# Patient Record
Sex: FEMALE | Race: WHITE | Employment: UNEMPLOYED | URBAN - METROPOLITAN AREA
[De-identification: names, ages, dates, MRNs, and addresses within clinical notes are randomized per-mention and may not be internally consistent; named-entity substitution may affect disease eponyms.]

---

## 2017-02-23 ENCOUNTER — HOSPITAL ENCOUNTER (EMERGENCY)
Age: 32
Discharge: HOME | End: 2017-02-24
Attending: EMERGENCY MEDICINE
Payer: COMMERCIAL

## 2017-02-23 DIAGNOSIS — G89.29 OTHER CHRONIC PAIN: ICD-10-CM

## 2017-02-23 DIAGNOSIS — F11.20 NARCOTIC DEPENDENCE (HCC): ICD-10-CM

## 2017-02-23 DIAGNOSIS — R10.9 FLANK PAIN: Primary | ICD-10-CM

## 2017-02-23 DIAGNOSIS — N39.0 URINARY TRACT INFECTION WITH HEMATURIA, SITE UNSPECIFIED: ICD-10-CM

## 2017-02-23 DIAGNOSIS — R31.9 URINARY TRACT INFECTION WITH HEMATURIA, SITE UNSPECIFIED: ICD-10-CM

## 2017-02-23 DIAGNOSIS — R31.9 HEMATURIA: ICD-10-CM

## 2017-02-23 LAB
ALBUMIN SERPL BCP-MCNC: 3.9 G/DL (ref 3.5–5)
ALBUMIN/GLOB SERPL: 1.3 {RATIO} (ref 1.1–2.2)
ALP SERPL-CCNC: 118 U/L (ref 45–117)
ALT SERPL-CCNC: 17 U/L (ref 12–78)
ANION GAP BLD CALC-SCNC: 8 MMOL/L (ref 5–15)
APPEARANCE UR: ABNORMAL
AST SERPL W P-5'-P-CCNC: 12 U/L (ref 15–37)
BACTERIA URNS QL MICRO: ABNORMAL /HPF
BASOPHILS # BLD AUTO: 0 K/UL (ref 0–0.1)
BASOPHILS # BLD: 0 % (ref 0–1)
BILIRUB SERPL-MCNC: 0.2 MG/DL (ref 0.2–1)
BILIRUB UR QL: NEGATIVE
BUN SERPL-MCNC: 19 MG/DL (ref 6–20)
BUN/CREAT SERPL: 21 (ref 12–20)
CALCIUM SERPL-MCNC: 8.7 MG/DL (ref 8.5–10.1)
CHLORIDE SERPL-SCNC: 104 MMOL/L (ref 97–108)
CO2 SERPL-SCNC: 27 MMOL/L (ref 21–32)
COLOR UR: ABNORMAL
CREAT SERPL-MCNC: 0.9 MG/DL (ref 0.55–1.02)
EOSINOPHIL # BLD: 0.3 K/UL (ref 0–0.4)
EOSINOPHIL NFR BLD: 4 % (ref 0–7)
EPITH CASTS URNS QL MICRO: ABNORMAL /LPF
ERYTHROCYTE [DISTWIDTH] IN BLOOD BY AUTOMATED COUNT: 12.6 % (ref 11.5–14.5)
GLOBULIN SER CALC-MCNC: 3 G/DL (ref 2–4)
GLUCOSE SERPL-MCNC: 73 MG/DL (ref 65–100)
GLUCOSE UR STRIP.AUTO-MCNC: NEGATIVE MG/DL
HCT VFR BLD AUTO: 41.1 % (ref 35–47)
HGB BLD-MCNC: 14 G/DL (ref 11.5–16)
HGB UR QL STRIP: ABNORMAL
HYALINE CASTS URNS QL MICRO: ABNORMAL /LPF (ref 0–5)
KETONES UR QL STRIP.AUTO: NEGATIVE MG/DL
LEUKOCYTE ESTERASE UR QL STRIP.AUTO: ABNORMAL
LYMPHOCYTES # BLD AUTO: 45 % (ref 12–49)
LYMPHOCYTES # BLD: 4.3 K/UL (ref 0.8–3.5)
MCH RBC QN AUTO: 32.3 PG (ref 26–34)
MCHC RBC AUTO-ENTMCNC: 34.1 G/DL (ref 30–36.5)
MCV RBC AUTO: 94.7 FL (ref 80–99)
MONOCYTES # BLD: 0.7 K/UL (ref 0–1)
MONOCYTES NFR BLD AUTO: 7 % (ref 5–13)
NEUTS SEG # BLD: 4.3 K/UL (ref 1.8–8)
NEUTS SEG NFR BLD AUTO: 44 % (ref 32–75)
NITRITE UR QL STRIP.AUTO: NEGATIVE
PH UR STRIP: 6 [PH] (ref 5–8)
PLATELET # BLD AUTO: 320 K/UL (ref 150–400)
POTASSIUM SERPL-SCNC: 4 MMOL/L (ref 3.5–5.1)
PROT SERPL-MCNC: 6.9 G/DL (ref 6.4–8.2)
PROT UR STRIP-MCNC: NEGATIVE MG/DL
RBC # BLD AUTO: 4.34 M/UL (ref 3.8–5.2)
RBC #/AREA URNS HPF: >100 /HPF (ref 0–5)
SODIUM SERPL-SCNC: 139 MMOL/L (ref 136–145)
SP GR UR REFRACTOMETRY: 1.03 (ref 1–1.03)
UA: UC IF INDICATED,UAUC: ABNORMAL
UROBILINOGEN UR QL STRIP.AUTO: 0.2 EU/DL (ref 0.2–1)
WBC # BLD AUTO: 9.7 K/UL (ref 3.6–11)
WBC URNS QL MICRO: ABNORMAL /HPF (ref 0–4)

## 2017-02-23 PROCEDURE — 81001 URINALYSIS AUTO W/SCOPE: CPT | Performed by: EMERGENCY MEDICINE

## 2017-02-23 PROCEDURE — 87086 URINE CULTURE/COLONY COUNT: CPT | Performed by: EMERGENCY MEDICINE

## 2017-02-23 PROCEDURE — 85025 COMPLETE CBC W/AUTO DIFF WBC: CPT | Performed by: EMERGENCY MEDICINE

## 2017-02-23 PROCEDURE — 96376 TX/PRO/DX INJ SAME DRUG ADON: CPT

## 2017-02-23 PROCEDURE — 99282 EMERGENCY DEPT VISIT SF MDM: CPT

## 2017-02-23 PROCEDURE — 80053 COMPREHEN METABOLIC PANEL: CPT | Performed by: EMERGENCY MEDICINE

## 2017-02-23 PROCEDURE — 36415 COLL VENOUS BLD VENIPUNCTURE: CPT | Performed by: EMERGENCY MEDICINE

## 2017-02-23 PROCEDURE — 96375 TX/PRO/DX INJ NEW DRUG ADDON: CPT

## 2017-02-23 PROCEDURE — 96374 THER/PROPH/DIAG INJ IV PUSH: CPT

## 2017-02-23 RX ORDER — HYDROMORPHONE HYDROCHLORIDE 1 MG/ML
1 INJECTION, SOLUTION INTRAMUSCULAR; INTRAVENOUS; SUBCUTANEOUS
Status: COMPLETED | OUTPATIENT
Start: 2017-02-24 | End: 2017-02-24

## 2017-02-23 RX ORDER — ONDANSETRON 2 MG/ML
4 INJECTION INTRAMUSCULAR; INTRAVENOUS
Status: COMPLETED | OUTPATIENT
Start: 2017-02-24 | End: 2017-02-24

## 2017-02-24 ENCOUNTER — APPOINTMENT (OUTPATIENT)
Dept: CT IMAGING | Age: 32
End: 2017-02-24
Attending: EMERGENCY MEDICINE
Payer: COMMERCIAL

## 2017-02-24 VITALS
BODY MASS INDEX: 39.19 KG/M2 | RESPIRATION RATE: 16 BRPM | OXYGEN SATURATION: 100 % | HEIGHT: 62 IN | DIASTOLIC BLOOD PRESSURE: 71 MMHG | HEART RATE: 74 BPM | TEMPERATURE: 98.1 F | SYSTOLIC BLOOD PRESSURE: 105 MMHG | WEIGHT: 212.96 LBS

## 2017-02-24 PROCEDURE — 74011250636 HC RX REV CODE- 250/636: Performed by: EMERGENCY MEDICINE

## 2017-02-24 PROCEDURE — 74176 CT ABD & PELVIS W/O CONTRAST: CPT

## 2017-02-24 RX ORDER — NITROFURANTOIN 25; 75 MG/1; MG/1
100 CAPSULE ORAL 2 TIMES DAILY
Qty: 6 CAP | Refills: 0 | Status: SHIPPED | OUTPATIENT
Start: 2017-02-24 | End: 2017-02-27

## 2017-02-24 RX ORDER — PREDNISONE 20 MG/1
20 TABLET ORAL 2 TIMES DAILY
COMMUNITY
End: 2017-03-22

## 2017-02-24 RX ORDER — HYDROMORPHONE HYDROCHLORIDE 1 MG/ML
0.5 INJECTION, SOLUTION INTRAMUSCULAR; INTRAVENOUS; SUBCUTANEOUS
Status: COMPLETED | OUTPATIENT
Start: 2017-02-24 | End: 2017-02-24

## 2017-02-24 RX ORDER — OXYCODONE HYDROCHLORIDE 5 MG/1
5 CAPSULE ORAL
Qty: 12 CAP | Refills: 0 | Status: SHIPPED | OUTPATIENT
Start: 2017-02-24 | End: 2017-04-11 | Stop reason: CLARIF

## 2017-02-24 RX ORDER — VENLAFAXINE 100 MG/1
75 TABLET ORAL DAILY
COMMUNITY
End: 2018-06-17

## 2017-02-24 RX ADMIN — HYDROMORPHONE HYDROCHLORIDE 1 MG: 1 INJECTION, SOLUTION INTRAMUSCULAR; INTRAVENOUS; SUBCUTANEOUS at 00:44

## 2017-02-24 RX ADMIN — ONDANSETRON HYDROCHLORIDE 4 MG: 2 INJECTION, SOLUTION INTRAMUSCULAR; INTRAVENOUS at 00:44

## 2017-02-24 RX ADMIN — HYDROMORPHONE HYDROCHLORIDE 0.5 MG: 1 INJECTION, SOLUTION INTRAMUSCULAR; INTRAVENOUS; SUBCUTANEOUS at 03:00

## 2017-02-24 RX ADMIN — HYDROMORPHONE HYDROCHLORIDE 0.5 MG: 1 INJECTION, SOLUTION INTRAMUSCULAR; INTRAVENOUS; SUBCUTANEOUS at 02:01

## 2017-02-24 NOTE — DISCHARGE INSTRUCTIONS
Chronic Pain: Care Instructions  Your Care Instructions  Chronic pain is pain that lasts a long time (months or even years) and may or may not have a clear cause. It is different from acute pain, which usually does have a clear cause--like an injury or illness--and gets better over time. Chronic pain:  · Lasts over time but may vary from day to day. · Does not go away despite efforts to end it. · May disrupt your sleep and lead to fatigue. · May cause depression or anxiety. · May make your muscles tense, causing more pain. · Can disrupt your work, hobbies, home life, and relationships with friends and family. Chronic pain is a very real condition. It is not just in your head. Treatment can help and usually includes several methods used together, such as medicines, physical therapy, exercise, and other treatments. Learning how to relax and changing negative thought patterns can also help you cope. Chronic pain is complex. Taking an active role in your treatment will help you better manage your pain. Tell your doctor if you have trouble dealing with your pain. You may have to try several things before you find what works best for you. Follow-up care is a key part of your treatment and safety. Be sure to make and go to all appointments, and call your doctor if you are having problems. Its also a good idea to know your test results and keep a list of the medicines you take. How can you care for yourself at home? · Pace yourself. Break up large jobs into smaller tasks. Save harder tasks for days when you have less pain, or go back and forth between hard tasks and easier ones. Take rest breaks. · Relax, and reduce stress. Relaxation techniques such as deep breathing or meditation can help. · Keep moving. Gentle, daily exercise can help reduce pain over the long run. Try low- or no-impact exercises such as walking, swimming, and stationary biking. Do stretches to stay flexible.   · Try heat, cold packs, and massage. · Get enough sleep. Chronic pain can make you tired and drain your energy. Talk with your doctor if you have trouble sleeping because of pain. · Think positive. Your thoughts can affect your pain level. Do things that you enjoy to distract yourself when you have pain instead of focusing on the pain. See a movie, read a book, listen to music, or spend time with a friend. · If you think you are depressed, talk to your doctor about treatment. · Keep a daily pain diary. Record how your moods, thoughts, sleep patterns, activities, and medicine affect your pain. You may find that your pain is worse during or after certain activities or when you are feeling a certain emotion. Having a record of your pain can help you and your doctor find the best ways to treat your pain. · Take pain medicines exactly as directed. ¨ If the doctor gave you a prescription medicine for pain, take it as prescribed. ¨ If you are not taking a prescription pain medicine, ask your doctor if you can take an over-the-counter medicine. Reducing constipation caused by pain medicine  · Include fruits, vegetables, beans, and whole grains in your diet each day. These foods are high in fiber. · Drink plenty of fluids, enough so that your urine is light yellow or clear like water. If you have kidney, heart, or liver disease and have to limit fluids, talk with your doctor before you increase the amount of fluids you drink. · If your doctor recommends it, get more exercise. Walking is a good choice. Bit by bit, increase the amount you walk every day. Try for at least 30 minutes on most days of the week. · Schedule time each day for a bowel movement. A daily routine may help. Take your time and do not strain when having a bowel movement. When should you call for help? Call your doctor now or seek immediate medical care if:  · Your pain gets worse or is out of control.   · You feel down or blue, or you do not enjoy things like you once did. You may be depressed, which is common in people with chronic pain. Depression can be treated. · You have vomiting or cramps for more than 2 hours. Watch closely for changes in your health, and be sure to contact your doctor if:  · You cannot sleep because of pain. · You are very worried or anxious about your pain. · You have trouble taking your pain medicine. · You have any concerns about your pain medicine. · You have trouble with bowel movements, such as:  ¨ No bowel movement in 3 days. ¨ Blood in the anal area, in your stool, or on the toilet paper. ¨ Diarrhea for more than 24 hours. Where can you learn more? Go to http://kymberly-yoon.info/. Enter N004 in the search box to learn more about \"Chronic Pain: Care Instructions. \"  Current as of: February 19, 2016  Content Version: 11.1  © 2006-2016 [employer]. Care instructions adapted under license by [employer] (which disclaims liability or warranty for this information). If you have questions about a medical condition or this instruction, always ask your healthcare professional. [place] [address] any warranty or liability for your use of this information. Blood in the Urine: Care Instructions  Your Care Instructions  Blood in the urine, or hematuria, may make the urine look red, brown, or pink. There may be blood every time you urinate or just from time to time. You cannot always see blood in the urine, but it will show up in a urine test.  Blood in the urine may be serious. It should always be checked by a doctor. Your doctor may recommend more tests, including an X-ray, a CT scan, or a cystoscopy (which lets a doctor look inside the urethra and bladder). Blood in the urine can be a sign of another problem. Common causes are bladder infections and kidney stones. An injury to your groin or your genital area can also cause bleeding in the urinary tract.  Very hard exercise--such as running a marathon--can cause blood in the urine. Blood in the urine can also be a sign of kidney disease or cancer in the bladder or kidney. Many cases of blood in the urine are caused by a harmless condition that runs in families. This is called benign familial hematuria. It does not need any treatment. Sometimes your urine may look red or brown even though it does not contain blood. For example, not getting enough fluids (dehydration), taking certain medicines, or having a liver problem can change the color of your urine. Eating foods such as beets, rhubarb, or blackberries or foods with red food coloring can make your urine look red or pink. Follow-up care is a key part of your treatment and safety. Be sure to make and go to all appointments, and call your doctor if you are having problems. It's also a good idea to know your test results and keep a list of the medicines you take. When should you call for help? Call your doctor now or seek immediate medical care if:  · You have symptoms of a urinary infection. For example:  ¨ You have pus in your urine. ¨ You have pain in your back just below your rib cage. This is called flank pain. ¨ You have a fever, chills, or body aches. ¨ It hurts to urinate. ¨ You have groin or belly pain. · You have more blood in your urine. Watch closely for changes in your health, and be sure to contact your doctor if:  · You have new urination problems. · You do not get better as expected. Where can you learn more? Go to http://kymberly-yoon.info/. Enter M524 in the search box to learn more about \"Blood in the Urine: Care Instructions. \"  Current as of: August 12, 2016  Content Version: 11.1  © 5451-5814 [employer]. Care instructions adapted under license by [employer] (which disclaims liability or warranty for this information).  If you have questions about a medical condition or this instruction, always ask your healthcare professional. [address] any warranty or liability for your use of this information. Abdominal Pain: Care Instructions  Your Care Instructions    Abdominal pain has many possible causes. Some aren't serious and get better on their own in a few days. Others need more testing and treatment. If your pain continues or gets worse, you need to be rechecked and may need more tests to find out what is wrong. You may need surgery to correct the problem. Don't ignore new symptoms, such as fever, nausea and vomiting, urination problems, pain that gets worse, and dizziness. These may be signs of a more serious problem. Your doctor may have recommended a follow-up visit in the next 8 to 12 hours. If you are not getting better, you may need more tests or treatment. The doctor has checked you carefully, but problems can develop later. If you notice any problems or new symptoms, get medical treatment right away. Follow-up care is a key part of your treatment and safety. Be sure to make and go to all appointments, and call your doctor if you are having problems. It's also a good idea to know your test results and keep a list of the medicines you take. How can you care for yourself at home? · Rest until you feel better. · To prevent dehydration, drink plenty of fluids, enough so that your urine is light yellow or clear like water. Choose water and other caffeine-free clear liquids until you feel better. If you have kidney, heart, or liver disease and have to limit fluids, talk with your doctor before you increase the amount of fluids you drink. · If your stomach is upset, eat mild foods, such as rice, dry toast or crackers, bananas, and applesauce. Try eating several small meals instead of two or three large ones. · Wait until 48 hours after all symptoms have gone away before you have spicy foods, alcohol, and drinks that contain caffeine. · Do not eat foods that are high in fat.   · Avoid anti-inflammatory medicines such as aspirin, ibuprofen (Advil, Motrin), and naproxen (Aleve). These can cause stomach upset. Talk to your doctor if you take daily aspirin for another health problem. When should you call for help? Call 911 anytime you think you may need emergency care. For example, call if:  · You passed out (lost consciousness). · You pass maroon or very bloody stools. · You vomit blood or what looks like coffee grounds. · You have new, severe belly pain. Call your doctor now or seek immediate medical care if:  · Your pain gets worse, especially if it becomes focused in one area of your belly. · You have a new or higher fever. · Your stools are black and look like tar, or they have streaks of blood. · You have unexpected vaginal bleeding. · You have symptoms of a urinary tract infection. These may include:  ¨ Pain when you urinate. ¨ Urinating more often than usual.  ¨ Blood in your urine. · You are dizzy or lightheaded, or you feel like you may faint. Watch closely for changes in your health, and be sure to contact your doctor if:  · You are not getting better after 1 day (24 hours). Where can you learn more? Go to http://kymberly-yoon.info/. Enter X855 in the search box to learn more about \"Abdominal Pain: Care Instructions. \"  Current as of: May 27, 2016  Content Version: 11.1  © 9236-7910 [email or web address]. Care instructions adapted under license by [employer] (which disclaims liability or warranty for this information). If you have questions about a medical condition or this instruction, always ask your healthcare professional. [place] [address] any warranty or liability for your use of this information.

## 2017-02-24 NOTE — ED PROVIDER NOTES
HPI Comments: Shona Muñoz is a 33 y.o. female, pmhx significant for kidney stone, GERD, IBS, ovarian cyst, lupus, who presents ambulatory to the ED c/o acute on chronic BL flank pain with associated nausea, hematuria, inability to fully empty bladder and suprapubic pain x today. Pt states that she saw her rheumatologist a few weeks ago and stated that her kidneys were inflamed, which was typical for lupus pts, via US. He rx'd her a course of 20 mg BID of prednisone, of which she has 7 more days. He told her that if she has increased pain to go into the ED to be evaluated. He also found on a R kidney stone on US 3 days ago. Pt has taken zofran and tramadol with no relief from her sxs. Of note, pt reports that her creatinine was increased 1.5 weeks ago but her urine came back clean. Pt specifically denies fever or vomiting. PCP: Evelia Merlin, MD      Social Hx: -tobacco, -EtOH, -Illicit Drugs   FHx: no pertinent family hx   Medication Allergies: compazine, penicillins, sulfa, topamax, toradol, valproic acid      There are no other complaints, changes, or physical findings at this time. The history is provided by the patient.         Past Medical History:   Diagnosis Date    Abnormal CT of the abdomen 11/8/2012    Asthma     Autoimmune disease ([facility] [address]     lupus    Cervical prolapse     Dehydration     Fatty liver     Gastrointestinal disorder     gerd, twisted colon, IBS    GERD (gastroesophageal reflux disease)     Headache(784.0)     Morbid obesity (HCC)     Nausea & vomiting     Neurological disorder     cluster headaches    other 2006, 2009    ovarian cyst removal    Other ill-defined conditions(799.89)     Worsening headaches        Past Surgical History:   Procedure Laterality Date    COLONOSCOPY  9/21/2010         HX CHOLECYSTECTOMY      HX GYN  1/2009    right salpingo oopherectomy    HX GYN  2006    right ovarian tumor removed    HX HEENT      left wisdom teeth removal  HX HEENT      top right wisdom tooth removed    HX HERNIA REPAIR  4/2012    HX HERNIA REPAIR  2/28/13    Laparoscopic recurrent incisional hernia repair    HX UROLOGICAL      Kidney Stone Removal    [place] EGD TRANSORAL BIOPSY SINGLE/MULTIPLE  9/22/2010              History reviewed. No pertinent family history. Social History     Social History    Marital status:      Spouse name: N/A    Number of children: N/A    Years of education: N/A     Occupational History    Not on file. Social History Main Topics    Smoking status: Never Smoker    Smokeless tobacco: Never Used    Alcohol use No    Drug use: No    Sexual activity: Not Currently     Partners: Male     Birth control/ protection: Abstinence     Other Topics Concern    Not on file     Social History Narrative         ALLERGIES: Latex; Compazine [prochlorperazine]; Pcn [penicillins]; Sulfa (sulfonamide antibiotics); Topamax [topiramate]; Adhesive; Mushroom combination no.1; Toradol [ketorolac tromethamine]; and Valproic acid    Review of Systems   Constitutional: Negative for activity change, appetite change, fatigue and fever. HENT: Negative. Negative for congestion, rhinorrhea and sore throat. Respiratory: Negative. Negative for cough, shortness of breath and wheezing. Cardiovascular: Negative. Negative for chest pain and leg swelling. Gastrointestinal: Positive for abdominal pain (suprapubic) and nausea. Negative for abdominal distention, constipation, diarrhea and vomiting. Endocrine: Negative. Genitourinary: Positive for flank pain (BL) and hematuria. Negative for difficulty urinating, dysuria, menstrual problem, vaginal bleeding and vaginal discharge.        + inability to empty bladder    Musculoskeletal: Negative for arthralgias, joint swelling and myalgias. Skin: Negative. Negative for rash. Neurological: Negative. Negative for dizziness, weakness, light-headedness and headaches. Psychiatric/Behavioral: Negative. All other systems reviewed and are negative. Patient Vitals for the past 12 hrs:   Temp Pulse Resp BP SpO2   02/23/17 2248 98.1 °F (36.7 °C) 76 16 117/74 100 %       Physical Exam   Constitutional: She is oriented to person, place, and time. She appears well-developed. No distress (no acute distress). Non - toxic  Overweight    HENT:   Head: Atraumatic. Eyes: EOM are normal.   Cardiovascular: Normal rate, regular rhythm, normal heart sounds and intact distal pulses. Exam reveals no gallop and no friction rub. No murmur heard. Pulmonary/Chest: Effort normal and breath sounds normal. No respiratory distress. She has no wheezes. She has no rales. She exhibits no tenderness. Abdominal: Soft. Bowel sounds are normal. She exhibits no distension and no mass. There is tenderness (mild BL CVA tenderness). There is no rebound and no guarding. Musculoskeletal: Normal range of motion. She exhibits no edema or tenderness. Neurological: She is oriented to person, place, and time. Skin: Skin is warm. Psychiatric: She has a normal mood and affect. Nursing note and vitals reviewed. MDM  Number of Diagnoses or Management Options  Flank pain:   Hematuria:   Urinary tract infection with hematuria, site unspecified:   Diagnosis management comments: DDx: UTI, pyelonephritis, acute on chronic kidney disease, lupus flare, ureteral stone, kidney stone        Amount and/or Complexity of Data Reviewed  Clinical lab tests: reviewed and ordered  Tests in the radiology section of CPT®: reviewed and ordered  Review and summarize past medical records: yes  Independent visualization of images, tracings, or specimens: yes    Patient Progress  Patient progress: stable      Progress Note:  Pt had good relief from IV pain medication. Awaiting CT.   Lizzie Burr MD    Progress Note:  Unable to obtain records from OSH. OSH denies record of patient visiting this facility despite patient's report of such visit. Nelsy Rodriguez MD    Progress Note:  Pt asking about how to manage a skin lesion inside her nose at time of discharge. Pt was referred back to her physician for further management of lesion she feels is related to her SLE. Progress Note:  Pt has not had any N/V during ED visit. Pain controlled at time of discharge. Nelsy Rodriguez MD      Procedures  LABORATORY TESTS:  Recent Results (from the past 12 hour(s))   CBC WITH AUTOMATED DIFF    Collection Time: 02/23/17 11:01 PM   Result Value Ref Range    WBC 9.7 3.6 - 11.0 K/uL    RBC 4.34 3.80 - 5.20 M/uL    HGB 14.0 11.5 - 16.0 g/dL    HCT 41.1 35.0 - 47.0 %    MCV 94.7 80.0 - 99.0 FL    MCH 32.3 26.0 - 34.0 PG    MCHC 34.1 30.0 - 36.5 g/dL    RDW 12.6 11.5 - 14.5 %    PLATELET 831 118 - 291 K/uL    NEUTROPHILS 44 32 - 75 %    LYMPHOCYTES 45 12 - 49 %    MONOCYTES 7 5 - 13 %    EOSINOPHILS 4 0 - 7 %    BASOPHILS 0 0 - 1 %    ABS. NEUTROPHILS 4.3 1.8 - 8.0 K/UL    ABS. LYMPHOCYTES 4.3 (H) 0.8 - 3.5 K/UL    ABS. MONOCYTES 0.7 0.0 - 1.0 K/UL    ABS. EOSINOPHILS 0.3 0.0 - 0.4 K/UL    ABS. BASOPHILS 0.0 0.0 - 0.1 K/UL   METABOLIC PANEL, COMPREHENSIVE    Collection Time: 02/23/17 11:01 PM   Result Value Ref Range    Sodium 139 136 - 145 mmol/L    Potassium 4.0 3.5 - 5.1 mmol/L    Chloride 104 97 - 108 mmol/L    CO2 27 21 - 32 mmol/L    Anion gap 8 5 - 15 mmol/L    Glucose 73 65 - 100 mg/dL    BUN 19 6 - 20 MG/DL    Creatinine 0.90 0.55 - 1.02 MG/DL    BUN/Creatinine ratio 21 (H) 12 - 20      GFR est AA >60 >60 ml/min/1.73m2    GFR est non-AA >60 >60 ml/min/1.73m2    Calcium 8.7 8.5 - 10.1 MG/DL    Bilirubin, total 0.2 0.2 - 1.0 MG/DL    ALT (SGPT) 17 12 - 78 U/L    AST (SGOT) 12 (L) 15 - 37 U/L    Alk.  phosphatase 118 (H) 45 - 117 U/L    Protein, total 6.9 6.4 - 8.2 g/dL    Albumin 3.9 3.5 - 5.0 g/dL    Globulin 3.0 2.0 - 4.0 g/dL    A-G Ratio 1.3 1.1 - 2.2     URINALYSIS W/ REFLEX CULTURE    Collection Time: 02/23/17 11:19 PM   Result Value Ref Range    Color YELLOW/STRAW      Appearance CLOUDY (A) CLEAR      Specific gravity 1.026 1.003 - 1.030      pH (UA) 6.0 5.0 - 8.0      Protein NEGATIVE  NEG mg/dL    Glucose NEGATIVE  NEG mg/dL    Ketone NEGATIVE  NEG mg/dL    Bilirubin NEGATIVE  NEG      Blood LARGE (A) NEG      Urobilinogen 0.2 0.2 - 1.0 EU/dL    Nitrites NEGATIVE  NEG      Leukocyte Esterase SMALL (A) NEG      WBC 10-20 0 - 4 /hpf    RBC >100 (H) 0 - 5 /hpf    Epithelial cells MANY (A) FEW /lpf    Bacteria 1+ (A) NEG /hpf    UA:UC IF INDICATED URINE CULTURE ORDERED (A) CNI      Hyaline cast 2-5 0 - 5 /lpf       IMAGING RESULTS:  CT Results  (Last 48 hours)               02/24/17 0128  CT ABD PELV WO CONT Final result    Impression:  IMPRESSION:   Punctate nonobstructing right renal stone. No ureteral stone or hydronephrosis. No acute abnormality. Narrative:  INDICATION: Bilateral flank pain right greater than left for one week       COMPARISON: 12/8/2016       TECHNIQUE:    Thin axial images were obtained through the abdomen and pelvis. Coronal and   sagittal reconstructions were generated. Oral contrast was not administered. CT   dose reduction was achieved through use of a standardized protocol tailored for   this examination and automatic exposure control for dose modulation. The absence of intravenous contrast material reduces the sensitivity for   evaluation of the solid parenchymal organs of the abdomen. FINDINGS:    LUNG BASES: Clear. INCIDENTALLY IMAGED HEART AND MEDIASTINUM: Unremarkable. LIVER: No mass or biliary dilatation. GALLBLADDER: Surgically absent. SPLEEN: No mass. PANCREAS: No mass or ductal dilatation. ADRENALS: Unremarkable. KIDNEYS/URETERS: Subcentimeter right renal cyst is stable. There is a punctate   nonobstructing right renal stone. No ureteral stone or hydronephrosis. STOMACH: Unremarkable. SMALL BOWEL: No dilatation or wall thickening.    COLON: There is moderate fecal stasis without colonic distention   APPENDIX: Unremarkable. PERITONEUM: No ascites or pneumoperitoneum. RETROPERITONEUM: No lymphadenopathy or aortic aneurysm. REPRODUCTIVE ORGANS: The uterus is surgically absent. URINARY BLADDER: No mass or calculus. BONES: No destructive bone lesion. ADDITIONAL COMMENTS: N/A                 MEDICATIONS GIVEN:  Medications   sodium chloride 0.9 % bolus infusion 1,000 mL (1,000 mL IntraVENous Refused 2/24/17 0000)   HYDROmorphone (PF) (DILAUDID) injection 0.5 mg (not administered)   ondansetron (ZOFRAN) injection 4 mg (4 mg IntraVENous Given 2/24/17 0044)   HYDROmorphone (PF) (DILAUDID) injection 1 mg (1 mg IntraVENous Given 2/24/17 0044)       IMPRESSION:  1. Flank pain    2. Hematuria    3. Urinary tract infection with hematuria, site unspecified        PLAN:  1. Current Discharge Medication List      CONTINUE these medications which have NOT CHANGED    Details   predniSONE (DELTASONE) 20 mg tablet Take 20 mg by mouth two (2) times a day. venlafaxine (EFFEXOR) 100 mg tablet Take 150 mg by mouth daily. ondansetron (ZOFRAN ODT) 4 mg disintegrating tablet Take 1 Tab by mouth every eight (8) hours as needed for Nausea. Qty: 10 Tab, Refills: 0      rizatriptan (MAXALT) 10 mg tablet Take 10 mg by mouth once as needed for Migraine. May repeat in 2 hours if needed      belimumab (BENLYSTA) 400 mg solr injection by IntraVENous route. ALPRAZolam (XANAX) 1 mg tablet Take 1 Tab by mouth nightly. Max Daily Amount: 1 mg. Qty: 30 Tab, Refills: 6    Associated Diagnoses: Chronic migraine without aura, without mention of intractable migraine without mention of status migrainosus; Anxiety; Lupus (HCC)      mirabegron ER (MYRBETRIQ) 50 mg ER tablet Take 50 mg by mouth daily.  Indications: BLADDER HYPERACTIVITY      omeprazole (PRILOSEC) 20 mg capsule            2.   Follow-up Information     None        Return to ED if worse   DISCHARGE NOTE:  1:54 AM  The patient's results have been reviewed with family and/or caregiver. They verbally convey their understanding and agreement of the patient's signs, symptoms, diagnosis, treatment, and prognosis. They additionally agree to follow up as recommended in the discharge instructions or to return to the Emergency Room should the patient's condition change prior to their follow-up appointment. The family and/or caregiver verbally agrees with the care-plan and all of their questions have been answered. The discharge instructions have also been provided to the them along with educational information regarding the patient's diagnosis and a list of reasons why the patient would want to return to the ER prior to their follow-up appointment should their condition change. Written by Cesar Krause, ANDRE Scribe, as dictated by Estela Ceballos MD.    This note is prepared by Cesar Krause acting as scribe for MD Estela Arana MD : The scribe's documentation has been prepared under my direction and personally reviewed by me in its entirety. I confirm that the note above accurately reflects all work, treatment, procedures, and medical decision making performed by me.

## 2017-02-25 LAB
BACTERIA SPEC CULT: NORMAL
CC UR VC: NORMAL
SERVICE CMNT-IMP: NORMAL

## 2017-02-25 NOTE — PROGRESS NOTES
Called patient and discussed with patient urine results. No need for abx. Will f/u with Dr Thad Guan as needed. Ely Truong  Pagé FNP-BC

## 2017-03-15 ENCOUNTER — HOSPITAL ENCOUNTER (EMERGENCY)
Age: 32
Discharge: HOME | End: 2017-03-15
Attending: EMERGENCY MEDICINE
Payer: COMMERCIAL

## 2017-03-15 ENCOUNTER — APPOINTMENT (OUTPATIENT)
Dept: GENERAL RADIOLOGY | Age: 32
End: 2017-03-15
Attending: PHYSICIAN ASSISTANT
Payer: COMMERCIAL

## 2017-03-15 VITALS
SYSTOLIC BLOOD PRESSURE: 158 MMHG | HEART RATE: 59 BPM | DIASTOLIC BLOOD PRESSURE: 90 MMHG | TEMPERATURE: 98.4 F | BODY MASS INDEX: 38.58 KG/M2 | WEIGHT: 209.66 LBS | HEIGHT: 62 IN | RESPIRATION RATE: 18 BRPM | OXYGEN SATURATION: 97 %

## 2017-03-15 DIAGNOSIS — R05.9 COUGH: ICD-10-CM

## 2017-03-15 DIAGNOSIS — R07.89 CHEST WALL PAIN: ICD-10-CM

## 2017-03-15 DIAGNOSIS — R09.1 PLEURISY WITHOUT EFFUSION: Primary | ICD-10-CM

## 2017-03-15 LAB
ALBUMIN SERPL BCP-MCNC: 3.9 G/DL (ref 3.5–5)
ALBUMIN/GLOB SERPL: 1.4 {RATIO} (ref 1.1–2.2)
ALP SERPL-CCNC: 103 U/L (ref 45–117)
ALT SERPL-CCNC: 21 U/L (ref 12–78)
ANION GAP BLD CALC-SCNC: 5 MMOL/L (ref 5–15)
AST SERPL W P-5'-P-CCNC: 14 U/L (ref 15–37)
BASOPHILS # BLD AUTO: 0 K/UL (ref 0–0.1)
BASOPHILS # BLD: 0 % (ref 0–1)
BILIRUB SERPL-MCNC: 0.1 MG/DL (ref 0.2–1)
BUN SERPL-MCNC: 16 MG/DL (ref 6–20)
BUN/CREAT SERPL: 17 (ref 12–20)
CALCIUM SERPL-MCNC: 8.8 MG/DL (ref 8.5–10.1)
CHLORIDE SERPL-SCNC: 108 MMOL/L (ref 97–108)
CO2 SERPL-SCNC: 28 MMOL/L (ref 21–32)
CREAT SERPL-MCNC: 0.93 MG/DL (ref 0.55–1.02)
EOSINOPHIL # BLD: 0 K/UL (ref 0–0.4)
EOSINOPHIL NFR BLD: 0 % (ref 0–7)
ERYTHROCYTE [DISTWIDTH] IN BLOOD BY AUTOMATED COUNT: 12.9 % (ref 11.5–14.5)
GLOBULIN SER CALC-MCNC: 2.7 G/DL (ref 2–4)
GLUCOSE SERPL-MCNC: 105 MG/DL (ref 65–100)
HCT VFR BLD AUTO: 41.4 % (ref 35–47)
HGB BLD-MCNC: 14.3 G/DL (ref 11.5–16)
LYMPHOCYTES # BLD AUTO: 23 % (ref 12–49)
LYMPHOCYTES # BLD: 1.4 K/UL (ref 0.8–3.5)
MCH RBC QN AUTO: 32.6 PG (ref 26–34)
MCHC RBC AUTO-ENTMCNC: 34.5 G/DL (ref 30–36.5)
MCV RBC AUTO: 94.3 FL (ref 80–99)
MONOCYTES # BLD: 0.6 K/UL (ref 0–1)
MONOCYTES NFR BLD AUTO: 10 % (ref 5–13)
NEUTS SEG # BLD: 4.2 K/UL (ref 1.8–8)
NEUTS SEG NFR BLD AUTO: 67 % (ref 32–75)
PLATELET # BLD AUTO: 240 K/UL (ref 150–400)
POTASSIUM SERPL-SCNC: 4.1 MMOL/L (ref 3.5–5.1)
PROT SERPL-MCNC: 6.6 G/DL (ref 6.4–8.2)
RBC # BLD AUTO: 4.39 M/UL (ref 3.8–5.2)
SODIUM SERPL-SCNC: 141 MMOL/L (ref 136–145)
WBC # BLD AUTO: 6.3 K/UL (ref 3.6–11)

## 2017-03-15 PROCEDURE — 74011250637 HC RX REV CODE- 250/637: Performed by: PHYSICIAN ASSISTANT

## 2017-03-15 PROCEDURE — 80053 COMPREHEN METABOLIC PANEL: CPT

## 2017-03-15 PROCEDURE — 74011250636 HC RX REV CODE- 250/636: Performed by: PHYSICIAN ASSISTANT

## 2017-03-15 PROCEDURE — 96375 TX/PRO/DX INJ NEW DRUG ADDON: CPT

## 2017-03-15 PROCEDURE — 71020 XR CHEST PA LAT: CPT

## 2017-03-15 PROCEDURE — 36415 COLL VENOUS BLD VENIPUNCTURE: CPT

## 2017-03-15 PROCEDURE — 96374 THER/PROPH/DIAG INJ IV PUSH: CPT

## 2017-03-15 PROCEDURE — 99283 EMERGENCY DEPT VISIT LOW MDM: CPT

## 2017-03-15 PROCEDURE — 85025 COMPLETE CBC W/AUTO DIFF WBC: CPT

## 2017-03-15 RX ORDER — MELOXICAM 15 MG/1
15 TABLET ORAL DAILY
Qty: 10 TAB | Refills: 0 | Status: SHIPPED | OUTPATIENT
Start: 2017-03-15 | End: 2017-03-22

## 2017-03-15 RX ORDER — GUAIFENESIN 100 MG/5ML
100 SOLUTION ORAL
Qty: 1 BOTTLE | Refills: 0 | Status: SHIPPED | OUTPATIENT
Start: 2017-03-15 | End: 2017-04-11 | Stop reason: CLARIF

## 2017-03-15 RX ORDER — HYDROMORPHONE HYDROCHLORIDE 1 MG/ML
1 INJECTION, SOLUTION INTRAMUSCULAR; INTRAVENOUS; SUBCUTANEOUS
Status: COMPLETED | OUTPATIENT
Start: 2017-03-15 | End: 2017-03-15

## 2017-03-15 RX ORDER — GUAIFENESIN 100 MG/5ML
100 SOLUTION ORAL
Status: COMPLETED | OUTPATIENT
Start: 2017-03-15 | End: 2017-03-15

## 2017-03-15 RX ORDER — HYDROMORPHONE HYDROCHLORIDE 1 MG/ML
1 INJECTION, SOLUTION INTRAMUSCULAR; INTRAVENOUS; SUBCUTANEOUS
Status: DISCONTINUED | OUTPATIENT
Start: 2017-03-15 | End: 2017-03-15

## 2017-03-15 RX ORDER — MORPHINE SULFATE 2 MG/ML
6 INJECTION, SOLUTION INTRAMUSCULAR; INTRAVENOUS ONCE
Status: COMPLETED | OUTPATIENT
Start: 2017-03-15 | End: 2017-03-15

## 2017-03-15 RX ORDER — ONDANSETRON 2 MG/ML
4 INJECTION INTRAMUSCULAR; INTRAVENOUS ONCE
Status: COMPLETED | OUTPATIENT
Start: 2017-03-15 | End: 2017-03-15

## 2017-03-15 RX ORDER — HYDROCODONE BITARTRATE AND HOMATROPINE METHYLBROMIDE 1.5; 5 MG/5ML; MG/5ML
5 SYRUP ORAL 4 TIMES DAILY
Qty: 120 ML | Status: SHIPPED | OUTPATIENT
Start: 2017-03-15 | End: 2017-03-22

## 2017-03-15 RX ORDER — FLUCONAZOLE 150 MG/1
150 TABLET ORAL
Qty: 1 TAB | Refills: 0 | Status: SHIPPED | OUTPATIENT
Start: 2017-03-15 | End: 2017-03-15

## 2017-03-15 RX ORDER — HYDROCODONE POLISTIREX AND CHLORPHENIRAMINE POLISTIREX 10; 8 MG/5ML; MG/5ML
5 SUSPENSION, EXTENDED RELEASE ORAL
Status: COMPLETED | OUTPATIENT
Start: 2017-03-15 | End: 2017-03-15

## 2017-03-15 RX ADMIN — ONDANSETRON HYDROCHLORIDE 4 MG: 2 INJECTION, SOLUTION INTRAMUSCULAR; INTRAVENOUS at 17:37

## 2017-03-15 RX ADMIN — METHYLPREDNISOLONE SODIUM SUCCINATE 125 MG: 125 INJECTION, POWDER, FOR SOLUTION INTRAMUSCULAR; INTRAVENOUS at 17:36

## 2017-03-15 RX ADMIN — HYDROMORPHONE HYDROCHLORIDE 1 MG: 1 INJECTION, SOLUTION INTRAMUSCULAR; INTRAVENOUS; SUBCUTANEOUS at 18:48

## 2017-03-15 RX ADMIN — Medication 6 MG: at 17:37

## 2017-03-15 RX ADMIN — GUAIFENESIN 100 MG: 100 SOLUTION ORAL at 17:51

## 2017-03-15 RX ADMIN — HYDROCODONE POLISTIREX AND CHLORPHENIRAMINE POLISTIREX 5 ML: 10; 8 SUSPENSION, EXTENDED RELEASE ORAL at 17:51

## 2017-03-15 NOTE — ED PROVIDER NOTES
HPI Comments: Jayda Nina is a 33 y.o. female with a PMH of asthma, lupus, GERD, IBS, and cluster headaches presenting ambulatory to the ED from home C/O cough for the past week. She states that she saw her PCP last week and had a chest xray and flu swab completed at the time. She states that her PCP told her it looked like she had the start of a pneumonia on her CXR so she was given a Zpak, Hydromet, and prednisone. She reports that she was not feeling any relief and called her PCP back on Monday and was given a prescription for Levaquin, which she has taken for the past 2 days. She reports very little relief, and ran out of the cough syrup at home. She states that she has had pleurisy in the past, and reports that she has very similar symptoms. She endorses chest pain with deep inspiration and during coughing spells, cough with some green/red mucus production, subjective fevers/chills, and generalized fatigue. She denies any recent travel, OCP use, leg swelling, or history of blood clots. Hysterectomy performed May 2016, with no other recent surgeries. She has taken ibuprofen and tylenol at home with no relief of her chest pain. Her pain is localized bilaterally along the anterior lower rib cage and is reproducible to palpation. She denies any recent sick contacts. She has home albuterol inhalers and nebulizers which she reports regular use of. Patient denies any other symptoms or complaints. PCP: Yolanda Sanders MD    There are no other complaints, changes or physical findings at this time. The history is provided by the patient. No  was used.         Past Medical History:   Diagnosis Date    Abnormal CT of the abdomen 11/8/2012    Asthma     Autoimmune disease (HCC)     lupus    Cervical prolapse     Dehydration     Fatty liver     Gastrointestinal disorder     gerd, twisted colon, IBS    GERD (gastroesophageal reflux disease)     Headache(784.0)     [facility]     Nausea & vomiting     Neurological disorder     cluster headaches    other 2006, 2009    ovarian cyst removal    Other ill-defined conditions(799.89)     Worsening headaches        Past Surgical History:   Procedure Laterality Date    COLONOSCOPY  9/21/2010         HX CHOLECYSTECTOMY      HX GYN  1/2009    right salpingo oopherectomy    HX GYN  2006    right ovarian tumor removed    HX HEENT      left wisdom teeth removal    HX HEENT      top right wisdom tooth removed    HX HERNIA REPAIR  4/2012    HX HERNIA REPAIR  2/28/13    Laparoscopic recurrent incisional hernia repair    HX UROLOGICAL      Kidney Stone Removal    FL EGD TRANSORAL BIOPSY SINGLE/MULTIPLE  9/22/2010              History reviewed. No pertinent family history. Social History     Social History    Marital status:      Spouse name: N/A    Number of children: N/A    Years of education: N/A     Occupational History    Not on file. Social History Main Topics    Smoking status: Never Smoker    Smokeless tobacco: Never Used    Alcohol use No    Drug use: No    Sexual activity: Not Currently     Partners: Male     Birth control/ protection: Abstinence     Other Topics Concern    Not on file     Social History Narrative         ALLERGIES: Latex; Compazine [prochlorperazine]; Pcn [penicillins]; Sulfa (sulfonamide antibiotics); Topamax [topiramate]; Adhesive; Mushroom combination no.1; Toradol [ketorolac tromethamine]; and Valproic acid    Review of Systems   Constitutional: Positive for chills and fever. HENT: Positive for sore throat. Eyes: Negative for pain. Respiratory: Positive for cough, chest tightness and shortness of breath. Cardiovascular: Positive for chest pain. Gastrointestinal: Negative for abdominal pain, diarrhea, nausea and vomiting. Genitourinary: Negative for dysuria and hematuria. Musculoskeletal: Negative for arthralgias and myalgias. Skin: Negative for rash. Neurological: Negative for dizziness, light-headedness, numbness and headaches. Psychiatric/Behavioral: Negative for behavioral problems and confusion. Vitals:    03/15/17 1632 03/15/17 1848   BP: 127/87 158/90   Pulse: 80 (!) 59   Resp: 20 18   Temp: 98.4 °F (36.9 °C)    SpO2: 98% 97%   Weight: 95.1 kg (209 lb 10.5 oz)    Height: 5' 2\" (1.575 m)             Physical Exam   Constitutional: She is oriented to person, place, and time. She appears well-developed and well-nourished. No distress. 32 y/o  female, dry hacking cough throughout exam   HENT:   Head: Normocephalic and atraumatic. Right Ear: Hearing, tympanic membrane, external ear and ear canal normal. Tympanic membrane is not injected. Left Ear: Hearing, tympanic membrane, external ear and ear canal normal. Tympanic membrane is not injected. Nose: Nose normal. No mucosal edema. Right sinus exhibits no maxillary sinus tenderness and no frontal sinus tenderness. Left sinus exhibits no maxillary sinus tenderness and no frontal sinus tenderness. Mouth/Throat: Mucous membranes are dry. Posterior oropharyngeal erythema present. No oropharyngeal exudate or posterior oropharyngeal edema. Eyes: Conjunctivae and EOM are normal. Right conjunctiva is not injected. Left conjunctiva is not injected. Neck: Normal range of motion. Neck supple. Cardiovascular: Normal rate and regular rhythm. No murmur heard. Pulmonary/Chest: Effort normal and breath sounds normal. She has no decreased breath sounds. She has no wheezes. She exhibits bony tenderness (bilateral anterior lower rib cage tenderness to palpation). Abdominal: Soft. Normal appearance and bowel sounds are normal. She exhibits no distension. There is no tenderness. There is no guarding. Musculoskeletal: Normal range of motion. She exhibits no edema or tenderness. Neurological: She is alert and oriented to person, place, and time. Skin: Skin is warm and dry. No rash noted. She is not diaphoretic. Psychiatric: She has a normal mood and affect. Her behavior is normal. Judgment normal.   Nursing note and vitals reviewed. MDM  Number of Diagnoses or Management Options  Diagnosis management comments: DDx: pneumonia, bronchitis, bronchospasm, URI, asthma, COPD, pneumothorax, pulmonary edema. Low suspicion for pulmonary embolism due to not tachycardic, fevers, no recent travel, leg swelling or blood clot risk factors. Patient presents with cough and associated chest pain. Will obtain labs, CXR and treat with cough suppressant, pain medications and solumedrol. Amount and/or Complexity of Data Reviewed  Clinical lab tests: ordered and reviewed  Tests in the radiology section of CPT®: ordered and reviewed  Review and summarize past medical records: yes    Patient Progress  Patient progress: stable    ED Course       Procedures    Chief Complaint   Patient presents with    Cough     x 1 wk, on second round of abx, reports blood tinge sputum        5:19 PM  The patients presenting problems have been discussed, and they are in agreement with the care plan formulated and outlined with them. I have encouraged them to ask questions as they arise throughout their visit.     MEDICATIONS GIVEN:  Medications   chlorpheniramine-HYDROcodone (TUSSIONEX) oral suspension 5 mL (5 mL Oral Given 3/15/17 1751)   morphine injection 6 mg (6 mg IntraVENous Given 3/15/17 1737)   methylPREDNISolone (PF) (SOLU-MEDROL) injection 125 mg (125 mg IntraVENous Given 3/15/17 1736)   ondansetron (ZOFRAN) injection 4 mg (4 mg IntraVENous Given 3/15/17 1737)   guaiFENesin (ROBITUSSIN) 100 mg/5 mL oral liquid 100 mg (100 mg Oral Given 3/15/17 1751)   HYDROmorphone (PF) (DILAUDID) injection 1 mg (1 mg IntraVENous Given 3/15/17 1848)       LABS REVIEWED:  Recent Results (from the past 24 hour(s))   CBC WITH AUTOMATED DIFF    Collection Time: 03/15/17  5:35 PM   Result Value Ref Range    WBC 6.3 3.6 - 11.0 K/uL RBC 4.39 3.80 - 5.20 M/uL    HGB 14.3 11.5 - 16.0 g/dL    HCT 41.4 35.0 - 47.0 %    MCV 94.3 80.0 - 99.0 FL    MCH 32.6 26.0 - 34.0 PG    MCHC 34.5 30.0 - 36.5 g/dL    RDW 12.9 11.5 - 14.5 %    PLATELET 804 801 - 516 K/uL    NEUTROPHILS 67 32 - 75 %    LYMPHOCYTES 23 12 - 49 %    MONOCYTES 10 5 - 13 %    EOSINOPHILS 0 0 - 7 %    BASOPHILS 0 0 - 1 %    ABS. NEUTROPHILS 4.2 1.8 - 8.0 K/UL    ABS. LYMPHOCYTES 1.4 0.8 - 3.5 K/UL    ABS. MONOCYTES 0.6 0.0 - 1.0 K/UL    ABS. EOSINOPHILS 0.0 0.0 - 0.4 K/UL    ABS. BASOPHILS 0.0 0.0 - 0.1 K/UL   METABOLIC PANEL, COMPREHENSIVE    Collection Time: 03/15/17  5:35 PM   Result Value Ref Range    Sodium 141 136 - 145 mmol/L    Potassium 4.1 3.5 - 5.1 mmol/L    Chloride 108 97 - 108 mmol/L    CO2 28 21 - 32 mmol/L    Anion gap 5 5 - 15 mmol/L    Glucose 105 (H) 65 - 100 mg/dL    BUN 16 6 - 20 MG/DL    Creatinine 0.93 0.55 - 1.02 MG/DL    BUN/Creatinine ratio 17 12 - 20      GFR est AA >60 >60 ml/min/1.73m2    GFR est non-AA >60 >60 ml/min/1.73m2    Calcium 8.8 8.5 - 10.1 MG/DL    Bilirubin, total 0.1 (L) 0.2 - 1.0 MG/DL    ALT (SGPT) 21 12 - 78 U/L    AST (SGOT) 14 (L) 15 - 37 U/L    Alk. phosphatase 103 45 - 117 U/L    Protein, total 6.6 6.4 - 8.2 g/dL    Albumin 3.9 3.5 - 5.0 g/dL    Globulin 2.7 2.0 - 4.0 g/dL    A-G Ratio 1.4 1.1 - 2.2         VITAL SIGNS:  Patient Vitals for the past 12 hrs:   Temp Pulse Resp BP SpO2   03/15/17 1848 - (!) 59 18 158/90 97 %   03/15/17 1632 98.4 °F (36.9 °C) 80 20 127/87 98 %       RADIOLOGY RESULTS:  The following have been ordered and reviewed:  XR CHEST PA LAT   Final Result   EXAM: XR CHEST PA LAT     INDICATION: cough     COMPARISON: 7/21/2016.     FINDINGS: PA and lateral radiographs of the chest demonstrate clear lungs.  The  cardiac and mediastinal contours and pulmonary vascularity are normal. The  bones and soft tissues are within normal limits.      IMPRESSION  IMPRESSION: Normal chest.       PROGRESS NOTES:  5:47 PM  Per nursing, patient declined receiving IV fluids, stating that she has an overactive bladder and she'll be up all night. 6:13 PM  I have just reevaluated the patient. I have reviewed her vital signs and determined there is currently no worsening in their condition or physical exam. Results have been reviewed with them and their questions have been answered. Patient reports that she has not gotten any relief of her chest pain yet. Of note, patient also requesting a prescription for Diflucan for yeast infection. 6:58 PM  Reviewed discharge paperwork and prescriptions with patient. Questions answered and follow up information given. Patient agrees with discharge home at this time. DIAGNOSIS:    1. Pleurisy without effusion    2. Cough    3. Chest wall pain        PLAN:  Follow-up Information     Follow up With Details Comments [address] Emanuel MD In 1 week  [address] [place] [address]  [phone]      [facility] EMERGENCY DEPT  As needed, If symptoms worsen [address] [facility] [address]   [address] [address] [phone]        Current Discharge Medication List      START taking these medications    Details   fluconazole (DIFLUCAN) 150 mg tablet Take 1 Tab by mouth now for 1 dose. FDA advises cautious prescribing of oral fluconazole in pregnancy. Qty: 1 Tab, Refills: 0      meloxicam (MOBIC) 15 mg tablet Take 1 Tab by mouth daily. Qty: 10 Tab, Refills: 0      HYDROcodone-homatropine (HYCODAN) 5-1.5 mg/5 mL (5 mL) syrup Take 5 mL by mouth four (4) times daily for 7 days. Max Daily Amount: 20 mL. Qty: 120 mL, Refills: ml      guaiFENesin (ROBITUSSIN) 100 mg/5 mL liquid Take 5 mL by mouth three (3) times daily as needed for Cough. Qty: 1 Bottle, Refills: 0               ED COURSE: The patients hospital course has been uncomplicated. DISCHARGE NOTE:  7:15 PM  The care plan has been outline with the patient and/or family, who verbally conveyed understanding and agreement.  Available results have been reviewed. Patient and/or family understand the follow up plan as outlined and discharge instructions. Should their condition deterioration at any time after discharge the patient agrees to return, follow up sooner than outlined or seek medical assistance at the closest Emergency Room as soon as possible. Questions have been answered.  Discharge instructions and educational information regarding the patient's diagnosis as well a list of reasons why the patient would want to seek immediate medical attention, should their condition change, were reviewed directly with the patient/family

## 2017-03-15 NOTE — DISCHARGE INSTRUCTIONS
Thank you for allowing us to provide you with excellent care today. We hope we addressed all of your concerns and needs. We strive to provide excellent quality care in the Emergency Department. Please rate us as excellent, as anything less than excellent does not meet our expectations. If you feel that you have not received excellent quality care or timely care, please ask to speak to the nurse manager. Please choose us in the future for your continued health care needs. The exam and treatment you received in the Emergency Department were for an urgent problem and are not intended as complete care. It is important that you follow-up with a doctor, nurse practitioner, or physician assistant to:  (1) confirm your diagnosis,  (2) re-evaluation of changes in your illness and treatment, and  (3) for ongoing care. If your symptoms become worse or you do not improve as expected and you are unable to reach your usual health care provider, you should return to the Emergency Department. We are available 24 hours a day. Take this sheet with you when you go to your follow-up visit. If you have any problem arranging the follow-up visit, contact [address] [place] [address] 21 [phone])    Make an appointment with your Primary Care doctor for follow up of this visit. Return to the ER if you are unable to be seen in the time recommended on your discharge instructions. Cough: Care Instructions  Your Care Instructions  A cough is your body's response to something that bothers your throat or airways. Many things can cause a cough. You might cough because of a cold or the flu, bronchitis, or asthma. Smoking, postnasal drip, allergies, and stomach acid that backs up into your throat also can cause coughs. A cough is a symptom, not a disease. Most coughs stop when the cause, such as a cold, goes away. You can take a few steps at home to cough less and feel better. Follow-up care is a key part of your treatment and safety.  Be sure to make and go to all appointments, and call your doctor if you are having problems. It's also a good idea to know your test results and keep a list of the medicines you take. How can you care for yourself at home? · Drink lots of water and other fluids. This helps thin the mucus and soothes a dry or sore throat. Honey or lemon juice in hot water or tea may ease a dry cough. · Take cough medicine as directed by your doctor. · Prop up your head on pillows to help you breathe and ease a dry cough. · Try cough drops to soothe a dry or sore throat. Cough drops don't stop a cough. Medicine-flavored cough drops are no better than candy-flavored drops or hard candy. · Do not smoke. Avoid secondhand smoke. If you need help quitting, talk to your doctor about stop-smoking programs and medicines. These can increase your chances of quitting for good. When should you call for help? Call 911 anytime you think you may need emergency care. For example, call if:  · You have severe trouble breathing. Call your doctor now or seek immediate medical care if:  · You cough up blood. · You have new or worse trouble breathing. · You have a new or higher fever. · You have a new rash. Watch closely for changes in your health, and be sure to contact your doctor if:  · You cough more deeply or more often, especially if you notice more mucus or a change in the color of your mucus. · You have new symptoms, such as a sore throat, an earache, or sinus pain. · You do not get better as expected. Where can you learn more? Go to http://kymberly-yoon.info/. Enter D279 in the search box to learn more about \"Cough: Care Instructions. \"  Current as of: May 27, 2016  Content Version: 11.1  © 7546-8602 [employer]. Care instructions adapted under license by [employer] (which disclaims liability or warranty for this information).  If you have questions about a medical condition or this instruction, always ask your healthcare professional. [place] [address] any warranty or liability for your use of this information.

## 2017-03-15 NOTE — ED NOTES
Patient arrives for cough and pain with inspiration that started about one week ago. Patient reports that her doctor prescribed her a z-pac and then changed her to Levaquin which she has been taking since Monday. Patient reports that she is coughing up sputum that is red/green. Patient reports low grade fever x2 days. Patient reports hx of pleurisy. No distress noted. Will continue to monitor.

## 2017-03-15 NOTE — LETTER
[address] 
[facility] EMERGENCY DEPT 
[address] [place] [address] [address] [id number] [phone] Work/School Note Date: 3/15/2017 To Whom It May concern: 
 
Ying Felix was seen and treated today in the emergency room by the following provider(s): 
Attending Provider: Destiny Brandt MD 
Physician Assistant: Tabatha Zee PA-C. Ying Felix may return to work on 18 March 2017. Sincerely, Tabatha Zee PA-C

## 2017-03-22 ENCOUNTER — HOSPITAL ENCOUNTER (EMERGENCY)
Age: 32
Discharge: HOME | End: 2017-03-22
Attending: EMERGENCY MEDICINE
Payer: COMMERCIAL

## 2017-03-22 VITALS
HEART RATE: 109 BPM | TEMPERATURE: 97.7 F | SYSTOLIC BLOOD PRESSURE: 105 MMHG | WEIGHT: 220.68 LBS | HEIGHT: 62 IN | BODY MASS INDEX: 40.61 KG/M2 | OXYGEN SATURATION: 91 % | DIASTOLIC BLOOD PRESSURE: 48 MMHG | RESPIRATION RATE: 22 BRPM

## 2017-03-22 DIAGNOSIS — R09.1 PLEURISY: ICD-10-CM

## 2017-03-22 DIAGNOSIS — R05.9 COUGH: Primary | ICD-10-CM

## 2017-03-22 PROCEDURE — 96375 TX/PRO/DX INJ NEW DRUG ADDON: CPT

## 2017-03-22 PROCEDURE — 74011250636 HC RX REV CODE- 250/636: Performed by: EMERGENCY MEDICINE

## 2017-03-22 PROCEDURE — 99283 EMERGENCY DEPT VISIT LOW MDM: CPT

## 2017-03-22 PROCEDURE — 74011250637 HC RX REV CODE- 250/637: Performed by: EMERGENCY MEDICINE

## 2017-03-22 PROCEDURE — 96374 THER/PROPH/DIAG INJ IV PUSH: CPT

## 2017-03-22 RX ORDER — KETOROLAC TROMETHAMINE 30 MG/ML
30 INJECTION, SOLUTION INTRAMUSCULAR; INTRAVENOUS
Status: DISCONTINUED | OUTPATIENT
Start: 2017-03-22 | End: 2017-03-22 | Stop reason: HOSPADM

## 2017-03-22 RX ORDER — PREDNISONE 10 MG/1
TABLET ORAL
Qty: 1 PACKAGE | Refills: 0 | Status: SHIPPED | OUTPATIENT
Start: 2017-03-22 | End: 2017-04-11 | Stop reason: CLARIF

## 2017-03-22 RX ORDER — HYDROMORPHONE HYDROCHLORIDE 1 MG/ML
1 INJECTION, SOLUTION INTRAMUSCULAR; INTRAVENOUS; SUBCUTANEOUS
Status: COMPLETED | OUTPATIENT
Start: 2017-03-22 | End: 2017-03-22

## 2017-03-22 RX ORDER — HYDROMORPHONE HYDROCHLORIDE 2 MG/1
2 TABLET ORAL
Status: COMPLETED | OUTPATIENT
Start: 2017-03-22 | End: 2017-03-22

## 2017-03-22 RX ORDER — LEVOFLOXACIN 750 MG/1
750 TABLET ORAL DAILY
Qty: 5 TAB | Refills: 0 | Status: SHIPPED | OUTPATIENT
Start: 2017-03-22 | End: 2017-03-27

## 2017-03-22 RX ORDER — MELOXICAM 15 MG/1
15 TABLET ORAL DAILY
Qty: 21 TAB | Refills: 0 | Status: SHIPPED | OUTPATIENT
Start: 2017-03-22 | End: 2017-04-11 | Stop reason: CLARIF

## 2017-03-22 RX ORDER — PREDNISONE 20 MG/1
60 TABLET ORAL DAILY
Qty: 15 TAB | Refills: 0 | Status: SHIPPED | OUTPATIENT
Start: 2017-03-22 | End: 2017-03-27

## 2017-03-22 RX ORDER — ONDANSETRON 2 MG/ML
4 INJECTION INTRAMUSCULAR; INTRAVENOUS
Status: COMPLETED | OUTPATIENT
Start: 2017-03-22 | End: 2017-03-22

## 2017-03-22 RX ORDER — HYDROCODONE BITARTRATE AND HOMATROPINE METHYLBROMIDE 1.5; 5 MG/5ML; MG/5ML
5 SYRUP ORAL 4 TIMES DAILY
Qty: 120 ML | Status: SHIPPED | OUTPATIENT
Start: 2017-03-22 | End: 2017-03-29

## 2017-03-22 RX ADMIN — HYDROMORPHONE HYDROCHLORIDE 2 MG: 2 TABLET ORAL at 16:43

## 2017-03-22 RX ADMIN — ONDANSETRON HYDROCHLORIDE 4 MG: 2 INJECTION, SOLUTION INTRAMUSCULAR; INTRAVENOUS at 14:39

## 2017-03-22 RX ADMIN — HYDROMORPHONE HYDROCHLORIDE 1 MG: 1 INJECTION, SOLUTION INTRAMUSCULAR; INTRAVENOUS; SUBCUTANEOUS at 15:38

## 2017-03-22 NOTE — ED PROVIDER NOTES
HPI Comments: Chris Norris, 33 y.o. Female with PMHx significant for pleurisy, asthma, and lupus, presents ambulatory to ED [facility] ED with cc of progressively worsening nasal congestion, a persistent productive cough with associated green sputum and post-tussive emesis, bilateral ear pain, sore throat, a low grade fever, and chest pain x 3 days. The patient notes that her chest pain is exacerbated with deep inspiration. The patient notes that current symptoms are similar to when she was diagnosed with pleurisy. The patient states that she has used her nebulizer machine and inhaler with no relief. The patient states that she had similar symptoms three weeks ago, and she was prescribed a Z-pack. The patient also notes that she was evaluated in the ED 1 week ago for the same complaint and had a full work up with no significant findings. The patient states that she was discharged with meloxicam and Hycodan with significant relief before symptoms recurred three days ago. The patient denies recent long travel or hormone therapy. The patient specifically denies hemoptysis, unilateral swelling, SOB, or other acute complaints at this time. PCP: Juno Good MD    Social history significant for: - Tobacco, - EtOH, - Illicit Drug Use    There are no other complaints, changes, or physical findings at this time. Written by Luna Oshea ED Scribcadence, as dictated by Harika Rao MD.    The history is provided by the patient and medical records. No  was used.         Past Medical History:   Diagnosis Date    Abnormal CT of the abdomen 11/8/2012    Asthma     Autoimmune disease ([facility] [address]     lupus    Cervical prolapse     Dehydration     Fatty liver     Gastrointestinal disorder     gerd, twisted colon, IBS    GERD (gastroesophageal reflux disease)     Headache(784.0)     Morbid obesity (HCC)     Nausea & vomiting     Neurological disorder     cluster headaches    other 2006, 2009 ovarian cyst removal    Other ill-defined conditions(799.89)     Worsening headaches        Past Surgical History:   Procedure Laterality Date    COLONOSCOPY  9/21/2010         HX CHOLECYSTECTOMY      HX GYN  1/2009    right salpingo oopherectomy    HX GYN  2006    right ovarian tumor removed    HX HEENT      left wisdom teeth removal    HX HEENT      top right wisdom tooth removed    HX HERNIA REPAIR  4/2012    HX HERNIA REPAIR  2/28/13    Laparoscopic recurrent incisional hernia repair    HX UROLOGICAL      Kidney Stone Removal    [place] EGD TRANSORAL BIOPSY SINGLE/MULTIPLE  9/22/2010              History reviewed. No pertinent family history. Social History     Social History    Marital status:      Spouse name: N/A    Number of children: N/A    Years of education: N/A     Occupational History    Not on file. Social History Main Topics    Smoking status: Never Smoker    Smokeless tobacco: Never Used    Alcohol use No    Drug use: No    Sexual activity: Not Currently     Partners: Male     Birth control/ protection: Abstinence     Other Topics Concern    Not on file     Social History Narrative         ALLERGIES: Latex; Compazine [prochlorperazine]; Pcn [penicillins]; Sulfa (sulfonamide antibiotics); Topamax [topiramate]; Adhesive; Mushroom combination no.1; Toradol [ketorolac tromethamine]; and Valproic acid    Review of Systems   Constitutional: Positive for fever. Negative for chills. HENT: Positive for congestion, ear pain and sore throat. Negative for rhinorrhea and sneezing. Eyes: Negative for redness and visual disturbance. Respiratory: Positive for cough. Negative for shortness of breath. (-) Hemoptysis   Cardiovascular: Positive for chest pain. Negative for leg swelling. Gastrointestinal: Positive for vomiting (Post-tussive). Negative for abdominal pain and nausea. Genitourinary: Negative for difficulty urinating and frequency.    Musculoskeletal: Negative for back pain, myalgias and neck stiffness. Skin: Negative for rash. Neurological: Negative for dizziness, syncope, weakness and headaches. Hematological: Negative for adenopathy. Patient Vitals for the past 12 hrs:   Temp Pulse Resp BP SpO2   03/22/17 1545 - - - 105/47 96 %   03/22/17 1537 - - - 105/62 -   03/22/17 1333 97.7 °F (36.5 °C) (!) 109 22 124/85 100 %       Physical Exam   Constitutional: She is oriented to person, place, and time. She appears well-developed and well-nourished. Tearful  Obese   HENT:   Head: Normocephalic and atraumatic. Mouth/Throat: Oropharynx is clear and moist.   Eyes: Conjunctivae and EOM are normal.   Neck: Normal range of motion and full passive range of motion without pain. Neck supple. Cardiovascular: Regular rhythm, S1 normal, S2 normal, normal heart sounds, intact distal pulses and normal pulses. No murmur heard. Mildly tachycardic   Pulmonary/Chest: Breath sounds normal. No respiratory distress. She has no wheezes. Splinting on deep inspiration   Abdominal: Soft. Normal appearance and bowel sounds are normal. She exhibits no distension. There is no tenderness. There is no rebound. Musculoskeletal: Normal range of motion. Neurological: She is alert and oriented to person, place, and time. She has normal strength. Skin: Skin is warm, dry and intact. No rash noted. Psychiatric: She has a normal mood and affect. Her speech is normal and behavior is normal. Judgment and thought content normal.   Nursing note and vitals reviewed.   Written by Macie Goyal ED Scribe, as dictated by Mitch Leslie MD.    MDM  Number of Diagnoses or Management Options  Cough:   Pleurisy:   Diagnosis management comments:   DDx: Pleurisy, bronchitis, URI; low concern for PE       Amount and/or Complexity of Data Reviewed  Review and summarize past medical records: yes    Patient Progress  Patient progress: stable    ED Course       Procedures    Progress Note:  3:28 PM  The patient was offered a full work up for her symptoms, but states that she was evaluated in the ED and had a full work up done at that time. The patient defers a full work up at this time. The patient's  was also reviewed. The patient requested additional doses of Levaquin, and she was counseled on the overuse of ABX given her past prescriptions. She is in understanding with this. Written by ANDRE Rochaibe, as dictated by Aditi Ruiz MD.    Progress Note:  4:16 PM  The patient is requesting an additional dose of pain medication prior to discharge. Will give her a PO pain medication and discharge. Informed pt that this is likely viral in nature, but will write Rx for Levaquin in case her symptoms do not improve. Written by ANDRE Rocha, as dictated by Aditi Ruiz MD.    MEDICATIONS GIVEN:  Medications   ketorolac (TORADOL) injection 30 mg (30 mg IntraVENous Refused 3/22/17 1430)   ondansetron (ZOFRAN) injection 4 mg (4 mg IntraVENous Given 3/22/17 1439)   HYDROmorphone (PF) (DILAUDID) injection 1 mg (1 mg IntraVENous Given 3/22/17 1538)       IMPRESSION:  1. Cough    2. Pleurisy        PLAN:  1. Current Discharge Medication List      START taking these medications    Details   predniSONE (STERAPRED DS) 10 mg dose pack As directed on packaging label after completing burst dose  Qty: 1 Package, Refills: 0      levoFLOXacin (LEVAQUIN) 750 mg tablet Take 1 Tab by mouth daily for 5 days. Qty: 5 Tab, Refills: 0         CONTINUE these medications which have CHANGED    Details   meloxicam (MOBIC) 15 mg tablet Take 1 Tab by mouth daily. Qty: 21 Tab, Refills: 0      HYDROcodone-homatropine (HYCODAN) 5-1.5 mg/5 mL (5 mL) syrup Take 5 mL by mouth four (4) times daily for 7 days. Max Daily Amount: 20 mL. Qty: 120 mL, Refills: ml      predniSONE (DELTASONE) 20 mg tablet Take 3 Tabs by mouth daily for 5 days. Qty: 15 Tab, Refills: 0           2.    Follow-up Information Follow up With Details Comments Contact Info    Vannesa Edge MD Call in 1 day  [address]  [address] [address] [phone]      [facility] EMERGENCY DEPT  As needed, If symptoms worsen [address]  [address] [facility]  [phone]        Return to ED if worse     Discharge Note:  3:32 PM  The pt is ready for discharge. The pt's signs, symptoms, diagnosis, and discharge instructions have been discussed and pt has conveyed their understanding. The pt is to follow up as recommended or return to ER should their symptoms worsen. Plan has been discussed and pt is in agreement. This note is prepared by Emil Prince, acting as a Scribe for Marcell Claude, MD.    Marcell Claude, MD: The scribe's documentation has been prepared under my direction and personally reviewed by me in its entirety. I confirm that the notes above accurately reflects all work, treatment, procedures, and medical decision making performed by me.

## 2017-03-22 NOTE — ED NOTES
Assumed care of patient. Patient is alert and oriented, does not appear to be in distress. Patient ambulatory to ED and states that she has been recently treated for pleurisy and pna, feels like it is back now and is tear full. Pt states she has \"extreme\" pain when taking a deep breath, denies sob.

## 2017-03-22 NOTE — DISCHARGE INSTRUCTIONS
Cough: Care Instructions  Your Care Instructions  A cough is your body's response to something that bothers your throat or airways. Many things can cause a cough. You might cough because of a cold or the flu, bronchitis, or asthma. Smoking, postnasal drip, allergies, and stomach acid that backs up into your throat also can cause coughs. A cough is a symptom, not a disease. Most coughs stop when the cause, such as a cold, goes away. You can take a few steps at home to cough less and feel better. Follow-up care is a key part of your treatment and safety. Be sure to make and go to all appointments, and call your doctor if you are having problems. It's also a good idea to know your test results and keep a list of the medicines you take. How can you care for yourself at home? · Drink lots of water and other fluids. This helps thin the mucus and soothes a dry or sore throat. Honey or lemon juice in hot water or tea may ease a dry cough. · Take cough medicine as directed by your doctor. · Prop up your head on pillows to help you breathe and ease a dry cough. · Try cough drops to soothe a dry or sore throat. Cough drops don't stop a cough. Medicine-flavored cough drops are no better than candy-flavored drops or hard candy. · Do not smoke. Avoid secondhand smoke. If you need help quitting, talk to your doctor about stop-smoking programs and medicines. These can increase your chances of quitting for good. When should you call for help? Call 911 anytime you think you may need emergency care. For example, call if:  · You have severe trouble breathing. Call your doctor now or seek immediate medical care if:  · You cough up blood. · You have new or worse trouble breathing. · You have a new or higher fever. · You have a new rash.   Watch closely for changes in your health, and be sure to contact your doctor if:  · You cough more deeply or more often, especially if you notice more mucus or a change in the color of your mucus. · You have new symptoms, such as a sore throat, an earache, or sinus pain. · You do not get better as expected. Where can you learn more? Go to http://kymberly-yoon.info/. Enter D279 in the search box to learn more about \"Cough: Care Instructions. \"  Current as of: May 27, 2016  Content Version: 11.1  © 2006-2016 [employer]. Care instructions adapted under license by [employer] (which disclaims liability or warranty for this information). If you have questions about a medical condition or this instruction, always ask your healthcare professional. [place] [address] any warranty or liability for your use of this information. Pleurisy: Care Instructions  Your Care Instructions  Pleurisy is inflammation of the tissue that lines the inside of the chest and covers the lungs (pleura). Pleurisy is often caused by an infection, usually a virus. It also can be caused by other health problems, such as pneumonia or lupus. Pleurisy can cause sharp chest pain that gets worse when you cough or take a deep breath. You may need more tests to find out what is causing your pleurisy. Treatment depends on the cause. Pleurisy may come and go for a few days, or it may continue if the cause has not been treated. Home treatment can help ease symptoms. Follow-up care is a key part of your treatment and safety. Be sure to make and go to all appointments, and call your doctor if you are having problems. Its also a good idea to know your test results and keep a list of the medicines you take. How can you care for yourself at home? · Take an over-the-counter pain medicine, such as acetaminophen (Tylenol), ibuprofen (Advil, Motrin), or naproxen (Aleve). Read and follow all instructions on the label. · Do not take two or more pain medicines at the same time unless the doctor told you to. Many pain medicines have acetaminophen, which is Tylenol.  Too much acetaminophen (Tylenol) can be harmful. · If your doctor prescribed antibiotics, take them as directed. Do not stop taking them just because you feel better. You need to take the full course of antibiotics. · Take cough medicine as directed if your doctor recommends it. · Avoid activities that make the pain worse. When should you call for help? Call 911 anytime you think you may need emergency care. For example, call if:  · You have severe trouble breathing. · You have severe chest pain. · You passed out (lost consciousness). Call your doctor now or seek immediate medical care if:  · You have a new or higher fever. Watch closely for changes in your health, and be sure to contact your doctor if:  · You begin to cough up yellow or green mucus. · You cough up blood. · Your symptoms are not better in 3 or 4 days. Where can you learn more? Go to http://kymberly-yoon.info/. Enter F346 in the search box to learn more about \"Pleurisy: Care Instructions. \"  Current as of: May 23, 2016  Content Version: 11.1  © 6165-1938 [employer]. Care instructions adapted under license by [employer] (which disclaims liability or warranty for this information). If you have questions about a medical condition or this instruction, always ask your healthcare professional. [address] any warranty or liability for your use of this information.

## 2017-03-22 NOTE — ED NOTES
Bedside and Verbal shift change report given to Rika Acosta RN (oncoming nurse) by Lorna Lora (offgoing nurse). Report included the following information SBAR, Kardex, ED Summary, [facility] and Recent Results. Patient resting comfortably, side rail up, call bell w/in reach, no further needs expressed at this time, aware of POC.

## 2017-03-22 NOTE — ED NOTES
Patient discharged and given discharge instructions by Ramya Pollock MD. Patient had an opportunity to ask questions. Patient verbalized understanding of discharge instructions. Patient ambulatory from ED with tech, discharge instructions and prescriptions in hand. Patient accompanied by family. Ramya Pollock MD notified of pt's HR of 112. MD okay for pt to be discharged.

## 2017-03-27 ENCOUNTER — HOSPITAL ENCOUNTER (EMERGENCY)
Age: 32
Discharge: HOME | End: 2017-03-27
Attending: EMERGENCY MEDICINE
Payer: COMMERCIAL

## 2017-03-27 ENCOUNTER — APPOINTMENT (OUTPATIENT)
Dept: GENERAL RADIOLOGY | Age: 32
End: 2017-03-27
Attending: EMERGENCY MEDICINE
Payer: COMMERCIAL

## 2017-03-27 VITALS
HEART RATE: 99 BPM | TEMPERATURE: 98.2 F | OXYGEN SATURATION: 95 % | RESPIRATION RATE: 18 BRPM | BODY MASS INDEX: 40.66 KG/M2 | WEIGHT: 229.5 LBS | DIASTOLIC BLOOD PRESSURE: 66 MMHG | HEIGHT: 63 IN | SYSTOLIC BLOOD PRESSURE: 114 MMHG

## 2017-03-27 DIAGNOSIS — R09.1 PLEURISY WITHOUT EFFUSION: ICD-10-CM

## 2017-03-27 DIAGNOSIS — R60.9 EDEMA, UNSPECIFIED TYPE: Primary | ICD-10-CM

## 2017-03-27 LAB
ALBUMIN SERPL BCP-MCNC: 3.2 G/DL (ref 3.5–5)
ALBUMIN/GLOB SERPL: 1.2 {RATIO} (ref 1.1–2.2)
ALP SERPL-CCNC: 108 U/L (ref 45–117)
ALT SERPL-CCNC: 23 U/L (ref 12–78)
ANION GAP BLD CALC-SCNC: 7 MMOL/L (ref 5–15)
APPEARANCE UR: CLEAR
APTT PPP: 27.9 SEC (ref 22.1–32.5)
AST SERPL W P-5'-P-CCNC: 24 U/L (ref 15–37)
ATRIAL RATE: 101 BPM
BACTERIA URNS QL MICRO: NEGATIVE /HPF
BASOPHILS # BLD AUTO: 0 K/UL (ref 0–0.1)
BASOPHILS # BLD: 0 % (ref 0–1)
BILIRUB SERPL-MCNC: 0.3 MG/DL (ref 0.2–1)
BILIRUB UR QL: NEGATIVE
BNP SERPL-MCNC: 36 PG/ML (ref 0–125)
BUN SERPL-MCNC: 14 MG/DL (ref 6–20)
BUN/CREAT SERPL: 20 (ref 12–20)
CALCIUM SERPL-MCNC: 8.3 MG/DL (ref 8.5–10.1)
CALCULATED P AXIS, ECG09: 36 DEGREES
CALCULATED R AXIS, ECG10: 33 DEGREES
CALCULATED T AXIS, ECG11: 23 DEGREES
CHLORIDE SERPL-SCNC: 109 MMOL/L (ref 97–108)
CO2 SERPL-SCNC: 24 MMOL/L (ref 21–32)
COLOR UR: ABNORMAL
CREAT SERPL-MCNC: 0.69 MG/DL (ref 0.55–1.02)
DIAGNOSIS, 93000: NORMAL
EOSINOPHIL # BLD: 0.2 K/UL (ref 0–0.4)
EOSINOPHIL NFR BLD: 2 % (ref 0–7)
EPITH CASTS URNS QL MICRO: ABNORMAL /LPF
ERYTHROCYTE [DISTWIDTH] IN BLOOD BY AUTOMATED COUNT: 13.1 % (ref 11.5–14.5)
GLOBULIN SER CALC-MCNC: 2.7 G/DL (ref 2–4)
GLUCOSE SERPL-MCNC: 94 MG/DL (ref 65–100)
GLUCOSE UR STRIP.AUTO-MCNC: NEGATIVE MG/DL
HCT VFR BLD AUTO: 34 % (ref 35–47)
HGB BLD-MCNC: 11.6 G/DL (ref 11.5–16)
HGB UR QL STRIP: NEGATIVE
HYALINE CASTS URNS QL MICRO: ABNORMAL /LPF (ref 0–5)
INR PPP: <0.9 (ref 0.9–1.1)
KETONES UR QL STRIP.AUTO: NEGATIVE MG/DL
LEUKOCYTE ESTERASE UR QL STRIP.AUTO: ABNORMAL
LYMPHOCYTES # BLD AUTO: 50 % (ref 12–49)
LYMPHOCYTES # BLD: 3.7 K/UL (ref 0.8–3.5)
MCH RBC QN AUTO: 32.4 PG (ref 26–34)
MCHC RBC AUTO-ENTMCNC: 34.1 G/DL (ref 30–36.5)
MCV RBC AUTO: 95 FL (ref 80–99)
MONOCYTES # BLD: 0.9 K/UL (ref 0–1)
MONOCYTES NFR BLD AUTO: 12 % (ref 5–13)
NEUTS SEG # BLD: 2.7 K/UL (ref 1.8–8)
NEUTS SEG NFR BLD AUTO: 36 % (ref 32–75)
NITRITE UR QL STRIP.AUTO: NEGATIVE
P-R INTERVAL, ECG05: 160 MS
PH UR STRIP: 6 [PH] (ref 5–8)
PLATELET # BLD AUTO: 291 K/UL (ref 150–400)
POTASSIUM SERPL-SCNC: 4.2 MMOL/L (ref 3.5–5.1)
PROT SERPL-MCNC: 5.9 G/DL (ref 6.4–8.2)
PROT UR STRIP-MCNC: NEGATIVE MG/DL
PROTHROMBIN TIME: <9 SEC (ref 9–11.1)
Q-T INTERVAL, ECG07: 336 MS
QRS DURATION, ECG06: 78 MS
QTC CALCULATION (BEZET), ECG08: 435 MS
RBC # BLD AUTO: 3.58 M/UL (ref 3.8–5.2)
RBC #/AREA URNS HPF: ABNORMAL /HPF (ref 0–5)
SODIUM SERPL-SCNC: 140 MMOL/L (ref 136–145)
SP GR UR REFRACTOMETRY: 1.01 (ref 1–1.03)
THERAPEUTIC RANGE,PTTT: NORMAL SECS (ref 58–77)
TROPONIN I SERPL-MCNC: <0.04 NG/ML
UA: UC IF INDICATED,UAUC: ABNORMAL
UROBILINOGEN UR QL STRIP.AUTO: 0.2 EU/DL (ref 0.2–1)
VENTRICULAR RATE, ECG03: 101 BPM
WBC # BLD AUTO: 7.5 K/UL (ref 3.6–11)
WBC URNS QL MICRO: ABNORMAL /HPF (ref 0–4)

## 2017-03-27 PROCEDURE — 93005 ELECTROCARDIOGRAM TRACING: CPT

## 2017-03-27 PROCEDURE — 81001 URINALYSIS AUTO W/SCOPE: CPT

## 2017-03-27 PROCEDURE — 83880 ASSAY OF NATRIURETIC PEPTIDE: CPT

## 2017-03-27 PROCEDURE — 85610 PROTHROMBIN TIME: CPT

## 2017-03-27 PROCEDURE — 96375 TX/PRO/DX INJ NEW DRUG ADDON: CPT

## 2017-03-27 PROCEDURE — 85025 COMPLETE CBC W/AUTO DIFF WBC: CPT

## 2017-03-27 PROCEDURE — 71020 XR CHEST PA LAT: CPT

## 2017-03-27 PROCEDURE — 80053 COMPREHEN METABOLIC PANEL: CPT

## 2017-03-27 PROCEDURE — 74011250636 HC RX REV CODE- 250/636: Performed by: EMERGENCY MEDICINE

## 2017-03-27 PROCEDURE — 99284 EMERGENCY DEPT VISIT MOD MDM: CPT

## 2017-03-27 PROCEDURE — 36415 COLL VENOUS BLD VENIPUNCTURE: CPT

## 2017-03-27 PROCEDURE — 85730 THROMBOPLASTIN TIME PARTIAL: CPT

## 2017-03-27 PROCEDURE — 96374 THER/PROPH/DIAG INJ IV PUSH: CPT

## 2017-03-27 PROCEDURE — 96372 THER/PROPH/DIAG INJ SC/IM: CPT

## 2017-03-27 PROCEDURE — 84484 ASSAY OF TROPONIN QUANT: CPT

## 2017-03-27 RX ORDER — FUROSEMIDE 40 MG/1
40 TABLET ORAL DAILY
Qty: 20 TAB | Refills: 0 | Status: SHIPPED | OUTPATIENT
Start: 2017-03-27 | End: 2017-04-16

## 2017-03-27 RX ORDER — ONDANSETRON 2 MG/ML
4 INJECTION INTRAMUSCULAR; INTRAVENOUS
Status: COMPLETED | OUTPATIENT
Start: 2017-03-27 | End: 2017-03-27

## 2017-03-27 RX ORDER — FUROSEMIDE 10 MG/ML
40 INJECTION INTRAMUSCULAR; INTRAVENOUS
Status: COMPLETED | OUTPATIENT
Start: 2017-03-27 | End: 2017-03-27

## 2017-03-27 RX ORDER — HYDROMORPHONE HYDROCHLORIDE 1 MG/ML
1 INJECTION, SOLUTION INTRAMUSCULAR; INTRAVENOUS; SUBCUTANEOUS
Status: COMPLETED | OUTPATIENT
Start: 2017-03-27 | End: 2017-03-27

## 2017-03-27 RX ADMIN — HYDROMORPHONE HYDROCHLORIDE 1 MG: 1 INJECTION, SOLUTION INTRAMUSCULAR; INTRAVENOUS; SUBCUTANEOUS at 03:16

## 2017-03-27 RX ADMIN — FUROSEMIDE 40 MG: 10 INJECTION, SOLUTION INTRAMUSCULAR; INTRAVENOUS at 04:46

## 2017-03-27 RX ADMIN — ONDANSETRON HYDROCHLORIDE 4 MG: 2 INJECTION, SOLUTION INTRAMUSCULAR; INTRAVENOUS at 03:16

## 2017-03-27 RX ADMIN — HYDROMORPHONE HYDROCHLORIDE 1 MG: 1 INJECTION, SOLUTION INTRAMUSCULAR; INTRAVENOUS; SUBCUTANEOUS at 04:35

## 2017-03-27 NOTE — DISCHARGE INSTRUCTIONS
Leg and Ankle Edema: Care Instructions  Your Care Instructions  Swelling in the legs, ankles, and feet is called edema. It is common after you sit or stand for a while. Long plane flights or car rides often cause swelling in the legs and feet. You may also have swelling if you have to stand for long periods of time at your job. Problems with the veins in the legs (varicose veins) and changes in hormones can also cause swelling. Sometimes the swelling in the ankles and feet is caused by a more serious problem, such as heart failure, infection, blood clots, or liver or kidney disease. Follow-up care is a key part of your treatment and safety. Be sure to make and go to all appointments, and call your doctor if you are having problems. Its also a good idea to know your test results and keep a list of the medicines you take. How can you care for yourself at home? · If your doctor gave you medicine, take it as prescribed. Call your doctor if you think you are having a problem with your medicine. · Whenever you are resting, raise your legs up. Try to keep the swollen area higher than the level of your heart. · Take breaks from standing or sitting in one position. ¨ Walk around to increase the blood flow in your lower legs. ¨ Move your feet and ankles often while you stand, or tighten and relax your leg muscles. · Wear support stockings. Put them on in the morning, before swelling gets worse. · Eat a balanced diet. Lose weight if you need to. · Limit the amount of salt (sodium) in your diet. Salt holds fluid in the body and may increase swelling. When should you call for help? Call 911 anytime you think you may need emergency care. For example, call if:  · You have symptoms of a blood clot in your lung (called a pulmonary embolism). These may include:  ¨ Sudden chest pain. ¨ Trouble breathing. ¨ Coughing up blood.   Call your doctor now or seek immediate medical care if:  · You have signs of a blood clot, such as:  ¨ Pain in your calf, back of the knee, thigh, or groin. ¨ Redness and swelling in your leg or groin. · You have symptoms of infection, such as:  ¨ Increased pain, swelling, warmth, or redness. ¨ Red streaks or pus. ¨ A fever. Watch closely for changes in your health, and be sure to contact your doctor if:  · Your swelling is getting worse. · You have new or worsening pain in your legs. · You do not get better as expected. Where can you learn more? Go to http://kymberly-yoon.info/. Enter Q716 in the search box to learn more about \"Leg and Ankle Edema: Care Instructions. \"  Current as of: May 27, 2016  Content Version: 11.1  © 0936-8188 [employer]. Care instructions adapted under license by [employer] (which disclaims liability or warranty for this information). If you have questions about a medical condition or this instruction, always ask your healthcare professional. [address] any warranty or liability for your use of this information.

## 2017-03-27 NOTE — ED PROVIDER NOTES
HPI Comments: 34 yo F w Lupus on benlysta infusions presents with recurrence of her chronic pleuritis with bloody sputum for 2 weeks. Was seen by PCP, started on predn 40, then returned to ED because her cough had not improved, it was increased to 60. She returns for LE fluid overload, and oliguria, 20 lb weight gain over 2 weeks. She has had this once before when started on prednisone. Also her cough has improved, but her pleurisy has not improved. She denies SOB, fevers, chills. The history is provided by the patient and a parent. Past Medical History:   Diagnosis Date    Abnormal CT of the abdomen 11/8/2012    Asthma     Autoimmune disease ([facility] [address]     lupus    Cervical prolapse     Dehydration     Fatty liver     Gastrointestinal disorder     gerd, twisted colon, IBS    GERD (gastroesophageal reflux disease)     Headache(784.0)     Morbid obesity (HCC)     Nausea & vomiting     Neurological disorder     cluster headaches    other 2006, 2009    ovarian cyst removal    Other ill-defined conditions(799.89)     Worsening headaches        Past Surgical History:   Procedure Laterality Date    COLONOSCOPY  9/21/2010         HX CHOLECYSTECTOMY      HX GYN  1/2009    right salpingo oopherectomy    HX GYN  2006    right ovarian tumor removed    HX HEENT      left wisdom teeth removal    HX HEENT      top right wisdom tooth removed    HX HERNIA REPAIR  4/2012    HX HERNIA REPAIR  2/28/13    Laparoscopic recurrent incisional hernia repair    HX UROLOGICAL      Kidney Stone Removal    IA EGD TRANSORAL BIOPSY SINGLE/MULTIPLE  9/22/2010              History reviewed. No pertinent family history. Social History     Social History    Marital status:      Spouse name: N/A    Number of children: N/A    Years of education: N/A     Occupational History    Not on file.      Social History Main Topics    Smoking status: Never Smoker    Smokeless tobacco: Never Used    Alcohol use No  Drug use: No    Sexual activity: Not Currently     Partners: Male     Birth control/ protection: Abstinence     Other Topics Concern    Not on file     Social History Narrative         ALLERGIES: Latex; Compazine [prochlorperazine]; Pcn [penicillins]; Sulfa (sulfonamide antibiotics); Topamax [topiramate]; Adhesive; Mushroom combination no.1; Toradol [ketorolac tromethamine]; and Valproic acid    Review of Systems   Constitutional: Negative. Negative for chills and fever. Eyes: Negative. Negative for visual disturbance. Respiratory: Positive for cough. Negative for shortness of breath. Cardiovascular: Positive for leg swelling. Negative for chest pain. Gastrointestinal: Negative for abdominal pain, nausea and vomiting. Endocrine: Negative. Negative for polyuria. Genitourinary: Negative. Negative for difficulty urinating, dysuria and hematuria. Musculoskeletal: Negative. Skin: Negative. Negative for wound. Allergic/Immunologic: Negative. Neurological: Negative. Negative for syncope. Psychiatric/Behavioral: Negative for suicidal ideas. All other systems reviewed and are negative. Vitals:    03/27/17 0154 03/27/17 0438 03/27/17 0439 03/27/17 0446   BP: 131/83 114/66  95/67   Pulse: (!) 106   99   Resp: 18      Temp: 98.2 °F (36.8 °C)      SpO2: 100%  96% 95%   Weight: 104.1 kg (229 lb 8 oz)      Height: 5' 2.5\" (1.588 m)               Physical Exam   Constitutional: She is oriented to person, place, and time. She appears well-developed and well-nourished. HENT:   Head: Normocephalic and atraumatic. Cardiovascular: Normal rate, regular rhythm, normal heart sounds and intact distal pulses. Exam reveals no gallop. No murmur heard. Pulmonary/Chest: Effort normal and breath sounds normal. No respiratory distress. She has no wheezes. She has no rales. Abdominal: Soft. She exhibits no distension. There is no tenderness. Musculoskeletal: She exhibits edema.    BL LE edema 3+ to thigh   Neurological: She is alert and oriented to person, place, and time. Skin: Skin is warm and dry. Psychiatric: She has a normal mood and affect. Nursing note and vitals reviewed. MDM  Number of Diagnoses or Management Options  Edema, unspecified type:   Pleurisy without effusion:   Diagnosis management comments: 34 yo F w lupus, pleurisy not improved, cough slightly improved and LE edema since prednisone started. We will eval with labs, ekg, chest xray and reassess. Concern is for fluid retention 2/2 steroids. Amount and/or Complexity of Data Reviewed  Clinical lab tests: ordered and reviewed  Tests in the radiology section of CPT®: ordered and reviewed  Tests in the medicine section of CPT®: reviewed and ordered  Review and summarize past medical records: yes  Independent visualization of images, tracings, or specimens: yes    Patient Progress  Patient progress: stable      Procedures  I reviewed our electronic medical record system for any past medical records that were available that may contribute to the patients current condition, the nursing notes and and vital signs from today's visit    Nursing notes will be reviewed as they become available in realtime while the pt is in the ED. Isabela Holden MD    I personally reviewed pt's imaging. Official read by radiology listed below. Isabela Holden MD      4:43 AM  Hgb with noted drop over last 2 weeks (but has been at this level in the past) otherwise neg workup, negative CXR,  rectal neg for blood. Pt without report of gross hemotysis but rather blood streaked sputum. PT does not take blood thinner, not hypotensive or with other concerning sx for acute blood loss anemia. Will treat pt with lasix and rec follow up for recheck of cbc, lytes in next 2 days with PCP. Pt noted desire for dc home and agreement with plan.       LABORATORY TESTS:  Recent Results (from the past 12 hour(s))   CBC WITH AUTOMATED DIFF    Collection Time: 03/27/17  2:45 AM   Result Value Ref Range    WBC 7.5 3.6 - 11.0 K/uL    RBC 3.58 (L) 3.80 - 5.20 M/uL    HGB 11.6 11.5 - 16.0 g/dL    HCT 34.0 (L) 35.0 - 47.0 %    MCV 95.0 80.0 - 99.0 FL    MCH 32.4 26.0 - 34.0 PG    MCHC 34.1 30.0 - 36.5 g/dL    RDW 13.1 11.5 - 14.5 %    PLATELET 566 152 - 907 K/uL    NEUTROPHILS 36 32 - 75 %    LYMPHOCYTES 50 (H) 12 - 49 %    MONOCYTES 12 5 - 13 %    EOSINOPHILS 2 0 - 7 %    BASOPHILS 0 0 - 1 %    ABS. NEUTROPHILS 2.7 1.8 - 8.0 K/UL    ABS. LYMPHOCYTES 3.7 (H) 0.8 - 3.5 K/UL    ABS. MONOCYTES 0.9 0.0 - 1.0 K/UL    ABS. EOSINOPHILS 0.2 0.0 - 0.4 K/UL    ABS. BASOPHILS 0.0 0.0 - 0.1 K/UL   METABOLIC PANEL, COMPREHENSIVE    Collection Time: 03/27/17  2:45 AM   Result Value Ref Range    Sodium 140 136 - 145 mmol/L    Potassium 4.2 3.5 - 5.1 mmol/L    Chloride 109 (H) 97 - 108 mmol/L    CO2 24 21 - 32 mmol/L    Anion gap 7 5 - 15 mmol/L    Glucose 94 65 - 100 mg/dL    BUN 14 6 - 20 MG/DL    Creatinine 0.69 0.55 - 1.02 MG/DL    BUN/Creatinine ratio 20 12 - 20      GFR est AA >60 >60 ml/min/1.73m2    GFR est non-AA >60 >60 ml/min/1.73m2    Calcium 8.3 (L) 8.5 - 10.1 MG/DL    Bilirubin, total 0.3 0.2 - 1.0 MG/DL    ALT (SGPT) 23 12 - 78 U/L    AST (SGOT) 24 15 - 37 U/L    Alk.  phosphatase 108 45 - 117 U/L    Protein, total 5.9 (L) 6.4 - 8.2 g/dL    Albumin 3.2 (L) 3.5 - 5.0 g/dL    Globulin 2.7 2.0 - 4.0 g/dL    A-G Ratio 1.2 1.1 - 2.2     URINALYSIS W/ REFLEX CULTURE    Collection Time: 03/27/17  2:45 AM   Result Value Ref Range    Color YELLOW/STRAW      Appearance CLEAR CLEAR      Specific gravity 1.010 1.003 - 1.030      pH (UA) 6.0 5.0 - 8.0      Protein NEGATIVE  NEG mg/dL    Glucose NEGATIVE  NEG mg/dL    Ketone NEGATIVE  NEG mg/dL    Bilirubin NEGATIVE  NEG      Blood NEGATIVE  NEG      Urobilinogen 0.2 0.2 - 1.0 EU/dL    Nitrites NEGATIVE  NEG      Leukocyte Esterase TRACE (A) NEG      WBC 0-4 0 - 4 /hpf    RBC 0-5 0 - 5 /hpf    Epithelial cells FEW FEW /lpf    Bacteria NEGATIVE  NEG /hpf    UA:UC IF INDICATED CULTURE NOT INDICATED BY UA RESULT CNI      Hyaline cast 0-2 0 - 5 /lpf   PTT    Collection Time: 03/27/17  2:45 AM   Result Value Ref Range    aPTT 27.9 22.1 - 32.5 sec    aPTT, therapeutic range     58.0 - 77.0 SECS   PROTHROMBIN TIME + INR    Collection Time: 03/27/17  2:45 AM   Result Value Ref Range    INR <0.9 (L) 0.9 - 1.1    Prothrombin time <9.0 (L) 9.0 - 11.1 sec   PRO-BNP    Collection Time: 03/27/17  2:45 AM   Result Value Ref Range    NT pro-BNP 36 0 - 125 PG/ML   TROPONIN I    Collection Time: 03/27/17  2:45 AM   Result Value Ref Range    Troponin-I, Qt. <0.04 <0.05 ng/mL   EKG, 12 LEAD, INITIAL    Collection Time: 03/27/17  3:08 AM   Result Value Ref Range    Ventricular Rate 101 BPM    Atrial Rate 101 BPM    P-R Interval 160 ms    QRS Duration 78 ms    Q-T Interval 336 ms    QTC Calculation (Bezet) 435 ms    Calculated P Axis 36 degrees    Calculated R Axis 33 degrees    Calculated T Axis 23 degrees    Diagnosis       Sinus tachycardia  Cannot rule out Anterior infarct , age undetermined  When compared with ECG of 19-AUG-2014 12:47,  No significant change was found         IMAGING RESULTS:     CXR Results  (Last 48 hours)               03/27/17 0308  XR CHEST PA LAT Final result    Impression:  IMPRESSION:       There is no acute process. Stable exam.           Narrative:  EXAM:  XR CHEST PA LAT       INDICATION:  Leg swelling. Cough. COMPARISON: 3/15/2017       TECHNIQUE: PA and lateral chest views       FINDINGS: The cardiomediastinal contours are stable. The pulmonary vasculature   is within normal limits. The lungs and pleural spaces are clear. The bones and upper abdomen are stable.                    MEDICATIONS GIVEN:  Medications   HYDROmorphone (PF) (DILAUDID) injection 1 mg (1 mg IntraMUSCular Given 3/27/17 0316)   ondansetron (ZOFRAN) injection 4 mg (4 mg IntraVENous Given 3/27/17 0316)   HYDROmorphone (PF) (DILAUDID) injection 1 mg (1 mg IntraVENous Given 3/27/17 0435)   furosemide (LASIX) injection 40 mg (40 mg IntraVENous Given 3/27/17 8146)       IMPRESSION:  1. Edema, unspecified type    2. Pleurisy without effusion        PLAN:  1. Current Discharge Medication List      START taking these medications    Details   furosemide (LASIX) 40 mg tablet Take 1 Tab by mouth daily for 20 days. Qty: 20 Tab, Refills: 0           2. Follow-up Information     Follow up With Details Comments Contact Info    Evelia Merlin, MD In 1 week You should also follow up with your lupus doctor as soon as possible. [address] [address]  [phone]          Return to ED if worse     DISCHARGE NOTE:  5:12 AM  The patient's results have been reviewed with family and/or caregiver. They verbally convey their understanding and agreement of the patient's signs, symptoms, diagnosis, treatment, and prognosis. They additionally agree to follow up as recommended in the discharge instructions or to return to the Emergency Room should the patient's condition change prior to their follow-up appointment. The family and/or caregiver verbally agrees with the care-plan and all of their questions have been answered. The discharge instructions have also been provided to the them along with educational information regarding the patient's diagnosis and a list of reasons why the patient would want to return to the ER prior to their follow-up appointment should their condition change. Written by ANDRE Baldwin, as dictated by Mary Anne Nj MD.           This note will not be viewable in [address]

## 2017-04-11 ENCOUNTER — HOSPITAL ENCOUNTER (EMERGENCY)
Age: 32
Discharge: HOME | End: 2017-04-11
Attending: EMERGENCY MEDICINE | Admitting: EMERGENCY MEDICINE
Payer: COMMERCIAL

## 2017-04-11 VITALS
DIASTOLIC BLOOD PRESSURE: 54 MMHG | HEART RATE: 99 BPM | TEMPERATURE: 98.4 F | WEIGHT: 214.95 LBS | RESPIRATION RATE: 17 BRPM | HEIGHT: 62 IN | SYSTOLIC BLOOD PRESSURE: 113 MMHG | BODY MASS INDEX: 39.56 KG/M2 | OXYGEN SATURATION: 96 %

## 2017-04-11 DIAGNOSIS — R11.2 NON-INTRACTABLE VOMITING WITH NAUSEA, UNSPECIFIED VOMITING TYPE: ICD-10-CM

## 2017-04-11 DIAGNOSIS — M25.50 MULTIPLE JOINT PAIN: ICD-10-CM

## 2017-04-11 DIAGNOSIS — R42 DIZZY: Primary | ICD-10-CM

## 2017-04-11 LAB
ALBUMIN SERPL BCP-MCNC: 4.1 G/DL (ref 3.5–5)
ALBUMIN/GLOB SERPL: 1.5 {RATIO} (ref 1.1–2.2)
ALP SERPL-CCNC: 106 U/L (ref 45–117)
ALT SERPL-CCNC: 17 U/L (ref 12–78)
ANION GAP BLD CALC-SCNC: 9 MMOL/L (ref 5–15)
APPEARANCE UR: CLEAR
AST SERPL W P-5'-P-CCNC: 8 U/L (ref 15–37)
BACTERIA URNS QL MICRO: NEGATIVE /HPF
BASOPHILS # BLD AUTO: 0 K/UL (ref 0–0.1)
BASOPHILS # BLD: 0 % (ref 0–1)
BILIRUB SERPL-MCNC: 0.2 MG/DL (ref 0.2–1)
BILIRUB UR QL: NEGATIVE
BUN SERPL-MCNC: 8 MG/DL (ref 6–20)
BUN/CREAT SERPL: 9 (ref 12–20)
CALCIUM SERPL-MCNC: 8.9 MG/DL (ref 8.5–10.1)
CHLORIDE SERPL-SCNC: 111 MMOL/L (ref 97–108)
CO2 SERPL-SCNC: 24 MMOL/L (ref 21–32)
COLOR UR: NORMAL
CREAT SERPL-MCNC: 0.9 MG/DL (ref 0.55–1.02)
EOSINOPHIL # BLD: 0.3 K/UL (ref 0–0.4)
EOSINOPHIL NFR BLD: 3 % (ref 0–7)
EPITH CASTS URNS QL MICRO: NORMAL /LPF
ERYTHROCYTE [DISTWIDTH] IN BLOOD BY AUTOMATED COUNT: 12.3 % (ref 11.5–14.5)
FLUAV AG NPH QL IA: NEGATIVE
FLUBV AG NOSE QL IA: NEGATIVE
GLOBULIN SER CALC-MCNC: 2.8 G/DL (ref 2–4)
GLUCOSE SERPL-MCNC: 89 MG/DL (ref 65–100)
GLUCOSE UR STRIP.AUTO-MCNC: NEGATIVE MG/DL
HCT VFR BLD AUTO: 38.2 % (ref 35–47)
HGB BLD-MCNC: 13.3 G/DL (ref 11.5–16)
HGB UR QL STRIP: NEGATIVE
HYALINE CASTS URNS QL MICRO: NORMAL /LPF (ref 0–5)
KETONES UR QL STRIP.AUTO: NEGATIVE MG/DL
LEUKOCYTE ESTERASE UR QL STRIP.AUTO: NEGATIVE
LYMPHOCYTES # BLD AUTO: 45 % (ref 12–49)
LYMPHOCYTES # BLD: 3.8 K/UL (ref 0.8–3.5)
MCH RBC QN AUTO: 32.2 PG (ref 26–34)
MCHC RBC AUTO-ENTMCNC: 34.8 G/DL (ref 30–36.5)
MCV RBC AUTO: 92.5 FL (ref 80–99)
MONOCYTES # BLD: 0.8 K/UL (ref 0–1)
MONOCYTES NFR BLD AUTO: 9 % (ref 5–13)
NEUTS SEG # BLD: 3.6 K/UL (ref 1.8–8)
NEUTS SEG NFR BLD AUTO: 43 % (ref 32–75)
NITRITE UR QL STRIP.AUTO: NEGATIVE
PH UR STRIP: 6 [PH] (ref 5–8)
PLATELET # BLD AUTO: 342 K/UL (ref 150–400)
POTASSIUM SERPL-SCNC: 3.4 MMOL/L (ref 3.5–5.1)
PROT SERPL-MCNC: 6.9 G/DL (ref 6.4–8.2)
PROT UR STRIP-MCNC: NEGATIVE MG/DL
RBC # BLD AUTO: 4.13 M/UL (ref 3.8–5.2)
RBC #/AREA URNS HPF: NORMAL /HPF (ref 0–5)
SODIUM SERPL-SCNC: 144 MMOL/L (ref 136–145)
SP GR UR REFRACTOMETRY: 1.01 (ref 1–1.03)
UA: UC IF INDICATED,UAUC: NORMAL
UROBILINOGEN UR QL STRIP.AUTO: 0.2 EU/DL (ref 0.2–1)
WBC # BLD AUTO: 8.4 K/UL (ref 3.6–11)
WBC URNS QL MICRO: NORMAL /HPF (ref 0–4)

## 2017-04-11 PROCEDURE — 80053 COMPREHEN METABOLIC PANEL: CPT | Performed by: EMERGENCY MEDICINE

## 2017-04-11 PROCEDURE — 87804 INFLUENZA ASSAY W/OPTIC: CPT | Performed by: EMERGENCY MEDICINE

## 2017-04-11 PROCEDURE — 96375 TX/PRO/DX INJ NEW DRUG ADDON: CPT

## 2017-04-11 PROCEDURE — 74011000250 HC RX REV CODE- 250: Performed by: EMERGENCY MEDICINE

## 2017-04-11 PROCEDURE — 74011250636 HC RX REV CODE- 250/636: Performed by: EMERGENCY MEDICINE

## 2017-04-11 PROCEDURE — 81001 URINALYSIS AUTO W/SCOPE: CPT | Performed by: EMERGENCY MEDICINE

## 2017-04-11 PROCEDURE — 99285 EMERGENCY DEPT VISIT HI MDM: CPT

## 2017-04-11 PROCEDURE — 36415 COLL VENOUS BLD VENIPUNCTURE: CPT | Performed by: EMERGENCY MEDICINE

## 2017-04-11 PROCEDURE — 96374 THER/PROPH/DIAG INJ IV PUSH: CPT

## 2017-04-11 PROCEDURE — 85025 COMPLETE CBC W/AUTO DIFF WBC: CPT | Performed by: EMERGENCY MEDICINE

## 2017-04-11 PROCEDURE — 74011250637 HC RX REV CODE- 250/637: Performed by: EMERGENCY MEDICINE

## 2017-04-11 PROCEDURE — 93005 ELECTROCARDIOGRAM TRACING: CPT

## 2017-04-11 RX ORDER — MORPHINE SULFATE 4 MG/ML
4 INJECTION, SOLUTION INTRAMUSCULAR; INTRAVENOUS
Status: COMPLETED | OUTPATIENT
Start: 2017-04-11 | End: 2017-04-11

## 2017-04-11 RX ORDER — LIDOCAINE HYDROCHLORIDE 20 MG/ML
10 SOLUTION OROPHARYNGEAL
Status: COMPLETED | OUTPATIENT
Start: 2017-04-11 | End: 2017-04-11

## 2017-04-11 RX ORDER — OXYCODONE HYDROCHLORIDE 5 MG/1
5 TABLET ORAL
Status: COMPLETED | OUTPATIENT
Start: 2017-04-11 | End: 2017-04-11

## 2017-04-11 RX ORDER — ONDANSETRON 2 MG/ML
4 INJECTION INTRAMUSCULAR; INTRAVENOUS
Status: COMPLETED | OUTPATIENT
Start: 2017-04-11 | End: 2017-04-11

## 2017-04-11 RX ADMIN — ONDANSETRON HYDROCHLORIDE 4 MG: 2 INJECTION, SOLUTION INTRAMUSCULAR; INTRAVENOUS at 20:05

## 2017-04-11 RX ADMIN — OXYCODONE HYDROCHLORIDE 5 MG: 5 TABLET ORAL at 21:15

## 2017-04-11 RX ADMIN — LIDOCAINE HYDROCHLORIDE 10 ML: 20 SOLUTION ORAL; TOPICAL at 20:03

## 2017-04-11 RX ADMIN — ALUMINUM HYDROXIDE AND MAGNESIUM HYDROXIDE 30 ML: 200; 200 SUSPENSION ORAL at 20:02

## 2017-04-11 RX ADMIN — MORPHINE SULFATE 4 MG: 4 INJECTION, SOLUTION INTRAMUSCULAR; INTRAVENOUS at 20:06

## 2017-04-11 NOTE — ED PROVIDER NOTES
HPI Comments: Clement Parada is a 33 y.o. female with PMhx significant for headaches, GERD, and lupus who presents ambulatory to the ED c/o a headache, lightheadedness, dizziness x earlier today. She reports associated sx of nausea with one episode of emesis, diffuse joint pain, abdominal pain, and generalized weakness. She expresses that earlier today she felt lightheaded stating \"my head feels like it's in a fog\" and has found no relief in her sx with Ibuprofen leading her to the ED. The pt discloses that she was seen in the ED and informed that her HGB had dropped significantly within a 5 day span so she is concerned her HGB is low today. She notes a h/o a blood transfusion a few years ago but none since. The pt also reports that she has been taking iron supplements. Pt reports history of vertigo but states her symptoms today are different. She denies any chest pain, SOB, dysuria, decreased appetite, hematochezia, hemoptysis, or hematemesis. PCP: Fiordaliza Dumont MD      There are no other complaints, changes or physical findings at this time. Written by Nathanael Yadav ED Scribe, as dictated by Laurie Lujan MD     The history is provided by the patient. No  was used.         Past Medical History:   Diagnosis Date    Abnormal CT of the abdomen 11/8/2012    Asthma     Autoimmune disease ([facility] [address]     lupus    Cervical prolapse     Dehydration     Fatty liver     Gastrointestinal disorder     gerd, twisted colon, IBS    GERD (gastroesophageal reflux disease)     Headache(784.0)     Morbid obesity (HCC)     Nausea & vomiting     Neurological disorder     cluster headaches    other 2006, 2009    ovarian cyst removal    Other ill-defined conditions(799.89)     Worsening headaches        Past Surgical History:   Procedure Laterality Date    COLONOSCOPY  9/21/2010         HX CHOLECYSTECTOMY      HX GYN  1/2009    right salpingo oopherectomy    HX GYN  2006 right ovarian tumor removed    HX HEENT      left wisdom teeth removal    HX HEENT      top right wisdom tooth removed    HX HERNIA REPAIR  4/2012    HX HERNIA REPAIR  2/28/13    Laparoscopic recurrent incisional hernia repair    HX UROLOGICAL      Kidney Stone Removal    AR EGD TRANSORAL BIOPSY SINGLE/MULTIPLE  9/22/2010              No family history on file. Social History     Social History    Marital status:      Spouse name: N/A    Number of children: N/A    Years of education: N/A     Occupational History    Not on file. Social History Main Topics    Smoking status: Never Smoker    Smokeless tobacco: Never Used    Alcohol use No    Drug use: No    Sexual activity: Not Currently     Partners: Male     Birth control/ protection: Abstinence     Other Topics Concern    Not on file     Social History Narrative         ALLERGIES: Latex; Compazine [prochlorperazine]; Pcn [penicillins]; Sulfa (sulfonamide antibiotics); Topamax [topiramate]; Adhesive; Mushroom combination no.1; Toradol [ketorolac tromethamine]; and Valproic acid    Review of Systems   Constitutional: Negative for appetite change, chills, fatigue and fever. HENT: Negative for congestion, rhinorrhea and sore throat. Eyes: Negative for pain, discharge and visual disturbance. Respiratory: Negative for cough, chest tightness, shortness of breath and wheezing.         (-) hemoptysis      Cardiovascular: Negative for chest pain, palpitations and leg swelling. Gastrointestinal: Positive for abdominal pain, nausea and vomiting. Negative for blood in stool, constipation and diarrhea. (-) hematemesis    Genitourinary: Positive for hematuria (x4 months). Negative for dysuria and frequency. Musculoskeletal: Positive for arthralgias (diffuse joint pain). Negative for back pain and myalgias. Skin: Negative for rash. Neurological: Positive for dizziness, weakness (generalized), light-headedness and headaches. Psychiatric/Behavioral: Negative. Patient Vitals for the past 12 hrs:   Temp Pulse Resp BP SpO2   04/11/17 1845 98.4 °F (36.9 °C) 97 18 132/85 100 %        Physical Exam   Constitutional: She is oriented to person, place, and time. She appears well-developed and well-nourished. No distress. HENT:   Head: Normocephalic and atraumatic. Eyes: EOM are normal. Right eye exhibits no discharge. Left eye exhibits no discharge. No scleral icterus. Neck: Normal range of motion. Neck supple. No tracheal deviation present. Cardiovascular: Regular rhythm, normal heart sounds and intact distal pulses. Tachycardia present. Exam reveals no gallop and no friction rub. No murmur heard. Pulmonary/Chest: Effort normal and breath sounds normal. No respiratory distress. She has no wheezes. She has no rales. Abdominal: Soft. She exhibits no distension. There is tenderness in the epigastric area. There is no rebound and no guarding. Musculoskeletal: Normal range of motion. She exhibits no edema. All major joints are normal in appearance without erythema or warmth   Lymphadenopathy:     She has no cervical adenopathy. Neurological: She is alert and oriented to person, place, and time. No focal neuro deficits, facial symmetry, strength 5/5 in all extremities, normal speech   Skin: Skin is warm and dry. No rash noted. Psychiatric: She has a normal mood and affect. Nursing note and vitals reviewed. MDM  Number of Diagnoses or Management Options  Dizzy:   Multiple joint pain:   Non-intractable vomiting with nausea, unspecified vomiting type:   Diagnosis management comments:     Patient presents to ED with multiple complaints. Overall she appears well, afebrile, nontoxic. Differential includes viral syndrome, dehydration, electrolyte abnormality, influenza, UTI, peripheral vertigo. Headache likely primary - do not suspect ICH, SAH, infectious etiology.   Abdominal exam is benign - do not suspect acute intraabdominal process. Hx of hysterectomy and cholecystectomy. - CBC, CMP, UA, influenza  - EKG, Neli sent in triage; no s/s to suggest ACS  - Pt does not want IVF  - Symptomatic management and reevaluate         Amount and/or Complexity of Data Reviewed  Clinical lab tests: ordered and reviewed  Tests in the medicine section of CPT®: ordered and reviewed  Review and summarize past medical records: yes  Independent visualization of images, tracings, or specimens: yes    Patient Progress  Patient progress: stable    ED Course       Procedures    EKG interpretation: (Preliminary)  1852  Rhythm: normal sinus rhythm and sinus arrhythmia; and regular . Rate (approx.): 82; Axis: normal; ME interval: normal; QRS interval: normal ; ST/T wave: normal; Other findings: normal.  Written by Katherine Rodarte. ANDRE Yadav Scribe as dictated by Barry Mtz MD      PROGRESS NOTE:  9:14 PM  Labs unremarkable. Pt ambulated without difficulty. Will discharge with PCP follow up. Discussed results, prescriptions and follow up plan with patient. Provided customary return to ED instructions. Patient expressed understanding.   Ardena Osler, MD     LABORATORY TESTS:  Recent Results (from the past 12 hour(s))   EKG, 12 LEAD, INITIAL    Collection Time: 04/11/17  6:52 PM   Result Value Ref Range    Ventricular Rate 82 BPM    Atrial Rate 82 BPM    P-R Interval 160 ms    QRS Duration 82 ms    Q-T Interval 356 ms    QTC Calculation (Bezet) 415 ms    Calculated P Axis 26 degrees    Calculated R Axis 28 degrees    Calculated T Axis 29 degrees    Diagnosis       Normal sinus rhythm with sinus arrhythmia  When compared with ECG of 27-MAR-2017 03:08,  No significant change was found     CBC WITH AUTOMATED DIFF    Collection Time: 04/11/17  7:23 PM   Result Value Ref Range    WBC 8.4 3.6 - 11.0 K/uL    RBC 4.13 3.80 - 5.20 M/uL    HGB 13.3 11.5 - 16.0 g/dL    HCT 38.2 35.0 - 47.0 %    MCV 92.5 80.0 - 99.0 FL    MCH 32.2 26.0 - 34.0 PG MCHC 34.8 30.0 - 36.5 g/dL    RDW 12.3 11.5 - 14.5 %    PLATELET 483 661 - 124 K/uL    NEUTROPHILS 43 32 - 75 %    LYMPHOCYTES 45 12 - 49 %    MONOCYTES 9 5 - 13 %    EOSINOPHILS 3 0 - 7 %    BASOPHILS 0 0 - 1 %    ABS. NEUTROPHILS 3.6 1.8 - 8.0 K/UL    ABS. LYMPHOCYTES 3.8 (H) 0.8 - 3.5 K/UL    ABS. MONOCYTES 0.8 0.0 - 1.0 K/UL    ABS. EOSINOPHILS 0.3 0.0 - 0.4 K/UL    ABS. BASOPHILS 0.0 0.0 - 0.1 K/UL   METABOLIC PANEL, COMPREHENSIVE    Collection Time: 04/11/17  7:23 PM   Result Value Ref Range    Sodium 144 136 - 145 mmol/L    Potassium 3.4 (L) 3.5 - 5.1 mmol/L    Chloride 111 (H) 97 - 108 mmol/L    CO2 24 21 - 32 mmol/L    Anion gap 9 5 - 15 mmol/L    Glucose 89 65 - 100 mg/dL    BUN 8 6 - 20 MG/DL    Creatinine 0.90 0.55 - 1.02 MG/DL    BUN/Creatinine ratio 9 (L) 12 - 20      GFR est AA >60 >60 ml/min/1.73m2    GFR est non-AA >60 >60 ml/min/1.73m2    Calcium 8.9 8.5 - 10.1 MG/DL    Bilirubin, total 0.2 0.2 - 1.0 MG/DL    ALT (SGPT) 17 12 - 78 U/L    AST (SGOT) 8 (L) 15 - 37 U/L    Alk.  phosphatase 106 45 - 117 U/L    Protein, total 6.9 6.4 - 8.2 g/dL    Albumin 4.1 3.5 - 5.0 g/dL    Globulin 2.8 2.0 - 4.0 g/dL    A-G Ratio 1.5 1.1 - 2.2     URINALYSIS W/ REFLEX CULTURE    Collection Time: 04/11/17  8:11 PM   Result Value Ref Range    Color YELLOW/STRAW      Appearance CLEAR CLEAR      Specific gravity 1.010 1.003 - 1.030      pH (UA) 6.0 5.0 - 8.0      Protein NEGATIVE  NEG mg/dL    Glucose NEGATIVE  NEG mg/dL    Ketone NEGATIVE  NEG mg/dL    Bilirubin NEGATIVE  NEG      Blood NEGATIVE  NEG      Urobilinogen 0.2 0.2 - 1.0 EU/dL    Nitrites NEGATIVE  NEG      Leukocyte Esterase NEGATIVE  NEG      WBC 0-4 0 - 4 /hpf    RBC 0-5 0 - 5 /hpf    Epithelial cells FEW FEW /lpf    Bacteria NEGATIVE  NEG /hpf    UA:UC IF INDICATED CULTURE NOT INDICATED BY UA RESULT CNI      Hyaline cast 0-2 0 - 5 /lpf   INFLUENZA A & B AG (RAPID TEST)    Collection Time: 04/11/17  8:11 PM   Result Value Ref Range    Influenza A Antigen NEGATIVE  NEG      Influenza B Antigen NEGATIVE  NEG         MEDICATIONS GIVEN:  Medications   oxyCODONE IR (ROXICODONE) tablet 5 mg (not administered)   morphine injection 4 mg (4 mg IntraVENous Given 4/11/17 2006)   ondansetron (ZOFRAN) injection 4 mg (4 mg IntraVENous Given 4/11/17 2005)   aluminum-magnesium hydroxide (MAALOX) oral suspension 30 mL (30 mL Oral Given 4/11/17 2002)   lidocaine (XYLOCAINE) 2 % viscous solution 10 mL (10 mL Mouth/Throat Given 4/11/17 2003)       IMPRESSION:  1. Dizzy    2. Non-intractable vomiting with nausea, unspecified vomiting type    3. Multiple joint pain        PLAN:  1. Current Discharge Medication List        2. Follow-up Information     Follow up With Details Comments [address] E[address] MD In 2 days  [facility]  [phone]        Please follow up with your rheumatologist as soon as possible     [facility] EM[facility]  As needed, If symptoms worsen [address] [place] [address] Drive  [address] [facility]  [phone]        3. Return to ED if worse  Discharge Note:  9:10 PM  The patient is ready for discharge. The patient's signs, symptoms, diagnosis, and discharge instruction have been discussed and the patient has conveyed their understanding. The patient is to follow up as recommended or return to the ER should their symptoms worsen. Plan has been discussed and the patient is in agreement. Written by Valentin Yadav ED Scribe, as dictated by Rajan Brown MD    Attestation: This note is prepared by Miladis Yadav, acting as Scribe for Rajan Brown MD.      Rajan Brown MD: The scribe's documentation has been prepared under my direction and personally reviewed by me in its entirety. I confirm that the note above accurately reflects all work, treatment, procedures, and medical decision making performed by me.

## 2017-04-11 NOTE — ED NOTES
Patient presents to ED with C/O being lightheaded, \" in a fog\", nausea, joint pain, and fatigue that started today. The pt stated she was here 2 weeks ago for coughing up blood and dx with pleurisy. The pt stated she was told that her \"Hgb had dropped 4 points in 5 days. \" The pt stated she had a rectal exam and it was negative. The pt stated she went to her rheumatologist and her blood count had dropped more due her RBC's not reproducing correctly. The pt denies chest pain, SOB, vomiting, blood in stool, and urinary symptoms. Patient is A&Ox3, side rails up, call bell w/in reach, and aware of plan of care. The patient is in NAD.  at the bedside.

## 2017-04-12 LAB
ATRIAL RATE: 82 BPM
CALCULATED P AXIS, ECG09: 26 DEGREES
CALCULATED R AXIS, ECG10: 28 DEGREES
CALCULATED T AXIS, ECG11: 29 DEGREES
DIAGNOSIS, 93000: NORMAL
P-R INTERVAL, ECG05: 160 MS
Q-T INTERVAL, ECG07: 356 MS
QRS DURATION, ECG06: 82 MS
QTC CALCULATION (BEZET), ECG08: 415 MS
VENTRICULAR RATE, ECG03: 82 BPM

## 2017-04-12 NOTE — DISCHARGE INSTRUCTIONS

## 2017-04-12 NOTE — ED NOTES
Patient discharged and given discharge instructions by Sonido Alexander MD. Patient had an opportunity to ask questions. Patient verbalized understanding of discharge instructions. Patient ambulatory from ED, discharge instructions and prescriptions in hand. Patient accompanied by .

## 2017-04-12 NOTE — ED NOTES
Side rails up, call bell w/in reach, no further needs expressed at this time, aware of POC.  at the bedside.

## 2017-04-26 ENCOUNTER — HOSPITAL ENCOUNTER (EMERGENCY)
Age: 32
Discharge: HOME | End: 2017-04-27
Attending: EMERGENCY MEDICINE
Payer: COMMERCIAL

## 2017-04-26 DIAGNOSIS — T50.2X5A DIURETICS CAUSING ADVERSE EFFECT IN THERAPEUTIC USE, INITIAL ENCOUNTER: Primary | ICD-10-CM

## 2017-04-26 DIAGNOSIS — E86.0 DEHYDRATION: ICD-10-CM

## 2017-04-26 DIAGNOSIS — R52 COMPLAINTS OF TOTAL BODY PAIN: ICD-10-CM

## 2017-04-26 DIAGNOSIS — Z76.5 DRUG-SEEKING BEHAVIOR: ICD-10-CM

## 2017-04-26 DIAGNOSIS — R25.2 MUSCLE CRAMPS: ICD-10-CM

## 2017-04-26 PROCEDURE — 93005 ELECTROCARDIOGRAM TRACING: CPT

## 2017-04-26 PROCEDURE — 96361 HYDRATE IV INFUSION ADD-ON: CPT

## 2017-04-26 PROCEDURE — 99285 EMERGENCY DEPT VISIT HI MDM: CPT

## 2017-04-26 PROCEDURE — 94762 N-INVAS EAR/PLS OXIMTRY CONT: CPT

## 2017-04-26 PROCEDURE — 96374 THER/PROPH/DIAG INJ IV PUSH: CPT

## 2017-04-26 PROCEDURE — 96375 TX/PRO/DX INJ NEW DRUG ADDON: CPT

## 2017-04-26 PROCEDURE — 96365 THER/PROPH/DIAG IV INF INIT: CPT

## 2017-04-27 VITALS
HEIGHT: 63 IN | DIASTOLIC BLOOD PRESSURE: 78 MMHG | TEMPERATURE: 97.9 F | SYSTOLIC BLOOD PRESSURE: 121 MMHG | OXYGEN SATURATION: 100 % | RESPIRATION RATE: 15 BRPM | WEIGHT: 223.99 LBS | HEART RATE: 90 BPM | BODY MASS INDEX: 39.69 KG/M2

## 2017-04-27 LAB
ALBUMIN SERPL BCP-MCNC: 4 G/DL (ref 3.5–5)
ALBUMIN/GLOB SERPL: 1.3 {RATIO} (ref 1.1–2.2)
ALP SERPL-CCNC: 101 U/L (ref 45–117)
ALT SERPL-CCNC: 19 U/L (ref 12–78)
ANION GAP BLD CALC-SCNC: 8 MMOL/L (ref 5–15)
APPEARANCE UR: CLEAR
AST SERPL W P-5'-P-CCNC: 18 U/L (ref 15–37)
ATRIAL RATE: 100 BPM
BACTERIA URNS QL MICRO: ABNORMAL /HPF
BASOPHILS # BLD AUTO: 0 K/UL (ref 0–0.1)
BASOPHILS # BLD: 0 % (ref 0–1)
BILIRUB SERPL-MCNC: 0.2 MG/DL (ref 0.2–1)
BILIRUB UR QL: NEGATIVE
BUN SERPL-MCNC: 12 MG/DL (ref 6–20)
BUN/CREAT SERPL: 10 (ref 12–20)
CALCIUM SERPL-MCNC: 9.1 MG/DL (ref 8.5–10.1)
CALCULATED P AXIS, ECG09: 39 DEGREES
CALCULATED R AXIS, ECG10: 36 DEGREES
CALCULATED T AXIS, ECG11: 18 DEGREES
CHLORIDE SERPL-SCNC: 106 MMOL/L (ref 97–108)
CK MB CFR SERPL CALC: 1.2 % (ref 0–2.5)
CK MB SERPL-MCNC: 3.8 NG/ML (ref 5–25)
CK SERPL-CCNC: 307 U/L (ref 26–192)
CO2 SERPL-SCNC: 27 MMOL/L (ref 21–32)
COLOR UR: ABNORMAL
CREAT SERPL-MCNC: 1.24 MG/DL (ref 0.55–1.02)
DIAGNOSIS, 93000: NORMAL
EOSINOPHIL # BLD: 0.5 K/UL (ref 0–0.4)
EOSINOPHIL NFR BLD: 4 % (ref 0–7)
EPITH CASTS URNS QL MICRO: ABNORMAL /LPF
ERYTHROCYTE [DISTWIDTH] IN BLOOD BY AUTOMATED COUNT: 12.2 % (ref 11.5–14.5)
GLOBULIN SER CALC-MCNC: 3.2 G/DL (ref 2–4)
GLUCOSE SERPL-MCNC: 82 MG/DL (ref 65–100)
GLUCOSE UR STRIP.AUTO-MCNC: NEGATIVE MG/DL
HCG UR QL: NEGATIVE
HCT VFR BLD AUTO: 39.9 % (ref 35–47)
HGB BLD-MCNC: 13.3 G/DL (ref 11.5–16)
HGB UR QL STRIP: NEGATIVE
KETONES UR QL STRIP.AUTO: NEGATIVE MG/DL
LEUKOCYTE ESTERASE UR QL STRIP.AUTO: NEGATIVE
LYMPHOCYTES # BLD AUTO: 40 % (ref 12–49)
LYMPHOCYTES # BLD: 4.4 K/UL (ref 0.8–3.5)
MCH RBC QN AUTO: 30.9 PG (ref 26–34)
MCHC RBC AUTO-ENTMCNC: 33.3 G/DL (ref 30–36.5)
MCV RBC AUTO: 92.6 FL (ref 80–99)
MONOCYTES # BLD: 1 K/UL (ref 0–1)
MONOCYTES NFR BLD AUTO: 9 % (ref 5–13)
NEUTS SEG # BLD: 5.3 K/UL (ref 1.8–8)
NEUTS SEG NFR BLD AUTO: 47 % (ref 32–75)
NITRITE UR QL STRIP.AUTO: NEGATIVE
P-R INTERVAL, ECG05: 168 MS
PH UR STRIP: 5.5 [PH] (ref 5–8)
PLATELET # BLD AUTO: 301 K/UL (ref 150–400)
POTASSIUM SERPL-SCNC: 3.7 MMOL/L (ref 3.5–5.1)
PROT SERPL-MCNC: 7.2 G/DL (ref 6.4–8.2)
PROT UR STRIP-MCNC: NEGATIVE MG/DL
Q-T INTERVAL, ECG07: 332 MS
QRS DURATION, ECG06: 76 MS
QTC CALCULATION (BEZET), ECG08: 428 MS
RBC # BLD AUTO: 4.31 M/UL (ref 3.8–5.2)
RBC #/AREA URNS HPF: ABNORMAL /HPF (ref 0–5)
SODIUM SERPL-SCNC: 141 MMOL/L (ref 136–145)
SP GR UR REFRACTOMETRY: 1.01 (ref 1–1.03)
TROPONIN I SERPL-MCNC: <0.04 NG/ML
UA: UC IF INDICATED,UAUC: ABNORMAL
UROBILINOGEN UR QL STRIP.AUTO: 0.2 EU/DL (ref 0.2–1)
VENTRICULAR RATE, ECG03: 100 BPM
WBC # BLD AUTO: 11.2 K/UL (ref 3.6–11)
WBC URNS QL MICRO: ABNORMAL /HPF (ref 0–4)

## 2017-04-27 PROCEDURE — 81025 URINE PREGNANCY TEST: CPT

## 2017-04-27 PROCEDURE — 81001 URINALYSIS AUTO W/SCOPE: CPT | Performed by: EMERGENCY MEDICINE

## 2017-04-27 PROCEDURE — 74011250636 HC RX REV CODE- 250/636: Performed by: EMERGENCY MEDICINE

## 2017-04-27 PROCEDURE — 84484 ASSAY OF TROPONIN QUANT: CPT | Performed by: EMERGENCY MEDICINE

## 2017-04-27 PROCEDURE — 36415 COLL VENOUS BLD VENIPUNCTURE: CPT | Performed by: EMERGENCY MEDICINE

## 2017-04-27 PROCEDURE — 85025 COMPLETE CBC W/AUTO DIFF WBC: CPT | Performed by: EMERGENCY MEDICINE

## 2017-04-27 PROCEDURE — 82553 CREATINE MB FRACTION: CPT | Performed by: EMERGENCY MEDICINE

## 2017-04-27 PROCEDURE — 82550 ASSAY OF CK (CPK): CPT | Performed by: EMERGENCY MEDICINE

## 2017-04-27 PROCEDURE — 80053 COMPREHEN METABOLIC PANEL: CPT | Performed by: EMERGENCY MEDICINE

## 2017-04-27 PROCEDURE — 74011000250 HC RX REV CODE- 250: Performed by: EMERGENCY MEDICINE

## 2017-04-27 PROCEDURE — 87086 URINE CULTURE/COLONY COUNT: CPT | Performed by: EMERGENCY MEDICINE

## 2017-04-27 PROCEDURE — 74011000258 HC RX REV CODE- 258: Performed by: EMERGENCY MEDICINE

## 2017-04-27 PROCEDURE — 74011250637 HC RX REV CODE- 250/637: Performed by: EMERGENCY MEDICINE

## 2017-04-27 RX ORDER — FENTANYL CITRATE 50 UG/ML
25 INJECTION, SOLUTION INTRAMUSCULAR; INTRAVENOUS
Status: COMPLETED | OUTPATIENT
Start: 2017-04-27 | End: 2017-04-27

## 2017-04-27 RX ORDER — LORAZEPAM 2 MG/ML
1 INJECTION INTRAMUSCULAR
Status: COMPLETED | OUTPATIENT
Start: 2017-04-27 | End: 2017-04-27

## 2017-04-27 RX ORDER — DIPHENHYDRAMINE HCL 50 MG
50 CAPSULE ORAL
Status: COMPLETED | OUTPATIENT
Start: 2017-04-27 | End: 2017-04-27

## 2017-04-27 RX ORDER — HYDROXYZINE 50 MG/1
50 TABLET, FILM COATED ORAL
Qty: 20 TAB | Refills: 0 | Status: SHIPPED | OUTPATIENT
Start: 2017-04-27 | End: 2017-05-07

## 2017-04-27 RX ORDER — OXYCODONE AND ACETAMINOPHEN 5; 325 MG/1; MG/1
2 TABLET ORAL
Status: COMPLETED | OUTPATIENT
Start: 2017-04-27 | End: 2017-04-27

## 2017-04-27 RX ORDER — LIDOCAINE HCL/PF 100 MG/5ML
100 SYRINGE (ML) INTRAVENOUS
Status: DISCONTINUED | OUTPATIENT
Start: 2017-04-27 | End: 2017-04-27

## 2017-04-27 RX ORDER — DIAZEPAM 10 MG/2ML
2 INJECTION INTRAMUSCULAR
Status: COMPLETED | OUTPATIENT
Start: 2017-04-27 | End: 2017-04-27

## 2017-04-27 RX ORDER — DIPHENHYDRAMINE HCL 50 MG
CAPSULE ORAL
Status: DISCONTINUED
Start: 2017-04-27 | End: 2017-04-27 | Stop reason: HOSPADM

## 2017-04-27 RX ORDER — FLUCONAZOLE 100 MG/1
200 TABLET ORAL
Status: COMPLETED | OUTPATIENT
Start: 2017-04-27 | End: 2017-04-27

## 2017-04-27 RX ADMIN — FLUCONAZOLE 200 MG: 100 TABLET ORAL at 04:04

## 2017-04-27 RX ADMIN — SODIUM CHLORIDE 1000 ML: 900 INJECTION, SOLUTION INTRAVENOUS at 00:24

## 2017-04-27 RX ADMIN — LORAZEPAM 1 MG: 2 INJECTION INTRAMUSCULAR at 03:15

## 2017-04-27 RX ADMIN — DIPHENHYDRAMINE HYDROCHLORIDE 50 MG: 50 CAPSULE ORAL at 01:04

## 2017-04-27 RX ADMIN — FENTANYL CITRATE 25 MCG: 50 INJECTION, SOLUTION INTRAMUSCULAR; INTRAVENOUS at 03:57

## 2017-04-27 RX ADMIN — DIAZEPAM 2 MG: 5 INJECTION, SOLUTION INTRAMUSCULAR; INTRAVENOUS at 01:01

## 2017-04-27 RX ADMIN — OXYCODONE HYDROCHLORIDE AND ACETAMINOPHEN 2 TABLET: 5; 325 TABLET ORAL at 02:02

## 2017-04-27 RX ADMIN — Medication: at 03:14

## 2017-04-27 NOTE — ED NOTES
Bedside and Verbal shift change report given to Bijan Leo RN (oncoming nurse) by Lynda Hankins RN (offgoing nurse). Report included the following information SBAR and ED Summary.

## 2017-04-27 NOTE — DISCHARGE INSTRUCTIONS
Dehydration: Care Instructions  Your Care Instructions  Dehydration happens when your body loses too much fluid. This might happen when you do not drink enough water or you lose large amounts of fluids from your body because of diarrhea, vomiting, or sweating. Severe dehydration can be life-threatening. Water and minerals called electrolytes help put your body fluids back in balance. Learn the early signs of fluid loss, and drink more fluids to prevent dehydration. Follow-up care is a key part of your treatment and safety. Be sure to make and go to all appointments, and call your doctor if you are having problems. It's also a good idea to know your test results and keep a list of the medicines you take. How can you care for yourself at home? · To prevent dehydration, drink plenty of fluids, enough so that your urine is light yellow or clear like water. Choose water and other caffeine-free clear liquids until you feel better. If you have kidney, heart, or liver disease and have to limit fluids, talk with your doctor before you increase the amount of fluids you drink. · If you do not feel like eating or drinking, try taking small sips of water, sports drinks, or other rehydration drinks. · Get plenty of rest.  To prevent dehydration  · Add more fluids to your diet and daily routine, unless your doctor has told you not to. · During hot weather, drink more fluids. Drink even more fluids if you exercise a lot. Stay away from drinks with alcohol or caffeine. · Watch for the symptoms of dehydration. These include:  ¨ A dry, sticky mouth. ¨ Dark yellow urine, and not much of it. ¨ Dry and sunken eyes. ¨ Feeling very tired. · Learn what problems can lead to dehydration. These include:  ¨ Diarrhea, fever, and vomiting. ¨ Any illness with a fever, such as pneumonia or the flu. ¨ Activities that cause heavy sweating, such as endurance races and heavy outdoor work in hot or humid weather.   ¨ Alcohol or drug abuse or withdrawal.  ¨ Certain medicines, such as cold and allergy pills (antihistamines), diet pills (diuretics), and laxatives. ¨ Certain diseases, such as diabetes, cancer, and heart or kidney disease. When should you call for help? Call 911 anytime you think you may need emergency care. For example, call if:  · You passed out (lost consciousness). Call your doctor now or seek immediate medical care if:  · You are confused and cannot think clearly. · You are dizzy or lightheaded, or you feel like you may faint. · You have signs of needing more fluids. You have sunken eyes and a dry mouth, and you pass only a little dark urine. · You cannot keep fluids down. Watch closely for changes in your health, and be sure to contact your doctor if:  · You are not making tears. · Your skin is very dry and sags slowly back into place after you pinch it. · Your mouth and eyes are very dry. Where can you learn more? Go to http://kymberly-yoon.info/. Enter P973 in the search box to learn more about \"Dehydration: Care Instructions. \"  Current as of: May 27, 2016  Content Version: 11.2  © 8207-0338 [employer]. Care instructions adapted under license by [employer] (which disclaims liability or warranty for this information). If you have questions about a medical condition or this instruction, always ask your healthcare professional. [place] [address] any warranty or liability for your use of this information. Oral Rehydration: Care Instructions  Your Care Instructions  Dehydration occurs when your body loses too much water. This can happen if you do not drink enough fluids or lose a lot of fluid due to diarrhea, vomiting, or sweating. Being dehydrated can cause health problems and can even be life-threatening. To replace lost fluids, you need to drink liquid that contains special chemicals called electrolytes. Electrolytes keep your body working well. Plain water does not have electrolytes. You also need to rest to prevent more fluid loss. Replacing water and electrolytes (oral rehydration) completely takes about 36 hours. But you should feel better within a few hours. Follow-up care is a key part of your treatment and safety. Be sure to make and go to all appointments, and call your doctor if you are having problems. It's also a good idea to know your test results and keep a list of the medicines you take. How can you care for yourself at home? · Take frequent sips of a drink such as Gatorade, Powerade, or other rehydration drinks that your doctor suggests. These replace both fluid and important chemicals (electrolytes) you need for balance in your blood. · Drink 2 quarts of cool liquid over 2 to 4 hours. You should have at least 10 glasses of liquid a day to replace lost fluid. If you have kidney, heart, or liver disease and have to limit fluids, talk with your doctor before you increase the amount of fluids you drink. · Make your own drink. Measure everything carefully. The drink may not work well or may even be harmful if the amounts are off. ¨ 1 quart water  ¨ ½ teaspoon salt  ¨ 6 teaspoons sugar  · Do not drink liquid with caffeine, such as coffee and jose. · Do not drink any alcohol. It can make you dehydrated. · Drink plenty of fluids, enough so that your urine is light yellow or clear like water. If you have kidney, heart, or liver disease and have to limit fluids, talk with your doctor before you increase the amount of fluids you drink. When should you call for help? Call 911 anytime you think you may need emergency care. For example, call if:  · You have signs of severe dehydration, such as:  ¨ You are confused or unable to stay awake. ¨ You passed out (lost consciousness). Call your doctor now or seek immediate medical care if:  · You still have signs of dehydration.  You have sunken eyes and a dry mouth, and you pass only a little dark urine.  · You are dizzy or lightheaded, or you feel like you may faint. · You are not able to keep down fluids. Watch closely for changes in your health, and be sure to contact your doctor if:  · You do not get better as expected. Where can you learn more? Go to http://kymberly-yoon.info/. Enter I040 in the search box to learn more about \"Oral Rehydration: Care Instructions. \"  Current as of: May 27, 2016  Content Version: 11.2  © 3164-3813 [employer]. Care instructions adapted under license by [employer] (which disclaims liability or warranty for this information). If you have questions about a medical condition or this instruction, always ask your healthcare professional. [address] any warranty or liability for your use of this information. Accidental Overdose of Medicine: Care Instructions  Your Care Instructions  Almost any medicine can cause harm if you take too much of it. You have had treatment to help your body get rid of an overdose of a medicine. This may have been an over-the-counter medicine. Or it might be one that a doctor prescribed. It may even have been a vitamin or a supplement. During treatment, the doctor may have given you fluids and medicine. You also may have had lab tests. Then the doctor made sure that you were well enough to go home. The doctor has checked you carefully, but problems can develop later. If you notice any problems or new symptoms, get medical treatment right away. Follow-up care is a key part of your treatment and safety. Be sure to make and go to all appointments, and call your doctor if you are having problems. It's also a good idea to know your test results and keep a list of the medicines you take. How can you care for yourself at home? Home care  · Drink plenty of fluids.  If you have kidney, heart, or liver disease and have to limit fluids, talk with your doctor before you increase the amount of fluids you drink. · If you normally take medicines, ask your doctor when you can start taking them again. · Read the information that comes with any medicine. If you have questions, ask your doctor or pharmacist.  Prevention  · Be safe with medicines. Take your medicines exactly as prescribed or as the label directs. Call your doctor if you think you are having a problem with your medicine. · Keep your medicines in the containers they came in. Keep them with the original labels. · Find out what to do if you miss a dose of your medicine. When should you call for help? Call 911 anytime you think you may need emergency care. For example, call if:  · You passed out (lost consciousness). Call your doctor now or seek immediate medical care if:  · You have a sudden change in behavior. · You are very confused or can't think clearly. · You have new symptoms, such as vomiting or belly pain. Watch closely for changes in your health, and be sure to contact your doctor if:  · You do not get better as expected. Where can you learn more? Go to http://kymberly-yoon.info/. Enter C165 in the search box to learn more about \"Accidental Overdose of Medicine: Care Instructions. \"  Current as of: August 14, 2016  Content Version: 11.2  © 3822-5703 [employer]. Care instructions adapted under license by [employer] (which disclaims liability or warranty for this information). If you have questions about a medical condition or this instruction, always ask your healthcare professional. [place] [address] any warranty or liability for your use of this information.

## 2017-04-27 NOTE — ED NOTES
Assumed care of patient from triage. Patient arrives with complaints of dehydration. Patient reports that she has been on fluid pills for past 6 days to remove excess fluid. Patient reports that she has been urinating frequently without relief or removal of excess fluid. Patient also reports headaches with dizziness. Patient reports similar episodes when previously patient had very low potassium. Patient also reports all over itching that started yesterday. Patient placed on the monitor x 3 with side rails and call bell in reach.

## 2017-04-27 NOTE — ED NOTES
Patient received discharge instructions and verbalized understanding. Patient discharged ambulatory out of ED  in no apparent distress at this time. Patient declined wheelchair.

## 2017-04-27 NOTE — ED PROVIDER NOTES
HPI Comments: Shanika Martin, 33 y.o. female, presents ambulatory to ED [facility] ED with cc of a constellation of symptoms such as nausea, a sore throat, sleep disturbance, a pruritic generalized rash, generalized body aches, light-headedness, generalized tremors, and a headache x 3 days. The patient also c/o increased leg swelling and urinary frequency. The patient notes that she was prescribed Lasix 20 mg daily, and she states that she has been taking it for the past 6 days. The pt notes that she has had similar symptoms in the past, and she reports that she was diagnosed with hypokalemia at that time. The patient also notes a history of migraines, but she states that current headache is not similar. The patient notes that she has taken Tramadol for her symptoms with no relief. Per medical records, the patient was evaluated on 4/11/17 for similar symptoms with no significant findings. The pt specifically denies syncope, vomiting, or diarrhea at this time. PCP: Marcos Chapa MD  Neurology: Dior Martínez MD      PMHx significant for: Lupus, morbid obesity, GERD, asthma, cervical prolapse  PSHx significant for: Colonoscopy, cholecystectomy, hernia repair, oophorectomy  Social history significant for: - Tobacco, - EtOH, - Illicit Drug Use    There are no other complaints, changes, or physical findings at this time. Written by ANDRE Siddiqi, as dictated by Domenica Turner MD.    The history is provided by the patient and medical records. No  was used.         Past Medical History:   Diagnosis Date    Abnormal CT of the abdomen 11/8/2012    Asthma     Autoimmune disease ([facility] [address]     lupus    Cervical prolapse     Dehydration     Fatty liver     Gastrointestinal disorder     gerd, twisted colon, IBS    GERD (gastroesophageal reflux disease)     Headache     Morbid obesity (HCC)     Nausea & vomiting     Neurological disorder     cluster headaches    other 2006, 2009 ovarian cyst removal    Other ill-defined conditions     Worsening headaches        Past Surgical History:   Procedure Laterality Date    COLONOSCOPY  9/21/2010         HX CHOLECYSTECTOMY      HX GYN  1/2009    right salpingo oopherectomy    HX GYN  2006    right ovarian tumor removed    HX HEENT      left wisdom teeth removal    HX HEENT      top right wisdom tooth removed    HX HERNIA REPAIR  4/2012    HX HERNIA REPAIR  2/28/13    Laparoscopic recurrent incisional hernia repair    HX UROLOGICAL      Kidney Stone Removal    MD EGD TRANSORAL BIOPSY SINGLE/MULTIPLE  9/22/2010              History reviewed. No pertinent family history. Social History     Social History    Marital status:      Spouse name: N/A    Number of children: N/A    Years of education: N/A     Occupational History    Not on file. Social History Main Topics    Smoking status: Never Smoker    Smokeless tobacco: Never Used    Alcohol use No    Drug use: No    Sexual activity: Not Currently     Partners: Male     Birth control/ protection: Abstinence     Other Topics Concern    Not on file     Social History Narrative         ALLERGIES: Latex; Compazine [prochlorperazine]; Pcn [penicillins]; Sulfa (sulfonamide antibiotics); Topamax [topiramate]; Adhesive; Mushroom combination no.1; Toradol [ketorolac tromethamine]; and Valproic acid    Review of Systems   Constitutional: Negative. Negative for fever. HENT: Positive for sore throat. Eyes: Negative. Respiratory: Negative. Negative for shortness of breath. Cardiovascular: Positive for leg swelling. Negative for chest pain. Gastrointestinal: Positive for nausea. Negative for abdominal pain, diarrhea and vomiting. Endocrine: Negative. Genitourinary: Positive for frequency. Negative for difficulty urinating, dysuria and hematuria. Musculoskeletal: Positive for myalgias (Generalized). Skin: Positive for rash. Allergic/Immunologic: Negative. Neurological: Positive for tremors, light-headedness and headaches. Negative for syncope. Psychiatric/Behavioral: Positive for sleep disturbance. Negative for suicidal ideas. All other systems reviewed and are negative. Vitals:    04/26/17 2345 04/27/17 0104 04/27/17 0200 04/27/17 0230   BP: 138/88 126/76 110/81 121/78   Pulse: 100 94 92 90   Resp: 18 17 14 15   Temp: 97.9 °F (36.6 °C)      SpO2: 99% 98% 99% 100%   Weight: 101.6 kg (223 lb 15.8 oz)      Height: 5' 2.5\" (1.588 m)               Physical Exam   Constitutional: She is oriented to person, place, and time. She appears well-developed and well-nourished. She appears distressed. HENT:   Head: Normocephalic and atraumatic. Nose: Nose normal.   Eyes: Conjunctivae and EOM are normal. No scleral icterus. Neck: Normal range of motion. No tracheal deviation present. Cardiovascular: Normal rate, regular rhythm, normal heart sounds and intact distal pulses. Exam reveals no friction rub. No murmur heard. Pulmonary/Chest: Effort normal and breath sounds normal. No stridor. No respiratory distress. She has no wheezes. She has no rales. Abdominal: Soft. Bowel sounds are normal. She exhibits no distension. There is no tenderness. There is no rebound. Musculoskeletal: Normal range of motion. She exhibits no tenderness. Neurological: She is alert and oriented to person, place, and time. No cranial nerve deficit. Skin: Skin is warm and dry. No rash noted. She is not diaphoretic. Psychiatric: Her speech is normal. Judgment and thought content normal. Her mood appears anxious. Her affect is labile. She is agitated. Cognition and memory are normal.   Nursing note and vitals reviewed.        MDM  Number of Diagnoses or Management Options  Complaints of total body pain:   Dehydration:   Diuretics causing adverse effect in therapeutic use, initial encounter:   Drug-seeking behavior:   Muscle cramps:   Diagnosis management comments: DDX:  Over diuresis, dehydration, arf, electrolyte abnormality, drug seeking behavior    Plan:  Labs, ivf, analgesic    Impression:  Over diuresis, dehydration, drug seeking behavior, pruritis       Amount and/or Complexity of Data Reviewed  Clinical lab tests: ordered and reviewed  Tests in the medicine section of CPT®: ordered and reviewed  Obtain history from someone other than the patient: yes (Medical records)  Review and summarize past medical records: yes  Independent visualization of images, tracings, or specimens: yes      ED Course       Procedures    I reviewed our electronic medical record system for any past medical records that were available that may contribute to the patients current condition, the nursing notes and and vital signs from today's visit    Nursing notes will be reviewed as they become available in realtime while the pt has been in the ED. Paige Abdalla MD    12:15 AM  Pt's rate is 99 with a normal sinus rhythm as noted on Cardiac monitor. SpO2 is 97%, on RA    EKG interpretation 2351: NSR, nl Axis, rate 100; , QRS 76, QTc 428; no acute ischemia; Paige Abdalla MD    Progress Note:  12:59 AM  The patient was re-evaluated and states that she is in pain. The patient reports that she is still has generalized itching. Will give her a dose of benadryl and Valium. Written by Lucrecia Flowers ED Scribe, as dictated by Paige Abdalla MD.    Progress Note:  2:10 AM  The patient was re-evaluated and states she is still in pain and that all of her symptoms are still present after receiving a dose of valium, percocet, and benadryl. She notes \"I could have stayed at home and been miserable. And been up peeing all night for 3 days. \"  The patient states that she would like to be discharged because her pain has not improved with ED treatment. I acknowledged that the patient is uncomfortable and that I am attempting to control her sx.  I specifically noted that she needed rehydration for her over diuresis and that she would not have received IVF at home. I apologized that there is nothing I can do to reverse the lasix she has been taking for 6 days but again noted that we have been trying to improve her sx since she arrived. Pt stated \"I need something very strong\" for her pain, specifically requesting morphine. I noted that I have ordered her another analgesic that we are awaiting from the pharmacy. Pt agreed to try lidocaine. Pt also requests something else for her itching. Will provide Ativan. Written by Eusebio Gary ED Scribe, as dictated by Johnnie Quinones MD.    Progress Note:  3:48 AM  The patient was re-evaluated and states that her pain has not improved after Lidocaine drip. Will order fentanyl 25 mg IV and discharge. The patient is also requesting an rx for diflucan at time of discharge for the \"beginning sx of a yeast infection. \" She states that she \"doesn't want to visit my PCP and pay the $10 co-pay for the medication. \" The patient's lab results have been discussed with her, and she conveys her understanding of these results. Will discharge.   Written by ANDRE Stevee, as dictated by Johnnie Quinones MD.    LABORATORY TESTS:  Recent Results (from the past 12 hour(s))   EKG, 12 LEAD, INITIAL    Collection Time: 04/26/17 11:51 PM   Result Value Ref Range    Ventricular Rate 100 BPM    Atrial Rate 100 BPM    P-R Interval 168 ms    QRS Duration 76 ms    Q-T Interval 332 ms    QTC Calculation (Bezet) 428 ms    Calculated P Axis 39 degrees    Calculated R Axis 36 degrees    Calculated T Axis 18 degrees    Diagnosis       Normal sinus rhythm  Possible Left atrial enlargement  Borderline ECG  When compared with ECG of 11-APR-2017 18:52,  No significant change was found     HCG URINE, QL. - POC    Collection Time: 04/27/17 12:15 AM   Result Value Ref Range    Pregnancy test,urine (POC) NEGATIVE  NEG     METABOLIC PANEL, COMPREHENSIVE    Collection Time: 04/27/17 12:25 AM   Result Value Ref Range    Sodium 141 136 - 145 mmol/L    Potassium 3.7 3.5 - 5.1 mmol/L    Chloride 106 97 - 108 mmol/L    CO2 27 21 - 32 mmol/L    Anion gap 8 5 - 15 mmol/L    Glucose 82 65 - 100 mg/dL    BUN 12 6 - 20 MG/DL    Creatinine 1.24 (H) 0.55 - 1.02 MG/DL    BUN/Creatinine ratio 10 (L) 12 - 20      GFR est AA >60 >60 ml/min/1.73m2    GFR est non-AA 50 (L) >60 ml/min/1.73m2    Calcium 9.1 8.5 - 10.1 MG/DL    Bilirubin, total 0.2 0.2 - 1.0 MG/DL    ALT (SGPT) 19 12 - 78 U/L    AST (SGOT) 18 15 - 37 U/L    Alk. phosphatase 101 45 - 117 U/L    Protein, total 7.2 6.4 - 8.2 g/dL    Albumin 4.0 3.5 - 5.0 g/dL    Globulin 3.2 2.0 - 4.0 g/dL    A-G Ratio 1.3 1.1 - 2.2     CBC WITH AUTOMATED DIFF    Collection Time: 04/27/17 12:25 AM   Result Value Ref Range    WBC 11.2 (H) 3.6 - 11.0 K/uL    RBC 4.31 3.80 - 5.20 M/uL    HGB 13.3 11.5 - 16.0 g/dL    HCT 39.9 35.0 - 47.0 %    MCV 92.6 80.0 - 99.0 FL    MCH 30.9 26.0 - 34.0 PG    MCHC 33.3 30.0 - 36.5 g/dL    RDW 12.2 11.5 - 14.5 %    PLATELET 403 620 - 005 K/uL    NEUTROPHILS 47 32 - 75 %    LYMPHOCYTES 40 12 - 49 %    MONOCYTES 9 5 - 13 %    EOSINOPHILS 4 0 - 7 %    BASOPHILS 0 0 - 1 %    ABS. NEUTROPHILS 5.3 1.8 - 8.0 K/UL    ABS. LYMPHOCYTES 4.4 (H) 0.8 - 3.5 K/UL    ABS. MONOCYTES 1.0 0.0 - 1.0 K/UL    ABS. EOSINOPHILS 0.5 (H) 0.0 - 0.4 K/UL    ABS.  BASOPHILS 0.0 0.0 - 0.1 K/UL   TROPONIN I    Collection Time: 04/27/17 12:25 AM   Result Value Ref Range    Troponin-I, Qt. <0.04 <0.05 ng/mL   CK W/ REFLX CKMB    Collection Time: 04/27/17 12:25 AM   Result Value Ref Range     (H) 26 - 192 U/L   URINALYSIS W/ REFLEX CULTURE    Collection Time: 04/27/17 12:25 AM   Result Value Ref Range    Color YELLOW/STRAW      Appearance CLEAR CLEAR      Specific gravity 1.010 1.003 - 1.030      pH (UA) 5.5 5.0 - 8.0      Protein NEGATIVE  NEG mg/dL    Glucose NEGATIVE  NEG mg/dL    Ketone NEGATIVE  NEG mg/dL    Bilirubin NEGATIVE  NEG      Blood NEGATIVE  NEG      Urobilinogen 0.2 0.2 - 1.0 EU/dL    Nitrites NEGATIVE  NEG      Leukocyte Esterase NEGATIVE  NEG      WBC 0-4 0 - 4 /hpf    RBC 0-5 0 - 5 /hpf    Epithelial cells FEW FEW /lpf    Bacteria 1+ (A) NEG /hpf    UA:UC IF INDICATED URINE CULTURE ORDERED (A) CNI     CK-MB,QUANT. Collection Time: 04/27/17 12:25 AM   Result Value Ref Range    CK - MB 3.8 (H) <3.6 NG/ML    CK-MB Index 1.2 0 - 2.5       MEDICATIONS GIVEN:  Medications   diphenhydrAMINE (BENADRYL) 50 mg capsule (not administered)   sodium chloride 0.9 % bolus infusion 1,000 mL (0 mL IntraVENous IV Completed 4/27/17 0347)   diazePAM (VALIUM) injection 2 mg (2 mg IntraVENous Given 4/27/17 0101)   diphenhydrAMINE (BENADRYL) capsule 50 mg (50 mg Oral Given 4/27/17 0104)   oxyCODONE-acetaminophen (PERCOCET) 5-325 mg per tablet 2 Tab (2 Tabs Oral Given 4/27/17 0202)   lidocaine (PF) 100 mg/5 mL (2 %) 100 mg in 0.9% sodium chloride 50 mL IVPB ( IntraVENous Given 4/27/17 0314)   LORazepam (ATIVAN) injection 1 mg (1 mg IntraVENous Given 4/27/17 0315)   fentaNYL citrate (PF) injection 25 mcg (25 mcg IntraVENous Given 4/27/17 0357)   fluconazole (DIFLUCAN) tablet 200 mg (200 mg Oral Given 4/27/17 0404)       IMPRESSION:  1. Diuretics causing adverse effect in therapeutic use, initial encounter    2. Dehydration    3. Muscle cramps    4. Complaints of total body pain    5. Drug-seeking behavior        PLAN:  1. Current Discharge Medication List      START taking these medications    Details   hydrOXYzine HCl (ATARAX) 50 mg tablet Take 1 Tab by mouth every six (6) hours as needed for Itching for up to 10 days. Qty: 20 Tab, Refills: 0           2. Follow-up Information     Follow up With Details Comments [address] Emanuel MD Schedule an appointment as soon as possible for a visit in 2 days  [address] [place] [address]  [phone]          Return to ED if worse     Discharge Note:  3:52 AM  The pt is ready for discharge.  The pt's signs, symptoms, diagnosis, and discharge instructions have been discussed and pt has conveyed their understanding. The pt is to follow up as recommended or return to ER should their symptoms worsen. Plan has been discussed and pt is in agreement. This note is prepared by Hiral Ruiz, acting as a Scribe for Candy Messina MD.    Candy Messina MD: The scribe's documentation has been prepared under my direction and personally reviewed by me in its entirety. I confirm that the notes above accurately reflects all work, treatment, procedures, and medical decision making performed by me. This note will not be viewable in [address]

## 2017-04-27 NOTE — ED NOTES
Patient requesting Morphine for pain at this time. States \"pain is 7/10 at this time; the Lidocaine did not work. \"  Patient was texting on her phone during our conversation.   Provider notified of patient's request.

## 2017-04-28 LAB
BACTERIA SPEC CULT: NORMAL
CC UR VC: NORMAL
SERVICE CMNT-IMP: NORMAL

## 2017-05-17 ENCOUNTER — APPOINTMENT (OUTPATIENT)
Dept: GENERAL RADIOLOGY | Age: 32
End: 2017-05-17
Attending: EMERGENCY MEDICINE
Payer: COMMERCIAL

## 2017-05-17 ENCOUNTER — HOSPITAL ENCOUNTER (EMERGENCY)
Age: 32
Discharge: HOME | End: 2017-05-17
Attending: EMERGENCY MEDICINE
Payer: COMMERCIAL

## 2017-05-17 VITALS
RESPIRATION RATE: 18 BRPM | TEMPERATURE: 98.4 F | DIASTOLIC BLOOD PRESSURE: 51 MMHG | HEART RATE: 74 BPM | HEIGHT: 62 IN | BODY MASS INDEX: 39.8 KG/M2 | OXYGEN SATURATION: 100 % | SYSTOLIC BLOOD PRESSURE: 105 MMHG | WEIGHT: 216.27 LBS

## 2017-05-17 DIAGNOSIS — R10.9 FLANK PAIN, ACUTE: Primary | ICD-10-CM

## 2017-05-17 DIAGNOSIS — R31.9 HEMATURIA: ICD-10-CM

## 2017-05-17 LAB
ALBUMIN SERPL BCP-MCNC: 4.3 G/DL (ref 3.5–5)
ALBUMIN/GLOB SERPL: 1.3 {RATIO} (ref 1.1–2.2)
ALP SERPL-CCNC: 115 U/L (ref 45–117)
ALT SERPL-CCNC: 15 U/L (ref 12–78)
ANION GAP BLD CALC-SCNC: 7 MMOL/L (ref 5–15)
APPEARANCE UR: ABNORMAL
AST SERPL W P-5'-P-CCNC: 8 U/L (ref 15–37)
BACTERIA URNS QL MICRO: NEGATIVE /HPF
BASOPHILS # BLD AUTO: 0 K/UL (ref 0–0.1)
BASOPHILS # BLD: 0 % (ref 0–1)
BILIRUB SERPL-MCNC: 0.2 MG/DL (ref 0.2–1)
BILIRUB UR QL: NEGATIVE
BUN SERPL-MCNC: 13 MG/DL (ref 6–20)
BUN/CREAT SERPL: 13 (ref 12–20)
CALCIUM SERPL-MCNC: 9 MG/DL (ref 8.5–10.1)
CHLORIDE SERPL-SCNC: 107 MMOL/L (ref 97–108)
CO2 SERPL-SCNC: 26 MMOL/L (ref 21–32)
COLOR UR: ABNORMAL
CREAT SERPL-MCNC: 1.04 MG/DL (ref 0.55–1.02)
EOSINOPHIL # BLD: 0.6 K/UL (ref 0–0.4)
EOSINOPHIL NFR BLD: 7 % (ref 0–7)
EPITH CASTS URNS QL MICRO: ABNORMAL /LPF
ERYTHROCYTE [DISTWIDTH] IN BLOOD BY AUTOMATED COUNT: 12.1 % (ref 11.5–14.5)
GLOBULIN SER CALC-MCNC: 3.4 G/DL (ref 2–4)
GLUCOSE SERPL-MCNC: 83 MG/DL (ref 65–100)
GLUCOSE UR STRIP.AUTO-MCNC: NEGATIVE MG/DL
HCT VFR BLD AUTO: 40.2 % (ref 35–47)
HGB BLD-MCNC: 13.9 G/DL (ref 11.5–16)
HGB UR QL STRIP: ABNORMAL
HYALINE CASTS URNS QL MICRO: ABNORMAL /LPF (ref 0–5)
KETONES UR QL STRIP.AUTO: NEGATIVE MG/DL
LEUKOCYTE ESTERASE UR QL STRIP.AUTO: NEGATIVE
LYMPHOCYTES # BLD AUTO: 53 % (ref 12–49)
LYMPHOCYTES # BLD: 5 K/UL (ref 0.8–3.5)
MCH RBC QN AUTO: 31.6 PG (ref 26–34)
MCHC RBC AUTO-ENTMCNC: 34.6 G/DL (ref 30–36.5)
MCV RBC AUTO: 91.4 FL (ref 80–99)
MONOCYTES # BLD: 0.8 K/UL (ref 0–1)
MONOCYTES NFR BLD AUTO: 8 % (ref 5–13)
NEUTS SEG # BLD: 3 K/UL (ref 1.8–8)
NEUTS SEG NFR BLD AUTO: 32 % (ref 32–75)
NITRITE UR QL STRIP.AUTO: NEGATIVE
PH UR STRIP: 6 [PH] (ref 5–8)
PLATELET # BLD AUTO: 301 K/UL (ref 150–400)
POTASSIUM SERPL-SCNC: 3.7 MMOL/L (ref 3.5–5.1)
PROT SERPL-MCNC: 7.7 G/DL (ref 6.4–8.2)
PROT UR STRIP-MCNC: NEGATIVE MG/DL
RBC # BLD AUTO: 4.4 M/UL (ref 3.8–5.2)
RBC #/AREA URNS HPF: >100 /HPF (ref 0–5)
SODIUM SERPL-SCNC: 140 MMOL/L (ref 136–145)
SP GR UR REFRACTOMETRY: 1.02 (ref 1–1.03)
UA: UC IF INDICATED,UAUC: ABNORMAL
UROBILINOGEN UR QL STRIP.AUTO: 0.2 EU/DL (ref 0.2–1)
WBC # BLD AUTO: 9.5 K/UL (ref 3.6–11)
WBC URNS QL MICRO: ABNORMAL /HPF (ref 0–4)

## 2017-05-17 PROCEDURE — 80053 COMPREHEN METABOLIC PANEL: CPT | Performed by: EMERGENCY MEDICINE

## 2017-05-17 PROCEDURE — 96376 TX/PRO/DX INJ SAME DRUG ADON: CPT

## 2017-05-17 PROCEDURE — 99283 EMERGENCY DEPT VISIT LOW MDM: CPT

## 2017-05-17 PROCEDURE — 96374 THER/PROPH/DIAG INJ IV PUSH: CPT

## 2017-05-17 PROCEDURE — 74011250636 HC RX REV CODE- 250/636

## 2017-05-17 PROCEDURE — 81001 URINALYSIS AUTO W/SCOPE: CPT | Performed by: EMERGENCY MEDICINE

## 2017-05-17 PROCEDURE — 96375 TX/PRO/DX INJ NEW DRUG ADDON: CPT

## 2017-05-17 PROCEDURE — 74020 XR ABD FLAT/ ERECT: CPT

## 2017-05-17 PROCEDURE — 36415 COLL VENOUS BLD VENIPUNCTURE: CPT | Performed by: EMERGENCY MEDICINE

## 2017-05-17 PROCEDURE — 74011250637 HC RX REV CODE- 250/637: Performed by: EMERGENCY MEDICINE

## 2017-05-17 PROCEDURE — 85025 COMPLETE CBC W/AUTO DIFF WBC: CPT | Performed by: EMERGENCY MEDICINE

## 2017-05-17 PROCEDURE — 74011250636 HC RX REV CODE- 250/636: Performed by: EMERGENCY MEDICINE

## 2017-05-17 RX ORDER — ONDANSETRON 2 MG/ML
4 INJECTION INTRAMUSCULAR; INTRAVENOUS
Status: DISCONTINUED | OUTPATIENT
Start: 2017-05-17 | End: 2017-05-17 | Stop reason: HOSPADM

## 2017-05-17 RX ORDER — OXYCODONE AND ACETAMINOPHEN 5; 325 MG/1; MG/1
1 TABLET ORAL
Status: COMPLETED | OUTPATIENT
Start: 2017-05-17 | End: 2017-05-17

## 2017-05-17 RX ORDER — OXYCODONE AND ACETAMINOPHEN 5; 325 MG/1; MG/1
1 TABLET ORAL
Qty: 20 TAB | Refills: 0 | Status: SHIPPED | OUTPATIENT
Start: 2017-05-17 | End: 2017-12-12

## 2017-05-17 RX ORDER — ONDANSETRON 2 MG/ML
INJECTION INTRAMUSCULAR; INTRAVENOUS
Status: DISCONTINUED
Start: 2017-05-17 | End: 2017-05-17 | Stop reason: HOSPADM

## 2017-05-17 RX ORDER — SODIUM CHLORIDE 0.9 % (FLUSH) 0.9 %
5-10 SYRINGE (ML) INJECTION AS NEEDED
Status: DISCONTINUED | OUTPATIENT
Start: 2017-05-17 | End: 2017-05-17 | Stop reason: HOSPADM

## 2017-05-17 RX ORDER — HYDROMORPHONE HYDROCHLORIDE 1 MG/ML
INJECTION, SOLUTION INTRAMUSCULAR; INTRAVENOUS; SUBCUTANEOUS
Status: DISCONTINUED
Start: 2017-05-17 | End: 2017-05-17 | Stop reason: HOSPADM

## 2017-05-17 RX ORDER — PROMETHAZINE HYDROCHLORIDE 25 MG/1
25 TABLET ORAL
Qty: 12 TAB | Refills: 0 | Status: SHIPPED | OUTPATIENT
Start: 2017-05-17 | End: 2017-06-16

## 2017-05-17 RX ORDER — HYDROMORPHONE HYDROCHLORIDE 1 MG/ML
1 INJECTION, SOLUTION INTRAMUSCULAR; INTRAVENOUS; SUBCUTANEOUS
Status: COMPLETED | OUTPATIENT
Start: 2017-05-17 | End: 2017-05-17

## 2017-05-17 RX ORDER — HYDROMORPHONE HYDROCHLORIDE 1 MG/ML
0.5 INJECTION, SOLUTION INTRAMUSCULAR; INTRAVENOUS; SUBCUTANEOUS
Status: COMPLETED | OUTPATIENT
Start: 2017-05-17 | End: 2017-05-17

## 2017-05-17 RX ORDER — SODIUM CHLORIDE 0.9 % (FLUSH) 0.9 %
5-10 SYRINGE (ML) INJECTION EVERY 8 HOURS
Status: DISCONTINUED | OUTPATIENT
Start: 2017-05-17 | End: 2017-05-17 | Stop reason: HOSPADM

## 2017-05-17 RX ORDER — ONDANSETRON 4 MG/1
4 TABLET, ORALLY DISINTEGRATING ORAL
Qty: 10 TAB | Refills: 0 | Status: SHIPPED | OUTPATIENT
Start: 2017-05-17 | End: 2017-06-16

## 2017-05-17 RX ORDER — ONDANSETRON 2 MG/ML
4 INJECTION INTRAMUSCULAR; INTRAVENOUS
Status: COMPLETED | OUTPATIENT
Start: 2017-05-17 | End: 2017-05-17

## 2017-05-17 RX ADMIN — ONDANSETRON HYDROCHLORIDE 4 MG: 2 INJECTION, SOLUTION INTRAMUSCULAR; INTRAVENOUS at 03:00

## 2017-05-17 RX ADMIN — HYDROMORPHONE HYDROCHLORIDE 0.5 MG: 1 INJECTION, SOLUTION INTRAMUSCULAR; INTRAVENOUS; SUBCUTANEOUS at 03:00

## 2017-05-17 RX ADMIN — HYDROMORPHONE HYDROCHLORIDE 1 MG: 1 INJECTION, SOLUTION INTRAMUSCULAR; INTRAVENOUS; SUBCUTANEOUS at 04:01

## 2017-05-17 RX ADMIN — OXYCODONE HYDROCHLORIDE AND ACETAMINOPHEN 1 TABLET: 5; 325 TABLET ORAL at 05:24

## 2017-05-17 NOTE — ED PROVIDER NOTES
HPI Comments: Tim Oneill is a 33 y.o. female, pmhx kidney stones, who presents ambulatory to the ED c/o progressively worsening BL flank pain (L > R) x yesterday morning. Pt reports additional hematuria, nausea, and vomiting. She notes a hx of similar sxs with her previous kidney stones and secondary to her Lupus attacks. Pt states her sxs are exacerbated with movement and positional changes. She reports taking muscle relaxers and Azo with no relief of sxs. Pt reports a hx of Lupus for which she receives Benlysta and Rituxin infusions for Lupus every 6-8 weeks. She denies any recent steroid treatments. Pt specifically denies any recent fever, chills, diarrhea, CP, SOB, or heart palpitations. PCP: Ryne Hernandez MD  Urologist: Dr. Jose Colunga   Lupus: Dr. Niurka Cadet    PMHx: Significant for prolapsed uterus, cluster HA, asthma, GERD, Lupus, EDS  PSHx: Significant for hysterectomy, colonoscopy, cholecystectomy, hernia repair, kidney stone removal, renal stent placement  Social Hx: -tobacco, -EtOH, -Illicit Drugs    There are no other complaints, changes, or physical findings at this time. The history is provided by the patient.         Past Medical History:   Diagnosis Date    Abnormal CT of the abdomen 11/8/2012    Asthma     Autoimmune disease (HCC)     lupus    Cervical prolapse     Dehydration     Fatty liver     Gastrointestinal disorder     gerd, twisted colon, IBS    GERD (gastroesophageal reflux disease)     Headache     Morbid obesity (HCC)     Nausea & vomiting     Neurological disorder     cluster headaches    other 2006, 2009    ovarian cyst removal    Other ill-defined conditions     Worsening headaches        Past Surgical History:   Procedure Laterality Date    COLONOSCOPY  9/21/2010         HX CHOLECYSTECTOMY      HX GYN  1/2009    right salpingo oopherectomy    HX GYN  2006    right ovarian tumor removed    HX HEENT      left wisdom teeth removal    HX HEENT      top right wisdom tooth removed    HX HERNIA REPAIR  4/2012    HX HERNIA REPAIR  2/28/13    Laparoscopic recurrent incisional hernia repair    HX UROLOGICAL      Kidney Stone Removal    HI EGD TRANSORAL BIOPSY SINGLE/MULTIPLE  9/22/2010              History reviewed. No pertinent family history. Social History     Social History    Marital status:      Spouse name: N/A    Number of children: N/A    Years of education: N/A     Occupational History    Not on file. Social History Main Topics    Smoking status: Never Smoker    Smokeless tobacco: Never Used    Alcohol use No    Drug use: No    Sexual activity: Not Currently     Partners: Male     Birth control/ protection: Abstinence     Other Topics Concern    Not on file     Social History Narrative         ALLERGIES: Latex; Compazine [prochlorperazine]; Pcn [penicillins]; Sulfa (sulfonamide antibiotics); Topamax [topiramate]; Adhesive; Mushroom combination no.1; Toradol [ketorolac tromethamine]; and Valproic acid    Review of Systems   Constitutional: Negative. Negative for chills and fever. Eyes: Negative. Respiratory: Negative. Negative for shortness of breath. Cardiovascular: Negative for chest pain. Gastrointestinal: Positive for nausea and vomiting. Negative for abdominal pain, blood in stool, constipation and diarrhea. Endocrine: Negative. Genitourinary: Positive for flank pain (BL flank). Negative for difficulty urinating, dysuria and hematuria. Skin: Negative. Allergic/Immunologic: Negative. Neurological: Negative. Psychiatric/Behavioral: Negative for suicidal ideas. All other systems reviewed and are negative. Vitals:    05/17/17 0155 05/17/17 0300   BP: 139/72 105/51   Pulse: 74    Resp: 18    Temp: 98.4 °F (36.9 °C)    SpO2: 99% 100%   Weight: 98.1 kg (216 lb 4.3 oz)    Height: 5' 2\" (1.575 m)             Physical Exam   Nursing note and vitals reviewed.   General appearance - obese, well appearing, and in no distress  Eyes - pupils equal and reactive, extraocular eye movements intact  ENT - mucous membranes moist, pharynx normal without lesions  Neck - supple, no significant adenopathy; non-tender to palpation  Chest - clear to auscultation, no wheezes, rales or rhonchi; non-tender to palpation  Heart - normal rate and regular rhythm, S1 and S2 normal, no murmurs noted  Abdomen - soft, tenderness to palpation to BL flanks L > R, nondistended, no masses or organomegaly  Musculoskeletal - no joint tenderness, deformity or swelling; normal ROM  Extremities - peripheral pulses normal, no pedal edema  Skin - normal coloration and turgor, no rashes  Neurological - alert, oriented x3, normal speech, no focal findings or movement disorder noted  Written by ANDRE Goodson, as dictated by Rita Ferrell MD      MDM  Number of Diagnoses or Management Options  Diagnosis management comments:   DDx: UTI, pyelonephritis, kidney stone, muscle strain, drug seeking behavior       Amount and/or Complexity of Data Reviewed  Clinical lab tests: reviewed and ordered  Tests in the radiology section of CPT®: reviewed and ordered  Review and summarize past medical records: yes  Independent visualization of images, tracings, or specimens: yes    Patient Progress  Patient progress: stable      Procedures      PROGRESS NOTE:  3:37 AM  Pt reevaluated. Pt states it feels as though her pain is moving down her back. Requesting additional analgesics at this time. Will redose and reassess. Pt with 5 CTs over the last year, will obtain x-ray and treat for kidney stone. Written by ANDRE Goodson, as dictated by Rita Ferrell MD     PROGRESS NOTE:  4:56 AM  Pt reevaluated. Pt states her pain is \"creeping back up\" requesting additional analgesics and antiemetics at this time. Will order more analgesics and reassess.   Written by ANDRE Goodson, as dictated by Rita Ferrell MD    Progress note:  5:14 AM  Pt noted to be feeling better, ready for discharge. Updated pt and/or family on all final lab and imaging findings. Will follow up as instructed. All questions have been answered, pt voiced understanding and agreement with plan. Narcotics were prescribed, pt was advised not to drive or operate heavy machinery. Specific return precautions provided as well as instructions to return to the ED should sx worsen at any time. Written by David Mcnulty ED Scribe, as dictated by Elif Woodward MD     LABORATORY TESTS:  Recent Results (from the past 12 hour(s))   CBC WITH AUTOMATED DIFF    Collection Time: 05/17/17  2:21 AM   Result Value Ref Range    WBC 9.5 3.6 - 11.0 K/uL    RBC 4.40 3.80 - 5.20 M/uL    HGB 13.9 11.5 - 16.0 g/dL    HCT 40.2 35.0 - 47.0 %    MCV 91.4 80.0 - 99.0 FL    MCH 31.6 26.0 - 34.0 PG    MCHC 34.6 30.0 - 36.5 g/dL    RDW 12.1 11.5 - 14.5 %    PLATELET 344 918 - 832 K/uL    NEUTROPHILS 32 32 - 75 %    LYMPHOCYTES 53 (H) 12 - 49 %    MONOCYTES 8 5 - 13 %    EOSINOPHILS 7 0 - 7 %    BASOPHILS 0 0 - 1 %    ABS. NEUTROPHILS 3.0 1.8 - 8.0 K/UL    ABS. LYMPHOCYTES 5.0 (H) 0.8 - 3.5 K/UL    ABS. MONOCYTES 0.8 0.0 - 1.0 K/UL    ABS. EOSINOPHILS 0.6 (H) 0.0 - 0.4 K/UL    ABS. BASOPHILS 0.0 0.0 - 0.1 K/UL   METABOLIC PANEL, COMPREHENSIVE    Collection Time: 05/17/17  2:21 AM   Result Value Ref Range    Sodium 140 136 - 145 mmol/L    Potassium 3.7 3.5 - 5.1 mmol/L    Chloride 107 97 - 108 mmol/L    CO2 26 21 - 32 mmol/L    Anion gap 7 5 - 15 mmol/L    Glucose 83 65 - 100 mg/dL    BUN 13 6 - 20 MG/DL    Creatinine 1.04 (H) 0.55 - 1.02 MG/DL    BUN/Creatinine ratio 13 12 - 20      GFR est AA >60 >60 ml/min/1.73m2    GFR est non-AA >60 >60 ml/min/1.73m2    Calcium 9.0 8.5 - 10.1 MG/DL    Bilirubin, total 0.2 0.2 - 1.0 MG/DL    ALT (SGPT) 15 12 - 78 U/L    AST (SGOT) 8 (L) 15 - 37 U/L    Alk.  phosphatase 115 45 - 117 U/L    Protein, total 7.7 6.4 - 8.2 g/dL    Albumin 4.3 3.5 - 5.0 g/dL Globulin 3.4 2.0 - 4.0 g/dL    A-G Ratio 1.3 1.1 - 2.2     URINALYSIS W/ REFLEX CULTURE    Collection Time: 05/17/17  2:21 AM   Result Value Ref Range    Color YELLOW/STRAW      Appearance CLOUDY (A) CLEAR      Specific gravity 1.023 1.003 - 1.030      pH (UA) 6.0 5.0 - 8.0      Protein NEGATIVE  NEG mg/dL    Glucose NEGATIVE  NEG mg/dL    Ketone NEGATIVE  NEG mg/dL    Bilirubin NEGATIVE  NEG      Blood LARGE (A) NEG      Urobilinogen 0.2 0.2 - 1.0 EU/dL    Nitrites NEGATIVE  NEG      Leukocyte Esterase NEGATIVE  NEG      WBC 0-4 0 - 4 /hpf    RBC >100 (H) 0 - 5 /hpf    Epithelial cells MODERATE (A) FEW /lpf    Bacteria NEGATIVE  NEG /hpf    UA:UC IF INDICATED CULTURE NOT INDICATED BY UA RESULT CNI      Hyaline cast 2-5 0 - 5 /lpf       IMAGING RESULTS:  XR ABD FLAT/ ERECT   Final Result     EXAM: XR ABD FLAT/ ERECT     INDICATION: abd pain, flank pain     COMPARISON: None.     FINDINGS: Supine and upright views of the abdomen demonstrate a normal gas  pattern. There is no free intraperitoneal air. No soft tissue masses or  pathologic calcifications are seen. The bones and soft tissues are within normal  limits. Cholecystectomy clips are noted.     IMPRESSION  IMPRESSION: No acute findings.          MEDICATIONS GIVEN:  Medications   sodium chloride (NS) flush 5-10 mL (not administered)   sodium chloride (NS) flush 5-10 mL (not administered)   sodium chloride 0.9 % bolus infusion 1,000 mL (1,000 mL IntraVENous Refused 5/17/17 0300)   ondansetron (ZOFRAN) 4 mg/2 mL injection (not administered)   HYDROmorphone (PF) (DILAUDID) 1 mg/mL injection (not administered)   HYDROmorphone (PF) (DILAUDID) 1 mg/mL injection (not administered)   oxyCODONE-acetaminophen (PERCOCET) 5-325 mg per tablet 1 Tab (not administered)   ondansetron (ZOFRAN) injection 4 mg (not administered)   HYDROmorphone (PF) (DILAUDID) injection 0.5 mg (0.5 mg IntraVENous Given 5/17/17 0300)   ondansetron (ZOFRAN) injection 4 mg (4 mg IntraVENous Given 5/17/17 0300)   HYDROmorphone (PF) (DILAUDID) injection 1 mg (1 mg IntraVENous Given 5/17/17 0401)       IMPRESSION:  1. Flank pain, acute    2. Hematuria        PLAN:  1. Current Discharge Medication List      START taking these medications    Details   oxyCODONE-acetaminophen (PERCOCET) 5-325 mg per tablet Take 1 Tab by mouth every four (4) hours as needed for Pain. Max Daily Amount: 6 Tabs. Qty: 20 Tab, Refills: 0      promethazine (PHENERGAN) 25 mg tablet Take 1 Tab by mouth every six (6) hours as needed for Nausea. Qty: 12 Tab, Refills: 0         CONTINUE these medications which have CHANGED    Details   ondansetron (ZOFRAN ODT) 4 mg disintegrating tablet Take 1 Tab by mouth every eight (8) hours as needed for Nausea. Qty: 10 Tab, Refills: 0           2. Follow-up Information     Follow up With Details Comments [address] Kevin Emanuel MD   [address] [place] [address]  [phone]      [facility] EMERGENCY DEPT  If symptoms worsen [address]  [address]  [phone]        Return to ED if worse     DISCHARGE NOTE:  5:15 AM  The patient's results have been reviewed with family and/or caregiver. They verbally convey their understanding and agreement of the patient's signs, symptoms, diagnosis, treatment, and prognosis. They additionally agree to follow up as recommended in the discharge instructions or to return to the Emergency Room should the patient's condition change prior to their follow-up appointment. The family and/or caregiver verbally agrees with the care-plan and all of their questions have been answered.  The discharge instructions have also been provided to the them along with educational information regarding the patient's diagnosis and a list of reasons why the patient would want to return to the ER prior to their follow-up appointment should their condition change. Written by Abdoulaye Winn, ED Scribe, as dictated by Timothy Martinez MD.         This note is prepared by Abdoulaye Winn, acting as Scribe for MD Elif Quezada MD : The scribe's documentation has been prepared under my direction and personally reviewed by me in its entirety. I confirm that the note above accurately reflects all work, treatment, procedures, and medical decision making performed by me. This note will not be viewable in [address]

## 2017-05-17 NOTE — ED NOTES
Pt ambulatory to discharge. Pt medicated prior to discharge. IV removed with cath tip intact. Discharge papers given and explained by provider. Pt verbalized understanding.

## 2017-05-17 NOTE — DISCHARGE INSTRUCTIONS
Abdominal Pain: Care Instructions  Your Care Instructions    Abdominal pain has many possible causes. Some aren't serious and get better on their own in a few days. Others need more testing and treatment. If your pain continues or gets worse, you need to be rechecked and may need more tests to find out what is wrong. You may need surgery to correct the problem. Don't ignore new symptoms, such as fever, nausea and vomiting, urination problems, pain that gets worse, and dizziness. These may be signs of a more serious problem. Your doctor may have recommended a follow-up visit in the next 8 to 12 hours. If you are not getting better, you may need more tests or treatment. The doctor has checked you carefully, but problems can develop later. If you notice any problems or new symptoms, get medical treatment right away. Follow-up care is a key part of your treatment and safety. Be sure to make and go to all appointments, and call your doctor if you are having problems. It's also a good idea to know your test results and keep a list of the medicines you take. How can you care for yourself at home? · Rest until you feel better. · To prevent dehydration, drink plenty of fluids, enough so that your urine is light yellow or clear like water. Choose water and other caffeine-free clear liquids until you feel better. If you have kidney, heart, or liver disease and have to limit fluids, talk with your doctor before you increase the amount of fluids you drink. · If your stomach is upset, eat mild foods, such as rice, dry toast or crackers, bananas, and applesauce. Try eating several small meals instead of two or three large ones. · Wait until 48 hours after all symptoms have gone away before you have spicy foods, alcohol, and drinks that contain caffeine. · Do not eat foods that are high in fat. · Avoid anti-inflammatory medicines such as aspirin, ibuprofen (Advil, Motrin), and naproxen (Aleve).  These can cause stomach upset. Talk to your doctor if you take daily aspirin for another health problem. When should you call for help? Call 911 anytime you think you may need emergency care. For example, call if:  · You passed out (lost consciousness). · You pass maroon or very bloody stools. · You vomit blood or what looks like coffee grounds. · You have new, severe belly pain. Call your doctor now or seek immediate medical care if:  · Your pain gets worse, especially if it becomes focused in one area of your belly. · You have a new or higher fever. · Your stools are black and look like tar, or they have streaks of blood. · You have unexpected vaginal bleeding. · You have symptoms of a urinary tract infection. These may include:  ¨ Pain when you urinate. ¨ Urinating more often than usual.  ¨ Blood in your urine. · You are dizzy or lightheaded, or you feel like you may faint. Watch closely for changes in your health, and be sure to contact your doctor if:  · You are not getting better after 1 day (24 hours). Where can you learn more? Go to http://k[employer] Materialyoon.info/. Enter Z478 in the search box to learn more about \"Abdominal Pain: Care Instructions. \"  Current as of: May 27, 2016  Content Version: 11.2  © 3756-0952 [employer]. Care instructions adapted under license by [employer] (which disclaims liability or warranty for this information). If you have questions about a medical condition or this instruction, always ask your healthcare professional. [place] [address] any warranty or liability for your use of this information. Blood in the Urine: Care Instructions  Your Care Instructions  Blood in the urine, or hematuria, may make the urine look red, brown, or pink. There may be blood every time you urinate or just from time to time. You cannot always see blood in the urine, but it will show up in a urine test.  Blood in the urine may be serious.  It should always be checked by a doctor. Your doctor may recommend more tests, including an X-ray, a CT scan, or a cystoscopy (which lets a doctor look inside the urethra and bladder). Blood in the urine can be a sign of another problem. Common causes are bladder infections and kidney stones. An injury to your groin or your genital area can also cause bleeding in the urinary tract. Very hard exercise--such as running a marathon--can cause blood in the urine. Blood in the urine can also be a sign of kidney disease or cancer in the bladder or kidney. Many cases of blood in the urine are caused by a harmless condition that runs in families. This is called benign familial hematuria. It does not need any treatment. Sometimes your urine may look red or brown even though it does not contain blood. For example, not getting enough fluids (dehydration), taking certain medicines, or having a liver problem can change the color of your urine. Eating foods such as beets, rhubarb, or blackberries or foods with red food coloring can make your urine look red or pink. Follow-up care is a key part of your treatment and safety. Be sure to make and go to all appointments, and call your doctor if you are having problems. It's also a good idea to know your test results and keep a list of the medicines you take. When should you call for help? Call your doctor now or seek immediate medical care if:  · You have symptoms of a urinary infection. For example:  ¨ You have pus in your urine. ¨ You have pain in your back just below your rib cage. This is called flank pain. ¨ You have a fever, chills, or body aches. ¨ It hurts to urinate. ¨ You have groin or belly pain. · You have more blood in your urine. Watch closely for changes in your health, and be sure to contact your doctor if:  · You have new urination problems. · You do not get better as expected. Where can you learn more?   Go to http://kymberly-yoon.info/. Enter O520 in the search box to learn more about \"Blood in the Urine: Care Instructions. \"  Current as of: August 12, 2016  Content Version: 11.2  © 9607-1146 [employer], [facility]. Care instructions adapted under license by [employer] (which disclaims liability or warranty for this information). If you have questions about a medical condition or this instruction, always ask your healthcare professional. [address] any warranty or liability for your use of this information.

## 2017-05-17 NOTE — LETTER
[address] 
[facility] EMERGENCY DEPT 
[address] [place] [address] [id number] [phone] Work/School Note Date: 5/17/2017 To Whom It May concern: 
 
Justine Saenz was seen and treated today in the emergency room by the following provider(s): 
Attending Provider: Mone Lay MD.   
 
Justine Saenz should be excused from work on 5/17/2017. Sincerely, Mone Lay MD

## 2017-05-17 NOTE — ED NOTES
Bedside and Verbal shift change report given to Anthony Villegas RN (oncoming nurse) by Celine Molina RN (offgoing nurse). Report included the following information SBAR, Kardex, ED Summary, MAR, Recent Results and Med Rec Status.

## 2017-05-17 NOTE — ED NOTES
Assumed care of pt from triage at this time. Pt arrives with c/o bilateral flank pain x yesterday afternoon. Pt states L side hurts worse than right. Pt with hx of kidney stones. Pt denies any urinary symptoms. Pt states N/v x this evening. Pt with 8/10 cramping, sharp pain to L flank area at this time. Pt with LBM x yesterday evening. Pt resting comfortably on the stretcher in a position of comfort.  Pt in no acute distress at this time.  Call bell within reach.  Side rails x 2.  Cardiac monitor x 2.  Stretcher locked in the lowest position.  Pt aware of plan to await for MD/PA-C/NP assessment, and pt/family verbalizes understanding.  Will continue to monitor bilateral flank pain.

## 2017-05-18 ENCOUNTER — HOSPITAL ENCOUNTER (EMERGENCY)
Age: 32
Discharge: HOME | End: 2017-05-18
Attending: EMERGENCY MEDICINE | Admitting: EMERGENCY MEDICINE
Payer: COMMERCIAL

## 2017-05-18 ENCOUNTER — APPOINTMENT (OUTPATIENT)
Dept: ULTRASOUND IMAGING | Age: 32
End: 2017-05-18
Attending: PHYSICIAN ASSISTANT
Payer: COMMERCIAL

## 2017-05-18 VITALS
HEIGHT: 62 IN | OXYGEN SATURATION: 100 % | DIASTOLIC BLOOD PRESSURE: 76 MMHG | HEART RATE: 81 BPM | SYSTOLIC BLOOD PRESSURE: 122 MMHG | TEMPERATURE: 98.3 F | BODY MASS INDEX: 40.37 KG/M2 | RESPIRATION RATE: 18 BRPM | WEIGHT: 219.36 LBS

## 2017-05-18 DIAGNOSIS — F41.9 ANXIETY: ICD-10-CM

## 2017-05-18 DIAGNOSIS — Z76.5 DRUG-SEEKING BEHAVIOR: ICD-10-CM

## 2017-05-18 DIAGNOSIS — B37.31 VAGINAL YEAST INFECTION: ICD-10-CM

## 2017-05-18 DIAGNOSIS — R10.9 FLANK PAIN: Primary | ICD-10-CM

## 2017-05-18 LAB
ALBUMIN SERPL BCP-MCNC: 4.2 G/DL (ref 3.5–5)
ALBUMIN/GLOB SERPL: 1.4 {RATIO} (ref 1.1–2.2)
ALP SERPL-CCNC: 107 U/L (ref 45–117)
ALT SERPL-CCNC: 15 U/L (ref 12–78)
ANION GAP BLD CALC-SCNC: 8 MMOL/L (ref 5–15)
APPEARANCE UR: CLEAR
AST SERPL W P-5'-P-CCNC: 7 U/L (ref 15–37)
BACTERIA URNS QL MICRO: NEGATIVE /HPF
BASOPHILS # BLD AUTO: 0 K/UL (ref 0–0.1)
BASOPHILS # BLD: 0 % (ref 0–1)
BILIRUB SERPL-MCNC: 0.2 MG/DL (ref 0.2–1)
BILIRUB UR QL: NEGATIVE
BUN SERPL-MCNC: 10 MG/DL (ref 6–20)
BUN/CREAT SERPL: 11 (ref 12–20)
CALCIUM SERPL-MCNC: 8.8 MG/DL (ref 8.5–10.1)
CHLORIDE SERPL-SCNC: 106 MMOL/L (ref 97–108)
CO2 SERPL-SCNC: 27 MMOL/L (ref 21–32)
COLOR UR: ABNORMAL
CREAT SERPL-MCNC: 0.87 MG/DL (ref 0.55–1.02)
EOSINOPHIL # BLD: 0.8 K/UL (ref 0–0.4)
EOSINOPHIL NFR BLD: 7 % (ref 0–7)
EPITH CASTS URNS QL MICRO: ABNORMAL /LPF
ERYTHROCYTE [DISTWIDTH] IN BLOOD BY AUTOMATED COUNT: 12.2 % (ref 11.5–14.5)
GLOBULIN SER CALC-MCNC: 3 G/DL (ref 2–4)
GLUCOSE SERPL-MCNC: 83 MG/DL (ref 65–100)
GLUCOSE UR STRIP.AUTO-MCNC: NEGATIVE MG/DL
HCT VFR BLD AUTO: 40.4 % (ref 35–47)
HGB BLD-MCNC: 14 G/DL (ref 11.5–16)
HGB UR QL STRIP: ABNORMAL
KETONES UR QL STRIP.AUTO: NEGATIVE MG/DL
LEUKOCYTE ESTERASE UR QL STRIP.AUTO: NEGATIVE
LYMPHOCYTES # BLD AUTO: 47 % (ref 12–49)
LYMPHOCYTES # BLD: 5.2 K/UL (ref 0.8–3.5)
MCH RBC QN AUTO: 31.7 PG (ref 26–34)
MCHC RBC AUTO-ENTMCNC: 34.7 G/DL (ref 30–36.5)
MCV RBC AUTO: 91.6 FL (ref 80–99)
MONOCYTES # BLD: 1 K/UL (ref 0–1)
MONOCYTES NFR BLD AUTO: 9 % (ref 5–13)
NEUTS SEG # BLD: 4.1 K/UL (ref 1.8–8)
NEUTS SEG NFR BLD AUTO: 37 % (ref 32–75)
NITRITE UR QL STRIP.AUTO: NEGATIVE
PH UR STRIP: 6.5 [PH] (ref 5–8)
PLATELET # BLD AUTO: 280 K/UL (ref 150–400)
POTASSIUM SERPL-SCNC: 4.4 MMOL/L (ref 3.5–5.1)
PROT SERPL-MCNC: 7.2 G/DL (ref 6.4–8.2)
PROT UR STRIP-MCNC: NEGATIVE MG/DL
RBC # BLD AUTO: 4.41 M/UL (ref 3.8–5.2)
RBC #/AREA URNS HPF: ABNORMAL /HPF (ref 0–5)
SODIUM SERPL-SCNC: 141 MMOL/L (ref 136–145)
SP GR UR REFRACTOMETRY: <1.005 (ref 1–1.03)
UA: UC IF INDICATED,UAUC: ABNORMAL
UROBILINOGEN UR QL STRIP.AUTO: 0.2 EU/DL (ref 0.2–1)
WBC # BLD AUTO: 11.1 K/UL (ref 3.6–11)
WBC URNS QL MICRO: ABNORMAL /HPF (ref 0–4)

## 2017-05-18 PROCEDURE — 81001 URINALYSIS AUTO W/SCOPE: CPT | Performed by: EMERGENCY MEDICINE

## 2017-05-18 PROCEDURE — 96375 TX/PRO/DX INJ NEW DRUG ADDON: CPT

## 2017-05-18 PROCEDURE — 96376 TX/PRO/DX INJ SAME DRUG ADON: CPT

## 2017-05-18 PROCEDURE — 99282 EMERGENCY DEPT VISIT SF MDM: CPT

## 2017-05-18 PROCEDURE — 76770 US EXAM ABDO BACK WALL COMP: CPT

## 2017-05-18 PROCEDURE — 36415 COLL VENOUS BLD VENIPUNCTURE: CPT | Performed by: EMERGENCY MEDICINE

## 2017-05-18 PROCEDURE — 85025 COMPLETE CBC W/AUTO DIFF WBC: CPT | Performed by: EMERGENCY MEDICINE

## 2017-05-18 PROCEDURE — 80053 COMPREHEN METABOLIC PANEL: CPT | Performed by: EMERGENCY MEDICINE

## 2017-05-18 PROCEDURE — 74011250637 HC RX REV CODE- 250/637: Performed by: PHYSICIAN ASSISTANT

## 2017-05-18 PROCEDURE — 74011250636 HC RX REV CODE- 250/636: Performed by: PHYSICIAN ASSISTANT

## 2017-05-18 PROCEDURE — 96374 THER/PROPH/DIAG INJ IV PUSH: CPT

## 2017-05-18 RX ORDER — KETOROLAC TROMETHAMINE 30 MG/ML
30 INJECTION, SOLUTION INTRAMUSCULAR; INTRAVENOUS
Status: DISCONTINUED | OUTPATIENT
Start: 2017-05-18 | End: 2017-05-18

## 2017-05-18 RX ORDER — TAMSULOSIN HYDROCHLORIDE 0.4 MG/1
0.4 CAPSULE ORAL
Status: COMPLETED | OUTPATIENT
Start: 2017-05-18 | End: 2017-05-18

## 2017-05-18 RX ORDER — FLUCONAZOLE 150 MG/1
150 TABLET ORAL
Qty: 1 TAB | Refills: 0 | Status: SHIPPED | OUTPATIENT
Start: 2017-05-18 | End: 2017-05-18

## 2017-05-18 RX ORDER — ONDANSETRON 2 MG/ML
4 INJECTION INTRAMUSCULAR; INTRAVENOUS
Status: COMPLETED | OUTPATIENT
Start: 2017-05-18 | End: 2017-05-18

## 2017-05-18 RX ORDER — HYDROMORPHONE HYDROCHLORIDE 1 MG/ML
1 INJECTION, SOLUTION INTRAMUSCULAR; INTRAVENOUS; SUBCUTANEOUS
Status: COMPLETED | OUTPATIENT
Start: 2017-05-18 | End: 2017-05-18

## 2017-05-18 RX ORDER — HYDROMORPHONE HYDROCHLORIDE 1 MG/ML
0.2 INJECTION, SOLUTION INTRAMUSCULAR; INTRAVENOUS; SUBCUTANEOUS ONCE
Status: COMPLETED | OUTPATIENT
Start: 2017-05-18 | End: 2017-05-18

## 2017-05-18 RX ADMIN — HYDROMORPHONE HYDROCHLORIDE 1 MG: 1 INJECTION, SOLUTION INTRAMUSCULAR; INTRAVENOUS; SUBCUTANEOUS at 17:24

## 2017-05-18 RX ADMIN — TAMSULOSIN HYDROCHLORIDE 0.4 MG: 0.4 CAPSULE ORAL at 17:24

## 2017-05-18 RX ADMIN — HYDROMORPHONE HYDROCHLORIDE 0.2 MG: 1 INJECTION, SOLUTION INTRAMUSCULAR; INTRAVENOUS; SUBCUTANEOUS at 19:02

## 2017-05-18 RX ADMIN — ONDANSETRON HYDROCHLORIDE 4 MG: 2 INJECTION, SOLUTION INTRAMUSCULAR; INTRAVENOUS at 17:24

## 2017-05-18 NOTE — ED NOTES
Patient discharged by [facility]. Patient provided with discharge instructions Rx and instructions on follow up care. Patient out of ED ambulatory accompanied by family.

## 2017-05-18 NOTE — DISCHARGE INSTRUCTIONS
Vaginal Yeast Infection: Care Instructions  Your Care Instructions  A vaginal yeast infection is caused by too many yeast cells in the vagina. This is common in women of all ages. Itching, vaginal discharge and irritation, and other symptoms can bother you. But yeast infections don't often cause other health problems. Some medicines can increase your risk of getting a yeast infection. These include antibiotics, birth control pills, hormones, and steroids. You may also be more likely to get a yeast infection if you are pregnant, have diabetes, douche, or wear tight clothes. With treatment, most yeast infections get better in 2 to 3 days. Follow-up care is a key part of your treatment and safety. Be sure to make and go to all appointments, and call your doctor if you are having problems. It's also a good idea to know your test results and keep a list of the medicines you take. How can you care for yourself at home? · Take your medicines exactly as prescribed. Call your doctor if you think you are having a problem with your medicine. · Ask your doctor about over-the-counter (OTC) medicines for yeast infections. They may cost less than prescription medicines. If you use an OTC treatment, read and follow all instructions on the label. · Do not use tampons while using a vaginal cream or suppository. The tampons can absorb the medicine. Use pads instead. · Wear loose cotton clothing. Do not wear nylon or other fabric that holds body heat and moisture close to the skin. · Try sleeping without underwear. · Do not scratch. Relieve itching with a cold pack or a cool bath. · Do not wash your vaginal area more than once a day. Use plain water or a mild, unscented soap. Air-dry the vaginal area. · Change out of wet swimsuits after swimming. · Do not have sex until you have finished your treatment. · Do not douche. When should you call for help?   Call your doctor now or seek immediate medical care if:  · You have unexpected vaginal bleeding. · You have new or increased pain in your vagina or pelvis. Watch closely for changes in your health, and be sure to contact your doctor if:  · You have a fever. · You are not getting better after 2 days. · Your symptoms come back after you finish your medicines. Where can you learn more? Go to http://kymberly-yoon.info/. Enter Y302 in the search box to learn more about \"Vaginal Yeast Infection: Care Instructions. \"  Current as of: October 13, 2016  Content Version: 11.2  © 5373-7809 [employer]. Care instructions adapted under license by [employer] (which disclaims liability or warranty for this information). If you have questions about a medical condition or this instruction, always ask your healthcare professional. [address] any warranty or liability for your use of this information. Abdominal Pain: Care Instructions  Your Care Instructions    Abdominal pain has many possible causes. Some aren't serious and get better on their own in a few days. Others need more testing and treatment. If your pain continues or gets worse, you need to be rechecked and may need more tests to find out what is wrong. You may need surgery to correct the problem. Don't ignore new symptoms, such as fever, nausea and vomiting, urination problems, pain that gets worse, and dizziness. These may be signs of a more serious problem. Your doctor may have recommended a follow-up visit in the next 8 to 12 hours. If you are not getting better, you may need more tests or treatment. The doctor has checked you carefully, but problems can develop later. If you notice any problems or new symptoms, get medical treatment right away. Follow-up care is a key part of your treatment and safety. Be sure to make and go to all appointments, and call your doctor if you are having problems.  It's also a good idea to know your test results and keep a list of the medicines you take. How can you care for yourself at home? · Rest until you feel better. · To prevent dehydration, drink plenty of fluids, enough so that your urine is light yellow or clear like water. Choose water and other caffeine-free clear liquids until you feel better. If you have kidney, heart, or liver disease and have to limit fluids, talk with your doctor before you increase the amount of fluids you drink. · If your stomach is upset, eat mild foods, such as rice, dry toast or crackers, bananas, and applesauce. Try eating several small meals instead of two or three large ones. · Wait until 48 hours after all symptoms have gone away before you have spicy foods, alcohol, and drinks that contain caffeine. · Do not eat foods that are high in fat. · Avoid anti-inflammatory medicines such as aspirin, ibuprofen (Advil, Motrin), and naproxen (Aleve). These can cause stomach upset. Talk to your doctor if you take daily aspirin for another health problem. When should you call for help? Call 911 anytime you think you may need emergency care. For example, call if:  · You passed out (lost consciousness). · You pass maroon or very bloody stools. · You vomit blood or what looks like coffee grounds. · You have new, severe belly pain. Call your doctor now or seek immediate medical care if:  · Your pain gets worse, especially if it becomes focused in one area of your belly. · You have a new or higher fever. · Your stools are black and look like tar, or they have streaks of blood. · You have unexpected vaginal bleeding. · You have symptoms of a urinary tract infection. These may include:  ¨ Pain when you urinate. ¨ Urinating more often than usual.  ¨ Blood in your urine. · You are dizzy or lightheaded, or you feel like you may faint. Watch closely for changes in your health, and be sure to contact your doctor if:  · You are not getting better after 1 day (24 hours).   Where can you learn more?  Go to http://kymberly-yoon.info/. Enter N226 in the search box to learn more about \"Abdominal Pain: Care Instructions. \"  Current as of: May 27, 2016  Content Version: 11.2  © 6562-7809 [employer]. Care instructions adapted under license by [employer] (which disclaims liability or warranty for this information). If you have questions about a medical condition or this instruction, always ask your healthcare professional. [address] any warranty or liability for your use of this information. Flank Pain: Care Instructions  Your Care Instructions  Flank pain is pain on the side of the back just below the rib cage and above the waist. It can be on one or both sides. Flank pain has many possible causes, including a kidney stone, a urinary tract infection, or back strain. Flank pain may get better on its own. But don't ignore new symptoms, such as fever, nausea and vomiting, urination problems, pain that gets worse, and dizziness. These may be signs of a more serious problem. You may have to have tests or other treatment. Follow-up care is a key part of your treatment and safety. Be sure to make and go to all appointments, and call your doctor if you are having problems. It's also a good idea to know your test results and keep a list of the medicines you take. How can you care for yourself at home? · Rest until you feel better. · Take pain medicines exactly as directed. ¨ If the doctor gave you a prescription medicine for pain, take it as prescribed. ¨ If you are not taking a prescription pain medicine, ask your doctor if you can take an over-the-counter pain medicine, such as acetaminophen (Tylenol), ibuprofen (Advil, Motrin), or naproxen (Aleve). Read and follow all instructions on the label. · If your doctor prescribed antibiotics, take them as directed. Do not stop taking them just because you feel better.  You need to take the full course of antibiotics. · To apply heat, put a warm water bottle, a heating pad set on low, or a warm cloth on the painful area. Do not go to sleep with a heating pad on your skin. · To prevent dehydration, drink plenty of fluids, enough so that your urine is light yellow or clear like water. Choose water and other caffeine-free clear liquids until you feel better. If you have kidney, heart, or liver disease and have to limit fluids, talk with your doctor before you increase the amount of fluids you drink. When should you call for help? Call your doctor now or seek immediate medical care if:  · Your flank pain gets worse. · You have new symptoms, such as fever, nausea, or vomiting. · You have symptoms of a urinary problem. For example:  ¨ You have blood or pus in your urine. ¨ You have chills or body aches. ¨ It hurts to urinate. ¨ You have groin or belly pain. Watch closely for changes in your health, and be sure to contact your doctor if you do not get better as expected. Where can you learn more? Go to http://kymberly-yoon.info/. Enter S191 in the search box to learn more about \"Flank Pain: Care Instructions. \"  Current as of: May 27, 2016  Content Version: 11.2  © 7976-1883 [employer]. Care instructions adapted under license by "[employer] (which disclaims liability or warranty for this information). If you have questions about a medical condition or this instruction, always ask your healthcare professional. [place] [address] any warranty or liability for your use of this information.

## 2017-05-18 NOTE — ED NOTES
Patient notified this nurse that the IV version of Toradol also causes blood in her stool. Updated allergy list to reflect this and did not administer medication. Will notify provider.

## 2017-05-18 NOTE — ED TRIAGE NOTES
Patient seen in ED previously for flank pain and was told that she possibly had kidney stones but did not have CT done for confirmed dx. Patient states that the pain has worsened and is bilaterally. Patient reports prior hysterectomy and had partial removal of left ovary. She states that she was told that she had a cyst on the ovary.

## 2017-05-18 NOTE — ED NOTES
Patient refused IV bolus stating that she has overactive bladder and did not want to continue to urinate. Educated patient that if this is a kidney stone, fluids would assist in flushing.

## 2017-05-18 NOTE — ED PROVIDER NOTES
HPI Comments: Ruthie Esteves is a 33 y.o. female with PMhx significant for lupus, GERD, ovarian cyst, and cluster headaches who presents ambulatory to the ED c/o cfonstant, worsening 8/10 BL flank pain radiating to her lower abdomen x several days. She reports associated sx of nausea and hematuria. The expresses that she was seen in the ED yesterday for her current sx and was diagnosed with a kidney stone and discharged with Zofran, Oxycodone, and Phenergan. She endorses that she has been taking these medications with Tylenol and Ibuprofen WNR leading her back to the ED stating her last dose was a few hours PTA. The pt denies that she has seen urology for her h/o kidney stones. She specifically denies any vomiting, change in pain, melena. PCP: Shiraz Edge MD    Surgical Hx: (+) hysterectomy      There are no other complaints, changes or physical findings at this time. Written by Tanisha Yadav, ED Scribe, as dictated by Maura Lloyd PA-C     The history is provided by the patient. No  was used.         Past Medical History:   Diagnosis Date    Abnormal CT of the abdomen 11/8/2012    Asthma     Autoimmune disease ([address]     lupus    Cervical prolapse     Dehydration     Fatty liver     Gastrointestinal disorder     gerd, twisted colon, IBS    GERD (gastroesophageal reflux disease)     Headache     Morbid obesity (HCC)     Nausea & vomiting     Neurological disorder     cluster headaches    other 2006, 2009    ovarian cyst removal    Other ill-defined conditions     Worsening headaches        Past Surgical History:   Procedure Laterality Date    COLONOSCOPY  9/21/2010         HX CHOLECYSTECTOMY      HX GYN  1/2009    right salpingo oopherectomy    HX GYN  2006    right ovarian tumor removed    HX HEENT      left wisdom teeth removal    HX HEENT      top right wisdom tooth removed    HX HERNIA REPAIR  4/2012    HX HERNIA REPAIR  2/28/13    Laparoscopic recurrent incisional hernia repair    HX UROLOGICAL      Kidney Stone Removal    PA EGD TRANSORAL BIOPSY SINGLE/MULTIPLE  9/22/2010              History reviewed. No pertinent family history. Social History     Social History    Marital status:      Spouse name: N/A    Number of children: N/A    Years of education: N/A     Occupational History    Not on file. Social History Main Topics    Smoking status: Never Smoker    Smokeless tobacco: Never Used    Alcohol use No    Drug use: No    Sexual activity: Not Currently     Partners: Male     Birth control/ protection: Abstinence     Other Topics Concern    Not on file     Social History Narrative         ALLERGIES: Latex; Compazine [prochlorperazine]; Pcn [penicillins]; Sulfa (sulfonamide antibiotics); Topamax [topiramate]; Adhesive; Mushroom combination no.1; Toradol [ketorolac tromethamine]; and Valproic acid    Review of Systems   Constitutional: Negative. Negative for activity change, appetite change, chills, diaphoresis, fever and unexpected weight change. HENT: Negative for congestion, hearing loss, rhinorrhea, sinus pressure, sneezing, sore throat and trouble swallowing. Eyes: Negative for pain, redness, itching and visual disturbance. Respiratory: Negative for cough, shortness of breath and wheezing. Cardiovascular: Negative for chest pain, palpitations and leg swelling. Gastrointestinal: Positive for nausea. Negative for abdominal pain, constipation, diarrhea and vomiting. Genitourinary: Positive for flank pain (BL ) and hematuria. Negative for dysuria. Musculoskeletal: Negative for arthralgias, gait problem and myalgias. Skin: Negative for color change, pallor, rash and wound. Neurological: Negative for tremors, weakness, light-headedness, numbness and headaches. All other systems reviewed and are negative.       Patient Vitals for the past 12 hrs:   Temp Pulse Resp BP SpO2   05/18/17 1643 98.3 °F (36.8 °C) 81 18 122/76 100 %        Physical Exam   Constitutional: She is oriented to person, place, and time. She appears well-developed. No distress. 33 y.o.  female in NAD  Communicates appropriately and in full sentences  Obese   Pt only becomes tearful when provider enters room   HENT:   Head: Normocephalic and atraumatic. Right Ear: External ear normal.   Left Ear: External ear normal.   Mouth/Throat: Oropharynx is clear and moist.   Eyes: Conjunctivae are normal. Pupils are equal, round, and reactive to light. Right eye exhibits no discharge. Left eye exhibits no discharge. Neck: Normal range of motion. Neck supple. No tracheal deviation present. Cardiovascular: Normal rate, regular rhythm, normal heart sounds and intact distal pulses. Pulmonary/Chest: Effort normal and breath sounds normal. No stridor. No respiratory distress. She has no wheezes. Abdominal: Soft. Bowel sounds are normal. She exhibits no distension. There is no tenderness. There is no CVA tenderness. No flank pain elicited   Musculoskeletal: Normal range of motion. She exhibits no edema, tenderness or deformity. No neurologic, motor, vascular, or compartment embarrassment observed on exam. No focal neurologic deficits. Lymphadenopathy:     She has no cervical adenopathy. Neurological: She is alert and oriented to person, place, and time. No cranial nerve deficit. Coordination normal.   Skin: Skin is warm and dry. No rash noted. She is not diaphoretic. No erythema. No pallor. Psychiatric: She has a normal mood and affect. Tearful throughout the exam    Nursing note and vitals reviewed. MDM  Number of Diagnoses or Management Options  Anxiety:   Drug-seeking behavior:   Flank pain:   Vaginal yeast infection:   Diagnosis management comments: DDx: Nephrolithiasis, UTI, Pyelonephritis, Chronic flank pain, Malingering. 34 yo presenting with ongoing flank pain. Pt evaluated last night.  She has had 5 CT scans in last year. Will order US of kidneys and obtain basic labs. Labs WNL and decreased hematuria since evaluation last night. US negative. Pt tearful only when provider enters room. Requests dilaudid and ativan by name stating \"those are the only meds that work for me. \" Will encourage close follow-up with Dr. Yaakov Prasad and urology. Amount and/or Complexity of Data Reviewed  Clinical lab tests: ordered and reviewed  Tests in the radiology section of CPT®: ordered and reviewed  Review and summarize past medical records: yes    Patient Progress  Patient progress: stable    ED Course       Procedures    I reviewed our electronic medical record system for any past medical records that were available that may contribute to the patients current condition, the nursing notes and and vital signs from today's visit     Nursing notes will be reviewed as they become available in realtime while the pt is in the ED. The patients presenting problems have been discussed, and they are in agreement with the care plan formulated and outlined with them. I have encouraged them to ask questions as they arise throughout their visit. PROGRESS NOTE:  5:32 PM  Pt has been re-evaluated. The pt refuses the fluid bolus secondary to \"my over active bladder and i've already used the restroom enough. \"  Written by Lilli Yadav, ANDRE Gama, as dictated by Donaldo Gupta PA-C. PROGRESS NOTE:  6:36 PM  Pt has been re-evaluated. Pt initially states that her pain had not improved from the Dilaudid. Informed pt that she could receive lidocaine for her discomfort, the pt then states that her pain had improved with the Dilaudid and requests another dose. She also requests Ativan by name. She was updated on all available results and informed that no further narcotic pain medication was needed until the results of the ultrasound were available. The pt verbalizes understanding. Written by Lilli Yadav ED [place], as dictated by Ambar Fagan [address] [place] [id number], PAMARTY.     6:40 PM  Provider re-evaluated pt. Per patient, abdominal pain has improved. Provider discussed all available diagnostics, diagnosis, and treatment plan. Thoroughly discussed worrisome signs/symptoms in which pt should immediately return to ED, otherwise, urged to follow-up with Dr. Bhavani Gonzalez. Patient conveys understanding and agreement to all of the above. All patient's questions were answered by provider. Progress Note:  6:43  PM  Pt requests additional Percocet prescription. Pt was written for Percocet 5-325mg, #20 two days ago and pt states she has run out. Informed pt that she should still have remaining analgesia and will not write for additional narcotics at this point in time. Pt requests Diflucan for a yeast infection.  Pt declines pelvic exam.    LABORATORY TESTS:  Recent Results (from the past 12 hour(s))   URINALYSIS W/ REFLEX CULTURE    Collection Time: 05/18/17  5:00 PM   Result Value Ref Range    Color YELLOW/STRAW      Appearance CLEAR CLEAR      Specific gravity <1.005 1.003 - 1.030    pH (UA) 6.5 5.0 - 8.0      Protein NEGATIVE  NEG mg/dL    Glucose NEGATIVE  NEG mg/dL    Ketone NEGATIVE  NEG mg/dL    Bilirubin NEGATIVE  NEG      Blood LARGE (A) NEG      Urobilinogen 0.2 0.2 - 1.0 EU/dL    Nitrites NEGATIVE  NEG      Leukocyte Esterase NEGATIVE  NEG      WBC 0-4 0 - 4 /hpf    RBC 5-10 0 - 5 /hpf    Epithelial cells FEW FEW /lpf    Bacteria NEGATIVE  NEG /hpf    UA:UC IF INDICATED CULTURE NOT INDICATED BY UA RESULT CNI     CBC WITH AUTOMATED DIFF    Collection Time: 05/18/17  5:16 PM   Result Value Ref Range    WBC 11.1 (H) 3.6 - 11.0 K/uL    RBC 4.41 3.80 - 5.20 M/uL    HGB 14.0 11.5 - 16.0 g/dL    HCT 40.4 35.0 - 47.0 %    MCV 91.6 80.0 - 99.0 FL    MCH 31.7 26.0 - 34.0 PG    MCHC 34.7 30.0 - 36.5 g/dL    RDW 12.2 11.5 - 14.5 %    PLATELET 466 257 - 381 K/uL    NEUTROPHILS 37 32 - 75 %    LYMPHOCYTES 47 12 - 49 %    MONOCYTES 9 5 - 13 %    EOSINOPHILS 7 0 - 7 %    BASOPHILS 0 0 - 1 %    ABS. NEUTROPHILS 4.1 1.8 - 8.0 K/UL    ABS. LYMPHOCYTES 5.2 (H) 0.8 - 3.5 K/UL    ABS. MONOCYTES 1.0 0.0 - 1.0 K/UL    ABS. EOSINOPHILS 0.8 (H) 0.0 - 0.4 K/UL    ABS. BASOPHILS 0.0 0.0 - 0.1 K/UL   METABOLIC PANEL, COMPREHENSIVE    Collection Time: 05/18/17  5:16 PM   Result Value Ref Range    Sodium 141 136 - 145 mmol/L    Potassium 4.4 3.5 - 5.1 mmol/L    Chloride 106 97 - 108 mmol/L    CO2 27 21 - 32 mmol/L    Anion gap 8 5 - 15 mmol/L    Glucose 83 65 - 100 mg/dL    BUN 10 6 - 20 MG/DL    Creatinine 0.87 0.55 - 1.02 MG/DL    BUN/Creatinine ratio 11 (L) 12 - 20      GFR est AA >60 >60 ml/min/1.73m2    GFR est non-AA >60 >60 ml/min/1.73m2    Calcium 8.8 8.5 - 10.1 MG/DL    Bilirubin, total 0.2 0.2 - 1.0 MG/DL    ALT (SGPT) 15 12 - 78 U/L    AST (SGOT) 7 (L) 15 - 37 U/L    Alk. phosphatase 107 45 - 117 U/L    Protein, total 7.2 6.4 - 8.2 g/dL    Albumin 4.2 3.5 - 5.0 g/dL    Globulin 3.0 2.0 - 4.0 g/dL    A-G Ratio 1.4 1.1 - 2.2         IMAGING RESULTS:  US RETROPERITONEUM COMP   Final Result   INDICATION: Nephrolithiasis.     Exam: Bilateral renal ultrasound.     FINDINGS: The right kidney measures 10.6 cm in sagittal length. The left kidney  measures 10.6 cm in sagittal length. There is no hydronephrosis. No renal cyst,  calculus or mass is visualized. The abdominal aorta and bifurcation are not well  visualized due to overlying bowel gas. The visualized IVC is normal. The urinary  bladder is partially filled and grossly normal.     IMPRESSION  IMPRESSION: No renal calculi. No hydronephrosis.        MEDICATIONS GIVEN:  Medications   sodium chloride 0.9 % bolus infusion 1,000 mL (1,000 mL IntraVENous Refused 5/18/17 9773)   tamsulosin (FLOMAX) capsule 0.4 mg (0.4 mg Oral Given 5/18/17 1724)   HYDROmorphone (PF) (DILAUDID) injection 1 mg (1 mg IntraVENous Given 5/18/17 1724)   ondansetron (ZOFRAN) injection 4 mg (4 mg IntraVENous Given 5/18/17 1724)   HYDROmorphone (PF) (DILAUDID) injection 0.2 mg (0.2 mg IntraVENous Given 5/18/17 1902)       IMPRESSION:  1. Flank pain    2. Vaginal yeast infection    3. Drug-seeking behavior    4. Anxiety        PLAN:  1. Discharge Medication List as of 5/18/2017  6:46 PM        2. Follow-up Information     Follow up With Details Comments [address] Matilde Worthy MD Schedule an appointment as soon as possible for a visit in 2 days As needed, If symptoms worsen, Possible further evaluation and treatment [address]   [phone]      [facility] EMERGENCY DEPT Go to As needed, If symptoms worsen [address] [place] [address]  [phone]    Your OBGYN Schedule an appointment as soon as possible for a visit in 2 days As needed, If symptoms worsen, Possible further evaluation and treatment     Your Rheumatologist Schedule an appointment as soon as possible for a visit in 2 days As needed, Possible further evaluation and treatment, If symptoms worsen     Brittany Moreno MD Schedule an appointment as soon as possible for a visit in 2 days As needed, If symptoms worsen, Possible further evaluation and treatment [address]  [phone]          3. Return to ED if worse   Discharge Note:  6:42 PM  The patient is ready for discharge. The patient's signs, symptoms, diagnosis, and discharge instruction have been discussed and the patient has conveyed their understanding. The patient is to follow up as recommended or return to the ER should their symptoms worsen. Plan has been discussed and the patient is in agreement. Written by Stephanie Yadav ED Scribe, as dictated by [employer]erFRANCINE     Attestation: This note is prepared by Camilla Yadav, acting as Scribe for [employer]. [employer]er[employer]INE: The scribe's documentation has been prepared under my direction and personally reviewed by me in its entirety.  I confirm that the note above accurately reflects all work, treatment, procedures, and medical decision making performed by me. This note will not be viewable in [address]

## 2017-05-18 NOTE — LETTER
[facility] EMERGENCY DEPT 
[address] [place] [address] [address] [id number] [phone] Work/School Note Date: 5/18/2017 To Whom It May concern: 
 
Justine Saenz was seen and treated today in the emergency room by the following provider(s): 
Attending Provider: Kathy Rice MD 
Physician Assistant: Omar Gonzales. Justine Saenz may return to work on 05/20/2017. Sincerely, 
 
 
 
 
Omar Gonzales

## 2017-06-15 ENCOUNTER — HOSPITAL ENCOUNTER (EMERGENCY)
Age: 32
Discharge: HOME | End: 2017-06-15
Attending: EMERGENCY MEDICINE
Payer: COMMERCIAL

## 2017-06-15 VITALS
HEART RATE: 77 BPM | OXYGEN SATURATION: 99 % | SYSTOLIC BLOOD PRESSURE: 133 MMHG | RESPIRATION RATE: 16 BRPM | BODY MASS INDEX: 38.66 KG/M2 | TEMPERATURE: 98.6 F | DIASTOLIC BLOOD PRESSURE: 94 MMHG | HEIGHT: 62 IN | WEIGHT: 210.1 LBS

## 2017-06-15 DIAGNOSIS — R10.84 ABDOMINAL PAIN, GENERALIZED: Primary | ICD-10-CM

## 2017-06-15 DIAGNOSIS — R19.7 DIARRHEA, UNSPECIFIED TYPE: ICD-10-CM

## 2017-06-15 LAB
ALBUMIN SERPL BCP-MCNC: 4.2 G/DL (ref 3.5–5)
ALBUMIN/GLOB SERPL: 1.2 {RATIO} (ref 1.1–2.2)
ALP SERPL-CCNC: 127 U/L (ref 45–117)
ALT SERPL-CCNC: 17 U/L (ref 12–78)
ANION GAP BLD CALC-SCNC: 6 MMOL/L (ref 5–15)
APPEARANCE UR: ABNORMAL
AST SERPL W P-5'-P-CCNC: 8 U/L (ref 15–37)
BACTERIA URNS QL MICRO: ABNORMAL /HPF
BASOPHILS # BLD AUTO: 0 K/UL (ref 0–0.1)
BASOPHILS # BLD: 0 % (ref 0–1)
BILIRUB SERPL-MCNC: 0.3 MG/DL (ref 0.2–1)
BILIRUB UR QL: NEGATIVE
BUN SERPL-MCNC: 9 MG/DL (ref 6–20)
BUN/CREAT SERPL: 10 (ref 12–20)
CALCIUM SERPL-MCNC: 9.5 MG/DL (ref 8.5–10.1)
CHLORIDE SERPL-SCNC: 107 MMOL/L (ref 97–108)
CO2 SERPL-SCNC: 25 MMOL/L (ref 21–32)
COLOR UR: ABNORMAL
CREAT SERPL-MCNC: 0.9 MG/DL (ref 0.55–1.02)
EOSINOPHIL # BLD: 0.3 K/UL (ref 0–0.4)
EOSINOPHIL NFR BLD: 3 % (ref 0–7)
EPITH CASTS URNS QL MICRO: ABNORMAL /LPF
ERYTHROCYTE [DISTWIDTH] IN BLOOD BY AUTOMATED COUNT: 12.3 % (ref 11.5–14.5)
GLOBULIN SER CALC-MCNC: 3.5 G/DL (ref 2–4)
GLUCOSE SERPL-MCNC: 107 MG/DL (ref 65–100)
GLUCOSE UR STRIP.AUTO-MCNC: NEGATIVE MG/DL
HCG UR QL: NEGATIVE
HCT VFR BLD AUTO: 43.7 % (ref 35–47)
HEMOCCULT STL QL: NEGATIVE
HGB BLD-MCNC: 15 G/DL (ref 11.5–16)
HGB UR QL STRIP: ABNORMAL
KETONES UR QL STRIP.AUTO: NEGATIVE MG/DL
LEUKOCYTE ESTERASE UR QL STRIP.AUTO: ABNORMAL
LYMPHOCYTES # BLD AUTO: 30 % (ref 12–49)
LYMPHOCYTES # BLD: 3 K/UL (ref 0.8–3.5)
MCH RBC QN AUTO: 31.6 PG (ref 26–34)
MCHC RBC AUTO-ENTMCNC: 34.3 G/DL (ref 30–36.5)
MCV RBC AUTO: 92.2 FL (ref 80–99)
MONOCYTES # BLD: 0.7 K/UL (ref 0–1)
MONOCYTES NFR BLD AUTO: 7 % (ref 5–13)
NEUTS SEG # BLD: 6.1 K/UL (ref 1.8–8)
NEUTS SEG NFR BLD AUTO: 60 % (ref 32–75)
NITRITE UR QL STRIP.AUTO: NEGATIVE
PH UR STRIP: 7 [PH] (ref 5–8)
PLATELET # BLD AUTO: 289 K/UL (ref 150–400)
POTASSIUM SERPL-SCNC: 3.5 MMOL/L (ref 3.5–5.1)
PROT SERPL-MCNC: 7.7 G/DL (ref 6.4–8.2)
PROT UR STRIP-MCNC: NEGATIVE MG/DL
RBC # BLD AUTO: 4.74 M/UL (ref 3.8–5.2)
RBC #/AREA URNS HPF: ABNORMAL /HPF (ref 0–5)
SODIUM SERPL-SCNC: 138 MMOL/L (ref 136–145)
SP GR UR REFRACTOMETRY: 1.02 (ref 1–1.03)
UA: UC IF INDICATED,UAUC: ABNORMAL
UROBILINOGEN UR QL STRIP.AUTO: 1 EU/DL (ref 0.2–1)
WBC # BLD AUTO: 10 K/UL (ref 3.6–11)
WBC URNS QL MICRO: ABNORMAL /HPF (ref 0–4)

## 2017-06-15 PROCEDURE — 80053 COMPREHEN METABOLIC PANEL: CPT | Performed by: EMERGENCY MEDICINE

## 2017-06-15 PROCEDURE — 85025 COMPLETE CBC W/AUTO DIFF WBC: CPT | Performed by: EMERGENCY MEDICINE

## 2017-06-15 PROCEDURE — 96361 HYDRATE IV INFUSION ADD-ON: CPT

## 2017-06-15 PROCEDURE — 96375 TX/PRO/DX INJ NEW DRUG ADDON: CPT

## 2017-06-15 PROCEDURE — 74011250636 HC RX REV CODE- 250/636: Performed by: EMERGENCY MEDICINE

## 2017-06-15 PROCEDURE — 81025 URINE PREGNANCY TEST: CPT | Performed by: EMERGENCY MEDICINE

## 2017-06-15 PROCEDURE — 87086 URINE CULTURE/COLONY COUNT: CPT | Performed by: EMERGENCY MEDICINE

## 2017-06-15 PROCEDURE — 81001 URINALYSIS AUTO W/SCOPE: CPT | Performed by: EMERGENCY MEDICINE

## 2017-06-15 PROCEDURE — 99283 EMERGENCY DEPT VISIT LOW MDM: CPT

## 2017-06-15 PROCEDURE — 36415 COLL VENOUS BLD VENIPUNCTURE: CPT | Performed by: EMERGENCY MEDICINE

## 2017-06-15 PROCEDURE — 82272 OCCULT BLD FECES 1-3 TESTS: CPT | Performed by: EMERGENCY MEDICINE

## 2017-06-15 PROCEDURE — 74011250637 HC RX REV CODE- 250/637: Performed by: EMERGENCY MEDICINE

## 2017-06-15 PROCEDURE — 96374 THER/PROPH/DIAG INJ IV PUSH: CPT

## 2017-06-15 RX ORDER — LAMOTRIGINE 25 MG/1
50 TABLET ORAL DAILY
COMMUNITY
End: 2019-02-22

## 2017-06-15 RX ORDER — DICYCLOMINE HYDROCHLORIDE 10 MG/1
10 CAPSULE ORAL 4 TIMES DAILY
Qty: 20 CAP | Refills: 0 | Status: SHIPPED | OUTPATIENT
Start: 2017-06-15 | End: 2017-06-20

## 2017-06-15 RX ORDER — ONDANSETRON 2 MG/ML
4 INJECTION INTRAMUSCULAR; INTRAVENOUS
Status: COMPLETED | OUTPATIENT
Start: 2017-06-15 | End: 2017-06-15

## 2017-06-15 RX ORDER — MORPHINE SULFATE 2 MG/ML
6 INJECTION, SOLUTION INTRAMUSCULAR; INTRAVENOUS
Status: COMPLETED | OUTPATIENT
Start: 2017-06-15 | End: 2017-06-15

## 2017-06-15 RX ORDER — HYDROCODONE BITARTRATE AND ACETAMINOPHEN 7.5; 325 MG/1; MG/1
1 TABLET ORAL
Status: COMPLETED | OUTPATIENT
Start: 2017-06-15 | End: 2017-06-15

## 2017-06-15 RX ORDER — LEVOFLOXACIN 500 MG/1
500 TABLET, FILM COATED ORAL DAILY
Qty: 7 TAB | Refills: 0 | Status: SHIPPED | OUTPATIENT
Start: 2017-06-15 | End: 2022-01-17 | Stop reason: SDUPTHER

## 2017-06-15 RX ORDER — KETOROLAC TROMETHAMINE 30 MG/ML
30 INJECTION, SOLUTION INTRAMUSCULAR; INTRAVENOUS
COMMUNITY
End: 2018-02-11

## 2017-06-15 RX ADMIN — SODIUM CHLORIDE 1000 ML: 900 INJECTION, SOLUTION INTRAVENOUS at 18:59

## 2017-06-15 RX ADMIN — ONDANSETRON HYDROCHLORIDE 4 MG: 2 INJECTION, SOLUTION INTRAMUSCULAR; INTRAVENOUS at 18:58

## 2017-06-15 RX ADMIN — HYDROCODONE BITARTRATE AND ACETAMINOPHEN 1 TABLET: 7.5; 325 TABLET ORAL at 21:04

## 2017-06-15 RX ADMIN — Medication 6 MG: at 18:59

## 2017-06-15 NOTE — ED NOTES
Patient's family member noted redness under patient's chin & warm to touch.  Dr Blake Every notified & will check

## 2017-06-15 NOTE — ED NOTES
Care assumed of patient @ this time & intro with rounding done, with report from Janina Martini RN__________________. Patient resting quietly on stretcher without verbal complaints.

## 2017-06-15 NOTE — ED PROVIDER NOTES
HPI Comments: Justine Saenz is a 33 y.o. female with PMHx significant for colitis, asthma, GERD, lupus who presents ambulatory to the ED with cc of diarrhea x 5 days with associated diffuse abdominal pain rated 7/10 c/w pain from colitis. She also c/o nausea and states that she has noticed green and blood red color in her stool . Pt reports that her sxs are not alleviated when eating chicken soup or yogurt. Pt reports that taking imodium made her sxs worse because it increased her abd pain. She states that she has been hospitalized (at [facility]) before for her colitis and was treated with flagyl and rocephin. She denies any vomiting, CP, SOB, F/C, vomiting, dysuria/hematuria, cough, sick contacts. PCP: Bandar Jacobson MD    Social History: smoking (-) EtOH(-) illicit drug (-)      There are no other complaints, changes, or physical findings at this time. The history is provided by the patient.         Past Medical History:   Diagnosis Date    Abnormal CT of the abdomen 11/8/2012    Asthma     Autoimmune disease ([address]     lupus    Cervical prolapse     Dehydration     Fatty liver     Gastrointestinal disorder     gerd, twisted colon, IBS    GERD (gastroesophageal reflux disease)     Headache     Morbid obesity (HCC)     Nausea & vomiting     Neurological disorder     cluster headaches    other 2006, 2009    ovarian cyst removal    Other ill-defined conditions     Worsening headaches        Past Surgical History:   Procedure Laterality Date    COLONOSCOPY  9/21/2010         HX CHOLECYSTECTOMY      HX GYN  1/2009    right salpingo oopherectomy    HX GYN  2006    right ovarian tumor removed    HX HEENT      left wisdom teeth removal    HX HEENT      top right wisdom tooth removed    HX HERNIA REPAIR  4/2012    HX HERNIA REPAIR  2/28/13    Laparoscopic recurrent incisional hernia repair    HX UROLOGICAL      Kidney Stone Removal    DE EGD TRANSORAL BIOPSY SINGLE/MULTIPLE 9/22/2010              History reviewed. No pertinent family history. Social History     Social History    Marital status:      Spouse name: N/A    Number of children: N/A    Years of education: N/A     Occupational History    Not on file. Social History Main Topics    Smoking status: Never Smoker    Smokeless tobacco: Never Used    Alcohol use No    Drug use: No    Sexual activity: Not Currently     Partners: Male     Birth control/ protection: Abstinence     Other Topics Concern    Not on file     Social History Narrative         ALLERGIES: Latex; Compazine [prochlorperazine]; Pcn [penicillins]; Sulfa (sulfonamide antibiotics); Topamax [topiramate]; Adhesive; Mushroom combination no.1; Toradol [ketorolac tromethamine]; and Valproic acid    Review of Systems   Constitutional: Negative for chills, fatigue and fever. HENT: Negative for congestion, rhinorrhea and sore throat. Eyes: Negative for pain, discharge and visual disturbance. Respiratory: Negative for cough, chest tightness, shortness of breath and wheezing. Cardiovascular: Negative for chest pain, palpitations and leg swelling. Gastrointestinal: Positive for abdominal pain, blood in stool, diarrhea and nausea. Negative for constipation and vomiting. Genitourinary: Negative for dysuria, frequency and hematuria. Musculoskeletal: Negative for arthralgias, back pain and myalgias. Skin: Negative for rash. Neurological: Negative for dizziness, weakness, light-headedness and headaches. Psychiatric/Behavioral: Negative. Vitals:    06/15/17 1845 06/15/17 1900 06/15/17 1920 06/15/17 2006   BP:    (!) 133/94   Pulse:    77   Resp:    16   Temp:       SpO2: 99% 100% 99% 99%   Weight:       Height:                Physical Exam   Constitutional: She is oriented to person, place, and time. She appears well-developed and well-nourished. No distress. HENT:   Head: Normocephalic and atraumatic.    Eyes: EOM are normal. Right eye exhibits no discharge. Left eye exhibits no discharge. No scleral icterus. Neck: Normal range of motion. Neck supple. No tracheal deviation present. Cardiovascular: Normal rate, regular rhythm, normal heart sounds and intact distal pulses. Exam reveals no gallop and no friction rub. No murmur heard. Pulmonary/Chest: Effort normal and breath sounds normal. No respiratory distress. She has no wheezes. She has no rales. Abdominal: Soft. She exhibits no distension. There is tenderness. There is no rebound and no guarding. Generalized upper abdominal tenderness   Musculoskeletal: Normal range of motion. She exhibits no edema. Lymphadenopathy:     She has no cervical adenopathy. Neurological: She is alert and oriented to person, place, and time. No focal neuro deficits   Skin: Skin is warm and dry. No rash noted. Psychiatric: She has a normal mood and affect. Nursing note and vitals reviewed. MDM  Number of Diagnoses or Management Options  Abdominal pain, generalized:   Diarrhea, unspecified type:   Diagnosis management comments:     Patient presents to ED with generalized abdominal pain with diarrhea. She appears well on exam and is afebrile with stable vital signs. Differential includes viral syndrome, colitis, pancreatitis, choledocholithiasis, obstruction, UTI, drug seeking behavior (frequent ED visits for abdominal pain related complaints. )  - CBC, CMP, UA, UPT  - Symptomatic management and reevaluate  - Patient has had multiple CT scans of a/p. Overall low suspicion for acute intraabdominal process. If labs are unremarkable, will defer CTa/p and treat empirically with levofloxacin.          Amount and/or Complexity of Data Reviewed  Clinical lab tests: reviewed and ordered  Review and summarize past medical records: yes    Patient Progress  Patient progress: stable    ED Course       Procedures    Procedure Note - Rectal Exam:   7:06 PM  Performed by: Leighann You MD  Chaperoned by: Oscar Marks RN  Rectal exam performed. No stool was collected, no gross blood noted. The procedure took 1-15 minutes, and pt tolerated well. Written by ANDRE Wall, as dictated by Ariadne Ryan MD.    PROGRESS NOTE:  8:35 PM  Labs unremarkable. Patient is tolerating po. Will treat with levofloxacin and have patient follow up with PCP. Concern for drug seeking behavior so will discharge without narcotics. Discussed results, prescriptions and follow up plan with patient. Provided customary return to ED instructions. Patient expressed understanding. Rochelle Hernandez MD    LABORATORY TESTS:  Recent Results (from the past 12 hour(s))   CBC WITH AUTOMATED DIFF    Collection Time: 06/15/17  4:50 PM   Result Value Ref Range    WBC 10.0 3.6 - 11.0 K/uL    RBC 4.74 3.80 - 5.20 M/uL    HGB 15.0 11.5 - 16.0 g/dL    HCT 43.7 35.0 - 47.0 %    MCV 92.2 80.0 - 99.0 FL    MCH 31.6 26.0 - 34.0 PG    MCHC 34.3 30.0 - 36.5 g/dL    RDW 12.3 11.5 - 14.5 %    PLATELET 397 928 - 857 K/uL    NEUTROPHILS 60 32 - 75 %    LYMPHOCYTES 30 12 - 49 %    MONOCYTES 7 5 - 13 %    EOSINOPHILS 3 0 - 7 %    BASOPHILS 0 0 - 1 %    ABS. NEUTROPHILS 6.1 1.8 - 8.0 K/UL    ABS. LYMPHOCYTES 3.0 0.8 - 3.5 K/UL    ABS. MONOCYTES 0.7 0.0 - 1.0 K/UL    ABS. EOSINOPHILS 0.3 0.0 - 0.4 K/UL    ABS. BASOPHILS 0.0 0.0 - 0.1 K/UL   METABOLIC PANEL, COMPREHENSIVE    Collection Time: 06/15/17  4:50 PM   Result Value Ref Range    Sodium 138 136 - 145 mmol/L    Potassium 3.5 3.5 - 5.1 mmol/L    Chloride 107 97 - 108 mmol/L    CO2 25 21 - 32 mmol/L    Anion gap 6 5 - 15 mmol/L    Glucose 107 (H) 65 - 100 mg/dL    BUN 9 6 - 20 MG/DL    Creatinine 0.90 0.55 - 1.02 MG/DL    BUN/Creatinine ratio 10 (L) 12 - 20      GFR est AA >60 >60 ml/min/1.73m2    GFR est non-AA >60 >60 ml/min/1.73m2    Calcium 9.5 8.5 - 10.1 MG/DL    Bilirubin, total 0.3 0.2 - 1.0 MG/DL    ALT (SGPT) 17 12 - 78 U/L    AST (SGOT) 8 (L) 15 - 37 U/L    Alk. phosphatase 127 (H) 45 - 117 U/L    Protein, total 7.7 6.4 - 8.2 g/dL    Albumin 4.2 3.5 - 5.0 g/dL    Globulin 3.5 2.0 - 4.0 g/dL    A-G Ratio 1.2 1.1 - 2.2     URINALYSIS W/ REFLEX CULTURE    Collection Time: 06/15/17  4:54 PM   Result Value Ref Range    Color YELLOW/STRAW      Appearance CLOUDY (A) CLEAR      Specific gravity 1.024 1.003 - 1.030      pH (UA) 7.0 5.0 - 8.0      Protein NEGATIVE  NEG mg/dL    Glucose NEGATIVE  NEG mg/dL    Ketone NEGATIVE  NEG mg/dL    Bilirubin NEGATIVE  NEG      Blood SMALL (A) NEG      Urobilinogen 1.0 0.2 - 1.0 EU/dL    Nitrites NEGATIVE  NEG      Leukocyte Esterase SMALL (A) NEG      WBC 5-10 0 - 4 /hpf    RBC 5-10 0 - 5 /hpf    Epithelial cells MANY (A) FEW /lpf    Bacteria 2+ (A) NEG /hpf    UA:UC IF INDICATED URINE CULTURE ORDERED (A) CNI     HCG URINE, QL    Collection Time: 06/15/17  4:54 PM   Result Value Ref Range    HCG urine, Ql. NEGATIVE  NEG     OCCULT BLOOD, STOOL    Collection Time: 06/15/17  7:13 PM   Result Value Ref Range    Occult blood, stool NEGATIVE  NEG         MEDICATIONS GIVEN:  Medications   sodium chloride 0.9 % bolus infusion 1,000 mL (1,000 mL IntraVENous New Bag 6/15/17 1859)   morphine injection 6 mg (6 mg IntraVENous Given 6/15/17 1859)   ondansetron (ZOFRAN) injection 4 mg (4 mg IntraVENous Given 6/15/17 1858)       IMPRESSION:  1. Abdominal pain, generalized    2. Diarrhea, unspecified type        PLAN:  Current Discharge Medication List      START taking these medications    Details   levoFLOXacin (LEVAQUIN) 500 mg tablet Take 1 Tab by mouth daily for 7 days. Qty: 7 Tab, Refills: 0      dicyclomine (BENTYL) 10 mg capsule Take 1 Cap by mouth four (4) times daily for 5 days.   Qty: 20 Cap, Refills: 0           Follow-up Information     Follow up With Details Comments [address] Emanuel MD In 2 days  [address] [address]  [phone]      [facility] EMERGENCY DEPT  As needed, If symptoms worsen [address] [facility]  [phone]        Return to ED if worse       Discharge Note:  8:45 PM  The patient has been re-evaluated and is ready for discharge. Reviewed available results with patient. Counseled patient on diagnosis and care plan. Patient has expressed understanding, and all questions have been answered. Patient agrees with plan and agrees to follow up as recommended, or to return to the ED if their symptoms worsen. Discharge instructions have been provided and explained to the patient, along with reasons to return to the ED. Written by Zafar Bender, ED Scribe, as dictated by Fady Cadet MD.       ATTESTATION:  This note is prepared by Zafar Bender, acting as Scribe for Fady Cadet MD.    Fady Cadet MD :The scribe's documentation has been prepared under my direction and personally reviewed by me in its entirety. I confirm that the note above accurately reflects all work, treatment, procedures, and medical decision making performed by me.

## 2017-06-16 ENCOUNTER — APPOINTMENT (OUTPATIENT)
Dept: CT IMAGING | Age: 32
End: 2017-06-16
Attending: EMERGENCY MEDICINE
Payer: COMMERCIAL

## 2017-06-16 ENCOUNTER — HOSPITAL ENCOUNTER (EMERGENCY)
Age: 32
Discharge: HOME | End: 2017-06-16
Attending: EMERGENCY MEDICINE
Payer: COMMERCIAL

## 2017-06-16 VITALS
WEIGHT: 218.03 LBS | SYSTOLIC BLOOD PRESSURE: 110 MMHG | TEMPERATURE: 97.9 F | DIASTOLIC BLOOD PRESSURE: 69 MMHG | RESPIRATION RATE: 18 BRPM | BODY MASS INDEX: 40.12 KG/M2 | HEIGHT: 62 IN | OXYGEN SATURATION: 97 % | HEART RATE: 73 BPM

## 2017-06-16 DIAGNOSIS — F11.20 UNCOMPLICATED OPIOID DEPENDENCE (HCC): ICD-10-CM

## 2017-06-16 DIAGNOSIS — K52.9 GASTROENTERITIS, ACUTE: Primary | ICD-10-CM

## 2017-06-16 PROCEDURE — 74011636320 HC RX REV CODE- 636/320: Performed by: EMERGENCY MEDICINE

## 2017-06-16 PROCEDURE — 74011250637 HC RX REV CODE- 250/637: Performed by: EMERGENCY MEDICINE

## 2017-06-16 PROCEDURE — 96375 TX/PRO/DX INJ NEW DRUG ADDON: CPT

## 2017-06-16 PROCEDURE — 74177 CT ABD & PELVIS W/CONTRAST: CPT

## 2017-06-16 PROCEDURE — 99283 EMERGENCY DEPT VISIT LOW MDM: CPT

## 2017-06-16 PROCEDURE — 96374 THER/PROPH/DIAG INJ IV PUSH: CPT

## 2017-06-16 PROCEDURE — 96361 HYDRATE IV INFUSION ADD-ON: CPT

## 2017-06-16 PROCEDURE — 96376 TX/PRO/DX INJ SAME DRUG ADON: CPT

## 2017-06-16 PROCEDURE — 74011250636 HC RX REV CODE- 250/636: Performed by: EMERGENCY MEDICINE

## 2017-06-16 RX ORDER — PROMETHAZINE HYDROCHLORIDE 25 MG/1
25 SUPPOSITORY RECTAL
Qty: 12 SUPPOSITORY | Refills: 0 | Status: SHIPPED | OUTPATIENT
Start: 2017-06-16 | End: 2017-06-23

## 2017-06-16 RX ORDER — SODIUM CHLORIDE 9 MG/ML
50 INJECTION, SOLUTION INTRAVENOUS
Status: COMPLETED | OUTPATIENT
Start: 2017-06-16 | End: 2017-06-16

## 2017-06-16 RX ORDER — SODIUM CHLORIDE 0.9 % (FLUSH) 0.9 %
10 SYRINGE (ML) INJECTION
Status: COMPLETED | OUTPATIENT
Start: 2017-06-16 | End: 2017-06-16

## 2017-06-16 RX ORDER — MORPHINE SULFATE 4 MG/ML
4 INJECTION, SOLUTION INTRAMUSCULAR; INTRAVENOUS
Status: COMPLETED | OUTPATIENT
Start: 2017-06-16 | End: 2017-06-16

## 2017-06-16 RX ORDER — ONDANSETRON 2 MG/ML
4 INJECTION INTRAMUSCULAR; INTRAVENOUS
Status: COMPLETED | OUTPATIENT
Start: 2017-06-16 | End: 2017-06-16

## 2017-06-16 RX ORDER — LEVOFLOXACIN 500 MG/1
500 TABLET, FILM COATED ORAL
Status: COMPLETED | OUTPATIENT
Start: 2017-06-16 | End: 2017-06-16

## 2017-06-16 RX ORDER — ONDANSETRON 4 MG/1
4 TABLET, FILM COATED ORAL
Qty: 20 TAB | Refills: 0 | Status: SHIPPED | OUTPATIENT
Start: 2017-06-16 | End: 2019-03-04 | Stop reason: SDUPTHER

## 2017-06-16 RX ORDER — FENTANYL CITRATE 50 UG/ML
50 INJECTION, SOLUTION INTRAMUSCULAR; INTRAVENOUS
Status: COMPLETED | OUTPATIENT
Start: 2017-06-16 | End: 2017-06-16

## 2017-06-16 RX ADMIN — DIATRIZOATE MEGLUMINE AND DIATRIZOATE SODIUM 30 ML: 600; 100 SOLUTION ORAL; RECTAL at 03:30

## 2017-06-16 RX ADMIN — SODIUM CHLORIDE 50 ML/HR: 900 INJECTION, SOLUTION INTRAVENOUS at 04:32

## 2017-06-16 RX ADMIN — ONDANSETRON HYDROCHLORIDE 4 MG: 2 INJECTION, SOLUTION INTRAMUSCULAR; INTRAVENOUS at 03:29

## 2017-06-16 RX ADMIN — SODIUM CHLORIDE 1000 ML: 900 INJECTION, SOLUTION INTRAVENOUS at 03:09

## 2017-06-16 RX ADMIN — MORPHINE SULFATE 4 MG: 4 INJECTION, SOLUTION INTRAMUSCULAR; INTRAVENOUS at 05:37

## 2017-06-16 RX ADMIN — MORPHINE SULFATE 4 MG: 4 INJECTION, SOLUTION INTRAMUSCULAR; INTRAVENOUS at 04:22

## 2017-06-16 RX ADMIN — IOPAMIDOL 100 ML: 755 INJECTION, SOLUTION INTRAVENOUS at 04:32

## 2017-06-16 RX ADMIN — Medication 10 ML: at 04:32

## 2017-06-16 RX ADMIN — LEVOFLOXACIN 500 MG: 500 TABLET, FILM COATED ORAL at 05:37

## 2017-06-16 RX ADMIN — FENTANYL CITRATE 50 MCG: 50 INJECTION, SOLUTION INTRAMUSCULAR; INTRAVENOUS at 03:29

## 2017-06-16 NOTE — DISCHARGE INSTRUCTIONS
Gastroenteritis: Care Instructions  Your Care Instructions  Gastroenteritis is an illness that may cause nausea, vomiting, and diarrhea. It is sometimes called \"stomach flu. \" It can be caused by bacteria or a virus. You will probably begin to feel better in 1 to 2 days. In the meantime, get plenty of rest and make sure you do not become dehydrated. Dehydration occurs when your body loses too much fluid. Follow-up care is a key part of your treatment and safety. Be sure to make and go to all appointments, and call your doctor if you are having problems. Its also a good idea to know your test results and keep a list of the medicines you take. How can you care for yourself at home? · If your doctor prescribed antibiotics, take them as directed. Do not stop taking them just because you feel better. You need to take the full course of antibiotics. · Drink plenty of fluids to prevent dehydration, enough so that your urine is light yellow or clear like water. Choose water and other caffeine-free clear liquids until you feel better. If you have kidney, heart, or liver disease and have to limit fluids, talk with your doctor before you increase your fluid intake. · Drink fluids slowly, in frequent, small amounts, because drinking too much too fast can cause vomiting. · Begin eating mild foods, such as dry toast, yogurt, applesauce, bananas, and rice. Avoid spicy, hot, or high-fat foods, and do not drink alcohol or caffeine for a day or two. Do not drink milk or eat ice cream until you are feeling better. How to prevent gastroenteritis  · Keep hot foods hot and cold foods cold. · Do not eat meats, dressings, salads, or other foods that have been kept at room temperature for more than 2 hours. · Use a thermometer to check your refrigerator. It should be between 34°F and 40°F.  · Defrost meats in the refrigerator or microwave, not on the kitchen counter. · Keep your hands and your kitchen clean.  Wash your hands, cutting boards, and countertops with hot soapy water frequently. · Cook meat until it is well done. · Do not eat raw eggs or uncooked sauces made with raw eggs. · Do not take chances. If food looks or tastes spoiled, throw it out. When should you call for help? Call 911 anytime you think you may need emergency care. For example, call if:  · You vomit blood or what looks like coffee grounds. · You passed out (lost consciousness). · You pass maroon or very bloody stools. Call your doctor now or seek immediate medical care if:  · You have severe belly pain. · You have signs of needing more fluids. You have sunken eyes, a dry mouth, and pass only a little dark urine. · You feel like you are going to faint. · You have increased belly pain that does not go away in 1 to 2 days. · You have new or increased nausea, or you are vomiting. · You have a new or higher fever. · Your stools are black and tarlike or have streaks of blood. Watch closely for changes in your health, and be sure to contact your doctor if:  · You are dizzy or lightheaded. · You urinate less than usual, or your urine is dark yellow or brown. · You do not feel better with each day that goes by. Where can you learn more? Go to http://kymberly-yoon.info/. Enter N142 in the search box to learn more about \"Gastroenteritis: Care Instructions. \"  Current as of: May 24, 2016  Content Version: 11.2  © 7575-6799 [employer]. Care instructions adapted under license by [employer] (which disclaims liability or warranty for this information). If you have questions about a medical condition or this instruction, always ask your healthcare professional. [address] any warranty or liability for your use of this information.

## 2017-06-16 NOTE — ED NOTES
Dr. Lalito Aragon reviewed discharge instructions with the patient. The patient verbalized understanding.

## 2017-06-16 NOTE — ED PROVIDER NOTES
HPI Comments: Earle Del Toro is a 33 y.o. female with PMhx significant for Dehydration, cluster HA, GERD, twisted colon, Cervical prolapse, Asthma, lupus who presents ambulatory to the ED with cc of gradually worsening cramping aching periumbilical abdominal pain. Pt reports associated constipation, nausea, vomiting, and blood in her stool. Pt reports eating chicken noodle soup and drinking Gatorade. She states that she took an imodium which exacerbated her symptoms. Per chart review pt was seen yesterday at [facility] and had an abdominal XR with no significant findings. Pt reports a PMHx significant for colitis, noting that she was admitted secondary to her symptoms. Pt denies and fevers, chills, CP, or SOB. Social history significant for: - Tobacco, - EtOH, - Illicit drug use    PCP: Gabby Kauffman MD    There are no other complaints, changes or physical findings at this time. Written by Melonie Gonzáles ED Scribe, as dictated by Yesenia Duque M.D. The history is provided by the patient. No  was used.         Past Medical History:   Diagnosis Date    Abnormal CT of the abdomen 11/8/2012    Asthma     Autoimmune disease ([facility] [address]     lupus    Cervical prolapse     Dehydration     Fatty liver     Gastrointestinal disorder     gerd, twisted colon, IBS    GERD (gastroesophageal reflux disease)     Headache     Morbid obesity (HCC)     Nausea & vomiting     Neurological disorder     cluster headaches    other 2006, 2009    ovarian cyst removal    Other ill-defined conditions     Worsening headaches        Past Surgical History:   Procedure Laterality Date    COLONOSCOPY  9/21/2010         HX CHOLECYSTECTOMY      HX GYN  1/2009    right salpingo oopherectomy    HX GYN  2006    right ovarian tumor removed    HX HEENT      left wisdom teeth removal    HX HEENT      top right wisdom tooth removed    HX HERNIA REPAIR  4/2012    HX HERNIA REPAIR  2/28/13    Laparoscopic recurrent incisional hernia repair    HX UROLOGICAL      Kidney Stone Removal    PA EGD TRANSORAL BIOPSY SINGLE/MULTIPLE  9/22/2010              No family history on file. Social History     Social History    Marital status:      Spouse name: N/A    Number of children: N/A    Years of education: N/A     Occupational History    Not on file. Social History Main Topics    Smoking status: Never Smoker    Smokeless tobacco: Never Used    Alcohol use No    Drug use: No    Sexual activity: Not Currently     Partners: Male     Birth control/ protection: Abstinence     Other Topics Concern    Not on file     Social History Narrative         ALLERGIES: Latex; Compazine [prochlorperazine]; Pcn [penicillins]; Sulfa (sulfonamide antibiotics); Topamax [topiramate]; Adhesive; Mushroom combination no.1; Toradol [ketorolac tromethamine]; and Valproic acid    Review of Systems   Constitutional: Negative for chills and fever. Respiratory: Negative for cough and shortness of breath. Cardiovascular: Negative for chest pain. Gastrointestinal: Positive for abdominal pain (Periumbilical), blood in stool, constipation, nausea and vomiting. Negative for diarrhea. Neurological: Negative for weakness and numbness. All other systems reviewed and are negative. Patient Vitals for the past 12 hrs:   Temp Pulse Resp BP SpO2   06/16/17 0157 97.9 °F (36.6 °C) (!) 112 18 (!) 148/105 98 %     Physical Exam   Constitutional: She is oriented to person, place, and time. She appears well-developed and well-nourished. Tearful crying out in pain   HENT:   Head: Normocephalic and atraumatic. Eyes: Conjunctivae and EOM are normal.   Neck: Normal range of motion. Neck supple. Cardiovascular: Normal rate and regular rhythm. Pulmonary/Chest: Effort normal and breath sounds normal. No respiratory distress. Abdominal: Soft. She exhibits no distension.    Mid abdomen tenderness  Lower abdomen tenderness Musculoskeletal: Normal range of motion. Neurological: She is alert and oriented to person, place, and time. Skin: Skin is warm and dry. Psychiatric: She has a normal mood and affect. Nursing note and vitals reviewed. MDM  Number of Diagnoses or Management Options  Gastroenteritis, acute:   Uncomplicated opioid dependence [facility]):   Diagnosis management comments: Patient presents with abdominal pain, nvd.  DDx: Gastroenteritis, SBO, appendicitis, colitis, IBD, diverticulitis, mesenteric ischemia, AAA or descending dissection, ACS, ureteral stone. Will get CT Abdomen. Reviewed labs from earlier which were unremarkable. Concerned for opioid dependence in her. Amount and/or Complexity of Data Reviewed  Tests in the radiology section of CPT®: ordered and reviewed  Review and summarize past medical records: yes    Patient Progress  Patient progress: stable    Procedures    PROGRESS NOTE:  4:11 AM  Review . Pt gets 90 tablets of narcotic monthly from PCP. Last was Nor co 05/26/2017 90 tablets for 30 days. Pt has also been rx Fioricett, tramadol, alprazolam. Concerned about narcotic dependence in this pt. Will not be prescribing narcotics from home. Written by Brennan Mccord ED scribe, as dictated by Mark Ramires M.D    LABORATORY TESTS:  Recent Results (from the past 12 hour(s))   OCCULT BLOOD, STOOL    Collection Time: 06/15/17  7:13 PM   Result Value Ref Range    Occult blood, stool NEGATIVE  NEG         IMAGING RESULTS:  CT ABD PELV W CONT   Final Result   CT Results  (Last 48 hours)               06/16/17 0440  CT ABD PELV W CONT Final result    Impression:  IMPRESSION: No acute process in the abdomen and pelvis. Narrative:  CT ABDOMEN AND PELVIS WITH CONTRAST. 6/16/2017 4:40 AM        INDICATION: Abdominal pain, history of colitis, nausea, vomiting, diarrhea. COMPARISON: 2/24/2017, 12/8/2016.        TECHNIQUE: CT of the abdomen and pelvis was performed after the administration   of 100 cc IV Isovue-370 and oral contrast. CT dose reduction was achieved   through use of a standardized protocol tailored for this examination and   automatic exposure control for dose modulation. FINDINGS:   Abdomen: Incidental note is made of a fat-containing subxiphoid ventral hernia. 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 Incidental note is   made of a right renal cyst and tiny nonobstructing right renal calculi. Post   cholecystectomy. The lung bases are clear. The heart size is normal. The distal   esophagus, stomach, duodenum, liver, pancreas, spleen, adrenals, and left kidney   are normal.       Pelvis: Sigmoid diverticulosis is mild. The small bowel, ileocecal junction,   colon, and bladder are otherwise normal. The appendix is not visualized; no   pericecal inflammatory process. No free air or fluid, and no abdominopelvic   lymphadenopathy. Post hysterectomy. MEDICATIONS GIVEN:  Medications   levoFLOXacin (LEVAQUIN) tablet 500 mg (not administered)   ondansetron (ZOFRAN) injection 4 mg (4 mg IntraVENous Given 6/16/17 0329)   fentaNYL citrate (PF) injection 50 mcg (50 mcg IntraVENous Given 6/16/17 0329)   sodium chloride 0.9 % bolus infusion 1,000 mL (0 mL IntraVENous IV Completed 6/16/17 0444)   diatrizoate meglumine-d.sodium (MD-GASTROVIEW,GASTROGRAFIN) 66-10 % contrast solution 30 mL (30 mL Oral Given 6/16/17 0330)   sodium chloride (NS) flush 10 mL (10 mL IntraVENous Given 6/16/17 0432)   iopamidol (ISOVUE-370) 76 % injection 100 mL (100 mL IntraVENous Given 6/16/17 0432)   0.9% sodium chloride infusion (0 mL/hr IntraVENous IV Completed 6/16/17 0444)   morphine injection 4 mg (4 mg IntraVENous Given 6/16/17 0422)       IMPRESSION:  1. Gastroenteritis, acute        PLAN:  1.    Current Discharge Medication List      START taking these medications    Details   ondansetron hcl (ZOFRAN, AS HYDROCHLORIDE,) 4 mg tablet Take 1 Tab by mouth every eight (8) hours as needed for Nausea. Qty: 20 Tab, Refills: 0         STOP taking these medications       ondansetron (ZOFRAN ODT) 4 mg disintegrating tablet Comments:   Reason for Stoppin.   Follow-up Information     Follow up With Details Comments [address] [place]   [address] [address] [facility]  [phone]          Return to ED if worse     DISCHARGE NOTE  5:22 AM  The patient has been re-evaluated and is ready for discharge. Reviewed available results with patient. Counseled patient on diagnosis and care plan. Patient has expressed understanding, and all questions have been answered. Patient agrees with plan and agrees to follow up as recommended, or return to the ED if their symptoms worsen. Discharge instructions have been provided and explained to the patient, along with reasons to return to the ED. This note is prepared by Cristian Schwartz, acting as Scribe for Christian Sanderson M.D. Christian Sanderson M.D: The scribe's documentation has been prepared under my direction and personally reviewed by me in its entirety. I confirm that the note above accurately reflects all work, treatment, procedures, and medical decision making performed by me.

## 2017-06-16 NOTE — DISCHARGE INSTRUCTIONS
Abdominal Pain: Care Instructions  Your Care Instructions    Abdominal pain has many possible causes. Some aren't serious and get better on their own in a few days. Others need more testing and treatment. If your pain continues or gets worse, you need to be rechecked and may need more tests to find out what is wrong. You may need surgery to correct the problem. Don't ignore new symptoms, such as fever, nausea and vomiting, urination problems, pain that gets worse, and dizziness. These may be signs of a more serious problem. Your doctor may have recommended a follow-up visit in the next 8 to 12 hours. If you are not getting better, you may need more tests or treatment. The doctor has checked you carefully, but problems can develop later. If you notice any problems or new symptoms, get medical treatment right away. Follow-up care is a key part of your treatment and safety. Be sure to make and go to all appointments, and call your doctor if you are having problems. It's also a good idea to know your test results and keep a list of the medicines you take. How can you care for yourself at home? · Rest until you feel better. · To prevent dehydration, drink plenty of fluids, enough so that your urine is light yellow or clear like water. Choose water and other caffeine-free clear liquids until you feel better. If you have kidney, heart, or liver disease and have to limit fluids, talk with your doctor before you increase the amount of fluids you drink. · If your stomach is upset, eat mild foods, such as rice, dry toast or crackers, bananas, and applesauce. Try eating several small meals instead of two or three large ones. · Wait until 48 hours after all symptoms have gone away before you have spicy foods, alcohol, and drinks that contain caffeine. · Do not eat foods that are high in fat. · Avoid anti-inflammatory medicines such as aspirin, ibuprofen (Advil, Motrin), and naproxen (Aleve).  These can cause stomach upset. Talk to your doctor if you take daily aspirin for another health problem. When should you call for help? Call 911 anytime you think you may need emergency care. For example, call if:  · You passed out (lost consciousness). · You pass maroon or very bloody stools. · You vomit blood or what looks like coffee grounds. · You have new, severe belly pain. Call your doctor now or seek immediate medical care if:  · Your pain gets worse, especially if it becomes focused in one area of your belly. · You have a new or higher fever. · Your stools are black and look like tar, or they have streaks of blood. · You have unexpected vaginal bleeding. · You have symptoms of a urinary tract infection. These may include:  ¨ Pain when you urinate. ¨ Urinating more often than usual.  ¨ Blood in your urine. · You are dizzy or lightheaded, or you feel like you may faint. Watch closely for changes in your health, and be sure to contact your doctor if:  · You are not getting better after 1 day (24 hours). Where can you learn more? Go to http://kymberlyMindBodyGreenyoon.info/. Enter B681 in the search box to learn more about \"Abdominal Pain: Care Instructions. \"  Current as of: May 27, 2016  Content Version: 11.2  © 6516-6417 [employer]. Care instructions adapted under license by [employer] (which disclaims liability or warranty for this information). If you have questions about a medical condition or this instruction, always ask your healthcare professional. [place] [address] any warranty or liability for your use of this information. Diarrhea: Care Instructions  Your Care Instructions    Diarrhea is loose, watery stools (bowel movements). The exact cause is often hard to find. Sometimes diarrhea is your body's way of getting rid of what caused an upset stomach. Viruses, food poisoning, and many medicines can cause diarrhea.  Some people get diarrhea in response to emotional stress, anxiety, or certain foods. Almost everyone has diarrhea now and then. It usually isn't serious, and your stools will return to normal soon. The important thing to do is replace the fluids you have lost, so you can prevent dehydration. The doctor has checked you carefully, but problems can develop later. If you notice any problems or new symptoms, get medical treatment right away. Follow-up care is a key part of your treatment and safety. Be sure to make and go to all appointments, and call your doctor if you are having problems. It's also a good idea to know your test results and keep a list of the medicines you take. How can you care for yourself at home? · Watch for signs of dehydration, which means your body has lost too much water. Dehydration is a serious condition and should be treated right away. Signs of dehydration are:  ¨ Increasing thirst and dry eyes and mouth. ¨ Feeling faint or lightheaded. ¨ Darker urine, and a smaller amount of urine than normal.  · To prevent dehydration, drink plenty of fluids, enough so that your urine is light yellow or clear like water. Choose water and other caffeine-free clear liquids until you feel better. If you have kidney, heart, or liver disease and have to limit fluids, talk with your doctor before you increase the amount of fluids you drink. · Begin eating small amounts of mild foods the next day, if you feel like it. ¨ Try yogurt that has live cultures of Lactobacillus. (Check the label.)  ¨ Avoid spicy foods, fruits, alcohol, and caffeine until 48 hours after all symptoms are gone. ¨ Avoid chewing gum that contains sorbitol. ¨ Avoid dairy products (except for yogurt with Lactobacillus) while you have diarrhea and for 3 days after symptoms are gone. · The doctor may recommend that you take over-the-counter medicine, such as loperamide (Imodium), if you still have diarrhea after 6 hours.  Read and follow all instructions on the label. Do not use this medicine if you have bloody diarrhea, a high fever, or other signs of serious illness. Call your doctor if you think you are having a problem with your medicine. When should you call for help? Call 911 anytime you think you may need emergency care. For example, call if:  · You passed out (lost consciousness). · Your stools are maroon or very bloody. Call your doctor now or seek immediate medical care if:  · You are dizzy or lightheaded, or you feel like you may faint. · Your stools are black and look like tar, or they have streaks of blood. · You have new or worse belly pain. · You have symptoms of dehydration, such as:  ¨ Dry eyes and a dry mouth. ¨ Passing only a little dark urine. ¨ Feeling thirstier than usual.  · You have a new or higher fever. Watch closely for changes in your health, and be sure to contact your doctor if:  · Your diarrhea is getting worse. · You see pus in the diarrhea. · You are not getting better after 2 days (48 hours). Where can you learn more? Go to http://kymberly-yoon.info/. Enter T869 in the search box to learn more about \"Diarrhea: Care Instructions. \"  Current as of: May 27, 2016  Content Version: 11.2  © 9446-0859 [employer]. Care instructions adapted under license by [employer] (which disclaims liability or warranty for this information). If you have questions about a medical condition or this instruction, always ask your healthcare professional. [place] [address] any warranty or liability for your use of this information.

## 2017-06-17 LAB
BACTERIA SPEC CULT: NORMAL
CC UR VC: NORMAL
SERVICE CMNT-IMP: NORMAL

## 2017-12-12 ENCOUNTER — HOSPITAL ENCOUNTER (EMERGENCY)
Age: 32
Discharge: HOME | End: 2017-12-12
Attending: EMERGENCY MEDICINE
Payer: MEDICAID

## 2017-12-12 ENCOUNTER — APPOINTMENT (OUTPATIENT)
Dept: GENERAL RADIOLOGY | Age: 32
End: 2017-12-12
Attending: EMERGENCY MEDICINE
Payer: MEDICAID

## 2017-12-12 VITALS
WEIGHT: 209 LBS | HEIGHT: 62 IN | SYSTOLIC BLOOD PRESSURE: 144 MMHG | BODY MASS INDEX: 38.46 KG/M2 | OXYGEN SATURATION: 96 % | RESPIRATION RATE: 16 BRPM | HEART RATE: 73 BPM | TEMPERATURE: 98.2 F | DIASTOLIC BLOOD PRESSURE: 88 MMHG

## 2017-12-12 DIAGNOSIS — R55 VASOVAGAL SYNCOPE: ICD-10-CM

## 2017-12-12 DIAGNOSIS — R52 DIFFUSE PAIN: Primary | ICD-10-CM

## 2017-12-12 DIAGNOSIS — F11.20 NARCOTIC DEPENDENCE (HCC): ICD-10-CM

## 2017-12-12 DIAGNOSIS — Z76.5 DRUG-SEEKING BEHAVIOR: ICD-10-CM

## 2017-12-12 LAB
ALBUMIN SERPL-MCNC: 4.2 G/DL (ref 3.5–5)
ALBUMIN/GLOB SERPL: 1.3 {RATIO} (ref 1.1–2.2)
ALP SERPL-CCNC: 124 U/L (ref 45–117)
ALT SERPL-CCNC: 12 U/L (ref 12–78)
ANION GAP SERPL CALC-SCNC: 7 MMOL/L (ref 5–15)
APPEARANCE UR: CLEAR
AST SERPL-CCNC: 11 U/L (ref 15–37)
BACTERIA URNS QL MICRO: NEGATIVE /HPF
BASOPHILS # BLD: 0 K/UL (ref 0–0.1)
BASOPHILS NFR BLD: 0 % (ref 0–1)
BILIRUB SERPL-MCNC: 0.2 MG/DL (ref 0.2–1)
BILIRUB UR QL: NEGATIVE
BUN SERPL-MCNC: 12 MG/DL (ref 6–20)
BUN/CREAT SERPL: 12 (ref 12–20)
CALCIUM SERPL-MCNC: 9.3 MG/DL (ref 8.5–10.1)
CHLORIDE SERPL-SCNC: 105 MMOL/L (ref 97–108)
CO2 SERPL-SCNC: 27 MMOL/L (ref 21–32)
COLOR UR: NORMAL
CREAT SERPL-MCNC: 0.98 MG/DL (ref 0.55–1.02)
EOSINOPHIL # BLD: 0.3 K/UL (ref 0–0.4)
EOSINOPHIL NFR BLD: 3 % (ref 0–7)
EPITH CASTS URNS QL MICRO: NORMAL /LPF
ERYTHROCYTE [DISTWIDTH] IN BLOOD BY AUTOMATED COUNT: 12.7 % (ref 11.5–14.5)
GLOBULIN SER CALC-MCNC: 3.2 G/DL (ref 2–4)
GLUCOSE SERPL-MCNC: 95 MG/DL (ref 65–100)
GLUCOSE UR STRIP.AUTO-MCNC: NEGATIVE MG/DL
HCT VFR BLD AUTO: 42.4 % (ref 35–47)
HGB BLD-MCNC: 14.2 G/DL (ref 11.5–16)
HGB UR QL STRIP: NEGATIVE
HYALINE CASTS URNS QL MICRO: NORMAL /LPF (ref 0–5)
KETONES UR QL STRIP.AUTO: NEGATIVE MG/DL
LEUKOCYTE ESTERASE UR QL STRIP.AUTO: NEGATIVE
LYMPHOCYTES # BLD: 2.3 K/UL (ref 0.8–3.5)
LYMPHOCYTES NFR BLD: 24 % (ref 12–49)
MAGNESIUM SERPL-MCNC: 2 MG/DL (ref 1.6–2.4)
MCH RBC QN AUTO: 31 PG (ref 26–34)
MCHC RBC AUTO-ENTMCNC: 33.5 G/DL (ref 30–36.5)
MCV RBC AUTO: 92.6 FL (ref 80–99)
MONOCYTES # BLD: 0.8 K/UL (ref 0–1)
MONOCYTES NFR BLD: 9 % (ref 5–13)
NEUTS SEG # BLD: 6.3 K/UL (ref 1.8–8)
NEUTS SEG NFR BLD: 64 % (ref 32–75)
NITRITE UR QL STRIP.AUTO: NEGATIVE
PH UR STRIP: 6.5 [PH] (ref 5–8)
PLATELET # BLD AUTO: 343 K/UL (ref 150–400)
POTASSIUM SERPL-SCNC: 4.2 MMOL/L (ref 3.5–5.1)
PROT SERPL-MCNC: 7.4 G/DL (ref 6.4–8.2)
PROT UR STRIP-MCNC: NEGATIVE MG/DL
RBC # BLD AUTO: 4.58 M/UL (ref 3.8–5.2)
RBC #/AREA URNS HPF: NORMAL /HPF (ref 0–5)
SODIUM SERPL-SCNC: 139 MMOL/L (ref 136–145)
SP GR UR REFRACTOMETRY: 1.01 (ref 1–1.03)
TROPONIN I SERPL-MCNC: <0.04 NG/ML
UA: UC IF INDICATED,UAUC: NORMAL
UROBILINOGEN UR QL STRIP.AUTO: 0.2 EU/DL (ref 0.2–1)
WBC # BLD AUTO: 9.8 K/UL (ref 3.6–11)
WBC URNS QL MICRO: NORMAL /HPF (ref 0–4)

## 2017-12-12 PROCEDURE — 96374 THER/PROPH/DIAG INJ IV PUSH: CPT

## 2017-12-12 PROCEDURE — 83735 ASSAY OF MAGNESIUM: CPT | Performed by: EMERGENCY MEDICINE

## 2017-12-12 PROCEDURE — 71020 XR CHEST PA LAT: CPT

## 2017-12-12 PROCEDURE — 80053 COMPREHEN METABOLIC PANEL: CPT | Performed by: EMERGENCY MEDICINE

## 2017-12-12 PROCEDURE — 85025 COMPLETE CBC W/AUTO DIFF WBC: CPT | Performed by: EMERGENCY MEDICINE

## 2017-12-12 PROCEDURE — 99284 EMERGENCY DEPT VISIT MOD MDM: CPT

## 2017-12-12 PROCEDURE — 93005 ELECTROCARDIOGRAM TRACING: CPT

## 2017-12-12 PROCEDURE — 81001 URINALYSIS AUTO W/SCOPE: CPT | Performed by: EMERGENCY MEDICINE

## 2017-12-12 PROCEDURE — 74011250636 HC RX REV CODE- 250/636: Performed by: EMERGENCY MEDICINE

## 2017-12-12 PROCEDURE — 84484 ASSAY OF TROPONIN QUANT: CPT | Performed by: EMERGENCY MEDICINE

## 2017-12-12 PROCEDURE — 96375 TX/PRO/DX INJ NEW DRUG ADDON: CPT

## 2017-12-12 PROCEDURE — 36415 COLL VENOUS BLD VENIPUNCTURE: CPT | Performed by: EMERGENCY MEDICINE

## 2017-12-12 PROCEDURE — 74011250637 HC RX REV CODE- 250/637: Performed by: EMERGENCY MEDICINE

## 2017-12-12 RX ORDER — HYDROCODONE BITARTRATE AND ACETAMINOPHEN 7.5; 325 MG/1; MG/1
1 TABLET ORAL
Status: COMPLETED | OUTPATIENT
Start: 2017-12-12 | End: 2017-12-12

## 2017-12-12 RX ORDER — ONDANSETRON 2 MG/ML
4 INJECTION INTRAMUSCULAR; INTRAVENOUS
Status: COMPLETED | OUTPATIENT
Start: 2017-12-12 | End: 2017-12-12

## 2017-12-12 RX ORDER — MORPHINE SULFATE 2 MG/ML
4 INJECTION, SOLUTION INTRAMUSCULAR; INTRAVENOUS
Status: COMPLETED | OUTPATIENT
Start: 2017-12-12 | End: 2017-12-12

## 2017-12-12 RX ADMIN — MORPHINE SULFATE 4 MG: 2 INJECTION, SOLUTION INTRAMUSCULAR; INTRAVENOUS at 17:30

## 2017-12-12 RX ADMIN — ONDANSETRON 4 MG: 2 INJECTION INTRAMUSCULAR; INTRAVENOUS at 17:31

## 2017-12-12 RX ADMIN — HYDROCODONE BITARTRATE AND ACETAMINOPHEN 1 TABLET: 7.5; 325 TABLET ORAL at 19:35

## 2017-12-13 LAB
ATRIAL RATE: 69 BPM
CALCULATED P AXIS, ECG09: 14 DEGREES
CALCULATED R AXIS, ECG10: 20 DEGREES
CALCULATED T AXIS, ECG11: 22 DEGREES
DIAGNOSIS, 93000: NORMAL
P-R INTERVAL, ECG05: 158 MS
Q-T INTERVAL, ECG07: 374 MS
QRS DURATION, ECG06: 76 MS
QTC CALCULATION (BEZET), ECG08: 400 MS
VENTRICULAR RATE, ECG03: 69 BPM

## 2017-12-13 NOTE — DISCHARGE INSTRUCTIONS
trouble speaking. ¨ Sudden confusion or trouble understanding simple statements. ¨ Sudden problems with walking or balance. ¨ A sudden, severe headache that is different from past headaches. ? · You passed out (lost consciousness) again. ? Watch closely for changes in your health, and be sure to contact your doctor if:  ? · You do not get better as expected. Where can you learn more? Go to http://kymberly-yoon.info/. Enter O786 in the search box to learn more about \"Fainting: Care Instructions. \"  Current as of: March 20, 2017  Content Version: 11.4  © 9056-3361 [employer]. Care instructions adapted under license by [employer] (which disclaims liability or warranty for this information). If you have questions about a medical condition or this instruction, always ask your healthcare professional. [address] any warranty or liability for your use of this information.

## 2017-12-16 ENCOUNTER — HOSPITAL ENCOUNTER (EMERGENCY)
Age: 32
Discharge: HOME | End: 2017-12-16
Attending: EMERGENCY MEDICINE
Payer: COMMERCIAL

## 2017-12-16 ENCOUNTER — APPOINTMENT (OUTPATIENT)
Dept: CT IMAGING | Age: 32
End: 2017-12-16
Attending: EMERGENCY MEDICINE
Payer: COMMERCIAL

## 2017-12-16 VITALS
DIASTOLIC BLOOD PRESSURE: 86 MMHG | RESPIRATION RATE: 10 BRPM | TEMPERATURE: 98.5 F | BODY MASS INDEX: 37.36 KG/M2 | OXYGEN SATURATION: 93 % | HEART RATE: 54 BPM | WEIGHT: 203 LBS | SYSTOLIC BLOOD PRESSURE: 130 MMHG | HEIGHT: 62 IN

## 2017-12-16 DIAGNOSIS — R55 SYNCOPE AND COLLAPSE: ICD-10-CM

## 2017-12-16 DIAGNOSIS — M54.2 NECK PAIN: Primary | ICD-10-CM

## 2017-12-16 DIAGNOSIS — M79.602 ARM PAIN, ANTERIOR, LEFT: ICD-10-CM

## 2017-12-16 LAB
APPEARANCE UR: CLEAR
BILIRUB UR QL: NEGATIVE
COLOR UR: NORMAL
GLUCOSE UR STRIP.AUTO-MCNC: NEGATIVE MG/DL
HGB UR QL STRIP: NEGATIVE
KETONES UR QL STRIP.AUTO: NEGATIVE MG/DL
LEUKOCYTE ESTERASE UR QL STRIP.AUTO: NEGATIVE
NITRITE UR QL STRIP.AUTO: NEGATIVE
PH UR STRIP: 7 [PH] (ref 5–8)
PROT UR STRIP-MCNC: NEGATIVE MG/DL
SP GR UR REFRACTOMETRY: 1.02 (ref 1–1.03)
UROBILINOGEN UR QL STRIP.AUTO: 0.2 EU/DL (ref 0.2–1)

## 2017-12-16 PROCEDURE — 99285 EMERGENCY DEPT VISIT HI MDM: CPT

## 2017-12-16 PROCEDURE — 96375 TX/PRO/DX INJ NEW DRUG ADDON: CPT

## 2017-12-16 PROCEDURE — 81003 URINALYSIS AUTO W/O SCOPE: CPT | Performed by: EMERGENCY MEDICINE

## 2017-12-16 PROCEDURE — 93005 ELECTROCARDIOGRAM TRACING: CPT

## 2017-12-16 PROCEDURE — 96374 THER/PROPH/DIAG INJ IV PUSH: CPT

## 2017-12-16 PROCEDURE — 74011636320 HC RX REV CODE- 636/320: Performed by: EMERGENCY MEDICINE

## 2017-12-16 PROCEDURE — 74011250636 HC RX REV CODE- 250/636: Performed by: EMERGENCY MEDICINE

## 2017-12-16 PROCEDURE — 74011250637 HC RX REV CODE- 250/637: Performed by: EMERGENCY MEDICINE

## 2017-12-16 PROCEDURE — 70491 CT SOFT TISSUE NECK W/DYE: CPT

## 2017-12-16 RX ORDER — PROMETHAZINE HYDROCHLORIDE 25 MG/1
25 TABLET ORAL
Qty: 12 TAB | Refills: 0 | Status: SHIPPED | OUTPATIENT
Start: 2017-12-16 | End: 2018-06-17

## 2017-12-16 RX ORDER — OXYCODONE AND ACETAMINOPHEN 5; 325 MG/1; MG/1
2 TABLET ORAL ONCE
Status: COMPLETED | OUTPATIENT
Start: 2017-12-16 | End: 2017-12-16

## 2017-12-16 RX ORDER — MORPHINE SULFATE 10 MG/ML
10 INJECTION, SOLUTION INTRAMUSCULAR; INTRAVENOUS ONCE
Status: COMPLETED | OUTPATIENT
Start: 2017-12-16 | End: 2017-12-16

## 2017-12-16 RX ORDER — DULOXETIN HYDROCHLORIDE 30 MG/1
30 CAPSULE, DELAYED RELEASE ORAL DAILY
COMMUNITY
End: 2019-02-22 | Stop reason: DRUGHIGH

## 2017-12-16 RX ORDER — FLUCONAZOLE 150 MG/1
150 TABLET ORAL DAILY
Qty: 1 TAB | Refills: 0 | Status: SHIPPED | OUTPATIENT
Start: 2017-12-16 | End: 2017-12-17

## 2017-12-16 RX ORDER — SODIUM CHLORIDE 9 MG/ML
50 INJECTION, SOLUTION INTRAVENOUS
Status: COMPLETED | OUTPATIENT
Start: 2017-12-16 | End: 2017-12-16

## 2017-12-16 RX ORDER — OXYCODONE AND ACETAMINOPHEN 5; 325 MG/1; MG/1
2 TABLET ORAL
Qty: 12 TAB | Refills: 0 | Status: SHIPPED | OUTPATIENT
Start: 2017-12-16 | End: 2018-01-13 | Stop reason: ALTCHOICE

## 2017-12-16 RX ORDER — SODIUM CHLORIDE 0.9 % (FLUSH) 0.9 %
10 SYRINGE (ML) INJECTION
Status: COMPLETED | OUTPATIENT
Start: 2017-12-16 | End: 2017-12-16

## 2017-12-16 RX ORDER — ONDANSETRON 2 MG/ML
8 INJECTION INTRAMUSCULAR; INTRAVENOUS
Status: COMPLETED | OUTPATIENT
Start: 2017-12-16 | End: 2017-12-16

## 2017-12-16 RX ADMIN — IOPAMIDOL 100 ML: 612 INJECTION, SOLUTION INTRAVENOUS at 22:54

## 2017-12-16 RX ADMIN — ONDANSETRON 8 MG: 2 INJECTION INTRAMUSCULAR; INTRAVENOUS at 22:06

## 2017-12-16 RX ADMIN — OXYCODONE HYDROCHLORIDE AND ACETAMINOPHEN 2 TABLET: 5; 325 TABLET ORAL at 23:27

## 2017-12-16 RX ADMIN — Medication 10 ML: at 22:54

## 2017-12-16 RX ADMIN — MORPHINE SULFATE 10 MG: 10 INJECTION, SOLUTION INTRAVENOUS at 22:21

## 2017-12-16 RX ADMIN — SODIUM CHLORIDE 50 ML/HR: 900 INJECTION, SOLUTION INTRAVENOUS at 22:54

## 2017-12-17 LAB
ATRIAL RATE: 59 BPM
CALCULATED P AXIS, ECG09: 13 DEGREES
CALCULATED R AXIS, ECG10: 34 DEGREES
CALCULATED T AXIS, ECG11: 12 DEGREES
DIAGNOSIS, 93000: NORMAL
P-R INTERVAL, ECG05: 142 MS
Q-T INTERVAL, ECG07: 392 MS
QRS DURATION, ECG06: 82 MS
QTC CALCULATION (BEZET), ECG08: 388 MS
VENTRICULAR RATE, ECG03: 59 BPM

## 2017-12-17 NOTE — ED NOTES
Discharge instructions reviewed with patient. Discharge instructions given to patient per Dr. Kimberley Greenwood. Patient able to return/verbalize discharge instructions. Copy of discharge instructions given. Patient condition stable, respiratory status within normal limits, neuro status intact. Ambulatory out of ER, accompanied by .

## 2017-12-17 NOTE — ED TRIAGE NOTES
Patient reports being seen for the same thing last week and was referred to a neurologist and seen this week. The doctor believed that her botox is the cause of the lump on her neck and her neurological problems with the pain. Patient reports having an MRI scheduled next week if her insurance approves it. Patient states she was hurting for a couple hours so she hopped in the shower thinking it would help her pain. Patient reports it did not help and she fainted shortly after. When she woke up she called EMS and was brought here. Patient also complaining of pain on her head when she fell and bumped it. Patient states she has lupus and has a \"lot of pain tolerance, but this is terrible pain! \" Patient updated on plan of care and call bell within reach.

## 2017-12-17 NOTE — DISCHARGE INSTRUCTIONS
Fainting: Care Instructions  Your Care Instructions    When you faint, or pass out, you lose consciousness for a short time. A brief drop in blood flow to the brain often causes it. When you fall or lie down, more blood flows to your brain and you regain consciousness. Emotional stress, pain, or overheating-especially if you have been standing-can make you faint. In these cases, fainting is usually not serious. But fainting can be a sign of a more serious problem. Your doctor may want you to have more tests to rule out other causes. The treatment you need depends on the reason why you fainted. The doctor has checked you carefully, but problems can develop later. If you notice any problems or new symptoms, get medical treatment right away. Follow-up care is a key part of your treatment and safety. Be sure to make and go to all appointments, and call your doctor if you are having problems. It's also a good idea to know your test results and keep a list of the medicines you take. How can you care for yourself at home? · Drink plenty of fluids to prevent dehydration. If you have kidney, heart, or liver disease and have to limit fluids, talk with your doctor before you increase your fluid intake. When should you call for help? Call 911 anytime you think you may need emergency care. For example, call if:  ? · You have symptoms of a heart problem. These may include:  ¨ Chest pain or pressure. ¨ Severe trouble breathing. ¨ A fast or irregular heartbeat. ¨ Lightheadedness or sudden weakness. ¨ Coughing up pink, foamy mucus. ¨ Passing out. After you call 911, the  may tell you to chew 1 adult-strength or 2 to 4 low-dose aspirin. Wait for an ambulance. Do not try to drive yourself. ? · You have symptoms of a stroke. These may include:  ¨ Sudden numbness, tingling, weakness, or loss of movement in your face, arm, or leg, especially on only one side of your body. ¨ Sudden vision changes.   ¨ Sudden trouble speaking. ¨ Sudden confusion or trouble understanding simple statements. ¨ Sudden problems with walking or balance. ¨ A sudden, severe headache that is different from past headaches. ? · You passed out (lost consciousness) again. ? Watch closely for changes in your health, and be sure to contact your doctor if:  ? · You do not get better as expected. Where can you learn more? Go to http://kymberly-yoon.info/. Enter R241 in the search box to learn more about \"Fainting: Care Instructions. \"  Current as of: March 20, 2017  Content Version: 11.4  © 2686-3930 [employer]. Care instructions adapted under license by [employer] (which disclaims liability or warranty for this information). If you have questions about a medical condition or this instruction, always ask your healthcare professional. [address] any warranty or liability for your use of this information.

## 2018-01-12 PROCEDURE — 93005 ELECTROCARDIOGRAM TRACING: CPT

## 2018-01-12 PROCEDURE — 36415 COLL VENOUS BLD VENIPUNCTURE: CPT | Performed by: EMERGENCY MEDICINE

## 2018-01-12 PROCEDURE — 99285 EMERGENCY DEPT VISIT HI MDM: CPT

## 2018-01-12 PROCEDURE — 85025 COMPLETE CBC W/AUTO DIFF WBC: CPT | Performed by: EMERGENCY MEDICINE

## 2018-01-12 PROCEDURE — 80053 COMPREHEN METABOLIC PANEL: CPT | Performed by: EMERGENCY MEDICINE

## 2018-01-12 PROCEDURE — 81001 URINALYSIS AUTO W/SCOPE: CPT | Performed by: EMERGENCY MEDICINE

## 2018-01-12 PROCEDURE — 96374 THER/PROPH/DIAG INJ IV PUSH: CPT

## 2018-01-12 PROCEDURE — 96375 TX/PRO/DX INJ NEW DRUG ADDON: CPT

## 2018-01-12 PROCEDURE — 87086 URINE CULTURE/COLONY COUNT: CPT | Performed by: EMERGENCY MEDICINE

## 2018-01-13 ENCOUNTER — HOSPITAL ENCOUNTER (EMERGENCY)
Age: 33
Discharge: HOME | End: 2018-01-13
Attending: EMERGENCY MEDICINE
Payer: COMMERCIAL

## 2018-01-13 VITALS
RESPIRATION RATE: 15 BRPM | TEMPERATURE: 99 F | OXYGEN SATURATION: 96 % | SYSTOLIC BLOOD PRESSURE: 106 MMHG | HEART RATE: 87 BPM | DIASTOLIC BLOOD PRESSURE: 70 MMHG | WEIGHT: 218.7 LBS | BODY MASS INDEX: 40 KG/M2

## 2018-01-13 DIAGNOSIS — G44.201 ACUTE INTRACTABLE TENSION-TYPE HEADACHE: Primary | ICD-10-CM

## 2018-01-13 DIAGNOSIS — R55 SYNCOPE AND COLLAPSE: ICD-10-CM

## 2018-01-13 LAB
ALBUMIN SERPL-MCNC: 4.2 G/DL (ref 3.5–5)
ALBUMIN/GLOB SERPL: 1.4 {RATIO} (ref 1.1–2.2)
ALP SERPL-CCNC: 123 U/L (ref 45–117)
ALT SERPL-CCNC: 12 U/L (ref 12–78)
ANION GAP SERPL CALC-SCNC: 5 MMOL/L (ref 5–15)
APPEARANCE UR: CLEAR
AST SERPL-CCNC: 8 U/L (ref 15–37)
ATRIAL RATE: 91 BPM
BACTERIA URNS QL MICRO: ABNORMAL /HPF
BASOPHILS # BLD: 0 K/UL (ref 0–0.1)
BASOPHILS NFR BLD: 0 % (ref 0–1)
BILIRUB SERPL-MCNC: 0.3 MG/DL (ref 0.2–1)
BILIRUB UR QL CFM: NEGATIVE
BUN SERPL-MCNC: 13 MG/DL (ref 6–20)
BUN/CREAT SERPL: 15 (ref 12–20)
CALCIUM SERPL-MCNC: 9.4 MG/DL (ref 8.5–10.1)
CALCULATED P AXIS, ECG09: 41 DEGREES
CALCULATED R AXIS, ECG10: 41 DEGREES
CALCULATED T AXIS, ECG11: 27 DEGREES
CAOX CRY URNS QL MICRO: ABNORMAL
CHLORIDE SERPL-SCNC: 108 MMOL/L (ref 97–108)
CO2 SERPL-SCNC: 28 MMOL/L (ref 21–32)
COLOR UR: ABNORMAL
CREAT SERPL-MCNC: 0.88 MG/DL (ref 0.55–1.02)
DIAGNOSIS, 93000: NORMAL
DIFFERENTIAL METHOD BLD: ABNORMAL
EOSINOPHIL # BLD: 0.5 K/UL (ref 0–0.4)
EOSINOPHIL NFR BLD: 6 % (ref 0–7)
EPITH CASTS URNS QL MICRO: ABNORMAL /LPF
ERYTHROCYTE [DISTWIDTH] IN BLOOD BY AUTOMATED COUNT: 12.6 % (ref 11.5–14.5)
GLOBULIN SER CALC-MCNC: 2.9 G/DL (ref 2–4)
GLUCOSE SERPL-MCNC: 89 MG/DL (ref 65–100)
GLUCOSE UR STRIP.AUTO-MCNC: NEGATIVE MG/DL
HCT VFR BLD AUTO: 39.9 % (ref 35–47)
HGB BLD-MCNC: 13.4 G/DL (ref 11.5–16)
HGB UR QL STRIP: NEGATIVE
KETONES UR QL STRIP.AUTO: NEGATIVE MG/DL
LEUKOCYTE ESTERASE UR QL STRIP.AUTO: NEGATIVE
LYMPHOCYTES # BLD: 4 K/UL (ref 0.8–3.5)
LYMPHOCYTES NFR BLD: 46 % (ref 12–49)
MCH RBC QN AUTO: 31.3 PG (ref 26–34)
MCHC RBC AUTO-ENTMCNC: 33.6 G/DL (ref 30–36.5)
MCV RBC AUTO: 93.2 FL (ref 80–99)
MONOCYTES # BLD: 0.7 K/UL (ref 0–1)
MONOCYTES NFR BLD: 8 % (ref 5–13)
NEUTS SEG # BLD: 3.5 K/UL (ref 1.8–8)
NEUTS SEG NFR BLD: 40 % (ref 32–75)
NITRITE UR QL STRIP.AUTO: NEGATIVE
P-R INTERVAL, ECG05: 168 MS
PH UR STRIP: 5.5 [PH] (ref 5–8)
PLATELET # BLD AUTO: 289 K/UL (ref 150–400)
POTASSIUM SERPL-SCNC: 4.1 MMOL/L (ref 3.5–5.1)
PROT SERPL-MCNC: 7.1 G/DL (ref 6.4–8.2)
PROT UR STRIP-MCNC: NEGATIVE MG/DL
Q-T INTERVAL, ECG07: 346 MS
QRS DURATION, ECG06: 82 MS
QTC CALCULATION (BEZET), ECG08: 425 MS
RBC # BLD AUTO: 4.28 M/UL (ref 3.8–5.2)
RBC #/AREA URNS HPF: ABNORMAL /HPF (ref 0–5)
RBC MORPH BLD: ABNORMAL
SODIUM SERPL-SCNC: 141 MMOL/L (ref 136–145)
SP GR UR REFRACTOMETRY: 1.03 (ref 1–1.03)
UA: UC IF INDICATED,UAUC: ABNORMAL
UROBILINOGEN UR QL STRIP.AUTO: 1 EU/DL (ref 0.2–1)
VENTRICULAR RATE, ECG03: 91 BPM
WBC # BLD AUTO: 8.7 K/UL (ref 3.6–11)
WBC MORPH BLD: ABNORMAL
WBC URNS QL MICRO: ABNORMAL /HPF (ref 0–4)

## 2018-01-13 PROCEDURE — 96374 THER/PROPH/DIAG INJ IV PUSH: CPT

## 2018-01-13 PROCEDURE — 74011250637 HC RX REV CODE- 250/637: Performed by: EMERGENCY MEDICINE

## 2018-01-13 PROCEDURE — 74011250636 HC RX REV CODE- 250/636: Performed by: EMERGENCY MEDICINE

## 2018-01-13 PROCEDURE — 96375 TX/PRO/DX INJ NEW DRUG ADDON: CPT

## 2018-01-13 RX ORDER — OXYCODONE HYDROCHLORIDE 5 MG/1
10 TABLET ORAL
Status: COMPLETED | OUTPATIENT
Start: 2018-01-13 | End: 2018-01-13

## 2018-01-13 RX ORDER — OXYCODONE HYDROCHLORIDE 5 MG/1
5 TABLET ORAL
Qty: 15 TAB | Refills: 0 | Status: SHIPPED | OUTPATIENT
Start: 2018-01-13 | End: 2019-01-14

## 2018-01-13 RX ORDER — ONDANSETRON 2 MG/ML
4 INJECTION INTRAMUSCULAR; INTRAVENOUS
Status: COMPLETED | OUTPATIENT
Start: 2018-01-13 | End: 2018-01-13

## 2018-01-13 RX ORDER — HYDROMORPHONE HYDROCHLORIDE 2 MG/ML
0.5 INJECTION, SOLUTION INTRAMUSCULAR; INTRAVENOUS; SUBCUTANEOUS
Status: COMPLETED | OUTPATIENT
Start: 2018-01-13 | End: 2018-01-13

## 2018-01-13 RX ORDER — FLUCONAZOLE 100 MG/1
150 TABLET ORAL ONCE
Qty: 1 TAB | Refills: 1 | Status: SHIPPED | OUTPATIENT
Start: 2018-01-13 | End: 2018-01-13

## 2018-01-13 RX ORDER — FENTANYL CITRATE 50 UG/ML
100 INJECTION, SOLUTION INTRAMUSCULAR; INTRAVENOUS
Status: COMPLETED | OUTPATIENT
Start: 2018-01-13 | End: 2018-01-13

## 2018-01-13 RX ADMIN — FENTANYL CITRATE 100 MCG: 50 INJECTION, SOLUTION INTRAMUSCULAR; INTRAVENOUS at 04:22

## 2018-01-13 RX ADMIN — ONDANSETRON 4 MG: 2 INJECTION INTRAMUSCULAR; INTRAVENOUS at 03:11

## 2018-01-13 RX ADMIN — HYDROMORPHONE HYDROCHLORIDE 0.5 MG: 2 INJECTION, SOLUTION INTRAMUSCULAR; INTRAVENOUS; SUBCUTANEOUS at 03:29

## 2018-01-13 RX ADMIN — OXYCODONE HYDROCHLORIDE 10 MG: 5 TABLET ORAL at 05:12

## 2018-01-13 NOTE — ED NOTES
Patient reports to ED with  with c/o pain in the back of her head/neck. States that she has had this pain for approx 1 month now and is being worked up for possible leukemia. She has been told that she has swollen lymph nodes in the back of her head and that further workup for diagnosis has been hindered due to her insurance. Patient states that she took a \"shot\" of toradol and hydrocodone approx 8 hours ago for the pain with no relief. Her pain is positional, she is not sensitive to light, reports no vision abnormalities. Pain is mostly on the right side. States that she had approx 3 syncopal episodes today, 2 that were witnessed by  and he states that she was unconscious for approx 5 minutes for 1 episode.

## 2018-01-13 NOTE — ED NOTES
All discharge paperwork reviewed with patient,  and MD and they deny any need for further explanation regarding these instructions.   Patient is discharged in wheelchair to the care of her

## 2018-01-13 NOTE — DISCHARGE INSTRUCTIONS
Fainting: Care Instructions  Your Care Instructions    When you faint, or pass out, you lose consciousness for a short time. A brief drop in blood flow to the brain often causes it. When you fall or lie down, more blood flows to your brain and you regain consciousness. Emotional stress, pain, or overheating-especially if you have been standing-can make you faint. In these cases, fainting is usually not serious. But fainting can be a sign of a more serious problem. Your doctor may want you to have more tests to rule out other causes. The treatment you need depends on the reason why you fainted. The doctor has checked you carefully, but problems can develop later. If you notice any problems or new symptoms, get medical treatment right away. Follow-up care is a key part of your treatment and safety. Be sure to make and go to all appointments, and call your doctor if you are having problems. It's also a good idea to know your test results and keep a list of the medicines you take. How can you care for yourself at home? · Drink plenty of fluids to prevent dehydration. If you have kidney, heart, or liver disease and have to limit fluids, talk with your doctor before you increase your fluid intake. When should you call for help? Call 911 anytime you think you may need emergency care. For example, call if:  ? · You have symptoms of a heart problem. These may include:  ¨ Chest pain or pressure. ¨ Severe trouble breathing. ¨ A fast or irregular heartbeat. ¨ Lightheadedness or sudden weakness. ¨ Coughing up pink, foamy mucus. ¨ Passing out. After you call 911, the  may tell you to chew 1 adult-strength or 2 to 4 low-dose aspirin. Wait for an ambulance. Do not try to drive yourself. ? · You have symptoms of a stroke. These may include:  ¨ Sudden numbness, tingling, weakness, or loss of movement in your face, arm, or leg, especially on only one side of your body. ¨ Sudden vision changes.   ¨ Sudden trouble speaking. ¨ Sudden confusion or trouble understanding simple statements. ¨ Sudden problems with walking or balance. ¨ A sudden, severe headache that is different from past headaches. ? · You passed out (lost consciousness) again. ? Watch closely for changes in your health, and be sure to contact your doctor if:  ? · You do not get better as expected. Where can you learn more? Go to http://kymberly-yoon.info/. Enter U597 in the search box to learn more about \"Fainting: Care Instructions. \"  Current as of: March 20, 2017  Content Version: 11.4  © 0417-8896 [employer]. Care instructions adapted under license by [employer] (which disclaims liability or warranty for this information). If you have questions about a medical condition or this instruction, always ask your healthcare professional. [address] any warranty or liability for your use of this information. Headache: Care Instructions  Your Care Instructions    Headaches have many possible causes. Most headaches aren't a sign of a more serious problem, and they will get better on their own. Home treatment may help you feel better faster. The doctor has checked you carefully, but problems can develop later. If you notice any problems or new symptoms, get medical treatment right away. Follow-up care is a key part of your treatment and safety. Be sure to make and go to all appointments, and call your doctor if you are having problems. It's also a good idea to know your test results and keep a list of the medicines you take. How can you care for yourself at home? · Do not drive if you have taken a prescription pain medicine. · Rest in a quiet, dark room until your headache is gone. Close your eyes and try to relax or go to sleep. Don't watch TV or read. · Put a cold, moist cloth or cold pack on the painful area for 10 to 20 minutes at a time.  Put a thin cloth between the cold pack and your skin. · Use a warm, moist towel or a heating pad set on low to relax tight shoulder and neck muscles. · Have someone gently massage your neck and shoulders. · Take pain medicines exactly as directed. ¨ If the doctor gave you a prescription medicine for pain, take it as prescribed. ¨ If you are not taking a prescription pain medicine, ask your doctor if you can take an over-the-counter medicine. · Be careful not to take pain medicine more often than the instructions allow, because you may get worse or more frequent headaches when the medicine wears off. · Do not ignore new symptoms that occur with a headache, such as a fever, weakness or numbness, vision changes, or confusion. These may be signs of a more serious problem. To prevent headaches  · Keep a headache diary so you can figure out what triggers your headaches. Avoiding triggers may help you prevent headaches. Record when each headache began, how long it lasted, and what the pain was like (throbbing, aching, stabbing, or dull). Write down any other symptoms you had with the headache, such as nausea, flashing lights or dark spots, or sensitivity to bright light or loud noise. Note if the headache occurred near your period. List anything that might have triggered the headache, such as certain foods (chocolate, cheese, wine) or odors, smoke, bright light, stress, or lack of sleep. · Find healthy ways to deal with stress. Headaches are most common during or right after stressful times. Take time to relax before and after you do something that has caused a headache in the past.  · Try to keep your muscles relaxed by keeping good posture. Check your jaw, face, neck, and shoulder muscles for tension, and try relaxing them. When sitting at a desk, change positions often, and stretch for 30 seconds each hour. · Get plenty of sleep and exercise. · Eat regularly and well. Long periods without food can trigger a headache.   · Treat yourself to a massage. Some people find that regular massages are very helpful in relieving tension. · Limit caffeine by not drinking too much coffee, tea, or soda. But don't quit caffeine suddenly, because that can also give you headaches. · Reduce eyestrain from computers by blinking frequently and looking away from the computer screen every so often. Make sure you have proper eyewear and that your monitor is set up properly, about an arm's length away. · Seek help if you have depression or anxiety. Your headaches may be linked to these conditions. Treatment can both prevent headaches and help with symptoms of anxiety or depression. When should you call for help? Call 911 anytime you think you may need emergency care. For example, call if:  ? · You have signs of a stroke. These may include:  ¨ Sudden numbness, paralysis, or weakness in your face, arm, or leg, especially on only one side of your body. ¨ Sudden vision changes. ¨ Sudden trouble speaking. ¨ Sudden confusion or trouble understanding simple statements. ¨ Sudden problems with walking or balance. ¨ A sudden, severe headache that is different from past headaches. ?Call your doctor now or seek immediate medical care if:  ? · You have a new or worse headache. ? · Your headache gets much worse. Where can you learn more? Go to http://kymberly-yoon.info/. Enter M271 in the search box to learn more about \"Headache: Care Instructions. \"  Current as of: October 14, 2016  Content Version: 11.4  © 9466-5748 [employer]. Care instructions adapted under license by [employer] (which disclaims liability or warranty for this information). If you have questions about a medical condition or this instruction, always ask your healthcare professional. [place] [address] any warranty or liability for your use of this information.          Vasovagal Syncope: Care Instructions  Your Care Instructions    Vasovagal syncope (say \"fsm-[id number]-HYV-storm HORTONAOMJ-duq-ily\")is sudden dizziness or fainting that can be set off by things such as pain, stress, fear, or trauma. You may sweat or feel lightheaded, sick to your stomach, or tingly. The problem causes the heart rate to slow and the blood vessels to widen, or dilate, for a short time. When this happens, blood pools in the lower body, and less blood goes to the brain. You can usually get relief by lying down with your legs raised (elevated). This helps more blood to flow to your brain and may help relieve symptoms like feeling dizzy. Some doctors may recommend a technique that involves tensing your fists and arms. This type of fainting is often easy to predict. For example, it happens to some people when they see blood or have to get a shot. They may feel symptoms before they faint. An episode of vasovagal syncope usually responds well to self-care. Other treatment often isn't needed. But if the fainting keeps happening, your doctor may suggest further treatments. Follow-up care is a key part of your treatment and safety. Be sure to make and go to all appointments, and call your doctor if you are having problems. It's also a good idea to know your test results and keep a list of the medicines you take. How can you care for yourself at home? · Drink plenty of fluids to prevent dehydration. If you have kidney, heart, or liver disease and have to limit fluids, talk with your doctor before you increase your fluid intake. · Try to avoid things that you think may set off vasovagal syncope. · Talk to your doctor about any medicines you take. Some medicines may increase the chance of this condition occurring. · If you feel symptoms, lie down with your legs raised. Talk to your doctor about what to do if your symptoms come back. When should you call for help? Call 911 anytime you think you may need emergency care. For example, call if:  ? · You have symptoms of a heart problem.  These may include:  ¨ Chest pain or pressure. ¨ Severe trouble breathing. ¨ A fast or irregular heartbeat. ? Watch closely for changes in your health, and be sure to contact your doctor if:  ? · You have more episodes of fainting at home. ? · You do not get better as expected. Where can you learn more? Go to http://kymberly-yoon.info/. Enter L754 in the search box to learn more about \"Vasovagal Syncope: Care Instructions. \"  Current as of: March 20, 2017  Content Version: 11.4  © 9724-8511 [employer]. Care instructions adapted under license by [employer] (which disclaims liability or warranty for this information). If you have questions about a medical condition or this instruction, always ask your healthcare professional. [address] any warranty or liability for your use of this information.

## 2018-01-13 NOTE — ED PROVIDER NOTES
EMERGENCY DEPARTMENT HISTORY AND PHYSICAL EXAM      Date: 1/13/2018  Patient Name: Estela Beebe    History of Presenting Illness     Chief Complaint   Patient presents with    Headache     multiple visits for headache and syncope; \"they think I have leukemia\"       History Provided By: Patient    HPI: Estela Beebe, 27 y.o. female with PMHx significant for cluster headaches and Lupus, presents ambulatory to the ED with cc of acute on chronic, waxing and waning headache with associated nausea onset 1/12. She reports three episodes of syncope today secondary to pain, two of which were witnessed where pt remained unconscious for ~5 minutes. Pt explains the pain is localized to the right occipital region. Pt states her headaches with blurred vision and confusion began ~ 1 month ago and is currently followed by neurology. She denies any head trauma at that time. Pt reports recent MRI \"they told me my lymph nodes on the right side aren't filtering well, probably because of my autoimmune system and they probably think it's leukemia\". Pt states she is scheduled for further f/u testing on 1/15. She endorses taking hydrocodone and receiving Toradol injections wnr of sx's. She reports family hx of lupus, kidney disease, heart disease, and cancer. Pt denies tobacco and EtOH use. Pt specifically denies any photophobia, vomiting, fever, chills, numbness, chest pain, SOB, dysuria, and hematuria. PCP: Kit Titus MD    There are no other complaints, changes, or physical findings at this time. Current Outpatient Prescriptions   Medication Sig Dispense Refill    oxyCODONE IR (ROXICODONE) 5 mg immediate release tablet Take 1 Tab by mouth every four (4) hours as needed for Pain. Max Daily Amount: 30 mg. 15 Tab 0    fluconazole (DIFLUCAN) 100 mg tablet Take 1.5 Tabs by mouth once for 1 dose. 1 Tab 1    DULoxetine (CYMBALTA) 30 mg capsule Take 30 mg by mouth daily.       promethazine (PHENERGAN) 25 mg tablet Take 1 Tab by mouth every six (6) hours as needed. 12 Tab 0    ondansetron hcl (ZOFRAN, AS HYDROCHLORIDE,) 4 mg tablet Take 1 Tab by mouth every eight (8) hours as needed for Nausea. 20 Tab 0    lamoTRIgine (LAMICTAL) 25 mg tablet Take 50 mg by mouth daily.  Ketorolac 30 mg/mL soln 30 mg by Injection route. Indications: Patient performs IM injections herself.  venlafaxine (EFFEXOR) 100 mg tablet Take 75 mg by mouth daily.  rizatriptan (MAXALT) 10 mg tablet Take 10 mg by mouth once as needed for Migraine. May repeat in 2 hours if needed      belimumab (BENLYSTA) 400 mg solr injection by IntraVENous route.  ALPRAZolam (XANAX) 1 mg tablet Take 1 Tab by mouth nightly. Max Daily Amount: 1 mg. 30 Tab 6    mirabegron ER (MYRBETRIQ) 50 mg ER tablet Take 50 mg by mouth daily.  Indications: BLADDER HYPERACTIVITY      omeprazole (PRILOSEC) 20 mg capsule          Past History     Past Medical History:  Past Medical History:   Diagnosis Date    Abnormal CT of the abdomen 11/8/2012    Asthma     Autoimmune disease ([facility] [address]     lupus    Cervical prolapse     Dehydration     Gastrointestinal disorder     gerd, twisted colon, IBS    GERD (gastroesophageal reflux disease)     Headache(784.0)     Morbid obesity (HCC)     Nausea & vomiting     Neurological disorder     cluster headaches    other 2006, 2009    ovarian cyst removal    Other ill-defined conditions(799.89)     Worsening headaches        Past Surgical History:  Past Surgical History:   Procedure Laterality Date    COLONOSCOPY  9/21/2010         HX CHOLECYSTECTOMY      HX GYN  1/2009    right salpingo oopherectomy    HX GYN  2006    right ovarian tumor removed    HX HEENT      left wisdom teeth removal    HX HEENT      top right wisdom tooth removed    HX HERNIA REPAIR  4/2012    HX HERNIA REPAIR  2/28/13    Laparoscopic recurrent incisional hernia repair    HX UROLOGICAL      Kidney Stone Removal    [place] EGD TRANSORAL BIOPSY SINGLE/MULTIPLE  9/22/2010            Family History:  No family history on file. Social History:  Social History   Substance Use Topics    Smoking status: Never Smoker    Smokeless tobacco: Never Used    Alcohol use No       Allergies: Allergies   Allergen Reactions    Latex Itching     burning    Compazine [Prochlorperazine] Anxiety     High heart rate  Pt can take promethazine with no problems    Pcn [Penicillins] Rash and Nausea and Vomiting    Sulfa (Sulfonamide Antibiotics) Unknown (comments)    Topamax [Topiramate] Unknown (comments)    Adhesive Rash     Allergic to Dermabond    Mushroom Combination No.1 Unknown (comments)    Toradol [Ketorolac Tromethamine] Nausea and Vomiting and Other (comments)     PO & IV causes GI bleed    Valproic Acid Other (comments)     Elevated heart rate and vomiting           Review of Systems   Review of Systems   Constitutional: Negative for chills and fever. HENT: Negative for congestion. Eyes: Negative for photophobia. Respiratory: Negative for shortness of breath. Cardiovascular: Negative for chest pain. Gastrointestinal: Positive for nausea. Negative for vomiting. Endocrine: Negative for heat intolerance. Genitourinary: Negative for dysuria and hematuria. Musculoskeletal: Negative for back pain. Skin: Negative for rash. Allergic/Immunologic: Negative for immunocompromised state. Neurological: Positive for syncope and headaches. Negative for numbness. Hematological: Does not bruise/bleed easily. Psychiatric/Behavioral: Negative. All other systems reviewed and are negative. Physical Exam   Physical Exam   Constitutional: She is oriented to person, place, and time. She appears well-developed and well-nourished. She appears distressed (mild). Elevated BMI   HENT:   Head: Normocephalic and atraumatic. Eyes: EOM are normal. Pupils are equal, round, and reactive to light. Neck: Normal range of motion.    Cervical tenderness Cardiovascular: Normal rate, regular rhythm and normal heart sounds. Pulmonary/Chest: Effort normal and breath sounds normal. No respiratory distress. Abdominal: Soft. Bowel sounds are normal. She exhibits no mass. There is no tenderness. Musculoskeletal: Normal range of motion. She exhibits no edema. Neurological: She is alert and oriented to person, place, and time. Coordination normal.   Sensory and motor intact   Skin: Skin is warm and dry. Psychiatric: She has a normal mood and affect. Her behavior is normal.   Nursing note and vitals reviewed. Diagnostic Study Results     Labs -     Recent Results (from the past 12 hour(s))   EKG, 12 LEAD, INITIAL    Collection Time: 01/12/18 10:44 PM   Result Value Ref Range    Ventricular Rate 91 BPM    Atrial Rate 91 BPM    P-R Interval 168 ms    QRS Duration 82 ms    Q-T Interval 346 ms    QTC Calculation (Bezet) 425 ms    Calculated P Axis 41 degrees    Calculated R Axis 41 degrees    Calculated T Axis 27 degrees    Diagnosis       Normal sinus rhythm  Normal ECG  When compared with ECG of 16-DEC-2017 22:25,  Vent.  rate has increased BY  32 BPM     URINALYSIS W/ REFLEX CULTURE    Collection Time: 01/12/18 11:02 PM   Result Value Ref Range    Color DARK YELLOW      Appearance CLEAR CLEAR      Specific gravity 1.028 1.003 - 1.030      pH (UA) 5.5 5.0 - 8.0      Protein NEGATIVE  NEG mg/dL    Glucose NEGATIVE  NEG mg/dL    Ketone NEGATIVE  NEG mg/dL    Blood NEGATIVE  NEG      Urobilinogen 1.0 0.2 - 1.0 EU/dL    Nitrites NEGATIVE  NEG      Leukocyte Esterase NEGATIVE  NEG      WBC 0-4 0 - 4 /hpf    RBC 0-5 0 - 5 /hpf    Epithelial cells FEW FEW /lpf    Bacteria 1+ (A) NEG /hpf    UA:UC IF INDICATED URINE CULTURE ORDERED (A) CNI      CA Oxalate crystals FEW (A) NEG     BILIRUBIN, CONFIRM    Collection Time: 01/12/18 11:02 PM   Result Value Ref Range    Bilirubin UA, confirm NEGATIVE  NEG     METABOLIC PANEL, COMPREHENSIVE    Collection Time: 01/12/18 11:06 PM   Result Value Ref Range    Sodium 141 136 - 145 mmol/L    Potassium 4.1 3.5 - 5.1 mmol/L    Chloride 108 97 - 108 mmol/L    CO2 28 21 - 32 mmol/L    Anion gap 5 5 - 15 mmol/L    Glucose 89 65 - 100 mg/dL    BUN 13 6 - 20 MG/DL    Creatinine 0.88 0.55 - 1.02 MG/DL    BUN/Creatinine ratio 15 12 - 20      GFR est AA >60 >60 ml/min/1.73m2    GFR est non-AA >60 >60 ml/min/1.73m2    Calcium 9.4 8.5 - 10.1 MG/DL    Bilirubin, total 0.3 0.2 - 1.0 MG/DL    ALT (SGPT) 12 12 - 78 U/L    AST (SGOT) 8 (L) 15 - 37 U/L    Alk. phosphatase 123 (H) 45 - 117 U/L    Protein, total 7.1 6.4 - 8.2 g/dL    Albumin 4.2 3.5 - 5.0 g/dL    Globulin 2.9 2.0 - 4.0 g/dL    A-G Ratio 1.4 1.1 - 2.2     CBC WITH AUTOMATED DIFF    Collection Time: 01/12/18 11:06 PM   Result Value Ref Range    WBC 8.7 3.6 - 11.0 K/uL    RBC 4.28 3.80 - 5.20 M/uL    HGB 13.4 11.5 - 16.0 g/dL    HCT 39.9 35.0 - 47.0 %    MCV 93.2 80.0 - 99.0 FL    MCH 31.3 26.0 - 34.0 PG    MCHC 33.6 30.0 - 36.5 g/dL    RDW 12.6 11.5 - 14.5 %    PLATELET 728 627 - 161 K/uL    NEUTROPHILS 40 32 - 75 %    LYMPHOCYTES 46 12 - 49 %    MONOCYTES 8 5 - 13 %    EOSINOPHILS 6 0 - 7 %    BASOPHILS 0 0 - 1 %    ABS. NEUTROPHILS 3.5 1.8 - 8.0 K/UL    ABS. LYMPHOCYTES 4.0 (H) 0.8 - 3.5 K/UL    ABS. MONOCYTES 0.7 0.0 - 1.0 K/UL    ABS. EOSINOPHILS 0.5 (H) 0.0 - 0.4 K/UL    ABS. BASOPHILS 0.0 0.0 - 0.1 K/UL    RBC COMMENTS NORMOCYTIC, NORMOCHROMIC      WBC COMMENTS REACTIVE LYMPHS      DF SMEAR SCANNED         Medical Decision Making   I am the first provider for this patient. I reviewed the vital signs, available nursing notes, past medical history, past surgical history, family history and social history. Vital Signs-Reviewed the patient's vital signs.   Patient Vitals for the past 12 hrs:   Temp Pulse Resp BP SpO2   01/13/18 0500 - 87 15 106/70 96 %   01/13/18 0445 - 87 13 105/72 94 %   01/13/18 0430 - 84 12 110/73 93 %   01/13/18 0415 - 86 15 128/70 95 %   01/13/18 0400 - 85 14 112/67 95 %   01/13/18 0345 - 81 16 105/73 95 %   01/13/18 0330 - - - 117/68 -   01/13/18 0315 - 76 13 (!) 122/103 98 %   01/13/18 0312 - - - 123/80 -   01/13/18 0311 - - - 124/75 -   01/13/18 0300 - 78 16 115/56 98 %   01/13/18 0245 - 76 13 116/63 100 %   01/13/18 0215 - 83 18 112/62 97 %   01/13/18 0200 - 78 10 119/75 95 %   01/12/18 2239 99 °F (37.2 °C) 87 18 138/89 100 %       Pulse Oximetry Analysis - 100% on RA    Cardiac Monitor:   Rate: 87 bpm  Rhythm: Normal Sinus Rhythm      EKG interpretation: (Preliminary)  2244  Rhythm: normal sinus rhythm; and regular . Rate (approx.): 91; Axis: normal; NC interval: normal; QRS interval: normal ; ST/T wave: normal; Other findings: normal, no significant changes  Written by ANDRE Horan, as dictated by Lupe Roman MD.    Records Reviewed: Nursing Notes, Old Medical Records and Previous electrocardiograms    Provider Notes (Medical Decision Making):     DDx: chronic headache, chronic neck pain, syncope, dehydration, electrolyte abnormality, anemia, UTI, migraine, cluster headaches    ED Course:   Initial assessment performed. The patients presenting problems have been discussed, and they are in agreement with the care plan formulated and outlined with them. I have encouraged them to ask questions as they arise throughout their visit. 3:59 AM  Pt reports little improvement of sx's. Written by ANDRE Horan, as dictated by Lupe Roman MD    5:00 AM  Pt reports she is feeling better and her headache is improved. She states she currently has a yeast infection and is requesting diflucan. Anticipate discharge. Written by ANDRE Horan, as dictated by Lupe Roman MD    Disposition:    DISCHARGE NOTE:  5:04 AM  The patient is ready for discharge. The patients signs, symptoms, diagnosis, and instructions for discharge have been discussed and the pt has conveyed their understanding.  The patient is to follow up as recommended with PCP or return to the ER should their symptoms worsen. Plan has been discussed and patient has conveyed their agreement. PLAN:  1. Discharge home   Discharge Medication List as of 1/13/2018  5:02 AM      START taking these medications    Details   oxyCODONE IR (ROXICODONE) 5 mg immediate release tablet Take 1 Tab by mouth every four (4) hours as needed for Pain. Max Daily Amount: 30 mg., Print, Disp-15 Tab, R-0      fluconazole (DIFLUCAN) 100 mg tablet Take 1.5 Tabs by mouth once for 1 dose., Print, Disp-1 Tab, R-1         CONTINUE these medications which have NOT CHANGED    Details   DULoxetine (CYMBALTA) 30 mg capsule Take 30 mg by mouth daily. , Historical Med      promethazine (PHENERGAN) 25 mg tablet Take 1 Tab by mouth every six (6) hours as needed. , Print, Disp-12 Tab, R-0      ondansetron hcl (ZOFRAN, AS HYDROCHLORIDE,) 4 mg tablet Take 1 Tab by mouth every eight (8) hours as needed for Nausea., Normal, Disp-20 Tab, R-0      lamoTRIgine (LAMICTAL) 25 mg tablet Take 50 mg by mouth daily. , Historical Med      Ketorolac 30 mg/mL soln 30 mg by Injection route. Indications: Patient performs IM injections herself., Historical Med      venlafaxine (EFFEXOR) 100 mg tablet Take 75 mg by mouth daily. , Historical Med      rizatriptan (MAXALT) 10 mg tablet Take 10 mg by mouth once as needed for Migraine. May repeat in 2 hours if needed, Historical Med      belimumab (BENLYSTA) 400 mg solr injection by IntraVENous route., Historical Med      ALPRAZolam (XANAX) 1 mg tablet Take 1 Tab by mouth nightly. Max Daily Amount: 1 mg., Print, Disp-30 Tab, R-6      mirabegron ER (MYRBETRIQ) 50 mg ER tablet Take 50 mg by mouth daily.  Indications: BLADDER HYPERACTIVITY, Historical Med      omeprazole (PRILOSEC) 20 mg capsule Historical Med         STOP taking these medications       BUTALBIT/ACETAMIN/CAFF/CODEINE (FIORICET WITH CODEINE PO) Comments:   Reason for Stopping:         oxyCODONE-acetaminophen (PERCOCET) 5-325 mg per tablet Comments:   Reason for Stoppin.   Follow-up Information     Follow up With Details Comments [address] Kevin Emanuel MD In 2 days As needed [address]  [phone]      [facility] EMERGENCY DEPT  If symptoms worsen [address] [place] [address]  [phone]        Return to ED if worse     Diagnosis     Clinical Impression:   1. Acute intractable tension-type headache    2. Syncope and collapse        Attestations: This note is prepared by Kaitlynn Lau, acting as Scribe for Zheng Kenny MD.    Zheng Kenny MD: The scribe's documentation has been prepared under my direction and personally reviewed by me in its entirety. I confirm that the note above accurately reflects all work, treatment, procedures, and medical decision making performed by me.

## 2018-01-14 LAB
BACTERIA SPEC CULT: NORMAL
CC UR VC: NORMAL
SERVICE CMNT-IMP: NORMAL

## 2018-01-24 ENCOUNTER — DOCUMENTATION ONLY (OUTPATIENT)
Dept: CASE MANAGEMENT | Age: 33
End: 2018-01-24

## 2018-01-24 NOTE — MANAGEMENT PLAN
Patient Management Plan        Pt Name: wKaku priest :1985        Management Plan by: Jenean Schilder, RN, Melvin Morales, PhD, RN,  Nick Bianchi, RN and ED High Utilizers Team                                                                                           Date Placed:  18     Pt's Physicians:         Primary Care:  Abbi Hector MD [address] [place] [address]         Specialists:  Rhianna - Rosaura Be(?) or [facility]: [phone]                    Summary    Reason for Referral: This patient has been provided a management plan for Chronic Pain     Patient with frequent visits for: various complaints such as HA, Abdominal/pelvic pain, syncope, extremity pain      Warning/Safety Alert:     :  Prescriptions: 51   Prescribers: 11   Pharmacies: 4    Pt scored a 50 on the RIOSORD (Risk Index for Overdose or Serious Opioid Induced Respiratory Depression). This score indicates a 60% probability of OD or Resp. Dep. Situation: Chronic Conditions Summary -     Root Cause Medical Problems List:  Lupus, twisted colon, GERD, Asthma, IBS, HA, multiple previous surgeries  Root Cause Psychosocial Problems List: Depression and anxiety  Root Cause Social Determinants of Health (SDOH): unknown  Incidence of Testin x-rays, 3 CTs  Pattern of Access:  15 [facility] ED visits (3 visits since 17) (-- 10 visits)     Goals/Interventions:      ED Provider/nursing(together) to discuss mgmt. plan w/ pt   ED Provider to review  with pt   Nursing to obtain ordered UDS    Nursing/CM obtain updated list of current providers and attempt to obtain MAIRE for coordination of care. Please contact her neurologist to see if she has a pain contract with them.  Consider non-narcotic medications unless emergently necessary.  Educate patient on the appropriate use of ED. Provide UC and Dispatch Jason information   Pt needs access to and education on use of Narcan.  Encourage her to identify someone who can administer Narcan in the event she becomes unresponsive, especially given her score on RIOSORD   Provide pt with High ED utilizer letter and [facility] Opioid Prescribing Guidelines    Offer pt resource: [employer] [employer]  ([phone]) 8 am - midnight 7 days/week      If further assessment/input is needed, may contact:    During Business Hours: CM/PCPs Office-Dr. Vignesh Mir. Consider contacting Neurology [phone] as she receives 90 Lortabs, 60 Xanax, and 60 Fioricet monthly from them. After Hours: [facility](only if pt in crisis)       Challenges for Self Management:      Impairment of cognition and/or functional limitations: unknown   Financial Constraints: unknown   Utilization: Numerous ED visits   Emotional status of Patient: Has become angry and agitated when denied narcotic medications   Behavioral Health Factors:  Depression and anxiety                Motivational level: Likely poor     Medication Management: Complications from poor adherence, Poor Adherence, Poly-pharm     Advanced Care Plan: None                      ** This plan has been created by the Care Coordination Committee, a multi-disciplinary team. The patient and their physician were invited to participate in this plan. This management plan is intended to provide consistent evaluation and treatment for this patient not to supersede physician judgment. **

## 2018-02-11 ENCOUNTER — APPOINTMENT (OUTPATIENT)
Dept: CT IMAGING | Age: 33
End: 2018-02-11
Attending: EMERGENCY MEDICINE
Payer: COMMERCIAL

## 2018-02-11 ENCOUNTER — HOSPITAL ENCOUNTER (EMERGENCY)
Age: 33
Discharge: HOME | End: 2018-02-11
Attending: EMERGENCY MEDICINE
Payer: COMMERCIAL

## 2018-02-11 VITALS
BODY MASS INDEX: 40.73 KG/M2 | SYSTOLIC BLOOD PRESSURE: 128 MMHG | RESPIRATION RATE: 16 BRPM | DIASTOLIC BLOOD PRESSURE: 99 MMHG | HEART RATE: 90 BPM | HEIGHT: 62 IN | WEIGHT: 221.34 LBS | OXYGEN SATURATION: 100 % | TEMPERATURE: 98.3 F

## 2018-02-11 DIAGNOSIS — K59.00 CONSTIPATION, UNSPECIFIED CONSTIPATION TYPE: ICD-10-CM

## 2018-02-11 DIAGNOSIS — R10.11 RIGHT UPPER QUADRANT ABDOMINAL PAIN: ICD-10-CM

## 2018-02-11 DIAGNOSIS — R10.31 ABDOMINAL PAIN, RIGHT LOWER QUADRANT: Primary | ICD-10-CM

## 2018-02-11 DIAGNOSIS — R11.2 NAUSEA AND VOMITING, INTRACTABILITY OF VOMITING NOT SPECIFIED, UNSPECIFIED VOMITING TYPE: ICD-10-CM

## 2018-02-11 DIAGNOSIS — N20.0 RIGHT KIDNEY STONE: ICD-10-CM

## 2018-02-11 LAB
ALBUMIN SERPL-MCNC: 3.7 G/DL (ref 3.5–5)
ALBUMIN/GLOB SERPL: 1.2 {RATIO} (ref 1.1–2.2)
ALP SERPL-CCNC: 130 U/L (ref 45–117)
ALT SERPL-CCNC: 12 U/L (ref 12–78)
ANION GAP SERPL CALC-SCNC: 6 MMOL/L (ref 5–15)
APPEARANCE UR: CLEAR
AST SERPL-CCNC: 14 U/L (ref 15–37)
BACTERIA URNS QL MICRO: NEGATIVE /HPF
BASOPHILS # BLD: 0.1 K/UL (ref 0–0.1)
BASOPHILS NFR BLD: 1 % (ref 0–1)
BILIRUB SERPL-MCNC: 0.3 MG/DL (ref 0.2–1)
BILIRUB UR QL: NEGATIVE
BUN SERPL-MCNC: 14 MG/DL (ref 6–20)
BUN/CREAT SERPL: 14 (ref 12–20)
CALCIUM SERPL-MCNC: 8.4 MG/DL (ref 8.5–10.1)
CHLORIDE SERPL-SCNC: 109 MMOL/L (ref 97–108)
CO2 SERPL-SCNC: 23 MMOL/L (ref 21–32)
COLOR UR: ABNORMAL
CREAT SERPL-MCNC: 0.99 MG/DL (ref 0.55–1.02)
DIFFERENTIAL METHOD BLD: ABNORMAL
EOSINOPHIL # BLD: 1.3 K/UL (ref 0–0.4)
EOSINOPHIL NFR BLD: 13 % (ref 0–7)
EPITH CASTS URNS QL MICRO: ABNORMAL /LPF
ERYTHROCYTE [DISTWIDTH] IN BLOOD BY AUTOMATED COUNT: 11.9 % (ref 11.5–14.5)
GLOBULIN SER CALC-MCNC: 3.2 G/DL (ref 2–4)
GLUCOSE SERPL-MCNC: 105 MG/DL (ref 65–100)
GLUCOSE UR STRIP.AUTO-MCNC: NEGATIVE MG/DL
HCT VFR BLD AUTO: 38 % (ref 35–47)
HGB BLD-MCNC: 13 G/DL (ref 11.5–16)
HGB UR QL STRIP: ABNORMAL
HYALINE CASTS URNS QL MICRO: ABNORMAL /LPF (ref 0–5)
IMM GRANULOCYTES # BLD: 0 K/UL (ref 0–0.04)
IMM GRANULOCYTES NFR BLD AUTO: 0 % (ref 0–0.5)
KETONES UR QL STRIP.AUTO: NEGATIVE MG/DL
LACTATE SERPL-SCNC: 1 MMOL/L (ref 0.4–2)
LEUKOCYTE ESTERASE UR QL STRIP.AUTO: NEGATIVE
LIPASE SERPL-CCNC: 113 U/L (ref 73–393)
LYMPHOCYTES # BLD: 3.4 K/UL (ref 0.8–3.5)
LYMPHOCYTES NFR BLD: 34 % (ref 12–49)
MCH RBC QN AUTO: 31.1 PG (ref 26–34)
MCHC RBC AUTO-ENTMCNC: 34.2 G/DL (ref 30–36.5)
MCV RBC AUTO: 90.9 FL (ref 80–99)
MONOCYTES # BLD: 0.7 K/UL (ref 0–1)
MONOCYTES NFR BLD: 7 % (ref 5–13)
NEUTS SEG # BLD: 4.6 K/UL (ref 1.8–8)
NEUTS SEG NFR BLD: 45 % (ref 32–75)
NITRITE UR QL STRIP.AUTO: NEGATIVE
NRBC # BLD: 0 K/UL (ref 0–0.01)
NRBC BLD-RTO: 0 PER 100 WBC
PH UR STRIP: 6.5 [PH] (ref 5–8)
PLATELET # BLD AUTO: 319 K/UL (ref 150–400)
PMV BLD AUTO: 9.5 FL (ref 8.9–12.9)
POTASSIUM SERPL-SCNC: 3.7 MMOL/L (ref 3.5–5.1)
PROT SERPL-MCNC: 6.9 G/DL (ref 6.4–8.2)
PROT UR STRIP-MCNC: NEGATIVE MG/DL
RBC # BLD AUTO: 4.18 M/UL (ref 3.8–5.2)
RBC #/AREA URNS HPF: ABNORMAL /HPF (ref 0–5)
RBC MORPH BLD: ABNORMAL
SODIUM SERPL-SCNC: 138 MMOL/L (ref 136–145)
SP GR UR REFRACTOMETRY: 1.01 (ref 1–1.03)
UA: UC IF INDICATED,UAUC: ABNORMAL
UROBILINOGEN UR QL STRIP.AUTO: 1 EU/DL (ref 0.2–1)
WBC # BLD AUTO: 10.1 K/UL (ref 3.6–11)
WBC URNS QL MICRO: ABNORMAL /HPF (ref 0–4)

## 2018-02-11 PROCEDURE — 96375 TX/PRO/DX INJ NEW DRUG ADDON: CPT

## 2018-02-11 PROCEDURE — 74011250636 HC RX REV CODE- 250/636: Performed by: EMERGENCY MEDICINE

## 2018-02-11 PROCEDURE — 96376 TX/PRO/DX INJ SAME DRUG ADON: CPT

## 2018-02-11 PROCEDURE — 83690 ASSAY OF LIPASE: CPT | Performed by: EMERGENCY MEDICINE

## 2018-02-11 PROCEDURE — 99284 EMERGENCY DEPT VISIT MOD MDM: CPT

## 2018-02-11 PROCEDURE — 74011636320 HC RX REV CODE- 636/320: Performed by: EMERGENCY MEDICINE

## 2018-02-11 PROCEDURE — 80053 COMPREHEN METABOLIC PANEL: CPT | Performed by: EMERGENCY MEDICINE

## 2018-02-11 PROCEDURE — 96374 THER/PROPH/DIAG INJ IV PUSH: CPT

## 2018-02-11 PROCEDURE — 36415 COLL VENOUS BLD VENIPUNCTURE: CPT | Performed by: EMERGENCY MEDICINE

## 2018-02-11 PROCEDURE — 83605 ASSAY OF LACTIC ACID: CPT | Performed by: EMERGENCY MEDICINE

## 2018-02-11 PROCEDURE — 85025 COMPLETE CBC W/AUTO DIFF WBC: CPT | Performed by: EMERGENCY MEDICINE

## 2018-02-11 PROCEDURE — 74177 CT ABD & PELVIS W/CONTRAST: CPT

## 2018-02-11 PROCEDURE — 81001 URINALYSIS AUTO W/SCOPE: CPT | Performed by: EMERGENCY MEDICINE

## 2018-02-11 RX ORDER — ONDANSETRON 2 MG/ML
4 INJECTION INTRAMUSCULAR; INTRAVENOUS
Status: COMPLETED | OUTPATIENT
Start: 2018-02-11 | End: 2018-02-11

## 2018-02-11 RX ORDER — FENTANYL CITRATE 50 UG/ML
100 INJECTION, SOLUTION INTRAMUSCULAR; INTRAVENOUS
Status: COMPLETED | OUTPATIENT
Start: 2018-02-11 | End: 2018-02-11

## 2018-02-11 RX ORDER — SODIUM CHLORIDE 9 MG/ML
50 INJECTION, SOLUTION INTRAVENOUS
Status: COMPLETED | OUTPATIENT
Start: 2018-02-11 | End: 2018-02-11

## 2018-02-11 RX ORDER — SODIUM CHLORIDE 0.9 % (FLUSH) 0.9 %
10 SYRINGE (ML) INJECTION
Status: COMPLETED | OUTPATIENT
Start: 2018-02-11 | End: 2018-02-11

## 2018-02-11 RX ORDER — MORPHINE SULFATE 2 MG/ML
4 INJECTION, SOLUTION INTRAMUSCULAR; INTRAVENOUS
Status: COMPLETED | OUTPATIENT
Start: 2018-02-11 | End: 2018-02-11

## 2018-02-11 RX ORDER — KETOROLAC TROMETHAMINE 10 MG/1
10 TABLET, FILM COATED ORAL
Qty: 10 TAB | Refills: 0 | Status: SHIPPED | OUTPATIENT
Start: 2018-02-11 | End: 2019-02-22

## 2018-02-11 RX ORDER — MORPHINE SULFATE 10 MG/ML
6 INJECTION, SOLUTION INTRAMUSCULAR; INTRAVENOUS
Status: COMPLETED | OUTPATIENT
Start: 2018-02-11 | End: 2018-02-11

## 2018-02-11 RX ADMIN — SODIUM CHLORIDE 50 ML/HR: 900 INJECTION, SOLUTION INTRAVENOUS at 09:06

## 2018-02-11 RX ADMIN — FENTANYL CITRATE 100 MCG: 50 INJECTION, SOLUTION INTRAMUSCULAR; INTRAVENOUS at 07:52

## 2018-02-11 RX ADMIN — ONDANSETRON HYDROCHLORIDE 4 MG: 2 INJECTION, SOLUTION INTRAMUSCULAR; INTRAVENOUS at 08:44

## 2018-02-11 RX ADMIN — ONDANSETRON HYDROCHLORIDE 4 MG: 2 INJECTION, SOLUTION INTRAMUSCULAR; INTRAVENOUS at 07:52

## 2018-02-11 RX ADMIN — MORPHINE SULFATE 4 MG: 2 INJECTION, SOLUTION INTRAMUSCULAR; INTRAVENOUS at 09:30

## 2018-02-11 RX ADMIN — Medication 10 ML: at 09:06

## 2018-02-11 RX ADMIN — MORPHINE SULFATE 6 MG: 10 INJECTION, SOLUTION INTRAVENOUS at 08:44

## 2018-02-11 RX ADMIN — IOPAMIDOL 100 ML: 755 INJECTION, SOLUTION INTRAVENOUS at 09:05

## 2018-02-11 RX ADMIN — DIATRIZOATE MEGLUMINE AND DIATRIZOATE SODIUM 30 ML: 660; 100 LIQUID ORAL; RECTAL at 07:55

## 2018-02-11 NOTE — ED NOTES
MD Boyce Eye has reviewed discharge instructions with the patient. The patient verbalized understanding. Pt discharged with written instructions. No further concerns at this time. IV removed.

## 2018-02-11 NOTE — ED NOTES
Pt presents to ED for sudden constant RLQ pain that began 4 hours ago. Patient reports symptoms feel different than past kidney stones. She attempted to treat her pain with 0.5 Oxycodone (which she takes for migraines) but vomited 15 minutes later. She notes that Oxycodone causes her to itch and become nauseous. She is seeing her doctor tomorrow to switch to a new medication. Pt denies any injury to the area.  She specifically denies hematuria, dysuria, difficulty urinating, diarrhea, constipation, chest pain, SOB, numbness, headaches, or vaginal discharge.

## 2018-02-11 NOTE — DISCHARGE INSTRUCTIONS
Abdominal Pain: Care Instructions  Your Care Instructions    Abdominal pain has many possible causes. Some aren't serious and get better on their own in a few days. Others need more testing and treatment. If your pain continues or gets worse, you need to be rechecked and may need more tests to find out what is wrong. You may need surgery to correct the problem. Don't ignore new symptoms, such as fever, nausea and vomiting, urination problems, pain that gets worse, and dizziness. These may be signs of a more serious problem. Your doctor may have recommended a follow-up visit in the next 8 to 12 hours. If you are not getting better, you may need more tests or treatment. The doctor has checked you carefully, but problems can develop later. If you notice any problems or new symptoms, get medical treatment right away. Follow-up care is a key part of your treatment and safety. Be sure to make and go to all appointments, and call your doctor if you are having problems. It's also a good idea to know your test results and keep a list of the medicines you take. How can you care for yourself at home? · Rest until you feel better. · To prevent dehydration, drink plenty of fluids, enough so that your urine is light yellow or clear like water. Choose water and other caffeine-free clear liquids until you feel better. If you have kidney, heart, or liver disease and have to limit fluids, talk with your doctor before you increase the amount of fluids you drink. · If your stomach is upset, eat mild foods, such as rice, dry toast or crackers, bananas, and applesauce. Try eating several small meals instead of two or three large ones. · Wait until 48 hours after all symptoms have gone away before you have spicy foods, alcohol, and drinks that contain caffeine. · Do not eat foods that are high in fat. · Avoid anti-inflammatory medicines such as aspirin, ibuprofen (Advil, Motrin), and naproxen (Aleve).  These can cause stomach upset. Talk to your doctor if you take daily aspirin for another health problem. When should you call for help? Call 911 anytime you think you may need emergency care. For example, call if:  ? · You passed out (lost consciousness). ? · You pass maroon or very bloody stools. ? · You vomit blood or what looks like coffee grounds. ? · You have new, severe belly pain. ?Call your doctor now or seek immediate medical care if:  ? · Your pain gets worse, especially if it becomes focused in one area of your belly. ? · You have a new or higher fever. ? · Your stools are black and look like tar, or they have streaks of blood. ? · You have unexpected vaginal bleeding. ? · You have symptoms of a urinary tract infection. These may include:  ¨ Pain when you urinate. ¨ Urinating more often than usual.  ¨ Blood in your urine. ? · You are dizzy or lightheaded, or you feel like you may faint. ? Watch closely for changes in your health, and be sure to contact your doctor if:  ? · You are not getting better after 1 day (24 hours). Where can you learn more? Go to http://kymberly-yoon.info/. Enter E393 in the search box to learn more about \"Abdominal Pain: Care Instructions. \"  Current as of: March 20, 2017  Content Version: 11.4  © 9621-0509 [employer]. Care instructions adapted under license by [employer] (which disclaims liability or warranty for this information). If you have questions about a medical condition or this instruction, always ask your healthcare professional. [place] [address] any warranty or liability for your use of this information. Constipation: Care Instructions  Your Care Instructions    Constipation means that you have a hard time passing stools (bowel movements). People pass stools from 3 times a day to once every 3 days. What is normal for you may be different.  Constipation may occur with pain in the rectum and cramping. The pain may get worse when you try to pass stools. Sometimes there are small amounts of bright red blood on toilet paper or the surface of stools. This is because of enlarged veins near the rectum (hemorrhoids). A few changes in your diet and lifestyle may help you avoid ongoing constipation. Your doctor may also prescribe medicine to help loosen your stool. Some medicines can cause constipation. These include pain medicines and antidepressants. Tell your doctor about all the medicines you take. Your doctor may want to make a medicine change to ease your symptoms. Follow-up care is a key part of your treatment and safety. Be sure to make and go to all appointments, and call your doctor if you are having problems. It's also a good idea to know your test results and keep a list of the medicines you take. How can you care for yourself at home? · Drink plenty of fluids, enough so that your urine is light yellow or clear like water. If you have kidney, heart, or liver disease and have to limit fluids, talk with your doctor before you increase the amount of fluids you drink. · Include high-fiber foods in your diet each day. These include fruits, vegetables, beans, and whole grains. · Get at least 30 minutes of exercise on most days of the week. Walking is a good choice. You also may want to do other activities, such as running, swimming, cycling, or playing tennis or team sports. · Take a fiber supplement, such as Citrucel or Metamucil, every day. Read and follow all instructions on the label. · Schedule time each day for a bowel movement. A daily routine may help. Take your time having your bowel movement. · Support your feet with a small step stool when you sit on the toilet. This helps flex your hips and places your pelvis in a squatting position. · Your doctor may recommend an over-the-counter laxative to relieve your constipation. Examples are Milk of Magnesia and MiraLax.  Read and follow all instructions on the label. Do not use laxatives on a long-term basis. When should you call for help? Call your doctor now or seek immediate medical care if:  ? · You have new or worse belly pain. ? · You have new or worse nausea or vomiting. ? · You have blood in your stools. ? Watch closely for changes in your health, and be sure to contact your doctor if:  ? · Your constipation is getting worse. ? · You do not get better as expected. Where can you learn more? Go to http://kymberly-yoon.info/. Enter 21  in the search box to learn more about \"Constipation: Care Instructions. \"  Current as of: March 20, 2017  Content Version: 11.4  © 8525-7132 [employer]. Care instructions adapted under license by [employer] (which disclaims liability or warranty for this information). If you have questions about a medical condition or this instruction, always ask your healthcare professional. [place] [address] any warranty or liability for your use of this information. Kidney Stone: Care Instructions  Your Care Instructions    Kidney stones are formed when salts, minerals, and other substances normally found in the urine clump together. They can be as small as grains of sand or, rarely, as large as golf balls. While the stone is traveling through the ureter, which is the tube that carries urine from the kidney to the bladder, you will probably feel pain. The pain may be mild or very severe. You may also have some blood in your urine. As soon as the stone reaches the bladder, any intense pain should go away. If a stone is too large to pass on its own, you may need a medical procedure to help you pass the stone. The doctor has checked you carefully, but problems can develop later. If you notice any problems or new symptoms, get medical treatment right away. Follow-up care is a key part of your treatment and safety.  Be sure to make and go to all appointments, and call your doctor if you are having problems. It's also a good idea to know your test results and keep a list of the medicines you take. How can you care for yourself at home? · Drink plenty of fluids, enough so that your urine is light yellow or clear like water. If you have kidney, heart, or liver disease and have to limit fluids, talk with your doctor before you increase the amount of fluids you drink. · Take pain medicines exactly as directed. Call your doctor if you think you are having a problem with your medicine. ¨ If the doctor gave you a prescription medicine for pain, take it as prescribed. ¨ If you are not taking a prescription pain medicine, ask your doctor if you can take an over-the-counter medicine. Read and follow all instructions on the label. · Your doctor may ask you to strain your urine so that you can collect your kidney stone when it passes. You can use a kitchen strainer or a tea strainer to catch the stone. Store it in a plastic bag until you see your doctor again. Preventing future kidney stones  Some changes in your diet may help prevent kidney stones. Depending on the cause of your stones, your doctor may recommend that you:  · Drink plenty of fluids, enough so that your urine is light yellow or clear like water. If you have kidney, heart, or liver disease and have to limit fluids, talk with your doctor before you increase the amount of fluids you drink. · Limit coffee, tea, and alcohol. Also avoid grapefruit juice. · Do not take more than the recommended daily dose of vitamins C and D.  · Avoid antacids such as Gaviscon, Maalox, Mylanta, or Tums. · Limit the amount of salt (sodium) in your diet. · Eat a balanced diet that is not too high in protein. · Limit foods that are high in a substance called oxalate, which can cause kidney stones. These foods include dark green vegetables, rhubarb, chocolate, wheat bran, nuts, cranberries, and beans.   When should you call for help? Call your doctor now or seek immediate medical care if:  ? · You cannot keep down fluids. ? · Your pain gets worse. ? · You have a fever or chills. ? · You have new or worse pain in your back just below your rib cage (the flank area). ? · You have new or more blood in your urine. ? Watch closely for changes in your health, and be sure to contact your doctor if:  ? · You do not get better as expected. Where can you learn more? Go to http://kymberly-yoon.info/. Enter V983 in the search box to learn more about \"Kidney Stone: Care Instructions. \"  Current as of: May 12, 2017  Content Version: 11.4  © 0806-9995 [employer]. Care instructions adapted under license by [employer] (which disclaims liability or warranty for this information). If you have questions about a medical condition or this instruction, always ask your healthcare professional. [place] [address] any warranty or liability for your use of this information. Nausea and Vomiting: Care Instructions  Your Care Instructions    When you are nauseated, you may feel weak and sweaty and notice a lot of saliva in your mouth. Nausea often leads to vomiting. Most of the time you do not need to worry about nausea and vomiting, but they can be signs of other illnesses. Two common causes of nausea and vomiting are stomach flu and food poisoning. Nausea and vomiting from viral stomach flu will usually start to improve within 24 hours. Nausea and vomiting from food poisoning may last from 12 to 48 hours. The doctor has checked you carefully, but problems can develop later. If you notice any problems or new symptoms, get medical treatment right away. Follow-up care is a key part of your treatment and safety. Be sure to make and go to all appointments, and call your doctor if you are having problems.  It's also a good idea to know your test results and keep a list of the medicines you take. How can you care for yourself at home? · To prevent dehydration, drink plenty of fluids, enough so that your urine is light yellow or clear like water. Choose water and other caffeine-free clear liquids until you feel better. If you have kidney, heart, or liver disease and have to limit fluids, talk with your doctor before you increase the amount of fluids you drink. · Rest in bed until you feel better. · When you are able to eat, try clear soups, mild foods, and liquids until all symptoms are gone for 12 to 48 hours. Other good choices include dry toast, crackers, cooked cereal, and gelatin dessert, such as Jell-O. When should you call for help? Call 911 anytime you think you may need emergency care. For example, call if:  ? · You passed out (lost consciousness). ?Call your doctor now or seek immediate medical care if:  ? · You have symptoms of dehydration, such as:  ¨ Dry eyes and a dry mouth. ¨ Passing only a little dark urine. ¨ Feeling thirstier than usual.   ? · You have new or worsening belly pain. ? · You have a new or higher fever. ? · You vomit blood or what looks like coffee grounds. ? Watch closely for changes in your health, and be sure to contact your doctor if:  ? · You have ongoing nausea and vomiting. ? · Your vomiting is getting worse. ? · Your vomiting lasts longer than 2 days. ? · You are not getting better as expected. Where can you learn more? Go to http://kymberly-yoon.info/. Enter 25 [id number] in the search box to learn more about \"Nausea and Vomiting: Care Instructions. \"  Current as of: March 20, 2017  Content Version: 11.4  © 9752-8762 [employer]. Care instructions adapted under license by [employer] (which disclaims liability or warranty for this information).  If you have questions about a medical condition or this instruction, always ask your healthcare professional. [place] any warranty or liability for your use of this information.

## 2018-02-11 NOTE — ED PROVIDER NOTES
EMERGENCY DEPARTMENT HISTORY AND PHYSICAL EXAM      Date: 2/11/2018  Patient Name: Emily Fisher    History of Presenting Illness     Chief Complaint   Patient presents with    Abdominal Pain     patient complain of RLQ abdominal pain 0400 this morning     History Provided By: Patient    HPI: Emily Fisher, 27 y.o. female with PMHx significant for GERD, IBS, lupus and PShx for hysterectomy with right salpingo oophorectomy, presents ambulatory with her  to the ED with cc of sudden onset, constant, 10/10, right, lower abdominal pain that began 4 hours PTA. Her sxs feel different than past kidney stones. She attempted to treat her pain with 0.5 Oxycodone (which she takes for migraines) but vomited 15 minutes later. She notes that Oxycodone causes her to itch and become nauseous. She is seeing her doctor tomorrow to switch to a new medication. Pt denies any injury to the area. She specifically denies hematuria, dysuria, difficulty urinating, diarrhea, constipation, chest pain, SOB, numbness, headaches, or vaginal discharge. PCP: Dorys Thompson MD    Social Hx: - Tobacco, - EtOH, - Illicit Drugs    There are no other complaints, changes, or physical findings at this time. Current Facility-Administered Medications   Medication Dose Route Frequency Provider Last Rate Last Dose    sodium chloride 0.9 % bolus infusion 1,000 mL  1,000 mL IntraVENous NOW Rogelio Schilder, MD         Current Outpatient Prescriptions   Medication Sig Dispense Refill    ketorolac (TORADOL) 10 mg tablet Take 1 Tab by mouth every six (6) hours as needed for Pain. 10 Tab 0    oxyCODONE IR (ROXICODONE) 5 mg immediate release tablet Take 1 Tab by mouth every four (4) hours as needed for Pain. Max Daily Amount: 30 mg. 15 Tab 0    DULoxetine (CYMBALTA) 30 mg capsule Take 30 mg by mouth daily.  promethazine (PHENERGAN) 25 mg tablet Take 1 Tab by mouth every six (6) hours as needed.  12 Tab 0    ondansetron hcl (ZOFRAN, AS HYDROCHLORIDE,) 4 mg tablet Take 1 Tab by mouth every eight (8) hours as needed for Nausea. 20 Tab 0    lamoTRIgine (LAMICTAL) 25 mg tablet Take 50 mg by mouth daily.  venlafaxine (EFFEXOR) 100 mg tablet Take 75 mg by mouth daily.  rizatriptan (MAXALT) 10 mg tablet Take 10 mg by mouth once as needed for Migraine. May repeat in 2 hours if needed      belimumab (BENLYSTA) 400 mg solr injection by IntraVENous route.  ALPRAZolam (XANAX) 1 mg tablet Take 1 Tab by mouth nightly. Max Daily Amount: 1 mg. 30 Tab 6    mirabegron ER (MYRBETRIQ) 50 mg ER tablet Take 50 mg by mouth daily. Indications: BLADDER HYPERACTIVITY      omeprazole (PRILOSEC) 20 mg capsule        Past History     Past Medical History:  Past Medical History:   Diagnosis Date    Abnormal CT of the abdomen 11/8/2012    Asthma     Autoimmune disease ([facility] [address]     lupus    Cervical prolapse     Dehydration     Gastrointestinal disorder     gerd, twisted colon, IBS    GERD (gastroesophageal reflux disease)     Headache(784.0)     Morbid obesity (HCC)     Nausea & vomiting     Neurological disorder     cluster headaches    other 2006, 2009    ovarian cyst removal    Other ill-defined conditions(799.89)     Worsening headaches      Past Surgical History:  Past Surgical History:   Procedure Laterality Date    COLONOSCOPY  9/21/2010         HX CHOLECYSTECTOMY      HX GYN  1/2009    right salpingo oopherectomy    HX GYN  2006    right ovarian tumor removed    HX HEENT      left wisdom teeth removal    HX HEENT      top right wisdom tooth removed    HX HERNIA REPAIR  4/2012    HX HERNIA REPAIR  2/28/13    Laparoscopic recurrent incisional hernia repair    HX UROLOGICAL      Kidney Stone Removal    NE EGD TRANSORAL BIOPSY SINGLE/MULTIPLE  9/22/2010          Family History:  History reviewed. No pertinent family history.     Social History:  Social History   Substance Use Topics    Smoking status: Never Smoker    Smokeless tobacco: Never Used    Alcohol use No     Allergies: Allergies   Allergen Reactions    Latex Itching     burning    Compazine [Prochlorperazine] Anxiety     High heart rate  Pt can take promethazine with no problems    Pcn [Penicillins] Rash and Nausea and Vomiting    Sulfa (Sulfonamide Antibiotics) Unknown (comments)    Topamax [Topiramate] Unknown (comments)    Adhesive Rash     Allergic to Dermabond    Mushroom Combination No.1 Unknown (comments)    Toradol [Ketorolac Tromethamine] Nausea and Vomiting and Other (comments)     PO & IV causes GI bleed    Valproic Acid Other (comments)     Elevated heart rate and vomiting       Review of Systems   Review of Systems   Constitutional: Negative for fever. HENT: Negative for congestion. Eyes: Negative for visual disturbance. Respiratory: Negative for shortness of breath. Cardiovascular: Negative for chest pain. Gastrointestinal: Positive for abdominal pain (RLQ). Negative for constipation and diarrhea. Endocrine: Negative for heat intolerance. Genitourinary: Negative. Negative for difficulty urinating, dysuria and hematuria. Musculoskeletal: Negative for back pain. Skin: Negative for rash. Allergic/Immunologic: Negative for immunocompromised state. Neurological: Negative for numbness and headaches. Hematological: Does not bruise/bleed easily. Psychiatric/Behavioral: Negative. All other systems reviewed and are negative. Physical Exam   Physical Exam   Constitutional: She is oriented to person, place, and time. She appears well-developed and well-nourished. She appears distressed (moderate). Elevated BMI   HENT:   Head: Normocephalic and atraumatic. Eyes: EOM are normal. Pupils are equal, round, and reactive to light. Neck: Normal range of motion. Neck supple. Cardiovascular: Normal rate, regular rhythm and normal heart sounds.     Pulmonary/Chest: Effort normal and breath sounds normal. No respiratory distress. Abdominal: Soft. Bowel sounds are normal. She exhibits no mass. There is tenderness (RLQ>RUQ). Musculoskeletal: Normal range of motion. She exhibits no edema. Neurological: She is alert and oriented to person, place, and time. Coordination normal.   Skin: Skin is warm and dry. Psychiatric: She has a normal mood and affect. Her behavior is normal.   Nursing note and vitals reviewed. Diagnostic Study Results     Labs -     Recent Results (from the past 12 hour(s))   CBC WITH AUTOMATED DIFF    Collection Time: 02/11/18  7:40 AM   Result Value Ref Range    WBC 10.1 3.6 - 11.0 K/uL    RBC 4.18 3.80 - 5.20 M/uL    HGB 13.0 11.5 - 16.0 g/dL    HCT 38.0 35.0 - 47.0 %    MCV 90.9 80.0 - 99.0 FL    MCH 31.1 26.0 - 34.0 PG    MCHC 34.2 30.0 - 36.5 g/dL    RDW 11.9 11.5 - 14.5 %    PLATELET 800 609 - 388 K/uL    MPV 9.5 8.9 - 12.9 FL    NRBC 0.0 0  WBC    ABSOLUTE NRBC 0.00 0.00 - 0.01 K/uL    NEUTROPHILS 45 32 - 75 %    LYMPHOCYTES 34 12 - 49 %    MONOCYTES 7 5 - 13 %    EOSINOPHILS 13 (H) 0 - 7 %    BASOPHILS 1 0 - 1 %    IMMATURE GRANULOCYTES 0 0.0 - 0.5 %    ABS. NEUTROPHILS 4.6 1.8 - 8.0 K/UL    ABS. LYMPHOCYTES 3.4 0.8 - 3.5 K/UL    ABS. MONOCYTES 0.7 0.0 - 1.0 K/UL    ABS. EOSINOPHILS 1.3 (H) 0.0 - 0.4 K/UL    ABS. BASOPHILS 0.1 0.0 - 0.1 K/UL    ABS. IMM.  GRANS. 0.0 0.00 - 0.04 K/UL    DF AUTOMATED      RBC COMMENTS NORMOCYTIC, NORMOCHROMIC     METABOLIC PANEL, COMPREHENSIVE    Collection Time: 02/11/18  7:40 AM   Result Value Ref Range    Sodium 138 136 - 145 mmol/L    Potassium 3.7 3.5 - 5.1 mmol/L    Chloride 109 (H) 97 - 108 mmol/L    CO2 23 21 - 32 mmol/L    Anion gap 6 5 - 15 mmol/L    Glucose 105 (H) 65 - 100 mg/dL    BUN 14 6 - 20 MG/DL    Creatinine 0.99 0.55 - 1.02 MG/DL    BUN/Creatinine ratio 14 12 - 20      GFR est AA >60 >60 ml/min/1.73m2    GFR est non-AA >60 >60 ml/min/1.73m2    Calcium 8.4 (L) 8.5 - 10.1 MG/DL    Bilirubin, total 0.3 0.2 - 1.0 MG/DL    ALT (SGPT) 12 12 - 78 U/L    AST (SGOT) 14 (L) 15 - 37 U/L    Alk. phosphatase 130 (H) 45 - 117 U/L    Protein, total 6.9 6.4 - 8.2 g/dL    Albumin 3.7 3.5 - 5.0 g/dL    Globulin 3.2 2.0 - 4.0 g/dL    A-G Ratio 1.2 1.1 - 2.2     LIPASE    Collection Time: 02/11/18  7:40 AM   Result Value Ref Range    Lipase 113 73 - 393 U/L   LACTIC ACID    Collection Time: 02/11/18  7:50 AM   Result Value Ref Range    Lactic acid 1.0 0.4 - 2.0 MMOL/L   URINALYSIS W/ REFLEX CULTURE    Collection Time: 02/11/18  8:19 AM   Result Value Ref Range    Color YELLOW/STRAW      Appearance CLEAR CLEAR      Specific gravity 1.014 1.003 - 1.030      pH (UA) 6.5 5.0 - 8.0      Protein NEGATIVE  NEG mg/dL    Glucose NEGATIVE  NEG mg/dL    Ketone NEGATIVE  NEG mg/dL    Bilirubin NEGATIVE  NEG      Blood LARGE (A) NEG      Urobilinogen 1.0 0.2 - 1.0 EU/dL    Nitrites NEGATIVE  NEG      Leukocyte Esterase NEGATIVE  NEG      WBC 0-4 0 - 4 /hpf    RBC  0 - 5 /hpf    Epithelial cells FEW FEW /lpf    Bacteria NEGATIVE  NEG /hpf    UA:UC IF INDICATED CULTURE NOT INDICATED BY UA RESULT CNI      Hyaline cast 0-2 0 - 5 /lpf     Radiologic Studies -     CT Results  (Last 48 hours)               02/11/18 0906  CT ABD PELV W CONT Final result    Impression:  IMPRESSION:   Nonobstructing right renal calculus   Constipation           Narrative:  EXAM:  CT ABD PELV W CONT       INDICATION: Sudden onset of RLQ abdominal pain at 0400 hours. History of kidney   stones. COMPARISON: 6/16/2017       CONTRAST:  100 mL of Isovue-370. (verified)       TECHNIQUE:    Following the uneventful intravenous administration of contrast, thin axial   images were obtained through the abdomen and pelvis. Coronal and sagittal   reconstructions were generated. Oral contrast was administered. CT dose   reduction was achieved through use of a standardized protocol tailored for this   examination and automatic exposure control for dose modulation.        FINDINGS:    LUNG BASES: tiny bilateral pleural effusions   INCIDENTALLY IMAGED HEART AND MEDIASTINUM: Unremarkable. LIVER: No mass or biliary dilatation. GALLBLADDER: Prior cholecystectomy   SPLEEN: No mass. PANCREAS: No mass or ductal dilatation. ADRENALS: Unremarkable. KIDNEYS: No hydronephrosis. There is a 1 cm exophytic right renal cystic   structure. Right interpolar nonobstructing 3 mm calculus (coronal image 77). Duplex right renal collecting system. STOMACH: Unremarkable. SMALL BOWEL: No dilatation or wall thickening. COLON: No dilatation or wall thickening. Mild constipation   APPENDIX: Unremarkable. PERITONEUM: No ascites or pneumoperitoneum. RETROPERITONEUM: No lymphadenopathy or aortic aneurysm. REPRODUCTIVE ORGANS: Prior cholecystectomy   URINARY BLADDER: No mass or calculus. BONES: No destructive bone lesion. ADDITIONAL COMMENTS: N/A               Medical Decision Making   I am the first provider for this patient. I reviewed the vital signs, available nursing notes, past medical history, past surgical history, family history and social history. Vital Signs-Reviewed the patient's vital signs. Patient Vitals for the past 12 hrs:   Temp Pulse Resp BP SpO2   02/11/18 0932 - 95 16 145/70 100 %   02/11/18 0830 - 85 16 150/88 98 %   02/11/18 0819 - 70 16 142/75 98 %   02/11/18 0708 98.3 °F (36.8 °C) (!) 110 16 150/51 95 %     Records Reviewed: Nursing Notes and Old Medical Records    Provider Notes (Medical Decision Making):   DDx: appendectomy, kidney stone, obstruction, UTI, pyelonephritis     ED Course:   Initial assessment performed. The patients presenting problems have been discussed, and they are in agreement with the care plan formulated and outlined with them. I have encouraged them to ask questions as they arise throughout their visit. 7:30 PM  Per chart review, pt is on a pain management plan.  She has had 51 Rx, 11 prescribers, 4 pharmacies, 3 ED visits to a [facility] since 12/12/2017, 13 since 2017. Pt has a hx of IBS. 8:30 AM  Pt states that Fentanyl did not relieve her pain. Will order morphine. 8:50 AM  Pt reports no relief of her pain with morphine. Will order additional dose. 9:30 AM  Pt reports that she can take Toradol PO for home use. Has Bentyl and antiemetic at home. She has taken a laxative for constipation with relief. Disposition:  Discharge Note:  9:37 AM  The patient has been re-evaluated and is ready for discharge. Reviewed available results with patient. Counseled patient/parent/guardian on diagnosis and care plan. Patient has expressed understanding, and all questions have been answered. Patient agrees with plan and agrees to follow up as recommended, or return to the ED if their symptoms worsen. Discharge instructions have been provided and explained to the patient, along with reasons to return to the ED.`    PLAN:  1. Current Discharge Medication List      START taking these medications    Details   ketorolac (TORADOL) 10 mg tablet Take 1 Tab by mouth every six (6) hours as needed for Pain. Qty: 10 Tab, Refills: 0         STOP taking these medications       Ketorolac 30 mg/mL soln Comments:   Reason for Stoppin.   Follow-up Information     Follow up With Details Comments Contact Info    Josey Wilson MD In 2 days As needed [address]  [phone]      [facility] [place] EM[facility] DE[facility]  If symptoms worsen [address] [place] [address]  [phone]        Return to ED if worse     Diagnosis     Clinical Impression:   1. Abdominal pain, right lower quadrant    2. Right upper quadrant abdominal pain    3. Nausea and vomiting, intractability of vomiting not specified, unspecified vomiting type    4. Right kidney stone    5. Constipation, unspecified constipation type      Attestations:    Attestation:   This note is prepared by Nakita Paez, acting as scribe for Xuan Ogden MD Les Mejia MD: The scribe's documentation has been prepared under my direction and personally reviewed by me in its entirety. I confirm that the note above accurately reflects all work, treatment, procedures, and medical decision making performed by me.

## 2018-06-17 ENCOUNTER — HOSPITAL ENCOUNTER (EMERGENCY)
Age: 33
Discharge: ANOTHER HOSPITAL | End: 2018-06-17
Attending: EMERGENCY MEDICINE
Payer: COMMERCIAL

## 2018-06-17 ENCOUNTER — APPOINTMENT (OUTPATIENT)
Dept: GENERAL RADIOLOGY | Age: 33
End: 2018-06-17
Attending: EMERGENCY MEDICINE
Payer: COMMERCIAL

## 2018-06-17 ENCOUNTER — APPOINTMENT (OUTPATIENT)
Dept: CT IMAGING | Age: 33
End: 2018-06-17
Attending: EMERGENCY MEDICINE
Payer: COMMERCIAL

## 2018-06-17 VITALS
WEIGHT: 215 LBS | HEART RATE: 92 BPM | BODY MASS INDEX: 39.56 KG/M2 | DIASTOLIC BLOOD PRESSURE: 75 MMHG | RESPIRATION RATE: 15 BRPM | HEIGHT: 62 IN | SYSTOLIC BLOOD PRESSURE: 125 MMHG | TEMPERATURE: 99.3 F | OXYGEN SATURATION: 100 %

## 2018-06-17 DIAGNOSIS — D72.829 LEUKOCYTOSIS, UNSPECIFIED TYPE: ICD-10-CM

## 2018-06-17 DIAGNOSIS — R65.10 SIRS (SYSTEMIC INFLAMMATORY RESPONSE SYNDROME) (HCC): ICD-10-CM

## 2018-06-17 DIAGNOSIS — K92.0 HEMATEMESIS WITH NAUSEA: Primary | ICD-10-CM

## 2018-06-17 DIAGNOSIS — E86.0 DEHYDRATION: ICD-10-CM

## 2018-06-17 LAB
ALBUMIN SERPL-MCNC: 3.8 G/DL (ref 3.5–5)
ALBUMIN/GLOB SERPL: 1.3 {RATIO} (ref 1.1–2.2)
ALP SERPL-CCNC: 101 U/L (ref 45–117)
ALT SERPL-CCNC: 16 U/L (ref 12–78)
ANION GAP SERPL CALC-SCNC: 10 MMOL/L (ref 5–15)
APPEARANCE UR: CLEAR
AST SERPL-CCNC: 9 U/L (ref 15–37)
BACTERIA URNS QL MICRO: NEGATIVE /HPF
BASOPHILS # BLD: 0.1 K/UL (ref 0–0.1)
BASOPHILS NFR BLD: 0 % (ref 0–1)
BILIRUB SERPL-MCNC: 0.2 MG/DL (ref 0.2–1)
BILIRUB UR QL: NEGATIVE
BUN SERPL-MCNC: 58 MG/DL (ref 6–20)
BUN/CREAT SERPL: 53 (ref 12–20)
CALCIUM SERPL-MCNC: 8.7 MG/DL (ref 8.5–10.1)
CHLORIDE SERPL-SCNC: 110 MMOL/L (ref 97–108)
CO2 SERPL-SCNC: 20 MMOL/L (ref 21–32)
COLOR UR: ABNORMAL
CREAT SERPL-MCNC: 1.09 MG/DL (ref 0.55–1.02)
DIFFERENTIAL METHOD BLD: ABNORMAL
EOSINOPHIL # BLD: 0.1 K/UL (ref 0–0.4)
EOSINOPHIL NFR BLD: 0 % (ref 0–7)
EPITH CASTS URNS QL MICRO: ABNORMAL /LPF
ERYTHROCYTE [DISTWIDTH] IN BLOOD BY AUTOMATED COUNT: 12.7 % (ref 11.5–14.5)
GLOBULIN SER CALC-MCNC: 3 G/DL (ref 2–4)
GLUCOSE SERPL-MCNC: 104 MG/DL (ref 65–100)
GLUCOSE UR STRIP.AUTO-MCNC: NEGATIVE MG/DL
HCT VFR BLD AUTO: 34.3 % (ref 35–47)
HGB BLD-MCNC: 11.6 G/DL (ref 11.5–16)
HGB UR QL STRIP: NEGATIVE
HYALINE CASTS URNS QL MICRO: ABNORMAL /LPF (ref 0–5)
IMM GRANULOCYTES # BLD: 0.3 K/UL (ref 0–0.04)
IMM GRANULOCYTES NFR BLD AUTO: 1 % (ref 0–0.5)
KETONES UR QL STRIP.AUTO: ABNORMAL MG/DL
LACTATE SERPL-SCNC: 1.2 MMOL/L (ref 0.4–2)
LEUKOCYTE ESTERASE UR QL STRIP.AUTO: NEGATIVE
LIPASE SERPL-CCNC: 68 U/L (ref 73–393)
LYMPHOCYTES # BLD: 5.2 K/UL (ref 0.8–3.5)
LYMPHOCYTES NFR BLD: 22 % (ref 12–49)
MAGNESIUM SERPL-MCNC: 2.1 MG/DL (ref 1.6–2.4)
MCH RBC QN AUTO: 31.7 PG (ref 26–34)
MCHC RBC AUTO-ENTMCNC: 33.8 G/DL (ref 30–36.5)
MCV RBC AUTO: 93.7 FL (ref 80–99)
MONOCYTES # BLD: 1.5 K/UL (ref 0–1)
MONOCYTES NFR BLD: 6 % (ref 5–13)
NEUTS SEG # BLD: 16.4 K/UL (ref 1.8–8)
NEUTS SEG NFR BLD: 70 % (ref 32–75)
NITRITE UR QL STRIP.AUTO: NEGATIVE
NRBC # BLD: 0 K/UL (ref 0–0.01)
NRBC BLD-RTO: 0 PER 100 WBC
PH UR STRIP: 6 [PH] (ref 5–8)
PLATELET # BLD AUTO: 466 K/UL (ref 150–400)
PMV BLD AUTO: 10.3 FL (ref 8.9–12.9)
POTASSIUM SERPL-SCNC: 4.9 MMOL/L (ref 3.5–5.1)
PROT SERPL-MCNC: 6.8 G/DL (ref 6.4–8.2)
PROT UR STRIP-MCNC: NEGATIVE MG/DL
RBC # BLD AUTO: 3.66 M/UL (ref 3.8–5.2)
RBC #/AREA URNS HPF: ABNORMAL /HPF (ref 0–5)
SODIUM SERPL-SCNC: 140 MMOL/L (ref 136–145)
SP GR UR REFRACTOMETRY: >1.03 (ref 1–1.03)
TROPONIN I SERPL-MCNC: 0.11 NG/ML
TROPONIN I SERPL-MCNC: 0.23 NG/ML
UA: UC IF INDICATED,UAUC: ABNORMAL
UROBILINOGEN UR QL STRIP.AUTO: 0.2 EU/DL (ref 0.2–1)
WBC # BLD AUTO: 23.6 K/UL (ref 3.6–11)
WBC URNS QL MICRO: ABNORMAL /HPF (ref 0–4)

## 2018-06-17 PROCEDURE — 87040 BLOOD CULTURE FOR BACTERIA: CPT | Performed by: EMERGENCY MEDICINE

## 2018-06-17 PROCEDURE — 80053 COMPREHEN METABOLIC PANEL: CPT | Performed by: EMERGENCY MEDICINE

## 2018-06-17 PROCEDURE — 93005 ELECTROCARDIOGRAM TRACING: CPT

## 2018-06-17 PROCEDURE — 71045 X-RAY EXAM CHEST 1 VIEW: CPT

## 2018-06-17 PROCEDURE — 74011250636 HC RX REV CODE- 250/636: Performed by: EMERGENCY MEDICINE

## 2018-06-17 PROCEDURE — 74177 CT ABD & PELVIS W/CONTRAST: CPT

## 2018-06-17 PROCEDURE — 96375 TX/PRO/DX INJ NEW DRUG ADDON: CPT

## 2018-06-17 PROCEDURE — 36415 COLL VENOUS BLD VENIPUNCTURE: CPT | Performed by: EMERGENCY MEDICINE

## 2018-06-17 PROCEDURE — 83605 ASSAY OF LACTIC ACID: CPT | Performed by: EMERGENCY MEDICINE

## 2018-06-17 PROCEDURE — 85025 COMPLETE CBC W/AUTO DIFF WBC: CPT | Performed by: EMERGENCY MEDICINE

## 2018-06-17 PROCEDURE — 96374 THER/PROPH/DIAG INJ IV PUSH: CPT

## 2018-06-17 PROCEDURE — 99285 EMERGENCY DEPT VISIT HI MDM: CPT

## 2018-06-17 PROCEDURE — 84484 ASSAY OF TROPONIN QUANT: CPT | Performed by: EMERGENCY MEDICINE

## 2018-06-17 PROCEDURE — 74011636320 HC RX REV CODE- 636/320: Performed by: EMERGENCY MEDICINE

## 2018-06-17 PROCEDURE — 83690 ASSAY OF LIPASE: CPT | Performed by: EMERGENCY MEDICINE

## 2018-06-17 PROCEDURE — 83735 ASSAY OF MAGNESIUM: CPT | Performed by: EMERGENCY MEDICINE

## 2018-06-17 PROCEDURE — 96376 TX/PRO/DX INJ SAME DRUG ADON: CPT

## 2018-06-17 PROCEDURE — 96361 HYDRATE IV INFUSION ADD-ON: CPT

## 2018-06-17 PROCEDURE — C9113 INJ PANTOPRAZOLE SODIUM, VIA: HCPCS | Performed by: EMERGENCY MEDICINE

## 2018-06-17 PROCEDURE — 81001 URINALYSIS AUTO W/SCOPE: CPT | Performed by: EMERGENCY MEDICINE

## 2018-06-17 RX ORDER — METRONIDAZOLE 500 MG/100ML
500 INJECTION, SOLUTION INTRAVENOUS EVERY 8 HOURS
Status: DISCONTINUED | OUTPATIENT
Start: 2018-06-17 | End: 2018-06-17 | Stop reason: ALTCHOICE

## 2018-06-17 RX ORDER — SODIUM CHLORIDE 0.9 % (FLUSH) 0.9 %
10 SYRINGE (ML) INJECTION
Status: DISCONTINUED | OUTPATIENT
Start: 2018-06-17 | End: 2018-06-17

## 2018-06-17 RX ORDER — ONDANSETRON 2 MG/ML
4 INJECTION INTRAMUSCULAR; INTRAVENOUS
Status: COMPLETED | OUTPATIENT
Start: 2018-06-17 | End: 2018-06-17

## 2018-06-17 RX ORDER — PANTOPRAZOLE SODIUM 40 MG/10ML
40 INJECTION, POWDER, LYOPHILIZED, FOR SOLUTION INTRAVENOUS
Status: COMPLETED | OUTPATIENT
Start: 2018-06-17 | End: 2018-06-17

## 2018-06-17 RX ORDER — SODIUM CHLORIDE 0.9 % (FLUSH) 0.9 %
5-10 SYRINGE (ML) INJECTION AS NEEDED
Status: DISCONTINUED | OUTPATIENT
Start: 2018-06-17 | End: 2018-06-17 | Stop reason: HOSPADM

## 2018-06-17 RX ORDER — MORPHINE SULFATE 10 MG/ML
6 INJECTION, SOLUTION INTRAMUSCULAR; INTRAVENOUS
Status: COMPLETED | OUTPATIENT
Start: 2018-06-17 | End: 2018-06-17

## 2018-06-17 RX ORDER — BUTALBITAL, ASPIRIN, CAFFEINE AND CODEINE PHOSPHATE 50; 325; 40; 30 MG/1; MG/1; MG/1; MG/1
CAPSULE ORAL
COMMUNITY
End: 2019-01-14

## 2018-06-17 RX ORDER — MORPHINE SULFATE 4 MG/ML
6 INJECTION INTRAVENOUS
Status: COMPLETED | OUTPATIENT
Start: 2018-06-17 | End: 2018-06-17

## 2018-06-17 RX ORDER — SODIUM CHLORIDE 9 MG/ML
50 INJECTION, SOLUTION INTRAVENOUS
Status: COMPLETED | OUTPATIENT
Start: 2018-06-17 | End: 2018-06-17

## 2018-06-17 RX ORDER — SODIUM CHLORIDE 9 MG/ML
50 INJECTION, SOLUTION INTRAVENOUS
Status: DISCONTINUED | OUTPATIENT
Start: 2018-06-17 | End: 2018-06-17

## 2018-06-17 RX ORDER — LEVOFLOXACIN 5 MG/ML
750 INJECTION, SOLUTION INTRAVENOUS EVERY 24 HOURS
Status: DISCONTINUED | OUTPATIENT
Start: 2018-06-17 | End: 2018-06-17 | Stop reason: ALTCHOICE

## 2018-06-17 RX ORDER — SODIUM CHLORIDE 0.9 % (FLUSH) 0.9 %
10 SYRINGE (ML) INJECTION
Status: COMPLETED | OUTPATIENT
Start: 2018-06-17 | End: 2018-06-17

## 2018-06-17 RX ADMIN — Medication 10 ML: at 14:25

## 2018-06-17 RX ADMIN — MORPHINE SULFATE 6 MG: 4 INJECTION INTRAVENOUS at 17:50

## 2018-06-17 RX ADMIN — SODIUM CHLORIDE 1000 ML: 900 INJECTION, SOLUTION INTRAVENOUS at 11:54

## 2018-06-17 RX ADMIN — ONDANSETRON 4 MG: 2 INJECTION INTRAMUSCULAR; INTRAVENOUS at 17:49

## 2018-06-17 RX ADMIN — SODIUM CHLORIDE 1000 ML: 900 INJECTION, SOLUTION INTRAVENOUS at 15:51

## 2018-06-17 RX ADMIN — MORPHINE SULFATE 6 MG: 10 INJECTION INTRAVENOUS at 13:39

## 2018-06-17 RX ADMIN — SODIUM CHLORIDE 50 ML/HR: 900 INJECTION, SOLUTION INTRAVENOUS at 14:25

## 2018-06-17 RX ADMIN — PANTOPRAZOLE SODIUM 40 MG: 40 INJECTION, POWDER, FOR SOLUTION INTRAVENOUS at 11:54

## 2018-06-17 RX ADMIN — ONDANSETRON 4 MG: 2 INJECTION INTRAMUSCULAR; INTRAVENOUS at 11:54

## 2018-06-17 RX ADMIN — IOPAMIDOL 100 ML: 755 INJECTION, SOLUTION INTRAVENOUS at 14:24

## 2018-06-17 RX ADMIN — SODIUM CHLORIDE 1000 ML: 900 INJECTION, SOLUTION INTRAVENOUS at 15:37

## 2018-06-17 NOTE — ED NOTES
[facility] at bedside to transport pt to [facility] room 218. Pt awake and stable upon ED exit.

## 2018-06-17 NOTE — ED NOTES
Patient to CT. Unable to draw blood from IV. MD made aware about delay in labs. MD to place central line when patient arrives back in unit.

## 2018-06-17 NOTE — ED NOTES
Assumed care of patient. Patient states she has vomited 45 times since 1 am. Patient denies stomach cramping, just stomach pain. Patient states she is under a lot of stress this week. Patient denies any suicidal/homicidal thoughts. Patient is making appointment with counselor this week. Patient placed on monitor x 3, patient ANO x 4. Call bell in reach.

## 2018-06-17 NOTE — ED PROVIDER NOTES
EMERGENCY DEPARTMENT HISTORY AND PHYSICAL EXAM      Date: 6/17/2018  Patient Name: Alla Muñoz    History of Presenting Illness     Chief Complaint   Patient presents with    Vomiting     Patient states she has vomited about 45 times this morning since 1 am        History Provided By: Patient and EMS    HPI: Alla Muñoz, 27 y.o. female with PMHx significant for lupus, migraine headaches, asthma, GERD, presents via EMS to the ED with cc of multiple episodes of emesis since 1 AM this morning. She reports associated symptoms of nausea, sweating, diffuse abdominal pain, and a \"sore\" CP secondary to the copious amounts of vomiting. Pt states EMS described her emesis as \"coffee ground\" while she describes it as \"black tar. \" Pt believes she has vomited over 40 times since 1 AM this morning. She notes she lives partially with her mother and partially with her grandfather. Her grandfather had placed the thermostat at 90 degrees and pt states she was unable to stay still. She had then experienced nausea, to which she had taken 8 mg of Zofran at 5 AM and then had taken Bentyl. She notes she has had recent increased stress at home. Pt reports her last BM was passed yesterday and is unsure if there was any blood in her stool, as she was \"stressed and could not be bothered to look. \" She denies having a h/o a GI bleed or experiencing blood in her emesis before. Pt denies being on an anticoagulant. She denies diarrhea, blood in the stool, coughing, or urinary sxs. Chief Complaint: emesis  Duration: 9 Hours  Timing:  Intermittent  Location: N/A  Quality: N/A  Severity: greater than 40 episodes  Modifying Factors: N/A  Associated Symptoms: nausea, sweating abdominal pain, CP    There are no other complaints, changes, or physical findings at this time.     PCP: Donna Knig MD    Current Facility-Administered Medications   Medication Dose Route Frequency Provider Last Rate Last Dose    sodium chloride 0.9 % bolus infusion 1,000 mL  1,000 mL IntraVENous NOW Lance De Anda MD        sodium chloride (NS) flush 5-10 mL  5-10 mL IntraVENous PRN Lance De Anda MD        sodium chloride 0.9 % bolus infusion 1,000 mL  1,000 mL IntraVENous NOW Lance De Anda MD         Current Outpatient Prescriptions   Medication Sig Dispense Refill    codeine-butalbital-aspirin-caffeine (FIORINAL-CODEINE #3) 89--40 mg per capsule Take  by mouth every six (6) hours as needed for Pain.  ketorolac (TORADOL) 10 mg tablet Take 1 Tab by mouth every six (6) hours as needed for Pain. 10 Tab 0    oxyCODONE IR (ROXICODONE) 5 mg immediate release tablet Take 1 Tab by mouth every four (4) hours as needed for Pain. Max Daily Amount: 30 mg. 15 Tab 0    DULoxetine (CYMBALTA) 30 mg capsule Take 30 mg by mouth daily.  ondansetron hcl (ZOFRAN, AS HYDROCHLORIDE,) 4 mg tablet Take 1 Tab by mouth every eight (8) hours as needed for Nausea. 20 Tab 0    lamoTRIgine (LAMICTAL) 25 mg tablet Take 50 mg by mouth daily.  rizatriptan (MAXALT) 10 mg tablet Take 10 mg by mouth once as needed for Migraine. May repeat in 2 hours if needed      belimumab (BENLYSTA) 400 mg solr injection by IntraVENous route.  ALPRAZolam (XANAX) 1 mg tablet Take 1 Tab by mouth nightly.  Max Daily Amount: 1 mg. 30 Tab 6    omeprazole (PRILOSEC) 20 mg capsule          Past History     Past Medical History:  Past Medical History:   Diagnosis Date    Abnormal CT of the abdomen 11/8/2012    Asthma     Autoimmune disease ([facility] [address]     lupus    Cervical prolapse     Dehydration     Gastrointestinal disorder     gerd, twisted colon, IBS    GERD (gastroesophageal reflux disease)     Headache(784.0)     Morbid obesity (HCC)     Nausea & vomiting     Neurological disorder     cluster headaches    other 2006, 2009    ovarian cyst removal    Other ill-defined conditions(799.89)     Worsening headaches        Past Surgical History:  Past Surgical History:   Procedure Laterality Date    COLONOSCOPY  9/21/2010         HX CHOLECYSTECTOMY      HX GYN  1/2009    right salpingo oopherectomy    HX GYN  2006    right ovarian tumor removed    HX HEENT      left wisdom teeth removal    HX HEENT      top right wisdom tooth removed    HX HERNIA REPAIR  4/2012    HX HERNIA REPAIR  2/28/13    Laparoscopic recurrent incisional hernia repair    HX UROLOGICAL      Kidney Stone Removal    NC EGD TRANSORAL BIOPSY SINGLE/MULTIPLE  9/22/2010            Family History:  History reviewed. No pertinent family history. Social History:  Social History   Substance Use Topics    Smoking status: Never Smoker    Smokeless tobacco: Never Used    Alcohol use No       Allergies: Allergies   Allergen Reactions    Latex Itching     burning    Compazine [Prochlorperazine] Anxiety     High heart rate  Pt can take promethazine with no problems    Pcn [Penicillins] Rash and Nausea and Vomiting    Sulfa (Sulfonamide Antibiotics) Unknown (comments)    Topamax [Topiramate] Unknown (comments)    Adhesive Rash     Allergic to Dermabond    Mushroom Combination No.1 Unknown (comments)    Toradol [Ketorolac Tromethamine] Nausea and Vomiting and Other (comments)     PO & IV causes GI bleed    Valproic Acid Other (comments)     Elevated heart rate and vomiting       Review of Systems   Review of Systems   Constitutional: Positive for diaphoresis. Negative for chills, fatigue and fever. HENT: Negative for congestion, rhinorrhea and sore throat. Eyes: Negative for pain, discharge and visual disturbance. Respiratory: Negative for cough, chest tightness, shortness of breath and wheezing. Cardiovascular: Positive for chest pain. Negative for palpitations and leg swelling. Gastrointestinal: Positive for abdominal pain, nausea and vomiting. Negative for blood in stool, constipation and diarrhea. Genitourinary: Negative for dysuria, frequency and hematuria. Musculoskeletal: Negative for arthralgias, back pain and myalgias. Skin: Negative for rash. Neurological: Negative for dizziness, weakness, light-headedness and headaches. Psychiatric/Behavioral: Negative. Physical Exam   Physical Exam   Constitutional: She is oriented to person, place, and time. She appears well-developed and well-nourished. No distress. HENT:   Head: Normocephalic and atraumatic. Mouth/Throat: Mucous membranes are dry. Eyes: EOM are normal. Right eye exhibits no discharge. Left eye exhibits no discharge. No scleral icterus. Neck: Normal range of motion. Neck supple. No tracheal deviation present. Cardiovascular: Regular rhythm, normal heart sounds and intact distal pulses. Tachycardia present. Exam reveals no gallop and no friction rub. No murmur heard. Pulmonary/Chest: Effort normal and breath sounds normal. No respiratory distress. She has no wheezes. She has no rales. Abdominal: Soft. She exhibits no distension. There is generalized tenderness. There is no rebound and no guarding. Musculoskeletal: Normal range of motion. She exhibits no edema. Lymphadenopathy:     She has no cervical adenopathy. Neurological: She is alert and oriented to person, place, and time. No focal neuro deficits   Skin: Skin is warm. No rash noted. She is diaphoretic. Psychiatric: She has a normal mood and affect. Nursing note and vitals reviewed.     Diagnostic Study Results     Labs -   Recent Results (from the past 12 hour(s))   CBC WITH AUTOMATED DIFF    Collection Time: 06/17/18 11:57 AM   Result Value Ref Range    WBC 23.6 (H) 3.6 - 11.0 K/uL    RBC 3.66 (L) 3.80 - 5.20 M/uL    HGB 11.6 11.5 - 16.0 g/dL    HCT 34.3 (L) 35.0 - 47.0 %    MCV 93.7 80.0 - 99.0 FL    MCH 31.7 26.0 - 34.0 PG    MCHC 33.8 30.0 - 36.5 g/dL    RDW 12.7 11.5 - 14.5 %    PLATELET 315 (H) 992 - 400 K/uL    MPV 10.3 8.9 - 12.9 FL    NRBC 0.0 0  WBC    ABSOLUTE NRBC 0.00 0.00 - 0.01 K/uL NEUTROPHILS 70 32 - 75 %    LYMPHOCYTES 22 12 - 49 %    MONOCYTES 6 5 - 13 %    EOSINOPHILS 0 0 - 7 %    BASOPHILS 0 0 - 1 %    IMMATURE GRANULOCYTES 1 (H) 0.0 - 0.5 %    ABS. NEUTROPHILS 16.4 (H) 1.8 - 8.0 K/UL    ABS. LYMPHOCYTES 5.2 (H) 0.8 - 3.5 K/UL    ABS. MONOCYTES 1.5 (H) 0.0 - 1.0 K/UL    ABS. EOSINOPHILS 0.1 0.0 - 0.4 K/UL    ABS. BASOPHILS 0.1 0.0 - 0.1 K/UL    ABS. IMM. GRANS. 0.3 (H) 0.00 - 0.04 K/UL    DF AUTOMATED     METABOLIC PANEL, COMPREHENSIVE    Collection Time: 06/17/18 11:57 AM   Result Value Ref Range    Sodium 140 136 - 145 mmol/L    Potassium 4.9 3.5 - 5.1 mmol/L    Chloride 110 (H) 97 - 108 mmol/L    CO2 20 (L) 21 - 32 mmol/L    Anion gap 10 5 - 15 mmol/L    Glucose 104 (H) 65 - 100 mg/dL    BUN 58 (H) 6 - 20 MG/DL    Creatinine 1.09 (H) 0.55 - 1.02 MG/DL    BUN/Creatinine ratio 53 (H) 12 - 20      GFR est AA >60 >60 ml/min/1.73m2    GFR est non-AA 57 (L) >60 ml/min/1.73m2    Calcium 8.7 8.5 - 10.1 MG/DL    Bilirubin, total 0.2 0.2 - 1.0 MG/DL    ALT (SGPT) 16 12 - 78 U/L    AST (SGOT) 9 (L) 15 - 37 U/L    Alk. phosphatase 101 45 - 117 U/L    Protein, total 6.8 6.4 - 8.2 g/dL    Albumin 3.8 3.5 - 5.0 g/dL    Globulin 3.0 2.0 - 4.0 g/dL    A-G Ratio 1.3 1.1 - 2.2     LIPASE    Collection Time: 06/17/18 11:57 AM   Result Value Ref Range    Lipase 68 (L) 73 - 393 U/L   MAGNESIUM    Collection Time: 06/17/18 11:57 AM   Result Value Ref Range    Magnesium 2.1 1.6 - 2.4 mg/dL   TROPONIN I    Collection Time: 06/17/18 11:57 AM   Result Value Ref Range    Troponin-I, Qt. 0.11 (H) <0.05 ng/mL       Radiologic Studies -   XR CHEST PORT    (Results Pending)   CT ABD PELV WO CONT    (Results Pending)     CT Results  (Last 48 hours)    None        CXR Results  (Last 48 hours)    None        Medical Decision Making   I am the first provider for this patient. I reviewed the vital signs, available nursing notes, past medical history, past surgical history, family history and social history.     Vital Signs-Reviewed the patient's vital signs. Patient Vitals for the past 12 hrs:   Temp Pulse Resp BP SpO2   06/17/18 1323 - (!) 130 20 94/63 100 %   06/17/18 1101 - (!) 135 18 103/57 98 %   06/17/18 0954 99.6 °F (37.6 °C) (!) 138 18 108/56 100 %     Pulse Oximetry Analysis - 98% on RA    Cardiac Monitor:   Rate: 137 bpm  Rhythm: Sinus Tachycardia     EKG interpretation: (Preliminary): 1047  Rhythm: sinus tachycardia; and regular . Rate (approx.): 136; Axis: normal; SD interval: normal; QRS interval: normal ; ST/T wave: normal.  Written by Louisa Dash ED Scribe, as dictated by Tiffanie Brar MD.    Records Reviewed: Nursing Notes, Old Medical Records, Previous electrocardiograms, Previous Radiology Studies and Previous Laboratory Studies    Provider Notes (Medical Decision Making):   DDx: GI bleed, gastroenteritis, gastritis, PUD, GERD, viral syndrome, dehydration, electrolyte abnormality, ENRICO, pregnancy, h/o cholecystectomy    Disposition: Patient is a 26 yo female who presents to ED with multiple episodes of n/v described as black, tarry emesis. She also reports abdominal pain. Labs indicate leukocytosis of 23 and hemoglobin of 11.6. Given leukocytosis, obtained CT a/p which was negative for acute process. Leukocytosis likely related to n/v, dehydration and stress demargination. UA and CXR also negative. Antibiotics held due to lack of source of infection. SIRS criteria due to dehydration, possible GIB. Vital signs improved/stabilized after IVF. Troponin elevated but likely due to tachycardia/hypotension; EKG nonischemic, chest pain seems to be more MSK secondary to n/v.  Given concern for GIB, will admit for further management. Pt is a [facility] patient and requires transfer to another facility for management. ED Course:   Initial assessment performed. The patients presenting problems have been discussed, and they are in agreement with the care plan formulated and outlined with them.   I have encouraged them to ask questions as they arise throughout their visit. PROGRESS NOTE:  12:53 PM  Nursing staff reports pt's troponin is 0.11. PROGRESS NOTE:  2:05 PM  Given abdominal pain, nausea, vomiting, and sepsis criteria, will treat with levofloxacin and flagyl. UA is still pending. CONSULT NOTE:   3:18 PM  Martell Samson MD spoke with Gabino Dixon MD  Specialty: GI  Discussed pt's hx, disposition, and available diagnostic and imaging results. Reviewed care plans. Consultant agrees with plans as outlined. Dr. Emerita Chen recommends admission for EGD tomorrow. CONSULT NOTE:   4:30 PM  Martell Samson MD spoke with Bala Good MD   Specialty: hospitalist  Discussed pt's hx, disposition, and available diagnostic and imaging results. Reviewed care plans. Consultant agrees with plans as outlined. Dr. Jelena Leavitt accepts pt for transfer at [facility].   Written by Khloe Araya ED Scribe, as dictated by Martell Samson MD.    CONSULT NOTE:   4:31 PM  Martell Samson MD spoke with Gabino Dixon MD   Specialty: GI  Discussed pt's hx, disposition, and available diagnostic and imaging results. Reviewed care plans. Consultant agrees with plans as outlined. Updated Dr. Emerita Chen that pt will be transferred to [facility]. He states he will inform his partner that covers that location. Written by Khloe Araya ED Scribe, as dictated by Martell Samson MD.    SIGN OUT:  5:10 PM  Patient's presentation, labs/imaging and plan of care was reviewed with Tony Tamez Lines. Germaine Camejo MD as part of sign out. They will to continue treatment as part of the plan discussed with the patient. Tony Contreras. Germaine Camejo MD's assistance in completion of this plan is greatly appreciated but it should be noted that I will be the provider of record for this patient. Jerry Ordoñez    6:09 PM  Last-look before transfer. Patient appears well and is sitting up.  Pt is ready for transfer  This note was written by Evette Palumbo ED scribe, as dictated by  Cain Prince. Ramu Benavidez MD.      Critical Care Time:   CRITICAL CARE NOTE :  1:39 PM  IMPENDING DETERIORATION -Cardiovascular  ASSOCIATED RISK FACTORS - Hypotension  MANAGEMENT- Bedside assessment, supervision of care, transfer  INTERPRETATION -  EKG, labs, BP  INTERVENTIONS - IVF  CASE REVIEW - Hospitalist, nursing  TREATMENT RESPONSE - Improved  PERFORMED BY - Self    NOTES   :  I have spent 60 minutes of critical care time involved in lab review, consultations with specialist, family decision- making, bedside attention and documentation. During this entire length of time I was immediately available to the patient . Allen Mccray MD    Disposition:  PLAN:  1. Transfer to [facility]    Transfer Note:  4:32 PM  Patient is being transferred to hospitalist service at [facility], transfer accepted by Dr. Carrie Kingston. The reasons for patient's transfer have been discussed with the patient and available family. They convey agreement and understanding for the need to be transferred as explained to them by Allen Mccray MD     Diagnosis     Clinical Impression:   1. Hematemesis with nausea    2. Dehydration    3. Leukocytosis, unspecified type    4. SIRS (systemic inflammatory response syndrome) (HCC)        Attestations: This note is prepared by Myrna Albarran, acting as Scribe for Allen Mccray MD.    Allen Mccray MD: The scribe's documentation has been prepared under my direction and personally reviewed by me in its entirety. I confirm that the note above accurately reflects all work, treatment, procedures, and medical decision making performed by me. This note will not be viewable in [address]

## 2018-06-17 NOTE — ED NOTES
Bedside and Verbal shift change report given to Janeth Griffiths RN (oncoming nurse) by Aamir Lucero RN (offgoing nurse). Report included the following information SBAR, ED Summary, MAR and Recent Results.

## 2018-06-17 NOTE — ED NOTES
[facility] called and report given to Bubba Tan RN using SBAR format. [facility] called for ALS transport and ETA is approx 1 hour.

## 2018-06-18 LAB
ATRIAL RATE: 136 BPM
CALCULATED P AXIS, ECG09: 36 DEGREES
CALCULATED R AXIS, ECG10: 28 DEGREES
CALCULATED T AXIS, ECG11: 14 DEGREES
DIAGNOSIS, 93000: NORMAL
P-R INTERVAL, ECG05: 130 MS
Q-T INTERVAL, ECG07: 274 MS
QRS DURATION, ECG06: 70 MS
QTC CALCULATION (BEZET), ECG08: 412 MS
VENTRICULAR RATE, ECG03: 136 BPM

## 2018-06-23 LAB
BACTERIA SPEC CULT: NORMAL
SERVICE CMNT-IMP: NORMAL

## 2018-09-06 ENCOUNTER — APPOINTMENT (OUTPATIENT)
Dept: CT IMAGING | Age: 33
End: 2018-09-06
Attending: EMERGENCY MEDICINE
Payer: COMMERCIAL

## 2018-09-06 ENCOUNTER — HOSPITAL ENCOUNTER (EMERGENCY)
Age: 33
Discharge: HOME | End: 2018-09-06
Attending: EMERGENCY MEDICINE
Payer: COMMERCIAL

## 2018-09-06 VITALS
BODY MASS INDEX: 32.19 KG/M2 | SYSTOLIC BLOOD PRESSURE: 122 MMHG | TEMPERATURE: 98 F | RESPIRATION RATE: 18 BRPM | HEIGHT: 70 IN | DIASTOLIC BLOOD PRESSURE: 62 MMHG | HEART RATE: 71 BPM | WEIGHT: 224.87 LBS | OXYGEN SATURATION: 100 %

## 2018-09-06 DIAGNOSIS — R10.9 FLANK PAIN: Primary | ICD-10-CM

## 2018-09-06 DIAGNOSIS — N20.0 KIDNEY STONES: ICD-10-CM

## 2018-09-06 DIAGNOSIS — Z63.79 STRESS DUE TO ILLNESS OF FAMILY MEMBER: ICD-10-CM

## 2018-09-06 DIAGNOSIS — R31.9 HEMATURIA, UNSPECIFIED TYPE: ICD-10-CM

## 2018-09-06 LAB
ALBUMIN SERPL-MCNC: 4 G/DL (ref 3.5–5)
ALBUMIN/GLOB SERPL: 1.3 {RATIO} (ref 1.1–2.2)
ALP SERPL-CCNC: 115 U/L (ref 45–117)
ALT SERPL-CCNC: 13 U/L (ref 12–78)
AMPHET UR QL SCN: NEGATIVE
ANION GAP SERPL CALC-SCNC: 7 MMOL/L (ref 5–15)
APPEARANCE UR: ABNORMAL
AST SERPL-CCNC: 8 U/L (ref 15–37)
BACTERIA URNS QL MICRO: NEGATIVE /HPF
BARBITURATES UR QL SCN: POSITIVE
BASOPHILS # BLD: 0 K/UL (ref 0–0.1)
BASOPHILS NFR BLD: 0 % (ref 0–1)
BENZODIAZ UR QL: POSITIVE
BILIRUB SERPL-MCNC: 0.3 MG/DL (ref 0.2–1)
BILIRUB UR QL: NEGATIVE
BUN SERPL-MCNC: 13 MG/DL (ref 6–20)
BUN/CREAT SERPL: 17 (ref 12–20)
CALCIUM SERPL-MCNC: 8.6 MG/DL (ref 8.5–10.1)
CANNABINOIDS UR QL SCN: NEGATIVE
CHLORIDE SERPL-SCNC: 108 MMOL/L (ref 97–108)
CO2 SERPL-SCNC: 24 MMOL/L (ref 21–32)
COCAINE UR QL SCN: NEGATIVE
COLOR UR: ABNORMAL
CREAT SERPL-MCNC: 0.78 MG/DL (ref 0.55–1.02)
DIFFERENTIAL METHOD BLD: ABNORMAL
DRUG SCRN COMMENT,DRGCM: ABNORMAL
EOSINOPHIL # BLD: 0.7 K/UL (ref 0–0.4)
EOSINOPHIL NFR BLD: 7 % (ref 0–7)
EPITH CASTS URNS QL MICRO: ABNORMAL /LPF
ERYTHROCYTE [DISTWIDTH] IN BLOOD BY AUTOMATED COUNT: 14.3 % (ref 11.5–14.5)
GLOBULIN SER CALC-MCNC: 3.1 G/DL (ref 2–4)
GLUCOSE SERPL-MCNC: 102 MG/DL (ref 65–100)
GLUCOSE UR STRIP.AUTO-MCNC: NEGATIVE MG/DL
HCT VFR BLD AUTO: 35 % (ref 35–47)
HGB BLD-MCNC: 11.2 G/DL (ref 11.5–16)
HGB UR QL STRIP: ABNORMAL
HYALINE CASTS URNS QL MICRO: ABNORMAL /LPF (ref 0–5)
IMM GRANULOCYTES # BLD: 0 K/UL (ref 0–0.04)
IMM GRANULOCYTES NFR BLD AUTO: 0 % (ref 0–0.5)
KETONES UR QL STRIP.AUTO: NEGATIVE MG/DL
LEUKOCYTE ESTERASE UR QL STRIP.AUTO: NEGATIVE
LYMPHOCYTES # BLD: 3.9 K/UL (ref 0.8–3.5)
LYMPHOCYTES NFR BLD: 40 % (ref 12–49)
MCH RBC QN AUTO: 28.3 PG (ref 26–34)
MCHC RBC AUTO-ENTMCNC: 32 G/DL (ref 30–36.5)
MCV RBC AUTO: 88.4 FL (ref 80–99)
METHADONE UR QL: NEGATIVE
MONOCYTES # BLD: 0.9 K/UL (ref 0–1)
MONOCYTES NFR BLD: 9 % (ref 5–13)
NEUTS SEG # BLD: 4.4 K/UL (ref 1.8–8)
NEUTS SEG NFR BLD: 44 % (ref 32–75)
NITRITE UR QL STRIP.AUTO: NEGATIVE
NRBC # BLD: 0 K/UL (ref 0–0.01)
NRBC BLD-RTO: 0 PER 100 WBC
OPIATES UR QL: POSITIVE
PCP UR QL: NEGATIVE
PH UR STRIP: 6 [PH] (ref 5–8)
PLATELET # BLD AUTO: 412 K/UL (ref 150–400)
PMV BLD AUTO: 9.9 FL (ref 8.9–12.9)
POTASSIUM SERPL-SCNC: 3.7 MMOL/L (ref 3.5–5.1)
PROT SERPL-MCNC: 7.1 G/DL (ref 6.4–8.2)
PROT UR STRIP-MCNC: NEGATIVE MG/DL
RBC # BLD AUTO: 3.96 M/UL (ref 3.8–5.2)
RBC #/AREA URNS HPF: >100 /HPF (ref 0–5)
SODIUM SERPL-SCNC: 139 MMOL/L (ref 136–145)
SP GR UR REFRACTOMETRY: 1.01 (ref 1–1.03)
UA: UC IF INDICATED,UAUC: ABNORMAL
UROBILINOGEN UR QL STRIP.AUTO: 0.2 EU/DL (ref 0.2–1)
WBC # BLD AUTO: 9.9 K/UL (ref 3.6–11)
WBC URNS QL MICRO: ABNORMAL /HPF (ref 0–4)

## 2018-09-06 PROCEDURE — 96375 TX/PRO/DX INJ NEW DRUG ADDON: CPT

## 2018-09-06 PROCEDURE — 36415 COLL VENOUS BLD VENIPUNCTURE: CPT | Performed by: EMERGENCY MEDICINE

## 2018-09-06 PROCEDURE — 81001 URINALYSIS AUTO W/SCOPE: CPT | Performed by: EMERGENCY MEDICINE

## 2018-09-06 PROCEDURE — 85025 COMPLETE CBC W/AUTO DIFF WBC: CPT | Performed by: EMERGENCY MEDICINE

## 2018-09-06 PROCEDURE — 74011250636 HC RX REV CODE- 250/636: Performed by: EMERGENCY MEDICINE

## 2018-09-06 PROCEDURE — 99284 EMERGENCY DEPT VISIT MOD MDM: CPT

## 2018-09-06 PROCEDURE — 80053 COMPREHEN METABOLIC PANEL: CPT | Performed by: EMERGENCY MEDICINE

## 2018-09-06 PROCEDURE — 74176 CT ABD & PELVIS W/O CONTRAST: CPT

## 2018-09-06 PROCEDURE — 96361 HYDRATE IV INFUSION ADD-ON: CPT

## 2018-09-06 PROCEDURE — 80307 DRUG TEST PRSMV CHEM ANLYZR: CPT | Performed by: EMERGENCY MEDICINE

## 2018-09-06 PROCEDURE — 74011000258 HC RX REV CODE- 258: Performed by: EMERGENCY MEDICINE

## 2018-09-06 PROCEDURE — 96365 THER/PROPH/DIAG IV INF INIT: CPT

## 2018-09-06 RX ORDER — ONDANSETRON 2 MG/ML
4 INJECTION INTRAMUSCULAR; INTRAVENOUS
Status: COMPLETED | OUTPATIENT
Start: 2018-09-06 | End: 2018-09-06

## 2018-09-06 RX ADMIN — SODIUM CHLORIDE 1000 ML: 900 INJECTION, SOLUTION INTRAVENOUS at 02:39

## 2018-09-06 RX ADMIN — LIDOCAINE HYDROCHLORIDE 102 MG: 20 INJECTION, SOLUTION INTRAVENOUS at 03:38

## 2018-09-06 RX ADMIN — ONDANSETRON 4 MG: 2 INJECTION INTRAMUSCULAR; INTRAVENOUS at 02:31

## 2018-09-06 NOTE — ED NOTES
No rooms available at this time. Triage RN placed pt in [facility] outside of triage for monitoring. This RN placed IV and obtained/sent labs. Pt tearful and asking for pain medication, stating she cannot have NSAIDS due to hx of ulcer. Spoke with Dr. Denzel Hines. Per Dr. Denzel Hines, pt has management plan. Spoke with pt about management plan and sent pt's urine sample to lab.

## 2018-09-06 NOTE — ED PROVIDER NOTES
EMERGENCY DEPARTMENT HISTORY AND PHYSICAL EXAM 
 
 
Date: 9/6/2018 Patient Name: Jayda Nina History of Presenting Illness Chief Complaint Patient presents with  Flank Pain  
  right sided x 4 hours, hx of kidney stones  Abdominal Pain  Vomiting History Provided By: Patient HPI: Jayda Nina, 27 y.o. female with PMHx significant for GERD, asthma, presents ambulatory to the ED with cc of acute on chronic, sharp, right-sided flank pain radiating to her RLQ that began ~6 hours PTA. The pt report she began having intermittent episodes of emesis and hematuria. She states she took codeine yesterday. The pt notes she was seen in June 2018 for similar symptoms and was informed that she had a stomach ulcer. She conveys that she had an endoscopy and needed \"4 bags of blood. \" The pt states that she was previously seen by Dr. Sedrick Cowart for pain management and prescribed narcotics monthly for neck pain. However, she adds that Dr. Sedrick Cowart moved practices and she was taken off of her pain management plan. She then relays that she was discontinued with narcotics with Dr. Sedrick Cowart' previous practice through Dr. Dennis Gamez. She reports that she is now seen by a Neurologist, who will use needles and nerve blocks instead of pain management. The pt notes that she is currently prescribed Xanax by Dr. Estela Yun, her Psychiatrist. Additionally, the pt notes that she had surgery 4 years ago when her kidney stone was lodged in her ureter tubes. She states that she is not seen by a Urologist. She specifically denies any fevers, chills, SOB and chest pain. There are no other complaints, changes, or physical findings at this time. PCP: Yolanda Sanders MD 
 
Current Outpatient Prescriptions Medication Sig Dispense Refill  codeine-butalbital-aspirin-caffeine (FIORINAL-CODEINE #3) 93--40 mg per capsule Take  by mouth every six (6) hours as needed for Pain.  ketorolac (TORADOL) 10 mg tablet Take 1 Tab by mouth every six (6) hours as needed for Pain. 10 Tab 0  
 oxyCODONE IR (ROXICODONE) 5 mg immediate release tablet Take 1 Tab by mouth every four (4) hours as needed for Pain. Max Daily Amount: 30 mg. 15 Tab 0  
 DULoxetine (CYMBALTA) 30 mg capsule Take 30 mg by mouth daily.  ondansetron hcl (ZOFRAN, AS HYDROCHLORIDE,) 4 mg tablet Take 1 Tab by mouth every eight (8) hours as needed for Nausea. 20 Tab 0  
 lamoTRIgine (LAMICTAL) 25 mg tablet Take 50 mg by mouth daily.  rizatriptan (MAXALT) 10 mg tablet Take 10 mg by mouth once as needed for Migraine. May repeat in 2 hours if needed  belimumab (BENLYSTA) 400 mg solr injection by IntraVENous route.  ALPRAZolam (XANAX) 1 mg tablet Take 1 Tab by mouth nightly. Max Daily Amount: 1 mg. 30 Tab 6  
 omeprazole (PRILOSEC) 20 mg capsule Past History Past Medical History: 
Past Medical History:  
Diagnosis Date  Abnormal CT of the abdomen 11/8/2012  Asthma  Autoimmune disease ([address] lupus  Cervical prolapse  Dehydration  Gastrointestinal disorder   
 gerd, twisted colon, IBS  GERD (gastroesophageal reflux disease)  Headache(784.0)  Morbid obesity ([address]  Nausea & vomiting  Neurological disorder   
 cluster headaches  other 2006, 2009  
 ovarian cyst removal  
 Other ill-defined conditions(799.89)  Worsening headaches Past Surgical History: 
Past Surgical History:  
Procedure Laterality Date  COLONOSCOPY  9/21/2010  HX CHOLECYSTECTOMY  HX GYN  1/2009  
 right salpingo oopherectomy  HX GYN  2006  
 right ovarian tumor removed  HX HEENT    
 left wisdom teeth removal  
 HX HEENT    
 top right wisdom tooth removed  HX HERNIA REPAIR  4/2012  HX HERNIA REPAIR  2/28/13 Laparoscopic recurrent incisional hernia repair  HX HYSTERECTOMY    
 2016  HX UROLOGICAL  Kidney Stone Removal  
  SC EGD TRANSORAL BIOPSY SINGLE/MULTIPLE  9/22/2010 Family History: No family history on file. Social History: 
Social History Substance Use Topics  Smoking status: Never Smoker  Smokeless tobacco: Never Used  Alcohol use No  
 
 
Allergies: Allergies Allergen Reactions  Latex Itching  
  burning  Compazine [Prochlorperazine] Anxiety High heart rate Pt can take promethazine with no problems  Pcn [Penicillins] Rash and Nausea and Vomiting  Sulfa (Sulfonamide Antibiotics) Unknown (comments)  Topamax [Topiramate] Unknown (comments)  Adhesive Rash Allergic to Dermabond  Mushroom Combination No.1 Unknown (comments)  Toradol [Ketorolac Tromethamine] Nausea and Vomiting and Other (comments) PO & IV causes GI bleed  Valproic Acid Other (comments) Elevated heart rate and vomiting Review of Systems Review of Systems Constitutional: Negative for activity change, appetite change, fatigue and fever. HENT: Negative. Negative for congestion, rhinorrhea and sore throat. Respiratory: Negative. Negative for cough, shortness of breath and wheezing. Cardiovascular: Negative. Negative for chest pain and leg swelling. Gastrointestinal: Positive for abdominal pain and vomiting. Negative for abdominal distention, constipation, diarrhea and nausea. Endocrine: Negative. Genitourinary: Positive for flank pain and hematuria. Negative for difficulty urinating, dysuria, menstrual problem, vaginal bleeding and vaginal discharge. Musculoskeletal: Negative for arthralgias, joint swelling and myalgias. Skin: Negative. Negative for rash. Neurological: Negative. Negative for dizziness, weakness, light-headedness and headaches. Psychiatric/Behavioral: Negative. Physical Exam  
Physical Exam  
Constitutional: She is oriented to person, place, and time. She appears well-developed and well-nourished. Went from histrionic to calm during storytelling. HENT:  
Head: Atraumatic. Eyes: EOM are normal.  
Cardiovascular: Regular rhythm, normal heart sounds and intact distal pulses. Tachycardia present. Exam reveals no gallop and no friction rub. No murmur heard. Tachycardic at 109 in triage. Not present at time of my examination. Pulmonary/Chest: Effort normal and breath sounds normal. No respiratory distress. She has no wheezes. She has no rales. She exhibits no tenderness. Abdominal: Soft. Bowel sounds are normal. She exhibits no distension and no mass. There is no tenderness. There is no rebound and no guarding. Musculoskeletal: Normal range of motion. She exhibits no edema or tenderness. Neurological: She is oriented to person, place, and time. Skin: Skin is warm. Psychiatric: She has a normal mood and affect. Nursing note and vitals reviewed. Diagnostic Study Results Labs - Recent Results (from the past 12 hour(s)) CBC WITH AUTOMATED DIFF Collection Time: 09/06/18  2:03 AM  
Result Value Ref Range WBC 9.9 3.6 - 11.0 K/uL  
 RBC 3.96 3.80 - 5.20 M/uL  
 HGB 11.2 (L) 11.5 - 16.0 g/dL HCT 35.0 35.0 - 47.0 % MCV 88.4 80.0 - 99.0 FL  
 MCH 28.3 26.0 - 34.0 PG  
 MCHC 32.0 30.0 - 36.5 g/dL  
 RDW 14.3 11.5 - 14.5 % PLATELET 857 (H) 706 - 400 K/uL MPV 9.9 8.9 - 12.9 FL  
 NRBC 0.0 0  WBC ABSOLUTE NRBC 0.00 0.00 - 0.01 K/uL NEUTROPHILS 44 32 - 75 % LYMPHOCYTES 40 12 - 49 % MONOCYTES 9 5 - 13 % EOSINOPHILS 7 0 - 7 % BASOPHILS 0 0 - 1 % IMMATURE GRANULOCYTES 0 0.0 - 0.5 % ABS. NEUTROPHILS 4.4 1.8 - 8.0 K/UL  
 ABS. LYMPHOCYTES 3.9 (H) 0.8 - 3.5 K/UL  
 ABS. MONOCYTES 0.9 0.0 - 1.0 K/UL  
 ABS. EOSINOPHILS 0.7 (H) 0.0 - 0.4 K/UL  
 ABS. BASOPHILS 0.0 0.0 - 0.1 K/UL  
 ABS. IMM. GRANS. 0.0 0.00 - 0.04 K/UL  
 DF AUTOMATED METABOLIC PANEL, COMPREHENSIVE Collection Time: 09/06/18  2:03 AM  
Result Value Ref Range Sodium 139 136 - 145 mmol/L Potassium 3.7 3.5 - 5.1 mmol/L Chloride 108 97 - 108 mmol/L  
 CO2 24 21 - 32 mmol/L Anion gap 7 5 - 15 mmol/L Glucose 102 (H) 65 - 100 mg/dL BUN 13 6 - 20 MG/DL Creatinine 0.78 0.55 - 1.02 MG/DL  
 BUN/Creatinine ratio 17 12 - 20 GFR est AA >60 >60 ml/min/1.73m2 GFR est non-AA >60 >60 ml/min/1.73m2 Calcium 8.6 8.5 - 10.1 MG/DL Bilirubin, total 0.3 0.2 - 1.0 MG/DL  
 ALT (SGPT) 13 12 - 78 U/L  
 AST (SGOT) 8 (L) 15 - 37 U/L Alk. phosphatase 115 45 - 117 U/L Protein, total 7.1 6.4 - 8.2 g/dL Albumin 4.0 3.5 - 5.0 g/dL Globulin 3.1 2.0 - 4.0 g/dL A-G Ratio 1.3 1.1 - 2.2 URINALYSIS W/ REFLEX CULTURE Collection Time: 09/06/18  2:10 AM  
Result Value Ref Range Color YELLOW/STRAW Appearance CLOUDY (A) CLEAR Specific gravity 1.009 1.003 - 1.030    
 pH (UA) 6.0 5.0 - 8.0 Protein NEGATIVE  NEG mg/dL Glucose NEGATIVE  NEG mg/dL Ketone NEGATIVE  NEG mg/dL Bilirubin NEGATIVE  NEG Blood LARGE (A) NEG Urobilinogen 0.2 0.2 - 1.0 EU/dL Nitrites NEGATIVE  NEG Leukocyte Esterase NEGATIVE  NEG    
 WBC 0-4 0 - 4 /hpf  
 RBC >100 (H) 0 - 5 /hpf Epithelial cells MODERATE (A) FEW /lpf Bacteria NEGATIVE  NEG /hpf  
 UA:UC IF INDICATED CULTURE NOT INDICATED BY UA RESULT CNI Hyaline cast 0-2 0 - 5 /lpf DRUG SCREEN, URINE Collection Time: 09/06/18  2:10 AM  
Result Value Ref Range AMPHETAMINES NEGATIVE  NEG    
 BARBITURATES POSITIVE (A) NEG BENZODIAZEPINES POSITIVE (A) NEG    
 COCAINE NEGATIVE  NEG METHADONE NEGATIVE  NEG    
 OPIATES POSITIVE (A) NEG    
 PCP(PHENCYCLIDINE) NEGATIVE  NEG    
 THC (TH-CANNABINOL) NEGATIVE  NEG Drug screen comment (NOTE) Radiologic Studies -  
CT ABD PELV WO CONT Final Result CT Results  (Last 48 hours) 09/06/18 0441  CT ABD PELV WO CONT Final result Impression:  IMPRESSION: Nonobstructing bilateral nephrolithiasis. No acute findings  
otherwise. Narrative:  EXAM:  CT ABD PELV WO CONT INDICATION: Right-sided flank pain x4 hours. History of kidney stones. COMPARISON: CT 6/17/2018. CONTRAST:  None. TECHNIQUE:   
Thin axial images were obtained through the abdomen and pelvis. Coronal and  
sagittal reconstructions were generated. Oral contrast was not administered. CT  
dose reduction was achieved through use of a standardized protocol tailored for  
this examination and automatic exposure control for dose modulation. The absence of intravenous contrast material reduces the sensitivity for  
evaluation of the solid parenchymal organs of the abdomen. FINDINGS:   
LUNG BASES: Clear. INCIDENTALLY IMAGED HEART AND MEDIASTINUM: Unremarkable. LIVER: No mass or biliary dilatation. GALLBLADDER: Surgically absent. SPLEEN: No mass. PANCREAS: No mass or ductal dilatation. ADRENALS: Unremarkable. KIDNEYS/URETERS: Punctate interpolar collecting system stones bilaterally. No  
hydronephrosis or renal mass. Ureters are nondilated with no ureteral stones  
identified. STOMACH: Unremarkable. SMALL BOWEL: No dilatation or wall thickening. COLON: No dilatation or wall thickening. APPENDIX: Unremarkable. PERITONEUM: No ascites or pneumoperitoneum. RETROPERITONEUM: No lymphadenopathy or aortic aneurysm. REPRODUCTIVE ORGANS: Uterus and right ovary are surgically absent. Left ovary  
appears unremarkable. URINARY BLADDER: No mass or calculus. BONES: No destructive bone lesion. ADDITIONAL COMMENTS: N/A Medical Decision Making I am the first provider for this patient. I reviewed the vital signs, available nursing notes, past medical history, past surgical history, family history and social history. Vital Signs-Reviewed the patient's vital signs. Patient Vitals for the past 12 hrs: Temp Pulse Resp BP SpO2  
09/06/18 0353 - 83 18 - 100 % 09/06/18 0330 - - - (!) 132/92 100 % 09/06/18 0327 - - - - 99 % 09/06/18 0326 - - - 123/72 -  
09/06/18 0139 98 °F (36.7 °C) (!) 109 17 (!) 148/110 99 % Pulse Oximetry Analysis - 99% on RA Records Reviewed: Nursing Notes and Old Medical Records Provider Notes (Medical Decision Making):  
Pt has case management plan due to extensive utilization of ED and narcotic dependence. Will follow case management plan and try non-narcotic pain management options first with IV lidocaine for acute pain control, labs and escalate as needed for imaging. Pt has history of hematuria with usually no evidence of ureteral stone. Pt might have hemmorragic cystitis, uti, or stone. However, she is also under significant stress due to impending death of a family member and will be sensitive to her emotional state/needs as well. ED Course:  
Initial assessment performed. The patients presenting problems have been discussed, and they are in agreement with the care plan formulated and outlined with them. I have encouraged them to ask questions as they arise throughout their visit. 5:28 AM 
Case management plan reviewed and followed methodically.  search performed. High ed utilization letter provided. Narcotic prescribing guidelines of [place] provided. 5:51 AM 
Pt is requesting to de discharged. She received a phone call from her mom stating that her grandfather is having labored breathing in hospice care. 5:54 AM 
Pt states her pain was significantly improved on discharge with IV lidocaine. Pt initially stated she had no relief but on reassessment noted her pain was controlled with IV lidocaine. Did not receive narcotics and pain control achieved. Critical Care Time:  
0 Disposition: 
Discharge Note: 
5:58 AM 
The pt is ready for discharge.  The pt's signs, symptoms, diagnosis, and discharge instructions have been discussed and pt has conveyed their understanding. The pt is to follow up as recommended or return to ER should their symptoms worsen. Plan has been discussed and pt is in agreement. PLAN: 
1. Discharge Current Discharge Medication List  
  
 
2. Follow-up Information Follow up With Details Comments Contact Info Susan Perales MD Today As needed FOR ONGOING PAIN MANAGEMENT [address] [facility] 
[phone] [facility] EMERGENCY DEPT  If symptoms worsen [address] [facility] 
[phone] Return to ED if worse Diagnosis Clinical Impression: 1. Flank pain 2. Hematuria, unspecified type 3. Kidney stones 4. Stress due to illness of family member Attestations: This note is prepared by Dayanara Romeo, acting as Scribe for MD Pan Deleon MD: The scribe's documentation has been prepared under my direction and personally reviewed by me in its entirety. I confirm that the note above accurately reflects all work, treatment, procedures, and medical decision making performed by me.

## 2018-09-06 NOTE — ED NOTES
Dr stafford reviewed discharge instructions with the patient. The patient verbalized understanding. All questions and concerns were addressed. The patient declined a wheelchair and is discharged ambulatory in the care of family members with instructions and prescriptions in hand. Pt is alert and oriented x 4. Respirations are clear and unlabored.

## 2019-01-14 ENCOUNTER — HOSPITAL ENCOUNTER (EMERGENCY)
Age: 34
Discharge: HOME | End: 2019-01-14
Attending: EMERGENCY MEDICINE
Payer: COMMERCIAL

## 2019-01-14 VITALS
HEIGHT: 62 IN | BODY MASS INDEX: 41.46 KG/M2 | SYSTOLIC BLOOD PRESSURE: 156 MMHG | OXYGEN SATURATION: 100 % | TEMPERATURE: 98.7 F | DIASTOLIC BLOOD PRESSURE: 87 MMHG | HEART RATE: 105 BPM | WEIGHT: 225.31 LBS | RESPIRATION RATE: 16 BRPM

## 2019-01-14 DIAGNOSIS — Z86.59 HISTORY OF ANXIETY DISORDER: ICD-10-CM

## 2019-01-14 DIAGNOSIS — L03.213 PRESEPTAL CELLULITIS OF LEFT EYE: Primary | ICD-10-CM

## 2019-01-14 PROCEDURE — 99282 EMERGENCY DEPT VISIT SF MDM: CPT

## 2019-01-14 RX ORDER — CLINDAMYCIN HYDROCHLORIDE 300 MG/1
300 CAPSULE ORAL 4 TIMES DAILY
Qty: 40 CAP | Refills: 0 | Status: SHIPPED | OUTPATIENT
Start: 2019-01-14 | End: 2019-01-24

## 2019-01-14 NOTE — ED NOTES
DEISY Patel, [address] reviewed discharge instructions with the patient. The patient verbalized understanding. All questions and concerns were addressed. The patient declined a wheelchair and is discharged ambulatory in the care of family members with instructions and prescriptions in hand. Pt is alert and oriented x 4. Respirations are clear and unlabored.

## 2019-01-14 NOTE — ED PROVIDER NOTES
EMERGENCY DEPARTMENT HISTORY AND PHYSICAL EXAM 
 
 
Date: 1/14/2019 Patient Name: Cortez Acosta History of Presenting Illness Chief Complaint Patient presents with  Eye Swelling  
  pt reports she woke today with swelling above left eye with pain History Provided By: Patient HPI: Cortez Acosta, 26 y.o. female with PMHx significant for obesity, depression, anxiety, and \"lymph node trouble\" presents ambulatory to the ED with cc of left eye swelling and pain to his L outer eyebrow x 4 days. Pt states that first she noticed a small pimple to her lateral L eyebrow which she has tried to pop numerous times. Her mom tried popping it last night and got a scant amount of clear fluid to drain, but otherwise she has not been able to get any significant drainage from the area. Pt reports that since the area was attempted to pop forcefully, she experienced L lower eyelid swelling. She denies any vision changes, eye pain, or injection of her eye. Denies ocular trauma. Does not wear contacts. Denies fever, chills, drainage. PCP: Tammy Hurtado MD 
 
There are no other complaints, changes, or physical findings at this time. Current Outpatient Medications Medication Sig Dispense Refill  clindamycin (CLEOCIN) 300 mg capsule Take 1 Cap by mouth four (4) times daily for 10 days. 40 Cap 0  
 ketorolac (TORADOL) 10 mg tablet Take 1 Tab by mouth every six (6) hours as needed for Pain. 10 Tab 0  
 DULoxetine (CYMBALTA) 30 mg capsule Take 30 mg by mouth daily.  ondansetron hcl (ZOFRAN, AS HYDROCHLORIDE,) 4 mg tablet Take 1 Tab by mouth every eight (8) hours as needed for Nausea. 20 Tab 0  
 lamoTRIgine (LAMICTAL) 25 mg tablet Take 50 mg by mouth daily.  rizatriptan (MAXALT) 10 mg tablet Take 10 mg by mouth once as needed for Migraine. May repeat in 2 hours if needed  belimumab (BENLYSTA) 400 mg solr injection by IntraVENous route.  ALPRAZolam (XANAX) 1 mg tablet Take 1 Tab by mouth nightly. Max Daily Amount: 1 mg. 30 Tab 6  
 omeprazole (PRILOSEC) 20 mg capsule Past History Past Medical History: 
Past Medical History:  
Diagnosis Date  Abnormal CT of the abdomen 11/8/2012  Asthma  Autoimmune disease ([facility] [address] lupus  Cervical prolapse  Dehydration  Gastrointestinal disorder   
 gerd, twisted colon, IBS  GERD (gastroesophageal reflux disease)  Headache(784.0)  Morbid obesity ([address]  Nausea & vomiting  Neurological disorder   
 cluster headaches  other 2006, 2009  
 ovarian cyst removal  
 Other ill-defined conditions(799.89)  Worsening headaches Past Surgical History: 
Past Surgical History:  
Procedure Laterality Date  COLONOSCOPY  9/21/2010  HX CHOLECYSTECTOMY  HX GYN  1/2009  
 right salpingo oopherectomy  HX GYN  2006  
 right ovarian tumor removed  HX HEENT    
 left wisdom teeth removal  
 HX HEENT    
 top right wisdom tooth removed  HX HERNIA REPAIR  4/2012  HX HERNIA REPAIR  2/28/13 Laparoscopic recurrent incisional hernia repair  HX HYSTERECTOMY    
 2016  HX UROLOGICAL Kidney Stone Removal  
 [place] EGD TRANSORAL BIOPSY SINGLE/MULTIPLE  9/22/2010 Family History: 
History reviewed. No pertinent family history. Social History: 
Social History Tobacco Use  Smoking status: Never Smoker  Smokeless tobacco: Never Used Substance Use Topics  Alcohol use: No  
 Drug use: No  
 
 
Allergies: Allergies Allergen Reactions  Latex Itching  
  burning  Compazine [Prochlorperazine] Anxiety High heart rate Pt can take promethazine with no problems  Pcn [Penicillins] Rash and Nausea and Vomiting  Sulfa (Sulfonamide Antibiotics) Unknown (comments)  Topamax [Topiramate] Unknown (comments)  Adhesive Rash Allergic to Dermabond  Mushroom Combination No.1 Unknown (comments)  Toradol [Ketorolac Tromethamine] Nausea and Vomiting and Other (comments) PO & IV causes GI bleed  Valproic Acid Other (comments) Elevated heart rate and vomiting Review of Systems Review of Systems Constitutional: Negative. Negative for activity change, appetite change, chills and fever. Eyes: Negative for pain, discharge, redness, itching and visual disturbance. +L lower eyelid swelling Respiratory: Negative for cough and shortness of breath. Cardiovascular: Negative for chest pain and palpitations. Gastrointestinal: Negative for nausea and vomiting. Skin: Negative for color change, rash and wound. +\"pimple\" Neurological: Negative for dizziness and headaches. All other systems reviewed and are negative. Physical Exam  
Physical Exam  
Constitutional: She is oriented to person, place, and time. She appears well-developed and well-nourished. No distress. 26 y.o.  female in NAD Communicates appropriately and in full sentences HENT:  
Head: Normocephalic and atraumatic. Eyes: Conjunctivae and EOM are normal. Pupils are equal, round, and reactive to light. Left eye exhibits no discharge. No pain with EOMs Mild swelling to L lower eyelid 1 mm pimple to L lateral eyebrow that is indurated. Signs of prior drainage present. No fluctuance, warmth, erythema. Neck: Normal range of motion. Neck supple. No nuchal rigidity or meningeal signs Pulmonary/Chest: Effort normal. No respiratory distress. Musculoskeletal: Normal range of motion. She exhibits no deformity. No neurologic, motor, vascular, or compartment embarrassment observed on exam. No focal neurologic deficits. Neurological: She is alert and oriented to person, place, and time. Skin: Skin is warm and dry. She is not diaphoretic. Psychiatric: Her mood appears anxious. She exhibits a depressed mood. Nursing note and vitals reviewed. Diagnostic Study Results No formal testing initiated. Medical Decision Making I am the first provider for this patient. I reviewed the vital signs, available nursing notes, past medical history, past surgical history, family history and social history. Vital Signs-Reviewed the patient's vital signs. Patient Vitals for the past 12 hrs: 
 Temp Pulse Resp BP SpO2  
01/14/19 0841 98.7 °F (37.1 °C) (!) 105 16 156/87 100 % Records Reviewed: Nursing Notes and Old Medical Records Provider Notes (Medical Decision Making): DDx: preseptal cellulitis, folliculitis, abscess, sebaceous cyst, veyr low suspicion for orbital cellulitis Pt has full EOMs without nystagmus or pain. Pain limited to pimple. No conjunctival erythema or injection - doubt abrasion. Will treat with clindamycin given allergy to sulfa ABX. Provided with ophthalmology follow-up. Reviewed care management and FAVIO document. Pt expresses understanding. Provided customary ED return precautions. ED Course:  
Initial assessment performed. The patients presenting problems have been discussed, and they are in agreement with the care plan formulated and outlined with them. I have encouraged them to ask questions as they arise throughout their visit. DISCHARGE NOTE: 
Titi Marshall's  results have been reviewed with her. She has been counseled regarding her diagnosis. She verbally conveys understanding and agreement of the signs, symptoms, diagnosis, treatment and prognosis and additionally agrees to follow up as recommended with Dr. Virginie Bingham MD in 24 - 48 hours. She also agrees with the care-plan and conveys that all of her questions have been answered.   I have also put together some discharge instructions for her that include: 1) educational information regarding their diagnosis, 2) how to care for their diagnosis at home, as well a 3) list of reasons why they would want to return to the ED prior to their follow-up appointment, should their condition change. She and/or family's questions have been answered. I have encouraged them to see the official results in [facility] Chart\" or to retrieve the specifics of their results from medical records. PLAN: 
1. Return precautions as discussed 2. Follow-up with providers as directed 3. Medications as prescribed Return to ED if worse Diagnosis Clinical Impression: 1. Preseptal cellulitis of left eye 2. History of anxiety disorder Discharge Medication List as of 1/14/2019  9:04 AM  
  
START taking these medications Details  
clindamycin (CLEOCIN) 300 mg capsule Take 1 Cap by mouth four (4) times daily for 10 days. , Normal, Disp-40 Cap, R-0  
  
  
CONTINUE these medications which have NOT CHANGED Details  
ketorolac (TORADOL) 10 mg tablet Take 1 Tab by mouth every six (6) hours as needed for Pain., Normal, Disp-10 Tab, R-0  
  
DULoxetine (CYMBALTA) 30 mg capsule Take 30 mg by mouth daily. , Historical Med  
  
ondansetron hcl (ZOFRAN, AS HYDROCHLORIDE,) 4 mg tablet Take 1 Tab by mouth every eight (8) hours as needed for Nausea., Normal, Disp-20 Tab, R-0  
  
lamoTRIgine (LAMICTAL) 25 mg tablet Take 50 mg by mouth daily. , Historical Med  
  
rizatriptan (MAXALT) 10 mg tablet Take 10 mg by mouth once as needed for Migraine. May repeat in 2 hours if needed, Historical Med  
  
belimumab (BENLYSTA) 400 mg solr injection by IntraVENous route., Historical Med ALPRAZolam (XANAX) 1 mg tablet Take 1 Tab by mouth nightly. Max Daily Amount: 1 mg., Print, Disp-30 Tab, R-6  
  
omeprazole (PRILOSEC) 20 mg capsule Historical Med Follow-up Information Follow up With Specialties Details Why Contact Info Nico Rowley MD Family Practice Schedule an appointment as soon as possible for a visit in 2 days As needed, If symptoms worsen [address] [facility]. [phone] Alejandrina Calhoun MD Ophthalmology Schedule an appointment as soon as possible for a visit in 1 day Possible further evaluation and treatment [facility] [address] [address] [phone] This note will not be viewable in [address]

## 2019-01-14 NOTE — DISCHARGE INSTRUCTIONS
Patient Education        Cellulitis of the Eye: Care Instructions  Your Care Instructions    Cellulitis of the eye is an infection of the skin and tissues around the eye. It is also called preseptal cellulitis or periorbital cellulitis. It is usually caused by bacteria. This type of infection may happen after a sinus infection or a dental infection. It could also happen after an insect bite or an injury to the face. It most often occurs where there is a break in the skin. Cellulitis of the eye can be very serious. It's important to treat it right away. If you do, it usually goes away without lasting problems. Medicine and home treatment can help you get better. Follow-up care is a key part of your treatment and safety. Be sure to make and go to all appointments, and call your doctor if you are having problems. It's also a good idea to know your test results and keep a list of the medicines you take. How can you care for yourself at home? · Take your antibiotics as directed. Do not stop taking them just because you feel better. You need to take the full course of antibiotics. · Do not wear contact lenses unless your doctor tells you it is okay. · Put your head on pillows, and put a cool, damp cloth on your eye. This can reduce swelling and pain. · If your doctor recommends it, use a warm pack on your eye. · Keep the skin around your eye clean and dry. · Be safe with medicines. Read and follow all instructions on the label. ? If the doctor gave you a prescription medicine for pain, take it as prescribed. ? If you are not taking a prescription pain medicine, ask your doctor if you can take an over-the-counter medicine. To prevent cellulitis  · Wash your hands well after you use the bathroom and before and after you eat. · Do not rub or pick at the skin around your eyes. Cellulitis occurs most often where there is a break in the skin.   · If you get a cut, pimple, or insect bite near your eye, clean the area as soon as you can. This can help prevent an infection. ? Wash the area with cool water and a mild soap, such as [place]. ? Do not use rubbing alcohol, hydrogen peroxide, iodine, or Mercurochrome. These can harm the tissues and slow healing. · Call your doctor if you have a sinus infection with redness or swelling of your eyes. When should you call for help? Call your doctor now or seek immediate medical care if:    · You have new or worse signs of an eye infection, such as:  ? Pus or thick discharge coming from the eye.  ? Redness or swelling around the eye.  ? A fever.     · Your eye seems to be bulging out.     · You seem to be getting sicker.     · You have vision changes.    Watch closely for changes in your health, and be sure to contact your doctor if:    · You do not get better as expected. Where can you learn more? Go to http://kymberly-yoon.info/. Enter 494 [id number] in the search box to learn more about \"Cellulitis of the Eye: Care Instructions. \"  Current as of: December 3, 2017  Content Version: 11.8  © 9281-6485 [employer]. Care instructions adapted under license by [employer] (which disclaims liability or warranty for this information). If you have questions about a medical condition or this instruction, always ask your healthcare professional. [address] any warranty or liability for your use of this information. Patient Education        Folliculitis: Care Instructions  Your Care Instructions    Folliculitis (say \"bmh-AKT-nvr-LY-tus\") is an infection of the pouches (follicles) in the skin where hair grows. It can occur on any part of the body, but it is most common on the scalp, face, armpits, and groin. Bacteria, such as those found in a hot tub, can cause folliculitis. Folliculitis begins as a red, tender area near a strand of hair. The skin can itch or burn and may drain pus or blood.  Sometimes folliculitis can lead to more serious skin infections. Your doctor usually can treat mild folliculitis with an antibiotic cream or ointment. If you have folliculitis on your scalp, you may use a shampoo that kills bacteria. Antibiotics you take as pills can treat infections deeper in the skin. For stubborn cases of folliculitis, laser treatment may be an option. Laser treatment uses strong beams of light to destroy the hair follicle. But hair will no longer grow in the treated area. Follow-up care is a key part of your treatment and safety. Be sure to make and go to all appointments, and call your doctor if you are having problems. It's also a good idea to know your test results and keep a list of the medicines you take. How can you care for yourself at home? · Take your medicine exactly as prescribed. If your doctor prescribed antibiotics, take them as directed. Do not stop taking them just because you feel better. You need to take the full course of antibiotics. · Use a soap that kills bacteria to wash the infected area. If your scalp or beard is infected, use a shampoo with selenium or propylene glycol. Be careful. Do not scrub too long or too hard. · Mix 1 1/3 cup warm water and 1 tablespoon vinegar. Soak a cloth in the mixture, and place it over the infected skin until it cools off (usually 5 to 10 minutes). You can do this 3 to 6 times a day. · Do not share your razor, towel, or washcloth. That can spread folliculitis. · Use a new blade in your razor each time you shave to keep from re-infecting your skin. · If you tend to get folliculitis, avoid using hot tubs. They can contain bacteria that cause folliculitis. When should you call for help? Call your doctor now or seek immediate medical care if:    · You have symptoms of infection, such as:  ? Increased pain, swelling, warmth, or redness. ? Red streaks leading from the area. ? Pus draining from the area.   ? A fever.    Watch closely for changes in your health, and be sure to contact your doctor if:    · You do not get better as expected. Where can you learn more? Go to http://kymberly-yoon.info/. Enter M257 in the search box to learn more about \"Folliculitis: Care Instructions. \"  Current as of: April 18, 2018  Content Version: 11.8  © 9819-7158 [employer]. Care instructions adapted under license by [employer] (which disclaims liability or warranty for this information). If you have questions about a medical condition or this instruction, always ask your healthcare professional. [address] any warranty or liability for your use of this information.

## 2019-01-14 NOTE — LETTER
[address] 
[facility] EMERGENCY DEPT 
[address] [place] [address] [address] [id number] [phone] Work/School Note Date: 1/14/2019 To Whom It May concern: 
 
Chanell Bowen was seen and treated today in the emergency room by the following provider(s): 
Attending Provider: Leeanna Last MD 
Physician Assistant: MARNIE Pacheco. Chanell Bowen may return to work on 1/16/2019. Sincerely, 
 
 
 
 
Barb Ochoa.  [place], [place]

## 2019-02-22 ENCOUNTER — OFFICE VISIT (OUTPATIENT)
Dept: INTERNAL MEDICINE CLINIC | Age: 34
End: 2019-02-22

## 2019-02-22 VITALS
HEART RATE: 97 BPM | WEIGHT: 225 LBS | OXYGEN SATURATION: 97 % | TEMPERATURE: 98.9 F | SYSTOLIC BLOOD PRESSURE: 95 MMHG | HEIGHT: 63 IN | BODY MASS INDEX: 39.87 KG/M2 | RESPIRATION RATE: 14 BRPM | DIASTOLIC BLOOD PRESSURE: 65 MMHG

## 2019-02-22 DIAGNOSIS — M32.9 SYSTEMIC LUPUS ERYTHEMATOSUS, UNSPECIFIED SLE TYPE, UNSPECIFIED ORGAN INVOLVEMENT STATUS (HCC): ICD-10-CM

## 2019-02-22 DIAGNOSIS — N20.0 KIDNEY STONES: ICD-10-CM

## 2019-02-22 DIAGNOSIS — E66.01 SEVERE OBESITY (HCC): ICD-10-CM

## 2019-02-22 DIAGNOSIS — G43.809 OTHER MIGRAINE WITHOUT STATUS MIGRAINOSUS, NOT INTRACTABLE: Primary | ICD-10-CM

## 2019-02-22 DIAGNOSIS — A04.72 CLOSTRIDIUM DIFFICILE DIARRHEA: ICD-10-CM

## 2019-02-22 DIAGNOSIS — F32.A ANXIETY AND DEPRESSION: ICD-10-CM

## 2019-02-22 DIAGNOSIS — F41.9 ANXIETY AND DEPRESSION: ICD-10-CM

## 2019-02-22 RX ORDER — MIRTAZAPINE 30 MG/1
TABLET, FILM COATED ORAL
Refills: 0 | COMMUNITY
Start: 2019-02-13 | End: 2019-03-14 | Stop reason: SDUPTHER

## 2019-02-22 RX ORDER — PANTOPRAZOLE SODIUM 40 MG/1
TABLET, DELAYED RELEASE ORAL
Refills: 0 | COMMUNITY
Start: 2019-02-01 | End: 2022-01-29

## 2019-02-22 RX ORDER — BUTALBITAL, ACETAMINOPHEN, CAFFEINE AND CODEINE PHOSPHATE 50; 325; 40; 30 MG/1; MG/1; MG/1; MG/1
CAPSULE ORAL
Refills: 0 | COMMUNITY
Start: 2019-02-01 | End: 2019-02-22 | Stop reason: SDUPTHER

## 2019-02-22 RX ORDER — DULOXETIN HYDROCHLORIDE 60 MG/1
120 CAPSULE, DELAYED RELEASE ORAL DAILY
Refills: 1 | COMMUNITY
Start: 2019-01-24 | End: 2020-05-26 | Stop reason: SDUPTHER

## 2019-02-22 RX ORDER — BUTALBITAL, ACETAMINOPHEN, CAFFEINE AND CODEINE PHOSPHATE 50; 325; 40; 30 MG/1; MG/1; MG/1; MG/1
CAPSULE ORAL
Qty: 90 CAP | Refills: 0 | Status: ON HOLD | OUTPATIENT
Start: 2019-02-22 | End: 2021-08-29

## 2019-02-22 RX ORDER — KETOROLAC TROMETHAMINE 30 MG/ML
INJECTION, SOLUTION INTRAMUSCULAR; INTRAVENOUS
Refills: 0 | COMMUNITY
Start: 2019-01-15 | End: 2019-03-14

## 2019-02-22 RX ORDER — BUTALBITAL, ACETAMINOPHEN, CAFFEINE AND CODEINE PHOSPHATE 50; 325; 40; 30 MG/1; MG/1; MG/1; MG/1
CAPSULE ORAL
Qty: 60 CAP | Refills: 0 | Status: CANCELLED | OUTPATIENT
Start: 2019-02-22

## 2019-02-22 RX ORDER — FIDAXOMICIN 200 MG/1
200 TABLET, FILM COATED ORAL 2 TIMES DAILY
Refills: 0 | COMMUNITY
Start: 2019-02-20 | End: 2019-04-01 | Stop reason: ALTCHOICE

## 2019-02-22 NOTE — PROGRESS NOTES
Reviewed record in preparation for visit and have obtained necessary documentation. Identified pt with two pt identifiers(name and ). Chief Complaint Patient presents with Hiraljennifer Carpenter [facility] Health Maintenance Due Topic Date Due  
 DTaP/Tdap/Td series (1 - Tdap) 2004  PAP AKA CERVICAL CYTOLOGY  2004  Influenza Age 5 to Adult  2018 Ms. Marshall has a reminder for a \"due or due soon\" health maintenance. I have asked that she discuss health maintenance topic(s) due with Her  primary care provider. Coordination of Care Questionnaire: 
:  
 
1) Have you been to an emergency room, urgent care clinic since your last visit? yes Hospitalized since your last visit? yes 2) Have you seen or consulted any other health care providers outside of [address] [place] [address] since your last visit? yes  (Include any pap smears or colon screenings in this section.) 3) Do you have an Advance Directive on file? no 
 
4) Are you interested in receiving information on Advance Directives? NO Patient is accompanied by self I have received verbal consent from Zeenat Phillips to discuss any/all medical information while they are present in the room.

## 2019-02-22 NOTE — PROGRESS NOTES
Kirill Morley is a [address] y.o. female who presents for evaluation of new pt visit. Used to see dr Larisa Almaraz, and has seen him for years, but she now has medicaid, and his group does not take that insurance. Long and complicated past medical history, primarily with SLE and chronic migraines. Was inpt [facility] end of jan to early feb for 5 days with c diff diarrhea. Had been on clinda, cipro, and augmentin shortly before due to cellulitis. Doing much better now regarding her diarrhea. Has been unable to find neurologist though, as none thus far take any new pts with medicaid. Asks for refill of fioricet with codeine. ROS: 
Constitutional: negative for fevers, chills, anorexia and weight loss Eyes:   negative for visual disturbance and irritation ENT:   negative for tinnitus,sore throat,nasal congestion,ear pain,hoarseness Respiratory:  negative for cough, hemoptysis, dyspnea,wheezing CV:   negative for chest pain, palpitations, lower extremity edema GI:   negative for nausea, vomiting, diarrhea, abdominal pain,melena Genitourinary: negative for frequency, dysuria and hematuria Musculoskel: negative for myalgias, arthralgias, back pain, muscle weakness, joint pain Neurological:  negative for headaches, dizziness, focal weakness, numbness Psychiatric:     Negative for depression or anxiety Past Medical History:  
Diagnosis Date  Abnormal CT of the abdomen 11/8/2012  Asthma  Autoimmune disease ([facility] [address] lupus  C. difficile diarrhea  Cervical prolapse  Dehydration  Gastrointestinal disorder   
 gerd, twisted colon, IBS  GERD (gastroesophageal reflux disease)  Headache(784.0)  Lupus  Morbid obesity ([facility] [address]  Nausea & vomiting  Neurological disorder   
 cluster headaches  other 2006, 2009  
 ovarian cyst removal  
 Other ill-defined conditions(799.89)  Worsening headaches Past Surgical History:  
Procedure Laterality Date  COLONOSCOPY  9/21/2010  HX CHOLECYSTECTOMY  HX GYN  1/2009  
 right salpingo oopherectomy  HX GYN  2006  
 right ovarian tumor removed  HX HEENT    
 left wisdom teeth removal  
 HX HEENT    
 top right wisdom tooth removed  HX HERNIA REPAIR  4/2012  HX HERNIA REPAIR  2/28/13 Laparoscopic recurrent incisional hernia repair  HX HYSTERECTOMY    
 2016  HX UROLOGICAL Kidney Stone Removal  
 [place] EGD TRANSORAL BIOPSY SINGLE/MULTIPLE  9/22/2010 Family History Problem Relation Age of Onset  Colon Cancer Maternal Grandfather Social History Socioeconomic History  Marital status: LEGALLY  Spouse name: Not on file  Number of children: Not on file  Years of education: Not on file  Highest education level: Not on file Social Needs  Financial resource strain: Not on file  Food insecurity - worry: Not on file  Food insecurity - inability: Not on file  Transportation needs - medical: Not on file  Transportation needs - non-medical: Not on file Occupational History  Not on file Tobacco Use  Smoking status: Never Smoker  Smokeless tobacco: Never Used Substance and Sexual Activity  Alcohol use: No  
 Drug use: No  
 Sexual activity: Not Currently Partners: Male Birth control/protection: Abstinence Other Topics Concern  Not on file Social History Narrative  Not on file Visit Vitals BP 95/65 (BP 1 Location: Right arm, BP Patient Position: Sitting) Pulse 97 Temp 98.9 °F (37.2 °C) (Oral) Resp 14 Ht 5' 3\" (1.6 m) Wt 225 lb (102.1 kg) LMP 02/17/2016 SpO2 97% BMI 39.86 kg/m² Physical Examination:  
General - Well appearing female HEENT - PERRL, TM no erythema/opacification, normal nasal turbinates, no oropharyngeal erythema or exudate, MMM Neck - supple, no bruits, no thyroidomegaly, no lymphadenopathy Pulm - clear to auscultation bilaterally Cardio - RRR, normal S1 S2, no murmur Abd - soft, nontender, no masses, no HSM Extrem - no edema, +2 distal pulses Neuro-  No focal deficits, CN intact Assessment/Plan: 1. Chronic migraines--refill given for fioricet with codeine. She has prn maxalt as well. Trying to get appt to see neurology, as cervical blocks often help the most. 
2.  c diff diarrhea--continue with dificid 3. SLE--on benlysta, follows with rheum. Dr Giselle Holden 4. Anxiety and depression--continue cymbalta 5. Obesity-- 
6. Hx gastric ulcer--from nsaids, on protonix 7. Hx bilateral kidney stones-- 
 
Get records from recent hospital stay, as well as from prior pcp, dr Venita Mcdaniel. Had complete hysterectomy with cervix removed, so no need for any more pap/pelvics 
 
rtc 6 months Tk Atkinson III, DO

## 2019-02-22 NOTE — PATIENT INSTRUCTIONS
Clostridium Difficile Colitis: Care Instructions Your Care Instructions Clostridium difficile (also called C. difficile) are bacteria that can cause swelling and irritation of the large intestine, or colon. This inflammation is also called colitis. It can cause diarrhea, fever, and belly cramps. You may get C. difficile colitis if you take antibiotics. The infection is most common in people who are taking antibiotics while in the hospital. It is also common in older people in hospitals and nursing homes. Severe disease could cause the colon to swell to many times its normal size (toxic megacolon). This can cause death and needs emergency treatment. You may have a swollen belly that is painful or tender, a rapid heartbeat, and a fever. Follow-up care is a key part of your treatment and safety. Be sure to make and go to all appointments, and call your doctor if you are having problems. It's also a good idea to know your test results and keep a list of the medicines you take. How can you care for yourself at home? · Your doctor may give you antibiotics to treat C. difficile colitis. If your doctor prescribes an antibiotic, he or she will give you a different antibiotic than the one that caused your infection. Take your antibiotics as directed. Do not stop taking them just because you feel better. You need to take the full course of antibiotics. · To prevent dehydration, drink plenty of fluids, enough so that your urine is light yellow or clear like water. Choose water and other caffeine-free clear liquids until you feel better. If you have kidney, heart, or liver disease and have to limit fluids, talk with your doctor before you increase the amount of fluids you drink. · Begin eating small amounts of mild foods, if you feel like it. Try yogurt that has live cultures of lactobacillus (check the label). ? Avoid spicy foods, fruits, alcohol, and caffeine until 48 hours after all symptoms go away. ? Avoid chewing gum that contains sorbitol. ? Avoid dairy products (except for yogurt with lactobacillus) while you have diarrhea and for 3 days after symptoms go away. · To prevent the spread of C. difficile, practice good hygiene. Keep your hands clean by washing them well and often with soap and clean, running water. Alcohol-based hand sanitizers do not kill C. difficile. When should you call for help? Call 911 if: 
  · You passed out (lost consciousness).  
 Call your doctor now or seek immediate medical care if: 
  · You have a fever over 101°F or shaking chills.  
  · You feel lightheaded or have a fast heart rate.  
  · You pass stools that are almost always bloody.  
  · You have signs of needing more fluids. You have sunken eyes and a dry mouth, and you pass only a little dark urine.  
  · You have severe belly pain with or without bloating.  
  · You have severe vomiting and cannot keep down liquids.  
  · You are not passing any stools or gas.  
 Watch closely for changes in your health, and be sure to contact your doctor if: 
  · You do not get better as expected. Where can you learn more? Go to http://kymberly-yoon.info/. Enter [phone] in the search box to learn more about \"Clostridium Difficile Colitis: Care Instructions. \" Current as of: July 30, 2018 Content Version: 11.9 © 4394-8562 [employer], [employer]. Care instructions adapted under license by [employer] (which disclaims liability or warranty for this information). If you have questions about a medical condition or this instruction, always ask your healthcare professional. [place] [address] any warranty or liability for your use of this information. If unable to get an appt with neurology here, would try to see neurology at [facility].

## 2019-03-01 ENCOUNTER — TELEPHONE (OUTPATIENT)
Dept: INTERNAL MEDICINE CLINIC | Age: 34
End: 2019-03-01

## 2019-03-01 NOTE — TELEPHONE ENCOUNTER
Spoke with patient after 2 patient identifiers being note and advised of appt on Monday, March 4, 2019 08:45 AM, patient accpeted. Patient expressed understanding and has no further questions at this time.

## 2019-03-01 NOTE — TELEPHONE ENCOUNTER
#[phone] pt states she woke up dizzy. When she moves her head any way she gets real dizzy feeling. She is ok to walk but feels like it is inner ear maybe. Pt would like to be seen today. Please call to advise. Thanks.

## 2019-03-04 ENCOUNTER — OFFICE VISIT (OUTPATIENT)
Dept: INTERNAL MEDICINE CLINIC | Age: 34
End: 2019-03-04

## 2019-03-04 VITALS
HEIGHT: 63 IN | HEART RATE: 94 BPM | WEIGHT: 225 LBS | TEMPERATURE: 98.5 F | BODY MASS INDEX: 39.87 KG/M2 | OXYGEN SATURATION: 95 % | SYSTOLIC BLOOD PRESSURE: 113 MMHG | RESPIRATION RATE: 16 BRPM | DIASTOLIC BLOOD PRESSURE: 74 MMHG

## 2019-03-04 DIAGNOSIS — M32.9 SYSTEMIC LUPUS ERYTHEMATOSUS, UNSPECIFIED SLE TYPE, UNSPECIFIED ORGAN INVOLVEMENT STATUS (HCC): ICD-10-CM

## 2019-03-04 DIAGNOSIS — R42 VERTIGO: Primary | ICD-10-CM

## 2019-03-04 DIAGNOSIS — F32.A ANXIETY AND DEPRESSION: ICD-10-CM

## 2019-03-04 DIAGNOSIS — F41.9 ANXIETY AND DEPRESSION: ICD-10-CM

## 2019-03-04 RX ORDER — MECLIZINE HCL 12.5 MG 12.5 MG/1
12.5 TABLET ORAL
Qty: 30 TAB | Refills: 0 | Status: SHIPPED | OUTPATIENT
Start: 2019-03-04 | End: 2019-03-14 | Stop reason: SDUPTHER

## 2019-03-04 RX ORDER — ONDANSETRON 4 MG/1
4 TABLET, FILM COATED ORAL
Qty: 20 TAB | Refills: 0 | Status: SHIPPED | OUTPATIENT
Start: 2019-03-04 | End: 2020-01-21 | Stop reason: SDUPTHER

## 2019-03-04 NOTE — PROGRESS NOTES
Identified pt with two pt identifiers(name and ). Reviewed record in preparation for visit and have obtained necessary documentation. Chief Complaint Patient presents with  Dizziness Pt states room spins when bending down, laying down, or bending her hair back for 1 week. Visit Vitals /74 (BP 1 Location: Left arm, BP Patient Position: Sitting) Pulse 94 Temp 98.5 °F (36.9 °C) (Oral) Resp 16 Ht 5' 3\" (1.6 m) Wt 225 lb (102.1 kg) SpO2 95% BMI 39.86 kg/m² Health Maintenance Due Topic  DTaP/Tdap/Td series (1 - Tdap)  Influenza Age 5 to Adult Coordination of Care Questionnaire: 
:  
1) Have you been to an emergency room, urgent care, or hospitalized since your last visit? If yes, where when, and reason for visit? No 
  
 
 
2. Have seen or consulted any other health care provider since your last visit? If yes, where when, and reason for visit? No 
  
 
3) Do you have an Advanced Directive/ Living Will in place? No 
If no, would you like information No 
 
Patient is accompanied by self I have received verbal consent from Tamika Owen to discuss any/all medical information while they are present in the room.

## 2019-03-04 NOTE — PATIENT INSTRUCTIONS
Epley Maneuver at Home for Vertigo: Exercises Your Care Instructions Vertigo is a spinning or whirling sensation when you move your head. Your doctor may have moved you in different positions to help your vertigo get better faster. This is called the Epley maneuver. Your doctor also may have asked you to do these exercises at home. Do the exercises as often as your doctor recommends. If your vertigo is getting worse, your doctor may have you change the exercise or stop it. Step 1 Step 1 1. Sit on the edge of a bed or sofa. Step 2 1. Turn your head 45 degrees in the direction your doctor told you to. This should be toward the ear that causes the most vertigo for you. In this picture, the woman is turning toward her left ear. Step 3 1. Tilt yourself backward until you are lying on your back. Your head should still be at a 45-degree turn. Your head should be about midway between looking straight ahead and looking out to your side. Hold for 30 seconds. If you have vertigo, stay in this position until it stops. Step 4 1. Turn your head 90 degrees toward the ear that has the least vertigo. In this picture, the woman is turning to the right because she has vertigo on her left side. The point of your chin should be raised and over your shoulder. Hold for 30 seconds. Step 5 1. Roll onto the side with the least vertigo. You should now be looking at the floor. Hold for 30 seconds. Follow-up care is a key part of your treatment and safety. Be sure to make and go to all appointments, and call your doctor if you are having problems. It's also a good idea to know your test results and keep a list of the medicines you take. Where can you learn more? Go to http://kymberly-yoon.info/. Enter 470 [id number] in the search box to learn more about \"Epley Maneuver at Home for Vertigo: Exercises. \" Current as of: Yelitza 3, 2018 Content Version: 11.9 © 6303-3419 Healthwise, [employer]AR[employer]. Care instructions adapted under license by [employer] (which disclaims liability or warranty for this information). If you have questions about a medical condition or this instruction, always ask your healthcare professional. [address] any warranty or liability for your use of this information. [place] Exercises for Vertigo: Care Instructions Your Care Instructions Simple exercises can help you regain your balance when you have vertigo. If you have Ménière's disease, benign paroxysmal positional vertigo (BPPV), or another inner ear problem, you may have vertigo off and on. Do these exercises first thing in the morning and before you go to bed. You might get dizzy when you first start them. If this happens, try to do them for at least 5 minutes. Do a group of exercises at a time, starting at the top of the list. It may take several weeks before you can do all the exercises without feeling dizzy. Follow-up care is a key part of your treatment and safety. Be sure to make and go to all appointments, and call your doctor if you are having problems. It's also a good idea to know your test results and keep a list of the medicines you take. How can you care for yourself at home? Exercise 1 While sitting on the side of the bed and holding your head still: 
· Look up as far as you can. · Look down as far as you can. · Look from side to side as far as you can. · Stretch your arm straight out in front of you. Focus on your index finger. Continue to focus on your finger while you bring it to your nose. Exercise 2 While sitting on the side of the bed: · Bring your head as far back as you can. · Bring your head forward to touch your chin to your chest. 
· Turn your head from side to side. · Do these exercises first with your eyes open. Then try with your eyes closed. Exercise 3 While sitting on the side of the bed: · Shrug your shoulders straight upward, then relax them. · Bend over and try to touch the ground with your fingers. Then go back to a sitting position. · Toss a small ball from one hand to the other. Throw the ball higher than your eyes so you have to look up. Exercise 4 While standing (with someone close by if you feel uncomfortable): 
· Repeat Exercise 1. 
· Repeat Exercise 2. 
· Pass a ball between your legs and above your head. · Sit down and then stand up. Repeat. Turn around in a Robinson a different way each time you stand. · With someone close by to help you, try the above exercises with your eyes closed. Exercise 5 In a room that is cleared of obstacles: 
· Walk to a corner of the room, turn to your right, and walk back to the starting point. Now, repeat and turn left. · Walk up and down a slope. Now try stairs. · While holding on to someone's arm, try these exercises with your eyes closed. When should you call for help? Watch closely for changes in your health, and be sure to contact your doctor if: 
  · You do not get better as expected. Where can you learn more? Go to http://kymberly-yoon.info/. Enter D391 in the search box to learn more about \"Cawthorne Exercises for Vertigo: Care Instructions. \" Current as of: March 27, 2018 Content Version: 11.9 © 8483-6751 [employer], Incorporated. Care instructions adapted under license by [employer] (which disclaims liability or warranty for this information). If you have questions about a medical condition or this instruction, always ask your healthcare professional. [address] any warranty or liability for your use of this information. If vertigo has not improved by wed, would call PT to make an appt to see them.

## 2019-03-04 NOTE — PROGRESS NOTES
Terrance Garza is a 26 y.o. female who presents for evaluation of vertigo. Last seen by me feb 22, 2019 in new pt visit. Vertigo started last Sunday, having tough time even getting out of bed. Had similar bout years ago. Also having some nausea. ROS: 
Constitutional: negative for fevers, chills, anorexia and weight loss Eyes:   negative for visual disturbance and irritation ENT:   negative for tinnitus,sore throat,nasal congestion,ear pain,hoarseness Respiratory:  negative for cough, hemoptysis, dyspnea,wheezing CV:   negative for chest pain, palpitations, lower extremity edema GI:   negative for nausea, vomiting, diarrhea, abdominal pain,melena Genitourinary: negative for frequency, dysuria and hematuria Musculoskel: negative for myalgias, arthralgias, back pain, muscle weakness, joint pain Neurological:  negative for headaches, dizziness, focal weakness, numbness Psychiatric:     ++ for depression or anxiety Past Medical History:  
Diagnosis Date  Abnormal CT of the abdomen 11/8/2012  Asthma  Autoimmune disease ([facility] [address] lupus  C. difficile diarrhea  Cervical prolapse  Dehydration  Gastrointestinal disorder   
 gerd, twisted colon, IBS  GERD (gastroesophageal reflux disease)  Headache(784.0)  Lupus  Morbid obesity ([facility] [address]  Nausea & vomiting  Neurological disorder   
 cluster headaches  other 2006, 2009  
 ovarian cyst removal  
 Other ill-defined conditions(799.89)  Worsening headaches Past Surgical History:  
Procedure Laterality Date  COLONOSCOPY  9/21/2010  HX CHOLECYSTECTOMY  HX GYN  1/2009  
 right salpingo oopherectomy  HX GYN  2006  
 right ovarian tumor removed  HX HEENT    
 left wisdom teeth removal  
 HX HEENT    
 top right wisdom tooth removed  HX HERNIA REPAIR  4/2012  HX HERNIA REPAIR  2/28/13 Laparoscopic recurrent incisional hernia repair  HX HYSTERECTOMY    
 2016  HX UROLOGICAL Kidney Stone Removal  
 PA EGD TRANSORAL BIOPSY SINGLE/MULTIPLE  9/22/2010 Family History Problem Relation Age of Onset  Colon Cancer Maternal Grandfather Social History Socioeconomic History  Marital status: LEGALLY  Spouse name: Not on file  Number of children: Not on file  Years of education: Not on file  Highest education level: Not on file Social Needs  Financial resource strain: Not on file  Food insecurity - worry: Not on file  Food insecurity - inability: Not on file  Transportation needs - medical: Not on file  Transportation needs - non-medical: Not on file Occupational History  Not on file Tobacco Use  Smoking status: Never Smoker  Smokeless tobacco: Never Used Substance and Sexual Activity  Alcohol use: No  
 Drug use: No  
 Sexual activity: Not Currently Partners: Male Birth control/protection: Abstinence Other Topics Concern  Not on file Social History Narrative  Not on file Visit Vitals /74 (BP 1 Location: Left arm, BP Patient Position: Sitting) Pulse 94 Temp 98.5 °F (36.9 °C) (Oral) Resp 16 Ht 5' 3\" (1.6 m) Wt 225 lb (102.1 kg) LMP 02/17/2016 SpO2 95% BMI 39.86 kg/m² Physical Examination:  
General - Well appearing female HEENT - PERRL, TM no erythema/opacification, normal nasal turbinates, no oropharyngeal erythema or exudate, MMM.  ++mild horizontal nystagmus Neck - supple, no bruits, no thyroidomegaly, no lymphadenopathy Pulm - clear to auscultation bilaterally Cardio - RRR, normal S1 S2, no murmur Abd - soft, nontender, no masses, no HSM Extrem - no edema, +2 distal pulses Neuro-  No focal deficits, CN intact Assessment/Plan: 1. Vertigo--rx for meclizine. Exercises given. If not improved by wed, she will call PT to work with their vestibular person 2.  c diff diarrhea--remains on dificid. Stools much improved. Has appt with gi on Friday 3. SLE--on benylsta, follows with rheum 4. Anxiety and depression--continue cymbalta, xanax 5. Migraines--supportive care 
 
rtc for regular visit Priya Nazario III, DO

## 2019-03-14 ENCOUNTER — OFFICE VISIT (OUTPATIENT)
Dept: INTERNAL MEDICINE CLINIC | Age: 34
End: 2019-03-14

## 2019-03-14 ENCOUNTER — HOSPITAL ENCOUNTER (OUTPATIENT)
Dept: PHYSICAL THERAPY | Age: 34
End: 2019-03-14

## 2019-03-14 VITALS
HEIGHT: 63 IN | OXYGEN SATURATION: 97 % | HEART RATE: 80 BPM | TEMPERATURE: 98.5 F | BODY MASS INDEX: 39.51 KG/M2 | DIASTOLIC BLOOD PRESSURE: 73 MMHG | WEIGHT: 223 LBS | SYSTOLIC BLOOD PRESSURE: 116 MMHG

## 2019-03-14 DIAGNOSIS — R05.9 COUGH: ICD-10-CM

## 2019-03-14 DIAGNOSIS — F41.9 ANXIETY: ICD-10-CM

## 2019-03-14 DIAGNOSIS — F51.01 PRIMARY INSOMNIA: ICD-10-CM

## 2019-03-14 DIAGNOSIS — M32.9 LUPUS (HCC): ICD-10-CM

## 2019-03-14 DIAGNOSIS — J02.9 PHARYNGITIS, UNSPECIFIED ETIOLOGY: Primary | ICD-10-CM

## 2019-03-14 RX ORDER — ALPRAZOLAM 1 MG/1
1 TABLET ORAL
Qty: 30 TAB | Refills: 0 | Status: SHIPPED | OUTPATIENT
Start: 2019-03-14 | End: 2019-04-15 | Stop reason: SDUPTHER

## 2019-03-14 RX ORDER — CODEINE PHOSPHATE AND GUAIFENESIN 10; 100 MG/5ML; MG/5ML
5 SOLUTION ORAL
Qty: 120 ML | Refills: 0 | Status: SHIPPED | OUTPATIENT
Start: 2019-03-14 | End: 2019-03-17

## 2019-03-14 RX ORDER — MECLIZINE HCL 12.5 MG 12.5 MG/1
12.5 TABLET ORAL
Qty: 30 TAB | Refills: 1 | Status: SHIPPED | OUTPATIENT
Start: 2019-03-14 | End: 2019-04-11 | Stop reason: SDUPTHER

## 2019-03-14 RX ORDER — MIRTAZAPINE 30 MG/1
TABLET, FILM COATED ORAL
Qty: 30 TAB | Refills: 3 | Status: SHIPPED | OUTPATIENT
Start: 2019-03-14 | End: 2019-07-10 | Stop reason: SDUPTHER

## 2019-03-14 NOTE — PROGRESS NOTES
HISTORY OF PRESENT ILLNESS  Leonard Vivas is a 26 y.o. female. HPI   C/o sorethroat x 3 days and dry cough    Has SLE  Just completed dificid for c.diff still has soft stools-had f/u Gi MD and stool c.diff reordered  Needs refill of meclizine for vertigo--will start PT next week  Needs refill of remeron and xanax until can see psych MD    Patient Active Problem List    Diagnosis Date Noted    Severe obesity ([facility] [address] 02/22/2019    Incomplete uterovaginal prolapse 05/17/2016    Migraine with aura and without status migrainosus, not intractable 12/02/2015    Anxiety 12/05/2014    Lupus 12/04/2014    Recurrent ventral incisional hernia 03/01/2013    Ventral hernia 02/25/2013    Abnormal CT of the abdomen 11/08/2012     Current Outpatient Medications   Medication Sig Dispense Refill    mirtazapine (REMERON) 30 mg tablet take 1 tablet by mouth at bedtime 30 Tab 3    ALPRAZolam (XANAX) 1 mg tablet Take 1 Tab by mouth nightly. Max Daily Amount: 1 mg. 30 Tab 0    meclizine (ANTIVERT) 12.5 mg tablet Take 1 Tab by mouth three (3) times daily as needed (vertigo) for up to 10 days. 30 Tab 1    guaiFENesin-codeine (ROBAFEN AC) 100-10 mg/5 mL solution Take 5 mL by mouth three (3) times daily as needed for Cough for up to 3 days. Max Daily Amount: 15 mL. 120 mL 0    ondansetron hcl (ZOFRAN) 4 mg tablet Take 1 Tab by mouth every eight (8) hours as needed for Nausea. 20 Tab 0    DULoxetine (CYMBALTA) 60 mg capsule Take 60 mg by mouth daily. 1    DIFICID 200 mg tab tablet Take 200 mg by mouth two (2) times a day.  0    pantoprazole (PROTONIX) 40 mg tablet take 1 tablet by mouth at bedtime  0    codeine-butalbital-acetaminophen-caffeine (FIORICET WITH CODEINE) -92-30 mg capsule take 2 capsules by mouth every 6 hours if needed for MIGRAINE HEADACHE 90 Cap 0    rizatriptan (MAXALT) 10 mg tablet Take 10 mg by mouth once as needed for Migraine.  May repeat in 2 hours if needed      belimumab (BENLYSTA) 400 mg solr injection by IntraVENous route. Allergies   Allergen Reactions    Latex Itching     burning    Compazine [Prochlorperazine] Anxiety     High heart rate  Pt can take promethazine with no problems    Pcn [Penicillins] Rash and Nausea and Vomiting     Pt states not allergic to penicillin    Sulfa (Sulfonamide Antibiotics) Unknown (comments)    Topamax [Topiramate] Unknown (comments)    Adhesive Rash     Allergic to Dermabond    Clindamycin Diarrhea     Pt states caused her C-Diff    Mushroom Combination No.1 Unknown (comments)    Toradol [Ketorolac Tromethamine] Nausea and Vomiting and Other (comments)     PO & IV causes GI bleed    Valproic Acid Other (comments)     Elevated heart rate and vomiting        Lab Results   Component Value Date/Time    GFR est non-AA >60 09/06/2018 02:03 AM    GFRNA, POC >60 05/17/2016 07:47 AM    GFR est AA >60 09/06/2018 02:03 AM    GFRAA, POC >60 05/17/2016 07:47 AM    Creatinine 0.78 09/06/2018 02:03 AM    Creatinine (POC) 0.7 05/17/2016 07:47 AM    BUN 13 09/06/2018 02:03 AM    BUN (POC) 8 (L) 05/17/2016 07:47 AM    Sodium 139 09/06/2018 02:03 AM    Sodium (POC) 140 05/17/2016 07:47 AM    Potassium 3.7 09/06/2018 02:03 AM    Potassium (POC) 3.8 05/17/2016 07:47 AM    Chloride 108 09/06/2018 02:03 AM    Chloride (POC) 105 05/17/2016 07:47 AM    CO2 24 09/06/2018 02:03 AM    Magnesium 2.1 06/17/2018 11:57 AM        ROS    Physical Exam   Constitutional: She appears well-nourished. HENT:   Mouth/Throat: Oropharynx is clear and moist. No oropharyngeal exudate. Neck: No JVD present. No tracheal deviation present. No thyromegaly present. Cardiovascular: Exam reveals no gallop and no friction rub. No murmur heard. Lymphadenopathy:     She has no cervical adenopathy. ASSESSMENT and PLAN  Diagnoses and all orders for this visit:    1.  Pharyngitis, unspecified etiology  -     AMB POC RAPID STREP A-negative  -     guaiFENesin-codeine (ROBAFEN AC) 100-10 mg/5 mL solution; Take 5 mL by mouth three (3) times daily as needed for Cough for up to 3 days. Max Daily Amount: 15 mL. 2. Primary insomnia  -     ALPRAZolam (XANAX) 1 mg tablet; Take 1 Tab by mouth nightly. Max Daily Amount: 1 mg.    3. Anxiety  -     ALPRAZolam (XANAX) 1 mg tablet; Take 1 Tab by mouth nightly. Max Daily Amount: 1 mg.    4. Lupus    5. Cough  -     guaiFENesin-codeine (ROBAFEN AC) 100-10 mg/5 mL solution; Take 5 mL by mouth three (3) times daily as needed for Cough for up to 3 days. Max Daily Amount: 15 mL.  6. C.diff   Pt will f/u Dr Nam Le  Other orders  -     mirtazapine (REMERON) 30 mg tablet; take 1 tablet by mouth at bedtime  -     meclizine (ANTIVERT) 12.5 mg tablet; Take 1 Tab by mouth three (3) times daily as needed (vertigo) for up to 10 days.       Follow-up Disposition: Not on File

## 2019-03-14 NOTE — PROGRESS NOTES
Chief Complaint   Patient presents with    Sore Throat     center of throat, Per patient nauesa and vomiting  last week    Cough     Non productive cough    Dizziness     Dx vertigo    Medication Refill     requesting Sendy Handy

## 2019-03-19 ENCOUNTER — TELEPHONE (OUTPATIENT)
Dept: INTERNAL MEDICINE CLINIC | Age: 34
End: 2019-03-19

## 2019-03-19 NOTE — TELEPHONE ENCOUNTER
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 Dagmar Kowalski states she needs a call back in reference to getting an antibiotic prescribed as she is not feeling any better & also to get a refill on cough medication. Please call.  Thank you

## 2019-03-20 DIAGNOSIS — R05.9 COUGH: Primary | ICD-10-CM

## 2019-03-20 RX ORDER — CODEINE PHOSPHATE AND GUAIFENESIN 10; 100 MG/5ML; MG/5ML
5 SOLUTION ORAL
Qty: 120 ML | Refills: 0 | Status: SHIPPED | OUTPATIENT
Start: 2019-03-20 | End: 2019-03-27

## 2019-03-20 RX ORDER — AZITHROMYCIN 250 MG/1
250 TABLET, FILM COATED ORAL SEE ADMIN INSTRUCTIONS
Qty: 6 TAB | Refills: 0 | Status: SHIPPED | OUTPATIENT
Start: 2019-03-20 | End: 2019-03-25

## 2019-03-20 NOTE — TELEPHONE ENCOUNTER
#[phone] pt states she is still waiting to hear about the antibiotic and cough medication requested.   Please call pt today

## 2019-03-20 NOTE — TELEPHONE ENCOUNTER
Spoke with patient after 2 patient identifiers being note and advised per Dr. Abel Sarabia that ABT and cough syrup was sent the pharmacy on file. Patient expressed understanding and has no further questions at this time.

## 2019-03-20 NOTE — TELEPHONE ENCOUNTER
Unable to reach patient LVM to return call.  Want tot tell her she should have two days left of cough medication, and that if she needs abt she needs to be seen

## 2019-03-29 DIAGNOSIS — R05.9 COUGH: Primary | ICD-10-CM

## 2019-04-01 ENCOUNTER — OFFICE VISIT (OUTPATIENT)
Dept: INTERNAL MEDICINE CLINIC | Age: 34
End: 2019-04-01

## 2019-04-01 VITALS
TEMPERATURE: 98.2 F | WEIGHT: 227 LBS | BODY MASS INDEX: 40.22 KG/M2 | OXYGEN SATURATION: 98 % | DIASTOLIC BLOOD PRESSURE: 79 MMHG | SYSTOLIC BLOOD PRESSURE: 116 MMHG | HEART RATE: 105 BPM | RESPIRATION RATE: 16 BRPM | HEIGHT: 63 IN

## 2019-04-01 DIAGNOSIS — R05.9 COUGH: ICD-10-CM

## 2019-04-01 DIAGNOSIS — J02.9 SORE THROAT: ICD-10-CM

## 2019-04-01 DIAGNOSIS — J01.10 SUBACUTE FRONTAL SINUSITIS: Primary | ICD-10-CM

## 2019-04-01 DIAGNOSIS — M32.9 LUPUS (HCC): ICD-10-CM

## 2019-04-01 LAB
S PYO AG THROAT QL: NEGATIVE
VALID INTERNAL CONTROL?: YES

## 2019-04-01 RX ORDER — CYCLOBENZAPRINE HCL 10 MG
TABLET ORAL
Refills: 1 | COMMUNITY
Start: 2019-03-14 | End: 2019-04-01

## 2019-04-01 RX ORDER — MECLIZINE HCL 12.5 MG 12.5 MG/1
25 TABLET ORAL
COMMUNITY
Start: 2019-03-24 | End: 2019-10-24

## 2019-04-01 RX ORDER — CODEINE PHOSPHATE AND GUAIFENESIN 10; 100 MG/5ML; MG/5ML
5 SOLUTION ORAL
Qty: 120 ML | Refills: 0 | Status: SHIPPED | OUTPATIENT
Start: 2019-04-01 | End: 2019-04-08

## 2019-04-01 RX ORDER — INDOMETHACIN 25 MG/1
CAPSULE ORAL
Status: ON HOLD | COMMUNITY
Start: 2019-03-29 | End: 2021-08-29

## 2019-04-01 RX ORDER — AMOXICILLIN AND CLAVULANATE POTASSIUM 875; 125 MG/1; MG/1
1 TABLET, FILM COATED ORAL EVERY 12 HOURS
Qty: 20 TAB | Refills: 0 | Status: SHIPPED | OUTPATIENT
Start: 2019-04-01 | End: 2019-04-11

## 2019-04-01 NOTE — PATIENT INSTRUCTIONS
Saline Nasal Washes: Care Instructions Your Care Instructions Saline nasal washes help keep the nasal passages open by washing out thick or dried mucus. This simple remedy can help relieve symptoms of allergies, sinusitis, and colds. It also can make the nose feel more comfortable by keeping the mucous membranes moist. You may notice a little burning sensation in your nose the first few times you use the solution, but this usually gets better in a few days. Follow-up care is a key part of your treatment and safety. Be sure to make and go to all appointments, and call your doctor if you are having problems. It's also a good idea to know your test results and keep a list of the medicines you take. How can you care for yourself at home? · You can buy premixed saline solution in a squeeze bottle or other sinus rinse products at a drugstore. Read and follow the instructions on the label. · You also can make your own saline solution by adding 1 teaspoon of salt and 1 teaspoon of baking soda to 2 cups of distilled water. · If you use a homemade solution, pour a small amount into a clean bowl. Using a rubber bulb syringe, squeeze the syringe and place the tip in the salt water. Pull a small amount of the salt water into the syringe by relaxing your hand. · Sit down with your head tilted slightly back. Do not lie down. Put the tip of the bulb syringe or the squeeze bottle a little way into one of your nostrils. Gently drip or squirt a few drops into the nostril. Repeat with the other nostril. Some sneezing and gagging are normal at first. 
· Gently blow your nose. · Wipe the syringe or bottle tip clean after each use. · Repeat this 2 or 3 times a day. · Use nasal washes gently if you have nosebleeds often. When should you call for help? Watch closely for changes in your health, and be sure to contact your doctor if: 
  · You often get nosebleeds.  
  · You have problems doing the nasal washes. Where can you learn more? Go to http://kymberly-yoon.info/. Enter [id number] in the search box to learn more about \"Saline Nasal Washes: Care Instructions. \" Current as of: March 27, 2018 Content Version: 11.9 © 6789-3078 [employer]. Care instructions adapted under license by [employer] (which disclaims liability or warranty for this information). If you have questions about a medical condition or this instruction, always ask your healthcare professional. [address] any warranty or liability for your use of this information. Sinusitis: Care Instructions Your Care Instructions Sinusitis is an infection of the lining of the sinus cavities in your head. Sinusitis often follows a cold. It causes pain and pressure in your head and face. In most cases, sinusitis gets better on its own in 1 to 2 weeks. But some mild symptoms may last for several weeks. Sometimes antibiotics are needed. Follow-up care is a key part of your treatment and safety. Be sure to make and go to all appointments, and call your doctor if you are having problems. It's also a good idea to know your test results and keep a list of the medicines you take. How can you care for yourself at home? · Take an over-the-counter pain medicine, such as acetaminophen (Tylenol), ibuprofen (Advil, Motrin), or naproxen (Aleve). Read and follow all instructions on the label. · If the doctor prescribed antibiotics, take them as directed. Do not stop taking them just because you feel better. You need to take the full course of antibiotics. · Be careful when taking over-the-counter cold or flu medicines and Tylenol at the same time. Many of these medicines have acetaminophen, which is Tylenol. Read the labels to make sure that you are not taking more than the recommended dose. Too much acetaminophen (Tylenol) can be harmful. · Breathe warm, moist air from a steamy shower, a hot bath, or a sink filled with hot water. Avoid cold, dry air. Using a humidifier in your home may help. Follow the directions for cleaning the machine. · Use saline (saltwater) nasal washes to help keep your nasal passages open and wash out mucus and bacteria. You can buy saline nose drops at a grocery store or drugstore. Or you can make your own at home by adding 1 teaspoon of salt and 1 teaspoon of baking soda to 2 cups of distilled water. If you make your own, fill a bulb syringe with the solution, insert the tip into your nostril, and squeeze gently. Duana Glow your nose. · Put a hot, wet towel or a warm gel pack on your face 3 or 4 times a day for 5 to 10 minutes each time. · Try a decongestant nasal spray like oxymetazoline (Afrin). Do not use it for more than 3 days in a row. Using it for more than 3 days can make your congestion worse. When should you call for help? Call your doctor now or seek immediate medical care if: 
  · You have new or worse swelling or redness in your face or around your eyes.  
  · You have a new or higher fever.  
 Watch closely for changes in your health, and be sure to contact your doctor if: 
  · You have new or worse facial pain.  
  · The mucus from your nose becomes thicker (like pus) or has new blood in it.  
  · You are not getting better as expected. Where can you learn more? Go to http://kymberly-yoon.info/. Enter T847 in the search box to learn more about \"Sinusitis: Care Instructions. \" Current as of: March 27, 2018 Content Version: 11.9 © 5969-7283 [employer]. Care instructions adapted under license by [employer] (which disclaims liability or warranty for this information).  If you have questions about a medical condition or this instruction, always ask your healthcare professional. [address] any warranty or liability for your use of this information. Try xyzal to help with possibility of seasonal allergies. Continue flonase. Continue probiotic and activia.

## 2019-04-01 NOTE — PROGRESS NOTES
Reviewed record in preparation for visit and have obtained necessary documentation. Identified pt with two pt identifiers(name and ). Chief Complaint Patient presents with  Cough  
  x2 weeks  Sore Throat  Nasal Congestion  Ear Pain  
  bilateral  
 
 
Health Maintenance Due Topic Date Due  
 DTaP/Tdap/Td series (1 - Tdap) 2004 Ms. Marshall has a reminder for a \"due or due soon\" health maintenance. I have asked that she discuss health maintenance topic(s) due with Her  primary care provider. Coordination of Care Questionnaire: 
:  
 
1) Have you been to an emergency room, urgent care clinic since your last visit? no  
Hospitalized since your last visit? no          
 
2) Have you seen or consulted any other health care providers outside of [address] [place] [address] since your last visit? no  (Include any pap smears or colon screenings in this section.) 3) Do you have an Advance Directive on file? no 
 
4) Are you interested in receiving information on Advance Directives? NO Patient is accompanied by self I have received verbal consent from Ashley López to discuss any/all medical information while they are present in the room.

## 2019-04-01 NOTE — PROGRESS NOTES
Dominic Avelar is a 26 y.o. female who presents for evaluation of continued uri and sinusitis. Last seen by me march 4, 2019 for vertigo, which has basically resolved. Saw dr Yan Guillory here march 14 for cough. rx given for zpak and cherritussin. Got better briefly, but cough and sore throat and sinus congestion has returned and worsened despite prior interventions. Has been unable to get her benlysta infusion for her SLE due to being on abx. Has finished her dificid though for her prior c diff diarrhea infection, thought due to clindamycin. Bowels much improved, no more diarrhea. ROS: 
Constitutional: negative for fevers, chills, anorexia and weight loss Eyes:   negative for visual disturbance and irritation ENT:   negative for tinnitus,ear pain,hoarseness. ++sore throat, sinus congestion. Respiratory:  negative for  hemoptysis, dyspnea,wheezing.  ++cough CV:   negative for chest pain, palpitations, lower extremity edema GI:   negative for nausea, vomiting, diarrhea, abdominal pain,melena Genitourinary: negative for frequency, dysuria and hematuria Musculoskel: negative for myalgias, arthralgias, back pain, muscle weakness, joint pain Neurological:  negative for headaches, dizziness, focal weakness, numbness Psychiatric:     Negative for depression or anxiety Past Medical History:  
Diagnosis Date  Abnormal CT of the abdomen 11/8/2012  Asthma  Autoimmune disease ([address] lupus  C. difficile diarrhea  Cervical prolapse  Dehydration  Gastrointestinal disorder   
 gerd, twisted colon, IBS  GERD (gastroesophageal reflux disease)  Headache(784.0)  Lupus ([address]  Morbid obesity ([address]  Nausea & vomiting  Neurological disorder   
 cluster headaches  other 2006, 2009  
 ovarian cyst removal  
 Other ill-defined conditions(799.89)  Worsening headaches Past Surgical History:  
Procedure Laterality Date  COLONOSCOPY  9/21/2010  HX CHOLECYSTECTOMY  HX GYN  1/2009  
 right salpingo oopherectomy  HX GYN  2006  
 right ovarian tumor removed  HX HEENT    
 left wisdom teeth removal  
 HX HEENT    
 top right wisdom tooth removed  HX HERNIA REPAIR  4/2012  HX HERNIA REPAIR  2/28/13 Laparoscopic recurrent incisional hernia repair  HX HYSTERECTOMY    
 2016  HX UROLOGICAL Kidney Stone Removal  
 IN EGD TRANSORAL BIOPSY SINGLE/MULTIPLE  9/22/2010 Family History Problem Relation Age of Onset  Colon Cancer Maternal Grandfather Social History Socioeconomic History  Marital status: LEGALLY  Spouse name: Not on file  Number of children: Not on file  Years of education: Not on file  Highest education level: Not on file Occupational History  Not on file Social Needs  Financial resource strain: Not on file  Food insecurity:  
  Worry: Not on file Inability: Not on file  Transportation needs:  
  Medical: Not on file Non-medical: Not on file Tobacco Use  Smoking status: Never Smoker  Smokeless tobacco: Never Used Substance and Sexual Activity  Alcohol use: No  
 Drug use: No  
 Sexual activity: Not Currently Partners: Male Birth control/protection: Abstinence Lifestyle  Physical activity:  
  Days per week: Not on file Minutes per session: Not on file  Stress: Not on file Relationships  Social connections:  
  Talks on phone: Not on file Gets together: Not on file Attends Advent service: Not on file Active member of club or organization: Not on file Attends meetings of clubs or organizations: Not on file Relationship status: Not on file  Intimate partner violence:  
  Fear of current or ex partner: Not on file Emotionally abused: Not on file Physically abused: Not on file Forced sexual activity: Not on file Other Topics Concern  Not on file Social History Narrative  Not on file Visit Vitals /79 (BP 1 Location: Left arm, BP Patient Position: Sitting) Pulse (!) 105 Temp 98.2 °F (36.8 °C) (Oral) Resp 16 Ht 5' 3\" (1.6 m) Wt 227 lb (103 kg) LMP 02/17/2016 SpO2 98% BMI 40.21 kg/m² Physical Examination:  
General - Well appearing female HEENT - PERRL, TM no erythema/opacification, normal nasal turbinates, no oropharyngeal erythema or exudate, MMM. Ears clear Neck - supple, no bruits, no thyroidomegaly, no lymphadenopathy Pulm - clear to auscultation bilaterally Cardio - RRR, normal S1 S2, no murmur Abd - soft, nontender, no masses, no HSM Extrem - no edema, +2 distal pulses Neuro-  No focal deficits, CN intact Rapid strep negative Assessment/Plan: 1. Sinusitis--rx for augmentin. Would also start xyzal to help with any allergy component. 2.  Cough from pnd--refill given for cherritussin 3. sle--hopefully can get off abx and get her next injection of benlysta 4.  c diff diarrhea--resolved, off dificid 5. Migraines--prn maxalt. Still trying to get in to see neurology 
 
rtc 3 months Argentina Ruiz III, DO

## 2019-04-11 RX ORDER — MECLIZINE HCL 12.5 MG 12.5 MG/1
TABLET ORAL
Qty: 30 TAB | Refills: 1 | Status: SHIPPED | OUTPATIENT
Start: 2019-04-11 | End: 2019-06-11 | Stop reason: SDUPTHER

## 2019-04-15 DIAGNOSIS — F41.9 ANXIETY: ICD-10-CM

## 2019-04-15 DIAGNOSIS — F51.01 PRIMARY INSOMNIA: ICD-10-CM

## 2019-04-15 RX ORDER — ALPRAZOLAM 1 MG/1
TABLET ORAL
Qty: 30 TAB | Refills: 0 | Status: SHIPPED | OUTPATIENT
Start: 2019-04-15 | End: 2020-03-30 | Stop reason: SDUPTHER

## 2019-04-26 ENCOUNTER — TELEPHONE (OUTPATIENT)
Dept: INTERNAL MEDICINE CLINIC | Age: 34
End: 2019-04-26

## 2019-04-26 DIAGNOSIS — G43.809 OTHER MIGRAINE WITHOUT STATUS MIGRAINOSUS, NOT INTRACTABLE: ICD-10-CM

## 2019-04-26 RX ORDER — BUTALBITAL, ACETAMINOPHEN, CAFFEINE AND CODEINE PHOSPHATE 50; 325; 40; 30 MG/1; MG/1; MG/1; MG/1
1 CAPSULE ORAL
Qty: 90 CAP | Refills: 0 | Status: CANCELLED | OUTPATIENT
Start: 2019-04-26 | End: 2019-05-26

## 2019-04-26 NOTE — TELEPHONE ENCOUNTER
Called, spoke to pt. Two identifiers confirmed. Pt stated she fell going up the stairs and landed on her knee. Notified pt of no availability today. Recommended pt go to [facility]. Pt verbalized understanding of information discussed w/ no further questions at this time. Pt also requesting a refill on Fioricet with codeine. Pt stated she is currently working with a  to help her find a neurologist.   Pt stated her insurance is not accepted by many of the neurologist in the area and most are not accepting new patients. Notified pt I would send request to Dr. Swathi Sarabia. Pt verbalized understanding of information discussed w/ no further questions at this time.

## 2019-04-26 NOTE — TELEPHONE ENCOUNTER
#[phone] pt needs to be seen for knee pain/high level 9/10 Please call pt. Pt fell and that is why knee hurts so bad. She has tried everything and nothing is helping the pain. She needs it checked she states.

## 2019-04-29 DIAGNOSIS — G43.809 OTHER MIGRAINE WITHOUT STATUS MIGRAINOSUS, NOT INTRACTABLE: ICD-10-CM

## 2019-04-29 RX ORDER — BUTALBITAL, ACETAMINOPHEN, CAFFEINE AND CODEINE PHOSPHATE 50; 325; 40; 30 MG/1; MG/1; MG/1; MG/1
CAPSULE ORAL
Qty: 90 CAP | Refills: 0 | OUTPATIENT
Start: 2019-04-29

## 2019-04-29 NOTE — TELEPHONE ENCOUNTER
Duplicate encounter. Patient has already been advised narcotics will not be prescribed & will need to see neurology.

## 2019-04-29 NOTE — TELEPHONE ENCOUNTER
Informed patient of Dr. Fiona Upton recommendations & reaching out to [facility]. This has been fully explained to the patient, who indicates understanding.

## 2019-05-07 ENCOUNTER — TELEPHONE (OUTPATIENT)
Dept: INTERNAL MEDICINE CLINIC | Age: 34
End: 2019-05-07

## 2019-05-07 DIAGNOSIS — G43.809 OTHER MIGRAINE WITHOUT STATUS MIGRAINOSUS, NOT INTRACTABLE: Primary | ICD-10-CM

## 2019-05-07 RX ORDER — KETOROLAC TROMETHAMINE 30 MG/ML
30 INJECTION, SOLUTION INTRAMUSCULAR; INTRAVENOUS ONCE
Qty: 12 VIAL | Refills: 0 | Status: CANCELLED
Start: 2019-05-07 | End: 2019-05-07

## 2019-05-07 NOTE — TELEPHONE ENCOUNTER
Patient states she needs a call back asap to get an appt sooner than available for Migraines & discuss medication. Patient states she can not get in with her Neurologist until the End of May & needs to be seen her her PCP, Dr. Manny Delgadillo this week please. Please call to discuss.  Thank you

## 2019-05-07 NOTE — TELEPHONE ENCOUNTER
Called, spoke to pt. Two identifiers confirmed. Pt stated she cannot see her neurologist until 5/30. Pt is requesting a refill on her toradol to get her thru until her appointment with neuro. Notified pt I would send message to Dr. Manny Delgadillo  Pt verbalized understanding of information discussed w/ no further questions at this time.

## 2019-05-08 NOTE — TELEPHONE ENCOUNTER
Thanh Mackay III, DO  You 19 hours ago (1:14 PM)     No toradol.  See neurology. lili Zuniga, spoke to pt. Two identifiers confirmed. Notified pt of Dr. Jaimes Handler recommendations. Pt verbalized understanding of information discussed w/ no further questions at this time.

## 2019-05-17 ENCOUNTER — TELEPHONE (OUTPATIENT)
Dept: INTERNAL MEDICINE CLINIC | Age: 34
End: 2019-05-17

## 2019-05-17 DIAGNOSIS — F41.9 ANXIETY: ICD-10-CM

## 2019-05-17 DIAGNOSIS — F51.01 PRIMARY INSOMNIA: ICD-10-CM

## 2019-05-17 RX ORDER — ALPRAZOLAM 1 MG/1
TABLET ORAL
Qty: 30 TAB | Refills: 0 | OUTPATIENT
Start: 2019-05-17

## 2019-05-17 RX ORDER — BUSPIRONE HYDROCHLORIDE 5 MG/1
5 TABLET ORAL 2 TIMES DAILY
Qty: 60 TAB | Refills: 11 | Status: SHIPPED | OUTPATIENT
Start: 2019-05-17 | End: 2019-10-24

## 2019-05-17 NOTE — TELEPHONE ENCOUNTER
Called, spoke to pt. Two identifiers confirmed. Pt stated she has had a fever for a couple days now as well as a sore throat. Notified pt of no availability for the rest of the day d/t almost closing time. Urged pt to go to urgent care. Pt stated she has medicaid and urgent care does not accept her insurance. Notified pt to go to [facility] urgent care. Pt verbalized understanding of information discussed w/ no further questions at this time.

## 2019-05-17 NOTE — TELEPHONE ENCOUNTER
Called, spoke to pt. Two identifiers confirmed. Notified pt of Dr. Rupa Elkins recommendations. Pt stated she would like to try Buspar. Notified pt I would send request to Dr. Graciela Archer. Pt verbalized understanding of information discussed w/ no further questions at this time.

## 2019-05-17 NOTE — TELEPHONE ENCOUNTER
#[phone] pt states she has been running a fever and has a sore throat. Pt would like to be seen as very soon as possible. Please call yet today if you can she ask. Thanks.    Pt would see Dr. Bryant Jo as well

## 2019-05-17 NOTE — TELEPHONE ENCOUNTER
This is an 'as needed' medicine. 30 pills should last at least 3 months. Otherwise, you need to be on a maintenance medicine for your anxiety like buspar, which I am happy to order for you if you'd like.

## 2019-06-11 RX ORDER — MECLIZINE HCL 12.5 MG 12.5 MG/1
TABLET ORAL
Qty: 30 TAB | Refills: 1 | Status: SHIPPED | OUTPATIENT
Start: 2019-06-11 | End: 2020-03-10 | Stop reason: ALTCHOICE

## 2019-06-17 ENCOUNTER — TELEPHONE (OUTPATIENT)
Dept: INTERNAL MEDICINE CLINIC | Age: 34
End: 2019-06-17

## 2019-06-17 NOTE — TELEPHONE ENCOUNTER
Called, spoke to pt. Two identifiers confirmed. Pt c/o sore throat, congestion and productive cough. Appointment scheduled for 6/19 @ 1115. Pt verbalized understanding of information discussed w/ no further questions at this time. Pt was offered an appointment today. Pt declined.

## 2019-06-17 NOTE — TELEPHONE ENCOUNTER
Requesting the first available appt due to sore throat, head congestion x 1 wk and possible strep throat.       Best contact: [phone]         Message received & copied from [facility]

## 2019-07-11 RX ORDER — MIRTAZAPINE 30 MG/1
TABLET, FILM COATED ORAL
Qty: 30 TAB | Refills: 3 | Status: SHIPPED | OUTPATIENT
Start: 2019-07-11 | End: 2019-11-07 | Stop reason: SDUPTHER

## 2019-08-13 ENCOUNTER — OFFICE VISIT (OUTPATIENT)
Dept: DERMATOLOGY | Facility: AMBULATORY SURGERY CENTER | Age: 34
End: 2019-08-13

## 2019-08-13 VITALS
DIASTOLIC BLOOD PRESSURE: 68 MMHG | WEIGHT: 227 LBS | HEART RATE: 67 BPM | RESPIRATION RATE: 14 BRPM | BODY MASS INDEX: 40.22 KG/M2 | TEMPERATURE: 98.7 F | SYSTOLIC BLOOD PRESSURE: 122 MMHG | OXYGEN SATURATION: 98 % | HEIGHT: 63 IN

## 2019-08-13 DIAGNOSIS — L02.91 ABSCESS: Primary | ICD-10-CM

## 2019-08-13 RX ORDER — DULOXETIN HYDROCHLORIDE 60 MG/1
60 CAPSULE, DELAYED RELEASE ORAL DAILY
COMMUNITY
End: 2019-10-24 | Stop reason: SDUPTHER

## 2019-08-13 RX ORDER — MIRTAZAPINE 30 MG/1
30 TABLET, FILM COATED ORAL DAILY
COMMUNITY
End: 2020-03-10 | Stop reason: SDUPTHER

## 2019-08-13 RX ORDER — ESTRADIOL 1 MG/1
1 TABLET ORAL DAILY
Refills: 0 | Status: ON HOLD | COMMUNITY
Start: 2019-07-19 | End: 2021-08-29

## 2019-08-13 RX ORDER — DOXYCYCLINE 100 MG/1
100 CAPSULE ORAL 2 TIMES DAILY
Qty: 60 CAP | Refills: 0 | Status: SHIPPED | OUTPATIENT
Start: 2019-08-13 | End: 2019-09-12

## 2019-08-13 RX ORDER — DOXYCYCLINE 100 MG/1
100 CAPSULE ORAL AS NEEDED
Refills: 0 | COMMUNITY
Start: 2019-08-11 | End: 2020-09-06

## 2019-08-13 NOTE — PROGRESS NOTES
Janina Guadalupe is a 26 y.o. female presents to the office today with the following:  Chief Complaint   Patient presents with    Skin Exam     Cysts on face and under left arm of unknown origin       77-year-old  female presents for evaluation and treatment of a tender swelling on the right side of her face. It is been present, reportedly, for the past 4 to 5 days and has been getting worse. She went to the ER on Saturday and was given a short course of doxycycline but no further treatment. She reports that her mother squeezed the lesion and was able to express a small amount of pus earlier in the course. Currently the swelling is very tender and the redness seems to be increasing. She additionally has tenderness down the right side of her neck. The patient also notes that she has had lesions in her left axilla during approximately the same time period. 1 of these was lanced in the ER and she notes that significant amount of pus was expressed. These have otherwise not been treated but they are quite tender. She has some dental problems but none on the side in question. She does have a history of lupus and is being treated for that by a rheumatologist.      Physical Exam   Constitutional: She is oriented to person, place, and time and well-developed, well-nourished, and in no distress. HENT:   Head: Normocephalic. Eyes: EOM are normal.   Pulmonary/Chest: Effort normal.   Neurological: She is alert and oriented to person, place, and time. Skin:   On the right cheek on the right lateral cheek there is a crusted ulcer with surrounding edematous plaque. There is tender lymphadenopathy of the right submandibular nodes. In the left axilla there are several subcutaneous nodules and pustules. 1. Abscess  Incision and drainage of the abscess was performed today. The lesion was anesthetized with 1% lidocaine with 1: 100,000 epinephrine and prepped with chlorhexidine.   Once the chlorhexidine had tried a #15 blade was used to sharply incise into the center of the lesion. A wound culture swab was obtained using the contents of the abscess. Devitalized tissue was sharply debrided. The wound was packed with Xeroform gauze. The patient will have her mother, who is a nurse practitioner, change the packing daily until her next visit next Friday. I will extend her course of doxycycline out to 30 days. - doxycycline (MONODOX) 100 mg capsule; Take 1 Cap by mouth two (2) times a day for 30 days. Dispense: 60 Cap; Refill: 0  - AEROBIC BACTERIAL CULTURE            2.  Hidradenitis suppurativa  Although relatively acute in onset this patient's lesions are consistent with hidradenitis. An initial course of doxycycline with chlorhexidine washes will be initiated at this visit. Further treatment will depend on her response to this. She was counseled regarding the idiopathic nature of the disease and the frequent chronicity of the disease. She was also counseled that it is unlikely related to her lupus. Follow-up and Dispositions    · Return in about 10 days (around 8/23/2019) for Wound check.          Monty Barboza MD

## 2019-08-13 NOTE — PATIENT INSTRUCTIONS
Chief Complaint   Patient presents with    Skin Exam     Cysts on face and under left arm of unknown origin     Remove bandage, use saline to dampen approximately 6 inches of sterile packing strip. Pack strip into wound, trim if needed. Cover wound with bandaid, or telfa and medical tape. Wash with chlorhexidine gluconate from neck down, 1 time a week for 1 month.

## 2019-08-18 LAB — BACTERIA SPEC AEROBE CULT: ABNORMAL

## 2019-08-19 ENCOUNTER — TELEPHONE (OUTPATIENT)
Dept: DERMATOLOGY | Facility: AMBULATORY SURGERY CENTER | Age: 34
End: 2019-08-19

## 2019-08-19 NOTE — TELEPHONE ENCOUNTER
8:46 AM  RN left  requesting return call to [facility]. This is regarding patient's culture indicating MRSA.

## 2019-08-19 NOTE — PROGRESS NOTES
Please that the patient know that the results from her wound culture have returned and show MRSA. The bacteria is sensitive to doxycycline which is the medication she is on. No further treatment is needed at this time and we will see her later this week for her follow-up. Thank you.

## 2019-08-19 NOTE — TELEPHONE ENCOUNTER
10:10 AM  Patient returned call, RN informed her the culture showed MRSA and to continue to take doxycycline. Patient expressed understanding.   Patient stated she stopped packing wound on

## 2019-08-23 ENCOUNTER — OFFICE VISIT (OUTPATIENT)
Dept: DERMATOLOGY | Facility: AMBULATORY SURGERY CENTER | Age: 34
End: 2019-08-23

## 2019-08-23 DIAGNOSIS — L02.91 ABSCESS: Primary | ICD-10-CM

## 2019-08-23 RX ORDER — VANCOMYCIN HYDROCHLORIDE 250 MG/1
500 CAPSULE ORAL
COMMUNITY
Start: 2019-02-01 | End: 2019-10-24 | Stop reason: ALTCHOICE

## 2019-09-20 ENCOUNTER — OFFICE VISIT (OUTPATIENT)
Dept: INTERNAL MEDICINE CLINIC | Age: 34
End: 2019-09-20

## 2019-09-20 VITALS
RESPIRATION RATE: 16 BRPM | TEMPERATURE: 98.1 F | WEIGHT: 242 LBS | SYSTOLIC BLOOD PRESSURE: 107 MMHG | HEIGHT: 63 IN | DIASTOLIC BLOOD PRESSURE: 75 MMHG | HEART RATE: 59 BPM | BODY MASS INDEX: 42.88 KG/M2 | OXYGEN SATURATION: 99 %

## 2019-09-20 DIAGNOSIS — R05.9 COUGH: ICD-10-CM

## 2019-09-20 DIAGNOSIS — M32.9 LUPUS (HCC): ICD-10-CM

## 2019-09-20 DIAGNOSIS — J01.00 ACUTE NON-RECURRENT MAXILLARY SINUSITIS: Primary | ICD-10-CM

## 2019-09-20 LAB
QUICKVUE INFLUENZA TEST: NEGATIVE
VALID INTERNAL CONTROL?: YES

## 2019-09-20 RX ORDER — FUROSEMIDE 20 MG/1
TABLET ORAL
Refills: 0 | Status: ON HOLD | COMMUNITY
Start: 2019-07-25 | End: 2021-08-29

## 2019-09-20 RX ORDER — TIZANIDINE 4 MG/1
TABLET ORAL
Refills: 0 | COMMUNITY
Start: 2019-09-19 | End: 2021-06-11 | Stop reason: SDUPTHER

## 2019-09-20 RX ORDER — AMOXICILLIN AND CLAVULANATE POTASSIUM 875; 125 MG/1; MG/1
1 TABLET, FILM COATED ORAL EVERY 12 HOURS
Qty: 14 TAB | Refills: 0 | Status: SHIPPED | OUTPATIENT
Start: 2019-09-20 | End: 2019-09-27

## 2019-09-20 RX ORDER — CODEINE PHOSPHATE AND GUAIFENESIN 10; 100 MG/5ML; MG/5ML
5 SOLUTION ORAL
Qty: 120 ML | Refills: 0 | Status: SHIPPED | OUTPATIENT
Start: 2019-09-20 | End: 2022-01-17 | Stop reason: SDUPTHER

## 2019-09-20 NOTE — PROGRESS NOTES
HISTORY OF PRESENT ILLNESS  Román Hendrix is a 26 y.o. female. HPI   Pt normally follows with Dr. Norman Levi). Pt is here for acute care. Pt c/o fever, sore throat, facial pressure, congestion and cough (only in the PM)   Had a fever of 102.1 yesterday   Pt has lupus and missed her retuxin infusion   Pt has taken tylenol constantly to keep her fever down   Denies wheezing   States her cough is pretty bad at night   Takes halls throughout the day   Advised to use flonase   Will give augmentin   Will run rapid flu   Will give robitussin for cough     Patient Active Problem List    Diagnosis Date Noted    Severe obesity ([address] 02/22/2019    Incomplete uterovaginal prolapse 05/17/2016    Migraine with aura and without status migrainosus, not intractable 12/02/2015    Anxiety 12/05/2014    Lupus ([facility] 12/04/2014    Recurrent ventral incisional hernia 03/01/2013    Ventral hernia 02/25/2013    Abnormal CT of the abdomen 11/08/2012     Current Outpatient Medications   Medication Sig Dispense Refill    vancomycin (VANCOCIN) 250 mg capsule 500 mg.      DULoxetine (CYMBALTA) 60 mg capsule Take 60 mg by mouth daily.  mirtazapine (REMERON) 30 mg tablet Take 30 mg by mouth daily.  doxycycline (MONODOX) 100 mg capsule Take 100 mg by mouth daily. 0    estradiol (ESTRACE) 1 mg tablet Take 1 mg by mouth daily. 0    mirtazapine (REMERON) 30 mg tablet take 1 tablet by mouth at bedtime 30 Tab 3    meclizine (ANTIVERT) 12.5 mg tablet take 1 tablet by mouth three times a day if needed for VERTIGO FOR UP TO 10 DAYS 30 Tab 1    busPIRone (BUSPAR) 5 mg tablet Take 1 Tab by mouth two (2) times a day. 60 Tab 11    ALPRAZolam (XANAX) 1 mg tablet take 1 tablet by mouth NIGHTLY maximum daily dose of 1 tablet 30 Tab 0    meclizine (ANTIVERT) 12.5 mg tablet Take 25 mg by mouth three (3) times daily as needed.       indomethacin (INDOCIN) 25 mg capsule       ondansetron hcl (ZOFRAN) 4 mg tablet Take 1 Tab by mouth every eight (8) hours as needed for Nausea. 20 Tab 0    DULoxetine (CYMBALTA) 60 mg capsule Take 60 mg by mouth daily. 1    pantoprazole (PROTONIX) 40 mg tablet take 1 tablet by mouth at bedtime  0    codeine-butalbital-acetaminophen-caffeine (FIORICET WITH CODEINE) -71-30 mg capsule take 2 capsules by mouth every 6 hours if needed for MIGRAINE HEADACHE 90 Cap 0    rizatriptan (MAXALT) 10 mg tablet Take 10 mg by mouth once as needed for Migraine. May repeat in 2 hours if needed      belimumab (BENLYSTA) 400 mg solr injection by IntraVENous route.        Past Surgical History:   Procedure Laterality Date    COLONOSCOPY  9/21/2010         HX CHOLECYSTECTOMY      HX GYN  1/2009    right salpingo oopherectomy    HX GYN  2006    right ovarian tumor removed    HX HEENT      left wisdom teeth removal    HX HEENT      top right wisdom tooth removed    HX HERNIA REPAIR  4/2012    HX HERNIA REPAIR  2/28/13    Laparoscopic recurrent incisional hernia repair    HX HYSTERECTOMY      2016    HX UROLOGICAL      Kidney Stone Removal    NC EGD TRANSORAL BIOPSY SINGLE/MULTIPLE  9/22/2010           Lab Results   Component Value Date/Time    WBC 9.9 09/06/2018 02:03 AM    HGB 11.2 (L) 09/06/2018 02:03 AM    Hemoglobin (POC) 12.6 05/17/2016 07:47 AM    HCT 35.0 09/06/2018 02:03 AM    Hematocrit (POC) 37 05/17/2016 07:47 AM    PLATELET 917 (H) 05/13/9008 02:03 AM    MCV 88.4 09/06/2018 02:03 AM     No results found for: CHOL, CHOLPOCT, HDL, LDL, LDLC, LDLCPOC, LDLCEXT, TRIGL, TGLPOCT, CHHD, CHHDX  Lab Results   Component Value Date/Time    GFR est non-AA >60 09/06/2018 02:03 AM    GFRNA, POC >60 05/17/2016 07:47 AM    GFR est AA >60 09/06/2018 02:03 AM    GFRAA, POC >60 05/17/2016 07:47 AM    Creatinine 0.78 09/06/2018 02:03 AM    Creatinine (POC) 0.7 05/17/2016 07:47 AM    BUN 13 09/06/2018 02:03 AM    BUN (POC) 8 (L) 05/17/2016 07:47 AM    Sodium 139 09/06/2018 02:03 AM    Sodium (POC) 140 05/17/2016 07:47 AM    Potassium 3.7 09/06/2018 02:03 AM    Potassium (POC) 3.8 05/17/2016 07:47 AM    Chloride 108 09/06/2018 02:03 AM    Chloride (POC) 105 05/17/2016 07:47 AM    CO2 24 09/06/2018 02:03 AM    Magnesium 2.1 06/17/2018 11:57 AM        Review of Systems   Constitutional: Positive for fever. Negative for chills. HENT: Positive for congestion, sinus pain and sore throat. Respiratory: Positive for cough. Negative for shortness of breath. Cardiovascular: Negative for chest pain. Physical Exam   Constitutional: She is oriented to person, place, and time. She appears well-developed and well-nourished. No distress. HENT:   Head: Normocephalic and atraumatic. Right Ear: External ear normal.   Left Ear: External ear normal.   Mouth/Throat: Oropharynx is clear and moist. No oropharyngeal exudate. Erythema in both ears    Eyes: Conjunctivae and EOM are normal. Right eye exhibits no discharge. Left eye exhibits no discharge. Neck: Normal range of motion. Neck supple. Nl lymph nodes but TTP anterior cervical    Cardiovascular: Normal rate, regular rhythm and normal heart sounds. Exam reveals no gallop and no friction rub. No murmur heard. Pulmonary/Chest: Effort normal and breath sounds normal. No respiratory distress. She has no wheezes. She has no rales. She exhibits no tenderness. Musculoskeletal: Normal range of motion. She exhibits no edema, tenderness or deformity. Lymphadenopathy:     She has no cervical adenopathy. Neurological: She is alert and oriented to person, place, and time. Coordination normal.   Skin: Skin is warm and dry. No rash noted. She is not diaphoretic. No erythema. No pallor. Psychiatric: She has a normal mood and affect. Her behavior is normal.       ASSESSMENT and PLAN    ICD-10-CM ICD-9-CM    1.  Acute non-recurrent maxillary sinusitis            Will tx with augmentin BID pt is not allergic to this and has tolerated in the past start flonase daily rapid flu negative  J01.00 461.0    2. Lupus ([address]          Immunocompromised higher risk of abx infection  M32.9 710.0    3. Cough            Cheratussin prn  R05 786.2 guaiFENesin-codeine (CHERATUSSIN AC) 100-10 mg/5 mL solution          Scribed by Rickey Abdi [address], as dictated by Dr. Radames Malik. Current diagnosis and concerns discussed with pt at length. Pt understands risks and benefits or current treatment plan and medications, and accepts the treatment and medication with any possible risks. Pt asks appropriate questions, which were answered. Pt was instructed to call with any concerns or problems. I have reviewed the note documented by the scribe. The services provided are my own.   The documentation is accurate

## 2019-09-20 NOTE — ED PROVIDER NOTES
EMERGENCY DEPARTMENT HISTORY AND PHYSICAL EXAM      Date: 12/12/2017  Patient Name: Anna Guerra    History of Presenting Illness     Chief Complaint   Patient presents with    Back Pain     Pain started last night and is affecting her entire right side. She has lupus. She tried to get in touch with he neurologist but was not able to. She was on her way to the hospital and passed out at the doorway of her home from the severe pain. No injury.  Neck Pain    Ear Pain    Abdominal Pain       History Provided By: Patient and Patient's Mother    HPI: Anna Guerra, 33 y.o. female with PMHx significant for headaches and PShx for hysterectomy, cholecystectomy, presents via EMS to the ED with cc of syncope with preceding lightheadedness, nausea, and right sided pain that began last night. Pt states that the pain began first. She notes diffuse right sided pain and has a hard time localizing her pain but reports right sided back and pelvic pain. Her right ear also hurts and began to bleed. Pt notes that she has \"vomited from the pain\". She reports that she was about to drive to the ED for evaluation but passed out. She called 911 then passed out again. She also notes recent blurry vision and \"dark\" urine. Pt was seen by her neurologist and was administered a Botox injection for her headaches on 12/8/2017. He noticed a \"lump to the base of her head\". She denies recent travel or surgery. Pt specifically denies dysuria, hematuria, cough, hemoptysis, chest pain, SOB, or vaginal discharge. PCP: Mitchell Brenner MD     Social Hx: - Tobacco, - EtOH, - Illicit Drugs    There are no other complaints, changes, or physical findings at this time.     Current Facility-Administered Medications   Medication Dose Route Frequency Provider Last Rate Last Dose    promethazine (PHENERGAN) 25 mg in 0.9% sodium chloride 50 mL IVPB  25 mg IntraVENous Aashish Leavitt MD         Current Outpatient Prescriptions Medication Sig Dispense Refill    ondansetron hcl (ZOFRAN, AS HYDROCHLORIDE,) 4 mg tablet Take 1 Tab by mouth every eight (8) hours as needed for Nausea. 20 Tab 0    lamoTRIgine (LAMICTAL) 25 mg tablet Take 50 mg by mouth daily.  Ketorolac 30 mg/mL soln 30 mg by Injection route. Indications: Patient performs IM injections herself.  venlafaxine (EFFEXOR) 100 mg tablet Take 75 mg by mouth daily.  rizatriptan (MAXALT) 10 mg tablet Take 10 mg by mouth once as needed for Migraine. May repeat in 2 hours if needed      belimumab (BENLYSTA) 400 mg solr injection by IntraVENous route.  ALPRAZolam (XANAX) 1 mg tablet Take 1 Tab by mouth nightly. Max Daily Amount: 1 mg. 30 Tab 6    mirabegron ER (MYRBETRIQ) 50 mg ER tablet Take 50 mg by mouth daily.  Indications: BLADDER HYPERACTIVITY      omeprazole (PRILOSEC) 20 mg capsule          Past History     Past Medical History:  Past Medical History:   Diagnosis Date    Abnormal CT of the abdomen 11/8/2012    Asthma     Autoimmune disease ([facility]     lupus    Cervical prolapse     Dehydration     Gastrointestinal disorder     gerd, twisted colon, IBS    GERD (gastroesophageal reflux disease)     Headache(784.0)     Morbid obesity (HCC)     Nausea & vomiting     Neurological disorder     cluster headaches    other 2006, 2009    ovarian cyst removal    Other ill-defined conditions(799.89)     Worsening headaches        Past Surgical History:  Past Surgical History:   Procedure Laterality Date    COLONOSCOPY  9/21/2010         HX CHOLECYSTECTOMY      HX GYN  1/2009    right salpingo oopherectomy    HX GYN  2006    right ovarian tumor removed    HX HEENT      left wisdom teeth removal    HX HEENT      top right wisdom tooth removed    HX HERNIA REPAIR  4/2012    HX HERNIA REPAIR  2/28/13    Laparoscopic recurrent incisional hernia repair    HX UROLOGICAL      Kidney Stone Removal    [place] EGD TRANSORAL BIOPSY SINGLE/MULTIPLE  9/22/2010 Family History:  History reviewed. No pertinent family history. Social History:  Social History   Substance Use Topics    Smoking status: Never Smoker    Smokeless tobacco: Never Used    Alcohol use No     Allergies: Allergies   Allergen Reactions    Latex Itching     burning    Compazine [Prochlorperazine] Anxiety     High heart rate  Pt can take promethazine with no problems    Pcn [Penicillins] Rash and Nausea and Vomiting    Sulfa (Sulfonamide Antibiotics) Unknown (comments)    Topamax [Topiramate] Unknown (comments)    Adhesive Rash     Allergic to Dermabond    Mushroom Combination No.1 Unknown (comments)    Toradol [Ketorolac Tromethamine] Nausea and Vomiting and Other (comments)     PO & IV causes GI bleed    Valproic Acid Other (comments)     Elevated heart rate and vomiting       Review of Systems   Review of Systems   Constitutional: Negative for chills, fatigue and fever. HENT: Positive for ear pain (with bleeding). Negative for congestion, rhinorrhea and sore throat. Eyes: Positive for visual disturbance. Negative for pain and discharge. Respiratory: Negative for cough, chest tightness, shortness of breath and wheezing.         - hemoptysis    Cardiovascular: Negative for chest pain, palpitations and leg swelling. Gastrointestinal: Positive for vomiting. Negative for abdominal pain, constipation and diarrhea. Genitourinary: Positive for pelvic pain (right sided). Negative for dysuria, frequency, hematuria and vaginal discharge.        + dark urine   Musculoskeletal: Positive for back pain (right sided). Negative for arthralgias and myalgias. Skin: Negative for rash.        + \"lump on neck\"   Neurological: Positive for syncope. Negative for dizziness, weakness, light-headedness and headaches. Psychiatric/Behavioral: Negative. Physical Exam   Physical Exam   Constitutional: She is oriented to person, place, and time. She appears well-developed and well-nourished.  No distress. HENT:   Head: Normocephalic and atraumatic. Right Ear: Tympanic membrane normal.   Left Ear: Tympanic membrane normal.   Abrasion to the right external ear canal with dried blood    Eyes: EOM are normal. Pupils are equal, round, and reactive to light. Right eye exhibits no discharge. Left eye exhibits no discharge. No scleral icterus. Neck: Normal range of motion. Neck supple. No tracheal deviation present. No appreciable mass or nodule to the posterior neck   Cardiovascular: Normal rate, regular rhythm, normal heart sounds and intact distal pulses. Exam reveals no gallop and no friction rub. No murmur heard. Pulmonary/Chest: Effort normal and breath sounds normal. No respiratory distress. She has no wheezes. She has no rales. Abdominal: Soft. She exhibits no distension. There is tenderness in the right upper quadrant and right lower quadrant. There is no rebound and no guarding. Musculoskeletal: Normal range of motion. She exhibits no edema. C, T, L spine normal alignment without step offs or midline TTP   Lymphadenopathy:     She has no cervical adenopathy. Neurological: She is alert and oriented to person, place, and time. No focal neuro deficits   Skin: Skin is warm and dry. No rash noted. Psychiatric: Her mood appears anxious. Crying on exam   Nursing note and vitals reviewed. Diagnostic Study Results     Labs -     Recent Results (from the past 12 hour(s))   CBC WITH AUTOMATED DIFF    Collection Time: 12/12/17  5:18 PM   Result Value Ref Range    WBC 9.8 3.6 - 11.0 K/uL    RBC 4.58 3.80 - 5.20 M/uL    HGB 14.2 11.5 - 16.0 g/dL    HCT 42.4 35.0 - 47.0 %    MCV 92.6 80.0 - 99.0 FL    MCH 31.0 26.0 - 34.0 PG    MCHC 33.5 30.0 - 36.5 g/dL    RDW 12.7 11.5 - 14.5 %    PLATELET 703 736 - 646 K/uL    NEUTROPHILS 64 32 - 75 %    LYMPHOCYTES 24 12 - 49 %    MONOCYTES 9 5 - 13 %    EOSINOPHILS 3 0 - 7 %    BASOPHILS 0 0 - 1 %    ABS. NEUTROPHILS 6.3 1.8 - 8.0 K/UL    ABS. LYMPHOCYTES 2.3 0.8 - 3.5 K/UL    ABS. MONOCYTES 0.8 0.0 - 1.0 K/UL    ABS. EOSINOPHILS 0.3 0.0 - 0.4 K/UL    ABS. BASOPHILS 0.0 0.0 - 0.1 K/UL   METABOLIC PANEL, COMPREHENSIVE    Collection Time: 12/12/17  5:18 PM   Result Value Ref Range    Sodium 139 136 - 145 mmol/L    Potassium 4.2 3.5 - 5.1 mmol/L    Chloride 105 97 - 108 mmol/L    CO2 27 21 - 32 mmol/L    Anion gap 7 5 - 15 mmol/L    Glucose 95 65 - 100 mg/dL    BUN 12 6 - 20 MG/DL    Creatinine 0.98 0.55 - 1.02 MG/DL    BUN/Creatinine ratio 12 12 - 20      GFR est AA >60 >60 ml/min/1.73m2    GFR est non-AA >60 >60 ml/min/1.73m2    Calcium 9.3 8.5 - 10.1 MG/DL    Bilirubin, total 0.2 0.2 - 1.0 MG/DL    ALT (SGPT) 12 12 - 78 U/L    AST (SGOT) 11 (L) 15 - 37 U/L    Alk.  phosphatase 124 (H) 45 - 117 U/L    Protein, total 7.4 6.4 - 8.2 g/dL    Albumin 4.2 3.5 - 5.0 g/dL    Globulin 3.2 2.0 - 4.0 g/dL    A-G Ratio 1.3 1.1 - 2.2     MAGNESIUM    Collection Time: 12/12/17  5:18 PM   Result Value Ref Range    Magnesium 2.0 1.6 - 2.4 mg/dL   TROPONIN I    Collection Time: 12/12/17  5:18 PM   Result Value Ref Range    Troponin-I, Qt. <0.04 <0.05 ng/mL   EKG, 12 LEAD, INITIAL    Collection Time: 12/12/17  6:12 PM   Result Value Ref Range    Ventricular Rate 69 BPM    Atrial Rate 69 BPM    P-R Interval 158 ms    QRS Duration 76 ms    Q-T Interval 374 ms    QTC Calculation (Bezet) 400 ms    Calculated P Axis 14 degrees    Calculated R Axis 20 degrees    Calculated T Axis 22 degrees    Diagnosis       Normal sinus rhythm  Nonspecific ST abnormality  Abnormal ECG  When compared with ECG of 26-APR-2017 23:51,  No significant change was found     URINALYSIS W/ REFLEX CULTURE    Collection Time: 12/12/17  6:26 PM   Result Value Ref Range    Color YELLOW/STRAW      Appearance CLEAR CLEAR      Specific gravity 1.011 1.003 - 1.030      pH (UA) 6.5 5.0 - 8.0      Protein NEGATIVE  NEG mg/dL    Glucose NEGATIVE  NEG mg/dL    Ketone NEGATIVE  NEG mg/dL    Bilirubin NEGATIVE  NEG Blood NEGATIVE  NEG      Urobilinogen 0.2 0.2 - 1.0 EU/dL    Nitrites NEGATIVE  NEG      Leukocyte Esterase NEGATIVE  NEG      WBC 0-4 0 - 4 /hpf    RBC 0-5 0 - 5 /hpf    Epithelial cells FEW FEW /lpf    Bacteria NEGATIVE  NEG /hpf    UA:UC IF INDICATED CULTURE NOT INDICATED BY UA RESULT CNI      Hyaline cast 0-2 0 - 5 /lpf     Radiologic Studies -     CXR Results  (Last 48 hours)               12/12/17 1855  XR CHEST PA LAT Final result    Impression:  Impression:   No acute cardiopulmonary process. Narrative:  Chest PA and lateral       History: Syncope       Comparison: 3/27/2017       Findings: The lungs are well expanded. No focal consolidation, pleural   effusion, or pneumothorax. The cardiomediastinal silhouette is unremarkable. The visualized osseous structures are unremarkable. Surgical clips are seen in   the right upper quadrant. Medical Decision Making   I am the first provider for this patient. I reviewed the vital signs, available nursing notes, past medical history, past surgical history, family history and social history. Vital Signs-Reviewed the patient's vital signs. Patient Vitals for the past 12 hrs:   Temp Pulse Resp BP SpO2   12/12/17 2012 - 73 16 - 96 %   12/12/17 2000 - 73 20 144/88 94 %   12/12/17 1930 - 80 12 120/80 93 %   12/12/17 1918 - 67 13 - 94 %   12/12/17 1915 - - - 118/66 -   12/12/17 1831 - 93 18 142/88 96 %   12/12/17 1828 - 71 16 132/80 97 %   12/12/17 1825 - 70 12 132/74 93 %   12/12/17 1700 - 77 18 (!) 152/102 97 %   12/12/17 1648 98.2 °F (36.8 °C) 76 20 (!) 139/98 97 %   12/12/17 1643 - 82 14 125/84 97 %     Pulse Oximetry Analysis - 98% on RA    Cardiac Monitor:   Rate: 69 bpm  Rhythm: Normal Sinus Rhythm      EKG interpretation: 18:12  Rhythm: normal sinus rhythm; and regular . Rate (approx.): 69;  Axis: normal; CA interval: normal; QRS interval: normal ; ST/T wave: normal.  Written by ANDRE Saravia, as dictated by Nick Monge MD.    Records Reviewed: Nursing Notes and Old Medical Records    Provider Notes (Medical Decision Making):   Patient is a 34 yo female who presents to ED with diffuse right sided pain. When asked where pain is localized she reports \"everywhere\" but specifically reports right ear pain, right back pain and right abdominal pain. She has had numerous CT scans a/p in the past.  She also reports syncopal event pta - likely vasovagal due to pain/anxiety. Differential includes dehydration, vasovagal syncope, UTI, electrolyte abnormality, GERD, gastritis, PUD, pancreatitis, ACS, chronic opioid use, malingering. No s/s of PE. Discussed pelvic exam but patient deferred. Overall low suspicion for acute intraabdominal process including appendicitis. Hx cholecystectomy, hysterectomy with right oophorectomy ruling out ovarian cyst/torsion.  - CBC, CMP, troponin, UA  - CXR  - Symptomatic management and reevaluate    ED Course:   Initial assessment performed. The patients presenting problems have been discussed, and they are in agreement with the care plan formulated and outlined with them. I have encouraged them to ask questions as they arise throughout their visit. PROGRESS NOTE:  5:00 PM  Pt deferring pelvic exam at this time. PROGRESS NOTE:  8:10 PM  Per  review: pt is prescribed   90 tabs of Norco on 11/24, prescribed 90 tablets monthly. 60 tabs of Alprazolam on 11/14, prescribed monthly. 60 tabs of fioricet on 11/12, prescribed monthly. PROGRESS NOTE:  7:30 PM  Pt requesting additional pain medications because she hurts \"everywhere. \" Pt frequently presents to the ED with pain related complaints. Concern for drug seeking behavior. Offered to treat with Norco. Pt requesting phenergan. Phenergan ordered.      PROGRESS NOTE:  8:20 PM  Upon discharge pt is agitated that we have not given additional IV narcotics Advised pt that given negative workup and hx of chronic narcotic use do not feel unknown need for additional IV narcotics and that her sxs can be managed with previously prescribed oral narcotics. Pt repetitively states she is not here for pain medications but then states she hurts everywhere and requests IV pain medication. Advised patient she can take her medications that are previously prescribed. Offered phenergen but she states that wants to go home. Will discharge with follow up. Disposition:  Discharge Note:  8:20 PM  The patient has been re-evaluated and is ready for discharge. Reviewed available results with patient. Counseled patient/parent/guardian on diagnosis and care plan. Patient has expressed understanding, and all questions have been answered. Patient agrees with plan and agrees to follow up as recommended, or return to the ED if their symptoms worsen. Discharge instructions have been provided and explained to the patient, along with reasons to return to the ED. PLAN:  1. Discharge Medication List as of 12/12/2017  8:14 PM        2. Follow-up Information     Follow up With Details Comments [address] Emanuel MD  As needed, If symptoms worsen [id number] [place] [address]  [phone]      [facility] EMERGENCY DEPT  As needed, If symptoms worsen [address] [place] [address]  [phone]        Return to ED if worse     Diagnosis     Clinical Impression:   1. Diffuse pain    2. Vasovagal syncope    3. Narcotic dependence ([address]    4. Drug-seeking behavior      Attestations:    Attestation: This note is prepared by Zainab Boyd, acting as Scribe for MD Maria Esther Santacruz MD: The scribe's documentation has been prepared under my direction and personally reviewed by me in its entirety. I confirm that the note above accurately reflects all work, treatment, procedures, and medical decision making performed by me.

## 2019-10-24 ENCOUNTER — OFFICE VISIT (OUTPATIENT)
Dept: INTERNAL MEDICINE CLINIC | Age: 34
End: 2019-10-24

## 2019-10-24 VITALS
HEIGHT: 63 IN | DIASTOLIC BLOOD PRESSURE: 70 MMHG | RESPIRATION RATE: 16 BRPM | SYSTOLIC BLOOD PRESSURE: 106 MMHG | HEART RATE: 87 BPM | BODY MASS INDEX: 42.35 KG/M2 | WEIGHT: 239 LBS | TEMPERATURE: 98.6 F | OXYGEN SATURATION: 16 %

## 2019-10-24 DIAGNOSIS — F41.9 ANXIETY: ICD-10-CM

## 2019-10-24 DIAGNOSIS — R11.0 NAUSEA: Primary | ICD-10-CM

## 2019-10-24 RX ORDER — PROMETHAZINE HYDROCHLORIDE 25 MG/1
25 TABLET ORAL
Qty: 30 TAB | Refills: 1 | Status: SHIPPED | OUTPATIENT
Start: 2019-10-24 | End: 2019-11-27 | Stop reason: SDUPTHER

## 2019-10-24 NOTE — PROGRESS NOTES
HISTORY OF PRESENT ILLNESS  Lorie Quinn is a 26 y.o. female. HPI     Has PUD and possible UC  Dx treated by GI MD-Dr Joyce Sanchez  Hx C diff colitis  Has frequent emesis--insurance allows 15 zofran per month, requests another medication for nausea  Had 4-5 gastric ulcers witrh bleeding this year and hospitalized at [facility] --4 were clipped per pt  No nsaids not but still some nausea  Possible dx of UC too per pt  Has f/u with GI MD  Sees counselor for anxiety and takes remeron and xanax  Increased stress with  and family issues  Sees Dr Tracy Olivares for SLE on Benlysta and rituxan        Last OV  Pt here for routine f/u     C/o sorethroat x 3 days and dry cough     Has SLE  Just completed dificid for c.diff still has soft stools-had f/u Gi MD and stool c.diff reordered  Needs refill of meclizine for vertigo--will start PT next week  Needs refill of remeron and xanax until can see psych MD       Patient Active Problem List    Diagnosis Date Noted    Severe obesity ([facility] [address] 02/22/2019    Incomplete uterovaginal prolapse 05/17/2016    Migraine with aura and without status migrainosus, not intractable 12/02/2015    Anxiety 12/05/2014    Lupus ([facility] [address] 12/04/2014    Recurrent ventral incisional hernia 03/01/2013    Ventral hernia 02/25/2013    Abnormal CT of the abdomen 11/08/2012     Current Outpatient Medications   Medication Sig Dispense Refill    furosemide (LASIX) 20 mg tablet take 1 tablet by mouth once daily  0    tiZANidine (ZANAFLEX) 4 mg tablet   0    vancomycin (VANCOCIN) 250 mg capsule 500 mg.      DULoxetine (CYMBALTA) 60 mg capsule Take 60 mg by mouth daily.  mirtazapine (REMERON) 30 mg tablet Take 30 mg by mouth daily.  doxycycline (MONODOX) 100 mg capsule Take 100 mg by mouth daily. 0    estradiol (ESTRACE) 1 mg tablet Take 1 mg by mouth daily.   0    mirtazapine (REMERON) 30 mg tablet take 1 tablet by mouth at bedtime 30 Tab 3    meclizine (ANTIVERT) 12.5 mg tablet take 1 tablet by mouth three times a day if needed for VERTIGO FOR UP TO 10 DAYS 30 Tab 1    busPIRone (BUSPAR) 5 mg tablet Take 1 Tab by mouth two (2) times a day. 60 Tab 11    ALPRAZolam (XANAX) 1 mg tablet take 1 tablet by mouth NIGHTLY maximum daily dose of 1 tablet 30 Tab 0    meclizine (ANTIVERT) 12.5 mg tablet Take 25 mg by mouth three (3) times daily as needed.  indomethacin (INDOCIN) 25 mg capsule       ondansetron hcl (ZOFRAN) 4 mg tablet Take 1 Tab by mouth every eight (8) hours as needed for Nausea. 20 Tab 0    DULoxetine (CYMBALTA) 60 mg capsule Take 60 mg by mouth daily. 1    pantoprazole (PROTONIX) 40 mg tablet take 1 tablet by mouth at bedtime  0    codeine-butalbital-acetaminophen-caffeine (FIORICET WITH CODEINE) -67-30 mg capsule take 2 capsules by mouth every 6 hours if needed for MIGRAINE HEADACHE 90 Cap 0    rizatriptan (MAXALT) 10 mg tablet Take 10 mg by mouth once as needed for Migraine. May repeat in 2 hours if needed      belimumab (BENLYSTA) 400 mg solr injection by IntraVENous route.        Allergies   Allergen Reactions    Latex Itching     burning    Compazine [Prochlorperazine] Anxiety     High heart rate  Pt can take promethazine with no problems    Sulfa (Sulfonamide Antibiotics) Unknown (comments)    Topamax [Topiramate] Unknown (comments)    Adhesive Rash     Allergic to Dermabond    Clindamycin Diarrhea     Pt states caused her C-Diff    Mushroom Combination No.1 Unknown (comments)    Toradol [Ketorolac Tromethamine] Nausea and Vomiting and Other (comments)     PO & IV causes GI bleed    Valproic Acid Other (comments)     Elevated heart rate and vomiting       Social History     Tobacco Use    Smoking status: Never Smoker    Smokeless tobacco: Never Used   Substance Use Topics    Alcohol use: No      Lab Results   Component Value Date/Time    WBC 9.9 09/06/2018 02:03 AM    HGB 11.2 (L) 09/06/2018 02:03 AM    Hemoglobin (POC) 12.6 05/17/2016 07:47 AM    HCT 35.0 09/06/2018 02:03 AM    Hematocrit (POC) 37 05/17/2016 07:47 AM    PLATELET 325 (H) 17/57/0898 02:03 AM    MCV 88.4 09/06/2018 02:03 AM     Lab Results   Component Value Date/Time    Glucose 102 (H) 09/06/2018 02:03 AM    Glucose (POC) 99 05/17/2016 07:47 AM    Creatinine (POC) 0.7 05/17/2016 07:47 AM    Creatinine 0.78 09/06/2018 02:03 AM      No results found for: CHOL, CHOLPOCT, HDL, LDL, LDLC, LDLCPOC, LDLCEXT, TRIGL, TGLPOCT, CHHD, CHHDX  Lab Results   Component Value Date/Time    GFR est non-AA >60 09/06/2018 02:03 AM    GFRNA, POC >60 05/17/2016 07:47 AM    GFR est AA >60 09/06/2018 02:03 AM    GFRAA, POC >60 05/17/2016 07:47 AM    Creatinine 0.78 09/06/2018 02:03 AM    Creatinine (POC) 0.7 05/17/2016 07:47 AM    BUN 13 09/06/2018 02:03 AM    BUN (POC) 8 (L) 05/17/2016 07:47 AM    Sodium 139 09/06/2018 02:03 AM    Sodium (POC) 140 05/17/2016 07:47 AM    Potassium 3.7 09/06/2018 02:03 AM    Potassium (POC) 3.8 05/17/2016 07:47 AM    Chloride 108 09/06/2018 02:03 AM    Chloride (POC) 105 05/17/2016 07:47 AM    CO2 24 09/06/2018 02:03 AM    Magnesium 2.1 06/17/2018 11:57 AM        ROS    Physical Exam   Constitutional: She appears well-developed and well-nourished. Appears stated age, obese,pleasant, nad   Cardiovascular: Normal rate, regular rhythm and normal heart sounds. Exam reveals no gallop and no friction rub. No murmur heard. Pulmonary/Chest: Effort normal and breath sounds normal. No respiratory distress. She has no wheezes. Abdominal: Soft. Bowel sounds are normal. She exhibits no distension and no mass. There is no tenderness. There is no rebound and no guarding. Musculoskeletal: She exhibits no edema. Neurological: She is alert. Psychiatric: She has a normal mood and affect. Nursing note and vitals reviewed. ASSESSMENT and PLAN  Diagnoses and all orders for this visit:    1. Nausea   Phenergan 30 plus 1 refill--has tolerated well in the past   Will f/u GI MD    2. Anxiety   Continue medicines-remeron and xanax   F/u with counselor   Appears reasonably well controlled  Other orders  -     promethazine (PHENERGAN) 25 mg tablet; Take 1 Tab by mouth every six (6) hours as needed for Nausea. Follow-up and Dispositions    · Return in about 6 months (around 4/24/2020) for anxiety , nasea , PUD/UC.

## 2019-10-24 NOTE — PROGRESS NOTES
Chief Complaint   Patient presents with    Establish Care     Changing PCP    Nausea     Requesting rx for nausea other than Zofran

## 2019-10-24 NOTE — PATIENT INSTRUCTIONS
Office Policies    Phone calls/patient messages:            Please allow up to 24 hours for someone in the office to contact you about your call or message. Be mindful your provider may be out of the office or your message may require further review. We encourage you to use [facility] for your messages as this is a faster, more efficient way to communicate with our office                         Medication Refills:            Prescription medications require 48-72 business hours to process. We encourage you to use [facility] for your refills. For controlled medications: Please allow 72 business hours to process. Certain medications may require you to  a written prescription at our office. NO narcotic/controlled medications will be prescribed after 4pm Monday through Friday or on weekends              Form/Paperwork Completion:            Please note a $25 fee may incur for all paperwork for completed by our providers. We ask that you allow 7-10 business days. Pre-payment is due prior to picking up/faxing the completed form. You may also download your forms to [facility] to have your doctor print off.

## 2019-11-05 ENCOUNTER — OFFICE VISIT (OUTPATIENT)
Dept: INTERNAL MEDICINE CLINIC | Age: 34
End: 2019-11-05

## 2019-11-05 VITALS
HEART RATE: 116 BPM | HEIGHT: 63 IN | OXYGEN SATURATION: 99 % | WEIGHT: 238 LBS | DIASTOLIC BLOOD PRESSURE: 91 MMHG | RESPIRATION RATE: 18 BRPM | TEMPERATURE: 98.5 F | SYSTOLIC BLOOD PRESSURE: 103 MMHG | BODY MASS INDEX: 42.17 KG/M2

## 2019-11-05 DIAGNOSIS — H66.91 RIGHT OTITIS MEDIA, UNSPECIFIED OTITIS MEDIA TYPE: Primary | ICD-10-CM

## 2019-11-05 RX ORDER — AMOXICILLIN 500 MG/1
500 CAPSULE ORAL 3 TIMES DAILY
Qty: 21 CAP | Refills: 0 | Status: SHIPPED | OUTPATIENT
Start: 2019-11-05 | End: 2019-11-12

## 2019-11-05 NOTE — PROGRESS NOTES
SUBJECTIVE  Ms. Tigist Hensley is a patient of Fadia Rachel MD and presents today acutely for     Chief Complaint   Patient presents with    Ear Pain     pt here today c.o ear ache x 3 days; pt not able to sleep due pain; pt radiaties into her R jaw and R side of head; pt has been taking tylenol; pt unable to take aspirin      Symptoms started 5 days ago, but got much worse about 3 days ago. She has had GI symptoms, N/v, \"but I've just been diagnosed with ulcerative colitis. \"    Tylenol hasn't helped. OBJECTIVE  Visit Vitals  BP (!) 103/91 (BP 1 Location: Left arm, BP Patient Position: Sitting)   Pulse (!) 116   Temp 98.5 °F (36.9 °C) (Oral)   Resp 18   Ht 5' 3\" (1.6 m)   Wt 238 lb (108 kg)   LMP 02/17/2016   SpO2 99%   BMI 42.16 kg/m²     Gen: Pleasant 26 y.o.  female in NAD.   HEENT: PERRLA. EOMI. TM: Some redness on R TM. OP moist and pink.  Neck: Supple.  No LAD.  HEART: RRR, No M/G/R.   LUNGS: CTAB No W/R.    ASSESSMENT   Otitis medis, right. PLAN  Orders Placed This Encounter    amoxicillin (AMOXIL) 500 mg capsule     Sig: Take 1 Cap by mouth three (3) times daily for 7 days. Dispense:  21 Cap     Refill:  0    L.acidoph & parac-S.therm-Bifido (BELGICA Q2/RISAQUAD-2) 16 billion cell cap cap     Sig: Take 1 Cap by mouth daily. Dispense:  30 Cap     Refill:  1       I have reviewed with the patient details of the assessment and plan and all questions were answered. Relevant patient education was performed. Follow-up and Dispositions    · Return if symptoms worsen or fail to improve.

## 2019-11-05 NOTE — PROGRESS NOTES
1. Have you been to the ER, urgent care clinic since your last visit? Hospitalized since your last visit?no  2. Have you seen or consulted any other health care providers outside of the [address] [place] [address] since your last visit? Include any pap smears or colon screening.  no

## 2019-11-05 NOTE — PATIENT INSTRUCTIONS
Office Policies    Phone calls/patient messages:            Please allow up to 24 hours for someone in the office to contact you about your call or message. Be mindful your provider may be out of the office or your message may require further review. We encourage you to use [facility] for your messages as this is a faster, more efficient way to communicate with our office                         Medication Refills:            Prescription medications require 48-72 business hours to process. We encourage you to use [facility] for your refills. For controlled medications: Please allow 72 business hours to process. Certain medications may require you to  a written prescription at our office. NO narcotic/controlled medications will be prescribed after 4pm Monday through Friday or on weekends              Form/Paperwork Completion:            Please note a $25 fee may incur for all paperwork for completed by our providers. We ask that you allow 7-10 business days. Pre-payment is due prior to picking up/faxing the completed form. You may also download your forms to [facility] to have your doctor print off.

## 2019-11-07 RX ORDER — MIRTAZAPINE 30 MG/1
TABLET, FILM COATED ORAL
Qty: 30 TAB | Refills: 3 | Status: ON HOLD | OUTPATIENT
Start: 2019-11-07 | End: 2020-02-26

## 2019-11-18 ENCOUNTER — OFFICE VISIT (OUTPATIENT)
Dept: INTERNAL MEDICINE CLINIC | Age: 34
End: 2019-11-18

## 2019-11-18 VITALS
WEIGHT: 239 LBS | TEMPERATURE: 98.4 F | BODY MASS INDEX: 42.35 KG/M2 | HEART RATE: 105 BPM | HEIGHT: 63 IN | SYSTOLIC BLOOD PRESSURE: 123 MMHG | RESPIRATION RATE: 16 BRPM | DIASTOLIC BLOOD PRESSURE: 87 MMHG | OXYGEN SATURATION: 96 %

## 2019-11-18 DIAGNOSIS — M32.9 LUPUS (HCC): ICD-10-CM

## 2019-11-18 DIAGNOSIS — R07.81 PLEURITIC CHEST PAIN: Primary | ICD-10-CM

## 2019-11-18 DIAGNOSIS — R07.81 RIB PAIN ON RIGHT SIDE: Primary | ICD-10-CM

## 2019-11-18 RX ORDER — PREDNISONE 20 MG/1
TABLET ORAL
Qty: 12 TAB | Refills: 0 | Status: SHIPPED | OUTPATIENT
Start: 2019-11-18 | End: 2020-02-24 | Stop reason: ALTCHOICE

## 2019-11-18 RX ORDER — HYDROCODONE BITARTRATE AND ACETAMINOPHEN 5; 325 MG/1; MG/1
1 TABLET ORAL
Qty: 10 TAB | Refills: 0 | Status: SHIPPED | OUTPATIENT
Start: 2019-11-18 | End: 2019-11-21

## 2019-11-18 RX ORDER — NALOXONE HYDROCHLORIDE 4 MG/.1ML
SPRAY NASAL
Qty: 1 EACH | Refills: 0 | Status: SHIPPED | OUTPATIENT
Start: 2019-11-18 | End: 2020-09-06

## 2019-11-18 NOTE — PROGRESS NOTES
HISTORY OF PRESENT ILLNESS  Fazal Sheehan is a 26 y.o. female. HPI   Pt normally follows with Dr. Manuel Li  (PCP). Pt is here for acute care.     Pt c/o pain in her R rib  x  2 days ago   She is tearful today in clinic, states this pain is intense and getting increasingly worse   Pt states she has had similar sxs like this in the past and was hospitalized for pleurisy a couple years ago   States she cannot take full breath, states the pain is very intense if she coughs or takes a deep breath   Pt saw Dr Ian Fontanez for ear infection on 11/5/19, states she did not have a cough with this however    pt was recently diagnosed with ulcerative colitis   Pt also has lupus, has had this for 5 years talked to her Dr Jumana Gutierres (rheum) who told her to f/u with pcp and stated it was likely not related to lupus   Gets benylsta infusions for this   Has been taking protonix for reflux, denies reflux getting worse   Has n/v but is able to keep food down   Can't NSAIDS due to UC, has been taking tylenol but this has not helped   States pain is worse when she is laying on her side   Pt does not have her gall bladder, does have her appendix   Had a hernia repair, hysterectomy, R oophorectomy and cholecystectomy   Will get rib series   Pt states she tried taking tizantidine but this did not help   Has tried flexeril in the past but this did not work as well as tizanidine   Will give prednisone taper   Pt used to get hydrocodone from Dr Darek Roy for occipital neuralgia but this got better after nerve blocks   Will give hydrocodone until XR   Nor refillable    Reviewed    Last hydrocodone prescribed in august from Dr Rick Ramos   Was given rx for oxycodone, tramadol, hydrocodone from 2/19-7/19       Patient Active Problem List    Diagnosis Date Noted    Severe obesity ([facility] [address] 02/22/2019    Incomplete uterovaginal prolapse 05/17/2016    Migraine with aura and without status migrainosus, not intractable 12/02/2015    Anxiety 12/05/2014    Lupus ([facility] [address] 12/04/2014    Recurrent ventral incisional hernia 03/01/2013    Ventral hernia 02/25/2013    Abnormal CT of the abdomen 11/08/2012     Current Outpatient Medications   Medication Sig Dispense Refill    mirtazapine (REMERON) 30 mg tablet take 1 tablet by mouth at bedtime 30 Tab 3    L.acidoph & parac-S.therm-Bifido (BELGICA Q2/RISAQUAD-2) 16 billion cell cap cap Take 1 Cap by mouth daily. 30 Cap 1    promethazine (PHENERGAN) 25 mg tablet Take 1 Tab by mouth every six (6) hours as needed for Nausea. 30 Tab 1    furosemide (LASIX) 20 mg tablet PRN  0    tiZANidine (ZANAFLEX) 4 mg tablet   0    mirtazapine (REMERON) 30 mg tablet Take 30 mg by mouth daily.  doxycycline (MONODOX) 100 mg capsule Take 100 mg by mouth daily. 0    estradiol (ESTRACE) 1 mg tablet Take 1 mg by mouth daily. 0    meclizine (ANTIVERT) 12.5 mg tablet take 1 tablet by mouth three times a day if needed for VERTIGO FOR UP TO 10 DAYS 30 Tab 1    ALPRAZolam (XANAX) 1 mg tablet take 1 tablet by mouth NIGHTLY maximum daily dose of 1 tablet 30 Tab 0    indomethacin (INDOCIN) 25 mg capsule PRN      ondansetron hcl (ZOFRAN) 4 mg tablet Take 1 Tab by mouth every eight (8) hours as needed for Nausea. 20 Tab 0    DULoxetine (CYMBALTA) 60 mg capsule Take 60 mg by mouth daily. 1    pantoprazole (PROTONIX) 40 mg tablet take 1 tablet by mouth at bedtime  0    codeine-butalbital-acetaminophen-caffeine (FIORICET WITH CODEINE) -27-30 mg capsule take 2 capsules by mouth every 6 hours if needed for MIGRAINE HEADACHE 90 Cap 0    rizatriptan (MAXALT) 10 mg tablet Take 10 mg by mouth once as needed for Migraine. May repeat in 2 hours if needed      belimumab (BENLYSTA) 400 mg solr injection by IntraVENous route.        Past Surgical History:   Procedure Laterality Date    COLONOSCOPY  9/21/2010         HX CHOLECYSTECTOMY      HX GYN  1/2009    right salpingo oopherectomy    HX GYN  2006    right ovarian tumor removed    HX HEENT left wisdom teeth removal    HX HEENT      top right wisdom tooth removed    HX HERNIA REPAIR  4/2012    HX HERNIA REPAIR  2/28/13    Laparoscopic recurrent incisional hernia repair    HX HYSTERECTOMY      2016    HX UROLOGICAL      Kidney Stone Removal    ND EGD TRANSORAL BIOPSY SINGLE/MULTIPLE  9/22/2010           Lab Results   Component Value Date/Time    WBC 9.9 09/06/2018 02:03 AM    HGB 11.2 (L) 09/06/2018 02:03 AM    Hemoglobin (POC) 12.6 05/17/2016 07:47 AM    HCT 35.0 09/06/2018 02:03 AM    Hematocrit (POC) 37 05/17/2016 07:47 AM    PLATELET 305 (H) 54/43/2176 02:03 AM    MCV 88.4 09/06/2018 02:03 AM     No results found for: CHOL, CHOLPOCT, HDL, LDL, LDLC, LDLCPOC, LDLCEXT, TRIGL, TGLPOCT, CHHD, CHHDX  Lab Results   Component Value Date/Time    GFR est non-AA >60 09/06/2018 02:03 AM    GFRNA, POC >60 05/17/2016 07:47 AM    GFR est AA >60 09/06/2018 02:03 AM    GFRAA, POC >60 05/17/2016 07:47 AM    Creatinine 0.78 09/06/2018 02:03 AM    Creatinine (POC) 0.7 05/17/2016 07:47 AM    BUN 13 09/06/2018 02:03 AM    BUN (POC) 8 (L) 05/17/2016 07:47 AM    Sodium 139 09/06/2018 02:03 AM    Sodium (POC) 140 05/17/2016 07:47 AM    Potassium 3.7 09/06/2018 02:03 AM    Potassium (POC) 3.8 05/17/2016 07:47 AM    Chloride 108 09/06/2018 02:03 AM    Chloride (POC) 105 05/17/2016 07:47 AM    CO2 24 09/06/2018 02:03 AM    Magnesium 2.1 06/17/2018 11:57 AM        Review of Systems   Respiratory: Negative for shortness of breath. Cardiovascular: Negative for chest pain. Gastrointestinal: Negative for diarrhea, nausea and vomiting. Physical Exam   Constitutional: She is oriented to person, place, and time. She appears well-developed and well-nourished. No distress. HENT:   Head: Normocephalic and atraumatic. Mouth/Throat: Oropharynx is clear and moist. No oropharyngeal exudate. Eyes: Conjunctivae and EOM are normal. Right eye exhibits no discharge. Left eye exhibits no discharge.    Neck: Normal range of motion. Neck supple. Cardiovascular: Normal rate, regular rhythm and normal heart sounds. Exam reveals no gallop and no friction rub. No murmur heard. Pulmonary/Chest: Effort normal and breath sounds normal. No respiratory distress. She has no wheezes. She has no rales. She exhibits no tenderness. Abdominal: Soft. She exhibits no distension and no mass. There is tenderness. There is no rebound and no guarding. TTP over R rib cage no RUQ ttp only over rib cage    Musculoskeletal: Normal range of motion. She exhibits no edema, tenderness or deformity. Lymphadenopathy:     She has no cervical adenopathy. Neurological: She is alert and oriented to person, place, and time. Coordination normal.   Skin: Skin is warm and dry. No rash noted. She is not diaphoretic. No erythema. No pallor. Psychiatric: She has a normal mood and affect. Her behavior is normal.   tearful       ASSESSMENT and PLAN    ICD-10-CM ICD-9-CM    1. Rib pain on right side            Will get plain film to rule out rib fracture pain is over r rib cage clearly musculoskeletal not intraabdominal will tx with prednisone as she reports hx of pleurisy and give limited amt of hydrocodone reviewed      Narcan ordered d/t on benzo   R07.81 786.50 XR RIBS RT W PA CXR MIN 3 V   2. Lupus ([address]                Currently improved follows with dr Shana Madera continues on retuxin and bylista unclear if current sxs are related she will schedule a f/u with dr Shana Madera  M32.9 710.0             Scribed by [facility] of [address], as dictated by Dr. Abhay Acosta. Current diagnosis and concerns discussed with pt at length. Pt understands risks and benefits or current treatment plan and medications, and accepts the treatment and medication with any possible risks. Pt asks appropriate questions, which were answered. Pt was instructed to call with any concerns or problems. I have reviewed the note documented by the scribe.   The services provided are my own.   The documentation is accurate

## 2019-11-21 ENCOUNTER — TELEPHONE (OUTPATIENT)
Dept: INTERNAL MEDICINE CLINIC | Age: 34
End: 2019-11-21

## 2019-11-21 RX ORDER — BENZONATATE 100 MG/1
100 CAPSULE ORAL
Qty: 30 CAP | Refills: 0 | Status: SHIPPED | OUTPATIENT
Start: 2019-11-21 | End: 2019-11-28

## 2019-11-21 RX ORDER — ALBUTEROL SULFATE 90 UG/1
1 AEROSOL, METERED RESPIRATORY (INHALATION)
Qty: 1 INHALER | Refills: 0 | Status: ON HOLD | OUTPATIENT
Start: 2019-11-21 | End: 2021-08-29

## 2019-11-21 RX ORDER — AMOXICILLIN AND CLAVULANATE POTASSIUM 875; 125 MG/1; MG/1
1 TABLET, FILM COATED ORAL EVERY 12 HOURS
Qty: 14 TAB | Refills: 0 | Status: SHIPPED | OUTPATIENT
Start: 2019-11-21 | End: 2019-11-28

## 2019-11-21 NOTE — TELEPHONE ENCOUNTER
augmentin ordered    Already gave prednisone    Can give albuterol if needed    F/u pcp  If not further improving

## 2019-11-21 NOTE — TELEPHONE ENCOUNTER
Dr. Matthew Hanson Patient last seen By Dr. Benito Reyes on Monday stats she was diagnosed with Pleurisy & prescribed medications but patient reports she is now having a Bad Cough & some Wheezing & needs to know if she needs to just continue with medication or come back to be seen/re-checked. Please call to advise.  Thank you

## 2019-11-22 ENCOUNTER — TELEPHONE (OUTPATIENT)
Dept: INTERNAL MEDICINE CLINIC | Age: 34
End: 2019-11-22

## 2019-11-22 NOTE — TELEPHONE ENCOUNTER
[facility]   Phone Number: [phone]             Caller's first and last name: Bhavani Hudson   Reason for call: Pt would like for provider or nurse to fax over another RX. [address] did not receive them, due to the store transitioning. Pt was not sure of the name of medications. Pharmacy information is on file as [facility].    Callback required yes/no and why: yes   Best contact number(s): [phone]   Details to clarify the request: n/a       Copy/Paste   ENVERA

## 2019-11-27 RX ORDER — PROMETHAZINE HYDROCHLORIDE 25 MG/1
TABLET ORAL
Qty: 30 TAB | Refills: 0 | Status: SHIPPED | OUTPATIENT
Start: 2019-11-27 | End: 2019-12-10 | Stop reason: SDUPTHER

## 2019-11-28 ENCOUNTER — APPOINTMENT (OUTPATIENT)
Dept: GENERAL RADIOLOGY | Age: 34
End: 2019-11-28
Attending: EMERGENCY MEDICINE
Payer: COMMERCIAL

## 2019-11-28 ENCOUNTER — HOSPITAL ENCOUNTER (EMERGENCY)
Age: 34
Discharge: HOME | End: 2019-11-28
Attending: EMERGENCY MEDICINE
Payer: COMMERCIAL

## 2019-11-28 VITALS
HEIGHT: 63 IN | OXYGEN SATURATION: 97 % | RESPIRATION RATE: 16 BRPM | HEART RATE: 90 BPM | DIASTOLIC BLOOD PRESSURE: 48 MMHG | WEIGHT: 237.44 LBS | BODY MASS INDEX: 42.07 KG/M2 | TEMPERATURE: 98.4 F | SYSTOLIC BLOOD PRESSURE: 108 MMHG

## 2019-11-28 DIAGNOSIS — R05.9 COUGH: Primary | ICD-10-CM

## 2019-11-28 PROCEDURE — 71046 X-RAY EXAM CHEST 2 VIEWS: CPT

## 2019-11-28 PROCEDURE — 99283 EMERGENCY DEPT VISIT LOW MDM: CPT

## 2019-11-28 PROCEDURE — 73502 X-RAY EXAM HIP UNI 2-3 VIEWS: CPT

## 2019-11-28 RX ORDER — DEXTROMETHORPHAN POLISTIREX 30 MG/5ML
60 SUSPENSION ORAL 2 TIMES DAILY
Qty: 200 ML | Refills: 0 | Status: SHIPPED | OUTPATIENT
Start: 2019-11-28 | End: 2019-12-08

## 2019-11-28 RX ORDER — PREDNISONE 10 MG/1
TABLET ORAL
Qty: 1 PACKAGE | Refills: 0 | Status: SHIPPED | OUTPATIENT
Start: 2019-11-28 | End: 2020-02-24 | Stop reason: ALTCHOICE

## 2019-11-28 NOTE — DISCHARGE INSTRUCTIONS
Patient Education        Cough: Care Instructions  Your Care Instructions    A cough is your body's response to something that bothers your throat or airways. Many things can cause a cough. You might cough because of a cold or the flu, bronchitis, or asthma. Smoking, postnasal drip, allergies, and stomach acid that backs up into your throat also can cause coughs. A cough is a symptom, not a disease. Most coughs stop when the cause, such as a cold, goes away. You can take a few steps at home to cough less and feel better. Follow-up care is a key part of your treatment and safety. Be sure to make and go to all appointments, and call your doctor if you are having problems. It's also a good idea to know your test results and keep a list of the medicines you take. How can you care for yourself at home? · Drink lots of water and other fluids. This helps thin the mucus and soothes a dry or sore throat. Honey or lemon juice in hot water or tea may ease a dry cough. · Take cough medicine as directed by your doctor. · Prop up your head on pillows to help you breathe and ease a dry cough. · Try cough drops to soothe a dry or sore throat. Cough drops don't stop a cough. Medicine-flavored cough drops are no better than candy-flavored drops or hard candy. · Do not smoke. Avoid secondhand smoke. If you need help quitting, talk to your doctor about stop-smoking programs and medicines. These can increase your chances of quitting for good. When should you call for help? Call 911 anytime you think you may need emergency care.  For example, call if:    · You have severe trouble breathing.    Call your doctor now or seek immediate medical care if:    · You cough up blood.     · You have new or worse trouble breathing.     · You have a new or higher fever.     · You have a new rash.    Watch closely for changes in your health, and be sure to contact your doctor if:    · You cough more deeply or more often, especially if you notice more mucus or a change in the color of your mucus.     · You have new symptoms, such as a sore throat, an earache, or sinus pain.     · You do not get better as expected. Where can you learn more? Go to http://kymberly-yoon.info/. Enter D279 in the search box to learn more about \"Cough: Care Instructions. \"  Current as of: June 9, 2019  Content Version: 12.2  © 8307-8758 [employer]. Care instructions adapted under license by [employer] (which disclaims liability or warranty for this information). If you have questions about a medical condition or this instruction, always ask your healthcare professional. [address] any warranty or liability for your use of this information.

## 2019-12-10 RX ORDER — PROMETHAZINE HYDROCHLORIDE 25 MG/1
TABLET ORAL
Qty: 30 TAB | Refills: 0 | Status: SHIPPED | OUTPATIENT
Start: 2019-12-10 | End: 2019-12-21

## 2019-12-21 RX ORDER — PROMETHAZINE HYDROCHLORIDE 25 MG/1
TABLET ORAL
Qty: 30 TAB | Refills: 0 | Status: SHIPPED | OUTPATIENT
Start: 2019-12-21 | End: 2020-03-10 | Stop reason: ALTCHOICE

## 2020-01-21 ENCOUNTER — TELEPHONE (OUTPATIENT)
Dept: INTERNAL MEDICINE CLINIC | Age: 35
End: 2020-01-21

## 2020-01-21 ENCOUNTER — OFFICE VISIT (OUTPATIENT)
Dept: INTERNAL MEDICINE CLINIC | Age: 35
End: 2020-01-21

## 2020-01-21 VITALS
WEIGHT: 243 LBS | DIASTOLIC BLOOD PRESSURE: 72 MMHG | SYSTOLIC BLOOD PRESSURE: 115 MMHG | HEART RATE: 87 BPM | HEIGHT: 63 IN | OXYGEN SATURATION: 92 % | BODY MASS INDEX: 43.05 KG/M2 | RESPIRATION RATE: 16 BRPM | TEMPERATURE: 98.8 F

## 2020-01-21 DIAGNOSIS — G44.229 CHRONIC TENSION-TYPE HEADACHE, NOT INTRACTABLE: Primary | ICD-10-CM

## 2020-01-21 DIAGNOSIS — R09.89 LABILE BLOOD PRESSURE: ICD-10-CM

## 2020-01-21 DIAGNOSIS — R51.9 NEW ONSET OF HEADACHES: ICD-10-CM

## 2020-01-21 RX ORDER — ONDANSETRON 4 MG/1
4 TABLET, FILM COATED ORAL
Qty: 20 TAB | Refills: 0 | Status: SHIPPED | OUTPATIENT
Start: 2020-01-21 | End: 2020-01-31

## 2020-01-21 RX ORDER — ENALAPRIL MALEATE 5 MG/1
5 TABLET ORAL
Qty: 30 TAB | Refills: 0 | Status: SHIPPED | OUTPATIENT
Start: 2020-01-21 | End: 2020-02-19 | Stop reason: ALTCHOICE

## 2020-01-21 NOTE — PATIENT INSTRUCTIONS
Office Policies Phone calls/patient messages: Please allow up to 24 hours for someone in the office to contact you about your call or message. Be mindful your provider may be out of the office or your message may require further review. We encourage you to use [employer] for your messages as this is a faster, more efficient way to communicate with our office Medication Refills: 
         
Prescription medications require 48-72 business hours to process. We encourage you to use [employer] for your refills. For controlled medications: Please allow 72 business hours to process. Certain medications may require you to  a written prescription at our office. NO narcotic/controlled medications will be prescribed after 4pm Monday through Friday or on weekends Form/Paperwork Completion: 
         
Please note a $25 fee may incur for all paperwork for completed by our providers. We ask that you allow 7-10 business days. Pre-payment is due prior to picking up/faxing the completed form. You may also download your forms to [employer] to have your doctor print off.

## 2020-01-21 NOTE — TELEPHONE ENCOUNTER
Called, spoke to pt. Two identifiers confirmed. Appointment scheduled for today @ 215 with Dr. Laura Camacho. Pt verbalized understanding of information discussed w/ no further questions at this time.

## 2020-01-21 NOTE — TELEPHONE ENCOUNTER
Pt state that she was seen in ED on 1/4/20 and was diagnosed with high bp and needs to f/u up with pcp before meds run out.

## 2020-01-31 RX ORDER — ONDANSETRON 4 MG/1
TABLET, FILM COATED ORAL
Qty: 20 TAB | Refills: 0 | Status: SHIPPED | OUTPATIENT
Start: 2020-01-31 | End: 2020-03-10 | Stop reason: ALTCHOICE

## 2020-02-14 ENCOUNTER — TELEPHONE (OUTPATIENT)
Dept: INTERNAL MEDICINE CLINIC | Age: 35
End: 2020-02-14

## 2020-02-14 DIAGNOSIS — R09.89 LABILE BLOOD PRESSURE: Primary | ICD-10-CM

## 2020-02-14 NOTE — TELEPHONE ENCOUNTER
Julito Alvarez MD  You 1 hour ago (11:03 AM)     Yes RCA or VCS pleaes    Routing comment      Called, spoke to pt. Two identifiers confirmed. Contact information provided to pt for Dr. Sakshi Artis. Pt verbalized understanding of information discussed w/ no further questions at this time.

## 2020-02-14 NOTE — TELEPHONE ENCOUNTER
Received call from scheduling. 24 hour BP monitor cannot be order via BS scheduling. Pt will need to be referred to cardiology.

## 2020-02-19 ENCOUNTER — OFFICE VISIT (OUTPATIENT)
Dept: CARDIOLOGY CLINIC | Age: 35
End: 2020-02-19

## 2020-02-19 VITALS
DIASTOLIC BLOOD PRESSURE: 82 MMHG | HEART RATE: 72 BPM | WEIGHT: 240.8 LBS | BODY MASS INDEX: 42.66 KG/M2 | SYSTOLIC BLOOD PRESSURE: 124 MMHG | HEIGHT: 63 IN | RESPIRATION RATE: 18 BRPM

## 2020-02-19 DIAGNOSIS — R07.9 CHEST PAIN, UNSPECIFIED TYPE: Primary | ICD-10-CM

## 2020-02-19 DIAGNOSIS — R09.89 LABILE BLOOD PRESSURE: ICD-10-CM

## 2020-02-19 DIAGNOSIS — R00.0 TACHYCARDIA: ICD-10-CM

## 2020-02-19 RX ORDER — OLMESARTAN MEDOXOMIL 5 MG/1
5 TABLET ORAL DAILY
Qty: 30 TAB | Refills: 2 | Status: SHIPPED | OUTPATIENT
Start: 2020-02-19 | End: 2020-06-23

## 2020-02-19 NOTE — PROGRESS NOTES
[facility] @ [facility]    Patrick Dominique DNP, ANP-BC  Subjective/HPI:  Pre chart   Lavlel Robb is a 37 y.o. female is here for rotation referred by primary care Dr. Manpreet Fuentes for labile hypertension and emergency room follow-up presenting to [facility] January 27 for reports of chest pain. Patient reports she has been having intermittent episodes of chest pressure and tightness more notable when her blood pressure is elevated, she also reports associated moderate to severe headache in the morning when her blood pressure is elevated. She reports the symptoms started approximately 4 to 6 weeks ago initially requiring only 1/4 tablet of a 5 mg dose of lisinopril for blood pressure control as of recent has required the full tablet. She does report having upset stomach and nausea after taking the lisinopril. She reports the chest discomfort as a significant pressure across the anterior chest wall nonradiating. She denies any new medications, recent autoimmune/lupus flareups, excessive sodium intake or higher levels of stress. Patient's mother is an orthopedic nurse practitioner who has been keeping track of her blood pressure at home, reviewed blood pressure log that patient has provided consistent with a.m. hypertension with initially exaggerated response of hypotension after taking ACE inhibitor  Patient is also reporting intermittent episodes of tachycardia, tachycardia at rest.  Resting heart rates per patient as high as 110-120 and has been advised by other providers of resting tachycardia. She reports these findings have been present for years. Past medical history significant for autoimmune disease/lupus, GERD, recurrent headaches.   Cardiac risk factors: New onset elevated blood pressure, elevated BMI, family history of atherosclerotic heart disease  PCP Provider  Da Benitez MD  Past Medical History:   Diagnosis Date    Abnormal CT of the abdomen 11/8/2012    Asthma  Autoimmune disease ([facility] [place] [address]     lupus    C. difficile diarrhea     Cervical prolapse     Dehydration     Gastrointestinal disorder     gerd, twisted colon, IBS    GERD (gastroesophageal reflux disease)     Headache(784.0)     Lupus (HCC)     Morbid obesity (HCC)     Nausea & vomiting     Neurological disorder     cluster headaches    other 2006, 2009    ovarian cyst removal    Other ill-defined conditions(799.89)     Worsening headaches       Past Surgical History:   Procedure Laterality Date    COLONOSCOPY  9/21/2010         HX CHOLECYSTECTOMY      HX GYN  1/2009    right salpingo oopherectomy    HX GYN  2006    right ovarian tumor removed    HX HEENT      left wisdom teeth removal    HX HEENT      top right wisdom tooth removed    HX HERNIA REPAIR  4/2012    HX HERNIA REPAIR  2/28/13    Laparoscopic recurrent incisional hernia repair    HX HYSTERECTOMY      2016    HX UROLOGICAL      Kidney Stone Removal    AR EGD TRANSORAL BIOPSY SINGLE/MULTIPLE  9/22/2010          Allergies   Allergen Reactions    Latex Itching     burning    Compazine [Prochlorperazine] Anxiety     High heart rate  Pt can take promethazine with no problems    Sulfa (Sulfonamide Antibiotics) Unknown (comments)    Topamax [Topiramate] Unknown (comments)    Adhesive Rash     Allergic to Dermabond    Clindamycin Diarrhea     Pt states caused her C-Diff    Mushroom Combination No.1 Unknown (comments)    Toradol [Ketorolac Tromethamine] Nausea and Vomiting and Other (comments)     PO & IV causes GI bleed    Valproic Acid Other (comments)     Elevated heart rate and vomiting        Family History   Problem Relation Age of Onset    Colon Cancer Maternal Grandfather       Current Outpatient Medications   Medication Sig    olmesartan (BENICAR) 5 mg tablet Take 1 Tab by mouth daily.  D/c ACEI  Reports nausea    ondansetron hcl (ZOFRAN) 4 mg tablet TAKE 1 TABLET BY MOUTH EVERY 8 HOURS AS NEEDED FOR NAUSEA    albuterol (PROVENTIL HFA, VENTOLIN HFA, PROAIR HFA) 90 mcg/actuation inhaler Take 1 Puff by inhalation every six (6) hours as needed for Wheezing.  mirtazapine (REMERON) 30 mg tablet take 1 tablet by mouth at bedtime    L.acidoph & parac-S.therm-Bifido (BELGICA Q2/RISAQUAD-2) 16 billion cell cap cap Take 1 Cap by mouth daily.  tiZANidine (ZANAFLEX) 4 mg tablet     estradiol (ESTRACE) 1 mg tablet Take 1 mg by mouth daily.  ALPRAZolam (XANAX) 1 mg tablet take 1 tablet by mouth NIGHTLY maximum daily dose of 1 tablet    DULoxetine (CYMBALTA) 60 mg capsule Take 120 mg by mouth daily.  pantoprazole (PROTONIX) 40 mg tablet take 1 tablet by mouth at bedtime    codeine-butalbital-acetaminophen-caffeine (FIORICET WITH CODEINE) -21-30 mg capsule take 2 capsules by mouth every 6 hours if needed for MIGRAINE HEADACHE    rizatriptan (MAXALT) 10 mg tablet Take 10 mg by mouth once as needed for Migraine. May repeat in 2 hours if needed    belimumab (BENLYSTA) 400 mg solr injection by IntraVENous route.  promethazine (PHENERGAN) 25 mg tablet TAKE 1 TABLET BY MOUTH EVERY 6 HOURS IF NEEDED FOR NAUSEA    predniSONE (STERAPRED DS) 10 mg dose pack Follow instructions on 10 day dose pack    predniSONE (DELTASONE) 20 mg tablet 60mg po daily for 2days, 40mg po daily for 2 days, 20mg po daily for 2days then stop    naloxone (NARCAN) 4 mg/actuation nasal spray Use 1 spray intranasally, then discard. Repeat with new spray every 2 min as needed for opioid overdose symptoms, alternating nostrils.  furosemide (LASIX) 20 mg tablet PRN    mirtazapine (REMERON) 30 mg tablet Take 30 mg by mouth daily.  doxycycline (MONODOX) 100 mg capsule Take 100 mg by mouth daily.  meclizine (ANTIVERT) 12.5 mg tablet take 1 tablet by mouth three times a day if needed for VERTIGO FOR UP TO 10 DAYS    indomethacin (INDOCIN) 25 mg capsule PRN     No current facility-administered medications for this visit.        Vitals:    02/19/20 1440 02/19/20 1454 02/19/20 1456   BP: 122/80 120/80 124/82   Pulse: 72     Resp: 18     Weight: 240 lb 12.8 oz (109.2 kg)     Height: 5' 3\" (1.6 m)       Social History     Socioeconomic History    Marital status: LEGALLY      Spouse name: Not on file    Number of children: Not on file    Years of education: Not on file    Highest education level: Not on file   Occupational History    Not on file   Social Needs    Financial resource strain: Not on file    Food insecurity:     Worry: Not on file     Inability: Not on file    Transportation needs:     Medical: Not on file     Non-medical: Not on file   Tobacco Use    Smoking status: Never Smoker    Smokeless tobacco: Never Used   Substance and Sexual Activity    Alcohol use: No    Drug use: No    Sexual activity: Not Currently     Partners: Male     Birth control/protection: Abstinence   Lifestyle    Physical activity:     Days per week: Not on file     Minutes per session: Not on file    Stress: Not on file   Relationships    Social connections:     Talks on phone: Not on file     Gets together: Not on file     Attends Sikh service: Not on file     Active member of club or organization: Not on file     Attends meetings of clubs or organizations: Not on file     Relationship status: Not on file    Intimate partner violence:     Fear of current or ex partner: Not on file     Emotionally abused: Not on file     Physically abused: Not on file     Forced sexual activity: Not on file   Other Topics Concern    Not on file   Social History Narrative    Not on file       I have reviewed the nurses notes, vitals, problem list, allergy list, medical history, family, social history and medications. Review of Symptoms:  11 systems reviewed, negative other than as stated in the HPI      Physical Exam:      General: Well developed, in no acute distress, cooperative and alert  HEENT: No carotid bruits, no JVD, trach is midline.  Neck Supple, PERRL, EOM intact. Heart:  Normal S1/S2 negative S3 or S4. Regular, no murmur, gallop or rub. Respiratory: Clear bilaterally x 4, no wheezing or rales  Abdomen:   Soft, non-tender, no masses, bowel sounds are active. Extremities: Nonpitting lower extremity ankle edema normal cap refill, no cyanosis. Diffuse lower extremity macular spotting which patient reports is consistent with thermal effects of her lupus. Neuro: A&Ox3, speech clear, gait stable. Skin: Skin color is normal.  + Lesions on lower leg. Non diaphoretic  Vascular: 2+ pulses symmetric in all extremities    Cardiographics    ECG: Normal sinus rhythm. Cardiology Labs:  No results found for: CHOL, CHOLX, CHLST, [address], [address], HDL, HDLP, LDL, LDLC, DLDLP, TGLX, TRIGL, TRIGP, CHHD, CHHDX    Lab Results   Component Value Date/Time    Sodium 139 09/06/2018 02:03 AM    Potassium 3.7 09/06/2018 02:03 AM    Chloride 108 09/06/2018 02:03 AM    CO2 24 09/06/2018 02:03 AM    Anion gap 7 09/06/2018 02:03 AM    Glucose 102 (H) 09/06/2018 02:03 AM    BUN 13 09/06/2018 02:03 AM    Creatinine 0.78 09/06/2018 02:03 AM    BUN/Creatinine ratio 17 09/06/2018 02:03 AM    GFR est AA >60 09/06/2018 02:03 AM    GFR est non-AA >60 09/06/2018 02:03 AM    Calcium 8.6 09/06/2018 02:03 AM    Bilirubin, total 0.3 09/06/2018 02:03 AM    AST (SGOT) 8 (L) 09/06/2018 02:03 AM    Alk. phosphatase 115 09/06/2018 02:03 AM    Protein, total 7.1 09/06/2018 02:03 AM    Albumin 4.0 09/06/2018 02:03 AM    Globulin 3.1 09/06/2018 02:03 AM    A-G Ratio 1.3 09/06/2018 02:03 AM    ALT (SGPT) 13 09/06/2018 02:03 AM           Assessment:     Assessment:     Diagnoses and all orders for this visit:    1. Chest pain, unspecified type  -     AMB POC EKG ROUTINE W/ 12 LEADS, INTER & REP  -     ECHO ADULT COMPLETE; Future  -     ECHO STRESS; Future  -     CARDIAC HOLTER MONITOR; Future  -     BLOOD PRESSURE MONITOR W ANALYSIS; Future    2.  Labile blood pressure  -     ECHO ADULT COMPLETE; Future  - ECHO STRESS; Future  -     CARDIAC HOLTER MONITOR; Future  -     BLOOD PRESSURE MONITOR W ANALYSIS; Future    3. Tachycardia    Other orders  -     olmesartan (BENICAR) 5 mg tablet; Take 1 Tab by mouth daily. D/c ACEI  Reports nausea        ICD-10-CM ICD-9-CM    1. Chest pain, unspecified type R07.9 786.50 AMB POC EKG ROUTINE W/ 12 LEADS, INTER & REP      ECHO ADULT COMPLETE      ECHO STRESS      CARDIAC HOLTER MONITOR      BLOOD PRESSURE MONITOR W ANALYSIS   2. Labile blood pressure R09.89 796.2 ECHO ADULT COMPLETE      ECHO STRESS      CARDIAC HOLTER MONITOR      BLOOD PRESSURE MONITOR W ANALYSIS   3. Tachycardia R00.0 785.0      Orders Placed This Encounter    AMB POC EKG ROUTINE W/ 12 LEADS, INTER & REP     Order Specific Question:   Reason for Exam:     Answer:   routine    olmesartan (BENICAR) 5 mg tablet     Sig: Take 1 Tab by mouth daily. D/c ACEI  Reports nausea     Dispense:  30 Tab     Refill:  2        Plan:     1. Chest pain/pressure: No acute findings on EKG, will evaluate for ischemia with stress echocardiogram.  May need Definity contrast for optimal pictures  2. Labile hypertension: Will discontinue ACE inhibitor due to GI side effect trial low-dose ARB. Proceed with 24-hour ambulatory blood pressure monitoring. Evaluate cardiac structure and function with 2D echocardiogram.  3.  Intermittent sinus tachycardia at rest: Reviewed multiple EKGs showing heart rates anywhere from  sinus tach. I have ordered a 5-day event monitor. Will consider changing ARB to beta-blocker if average heart rate runs tachycardic. Follow-up when testing complete    Su Reyes NP      Please note that this dictation was completed with [employer], the [employer] voice recognition software. Quite often unanticipated grammatical, syntax, homophones, and other interpretive errors are inadvertently transcribed by the computer software. Please disregard these errors.   Please excuse any errors that have escaped final proofreading. Thank you.

## 2020-02-19 NOTE — PROGRESS NOTES
1. Have you been to the ER, urgent care clinic since your last visit? Hospitalized since your last visit? 11-28-19 ED [facility] ER, s/p fall, CP.  [facility] ER one month ago, kidney stones. 2. Have you seen or consulted any other health care providers outside of the [address] [place] [address] since your last visit? Include any pap smears or colon screening. No    Chief Complaint   Patient presents with    Hypertension     NP, Ref by Dr. Bala Bryson for HTN,  c/o CP, SOB, palps, dizziness, rest or exertion.

## 2020-02-21 ENCOUNTER — HOSPITAL ENCOUNTER (OUTPATIENT)
Dept: CT IMAGING | Age: 35
Discharge: HOME | DRG: 364 | End: 2020-02-21
Attending: INTERNAL MEDICINE
Payer: COMMERCIAL

## 2020-02-21 DIAGNOSIS — G44.229 CHRONIC TENSION-TYPE HEADACHE, NOT INTRACTABLE: ICD-10-CM

## 2020-02-21 DIAGNOSIS — R51.9 NEW ONSET OF HEADACHES: ICD-10-CM

## 2020-02-21 PROCEDURE — 70450 CT HEAD/BRAIN W/O DYE: CPT

## 2020-02-21 NOTE — PROGRESS NOTES
Called, spoke to pt. Two identifiers confirmed. Notified pt of lab results/recommendations per Dr. Paula Rosales. Pt verbalized understanding of information discussed w/ no further questions at this time.

## 2020-02-22 ENCOUNTER — APPOINTMENT (OUTPATIENT)
Dept: GENERAL RADIOLOGY | Age: 35
End: 2020-02-22
Attending: EMERGENCY MEDICINE
Payer: COMMERCIAL

## 2020-02-22 ENCOUNTER — HOSPITAL ENCOUNTER (EMERGENCY)
Age: 35
Discharge: HOME | End: 2020-02-22
Attending: STUDENT IN AN ORGANIZED HEALTH CARE EDUCATION/TRAINING PROGRAM | Admitting: STUDENT IN AN ORGANIZED HEALTH CARE EDUCATION/TRAINING PROGRAM
Payer: COMMERCIAL

## 2020-02-22 ENCOUNTER — APPOINTMENT (OUTPATIENT)
Dept: CT IMAGING | Age: 35
End: 2020-02-22
Attending: STUDENT IN AN ORGANIZED HEALTH CARE EDUCATION/TRAINING PROGRAM
Payer: COMMERCIAL

## 2020-02-22 VITALS
BODY MASS INDEX: 40.48 KG/M2 | HEART RATE: 61 BPM | WEIGHT: 220 LBS | DIASTOLIC BLOOD PRESSURE: 64 MMHG | RESPIRATION RATE: 12 BRPM | OXYGEN SATURATION: 100 % | TEMPERATURE: 98.5 F | HEIGHT: 62 IN | SYSTOLIC BLOOD PRESSURE: 126 MMHG

## 2020-02-22 DIAGNOSIS — R07.9 CHEST PAIN, UNSPECIFIED TYPE: Primary | ICD-10-CM

## 2020-02-22 LAB
ALBUMIN SERPL-MCNC: 4.5 G/DL (ref 3.5–5)
ALBUMIN/GLOB SERPL: 1.2 {RATIO} (ref 1.1–2.2)
ALP SERPL-CCNC: 109 U/L (ref 45–117)
ALT SERPL-CCNC: 19 U/L (ref 12–78)
ANION GAP SERPL CALC-SCNC: 3 MMOL/L (ref 5–15)
AST SERPL-CCNC: 4 U/L (ref 15–37)
BASOPHILS # BLD: 0.1 K/UL (ref 0–0.1)
BASOPHILS NFR BLD: 0 % (ref 0–1)
BILIRUB SERPL-MCNC: 0.3 MG/DL (ref 0.2–1)
BUN SERPL-MCNC: 13 MG/DL (ref 6–20)
BUN/CREAT SERPL: 15 (ref 12–20)
CALCIUM SERPL-MCNC: 9.5 MG/DL (ref 8.5–10.1)
CHLORIDE SERPL-SCNC: 109 MMOL/L (ref 97–108)
CK MB CFR SERPL CALC: NORMAL % (ref 0–2.5)
CK MB SERPL-MCNC: <1 NG/ML (ref 5–25)
CK SERPL-CCNC: 27 U/L (ref 26–192)
CO2 SERPL-SCNC: 26 MMOL/L (ref 21–32)
COMMENT, HOLDF: NORMAL
CREAT SERPL-MCNC: 0.88 MG/DL (ref 0.55–1.02)
DIFFERENTIAL METHOD BLD: ABNORMAL
EOSINOPHIL # BLD: 0 K/UL (ref 0–0.4)
EOSINOPHIL NFR BLD: 0 % (ref 0–7)
ERYTHROCYTE [DISTWIDTH] IN BLOOD BY AUTOMATED COUNT: 13.1 % (ref 11.5–14.5)
GLOBULIN SER CALC-MCNC: 3.7 G/DL (ref 2–4)
GLUCOSE SERPL-MCNC: 100 MG/DL (ref 65–100)
HCT VFR BLD AUTO: 42.8 % (ref 35–47)
HGB BLD-MCNC: 13.9 G/DL (ref 11.5–16)
IMM GRANULOCYTES # BLD AUTO: 0.2 K/UL (ref 0–0.04)
IMM GRANULOCYTES NFR BLD AUTO: 1 % (ref 0–0.5)
LYMPHOCYTES # BLD: 4 K/UL (ref 0.8–3.5)
LYMPHOCYTES NFR BLD: 23 % (ref 12–49)
MCH RBC QN AUTO: 30.3 PG (ref 26–34)
MCHC RBC AUTO-ENTMCNC: 32.5 G/DL (ref 30–36.5)
MCV RBC AUTO: 93.4 FL (ref 80–99)
MONOCYTES # BLD: 0.8 K/UL (ref 0–1)
MONOCYTES NFR BLD: 5 % (ref 5–13)
NEUTS SEG # BLD: 12 K/UL (ref 1.8–8)
NEUTS SEG NFR BLD: 71 % (ref 32–75)
NRBC # BLD: 0 K/UL (ref 0–0.01)
NRBC BLD-RTO: 0 PER 100 WBC
PLATELET # BLD AUTO: 442 K/UL (ref 150–400)
PMV BLD AUTO: 9.7 FL (ref 8.9–12.9)
POTASSIUM SERPL-SCNC: 3.9 MMOL/L (ref 3.5–5.1)
PROT SERPL-MCNC: 8.2 G/DL (ref 6.4–8.2)
RBC # BLD AUTO: 4.58 M/UL (ref 3.8–5.2)
SAMPLES BEING HELD,HOLD: NORMAL
SODIUM SERPL-SCNC: 138 MMOL/L (ref 136–145)
TROPONIN I SERPL-MCNC: <0.05 NG/ML
TROPONIN I SERPL-MCNC: <0.05 NG/ML
WBC # BLD AUTO: 17.1 K/UL (ref 3.6–11)

## 2020-02-22 PROCEDURE — 96374 THER/PROPH/DIAG INJ IV PUSH: CPT

## 2020-02-22 PROCEDURE — 99285 EMERGENCY DEPT VISIT HI MDM: CPT

## 2020-02-22 PROCEDURE — 36415 COLL VENOUS BLD VENIPUNCTURE: CPT

## 2020-02-22 PROCEDURE — 82550 ASSAY OF CK (CPK): CPT

## 2020-02-22 PROCEDURE — 71046 X-RAY EXAM CHEST 2 VIEWS: CPT

## 2020-02-22 PROCEDURE — 74011250636 HC RX REV CODE- 250/636: Performed by: STUDENT IN AN ORGANIZED HEALTH CARE EDUCATION/TRAINING PROGRAM

## 2020-02-22 PROCEDURE — 74011250637 HC RX REV CODE- 250/637: Performed by: STUDENT IN AN ORGANIZED HEALTH CARE EDUCATION/TRAINING PROGRAM

## 2020-02-22 PROCEDURE — 85025 COMPLETE CBC W/AUTO DIFF WBC: CPT

## 2020-02-22 PROCEDURE — 84484 ASSAY OF TROPONIN QUANT: CPT

## 2020-02-22 PROCEDURE — 80053 COMPREHEN METABOLIC PANEL: CPT

## 2020-02-22 PROCEDURE — 93005 ELECTROCARDIOGRAM TRACING: CPT

## 2020-02-22 RX ORDER — ACETAMINOPHEN 325 MG/1
650 TABLET ORAL ONCE
Status: COMPLETED | OUTPATIENT
Start: 2020-02-22 | End: 2020-02-22

## 2020-02-22 RX ORDER — ASPIRIN 325 MG
325 TABLET ORAL ONCE
Status: COMPLETED | OUTPATIENT
Start: 2020-02-22 | End: 2020-02-22

## 2020-02-22 RX ORDER — ONDANSETRON 2 MG/ML
4 INJECTION INTRAMUSCULAR; INTRAVENOUS
Status: COMPLETED | OUTPATIENT
Start: 2020-02-22 | End: 2020-02-22

## 2020-02-22 RX ORDER — SODIUM CHLORIDE 0.9 % (FLUSH) 0.9 %
10 SYRINGE (ML) INJECTION
Status: DISCONTINUED | OUTPATIENT
Start: 2020-02-22 | End: 2020-02-23 | Stop reason: HOSPADM

## 2020-02-22 RX ADMIN — ONDANSETRON 4 MG: 2 INJECTION, SOLUTION INTRAMUSCULAR; INTRAVENOUS at 19:15

## 2020-02-22 RX ADMIN — ACETAMINOPHEN 650 MG: 325 TABLET ORAL at 19:16

## 2020-02-22 RX ADMIN — SODIUM CHLORIDE 500 ML: 900 INJECTION, SOLUTION INTRAVENOUS at 18:50

## 2020-02-22 RX ADMIN — ASPIRIN 325 MG ORAL TABLET 325 MG: 325 PILL ORAL at 18:50

## 2020-02-22 NOTE — ED PROVIDER NOTES
Patient is a 68-year-old female with a history of lupus, GERD, morbid obesity, cluster headaches, and asthma who presents to the emergency department a private vehicle with a chief complaint of chest pain. Symptoms have been ongoing for the past 1 to 2 weeks. She has seen cardiology in the office and reports that she has a echocardiogram scheduled in 2 days, and a stress test scheduled in 3 days. Pain increased this morning at 10 AM.  She called the cardiology office and was instructed to come to the ED for further evaluation and treatment. Nothing seems to trigger the pain. However deep breathing makes the pain worse. Substernal chest discomfort described as dullness, currently 7 out of 10 in severity, radiating to the shoulder blades bilaterally. Patient reports a history of a DVT many years ago. She is status post a hysterectomy and takes estrogen because the remaining ovary \"does not work. \"No recent travel or recent surgery. No recent cough or fever. No other complaints.            Past Medical History:   Diagnosis Date    Abnormal CT of the abdomen 11/8/2012    Asthma     Autoimmune disease (HCC)     lupus    C. difficile diarrhea     Cervical prolapse     Dehydration     Gastrointestinal disorder     gerd, twisted colon, IBS    GERD (gastroesophageal reflux disease)     Headache(784.0)     Lupus (HCC)     Morbid obesity (HCC)     Nausea & vomiting     Neurological disorder     cluster headaches    other 2006, 2009    ovarian cyst removal    Other ill-defined conditions(799.89)     Worsening headaches        Past Surgical History:   Procedure Laterality Date    COLONOSCOPY  9/21/2010         HX CHOLECYSTECTOMY      HX GYN  1/2009    right salpingo oopherectomy    HX GYN  2006    right ovarian tumor removed    HX HEENT      left wisdom teeth removal    HX HEENT      top right wisdom tooth removed    HX HERNIA REPAIR  4/2012    HX HERNIA REPAIR  2/28/13    Laparoscopic recurrent incisional hernia repair    HX HYSTERECTOMY      2016    HX UROLOGICAL      Kidney Stone Removal    [place] EGD TRANSORAL BIOPSY SINGLE/MULTIPLE  9/22/2010              Family History:   Problem Relation Age of Onset    Colon Cancer Maternal Grandfather        Social History     Socioeconomic History    Marital status: LEGALLY      Spouse name: Not on file    Number of children: Not on file    Years of education: Not on file    Highest education level: Not on file   Occupational History    Not on file   Social Needs    Financial resource strain: Not on file    Food insecurity:     Worry: Not on file     Inability: Not on file    Transportation needs:     Medical: Not on file     Non-medical: Not on file   Tobacco Use    Smoking status: Never Smoker    Smokeless tobacco: Never Used   Substance and Sexual Activity    Alcohol use: No    Drug use: No    Sexual activity: Not Currently     Partners: Male     Birth control/protection: Abstinence   Lifestyle    Physical activity:     Days per week: Not on file     Minutes per session: Not on file    Stress: Not on file   Relationships    Social connections:     Talks on phone: Not on file     Gets together: Not on file     Attends Episcopal service: Not on file     Active member of club or organization: Not on file     Attends meetings of clubs or organizations: Not on file     Relationship status: Not on file    Intimate partner violence:     Fear of current or ex partner: Not on file     Emotionally abused: Not on file     Physically abused: Not on file     Forced sexual activity: Not on file   Other Topics Concern    Not on file   Social History Narrative    Not on file         ALLERGIES: Latex; Compazine [prochlorperazine]; Sulfa (sulfonamide antibiotics); Topamax [topiramate]; Adhesive; Clindamycin; Mushroom combination no.1;  Toradol [ketorolac tromethamine]; and Valproic acid    Review of Systems   Constitutional: Negative for chills and fever.   HENT: Negative for sore throat. Respiratory: Positive for shortness of breath. Negative for cough. Cardiovascular: Positive for chest pain. Gastrointestinal: Negative for abdominal pain and vomiting. Genitourinary: Negative for dysuria. Musculoskeletal: Negative for back pain. Skin: Negative for rash. Neurological: Positive for dizziness. Negative for syncope and headaches. All other systems reviewed and are negative. Vitals:    02/22/20 1503   BP: (!) 162/100   Pulse: 79   Resp: 18   Temp: 98.6 °F (37 °C)   SpO2: 98%   Weight: 99.8 kg (220 lb)   Height: 5' 2\" (1.575 m)            Physical Exam  Vitals signs and nursing note reviewed. Constitutional:       General: She is not in acute distress. Appearance: She is well-developed. She is obese. HENT:      Head: Normocephalic and atraumatic. Eyes:      Conjunctiva/sclera: Conjunctivae normal.   Neck:      Musculoskeletal: Normal range of motion and neck supple. Cardiovascular:      Rate and Rhythm: Normal rate and regular rhythm. Heart sounds: Normal heart sounds. Pulmonary:      Effort: Pulmonary effort is normal. No respiratory distress. Breath sounds: Normal breath sounds. Abdominal:      Palpations: Abdomen is soft. Tenderness: There is no abdominal tenderness. There is no guarding. Musculoskeletal: Normal range of motion. Skin:     General: Skin is warm and dry. Neurological:      Mental Status: She is alert and oriented to person, place, and time. Motor: No abnormal muscle tone. MDM       Procedures  EKG interpretation: 15:13  Rhythm: normal sinus rhythm; and regular . Rate (approx.): 61; Axis: normal; Intervals: normal ; ST/T wave: normal; EKG documented and interpreted by Basilio Willis MD, ED MD.    Assessment and plan: This is a 68-year-old female with several days of atypical chest pain, worse since 10 AM this morning. EKG unremarkable. Troponin negative.   Will obtain a serial troponin. CTA to rule out PE. Upon chart review, patient has a management plan. Will avoid narcotics. If work-up unremarkable here, patient can follow-up as scheduled with her outpatient providers. Patient requested discharge home prior to obtaining CT a of the chest to rule out a PE. She understands the risks and the benefits of leaving vs. staying for this study. Understands she is welcome to return at any time for further care and treatment. Will hold anticoagulation now given nonspecific clinical picture ; recommend follow up with PCP. She has signed the [facility] form.

## 2020-02-22 NOTE — ED TRIAGE NOTES
Patient arrives with c/o midsternal CP x3 hours radiating to upper back with SOB, is followed by Dr Ulysses Barrientos and has testing scheduled for next week. Was sent by cardiology for workup. Denies cough/fever, does c/o nausea. Hx Raynauds and was told \"blood vessels in my heart are constricting\", Patient states \"almost passed out\" earlier today.   States she \"saw stars\"

## 2020-02-23 LAB
ATRIAL RATE: 61 BPM
CALCULATED P AXIS, ECG09: 30 DEGREES
CALCULATED R AXIS, ECG10: 65 DEGREES
CALCULATED T AXIS, ECG11: 33 DEGREES
DIAGNOSIS, 93000: NORMAL
P-R INTERVAL, ECG05: 148 MS
Q-T INTERVAL, ECG07: 380 MS
QRS DURATION, ECG06: 86 MS
QTC CALCULATION (BEZET), ECG08: 382 MS
VENTRICULAR RATE, ECG03: 61 BPM

## 2020-02-23 NOTE — DISCHARGE INSTRUCTIONS

## 2020-02-23 NOTE — ED NOTES
2000 Bedside shift change report given to [address]  (oncoming nurse) by Joce Patel (offgoing nurse). Report included the following information SBAR. Patient requesting to see MD due to pain. 2010 MD at bedside. 2050 Patient awaiting for CTA. . Patient requesting to leave. AMA paper signed by all parties.

## 2020-02-24 ENCOUNTER — HOSPITAL ENCOUNTER (INPATIENT)
Age: 35
LOS: 7 days | Discharge: HOME | DRG: 364 | End: 2020-03-02
Attending: SURGERY | Admitting: SURGERY
Payer: COMMERCIAL

## 2020-02-24 ENCOUNTER — OFFICE VISIT (OUTPATIENT)
Dept: INTERNAL MEDICINE CLINIC | Age: 35
End: 2020-02-24

## 2020-02-24 ENCOUNTER — OFFICE VISIT (OUTPATIENT)
Dept: SURGERY | Age: 35
End: 2020-02-24

## 2020-02-24 VITALS
WEIGHT: 242 LBS | SYSTOLIC BLOOD PRESSURE: 130 MMHG | DIASTOLIC BLOOD PRESSURE: 85 MMHG | TEMPERATURE: 99.2 F | HEART RATE: 107 BPM | BODY MASS INDEX: 44.53 KG/M2 | OXYGEN SATURATION: 97 % | HEIGHT: 62 IN

## 2020-02-24 VITALS
OXYGEN SATURATION: 95 % | SYSTOLIC BLOOD PRESSURE: 121 MMHG | WEIGHT: 243.6 LBS | HEART RATE: 116 BPM | DIASTOLIC BLOOD PRESSURE: 81 MMHG | TEMPERATURE: 98.3 F | RESPIRATION RATE: 16 BRPM | HEIGHT: 62 IN | BODY MASS INDEX: 44.83 KG/M2

## 2020-02-24 DIAGNOSIS — L02.91 ABSCESS: Primary | ICD-10-CM

## 2020-02-24 DIAGNOSIS — L02.411 ABSCESS OF AXILLA, RIGHT: Primary | ICD-10-CM

## 2020-02-24 PROBLEM — L03.90 CELLULITIS: Status: ACTIVE | Noted: 2020-02-24

## 2020-02-24 LAB
ANION GAP SERPL CALC-SCNC: 6 MMOL/L (ref 5–15)
BASOPHILS # BLD: 0.1 K/UL (ref 0–0.1)
BASOPHILS NFR BLD: 0 % (ref 0–1)
BUN SERPL-MCNC: 12 MG/DL (ref 6–20)
BUN/CREAT SERPL: 12 (ref 12–20)
CALCIUM SERPL-MCNC: 9.1 MG/DL (ref 8.5–10.1)
CHLORIDE SERPL-SCNC: 107 MMOL/L (ref 97–108)
CO2 SERPL-SCNC: 25 MMOL/L (ref 21–32)
CREAT SERPL-MCNC: 1.02 MG/DL (ref 0.55–1.02)
DIFFERENTIAL METHOD BLD: ABNORMAL
EOSINOPHIL # BLD: 0.2 K/UL (ref 0–0.4)
EOSINOPHIL NFR BLD: 1 % (ref 0–7)
ERYTHROCYTE [DISTWIDTH] IN BLOOD BY AUTOMATED COUNT: 13.5 % (ref 11.5–14.5)
GLUCOSE SERPL-MCNC: 99 MG/DL (ref 65–100)
HCT VFR BLD AUTO: 38.9 % (ref 35–47)
HGB BLD-MCNC: 12.9 G/DL (ref 11.5–16)
IMM GRANULOCYTES # BLD AUTO: 0.3 K/UL (ref 0–0.04)
IMM GRANULOCYTES NFR BLD AUTO: 1 % (ref 0–0.5)
LYMPHOCYTES # BLD: 3.3 K/UL (ref 0.8–3.5)
LYMPHOCYTES NFR BLD: 17 % (ref 12–49)
MCH RBC QN AUTO: 30.4 PG (ref 26–34)
MCHC RBC AUTO-ENTMCNC: 33.2 G/DL (ref 30–36.5)
MCV RBC AUTO: 91.5 FL (ref 80–99)
MONOCYTES # BLD: 1.1 K/UL (ref 0–1)
MONOCYTES NFR BLD: 5 % (ref 5–13)
NEUTS SEG # BLD: 15.1 K/UL (ref 1.8–8)
NEUTS SEG NFR BLD: 76 % (ref 32–75)
NRBC # BLD: 0 K/UL (ref 0–0.01)
NRBC BLD-RTO: 0 PER 100 WBC
PLATELET # BLD AUTO: 415 K/UL (ref 150–400)
PMV BLD AUTO: 9.5 FL (ref 8.9–12.9)
POTASSIUM SERPL-SCNC: 3.2 MMOL/L (ref 3.5–5.1)
RBC # BLD AUTO: 4.25 M/UL (ref 3.8–5.2)
SODIUM SERPL-SCNC: 138 MMOL/L (ref 136–145)
WBC # BLD AUTO: 20 K/UL (ref 3.6–11)

## 2020-02-24 PROCEDURE — 85025 COMPLETE CBC W/AUTO DIFF WBC: CPT

## 2020-02-24 PROCEDURE — 74011250636 HC RX REV CODE- 250/636: Performed by: SURGERY

## 2020-02-24 PROCEDURE — 65270000029 HC RM PRIVATE

## 2020-02-24 PROCEDURE — 36415 COLL VENOUS BLD VENIPUNCTURE: CPT

## 2020-02-24 PROCEDURE — 80048 BASIC METABOLIC PNL TOTAL CA: CPT

## 2020-02-24 PROCEDURE — 74011250637 HC RX REV CODE- 250/637: Performed by: SURGERY

## 2020-02-24 RX ORDER — HYDROCODONE BITARTRATE AND ACETAMINOPHEN 5; 325 MG/1; MG/1
1-2 TABLET ORAL
Status: DISCONTINUED | OUTPATIENT
Start: 2020-02-24 | End: 2020-03-02 | Stop reason: HOSPADM

## 2020-02-24 RX ORDER — MIRTAZAPINE 15 MG/1
30 TABLET, FILM COATED ORAL
Status: DISCONTINUED | OUTPATIENT
Start: 2020-02-24 | End: 2020-02-25

## 2020-02-24 RX ORDER — DEXTROSE, SODIUM CHLORIDE, AND POTASSIUM CHLORIDE 5; .45; .15 G/100ML; G/100ML; G/100ML
75 INJECTION INTRAVENOUS CONTINUOUS
Status: DISCONTINUED | OUTPATIENT
Start: 2020-02-24 | End: 2020-03-02 | Stop reason: HOSPADM

## 2020-02-24 RX ORDER — DOXYCYCLINE 100 MG/1
100 TABLET ORAL 2 TIMES DAILY
Qty: 20 TAB | Refills: 0 | Status: SHIPPED | OUTPATIENT
Start: 2020-02-24 | End: 2020-03-05

## 2020-02-24 RX ORDER — ENOXAPARIN SODIUM 100 MG/ML
40 INJECTION SUBCUTANEOUS EVERY 12 HOURS
Status: DISCONTINUED | OUTPATIENT
Start: 2020-02-24 | End: 2020-03-02 | Stop reason: HOSPADM

## 2020-02-24 RX ORDER — HYDROMORPHONE HYDROCHLORIDE 1 MG/ML
0.5 INJECTION, SOLUTION INTRAMUSCULAR; INTRAVENOUS; SUBCUTANEOUS
Status: DISCONTINUED | OUTPATIENT
Start: 2020-02-24 | End: 2020-03-02 | Stop reason: HOSPADM

## 2020-02-24 RX ORDER — SODIUM CHLORIDE 0.9 % (FLUSH) 0.9 %
5-40 SYRINGE (ML) INJECTION AS NEEDED
Status: DISCONTINUED | OUTPATIENT
Start: 2020-02-24 | End: 2020-03-02 | Stop reason: HOSPADM

## 2020-02-24 RX ORDER — ENOXAPARIN SODIUM 100 MG/ML
40 INJECTION SUBCUTANEOUS EVERY 24 HOURS
Status: DISCONTINUED | OUTPATIENT
Start: 2020-02-24 | End: 2020-02-24

## 2020-02-24 RX ORDER — KETOROLAC TROMETHAMINE 30 MG/ML
15 INJECTION, SOLUTION INTRAMUSCULAR; INTRAVENOUS
Status: DISPENSED | OUTPATIENT
Start: 2020-02-24 | End: 2020-02-29

## 2020-02-24 RX ORDER — CEPHALEXIN 500 MG/1
500 CAPSULE ORAL 4 TIMES DAILY
Qty: 40 CAP | Refills: 0 | Status: SHIPPED | OUTPATIENT
Start: 2020-02-24 | End: 2020-03-02

## 2020-02-24 RX ORDER — SODIUM CHLORIDE 0.9 % (FLUSH) 0.9 %
5-40 SYRINGE (ML) INJECTION EVERY 8 HOURS
Status: DISCONTINUED | OUTPATIENT
Start: 2020-02-24 | End: 2020-03-02 | Stop reason: HOSPADM

## 2020-02-24 RX ADMIN — ENOXAPARIN SODIUM 40 MG: 40 INJECTION SUBCUTANEOUS at 18:29

## 2020-02-24 RX ADMIN — VANCOMYCIN HYDROCHLORIDE 2000 MG: 10 INJECTION, POWDER, LYOPHILIZED, FOR SOLUTION INTRAVENOUS at 21:41

## 2020-02-24 RX ADMIN — HYDROCODONE BITARTRATE AND ACETAMINOPHEN 2 TABLET: 5; 325 TABLET ORAL at 23:02

## 2020-02-24 RX ADMIN — HYDROMORPHONE HYDROCHLORIDE 0.5 MG: 1 INJECTION, SOLUTION INTRAMUSCULAR; INTRAVENOUS; SUBCUTANEOUS at 19:26

## 2020-02-24 RX ADMIN — Medication 10 ML: at 21:36

## 2020-02-24 RX ADMIN — HYDROMORPHONE HYDROCHLORIDE 0.5 MG: 1 INJECTION, SOLUTION INTRAMUSCULAR; INTRAVENOUS; SUBCUTANEOUS at 21:35

## 2020-02-24 RX ADMIN — Medication 10 ML: at 18:29

## 2020-02-24 RX ADMIN — HYDROCODONE BITARTRATE AND ACETAMINOPHEN 2 TABLET: 5; 325 TABLET ORAL at 18:29

## 2020-02-24 RX ADMIN — MIRTAZAPINE 30 MG: 15 TABLET, FILM COATED ORAL at 21:57

## 2020-02-24 RX ADMIN — HYDROMORPHONE HYDROCHLORIDE 0.5 MG: 1 INJECTION, SOLUTION INTRAMUSCULAR; INTRAVENOUS; SUBCUTANEOUS at 23:57

## 2020-02-24 RX ADMIN — KETOROLAC TROMETHAMINE 15 MG: 30 INJECTION, SOLUTION INTRAMUSCULAR at 21:45

## 2020-02-24 RX ADMIN — DEXTROSE MONOHYDRATE, SODIUM CHLORIDE, AND POTASSIUM CHLORIDE 75 ML/HR: 50; 4.5; 1.49 INJECTION, SOLUTION INTRAVENOUS at 18:29

## 2020-02-24 NOTE — PROGRESS NOTES
To: Theodora Lehman MD    From: Donalee Leventhal, MD    Thank you for sending Evette Kothari to see us. Please note that this dictation was completed with [employer], the computer voice recognition software. Quite often unanticipated grammatical, syntax, homophones, and other interpretive errors are inadvertently transcribed by the software. Please disregard these errors. Please excuse any errors that have escaped final proofreading. Encounter Date: 2/24/2020  History and Physical    Assessment:   Right axillary abscess. Multiple collection with cellulitis and thrombophlebitis. Body mass index is 44.26 kg/m². Comorbid Lupus. H/o recurrent C.diff. Plan:   I recommended I&D and admission for IV Vanco.    Risks including bleeding and infection were explained to the patient. The patient expressed understanding of the risks, and all questions were answered to the patient's satisfaction. HPI:   Amarilis Corrales is a 37 y.o. female who is sent over by Dr. Gilda Steele for right axillary abscess. She says that she developed pain under her arm on Saturday night did this became progressively uncomfortable over the weekend and she felt it pop under the skin earlier this morning. It is become red and the discomfort is now extending down the upper arm on the inside. She was given prescriptions for Keflex and doxycycline and referred here. She denies fever and chills. She does have lupus.   She also has a history of recurrent C. difficile and has been treated by Dr. Christian Lake.    Past Medical History:   Diagnosis Date    Abnormal CT of the abdomen 11/8/2012    Asthma     Autoimmune disease ([address]     lupus    C. difficile diarrhea     Cervical prolapse     Dehydration     Gastrointestinal disorder     gerd, twisted colon, IBS    GERD (gastroesophageal reflux disease)     Headache(784.0)     Lupus ([address]     Morbid obesity ([address]     Nausea & vomiting     Neurological disorder cluster headaches    other 2006, 2009    ovarian cyst removal    Other ill-defined conditions(799.89)     Worsening headaches      Past Surgical History:   Procedure Laterality Date    COLONOSCOPY  9/21/2010         HX CHOLECYSTECTOMY      HX GYN  1/2009    right salpingo oopherectomy    HX GYN  2006    right ovarian tumor removed    HX HEENT      left wisdom teeth removal    HX HEENT      top right wisdom tooth removed    HX HERNIA REPAIR  4/2012    HX HERNIA REPAIR  2/28/13    Laparoscopic recurrent incisional hernia repair    HX HYSTERECTOMY      2016    HX UROLOGICAL      Kidney Stone Removal    [place] EGD TRANSORAL BIOPSY SINGLE/MULTIPLE  9/22/2010           Family History   Problem Relation Age of Onset    Colon Cancer Maternal Grandfather      Social History     Tobacco Use    Smoking status: Never Smoker    Smokeless tobacco: Never Used   Substance Use Topics    Alcohol use: No      Current Outpatient Medications   Medication Sig    doxycycline (ADOXA) 100 mg tablet Take 1 Tab by mouth two (2) times a day for 10 days.  cephALEXin (KEFLEX) 500 mg capsule Take 1 Cap by mouth four (4) times daily for 10 days.  olmesartan (BENICAR) 5 mg tablet Take 1 Tab by mouth daily. D/c ACEI  Reports nausea    ondansetron hcl (ZOFRAN) 4 mg tablet TAKE 1 TABLET BY MOUTH EVERY 8 HOURS AS NEEDED FOR NAUSEA    promethazine (PHENERGAN) 25 mg tablet TAKE 1 TABLET BY MOUTH EVERY 6 HOURS IF NEEDED FOR NAUSEA    albuterol (PROVENTIL HFA, VENTOLIN HFA, PROAIR HFA) 90 mcg/actuation inhaler Take 1 Puff by inhalation every six (6) hours as needed for Wheezing.  naloxone (NARCAN) 4 mg/actuation nasal spray Use 1 spray intranasally, then discard. Repeat with new spray every 2 min as needed for opioid overdose symptoms, alternating nostrils.     mirtazapine (REMERON) 30 mg tablet take 1 tablet by mouth at bedtime    L.acidoph & parac-S.therm-Bifido (BELGICA Q2/RISAQUAD-2) 16 billion cell cap cap Take 1 Cap by mouth daily.    furosemide (LASIX) 20 mg tablet PRN    tiZANidine (ZANAFLEX) 4 mg tablet     mirtazapine (REMERON) 30 mg tablet Take 30 mg by mouth daily.  estradiol (ESTRACE) 1 mg tablet Take 1 mg by mouth daily.  meclizine (ANTIVERT) 12.5 mg tablet take 1 tablet by mouth three times a day if needed for VERTIGO FOR UP TO 10 DAYS    ALPRAZolam (XANAX) 1 mg tablet take 1 tablet by mouth NIGHTLY maximum daily dose of 1 tablet    DULoxetine (CYMBALTA) 60 mg capsule Take 120 mg by mouth daily.  pantoprazole (PROTONIX) 40 mg tablet take 1 tablet by mouth at bedtime    codeine-butalbital-acetaminophen-caffeine (FIORICET WITH CODEINE) -43-30 mg capsule take 2 capsules by mouth every 6 hours if needed for MIGRAINE HEADACHE    rizatriptan (MAXALT) 10 mg tablet Take 10 mg by mouth once as needed for Migraine. May repeat in 2 hours if needed    belimumab (BENLYSTA) 400 mg solr injection by IntraVENous route.  doxycycline (MONODOX) 100 mg capsule Take 100 mg by mouth daily.  indomethacin (INDOCIN) 25 mg capsule PRN     No current facility-administered medications for this visit. Allergies: Allergies   Allergen Reactions    Latex Itching     burning    Compazine [Prochlorperazine] Anxiety     High heart rate  Pt can take promethazine with no problems    Sulfa (Sulfonamide Antibiotics) Unknown (comments)    Topamax [Topiramate] Unknown (comments)    Adhesive Rash     Allergic to Dermabond    Clindamycin Diarrhea     Pt states caused her C-Diff    Mushroom Combination No.1 Unknown (comments)    Toradol [Ketorolac Tromethamine] Nausea and Vomiting and Other (comments)     PO & IV causes GI bleed    Valproic Acid Other (comments)     Elevated heart rate and vomiting         Review of Systems:  10 systems reviewed. See scanned sheet in \"Media\" section. See HPI for pertinent positives and negatives.       Objective:     Visit Vitals  /85   Pulse (!) 107   Temp 99.2 °F (37.3 °C)   Ht 5' 2\" (1.575 m)   Wt 109.8 kg (242 lb)   SpO2 97%   BMI 44.26 kg/m²       Physical Exam:  General appearance  Alert, cooperative, no distress, appears stated age   [de-identified] Anicteric           Lungs   Clear to auscultation bilaterally   Heart  Regular rate and rhythm. Abdomen   Soft, non-tender. Extremities  carbuncle in the axilla with erythema extending here approximately 10 cm distally. There is also a more proximal collection on ultrasound. Pulses 2+ right radial       Lymph nodes No palpable LAD. Neurologic Without overt sensory or motor deficit     Incision/Drainage Procedure Note    Encounter Date: 2/24/2020    Location of lesion: Above     Time out at performed by me (see consent form):  * Patient was identified by name and date of birth   * Agreement on procedure being performed was verified  * Risks and Benefits explained to the patient  * Procedure site verified and marked as necessary  * Patient was positioned for comfort  * Consent was signed and verified    Procedure Details: The risks,benefits and alternatives were explained and consent was obtained for the procedure. Time out was performed. The area was prepped and draped in the usual manner. Local anesthesia was infiltrated into the skin overlying the abscess. An incision was made. Pus was evacuated. A culture was obtained. The wound was irrigated with peroxide. The wound was packed with iodoform gauze. A sterile dressing was then applied. Estimated Blood Loss:  minimal     Disposition:  Procedure well tolerated. Post-procedure pain scale: 3/10. Plan:  Will admit for IV vancomycin, pain control    Signed By: Zeb Harris MD

## 2020-02-24 NOTE — PROGRESS NOTES
HISTORY OF PRESENT ILLNESS  Christelle Rogers is a 37 y.o. female. HPI   Pt normally follows with Dr. Mary Cruz  (PCP). Pt is here for acute care. Pt c/o painful boil under her R arm x Saturday night   She states this is very painful   Discussed she likely has cellulitis and abscess   Discussed she will need to see surg to have this drained   She has tried tylenol for the pain   Has not taken asa   Will give keflex and doxycycline   Will assist getting appt with surg     Pt has a stress test scheduled for tomorrow         Patient Active Problem List    Diagnosis Date Noted    Severe obesity ([facility] 02/22/2019    Incomplete uterovaginal prolapse 05/17/2016    Migraine with aura and without status migrainosus, not intractable 12/02/2015    Anxiety 12/05/2014    Lupus ([facility] 12/04/2014    Recurrent ventral incisional hernia 03/01/2013    Ventral hernia 02/25/2013    Abnormal CT of the abdomen 11/08/2012     Current Outpatient Medications   Medication Sig Dispense Refill    olmesartan (BENICAR) 5 mg tablet Take 1 Tab by mouth daily. D/c ACEI  Reports nausea 30 Tab 2    ondansetron hcl (ZOFRAN) 4 mg tablet TAKE 1 TABLET BY MOUTH EVERY 8 HOURS AS NEEDED FOR NAUSEA 20 Tab 0    promethazine (PHENERGAN) 25 mg tablet TAKE 1 TABLET BY MOUTH EVERY 6 HOURS IF NEEDED FOR NAUSEA 30 Tab 0    predniSONE (STERAPRED DS) 10 mg dose pack Follow instructions on 10 day dose pack 1 Package 0    albuterol (PROVENTIL HFA, VENTOLIN HFA, PROAIR HFA) 90 mcg/actuation inhaler Take 1 Puff by inhalation every six (6) hours as needed for Wheezing. 1 Inhaler 0    predniSONE (DELTASONE) 20 mg tablet 60mg po daily for 2days, 40mg po daily for 2 days, 20mg po daily for 2days then stop 12 Tab 0    naloxone (NARCAN) 4 mg/actuation nasal spray Use 1 spray intranasally, then discard. Repeat with new spray every 2 min as needed for opioid overdose symptoms, alternating nostrils.  1 Each 0    mirtazapine (REMERON) 30 mg tablet take 1 tablet by mouth at bedtime 30 Tab 3    L.acidoph & parac-S.therm-Bifido (BELGICA Q2/RISAQUAD-2) 16 billion cell cap cap Take 1 Cap by mouth daily. 30 Cap 1    furosemide (LASIX) 20 mg tablet PRN  0    tiZANidine (ZANAFLEX) 4 mg tablet   0    mirtazapine (REMERON) 30 mg tablet Take 30 mg by mouth daily.  doxycycline (MONODOX) 100 mg capsule Take 100 mg by mouth daily. 0    estradiol (ESTRACE) 1 mg tablet Take 1 mg by mouth daily. 0    meclizine (ANTIVERT) 12.5 mg tablet take 1 tablet by mouth three times a day if needed for VERTIGO FOR UP TO 10 DAYS 30 Tab 1    ALPRAZolam (XANAX) 1 mg tablet take 1 tablet by mouth NIGHTLY maximum daily dose of 1 tablet 30 Tab 0    indomethacin (INDOCIN) 25 mg capsule PRN      DULoxetine (CYMBALTA) 60 mg capsule Take 120 mg by mouth daily. 1    pantoprazole (PROTONIX) 40 mg tablet take 1 tablet by mouth at bedtime  0    codeine-butalbital-acetaminophen-caffeine (FIORICET WITH CODEINE) -85-30 mg capsule take 2 capsules by mouth every 6 hours if needed for MIGRAINE HEADACHE 90 Cap 0    rizatriptan (MAXALT) 10 mg tablet Take 10 mg by mouth once as needed for Migraine. May repeat in 2 hours if needed      belimumab (BENLYSTA) 400 mg solr injection by IntraVENous route.        Past Surgical History:   Procedure Laterality Date    COLONOSCOPY  9/21/2010         HX CHOLECYSTECTOMY      HX GYN  1/2009    right salpingo oopherectomy    HX GYN  2006    right ovarian tumor removed    HX HEENT      left wisdom teeth removal    HX HEENT      top right wisdom tooth removed    HX HERNIA REPAIR  4/2012    HX HERNIA REPAIR  2/28/13    Laparoscopic recurrent incisional hernia repair    HX HYSTERECTOMY      2016    HX UROLOGICAL      Kidney Stone Removal    [place] EGD TRANSORAL BIOPSY SINGLE/MULTIPLE  9/22/2010           Lab Results   Component Value Date/Time    WBC 17.1 (H) 02/22/2020 04:47 PM    HGB 13.9 02/22/2020 04:47 PM    Hemoglobin (POC) 12.6 05/17/2016 07:47 AM    HCT 42.8 02/22/2020 04:47 PM    Hematocrit (POC) 37 05/17/2016 07:47 AM    PLATELET 296 (H) 81/02/5166 04:47 PM    MCV 93.4 02/22/2020 04:47 PM     No results found for: CHOL, CHOLPOCT, HDL, LDL, LDLC, LDLCPOC, LDLCEXT, TRIGL, TGLPOCT, CHHD, CHHDX  Lab Results   Component Value Date/Time    GFR est non-AA >60 02/22/2020 04:47 PM    GFRNA, POC >60 05/17/2016 07:47 AM    GFR est AA >60 02/22/2020 04:47 PM    GFRAA, POC >60 05/17/2016 07:47 AM    Creatinine 0.88 02/22/2020 04:47 PM    Creatinine (POC) 0.7 05/17/2016 07:47 AM    BUN 13 02/22/2020 04:47 PM    BUN (POC) 8 (L) 05/17/2016 07:47 AM    Sodium 138 02/22/2020 04:47 PM    Sodium (POC) 140 05/17/2016 07:47 AM    Potassium 3.9 02/22/2020 04:47 PM    Potassium (POC) 3.8 05/17/2016 07:47 AM    Chloride 109 (H) 02/22/2020 04:47 PM    Chloride (POC) 105 05/17/2016 07:47 AM    CO2 26 02/22/2020 04:47 PM    Magnesium 2.1 06/17/2018 11:57 AM        Review of Systems   Constitutional: Negative for chills and fever. Gastrointestinal: Positive for nausea. Negative for vomiting. Physical Exam  Constitutional:       General: She is not in acute distress. Appearance: She is well-developed. She is not diaphoretic. HENT:      Head: Normocephalic and atraumatic. Eyes:      General:         Right eye: No discharge. Left eye: No discharge. Conjunctiva/sclera: Conjunctivae normal.   Neck:      Musculoskeletal: Normal range of motion and neck supple. Cardiovascular:      Rate and Rhythm: Normal rate and regular rhythm. Heart sounds: Murmur (1/6 ) present. No friction rub. No gallop. Pulmonary:      Effort: Pulmonary effort is normal. No respiratory distress. Breath sounds: Normal breath sounds. No wheezing or rales. Chest:      Chest wall: No tenderness. Musculoskeletal: Normal range of motion. General: No tenderness or deformity. Lymphadenopathy:      Cervical: No cervical adenopathy. Skin:     General: Skin is warm and dry. Coloration: Skin is not pale. Findings: No erythema or rash. Comments: 1.5 in area of indurate with fluctuance and 8 in by 4 in area of erythema    Neurological:      Mental Status: She is alert and oriented to person, place, and time. Coordination: Coordination normal.   Psychiatric:         Mood and Affect: Mood normal.         Behavior: Behavior normal.         ASSESSMENT and PLAN    ICD-10-CM ICD-9-CM    1. Abscess                      Needs I and D pt also has an area of cellulitis extending up her R arm will tx with doxycycline and keflex for 10 days    L02.91 682.9 REFERRAL TO GENERAL SURGERY            Scribed by Estefany Davila of [address] [place] [address] [address], as dictated by Dr. Gopi Méndez. Current diagnosis and concerns discussed with pt at length. Pt understands risks and benefits or current treatment plan and medications, and accepts the treatment and medication with any possible risks. Pt asks appropriate questions, which were answered. Pt was instructed to call with any concerns or problems. I have reviewed the note documented by the scribe. The services provided are my own.   The documentation is accurate

## 2020-02-24 NOTE — PROGRESS NOTES
Chief Complaint   Patient presents with    Skin Problem     seen at the request of Dr. Jordyn Olson eval abscess under right arm     Discussed advanced directive. Patient states that she does not have an advanced directive.

## 2020-02-24 NOTE — Clinical Note
2/24/20 Patient: Dominic Avelar YOB: 1985 Date of Visit: 2/24/2020 Robby Espinoza MD 
[address] [address] VIA In Basket Dear Robby Espinoza MD, Thank you for referring Ms. Vernette Severin to  [facility]  for evaluation. My notes for this consultation are attached. If you have questions, please do not hesitate to call me. I look forward to following your patient along with you.  
 
 
Sincerely, 
 
Stacie Kennedy MD

## 2020-02-25 ENCOUNTER — TELEPHONE (OUTPATIENT)
Dept: CARDIOLOGY CLINIC | Age: 35
End: 2020-02-25

## 2020-02-25 PROCEDURE — 74011000258 HC RX REV CODE- 258: Performed by: SURGERY

## 2020-02-25 PROCEDURE — 74011250637 HC RX REV CODE- 250/637: Performed by: NURSE PRACTITIONER

## 2020-02-25 PROCEDURE — 74011250636 HC RX REV CODE- 250/636: Performed by: HOSPITALIST

## 2020-02-25 PROCEDURE — 74011250637 HC RX REV CODE- 250/637: Performed by: SURGERY

## 2020-02-25 PROCEDURE — 77030036660

## 2020-02-25 PROCEDURE — 65270000029 HC RM PRIVATE

## 2020-02-25 PROCEDURE — 74011250636 HC RX REV CODE- 250/636: Performed by: SURGERY

## 2020-02-25 PROCEDURE — 74011250637 HC RX REV CODE- 250/637: Performed by: HOSPITALIST

## 2020-02-25 RX ORDER — DULOXETIN HYDROCHLORIDE 30 MG/1
120 CAPSULE, DELAYED RELEASE ORAL DAILY
Status: DISCONTINUED | OUTPATIENT
Start: 2020-02-26 | End: 2020-03-02 | Stop reason: HOSPADM

## 2020-02-25 RX ORDER — ONDANSETRON 2 MG/ML
4 INJECTION INTRAMUSCULAR; INTRAVENOUS
Status: DISCONTINUED | OUTPATIENT
Start: 2020-02-25 | End: 2020-03-02 | Stop reason: HOSPADM

## 2020-02-25 RX ORDER — ESTRADIOL 1 MG/1
1 TABLET ORAL DAILY
Status: DISCONTINUED | OUTPATIENT
Start: 2020-02-26 | End: 2020-03-02 | Stop reason: HOSPADM

## 2020-02-25 RX ORDER — PANTOPRAZOLE SODIUM 40 MG/1
40 TABLET, DELAYED RELEASE ORAL DAILY
Status: DISCONTINUED | OUTPATIENT
Start: 2020-02-25 | End: 2020-03-02 | Stop reason: HOSPADM

## 2020-02-25 RX ORDER — DULOXETIN HYDROCHLORIDE 30 MG/1
120 CAPSULE, DELAYED RELEASE ORAL ONCE
Status: COMPLETED | OUTPATIENT
Start: 2020-02-25 | End: 2020-02-25

## 2020-02-25 RX ORDER — MIRTAZAPINE 15 MG/1
30 TABLET, FILM COATED ORAL
Status: DISCONTINUED | OUTPATIENT
Start: 2020-02-25 | End: 2020-03-02 | Stop reason: HOSPADM

## 2020-02-25 RX ORDER — ALPRAZOLAM 0.5 MG/1
1 TABLET ORAL
Status: DISCONTINUED | OUTPATIENT
Start: 2020-02-25 | End: 2020-03-01

## 2020-02-25 RX ORDER — HYDROMORPHONE HYDROCHLORIDE 1 MG/ML
1-2 INJECTION, SOLUTION INTRAMUSCULAR; INTRAVENOUS; SUBCUTANEOUS ONCE
Status: COMPLETED | OUTPATIENT
Start: 2020-02-25 | End: 2020-02-25

## 2020-02-25 RX ORDER — MIRTAZAPINE 15 MG/1
30 TABLET, FILM COATED ORAL DAILY
Status: DISCONTINUED | OUTPATIENT
Start: 2020-02-26 | End: 2020-02-25

## 2020-02-25 RX ORDER — RIZATRIPTAN BENZOATE 10 MG/1
10 TABLET ORAL DAILY PRN
Status: DISCONTINUED | OUTPATIENT
Start: 2020-02-25 | End: 2020-03-02 | Stop reason: HOSPADM

## 2020-02-25 RX ORDER — TIZANIDINE 4 MG/1
4 TABLET ORAL
Status: DISCONTINUED | OUTPATIENT
Start: 2020-02-25 | End: 2020-03-02 | Stop reason: HOSPADM

## 2020-02-25 RX ORDER — PANTOPRAZOLE SODIUM 40 MG/1
40 TABLET, DELAYED RELEASE ORAL DAILY
Status: DISCONTINUED | OUTPATIENT
Start: 2020-02-25 | End: 2020-02-25

## 2020-02-25 RX ADMIN — VANCOMYCIN HYDROCHLORIDE 1500 MG: 10 INJECTION, POWDER, LYOPHILIZED, FOR SOLUTION INTRAVENOUS at 14:30

## 2020-02-25 RX ADMIN — Medication 10 ML: at 21:50

## 2020-02-25 RX ADMIN — ONDANSETRON 4 MG: 2 INJECTION INTRAMUSCULAR; INTRAVENOUS at 06:05

## 2020-02-25 RX ADMIN — HYDROMORPHONE HYDROCHLORIDE 0.5 MG: 1 INJECTION, SOLUTION INTRAMUSCULAR; INTRAVENOUS; SUBCUTANEOUS at 22:15

## 2020-02-25 RX ADMIN — KETOROLAC TROMETHAMINE 15 MG: 30 INJECTION, SOLUTION INTRAMUSCULAR at 18:32

## 2020-02-25 RX ADMIN — ONDANSETRON 4 MG: 2 INJECTION INTRAMUSCULAR; INTRAVENOUS at 10:48

## 2020-02-25 RX ADMIN — ENOXAPARIN SODIUM 40 MG: 40 INJECTION SUBCUTANEOUS at 17:37

## 2020-02-25 RX ADMIN — PANTOPRAZOLE SODIUM 40 MG: 40 TABLET, DELAYED RELEASE ORAL at 14:55

## 2020-02-25 RX ADMIN — HYDROMORPHONE HYDROCHLORIDE 0.5 MG: 1 INJECTION, SOLUTION INTRAMUSCULAR; INTRAVENOUS; SUBCUTANEOUS at 14:26

## 2020-02-25 RX ADMIN — HYDROMORPHONE HYDROCHLORIDE 1 MG: 1 INJECTION, SOLUTION INTRAMUSCULAR; INTRAVENOUS; SUBCUTANEOUS at 10:48

## 2020-02-25 RX ADMIN — Medication 20 ML: at 20:20

## 2020-02-25 RX ADMIN — HYDROMORPHONE HYDROCHLORIDE 0.5 MG: 1 INJECTION, SOLUTION INTRAMUSCULAR; INTRAVENOUS; SUBCUTANEOUS at 17:37

## 2020-02-25 RX ADMIN — MIRTAZAPINE 30 MG: 15 TABLET, FILM COATED ORAL at 18:50

## 2020-02-25 RX ADMIN — KETOROLAC TROMETHAMINE 15 MG: 30 INJECTION, SOLUTION INTRAMUSCULAR at 05:19

## 2020-02-25 RX ADMIN — HYDROMORPHONE HYDROCHLORIDE 0.5 MG: 1 INJECTION, SOLUTION INTRAMUSCULAR; INTRAVENOUS; SUBCUTANEOUS at 06:22

## 2020-02-25 RX ADMIN — HYDROMORPHONE HYDROCHLORIDE 0.5 MG: 1 INJECTION, SOLUTION INTRAMUSCULAR; INTRAVENOUS; SUBCUTANEOUS at 02:45

## 2020-02-25 RX ADMIN — Medication 10 ML: at 05:20

## 2020-02-25 RX ADMIN — ALPRAZOLAM 1 MG: 0.5 TABLET ORAL at 21:49

## 2020-02-25 RX ADMIN — HYDROMORPHONE HYDROCHLORIDE 1 MG: 1 INJECTION, SOLUTION INTRAMUSCULAR; INTRAVENOUS; SUBCUTANEOUS at 11:36

## 2020-02-25 RX ADMIN — ONDANSETRON 4 MG: 2 INJECTION INTRAMUSCULAR; INTRAVENOUS at 14:26

## 2020-02-25 RX ADMIN — Medication 10 ML: at 14:33

## 2020-02-25 RX ADMIN — SODIUM CHLORIDE 12.5 MG: 900 INJECTION, SOLUTION INTRAVENOUS at 14:50

## 2020-02-25 RX ADMIN — HYDROMORPHONE HYDROCHLORIDE 0.5 MG: 1 INJECTION, SOLUTION INTRAMUSCULAR; INTRAVENOUS; SUBCUTANEOUS at 20:20

## 2020-02-25 RX ADMIN — ENOXAPARIN SODIUM 40 MG: 40 INJECTION SUBCUTANEOUS at 05:19

## 2020-02-25 RX ADMIN — ONDANSETRON 4 MG: 2 INJECTION INTRAMUSCULAR; INTRAVENOUS at 18:31

## 2020-02-25 RX ADMIN — DULOXETINE HYDROCHLORIDE 120 MG: 30 CAPSULE, DELAYED RELEASE ORAL at 14:55

## 2020-02-25 RX ADMIN — RIZATRIPTAN BENZOATE 10 MG: 10 TABLET ORAL at 14:26

## 2020-02-25 RX ADMIN — TIZANIDINE 4 MG: 4 TABLET ORAL at 00:58

## 2020-02-25 RX ADMIN — DEXTROSE MONOHYDRATE, SODIUM CHLORIDE, AND POTASSIUM CHLORIDE 75 ML/HR: 50; 4.5; 1.49 INJECTION, SOLUTION INTRAVENOUS at 23:08

## 2020-02-25 RX ADMIN — ONDANSETRON 4 MG: 2 INJECTION INTRAMUSCULAR; INTRAVENOUS at 00:58

## 2020-02-25 RX ADMIN — HYDROMORPHONE HYDROCHLORIDE 0.5 MG: 1 INJECTION, SOLUTION INTRAMUSCULAR; INTRAVENOUS; SUBCUTANEOUS at 11:49

## 2020-02-25 RX ADMIN — HYDROMORPHONE HYDROCHLORIDE 0.5 MG: 1 INJECTION, SOLUTION INTRAMUSCULAR; INTRAVENOUS; SUBCUTANEOUS at 08:47

## 2020-02-25 NOTE — PROGRESS NOTES
Patient c/o Nausea and requesting something for it. Also, she requested her regular dose of Tizanidine 4 mg. MD was notified by page.         Success  A new connect request was successfully created for: Viktor Le : 1985 MRN: [id number] ConnectID: [id number] REASON: Other non-Emergent ACUITY: 30 Minutes SUBMITTED: 2020 00:15 EST

## 2020-02-25 NOTE — PROGRESS NOTES
Bedside and Verbal shift change report given to Celia Ayers RN (oncoming nurse) by Vlad Yin (offgoing nurse). Report included the following information SBAR, Kardex, Intake/Output, MAR and Recent Results.

## 2020-02-25 NOTE — PROGRESS NOTES
Bedside and Verbal shift change report given to Elly Briceno RN (oncoming nurse) by Panchito Flanagan (offgoing nurse). Report included the following information SBAR, Kardex, Intake/Output, MAR and Recent Results.

## 2020-02-25 NOTE — PROGRESS NOTES
Pharmacy Automatic Renal Dosing Protocol - Antimicrobials    Indication for Antimicrobials: SSTI - axillary abscess    Current Regimen of Each Antimicrobial:  Vancomycin 2000 mg IV x 1 dose (Start Date ; Day # 1)    Previous Antimicrobial Therapy:    Goal Level: VANCOMYCIN TROUGH GOAL RANGE     (AUC: 400 - 600 mg/hr/Liter/day)     Date Dose & Interval Measured (mcg/mL) Extrapolated (mcg/mL)                       Date & time of next level:     Significant Cultures:     Radiology / Imaging results: (X-ray, CT scan or MRI):     Paralysis, amputations, malnutrition: not noted    Labs:  Recent Labs     20  1647   CREA 1.02  --  0.88   BUN 12  --  13   WBC  --  20.0* 17.1*     Temp (24hrs), Av.8 °F (37.1 °C), Min:98.3 °F (36.8 °C), Max:99.2 °F (37.3 °C)    Creatinine Clearance (mL/min) or Dialysis: 60.3 ml/min    Impression/Plan:   Vancomycin 2000 mg IV x 1 dose, followed by 1500 mg IV every 16 hours (predicted trough 14.2 mcg/ml; )  Antimicrobial stop date 7 days     Pharmacy will follow daily and adjust medications as appropriate for renal function and/or serum levels.     Thank you,  Josue Longoria, PHARMD

## 2020-02-25 NOTE — TELEPHONE ENCOUNTER
The pt returned your call, she got admitted into the hospital yesterday for emergency surgery, She said she does not have good cell service to please give her a call on her hospital room phone which is [phone]      thanks

## 2020-02-25 NOTE — PROGRESS NOTES
Subjective:    Patient seen and examined by me today. Pain. Objective:    Blood pressure 102/83, pulse 88, temperature 98.4 °F (36.9 °C), resp. rate 18, last menstrual period 02/17/2016, SpO2 99 %. Exam - A&O, mild distress secondary to pain. Axillary I&D sites x2, no more pus. Erythema has regressed proximally but still present over thrombophlebitis extending distally 3-4cm medially on upper arm. Distal CMS intact. Assessment:  I&D right axillary abscess. H/o MRSA but C&S on this not back yet. Active Hospital Problems    Diagnosis Date Noted    Cellulitis 02/24/2020     Plan:  - IV vanco only. H/o recurrent C.diff.  - await C&S.    - May need to US thrombophlebitis if not clinically improved by tomorrow.

## 2020-02-25 NOTE — TELEPHONE ENCOUNTER
----- Message from Greg Duran NP sent at 2/25/2020  9:07 AM EST -----  Please call patient, ECHO all normal

## 2020-02-25 NOTE — PROGRESS NOTES
Problem: Falls - Risk of  Goal: *Absence of Falls  Description  Document Puma Mercedes Fall Risk and appropriate interventions in the flowsheet.   Outcome: Progressing Towards Goal  Note: Fall Risk Interventions:            Medication Interventions: Patient to call before getting OOB                   Problem: Patient Education: Go to Patient Education Activity  Goal: Patient/Family Education  Outcome: Progressing Towards Goal

## 2020-02-25 NOTE — PROGRESS NOTES
Reason for Admission:   Cellulitis; axillary abscess                   RUR Score:  8%                    Plan for utilizing home health:    No recommendations at this time      PCP: First and Last name:  Andrés Santos   Name of Practice:   Internal medicine   Are you a current patient:  Yes   Approximate date of last visit: 2/24/2020                    Current Advanced Directive/Advance Care Plan:  Not at this time                       Transition of Care Plan:  Pt will likely discharge home  With family and OP follow-up. Pt is a 37year old female admitted on 2/24/2020. Pt was alert and oriented x4 during initial assessment. Pt appeared to be very tearful. Pt reports to live in a private residence with her parents, as Pt reported stated that she had recently  from her . Pt is independent in managing ADLs including driving. Pt uses no DME. Care Management Interventions  PCP Verified by CM:  Yes  Last Visit to PCP: 02/24/20  Transition of Care Consult (CM Consult): Discharge Planning  Current Support Network: Relative's Home  Confirm Follow Up Transport: Self  Discharge Location  Discharge Placement: [address] [facility], [address]

## 2020-02-26 ENCOUNTER — ANESTHESIA EVENT (OUTPATIENT)
Dept: SURGERY | Age: 35
DRG: 364 | End: 2020-02-26
Payer: COMMERCIAL

## 2020-02-26 ENCOUNTER — APPOINTMENT (OUTPATIENT)
Dept: ULTRASOUND IMAGING | Age: 35
DRG: 364 | End: 2020-02-26
Attending: SURGERY
Payer: COMMERCIAL

## 2020-02-26 LAB
ANION GAP SERPL CALC-SCNC: 6 MMOL/L (ref 5–15)
BUN SERPL-MCNC: 8 MG/DL (ref 6–20)
BUN/CREAT SERPL: 10 (ref 12–20)
CALCIUM SERPL-MCNC: 8.2 MG/DL (ref 8.5–10.1)
CHLORIDE SERPL-SCNC: 112 MMOL/L (ref 97–108)
CO2 SERPL-SCNC: 19 MMOL/L (ref 21–32)
CREAT SERPL-MCNC: 0.83 MG/DL (ref 0.55–1.02)
GLUCOSE SERPL-MCNC: 128 MG/DL (ref 65–100)
POTASSIUM SERPL-SCNC: 3.8 MMOL/L (ref 3.5–5.1)
SODIUM SERPL-SCNC: 137 MMOL/L (ref 136–145)

## 2020-02-26 PROCEDURE — 74011250637 HC RX REV CODE- 250/637: Performed by: NURSE PRACTITIONER

## 2020-02-26 PROCEDURE — 36573 INSJ PICC RS&I 5 YR+: CPT | Performed by: SURGERY

## 2020-02-26 PROCEDURE — 74011250636 HC RX REV CODE- 250/636: Performed by: HOSPITALIST

## 2020-02-26 PROCEDURE — 36415 COLL VENOUS BLD VENIPUNCTURE: CPT

## 2020-02-26 PROCEDURE — 65270000029 HC RM PRIVATE

## 2020-02-26 PROCEDURE — 80048 BASIC METABOLIC PNL TOTAL CA: CPT

## 2020-02-26 PROCEDURE — 74011250636 HC RX REV CODE- 250/636: Performed by: SURGERY

## 2020-02-26 PROCEDURE — 76937 US GUIDE VASCULAR ACCESS: CPT

## 2020-02-26 PROCEDURE — 74011250637 HC RX REV CODE- 250/637: Performed by: HOSPITALIST

## 2020-02-26 PROCEDURE — 77030018786 HC NDL GD F/USND BARD -B

## 2020-02-26 PROCEDURE — C1751 CATH, INF, PER/CENT/MIDLINE: HCPCS

## 2020-02-26 PROCEDURE — 74011250637 HC RX REV CODE- 250/637: Performed by: SURGERY

## 2020-02-26 PROCEDURE — 76882 US LMTD JT/FCL EVL NVASC XTR: CPT

## 2020-02-26 RX ORDER — BACITRACIN 500 UNIT/G
1 PACKET (EA) TOPICAL AS NEEDED
Status: DISCONTINUED | OUTPATIENT
Start: 2020-02-26 | End: 2020-03-02 | Stop reason: HOSPADM

## 2020-02-26 RX ORDER — HEPARIN 100 UNIT/ML
300 SYRINGE INTRAVENOUS AS NEEDED
Status: DISCONTINUED | OUTPATIENT
Start: 2020-02-26 | End: 2020-03-02 | Stop reason: HOSPADM

## 2020-02-26 RX ORDER — FAMOTIDINE 20 MG/1
20 TABLET, FILM COATED ORAL 2 TIMES DAILY
Status: DISCONTINUED | OUTPATIENT
Start: 2020-02-26 | End: 2020-03-02 | Stop reason: HOSPADM

## 2020-02-26 RX ADMIN — ENOXAPARIN SODIUM 40 MG: 40 INJECTION SUBCUTANEOUS at 19:11

## 2020-02-26 RX ADMIN — KETOROLAC TROMETHAMINE 15 MG: 30 INJECTION, SOLUTION INTRAMUSCULAR at 19:21

## 2020-02-26 RX ADMIN — HYDROMORPHONE HYDROCHLORIDE 0.5 MG: 1 INJECTION, SOLUTION INTRAMUSCULAR; INTRAVENOUS; SUBCUTANEOUS at 02:26

## 2020-02-26 RX ADMIN — ESTRADIOL 1 MG: 1 TABLET ORAL at 10:54

## 2020-02-26 RX ADMIN — VANCOMYCIN HYDROCHLORIDE 1500 MG: 10 INJECTION, POWDER, LYOPHILIZED, FOR SOLUTION INTRAVENOUS at 19:11

## 2020-02-26 RX ADMIN — HYDROCODONE BITARTRATE AND ACETAMINOPHEN 2 TABLET: 5; 325 TABLET ORAL at 09:00

## 2020-02-26 RX ADMIN — Medication 1 CAPSULE: at 09:00

## 2020-02-26 RX ADMIN — ALPRAZOLAM 1 MG: 0.5 TABLET ORAL at 21:22

## 2020-02-26 RX ADMIN — HYDROMORPHONE HYDROCHLORIDE 0.5 MG: 1 INJECTION, SOLUTION INTRAMUSCULAR; INTRAVENOUS; SUBCUTANEOUS at 06:53

## 2020-02-26 RX ADMIN — HYDROCODONE BITARTRATE AND ACETAMINOPHEN 2 TABLET: 5; 325 TABLET ORAL at 12:45

## 2020-02-26 RX ADMIN — TIZANIDINE 4 MG: 4 TABLET ORAL at 00:20

## 2020-02-26 RX ADMIN — HYDROMORPHONE HYDROCHLORIDE 0.5 MG: 1 INJECTION, SOLUTION INTRAMUSCULAR; INTRAVENOUS; SUBCUTANEOUS at 00:19

## 2020-02-26 RX ADMIN — HYDROMORPHONE HYDROCHLORIDE 0.5 MG: 1 INJECTION, SOLUTION INTRAMUSCULAR; INTRAVENOUS; SUBCUTANEOUS at 21:23

## 2020-02-26 RX ADMIN — HYDROMORPHONE HYDROCHLORIDE 0.5 MG: 1 INJECTION, SOLUTION INTRAMUSCULAR; INTRAVENOUS; SUBCUTANEOUS at 10:54

## 2020-02-26 RX ADMIN — HYDROCODONE BITARTRATE AND ACETAMINOPHEN 2 TABLET: 5; 325 TABLET ORAL at 20:12

## 2020-02-26 RX ADMIN — HYDROCODONE BITARTRATE AND ACETAMINOPHEN 2 TABLET: 5; 325 TABLET ORAL at 16:08

## 2020-02-26 RX ADMIN — ONDANSETRON 4 MG: 2 INJECTION INTRAMUSCULAR; INTRAVENOUS at 02:26

## 2020-02-26 RX ADMIN — Medication 10 ML: at 21:31

## 2020-02-26 RX ADMIN — Medication 10 ML: at 06:31

## 2020-02-26 RX ADMIN — PANTOPRAZOLE SODIUM 40 MG: 40 TABLET, DELAYED RELEASE ORAL at 09:01

## 2020-02-26 RX ADMIN — HYDROMORPHONE HYDROCHLORIDE 0.5 MG: 1 INJECTION, SOLUTION INTRAMUSCULAR; INTRAVENOUS; SUBCUTANEOUS at 04:56

## 2020-02-26 RX ADMIN — HYDROMORPHONE HYDROCHLORIDE 0.5 MG: 1 INJECTION, SOLUTION INTRAMUSCULAR; INTRAVENOUS; SUBCUTANEOUS at 19:07

## 2020-02-26 RX ADMIN — MIRTAZAPINE 30 MG: 15 TABLET, FILM COATED ORAL at 19:10

## 2020-02-26 RX ADMIN — HYDROMORPHONE HYDROCHLORIDE 0.5 MG: 1 INJECTION, SOLUTION INTRAMUSCULAR; INTRAVENOUS; SUBCUTANEOUS at 23:29

## 2020-02-26 RX ADMIN — FAMOTIDINE 20 MG: 20 TABLET ORAL at 19:10

## 2020-02-26 RX ADMIN — ONDANSETRON 4 MG: 2 INJECTION INTRAMUSCULAR; INTRAVENOUS at 10:54

## 2020-02-26 RX ADMIN — DULOXETINE 120 MG: 30 CAPSULE, DELAYED RELEASE ORAL at 09:00

## 2020-02-26 RX ADMIN — ENOXAPARIN SODIUM 40 MG: 40 INJECTION SUBCUTANEOUS at 06:31

## 2020-02-26 NOTE — PROGRESS NOTES
Bedside and Verbal shift change report given to Dann Rascon (oncoming nurse) by Tato Beaver (offgoing nurse). Report included the following information SBAR, Kardex, Intake/Output, MAR and Recent Results.

## 2020-02-26 NOTE — PROGRESS NOTES
Problem: Falls - Risk of  Goal: *Absence of Falls  Description  Document Susy Baig Fall Risk and appropriate interventions in the flowsheet.   2/26/2020 0044 by Brennen Olson RN  Outcome: Progressing Towards Goal  Note: Fall Risk Interventions:            Medication Interventions: Evaluate medications/consider consulting pharmacy, Teach patient to arise slowly                2/25/2020 2052 by Brennen Olson RN  Outcome: Progressing Towards Goal  Note: Fall Risk Interventions:            Medication Interventions: Evaluate medications/consider consulting pharmacy, Teach patient to arise slowly                   Problem: Patient Education: Go to Patient Education Activity  Goal: Patient/Family Education  2/26/2020 0044 by Brennen Olson RN  Outcome: Progressing Towards Goal  2/25/2020 2052 by Brennen Olson RN  Outcome: Progressing Towards Goal     Problem: Patient Education: Go to Patient Education Activity  Goal: Patient/Family Education  2/26/2020 0044 by Brennen Olson RN  Outcome: Progressing Towards Goal  2/25/2020 2052 by Brennen Olson RN  Outcome: Progressing Towards Goal     Problem: Surgical Pathway Day of Surgery  Goal: Off Pathway (Use only if patient is Off Pathway)  2/26/2020 0044 by Brennen Olson RN  Outcome: Progressing Towards Goal  2/25/2020 2052 by Brennen Olson RN  Outcome: Progressing Towards Goal  Goal: Activity/Safety  2/26/2020 0044 by Brennen Olson RN  Outcome: Progressing Towards Goal  2/25/2020 2052 by Brennen Olson RN  Outcome: Progressing Towards Goal  Goal: Consults, if ordered  2/26/2020 0044 by Brennen Olson, RN  Outcome: Progressing Towards Goal  2/25/2020 2052 by Brennen Olson RN  Outcome: Progressing Towards Goal  Goal: Nutrition/Diet  2/26/2020 0044 by Brennen Olson RN  Outcome: Progressing Towards Goal  2/25/2020 2052 by Brennen Olson RN  Outcome: Progressing Towards Goal  Goal: Medications  2/26/2020 0044 by Brennen Olson RN  Outcome: Progressing Towards Goal  2/25/2020 2052 by Drew Munson RN  Outcome: Progressing Towards Goal  Goal: Respiratory  2/26/2020 0044 by Drew Munson RN  Outcome: Progressing Towards Goal  2/25/2020 2052 by Drew Munson RN  Outcome: Progressing Towards Goal  Goal: Treatments/Interventions/Procedures  2/26/2020 0044 by Drew Munson RN  Outcome: Progressing Towards Goal  2/25/2020 2052 by Drew Munson RN  Outcome: Progressing Towards Goal  Goal: Psychosocial  2/26/2020 0044 by Drew Munson RN  Outcome: Progressing Towards Goal  2/25/2020 2052 by Drew Munson RN  Outcome: Progressing Towards Goal  Goal: *No signs and symptoms of infection or wound complications  1/01/7576 0044 by Drew Munson RN  Outcome: Progressing Towards Goal  2/25/2020 2052 by Drew Munson RN  Outcome: Progressing Towards Goal  Goal: *Optimal pain control at patient's stated goal  2/26/2020 0044 by Drew Munson RN  Outcome: Progressing Towards Goal  2/25/2020 2052 by Drew Munson RN  Outcome: Progressing Towards Goal  Goal: *Adequate urinary output (equal to or greater than 30 milliliters/hour)  Description  Ambulatory Surgery patients voiding without difficulty.   2/26/2020 0044 by Drew Munson RN  Outcome: Progressing Towards Goal  2/25/2020 2052 by Drew Munson RN  Outcome: Progressing Towards Goal  Goal: *Hemodynamically stable  2/26/2020 0044 by Drew Munson RN  Outcome: Progressing Towards Goal  2/25/2020 2052 by Drew Munson RN  Outcome: Progressing Towards Goal  Goal: *Tolerating diet  2/26/2020 0044 by Drew Munson RN  Outcome: Progressing Towards Goal  2/25/2020 2052 by Drew Munson RN  Outcome: Progressing Towards Goal  Goal: *Demonstrates progressive activity  2/26/2020 0044 by Drew Munson RN  Outcome: Progressing Towards Goal  2/25/2020 2052 by Drew Munson RN  Outcome: Progressing Towards Goal

## 2020-02-26 NOTE — PROGRESS NOTES
Subjective:    Patient seen and examined by me today. Pain still. Objective:    Blood pressure (!) 117/95, pulse 87, temperature 97.6 °F (36.4 °C), resp. rate 16, last menstrual period 02/17/2016, SpO2 99 %. Exam - A&O, mild distress secondary to pain. Axillary I&D sites x2, no more pus. Erythema has regressed proximally but still present over thrombophlebitis extending distally 3-4cm medially on upper arm. Distal CMS intact. Assessment:  I&D right axillary abscess. H/o MRSA but C&S on this not back yet. Active Hospital Problems    Diagnosis Date Noted    Cellulitis 02/24/2020     Plan:  - IV vanco only. H/o recurrent C.diff.  - she want protonix BID. I explained the increased risk for C.diff.  - await C&S.    - US today. Would alejandro to wait until tomorrow to I&D additional collection due to having just eaten.

## 2020-02-26 NOTE — PROGRESS NOTES
Problem: Falls - Risk of  Goal: *Absence of Falls  Description  Document Edgar Brunner Fall Risk and appropriate interventions in the flowsheet. Outcome: Progressing Towards Goal  Note: Fall Risk Interventions:            Medication Interventions: Evaluate medications/consider consulting pharmacy, Teach patient to arise slowly                   Problem: Patient Education: Go to Patient Education Activity  Goal: Patient/Family Education  Outcome: Progressing Towards Goal     Problem: Patient Education: Go to Patient Education Activity  Goal: Patient/Family Education  Outcome: Progressing Towards Goal     Problem: Surgical Pathway Day of Surgery  Goal: Off Pathway (Use only if patient is Off Pathway)  Outcome: Progressing Towards Goal  Goal: Activity/Safety  Outcome: Progressing Towards Goal  Goal: Consults, if ordered  Outcome: Progressing Towards Goal  Goal: Nutrition/Diet  Outcome: Progressing Towards Goal  Goal: Medications  Outcome: Progressing Towards Goal  Goal: Respiratory  Outcome: Progressing Towards Goal  Goal: Treatments/Interventions/Procedures  Outcome: Progressing Towards Goal  Goal: Psychosocial  Outcome: Progressing Towards Goal  Goal: *No signs and symptoms of infection or wound complications  Outcome: Progressing Towards Goal  Goal: *Optimal pain control at patient's stated goal  Outcome: Progressing Towards Goal  Goal: *Adequate urinary output (equal to or greater than 30 milliliters/hour)  Description  Ambulatory Surgery patients voiding without difficulty.   Outcome: Progressing Towards Goal  Goal: *Hemodynamically stable  Outcome: Progressing Towards Goal  Goal: *Tolerating diet  Outcome: Progressing Towards Goal  Goal: *Demonstrates progressive activity  Outcome: Progressing Towards Goal

## 2020-02-26 NOTE — TELEPHONE ENCOUNTER
Spoke with patient. Verified patient with two patient identifiers. Advised echo looked okay. States she R/S her EST and is R/S her FU appt due to being in the hospital.    States she is an IP, had surgery. States nurses told her she had skipped beats and a murmur. She wanted RADHA Cerna NP to know.

## 2020-02-27 ENCOUNTER — ANESTHESIA (OUTPATIENT)
Dept: SURGERY | Age: 35
DRG: 364 | End: 2020-02-27
Payer: COMMERCIAL

## 2020-02-27 LAB
ANION GAP SERPL CALC-SCNC: 3 MMOL/L (ref 5–15)
BACTERIA SPEC AEROBE CULT: ABNORMAL
BACTERIA SPEC AEROBE CULT: ABNORMAL
BASOPHILS # BLD: 0.1 K/UL (ref 0–0.1)
BASOPHILS NFR BLD: 1 % (ref 0–1)
BUN SERPL-MCNC: 9 MG/DL (ref 6–20)
BUN/CREAT SERPL: 12 (ref 12–20)
CALCIUM SERPL-MCNC: 8.1 MG/DL (ref 8.5–10.1)
CHLORIDE SERPL-SCNC: 110 MMOL/L (ref 97–108)
CO2 SERPL-SCNC: 26 MMOL/L (ref 21–32)
CREAT SERPL-MCNC: 0.73 MG/DL (ref 0.55–1.02)
DATE LAST DOSE: ABNORMAL
DIFFERENTIAL METHOD BLD: ABNORMAL
EOSINOPHIL # BLD: 0.4 K/UL (ref 0–0.4)
EOSINOPHIL NFR BLD: 4 % (ref 0–7)
ERYTHROCYTE [DISTWIDTH] IN BLOOD BY AUTOMATED COUNT: 13.6 % (ref 11.5–14.5)
GLUCOSE SERPL-MCNC: 119 MG/DL (ref 65–100)
HCT VFR BLD AUTO: 32.7 % (ref 35–47)
HGB BLD-MCNC: 10.8 G/DL (ref 11.5–16)
IMM GRANULOCYTES # BLD AUTO: 0.1 K/UL (ref 0–0.04)
IMM GRANULOCYTES NFR BLD AUTO: 1 % (ref 0–0.5)
LYMPHOCYTES # BLD: 3 K/UL (ref 0.8–3.5)
LYMPHOCYTES NFR BLD: 31 % (ref 12–49)
MCH RBC QN AUTO: 30.8 PG (ref 26–34)
MCHC RBC AUTO-ENTMCNC: 33 G/DL (ref 30–36.5)
MCV RBC AUTO: 93.2 FL (ref 80–99)
MONOCYTES # BLD: 0.8 K/UL (ref 0–1)
MONOCYTES NFR BLD: 9 % (ref 5–13)
NEUTS SEG # BLD: 5.3 K/UL (ref 1.8–8)
NEUTS SEG NFR BLD: 54 % (ref 32–75)
NRBC # BLD: 0 K/UL (ref 0–0.01)
NRBC BLD-RTO: 0 PER 100 WBC
PLATELET # BLD AUTO: 313 K/UL (ref 150–400)
PMV BLD AUTO: 9.7 FL (ref 8.9–12.9)
POTASSIUM SERPL-SCNC: 3.9 MMOL/L (ref 3.5–5.1)
RBC # BLD AUTO: 3.51 M/UL (ref 3.8–5.2)
REPORTED DOSE,DOSE: ABNORMAL UNITS
REPORTED DOSE/TIME,TMG: 1911
SODIUM SERPL-SCNC: 139 MMOL/L (ref 136–145)
VANCOMYCIN TROUGH SERPL-MCNC: 3.4 UG/ML (ref 5–10)
WBC # BLD AUTO: 9.7 K/UL (ref 3.6–11)

## 2020-02-27 PROCEDURE — 87147 CULTURE TYPE IMMUNOLOGIC: CPT

## 2020-02-27 PROCEDURE — 36415 COLL VENOUS BLD VENIPUNCTURE: CPT

## 2020-02-27 PROCEDURE — 77010033678 HC OXYGEN DAILY

## 2020-02-27 PROCEDURE — 94760 N-INVAS EAR/PLS OXIMETRY 1: CPT

## 2020-02-27 PROCEDURE — 74011250636 HC RX REV CODE- 250/636: Performed by: SURGERY

## 2020-02-27 PROCEDURE — 74011250636 HC RX REV CODE- 250/636: Performed by: NURSE ANESTHETIST, CERTIFIED REGISTERED

## 2020-02-27 PROCEDURE — 80048 BASIC METABOLIC PNL TOTAL CA: CPT

## 2020-02-27 PROCEDURE — 87186 SC STD MICRODIL/AGAR DIL: CPT

## 2020-02-27 PROCEDURE — 77030019908 HC STETH ESOPH SIMS -A: Performed by: NURSE ANESTHETIST, CERTIFIED REGISTERED

## 2020-02-27 PROCEDURE — 87205 SMEAR GRAM STAIN: CPT

## 2020-02-27 PROCEDURE — 77030011640 HC PAD GRND REM COVD -A: Performed by: SURGERY

## 2020-02-27 PROCEDURE — 74011000250 HC RX REV CODE- 250: Performed by: NURSE ANESTHETIST, CERTIFIED REGISTERED

## 2020-02-27 PROCEDURE — 87075 CULTR BACTERIA EXCEPT BLOOD: CPT

## 2020-02-27 PROCEDURE — 77030008684 HC TU ET CUF COVD -B: Performed by: NURSE ANESTHETIST, CERTIFIED REGISTERED

## 2020-02-27 PROCEDURE — 74011000250 HC RX REV CODE- 250: Performed by: SURGERY

## 2020-02-27 PROCEDURE — 87077 CULTURE AEROBIC IDENTIFY: CPT

## 2020-02-27 PROCEDURE — 74011250637 HC RX REV CODE- 250/637: Performed by: NURSE PRACTITIONER

## 2020-02-27 PROCEDURE — 76010000138 HC OR TIME 0.5 TO 1 HR: Performed by: SURGERY

## 2020-02-27 PROCEDURE — 74011000272 HC RX REV CODE- 272: Performed by: SURGERY

## 2020-02-27 PROCEDURE — 80202 ASSAY OF VANCOMYCIN: CPT

## 2020-02-27 PROCEDURE — 74011250636 HC RX REV CODE- 250/636: Performed by: HOSPITALIST

## 2020-02-27 PROCEDURE — 77030021678 HC GLIDESCP STAT DISP VERT -B: Performed by: NURSE ANESTHETIST, CERTIFIED REGISTERED

## 2020-02-27 PROCEDURE — 74011250636 HC RX REV CODE- 250/636: Performed by: ANESTHESIOLOGY

## 2020-02-27 PROCEDURE — 0J9D0ZZ DRAINAGE OF RIGHT UPPER ARM SUBCUTANEOUS TISSUE AND FASCIA, OPEN APPROACH: ICD-10-PCS | Performed by: SURGERY

## 2020-02-27 PROCEDURE — 65270000029 HC RM PRIVATE

## 2020-02-27 PROCEDURE — 85025 COMPLETE CBC W/AUTO DIFF WBC: CPT

## 2020-02-27 PROCEDURE — 76060000032 HC ANESTHESIA 0.5 TO 1 HR: Performed by: SURGERY

## 2020-02-27 PROCEDURE — 77030026438 HC STYL ET INTUB CARD -A: Performed by: NURSE ANESTHETIST, CERTIFIED REGISTERED

## 2020-02-27 PROCEDURE — 76210000016 HC OR PH I REC 1 TO 1.5 HR: Performed by: SURGERY

## 2020-02-27 PROCEDURE — 74011250637 HC RX REV CODE- 250/637: Performed by: SURGERY

## 2020-02-27 PROCEDURE — 77030018836 HC SOL IRR NACL ICUM -A: Performed by: SURGERY

## 2020-02-27 RX ORDER — FENTANYL CITRATE 50 UG/ML
25 INJECTION, SOLUTION INTRAMUSCULAR; INTRAVENOUS
Status: COMPLETED | OUTPATIENT
Start: 2020-02-27 | End: 2020-02-27

## 2020-02-27 RX ORDER — MIDAZOLAM HYDROCHLORIDE 1 MG/ML
INJECTION, SOLUTION INTRAMUSCULAR; INTRAVENOUS AS NEEDED
Status: DISCONTINUED | OUTPATIENT
Start: 2020-02-27 | End: 2020-02-27 | Stop reason: HOSPADM

## 2020-02-27 RX ORDER — ROCURONIUM BROMIDE 10 MG/ML
INJECTION, SOLUTION INTRAVENOUS AS NEEDED
Status: DISCONTINUED | OUTPATIENT
Start: 2020-02-27 | End: 2020-02-27 | Stop reason: HOSPADM

## 2020-02-27 RX ORDER — NEOSTIGMINE METHYLSULFATE 1 MG/ML
INJECTION INTRAVENOUS AS NEEDED
Status: DISCONTINUED | OUTPATIENT
Start: 2020-02-27 | End: 2020-02-27 | Stop reason: HOSPADM

## 2020-02-27 RX ORDER — LIDOCAINE HYDROCHLORIDE 10 MG/ML
0.1 INJECTION, SOLUTION EPIDURAL; INFILTRATION; INTRACAUDAL; PERINEURAL AS NEEDED
Status: DISCONTINUED | OUTPATIENT
Start: 2020-02-27 | End: 2020-02-27 | Stop reason: HOSPADM

## 2020-02-27 RX ORDER — SODIUM CHLORIDE, SODIUM LACTATE, POTASSIUM CHLORIDE, CALCIUM CHLORIDE 600; 310; 30; 20 MG/100ML; MG/100ML; MG/100ML; MG/100ML
25 INJECTION, SOLUTION INTRAVENOUS CONTINUOUS
Status: DISCONTINUED | OUTPATIENT
Start: 2020-02-27 | End: 2020-02-27 | Stop reason: HOSPADM

## 2020-02-27 RX ORDER — FENTANYL CITRATE 50 UG/ML
50 INJECTION, SOLUTION INTRAMUSCULAR; INTRAVENOUS
Status: DISCONTINUED | OUTPATIENT
Start: 2020-02-27 | End: 2020-02-28

## 2020-02-27 RX ORDER — BUPIVACAINE HYDROCHLORIDE 5 MG/ML
INJECTION, SOLUTION EPIDURAL; INTRACAUDAL AS NEEDED
Status: DISCONTINUED | OUTPATIENT
Start: 2020-02-27 | End: 2020-02-27 | Stop reason: HOSPADM

## 2020-02-27 RX ORDER — HYDRALAZINE HYDROCHLORIDE 20 MG/ML
INJECTION INTRAMUSCULAR; INTRAVENOUS
Status: DISPENSED
Start: 2020-02-27 | End: 2020-02-28

## 2020-02-27 RX ORDER — SODIUM CHLORIDE 0.9 % (FLUSH) 0.9 %
5-40 SYRINGE (ML) INJECTION AS NEEDED
Status: DISCONTINUED | OUTPATIENT
Start: 2020-02-27 | End: 2020-02-27 | Stop reason: HOSPADM

## 2020-02-27 RX ORDER — SODIUM CHLORIDE 0.9 % (FLUSH) 0.9 %
5-40 SYRINGE (ML) INJECTION EVERY 8 HOURS
Status: DISCONTINUED | OUTPATIENT
Start: 2020-02-27 | End: 2020-02-27 | Stop reason: HOSPADM

## 2020-02-27 RX ORDER — HYDROMORPHONE HYDROCHLORIDE 2 MG/ML
INJECTION, SOLUTION INTRAMUSCULAR; INTRAVENOUS; SUBCUTANEOUS AS NEEDED
Status: DISCONTINUED | OUTPATIENT
Start: 2020-02-27 | End: 2020-02-27 | Stop reason: HOSPADM

## 2020-02-27 RX ORDER — LIDOCAINE HYDROCHLORIDE 20 MG/ML
INJECTION, SOLUTION EPIDURAL; INFILTRATION; INTRACAUDAL; PERINEURAL AS NEEDED
Status: DISCONTINUED | OUTPATIENT
Start: 2020-02-27 | End: 2020-02-27 | Stop reason: HOSPADM

## 2020-02-27 RX ORDER — SUCCINYLCHOLINE CHLORIDE 20 MG/ML
INJECTION INTRAMUSCULAR; INTRAVENOUS AS NEEDED
Status: DISCONTINUED | OUTPATIENT
Start: 2020-02-27 | End: 2020-02-27 | Stop reason: HOSPADM

## 2020-02-27 RX ORDER — MORPHINE SULFATE 10 MG/ML
2 INJECTION, SOLUTION INTRAMUSCULAR; INTRAVENOUS
Status: DISCONTINUED | OUTPATIENT
Start: 2020-02-27 | End: 2020-02-27 | Stop reason: HOSPADM

## 2020-02-27 RX ORDER — FENTANYL CITRATE 50 UG/ML
INJECTION, SOLUTION INTRAMUSCULAR; INTRAVENOUS AS NEEDED
Status: DISCONTINUED | OUTPATIENT
Start: 2020-02-27 | End: 2020-02-27 | Stop reason: HOSPADM

## 2020-02-27 RX ORDER — DEXAMETHASONE SODIUM PHOSPHATE 4 MG/ML
INJECTION, SOLUTION INTRA-ARTICULAR; INTRALESIONAL; INTRAMUSCULAR; INTRAVENOUS; SOFT TISSUE AS NEEDED
Status: DISCONTINUED | OUTPATIENT
Start: 2020-02-27 | End: 2020-02-27 | Stop reason: HOSPADM

## 2020-02-27 RX ORDER — HYDRALAZINE HYDROCHLORIDE 20 MG/ML
10 INJECTION INTRAMUSCULAR; INTRAVENOUS ONCE
Status: COMPLETED | OUTPATIENT
Start: 2020-02-27 | End: 2020-02-27

## 2020-02-27 RX ORDER — ONDANSETRON 2 MG/ML
INJECTION INTRAMUSCULAR; INTRAVENOUS AS NEEDED
Status: DISCONTINUED | OUTPATIENT
Start: 2020-02-27 | End: 2020-02-27 | Stop reason: HOSPADM

## 2020-02-27 RX ORDER — ONDANSETRON 2 MG/ML
4 INJECTION INTRAMUSCULAR; INTRAVENOUS AS NEEDED
Status: DISCONTINUED | OUTPATIENT
Start: 2020-02-27 | End: 2020-02-27 | Stop reason: HOSPADM

## 2020-02-27 RX ORDER — PROPOFOL 10 MG/ML
INJECTION, EMULSION INTRAVENOUS AS NEEDED
Status: DISCONTINUED | OUTPATIENT
Start: 2020-02-27 | End: 2020-02-27 | Stop reason: HOSPADM

## 2020-02-27 RX ORDER — DIPHENHYDRAMINE HYDROCHLORIDE 50 MG/ML
12.5 INJECTION, SOLUTION INTRAMUSCULAR; INTRAVENOUS
Status: DISCONTINUED | OUTPATIENT
Start: 2020-02-27 | End: 2020-02-27 | Stop reason: HOSPADM

## 2020-02-27 RX ORDER — GLYCOPYRROLATE 0.2 MG/ML
INJECTION INTRAMUSCULAR; INTRAVENOUS AS NEEDED
Status: DISCONTINUED | OUTPATIENT
Start: 2020-02-27 | End: 2020-02-27 | Stop reason: HOSPADM

## 2020-02-27 RX ADMIN — ENOXAPARIN SODIUM 40 MG: 40 INJECTION SUBCUTANEOUS at 18:03

## 2020-02-27 RX ADMIN — FENTANYL CITRATE 25 MCG: 50 INJECTION, SOLUTION INTRAMUSCULAR; INTRAVENOUS at 13:33

## 2020-02-27 RX ADMIN — LIDOCAINE HYDROCHLORIDE 100 MG: 20 INJECTION, SOLUTION INTRAVENOUS at 11:56

## 2020-02-27 RX ADMIN — HYDRALAZINE HYDROCHLORIDE 10 MG: 20 INJECTION INTRAMUSCULAR; INTRAVENOUS at 13:56

## 2020-02-27 RX ADMIN — FENTANYL CITRATE 25 MCG: 50 INJECTION, SOLUTION INTRAMUSCULAR; INTRAVENOUS at 13:15

## 2020-02-27 RX ADMIN — MIRTAZAPINE 30 MG: 15 TABLET, FILM COATED ORAL at 18:02

## 2020-02-27 RX ADMIN — SUGAMMADEX 200 MG: 100 INJECTION, SOLUTION INTRAVENOUS at 12:36

## 2020-02-27 RX ADMIN — Medication 10 ML: at 21:00

## 2020-02-27 RX ADMIN — KETOROLAC TROMETHAMINE 15 MG: 30 INJECTION, SOLUTION INTRAMUSCULAR at 07:34

## 2020-02-27 RX ADMIN — HYDROMORPHONE HYDROCHLORIDE 0.5 MG: 1 INJECTION, SOLUTION INTRAMUSCULAR; INTRAVENOUS; SUBCUTANEOUS at 18:04

## 2020-02-27 RX ADMIN — DULOXETINE 120 MG: 30 CAPSULE, DELAYED RELEASE ORAL at 08:22

## 2020-02-27 RX ADMIN — SODIUM CHLORIDE, SODIUM LACTATE, POTASSIUM CHLORIDE, AND CALCIUM CHLORIDE 25 ML/HR: 600; 310; 30; 20 INJECTION, SOLUTION INTRAVENOUS at 10:45

## 2020-02-27 RX ADMIN — PROPOFOL 200 MG: 10 INJECTION, EMULSION INTRAVENOUS at 11:56

## 2020-02-27 RX ADMIN — ROCURONIUM BROMIDE 10 MG: 10 INJECTION INTRAVENOUS at 12:00

## 2020-02-27 RX ADMIN — PANTOPRAZOLE SODIUM 40 MG: 40 TABLET, DELAYED RELEASE ORAL at 08:22

## 2020-02-27 RX ADMIN — HYDROMORPHONE HYDROCHLORIDE 0.5 MG: 1 INJECTION, SOLUTION INTRAMUSCULAR; INTRAVENOUS; SUBCUTANEOUS at 20:03

## 2020-02-27 RX ADMIN — SUGAMMADEX 200 MG: 100 INJECTION, SOLUTION INTRAVENOUS at 12:28

## 2020-02-27 RX ADMIN — FENTANYL CITRATE 25 MCG: 50 INJECTION, SOLUTION INTRAMUSCULAR; INTRAVENOUS at 13:22

## 2020-02-27 RX ADMIN — HYDROMORPHONE HYDROCHLORIDE 0.2 MG: 2 INJECTION, SOLUTION INTRAMUSCULAR; INTRAVENOUS; SUBCUTANEOUS at 12:15

## 2020-02-27 RX ADMIN — MIDAZOLAM HYDROCHLORIDE 2 MG: 1 INJECTION, SOLUTION INTRAMUSCULAR; INTRAVENOUS at 11:46

## 2020-02-27 RX ADMIN — FENTANYL CITRATE 25 MCG: 50 INJECTION, SOLUTION INTRAMUSCULAR; INTRAVENOUS at 13:28

## 2020-02-27 RX ADMIN — Medication 3 AMPULE: at 10:46

## 2020-02-27 RX ADMIN — Medication 10 ML: at 15:44

## 2020-02-27 RX ADMIN — FENTANYL CITRATE 100 MCG: 50 INJECTION, SOLUTION INTRAMUSCULAR; INTRAVENOUS at 11:56

## 2020-02-27 RX ADMIN — SUCCINYLCHOLINE CHLORIDE 160 MG: 20 INJECTION, SOLUTION INTRAMUSCULAR; INTRAVENOUS at 11:56

## 2020-02-27 RX ADMIN — KETOROLAC TROMETHAMINE 15 MG: 30 INJECTION, SOLUTION INTRAMUSCULAR at 19:00

## 2020-02-27 RX ADMIN — FENTANYL CITRATE 50 MCG: 50 INJECTION, SOLUTION INTRAMUSCULAR; INTRAVENOUS at 11:31

## 2020-02-27 RX ADMIN — HYDROCODONE BITARTRATE AND ACETAMINOPHEN 2 TABLET: 5; 325 TABLET ORAL at 03:10

## 2020-02-27 RX ADMIN — FAMOTIDINE 20 MG: 20 TABLET ORAL at 18:02

## 2020-02-27 RX ADMIN — FAMOTIDINE 20 MG: 20 TABLET ORAL at 08:22

## 2020-02-27 RX ADMIN — DEXAMETHASONE SODIUM PHOSPHATE 10 MG: 4 INJECTION, SOLUTION INTRAMUSCULAR; INTRAVENOUS at 12:07

## 2020-02-27 RX ADMIN — FENTANYL CITRATE 25 MCG: 50 INJECTION, SOLUTION INTRAMUSCULAR; INTRAVENOUS at 13:04

## 2020-02-27 RX ADMIN — PROPOFOL 100 MG: 10 INJECTION, EMULSION INTRAVENOUS at 12:11

## 2020-02-27 RX ADMIN — VANCOMYCIN HYDROCHLORIDE 1000 MG: 1 INJECTION, POWDER, LYOPHILIZED, FOR SOLUTION INTRAVENOUS at 18:08

## 2020-02-27 RX ADMIN — HYDROMORPHONE HYDROCHLORIDE 0.5 MG: 1 INJECTION, SOLUTION INTRAMUSCULAR; INTRAVENOUS; SUBCUTANEOUS at 13:32

## 2020-02-27 RX ADMIN — HYDROCODONE BITARTRATE AND ACETAMINOPHEN 2 TABLET: 5; 325 TABLET ORAL at 15:45

## 2020-02-27 RX ADMIN — HYDROMORPHONE HYDROCHLORIDE 0.5 MG: 1 INJECTION, SOLUTION INTRAMUSCULAR; INTRAVENOUS; SUBCUTANEOUS at 04:28

## 2020-02-27 RX ADMIN — DEXTROSE MONOHYDRATE, SODIUM CHLORIDE, AND POTASSIUM CHLORIDE 75 ML/HR: 50; 4.5; 1.49 INJECTION, SOLUTION INTRAVENOUS at 06:27

## 2020-02-27 RX ADMIN — Medication 1 CAPSULE: at 08:22

## 2020-02-27 RX ADMIN — HYDROMORPHONE HYDROCHLORIDE 0.5 MG: 1 INJECTION, SOLUTION INTRAMUSCULAR; INTRAVENOUS; SUBCUTANEOUS at 21:58

## 2020-02-27 RX ADMIN — KETOROLAC TROMETHAMINE 15 MG: 30 INJECTION, SOLUTION INTRAMUSCULAR at 13:16

## 2020-02-27 RX ADMIN — Medication 10 ML: at 06:12

## 2020-02-27 RX ADMIN — VANCOMYCIN HYDROCHLORIDE 1500 MG: 10 INJECTION, POWDER, LYOPHILIZED, FOR SOLUTION INTRAVENOUS at 11:20

## 2020-02-27 RX ADMIN — ROCURONIUM BROMIDE 10 MG: 10 INJECTION INTRAVENOUS at 11:56

## 2020-02-27 RX ADMIN — ONDANSETRON HYDROCHLORIDE 4 MG: 2 INJECTION, SOLUTION INTRAMUSCULAR; INTRAVENOUS at 12:07

## 2020-02-27 RX ADMIN — ROCURONIUM BROMIDE 20 MG: 10 INJECTION INTRAVENOUS at 12:11

## 2020-02-27 RX ADMIN — GLYCOPYRROLATE 0.4 MG: 0.2 INJECTION, SOLUTION INTRAMUSCULAR; INTRAVENOUS at 12:23

## 2020-02-27 RX ADMIN — NEOSTIGMINE METHYLSULFATE 3 MG: 1 INJECTION INTRAVENOUS at 12:23

## 2020-02-27 RX ADMIN — FENTANYL CITRATE 25 MCG: 50 INJECTION, SOLUTION INTRAMUSCULAR; INTRAVENOUS at 12:52

## 2020-02-27 RX ADMIN — GLYCOPYRROLATE 0.2 MG: 0.2 INJECTION, SOLUTION INTRAMUSCULAR; INTRAVENOUS at 12:31

## 2020-02-27 RX ADMIN — FENTANYL CITRATE 25 MCG: 50 INJECTION, SOLUTION INTRAMUSCULAR; INTRAVENOUS at 12:55

## 2020-02-27 RX ADMIN — ONDANSETRON 4 MG: 2 INJECTION INTRAMUSCULAR; INTRAVENOUS at 20:04

## 2020-02-27 RX ADMIN — ALPRAZOLAM 1 MG: 0.5 TABLET ORAL at 21:00

## 2020-02-27 RX ADMIN — FENTANYL CITRATE 25 MCG: 50 INJECTION, SOLUTION INTRAMUSCULAR; INTRAVENOUS at 13:08

## 2020-02-27 RX ADMIN — HYDROMORPHONE HYDROCHLORIDE 0.5 MG: 1 INJECTION, SOLUTION INTRAMUSCULAR; INTRAVENOUS; SUBCUTANEOUS at 06:27

## 2020-02-27 NOTE — PROGRESS NOTES
0730- Pt requesting Tordol, however, medication is listed as an allergy. Pt states that she is not allergic to medication however the \"Pill form of Tordol messes with my ulcerative colitis, the IV one is fine. I am not allergic go it. \" Will continue to monitor.

## 2020-02-27 NOTE — ANESTHESIA PREPROCEDURE EVALUATION
Anesthetic History     PONV          Review of Systems / Medical History  Patient summary reviewed, nursing notes reviewed and pertinent labs reviewed    Pulmonary            Asthma : well controlled       Neuro/Psych         Headaches    Comments: Anxiety Cardiovascular  Within defined limits                Exercise tolerance: >4 METS  Comments: ECHO from 2/24/2020 shows a 60-65% EF with Grade 1 diastolic dysfunction and no valve issues   GI/Hepatic/Renal     GERD: well controlled      Liver disease     Endo/Other        Morbid obesity     Other Findings   Comments: Lupus  IBS  C. difficile diarrhea  Right axillary abscess  Previous steroid use         Physical Exam    Airway  Mallampati: II  TM Distance: 4 - 6 cm  Neck ROM: normal range of motion   Mouth opening: Normal     Cardiovascular  Regular rate and rhythm,  S1 and S2 normal,  no murmur, click, rub, or gallop  Rhythm: regular  Rate: normal         Dental    Dentition: Upper dentition intact, Lower dentition intact and Caps/crowns  Comments: Some missing teeth   Pulmonary  Breath sounds clear to auscultation               Abdominal  GI exam deferred       Other Findings            Anesthetic Plan    ASA: 3  Anesthesia type: general    Monitoring Plan: BIS      Induction: Intravenous  Anesthetic plan and risks discussed with: Patient      [place] intubation as previously

## 2020-02-27 NOTE — PROGRESS NOTES
PICC (Peripherally Inserted Central Catheter) line insertion  procedure note :     Procedure explained to patient along with risks and benefits  and patient agreed to proceed. Informed consent obtained from  patient. Patient teaching completed. Timeout completed. Pre-procedure assessment done. Pt has infection to right axilla and right upper arm. Maximum sterile barrier precautions observed throughout procedure. Lidocaine 1%  3.0    ml sq given prior to cannulation. Attempted cephalic vein without success. Cannulated basilic  vein using ultrasound guidance and modified seldinger technique. Inserted 5  [place] double  lumen PICC to left arm using [employer] Hel[employer] Hands Tip Location System and  [address]. Pt has    sinus   rhythm. PICC tip location was confirmed by 3 CG tip positioning system, indicating tall P wave and no negative deflection before P wave which would indicate that the PICC tip is properly placed in the distal SVC or at the [place]. PICC tip location was  confirmed by 2 PICC nurses and 3CG printout placed on patient's chart. Blood return verified and flushed with 20 ml normal saline in each port. Sterile dressing applied with biopatch, statLock and occlusive dressing as per protocol. Curos caps applied to each port. Patient tolerated procedure well with minimal blood loss ( less than 5 ml.)   Patient education material provided. PICC procedure performed by  :  Stormy Soliman RN. PICC nurse  Assisted by : Mercy Guerin RN  PICC nurse  Reason for access : reliable access / MD order /  Sonya man IV medication administration / possible  Discharge with PICC line / Vesicant IV medication infusion ( Vancomycin )  Complications related to insertion  : none  X-Ray : not applicable  Notified primary nurse  Ale Cagle RN  that  PICC line can be used.    Total Trimmed Length :  49   cm   External Length :   1 cm      PICC line site arm circumference:  42    cm   PICC catheter occupies 12   % of vein  Type of PICC: Bard Solo Power PICC   Ref # :    [id number]    Lot # :  [id number]   Expiration Date :    2020-12-31       Dominic Lagunas RN. BSN. CRNI,CMSROLY.  PICC Nurse, Vascular Access Team.

## 2020-02-27 NOTE — BRIEF OP NOTE
BRIEF OPERATIVE NOTE    Date of Procedure: 2/27/2020   Preoperative Diagnosis: ABSCESS  Postoperative Diagnosis: ABSCESS    Procedure(s):  RIGHT AXILLARY ABSCESS INCISION AND DRAINAGE  Surgeon(s) and Role:     * Bakari Donovan MD - Primary         Surgical Assistant: staff    Surgical Staff:  Circ-1: Ralf Witt RN   Circ-Relief: Dipti Stevens RN  Scrub Tech-1: Demetria Martínez  Scrub Tech-Relief:  Nithin Dubose  Surg Asst-1: Deedee Trotter  Surg Asst-Relief: Noe BUNN  Event Time In Time Out   Incision Start 1208    Incision Close 1218      Anesthesia: General   Estimated Blood Loss: min  Specimens:   ID Type Source Tests Collected by Time Destination   1 : Right axillary abscess Wound Incision CULTURE, ANAEROBIC, CULTURE, WOUND W GRAM STAIN Bakari Donovan MD 2/27/2020 1213 Microbiology      Findings: turbid fluid posteromedial proximal upper arm   Complications: none immediate  Implants: * No implants in log *    [id number]    [id number]

## 2020-02-27 NOTE — PROGRESS NOTES
Pharmacy Automatic Renal Dosing Protocol - Antimicrobials    Indication for Antimicrobials: SSTI - axillary abscess. Here for drain. Current Regimen of Each Antimicrobial:  Vancomycin 1500 mg IV q16h (Start Date ; Day 3)    Previous Antimicrobial Therapy:  Had RX for doxycycline and cephalexin for boil    Goal Level: VANCOMYCIN TROUGH GOAL RANGE     (AUC: 400 - 600 mg/hr/Liter/day)     Date Dose & Interval Measured (mcg/mL) Extrapolated (mcg/mL)    @ 09:59 1500 mg Q16H 3.4 3.2                 Date & time of next level: 2030    Significant Cultures: NA    Radiology / Imaging results: (X-ray, CT scan or MRI):     Paralysis, amputations, malnutrition: not noted    Labs:  Recent Labs     20  0437 20  0730 20  1915 20   CREA 0.73 0.83 1.02  --    BUN 9 8 12  --    WBC 9.7  --   --  20.0*     Temp (24hrs), Av.2 °F (36.8 °C), Min:97.8 °F (36.6 °C), Max:98.6 °F (37 °C)    Creatinine Clearance (mL/min) or Dialysis: 84.3 ml/min    Impression/Plan:   Vancomyin trough resulted subtherapeutic at 3.2 mcg/ml. Patient's renal function has improved some since admission. Vancomycin given >12 hours late on  due to IV infiltration, patient had to receive a new PICC line. Vancomycin dose adjusted to 1000 mg IV every 8 hours. Daily BMP  Antimicrobial stop date 7 days (Vanc consult)     Pharmacy will follow daily and adjust medications as appropriate for renal function and/or serum levels.     Thank you,  Thea Luna, ROBED

## 2020-02-27 NOTE — PROGRESS NOTES
Bedside and Verbal shift change report given to [place] (oncoming nurse) by Svetlana Luna RN (offgoing nurse). Report included the following information SBAR, Kardex, OR Summary, Procedure Summary, Intake/Output, MAR and Recent Results.

## 2020-02-27 NOTE — PERIOP NOTES
TRANSFER - OUT REPORT:    Verbal report given to [place], RN(name) on Lavell Robb  being transferred to [facility](unit) for routine post - op       Report consisted of patients Situation, Background, Assessment and   Recommendations(SBAR). Information from the following report(s) OR Summary, Procedure Summary, Intake/Output and MAR was reviewed with the receiving nurse. Opportunity for questions and clarification was provided.       Patient transported with:   O2 @ 3 liters  Tech

## 2020-02-27 NOTE — PROGRESS NOTES
Bedside and Verbal shift change report given to [address]orthy (oncoming nurse) by Erika Che (offgoing nurse). Report included the following information SBAR, Kardex, Intake/Output, MAR and Recent Results.

## 2020-02-27 NOTE — OP NOTES
[facility]  OPERATIVE REPORT    Name:  Summer Hdez  MR#:  [id number]  :  1985  ACCOUNT #:  [de-identified]  DATE OF SERVICE:  2020    PREOPERATIVE DIAGNOSES:  Right axillary and upper arm abscess. POSTOPERATIVE DIAGNOSES:  Right axillary and upper arm abscess. PROCEDURE PERFORMED:  Incision and drainage of right axillary and upper arm abscess. SURGEON:  Tenny Galeazzi, MD    ASSISTANT:  Staff. ANESTHESIA:  General.    COMPLICATIONS:  None immediate. SPECIMENS REMOVED:  Cultures. IMPLANTS:  None. ESTIMATED BLOOD LOSS:  Minimal.    FINDINGS:  Turbid fluid in the posteromedial proximal upper arm. INDICATIONS:  The patient is a 57-year-old female with history of axillary abscesses, who presents to my office with another. This was lanced in the office and she was admitted to the hospital for intravenous antibiotics. She has had MRSA in the past and there was cellulitis at the time of I and D. There has been one persistent area of cellulitis further along the upper arm and she was taken to the operating room today for incision and drainage at that site. DESCRIPTION OF PROCEDURE:  After obtaining informed consent, the patient was taken to the operating room, placed supine on the operating table. An operative time-out was performed and general endotracheal anesthesia was induced. Preoperative antibiotics were administered, and the right axilla and upper arm were prepped and draped in usual sterile fashion. An incision made was over the central area of erythema and taken down in the subcutaneous tissues. Deep to approximately 1 inch of subcutaneous adipose, there was a pocket of turbid fluid. Cultures were taken. The cavity was broken up with finger and it was connected to the prior axillary incision site. The cavity was irrigated with bacitracin and saline, and then a Penrose drain was run through the two incisions and tied over top of the skin. The cavity was packed with Betadine-soaked gauze and dressed with sterile 4 x 4s and tape. The patient was recovered from general anesthesia and taken to recovery area in satisfactory condition. All instrument, sponge, and needle counts were reported as correct.         Deland Aschoff, MD      [id number]  D:  02/27/2020 13:05  T:  02/27/2020 18:44  JOB #:  [id number]

## 2020-02-27 NOTE — ANESTHESIA POSTPROCEDURE EVALUATION
Procedure(s):  RIGHT AXILLARY ABSCESS INCISION AND DRAINAGE. general    Anesthesia Post Evaluation        Patient location during evaluation: PACU  Note status: Adequate. Level of consciousness: responsive to verbal stimuli and sleepy but conscious  Pain management: satisfactory to patient  Airway patency: patent  Anesthetic complications: no  Cardiovascular status: acceptable  Respiratory status: acceptable  Hydration status: acceptable  Comments: +Post-Anesthesia Evaluation and Assessment    Patient: Ghada Willson MRN: [id number]  SSN: [id number]   YOB: 1985  Age: 37 y.o. Sex: female      Cardiovascular Function/Vital Signs    BP (!) 201/88   Pulse 98   Temp 36.6 °C (97.9 °F)   Resp 17   SpO2 97%     Patient is status post Procedure(s) with comments:  RIGHT AXILLARY ABSCESS INCISION AND DRAINAGE - Right axilla. Nausea/Vomiting: Controlled. Postoperative hydration reviewed and adequate. Pain:  Pain Scale 1: Numeric (0 - 10) (02/27/20 1343)  Pain Intensity 1: 5 (02/27/20 1343)   Managed. Neurological Status:   Neuro (WDL): Exceptions to WDL (02/27/20 1244)   At baseline. Mental Status and Level of Consciousness: Arousable. Pulmonary Status:   O2 Device: Nasal cannula (02/27/20 1330)   Adequate oxygenation and airway patent. Complications related to anesthesia: None    Post-anesthesia assessment completed. No concerns. Signed By: Nicole Forrester MD    2/27/2020  Post anesthesia nausea and vomiting:  controlled      Vitals Value Taken Time   /88 2/27/2020  1:48 PM   Temp 36.6 °C (97.9 °F) 2/27/2020 12:44 PM   Pulse 77 2/27/2020  1:56 PM   Resp 12 2/27/2020  1:56 PM   SpO2 98 % 2/27/2020  1:56 PM   Vitals shown include unvalidated device data.

## 2020-02-28 LAB
ANION GAP SERPL CALC-SCNC: 3 MMOL/L (ref 5–15)
BUN SERPL-MCNC: 10 MG/DL (ref 6–20)
BUN/CREAT SERPL: 13 (ref 12–20)
CALCIUM SERPL-MCNC: 8.5 MG/DL (ref 8.5–10.1)
CHLORIDE SERPL-SCNC: 109 MMOL/L (ref 97–108)
CO2 SERPL-SCNC: 26 MMOL/L (ref 21–32)
CREAT SERPL-MCNC: 0.78 MG/DL (ref 0.55–1.02)
GLUCOSE SERPL-MCNC: 150 MG/DL (ref 65–100)
POTASSIUM SERPL-SCNC: 3.6 MMOL/L (ref 3.5–5.1)
SODIUM SERPL-SCNC: 138 MMOL/L (ref 136–145)

## 2020-02-28 PROCEDURE — 74011250637 HC RX REV CODE- 250/637: Performed by: SURGERY

## 2020-02-28 PROCEDURE — 94760 N-INVAS EAR/PLS OXIMETRY 1: CPT

## 2020-02-28 PROCEDURE — 74011250636 HC RX REV CODE- 250/636: Performed by: SURGERY

## 2020-02-28 PROCEDURE — 36415 COLL VENOUS BLD VENIPUNCTURE: CPT

## 2020-02-28 PROCEDURE — 80048 BASIC METABOLIC PNL TOTAL CA: CPT

## 2020-02-28 PROCEDURE — 74011250637 HC RX REV CODE- 250/637: Performed by: NURSE PRACTITIONER

## 2020-02-28 PROCEDURE — 65270000029 HC RM PRIVATE

## 2020-02-28 RX ORDER — IBUPROFEN 600 MG/1
600 TABLET ORAL
Qty: 21 TAB | Refills: 0 | OUTPATIENT
Start: 2020-02-28 | End: 2020-09-06

## 2020-02-28 RX ORDER — HYDROCODONE BITARTRATE AND ACETAMINOPHEN 5; 325 MG/1; MG/1
1-2 TABLET ORAL
Qty: 30 TAB | Refills: 0 | Status: SHIPPED | OUTPATIENT
Start: 2020-02-28 | End: 2020-03-04

## 2020-02-28 RX ORDER — HYDROMORPHONE HYDROCHLORIDE 1 MG/ML
1 INJECTION, SOLUTION INTRAMUSCULAR; INTRAVENOUS; SUBCUTANEOUS ONCE
Status: COMPLETED | OUTPATIENT
Start: 2020-02-28 | End: 2020-02-28

## 2020-02-28 RX ADMIN — HYDROMORPHONE HYDROCHLORIDE 1 MG: 1 INJECTION, SOLUTION INTRAMUSCULAR; INTRAVENOUS; SUBCUTANEOUS at 13:22

## 2020-02-28 RX ADMIN — Medication 1 CAPSULE: at 10:05

## 2020-02-28 RX ADMIN — DEXTROSE MONOHYDRATE, SODIUM CHLORIDE, AND POTASSIUM CHLORIDE 75 ML/HR: 50; 4.5; 1.49 INJECTION, SOLUTION INTRAVENOUS at 02:15

## 2020-02-28 RX ADMIN — HYDROMORPHONE HYDROCHLORIDE 0.5 MG: 1 INJECTION, SOLUTION INTRAMUSCULAR; INTRAVENOUS; SUBCUTANEOUS at 12:24

## 2020-02-28 RX ADMIN — VANCOMYCIN HYDROCHLORIDE 1000 MG: 1 INJECTION, POWDER, LYOPHILIZED, FOR SOLUTION INTRAVENOUS at 18:14

## 2020-02-28 RX ADMIN — DULOXETINE 120 MG: 30 CAPSULE, DELAYED RELEASE ORAL at 10:05

## 2020-02-28 RX ADMIN — Medication 10 ML: at 06:00

## 2020-02-28 RX ADMIN — Medication 10 ML: at 13:22

## 2020-02-28 RX ADMIN — ESTRADIOL 1 MG: 1 TABLET ORAL at 10:05

## 2020-02-28 RX ADMIN — PANTOPRAZOLE SODIUM 40 MG: 40 TABLET, DELAYED RELEASE ORAL at 10:06

## 2020-02-28 RX ADMIN — FAMOTIDINE 20 MG: 20 TABLET ORAL at 18:14

## 2020-02-28 RX ADMIN — KETOROLAC TROMETHAMINE 15 MG: 30 INJECTION, SOLUTION INTRAMUSCULAR at 10:05

## 2020-02-28 RX ADMIN — KETOROLAC TROMETHAMINE 15 MG: 30 INJECTION, SOLUTION INTRAMUSCULAR at 20:54

## 2020-02-28 RX ADMIN — HYDROMORPHONE HYDROCHLORIDE 0.5 MG: 1 INJECTION, SOLUTION INTRAMUSCULAR; INTRAVENOUS; SUBCUTANEOUS at 04:50

## 2020-02-28 RX ADMIN — ENOXAPARIN SODIUM 40 MG: 40 INJECTION SUBCUTANEOUS at 18:14

## 2020-02-28 RX ADMIN — HYDROCODONE BITARTRATE AND ACETAMINOPHEN 2 TABLET: 5; 325 TABLET ORAL at 13:42

## 2020-02-28 RX ADMIN — HYDROMORPHONE HYDROCHLORIDE 0.5 MG: 1 INJECTION, SOLUTION INTRAMUSCULAR; INTRAVENOUS; SUBCUTANEOUS at 20:57

## 2020-02-28 RX ADMIN — Medication 10 ML: at 21:01

## 2020-02-28 RX ADMIN — ALPRAZOLAM 1 MG: 0.5 TABLET ORAL at 21:00

## 2020-02-28 RX ADMIN — HYDROMORPHONE HYDROCHLORIDE 0.5 MG: 1 INJECTION, SOLUTION INTRAMUSCULAR; INTRAVENOUS; SUBCUTANEOUS at 02:14

## 2020-02-28 RX ADMIN — VANCOMYCIN HYDROCHLORIDE 1000 MG: 1 INJECTION, POWDER, LYOPHILIZED, FOR SOLUTION INTRAVENOUS at 10:09

## 2020-02-28 RX ADMIN — HYDROMORPHONE HYDROCHLORIDE 0.5 MG: 1 INJECTION, SOLUTION INTRAMUSCULAR; INTRAVENOUS; SUBCUTANEOUS at 16:17

## 2020-02-28 RX ADMIN — HYDROCODONE BITARTRATE AND ACETAMINOPHEN 2 TABLET: 5; 325 TABLET ORAL at 22:59

## 2020-02-28 RX ADMIN — HYDROMORPHONE HYDROCHLORIDE 0.5 MG: 1 INJECTION, SOLUTION INTRAMUSCULAR; INTRAVENOUS; SUBCUTANEOUS at 00:36

## 2020-02-28 RX ADMIN — VANCOMYCIN HYDROCHLORIDE 1000 MG: 1 INJECTION, POWDER, LYOPHILIZED, FOR SOLUTION INTRAVENOUS at 02:14

## 2020-02-28 RX ADMIN — DEXTROSE MONOHYDRATE, SODIUM CHLORIDE, AND POTASSIUM CHLORIDE 75 ML/HR: 50; 4.5; 1.49 INJECTION, SOLUTION INTRAVENOUS at 22:48

## 2020-02-28 RX ADMIN — FAMOTIDINE 20 MG: 20 TABLET ORAL at 10:06

## 2020-02-28 RX ADMIN — HYDROMORPHONE HYDROCHLORIDE 0.5 MG: 1 INJECTION, SOLUTION INTRAMUSCULAR; INTRAVENOUS; SUBCUTANEOUS at 18:17

## 2020-02-28 RX ADMIN — HYDROMORPHONE HYDROCHLORIDE 0.5 MG: 1 INJECTION, SOLUTION INTRAMUSCULAR; INTRAVENOUS; SUBCUTANEOUS at 08:05

## 2020-02-28 RX ADMIN — ENOXAPARIN SODIUM 40 MG: 40 INJECTION SUBCUTANEOUS at 05:13

## 2020-02-28 RX ADMIN — MIRTAZAPINE 30 MG: 15 TABLET, FILM COATED ORAL at 18:14

## 2020-02-28 NOTE — PROGRESS NOTES
Bedside and Verbal shift change report given to [address] [place] [address] (oncoming nurse) by Brigid Glasgow (offgoing nurse). Report included the following information SBAR, Kardex, Intake/Output, MAR and Recent Results.

## 2020-02-28 NOTE — DISCHARGE INSTRUCTIONS
Discharge Instructions:  Dr. Lilli Burks    Call on next business day to arrange appointment for follow up in 1 week -- [phone]. Activity:  Walk regularly starting immediately. You may resume driving when off narcotics for pain. Diet:  You may resume normal diet. Wound Care: You should shower daily and allow water to rinse the drain and incisions. Then blot dry and recover with gauze. Medications:  Resume home medications as indicated on the Medical Reconciliation form. Use ice for pain as well. Miralax should be used twice daily to prevent constipation while on narcotics. If you are still having trouble having a BM after 1-2 days, try milk of magnesia. If this does not work within 24 hours, try a bottle of magnesium citrate. MRSA education  1. Complete prescribed antibiotics,    2. Wash with Hibiclens daily until all acute skin issues resolved then once per week -- no Hibiclens on head,   3. Rinse shower/tub with dilute bleach daily,   4. Wash sheets and towels in bleach at least weekly,    5. Seek early medical attention for soft tissue infections.

## 2020-02-28 NOTE — PROGRESS NOTES
Bedside and Verbal shift change report given to Hayden RN (oncoming nurse) by Mariza Bailey RN (offgoing nurse). Report included the following information SBAR, Kardex, OR Summary, Procedure Summary, Intake/Output, MAR and Recent Results.

## 2020-02-28 NOTE — PROGRESS NOTES
Bedside, Verbal and Written shift change report given to [place] (oncoming nurse) by Hay[place] (offgoing nurse). Report included the following information SBAR, Kardex, ED Summary, OR Summary, Procedure Summary, Intake/Output, MAR, Accordion and Recent Results.

## 2020-02-29 LAB
ANION GAP SERPL CALC-SCNC: 3 MMOL/L (ref 5–15)
BACTERIA SPEC CULT: ABNORMAL
BACTERIA SPEC CULT: NORMAL
BUN SERPL-MCNC: 11 MG/DL (ref 6–20)
BUN/CREAT SERPL: 15 (ref 12–20)
CALCIUM SERPL-MCNC: 8.1 MG/DL (ref 8.5–10.1)
CHLORIDE SERPL-SCNC: 113 MMOL/L (ref 97–108)
CO2 SERPL-SCNC: 27 MMOL/L (ref 21–32)
CREAT SERPL-MCNC: 0.71 MG/DL (ref 0.55–1.02)
DATE LAST DOSE: ABNORMAL
GLUCOSE SERPL-MCNC: 107 MG/DL (ref 65–100)
GRAM STN SPEC: ABNORMAL
GRAM STN SPEC: ABNORMAL
POTASSIUM SERPL-SCNC: 3.6 MMOL/L (ref 3.5–5.1)
REPORTED DOSE,DOSE: ABNORMAL UNITS
REPORTED DOSE/TIME,TMG: 200
SERVICE CMNT-IMP: ABNORMAL
SERVICE CMNT-IMP: NORMAL
SODIUM SERPL-SCNC: 143 MMOL/L (ref 136–145)
VANCOMYCIN TROUGH SERPL-MCNC: 33.2 UG/ML (ref 5–10)

## 2020-02-29 PROCEDURE — 80048 BASIC METABOLIC PNL TOTAL CA: CPT

## 2020-02-29 PROCEDURE — 94760 N-INVAS EAR/PLS OXIMETRY 1: CPT

## 2020-02-29 PROCEDURE — 36415 COLL VENOUS BLD VENIPUNCTURE: CPT

## 2020-02-29 PROCEDURE — 74011250637 HC RX REV CODE- 250/637: Performed by: SURGERY

## 2020-02-29 PROCEDURE — 74011250637 HC RX REV CODE- 250/637: Performed by: HOSPITALIST

## 2020-02-29 PROCEDURE — 65270000029 HC RM PRIVATE

## 2020-02-29 PROCEDURE — 74011250637 HC RX REV CODE- 250/637: Performed by: NURSE PRACTITIONER

## 2020-02-29 PROCEDURE — 80202 ASSAY OF VANCOMYCIN: CPT

## 2020-02-29 PROCEDURE — 74011250636 HC RX REV CODE- 250/636: Performed by: SURGERY

## 2020-02-29 RX ADMIN — Medication 10 ML: at 21:54

## 2020-02-29 RX ADMIN — PANTOPRAZOLE SODIUM 40 MG: 40 TABLET, DELAYED RELEASE ORAL at 09:33

## 2020-02-29 RX ADMIN — HYDROMORPHONE HYDROCHLORIDE 0.5 MG: 1 INJECTION, SOLUTION INTRAMUSCULAR; INTRAVENOUS; SUBCUTANEOUS at 11:01

## 2020-02-29 RX ADMIN — TIZANIDINE 4 MG: 4 TABLET ORAL at 11:27

## 2020-02-29 RX ADMIN — HYDROMORPHONE HYDROCHLORIDE 0.5 MG: 1 INJECTION, SOLUTION INTRAMUSCULAR; INTRAVENOUS; SUBCUTANEOUS at 12:59

## 2020-02-29 RX ADMIN — ENOXAPARIN SODIUM 40 MG: 40 INJECTION SUBCUTANEOUS at 05:36

## 2020-02-29 RX ADMIN — HYDROMORPHONE HYDROCHLORIDE 0.5 MG: 1 INJECTION, SOLUTION INTRAMUSCULAR; INTRAVENOUS; SUBCUTANEOUS at 16:05

## 2020-02-29 RX ADMIN — DEXTROSE MONOHYDRATE, SODIUM CHLORIDE, AND POTASSIUM CHLORIDE 75 ML/HR: 50; 4.5; 1.49 INJECTION, SOLUTION INTRAVENOUS at 11:05

## 2020-02-29 RX ADMIN — MIRTAZAPINE 30 MG: 15 TABLET, FILM COATED ORAL at 18:09

## 2020-02-29 RX ADMIN — HYDROCODONE BITARTRATE AND ACETAMINOPHEN 2 TABLET: 5; 325 TABLET ORAL at 09:32

## 2020-02-29 RX ADMIN — Medication 1 CAPSULE: at 09:33

## 2020-02-29 RX ADMIN — HYDROMORPHONE HYDROCHLORIDE 0.5 MG: 1 INJECTION, SOLUTION INTRAMUSCULAR; INTRAVENOUS; SUBCUTANEOUS at 04:53

## 2020-02-29 RX ADMIN — ENOXAPARIN SODIUM 40 MG: 40 INJECTION SUBCUTANEOUS at 17:29

## 2020-02-29 RX ADMIN — KETOROLAC TROMETHAMINE 15 MG: 30 INJECTION, SOLUTION INTRAMUSCULAR at 09:55

## 2020-02-29 RX ADMIN — HYDROMORPHONE HYDROCHLORIDE 0.5 MG: 1 INJECTION, SOLUTION INTRAMUSCULAR; INTRAVENOUS; SUBCUTANEOUS at 00:27

## 2020-02-29 RX ADMIN — VANCOMYCIN HYDROCHLORIDE 1000 MG: 1 INJECTION, POWDER, LYOPHILIZED, FOR SOLUTION INTRAVENOUS at 09:59

## 2020-02-29 RX ADMIN — HYDROCODONE BITARTRATE AND ACETAMINOPHEN 2 TABLET: 5; 325 TABLET ORAL at 17:30

## 2020-02-29 RX ADMIN — HYDROCODONE BITARTRATE AND ACETAMINOPHEN 2 TABLET: 5; 325 TABLET ORAL at 03:13

## 2020-02-29 RX ADMIN — FAMOTIDINE 20 MG: 20 TABLET ORAL at 09:33

## 2020-02-29 RX ADMIN — HYDROCODONE BITARTRATE AND ACETAMINOPHEN 2 TABLET: 5; 325 TABLET ORAL at 21:55

## 2020-02-29 RX ADMIN — FAMOTIDINE 20 MG: 20 TABLET ORAL at 17:30

## 2020-02-29 RX ADMIN — HYDROMORPHONE HYDROCHLORIDE 0.5 MG: 1 INJECTION, SOLUTION INTRAMUSCULAR; INTRAVENOUS; SUBCUTANEOUS at 07:44

## 2020-02-29 RX ADMIN — ALPRAZOLAM 1 MG: 0.5 TABLET ORAL at 21:55

## 2020-02-29 RX ADMIN — ESTRADIOL 1 MG: 1 TABLET ORAL at 09:33

## 2020-02-29 RX ADMIN — HYDROMORPHONE HYDROCHLORIDE 0.5 MG: 1 INJECTION, SOLUTION INTRAMUSCULAR; INTRAVENOUS; SUBCUTANEOUS at 19:06

## 2020-02-29 RX ADMIN — DULOXETINE 120 MG: 30 CAPSULE, DELAYED RELEASE ORAL at 09:33

## 2020-02-29 RX ADMIN — Medication 10 ML: at 13:00

## 2020-02-29 RX ADMIN — Medication 10 ML: at 05:37

## 2020-02-29 RX ADMIN — VANCOMYCIN HYDROCHLORIDE 1000 MG: 1 INJECTION, POWDER, LYOPHILIZED, FOR SOLUTION INTRAVENOUS at 03:05

## 2020-02-29 NOTE — ROUTINE PROCESS
Bedside shift change report given to Daquan Martines (oncoming nurse) by Benjamin GARCIA RN (offgoing nurse). Report included the following information SBAR, Kardex and MAR.

## 2020-02-29 NOTE — PROGRESS NOTES
Pharmacy Automatic Renal Dosing Protocol - Antimicrobials    Indication for Antimicrobials: SSTI - axillary abscess; s/p ID     Current Regimen of Each Antimicrobial:  Vancomycin 1gm IV q8h (Start Date ; Day 6)    Previous Antimicrobial Therapy:  Had RX for doxycycline and cephalexin for boil    Vancomycin Trough Goal Level:  10-15 (AUC: 400 - 600 mg/hr/Liter/day)     Date Dose & Interval Measured (mcg/mL) Extrapolated (mcg/mL)    09:59 1500 mg Q16H 3.4 3.2    09:42 1000mg IV Q8h 33.2 33.96           Significant Cultures:   : Wound abscess = MRSA final  : Wound = MRSA final  : Wound - Anaerobic = pending    Radiology / Imaging results: (X-ray, CT scan or MRI):   : US Right Arm: 2.8 cm phlegmon is surrounded by soft tissue inflammation, likely cellulitis. No  drainable abscess at this time. Paralysis, amputations, malnutrition: not noted    Labs:  Recent Labs     20  0258 20  0449 20  0437   CREA 0.71 0.78 0.73   BUN 11 10 9   WBC  --   --  9.7     Temp (24hrs), Av.2 °F (36.8 °C), Min:98 °F (36.7 °C), Max:98.4 °F (36.9 °C)    Creatinine Clearance (mL/min) or Dialysis:   Estimated Creatinine Clearance: 123.5 mL/min (based on SCr of 0.71 mg/dL). Estimated Creatinine Clearance (using IBW):90.6 mL/min    Impression/Plan:   SCr stable, afebrile, WBC < 10k on   Vancomycin trough supratherapeutic  am thus stopped Vancomycin dose after approx 500mg (half of the dose infused)  Adjusted Vancomycin to 750mg (7.5mg/kg) IV q12h with next dose 3/1 02:00  Ordered Vancomycin trough before 3/1 2am dose to assure trough is within 15-20 range as projected  Daily BMP  Antimicrobial stop date 7 days (Vanc consult)     Pharmacy will follow daily and adjust medications as appropriate for renal function and/or serum levels.     Thank you,  Nikki Durant, [facility]

## 2020-02-29 NOTE — PROGRESS NOTES
1930 - Bedside shift change report given to Yojana Nixon RN (oncoming nurse) by Jeffrey Solorio RN (offgoing nurse). Report included the following information SBAR, Kardex, Procedure Summary, Intake/Output, MAR, Accordion, Recent Results and Med Rec Status.

## 2020-02-29 NOTE — PROGRESS NOTES
Bedside shift change report given to Moni (oncoming nurse) by Rupinder Galindo (offgoing nurse). Report included the following information SBAR, Kardex, Procedure Summary, Intake/Output and MAR.

## 2020-02-29 NOTE — PROGRESS NOTES
Admit Date: 2020    POD 2 Days Post-Op    Procedure:  Procedure(s) with comments:  RIGHT AXILLARY ABSCESS INCISION AND DRAINAGE - Right axilla    Subjective:     Patient has no new complaints. Less pain, still some warmth in upper medial arm    Objective:     Blood pressure 121/78, pulse 62, temperature 98.3 °F (36.8 °C), resp. rate 18, height 5' 3\" (1.6 m), weight 220 lb (99.8 kg), last menstrual period 2016, SpO2 97 %. Temp (24hrs), Av.2 °F (36.8 °C), Min:98 °F (36.7 °C), Max:98.4 °F (36.9 °C)      Physical Exam:  GENERAL: alert, cooperative, no distress, appears stated age, LUNG: clear to auscultation bilaterally, HEART: regular rate and rhythm,  EXTREMITIES:  Erythema mostly resolved, wound clean with scant drainage, penrose in place    Labs:   Recent Results (from the past 24 hour(s))   METABOLIC PANEL, BASIC    Collection Time: 20  2:58 AM   Result Value Ref Range    Sodium 143 136 - 145 mmol/L    Potassium 3.6 3.5 - 5.1 mmol/L    Chloride 113 (H) 97 - 108 mmol/L    CO2 27 21 - 32 mmol/L    Anion gap 3 (L) 5 - 15 mmol/L    Glucose 107 (H) 65 - 100 mg/dL    BUN 11 6 - 20 MG/DL    Creatinine 0.71 0.55 - 1.02 MG/DL    BUN/Creatinine ratio 15 12 - 20      GFR est AA >60 >60 ml/min/1.73m2    GFR est non-AA >60 >60 ml/min/1.73m2    Calcium 8.1 (L) 8.5 - 10.1 MG/DL   VANCOMYCIN, TROUGH    Collection Time: 20  9:42 AM   Result Value Ref Range    Vancomycin,trough 33.2 (HH) 5.0 - 10.0 ug/mL    Reported dose date: 2020      Reported dose time: 0200      Reported dose: 1GM IV Q8H UNITS       Data Review images and reports reviewed    Assessment:     Active Problems:    Cellulitis (2020)        Plan/Recommendations/Medical Decision Making:     Continue present treatment   MRSA on culture  Continue vanc      Hernandez Gomez MD, [facility]

## 2020-03-01 LAB
ANION GAP SERPL CALC-SCNC: 3 MMOL/L (ref 5–15)
BUN SERPL-MCNC: 11 MG/DL (ref 6–20)
BUN/CREAT SERPL: 14 (ref 12–20)
CALCIUM SERPL-MCNC: 8.7 MG/DL (ref 8.5–10.1)
CHLORIDE SERPL-SCNC: 111 MMOL/L (ref 97–108)
CO2 SERPL-SCNC: 28 MMOL/L (ref 21–32)
CREAT SERPL-MCNC: 0.78 MG/DL (ref 0.55–1.02)
DATE LAST DOSE: NORMAL
GLUCOSE SERPL-MCNC: 101 MG/DL (ref 65–100)
POTASSIUM SERPL-SCNC: 3.9 MMOL/L (ref 3.5–5.1)
REPORTED DOSE,DOSE: NORMAL UNITS
REPORTED DOSE/TIME,TMG: 1000
SODIUM SERPL-SCNC: 142 MMOL/L (ref 136–145)
VANCOMYCIN TROUGH SERPL-MCNC: 6 UG/ML (ref 5–10)

## 2020-03-01 PROCEDURE — 74011250637 HC RX REV CODE- 250/637: Performed by: NURSE PRACTITIONER

## 2020-03-01 PROCEDURE — 80048 BASIC METABOLIC PNL TOTAL CA: CPT

## 2020-03-01 PROCEDURE — 74011250636 HC RX REV CODE- 250/636: Performed by: NURSE PRACTITIONER

## 2020-03-01 PROCEDURE — 65270000029 HC RM PRIVATE

## 2020-03-01 PROCEDURE — 94760 N-INVAS EAR/PLS OXIMETRY 1: CPT

## 2020-03-01 PROCEDURE — 74011250636 HC RX REV CODE- 250/636: Performed by: SURGERY

## 2020-03-01 PROCEDURE — 74011250636 HC RX REV CODE- 250/636: Performed by: HOSPITALIST

## 2020-03-01 PROCEDURE — 36415 COLL VENOUS BLD VENIPUNCTURE: CPT

## 2020-03-01 PROCEDURE — 80202 ASSAY OF VANCOMYCIN: CPT

## 2020-03-01 PROCEDURE — 74011250637 HC RX REV CODE- 250/637: Performed by: SURGERY

## 2020-03-01 RX ORDER — ALPRAZOLAM 0.5 MG/1
1 TABLET ORAL
Status: DISCONTINUED | OUTPATIENT
Start: 2020-03-01 | End: 2020-03-02 | Stop reason: HOSPADM

## 2020-03-01 RX ORDER — KETOROLAC TROMETHAMINE 30 MG/ML
30 INJECTION, SOLUTION INTRAMUSCULAR; INTRAVENOUS EVERY 6 HOURS
Status: DISCONTINUED | OUTPATIENT
Start: 2020-03-01 | End: 2020-03-02 | Stop reason: HOSPADM

## 2020-03-01 RX ADMIN — HYDROMORPHONE HYDROCHLORIDE 0.5 MG: 1 INJECTION, SOLUTION INTRAMUSCULAR; INTRAVENOUS; SUBCUTANEOUS at 19:11

## 2020-03-01 RX ADMIN — HYDROCODONE BITARTRATE AND ACETAMINOPHEN 2 TABLET: 5; 325 TABLET ORAL at 20:41

## 2020-03-01 RX ADMIN — HYDROCODONE BITARTRATE AND ACETAMINOPHEN 2 TABLET: 5; 325 TABLET ORAL at 13:57

## 2020-03-01 RX ADMIN — HYDROMORPHONE HYDROCHLORIDE 0.5 MG: 1 INJECTION, SOLUTION INTRAMUSCULAR; INTRAVENOUS; SUBCUTANEOUS at 14:43

## 2020-03-01 RX ADMIN — Medication 10 ML: at 13:04

## 2020-03-01 RX ADMIN — ONDANSETRON 4 MG: 2 INJECTION INTRAMUSCULAR; INTRAVENOUS at 23:10

## 2020-03-01 RX ADMIN — FAMOTIDINE 20 MG: 20 TABLET ORAL at 17:08

## 2020-03-01 RX ADMIN — ENOXAPARIN SODIUM 40 MG: 40 INJECTION SUBCUTANEOUS at 17:07

## 2020-03-01 RX ADMIN — VANCOMYCIN HYDROCHLORIDE 1000 MG: 1 INJECTION, POWDER, LYOPHILIZED, FOR SOLUTION INTRAVENOUS at 20:41

## 2020-03-01 RX ADMIN — Medication 10 ML: at 05:53

## 2020-03-01 RX ADMIN — ENOXAPARIN SODIUM 40 MG: 40 INJECTION SUBCUTANEOUS at 05:53

## 2020-03-01 RX ADMIN — HYDROMORPHONE HYDROCHLORIDE 0.5 MG: 1 INJECTION, SOLUTION INTRAMUSCULAR; INTRAVENOUS; SUBCUTANEOUS at 10:33

## 2020-03-01 RX ADMIN — KETOROLAC TROMETHAMINE 30 MG: 30 INJECTION, SOLUTION INTRAMUSCULAR at 16:09

## 2020-03-01 RX ADMIN — PANTOPRAZOLE SODIUM 40 MG: 40 TABLET, DELAYED RELEASE ORAL at 08:03

## 2020-03-01 RX ADMIN — HYDROMORPHONE HYDROCHLORIDE 0.5 MG: 1 INJECTION, SOLUTION INTRAMUSCULAR; INTRAVENOUS; SUBCUTANEOUS at 12:41

## 2020-03-01 RX ADMIN — KETOROLAC TROMETHAMINE 30 MG: 30 INJECTION, SOLUTION INTRAMUSCULAR at 22:14

## 2020-03-01 RX ADMIN — DULOXETINE 120 MG: 30 CAPSULE, DELAYED RELEASE ORAL at 08:03

## 2020-03-01 RX ADMIN — MIRTAZAPINE 30 MG: 15 TABLET, FILM COATED ORAL at 18:02

## 2020-03-01 RX ADMIN — HYDROCODONE BITARTRATE AND ACETAMINOPHEN 2 TABLET: 5; 325 TABLET ORAL at 01:46

## 2020-03-01 RX ADMIN — ESTRADIOL 1 MG: 1 TABLET ORAL at 08:11

## 2020-03-01 RX ADMIN — VANCOMYCIN HYDROCHLORIDE 750 MG: 750 INJECTION, POWDER, LYOPHILIZED, FOR SOLUTION INTRAVENOUS at 02:02

## 2020-03-01 RX ADMIN — HYDROCODONE BITARTRATE AND ACETAMINOPHEN 2 TABLET: 5; 325 TABLET ORAL at 05:53

## 2020-03-01 RX ADMIN — VANCOMYCIN HYDROCHLORIDE 750 MG: 750 INJECTION, POWDER, LYOPHILIZED, FOR SOLUTION INTRAVENOUS at 13:04

## 2020-03-01 RX ADMIN — HYDROCODONE BITARTRATE AND ACETAMINOPHEN 2 TABLET: 5; 325 TABLET ORAL at 09:44

## 2020-03-01 RX ADMIN — HYDROMORPHONE HYDROCHLORIDE 0.5 MG: 1 INJECTION, SOLUTION INTRAMUSCULAR; INTRAVENOUS; SUBCUTANEOUS at 17:15

## 2020-03-01 RX ADMIN — Medication 10 ML: at 22:14

## 2020-03-01 RX ADMIN — HYDROMORPHONE HYDROCHLORIDE 0.5 MG: 1 INJECTION, SOLUTION INTRAMUSCULAR; INTRAVENOUS; SUBCUTANEOUS at 04:04

## 2020-03-01 RX ADMIN — Medication 1 CAPSULE: at 08:03

## 2020-03-01 RX ADMIN — HYDROMORPHONE HYDROCHLORIDE 0.5 MG: 1 INJECTION, SOLUTION INTRAMUSCULAR; INTRAVENOUS; SUBCUTANEOUS at 23:07

## 2020-03-01 RX ADMIN — FAMOTIDINE 20 MG: 20 TABLET ORAL at 08:03

## 2020-03-01 RX ADMIN — HYDROMORPHONE HYDROCHLORIDE 0.5 MG: 1 INJECTION, SOLUTION INTRAMUSCULAR; INTRAVENOUS; SUBCUTANEOUS at 08:03

## 2020-03-01 NOTE — PROGRESS NOTES
MD on call for Dr. Yolanda Theodore paged to D/C fluids due to 3+ pitting edema in extremities. Call back from Dr. Slim Kirk, verbal orders received with readback to D/C fluids and saline lock.

## 2020-03-01 NOTE — PROGRESS NOTES
Pharmacy Automatic Renal Dosing Protocol - Antimicrobials    Indication for Antimicrobials: SSTI - axillary abscess; s/p ID     Current Regimen of Each Antimicrobial:  Vancomycin 750mg IV q12h (Start Date ; Day 7)    Previous Antimicrobial Therapy:  Had RX for doxycycline and cephalexin for boil    Vancomycin Trough Goal Level:  10-15 (AUC: 400 - 600 mg/hr/Liter/day)     Date Dose & Interval Measured (mcg/mL) Extrapolated (mcg/mL)    09:59 1500 mg Q16H 3.4 3.2    09:42 1000mg IV Q8h 33.2 33.96   3/1 01:38 750mg IV q12h  6.0 assumed Css level   5.7           Significant Cultures:   : Wound abscess = MRSA final  : Wound = MRSA final  : Wound - Anaerobic = no isolates    Radiology / Imaging results: (X-ray, CT scan or MRI):   : US Right Arm: 2.8 cm phlegmon is surrounded by soft tissue inflammation, likely cellulitis. No  drainable abscess at this time. Paralysis, amputations, malnutrition: not noted    Labs:  Recent Labs     20  0138 20  0258 20  0449   CREA 0.78 0.71 0.78   BUN 11 11 10     Temp (24hrs), Av.3 °F (36.8 °C), Min:98.2 °F (36.8 °C), Max:98.4 °F (36.9 °C)    Creatinine Clearance (mL/min) or Dialysis:   Estimated Creatinine Clearance: 112.4 mL/min (based on SCr of 0.78 mg/dL). Estimated Creatinine Clearance (using IBW):82.5 mL/min    Impression/Plan:   SCr stable, afebrile, WBC < 10k on   Vancomycin trough reported supratherapeutic  am thus stopped Vancomycin dose after approx 500mg (half of the dose infused);  Level suspect thus repeated level 3 2am when projected level would be within goal.  Perhaps the  level was drawn with residual Vancomycin in line because repeat level was subtherapeutic with recaulated regimen needed to be 1000mg IV q8h for projected trough approx 14.5 at Css. Reordered Vancomycin 1000mg IV q8h x 2 doses to complete 7 days regimen per consult. Give next dose of Vancomycin 3/1 20:00.     Daily BMP  Antimicrobial stop date 7 days (Vanc consult) - last dose 3/2     Pharmacy will follow daily and adjust medications as appropriate for renal function and/or serum levels.     Thank you,  Shannan Angeles, [facility]

## 2020-03-01 NOTE — PROGRESS NOTES
Wound care completed, patient tolerated well but did have complaints of pain. Wound appeared to have some small purulent drainage coming from distal incision site.

## 2020-03-01 NOTE — PROGRESS NOTES
Messaged Dr Mingo Carney about patient's increased swelling in arm and pain.  Order received for toradol Q6

## 2020-03-01 NOTE — PROGRESS NOTES
1930 - Bedside shift change report given to Rehan Biggs RN (oncoming nurse) by Zurdo Flores RN (offgoing nurse). Report included the following information SBAR, Kardex, Procedure Summary, Intake/Output, MAR, Accordion, Recent Results and Med Rec Status.

## 2020-03-01 NOTE — PROGRESS NOTES
Bedside shift change report given to Moni (oncoming nurse) by Jasmin (offgoing nurse). Report included the following information SBAR, Kardex, Procedure Summary, Intake/Output and MAR.

## 2020-03-01 NOTE — PROGRESS NOTES
Admit Date: 2020    POD 3 Days Post-Op    Procedure:  Procedure(s) with comments:  RIGHT AXILLARY ABSCESS INCISION AND DRAINAGE - Right axilla    Subjective:     Patient very concerned about right upper arm swelling. Objective:     Blood pressure 143/86, pulse (!) 59, temperature 98.4 °F (36.9 °C), resp. rate 18, height 5' 3\" (1.6 m), weight 220 lb (99.8 kg), last menstrual period 2016, SpO2 100 %. Temp (24hrs), Av.3 °F (36.8 °C), Min:98.2 °F (36.8 °C), Max:98.4 °F (36.9 °C)      Physical Exam:  GENERAL: alert, cooperative, no distress, appears stated age, LUNG: clear to auscultation bilaterally, HEART: regular rate and rhythm,  EXTREMITIES:  Erythema mostly resolved, wound clean with scant drainage, penrose in place, dependent edema upper arm without pitting. Overall looks very good    Labs:   Recent Results (from the past 24 hour(s))   METABOLIC PANEL, BASIC    Collection Time: 20  1:38 AM   Result Value Ref Range    Sodium 142 136 - 145 mmol/L    Potassium 3.9 3.5 - 5.1 mmol/L    Chloride 111 (H) 97 - 108 mmol/L    CO2 28 21 - 32 mmol/L    Anion gap 3 (L) 5 - 15 mmol/L    Glucose 101 (H) 65 - 100 mg/dL    BUN 11 6 - 20 MG/DL    Creatinine 0.78 0.55 - 1.02 MG/DL    BUN/Creatinine ratio 14 12 - 20      GFR est AA >60 >60 ml/min/1.73m2    GFR est non-AA >60 >60 ml/min/1.73m2    Calcium 8.7 8.5 - 10.1 MG/DL   VANCOMYCIN, TROUGH    Collection Time: 20  1:38 AM   Result Value Ref Range    Vancomycin,trough 6.0 5.0 - 10.0 ug/mL    Reported dose date: 2020      Reported dose time: 1000      Reported dose: 500MG HALF OF 1000MG UNITS       Data Review images and reports reviewed    Assessment:     Active Problems:    Cellulitis (2020)        Plan/Recommendations/Medical Decision Making:     Continue present treatment   MRSA on culture  Continue vanc  Wound care ongoing. heplocked iv fluids last night        Hernandez Arzate MD, [facility]

## 2020-03-02 VITALS
HEIGHT: 63 IN | DIASTOLIC BLOOD PRESSURE: 67 MMHG | WEIGHT: 220 LBS | SYSTOLIC BLOOD PRESSURE: 113 MMHG | TEMPERATURE: 98.3 F | RESPIRATION RATE: 14 BRPM | HEART RATE: 73 BPM | OXYGEN SATURATION: 95 % | BODY MASS INDEX: 38.98 KG/M2

## 2020-03-02 LAB
ANION GAP SERPL CALC-SCNC: 5 MMOL/L (ref 5–15)
BUN SERPL-MCNC: 13 MG/DL (ref 6–20)
BUN/CREAT SERPL: 18 (ref 12–20)
CALCIUM SERPL-MCNC: 8.7 MG/DL (ref 8.5–10.1)
CHLORIDE SERPL-SCNC: 107 MMOL/L (ref 97–108)
CO2 SERPL-SCNC: 28 MMOL/L (ref 21–32)
CREAT SERPL-MCNC: 0.72 MG/DL (ref 0.55–1.02)
GLUCOSE SERPL-MCNC: 118 MG/DL (ref 65–100)
POTASSIUM SERPL-SCNC: 3.7 MMOL/L (ref 3.5–5.1)
SODIUM SERPL-SCNC: 140 MMOL/L (ref 136–145)

## 2020-03-02 PROCEDURE — 74011250636 HC RX REV CODE- 250/636: Performed by: NURSE PRACTITIONER

## 2020-03-02 PROCEDURE — 80048 BASIC METABOLIC PNL TOTAL CA: CPT

## 2020-03-02 PROCEDURE — 74011250636 HC RX REV CODE- 250/636: Performed by: HOSPITALIST

## 2020-03-02 PROCEDURE — 74011250637 HC RX REV CODE- 250/637: Performed by: SURGERY

## 2020-03-02 PROCEDURE — 36415 COLL VENOUS BLD VENIPUNCTURE: CPT

## 2020-03-02 PROCEDURE — 74011250637 HC RX REV CODE- 250/637: Performed by: NURSE PRACTITIONER

## 2020-03-02 PROCEDURE — 74011250636 HC RX REV CODE- 250/636: Performed by: SURGERY

## 2020-03-02 PROCEDURE — 94760 N-INVAS EAR/PLS OXIMETRY 1: CPT

## 2020-03-02 RX ORDER — SODIUM CHLORIDE 0.9 % (FLUSH) 0.9 %
SYRINGE (ML) INJECTION
Status: COMPLETED
Start: 2020-03-02 | End: 2020-03-02

## 2020-03-02 RX ADMIN — ESTRADIOL 1 MG: 1 TABLET ORAL at 08:51

## 2020-03-02 RX ADMIN — HYDROCODONE BITARTRATE AND ACETAMINOPHEN 1 TABLET: 5; 325 TABLET ORAL at 13:24

## 2020-03-02 RX ADMIN — KETOROLAC TROMETHAMINE 30 MG: 30 INJECTION, SOLUTION INTRAMUSCULAR at 04:46

## 2020-03-02 RX ADMIN — HYDROMORPHONE HYDROCHLORIDE 0.5 MG: 1 INJECTION, SOLUTION INTRAMUSCULAR; INTRAVENOUS; SUBCUTANEOUS at 11:54

## 2020-03-02 RX ADMIN — Medication 10 ML: at 13:25

## 2020-03-02 RX ADMIN — VANCOMYCIN HYDROCHLORIDE 1000 MG: 1 INJECTION, POWDER, LYOPHILIZED, FOR SOLUTION INTRAVENOUS at 04:39

## 2020-03-02 RX ADMIN — HYDROMORPHONE HYDROCHLORIDE 0.5 MG: 1 INJECTION, SOLUTION INTRAMUSCULAR; INTRAVENOUS; SUBCUTANEOUS at 01:16

## 2020-03-02 RX ADMIN — FAMOTIDINE 20 MG: 20 TABLET ORAL at 08:51

## 2020-03-02 RX ADMIN — ONDANSETRON 4 MG: 2 INJECTION INTRAMUSCULAR; INTRAVENOUS at 11:54

## 2020-03-02 RX ADMIN — DULOXETINE 120 MG: 30 CAPSULE, DELAYED RELEASE ORAL at 08:51

## 2020-03-02 RX ADMIN — ENOXAPARIN SODIUM 40 MG: 40 INJECTION SUBCUTANEOUS at 05:06

## 2020-03-02 RX ADMIN — Medication 1 CAPSULE: at 08:50

## 2020-03-02 RX ADMIN — Medication 10 ML: at 05:11

## 2020-03-02 RX ADMIN — HYDROMORPHONE HYDROCHLORIDE 0.5 MG: 1 INJECTION, SOLUTION INTRAMUSCULAR; INTRAVENOUS; SUBCUTANEOUS at 14:36

## 2020-03-02 RX ADMIN — PANTOPRAZOLE SODIUM 40 MG: 40 TABLET, DELAYED RELEASE ORAL at 08:51

## 2020-03-02 RX ADMIN — ONDANSETRON 4 MG: 2 INJECTION INTRAMUSCULAR; INTRAVENOUS at 05:06

## 2020-03-02 RX ADMIN — HYDROCODONE BITARTRATE AND ACETAMINOPHEN 2 TABLET: 5; 325 TABLET ORAL at 02:53

## 2020-03-02 RX ADMIN — KETOROLAC TROMETHAMINE 30 MG: 30 INJECTION, SOLUTION INTRAMUSCULAR at 11:17

## 2020-03-02 RX ADMIN — HYDROCODONE BITARTRATE AND ACETAMINOPHEN 1 TABLET: 5; 325 TABLET ORAL at 08:51

## 2020-03-02 NOTE — PROGRESS NOTES
Wound care completed, patient tolerated well. Small purulent drainage noted on gauze and appearing to come from distal incision site with penrose drain. Redness and swelling much improved from last night.

## 2020-03-02 NOTE — DISCHARGE SUMMARY
Physician Discharge Summary     Patient ID:  Rhonda Connolly  [id number]  32 y.o.  1985    Admit Date:  2/24/2020    Discharge Date:  03/02/20    Admission Diagnoses:  Cellulitis [L03.90]  03/02/20    Discharge Diagnoses: Active Problems:    Cellulitis (2/24/2020)         Surgical Procedure:  Procedure(s) with comments:  RIGHT AXILLARY ABSCESS INCISION AND DRAINAGE - Right axilla      Admission Condition:  Fair    Discharge Condition:  Good    Hospital Problems:    Active Hospital Problems    Diagnosis Date Noted    Cellulitis 02/24/2020       Hospital Course:   Admitted for IV abx after office I&D. Required further I&D a few days later. Now improved. Consults:  None    Disposition:  home    Physical Exam:  Arm with decreasing erythema. Penrose in place. Visit Vitals  /67   Pulse 73   Temp 98.3 °F (36.8 °C)   Resp 14   Ht 5' 3\" (1.6 m)   Wt 99.8 kg (220 lb)   SpO2 95%   BMI 38.97 kg/m²       Discharge medications:    Current Discharge Medication List      START taking these medications    Details   HYDROcodone-acetaminophen (NORCO) 5-325 mg per tablet Take 1-2 Tabs by mouth every six (6) hours as needed for Pain for up to 5 days. Max Daily Amount: 8 Tabs. Qty: 30 Tab, Refills: 0    Associated Diagnoses: Abscess      ibuprofen (MOTRIN) 600 mg tablet Take 1 Tab by mouth three (3) times daily (with meals). Qty: 21 Tab, Refills: 0         CONTINUE these medications which have NOT CHANGED    Details   !! mirtazapine (REMERON) 30 mg tablet TAKE 1 TABLET BY MOUTH AT BEDTIME  Qty: 30 Tab, Refills: 5      enalapril (VASOTEC) 5 mg tablet TAKE 1 TABLET BY MOUTH DAILY AS NEEDED FOR HYPERTENSION  Qty: 30 Tab, Refills: 5      doxycycline (ADOXA) 100 mg tablet Take 1 Tab by mouth two (2) times a day for 10 days. Qty: 20 Tab, Refills: 0      olmesartan (BENICAR) 5 mg tablet Take 1 Tab by mouth daily.  D/c ACEI  Reports nausea  Qty: 30 Tab, Refills: 2      ondansetron hcl (ZOFRAN) 4 mg tablet TAKE 1 TABLET BY MOUTH EVERY 8 HOURS AS NEEDED FOR NAUSEA  Qty: 20 Tab, Refills: 0      promethazine (PHENERGAN) 25 mg tablet TAKE 1 TABLET BY MOUTH EVERY 6 HOURS IF NEEDED FOR NAUSEA  Qty: 30 Tab, Refills: 0      albuterol (PROVENTIL HFA, VENTOLIN HFA, PROAIR HFA) 90 mcg/actuation inhaler Take 1 Puff by inhalation every six (6) hours as needed for Wheezing. Qty: 1 Inhaler, Refills: 0      naloxone (NARCAN) 4 mg/actuation nasal spray Use 1 spray intranasally, then discard. Repeat with new spray every 2 min as needed for opioid overdose symptoms, alternating nostrils. Qty: 1 Each, Refills: 0      L.acidoph & parac-S.therm-Bifido (BELGICA Q2/RISAQUAD-2) 16 billion cell cap cap Take 1 Cap by mouth daily. Qty: 30 Cap, Refills: 1      furosemide (LASIX) 20 mg tablet PRN  Refills: 0      tiZANidine (ZANAFLEX) 4 mg tablet Refills: 0      !! mirtazapine (REMERON) 30 mg tablet Take 30 mg by mouth daily. doxycycline (MONODOX) 100 mg capsule Take 100 mg by mouth daily. Refills: 0      estradiol (ESTRACE) 1 mg tablet Take 1 mg by mouth daily. Refills: 0      meclizine (ANTIVERT) 12.5 mg tablet take 1 tablet by mouth three times a day if needed for VERTIGO FOR UP TO 10 DAYS  Qty: 30 Tab, Refills: 1      ALPRAZolam (XANAX) 1 mg tablet take 1 tablet by mouth NIGHTLY maximum daily dose of 1 tablet  Qty: 30 Tab, Refills: 0    Associated Diagnoses: Primary insomnia; Anxiety      indomethacin (INDOCIN) 25 mg capsule PRN      DULoxetine (CYMBALTA) 60 mg capsule Take 120 mg by mouth daily.   Refills: 1      pantoprazole (PROTONIX) 40 mg tablet take 1 tablet by mouth at bedtime  Refills: 0      codeine-butalbital-acetaminophen-caffeine (FIORICET WITH CODEINE) -63-30 mg capsule take 2 capsules by mouth every 6 hours if needed for MIGRAINE HEADACHE  Qty: 90 Cap, Refills: 0    Associated Diagnoses: Other migraine without status migrainosus, not intractable      rizatriptan (MAXALT) 10 mg tablet Take 10 mg by mouth once as needed for Migraine. May repeat in 2 hours if needed      belimumab (BENLYSTA) 400 mg solr injection by IntraVENous route. !! - Potential duplicate medications found. Please discuss with provider. STOP taking these medications       cephALEXin (KEFLEX) 500 mg capsule Comments:   Reason for Stopping: Follow-up: 1 week(s) in my office. See discharge instructions for further details.      Signed:  Maryuri Paz MD  3/2/2020  1:23 PM

## 2020-03-02 NOTE — PROGRESS NOTES
Discharge paperwork reviewed with patient. Any questions that were asked were answered appropriately. Instructions were placed in [facility] folder, dressing materials were provided for patient. PICC line removed, pressure applied for ten minutes and transparent dressing placed over gauze. Dressing change was completed. Patient is awaiting transportation.

## 2020-03-02 NOTE — PROGRESS NOTES
Bedside shift change report given to Omar Duran (oncoming nurse) by Nida Yanez (offgoing nurse). Report included the following information SBAR, Kardex, Procedure Summary, Intake/Output, MAR and Recent Results.

## 2020-03-02 NOTE — PROGRESS NOTES
Initial Nutrition Assessment:    INTERVENTIONS/RECOMMENDATIONS:   · Continue regular diet    ASSESSMENT:   Chart reviewed, medically noted for s/p RIGHT AXILLARY ABSCESS INCISION AND DRAINAGE and PMH shown below. Assessing pt due to LOS. Pt reports good appetite and eating well. We discussed the role that nutrition plays in wound healing, specifically focusing on protein sources at each meal as well as nutrition supplements to help meet protein needs.  She declined protein supplements at this time as she is consuming protein sources with every meal.     Past Medical History:   Diagnosis Date    Abnormal CT of the abdomen 11/8/2012    Asthma     Autoimmune disease (HCC)     lupus    C. difficile diarrhea     Cervical prolapse     Dehydration     Gastrointestinal disorder     gerd, twisted colon, IBS    GERD (gastroesophageal reflux disease)     Headache(784.0)     Lupus (HCC)     Morbid obesity (HCC)     Nausea & vomiting     Neurological disorder     cluster headaches    other 2006, 2009    ovarian cyst removal    Other ill-defined conditions(799.89)     Worsening headaches        Diet Order: Regular  % Eaten:    Patient Vitals for the past 72 hrs:   % Diet Eaten   02/28/20 1500 100 %     Pertinent Medications: [x]Reviewed: estrace, pepcid, remeron, zofran, PPI,   Pertinent Labs: [x]Reviewed:   Food Allergies: []NKFA  [x]Other (mushroom)   Last BM: 2/29  Edema:        [x]RUE 1+  [x]LUE 1+  [x]RLE 2+  [x]LLE  2+    Pressure Injury:  S/p I&D axilla    [] Stage I   [] Stage II   [] Stage III   [] Stage IV      Wt Readings from Last 30 Encounters:   02/29/20 99.8 kg (220 lb)   02/24/20 109.8 kg (242 lb)   02/24/20 110.5 kg (243 lb 9.6 oz)   02/24/20 99.8 kg (220 lb)   02/22/20 99.8 kg (220 lb)   02/19/20 109.2 kg (240 lb 12.8 oz)   01/21/20 110.2 kg (243 lb)   11/28/19 107.7 kg (237 lb 7 oz)   11/18/19 108.4 kg (239 lb)   11/05/19 108 kg (238 lb)   10/24/19 108.4 kg (239 lb)   09/20/19 109.8 kg (242 lb) 08/13/19 103 kg (227 lb)   04/01/19 103 kg (227 lb)   03/14/19 101.2 kg (223 lb)   03/04/19 102.1 kg (225 lb)   02/22/19 102.1 kg (225 lb)   01/14/19 102.2 kg (225 lb 5 oz)   09/06/18 102 kg (224 lb 13.9 oz)   06/17/18 97.5 kg (215 lb)   02/11/18 100.4 kg (221 lb 5.5 oz)   01/12/18 99.2 kg (218 lb 11.1 oz)   12/16/17 92.1 kg (203 lb)   12/12/17 94.8 kg (209 lb)   06/16/17 98.9 kg (218 lb 0.6 oz)   06/15/17 95.3 kg (210 lb 1.6 oz)   05/18/17 99.5 kg (219 lb 5.7 oz)   05/17/17 98.1 kg (216 lb 4.3 oz)   04/26/17 101.6 kg (223 lb 15.8 oz)   04/11/17 97.5 kg (214 lb 15.2 oz)       Anthropometrics:   Height: 5' 3\" (160 cm) Weight: 99.8 kg (220 lb)   IBW (%IBW):   ( ) UBW (%UBW):   (  %)   Last Weight Metrics:  Weight Loss Metrics 2/29/2020 2/24/2020 2/24/2020 2/24/2020 2/24/2020 2/24/2020 2/22/2020   Today's Wt 220 lb - 242 lb 243 lb 9.6 oz 220 lb - 220 lb   BMI - 38.97 kg/m2 - 44.26 kg/m2 44.56 kg/m2 40.24 kg/m2 40.24 kg/m2       BMI: Body mass index is 38.97 kg/m². This BMI is indicative of:   []Underweight    []Normal    []Overweight    [x] Obesity   [] Extreme Obesity (BMI>40)     Estimated Nutrition Needs (Based on):   1825 Kcals/day(BMR: 1650 x 1.1) , 100 g(1 g/kg) Protein  Carbohydrate:  At Least 130 g/day  Fluids: 1825 mL/day (1ml/kcal) or per primary team    NUTRITION DIAGNOSES:   Problem:  Increased nutrient needs      Etiology: related to wound healing      Signs/Symptoms: as evidenced by RIGHT AXILLARY ABSCESS INCISION AND DRAINAGE       NUTRITION INTERVENTIONS:  Meals/Snacks: General/healthful diet                  GOAL:   consume >75% of meals in 5-7 days    LEARNING NEEDS (Diet, Food/Nutrient-Drug Interaction):    [x] None Identified   [] Identified and Education Provided/Documented   [] Identified and Pt declined/was not appropriate     Cultureal, Episcopal, OR Ethnic Dietary Needs:    [x] None Identified   [] Identified and Addressed     [x] Interdisciplinary Care Plan Reviewed/Documented    [x] Discharge Planning: General healthy diet with adequate protein intake to promote wound healing. Long-term goal of weight loss      MONITORING /EVALUATION:      Food/Nutrient Intake Outcomes:  Total energy intake  Physical Signs/Symptoms Outcomes: Weight/weight change, Electrolyte and renal profile, GI    NUTRITION RISK:    [] High              [] Moderate           [x]  Low  []  Minimal/Uncompromised    PT SEEN FOR:    []  MD Consult: []Calorie Count      []Diabetic Diet Education        []Diet Education     []Electrolyte Management     []General Nutrition Management and Supplements     []Management of Tube Feeding     []TPN Recommendations    []  RN Referral:  []MST score >=2     []Enteral/Parenteral Nutrition PTA     []Pregnant: Gestational DM or Multigestation     []Pressure Ulcer/Wound Care needs        []  Low BMI  [x]  LOS Referral       Juno Burks RDN  Pager [phone]  Weekend Pager [phone]

## 2020-03-03 ENCOUNTER — TELEPHONE (OUTPATIENT)
Dept: CARDIOLOGY CLINIC | Age: 35
End: 2020-03-03

## 2020-03-03 NOTE — TELEPHONE ENCOUNTER
[facility] to call me regarding tomorrow's appt. Pt R/S stress echo for 3-. Still needs to R/S her HM she did not show for. Still needs 24 hr BP monitoring. Need to R/S appt with Avinash Amaya. Does not need to come tomorrow 3-4-2020 since her testing is not completed.

## 2020-03-04 ENCOUNTER — TELEPHONE (OUTPATIENT)
Dept: INTERNAL MEDICINE CLINIC | Age: 35
End: 2020-03-04

## 2020-03-05 DIAGNOSIS — R05.9 COUGH: Primary | ICD-10-CM

## 2020-03-05 RX ORDER — AZITHROMYCIN 250 MG/1
250 TABLET, FILM COATED ORAL SEE ADMIN INSTRUCTIONS
Qty: 6 TAB | Refills: 0 | Status: SHIPPED | OUTPATIENT
Start: 2020-03-05 | End: 2020-03-10

## 2020-03-05 RX ORDER — CODEINE PHOSPHATE AND GUAIFENESIN 10; 100 MG/5ML; MG/5ML
5 SOLUTION ORAL
Qty: 100 ML | Refills: 0 | Status: SHIPPED | OUTPATIENT
Start: 2020-03-05 | End: 2020-03-10 | Stop reason: ALTCHOICE

## 2020-03-05 NOTE — TELEPHONE ENCOUNTER
Called, spoke to pt. Two identifiers confirmed. Pt c/o a productive cough and sinus pain. Pt requesting a cough suppressant and an abx. Notified pt a message will be sent to Dr. Destini Joiner. Pt verbalized understanding of information discussed w/ no further questions at this time.

## 2020-03-05 NOTE — TELEPHONE ENCOUNTER
Lawyer Roxana MD  You 12 minutes ago (8:16 AM)     Please tell pt I escribed zpak and michael ac    Routing comment      Pt notified

## 2020-03-08 ENCOUNTER — TELEPHONE (OUTPATIENT)
Dept: SURGERY | Age: 35
End: 2020-03-08

## 2020-03-08 NOTE — TELEPHONE ENCOUNTER
S/p I&D right axilla with penrose placed 9 days ago  Patient called complaining of pain at incision site. Patient reports the penrose is in place there is no erythema there is some induration around the drain. No fevers tolerating p.o.    I reassured her that it sounds like things are healing okay. Continue to take the hydrocodone and ibuprofen as needed. If she has concerns or symptoms worsen she could be evaluated emergency room. Expressed understanding of our discussion she does not feel like her symptoms are significant enough to go to the emergency room. We will keep her appointment for Tuesday with Dr. Nicci Avila.

## 2020-03-10 ENCOUNTER — OFFICE VISIT (OUTPATIENT)
Dept: SURGERY | Age: 35
End: 2020-03-10

## 2020-03-10 VITALS
TEMPERATURE: 98.8 F | HEIGHT: 63 IN | HEART RATE: 81 BPM | WEIGHT: 247.1 LBS | OXYGEN SATURATION: 98 % | RESPIRATION RATE: 18 BRPM | BODY MASS INDEX: 43.78 KG/M2 | SYSTOLIC BLOOD PRESSURE: 110 MMHG | DIASTOLIC BLOOD PRESSURE: 57 MMHG

## 2020-03-10 DIAGNOSIS — Z09 POSTOPERATIVE EXAMINATION: Primary | ICD-10-CM

## 2020-03-10 NOTE — PROGRESS NOTES
Patient returns today after drainage of complex abscess in the right axilla. Exam the erythema has resolved. The Penrose drain is in place. There is no significant drainage. I removed the Penrose drain. We covered it with sterile gauze and tape. She will soak the area in the shower at least once daily. She will come back to see us for a final check in 2 weeks. She and I discussed the significance of the MRSA culture. I recommended washing at least once weekly with chlorhexidine. I also recommended she switch to a mineral salts deodorant. We also discussed washing sheets and towels with bleach and spraying the tub with bleach once weekly. She was told to seek medical attention early for soft tissue infections.

## 2020-03-10 NOTE — Clinical Note
3/10/20 Patient: Flory Massey YOB: 1985 Date of Visit: 3/10/2020 Johnson Sánchez MD 
[address] [facility] [address] [address] VIA In Basket Dear Johnson Sánchez MD, Thank you for referring Ms. Mishel Roberts to  [facility] for evaluation. My notes for this consultation are attached. If you have questions, please do not hesitate to call me. I look forward to following your patient along with you.  
 
 
Sincerely, 
 
Mara Patel MD

## 2020-03-10 NOTE — PROGRESS NOTES
Chief Complaint   Patient presents with    Surgical Follow-up     I & D Axillary & upper arm abscess 2/27/20, drain removal.      1. Have you been to the ER, urgent care clinic since your last visit? Hospitalized since your last visit? No    2. Have you seen or consulted any other health care providers outside of the [address] [place] [address] since your last visit? Include any pap smears or colon screening.  No

## 2020-03-12 DIAGNOSIS — R05.9 COUGH: ICD-10-CM

## 2020-03-12 RX ORDER — TIZANIDINE 4 MG/1
TABLET ORAL
Qty: 100 ML | Refills: 0 | Status: SHIPPED | OUTPATIENT
Start: 2020-03-12 | End: 2020-03-17

## 2020-03-12 RX ORDER — ONDANSETRON 4 MG/1
TABLET, FILM COATED ORAL
Qty: 20 TAB | Refills: 0 | Status: SHIPPED | OUTPATIENT
Start: 2020-03-12 | End: 2020-03-24

## 2020-03-15 ENCOUNTER — HOSPITAL ENCOUNTER (EMERGENCY)
Age: 35
Discharge: HOME | End: 2020-03-15
Attending: EMERGENCY MEDICINE
Payer: COMMERCIAL

## 2020-03-15 ENCOUNTER — APPOINTMENT (OUTPATIENT)
Dept: ULTRASOUND IMAGING | Age: 35
End: 2020-03-15
Attending: EMERGENCY MEDICINE
Payer: COMMERCIAL

## 2020-03-15 VITALS
TEMPERATURE: 98.6 F | HEART RATE: 62 BPM | BODY MASS INDEX: 46.94 KG/M2 | DIASTOLIC BLOOD PRESSURE: 70 MMHG | OXYGEN SATURATION: 100 % | HEIGHT: 62 IN | SYSTOLIC BLOOD PRESSURE: 134 MMHG | RESPIRATION RATE: 16 BRPM | WEIGHT: 255.07 LBS

## 2020-03-15 DIAGNOSIS — G89.18 ACUTE POST-OPERATIVE PAIN: Primary | ICD-10-CM

## 2020-03-15 LAB
ANION GAP SERPL CALC-SCNC: 7 MMOL/L (ref 5–15)
BASOPHILS # BLD: 0 K/UL (ref 0–0.1)
BASOPHILS NFR BLD: 0 % (ref 0–1)
BUN SERPL-MCNC: 18 MG/DL (ref 6–20)
BUN/CREAT SERPL: 17 (ref 12–20)
CALCIUM SERPL-MCNC: 8.4 MG/DL (ref 8.5–10.1)
CHLORIDE SERPL-SCNC: 111 MMOL/L (ref 97–108)
CO2 SERPL-SCNC: 22 MMOL/L (ref 21–32)
CREAT SERPL-MCNC: 1.06 MG/DL (ref 0.55–1.02)
DIFFERENTIAL METHOD BLD: ABNORMAL
EOSINOPHIL # BLD: 0.5 K/UL (ref 0–0.4)
EOSINOPHIL NFR BLD: 5 % (ref 0–7)
ERYTHROCYTE [DISTWIDTH] IN BLOOD BY AUTOMATED COUNT: 13.9 % (ref 11.5–14.5)
GLUCOSE SERPL-MCNC: 102 MG/DL (ref 65–100)
HCT VFR BLD AUTO: 33.8 % (ref 35–47)
HGB BLD-MCNC: 11.1 G/DL (ref 11.5–16)
IMM GRANULOCYTES # BLD AUTO: 0 K/UL (ref 0–0.04)
IMM GRANULOCYTES NFR BLD AUTO: 0 % (ref 0–0.5)
LYMPHOCYTES # BLD: 4.9 K/UL (ref 0.8–3.5)
LYMPHOCYTES NFR BLD: 53 % (ref 12–49)
MCH RBC QN AUTO: 29.9 PG (ref 26–34)
MCHC RBC AUTO-ENTMCNC: 32.8 G/DL (ref 30–36.5)
MCV RBC AUTO: 91.1 FL (ref 80–99)
MONOCYTES # BLD: 0.7 K/UL (ref 0–1)
MONOCYTES NFR BLD: 7 % (ref 5–13)
NEUTS SEG # BLD: 3.4 K/UL (ref 1.8–8)
NEUTS SEG NFR BLD: 35 % (ref 32–75)
NRBC # BLD: 0 K/UL (ref 0–0.01)
NRBC BLD-RTO: 0 PER 100 WBC
PLATELET # BLD AUTO: 427 K/UL (ref 150–400)
PMV BLD AUTO: 9.9 FL (ref 8.9–12.9)
POTASSIUM SERPL-SCNC: 3.6 MMOL/L (ref 3.5–5.1)
RBC # BLD AUTO: 3.71 M/UL (ref 3.8–5.2)
SODIUM SERPL-SCNC: 140 MMOL/L (ref 136–145)
WBC # BLD AUTO: 9.5 K/UL (ref 3.6–11)

## 2020-03-15 PROCEDURE — 80048 BASIC METABOLIC PNL TOTAL CA: CPT

## 2020-03-15 PROCEDURE — 74011250636 HC RX REV CODE- 250/636: Performed by: EMERGENCY MEDICINE

## 2020-03-15 PROCEDURE — 96374 THER/PROPH/DIAG INJ IV PUSH: CPT

## 2020-03-15 PROCEDURE — 76882 US LMTD JT/FCL EVL NVASC XTR: CPT

## 2020-03-15 PROCEDURE — 96375 TX/PRO/DX INJ NEW DRUG ADDON: CPT

## 2020-03-15 PROCEDURE — 36415 COLL VENOUS BLD VENIPUNCTURE: CPT

## 2020-03-15 PROCEDURE — 85025 COMPLETE CBC W/AUTO DIFF WBC: CPT

## 2020-03-15 PROCEDURE — 99282 EMERGENCY DEPT VISIT SF MDM: CPT

## 2020-03-15 RX ORDER — HYDROMORPHONE HYDROCHLORIDE 1 MG/ML
1 INJECTION, SOLUTION INTRAMUSCULAR; INTRAVENOUS; SUBCUTANEOUS ONCE
Status: COMPLETED | OUTPATIENT
Start: 2020-03-15 | End: 2020-03-15

## 2020-03-15 RX ORDER — ONDANSETRON 2 MG/ML
4 INJECTION INTRAMUSCULAR; INTRAVENOUS
Status: COMPLETED | OUTPATIENT
Start: 2020-03-15 | End: 2020-03-15

## 2020-03-15 RX ORDER — HYDROCODONE BITARTRATE AND ACETAMINOPHEN 5; 325 MG/1; MG/1
2 TABLET ORAL
Qty: 12 TAB | Refills: 0 | Status: SHIPPED | OUTPATIENT
Start: 2020-03-15 | End: 2020-03-20

## 2020-03-15 RX ORDER — MORPHINE SULFATE 4 MG/ML
4 INJECTION INTRAVENOUS
Status: COMPLETED | OUTPATIENT
Start: 2020-03-15 | End: 2020-03-15

## 2020-03-15 RX ADMIN — HYDROMORPHONE HYDROCHLORIDE 1 MG: 1 INJECTION, SOLUTION INTRAMUSCULAR; INTRAVENOUS; SUBCUTANEOUS at 20:44

## 2020-03-15 RX ADMIN — MORPHINE SULFATE 4 MG: 4 INJECTION, SOLUTION INTRAMUSCULAR; INTRAVENOUS at 22:12

## 2020-03-15 RX ADMIN — ONDANSETRON 4 MG: 2 INJECTION, SOLUTION INTRAMUSCULAR; INTRAVENOUS at 22:12

## 2020-03-16 ENCOUNTER — PATIENT OUTREACH (OUTPATIENT)
Dept: FAMILY MEDICINE CLINIC | Age: 35
End: 2020-03-16

## 2020-03-16 NOTE — DISCHARGE INSTRUCTIONS
Your examination today is reassuring. It does not appear that you are having significant worsening of your infection. There is no apparent abscess by ultrasound. You can simply use warm packs to the affected area and take hydrocodone for pain. Follow-up with Dr. Titi Sparrow.

## 2020-03-16 NOTE — ED NOTES
Patient states that pain has improved and is now a 5 on a 0-10 scale. Patient states she has no questions about D/C instructions. Patient was ambulating well and no acute distress noted.  is waiting outside for patient.

## 2020-03-16 NOTE — ED PROVIDER NOTES
EMERGENCY DEPARTMENT HISTORY AND PHYSICAL EXAM      Date: 3/15/2020  Patient Name: Magali Sanchez  Patient Age and Sex: 37 y.o. female    History of Presenting Illness     Chief Complaint   Patient presents with    Post-Op Problem     Patient had an axillary abscess to which she had to have surgery for. Surgery was about two weeks ago. Patient now experiencing more lumps, erythema, pain, and subjective fevers. Patient talked to surgeon (Dr. Toma Dawn) and he advised her to come in for possible readmission for IV antibiotics. History Provided By: Patient    Ability to gather history was limited by:     HPI: Magali Sanchez, 37 y.o. female with history of morbid obesity, presenting with severe pain in her right axilla in the setting of recent surgical I&D of multiple abscesses of the right axilla. Surgery occurred about 2 weeks ago. She was managed with oral narcotics but has run out of them. States that over the past 2 days pain is gotten much worse, severe, sharp and burning in nature, 10 out of 10 severity, located beneath and around the surgical incisions of the right axilla. Subjective fevers. Not currently on antibiotics. Location:    Quality:      Severity:    Duration:   Timing:      Context:    Modifying factors:   Associated symptoms:       The patient's medical, surgical, family, and social history on file were reviewed by me today.       Past Medical History:   Diagnosis Date    Abnormal CT of the abdomen 11/8/2012    Asthma     Autoimmune disease (HCC)     lupus    C. difficile diarrhea     Cervical prolapse     Dehydration     Gastrointestinal disorder     gerd, twisted colon, IBS    GERD (gastroesophageal reflux disease)     Headache(784.0)     Lupus (HCC)     Morbid obesity (HCC)     Nausea & vomiting     Neurological disorder     cluster headaches    other 2006, 2009    ovarian cyst removal    Other ill-defined conditions(799.89)     Worsening headaches      Past Surgical History:   Procedure Laterality Date    COLONOSCOPY  9/21/2010         HX CHOLECYSTECTOMY      HX CYST INCISION AND DRAINAGE Right 02/27/2020    HX GYN  1/2009    right salpingo oopherectomy    HX GYN  2006    right ovarian tumor removed    HX HEENT      left wisdom teeth removal    HX HEENT      top right wisdom tooth removed    HX HERNIA REPAIR  4/2012    HX HERNIA REPAIR  2/28/13    Laparoscopic recurrent incisional hernia repair    HX HYSTERECTOMY      2016    HX UROLOGICAL      Kidney Stone Removal    CO EGD TRANSORAL BIOPSY SINGLE/MULTIPLE  9/22/2010            PCP: Veronica Lyons MD    Past History     Past Medical History:  Past Medical History:   Diagnosis Date    Abnormal CT of the abdomen 11/8/2012    Asthma     Autoimmune disease ([address]     lupus    C. difficile diarrhea     Cervical prolapse     Dehydration     Gastrointestinal disorder     gerd, twisted colon, IBS    GERD (gastroesophageal reflux disease)     Headache(784.0)     Lupus ([address]     Morbid obesity ([address]     Nausea & vomiting     Neurological disorder     cluster headaches    other 2006, 2009    ovarian cyst removal    Other ill-defined conditions(799.89)     Worsening headaches        Past Surgical History:  Past Surgical History:   Procedure Laterality Date    COLONOSCOPY  9/21/2010         HX CHOLECYSTECTOMY      HX CYST INCISION AND DRAINAGE Right 02/27/2020    HX GYN  1/2009    right salpingo oopherectomy    HX GYN  2006    right ovarian tumor removed    HX HEENT      left wisdom teeth removal    HX HEENT      top right wisdom tooth removed    HX HERNIA REPAIR  4/2012    HX HERNIA REPAIR  2/28/13    Laparoscopic recurrent incisional hernia repair    HX HYSTERECTOMY      2016    HX UROLOGICAL      Kidney Stone Removal    CO EGD TRANSORAL BIOPSY SINGLE/MULTIPLE  9/22/2010            Family History:  Family History   Problem Relation Age of Onset    Colon Cancer Maternal Grandfather Social History:  Social History     Tobacco Use    Smoking status: Never Smoker    Smokeless tobacco: Never Used   Substance Use Topics    Alcohol use: No    Drug use: No       Allergies: Allergies   Allergen Reactions    Latex Itching     burning    Compazine [Prochlorperazine] Anxiety     High heart rate  Pt can take promethazine with no problems    Sulfa (Sulfonamide Antibiotics) Unknown (comments)    Topamax [Topiramate] Unknown (comments)    Adhesive Rash     Allergic to Dermabond    Clindamycin Diarrhea     Pt states caused her C-Diff    Mushroom Combination No.1 Unknown (comments)    Toradol [Ketorolac Tromethamine] Nausea and Vomiting and Other (comments)     PO & IV causes GI bleed  Tolerated during Feb 2020 stay    Valproic Acid Other (comments)     Elevated heart rate and vomiting         Current Medications:  No current facility-administered medications on file prior to encounter. Current Outpatient Medications on File Prior to Encounter   Medication Sig Dispense Refill    Virtussin AC  mg/5 mL solution TAKE 5 ML BY MOUTH THREE TIMES DAILY AS NEEDED FOR COUGH FOR UP TO 7 DAYS; MAXIMUM DAILY AMOUNT: 15ML 100 mL 0    ondansetron hcl (ZOFRAN) 4 mg tablet TAKE 1 TABLET BY MOUTH EVERY 8 HOURS AS NEEDED F FOR NAUSEA 20 Tab 0    ibuprofen (MOTRIN) 600 mg tablet Take 1 Tab by mouth three (3) times daily (with meals). 21 Tab 0    mirtazapine (REMERON) 30 mg tablet TAKE 1 TABLET BY MOUTH AT BEDTIME 30 Tab 5    enalapril (VASOTEC) 5 mg tablet TAKE 1 TABLET BY MOUTH DAILY AS NEEDED FOR HYPERTENSION (Patient not taking: Reported on 3/10/2020) 30 Tab 5    olmesartan (BENICAR) 5 mg tablet Take 1 Tab by mouth daily. D/c ACEI  Reports nausea (Patient taking differently: Take 5 mg by mouth daily as needed.  D/c ACEI  Reports nausea) 30 Tab 2    albuterol (PROVENTIL HFA, VENTOLIN HFA, PROAIR HFA) 90 mcg/actuation inhaler Take 1 Puff by inhalation every six (6) hours as needed for Wheezing. (Patient not taking: Reported on 3/10/2020) 1 Inhaler 0    naloxone (NARCAN) 4 mg/actuation nasal spray Use 1 spray intranasally, then discard. Repeat with new spray every 2 min as needed for opioid overdose symptoms, alternating nostrils. 1 Each 0    L.acidoph & parac-S.therm-Bifido (BELGICA Q2/RISAQUAD-2) 16 billion cell cap cap Take 1 Cap by mouth daily. 30 Cap 1    furosemide (LASIX) 20 mg tablet PRN  0    tiZANidine (ZANAFLEX) 4 mg tablet   0    doxycycline (MONODOX) 100 mg capsule Take 100 mg by mouth daily. 0    estradiol (ESTRACE) 1 mg tablet Take 1 mg by mouth daily. 0    ALPRAZolam (XANAX) 1 mg tablet take 1 tablet by mouth NIGHTLY maximum daily dose of 1 tablet 30 Tab 0    indomethacin (INDOCIN) 25 mg capsule PRN      DULoxetine (CYMBALTA) 60 mg capsule Take 120 mg by mouth daily. 1    pantoprazole (PROTONIX) 40 mg tablet take 1 tablet by mouth at bedtime  0    codeine-butalbital-acetaminophen-caffeine (FIORICET WITH CODEINE) -66-30 mg capsule take 2 capsules by mouth every 6 hours if needed for MIGRAINE HEADACHE 90 Cap 0    rizatriptan (MAXALT) 10 mg tablet Take 10 mg by mouth once as needed for Migraine. May repeat in 2 hours if needed      belimumab (BENLYSTA) 400 mg solr injection by IntraVENous route. Review of Systems   Review of Systems   Constitutional: Positive for fever. Respiratory: Negative for shortness of breath. Cardiovascular: Negative for chest pain. Gastrointestinal: Negative for abdominal pain. All other systems reviewed and are negative. Physical Exam   Vital Signs  Patient Vitals for the past 8 hrs:   Temp Pulse Resp BP SpO2   03/15/20 2215 98.6 °F (37 °C) 62 16 134/70 100 %   03/15/20 1927 98.7 °F (37.1 °C) 80 18 151/78 100 %          Physical Exam  Vitals signs and nursing note reviewed. Constitutional:       General: She is in acute distress ( Appears in pain). Appearance: She is obese.    Cardiovascular:      Rate and Rhythm: Normal rate and regular rhythm. Pulmonary:      Effort: Pulmonary effort is normal.      Breath sounds: Normal breath sounds. Abdominal:      General: Abdomen is flat. Tenderness: There is no abdominal tenderness. Musculoskeletal:        Arms:       Comments: Normal appearance of the skin in the axilla. No concerning erythema or induration at all. There are a few small focal firm areas, about the size of a pea or a small marble, in 2 places near her surgical incisions. No fluctuance   Skin:     Findings: No abscess, ecchymosis or erythema. Neurological:      General: No focal deficit present. Mental Status: She is alert and oriented to person, place, and time. Psychiatric:         Mood and Affect: Mood is anxious. Speech: Speech normal.         Cognition and Memory: Cognition normal.         Diagnostic Study Results   Labs  Recent Results (from the past 24 hour(s))   CBC WITH AUTOMATED DIFF    Collection Time: 03/15/20  8:43 PM   Result Value Ref Range    WBC 9.5 3.6 - 11.0 K/uL    RBC 3.71 (L) 3.80 - 5.20 M/uL    HGB 11.1 (L) 11.5 - 16.0 g/dL    HCT 33.8 (L) 35.0 - 47.0 %    MCV 91.1 80.0 - 99.0 FL    MCH 29.9 26.0 - 34.0 PG    MCHC 32.8 30.0 - 36.5 g/dL    RDW 13.9 11.5 - 14.5 %    PLATELET 470 (H) 047 - 400 K/uL    MPV 9.9 8.9 - 12.9 FL    NRBC 0.0 0  WBC    ABSOLUTE NRBC 0.00 0.00 - 0.01 K/uL    NEUTROPHILS 35 32 - 75 %    LYMPHOCYTES 53 (H) 12 - 49 %    MONOCYTES 7 5 - 13 %    EOSINOPHILS 5 0 - 7 %    BASOPHILS 0 0 - 1 %    IMMATURE GRANULOCYTES 0 0.0 - 0.5 %    ABS. NEUTROPHILS 3.4 1.8 - 8.0 K/UL    ABS. LYMPHOCYTES 4.9 (H) 0.8 - 3.5 K/UL    ABS. MONOCYTES 0.7 0.0 - 1.0 K/UL    ABS. EOSINOPHILS 0.5 (H) 0.0 - 0.4 K/UL    ABS. BASOPHILS 0.0 0.0 - 0.1 K/UL    ABS. IMM.  GRANS. 0.0 0.00 - 0.04 K/UL    DF AUTOMATED     METABOLIC PANEL, BASIC    Collection Time: 03/15/20  8:43 PM   Result Value Ref Range    Sodium 140 136 - 145 mmol/L    Potassium 3.6 3.5 - 5.1 mmol/L Chloride 111 (H) 97 - 108 mmol/L    CO2 22 21 - 32 mmol/L    Anion gap 7 5 - 15 mmol/L    Glucose 102 (H) 65 - 100 mg/dL    BUN 18 6 - 20 MG/DL    Creatinine 1.06 (H) 0.55 - 1.02 MG/DL    BUN/Creatinine ratio 17 12 - 20      GFR est AA >60 >60 ml/min/1.73m2    GFR est non-AA 59 (L) >60 ml/min/1.73m2    Calcium 8.4 (L) 8.5 - 10.1 MG/DL       Radiologic Studies  US EXT NONVAS RT LTD   Final Result   IMPRESSION:   No drainable abscess. Minimal hypoechogenicity deep to the skin defect may   represent phlegmon. There is surrounding inflammation of the subcutaneous fat. CT Results  (Last 48 hours)    None        CXR Results  (Last 48 hours)    None          Procedures   Procedures    Medical Decision Making     Provider Notes (Medical Decision Making):   69-year-old female complaining of postoperative pain in the right axilla 2 weeks after I&D of multiple abscess sites. On exam the site appears to be healing very well. There is no concerning erythema or induration. There is no apparent abscess by exam.  There are 2 small focal hard areas in the axilla, and ultrasound imaging demonstrates no drainable abscess, but possible phlegmon and surrounding inflammation of subcutaneous fat. She has no significant leukocytosis or fever. Given the absence of any erythema, I do not think she has acute abscess or cellulitis, and would not benefit from antibiotics at this time. Advised to use warm packs as needed, refilled narcotic pain control, follow-up with her surgeon Dr. Keysha Samuel.     Leah Shields MD  11:22 PM        Social History     Tobacco Use    Smoking status: Never Smoker    Smokeless tobacco: Never Used   Substance Use Topics    Alcohol use: No    Drug use: No     Patient Vitals for the past 4 hrs:   Temp Pulse Resp BP SpO2   03/15/20 2215 98.6 °F (37 °C) 62 16 134/70 100 %   03/15/20 1927 98.7 °F (37.1 °C) 80 18 151/78 100 %              Consults:      Medications Administered during ED course:  Medications   HYDROmorphone (PF) (DILAUDID) injection 1 mg (1 mg IntraVENous Given 3/15/20 2044)   morphine injection 4 mg (4 mg IntraVENous Given 3/15/20 2212)   ondansetron (ZOFRAN) injection 4 mg (4 mg IntraVENous Given 3/15/20 2212)                Diagnosis and Disposition     Disposition:  Discharged    Clinical Impression:   1. Acute post-operative pain        Attestation:  I personally performed the services described in this documentation on this date 3/15/2020 for patient Bryant Ruelas. Mónica Castaneda MD        I was the first provider for this patient on this visit. To the best of my ability I reviewed relevant prior medical records, electrocardiograms, laboratories, and radiologic studies. The patient's presenting problems were discussed, and the patient was in agreement with the care plan formulated and outlined with them. Mónica Castaneda MD    Please note that this dictation was completed with Dragon voice recognition software. Quite often unanticipated grammatical, syntax, homophones, and other interpretive errors are inadvertently transcribed by the computer software. Please disregard these errors and excuse any errors that have escaped final proofreading.

## 2020-03-16 NOTE — PROGRESS NOTES
COVID-19 Screening Initial Follow-up Note    Patient contacted regarding COVID-19  risk. Care Transition Nurse/ Ambulatory Care Manager contacted the patient by telephone to perform post discharge assessment. Verified name and  with patient as identifiers. Provided introduction to self, and explanation of the CTN/ACM role, and reason for call due to risk factors for infection and/or exposure to COVID-19. Symptoms reviewed with patient who verbalized the following symptoms:   Fever yes    Fatigue no   Pain or aching joints no  Cough no  Shortness of breath no    Confusion or unusual change in mental status no    Chills or shaking no    Sweating no    Fast heart rate no    Fast breathing no    Dizziness/lightheadedness no    Less urine output no    Cold, clammy, and pale skin no  Low body temperature no       Due to onset/worsening of new symptoms, encounter routed to provider for escalation. Patient has following risk factors of: recent surgery I&D Right Axillary Abscesses CTN/ACM reviewed discharge instructions, medical action plan and red flags such as increased shortness of breath, increasing fever and signs of decompensation with patient who verbalized understanding. Discussed exposure protocols and quarantine with CDC Guidelines What to do if you are sick with coronavirus disease . Patient was given an opportunity for questions and concerns. The patient agrees to contact the [facility] exposure line, local health department and PCP office for questions related to their healthcare. CTN provided contact information for future reference.     Reviewed and educated patient on any new and changed medications related to discharge diagnosis     Plan for follow-up call in 14 days based on severity of symptoms and risk factors

## 2020-03-17 DIAGNOSIS — R05.9 COUGH: ICD-10-CM

## 2020-03-17 RX ORDER — TIZANIDINE 4 MG/1
TABLET ORAL
Qty: 100 ML | Refills: 0 | Status: SHIPPED | OUTPATIENT
Start: 2020-03-17 | End: 2020-03-23

## 2020-03-23 DIAGNOSIS — R05.9 COUGH: ICD-10-CM

## 2020-03-23 RX ORDER — TIZANIDINE 4 MG/1
TABLET ORAL
Qty: 100 ML | Refills: 0 | Status: SHIPPED | OUTPATIENT
Start: 2020-03-23 | End: 2020-03-31

## 2020-03-24 ENCOUNTER — OFFICE VISIT (OUTPATIENT)
Dept: SURGERY | Age: 35
End: 2020-03-24

## 2020-03-24 VITALS
HEIGHT: 62 IN | BODY MASS INDEX: 44.79 KG/M2 | WEIGHT: 243.4 LBS | OXYGEN SATURATION: 97 % | HEART RATE: 88 BPM | DIASTOLIC BLOOD PRESSURE: 86 MMHG | RESPIRATION RATE: 20 BRPM | SYSTOLIC BLOOD PRESSURE: 130 MMHG | TEMPERATURE: 98.4 F

## 2020-03-24 DIAGNOSIS — L02.411 ABSCESS OF AXILLA, RIGHT: Primary | ICD-10-CM

## 2020-03-24 RX ORDER — L.ACID,CASEI/B.ANIMAL/S.THERMO 16B CELL
CAPSULE ORAL
Qty: 30 CAP | Refills: 1 | Status: ON HOLD | OUTPATIENT
Start: 2020-03-24 | End: 2021-08-29

## 2020-03-24 RX ORDER — ONDANSETRON 4 MG/1
TABLET, FILM COATED ORAL
Qty: 20 TAB | Refills: 0 | Status: SHIPPED | OUTPATIENT
Start: 2020-03-24 | End: 2020-03-31

## 2020-03-24 NOTE — PROGRESS NOTES
1. Have you been to the ER, urgent care clinic since your last visit? Hospitalized since your last visit? Yes  ED [facility] 3/15/20 for new masses right axilla    2. Have you seen or consulted any other health care providers outside of the [address] [place] [address] since your last visit? Include any pap smears or colon screening.  No

## 2020-03-24 NOTE — Clinical Note
3/24/20 Patient: Anna Guerra YOB: 1985 Date of Visit: 3/24/2020 Kaila Coon MD 
[facility] [address] [address] VIA In Basket Dear Kaila Coon MD, Thank you for referring Ms. Jacob Lyman to [address] for evaluation. My notes for this consultation are attached. If you have questions, please do not hesitate to call me. I look forward to following your patient along with you.  
 
 
Sincerely, 
 
Frederick Molina MD

## 2020-03-24 NOTE — PROGRESS NOTES
Patient returns today after treatment of a right axillary abscess and cellulitis. She went to the emergency department on 3/15/2020 concerned about return of redness and discomfort. She was reassured that there was no cellulitis and her discharge has improved significantly. She still notes soreness with active range of motion. On exam today the incisions are well-healed and there is no erythema. There is mild tenderness with passive range of motion. We again discussed strategies for minimizing MRSA infections. I also encouraged increasing her activity and range of motion. She knows to call for future concerns.

## 2020-03-30 DIAGNOSIS — F41.9 ANXIETY: ICD-10-CM

## 2020-03-30 DIAGNOSIS — R05.9 COUGH: ICD-10-CM

## 2020-03-30 DIAGNOSIS — F51.01 PRIMARY INSOMNIA: ICD-10-CM

## 2020-03-30 RX ORDER — ALPRAZOLAM 1 MG/1
1 TABLET ORAL
Qty: 60 TAB | Refills: 0 | Status: SHIPPED | OUTPATIENT
Start: 2020-03-30 | End: 2020-05-11

## 2020-03-30 NOTE — TELEPHONE ENCOUNTER
Patient states she needs a call back to discuss her Anxiety medication & getting an adjustment/dosage increase to be made a physician she was seeing no longer takes her insurance & also to get a refill. Patient states she needs to discuss appointment/call options as she has an \"Old School\" phone that she does not think she is able to do Virtual Visit. Please call to discuss.  Thank you

## 2020-03-30 NOTE — TELEPHONE ENCOUNTER
Called, spoke to pt. Two identifiers confirmed. Pt requesting Dr. Meredith Jernigan fill her xanax until she can get back in with psychiatrist.   Notified pt Dr. Meredith Jernigan approved refill for one month. Pt verbalized understanding of information discussed w/ no further questions at this time.

## 2020-03-31 RX ORDER — TIZANIDINE 4 MG/1
TABLET ORAL
Qty: 100 ML | Refills: 0 | Status: SHIPPED | OUTPATIENT
Start: 2020-03-31 | End: 2020-04-06

## 2020-03-31 RX ORDER — ONDANSETRON 4 MG/1
TABLET, FILM COATED ORAL
Qty: 20 TAB | Refills: 0 | Status: SHIPPED | OUTPATIENT
Start: 2020-03-31 | End: 2020-04-13

## 2020-04-01 ENCOUNTER — PATIENT OUTREACH (OUTPATIENT)
Dept: CASE MANAGEMENT | Age: 35
End: 2020-04-01

## 2020-04-02 NOTE — PROGRESS NOTES
Patient resolved from Transition of Care episode on 04/01/2020  Patient/family has been provided the following resources and education related to COVID-19:                         Signs, symptoms and red flags related to COVID-19            CDC exposure and quarantine guidelines            [facility] exposure contact - [phone]            Contact for their local Department of Health               Left voice message with contact information. No further outreach scheduled with this CTN/ACM. Episode of Care resolved. Patient has this CTN/ACM contact information if future needs arise.

## 2020-04-06 DIAGNOSIS — R05.9 COUGH: ICD-10-CM

## 2020-04-06 RX ORDER — TIZANIDINE 4 MG/1
TABLET ORAL
Qty: 100 ML | Refills: 0 | Status: SHIPPED | OUTPATIENT
Start: 2020-04-06 | End: 2020-04-13

## 2020-04-12 DIAGNOSIS — R05.9 COUGH: ICD-10-CM

## 2020-04-13 DIAGNOSIS — R05.9 COUGH: ICD-10-CM

## 2020-04-13 RX ORDER — TIZANIDINE 4 MG/1
TABLET ORAL
Qty: 100 ML | Refills: 0 | Status: SHIPPED | OUTPATIENT
Start: 2020-04-13 | End: 2020-04-20

## 2020-04-13 RX ORDER — TIZANIDINE 4 MG/1
TABLET ORAL
Qty: 100 ML | Refills: 0 | Status: SHIPPED | OUTPATIENT
Start: 2020-04-13 | End: 2020-04-13

## 2020-04-13 RX ORDER — ONDANSETRON 4 MG/1
TABLET, FILM COATED ORAL
Qty: 20 TAB | Refills: 0 | Status: SHIPPED | OUTPATIENT
Start: 2020-04-13 | End: 2020-04-27

## 2020-04-20 ENCOUNTER — VIRTUAL VISIT (OUTPATIENT)
Dept: INTERNAL MEDICINE CLINIC | Age: 35
End: 2020-04-20

## 2020-04-20 ENCOUNTER — TELEPHONE (OUTPATIENT)
Dept: INTERNAL MEDICINE CLINIC | Age: 35
End: 2020-04-20

## 2020-04-20 DIAGNOSIS — J01.90 ACUTE SINUSITIS, RECURRENCE NOT SPECIFIED, UNSPECIFIED LOCATION: Primary | ICD-10-CM

## 2020-04-20 RX ORDER — AMOXICILLIN AND CLAVULANATE POTASSIUM 875; 125 MG/1; MG/1
1 TABLET, FILM COATED ORAL 2 TIMES DAILY
Qty: 14 TAB | Refills: 0 | Status: SHIPPED | OUTPATIENT
Start: 2020-04-20 | End: 2020-08-26 | Stop reason: ALTCHOICE

## 2020-04-20 NOTE — PATIENT INSTRUCTIONS
This is an established visit conducted via telemedicine. The patient has been instructed that this meets HIPAA criteria and acknowledges and agrees to this method of visitation.  
 
Berny Durant LPN 
86/16/04 
3:03 AM

## 2020-04-20 NOTE — TELEPHONE ENCOUNTER
Patient stats she needs a call back to discuss getting something called in for her Sore Throat, congestion & cough. Patient reports No Fever & has Not left her house in a Month & a Half. Patient states he parents have been out & she has been around them but patient reports she does not think she has Coronavirus. Please call to advise.  Thank you

## 2020-04-20 NOTE — PROGRESS NOTES
HISTORY OF PRESENT ILLNESS  Mike Ribera is a 37 y.o. female. HPI   Mike Ribera is a 37 y.o. female evaluated via telephone on 4/20/2020. Consent:  She and/or health care decision maker is aware that that she may receive a bill for this telephone service, depending on her insurance coverage, and has provided verbal consent to proceed: Yes      Documentation:  I communicated with the patient and/or health care decision maker about  Medical care. Details of this discussion including any medical advice provided: see below      I affirm this is a Patient Initiated Episode with an Established Patient who has not had a related appointment within my department in the past 7 days or scheduled within the next 24 hours. Total Time: minutes: 11-20 minutes    Note: not billable if this call serves to triage the patient into an appointment for the relevant concern      Criss Monteil MD   C/o sinus congestion and dry cough x 3-4 d    No f/c  C/o face tenderness from sinus congestion    Last OV  An electronic signature was used to authenticate this note. Went to ED for CP and dizziness on 1/4/2020  bp was 160/130 in ED , given enalapril IV then down to 130/60  At home BP has been around 130/100  Cardiac enzymes and ekg wnl  Still having headaches and bp at home has been up most of the time  bp drops too low after taking enalapril  Took 1/2 tab of enalapril today leydi is 115/72 today in office  Takes enalapril only when bp is elevated around with DBP around 100mmhg  Headaches occur on top of head and above the eyes  Headaches go away when her BP is wnl.  No vision tobias  Has occipital headaches which are different per pt     bp higher in mornings and midday    Patient Active Problem List    Diagnosis Date Noted    Cellulitis 02/24/2020    Severe obesity ([facility] [address] 02/22/2019    Incomplete uterovaginal prolapse 05/17/2016    Migraine with aura and without status migrainosus, not intractable 12/02/2015    Anxiety 12/05/2014    Lupus ([facility] [address] 12/04/2014    Recurrent ventral incisional hernia 03/01/2013    Ventral hernia 02/25/2013    Abnormal CT of the abdomen 11/08/2012     Current Outpatient Medications   Medication Sig Dispense Refill    ondansetron hcl (ZOFRAN) 4 mg tablet TAKE 1 TABLET BY MOUTH EVERY 8 HOURS AS NEEDED FOR NAUSEA 20 Tab 0    ALPRAZolam (XANAX) 1 mg tablet Take 1 Tab by mouth two (2) times daily as needed for Anxiety. Max Daily Amount: 2 mg. 60 Tab 0    RisaQuad-2 16 billion cell cap cap TAKE ONE CAPSULE BY MOUTH ONCE DAILY 30 Cap 1    ibuprofen (MOTRIN) 600 mg tablet Take 1 Tab by mouth three (3) times daily (with meals). 21 Tab 0    mirtazapine (REMERON) 30 mg tablet TAKE 1 TABLET BY MOUTH AT BEDTIME 30 Tab 5    enalapril (VASOTEC) 5 mg tablet TAKE 1 TABLET BY MOUTH DAILY AS NEEDED FOR HYPERTENSION 30 Tab 5    olmesartan (BENICAR) 5 mg tablet Take 1 Tab by mouth daily. D/c ACEI  Reports nausea (Patient taking differently: Take 5 mg by mouth daily as needed. D/c ACEI  Reports nausea) 30 Tab 2    albuterol (PROVENTIL HFA, VENTOLIN HFA, PROAIR HFA) 90 mcg/actuation inhaler Take 1 Puff by inhalation every six (6) hours as needed for Wheezing. 1 Inhaler 0    naloxone (NARCAN) 4 mg/actuation nasal spray Use 1 spray intranasally, then discard. Repeat with new spray every 2 min as needed for opioid overdose symptoms, alternating nostrils. 1 Each 0    furosemide (LASIX) 20 mg tablet PRN  0    tiZANidine (ZANAFLEX) 4 mg tablet   0    estradiol (ESTRACE) 1 mg tablet Take 1 mg by mouth daily. 0    indomethacin (INDOCIN) 25 mg capsule PRN      DULoxetine (CYMBALTA) 60 mg capsule Take 120 mg by mouth daily.   1    pantoprazole (PROTONIX) 40 mg tablet take 1 tablet by mouth at bedtime  0    codeine-butalbital-acetaminophen-caffeine (FIORICET WITH CODEINE) -04-30 mg capsule take 2 capsules by mouth every 6 hours if needed for MIGRAINE HEADACHE 90 Cap 0    rizatriptan (MAXALT) 10 mg tablet Take 10 mg by mouth once as needed for Migraine. May repeat in 2 hours if needed      belimumab (BENLYSTA) 400 mg solr injection by IntraVENous route.  Virtussin AC  mg/5 mL solution TAKE 5 ML BY MOUTH THREE TIMES DAILY AS NEEDED FOR COUGH FOR UP TO 7 DAYS. MAXIMUM DAILY DOSE IS 15  mL 0    doxycycline (MONODOX) 100 mg capsule Take 100 mg by mouth daily.   0     Allergies   Allergen Reactions    Latex Itching     burning    Compazine [Prochlorperazine] Anxiety     High heart rate  Pt can take promethazine with no problems    Sulfa (Sulfonamide Antibiotics) Unknown (comments)    Topamax [Topiramate] Unknown (comments)    Adhesive Rash     Allergic to Dermabond    Clindamycin Diarrhea     Pt states caused her C-Diff    Mushroom Combination No.1 Unknown (comments)    Toradol [Ketorolac Tromethamine] Nausea and Vomiting and Other (comments)     PO & IV causes GI bleed  Tolerated during Feb 2020 stay    Valproic Acid Other (comments)     Elevated heart rate and vomiting        Lab Results   Component Value Date/Time    WBC 9.5 03/15/2020 08:43 PM    HGB 11.1 (L) 03/15/2020 08:43 PM    Hemoglobin (POC) 12.6 05/17/2016 07:47 AM    HCT 33.8 (L) 03/15/2020 08:43 PM    Hematocrit (POC) 37 05/17/2016 07:47 AM    PLATELET 534 (H) 28/22/2695 08:43 PM    MCV 91.1 03/15/2020 08:43 PM     Lab Results   Component Value Date/Time    GFR est non-AA 59 (L) 03/15/2020 08:43 PM    GFRNA, POC >60 05/17/2016 07:47 AM    GFR est AA >60 03/15/2020 08:43 PM    GFRAA, POC >60 05/17/2016 07:47 AM    Creatinine 1.06 (H) 03/15/2020 08:43 PM    Creatinine (POC) 0.7 05/17/2016 07:47 AM    BUN 18 03/15/2020 08:43 PM    BUN (POC) 8 (L) 05/17/2016 07:47 AM    Sodium 140 03/15/2020 08:43 PM    Sodium (POC) 140 05/17/2016 07:47 AM    Potassium 3.6 03/15/2020 08:43 PM    Potassium (POC) 3.8 05/17/2016 07:47 AM    Chloride 111 (H) 03/15/2020 08:43 PM    Chloride (POC) 105 05/17/2016 07:47 AM    CO2 22 03/15/2020 08:43 PM Magnesium 2.1 06/17/2018 11:57 AM        ROS    Physical Exam    ASSESSMENT and PLAN  Diagnoses and all orders for this visit:    1. Acute sinusitis, recurrence not specified, unspecified location  -     amoxicillin-clavulanate (AUGMENTIN) 875-125 mg per tablet; Take 1 Tab by mouth two (2) times a day.      otc decongestants prn

## 2020-04-20 NOTE — TELEPHONE ENCOUNTER
Called, spoke to pt. Two identifiers confirmed. Virtual appointment scheduled for today @ [phone] with Dr. Song Skinner. Pt verbalized understanding of information discussed w/ no further questions at this time.

## 2020-04-27 RX ORDER — ONDANSETRON 4 MG/1
TABLET, FILM COATED ORAL
Qty: 20 TAB | Refills: 0 | Status: SHIPPED | OUTPATIENT
Start: 2020-04-27 | End: 2020-05-26

## 2020-04-28 ENCOUNTER — OFFICE VISIT (OUTPATIENT)
Dept: SURGERY | Age: 35
End: 2020-04-28

## 2020-04-28 VITALS
DIASTOLIC BLOOD PRESSURE: 92 MMHG | OXYGEN SATURATION: 99 % | BODY MASS INDEX: 44.94 KG/M2 | HEART RATE: 94 BPM | HEIGHT: 62 IN | TEMPERATURE: 98.4 F | SYSTOLIC BLOOD PRESSURE: 133 MMHG | WEIGHT: 244.2 LBS

## 2020-04-28 DIAGNOSIS — M25.511 ACUTE PAIN OF RIGHT SHOULDER: Primary | ICD-10-CM

## 2020-04-28 NOTE — PROGRESS NOTES
Chief Complaint   Patient presents with    Post OP Follow Up     I&D right axilla & arm abscess 2/27/20     1. Have you been to the ER, urgent care clinic since your last visit? Hospitalized since your last visit? No    2. Have you seen or consulted any other health care providers outside of the [address] [place] [address] since your last visit? Include any pap smears or colon screening.  No

## 2020-04-28 NOTE — Clinical Note
4/30/20 Patient: Matheus Segura YOB: 1985 Date of Visit: 4/28/2020 Loki Eric MD 
[address] [address] VIA In Basket Dear Loki Eric MD, Thank you for referring Ms. Louise Schneider to  [facility] for evaluation. My notes for this consultation are attached. If you have questions, please do not hesitate to call me. I look forward to following your patient along with you.  
 
 
Sincerely, 
 
Daniella Strickland MD

## 2020-04-30 NOTE — PROGRESS NOTES
To: Dayami Motta MD, Manon Call, MD    From: Rima Hussein MD    Thank you both. Encounter Date: 4/28/2020    Subjective:      Alberto Flores is a 37 y.o. female presents for evaluation of acute onset right shoulder pain. She had a right axillary abscess drained by me on 2/27/2020. This has healed extremely well. She says that she got her monoclonal antibody injection for her rheumatoid arthritis last week and then shoulder pain came on this week. She has not noticed any redness or drainage. Objective:     Visit Vitals  BP (!) 133/92   Pulse 94   Temp 98.4 °F (36.9 °C)   Ht 5' 2\" (1.575 m)   Wt 110.8 kg (244 lb 3.2 oz)   SpO2 99%   BMI 44.66 kg/m²       General:  alert, cooperative, no distress, appears stated age   Right upper extremity  there is no evidence of cellulitis visible. There is no cellulitis or collection on ultrasound. She is most tender over the rotator cuff. It is painful with passive movement. Assessment:     No evidence of soft tissue infection. Possible bursitis or frozen shoulder. Plan:     Her mom works for [address] [place] [address] and will have her seen by Dr. Claudia Finley. Just after the patient's visit here, he called me and let me know that he would be happy to see her.     Rima Hussein MD

## 2020-05-01 ENCOUNTER — TELEPHONE (OUTPATIENT)
Dept: INTERNAL MEDICINE CLINIC | Age: 35
End: 2020-05-01

## 2020-05-01 DIAGNOSIS — J40 BRONCHITIS: Primary | ICD-10-CM

## 2020-05-01 RX ORDER — AZITHROMYCIN 250 MG/1
250 TABLET, FILM COATED ORAL SEE ADMIN INSTRUCTIONS
Qty: 6 TAB | Refills: 0 | Status: SHIPPED | OUTPATIENT
Start: 2020-05-01 | End: 2020-05-06

## 2020-05-01 NOTE — TELEPHONE ENCOUNTER
#[phone] pt states she has the same thing as before and it has come back with a vengeance. Cough to the point of vomiting, nose running, sore throat and ear pain. Pt would like to know what Dr. Raheel Reyes would like to do for her? Last time she had an antibiotic. Please call.

## 2020-05-01 NOTE — TELEPHONE ENCOUNTER
Jumana Lee MD  You 28 minutes ago (11:59 AM)     I escribed a zpak    Routing comment      Pt notified.

## 2020-05-11 DIAGNOSIS — F41.9 ANXIETY: ICD-10-CM

## 2020-05-11 DIAGNOSIS — F51.01 PRIMARY INSOMNIA: ICD-10-CM

## 2020-05-11 RX ORDER — ALPRAZOLAM 1 MG/1
TABLET ORAL
Qty: 60 TAB | Refills: 1 | Status: SHIPPED | OUTPATIENT
Start: 2020-05-11 | End: 2020-07-01

## 2020-05-12 ENCOUNTER — TELEPHONE (OUTPATIENT)
Dept: INTERNAL MEDICINE CLINIC | Age: 35
End: 2020-05-12

## 2020-05-12 NOTE — TELEPHONE ENCOUNTER
----- Message from Judith Darby sent at 5/12/2020 11:02 AM EDT -----  Regarding: Dr. Theron Culver Rx request  Contact: [phone]  Patient is requesting a refill on Alprazolam to be sent to [facility] ( on file). Patient is almost out of her medication. Patient states that her psychiatrist is not able to fill it at this time (not taking patients due to [id number] ). Please follow up with her if there are questions.        Copy/paste envera

## 2020-05-26 RX ORDER — DULOXETIN HYDROCHLORIDE 60 MG/1
120 CAPSULE, DELAYED RELEASE ORAL DAILY
Qty: 30 CAP | Refills: 1 | Status: SHIPPED | OUTPATIENT
Start: 2020-05-26 | End: 2020-06-17

## 2020-05-26 RX ORDER — ONDANSETRON 4 MG/1
TABLET, FILM COATED ORAL
Qty: 20 TAB | Refills: 0 | Status: SHIPPED | OUTPATIENT
Start: 2020-05-26 | End: 2020-06-23

## 2020-05-26 NOTE — TELEPHONE ENCOUNTER
Reason for call:   Medication refill, usually filled by psychiatrist.     Jones Richardson required yes/no and why:   Yes, to let her know if the medication \"Duloxetine\" 60mg can be refilled for her.      Best contact number(s):   [phone]     Details to clarify the request:       Janet Loyola

## 2020-06-17 RX ORDER — DULOXETIN HYDROCHLORIDE 60 MG/1
CAPSULE, DELAYED RELEASE ORAL
Qty: 30 CAP | Refills: 1 | Status: SHIPPED | OUTPATIENT
Start: 2020-06-17 | End: 2020-07-14

## 2020-06-23 RX ORDER — ONDANSETRON 4 MG/1
TABLET, FILM COATED ORAL
Qty: 20 TAB | Refills: 0 | Status: SHIPPED | OUTPATIENT
Start: 2020-06-23 | End: 2020-07-04 | Stop reason: SDUPTHER

## 2020-06-23 RX ORDER — MIRTAZAPINE 30 MG/1
TABLET, FILM COATED ORAL
Qty: 30 TAB | Refills: 5 | Status: SHIPPED | OUTPATIENT
Start: 2020-06-23 | End: 2020-11-04

## 2020-06-24 ENCOUNTER — TELEPHONE (OUTPATIENT)
Dept: CARDIOLOGY CLINIC | Age: 35
End: 2020-06-24

## 2020-06-24 NOTE — TELEPHONE ENCOUNTER
LMVM to call me. Needs to schedule stress echo and FU appt with Kena Moody NP. (Never done due to Covid shut down).

## 2020-06-24 NOTE — TELEPHONE ENCOUNTER
----- Message from Tae Koch NP sent at 6/23/2020 10:15 PM EDT -----  Regarding: med renewal  Renewed Benicar for 1 month, patient was also supposed to have stress ECHO for reported chest pain,  the ECHO was done but never the stress test. This was when things wewre beginning to shut down for Covid. Please have her re book her stress ECHO and follow up with me.

## 2020-07-04 RX ORDER — ONDANSETRON 4 MG/1
TABLET, FILM COATED ORAL
Qty: 20 TAB | Refills: 0 | Status: SHIPPED | OUTPATIENT
Start: 2020-07-04 | End: 2020-07-14

## 2020-07-07 NOTE — TELEPHONE ENCOUNTER
Spoke with patient. Verified patient with two patient identifiers. States she will call and schedule both the Stress echo and FU with RADHA Stanford NP. Patient verbalized understanding.

## 2020-07-14 RX ORDER — DULOXETIN HYDROCHLORIDE 60 MG/1
CAPSULE, DELAYED RELEASE ORAL
Qty: 30 CAP | Refills: 1 | Status: SHIPPED | OUTPATIENT
Start: 2020-07-14 | End: 2020-08-26

## 2020-07-14 RX ORDER — ONDANSETRON 4 MG/1
TABLET, FILM COATED ORAL
Qty: 20 TAB | Refills: 0 | Status: SHIPPED | OUTPATIENT
Start: 2020-07-14 | End: 2020-07-27

## 2020-07-23 NOTE — PERIOP NOTES
Handoff Report from Operating Room to PACU    Report received from [facility] Hamilton Mayorga RN and Renay Burnham CRNA regarding Arlette Erazo. Surgeon(s):  Mariel Pham MD  And Procedure(s) (LRB):  RIGHT AXILLARY ABSCESS INCISION AND DRAINAGE (Right)  confirmed   with dressings discussed. Anesthesia type, drugs, patient history, complications, estimated blood loss, vital signs, intake and output, and last pain medication, lines, reversal medications and temperature were reviewed. [place] [facility]  [phone]    Vitals   Pre   Post   Assessment   Pre   Post     Temp  Temp: 98 °F (36.7 °C) (20)  98.0       LOC  A&O x 4 A&O x 4   HR   Pulse (Heart Rate): 69 (20) 62 lungs clear clear   B/P   BP: 130/73 (20) 141/82 Cardiac   Nsr Nsr   Resp   Resp Rate: 18 (20) 17 Skin   Warm,dry, intact Warm,dry, intact   Pain level  Pain Intensity 1: 0 (20) 0 Edema  generalized generalized   Orders:    Duration:   Start:    810 End:    Total:   4hrs   Dialyzer:   Dialyzer/Set Up Inspection: Iron Guzman (20)   K Bath:   Dialysate K (mEq/L): 3 (20)   Ca Bath:   Dialysate CA (mEq/L): 2.5 (20)   Na/Bicarb:   Dialysate NA (mEq/L): 140 (20)   Target Fluid Removal:   Goal/Amount of Fluid to Remove (mL): 3000 mL (20)   Access     Type & Location:   STEPHANIE AVG: skin CDI. No s/s of infection. No issues with cannulation or hemostasis. Running well at   Positive B/T      Labs     Obtained/Reviewed   Critical Results Called   Date when labs were drawn-  Hgb-    HGB   Date Value Ref Range Status   2020 10.1 (L) 12.1 - 17.0 g/dL Final     K-    Potassium   Date Value Ref Range Status   2020 3.8 3.5 - 5.1 mmol/L Final     Ca-   Calcium   Date Value Ref Range Status   2020 8.8 8.5 - 10.1 MG/DL Final     Bun-   BUN   Date Value Ref Range Status   2020 44 (H) 6 - 20 MG/DL Final     Creat-   Creatinine   Date Value Ref Range Status   2020 5.13 (H) 0.70 - 1.30 MG/DL Final        Medications/ Blood Products Given     Name   Dose   Route and Time     None Ordered                Blood Volume Processed (BVP):    91.4 Net Fluid   Removed:  3000ml   Comments   Time Out EZU   Primary Nurse Rpt Pre:Rimma Escobedo RN  Primary Nurse Rpt Post:Linn He Headings RN  Pt Education:Procedure  Care Plan: ongoing tx. Tx Summary:    Pt arrived to HD [address].  Consent signed & on file. SBAR received from Primary RN. 3837: Pt cannulated with 46M needles per policy & without issue. VSS. Dialysis Tx initiated. 0945: No issued noted during treatment. 12:15 Tx ended. VSS. All possible blood returned to patient. Hemostasis achieved without issue. Bed locked and in the lowest position, call bell and belongings in reach. SBAR given to Primary, RN. Patient is stable at time of their departure. All Dialysis related medications have been reviewed. Admiting Diagnosis: SOB/Fluid Overload  Pt's previous [facility]  Consent signed - Informed Consent Verified: Yes (07/21/20 0823)  [place] Consent - on file  Hepatitis Status- Neg Ag 1/16/20/ HbsAb 162 1/9/20    Machine #- Machine Number: [id number] (07/23/20 0732)  Telemetry status--tele, NSR  Pre-dialysis wt. -

## 2020-07-27 RX ORDER — ONDANSETRON 4 MG/1
TABLET, FILM COATED ORAL
Qty: 20 TAB | Refills: 0 | Status: SHIPPED | OUTPATIENT
Start: 2020-07-27 | End: 2020-09-04

## 2020-07-28 ENCOUNTER — CLINICAL SUPPORT (OUTPATIENT)
Dept: CARDIOLOGY CLINIC | Age: 35
End: 2020-07-28

## 2020-07-28 DIAGNOSIS — R00.2 PALPITATIONS: Primary | ICD-10-CM

## 2020-08-04 RX ORDER — PROMETHAZINE HYDROCHLORIDE 25 MG/1
TABLET ORAL
Qty: 30 TAB | Refills: 0 | Status: SHIPPED | OUTPATIENT
Start: 2020-08-04 | End: 2020-10-13

## 2020-08-06 NOTE — PROGRESS NOTES
Aysha: Please call patient the event monitor finds normal rhythm. When she was reporting lightheadedness heart racing there was no correlation with any arrhythmias of her heart.   Essentially the study is normal without any abnormal findings
Left voicemail message for a call back.
Patient received a 1 week event monitor. Instructions given verbally as well as an instruction sheet. Pt verbalized understanding.     [facility] Event Monitoring
Spoke to patient using 2 identifiers. Per Maggie Deluna NP, patient was made aware the event monitor finds normal rhythm. When she was reporting lightheadedness heart racing there was no correlation with any arrhythmias of her heart. Essentially the study is normal without any abnormal findings     Patient stated that she was in the hospital x 2 wks ago for a GI bleed. Was on the monitor and HR went from 60 to 90 to 120. Was asked if she has afib. She reported on and off palpitations with chest pain, lasting about 5 mins. This happens when she is reading or folding clothes. Goes away on its own. Last seen on 2/19/20. Would you like for her to follow up with you?
Principal Discharge DX:	Palpitations

## 2020-08-26 ENCOUNTER — OFFICE VISIT (OUTPATIENT)
Dept: CARDIOLOGY CLINIC | Age: 35
End: 2020-08-26
Payer: COMMERCIAL

## 2020-08-26 VITALS
BODY MASS INDEX: 46.78 KG/M2 | RESPIRATION RATE: 18 BRPM | HEART RATE: 102 BPM | SYSTOLIC BLOOD PRESSURE: 112 MMHG | WEIGHT: 254.2 LBS | DIASTOLIC BLOOD PRESSURE: 76 MMHG | OXYGEN SATURATION: 96 % | HEIGHT: 62 IN

## 2020-08-26 DIAGNOSIS — R00.0 INAPPROPRIATE SINUS TACHYCARDIA: ICD-10-CM

## 2020-08-26 DIAGNOSIS — R00.2 RAPID PALPITATIONS: ICD-10-CM

## 2020-08-26 DIAGNOSIS — F41.9 ANXIETY: ICD-10-CM

## 2020-08-26 DIAGNOSIS — I10 ESSENTIAL HYPERTENSION: Primary | ICD-10-CM

## 2020-08-26 PROCEDURE — 99214 OFFICE O/P EST MOD 30 MIN: CPT | Performed by: NURSE PRACTITIONER

## 2020-08-26 RX ORDER — DULOXETIN HYDROCHLORIDE 60 MG/1
CAPSULE, DELAYED RELEASE ORAL
Qty: 30 CAP | Refills: 1 | Status: SHIPPED | OUTPATIENT
Start: 2020-08-26 | End: 2020-10-08

## 2020-08-26 NOTE — PROGRESS NOTES
[facility] @ [facility]    Leticia Jaramillo DNP, ANP-BC  Subjective/HPI:     Cristina Hernandez is a 37 y.o. female is here for routine f/u. Patient reports she continues to have intermittent fluctuations in her blood pressure, hypertensive when in pain. Has been tolerating Benicar 5 mg daily. Was admitted to [facility] facility for upper GI bleed, she reports hypertensive event during the initial part of her admission was given a total of 15 mg of Benicar which lowered her blood pressure. Since her discharge she has maintained 5 mg daily. She was at [facility] last week for renal stone. [employer] is continuing to experience fluttering and palpitations, she wore an event monitor for 5 days and she reports during those 5 days she was asymptomatic, on chart review it did report that she had experienced chest pain and lightheadedness at times which did not correlate to any arrhythmias. Event monitor 8/2020  Results   Arrhthymias:normal     Symptom Correlation:   Cp/lightheadedness/heart racing do not correlate with any arrhythmias     Comments:   Sinus rhythm with symptoms that do not correlate with any arrhythmias       Stress ECHO 7/2020  Interpretation Summary     · Baseline ECG: Normal sinus rhythm. · Negative stress test.  · Normal stress echocardiogram. Low risk study. · Low risk [facility] treadmill score. 2/2020 ECHO  Echo Findings     Left Ventricle  Normal cavity size and systolic function (ejection fraction normal). Mild concentric hypertrophy . False tendon is noted. The estimated ejection fraction is 60 - 65%. LV wall motion is noted to be no regional wall motion abnormality. There is mild (grade 1) left ventricular diastolic dysfunction. Left Atrium  Normal cavity size. Right Ventricle  Normal cavity size and global systolic function. Right Atrium  Normal cavity size. Aortic Valve  Aortic valve not well visualized. No stenosis and no regurgitation.     Mitral Valve  Normal valve structure, no stenosis and no regurgitation. Tricuspid Valve  Tricuspid valve not well visualized. No stenosis and no regurgitation. There is no evidence of pulmonary hypertension. Pulmonic Valve  Pulmonic valve not well visualized. No stenosis and no regurgitation. Aorta  Normal aortic root and ascending aorta. Pericardium  No evidence of pericardial effusion.           PCP Provider  Jesus Andrews MD  Past Medical History:   Diagnosis Date    Abnormal CT of the abdomen 11/8/2012    Asthma     Autoimmune disease ([facility]     lupus    C. difficile diarrhea     Cervical prolapse     Dehydration     Gastrointestinal disorder     gerd, twisted colon, IBS    GERD (gastroesophageal reflux disease)     Headache(784.0)     Lupus ([address]     Morbid obesity (HCC)     Nausea & vomiting     Neurological disorder     cluster headaches    other 2006, 2009    ovarian cyst removal    Other ill-defined conditions(799.89)     Worsening headaches       Past Surgical History:   Procedure Laterality Date    COLONOSCOPY  9/21/2010         HX CHOLECYSTECTOMY      HX CYST INCISION AND DRAINAGE Right 02/27/2020    HX GYN  1/2009    right salpingo oopherectomy    HX GYN  2006    right ovarian tumor removed    HX HEENT      left wisdom teeth removal    HX HEENT      top right wisdom tooth removed    HX HERNIA REPAIR  4/2012    HX HERNIA REPAIR  2/28/13    Laparoscopic recurrent incisional hernia repair    HX HYSTERECTOMY      2016    HX UROLOGICAL      Kidney Stone Removal    NV EGD TRANSORAL BIOPSY SINGLE/MULTIPLE  9/22/2010          Allergies   Allergen Reactions    Latex Itching     burning    Compazine [Prochlorperazine] Anxiety     High heart rate  Pt can take promethazine with no problems    Sulfa (Sulfonamide Antibiotics) Unknown (comments)    Topamax [Topiramate] Unknown (comments)    Adhesive Rash     Allergic to Dermabond    Clindamycin Diarrhea     Pt states caused her C-Diff    Mushroom Other (comments)    Mushroom Combination No.1 Unknown (comments)    Toradol [Ketorolac Tromethamine] Nausea and Vomiting and Other (comments)     PO & IV causes GI bleed  Tolerated during Feb 2020 stay    Valproic Acid Other (comments)     Elevated heart rate and vomiting        Family History   Problem Relation Age of Onset    Colon Cancer Maternal Grandfather       Current Outpatient Medications   Medication Sig    DULoxetine (CYMBALTA) 60 mg capsule TAKE 2 CAPSULES BY MOUTH DAILY    promethazine (PHENERGAN) 25 mg tablet TAKE 1 TABLET BY MOUTH EVERY 6 HOURS AS NEEDED FOR NAUSEA    ondansetron hcl (ZOFRAN) 4 mg tablet TAKE 1 TABLET BY MOUTH EVERY 8 HOURS AS NEEDED FOR FOR NAUSEA    ALPRAZolam (XANAX) 1 mg tablet TAKE 1 TABLET BY MOUTH TWICE DAILY AS NEEDED FOR ANXIETY MAXIMUM DAILY DOSE 2    mirtazapine (REMERON) 30 mg tablet TAKE 1 TABLET BY MOUTH AT BEDTIME    olmesartan (BENICAR) 5 mg tablet TAKE 1 TABLET BY MOUTH DAILY    RisaQuad-2 16 billion cell cap cap TAKE ONE CAPSULE BY MOUTH ONCE DAILY    albuterol (PROVENTIL HFA, VENTOLIN HFA, PROAIR HFA) 90 mcg/actuation inhaler Take 1 Puff by inhalation every six (6) hours as needed for Wheezing.  furosemide (LASIX) 20 mg tablet PRN    tiZANidine (ZANAFLEX) 4 mg tablet     doxycycline (MONODOX) 100 mg capsule Take 100 mg by mouth as needed.  estradiol (ESTRACE) 1 mg tablet Take 1 mg by mouth daily.  pantoprazole (PROTONIX) 40 mg tablet take 1 tablet by mouth at bedtime    rizatriptan (MAXALT) 10 mg tablet Take 10 mg by mouth once as needed for Migraine. May repeat in 2 hours if needed    belimumab (BENLYSTA) 400 mg solr injection by IntraVENous route.  naproxen (NAPROSYN) 500 mg tablet TK 1 TA PO BID    tamsulosin (FLOMAX) 0.4 mg capsule 0.4 mg.    HYDROcodone-acetaminophen (NORCO) 5-325 mg per tablet 1 Tab.  oxyCODONE-acetaminophen (PERCOCET) 5-325 mg per tablet 1-2 Tabs.     ondansetron (ZOFRAN ODT) 4 mg disintegrating tablet 4 mg.  ketorolac (TORADOL) 10 mg tablet TK 1 T PO Q 6 H PRN FOR PAIN    penicillin v potassium (VEETID) 500 mg tablet 500 mg.  ibuprofen (MOTRIN) 600 mg tablet Take 1 Tab by mouth three (3) times daily (with meals).  enalapril (VASOTEC) 5 mg tablet TAKE 1 TABLET BY MOUTH DAILY AS NEEDED FOR HYPERTENSION    naloxone (NARCAN) 4 mg/actuation nasal spray Use 1 spray intranasally, then discard. Repeat with new spray every 2 min as needed for opioid overdose symptoms, alternating nostrils.  indomethacin (INDOCIN) 25 mg capsule PRN    codeine-butalbital-acetaminophen-caffeine (FIORICET WITH CODEINE) -93-30 mg capsule take 2 capsules by mouth every 6 hours if needed for MIGRAINE HEADACHE     No current facility-administered medications for this visit.        Vitals:    08/26/20 1527 08/26/20 1538   BP: 118/78 112/76   Pulse: (!) 102    Resp: 18    SpO2: 96%    Weight: 254 lb 3.2 oz (115.3 kg)    Height: 5' 2\" (1.575 m)      Social History     Socioeconomic History    Marital status: LEGALLY      Spouse name: Not on file    Number of children: Not on file    Years of education: Not on file    Highest education level: Not on file   Occupational History    Not on file   Social Needs    Financial resource strain: Not on file    Food insecurity     Worry: Not on file     Inability: Not on file    Transportation needs     Medical: Not on file     Non-medical: Not on file   Tobacco Use    Smoking status: Never Smoker    Smokeless tobacco: Never Used   Substance and Sexual Activity    Alcohol use: No    Drug use: No    Sexual activity: Not Currently     Partners: Male     Birth control/protection: Abstinence   Lifestyle    Physical activity     Days per week: Not on file     Minutes per session: Not on file    Stress: Not on file   Relationships    Social connections     Talks on phone: Not on file     Gets together: Not on file     Attends Buddhism service: Not on file     Active member of club or organization: Not on file     Attends meetings of clubs or organizations: Not on file     Relationship status: Not on file    Intimate partner violence     Fear of current or ex partner: Not on file     Emotionally abused: Not on file     Physically abused: Not on file     Forced sexual activity: Not on file   Other Topics Concern    Not on file   Social History Narrative    Not on file       I have reviewed the nurses notes, vitals, problem list, allergy list, medical history, family, social history and medications. Review of Symptoms:  11 systems reviewed, negative other than as stated in the HPI      Physical Exam:      General: Well developed, in no acute distress, cooperative and alert  HEENT: No carotid bruits, no JVD, trach is midline. Neck Supple, PERRL, EOM intact. Heart:  Normal S1/S2 negative S3 or S4. Regular, no murmur, gallop or rub. Respiratory: Clear bilaterally x 4, no wheezing or rales  Abdomen:   Soft, non-tender, no masses, bowel sounds are active. Extremities:  No edema, normal cap refill, no cyanosis, atraumatic. Neuro: A&Ox3, speech clear, gait stable. Skin: Skin color is normal. No rashes or lesions.  Non diaphoretic  Vascular: 2+ pulses symmetric in all extremities    Cardiographics    ECG:         Cardiology Labs:  No results found for: CHOL, CHOLX, CHLST, CHOLV, 770902, HDL, HDLP, LDL, LDLC, DLDLP, TGLX, TRIGL, TRIGP, CHHD, CHHDX    Lab Results   Component Value Date/Time    Sodium 140 03/15/2020 08:43 PM    Potassium 3.6 03/15/2020 08:43 PM    Chloride 111 (H) 03/15/2020 08:43 PM    CO2 22 03/15/2020 08:43 PM    Anion gap 7 03/15/2020 08:43 PM    Glucose 102 (H) 03/15/2020 08:43 PM    BUN 18 03/15/2020 08:43 PM    Creatinine 1.06 (H) 03/15/2020 08:43 PM    BUN/Creatinine ratio 17 03/15/2020 08:43 PM    GFR est AA >60 03/15/2020 08:43 PM    GFR est non-AA 59 (L) 03/15/2020 08:43 PM    Calcium 8.4 (L) 03/15/2020 08:43 PM    Bilirubin, total 0.3 02/22/2020 04:47 PM    Alk. phosphatase 109 02/22/2020 04:47 PM    Protein, total 8.2 02/22/2020 04:47 PM    Albumin 4.5 02/22/2020 04:47 PM    Globulin 3.7 02/22/2020 04:47 PM    A-G Ratio 1.2 02/22/2020 04:47 PM    ALT (SGPT) 19 02/22/2020 04:47 PM           Assessment:     Assessment:     Diagnoses and all orders for this visit:    1. Essential hypertension    2. Inappropriate sinus tachycardia    3. Rapid palpitations  -     LOOP MONITOR    4. Anxiety        ICD-10-CM ICD-9-CM    1. Essential hypertension  I10 401.9    2. Inappropriate sinus tachycardia  R00.0 427.89    3. Rapid palpitations  R00.2 785.1 LOOP MONITOR   4. Anxiety  F41.9 300.00      Orders Placed This Encounter    LOOP MONITOR, Clinic Performed     30 day event monitor     Order Specific Question:   Reason for Exam:     Answer:   tachycardic events        Plan:     1. Hypertension: Controlled 112/76 continue Benicar 5 mg daily, patient advised she could intermittently take an additional dose of 5 mg if she is hypertensive secondary to abdominal pain or migraine pain. 2.  Intermittent palpitations: Patient continuing to experience fluttering and palpitations reporting that during the 5-day event monitor she did not have any tachycardic events, she reports episodes of elevated heart rates during her admission at [facility]. She would benefit from an event monitor for 30 days, it has been ordered. We will follow-up with patient once 30-day event monitor results are posted. Talia Rodriguez NP    10/2/2020: Event monitor showing symptomatic sinus tach. Pt agree to start toprol 25mg QHS and may need to reduce or D/C benicar if BP trending low, has home BP monitor will keep office posted. Please note that this dictation was completed with [employer] the [employer] voice recognition software. Quite often unanticipated grammatical, syntax, homophones, and other interpretive errors are inadvertently transcribed by the computer software.   Please disregard these errors. Please excuse any errors that have escaped final proofreading. Thank you.

## 2020-08-26 NOTE — PROGRESS NOTES
1. Have you been to the ER, urgent care clinic since your last visit? Hospitalized since your last visit? Yes, on 8/22/20 at [facility]    2. Have you seen or consulted any other health care providers outside of the [address] [place] [address] since your last visit? Include any pap smears or colon screening.  No    Chief Complaint   Patient presents with    Hypertension     FOLLOW UP; reports high /122 when seen at [facility] [address] Palpitations     occasional

## 2020-08-27 RX ORDER — OLMESARTAN MEDOXOMIL 5 MG/1
TABLET ORAL
Qty: 90 TAB | Refills: 3 | Status: ON HOLD | OUTPATIENT
Start: 2020-08-27 | End: 2021-08-29

## 2020-08-28 ENCOUNTER — CLINICAL SUPPORT (OUTPATIENT)
Dept: CARDIOLOGY CLINIC | Age: 35
End: 2020-08-28
Payer: COMMERCIAL

## 2020-08-28 DIAGNOSIS — R00.2 PALPITATIONS: Primary | ICD-10-CM

## 2020-08-28 PROCEDURE — 93270 REMOTE 30 DAY ECG REV/REPORT: CPT | Performed by: INTERNAL MEDICINE

## 2020-08-28 PROCEDURE — 93272 ECG/REVIEW INTERPRET ONLY: CPT | Performed by: INTERNAL MEDICINE

## 2020-08-28 NOTE — PROGRESS NOTES
Patient received a 30 DAY event monitor. Instructions given verbally as well as an instruction sheet. Pt verbalized understanding.     [facility] Event Monitoring

## 2020-09-04 ENCOUNTER — TELEPHONE (OUTPATIENT)
Dept: INTERNAL MEDICINE CLINIC | Age: 35
End: 2020-09-04

## 2020-09-04 DIAGNOSIS — K04.7 TOOTH ABSCESS: Primary | ICD-10-CM

## 2020-09-04 RX ORDER — ONDANSETRON 4 MG/1
TABLET, FILM COATED ORAL
Qty: 20 TAB | Refills: 0 | Status: SHIPPED | OUTPATIENT
Start: 2020-09-04 | End: 2020-09-22

## 2020-09-04 RX ORDER — AMOXICILLIN 500 MG/1
500 CAPSULE ORAL 2 TIMES DAILY
Qty: 14 CAP | Refills: 0 | OUTPATIENT
Start: 2020-09-04 | End: 2020-09-06

## 2020-09-04 NOTE — TELEPHONE ENCOUNTER
She should not wait until December. 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 See rx.

## 2020-09-04 NOTE — TELEPHONE ENCOUNTER
#[phone] pt states she has an abscess tooth. She can not get into the dentist until Dec. With Medicaid. Pt is asking for penicillin 500 mg to be called into [facility] on file. Call pt with questions and to let her know what you can do. Thanks.

## 2020-09-04 NOTE — TELEPHONE ENCOUNTER
Called, spoke to pt. Two identifiers confirmed. Notified pt Dr. Beronica Cox is out of the office but message will be sent to on call md.   Recommended [facility] going to [facility]. Pt verbalized understanding of information discussed w/ no further questions at this time.

## 2020-09-06 ENCOUNTER — HOSPITAL ENCOUNTER (EMERGENCY)
Age: 35
Discharge: HOME | End: 2020-09-06
Attending: EMERGENCY MEDICINE
Payer: COMMERCIAL

## 2020-09-06 VITALS
DIASTOLIC BLOOD PRESSURE: 88 MMHG | HEIGHT: 63 IN | TEMPERATURE: 99 F | BODY MASS INDEX: 44.38 KG/M2 | HEART RATE: 120 BPM | SYSTOLIC BLOOD PRESSURE: 145 MMHG | WEIGHT: 250.44 LBS | OXYGEN SATURATION: 97 % | RESPIRATION RATE: 16 BRPM

## 2020-09-06 DIAGNOSIS — L02.214 ABSCESS OF LEFT GROIN: Primary | ICD-10-CM

## 2020-09-06 LAB
BASOPHILS # BLD: 0.1 K/UL (ref 0–0.1)
BASOPHILS NFR BLD: 0 % (ref 0–1)
DIFFERENTIAL METHOD BLD: ABNORMAL
EOSINOPHIL # BLD: 0.3 K/UL (ref 0–0.4)
EOSINOPHIL NFR BLD: 3 % (ref 0–7)
ERYTHROCYTE [DISTWIDTH] IN BLOOD BY AUTOMATED COUNT: 12.7 % (ref 11.5–14.5)
HCT VFR BLD AUTO: 38.4 % (ref 35–47)
HGB BLD-MCNC: 13 G/DL (ref 11.5–16)
IMM GRANULOCYTES # BLD AUTO: 0.1 K/UL (ref 0–0.04)
IMM GRANULOCYTES NFR BLD AUTO: 0 % (ref 0–0.5)
LACTATE BLD-SCNC: 1.39 MMOL/L (ref 0.4–2)
LYMPHOCYTES # BLD: 2.8 K/UL (ref 0.8–3.5)
LYMPHOCYTES NFR BLD: 24 % (ref 12–49)
MCH RBC QN AUTO: 31 PG (ref 26–34)
MCHC RBC AUTO-ENTMCNC: 33.9 G/DL (ref 30–36.5)
MCV RBC AUTO: 91.4 FL (ref 80–99)
MONOCYTES # BLD: 1.1 K/UL (ref 0–1)
MONOCYTES NFR BLD: 10 % (ref 5–13)
NEUTS SEG # BLD: 7.6 K/UL (ref 1.8–8)
NEUTS SEG NFR BLD: 63 % (ref 32–75)
NRBC # BLD: 0 K/UL (ref 0–0.01)
NRBC BLD-RTO: 0 PER 100 WBC
PLATELET # BLD AUTO: 370 K/UL (ref 150–400)
PMV BLD AUTO: 9.5 FL (ref 8.9–12.9)
RBC # BLD AUTO: 4.2 M/UL (ref 3.8–5.2)
WBC # BLD AUTO: 12 K/UL (ref 3.6–11)

## 2020-09-06 PROCEDURE — 96367 TX/PROPH/DG ADDL SEQ IV INF: CPT

## 2020-09-06 PROCEDURE — 36415 COLL VENOUS BLD VENIPUNCTURE: CPT

## 2020-09-06 PROCEDURE — 74011250636 HC RX REV CODE- 250/636: Performed by: EMERGENCY MEDICINE

## 2020-09-06 PROCEDURE — 99283 EMERGENCY DEPT VISIT LOW MDM: CPT

## 2020-09-06 PROCEDURE — 74011000258 HC RX REV CODE- 258: Performed by: EMERGENCY MEDICINE

## 2020-09-06 PROCEDURE — 96365 THER/PROPH/DIAG IV INF INIT: CPT

## 2020-09-06 PROCEDURE — 83605 ASSAY OF LACTIC ACID: CPT

## 2020-09-06 PROCEDURE — 85025 COMPLETE CBC W/AUTO DIFF WBC: CPT

## 2020-09-06 PROCEDURE — 96375 TX/PRO/DX INJ NEW DRUG ADDON: CPT

## 2020-09-06 PROCEDURE — 75810000289 HC I&D ABSCESS SIMP/COMP/MULT

## 2020-09-06 RX ORDER — CEPHALEXIN 500 MG/1
500 CAPSULE ORAL 4 TIMES DAILY
Qty: 28 CAP | Refills: 0 | Status: SHIPPED | OUTPATIENT
Start: 2020-09-06 | End: 2020-09-06

## 2020-09-06 RX ORDER — ONDANSETRON 2 MG/ML
4 INJECTION INTRAMUSCULAR; INTRAVENOUS
Status: COMPLETED | OUTPATIENT
Start: 2020-09-06 | End: 2020-09-06

## 2020-09-06 RX ORDER — DOXYCYCLINE HYCLATE 100 MG
100 TABLET ORAL 2 TIMES DAILY
Qty: 20 TAB | Refills: 0 | Status: SHIPPED | OUTPATIENT
Start: 2020-09-06 | End: 2020-09-16

## 2020-09-06 RX ORDER — CEPHALEXIN 500 MG/1
500 CAPSULE ORAL 4 TIMES DAILY
Qty: 28 CAP | Refills: 0 | Status: SHIPPED | OUTPATIENT
Start: 2020-09-06 | End: 2020-09-13

## 2020-09-06 RX ORDER — DOXYCYCLINE HYCLATE 100 MG
100 TABLET ORAL 2 TIMES DAILY
Qty: 20 TAB | Refills: 0 | Status: SHIPPED | OUTPATIENT
Start: 2020-09-06 | End: 2020-09-06

## 2020-09-06 RX ORDER — TRAMADOL HYDROCHLORIDE 50 MG/1
50 TABLET ORAL
Qty: 10 TAB | Refills: 0 | Status: SHIPPED | OUTPATIENT
Start: 2020-09-06 | End: 2020-09-06

## 2020-09-06 RX ORDER — LORAZEPAM 2 MG/ML
1 INJECTION INTRAMUSCULAR
Status: COMPLETED | OUTPATIENT
Start: 2020-09-06 | End: 2020-09-06

## 2020-09-06 RX ORDER — FENTANYL CITRATE 50 UG/ML
50 INJECTION, SOLUTION INTRAMUSCULAR; INTRAVENOUS
Status: COMPLETED | OUTPATIENT
Start: 2020-09-06 | End: 2020-09-06

## 2020-09-06 RX ORDER — KETOROLAC TROMETHAMINE 30 MG/ML
15 INJECTION, SOLUTION INTRAMUSCULAR; INTRAVENOUS
Status: COMPLETED | OUTPATIENT
Start: 2020-09-06 | End: 2020-09-06

## 2020-09-06 RX ORDER — LIDOCAINE HYDROCHLORIDE AND EPINEPHRINE 20; 10 MG/ML; UG/ML
1.5 INJECTION, SOLUTION INFILTRATION; PERINEURAL ONCE
Status: DISCONTINUED | OUTPATIENT
Start: 2020-09-06 | End: 2020-09-06 | Stop reason: HOSPADM

## 2020-09-06 RX ORDER — TRAMADOL HYDROCHLORIDE 50 MG/1
50 TABLET ORAL
Qty: 10 TAB | Refills: 0 | Status: SHIPPED | OUTPATIENT
Start: 2020-09-06 | End: 2020-09-09

## 2020-09-06 RX ADMIN — FENTANYL CITRATE 50 MCG: 50 INJECTION, SOLUTION INTRAMUSCULAR; INTRAVENOUS at 15:35

## 2020-09-06 RX ADMIN — ONDANSETRON 4 MG: 2 INJECTION INTRAMUSCULAR; INTRAVENOUS at 14:30

## 2020-09-06 RX ADMIN — CEFTRIAXONE 1 G: 1 INJECTION, POWDER, FOR SOLUTION INTRAMUSCULAR; INTRAVENOUS at 14:31

## 2020-09-06 RX ADMIN — KETOROLAC TROMETHAMINE 15 MG: 30 INJECTION, SOLUTION INTRAMUSCULAR at 14:30

## 2020-09-06 RX ADMIN — LORAZEPAM 1 MG: 2 INJECTION INTRAMUSCULAR; INTRAVENOUS at 15:35

## 2020-09-06 RX ADMIN — DOXYCYCLINE 100 MG: 100 INJECTION, POWDER, LYOPHILIZED, FOR SOLUTION INTRAVENOUS at 15:31

## 2020-09-06 RX ADMIN — SODIUM CHLORIDE 1000 ML: 900 INJECTION, SOLUTION INTRAVENOUS at 15:31

## 2020-09-06 NOTE — ED PROVIDER NOTES
EMERGENCY DEPARTMENT HISTORY AND PHYSICAL EXAM      Date: 9/6/2020  Patient Name: Tigist Hensley  Patient Age and Sex: 37 y.o. female     History of Presenting Illness     Chief Complaint   Patient presents with    Abscess     Ambulatory into the ED with c/o abscess to Lt groin x 3 days. Reports abscess/cellulitis of RUE in February of this year, requiring hospital admission. History Provided By: Patient    HPI: Tigist Hensley is a 73-year-old female with a history of lupus, breast abscess in the past presenting with abscess to the left groin. Patient states that 3 days ago started to notice a abscess in her left groin region. States it has become very very painful. Denies any fevers, nausea, vomiting, drainage from the area. States that when she had her breast abscess earlier this year, she required hospitalization, 8 days of antibiotics and multiple surgeries with Dr. Chandrika Cheng. Spoke with Dr. Juice Layton on call today who referred her to the ER. There are no other complaints, changes, or physical findings at this time. PCP: Fadia Rachel MD    No current facility-administered medications on file prior to encounter. Current Outpatient Medications on File Prior to Encounter   Medication Sig Dispense Refill    ondansetron hcl (ZOFRAN) 4 mg tablet TAKE 1 TABLET BY MOUTH EVERY 8 HOURS AS NEEDED FOR FOR NAUSEA 20 Tab 0    [DISCONTINUED] amoxicillin (AMOXIL) 500 mg capsule Take 1 Cap by mouth two (2) times a day for 7 days. 14 Cap 0    olmesartan (BENICAR) 5 mg tablet TAKE 1 TABLET BY MOUTH DAILY 90 Tab 3    DULoxetine (CYMBALTA) 60 mg capsule TAKE 2 CAPSULES BY MOUTH DAILY 30 Cap 1    naproxen (NAPROSYN) 500 mg tablet TK 1 TA PO BID      tamsulosin (FLOMAX) 0.4 mg capsule 0.4 mg.      [DISCONTINUED] HYDROcodone-acetaminophen (NORCO) 5-325 mg per tablet 1 Tab.  [DISCONTINUED] oxyCODONE-acetaminophen (PERCOCET) 5-325 mg per tablet 1-2 Tabs.       [DISCONTINUED] ondansetron (ZOFRAN ODT) 4 mg disintegrating tablet 4 mg.  [DISCONTINUED] ketorolac (TORADOL) 10 mg tablet TK 1 T PO Q 6 H PRN FOR PAIN      [DISCONTINUED] penicillin v potassium (VEETID) 500 mg tablet 500 mg.  promethazine (PHENERGAN) 25 mg tablet TAKE 1 TABLET BY MOUTH EVERY 6 HOURS AS NEEDED FOR NAUSEA 30 Tab 0    ALPRAZolam (XANAX) 1 mg tablet TAKE 1 TABLET BY MOUTH TWICE DAILY AS NEEDED FOR ANXIETY MAXIMUM DAILY DOSE 2 60 Tab 3    mirtazapine (REMERON) 30 mg tablet TAKE 1 TABLET BY MOUTH AT BEDTIME 30 Tab 5    RisaQuad-2 16 billion cell cap cap TAKE ONE CAPSULE BY MOUTH ONCE DAILY 30 Cap 1    [DISCONTINUED] ibuprofen (MOTRIN) 600 mg tablet Take 1 Tab by mouth three (3) times daily (with meals). 21 Tab 0    enalapril (VASOTEC) 5 mg tablet TAKE 1 TABLET BY MOUTH DAILY AS NEEDED FOR HYPERTENSION 30 Tab 5    albuterol (PROVENTIL HFA, VENTOLIN HFA, PROAIR HFA) 90 mcg/actuation inhaler Take 1 Puff by inhalation every six (6) hours as needed for Wheezing. 1 Inhaler 0    [DISCONTINUED] naloxone (NARCAN) 4 mg/actuation nasal spray Use 1 spray intranasally, then discard. Repeat with new spray every 2 min as needed for opioid overdose symptoms, alternating nostrils. 1 Each 0    furosemide (LASIX) 20 mg tablet PRN  0    tiZANidine (ZANAFLEX) 4 mg tablet   0    estradiol (ESTRACE) 1 mg tablet Take 1 mg by mouth daily. 0    [DISCONTINUED] doxycycline (MONODOX) 100 mg capsule Take 100 mg by mouth as needed. 0    indomethacin (INDOCIN) 25 mg capsule PRN      pantoprazole (PROTONIX) 40 mg tablet take 1 tablet by mouth at bedtime  0    codeine-butalbital-acetaminophen-caffeine (FIORICET WITH CODEINE) -15-30 mg capsule take 2 capsules by mouth every 6 hours if needed for MIGRAINE HEADACHE 90 Cap 0    rizatriptan (MAXALT) 10 mg tablet Take 10 mg by mouth once as needed for Migraine. May repeat in 2 hours if needed      belimumab (BENLYSTA) 400 mg solr injection by IntraVENous route.          Past History Past Medical History:  Past Medical History:   Diagnosis Date    Abnormal CT of the abdomen 11/8/2012    Asthma     Autoimmune disease (HCC)     lupus    C. difficile diarrhea     Cervical prolapse     Dehydration     Mariah-Danlos syndrome     Gastrointestinal disorder     gerd, twisted colon, IBS    GERD (gastroesophageal reflux disease)     Headache(784.0)     Lupus (HCC)     Morbid obesity (HCC)     Nausea & vomiting     Neurological disorder     cluster headaches    Occipital neuralgia     other 2006, 2009    ovarian cyst removal    Other ill-defined conditions(799.89)     Worsening headaches        Past Surgical History:  Past Surgical History:   Procedure Laterality Date    COLONOSCOPY  9/21/2010         HX CHOLECYSTECTOMY      HX CYST INCISION AND DRAINAGE Right 02/27/2020    HX GYN  1/2009    right salpingo oopherectomy    HX GYN  2006    right ovarian tumor removed    HX HEENT      left wisdom teeth removal    HX HEENT      top right wisdom tooth removed    HX HERNIA REPAIR  4/2012    HX HERNIA REPAIR  2/28/13    Laparoscopic recurrent incisional hernia repair    HX HYSTERECTOMY      2016    HX UROLOGICAL      Kidney Stone Removal    NE EGD TRANSORAL BIOPSY SINGLE/MULTIPLE  9/22/2010            Family History:  Family History   Problem Relation Age of Onset    Colon Cancer Maternal Grandfather        Social History:  Social History     Tobacco Use    Smoking status: Never Smoker    Smokeless tobacco: Never Used   Substance Use Topics    Alcohol use: No    Drug use: No       Allergies:   Allergies   Allergen Reactions    Latex Itching     burning    Compazine [Prochlorperazine] Anxiety     High heart rate  Pt can take promethazine with no problems    Sulfa (Sulfonamide Antibiotics) Unknown (comments)    Topamax [Topiramate] Unknown (comments)    Adhesive Rash     Allergic to Dermabond    Clindamycin Diarrhea     Pt states caused her C-Diff    Mushroom Other (comments)    Mushroom Combination No.1 Unknown (comments)    Toradol [Ketorolac Tromethamine] Nausea and Vomiting and Other (comments)     PO & IV causes GI bleed  Tolerated during Feb 2020 stay    Valproic Acid Other (comments)     Elevated heart rate and vomiting           Review of Systems   Review of Systems   Constitutional: Negative for chills and fever. Respiratory: Negative for cough and shortness of breath. Cardiovascular: Negative for chest pain. Gastrointestinal: Negative for abdominal pain, constipation, diarrhea, nausea and vomiting. Genitourinary: Negative for dysuria, frequency and hematuria. Skin: Positive for wound. Neurological: Negative for weakness and numbness. All other systems reviewed and are negative. Physical Exam   Physical Exam  Constitutional:       General: She is not in acute distress. Appearance: She is well-developed. She is obese. HENT:      Head: Normocephalic and atraumatic. Nose: Nose normal.      Mouth/Throat:      Mouth: Mucous membranes are moist.   Eyes:      Extraocular Movements: Extraocular movements intact. Conjunctiva/sclera: Conjunctivae normal.   Neck:      Musculoskeletal: Normal range of motion. Cardiovascular:      Rate and Rhythm: Tachycardia present. Comments: Well perfused  Pulmonary:      Effort: Pulmonary effort is normal. No respiratory distress. Musculoskeletal: Normal range of motion. Skin:     Comments: In patient's left groin at the very proximal thigh before it becomes the pubis, there is a small slightly indurated abscess. In palpating does not appear to be very deep and is about quarter in size. Does not involve the perineum, pubis and is just at the thigh level. Tender to palpation. Neurological:      General: No focal deficit present. Mental Status: She is alert and oriented to person, place, and time.    Psychiatric:      Comments: Patient is very anxious as well as in pain Diagnostic Study Results     Labs -     Recent Results (from the past 12 hour(s))   CBC WITH AUTOMATED DIFF    Collection Time: 09/06/20  2:30 PM   Result Value Ref Range    WBC 12.0 (H) 3.6 - 11.0 K/uL    RBC 4.20 3.80 - 5.20 M/uL    HGB 13.0 11.5 - 16.0 g/dL    HCT 38.4 35.0 - 47.0 %    MCV 91.4 80.0 - 99.0 FL    MCH 31.0 26.0 - 34.0 PG    MCHC 33.9 30.0 - 36.5 g/dL    RDW 12.7 11.5 - 14.5 %    PLATELET 917 066 - 028 K/uL    MPV 9.5 8.9 - 12.9 FL    NRBC 0.0 0  WBC    ABSOLUTE NRBC 0.00 0.00 - 0.01 K/uL    NEUTROPHILS 63 32 - 75 %    LYMPHOCYTES 24 12 - 49 %    MONOCYTES 10 5 - 13 %    EOSINOPHILS 3 0 - 7 %    BASOPHILS 0 0 - 1 %    IMMATURE GRANULOCYTES 0 0.0 - 0.5 %    ABS. NEUTROPHILS 7.6 1.8 - 8.0 K/UL    ABS. LYMPHOCYTES 2.8 0.8 - 3.5 K/UL    ABS. MONOCYTES 1.1 (H) 0.0 - 1.0 K/UL    ABS. EOSINOPHILS 0.3 0.0 - 0.4 K/UL    ABS. BASOPHILS 0.1 0.0 - 0.1 K/UL    ABS. IMM. GRANS. 0.1 (H) 0.00 - 0.04 K/UL    DF AUTOMATED     POC LACTIC ACID    Collection Time: 09/06/20  2:35 PM   Result Value Ref Range    Lactic Acid (POC) 1.39 0.40 - 2.00 mmol/L       Radiologic Studies -   No orders to display     CT Results  (Last 48 hours)    None        CXR Results  (Last 48 hours)    None            Medical Decision Making   I am the first provider for this patient. I reviewed the vital signs, available nursing notes, past medical history, past surgical history, family history and social history. Vital Signs-Reviewed the patient's vital signs. Patient Vitals for the past 12 hrs:   Temp Pulse Resp BP SpO2   09/06/20 1326 99 °F (37.2 °C) (!) 120 16 145/88 97 %       Records Reviewed: Nursing Notes and Old Medical Records    Provider Notes (Medical Decision Making):   Patient presenting with abscess of the inner thigh. Has a low-grade fever as well as is tachycardic here though possibly related to pain and anxiety rather than due to any fever.   Will get basic labs on her, give her IV antibiotics, and try to incise that small abscess for source control. She will need to follow-up with general surgery. ED Course:   Initial assessment performed. The patients presenting problems have been discussed, and they are in agreement with the care plan formulated and outlined with them. I have encouraged them to ask questions as they arise throughout their visit. Procedure Note - Incision and Drainage:   4:23 PM  Performed by: Shelia Varner PA-C   Complexity: complex  Skin prepped with Chlorprep. Sterile field established. Anesthesia achieved with 10 mLs of Lidocaine 1% with epinephrine using a local infiltration of 10 mL lidocaine 1% with epinephrine. Abscess to groin was incised with # 11 blade, and 0mLs of bloody drainage was expressed. Wound probed and irrigated. Area was packed using 1/2 inch iodoform gauze. Sterile dressing applied. Estimated blood loss: 3-4 mL  The procedure took 16-30 minutes, and pt tolerated well. Critical Care Time:   0    Disposition:  Discharge Note:  The patient has been re-evaluated and is ready for discharge. Reviewed available results with patient. Counseled patient on diagnosis and care plan. Patient has expressed understanding, and all questions have been answered. Patient agrees with plan and agrees to follow up as recommended, or to return to the ED if their symptoms worsen. Discharge instructions have been provided and explained to the patient, along with reasons to return to the ED. PLAN:  Current Discharge Medication List      START taking these medications    Details   doxycycline (VIBRA-TABS) 100 mg tablet Take 1 Tab by mouth two (2) times a day for 10 days. Qty: 20 Tab, Refills: 0      cephALEXin (Keflex) 500 mg capsule Take 1 Cap by mouth four (4) times daily for 7 days. Qty: 28 Cap, Refills: 0      traMADoL (ULTRAM) 50 mg tablet Take 1 Tab by mouth every six (6) hours as needed for Pain for up to 3 days. Max Daily Amount: 200 mg.  Indications: pain  Qty: 10 Tab, Refills: 0    Associated Diagnoses: Abscess of left groin         STOP taking these medications       amoxicillin (AMOXIL) 500 mg capsule Comments:   Reason for Stopping:         HYDROcodone-acetaminophen (NORCO) 5-325 mg per tablet Comments:   Reason for Stopping:         oxyCODONE-acetaminophen (PERCOCET) 5-325 mg per tablet Comments:   Reason for Stopping:         ondansetron (ZOFRAN ODT) 4 mg disintegrating tablet Comments:   Reason for Stopping:         ketorolac (TORADOL) 10 mg tablet Comments:   Reason for Stopping:         penicillin v potassium (VEETID) 500 mg tablet Comments:   Reason for Stopping:         ibuprofen (MOTRIN) 600 mg tablet Comments:   Reason for Stopping:         naloxone (NARCAN) 4 mg/actuation nasal spray Comments:   Reason for Stopping:         doxycycline (MONODOX) 100 mg capsule Comments:   Reason for Stoppin.   Follow-up Information     Follow up With Specialties Details Why Contact Info    Nahomy Zamora MD General Surgery, Breast Surgery, Colon and Rectal Surgery, Endocrinology Schedule an appointment as soon as possible for a visit  [address] [place] [address] [address]  [phone]          3. Return to ED if worse     Diagnosis     Clinical Impression:   1. Abscess of left groin        Attestations:    Gerry Beard M.D. Please note that this dictation was completed with [employer], the computer voice recognition software. Quite often unanticipated grammatical, syntax, homophones, and other interpretive errors are inadvertently transcribed by the computer software. Please disregard these errors. Please excuse any errors that have escaped final proofreading. Thank you.

## 2020-09-06 NOTE — ED NOTES
Dr. Sonia Liz has reviewed discharge instructions with the patient. The patient verbalized understanding. Pt. A&Ox4, respirations even and unlabored. VS stable as noted in flowsheet. Declined wheelchair assist from department; paperwork in hand.

## 2020-09-06 NOTE — ED NOTES
Assumed care of patient from triage. Patient is A&Ox4, respirations even and unlabored. Pt. States she noted pain and abscess in left groin in the area where inner thigh meet buttock x2 days. Patient reports having had an abscess in the past the quickly evolved into a surgical case. Concerned for same in this instance. Reports subjective fever. Pt. Osei Malady in low bed in POC, side rail x1, monitor x2, call light within reach.

## 2020-09-08 ENCOUNTER — OFFICE VISIT (OUTPATIENT)
Dept: SURGERY | Age: 35
End: 2020-09-08
Payer: MEDICAID

## 2020-09-08 VITALS
RESPIRATION RATE: 18 BRPM | HEART RATE: 108 BPM | HEIGHT: 62 IN | WEIGHT: 257.1 LBS | BODY MASS INDEX: 47.31 KG/M2 | SYSTOLIC BLOOD PRESSURE: 114 MMHG | TEMPERATURE: 96.7 F | DIASTOLIC BLOOD PRESSURE: 71 MMHG | OXYGEN SATURATION: 98 %

## 2020-09-08 DIAGNOSIS — L02.214 ABSCESS OF GROIN, LEFT: Primary | ICD-10-CM

## 2020-09-08 PROCEDURE — 99212 OFFICE O/P EST SF 10 MIN: CPT | Performed by: SURGERY

## 2020-09-08 NOTE — LETTER
9/8/20 Patient: Freddy Haddad YOB: 1985 Date of Visit: 9/8/2020 Deepali Huertas MD 
[address] [facility] [address] [phone] VIA In Basket Cayden Gordon MD 
[address] [place] [address] [address] [phone] VIA Facsimile: [phone] Dear MD Cayden Youssef MD, Thank you for referring Ms. Desi Bernard to [address] for evaluation. My notes for this consultation are attached. If you have questions, please do not hesitate to call me. I look forward to following your patient along with you.  
 
 
Sincerely, 
 
Aj Pfeiffer MD

## 2020-09-22 RX ORDER — ONDANSETRON 4 MG/1
TABLET, FILM COATED ORAL
Qty: 20 TAB | Refills: 0 | Status: SHIPPED | OUTPATIENT
Start: 2020-09-22 | End: 2020-10-12

## 2020-09-27 ENCOUNTER — HOSPITAL ENCOUNTER (EMERGENCY)
Age: 35
Discharge: HOME | End: 2020-09-27
Attending: EMERGENCY MEDICINE
Payer: COMMERCIAL

## 2020-09-27 ENCOUNTER — APPOINTMENT (OUTPATIENT)
Dept: CT IMAGING | Age: 35
End: 2020-09-27
Attending: EMERGENCY MEDICINE
Payer: COMMERCIAL

## 2020-09-27 VITALS
HEIGHT: 62 IN | HEART RATE: 94 BPM | OXYGEN SATURATION: 97 % | WEIGHT: 246.69 LBS | TEMPERATURE: 98.7 F | RESPIRATION RATE: 18 BRPM | BODY MASS INDEX: 45.4 KG/M2 | DIASTOLIC BLOOD PRESSURE: 112 MMHG | SYSTOLIC BLOOD PRESSURE: 138 MMHG

## 2020-09-27 DIAGNOSIS — N20.1 URETERAL STONE: Primary | ICD-10-CM

## 2020-09-27 LAB
ALBUMIN SERPL-MCNC: 4.4 G/DL (ref 3.5–5)
ALBUMIN/GLOB SERPL: 1.2 {RATIO} (ref 1.1–2.2)
ALP SERPL-CCNC: 116 U/L (ref 45–117)
ALT SERPL-CCNC: 19 U/L (ref 12–78)
ANION GAP SERPL CALC-SCNC: 7 MMOL/L (ref 5–15)
APPEARANCE UR: ABNORMAL
AST SERPL-CCNC: 15 U/L (ref 15–37)
BACTERIA URNS QL MICRO: NEGATIVE /HPF
BASOPHILS # BLD: 0.1 K/UL (ref 0–0.1)
BASOPHILS NFR BLD: 0 % (ref 0–1)
BILIRUB SERPL-MCNC: 0.3 MG/DL (ref 0.2–1)
BILIRUB UR QL CFM: NEGATIVE
BUN SERPL-MCNC: 12 MG/DL (ref 6–20)
BUN/CREAT SERPL: 13 (ref 12–20)
CALCIUM SERPL-MCNC: 9.5 MG/DL (ref 8.5–10.1)
CHLORIDE SERPL-SCNC: 108 MMOL/L (ref 97–108)
CO2 SERPL-SCNC: 24 MMOL/L (ref 21–32)
COLOR UR: ABNORMAL
CREAT SERPL-MCNC: 0.96 MG/DL (ref 0.55–1.02)
DIFFERENTIAL METHOD BLD: ABNORMAL
EOSINOPHIL # BLD: 0.7 K/UL (ref 0–0.4)
EOSINOPHIL NFR BLD: 5 % (ref 0–7)
EPITH CASTS URNS QL MICRO: ABNORMAL /LPF
ERYTHROCYTE [DISTWIDTH] IN BLOOD BY AUTOMATED COUNT: 12.6 % (ref 11.5–14.5)
GLOBULIN SER CALC-MCNC: 3.7 G/DL (ref 2–4)
GLUCOSE SERPL-MCNC: 100 MG/DL (ref 65–100)
GLUCOSE UR STRIP.AUTO-MCNC: NEGATIVE MG/DL
HCG UR QL: NEGATIVE
HCT VFR BLD AUTO: 36.8 % (ref 35–47)
HGB BLD-MCNC: 12.2 G/DL (ref 11.5–16)
HGB UR QL STRIP: ABNORMAL
HYALINE CASTS URNS QL MICRO: ABNORMAL /LPF (ref 0–5)
IMM GRANULOCYTES # BLD AUTO: 0.1 K/UL (ref 0–0.04)
IMM GRANULOCYTES NFR BLD AUTO: 0 % (ref 0–0.5)
KETONES UR QL STRIP.AUTO: ABNORMAL MG/DL
LEUKOCYTE ESTERASE UR QL STRIP.AUTO: ABNORMAL
LYMPHOCYTES # BLD: 2.9 K/UL (ref 0.8–3.5)
LYMPHOCYTES NFR BLD: 21 % (ref 12–49)
MCH RBC QN AUTO: 30.7 PG (ref 26–34)
MCHC RBC AUTO-ENTMCNC: 33.2 G/DL (ref 30–36.5)
MCV RBC AUTO: 92.5 FL (ref 80–99)
MONOCYTES # BLD: 1 K/UL (ref 0–1)
MONOCYTES NFR BLD: 8 % (ref 5–13)
NEUTS SEG # BLD: 9.1 K/UL (ref 1.8–8)
NEUTS SEG NFR BLD: 66 % (ref 32–75)
NITRITE UR QL STRIP.AUTO: NEGATIVE
NRBC # BLD: 0 K/UL (ref 0–0.01)
NRBC BLD-RTO: 0 PER 100 WBC
PH UR STRIP: 6 [PH] (ref 5–8)
PLATELET # BLD AUTO: 390 K/UL (ref 150–400)
PMV BLD AUTO: 10 FL (ref 8.9–12.9)
POTASSIUM SERPL-SCNC: 3.9 MMOL/L (ref 3.5–5.1)
PROT SERPL-MCNC: 8.1 G/DL (ref 6.4–8.2)
PROT UR STRIP-MCNC: ABNORMAL MG/DL
RBC # BLD AUTO: 3.98 M/UL (ref 3.8–5.2)
RBC #/AREA URNS HPF: >100 /HPF (ref 0–5)
SODIUM SERPL-SCNC: 139 MMOL/L (ref 136–145)
SP GR UR REFRACTOMETRY: 1.03 (ref 1–1.03)
UA: UC IF INDICATED,UAUC: ABNORMAL
UROBILINOGEN UR QL STRIP.AUTO: 1 EU/DL (ref 0.2–1)
WBC # BLD AUTO: 13.9 K/UL (ref 3.6–11)
WBC URNS QL MICRO: ABNORMAL /HPF (ref 0–4)

## 2020-09-27 PROCEDURE — 99283 EMERGENCY DEPT VISIT LOW MDM: CPT

## 2020-09-27 PROCEDURE — 81001 URINALYSIS AUTO W/SCOPE: CPT

## 2020-09-27 PROCEDURE — 96375 TX/PRO/DX INJ NEW DRUG ADDON: CPT

## 2020-09-27 PROCEDURE — 74011250637 HC RX REV CODE- 250/637: Performed by: EMERGENCY MEDICINE

## 2020-09-27 PROCEDURE — 36415 COLL VENOUS BLD VENIPUNCTURE: CPT

## 2020-09-27 PROCEDURE — 74176 CT ABD & PELVIS W/O CONTRAST: CPT

## 2020-09-27 PROCEDURE — 74011250636 HC RX REV CODE- 250/636: Performed by: EMERGENCY MEDICINE

## 2020-09-27 PROCEDURE — 96374 THER/PROPH/DIAG INJ IV PUSH: CPT

## 2020-09-27 PROCEDURE — 80053 COMPREHEN METABOLIC PANEL: CPT

## 2020-09-27 PROCEDURE — 85025 COMPLETE CBC W/AUTO DIFF WBC: CPT

## 2020-09-27 PROCEDURE — 81025 URINE PREGNANCY TEST: CPT

## 2020-09-27 RX ORDER — HYDROMORPHONE HYDROCHLORIDE 1 MG/ML
0.5 INJECTION, SOLUTION INTRAMUSCULAR; INTRAVENOUS; SUBCUTANEOUS
Status: COMPLETED | OUTPATIENT
Start: 2020-09-27 | End: 2020-09-27

## 2020-09-27 RX ORDER — TAMSULOSIN HYDROCHLORIDE 0.4 MG/1
0.4 CAPSULE ORAL DAILY
Qty: 7 CAP | Refills: 0 | Status: SHIPPED | OUTPATIENT
Start: 2020-09-27 | End: 2020-10-05

## 2020-09-27 RX ORDER — OXYCODONE HYDROCHLORIDE 5 MG/1
5 TABLET ORAL
Qty: 12 TAB | Refills: 0 | Status: SHIPPED | OUTPATIENT
Start: 2020-09-27 | End: 2020-09-30

## 2020-09-27 RX ORDER — OXYCODONE HYDROCHLORIDE 5 MG/1
10 TABLET ORAL
Status: COMPLETED | OUTPATIENT
Start: 2020-09-27 | End: 2020-09-27

## 2020-09-27 RX ORDER — ONDANSETRON 4 MG/1
4 TABLET, ORALLY DISINTEGRATING ORAL
Qty: 10 TAB | Refills: 0 | Status: SHIPPED | OUTPATIENT
Start: 2020-09-27 | End: 2020-10-13

## 2020-09-27 RX ORDER — MORPHINE SULFATE 2 MG/ML
4 INJECTION, SOLUTION INTRAMUSCULAR; INTRAVENOUS
Status: COMPLETED | OUTPATIENT
Start: 2020-09-27 | End: 2020-09-27

## 2020-09-27 RX ORDER — ONDANSETRON 2 MG/ML
4 INJECTION INTRAMUSCULAR; INTRAVENOUS
Status: COMPLETED | OUTPATIENT
Start: 2020-09-27 | End: 2020-09-27

## 2020-09-27 RX ADMIN — HYDROMORPHONE HYDROCHLORIDE 0.5 MG: 1 INJECTION, SOLUTION INTRAMUSCULAR; INTRAVENOUS; SUBCUTANEOUS at 14:43

## 2020-09-27 RX ADMIN — OXYCODONE 10 MG: 5 TABLET ORAL at 16:35

## 2020-09-27 RX ADMIN — SODIUM CHLORIDE 1000 ML: 900 INJECTION, SOLUTION INTRAVENOUS at 13:16

## 2020-09-27 RX ADMIN — ONDANSETRON 4 MG: 2 INJECTION INTRAMUSCULAR; INTRAVENOUS at 13:01

## 2020-09-27 RX ADMIN — MORPHINE SULFATE 4 MG: 2 INJECTION, SOLUTION INTRAMUSCULAR; INTRAVENOUS at 13:10

## 2020-09-27 NOTE — DISCHARGE INSTRUCTIONS
Patient Education        Kidney Stone: Care Instructions  Your Care Instructions     Kidney stones are formed when salts, minerals, and other substances normally found in the urine clump together. They can be as small as grains of sand or, rarely, as large as golf balls. While the stone is traveling through the ureter, which is the tube that carries urine from the kidney to the bladder, you will probably feel pain. The pain may be mild or very severe. You may also have some blood in your urine. As soon as the stone reaches the bladder, any intense pain should go away. If a stone is too large to pass on its own, you may need a medical procedure to help you pass the stone. The doctor has checked you carefully, but problems can develop later. If you notice any problems or new symptoms, get medical treatment right away. Follow-up care is a key part of your treatment and safety. Be sure to make and go to all appointments, and call your doctor if you are having problems. It's also a good idea to know your test results and keep a list of the medicines you take. How can you care for yourself at home? · Drink plenty of fluids, enough so that your urine is light yellow or clear like water. If you have kidney, heart, or liver disease and have to limit fluids, talk with your doctor before you increase the amount of fluids you drink. · Take pain medicines exactly as directed. Call your doctor if you think you are having a problem with your medicine. ? If the doctor gave you a prescription medicine for pain, take it as prescribed. ? If you are not taking a prescription pain medicine, ask your doctor if you can take an over-the-counter medicine. Read and follow all instructions on the label. · Your doctor may ask you to strain your urine so that you can collect your kidney stone when it passes. You can use a kitchen strainer or a tea strainer to catch the stone.  Store it in a plastic bag until you see your doctor again.  Preventing future kidney stones  Some changes in your diet may help prevent kidney stones. Depending on the cause of your stones, your doctor may recommend that you:  · Drink plenty of fluids, enough so that your urine is light yellow or clear like water. If you have kidney, heart, or liver disease and have to limit fluids, talk with your doctor before you increase the amount of fluids you drink. · Limit coffee, tea, and alcohol. Also avoid grapefruit juice. · Do not take more than the recommended daily dose of vitamins C and D.  · Avoid antacids such as Gaviscon, Maalox, Mylanta, or Tums. · Limit the amount of salt (sodium) in your diet. · Eat a balanced diet that is not too high in protein. · Limit foods that are high in a substance called oxalate, which can cause kidney stones. These foods include dark green vegetables, rhubarb, chocolate, wheat bran, nuts, cranberries, and beans. When should you call for help? Call your doctor now or seek immediate medical care if:    · You cannot keep down fluids.     · Your pain gets worse.     · You have a fever or chills.     · You have new or worse pain in your back just below your rib cage (the flank area).     · You have new or more blood in your urine. Watch closely for changes in your health, and be sure to contact your doctor if:    · You do not get better as expected. Where can you learn more? Go to http://kymberly-yoon.info/  Enter M090 in the search box to learn more about \"Kidney Stone: Care Instructions. \"  Current as of: April 15, 2020               Content Version: 12.6  © 5080-1905 [employer]. Care instructions adapted under license by [employer] (which disclaims liability or warranty for this information).  If you have questions about a medical condition or this instruction, always ask your healthcare professional. [address] any warranty or liability for your use of this information.

## 2020-09-27 NOTE — ED NOTES
AAKASH Arvizu reviewed discharge instructions with the patient. The patient verbalized understanding. All questions and concerns were addressed. The patient declined a wheelchair and is discharged ambulatory in the care of family members with instructions and prescriptions in hand. Pt is alert and oriented x 4. Respirations are clear and unlabored.

## 2020-09-27 NOTE — ED PROVIDER NOTES
EMERGENCY DEPARTMENT HISTORY AND PHYSICAL EXAM      Date: 9/27/2020  Patient Name: Brayan Owens    History of Presenting Illness     Chief Complaint   Patient presents with    Flank Pain     History of kidney stones. Started having pain last night on the right side and is sure she has a kidney stone. Last one ahd to be surgically removed. History Provided By: Patient    HPI: Brayan Owens, 37 y.o. female with PMHx as noted below presents the emergency department with complaints of right-sided flank pain. Patient states she had onset of right-sided flank pain yesterday evening. She describes it as a constant stabbing pain that is radiating to her right lower quadrant. Pain is been constant without relieving or exacerbating factors. She describes the pain is severe she has had associated nausea, dysuria and vomiting. Denying any chest pain, shortness of breath, fevers, chills or other systemic symptoms. PCP: Jan Rojas MD    Current Outpatient Medications   Medication Sig Dispense Refill    ondansetron (Zofran ODT) 4 mg disintegrating tablet Take 1 Tab by mouth every eight (8) hours as needed for Nausea. 10 Tab 0    oxyCODONE IR (Roxicodone) 5 mg immediate release tablet Take 1 Tab by mouth every six (6) hours as needed for Pain for up to 3 days. Max Daily Amount: 20 mg. 12 Tab 0    tamsulosin (Flomax) 0.4 mg capsule Take 1 Cap by mouth daily for 7 doses.  7 Cap 0    ondansetron hcl (ZOFRAN) 4 mg tablet TAKE 1 TABLET BY MOUTH EVERY 8 HOURS AS NEEDED FOR NAUSEA 20 Tab 0    olmesartan (BENICAR) 5 mg tablet TAKE 1 TABLET BY MOUTH DAILY 90 Tab 3    DULoxetine (CYMBALTA) 60 mg capsule TAKE 2 CAPSULES BY MOUTH DAILY 30 Cap 1    naproxen (NAPROSYN) 500 mg tablet TK 1 TA PO BID      promethazine (PHENERGAN) 25 mg tablet TAKE 1 TABLET BY MOUTH EVERY 6 HOURS AS NEEDED FOR NAUSEA 30 Tab 0    ALPRAZolam (XANAX) 1 mg tablet TAKE 1 TABLET BY MOUTH TWICE DAILY AS NEEDED FOR ANXIETY MAXIMUM DAILY DOSE 2 60 Tab 3    mirtazapine (REMERON) 30 mg tablet TAKE 1 TABLET BY MOUTH AT BEDTIME 30 Tab 5    RisaQuad-2 16 billion cell cap cap TAKE ONE CAPSULE BY MOUTH ONCE DAILY 30 Cap 1    enalapril (VASOTEC) 5 mg tablet TAKE 1 TABLET BY MOUTH DAILY AS NEEDED FOR HYPERTENSION 30 Tab 5    albuterol (PROVENTIL HFA, VENTOLIN HFA, PROAIR HFA) 90 mcg/actuation inhaler Take 1 Puff by inhalation every six (6) hours as needed for Wheezing. 1 Inhaler 0    furosemide (LASIX) 20 mg tablet PRN  0    tiZANidine (ZANAFLEX) 4 mg tablet   0    estradiol (ESTRACE) 1 mg tablet Take 1 mg by mouth daily. 0    indomethacin (INDOCIN) 25 mg capsule PRN      pantoprazole (PROTONIX) 40 mg tablet take 1 tablet by mouth at bedtime  0    codeine-butalbital-acetaminophen-caffeine (FIORICET WITH CODEINE) -40-30 mg capsule take 2 capsules by mouth every 6 hours if needed for MIGRAINE HEADACHE 90 Cap 0    rizatriptan (MAXALT) 10 mg tablet Take 10 mg by mouth once as needed for Migraine. May repeat in 2 hours if needed      belimumab (BENLYSTA) 400 mg solr injection by IntraVENous route.          Past History     Past Medical History:  Past Medical History:   Diagnosis Date    Abnormal CT of the abdomen 11/8/2012    Asthma     Autoimmune disease (HCC)     lupus    C. difficile diarrhea     Cervical prolapse     Dehydration     Mariah-Danlos syndrome     Gastrointestinal disorder     gerd, twisted colon, IBS    GERD (gastroesophageal reflux disease)     Headache(784.0)     Lupus (HCC)     Morbid obesity (HCC)     Nausea & vomiting     Neurological disorder     cluster headaches    Occipital neuralgia     other 2006, 2009    ovarian cyst removal    Other ill-defined conditions(799.89)     Worsening headaches        Past Surgical History:  Past Surgical History:   Procedure Laterality Date    COLONOSCOPY  9/21/2010         HX CHOLECYSTECTOMY      HX CYST INCISION AND DRAINAGE Right 02/27/2020    HX GYN  1/2009 right salpingo oopherectomy    HX GYN  2006    right ovarian tumor removed    HX HEENT      left wisdom teeth removal    HX HEENT      top right wisdom tooth removed    HX HERNIA REPAIR  4/2012    HX HERNIA REPAIR  2/28/13    Laparoscopic recurrent incisional hernia repair    HX HYSTERECTOMY      2016    HX UROLOGICAL      Kidney Stone Removal    ID EGD TRANSORAL BIOPSY SINGLE/MULTIPLE  9/22/2010            Family History:  Family History   Problem Relation Age of Onset    Colon Cancer Maternal Grandfather        Social History:  Social History     Tobacco Use    Smoking status: Never Smoker    Smokeless tobacco: Never Used   Substance Use Topics    Alcohol use: No    Drug use: No       Allergies: Allergies   Allergen Reactions    Latex Itching     burning    Compazine [Prochlorperazine] Anxiety     High heart rate  Pt can take promethazine with no problems    Sulfa (Sulfonamide Antibiotics) Unknown (comments)    Topamax [Topiramate] Unknown (comments)    Adhesive Rash     Allergic to Dermabond    Clindamycin Diarrhea     Pt states caused her C-Diff    Mushroom Other (comments)    Mushroom Combination No.1 Unknown (comments)    Toradol [Ketorolac Tromethamine] Nausea and Vomiting and Other (comments)     PO & IV causes GI bleed  Tolerated during Feb 2020 stay    Valproic Acid Other (comments)     Elevated heart rate and vomiting           Review of Systems   Review of Systems  Constitutional: Negative for fever, chills, and fatigue. HENT: Negative for congestion, sore throat, rhinorrhea, sneezing and neck stiffness   Eyes: Negative for discharge and redness. Respiratory: Negative for  shortness of breath, wheezing   Cardiovascular: Negative for chest pain, palpitations   Gastrointestinal: Positive for nausea, vomiting, abdominal pain. Negative constipation, diarrhea and blood in stool. Genitourinary: Positive for dysuria, hematuria, flank pain.   Negative decreased urine volume, discharge,   Musculoskeletal: Negative for myalgias or joint pain . Skin: Negative for rash or lesions . Neurological: Negative weakness, light-headedness, numbness and headaches. Physical Exam   Physical Exam    GENERAL: alert and oriented, moderate distress  EYES: PEERL, No injection, discharge or icterus. ENT: Mucous membranes pink and moist.  NECK: Supple  LUNGS: Airway patent. Non-labored respirations. Breath sounds clear with good air entry bilaterally. HEART: Regular rate and rhythm. No peripheral edema  ABDOMEN: Non-distended, mild right lower quadrant tenderness without guarding or rebound.   SKIN:  warm, dry  MSK/EXTREMITIES: Without swelling, tenderness or deformity, symmetric with normal ROM  NEUROLOGICAL: Alert, oriented      Diagnostic Study Results     Labs -     Recent Results (from the past 12 hour(s))   URINALYSIS W/ REFLEX CULTURE    Collection Time: 09/27/20 12:43 PM    Specimen: Urine    Urine specimen   Result Value Ref Range    Color DARK YELLOW      Appearance CLOUDY (A) CLEAR      Specific gravity 1.029 1.003 - 1.030      pH (UA) 6.0 5.0 - 8.0      Protein TRACE (A) NEG mg/dL    Glucose Negative NEG mg/dL    Ketone TRACE (A) NEG mg/dL    Blood LARGE (A) NEG      Urobilinogen 1.0 0.2 - 1.0 EU/dL    Nitrites Negative NEG      Leukocyte Esterase TRACE (A) NEG      WBC 0-4 0 - 4 /hpf    RBC >100 (H) 0 - 5 /hpf    Epithelial cells MODERATE (A) FEW /lpf    Bacteria Negative NEG /hpf    UA:UC IF INDICATED CULTURE NOT INDICATED BY UA RESULT CNI      Hyaline cast 2-5 0 - 5 /lpf   BILIRUBIN, CONFIRM    Collection Time: 09/27/20 12:43 PM   Result Value Ref Range    Bilirubin UA, confirm Negative NEG     HCG URINE, QL. - POC    Collection Time: 09/27/20 12:44 PM   Result Value Ref Range    Pregnancy test,urine (POC) Negative NEG     CBC WITH AUTOMATED DIFF    Collection Time: 09/27/20 12:49 PM   Result Value Ref Range    WBC 13.9 (H) 3.6 - 11.0 K/uL    RBC 3.98 3.80 - 5.20 M/uL    HGB 12.2 11.5 - 16.0 g/dL    HCT 36.8 35.0 - 47.0 %    MCV 92.5 80.0 - 99.0 FL    MCH 30.7 26.0 - 34.0 PG    MCHC 33.2 30.0 - 36.5 g/dL    RDW 12.6 11.5 - 14.5 %    PLATELET 907 006 - 008 K/uL    MPV 10.0 8.9 - 12.9 FL    NRBC 0.0 0  WBC    ABSOLUTE NRBC 0.00 0.00 - 0.01 K/uL    NEUTROPHILS 66 32 - 75 %    LYMPHOCYTES 21 12 - 49 %    MONOCYTES 8 5 - 13 %    EOSINOPHILS 5 0 - 7 %    BASOPHILS 0 0 - 1 %    IMMATURE GRANULOCYTES 0 0.0 - 0.5 %    ABS. NEUTROPHILS 9.1 (H) 1.8 - 8.0 K/UL    ABS. LYMPHOCYTES 2.9 0.8 - 3.5 K/UL    ABS. MONOCYTES 1.0 0.0 - 1.0 K/UL    ABS. EOSINOPHILS 0.7 (H) 0.0 - 0.4 K/UL    ABS. BASOPHILS 0.1 0.0 - 0.1 K/UL    ABS. IMM. GRANS. 0.1 (H) 0.00 - 0.04 K/UL    DF AUTOMATED     METABOLIC PANEL, COMPREHENSIVE    Collection Time: 09/27/20 12:49 PM   Result Value Ref Range    Sodium 139 136 - 145 mmol/L    Potassium 3.9 3.5 - 5.1 mmol/L    Chloride 108 97 - 108 mmol/L    CO2 24 21 - 32 mmol/L    Anion gap 7 5 - 15 mmol/L    Glucose 100 65 - 100 mg/dL    BUN 12 6 - 20 MG/DL    Creatinine 0.96 0.55 - 1.02 MG/DL    BUN/Creatinine ratio 13 12 - 20      GFR est AA >60 >60 ml/min/1.73m2    GFR est non-AA >60 >60 ml/min/1.73m2    Calcium 9.5 8.5 - 10.1 MG/DL    Bilirubin, total 0.3 0.2 - 1.0 MG/DL    ALT (SGPT) 19 12 - 78 U/L    AST (SGOT) 15 15 - 37 U/L    Alk. phosphatase 116 45 - 117 U/L    Protein, total 8.1 6.4 - 8.2 g/dL    Albumin 4.4 3.5 - 5.0 g/dL    Globulin 3.7 2.0 - 4.0 g/dL    A-G Ratio 1.2 1.1 - 2.2         Radiologic Studies -   CT ABD PELV WO CONT   Final Result   IMPRESSION:    1. Right ureterovesical junction calculus (2 mm). No hydronephrosis or   ureterectasis. 2. Tiny, nonobstructing, right renal calculi. 3. Mild hepatic steatosis. CT Results  (Last 48 hours)               09/27/20 1337  CT ABD PELV WO CONT Final result    Impression:  IMPRESSION:    1. Right ureterovesical junction calculus (2 mm). No hydronephrosis or   ureterectasis.    2. Tiny, nonobstructing, right renal calculi. 3. Mild hepatic steatosis. Narrative:  CT ABDOMEN AND PELVIS WITHOUT CONTRAST. 9/27/2020 1:37 PM        INDICATION: Right flank pain. COMPARISON: 9/6/2018. TECHNIQUE: CT of the abdomen and pelvis was performed without contrast. CT dose   reduction was achieved through use of a standardized protocol tailored for this   examination and automatic exposure control for dose modulation. FINDINGS:   Abdomen: A 2 mm calculus at the right ureterovesical junction does not cause   ureterectasis or hydronephrosis. Tiny right renal calculi in the upper and lower   poles are nonobstructing. Incidental note is made of a small, exophytic, right   renal cyst. 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 Hepatic steatosis is mild. Post cholecystectomy. The lung bases are clear. The   heart is at the upper limits of normal in size. The unenhanced distal esophagus,   stomach, duodenum, pancreas, spleen, adrenals, and left kidney are normal. There   are tiny, fat-containing, midline, abdominal hernias: ventral incisional in the   upper abdomen and periumbilical ([id number]). Pelvis: The unenhanced small bowel, ileocecal junction, appendix, colon, and   bladder are normal. Post hysterectomy. No free air or fluid, and no   abdominopelvic lymphadenopathy. CXR Results  (Last 48 hours)    None            Medical Decision Making     ITerrell MD am the first provider for this patient and am the attending of record for this patient encounter. I reviewed the vital signs, available nursing notes, past medical history, past surgical history, family history and social history. Vital Signs-Reviewed the patient's vital signs.   Patient Vitals for the past 12 hrs:   Temp Pulse Resp BP SpO2   09/27/20 1445     97 %   09/27/20 1430     95 %   09/27/20 1415     96 %   09/27/20 1400     95 %   09/27/20 1350     94 %   09/27/20 1349     97 %   09/27/20 1235 98.7 °F (37.1 °C) 94 18 (!) 138/112 99 %       Records Reviewed: Nursing Notes and Old Medical Records    Provider Notes (Medical Decision Making): This is a 57-year-old female presenting with 1 day of right flank pain. On arrival she is in distress and differential included obstructed ureteral stone, ovarian torsion, ovarian cyst, appendicitis, diverticulitis, cholecystitis among others. CT scan was notable for an obstructed right ureteral stone. Patient was given several doses of pain medication the emergency department with improvement in her symptoms. I reviewed labs which showed no acute abnormalities. There is no sign of any significant infection on her urinalysis warranting antibiotics or emergent urology consultation. Patient demonstrated ability to tolerate p.o. on reevaluation and felt stable for outpatient management. Provided follow-up with urology. ED Course:   Initial assessment performed. The patients presenting problems have been discussed, and they are in agreement with the care plan formulated and outlined with them. I have encouraged them to ask questions as they arise throughout their visit. Medications   morphine injection 4 mg (4 mg IntraVENous Given 9/27/20 1310)   ondansetron (ZOFRAN) injection 4 mg (4 mg IntraVENous Given 9/27/20 1301)   sodium chloride 0.9 % bolus infusion 1,000 mL (0 mL IntraVENous IV Completed 9/27/20 1531)   HYDROmorphone (PF) (DILAUDID) injection 0.5 mg (0.5 mg IntraVENous Given 9/27/20 1443)   oxyCODONE IR (ROXICODONE) tablet 10 mg (10 mg Oral Given 9/27/20 1635)         PROGRESS  Raisa TOYA Marshall's  results have been reviewed with her. She has been counseled regarding her diagnosis. She verbally conveys understanding and agreement of the signs, symptoms, diagnosis, treatment and prognosis and additionally agrees to follow up as recommended with Dr. Eugenia Smith MD in 24 - 48 hours.   She also agrees with the care-plan and conveys that all of her questions have been answered. I have also put together some discharge instructions for her that include: 1) educational information regarding their diagnosis, 2) how to care for their diagnosis at home, as well a 3) list of reasons why they would want to return to the ED prior to their follow-up appointment, should their condition change. Disposition:  home    PLAN:  1. Current Discharge Medication List      START taking these medications    Details   ondansetron (Zofran ODT) 4 mg disintegrating tablet Take 1 Tab by mouth every eight (8) hours as needed for Nausea. Qty: 10 Tab, Refills: 0      oxyCODONE IR (Roxicodone) 5 mg immediate release tablet Take 1 Tab by mouth every six (6) hours as needed for Pain for up to 3 days. Max Daily Amount: 20 mg.  Qty: 12 Tab, Refills: 0    Associated Diagnoses: Ureteral stone         CONTINUE these medications which have CHANGED    Details   tamsulosin (Flomax) 0.4 mg capsule Take 1 Cap by mouth daily for 7 doses. Qty: 7 Cap, Refills: 0           2. Follow-up Information     Follow up With Specialties Details Why Contact Info    Eugenia Smith MD Internal Medicine Schedule an appointment as soon as possible for a visit in 3 days  [address] [place] [address]  [phone]      [facility] EMERGENCY DEPT Emergency Medicine  If symptoms worsen [address] [facility] [address]   [address]  Schedule an appointment as soon as possible for a visit in 1 day  Larry M[address]ra [address]  [address]  [phone]        Return to ED if worse     Diagnosis     Clinical Impression:   1. Ureteral stone        Please note that this dictation was completed with Dragon, computer voice recognition software.   Quite often unanticipated grammatical, syntax, homophones, and other interpretive errors are inadvertently transcribed by the computer software. Please disregard these errors. Additionally, please excuse any errors that have escaped final proofreading.

## 2020-09-29 NOTE — PROGRESS NOTES
Aysha: Please call patient when patient is reporting her heart is racing she is in sinus tachycardia there are no other arrhythmias. Since the symptoms are bothersome for her I would recommend to start on a low-dose beta-blocker we will keep an eye on her blood pressure as well if her blood pressure is trending low we will need to then discontinue Benicar and just use the beta-blocker to treat hypertension and her sinus tachycardia. Let me know what she would like to do.

## 2020-10-01 NOTE — PROGRESS NOTES
Spoke to patient using 2 identifiers. Per Giovana Trinidad NP, patient was made aware when she is reporting her heart is racing she is in sinus tachycardia there are no other arrhythmias. Since the symptoms are bothersome for her he recommends to start on a low-dose beta-blocker. We will keep an eye on her blood pressure as well. If BP is trending low we will need to then discontinue Benicar and just use the beta-blocker to treat hypertension and her sinus tachycardia. Patient agreed to start on low dose BB and verbalized understanding. Please send rx to [facility].

## 2020-10-02 RX ORDER — METOPROLOL SUCCINATE 25 MG/1
25 TABLET, EXTENDED RELEASE ORAL
Qty: 30 TAB | Refills: 2 | Status: SHIPPED | OUTPATIENT
Start: 2020-10-02 | End: 2020-12-23

## 2020-10-03 ENCOUNTER — HOSPITAL ENCOUNTER (INPATIENT)
Age: 35
LOS: 2 days | Discharge: HOME | DRG: 463 | End: 2020-10-05
Attending: EMERGENCY MEDICINE | Admitting: HOSPITALIST
Payer: COMMERCIAL

## 2020-10-03 DIAGNOSIS — N20.0 RIGHT NEPHROLITHIASIS: Primary | ICD-10-CM

## 2020-10-03 PROBLEM — N20.1 URETERIC CALCULUS: Status: ACTIVE | Noted: 2020-10-03

## 2020-10-03 PROBLEM — N23 URETERIC COLIC: Status: ACTIVE | Noted: 2020-10-03

## 2020-10-03 LAB
ALBUMIN SERPL-MCNC: 4 G/DL (ref 3.5–5)
ALBUMIN/GLOB SERPL: 1.3 {RATIO} (ref 1.1–2.2)
ALP SERPL-CCNC: 100 U/L (ref 45–117)
ALT SERPL-CCNC: 18 U/L (ref 12–78)
ANION GAP SERPL CALC-SCNC: 7 MMOL/L (ref 5–15)
APPEARANCE UR: CLEAR
AST SERPL-CCNC: 22 U/L (ref 15–37)
BACTERIA URNS QL MICRO: NEGATIVE /HPF
BASOPHILS # BLD: 0 K/UL (ref 0–0.1)
BASOPHILS NFR BLD: 0 % (ref 0–1)
BILIRUB SERPL-MCNC: 0.3 MG/DL (ref 0.2–1)
BILIRUB UR QL: NEGATIVE
BUN SERPL-MCNC: 11 MG/DL (ref 6–20)
BUN/CREAT SERPL: 12 (ref 12–20)
CALCIUM SERPL-MCNC: 8.7 MG/DL (ref 8.5–10.1)
CHLORIDE SERPL-SCNC: 108 MMOL/L (ref 97–108)
CO2 SERPL-SCNC: 24 MMOL/L (ref 21–32)
COLOR UR: ABNORMAL
CREAT SERPL-MCNC: 0.94 MG/DL (ref 0.55–1.02)
DIFFERENTIAL METHOD BLD: ABNORMAL
EOSINOPHIL # BLD: 0.4 K/UL (ref 0–0.4)
EOSINOPHIL NFR BLD: 4 % (ref 0–7)
EPITH CASTS URNS QL MICRO: ABNORMAL /LPF
ERYTHROCYTE [DISTWIDTH] IN BLOOD BY AUTOMATED COUNT: 12.4 % (ref 11.5–14.5)
GLOBULIN SER CALC-MCNC: 3.2 G/DL (ref 2–4)
GLUCOSE SERPL-MCNC: 105 MG/DL (ref 65–100)
GLUCOSE UR STRIP.AUTO-MCNC: NEGATIVE MG/DL
HCT VFR BLD AUTO: 37.4 % (ref 35–47)
HGB BLD-MCNC: 12.2 G/DL (ref 11.5–16)
HGB UR QL STRIP: ABNORMAL
HYALINE CASTS URNS QL MICRO: ABNORMAL /LPF (ref 0–5)
IMM GRANULOCYTES # BLD AUTO: 0 K/UL (ref 0–0.04)
IMM GRANULOCYTES NFR BLD AUTO: 0 % (ref 0–0.5)
KETONES UR QL STRIP.AUTO: NEGATIVE MG/DL
LACTATE SERPL-SCNC: 1.1 MMOL/L (ref 0.4–2)
LEUKOCYTE ESTERASE UR QL STRIP.AUTO: NEGATIVE
LIPASE SERPL-CCNC: 109 U/L (ref 73–393)
LYMPHOCYTES # BLD: 4.9 K/UL (ref 0.8–3.5)
LYMPHOCYTES NFR BLD: 44 % (ref 12–49)
MCH RBC QN AUTO: 30.3 PG (ref 26–34)
MCHC RBC AUTO-ENTMCNC: 32.6 G/DL (ref 30–36.5)
MCV RBC AUTO: 92.8 FL (ref 80–99)
MONOCYTES # BLD: 0.2 K/UL (ref 0–1)
MONOCYTES NFR BLD: 2 % (ref 5–13)
NEUTS BAND NFR BLD MANUAL: 6 %
NEUTS SEG # BLD: 5.7 K/UL (ref 1.8–8)
NEUTS SEG NFR BLD: 44 % (ref 32–75)
NITRITE UR QL STRIP.AUTO: NEGATIVE
NRBC # BLD: 0 K/UL (ref 0–0.01)
NRBC BLD-RTO: 0 PER 100 WBC
PH UR STRIP: 6 [PH] (ref 5–8)
PLATELET # BLD AUTO: 255 K/UL (ref 150–400)
POTASSIUM SERPL-SCNC: 4 MMOL/L (ref 3.5–5.1)
PROT SERPL-MCNC: 7.2 G/DL (ref 6.4–8.2)
PROT UR STRIP-MCNC: NEGATIVE MG/DL
RBC # BLD AUTO: 4.03 M/UL (ref 3.8–5.2)
RBC #/AREA URNS HPF: >100 /HPF (ref 0–5)
RBC MORPH BLD: ABNORMAL
SODIUM SERPL-SCNC: 139 MMOL/L (ref 136–145)
SP GR UR REFRACTOMETRY: 1.01 (ref 1–1.03)
UA: UC IF INDICATED,UAUC: ABNORMAL
UROBILINOGEN UR QL STRIP.AUTO: 0.2 EU/DL (ref 0.2–1)
WBC # BLD AUTO: 11.2 K/UL (ref 3.6–11)
WBC URNS QL MICRO: ABNORMAL /HPF (ref 0–4)

## 2020-10-03 PROCEDURE — 80053 COMPREHEN METABOLIC PANEL: CPT

## 2020-10-03 PROCEDURE — 74011000258 HC RX REV CODE- 258: Performed by: HOSPITALIST

## 2020-10-03 PROCEDURE — 65270000029 HC RM PRIVATE

## 2020-10-03 PROCEDURE — 99284 EMERGENCY DEPT VISIT MOD MDM: CPT

## 2020-10-03 PROCEDURE — 83605 ASSAY OF LACTIC ACID: CPT

## 2020-10-03 PROCEDURE — 85025 COMPLETE CBC W/AUTO DIFF WBC: CPT

## 2020-10-03 PROCEDURE — 36415 COLL VENOUS BLD VENIPUNCTURE: CPT

## 2020-10-03 PROCEDURE — 96375 TX/PRO/DX INJ NEW DRUG ADDON: CPT

## 2020-10-03 PROCEDURE — 87040 BLOOD CULTURE FOR BACTERIA: CPT

## 2020-10-03 PROCEDURE — 74011250636 HC RX REV CODE- 250/636: Performed by: EMERGENCY MEDICINE

## 2020-10-03 PROCEDURE — 96374 THER/PROPH/DIAG INJ IV PUSH: CPT

## 2020-10-03 PROCEDURE — 81001 URINALYSIS AUTO W/SCOPE: CPT

## 2020-10-03 PROCEDURE — 83690 ASSAY OF LIPASE: CPT

## 2020-10-03 PROCEDURE — 74011250636 HC RX REV CODE- 250/636: Performed by: HOSPITALIST

## 2020-10-03 PROCEDURE — 74011250637 HC RX REV CODE- 250/637: Performed by: HOSPITALIST

## 2020-10-03 PROCEDURE — 74011250637 HC RX REV CODE- 250/637: Performed by: EMERGENCY MEDICINE

## 2020-10-03 RX ORDER — MORPHINE SULFATE 2 MG/ML
4 INJECTION, SOLUTION INTRAMUSCULAR; INTRAVENOUS ONCE
Status: COMPLETED | OUTPATIENT
Start: 2020-10-03 | End: 2020-10-03

## 2020-10-03 RX ORDER — LOSARTAN POTASSIUM 25 MG/1
25 TABLET ORAL DAILY
Status: DISCONTINUED | OUTPATIENT
Start: 2020-10-04 | End: 2020-10-05 | Stop reason: HOSPADM

## 2020-10-03 RX ORDER — KETOROLAC TROMETHAMINE 30 MG/ML
15 INJECTION, SOLUTION INTRAMUSCULAR; INTRAVENOUS
Status: COMPLETED | OUTPATIENT
Start: 2020-10-03 | End: 2020-10-03

## 2020-10-03 RX ORDER — TAMSULOSIN HYDROCHLORIDE 0.4 MG/1
0.4 CAPSULE ORAL DAILY
Status: DISCONTINUED | OUTPATIENT
Start: 2020-10-04 | End: 2020-10-05 | Stop reason: HOSPADM

## 2020-10-03 RX ORDER — ACETAMINOPHEN 325 MG/1
650 TABLET ORAL
Status: DISCONTINUED | OUTPATIENT
Start: 2020-10-03 | End: 2020-10-05 | Stop reason: HOSPADM

## 2020-10-03 RX ORDER — TAMSULOSIN HYDROCHLORIDE 0.4 MG/1
0.4 CAPSULE ORAL
Status: COMPLETED | OUTPATIENT
Start: 2020-10-03 | End: 2020-10-03

## 2020-10-03 RX ORDER — ACETAMINOPHEN 650 MG/1
650 SUPPOSITORY RECTAL
Status: DISCONTINUED | OUTPATIENT
Start: 2020-10-03 | End: 2020-10-05 | Stop reason: HOSPADM

## 2020-10-03 RX ORDER — SODIUM CHLORIDE 0.9 % (FLUSH) 0.9 %
5-40 SYRINGE (ML) INJECTION AS NEEDED
Status: DISCONTINUED | OUTPATIENT
Start: 2020-10-03 | End: 2020-10-05 | Stop reason: HOSPADM

## 2020-10-03 RX ORDER — POLYETHYLENE GLYCOL 3350 17 G/17G
17 POWDER, FOR SOLUTION ORAL DAILY PRN
Status: DISCONTINUED | OUTPATIENT
Start: 2020-10-03 | End: 2020-10-05 | Stop reason: HOSPADM

## 2020-10-03 RX ORDER — SODIUM CHLORIDE 9 MG/ML
75 INJECTION, SOLUTION INTRAVENOUS CONTINUOUS
Status: DISCONTINUED | OUTPATIENT
Start: 2020-10-03 | End: 2020-10-05 | Stop reason: HOSPADM

## 2020-10-03 RX ORDER — DULOXETIN HYDROCHLORIDE 30 MG/1
60 CAPSULE, DELAYED RELEASE ORAL DAILY
Status: DISCONTINUED | OUTPATIENT
Start: 2020-10-04 | End: 2020-10-05 | Stop reason: HOSPADM

## 2020-10-03 RX ORDER — ONDANSETRON 2 MG/ML
4 INJECTION INTRAMUSCULAR; INTRAVENOUS
Status: DISCONTINUED | OUTPATIENT
Start: 2020-10-03 | End: 2020-10-05 | Stop reason: HOSPADM

## 2020-10-03 RX ORDER — ALPRAZOLAM 0.5 MG/1
1 TABLET ORAL
Status: DISCONTINUED | OUTPATIENT
Start: 2020-10-03 | End: 2020-10-05 | Stop reason: HOSPADM

## 2020-10-03 RX ORDER — LISINOPRIL 5 MG/1
5 TABLET ORAL DAILY
Status: DISCONTINUED | OUTPATIENT
Start: 2020-10-04 | End: 2020-10-03

## 2020-10-03 RX ORDER — ALBUTEROL SULFATE 90 UG/1
1 AEROSOL, METERED RESPIRATORY (INHALATION)
Status: DISCONTINUED | OUTPATIENT
Start: 2020-10-03 | End: 2020-10-05 | Stop reason: HOSPADM

## 2020-10-03 RX ORDER — ENOXAPARIN SODIUM 100 MG/ML
40 INJECTION SUBCUTANEOUS DAILY
Status: DISCONTINUED | OUTPATIENT
Start: 2020-10-04 | End: 2020-10-04

## 2020-10-03 RX ORDER — SODIUM CHLORIDE 0.9 % (FLUSH) 0.9 %
5-40 SYRINGE (ML) INJECTION EVERY 8 HOURS
Status: DISCONTINUED | OUTPATIENT
Start: 2020-10-03 | End: 2020-10-05 | Stop reason: HOSPADM

## 2020-10-03 RX ORDER — PROMETHAZINE HYDROCHLORIDE 25 MG/1
12.5 TABLET ORAL
Status: DISCONTINUED | OUTPATIENT
Start: 2020-10-03 | End: 2020-10-05 | Stop reason: HOSPADM

## 2020-10-03 RX ORDER — ONDANSETRON 2 MG/ML
4 INJECTION INTRAMUSCULAR; INTRAVENOUS
Status: DISCONTINUED | OUTPATIENT
Start: 2020-10-03 | End: 2020-10-03

## 2020-10-03 RX ORDER — MORPHINE SULFATE 2 MG/ML
2 INJECTION, SOLUTION INTRAMUSCULAR; INTRAVENOUS
Status: DISCONTINUED | OUTPATIENT
Start: 2020-10-03 | End: 2020-10-05 | Stop reason: HOSPADM

## 2020-10-03 RX ORDER — METOPROLOL SUCCINATE 50 MG/1
25 TABLET, EXTENDED RELEASE ORAL
Status: DISCONTINUED | OUTPATIENT
Start: 2020-10-03 | End: 2020-10-03

## 2020-10-03 RX ORDER — MIRTAZAPINE 15 MG/1
30 TABLET, FILM COATED ORAL
Status: DISCONTINUED | OUTPATIENT
Start: 2020-10-03 | End: 2020-10-05 | Stop reason: HOSPADM

## 2020-10-03 RX ORDER — PANTOPRAZOLE SODIUM 40 MG/1
40 TABLET, DELAYED RELEASE ORAL
Status: DISCONTINUED | OUTPATIENT
Start: 2020-10-04 | End: 2020-10-05 | Stop reason: HOSPADM

## 2020-10-03 RX ORDER — ONDANSETRON 2 MG/ML
4 INJECTION INTRAMUSCULAR; INTRAVENOUS
Status: COMPLETED | OUTPATIENT
Start: 2020-10-03 | End: 2020-10-03

## 2020-10-03 RX ADMIN — MORPHINE SULFATE 4 MG: 2 INJECTION, SOLUTION INTRAMUSCULAR; INTRAVENOUS at 21:59

## 2020-10-03 RX ADMIN — CEFTRIAXONE 1 G: 1 INJECTION, POWDER, FOR SOLUTION INTRAMUSCULAR; INTRAVENOUS at 23:17

## 2020-10-03 RX ADMIN — KETOROLAC TROMETHAMINE 15 MG: 30 INJECTION, SOLUTION INTRAMUSCULAR at 21:07

## 2020-10-03 RX ADMIN — TAMSULOSIN HYDROCHLORIDE 0.4 MG: 0.4 CAPSULE ORAL at 21:59

## 2020-10-03 RX ADMIN — MORPHINE SULFATE 2 MG: 2 INJECTION, SOLUTION INTRAMUSCULAR; INTRAVENOUS at 23:27

## 2020-10-03 RX ADMIN — ONDANSETRON 4 MG: 2 INJECTION INTRAMUSCULAR; INTRAVENOUS at 21:07

## 2020-10-03 RX ADMIN — Medication 40 ML: at 22:45

## 2020-10-03 RX ADMIN — MIRTAZAPINE 30 MG: 15 TABLET, FILM COATED ORAL at 23:55

## 2020-10-03 RX ADMIN — SODIUM CHLORIDE 1000 ML: 900 INJECTION, SOLUTION INTRAVENOUS at 22:04

## 2020-10-03 RX ADMIN — SODIUM CHLORIDE 75 ML/HR: 900 INJECTION, SOLUTION INTRAVENOUS at 23:17

## 2020-10-04 ENCOUNTER — APPOINTMENT (OUTPATIENT)
Dept: CT IMAGING | Age: 35
DRG: 463 | End: 2020-10-04
Attending: STUDENT IN AN ORGANIZED HEALTH CARE EDUCATION/TRAINING PROGRAM
Payer: COMMERCIAL

## 2020-10-04 LAB
ANION GAP SERPL CALC-SCNC: 4 MMOL/L (ref 5–15)
BUN SERPL-MCNC: 9 MG/DL (ref 6–20)
BUN/CREAT SERPL: 10 (ref 12–20)
CALCIUM SERPL-MCNC: 8 MG/DL (ref 8.5–10.1)
CHLORIDE SERPL-SCNC: 112 MMOL/L (ref 97–108)
CO2 SERPL-SCNC: 26 MMOL/L (ref 21–32)
CREAT SERPL-MCNC: 0.9 MG/DL (ref 0.55–1.02)
ERYTHROCYTE [DISTWIDTH] IN BLOOD BY AUTOMATED COUNT: 12.6 % (ref 11.5–14.5)
GLUCOSE SERPL-MCNC: 98 MG/DL (ref 65–100)
HCT VFR BLD AUTO: 34.7 % (ref 35–47)
HGB BLD-MCNC: 11.3 G/DL (ref 11.5–16)
MAGNESIUM SERPL-MCNC: 2.3 MG/DL (ref 1.6–2.4)
MCH RBC QN AUTO: 30.3 PG (ref 26–34)
MCHC RBC AUTO-ENTMCNC: 32.6 G/DL (ref 30–36.5)
MCV RBC AUTO: 93 FL (ref 80–99)
NRBC # BLD: 0 K/UL (ref 0–0.01)
NRBC BLD-RTO: 0 PER 100 WBC
PLATELET # BLD AUTO: 305 K/UL (ref 150–400)
PMV BLD AUTO: 10.2 FL (ref 8.9–12.9)
POTASSIUM SERPL-SCNC: 3.2 MMOL/L (ref 3.5–5.1)
RBC # BLD AUTO: 3.73 M/UL (ref 3.8–5.2)
SODIUM SERPL-SCNC: 142 MMOL/L (ref 136–145)
WBC # BLD AUTO: 10 K/UL (ref 3.6–11)

## 2020-10-04 PROCEDURE — 83735 ASSAY OF MAGNESIUM: CPT

## 2020-10-04 PROCEDURE — 74011250636 HC RX REV CODE- 250/636: Performed by: HOSPITALIST

## 2020-10-04 PROCEDURE — 94760 N-INVAS EAR/PLS OXIMETRY 1: CPT

## 2020-10-04 PROCEDURE — 74011000258 HC RX REV CODE- 258: Performed by: HOSPITALIST

## 2020-10-04 PROCEDURE — 74011250636 HC RX REV CODE- 250/636: Performed by: INTERNAL MEDICINE

## 2020-10-04 PROCEDURE — 74176 CT ABD & PELVIS W/O CONTRAST: CPT

## 2020-10-04 PROCEDURE — 74011250637 HC RX REV CODE- 250/637: Performed by: STUDENT IN AN ORGANIZED HEALTH CARE EDUCATION/TRAINING PROGRAM

## 2020-10-04 PROCEDURE — 36415 COLL VENOUS BLD VENIPUNCTURE: CPT

## 2020-10-04 PROCEDURE — 74011250637 HC RX REV CODE- 250/637: Performed by: HOSPITALIST

## 2020-10-04 PROCEDURE — 80048 BASIC METABOLIC PNL TOTAL CA: CPT

## 2020-10-04 PROCEDURE — 65270000029 HC RM PRIVATE

## 2020-10-04 PROCEDURE — 74011250636 HC RX REV CODE- 250/636: Performed by: STUDENT IN AN ORGANIZED HEALTH CARE EDUCATION/TRAINING PROGRAM

## 2020-10-04 PROCEDURE — 85027 COMPLETE CBC AUTOMATED: CPT

## 2020-10-04 RX ORDER — ENOXAPARIN SODIUM 100 MG/ML
40 INJECTION SUBCUTANEOUS EVERY 12 HOURS
Status: DISCONTINUED | OUTPATIENT
Start: 2020-10-04 | End: 2020-10-05 | Stop reason: HOSPADM

## 2020-10-04 RX ORDER — HYDROMORPHONE HYDROCHLORIDE 1 MG/ML
0.5 INJECTION, SOLUTION INTRAMUSCULAR; INTRAVENOUS; SUBCUTANEOUS
Status: DISCONTINUED | OUTPATIENT
Start: 2020-10-04 | End: 2020-10-05 | Stop reason: HOSPADM

## 2020-10-04 RX ORDER — POTASSIUM CHLORIDE 20 MEQ/1
40 TABLET, EXTENDED RELEASE ORAL
Status: COMPLETED | OUTPATIENT
Start: 2020-10-04 | End: 2020-10-04

## 2020-10-04 RX ORDER — KETOROLAC TROMETHAMINE 30 MG/ML
30 INJECTION, SOLUTION INTRAMUSCULAR; INTRAVENOUS
Status: DISCONTINUED | OUTPATIENT
Start: 2020-10-04 | End: 2020-10-05 | Stop reason: HOSPADM

## 2020-10-04 RX ADMIN — HYDROMORPHONE HYDROCHLORIDE 0.5 MG: 1 INJECTION, SOLUTION INTRAMUSCULAR; INTRAVENOUS; SUBCUTANEOUS at 18:14

## 2020-10-04 RX ADMIN — ALPRAZOLAM 1 MG: 0.5 TABLET ORAL at 00:55

## 2020-10-04 RX ADMIN — TAMSULOSIN HYDROCHLORIDE 0.4 MG: 0.4 CAPSULE ORAL at 08:27

## 2020-10-04 RX ADMIN — Medication 10 ML: at 20:20

## 2020-10-04 RX ADMIN — ONDANSETRON 4 MG: 2 INJECTION INTRAMUSCULAR; INTRAVENOUS at 11:56

## 2020-10-04 RX ADMIN — MORPHINE SULFATE 2 MG: 2 INJECTION, SOLUTION INTRAMUSCULAR; INTRAVENOUS at 08:28

## 2020-10-04 RX ADMIN — MORPHINE SULFATE 2 MG: 2 INJECTION, SOLUTION INTRAMUSCULAR; INTRAVENOUS at 15:35

## 2020-10-04 RX ADMIN — DULOXETINE HYDROCHLORIDE 60 MG: 30 CAPSULE, DELAYED RELEASE ORAL at 08:27

## 2020-10-04 RX ADMIN — SODIUM CHLORIDE 75 ML/HR: 900 INJECTION, SOLUTION INTRAVENOUS at 13:15

## 2020-10-04 RX ADMIN — ENOXAPARIN SODIUM 40 MG: 40 INJECTION SUBCUTANEOUS at 08:27

## 2020-10-04 RX ADMIN — CEFTRIAXONE 1 G: 1 INJECTION, POWDER, FOR SOLUTION INTRAMUSCULAR; INTRAVENOUS at 22:16

## 2020-10-04 RX ADMIN — HYDROMORPHONE HYDROCHLORIDE 0.5 MG: 1 INJECTION, SOLUTION INTRAMUSCULAR; INTRAVENOUS; SUBCUTANEOUS at 04:56

## 2020-10-04 RX ADMIN — HYDROMORPHONE HYDROCHLORIDE 0.5 MG: 1 INJECTION, SOLUTION INTRAMUSCULAR; INTRAVENOUS; SUBCUTANEOUS at 22:20

## 2020-10-04 RX ADMIN — KETOROLAC TROMETHAMINE 30 MG: 30 INJECTION, SOLUTION INTRAMUSCULAR at 16:03

## 2020-10-04 RX ADMIN — MORPHINE SULFATE 2 MG: 2 INJECTION, SOLUTION INTRAMUSCULAR; INTRAVENOUS at 11:56

## 2020-10-04 RX ADMIN — MORPHINE SULFATE 2 MG: 2 INJECTION, SOLUTION INTRAMUSCULAR; INTRAVENOUS at 00:59

## 2020-10-04 RX ADMIN — MORPHINE SULFATE 2 MG: 2 INJECTION, SOLUTION INTRAMUSCULAR; INTRAVENOUS at 03:02

## 2020-10-04 RX ADMIN — MORPHINE SULFATE 2 MG: 2 INJECTION, SOLUTION INTRAMUSCULAR; INTRAVENOUS at 20:19

## 2020-10-04 RX ADMIN — LOSARTAN POTASSIUM 25 MG: 25 TABLET, FILM COATED ORAL at 08:27

## 2020-10-04 RX ADMIN — Medication 10 ML: at 14:25

## 2020-10-04 RX ADMIN — ONDANSETRON 4 MG: 2 INJECTION INTRAMUSCULAR; INTRAVENOUS at 00:57

## 2020-10-04 RX ADMIN — POTASSIUM CHLORIDE 40 MEQ: 20 TABLET, EXTENDED RELEASE ORAL at 08:27

## 2020-10-04 RX ADMIN — HYDROMORPHONE HYDROCHLORIDE 0.5 MG: 1 INJECTION, SOLUTION INTRAMUSCULAR; INTRAVENOUS; SUBCUTANEOUS at 10:12

## 2020-10-04 RX ADMIN — HYDROMORPHONE HYDROCHLORIDE 0.5 MG: 1 INJECTION, SOLUTION INTRAMUSCULAR; INTRAVENOUS; SUBCUTANEOUS at 14:18

## 2020-10-04 RX ADMIN — ENOXAPARIN SODIUM 40 MG: 40 INJECTION SUBCUTANEOUS at 20:20

## 2020-10-04 RX ADMIN — ONDANSETRON 4 MG: 2 INJECTION INTRAMUSCULAR; INTRAVENOUS at 18:15

## 2020-10-04 RX ADMIN — PANTOPRAZOLE SODIUM 40 MG: 40 TABLET, DELAYED RELEASE ORAL at 08:37

## 2020-10-04 RX ADMIN — ALPRAZOLAM 1 MG: 0.5 TABLET ORAL at 20:19

## 2020-10-04 RX ADMIN — MIRTAZAPINE 30 MG: 15 TABLET, FILM COATED ORAL at 20:19

## 2020-10-04 RX ADMIN — Medication 10 ML: at 06:32

## 2020-10-04 NOTE — PROGRESS NOTES
Pharmacy Automatic Dosing Protocol - VTE prophylaxis    Labs:  Recent Labs     10/04/20  0323 10/03/20  2050   CREA 0.90 0.94   BUN 9 11       Creatinine Clearance (mL/min) or Dialysis: 68.3 mL/min (IBW)    Weight: 112 kg  BMI 45    Impression/Plan:   Enoxaparin has been adjusted from 40 mg q24h  to q12h for BMI >40 based on the VTE prophylaxis dosing protocol. Pharmacy will follow daily and adjust medications as appropriate.     Thank you,  Trisha Homans, [place]

## 2020-10-04 NOTE — PROGRESS NOTES
General Surgery End of Shift Nursing Note    Bedside shift change report given to Myrna Richards (oncoming nurse) by [facility] BRE CERNA RN and Fox Miller RN (offgoing nurse). Report included the following information SBAR, Kardex, Intake/Output, MAR and Recent Results. Shift worked:   7am-7pm   Summary of shift:  Pt was complaining of extreme pain from kidney stones and was given pain meds multiple times throughout shift. Issues for physician to address:  None. Number times ambulated in hallway past shift: 0    Number of times OOB to chair past shift: 0    Pain Management:  Current medication: Morphine, dilaudid, toradol  Patient states pain is manageable on current pain medication: YES    GI:    Current diet:  DIET CLEAR LIQUID    Tolerating current diet: YES  Passing flatus: YES  Last Bowel Movement: yesterday   Appearance:     Respiratory:    Incentive Spirometer at bedside: NO  Patient instructed on use: NO    Patient Safety:    Falls Score: 1  Bed Alarm On? No  Sitter?  No    [employer]

## 2020-10-04 NOTE — PROGRESS NOTES
Hospitalist Progress Note    NAME: Betsy Rapp   :  1985   MRN:  [id number]       Assessment / Plan:    Neva Jones is a 37 y.o.  female  With PMHx of  Lupus, breast cancer and kidney stone (2x) who presents to ED from with worsening Right flank pain. She was dx with kidney stone a week ago and was awaiting surgery per pt. Ureteric Colic   - R flank pain, microscopic hematuria, and 2mm stone ar R ureterovesical junction on recent CT abd .    CT Abd   1. Right ureterovesical junction calculus (2 mm). No hydronephrosis or   ureterectasis. 2. Tiny, nonobstructing, right renal calculi. 3. Mild hepatic steatosis. - Continue tamsulosin  - Continue IV fluid resuscitation and pain control with morphine and toradol  - Pt with unremitting pain today and reports decreased urine out put despite increase fluid intake. sCr wnl. Will repeat CT scan to rule out hydronephrosis  - Urology consulted      Probable UTI  - Started on Ceftriaxone empirically  - Subjective fever and mild leukocytosis on admission but urinalysis not suggestive of UTI  - Will discontinue antibiotic after 3 doses    Hypokalemia  - Replete     Hypertension  - Continue losartan  - Ensure adequate pain control     Morbid obesity  - BMI 45  -  on lifestyle modification     Code Status: Full  Surrogate Decision Maker:     DVT Prophylaxis: s q hep  GI Prophylaxis: not indicated         40 or above Morbid obesity     Code status: Full  Prophylaxis: Lovenox  Recommended Disposition: Home w/Family     Subjective:     Chief Complaint / Reason for Physician Visit  Discussed with RN events overnight.    Pt continues to have right flank pain and nausea  States she had kidney stones in the past that required intervention    Review of Systems:  Symptom Y/N Comments  Symptom Y/N Comments   Fever/Chills    Chest Pain     Poor Appetite    Edema     Cough    Abdominal Pain     Sputum    Joint Pain     SOB/WILDE    Pruritis/Rash Nausea/vomit    Tolerating PT/OT     Diarrhea    Tolerating Diet     Constipation    Other       Could NOT obtain due to:      Objective:     VITALS:   Last 24hrs VS reviewed since prior progress note. Most recent are:  Patient Vitals for the past 24 hrs:   Temp Pulse Resp BP SpO2   10/04/20 1143 98.8 °F (37.1 °C) 68 18 (!) 140/84 98 %   10/04/20 0824 98.2 °F (36.8 °C) 80 18 119/78 97 %   10/04/20 0406 98 °F (36.7 °C) 67 18 (!) 150/83 99 %   10/04/20 0035 98.2 °F (36.8 °C) 88 18 (!) 140/90 99 %   10/03/20 2300 98.6 °F (37 °C) 88 16 119/69 97 %   10/03/20 2205     94 %   10/03/20 2204    138/86    10/03/20 2010 98.3 °F (36.8 °C) 86 18 132/78 97 %       Intake/Output Summary (Last 24 hours) at 10/4/2020 1554  Last data filed at 10/4/2020 0802  Gross per 24 hour   Intake 1656.25 ml   Output 400 ml   Net 1256.25 ml        PHYSICAL EXAM:  General: WD, WN. Alert, cooperative, no acute distress    EENT:  EOMI. Anicteric sclerae. MMM  Resp:  CTA bilaterally, no wheezing or rales. No accessory muscle use  CV:  Regular  rhythm,  No edema  GI:  Soft, Non distended, Non tender.  +Bowel sounds  Neurologic:  Alert and oriented X 3, normal speech,   Psych:   Good insight. Not anxious nor agitated  Skin:  No rashes. No jaundice    Reviewed most current lab test results and cultures  YES  Reviewed most current radiology test results   YES  Review and summation of old records today    NO  Reviewed patient's current orders and MAR    YES  PMH/SH reviewed - no change compared to H&P  ________________________________________________________________________  Care Plan discussed with:    Comments   Patient y    Family      RN y    Care Manager     Consultant                        Multidiciplinary team rounds were held today with , nursing, pharmacist and clinical coordinator. Patient's plan of care was discussed; medications were reviewed and discharge planning was addressed. ________________________________________________________________________      Total CRITICAL CARE TIME Spent:   Minutes non procedure based      Comments   >50% of visit spent in counseling and coordination of care y    ________________________________________________________________________  Mckenna Gautam MD     Procedures: see electronic medical records for all procedures/Xrays and details which were not copied into this note but were reviewed prior to creation of Plan. LABS:  I reviewed today's most current labs and imaging studies.   Pertinent labs include:  Recent Labs     10/04/20  0323 10/03/20  2050   WBC 10.0 11.2*   HGB 11.3* 12.2   HCT 34.7* 37.4    255     Recent Labs     10/04/20  0323 10/03/20  2050    139   K 3.2* 4.0   * 108   CO2 26 24   GLU 98 105*   BUN 9 11   CREA 0.90 0.94   CA 8.0* 8.7   MG 2.3  --    ALB  --  4.0   TBILI  --  0.3   ALT  --  18       Signed: Mckenna Gautam MD

## 2020-10-04 NOTE — H&P
Hospitalist Admission Note    NAME: Margoth Adams   :  1985   MRN:  [id number]     Date/Time:  10/3/2020 10:27 PM    Patient PCP: Paige Metcalf MD  _____________________________________________________________________  Given the patient's current clinical presentation, I have a high level of concern for decompensation if discharged from the emergency department. Complex decision making was performed, which includes reviewing the patient's available past medical records, laboratory results, and x-ray films. My assessment of this patient's clinical condition and my plan of care is as follows. Assessment / Plan:    Ureteric Colic   Probale UTI  CT Abd   1. Right ureterovesical junction calculus (2 mm). No hydronephrosis or   ureterectasis. 2. Tiny, nonobstructing, right renal calculi. 3. Mild hepatic steatosis. Npo  Cont iv ceftriaxone  And f/u cx  ED consulted  Urology duglas sepulveda who will see the pt. In am  IVF  IV analgesia      Code Status: Full  Surrogate Decision Maker:    DVT Prophylaxis: s q hep  GI Prophylaxis: not indicated    Baseline: independent        Subjective:   CHIEF COMPLAINT:  Flank  Pain   HISTORY OF PRESENT ILLNESS:     Whit Rojas is a 37 y.o.  female  With PMHx of   Lupus , breast cancer and kidney stone who presents to ED from home reports  Worsening Right flank pain . She was dx with kidney stone and  awaiting surgery per pt . C/O Sunjective fever. Afebrile in ED so far. Patient states she had similar  right-sided flank pain one week ago when she was seen in ED  And diagnosed with kidney stone. She describes the pain as a constant stabbing pain that is radiating to her right lower quadrant. Pain is been constant without relieving or exacerbating factors. She describes the pain is severe she has had associated nausea, dysuria and vomiting. Denying any chest pain, shortness of breath, fevers, chills or other systemic symptoms. We were asked to admit for work up and evaluation of the above problems.      Past Medical History:   Diagnosis Date    Abnormal CT of the abdomen 11/8/2012    Asthma     Autoimmune disease (HCC)     lupus    C. difficile diarrhea     Cervical prolapse     Dehydration     Mariah-Danlos syndrome     Gastrointestinal disorder     gerd, twisted colon, IBS    GERD (gastroesophageal reflux disease)     Headache(784.0)     Lupus (HCC)     Morbid obesity (HCC)     Nausea & vomiting     Neurological disorder     cluster headaches    Occipital neuralgia     other 2006, 2009    ovarian cyst removal    Other ill-defined conditions(799.89)     Worsening headaches         Past Surgical History:   Procedure Laterality Date    COLONOSCOPY  9/21/2010         HX CHOLECYSTECTOMY      HX CYST INCISION AND DRAINAGE Right 02/27/2020    HX GYN  1/2009    right salpingo oopherectomy    HX GYN  2006    right ovarian tumor removed    HX HEENT      left wisdom teeth removal    HX HEENT      top right wisdom tooth removed    HX HERNIA REPAIR  4/2012    HX HERNIA REPAIR  2/28/13    Laparoscopic recurrent incisional hernia repair    HX HYSTERECTOMY      2016    HX UROLOGICAL      Kidney Stone Removal    MT EGD TRANSORAL BIOPSY SINGLE/MULTIPLE  9/22/2010            Social History     Tobacco Use    Smoking status: Never Smoker    Smokeless tobacco: Never Used   Substance Use Topics    Alcohol use: No        Family History   Problem Relation Age of Onset    Colon Cancer Maternal Grandfather      Allergies   Allergen Reactions    Latex Itching     burning    Compazine [Prochlorperazine] Anxiety     High heart rate  Pt can take promethazine with no problems    Sulfa (Sulfonamide Antibiotics) Unknown (comments)    Topamax [Topiramate] Unknown (comments)    Adhesive Rash     Allergic to Dermabond    Clindamycin Diarrhea     Pt states caused her C-Diff    Mushroom Other (comments)    Mushroom Combination No.1 Unknown (comments)    Toradol [Ketorolac Tromethamine] Nausea and Vomiting and Other (comments)     PO & IV causes GI bleed  Tolerated during Feb 2020 stay    Valproic Acid Other (comments)     Elevated heart rate and vomiting          Prior to Admission medications    Medication Sig Start Date End Date Taking? Authorizing Provider   tamsulosin (Flomax) 0.4 mg capsule Take 1 Cap by mouth daily for 7 doses. 9/27/20 10/4/20 Yes Ankita Barr MD   ondansetron hcl (ZOFRAN) 4 mg tablet TAKE 1 TABLET BY MOUTH EVERY 8 HOURS AS NEEDED FOR NAUSEA 9/22/20  Yes Savanna Zaragoza MD   olmesartan (BENICAR) 5 mg tablet TAKE 1 TABLET BY MOUTH DAILY 8/27/20  Yes Rizwan Pickens NP   DULoxetine (CYMBALTA) 60 mg capsule TAKE 2 CAPSULES BY MOUTH DAILY 8/26/20  Yes Savanna Zaragoza MD   mirtazapine (REMERON) 30 mg tablet TAKE 1 TABLET BY MOUTH AT BEDTIME 6/23/20  Yes Savanna Zaragoza MD   metoprolol succinate (TOPROL-XL) 25 mg XL tablet Take 1 Tab by mouth nightly. 10/2/20   Rizwan Pickens NP   ondansetron (Zofran ODT) 4 mg disintegrating tablet Take 1 Tab by mouth every eight (8) hours as needed for Nausea. 9/27/20   Ankita Barr MD   naproxen (NAPROSYN) 500 mg tablet TK 1 TA PO BID 8/1/20   ProviderDamian   promethazine (PHENERGAN) 25 mg tablet TAKE 1 TABLET BY MOUTH EVERY 6 HOURS AS NEEDED FOR NAUSEA 8/4/20   Savanna Zaragoza MD   ALPRAZolam Sherley Rodriguez) 1 mg tablet TAKE 1 TABLET BY MOUTH TWICE DAILY AS NEEDED FOR ANXIETY MAXIMUM DAILY DOSE 2 7/1/20   Savanna Zaragoza MD   RisaQuad-2 16 billion cell cap cap TAKE ONE CAPSULE BY MOUTH ONCE DAILY 3/24/20   Sheela Duffy MD   enalapril (VASOTEC) 5 mg tablet TAKE 1 TABLET BY MOUTH DAILY AS NEEDED FOR HYPERTENSION 2/26/20   Savanna Zaragoza MD   albuterol (PROVENTIL HFA, VENTOLIN HFA, PROAIR HFA) 90 mcg/actuation inhaler Take 1 Puff by inhalation every six (6) hours as needed for Wheezing.  11/21/19   Alyssa Curry MD   furosemide (LASIX) 20 mg tablet PRN 7/25/19   Provider, Historical   tiZANidine (ZANAFLEX) 4 mg tablet  9/19/19   Provider, Historical   estradiol (ESTRACE) 1 mg tablet Take 1 mg by mouth daily. 7/19/19   Provider, Historical   indomethacin (INDOCIN) 25 mg capsule PRN 3/29/19   Provider, Historical   pantoprazole (PROTONIX) 40 mg tablet take 1 tablet by mouth at bedtime 2/1/19   Provider, Historical   codeine-butalbital-acetaminophen-caffeine (FIORICET WITH CODEINE) -30-30 mg capsule take 2 capsules by mouth every 6 hours if needed for MIGRAINE HEADACHE 2/22/19   Samantha ANDERSEN III, DO   rizatriptan (MAXALT) 10 mg tablet Take 10 mg by mouth once as needed for Migraine. May repeat in 2 hours if needed    Provider, Historical   belimumab (BENLYSTA) 400 mg solr injection by IntraVENous route. Provider, Historical       REVIEW OF SYSTEMS:     I am not able to complete the review of systems because:    The patient is intubated and sedated    The patient has altered mental status due to his acute medical problems    The patient has baseline aphasia from prior stroke(s)    The patient has baseline dementia and is not reliable historian    The patient is in acute medical distress and unable to provide information           Total of 12 systems reviewed as follows:       POSITIVE= underlined text  Negative = text not underlined  General:  fever, chills, sweats, generalized weakness, weight loss/gain,      loss of appetite   Eyes:    blurred vision, eye pain, loss of vision, double vision  ENT:    rhinorrhea, pharyngitis   Respiratory:   cough, sputum production, SOB, WILDE, wheezing, pleuritic pain   Cardiology:   chest pain, palpitations, orthopnea, PND, edema, syncope   Gastrointestinal:  abdominal pain , N/V, diarrhea, dysphagia, constipation, bleeding   Genitourinary:  frequency, urgency, dysuria, hematuria, incontinence ,Flank pain  Muskuloskeletal :  arthralgia, myalgia, back pain  Hematology:  easy bruising, nose or gum bleeding, lymphadenopathy Dermatological: rash, ulceration, pruritis, color change / jaundice  Endocrine:   hot flashes or polydipsia   Neurological:  headache, dizziness, confusion, focal weakness, paresthesia,     Speech difficulties, memory loss, gait difficulty  Psychological: Feelings of anxiety, depression, agitation    Objective:   VITALS:    Visit Vitals  /86   Pulse 86   Temp 98.3 °F (36.8 °C)   Resp 18   SpO2 94%       PHYSICAL EXAM:    General:    Alert, cooperative, no distress, appears stated age. HEENT: Atraumatic, anicteric sclerae, pink conjunctivae     No oral ulcers, mucosa moist, throat clear, dentition fair  Neck:  Supple, symmetrical,  thyroid: non tender  Lungs:   Clear to auscultation bilaterally. No Wheezing or Rhonchi. No rales. Chest wall:  No tenderness  No Accessory muscle use. Heart:   Regular  rhythm,  No  murmur   No edema  Abdomen:   Soft, non-tender. Not distended. Bowel sounds normal  Extremities: No cyanosis. No clubbing,      Skin turgor normal, Capillary refill normal, Radial dial pulse 2+  Skin:     Not pale. Not Jaundiced  No rashes   Psych:  Good insight. Not depressed. Not anxious or agitated. Neurologic: EOMs intact. No facial asymmetry. No aphasia or slurred speech. Symmetrical strength, Sensation grossly intact.  Alert and oriented X 4.     _______________________________________________________________________  Care Plan discussed with:    Comments   Patient y    Family      RN y    Care Manager                    Consultant:  dunia Conley md   _______________________________________________________________________  Expected  Disposition:   Home with Family y   HH/PT/OT/RN    SNF/LTC    ABELARDO    ________________________________________________________________________  TOTAL TIME: 61   Minutes    Critical Care Provided     Minutes non procedure based      Comments    y Reviewed previous records   >50% of visit spent in counseling and coordination of care y Discussion with patient and/or family and questions answered       Given the patient's current clinical presentation, I have a high level of concern for decompensation if discharged from the ED. Complex decision making was performed which includes reviewing the patient's available past medical records, laboratory results, and Xray films. I have also directly communicated my plan and discussed this case with the involved ED physician.     ____________________________________________________________________  Marisol Og MD    Procedures: see electronic medical records for all procedures/Xrays and details which were not copied into this note but were reviewed prior to creation of Plan. LAB DATA REVIEWED:    Recent Results (from the past 24 hour(s))   CBC WITH AUTOMATED DIFF    Collection Time: 10/03/20  8:50 PM   Result Value Ref Range    WBC 11.2 (H) 3.6 - 11.0 K/uL    RBC 4.03 3.80 - 5.20 M/uL    HGB 12.2 11.5 - 16.0 g/dL    HCT 37.4 35.0 - 47.0 %    MCV 92.8 80.0 - 99.0 FL    MCH 30.3 26.0 - 34.0 PG    MCHC 32.6 30.0 - 36.5 g/dL    RDW 12.4 11.5 - 14.5 %    PLATELET 680 873 - 166 K/uL    NRBC 0.0 0  WBC    ABSOLUTE NRBC 0.00 0.00 - 0.01 K/uL    NEUTROPHILS 44 32 - 75 %    BAND NEUTROPHILS 6 %    LYMPHOCYTES 44 12 - 49 %    MONOCYTES 2 (L) 5 - 13 %    EOSINOPHILS 4 0 - 7 %    BASOPHILS 0 0 - 1 %    IMMATURE GRANULOCYTES 0 0.0 - 0.5 %    ABS. NEUTROPHILS 5.7 1.8 - 8.0 K/UL    ABS. LYMPHOCYTES 4.9 (H) 0.8 - 3.5 K/UL    ABS. MONOCYTES 0.2 0.0 - 1.0 K/UL    ABS. EOSINOPHILS 0.4 0.0 - 0.4 K/UL    ABS. BASOPHILS 0.0 0.0 - 0.1 K/UL    ABS. IMM.  GRANS. 0.0 0.00 - 0.04 K/UL    DF AUTOMATED      RBC COMMENTS NORMOCYTIC, NORMOCHROMIC     METABOLIC PANEL, COMPREHENSIVE    Collection Time: 10/03/20  8:50 PM   Result Value Ref Range    Sodium 139 136 - 145 mmol/L    Potassium 4.0 3.5 - 5.1 mmol/L    Chloride 108 97 - 108 mmol/L    CO2 24 21 - 32 mmol/L    Anion gap 7 5 - 15 mmol/L    Glucose 105 (H) 65 - 100 mg/dL    BUN 11 6 - 20 MG/DL    Creatinine 0. 94 0.55 - 1.02 MG/DL    BUN/Creatinine ratio 12 12 - 20      GFR est AA >60 >60 ml/min/1.73m2    GFR est non-AA >60 >60 ml/min/1.73m2    Calcium 8.7 8.5 - 10.1 MG/DL    Bilirubin, total 0.3 0.2 - 1.0 MG/DL    ALT (SGPT) 18 12 - 78 U/L    AST (SGOT) 22 15 - 37 U/L    Alk.  phosphatase 100 45 - 117 U/L    Protein, total 7.2 6.4 - 8.2 g/dL    Albumin 4.0 3.5 - 5.0 g/dL    Globulin 3.2 2.0 - 4.0 g/dL    A-G Ratio 1.3 1.1 - 2.2     LIPASE    Collection Time: 10/03/20  8:50 PM   Result Value Ref Range    Lipase 109 73 - 393 U/L   URINALYSIS W/ REFLEX CULTURE    Collection Time: 10/03/20  9:39 PM    Specimen: Urine   Result Value Ref Range    Color YELLOW/STRAW      Appearance CLEAR CLEAR      Specific gravity 1.010 1.003 - 1.030      pH (UA) 6.0 5.0 - 8.0      Protein Negative NEG mg/dL    Glucose Negative NEG mg/dL    Ketone Negative NEG mg/dL    Bilirubin Negative NEG      Blood LARGE (A) NEG      Urobilinogen 0.2 0.2 - 1.0 EU/dL    Nitrites Negative NEG      Leukocyte Esterase Negative NEG      WBC 0-4 0 - 4 /hpf    RBC >100 (H) 0 - 5 /hpf    Epithelial cells FEW FEW /lpf    Bacteria Negative NEG /hpf    UA:UC IF INDICATED CULTURE NOT INDICATED BY UA RESULT CNI      Hyaline cast 0-2 0 - 5 /lpf

## 2020-10-04 NOTE — ED PROVIDER NOTES
EMERGENCY DEPARTMENT HISTORY AND PHYSICAL EXAM      Date: 10/3/2020  Patient Name: Margoth Adams    History of Presenting Illness     Chief Complaint   Patient presents with    Flank Pain     Pt arrived to ED from home reports R flank pain dx with kidney stone awaiting surgery per pt but pain is bad. History Provided By: Patient    HPI: Margtoh Adams, 37 y.o. female presents to the ED with cc of right flank pain and right lower quadrant pain. Symptoms onset 9/27/2020 when patient was seen at the emergency department. CT imaging demonstrated 2 mm right UVJ stone. Patient noted to have mild leukocytosis of 14,000 and was discharged home. Patient states that she has been having worsening nausea, vomiting, worsening pain, and concern for fevers. She also endorses decreased urine output. She has followed with [facility], Dr. Agnes Maynard, and states that there were plans for lithotripsy/stent 10/12/2020 but she has been unable to make it that long due to persistent pain. She noted increased temperature 100.3 °F at home earlier today and this made her concerned to come to the emergency department. She has required lithotripsy for other kidney stones in the past.    There are no other complaints, changes, or physical findings at this time. PCP: Paige Metcalf MD    No current facility-administered medications on file prior to encounter. Current Outpatient Medications on File Prior to Encounter   Medication Sig Dispense Refill    ondansetron (Zofran ODT) 4 mg disintegrating tablet Take 1 Tab by mouth every eight (8) hours as needed for Nausea. 10 Tab 0    tamsulosin (Flomax) 0.4 mg capsule Take 1 Cap by mouth daily for 7 doses.  7 Cap 0    ondansetron hcl (ZOFRAN) 4 mg tablet TAKE 1 TABLET BY MOUTH EVERY 8 HOURS AS NEEDED FOR NAUSEA 20 Tab 0    olmesartan (BENICAR) 5 mg tablet TAKE 1 TABLET BY MOUTH DAILY 90 Tab 3    DULoxetine (CYMBALTA) 60 mg capsule TAKE 2 CAPSULES BY MOUTH DAILY 30 Cap 1    mirtazapine (REMERON) 30 mg tablet TAKE 1 TABLET BY MOUTH AT BEDTIME 30 Tab 5    RisaQuad-2 16 billion cell cap cap TAKE ONE CAPSULE BY MOUTH ONCE DAILY 30 Cap 1    tiZANidine (ZANAFLEX) 4 mg tablet   0    pantoprazole (PROTONIX) 40 mg tablet take 1 tablet by mouth at bedtime  0    rizatriptan (MAXALT) 10 mg tablet Take 10 mg by mouth once as needed for Migraine. May repeat in 2 hours if needed      belimumab (BENLYSTA) 400 mg solr injection by IntraVENous route.  metoprolol succinate (TOPROL-XL) 25 mg XL tablet Take 1 Tab by mouth nightly. 30 Tab 2    naproxen (NAPROSYN) 500 mg tablet TK 1 TA PO BID      promethazine (PHENERGAN) 25 mg tablet TAKE 1 TABLET BY MOUTH EVERY 6 HOURS AS NEEDED FOR NAUSEA 30 Tab 0    ALPRAZolam (XANAX) 1 mg tablet TAKE 1 TABLET BY MOUTH TWICE DAILY AS NEEDED FOR ANXIETY MAXIMUM DAILY DOSE 2 60 Tab 3    enalapril (VASOTEC) 5 mg tablet TAKE 1 TABLET BY MOUTH DAILY AS NEEDED FOR HYPERTENSION 30 Tab 5    albuterol (PROVENTIL HFA, VENTOLIN HFA, PROAIR HFA) 90 mcg/actuation inhaler Take 1 Puff by inhalation every six (6) hours as needed for Wheezing. 1 Inhaler 0    furosemide (LASIX) 20 mg tablet PRN  0    estradiol (ESTRACE) 1 mg tablet Take 1 mg by mouth daily.   0    indomethacin (INDOCIN) 25 mg capsule PRN      codeine-butalbital-acetaminophen-caffeine (FIORICET WITH CODEINE) -21-30 mg capsule take 2 capsules by mouth every 6 hours if needed for MIGRAINE HEADACHE 90 Cap 0       Past History     Past Medical History:  Past Medical History:   Diagnosis Date    Abnormal CT of the abdomen 11/8/2012    Asthma     Autoimmune disease (HCC)     lupus    C. difficile diarrhea     Cervical prolapse     Dehydration     Mariah-Danlos syndrome     Gastrointestinal disorder     gerd, twisted colon, IBS    GERD (gastroesophageal reflux disease)     Headache(784.0)     Lupus (HCC)     Morbid obesity (HCC)     Nausea & vomiting     Neurological disorder cluster headaches    Occipital neuralgia     other 2006, 2009    ovarian cyst removal    Other ill-defined conditions(799.89)     Worsening headaches        Past Surgical History:  Past Surgical History:   Procedure Laterality Date    COLONOSCOPY  9/21/2010         HX CHOLECYSTECTOMY      HX CYST INCISION AND DRAINAGE Right 02/27/2020    HX GYN  1/2009    right salpingo oopherectomy    HX GYN  2006    right ovarian tumor removed    HX HEENT      left wisdom teeth removal    HX HEENT      top right wisdom tooth removed    HX HERNIA REPAIR  4/2012    HX HERNIA REPAIR  2/28/13    Laparoscopic recurrent incisional hernia repair    HX HYSTERECTOMY      2016    HX UROLOGICAL      Kidney Stone Removal    NE EGD TRANSORAL BIOPSY SINGLE/MULTIPLE  9/22/2010            Family History:  Family History   Problem Relation Age of Onset    Colon Cancer Maternal Grandfather        Social History:  Social History     Tobacco Use    Smoking status: Never Smoker    Smokeless tobacco: Never Used   Substance Use Topics    Alcohol use: No    Drug use: No       Allergies: Allergies   Allergen Reactions    Latex Itching     burning    Compazine [Prochlorperazine] Anxiety     High heart rate  Pt can take promethazine with no problems    Sulfa (Sulfonamide Antibiotics) Unknown (comments)    Topamax [Topiramate] Unknown (comments)    Adhesive Rash     Allergic to Dermabond    Clindamycin Diarrhea     Pt states caused her C-Diff    Mushroom Other (comments)    Mushroom Combination No.1 Unknown (comments)    Toradol [Ketorolac Tromethamine] Nausea and Vomiting and Other (comments)     PO & IV causes GI bleed  Tolerated during Feb 2020 stay    Valproic Acid Other (comments)     Elevated heart rate and vomiting           Review of Systems   Review of Systems   Constitutional: Positive for appetite change, chills and fever. Respiratory: Negative for cough and shortness of breath.     Cardiovascular: Negative for chest pain and leg swelling. Gastrointestinal: Positive for abdominal pain, nausea and vomiting. Negative for diarrhea. Genitourinary: Positive for flank pain. Musculoskeletal: Negative for back pain and gait problem. Skin: Negative for color change and rash. Neurological: Negative for dizziness, weakness, light-headedness and headaches. Hematological: Does not bruise/bleed easily. All other systems reviewed and are negative. Physical Exam   Physical Exam  Vitals signs and nursing note reviewed. Constitutional:       General: She is in acute distress. Appearance: Normal appearance. She is obese. She is not ill-appearing or toxic-appearing. Comments: Patient appears very uncomfortable due to pain   HENT:      Head: Normocephalic and atraumatic. Nose: Nose normal.      Mouth/Throat:      Mouth: Mucous membranes are moist.   Eyes:      Extraocular Movements: Extraocular movements intact. Pupils: Pupils are equal, round, and reactive to light. Neck:      Musculoskeletal: Normal range of motion and neck supple. Cardiovascular:      Rate and Rhythm: Normal rate and regular rhythm. Heart sounds: No murmur. Pulmonary:      Effort: Pulmonary effort is normal. No respiratory distress. Breath sounds: Normal breath sounds. No wheezing. Abdominal:      General: There is no distension. Palpations: Abdomen is soft. Tenderness: There is no abdominal tenderness. There is right CVA tenderness and guarding. There is no rebound. Musculoskeletal: Normal range of motion. General: No swelling or tenderness. Right lower leg: No edema. Left lower leg: No edema. Skin:     General: Skin is warm and dry. Coloration: Skin is not pale. Findings: No erythema. Neurological:      General: No focal deficit present. Mental Status: She is alert and oriented to person, place, and time.          Diagnostic Study Results     Labs -     Recent Results (from the past 12 hour(s))   CBC WITH AUTOMATED DIFF    Collection Time: 10/03/20  8:50 PM   Result Value Ref Range    WBC 11.2 (H) 3.6 - 11.0 K/uL    RBC 4.03 3.80 - 5.20 M/uL    HGB 12.2 11.5 - 16.0 g/dL    HCT 37.4 35.0 - 47.0 %    MCV 92.8 80.0 - 99.0 FL    MCH 30.3 26.0 - 34.0 PG    MCHC 32.6 30.0 - 36.5 g/dL    RDW 12.4 11.5 - 14.5 %    PLATELET 482 815 - 236 K/uL    NRBC 0.0 0  WBC    ABSOLUTE NRBC 0.00 0.00 - 0.01 K/uL    NEUTROPHILS 44 32 - 75 %    BAND NEUTROPHILS 6 %    LYMPHOCYTES 44 12 - 49 %    MONOCYTES 2 (L) 5 - 13 %    EOSINOPHILS 4 0 - 7 %    BASOPHILS 0 0 - 1 %    IMMATURE GRANULOCYTES 0 0.0 - 0.5 %    ABS. NEUTROPHILS 5.7 1.8 - 8.0 K/UL    ABS. LYMPHOCYTES 4.9 (H) 0.8 - 3.5 K/UL    ABS. MONOCYTES 0.2 0.0 - 1.0 K/UL    ABS. EOSINOPHILS 0.4 0.0 - 0.4 K/UL    ABS. BASOPHILS 0.0 0.0 - 0.1 K/UL    ABS. IMM. GRANS. 0.0 0.00 - 0.04 K/UL    DF AUTOMATED      RBC COMMENTS NORMOCYTIC, NORMOCHROMIC     METABOLIC PANEL, COMPREHENSIVE    Collection Time: 10/03/20  8:50 PM   Result Value Ref Range    Sodium 139 136 - 145 mmol/L    Potassium 4.0 3.5 - 5.1 mmol/L    Chloride 108 97 - 108 mmol/L    CO2 24 21 - 32 mmol/L    Anion gap 7 5 - 15 mmol/L    Glucose 105 (H) 65 - 100 mg/dL    BUN 11 6 - 20 MG/DL    Creatinine 0.94 0.55 - 1.02 MG/DL    BUN/Creatinine ratio 12 12 - 20      GFR est AA >60 >60 ml/min/1.73m2    GFR est non-AA >60 >60 ml/min/1.73m2    Calcium 8.7 8.5 - 10.1 MG/DL    Bilirubin, total 0.3 0.2 - 1.0 MG/DL    ALT (SGPT) 18 12 - 78 U/L    AST (SGOT) 22 15 - 37 U/L    Alk.  phosphatase 100 45 - 117 U/L    Protein, total 7.2 6.4 - 8.2 g/dL    Albumin 4.0 3.5 - 5.0 g/dL    Globulin 3.2 2.0 - 4.0 g/dL    A-G Ratio 1.3 1.1 - 2.2     LIPASE    Collection Time: 10/03/20  8:50 PM   Result Value Ref Range    Lipase 109 73 - 393 U/L   URINALYSIS W/ REFLEX CULTURE    Collection Time: 10/03/20  9:39 PM    Specimen: Urine   Result Value Ref Range    Color YELLOW/STRAW      Appearance CLEAR CLEAR Specific gravity 1.010 1.003 - 1.030      pH (UA) 6.0 5.0 - 8.0      Protein Negative NEG mg/dL    Glucose Negative NEG mg/dL    Ketone Negative NEG mg/dL    Bilirubin Negative NEG      Blood LARGE (A) NEG      Urobilinogen 0.2 0.2 - 1.0 EU/dL    Nitrites Negative NEG      Leukocyte Esterase Negative NEG      WBC 0-4 0 - 4 /hpf    RBC >100 (H) 0 - 5 /hpf    Epithelial cells FEW FEW /lpf    Bacteria Negative NEG /hpf    UA:UC IF INDICATED CULTURE NOT INDICATED BY UA RESULT CNI      Hyaline cast 0-2 0 - 5 /lpf   LACTIC ACID    Collection Time: 10/03/20 10:06 PM   Result Value Ref Range    Lactic acid 1.1 0.4 - 2.0 MMOL/L       Radiologic Studies -   No orders to display     CT Results  (Last 48 hours)    None        CXR Results  (Last 48 hours)    None          Medical Decision Making   I am the first provider for this patient. I reviewed the vital signs, available nursing notes, past medical history, past surgical history, family history and social history. Vital Signs-Reviewed the patient's vital signs. Patient Vitals for the past 12 hrs:   Temp Pulse Resp BP SpO2   10/04/20 0035 98.2 °F (36.8 °C) 88 18 (!) 140/90 99 %   10/03/20 2300 98.6 °F (37 °C) 88 16 119/69 97 %   10/03/20 2205     94 %   10/03/20 2204    138/86    10/03/20 2010 98.3 °F (36.8 °C) 86 18 132/78 97 %     Records Reviewed: Nursing Notes, Old Medical Records, Previous Radiology Studies and Previous Laboratory Studies  I personally reviewed ED provider records from 9/27/2020. Provider Notes (Medical Decision Making):   58-year-old female recently diagnosed with 2 mm right UVJ nephrolithiasis here with worsening right flank pain, decreased urinary output, and concern for fever at home 100.3 °F.  Patient having nausea and vomiting in the ED and difficulty tolerating her p.o. meds. She is afebrile and vital signs were stable throughout entire ED stay here.   She was noted to have decreasing leukocytosis 11.2 here but she does have 6% bandemia which is new. Urinalysis not consistent with infection however. Given her bandemia and reported fever at home I will cover with Rocephin IV. I have discussed with Dr. Tony Olmstead, urology, who will see patient in the morning and asks that patient be kept n.p.o. at midnight. I will discuss with hospitalist for admission. ED Course:   Initial assessment performed. The patients presenting problems have been discussed, and they are in agreement with the care plan formulated and outlined with them. I have encouraged them to ask questions as they arise throughout their visit. Admission Note:  Patient is being admitted to the hospital by Dr. Felix Hurtado, Service: Hospitalist.  The results of their tests and reasons for their admission have been discussed with them and available family. They convey agreement and understanding for the need to be admitted and for their admission diagnosis. Disposition:  Admit      Diagnosis     Clinical Impression:   1. Right nephrolithiasis        Attestations:    Elliott Brown MD    Please note that this dictation was completed with [employer], the computer voice recognition software. Quite often unanticipated grammatical, syntax, homophones, and other interpretive errors are inadvertently transcribed by the computer software. Please disregard these errors. Please excuse any errors that have escaped final proofreading. Thank you.

## 2020-10-04 NOTE — PROGRESS NOTES
Problem: Falls - Risk of  Goal: *Absence of Falls  Description: Document Brancadence Le Fall Risk and appropriate interventions in the flowsheet.   Outcome: Progressing Towards Goal  Note: Fall Risk Interventions:            Medication Interventions: Patient to call before getting OOB, Teach patient to arise slowly                   Problem: Patient Education: Go to Patient Education Activity  Goal: Patient/Family Education  Outcome: Progressing Towards Goal

## 2020-10-04 NOTE — PROGRESS NOTES
Bedside report off to [place] to include SBAR, [employer], Kardex updates and questions and answers

## 2020-10-04 NOTE — ED NOTES
Pt arrives to the ED with c/o right flank pain secondary to recent kidney stone dx. Pt states her symptoms however has worsened since Sunday. pt states she is having dysuria and is having urgency and is having N/V and running low grade fevers    Pt states she is scheduled for surgery with [address]  urology at 10/13    0005: TRANSFER - OUT REPORT:    Verbal report given to Maria uEgenia Brownlee MD(name) on Marisa Mercado  being transferred to Gen Surg (unit) for routine progression of care       Report consisted of patients Situation, Background, Assessment and   Recommendations(SBAR). Information from the following report(s) SBAR, Kardex, ED Summary, Intake/Output, MAR and Recent Results was reviewed with the receiving nurse. Lines:   Peripheral IV 10/03/20 Right Forearm (Active)   Site Assessment Clean, dry, & intact 10/03/20 2359   Phlebitis Assessment 0 10/03/20 2359   Infiltration Assessment 0 10/03/20 2359   Dressing Status Clean, dry, & intact 10/03/20 2359   Hub Color/Line Status Pink 10/03/20 2359        Opportunity for questions and clarification was provided.       Patient transported with:   [employer]

## 2020-10-04 NOTE — PROGRESS NOTES
Problem: Falls - Risk of  Goal: *Absence of Falls  Description: Document Kiki Bower Fall Risk and appropriate interventions in the flowsheet.   Outcome: Progressing Towards Goal  Note: Fall Risk Interventions:            Medication Interventions: Patient to call before getting OOB                   Problem: Patient Education: Go to Patient Education Activity  Goal: Patient/Family Education  Outcome: Progressing Towards Goal

## 2020-10-05 VITALS
RESPIRATION RATE: 16 BRPM | TEMPERATURE: 98.3 F | SYSTOLIC BLOOD PRESSURE: 130 MMHG | HEART RATE: 74 BPM | DIASTOLIC BLOOD PRESSURE: 69 MMHG | OXYGEN SATURATION: 97 %

## 2020-10-05 LAB
ANION GAP SERPL CALC-SCNC: 6 MMOL/L (ref 5–15)
BACTERIA SPEC CULT: ABNORMAL
BACTERIA SPEC CULT: ABNORMAL
BASOPHILS # BLD: 0 K/UL (ref 0–0.1)
BASOPHILS NFR BLD: 1 % (ref 0–1)
BUN SERPL-MCNC: 6 MG/DL (ref 6–20)
BUN/CREAT SERPL: 7 (ref 12–20)
CALCIUM SERPL-MCNC: 8.1 MG/DL (ref 8.5–10.1)
CHLORIDE SERPL-SCNC: 111 MMOL/L (ref 97–108)
CO2 SERPL-SCNC: 24 MMOL/L (ref 21–32)
CREAT SERPL-MCNC: 0.91 MG/DL (ref 0.55–1.02)
DIFFERENTIAL METHOD BLD: ABNORMAL
EOSINOPHIL # BLD: 0.5 K/UL (ref 0–0.4)
EOSINOPHIL NFR BLD: 7 % (ref 0–7)
ERYTHROCYTE [DISTWIDTH] IN BLOOD BY AUTOMATED COUNT: 12.6 % (ref 11.5–14.5)
GLUCOSE SERPL-MCNC: 92 MG/DL (ref 65–100)
HCT VFR BLD AUTO: 33.2 % (ref 35–47)
HGB BLD-MCNC: 10.4 G/DL (ref 11.5–16)
IMM GRANULOCYTES # BLD AUTO: 0.1 K/UL (ref 0–0.04)
IMM GRANULOCYTES NFR BLD AUTO: 1 % (ref 0–0.5)
LYMPHOCYTES # BLD: 2.2 K/UL (ref 0.8–3.5)
LYMPHOCYTES NFR BLD: 36 % (ref 12–49)
MCH RBC QN AUTO: 29.9 PG (ref 26–34)
MCHC RBC AUTO-ENTMCNC: 31.3 G/DL (ref 30–36.5)
MCV RBC AUTO: 95.4 FL (ref 80–99)
MONOCYTES # BLD: 0.6 K/UL (ref 0–1)
MONOCYTES NFR BLD: 10 % (ref 5–13)
NEUTS SEG # BLD: 2.9 K/UL (ref 1.8–8)
NEUTS SEG NFR BLD: 45 % (ref 32–75)
NRBC # BLD: 0 K/UL (ref 0–0.01)
NRBC BLD-RTO: 0 PER 100 WBC
PLATELET # BLD AUTO: 266 K/UL (ref 150–400)
PMV BLD AUTO: 9.9 FL (ref 8.9–12.9)
POTASSIUM SERPL-SCNC: 3.6 MMOL/L (ref 3.5–5.1)
RBC # BLD AUTO: 3.48 M/UL (ref 3.8–5.2)
SERVICE CMNT-IMP: ABNORMAL
SODIUM SERPL-SCNC: 141 MMOL/L (ref 136–145)
WBC # BLD AUTO: 6.3 K/UL (ref 3.6–11)

## 2020-10-05 PROCEDURE — 74011250636 HC RX REV CODE- 250/636: Performed by: INTERNAL MEDICINE

## 2020-10-05 PROCEDURE — 74011250636 HC RX REV CODE- 250/636: Performed by: HOSPITALIST

## 2020-10-05 PROCEDURE — 74011250636 HC RX REV CODE- 250/636: Performed by: STUDENT IN AN ORGANIZED HEALTH CARE EDUCATION/TRAINING PROGRAM

## 2020-10-05 PROCEDURE — 74011250637 HC RX REV CODE- 250/637: Performed by: HOSPITALIST

## 2020-10-05 PROCEDURE — 94760 N-INVAS EAR/PLS OXIMETRY 1: CPT

## 2020-10-05 PROCEDURE — 80048 BASIC METABOLIC PNL TOTAL CA: CPT

## 2020-10-05 PROCEDURE — 36415 COLL VENOUS BLD VENIPUNCTURE: CPT

## 2020-10-05 PROCEDURE — 85025 COMPLETE CBC W/AUTO DIFF WBC: CPT

## 2020-10-05 RX ORDER — KETOROLAC TROMETHAMINE 10 MG/1
10 TABLET, FILM COATED ORAL
Qty: 20 TAB | Refills: 0 | Status: ON HOLD | OUTPATIENT
Start: 2020-10-05 | End: 2021-08-29

## 2020-10-05 RX ADMIN — Medication 10 ML: at 13:46

## 2020-10-05 RX ADMIN — Medication 10 ML: at 02:33

## 2020-10-05 RX ADMIN — LOSARTAN POTASSIUM 25 MG: 25 TABLET, FILM COATED ORAL at 08:32

## 2020-10-05 RX ADMIN — ONDANSETRON 4 MG: 2 INJECTION INTRAMUSCULAR; INTRAVENOUS at 06:26

## 2020-10-05 RX ADMIN — MORPHINE SULFATE 2 MG: 2 INJECTION, SOLUTION INTRAMUSCULAR; INTRAVENOUS at 01:04

## 2020-10-05 RX ADMIN — Medication 10 ML: at 06:27

## 2020-10-05 RX ADMIN — HYDROMORPHONE HYDROCHLORIDE 0.5 MG: 1 INJECTION, SOLUTION INTRAMUSCULAR; INTRAVENOUS; SUBCUTANEOUS at 02:33

## 2020-10-05 RX ADMIN — ENOXAPARIN SODIUM 40 MG: 40 INJECTION SUBCUTANEOUS at 08:32

## 2020-10-05 RX ADMIN — Medication 10 ML: at 01:04

## 2020-10-05 RX ADMIN — MORPHINE SULFATE 2 MG: 2 INJECTION, SOLUTION INTRAMUSCULAR; INTRAVENOUS at 13:46

## 2020-10-05 RX ADMIN — MORPHINE SULFATE 2 MG: 2 INJECTION, SOLUTION INTRAMUSCULAR; INTRAVENOUS at 04:25

## 2020-10-05 RX ADMIN — TAMSULOSIN HYDROCHLORIDE 0.4 MG: 0.4 CAPSULE ORAL at 08:31

## 2020-10-05 RX ADMIN — ALPRAZOLAM 1 MG: 0.5 TABLET ORAL at 10:04

## 2020-10-05 RX ADMIN — KETOROLAC TROMETHAMINE 30 MG: 30 INJECTION, SOLUTION INTRAMUSCULAR at 10:04

## 2020-10-05 RX ADMIN — PANTOPRAZOLE SODIUM 40 MG: 40 TABLET, DELAYED RELEASE ORAL at 06:27

## 2020-10-05 RX ADMIN — HYDROMORPHONE HYDROCHLORIDE 0.5 MG: 1 INJECTION, SOLUTION INTRAMUSCULAR; INTRAVENOUS; SUBCUTANEOUS at 11:58

## 2020-10-05 RX ADMIN — MORPHINE SULFATE 2 MG: 2 INJECTION, SOLUTION INTRAMUSCULAR; INTRAVENOUS at 08:32

## 2020-10-05 RX ADMIN — ONDANSETRON 4 MG: 2 INJECTION INTRAMUSCULAR; INTRAVENOUS at 13:46

## 2020-10-05 RX ADMIN — DULOXETINE HYDROCHLORIDE 60 MG: 30 CAPSULE, DELAYED RELEASE ORAL at 08:31

## 2020-10-05 RX ADMIN — HYDROMORPHONE HYDROCHLORIDE 0.5 MG: 1 INJECTION, SOLUTION INTRAMUSCULAR; INTRAVENOUS; SUBCUTANEOUS at 06:27

## 2020-10-05 RX ADMIN — ONDANSETRON 4 MG: 2 INJECTION INTRAMUSCULAR; INTRAVENOUS at 01:04

## 2020-10-05 NOTE — ROUTINE PROCESS
Attempted to schedule PCP [facility] apt with Dr. Aamir Sadler, due to lack of apt availability the nurse will contact patient with apt date and time

## 2020-10-05 NOTE — PROGRESS NOTES
Problem: Falls - Risk of  Goal: *Absence of Falls  Description: Document Lydia Jimenez Fall Risk and appropriate interventions in the flowsheet. Outcome: Progressing Towards Goal  Note: Fall Risk Interventions:            Medication Interventions: Teach patient to arise slowly, Patient to call before getting OOB                   Problem: Patient Education: Go to Patient Education Activity  Goal: Patient/Family Education  Outcome: Progressing Towards Goal     Problem: Risk for Spread of Infection  Goal: Prevent transmission of infectious organism to others  Description: Prevent the transmission of infectious organisms to other patients, staff members, and visitors.   Outcome: Progressing Towards Goal     Problem: Patient Education:  Go to Education Activity  Goal: Patient/Family Education  Outcome: Progressing Towards Goal

## 2020-10-05 NOTE — PROGRESS NOTES
Pt given discharge instructions and verbalized understanding. IV was removed. Pt is not on telemetry. Pt was taken downstairs in a wheelchair by the RN. Pt's mother picked her up. Pt stated she had all of her belongings.

## 2020-10-05 NOTE — PROGRESS NOTES
Problem: Falls - Risk of  Goal: *Absence of Falls  Description: Document Michael Pelaez Fall Risk and appropriate interventions in the flowsheet. 10/5/2020 1338 by Ria Cox  Outcome: Resolved/Met  Note: Fall Risk Interventions:            Medication Interventions: Teach patient to arise slowly, Patient to call before getting OOB                10/5/2020 1116 by Ria Cox  Outcome: Progressing Towards Goal  Note: Fall Risk Interventions:            Medication Interventions: Teach patient to arise slowly, Patient to call before getting OOB                   Problem: Patient Education: Go to Patient Education Activity  Goal: Patient/Family Education  10/5/2020 8679 by Ria Cox  Outcome: Resolved/Met  10/5/2020 1116 by Ria Cox  Outcome: Progressing Towards Goal     Problem: Risk for Spread of Infection  Goal: Prevent transmission of infectious organism to others  Description: Prevent the transmission of infectious organisms to other patients, staff members, and visitors.   10/5/2020 1338 by Ria Cox  Outcome: Resolved/Met  10/5/2020 1116 by Ria Cox  Outcome: Progressing Towards Goal     Problem: Patient Education:  Go to Education Activity  Goal: Patient/Family Education  10/5/2020 1338 by Ria Cox  Outcome: Resolved/Met  10/5/2020 1116 by Ria Cox  Outcome: Progressing Towards Goal

## 2020-10-05 NOTE — CONSULTS
Requesting Provider: Loi Casas - Reason for Consultation: \"R flank pain\"  Pre-existing [facility] Patient:   No                Patient: Ifeanyi Kiran MRN: [id number]  SSN: [id number]    YOB: 1985  Age: 37 y.o. Sex: female     Location: 2132/01       Code Status: Full Code   PCP: Aniket Leung MD  - [phone]   Emergency Contact:  Primary Emergency Contact: Marquise Evans, Home Phone: [phone]   Race/Advent/Language: Cayla Momin / Nichole Rouse / Lidia Gabrielle: Payor: [address] [place] [address] Road / Plan: [address] / Product Type: Managed Care Medicaid /    Prior Admission Data: 9/27/20 [facility] EMERGENCY DEPT     Hospitalized:  Hospital Day: 3 - Admitted 10/3/2020  7:49 PM   POD #      CONSULTANTS  IP CONSULT TO UROLOGY  IP CONSULT TO HOSPITALIST   ADMISSION DIAGNOSES    ICD-10-CM ICD-9-CM   1. Right nephrolithiasis  N20.0 592.0         Assessment/Plan:       · Right Flank Pain  · Right 2 mm Urterovesical junction calculus  · 7 mm Right renal lesion  - Likely passed stone. Okay to MO from Urology standpoint.   - Recommend outpatient follow-up with [facility] with KUB and renal US to further assess R renal lesion?, cyst.   - Uribel, promethazine #20, and percocet #20 sent on 10/1 at office visit. CC: Flank Pain (Pt arrived to ED from home reports R flank pain dx with kidney stone awaiting surgery per pt but pain is bad. )   HPI: She is a 37 y.o. female with PMH significant for lupus, breast CA, and kidney stones. She presented to the ED with cc of R flank pain on 10/3. She was diagnosed with a Right 2 mm UVJ stone on 9/27. She was scheduled for stone surgery with [facility] on 10/12. Her pain became increasingly worse on 10/3, which she described \"like contractions\" and she felt like she passed the stone soon thereafter. CT on 10/4 does not demonstrate a stone, correlating with clinical picture.      Pt is sitting up in bed, alert and oriented in no apparent distress. She reports some continued bladder spasms and R flank pain intermittently, but overall pain is improved. She is voiding w/o difficulty. CT abd and pelvis w/o contrast 10/4:  KIDNEYS/URETERS: There is a 7 mm lesion in the lateral right kidney too small  fully catheterize, but likely representing a small cyst. No calculus or  hydronephrosis. URINARY BLADDER: No mass or calculus. IMPRESSION:  No nephrolithiasis or hydronephrosis. No evidence of acute process. AFVSS  WBC 6.3  Hgb 10.4  Cr 0.91  UA WBC 0-4, RBC >100, Neg leuks/bacteria   Blood Culture: NGTD  + Rocephin, Lovenox, Flomax       Problem:flank pain; Location: right; Quality:colic, Severity: mild-moderate; Timing:intermittent, Context: see above in HPI; Better/Worse: improved, Associated s/s:bladder spasms     Temp (24hrs), Av.4 °F (36.9 °C), Min:98 °F (36.7 °C), Max:99 °F (37.2 °C)    Urinary Status: Voiding, Dysuria  Creatinine   Date/Time Value Ref Range Status   10/05/2020 04:32 AM 0.91 0.55 - 1.02 MG/DL Final   10/04/2020 03:23 AM 0.90 0.55 - 1.02 MG/DL Final   10/03/2020 08:50 PM 0.94 0.55 - 1.02 MG/DL Final   2020 12:49 PM 0.96 0.55 - 1.02 MG/DL Final   03/15/2020 08:43 PM 1.06 (H) 0.55 - 1.02 MG/DL Final     Current Antimicrobial Therapy (168h ago, onward)    Ordered     Start Stop    10/03/20 2244  cefTRIAXone (ROCEPHIN) 1 g in 0.9% sodium chloride (MBP/ADV) 50 mL  1 g,   IntraVENous,   EVERY 24 HOURS      10/03/20 2245 --        Key Anti-Platelet Anticoagulant Meds     The patient is on no antiplatelet meds or anticoagulants.         Diet: DIET CLEAR LIQUID -       Labs     Lab Results   Component Value Date/Time    Lactic acid 1.1 10/03/2020 10:06 PM    Lactic acid 1.2 2018 03:05 PM    Lactic acid 1.0 2018 07:50 AM    WBC 6.3 10/05/2020 04:32 AM    HCT 33.2 (L) 10/05/2020 04:32 AM    PLATELET 308  04:32 AM    Sodium 141 10/05/2020 04:32 AM    Potassium 3.6 10/05/2020 04:32 AM    Chloride 111 (H) 10/05/2020 04:32 AM    CO2 24 10/05/2020 04:32 AM    BUN 6 10/05/2020 04:32 AM    Creatinine 0.91 10/05/2020 04:32 AM    Glucose 92 10/05/2020 04:32 AM    Calcium 8.1 (L) 10/05/2020 04:32 AM    Magnesium 2.3 10/04/2020 03:23 AM    INR <0.9 (L) 03/27/2017 02:45 AM     UA:   Lab Results   Component Value Date/Time    Color YELLOW/STRAW 10/03/2020 09:39 PM    Appearance CLEAR 10/03/2020 09:39 PM    Specific gravity 1.010 10/03/2020 09:39 PM    Specific gravity >1.030 (H) 06/17/2018 03:05 PM    pH (UA) 6.0 10/03/2020 09:39 PM    Protein Negative 10/03/2020 09:39 PM    Glucose Negative 10/03/2020 09:39 PM    Ketone Negative 10/03/2020 09:39 PM    Bilirubin Negative 10/03/2020 09:39 PM    Urobilinogen 0.2 10/03/2020 09:39 PM    Nitrites Negative 10/03/2020 09:39 PM    Leukocyte Esterase Negative 10/03/2020 09:39 PM    Epithelial cells FEW 10/03/2020 09:39 PM    Bacteria Negative 10/03/2020 09:39 PM    WBC 0-4 10/03/2020 09:39 PM    RBC >100 (H) 10/03/2020 09:39 PM     Imaging     Results for orders placed during the hospital encounter of 10/03/20   CT ABD PELV WO CONT    Narrative EXAM: CT ABD PELV WO CONT    INDICATION: Severe flank pain, hx of kidney stone    COMPARISON: 9/27/2020    CONTRAST:  None. TECHNIQUE:   Thin axial images were obtained through the abdomen and pelvis. Coronal and  sagittal reformats were generated. Oral contrast was not administered. CT dose  reduction was achieved through use of a standardized protocol tailored for this  examination and automatic exposure control for dose modulation. The absence of intravenous contrast material reduces the sensitivity for  evaluation of the vasculature and solid organs. FINDINGS:   LOWER THORAX: No significant abnormality in the incidentally imaged lower chest.  LIVER: Mild hepatic steatosis. No mass. BILIARY TREE: Gallbladder is within normal limits. CBD is not dilated. SPLEEN: within normal limits.   PANCREAS: No focal abnormality. ADRENALS: Unremarkable. KIDNEYS/URETERS: There is a 7 mm lesion in the lateral right kidney too small  fully catheterize, but likely representing a small cyst. No calculus or  hydronephrosis. STOMACH: Unremarkable. SMALL BOWEL: No dilatation or wall thickening. COLON: No dilatation or wall thickening. APPENDIX: Unremarkable. PERITONEUM: No ascites or pneumoperitoneum. RETROPERITONEUM: No lymphadenopathy or aortic aneurysm. REPRODUCTIVE ORGANS: Status post hysterectomy. URINARY BLADDER: No mass or calculus. BONES: No destructive bone lesion. ABDOMINAL WALL: No mass or hernia. ADDITIONAL COMMENTS: N/A      Impression IMPRESSION:  No nephrolithiasis or hydronephrosis. No evidence of acute process. US Results (most recent):  Results from [place] encounter on 03/15/20   US EXT NONVAS RT LTD    Narrative CLINICAL HISTORY: Recent abscess incision and drainage. Increasing pain, with 2,  small, firm, axillary nodules. COMPARISON: None. TECHNIQUE: Focused sonography was performed of the right axilla. FINDINGS:  No abscess or fluid collection is shown. In the superior right arm, near the  axilla, there is a small skin defect, with trace, ill-defined, hypoechogenicity  deep to it. This may represent phlegmon, and measures approximately 1 cm. There  is inflammation of the subcutaneous fat in a small portion of the scan. Impression IMPRESSION:  No drainable abscess. Minimal hypoechogenicity deep to the skin defect may  represent phlegmon. There is surrounding inflammation of the subcutaneous fat.        Cultures     All Micro Results     Procedure Component Value Units Date/Time    MRSA CULTURE [[id number]]  (Abnormal) Collected:  10/04/20 0323    Order Status:  Completed Specimen:  Nasal from Nares Updated:  10/05/20 0813     Special Requests: NO SPECIAL REQUESTS        Culture result: MRSA PRESENT                   Screening of patient nares for MRSA is for surveillance purposes and, if positive, to facilitate isolation considerations in high risk settings. It is not intended for automatic decolonization interventions per se as regimens are not sufficiently effective to warrant routine use.           CULTURE, BLOOD, PAIRED [[id number]] Collected:  10/03/20 2206    Order Status:  Completed Specimen:  Blood Updated:  10/05/20 0807     Special Requests: NO SPECIAL REQUESTS        Culture result: NO GROWTH 2 DAYS              Past History: (Complete 2+/3 areas)     Allergies   Allergen Reactions    Latex Itching     burning    Compazine [Prochlorperazine] Anxiety     High heart rate  Pt can take promethazine with no problems    Sulfa (Sulfonamide Antibiotics) Unknown (comments)    Topamax [Topiramate] Unknown (comments)    Adhesive Rash     Allergic to Dermabond    Clindamycin Diarrhea     Pt states caused her C-Diff    Mushroom Other (comments)    Mushroom Combination No.1 Unknown (comments)    Toradol [Ketorolac Tromethamine] Nausea and Vomiting and Other (comments)     PO & IV causes GI bleed  Tolerated during Feb 2020 stay    Valproic Acid Other (comments)     Elevated heart rate and vomiting        Current Facility-Administered Medications   Medication Dose Route Frequency    HYDROmorphone (PF) (DILAUDID) injection 0.5 mg  0.5 mg IntraVENous Q4H PRN    ketorolac (TORADOL) injection 30 mg  30 mg IntraVENous Q6H PRN    enoxaparin (LOVENOX) injection 40 mg  40 mg SubCUTAneous Q12H    acetaminophen (TYLENOL) tablet 650 mg  650 mg Oral Q6H PRN    albuterol (PROVENTIL HFA, VENTOLIN HFA, PROAIR HFA) inhaler 1 Puff  1 Puff Inhalation Q6H PRN    ALPRAZolam (XANAX) tablet 1 mg  1 mg Oral BID PRN    DULoxetine (CYMBALTA) capsule 60 mg  60 mg Oral DAILY    mirtazapine (REMERON) tablet 30 mg  30 mg Oral QHS    losartan (COZAAR) tablet 25 mg  25 mg Oral DAILY    pantoprazole (PROTONIX) tablet 40 mg  40 mg Oral ACB    tamsulosin (FLOMAX) capsule 0.4 mg  0.4 mg Oral DAILY    sodium chloride (NS) flush 5-40 mL  5-40 mL IntraVENous Q8H    sodium chloride (NS) flush 5-40 mL  5-40 mL IntraVENous PRN    acetaminophen (TYLENOL) tablet 650 mg  650 mg Oral Q6H PRN    Or    acetaminophen (TYLENOL) suppository 650 mg  650 mg Rectal Q6H PRN    polyethylene glycol (MIRALAX) packet 17 g  17 g Oral DAILY PRN    promethazine (PHENERGAN) tablet 12.5 mg  12.5 mg Oral Q6H PRN    Or    ondansetron (ZOFRAN) injection 4 mg  4 mg IntraVENous Q6H PRN    cefTRIAXone (ROCEPHIN) 1 g in 0.9% sodium chloride (MBP/ADV) 50 mL  1 g IntraVENous Q24H    morphine injection 2 mg  2 mg IntraVENous Q3H PRN    0.9% sodium chloride infusion  75 mL/hr IntraVENous CONTINUOUS    Prior to Admission medications    Medication Sig Start Date End Date Taking? Authorizing Provider   ondansetron (Zofran ODT) 4 mg disintegrating tablet Take 1 Tab by mouth every eight (8) hours as needed for Nausea. 9/27/20  Yes Hans Nicholson MD   tamsulosin (Flomax) 0.4 mg capsule Take 1 Cap by mouth daily for 7 doses. 9/27/20 10/4/20 Yes Hans Nicholson MD   ondansetron hcl (ZOFRAN) 4 mg tablet TAKE 1 TABLET BY MOUTH EVERY 8 HOURS AS NEEDED FOR NAUSEA 9/22/20  Yes Tia Cadet MD   olmesartan (BENICAR) 5 mg tablet TAKE 1 TABLET BY MOUTH DAILY 8/27/20  Yes Trevor Triana, AYAD   DULoxetine (CYMBALTA) 60 mg capsule TAKE 2 CAPSULES BY MOUTH DAILY 8/26/20  Yes Tia Cadet MD   mirtazapine (REMERON) 30 mg tablet TAKE 1 TABLET BY MOUTH AT BEDTIME 6/23/20  Yes Tia Cadet MD   RisaQuad-2 16 billion cell cap cap TAKE ONE CAPSULE BY MOUTH ONCE DAILY 3/24/20  Yes Kimberley Charlton MD   tiZANidine (ZANAFLEX) 4 mg tablet  9/19/19  Yes Provider, Historical   pantoprazole (PROTONIX) 40 mg tablet take 1 tablet by mouth at bedtime 2/1/19  Yes Provider, Historical   rizatriptan (MAXALT) 10 mg tablet Take 10 mg by mouth once as needed for Migraine.  May repeat in 2 hours if needed   Yes Provider, Historical   belimumab (BENLYSTA) 400 mg solr injection by IntraVENous route. Yes Provider, Historical   metoprolol succinate (TOPROL-XL) 25 mg XL tablet Take 1 Tab by mouth nightly. 10/2/20   Macy Holden NP   naproxen (NAPROSYN) 500 mg tablet TK 1 TA PO BID 20   Provider, Historical   promethazine (PHENERGAN) 25 mg tablet TAKE 1 TABLET BY MOUTH EVERY 6 HOURS AS NEEDED FOR NAUSEA 20   Valentino Woods MD   ALPRAZolam Angelita Old) 1 mg tablet TAKE 1 TABLET BY MOUTH TWICE DAILY AS NEEDED FOR ANXIETY MAXIMUM DAILY DOSE 2 20   Valentino Woods MD   enalapril (VASOTEC) 5 mg tablet TAKE 1 TABLET BY MOUTH DAILY AS NEEDED FOR HYPERTENSION 20   Valentino Woods MD   albuterol (PROVENTIL HFA, VENTOLIN HFA, PROAIR HFA) 90 mcg/actuation inhaler Take 1 Puff by inhalation every six (6) hours as needed for Wheezing. 19   William Dupree MD   furosemide (LASIX) 20 mg tablet PRN 19   Provider, Historical   estradiol (ESTRACE) 1 mg tablet Take 1 mg by mouth daily. 19   Provider, Historical   indomethacin (INDOCIN) 25 mg capsule PRN 3/29/19   Provider, Historical   codeine-butalbital-acetaminophen-caffeine (FIORICET WITH CODEINE) -49-30 mg capsule take 2 capsules by mouth every 6 hours if needed for MIGRAINE HEADACHE 19   Brandee Reardon DO        PMHx:  has a past medical history of Abnormal CT of the abdomen (2012), Asthma, Autoimmune disease ([address] C. difficile diarrhea, Cervical prolapse, Dehydration, Mariah-Danlos syndrome, Gastrointestinal disorder, GERD (gastroesophageal reflux disease), Headache(784.0), Lupus ([address] Morbid obesity ([address] Nausea & vomiting, Neurological disorder, Occipital neuralgia, other (, ), Other ill-defined conditions(799.89), and Worsening headaches. She also has no past medical history of Anemia NEC,  delivery, Heart abnormalities, or Premature Birth.    PSurgHx:  has a past surgical history that includes colonoscopy (2010); pr egd transoral biopsy single/multiple (2010); hx cholecystectomy; hx hernia repair (4/2012); hx hernia repair (2/28/13); hx gyn (1/2009); hx gyn (2006); hx urological; hx heent; hx heent; hx hysterectomy; and hx cyst incision and drainage (Right, 02/27/2020). PSocHx:  reports that she has never smoked. She has never used smokeless tobacco. She reports that she does not drink alcohol or use drugs. ROS:  (Complete - 10 systems) - DENIES: Weightloss (Constitutional), Dry mouth (ENMT), Chest pain (CV), SOB (Respiratory), Constipation (GI), Weakness (MS), Pallor (Skin), TIA Sx (Neuro), Confusion (Psych), Easy bruising (Heme) ADMITS: Right flank pain and bladder spasms intermittently ().      Physical Exam: (Comprehesive - 8+ 1995 Systems)     (1) Constitutional:  FIO2:   on SpO2: O2 Sat (%): 97 %  O2 Device: Room air    Patient Vitals for the past 24 hrs:   BP Temp Pulse Resp SpO2   10/05/20 1156 130/69 98.3 °F (36.8 °C) 74 16 97 %   10/05/20 0800 139/70 99 °F (37.2 °C) 90 16 97 %   10/05/20 0229 (!) 143/90 98.3 °F (36.8 °C) 80 18 100 %   10/04/20 2330 135/74 98 °F (36.7 °C) 96 16 98 %   10/04/20 2010 110/83 98.3 °F (36.8 °C) 93 16 96 %   10/04/20 1604 (!) 141/93 98.7 °F (37.1 °C) 97 20 97 %       Date 10/04/20 0700 - 10/05/20 0659 10/05/20 0700 - 10/06/20 0659   Shift 9419-1582 1470-8350 24 Hour Total 3424-3149 5292-1235 24 Hour Total   INTAKE   I.V. 1477.5 795 2272.5        Volume (0.9% sodium chloride infusion) 1477.5 695 2172.5        Volume (cefTRIAXone (ROCEPHIN) 1 g in 0.9% sodium chloride (MBP/ADV) 50 mL)  100 100      Shift Total 1477.5 795 2272.5      OUTPUT   Urine  1600 1600        Urine Voided  1600 1600      Shift Total  1600 1600      NET 1477.5 -805 672.5      Weight (kg)            (2) ENMT:   moist mucous membranes, normal sinuses   (3) Respiratory:  breathing easily, no distress   (4) GI:  no abdominal masses, tenderness   (5) :   Normal, voiding spontaneously    (6) Lymphatic:  no adenopathy, neck supple   (7) Muscloskeletal:  no gross deformity, normal ROM   (8) Skin:  no rash, warm & dry   (9) Neuro:  no focal deficits, normal speech      Signed By: Rubi Mcdonough NP  - October 5, 2020

## 2020-10-05 NOTE — DISCHARGE SUMMARY
Hospitalist Discharge Summary     Patient ID:  Manan Perez  [id number]  45 y.o.  1985  10/3/2020    PCP on record: Mariel Brito MD    Admit date: 10/3/2020  Discharge date and time: 10/5/2020    DISCHARGE DIAGNOSIS:  Ureteric colic  Nephrolithiasis  Hypertension      CONSULTATIONS:  IP CONSULT TO UROLOGY  IP CONSULT TO HOSPITALIST  ______________________________________________________________________  DISCHARGE SUMMARY/HOSPITAL COURSE:  for full details see H&P, daily progress notes, labs, consult notes. Sandra Lopez is a 37 y.o.  female  With PMHx of Lupus , breast cancer and kidney stone who presented with worsening of right flank pain, nausea, and vomiting. Patient was seen a week prior at the ED for the same complaint and found to have a 2mm right kidney stone on CT scan. Patient was discharged on flomax. She followed up with urology and was scheduled to have a procedure to remove the stone but came here as the pain got worse. On arrival her vital signs were stable. Urine analysis revealed microscopic hematuria. She was treated with IV fluid and pain management with analgesics. She had mild leukocytosis and subjective fever for which she was empiricaly treated for UTI with ceftriaxone for three days. Leukocytosis resloved. She had a repeat CT scan which showed no kidney stones or hydronephrosis. Likely passed stone as the pain also subsided. Urology was also consulted and recommend outpatient follow up.         _______________________________________________________________________  Patient seen and examined by me on discharge day. Pertinent Findings:  Gen:    Not in distress  Chest: Clear lungs  CVS:   Regular rhythm.   No edema  Abd:  Soft, not distended, not tender  Ext: +1 bilateral edema  Neuro:  Alert, oriented  _______________________________________________________________________  DISCHARGE MEDICATIONS:   Current Discharge Medication List      START taking these medications    Details   ketorolac (TORADOL) 10 mg tablet Take 1 Tab by mouth every six (6) hours as needed for Pain. Qty: 20 Tab, Refills: 0         CONTINUE these medications which have NOT CHANGED    Details   ondansetron (Zofran ODT) 4 mg disintegrating tablet Take 1 Tab by mouth every eight (8) hours as needed for Nausea. Qty: 10 Tab, Refills: 0      ondansetron hcl (ZOFRAN) 4 mg tablet TAKE 1 TABLET BY MOUTH EVERY 8 HOURS AS NEEDED FOR NAUSEA  Qty: 20 Tab, Refills: 0      olmesartan (BENICAR) 5 mg tablet TAKE 1 TABLET BY MOUTH DAILY  Qty: 90 Tab, Refills: 3      DULoxetine (CYMBALTA) 60 mg capsule TAKE 2 CAPSULES BY MOUTH DAILY  Qty: 30 Cap, Refills: 1      mirtazapine (REMERON) 30 mg tablet TAKE 1 TABLET BY MOUTH AT BEDTIME  Qty: 30 Tab, Refills: 5      RisaQuad-2 16 billion cell cap cap TAKE ONE CAPSULE BY MOUTH ONCE DAILY  Qty: 30 Cap, Refills: 1      tiZANidine (ZANAFLEX) 4 mg tablet Refills: 0      pantoprazole (PROTONIX) 40 mg tablet take 1 tablet by mouth at bedtime  Refills: 0      rizatriptan (MAXALT) 10 mg tablet Take 10 mg by mouth once as needed for Migraine. May repeat in 2 hours if needed      belimumab (BENLYSTA) 400 mg solr injection by IntraVENous route. metoprolol succinate (TOPROL-XL) 25 mg XL tablet Take 1 Tab by mouth nightly. Qty: 30 Tab, Refills: 2      naproxen (NAPROSYN) 500 mg tablet TK 1 TA PO BID      promethazine (PHENERGAN) 25 mg tablet TAKE 1 TABLET BY MOUTH EVERY 6 HOURS AS NEEDED FOR NAUSEA  Qty: 30 Tab, Refills: 0      ALPRAZolam (XANAX) 1 mg tablet TAKE 1 TABLET BY MOUTH TWICE DAILY AS NEEDED FOR ANXIETY MAXIMUM DAILY DOSE 2  Qty: 60 Tab, Refills: 3    Associated Diagnoses: Primary insomnia; Anxiety      albuterol (PROVENTIL HFA, VENTOLIN HFA, PROAIR HFA) 90 mcg/actuation inhaler Take 1 Puff by inhalation every six (6) hours as needed for Wheezing.   Qty: 1 Inhaler, Refills: 0      furosemide (LASIX) 20 mg tablet PRN  Refills: 0      estradiol (ESTRACE) 1 mg tablet Take 1 mg by mouth daily. Refills: 0      indomethacin (INDOCIN) 25 mg capsule PRN      codeine-butalbital-acetaminophen-caffeine (FIORICET WITH CODEINE) -02-30 mg capsule take 2 capsules by mouth every 6 hours if needed for MIGRAINE HEADACHE  Qty: 90 Cap, Refills: 0    Associated Diagnoses: Other migraine without status migrainosus, not intractable         STOP taking these medications       tamsulosin (Flomax) 0.4 mg capsule Comments:   Reason for Stopping:         enalapril (VASOTEC) 5 mg tablet Comments:   Reason for Stopping:                 Patient Follow Up Instructions:    Activity: Activity as tolerated  Diet: Regular Diet      Follow-up with urology in 1 week    Follow-up Information     Follow up With Specialties Details Why Contact Sofia Salazar MD Internal Medicine   [address]  [facility]  [phone]          ________________________________________________________________    Risk of deterioration: Low    Condition at Discharge:  Stable  __________________________________________________________________    Disposition  Home with family, no needs    ____________________________________________________________________    Code Status: Full Code  ___________________________________________________________________      Total time in minutes spent coordinating this discharge (includes going over instructions, follow-up, prescriptions, and preparing report for sign off to her PCP) :  35 minutes    Signed:  Nima Jacobson MD

## 2020-10-05 NOTE — PROGRESS NOTES
1920 Bedside and Verbal shift change report given to Saundra Herrera (oncoming nurse) by Roselia Morrow (offgoing nurse). Report included the following information SBAR, ED Summary, Intake/Output, MAR and Recent Results. 0531 pt refusing continuous fluids at this time, pt with +2 pitting edema in her lower legs. Verbal shift change report given to Gissel Rossi (oncoming nurse) by Saundra Herrera (offgoing nurse). Report included the following information SBAR, MAR and Recent Results. Problem: Falls - Risk of  Goal: *Absence of Falls  Description: Document Jennifer Mayfield Fall Risk and appropriate interventions in the flowsheet.   Outcome: Progressing Towards Goal  Note: Fall Risk Interventions:    Medication Interventions: Teach patient to arise slowly, Evaluate medications/consider consulting pharmacy

## 2020-10-08 LAB
BACTERIA SPEC CULT: NORMAL
SERVICE CMNT-IMP: NORMAL

## 2020-10-08 RX ORDER — DULOXETIN HYDROCHLORIDE 60 MG/1
CAPSULE, DELAYED RELEASE ORAL
Qty: 30 CAP | Refills: 1 | Status: SHIPPED | OUTPATIENT
Start: 2020-10-08 | End: 2020-11-10

## 2020-10-10 ENCOUNTER — HOSPITAL ENCOUNTER (EMERGENCY)
Age: 35
Discharge: HOME | End: 2020-10-10
Attending: EMERGENCY MEDICINE
Payer: COMMERCIAL

## 2020-10-10 ENCOUNTER — APPOINTMENT (OUTPATIENT)
Dept: CT IMAGING | Age: 35
End: 2020-10-10
Attending: EMERGENCY MEDICINE
Payer: COMMERCIAL

## 2020-10-10 VITALS
TEMPERATURE: 98.5 F | BODY MASS INDEX: 42.68 KG/M2 | OXYGEN SATURATION: 92 % | SYSTOLIC BLOOD PRESSURE: 131 MMHG | DIASTOLIC BLOOD PRESSURE: 82 MMHG | HEART RATE: 68 BPM | RESPIRATION RATE: 18 BRPM | WEIGHT: 250 LBS | HEIGHT: 64 IN

## 2020-10-10 DIAGNOSIS — K04.7 DENTAL INFECTION: Primary | ICD-10-CM

## 2020-10-10 DIAGNOSIS — R10.9 FLANK PAIN: ICD-10-CM

## 2020-10-10 LAB
ALBUMIN SERPL-MCNC: 3.5 G/DL (ref 3.5–5)
ALBUMIN/GLOB SERPL: 1 {RATIO} (ref 1.1–2.2)
ALP SERPL-CCNC: 96 U/L (ref 45–117)
ALT SERPL-CCNC: 20 U/L (ref 12–78)
ANION GAP SERPL CALC-SCNC: 6 MMOL/L (ref 5–15)
APPEARANCE UR: CLEAR
AST SERPL-CCNC: 17 U/L (ref 15–37)
BACTERIA URNS QL MICRO: NEGATIVE /HPF
BASOPHILS # BLD: 0 K/UL (ref 0–0.1)
BASOPHILS NFR BLD: 0 % (ref 0–1)
BILIRUB SERPL-MCNC: 0.2 MG/DL (ref 0.2–1)
BILIRUB UR QL: NEGATIVE
BUN SERPL-MCNC: 10 MG/DL (ref 6–20)
BUN/CREAT SERPL: 11 (ref 12–20)
CALCIUM SERPL-MCNC: 8.2 MG/DL (ref 8.5–10.1)
CHLORIDE SERPL-SCNC: 112 MMOL/L (ref 97–108)
CO2 SERPL-SCNC: 24 MMOL/L (ref 21–32)
COLOR UR: ABNORMAL
CREAT SERPL-MCNC: 0.95 MG/DL (ref 0.55–1.02)
DIFFERENTIAL METHOD BLD: ABNORMAL
EOSINOPHIL # BLD: 0.3 K/UL (ref 0–0.4)
EOSINOPHIL NFR BLD: 5 % (ref 0–7)
EPITH CASTS URNS QL MICRO: ABNORMAL /LPF
ERYTHROCYTE [DISTWIDTH] IN BLOOD BY AUTOMATED COUNT: 12.4 % (ref 11.5–14.5)
GLOBULIN SER CALC-MCNC: 3.4 G/DL (ref 2–4)
GLUCOSE SERPL-MCNC: 102 MG/DL (ref 65–100)
GLUCOSE UR STRIP.AUTO-MCNC: NEGATIVE MG/DL
HCT VFR BLD AUTO: 32.9 % (ref 35–47)
HGB BLD-MCNC: 11 G/DL (ref 11.5–16)
HGB UR QL STRIP: ABNORMAL
HYALINE CASTS URNS QL MICRO: ABNORMAL /LPF (ref 0–5)
IMM GRANULOCYTES # BLD AUTO: 0.1 K/UL (ref 0–0.04)
IMM GRANULOCYTES NFR BLD AUTO: 1 % (ref 0–0.5)
KETONES UR QL STRIP.AUTO: NEGATIVE MG/DL
LEUKOCYTE ESTERASE UR QL STRIP.AUTO: NEGATIVE
LIPASE SERPL-CCNC: 56 U/L (ref 73–393)
LYMPHOCYTES # BLD: 1.7 K/UL (ref 0.8–3.5)
LYMPHOCYTES NFR BLD: 29 % (ref 12–49)
MCH RBC QN AUTO: 30.2 PG (ref 26–34)
MCHC RBC AUTO-ENTMCNC: 33.4 G/DL (ref 30–36.5)
MCV RBC AUTO: 90.4 FL (ref 80–99)
MONOCYTES # BLD: 0.4 K/UL (ref 0–1)
MONOCYTES NFR BLD: 7 % (ref 5–13)
NEUTS SEG # BLD: 3.4 K/UL (ref 1.8–8)
NEUTS SEG NFR BLD: 57 % (ref 32–75)
NITRITE UR QL STRIP.AUTO: NEGATIVE
NRBC # BLD: 0 K/UL (ref 0–0.01)
NRBC BLD-RTO: 0 PER 100 WBC
PH UR STRIP: 6 [PH] (ref 5–8)
PLATELET # BLD AUTO: 269 K/UL (ref 150–400)
PMV BLD AUTO: 10 FL (ref 8.9–12.9)
POTASSIUM SERPL-SCNC: 3.8 MMOL/L (ref 3.5–5.1)
PROT SERPL-MCNC: 6.9 G/DL (ref 6.4–8.2)
PROT UR STRIP-MCNC: NEGATIVE MG/DL
RBC # BLD AUTO: 3.64 M/UL (ref 3.8–5.2)
RBC #/AREA URNS HPF: >100 /HPF (ref 0–5)
SODIUM SERPL-SCNC: 142 MMOL/L (ref 136–145)
SP GR UR REFRACTOMETRY: 1.02 (ref 1–1.03)
UA: UC IF INDICATED,UAUC: ABNORMAL
UROBILINOGEN UR QL STRIP.AUTO: 0.2 EU/DL (ref 0.2–1)
WBC # BLD AUTO: 5.9 K/UL (ref 3.6–11)
WBC URNS QL MICRO: ABNORMAL /HPF (ref 0–4)

## 2020-10-10 PROCEDURE — 74011250636 HC RX REV CODE- 250/636: Performed by: EMERGENCY MEDICINE

## 2020-10-10 PROCEDURE — 81001 URINALYSIS AUTO W/SCOPE: CPT

## 2020-10-10 PROCEDURE — 74011000636 HC RX REV CODE- 636: Performed by: EMERGENCY MEDICINE

## 2020-10-10 PROCEDURE — 96376 TX/PRO/DX INJ SAME DRUG ADON: CPT

## 2020-10-10 PROCEDURE — 36415 COLL VENOUS BLD VENIPUNCTURE: CPT

## 2020-10-10 PROCEDURE — 80053 COMPREHEN METABOLIC PANEL: CPT

## 2020-10-10 PROCEDURE — 96375 TX/PRO/DX INJ NEW DRUG ADDON: CPT

## 2020-10-10 PROCEDURE — 74177 CT ABD & PELVIS W/CONTRAST: CPT

## 2020-10-10 PROCEDURE — 85025 COMPLETE CBC W/AUTO DIFF WBC: CPT

## 2020-10-10 PROCEDURE — 99284 EMERGENCY DEPT VISIT MOD MDM: CPT

## 2020-10-10 PROCEDURE — 96374 THER/PROPH/DIAG INJ IV PUSH: CPT

## 2020-10-10 PROCEDURE — 83690 ASSAY OF LIPASE: CPT

## 2020-10-10 RX ORDER — FENTANYL CITRATE 50 UG/ML
100 INJECTION, SOLUTION INTRAMUSCULAR; INTRAVENOUS ONCE
Status: COMPLETED | OUTPATIENT
Start: 2020-10-10 | End: 2020-10-10

## 2020-10-10 RX ORDER — ONDANSETRON 2 MG/ML
4 INJECTION INTRAMUSCULAR; INTRAVENOUS
Status: COMPLETED | OUTPATIENT
Start: 2020-10-10 | End: 2020-10-10

## 2020-10-10 RX ORDER — METAXALONE 800 MG/1
800 TABLET ORAL 4 TIMES DAILY
Qty: 20 TAB | Refills: 0 | Status: SHIPPED | OUTPATIENT
Start: 2020-10-10 | End: 2020-10-15

## 2020-10-10 RX ORDER — AMOXICILLIN AND CLAVULANATE POTASSIUM 875; 125 MG/1; MG/1
1 TABLET, FILM COATED ORAL 2 TIMES DAILY
Qty: 20 TAB | Refills: 0 | Status: SHIPPED | OUTPATIENT
Start: 2020-10-10 | End: 2021-04-13

## 2020-10-10 RX ORDER — SODIUM CHLORIDE 0.9 % (FLUSH) 0.9 %
10 SYRINGE (ML) INJECTION
Status: COMPLETED | OUTPATIENT
Start: 2020-10-10 | End: 2020-10-10

## 2020-10-10 RX ADMIN — FENTANYL CITRATE 100 MCG: 50 INJECTION, SOLUTION INTRAMUSCULAR; INTRAVENOUS at 14:08

## 2020-10-10 RX ADMIN — IOPAMIDOL 100 ML: 755 INJECTION, SOLUTION INTRAVENOUS at 15:16

## 2020-10-10 RX ADMIN — ONDANSETRON 4 MG: 2 INJECTION INTRAMUSCULAR; INTRAVENOUS at 16:12

## 2020-10-10 RX ADMIN — Medication 10 ML: at 15:17

## 2020-10-10 RX ADMIN — SODIUM CHLORIDE 1000 ML: 900 INJECTION, SOLUTION INTRAVENOUS at 14:09

## 2020-10-10 RX ADMIN — ONDANSETRON 4 MG: 2 INJECTION INTRAMUSCULAR; INTRAVENOUS at 14:08

## 2020-10-10 RX ADMIN — FENTANYL CITRATE 100 MCG: 50 INJECTION, SOLUTION INTRAMUSCULAR; INTRAVENOUS at 15:29

## 2020-10-10 NOTE — ED PROVIDER NOTES
EMERGENCY DEPARTMENT HISTORY AND PHYSICAL EXAM      Date: 10/10/2020  Patient Name: Andrez Crawley    History of Presenting Illness     Chief Complaint   Patient presents with    Dental Pain     complains of facial edema    Back Pain     per pt she was here last week and admitted for a kidney infection and sepsis         HPI: Andrez Crawley, 37 y.o. female presents to the ED with cc of pain and swelling to the left side of her face, pain to her right flank. Patient was admitted to the hospital with renal stone, urinary tract infection last week. She states she was septic. She completed course of antibiotics at that time has been on Keflex since discharge. She states the stone passed independently but her pain has persisted and worsened since discharge. Pain localizes to right flank and does not radiate. It is not worse or better with anything in particular. It causes her to be nauseous but no vomiting was noted. She denies abdominal pain. She also notes a day or 2 of pain and swelling to the left side of her face associated with pain to 1 of her teeth. She notes having chronic dental issues but there is pain and swelling noted today. There are no other complaints, changes, or physical findings at this time. PCP: Luigi Mcdaniel MD    No current facility-administered medications on file prior to encounter. Current Outpatient Medications on File Prior to Encounter   Medication Sig Dispense Refill    DULoxetine (CYMBALTA) 60 mg capsule TAKE 2 CAPSULESBY MOUTH EVERY DAY 30 Cap 1    ketorolac (TORADOL) 10 mg tablet Take 1 Tab by mouth every six (6) hours as needed for Pain. 20 Tab 0    metoprolol succinate (TOPROL-XL) 25 mg XL tablet Take 1 Tab by mouth nightly. 30 Tab 2    ondansetron (Zofran ODT) 4 mg disintegrating tablet Take 1 Tab by mouth every eight (8) hours as needed for Nausea.  10 Tab 0    ondansetron hcl (ZOFRAN) 4 mg tablet TAKE 1 TABLET BY MOUTH EVERY 8 HOURS AS NEEDED FOR NAUSEA 20 Tab 0    olmesartan (BENICAR) 5 mg tablet TAKE 1 TABLET BY MOUTH DAILY 90 Tab 3    naproxen (NAPROSYN) 500 mg tablet TK 1 TA PO BID      promethazine (PHENERGAN) 25 mg tablet TAKE 1 TABLET BY MOUTH EVERY 6 HOURS AS NEEDED FOR NAUSEA 30 Tab 0    ALPRAZolam (XANAX) 1 mg tablet TAKE 1 TABLET BY MOUTH TWICE DAILY AS NEEDED FOR ANXIETY MAXIMUM DAILY DOSE 2 60 Tab 3    mirtazapine (REMERON) 30 mg tablet TAKE 1 TABLET BY MOUTH AT BEDTIME 30 Tab 5    RisaQuad-2 16 billion cell cap cap TAKE ONE CAPSULE BY MOUTH ONCE DAILY 30 Cap 1    albuterol (PROVENTIL HFA, VENTOLIN HFA, PROAIR HFA) 90 mcg/actuation inhaler Take 1 Puff by inhalation every six (6) hours as needed for Wheezing. 1 Inhaler 0    furosemide (LASIX) 20 mg tablet PRN  0    tiZANidine (ZANAFLEX) 4 mg tablet   0    estradiol (ESTRACE) 1 mg tablet Take 1 mg by mouth daily. 0    indomethacin (INDOCIN) 25 mg capsule PRN      pantoprazole (PROTONIX) 40 mg tablet take 1 tablet by mouth at bedtime  0    codeine-butalbital-acetaminophen-caffeine (FIORICET WITH CODEINE) -91-30 mg capsule take 2 capsules by mouth every 6 hours if needed for MIGRAINE HEADACHE 90 Cap 0    rizatriptan (MAXALT) 10 mg tablet Take 10 mg by mouth once as needed for Migraine. May repeat in 2 hours if needed      belimumab (BENLYSTA) 400 mg solr injection by IntraVENous route.          Past History     Past Medical History:  Past Medical History:   Diagnosis Date    Abnormal CT of the abdomen 11/8/2012    Asthma     Autoimmune disease (HCC)     lupus    C. difficile diarrhea     Cervical prolapse     Dehydration     Mariah-Danlos syndrome     Gastrointestinal disorder     gerd, twisted colon, IBS    GERD (gastroesophageal reflux disease)     Headache(784.0)     Lupus (HCC)     Morbid obesity (HCC)     Nausea & vomiting     Neurological disorder     cluster headaches    Occipital neuralgia     other 2006, 2009    ovarian cyst removal    Other ill-defined conditions(799.89)     Worsening headaches        Past Surgical History:  Past Surgical History:   Procedure Laterality Date    COLONOSCOPY  9/21/2010         HX CHOLECYSTECTOMY      HX CYST INCISION AND DRAINAGE Right 02/27/2020    HX GYN  1/2009    right salpingo oopherectomy    HX GYN  2006    right ovarian tumor removed    HX HEENT      left wisdom teeth removal    HX HEENT      top right wisdom tooth removed    HX HERNIA REPAIR  4/2012    HX HERNIA REPAIR  2/28/13    Laparoscopic recurrent incisional hernia repair    HX HYSTERECTOMY      2016    HX UROLOGICAL      Kidney Stone Removal    NH EGD TRANSORAL BIOPSY SINGLE/MULTIPLE  9/22/2010            Family History:  Family History   Problem Relation Age of Onset    Colon Cancer Maternal Grandfather        Social History:  Social History     Tobacco Use    Smoking status: Never Smoker    Smokeless tobacco: Never Used   Substance Use Topics    Alcohol use: No    Drug use: No       Allergies: Allergies   Allergen Reactions    Latex Itching     burning    Compazine [Prochlorperazine] Anxiety     High heart rate  Pt can take promethazine with no problems    Sulfa (Sulfonamide Antibiotics) Unknown (comments)    Topamax [Topiramate] Unknown (comments)    Adhesive Rash     Allergic to Dermabond    Clindamycin Diarrhea     Pt states caused her C-Diff    Mushroom Other (comments)    Mushroom Combination No.1 Unknown (comments)    Toradol [Ketorolac Tromethamine] Nausea and Vomiting and Other (comments)     PO & IV causes GI bleed  Tolerated during Feb 2020 stay    Valproic Acid Other (comments)     Elevated heart rate and vomiting           Review of Systems   Review of Systems   Constitutional: Negative for chills and fever. HENT: Positive for dental problem. Negative for rhinorrhea. Eyes: Negative for redness. Respiratory: Negative for shortness of breath. Cardiovascular: Negative for chest pain.    Gastrointestinal: Negative for abdominal pain. Genitourinary: Positive for flank pain. Negative for dysuria. Musculoskeletal: Negative for back pain. Neurological: Negative for syncope. Psychiatric/Behavioral: The patient is not nervous/anxious. All other systems reviewed and are negative. Physical Exam   Physical Exam  Vitals signs and nursing note reviewed. Constitutional:       Appearance: Normal appearance. HENT:      Head: Normocephalic and atraumatic. Comments: Swelling and mild erythema to L lower cheek, poor dentition in general w several necrotic appearing at site of pain, no clear abscess/fluctuance     Mouth/Throat:      Mouth: Mucous membranes are moist.   Eyes:      Extraocular Movements: Extraocular movements intact. Neck:      Musculoskeletal: Neck supple. Cardiovascular:      Rate and Rhythm: Normal rate and regular rhythm. Pulses: Normal pulses. Pulmonary:      Effort: Pulmonary effort is normal.      Breath sounds: Normal breath sounds. Abdominal:      Palpations: Abdomen is soft. Tenderness: There is no abdominal tenderness. Genitourinary:     Comments: R CVA TTP  Musculoskeletal:         General: No deformity. Skin:     General: Skin is warm and dry. Neurological:      General: No focal deficit present. Mental Status: She is alert.    Psychiatric:         Mood and Affect: Mood normal.         Behavior: Behavior normal.         Diagnostic Study Results     Labs -     Recent Results (from the past 24 hour(s))   CBC WITH AUTOMATED DIFF    Collection Time: 10/10/20  2:03 PM   Result Value Ref Range    WBC 5.9 3.6 - 11.0 K/uL    RBC 3.64 (L) 3.80 - 5.20 M/uL    HGB 11.0 (L) 11.5 - 16.0 g/dL    HCT 32.9 (L) 35.0 - 47.0 %    MCV 90.4 80.0 - 99.0 FL    MCH 30.2 26.0 - 34.0 PG    MCHC 33.4 30.0 - 36.5 g/dL    RDW 12.4 11.5 - 14.5 %    PLATELET 719 508 - 407 K/uL    MPV 10.0 8.9 - 12.9 FL    NRBC 0.0 0  WBC    ABSOLUTE NRBC 0.00 0.00 - 0.01 K/uL    NEUTROPHILS 57 32 - 75 %    LYMPHOCYTES 29 12 - 49 %    MONOCYTES 7 5 - 13 %    EOSINOPHILS 5 0 - 7 %    BASOPHILS 0 0 - 1 %    IMMATURE GRANULOCYTES 1 (H) 0.0 - 0.5 %    ABS. NEUTROPHILS 3.4 1.8 - 8.0 K/UL    ABS. LYMPHOCYTES 1.7 0.8 - 3.5 K/UL    ABS. MONOCYTES 0.4 0.0 - 1.0 K/UL    ABS. EOSINOPHILS 0.3 0.0 - 0.4 K/UL    ABS. BASOPHILS 0.0 0.0 - 0.1 K/UL    ABS. IMM. GRANS. 0.1 (H) 0.00 - 0.04 K/UL    DF AUTOMATED         Radiologic Studies -   CT ABD PELV W CONT    (Results Pending)     CT Results  (Last 48 hours)    None        CXR Results  (Last 48 hours)    None            Medical Decision Making   I am the first provider for this patient. I reviewed the vital signs, available nursing notes, past medical history, past surgical history, family history and social history. Vital Signs-Reviewed the patient's vital signs. Patient Vitals for the past 24 hrs:   Temp Pulse Resp BP SpO2   10/10/20 1415    (!) 125/95 96 %   10/10/20 1405    131/80 95 %   10/10/20 1400     95 %   10/10/20 1359     96 %   10/10/20 1314 98.5 °F (36.9 °C) 68 18 139/76 100 %         Provider Notes (Medical Decision Making):   41-year-old lady with recent admission with renal stone, urinary tract infection presenting to ED with chief complaint of right flank pain and independent complaint of left jaw pain and facial swelling. She seems to have a small amount of cellulitis to the left side of her jaw that is associated with multiple chronically necrotic teeth without obvious abscess and this is likely the cause. We will plan for Augmentin for this as well as dental follow-up. No obvious abscess noted for drainage at this time so antibiotics will be the best initial course. Independently, she has the right flank pain. On exam, she has right CVA tenderness but no abdominal tenderness at all.   Extensive work-up pursued including CBC, CMP, lipase, urinalysis, CT abdomen pelvis with contrast.  All of these tests were within normal limits. She does not appear to have a urinary tract infection though some red blood cells are noted, this may correspond to an intrarenal stone noted on her CT scan. She does not have a renal abscess or other intra-abdominal or pelvic pathology on CT scan. Her white blood cell count is normal, she has no bands, sepsis or significant infection or not suspected at this time. Her liver enzymes are normal, I do not think she has an obstructing biliary stone. Pain may be musculoskeletal versus ongoing tenderness associated with previous renal stone and infection. However, I think she is safe for discharge at this time with a normal work-up. Notably, patient very unhappy with this plan. She states that she does not feel any better and people are supposed feel better when they come to the hospital.  I explained all of the above and provided reassurance that it likely explains that her this has improved but she states it could possibly have improved because she still has pain and symptoms. I spent a significant amount of time trying to provide her reassurance but she continued to be upset, interrupted me multiple times and saying we were not doing anything for her, and seemed to be happy with nothing we could offer. I offered her cover test stating that it does create unusual symptoms but she refused this stating she does not have COVID. She stated she wanted to leave so she could go to another hospital.  Prior to discharge, she was able to ambulate and she tolerated p.o. without vomiting in the ED. ED Course:     Initial assessment performed. The patients presenting problems have been discussed, and they are in agreement with the care plan formulated and outlined with them. I have encouraged them to ask questions as they arise throughout their visit. Disposition:  dc    PLAN:  1. Current Discharge Medication List        2.    Follow-up Information    None       Return to ED if worse     Diagnosis Clinical Impression: right flank pain, left dental pain/swelling

## 2020-10-10 NOTE — ED NOTES
Pt returned from CT.  Pt crying from pain r/t reaction to contrast. Pt is not allergic to contrast: Pt has complicated medical hx with regard to use of contrast

## 2020-10-12 ENCOUNTER — HOSPITAL ENCOUNTER (EMERGENCY)
Age: 35
Discharge: HOME | End: 2020-10-13
Attending: EMERGENCY MEDICINE
Payer: COMMERCIAL

## 2020-10-12 DIAGNOSIS — R10.84 GENERALIZED ABDOMINAL PAIN: ICD-10-CM

## 2020-10-12 DIAGNOSIS — R11.2 NAUSEA AND VOMITING, INTRACTABILITY OF VOMITING NOT SPECIFIED, UNSPECIFIED VOMITING TYPE: Primary | ICD-10-CM

## 2020-10-12 DIAGNOSIS — R19.7 DIARRHEA, UNSPECIFIED TYPE: ICD-10-CM

## 2020-10-12 LAB
ALBUMIN SERPL-MCNC: 3.9 G/DL (ref 3.5–5)
ALBUMIN/GLOB SERPL: 1.1 {RATIO} (ref 1.1–2.2)
ALP SERPL-CCNC: 99 U/L (ref 45–117)
ALT SERPL-CCNC: 22 U/L (ref 12–78)
ANION GAP SERPL CALC-SCNC: 5 MMOL/L (ref 5–15)
AST SERPL-CCNC: 15 U/L (ref 15–37)
BASOPHILS # BLD: 0 K/UL (ref 0–0.1)
BASOPHILS NFR BLD: 0 % (ref 0–1)
BILIRUB SERPL-MCNC: 0.2 MG/DL (ref 0.2–1)
BUN SERPL-MCNC: 12 MG/DL (ref 6–20)
BUN/CREAT SERPL: 10 (ref 12–20)
CALCIUM SERPL-MCNC: 8.8 MG/DL (ref 8.5–10.1)
CHLORIDE SERPL-SCNC: 111 MMOL/L (ref 97–108)
CO2 SERPL-SCNC: 24 MMOL/L (ref 21–32)
CREAT SERPL-MCNC: 1.23 MG/DL (ref 0.55–1.02)
DIFFERENTIAL METHOD BLD: ABNORMAL
EOSINOPHIL # BLD: 0.6 K/UL (ref 0–0.4)
EOSINOPHIL NFR BLD: 7 % (ref 0–7)
ERYTHROCYTE [DISTWIDTH] IN BLOOD BY AUTOMATED COUNT: 12.9 % (ref 11.5–14.5)
GLOBULIN SER CALC-MCNC: 3.5 G/DL (ref 2–4)
GLUCOSE SERPL-MCNC: 105 MG/DL (ref 65–100)
HCT VFR BLD AUTO: 35.7 % (ref 35–47)
HGB BLD-MCNC: 11.7 G/DL (ref 11.5–16)
IMM GRANULOCYTES # BLD AUTO: 0.1 K/UL (ref 0–0.04)
IMM GRANULOCYTES NFR BLD AUTO: 1 % (ref 0–0.5)
LYMPHOCYTES # BLD: 3.8 K/UL (ref 0.8–3.5)
LYMPHOCYTES NFR BLD: 39 % (ref 12–49)
MCH RBC QN AUTO: 29.8 PG (ref 26–34)
MCHC RBC AUTO-ENTMCNC: 32.8 G/DL (ref 30–36.5)
MCV RBC AUTO: 90.8 FL (ref 80–99)
MONOCYTES # BLD: 0.6 K/UL (ref 0–1)
MONOCYTES NFR BLD: 6 % (ref 5–13)
NEUTS SEG # BLD: 4.7 K/UL (ref 1.8–8)
NEUTS SEG NFR BLD: 47 % (ref 32–75)
NRBC # BLD: 0 K/UL (ref 0–0.01)
NRBC BLD-RTO: 0 PER 100 WBC
PLATELET # BLD AUTO: 409 K/UL (ref 150–400)
PMV BLD AUTO: 9.7 FL (ref 8.9–12.9)
POTASSIUM SERPL-SCNC: 3.5 MMOL/L (ref 3.5–5.1)
PROT SERPL-MCNC: 7.4 G/DL (ref 6.4–8.2)
RBC # BLD AUTO: 3.93 M/UL (ref 3.8–5.2)
SODIUM SERPL-SCNC: 140 MMOL/L (ref 136–145)
WBC # BLD AUTO: 9.8 K/UL (ref 3.6–11)

## 2020-10-12 PROCEDURE — 36415 COLL VENOUS BLD VENIPUNCTURE: CPT

## 2020-10-12 PROCEDURE — 85025 COMPLETE CBC W/AUTO DIFF WBC: CPT

## 2020-10-12 PROCEDURE — 96374 THER/PROPH/DIAG INJ IV PUSH: CPT

## 2020-10-12 PROCEDURE — 99283 EMERGENCY DEPT VISIT LOW MDM: CPT

## 2020-10-12 PROCEDURE — 96375 TX/PRO/DX INJ NEW DRUG ADDON: CPT

## 2020-10-12 PROCEDURE — 80053 COMPREHEN METABOLIC PANEL: CPT

## 2020-10-12 RX ORDER — FENTANYL CITRATE 50 UG/ML
25 INJECTION, SOLUTION INTRAMUSCULAR; INTRAVENOUS
Status: COMPLETED | OUTPATIENT
Start: 2020-10-12 | End: 2020-10-13

## 2020-10-12 RX ORDER — FAMOTIDINE 10 MG/ML
20 INJECTION INTRAVENOUS
Status: COMPLETED | OUTPATIENT
Start: 2020-10-12 | End: 2020-10-13

## 2020-10-12 RX ORDER — ONDANSETRON 4 MG/1
TABLET, FILM COATED ORAL
Qty: 20 TAB | Refills: 0 | Status: SHIPPED | OUTPATIENT
Start: 2020-10-12 | End: 2020-10-28

## 2020-10-12 RX ORDER — ONDANSETRON 2 MG/ML
4 INJECTION INTRAMUSCULAR; INTRAVENOUS
Status: COMPLETED | OUTPATIENT
Start: 2020-10-12 | End: 2020-10-13

## 2020-10-13 VITALS
OXYGEN SATURATION: 97 % | DIASTOLIC BLOOD PRESSURE: 73 MMHG | HEART RATE: 68 BPM | WEIGHT: 249.12 LBS | RESPIRATION RATE: 18 BRPM | TEMPERATURE: 98.3 F | HEIGHT: 62 IN | BODY MASS INDEX: 45.84 KG/M2 | SYSTOLIC BLOOD PRESSURE: 135 MMHG

## 2020-10-13 LAB
AMPHET UR QL SCN: NEGATIVE
APPEARANCE UR: CLEAR
BACTERIA URNS QL MICRO: NEGATIVE /HPF
BARBITURATES UR QL SCN: POSITIVE
BENZODIAZ UR QL: POSITIVE
BILIRUB UR QL: NEGATIVE
CANNABINOIDS UR QL SCN: NEGATIVE
COCAINE UR QL SCN: NEGATIVE
COLOR UR: ABNORMAL
DRUG SCRN COMMENT,DRGCM: ABNORMAL
EPITH CASTS URNS QL MICRO: ABNORMAL /LPF
GLUCOSE UR STRIP.AUTO-MCNC: NEGATIVE MG/DL
HGB UR QL STRIP: NEGATIVE
KETONES UR QL STRIP.AUTO: NEGATIVE MG/DL
LEUKOCYTE ESTERASE UR QL STRIP.AUTO: NEGATIVE
METHADONE UR QL: NEGATIVE
MUCOUS THREADS URNS QL MICRO: ABNORMAL /LPF
NITRITE UR QL STRIP.AUTO: NEGATIVE
OPIATES UR QL: POSITIVE
PCP UR QL: NEGATIVE
PH UR STRIP: 6 [PH] (ref 5–8)
PROT UR STRIP-MCNC: ABNORMAL MG/DL
RBC #/AREA URNS HPF: ABNORMAL /HPF (ref 0–5)
SP GR UR REFRACTOMETRY: >1.03 (ref 1–1.03)
UA: UC IF INDICATED,UAUC: ABNORMAL
UROBILINOGEN UR QL STRIP.AUTO: 0.2 EU/DL (ref 0.2–1)
WBC URNS QL MICRO: ABNORMAL /HPF (ref 0–4)

## 2020-10-13 PROCEDURE — 80307 DRUG TEST PRSMV CHEM ANLYZR: CPT

## 2020-10-13 PROCEDURE — 81001 URINALYSIS AUTO W/SCOPE: CPT

## 2020-10-13 PROCEDURE — 74011250636 HC RX REV CODE- 250/636: Performed by: EMERGENCY MEDICINE

## 2020-10-13 RX ORDER — PROMETHAZINE HYDROCHLORIDE 25 MG/1
25 SUPPOSITORY RECTAL
Qty: 12 SUPPOSITORY | Refills: 0 | Status: SHIPPED | OUTPATIENT
Start: 2020-10-13 | End: 2021-04-13

## 2020-10-13 RX ORDER — ONDANSETRON 4 MG/1
4 TABLET, ORALLY DISINTEGRATING ORAL
Qty: 10 TAB | Refills: 0 | OUTPATIENT
Start: 2020-10-13 | End: 2021-07-17

## 2020-10-13 RX ADMIN — FENTANYL CITRATE 25 MCG: 50 INJECTION, SOLUTION INTRAMUSCULAR; INTRAVENOUS at 00:54

## 2020-10-13 RX ADMIN — SODIUM CHLORIDE 1000 ML: 900 INJECTION, SOLUTION INTRAVENOUS at 00:02

## 2020-10-13 RX ADMIN — ONDANSETRON 4 MG: 2 INJECTION INTRAMUSCULAR; INTRAVENOUS at 00:04

## 2020-10-13 RX ADMIN — FAMOTIDINE 20 MG: 10 INJECTION INTRAVENOUS at 00:05

## 2020-10-13 NOTE — ED NOTES
Patient ambulatory to room w c/o having C-diff. Patient reports that she was recently released from this facility for abdominal problems. Patient reports that she was discharged on antibiotics and she states the diarrhea has just got increasingly worse over the past 3 days. 0139- Patient demanding to speak to Dr. Stephanie Richardson she reports that she is unable to produce anymore stool as she exhausted it all prior to her arrival. Dr. Stephanie Richardson to bedside to speak to patient and to come up w a plan of care after discharge. 46- Discharge instructions given to patient by Dr. Stephanie Richardson. Verbalized understanding of instructions. Patient discharged without difficulty. Patient discharged in stable condition ambulatory accompanied by .

## 2020-10-13 NOTE — DISCHARGE INSTRUCTIONS
Patient Education        Abdominal Pain: Care Instructions  Your Care Instructions     Abdominal pain has many possible causes. Some aren't serious and get better on their own in a few days. Others need more testing and treatment. If your pain continues or gets worse, you need to be rechecked and may need more tests to find out what is wrong. You may need surgery to correct the problem. Don't ignore new symptoms, such as fever, nausea and vomiting, urination problems, pain that gets worse, and dizziness. These may be signs of a more serious problem. Your doctor may have recommended a follow-up visit in the next 8 to 12 hours. If you are not getting better, you may need more tests or treatment. The doctor has checked you carefully, but problems can develop later. If you notice any problems or new symptoms, get medical treatment right away. Follow-up care is a key part of your treatment and safety. Be sure to make and go to all appointments, and call your doctor if you are having problems. It's also a good idea to know your test results and keep a list of the medicines you take. How can you care for yourself at home? · Rest until you feel better. · To prevent dehydration, drink plenty of fluids, enough so that your urine is light yellow or clear like water. Choose water and other caffeine-free clear liquids until you feel better. If you have kidney, heart, or liver disease and have to limit fluids, talk with your doctor before you increase the amount of fluids you drink. · If your stomach is upset, eat mild foods, such as rice, dry toast or crackers, bananas, and applesauce. Try eating several small meals instead of two or three large ones. · Wait until 48 hours after all symptoms have gone away before you have spicy foods, alcohol, and drinks that contain caffeine. · Do not eat foods that are high in fat. · Avoid anti-inflammatory medicines such as aspirin, ibuprofen (Advil, Motrin), and naproxen (Aleve). These can cause stomach upset. Talk to your doctor if you take daily aspirin for another health problem. When should you call for help? Call 911 anytime you think you may need emergency care. For example, call if:    · You passed out (lost consciousness).     · You pass maroon or very bloody stools.     · You vomit blood or what looks like coffee grounds.     · You have new, severe belly pain. Call your doctor now or seek immediate medical care if:    · Your pain gets worse, especially if it becomes focused in one area of your belly.     · You have a new or higher fever.     · Your stools are black and look like tar, or they have streaks of blood.     · You have unexpected vaginal bleeding.     · You have symptoms of a urinary tract infection. These may include:  ? Pain when you urinate. ? Urinating more often than usual.  ? Blood in your urine.     · You are dizzy or lightheaded, or you feel like you may faint. Watch closely for changes in your health, and be sure to contact your doctor if:    · You are not getting better after 1 day (24 hours). Where can you learn more? Go to http://kymberlycentroseyoon.info/  Enter W532 in the search box to learn more about \"Abdominal Pain: Care Instructions. \"  Current as of: June 26, 2019               Content Version: 12.6  © 2679-1719 [employer]. Care instructions adapted under license by [employer] (which disclaims liability or warranty for this information). If you have questions about a medical condition or this instruction, always ask your healthcare professional. [place] [address] any warranty or liability for your use of this information. Patient Education        Diarrhea: Care Instructions  Your Care Instructions     Diarrhea is loose, watery stools (bowel movements). The exact cause is often hard to find. Sometimes diarrhea is your body's way of getting rid of what caused an upset stomach. Viruses, food poisoning, and many medicines can cause diarrhea. Some people get diarrhea in response to emotional stress, anxiety, or certain foods. Almost everyone has diarrhea now and then. It usually isn't serious, and your stools will return to normal soon. The important thing to do is replace the fluids you have lost, so you can prevent dehydration. The doctor has checked you carefully, but problems can develop later. If you notice any problems or new symptoms, get medical treatment right away. Follow-up care is a key part of your treatment and safety. Be sure to make and go to all appointments, and call your doctor if you are having problems. It's also a good idea to know your test results and keep a list of the medicines you take. How can you care for yourself at home? · Watch for signs of dehydration, which means your body has lost too much water. Dehydration is a serious condition and should be treated right away. Signs of dehydration are:  ? Increasing thirst and dry eyes and mouth. ? Feeling faint or lightheaded. ? A smaller amount of urine than normal.  · To prevent dehydration, drink plenty of fluids. Choose water and other caffeine-free clear liquids until you feel better. If you have kidney, heart, or liver disease and have to limit fluids, talk with your doctor before you increase the amount of fluids you drink. · Begin eating small amounts of mild foods the next day, if you feel like it. ? Try yogurt that has live cultures of Lactobacillus. (Check the label.)  ? Avoid spicy foods, fruits, alcohol, and caffeine until 48 hours after all symptoms are gone. ? Avoid chewing gum that contains sorbitol. ? Avoid dairy products (except for yogurt with Lactobacillus) while you have diarrhea and for 3 days after symptoms are gone. · The doctor may recommend that you take over-the-counter medicine, such as loperamide (Imodium), if you still have diarrhea after 6 hours.  Read and follow all instructions on the label. Do not use this medicine if you have bloody diarrhea, a high fever, or other signs of serious illness. Call your doctor if you think you are having a problem with your medicine. When should you call for help? Call 911 anytime you think you may need emergency care. For example, call if:    · You passed out (lost consciousness).     · Your stools are maroon or very bloody. Call your doctor now or seek immediate medical care if:    · You are dizzy or lightheaded, or you feel like you may faint.     · Your stools are black and look like tar, or they have streaks of blood.     · You have new or worse belly pain.     · You have symptoms of dehydration, such as:  ? Dry eyes and a dry mouth. ? Passing only a little dark urine. ? Feeling thirstier than usual.     · You have a new or higher fever. Watch closely for changes in your health, and be sure to contact your doctor if:    · Your diarrhea is getting worse.     · You see pus in the diarrhea.     · You are not getting better after 2 days (48 hours). Where can you learn more? Go to http://www.gray.com/  Enter [id number] in the search box to learn more about \"Diarrhea: Care Instructions. \"  Current as of: June 26, 2019               Content Version: 12.6  © 0712-8227 [employer]. Care instructions adapted under license by [employer] (which disclaims liability or warranty for this information). If you have questions about a medical condition or this instruction, always ask your healthcare professional. [place] [address] any warranty or liability for your use of this information. Patient Education        Nausea and Vomiting: Care Instructions  Your Care Instructions     When you are nauseated, you may feel weak and sweaty and notice a lot of saliva in your mouth. Nausea often leads to vomiting.  Most of the time you do not need to worry about nausea and vomiting, but they can be signs of other illnesses. Two common causes of nausea and vomiting are stomach flu and food poisoning. Nausea and vomiting from viral stomach flu will usually start to improve within 24 hours. Nausea and vomiting from food poisoning may last from 12 to 48 hours. The doctor has checked you carefully, but problems can develop later. If you notice any problems or new symptoms, get medical treatment right away. Follow-up care is a key part of your treatment and safety. Be sure to make and go to all appointments, and call your doctor if you are having problems. It's also a good idea to know your test results and keep a list of the medicines you take. How can you care for yourself at home? · To prevent dehydration, drink plenty of fluids, enough so that your urine is light yellow or clear like water. Choose water and other caffeine-free clear liquids until you feel better. If you have kidney, heart, or liver disease and have to limit fluids, talk with your doctor before you increase the amount of fluids you drink. · Rest in bed until you feel better. · When you are able to eat, try clear soups, mild foods, and liquids until all symptoms are gone for 12 to 48 hours. Other good choices include dry toast, crackers, cooked cereal, and gelatin dessert, such as Jell-O. When should you call for help? Call 911 anytime you think you may need emergency care. For example, call if:    · You passed out (lost consciousness). Call your doctor now or seek immediate medical care if:    · You have symptoms of dehydration, such as:  ? Dry eyes and a dry mouth. ? Passing only a little dark urine. ? Feeling thirstier than usual.     · You have new or worsening belly pain.     · You have a new or higher fever.     · You vomit blood or what looks like coffee grounds.    Watch closely for changes in your health, and be sure to contact your doctor if:    · You have ongoing nausea and vomiting.     · Your vomiting is getting worse.     · Your vomiting lasts longer than 2 days.     · You are not getting better as expected. Where can you learn more? Go to http://www.Whittl.com/  Enter H591 in the search box to learn more about \"Nausea and Vomiting: Care Instructions. \"  Current as of: June 26, 2019               Content Version: 12.6  © 8983-6220 [employer]. Care instructions adapted under license by [employer] (which disclaims liability or warranty for this information). If you have questions about a medical condition or this instruction, always ask your healthcare professional. [place] [address] any warranty or liability for your use of this information.

## 2020-10-13 NOTE — ED PROVIDER NOTES
EMERGENCY DEPARTMENT HISTORY AND PHYSICAL EXAM      Date: 10/12/2020  Patient Name: Sharonda Rooney    History of Presenting Illness     Chief Complaint   Patient presents with    Abdominal Pain     Patient was in the hospital recently and was on a lot of antibiotics. She now thinks she ahs c diff (again - history of),    Diarrhea       History Provided By: Patient    HPI: Sharonda Rooney, 37 y.o. female with PMHx significant for lupus, C. difficile, Mariah-Danlos syndrome, GERD, and kidney stones status post a recent admission for right flank pain that was thought to be due to a ureteral stone presents to the ED with chief complaint of nausea, vomiting, and diarrhea. Patient states \"I am almost positive I have C. difficile\". She states that she has had C. difficile multiple times in the past and that she has been on recent antibiotics due to concerns for possible UTI. She also took an antibiotic recently for dental pain. For the past 3 to 4 days she has been having worsening diarrhea that she thinks \"smells like C. difficile\". She has been having some difficulty making it to the restroom due to the urgency of diarrhea. She also has been having nausea and vomiting for the past 2 days. She has been taking nausea medications without significant relief. She complains of abdominal pain as well across her lower abdomen (left greater than right)  PCP: Ivan Alexander MD    No current facility-administered medications on file prior to encounter. Current Outpatient Medications on File Prior to Encounter   Medication Sig Dispense Refill    ondansetron hcl (ZOFRAN) 4 mg tablet TAKE 1 TABLET BY MOUTH EVERY 8 HOURS AS NEEDED FOR NAUSEA 20 Tab 0    amoxicillin-clavulanate (Augmentin) 875-125 mg per tablet Take 1 Tab by mouth two (2) times a day. 20 Tab 0    metaxalone (Skelaxin) 800 mg tablet Take 1 Tab by mouth four (4) times daily for 5 days.  20 Tab 0    DULoxetine (CYMBALTA) 60 mg capsule TAKE 2 CAPSULESBY MOUTH EVERY DAY 30 Cap 1    ketorolac (TORADOL) 10 mg tablet Take 1 Tab by mouth every six (6) hours as needed for Pain. 20 Tab 0    metoprolol succinate (TOPROL-XL) 25 mg XL tablet Take 1 Tab by mouth nightly. 30 Tab 2    ondansetron (Zofran ODT) 4 mg disintegrating tablet Take 1 Tab by mouth every eight (8) hours as needed for Nausea. 10 Tab 0    olmesartan (BENICAR) 5 mg tablet TAKE 1 TABLET BY MOUTH DAILY 90 Tab 3    naproxen (NAPROSYN) 500 mg tablet TK 1 TA PO BID      promethazine (PHENERGAN) 25 mg tablet TAKE 1 TABLET BY MOUTH EVERY 6 HOURS AS NEEDED FOR NAUSEA 30 Tab 0    ALPRAZolam (XANAX) 1 mg tablet TAKE 1 TABLET BY MOUTH TWICE DAILY AS NEEDED FOR ANXIETY MAXIMUM DAILY DOSE 2 60 Tab 3    mirtazapine (REMERON) 30 mg tablet TAKE 1 TABLET BY MOUTH AT BEDTIME 30 Tab 5    RisaQuad-2 16 billion cell cap cap TAKE ONE CAPSULE BY MOUTH ONCE DAILY 30 Cap 1    albuterol (PROVENTIL HFA, VENTOLIN HFA, PROAIR HFA) 90 mcg/actuation inhaler Take 1 Puff by inhalation every six (6) hours as needed for Wheezing. 1 Inhaler 0    furosemide (LASIX) 20 mg tablet PRN  0    tiZANidine (ZANAFLEX) 4 mg tablet   0    estradiol (ESTRACE) 1 mg tablet Take 1 mg by mouth daily. 0    indomethacin (INDOCIN) 25 mg capsule PRN      pantoprazole (PROTONIX) 40 mg tablet take 1 tablet by mouth at bedtime  0    codeine-butalbital-acetaminophen-caffeine (FIORICET WITH CODEINE) -77-30 mg capsule take 2 capsules by mouth every 6 hours if needed for MIGRAINE HEADACHE 90 Cap 0    rizatriptan (MAXALT) 10 mg tablet Take 10 mg by mouth once as needed for Migraine. May repeat in 2 hours if needed      belimumab (Benlysta) 400 mg solr injection by IntraVENous route.          Past History     Past Medical History:  Past Medical History:   Diagnosis Date    Abnormal CT of the abdomen 11/8/2012    Asthma     Autoimmune disease (HCC)     lupus    C. difficile diarrhea     Cervical prolapse     Dehydration     Mariah-Danlos syndrome     Gastrointestinal disorder     gerd, twisted colon, IBS    GERD (gastroesophageal reflux disease)     Headache(784.0)     Lupus (HCC)     Morbid obesity (HCC)     Nausea & vomiting     Neurological disorder     cluster headaches    Occipital neuralgia     other 2006, 2009    ovarian cyst removal    Other ill-defined conditions(799.89)     Worsening headaches        Past Surgical History:  Past Surgical History:   Procedure Laterality Date    COLONOSCOPY  9/21/2010         HX CHOLECYSTECTOMY      HX CYST INCISION AND DRAINAGE Right 02/27/2020    HX GYN  1/2009    right salpingo oopherectomy    HX GYN  2006    right ovarian tumor removed    HX HEENT      left wisdom teeth removal    HX HEENT      top right wisdom tooth removed    HX HERNIA REPAIR  4/2012    HX HERNIA REPAIR  2/28/13    Laparoscopic recurrent incisional hernia repair    HX HYSTERECTOMY      2016    HX UROLOGICAL      Kidney Stone Removal    MT EGD TRANSORAL BIOPSY SINGLE/MULTIPLE  9/22/2010            Family History:  Family History   Problem Relation Age of Onset    Colon Cancer Maternal Grandfather        Social History:  Social History     Tobacco Use    Smoking status: Never Smoker    Smokeless tobacco: Never Used   Substance Use Topics    Alcohol use: No    Drug use: No       Allergies:   Allergies   Allergen Reactions    Latex Itching     burning    Compazine [Prochlorperazine] Anxiety     High heart rate  Pt can take promethazine with no problems    Sulfa (Sulfonamide Antibiotics) Unknown (comments)    Topamax [Topiramate] Unknown (comments)    Adhesive Rash     Allergic to Dermabond    Clindamycin Diarrhea     Pt states caused her C-Diff    Mushroom Other (comments)    Mushroom Combination No.1 Unknown (comments)    Toradol [Ketorolac Tromethamine] Nausea and Vomiting and Other (comments)     PO & IV causes GI bleed  Tolerated during Feb 2020 stay    Valproic Acid Other (comments) Elevated heart rate and vomiting           Review of Systems   Review of Systems   Constitutional: Negative for chills and fever. HENT: Positive for dental problem. Negative for congestion, ear pain, rhinorrhea and sore throat. Respiratory: Negative for cough, shortness of breath and wheezing. Cardiovascular: Negative for chest pain and palpitations. Gastrointestinal: Positive for abdominal pain, diarrhea, nausea and vomiting. Negative for constipation. Genitourinary: Negative for dysuria, flank pain, hematuria and pelvic pain. Musculoskeletal: Negative for back pain and myalgias. Skin: Negative for rash and wound. Neurological: Negative for dizziness, syncope, weakness, light-headedness and headaches. Psychiatric/Behavioral: Negative for confusion. The patient is nervous/anxious. All other systems reviewed and are negative.         Physical Exam    General appearance -overweight, well appearing, and in no distress  Eyes - pupils equal and reactive, extraocular eye movements intact  ENT - mucous membranes moist, pharynx normal without lesions  Neck - supple, no significant adenopathy; non-tender to palpation  Chest - clear to auscultation, no wheezes, rales or rhonchi; non-tender to palpation  Heart - normal rate and regular rhythm, S1 and S2 normal, no murmurs noted  Abdomen - soft, tender to palpation across lower abdomen (left greater than right), no rebound or guarding, nondistended, no masses or organomegaly  Musculoskeletal - no joint tenderness, deformity or swelling; normal ROM  Extremities - peripheral pulses normal, no pedal edema  Skin - normal coloration and turgor, no rashes  Neurological - alert, oriented x3, normal speech, no focal findings or movement disorder noted    Diagnostic Study Results     Labs -     Recent Results (from the past 12 hour(s))   CBC WITH AUTOMATED DIFF    Collection Time: 10/12/20  9:33 PM   Result Value Ref Range    WBC 9.8 3.6 - 11.0 K/uL    RBC 3.93 3.80 - 5.20 M/uL    HGB 11.7 11.5 - 16.0 g/dL    HCT 35.7 35.0 - 47.0 %    MCV 90.8 80.0 - 99.0 FL    MCH 29.8 26.0 - 34.0 PG    MCHC 32.8 30.0 - 36.5 g/dL    RDW 12.9 11.5 - 14.5 %    PLATELET 035 (H) 660 - 400 K/uL    MPV 9.7 8.9 - 12.9 FL    NRBC 0.0 0  WBC    ABSOLUTE NRBC 0.00 0.00 - 0.01 K/uL    NEUTROPHILS 47 32 - 75 %    LYMPHOCYTES 39 12 - 49 %    MONOCYTES 6 5 - 13 %    EOSINOPHILS 7 0 - 7 %    BASOPHILS 0 0 - 1 %    IMMATURE GRANULOCYTES 1 (H) 0.0 - 0.5 %    ABS. NEUTROPHILS 4.7 1.8 - 8.0 K/UL    ABS. LYMPHOCYTES 3.8 (H) 0.8 - 3.5 K/UL    ABS. MONOCYTES 0.6 0.0 - 1.0 K/UL    ABS. EOSINOPHILS 0.6 (H) 0.0 - 0.4 K/UL    ABS. BASOPHILS 0.0 0.0 - 0.1 K/UL    ABS. IMM. GRANS. 0.1 (H) 0.00 - 0.04 K/UL    DF AUTOMATED     METABOLIC PANEL, COMPREHENSIVE    Collection Time: 10/12/20  9:33 PM   Result Value Ref Range    Sodium 140 136 - 145 mmol/L    Potassium 3.5 3.5 - 5.1 mmol/L    Chloride 111 (H) 97 - 108 mmol/L    CO2 24 21 - 32 mmol/L    Anion gap 5 5 - 15 mmol/L    Glucose 105 (H) 65 - 100 mg/dL    BUN 12 6 - 20 MG/DL    Creatinine 1.23 (H) 0.55 - 1.02 MG/DL    BUN/Creatinine ratio 10 (L) 12 - 20      GFR est AA 60 (L) >60 ml/min/1.73m2    GFR est non-AA 49 (L) >60 ml/min/1.73m2    Calcium 8.8 8.5 - 10.1 MG/DL    Bilirubin, total 0.2 0.2 - 1.0 MG/DL    ALT (SGPT) 22 12 - 78 U/L    AST (SGOT) 15 15 - 37 U/L    Alk. phosphatase 99 45 - 117 U/L    Protein, total 7.4 6.4 - 8.2 g/dL    Albumin 3.9 3.5 - 5.0 g/dL    Globulin 3.5 2.0 - 4.0 g/dL    A-G Ratio 1.1 1.1 - 2.2         Radiologic Studies -   No orders to display     CT Results  (Last 48 hours)    None        CXR Results  (Last 48 hours)    None            Medical Decision Making   I am the first provider for this patient. I reviewed the vital signs, available nursing notes, past medical history, past surgical history, family history and social history. Vital Signs-Reviewed the patient's vital signs.   Patient Vitals for the past 12 hrs:   Temp Pulse Resp BP SpO2   10/12/20 2054 98.3 °F (36.8 °C) 68 18 (!) 147/88 99 %           Records Reviewed: Nursing Notes and Old Medical Records    Provider Notes (Medical Decision Making):   Differential diagnosis: UTI, kidney stone, diverticulitis, colitis, C. difficile  We will check CBC, CMP, UA, stool studies. Patient has had multiple recent abdominal pelvis CTs (3 in the past month) so will try to avoid repeating CT today. ED Course:   Initial assessment performed. The patients presenting problems have been discussed, and they are in agreement with the care plan formulated and outlined with them. I have encouraged them to ask questions as they arise throughout their visit. Progress Notes:  ED Course as of Oct 13 0446   Tue Oct 13, 2020   0122 Patient states she is unable to provide a stool sample. She has not had any episodes of diarrhea or vomiting since my evaluation. Will treat with Zofran and Phenergan suppositories and encourage her to follow-up with either her GI specialist or her primary care. [AO]      ED Course User Index  [AO] Elif Isbell MD       Disposition:  PR home    PLAN:  1.    Discharge Medication List as of 10/13/2020  1:27 AM      START taking these medications    Details   promethazine (PHENERGAN) 25 mg suppository Insert 1 Suppository into rectum every six (6) hours as needed for Nausea., Normal, Disp-12 Suppository,R-0         CONTINUE these medications which have CHANGED    Details   ondansetron (Zofran ODT) 4 mg disintegrating tablet Take 1 Tab by mouth every eight (8) hours as needed for Nausea., Normal, Disp-10 Tab,R-0         CONTINUE these medications which have NOT CHANGED    Details   ondansetron hcl (ZOFRAN) 4 mg tablet TAKE 1 TABLET BY MOUTH EVERY 8 HOURS AS NEEDED FOR NAUSEA, Normal, Disp-20 Tab,R-0      amoxicillin-clavulanate (Augmentin) 875-125 mg per tablet Take 1 Tab by mouth two (2) times a day., Normal, Disp-20 Tab,R-0      metaxalone (Skelaxin) 800 mg tablet Take 1 Tab by mouth four (4) times daily for 5 days. , Normal, Disp-20 Tab,R-0      DULoxetine (CYMBALTA) 60 mg capsule TAKE 2 CAPSULESBY MOUTH EVERY DAY, Normal, Disp-30 Cap,R-1      ketorolac (TORADOL) 10 mg tablet Take 1 Tab by mouth every six (6) hours as needed for Pain., Normal, Disp-20 Tab,R-0      metoprolol succinate (TOPROL-XL) 25 mg XL tablet Take 1 Tab by mouth nightly., Normal, Disp-30 Tab,R-2      olmesartan (BENICAR) 5 mg tablet TAKE 1 TABLET BY MOUTH DAILY, Normal, Disp-90 Tab,R-3      naproxen (NAPROSYN) 500 mg tablet TK 1 TA PO BID, Historical Med      ALPRAZolam (XANAX) 1 mg tablet TAKE 1 TABLET BY MOUTH TWICE DAILY AS NEEDED FOR ANXIETY MAXIMUM DAILY DOSE 2, Normal, Disp-60 Tab,R-3      mirtazapine (REMERON) 30 mg tablet TAKE 1 TABLET BY MOUTH AT BEDTIME, Normal, Disp-30 Tab,R-5      RisaQuad-2 16 billion cell cap cap TAKE ONE CAPSULE BY MOUTH ONCE DAILY, Normal, Disp-30 Cap, R-1      albuterol (PROVENTIL HFA, VENTOLIN HFA, PROAIR HFA) 90 mcg/actuation inhaler Take 1 Puff by inhalation every six (6) hours as needed for Wheezing., Normal, Disp-1 Inhaler, R-0      furosemide (LASIX) 20 mg tablet PRN, Historical Med, R-0      tiZANidine (ZANAFLEX) 4 mg tablet Historical Med, R-0      estradiol (ESTRACE) 1 mg tablet Take 1 mg by mouth daily. , Historical Med, R-0      indomethacin (INDOCIN) 25 mg capsule PRN, Historical Med      pantoprazole (PROTONIX) 40 mg tablet take 1 tablet by mouth at bedtime, Historical Med, R-0      codeine-butalbital-acetaminophen-caffeine (FIORICET WITH CODEINE) -05-30 mg capsule take 2 capsules by mouth every 6 hours if needed for MIGRAINE HEADACHE, Print, Disp-90 Cap, R-0      rizatriptan (MAXALT) 10 mg tablet Take 10 mg by mouth once as needed for Migraine.  May repeat in 2 hours if needed, Historical Med      belimumab (Benlysta) 400 mg solr injection by IntraVENous route., Historical Med         STOP taking these medications       promethazine (PHENERGAN) 25 mg tablet Comments:   Reason for Stoppin.   Follow-up Information     Follow up With Specialties Details Why Contact Info    [facility] EMERGENCY DEPT Emergency Medicine  If symptoms worsen [address] [facility] [place]note [address]    Nellie Johnson MD Internal Medicine Schedule an appointment as soon as possible for a visit  [address] [place] [address]  [address] [facility]      Lissy Laughlin MD Gastroenterology Schedule an appointment as soon as possible for a visit  [facility]  [phone]          Return to ED if worse     Diagnosis     Clinical Impression:   1. Nausea and vomiting, intractability of vomiting not specified, unspecified vomiting type    2. Diarrhea, unspecified type    3.  Generalized abdominal pain

## 2020-10-15 DIAGNOSIS — F41.9 ANXIETY: ICD-10-CM

## 2020-10-15 DIAGNOSIS — F51.01 PRIMARY INSOMNIA: ICD-10-CM

## 2020-10-15 RX ORDER — ALPRAZOLAM 1 MG/1
TABLET ORAL
Qty: 60 TAB | Refills: 3 | Status: SHIPPED | OUTPATIENT
Start: 2020-10-15 | End: 2021-02-08 | Stop reason: SDUPTHER

## 2020-10-15 NOTE — TELEPHONE ENCOUNTER
Future Appointments:  No future appointments.      Last Appointment With Me:  4/20/2020     Requested Prescriptions     Pending Prescriptions Disp Refills    ALPRAZolam (XANAX) 1 mg tablet 60 Tab 3     Sig: TAKE 1 TABLET BY MOUTH TWICE DAILY AS NEEDED FOR ANXIETY MAXIMUM DAILY DOSE 2

## 2020-10-28 RX ORDER — ONDANSETRON 4 MG/1
TABLET, FILM COATED ORAL
Qty: 20 TAB | Refills: 0 | Status: SHIPPED | OUTPATIENT
Start: 2020-10-28 | End: 2020-11-04

## 2020-11-04 RX ORDER — ONDANSETRON 4 MG/1
TABLET, FILM COATED ORAL
Qty: 20 TAB | Refills: 0 | Status: SHIPPED | OUTPATIENT
Start: 2020-11-04 | End: 2020-11-16

## 2020-11-04 RX ORDER — MIRTAZAPINE 30 MG/1
TABLET, FILM COATED ORAL
Qty: 30 TAB | Refills: 1 | Status: SHIPPED | OUTPATIENT
Start: 2020-11-04 | End: 2020-12-30

## 2020-11-05 ENCOUNTER — TELEPHONE (OUTPATIENT)
Dept: INTERNAL MEDICINE CLINIC | Age: 35
End: 2020-11-05

## 2020-11-05 RX ORDER — AZITHROMYCIN 250 MG/1
250 TABLET, FILM COATED ORAL SEE ADMIN INSTRUCTIONS
Qty: 6 TAB | Refills: 0 | Status: SHIPPED | OUTPATIENT
Start: 2020-11-05 | End: 2020-12-13

## 2020-11-05 RX ORDER — METHYLPREDNISOLONE 4 MG/1
TABLET ORAL
Qty: 1 DOSE PACK | Refills: 0 | Status: SHIPPED | OUTPATIENT
Start: 2020-11-05 | End: 2020-12-17

## 2020-11-05 NOTE — TELEPHONE ENCOUNTER
#[phone]  Pt states she has pleurisy and has had before. Pt was asking for an appt and there were none for psr to schedule. Pt is asking that medication for this to be called into [facility] on file for her. Any questions, please call pt.

## 2020-11-05 NOTE — TELEPHONE ENCOUNTER
Barry Patel MD  You 11 minutes ago (1:11 PM)      I escribed a zpak and medcarla rowan    Message text

## 2020-11-06 ENCOUNTER — APPOINTMENT (OUTPATIENT)
Dept: GENERAL RADIOLOGY | Age: 35
End: 2020-11-06
Attending: STUDENT IN AN ORGANIZED HEALTH CARE EDUCATION/TRAINING PROGRAM
Payer: COMMERCIAL

## 2020-11-06 ENCOUNTER — APPOINTMENT (OUTPATIENT)
Dept: CT IMAGING | Age: 35
End: 2020-11-06
Attending: EMERGENCY MEDICINE
Payer: COMMERCIAL

## 2020-11-06 ENCOUNTER — HOSPITAL ENCOUNTER (EMERGENCY)
Age: 35
Discharge: HOME | End: 2020-11-06
Attending: EMERGENCY MEDICINE | Admitting: EMERGENCY MEDICINE
Payer: COMMERCIAL

## 2020-11-06 VITALS
TEMPERATURE: 98.2 F | RESPIRATION RATE: 18 BRPM | HEIGHT: 63 IN | HEART RATE: 80 BPM | WEIGHT: 242.95 LBS | BODY MASS INDEX: 43.05 KG/M2 | OXYGEN SATURATION: 97 % | SYSTOLIC BLOOD PRESSURE: 127 MMHG | DIASTOLIC BLOOD PRESSURE: 68 MMHG

## 2020-11-06 DIAGNOSIS — R10.9 RIGHT FLANK PAIN: Primary | ICD-10-CM

## 2020-11-06 LAB
ALBUMIN SERPL-MCNC: 4.1 G/DL (ref 3.5–5)
ALBUMIN/GLOB SERPL: 1.2 {RATIO} (ref 1.1–2.2)
ALP SERPL-CCNC: 103 U/L (ref 45–117)
ALT SERPL-CCNC: 18 U/L (ref 12–78)
AMPHET UR QL SCN: NEGATIVE
ANION GAP SERPL CALC-SCNC: 6 MMOL/L (ref 5–15)
APPEARANCE UR: CLEAR
AST SERPL-CCNC: 12 U/L (ref 15–37)
BACTERIA URNS QL MICRO: NEGATIVE /HPF
BARBITURATES UR QL SCN: POSITIVE
BASOPHILS # BLD: 0 K/UL (ref 0–0.1)
BASOPHILS NFR BLD: 0 % (ref 0–1)
BENZODIAZ UR QL: POSITIVE
BILIRUB SERPL-MCNC: 0.2 MG/DL (ref 0.2–1)
BILIRUB UR QL: NEGATIVE
BUN SERPL-MCNC: 15 MG/DL (ref 6–20)
BUN/CREAT SERPL: 17 (ref 12–20)
CALCIUM SERPL-MCNC: 9.7 MG/DL (ref 8.5–10.1)
CANNABINOIDS UR QL SCN: NEGATIVE
CHLORIDE SERPL-SCNC: 110 MMOL/L (ref 97–108)
CO2 SERPL-SCNC: 23 MMOL/L (ref 21–32)
COCAINE UR QL SCN: NEGATIVE
COLOR UR: ABNORMAL
COMMENT, HOLDF: NORMAL
CREAT SERPL-MCNC: 0.87 MG/DL (ref 0.55–1.02)
DIFFERENTIAL METHOD BLD: NORMAL
DRUG SCRN COMMENT,DRGCM: ABNORMAL
EOSINOPHIL # BLD: 0.4 K/UL (ref 0–0.4)
EOSINOPHIL NFR BLD: 5 % (ref 0–7)
EPITH CASTS URNS QL MICRO: ABNORMAL /LPF
ERYTHROCYTE [DISTWIDTH] IN BLOOD BY AUTOMATED COUNT: 12.9 % (ref 11.5–14.5)
GLOBULIN SER CALC-MCNC: 3.3 G/DL (ref 2–4)
GLUCOSE SERPL-MCNC: 94 MG/DL (ref 65–100)
GLUCOSE UR STRIP.AUTO-MCNC: NEGATIVE MG/DL
HCT VFR BLD AUTO: 36.1 % (ref 35–47)
HGB BLD-MCNC: 11.8 G/DL (ref 11.5–16)
HGB UR QL STRIP: ABNORMAL
HYALINE CASTS URNS QL MICRO: ABNORMAL /LPF (ref 0–5)
IMM GRANULOCYTES # BLD AUTO: 0 K/UL (ref 0–0.04)
IMM GRANULOCYTES NFR BLD AUTO: 0 % (ref 0–0.5)
KETONES UR QL STRIP.AUTO: NEGATIVE MG/DL
LEUKOCYTE ESTERASE UR QL STRIP.AUTO: NEGATIVE
LIPASE SERPL-CCNC: 118 U/L (ref 73–393)
LYMPHOCYTES # BLD: 3.5 K/UL (ref 0.8–3.5)
LYMPHOCYTES NFR BLD: 38 % (ref 12–49)
MCH RBC QN AUTO: 29.9 PG (ref 26–34)
MCHC RBC AUTO-ENTMCNC: 32.7 G/DL (ref 30–36.5)
MCV RBC AUTO: 91.4 FL (ref 80–99)
METHADONE UR QL: NEGATIVE
MONOCYTES # BLD: 0.6 K/UL (ref 0–1)
MONOCYTES NFR BLD: 7 % (ref 5–13)
NEUTS SEG # BLD: 4.6 K/UL (ref 1.8–8)
NEUTS SEG NFR BLD: 50 % (ref 32–75)
NITRITE UR QL STRIP.AUTO: NEGATIVE
NRBC # BLD: 0 K/UL (ref 0–0.01)
NRBC BLD-RTO: 0 PER 100 WBC
OPIATES UR QL: POSITIVE
PCP UR QL: NEGATIVE
PH UR STRIP: 5.5 [PH] (ref 5–8)
PLATELET # BLD AUTO: 308 K/UL (ref 150–400)
PMV BLD AUTO: 10 FL (ref 8.9–12.9)
POTASSIUM SERPL-SCNC: 4 MMOL/L (ref 3.5–5.1)
PROT SERPL-MCNC: 7.4 G/DL (ref 6.4–8.2)
PROT UR STRIP-MCNC: NEGATIVE MG/DL
RBC # BLD AUTO: 3.95 M/UL (ref 3.8–5.2)
RBC #/AREA URNS HPF: ABNORMAL /HPF (ref 0–5)
SAMPLES BEING HELD,HOLD: NORMAL
SODIUM SERPL-SCNC: 139 MMOL/L (ref 136–145)
SP GR UR REFRACTOMETRY: 1.02 (ref 1–1.03)
UA: UC IF INDICATED,UAUC: ABNORMAL
UROBILINOGEN UR QL STRIP.AUTO: 0.2 EU/DL (ref 0.2–1)
WBC # BLD AUTO: 9.1 K/UL (ref 3.6–11)
WBC URNS QL MICRO: ABNORMAL /HPF (ref 0–4)

## 2020-11-06 PROCEDURE — 71045 X-RAY EXAM CHEST 1 VIEW: CPT

## 2020-11-06 PROCEDURE — 81001 URINALYSIS AUTO W/SCOPE: CPT

## 2020-11-06 PROCEDURE — 80307 DRUG TEST PRSMV CHEM ANLYZR: CPT

## 2020-11-06 PROCEDURE — 99284 EMERGENCY DEPT VISIT MOD MDM: CPT

## 2020-11-06 PROCEDURE — 74176 CT ABD & PELVIS W/O CONTRAST: CPT

## 2020-11-06 PROCEDURE — 36415 COLL VENOUS BLD VENIPUNCTURE: CPT

## 2020-11-06 PROCEDURE — 83690 ASSAY OF LIPASE: CPT

## 2020-11-06 PROCEDURE — 74011250637 HC RX REV CODE- 250/637: Performed by: EMERGENCY MEDICINE

## 2020-11-06 PROCEDURE — 74011000250 HC RX REV CODE- 250: Performed by: EMERGENCY MEDICINE

## 2020-11-06 PROCEDURE — 85025 COMPLETE CBC W/AUTO DIFF WBC: CPT

## 2020-11-06 PROCEDURE — 80053 COMPREHEN METABOLIC PANEL: CPT

## 2020-11-06 RX ORDER — CYCLOBENZAPRINE HCL 10 MG
10 TABLET ORAL
Status: COMPLETED | OUTPATIENT
Start: 2020-11-06 | End: 2020-11-06

## 2020-11-06 RX ORDER — LIDOCAINE 4 G/100G
1 PATCH TOPICAL EVERY 24 HOURS
Status: DISCONTINUED | OUTPATIENT
Start: 2020-11-06 | End: 2020-11-06 | Stop reason: HOSPADM

## 2020-11-06 RX ADMIN — CYCLOBENZAPRINE 10 MG: 10 TABLET, FILM COATED ORAL at 17:05

## 2020-11-07 NOTE — ED NOTES
Pt discharged, RN unable to review instructions with patient, not in [facility] bed when RN returned to review. Dr. Chandrika Puga discussed discharge with pt. IV previously removed.

## 2020-11-08 NOTE — ED PROVIDER NOTES
EMERGENCY DEPARTMENT HISTORY AND PHYSICAL EXAM      Date: 11/6/2020  Patient Name: Julius King    History of Presenting Illness     Chief Complaint   Patient presents with    Back Pain     Patient reports acute onset when she takes a deep breath. States it radiates around her lung. History Provided By: Patient    HPI: Julius King, 37 y.o. female presents to the ED with cc of right flank pain. Patient states that her symptoms started this morning. Pain is constant and 8 out of 10 in severity. She denies shortness of breath, but her pain worsens when she does take a deep breath. She also denies chest pain, cough, fever or chills. She denies dysuria or change in bowel habits. She has had nausea, no vomiting. She denies headache or vaginal discharge. There are no other complaints, changes, or physical findings at this time. PCP: Elba Almaguer MD    No current facility-administered medications on file prior to encounter. Current Outpatient Medications on File Prior to Encounter   Medication Sig Dispense Refill    azithromycin (ZITHROMAX) 250 mg tablet Take 1 Tab by mouth See Admin Instructions for 5 days. 6 Tab 0    methylPREDNISolone (MEDROL DOSEPACK) 4 mg tablet As directed 1 Dose Pack 0    mirtazapine (REMERON) 30 mg tablet TAKE 1 TABLET BY MOUTH AT BEDTIME 30 Tab 1    ondansetron hcl (ZOFRAN) 4 mg tablet TAKE 1 TABLET BY MOUTH EVERY 8 HOURS AS NEEDED FOR NAUSEA 20 Tab 0    ALPRAZolam (XANAX) 1 mg tablet TAKE 1 TABLET BY MOUTH TWICE DAILY AS NEEDED FOR ANXIETY MAXIMUM DAILY DOSE 2 60 Tab 3    ondansetron (Zofran ODT) 4 mg disintegrating tablet Take 1 Tab by mouth every eight (8) hours as needed for Nausea. 10 Tab 0    promethazine (PHENERGAN) 25 mg suppository Insert 1 Suppository into rectum every six (6) hours as needed for Nausea. 12 Suppository 0    amoxicillin-clavulanate (Augmentin) 875-125 mg per tablet Take 1 Tab by mouth two (2) times a day.  20 Tab 0    DULoxetine (CYMBALTA) 60 mg capsule TAKE 2 CAPSULESBY MOUTH EVERY DAY 30 Cap 1    ketorolac (TORADOL) 10 mg tablet Take 1 Tab by mouth every six (6) hours as needed for Pain. 20 Tab 0    metoprolol succinate (TOPROL-XL) 25 mg XL tablet Take 1 Tab by mouth nightly. 30 Tab 2    olmesartan (BENICAR) 5 mg tablet TAKE 1 TABLET BY MOUTH DAILY 90 Tab 3    naproxen (NAPROSYN) 500 mg tablet TK 1 TA PO BID      RisaQuad-2 16 billion cell cap cap TAKE ONE CAPSULE BY MOUTH ONCE DAILY 30 Cap 1    albuterol (PROVENTIL HFA, VENTOLIN HFA, PROAIR HFA) 90 mcg/actuation inhaler Take 1 Puff by inhalation every six (6) hours as needed for Wheezing. 1 Inhaler 0    furosemide (LASIX) 20 mg tablet PRN  0    tiZANidine (ZANAFLEX) 4 mg tablet   0    estradiol (ESTRACE) 1 mg tablet Take 1 mg by mouth daily. 0    indomethacin (INDOCIN) 25 mg capsule PRN      pantoprazole (PROTONIX) 40 mg tablet take 1 tablet by mouth at bedtime  0    codeine-butalbital-acetaminophen-caffeine (FIORICET WITH CODEINE) -14-30 mg capsule take 2 capsules by mouth every 6 hours if needed for MIGRAINE HEADACHE 90 Cap 0    rizatriptan (MAXALT) 10 mg tablet Take 10 mg by mouth once as needed for Migraine. May repeat in 2 hours if needed      belimumab (Benlysta) 400 mg solr injection by IntraVENous route.          Past History     Past Medical History:  Past Medical History:   Diagnosis Date    Abnormal CT of the abdomen 11/8/2012    Asthma     Autoimmune disease (HCC)     lupus    C. difficile diarrhea     Cervical prolapse     Dehydration     Mariah-Danlos syndrome     Gastrointestinal disorder     gerd, twisted colon, IBS    GERD (gastroesophageal reflux disease)     Headache(784.0)     Lupus (HCC)     Morbid obesity (HCC)     Nausea & vomiting     Neurological disorder     cluster headaches    Occipital neuralgia     other 2006, 2009    ovarian cyst removal    Other ill-defined conditions(799.89)     Worsening headaches Past Surgical History:  Past Surgical History:   Procedure Laterality Date    COLONOSCOPY  9/21/2010         HX CHOLECYSTECTOMY      HX CYST INCISION AND DRAINAGE Right 02/27/2020    HX GYN  1/2009    right salpingo oopherectomy    HX GYN  2006    right ovarian tumor removed    HX HEENT      left wisdom teeth removal    HX HEENT      top right wisdom tooth removed    HX HERNIA REPAIR  4/2012    HX HERNIA REPAIR  2/28/13    Laparoscopic recurrent incisional hernia repair    HX HYSTERECTOMY      2016    HX UROLOGICAL      Kidney Stone Removal    AL EGD TRANSORAL BIOPSY SINGLE/MULTIPLE  9/22/2010            Family History:  Family History   Problem Relation Age of Onset    Colon Cancer Maternal Grandfather        Social History:  Social History     Tobacco Use    Smoking status: Never Smoker    Smokeless tobacco: Never Used   Substance Use Topics    Alcohol use: No    Drug use: No       Allergies: Allergies   Allergen Reactions    Latex Itching     burning    Compazine [Prochlorperazine] Anxiety     High heart rate  Pt can take promethazine with no problems    Sulfa (Sulfonamide Antibiotics) Unknown (comments)    Topamax [Topiramate] Unknown (comments)    Adhesive Rash     Allergic to Dermabond    Clindamycin Diarrhea     Pt states caused her C-Diff    Mushroom Other (comments)    Mushroom Combination No.1 Unknown (comments)    Toradol [Ketorolac Tromethamine] Nausea and Vomiting and Other (comments)     PO & IV causes GI bleed  Tolerated during Feb 2020 stay    Valproic Acid Other (comments)     Elevated heart rate and vomiting           Review of Systems   Review of Systems   Constitutional: Negative for fever. HENT: Negative for congestion. Eyes: Negative. Respiratory: Negative for shortness of breath. Cardiovascular: Negative for chest pain. Gastrointestinal: Positive for nausea. Endocrine: Negative for heat intolerance. Genitourinary: Positive for flank pain. Musculoskeletal: Negative for back pain. Skin: Negative for rash. Allergic/Immunologic: Negative for immunocompromised state. Neurological: Negative for dizziness. Hematological: Does not bruise/bleed easily. Psychiatric/Behavioral: Negative. All other systems reviewed and are negative. Physical Exam   Physical Exam  Vitals signs and nursing note reviewed. Constitutional:       General: She is not in acute distress. Appearance: She is well-developed. HENT:      Head: Normocephalic. Neck:      Musculoskeletal: Normal range of motion and neck supple. Cardiovascular:      Rate and Rhythm: Normal rate and regular rhythm. Heart sounds: Normal heart sounds. Pulmonary:      Effort: Pulmonary effort is normal.      Breath sounds: Normal breath sounds. Abdominal:      General: Bowel sounds are normal.      Palpations: Abdomen is soft. Tenderness: There is no abdominal tenderness. Comments: Right flank tenderness   Musculoskeletal: Normal range of motion. Skin:     General: Skin is warm and dry. Neurological:      General: No focal deficit present. Mental Status: She is alert and oriented to person, place, and time. Psychiatric:         Mood and Affect: Mood normal.         Behavior: Behavior normal.         Diagnostic Study Results     Labs -   No results found for this or any previous visit (from the past 12 hour(s)). Radiologic Studies -   CT ABD PELV WO CONT   Final Result   IMPRESSION: No renal calculi or evidence of obstructive uropathy. XR CHEST PORT   Final Result   IMPRESSION: No acute cardiopulmonary disease. CT Results  (Last 48 hours)               11/06/20 1733  CT ABD PELV WO CONT Final result    Impression:  IMPRESSION: No renal calculi or evidence of obstructive uropathy. Narrative:  INDICATION: Back pain, right flank pain, hematuria.        Exam: Noncontrast CT of the abdomen and pelvis is performed with 5 mm collimation. Sagittal and coronal reformatted images were also performed. CT dose reduction was achieved through the use of a standardized protocol   tailored for this examination and automatic exposure control for dose   modulation. Direct comparison is made to prior CT dated October 10, 2020. FINDINGS:       The visualized lung bases are clear. Abdomen:        Liver: The liver is normal on noncontrast images. Spleen: The spleen is normal on noncontrast images. Adrenals: The adrenals are normal on noncontrast images. Pancreas: The pancreas is normal on noncontrast images. Gallbladder: The gallbladder is surgically absent. Kidneys: There is no perinephric stranding, hydronephrosis or hydroureter. No   renal, ureteral bladder calculus is visualized. There is a subcentimeter right   renal cyst, unchanged. Bowel: No thickened or dilated loop of large or small bowel seen. Appendix: The appendix is normal.       Pelvis: Urinary bladder is partially filled and grossly normal.       Miscellaneous: There is no free intraperitoneal gas or fluid. There is no focal   fluid collection to suggest abscess. The uterus is absent. CXR Results  (Last 48 hours)               11/06/20 1410  XR CHEST PORT Final result    Impression:  IMPRESSION: No acute cardiopulmonary disease. Narrative:  INDICATION: Back pain, pain with inhalation. Portable AP upright view of the chest.       Direct comparison made to prior chest x-ray dated February 2020. Cardiomediastinal silhouette is stable. Lungs are clear bilaterally. Pleural   spaces are normal. Osseous structures are intact. Medical Decision Making   I am the first provider for this patient. I reviewed the vital signs, available nursing notes, past medical history, past surgical history, family history and social history.     Vital Signs-Reviewed the patient's vital signs.  No data found. Records Reviewed: Nursing Notes, Old Medical Records, Previous Radiology Studies and Previous Laboratory Studies    Provider Notes (Medical Decision Making):   Kidney stone, biliary colic, musculoskeletal pain, pancreatitis, UTI, pyelonephritis    ED Course:   Initial assessment performed. The patients presenting problems have been discussed, and they are in agreement with the care plan formulated and outlined with them. I have encouraged them to ask questions as they arise throughout their visit. Management plan review:      Patient's management plan was reviewed. Her  was reviewed. Overdose risk score is 430. Progress note:    Patient's results were reviewed. The patient is advised to follow-up and return to ER if worse               Critical Care Time:   0    Disposition:  home    DISCHARGE PLAN:  1. Discharge Medication List as of 11/6/2020  7:18 PM        2. Follow-up Information    None       3. Return to ED if worse     Diagnosis     Clinical Impression:   1. Right flank pain        Attestations:    Ivelisse Agarwal MD    Please note that this dictation was completed with [employer], the computer voice recognition software. Quite often unanticipated grammatical, syntax, homophones, and other interpretive errors are inadvertently transcribed by the computer software. Please disregard these errors. Please excuse any errors that have escaped final proofreading. Thank you.

## 2020-11-08 NOTE — DISCHARGE INSTRUCTIONS
Patient Education        Flank Pain: Care Instructions  Your Care Instructions  Flank pain is pain on the side of the back just below the rib cage and above the waist. It can be on one or both sides. Flank pain has many possible causes, including a kidney stone, a urinary tract infection, or back strain. Flank pain may get better on its own. But don't ignore new symptoms, such as fever, nausea and vomiting, urination problems, pain that gets worse, and dizziness. These may be signs of a more serious problem. You may have to have tests or other treatment. Follow-up care is a key part of your treatment and safety. Be sure to make and go to all appointments, and call your doctor if you are having problems. It's also a good idea to know your test results and keep a list of the medicines you take. How can you care for yourself at home? · Rest until you feel better. · Take pain medicines exactly as directed. ? If the doctor gave you a prescription medicine for pain, take it as prescribed. ? If you are not taking a prescription pain medicine, ask your doctor if you can take an over-the-counter pain medicine, such as acetaminophen (Tylenol), ibuprofen (Advil, Motrin), or naproxen (Aleve). Read and follow all instructions on the label. · If your doctor prescribed antibiotics, take them as directed. Do not stop taking them just because you feel better. You need to take the full course of antibiotics. · To apply heat, put a warm water bottle, a heating pad set on low, or a warm cloth on the painful area. Do not go to sleep with a heating pad on your skin. · To prevent dehydration, drink plenty of fluids, enough so that your urine is light yellow or clear like water. Choose water and other caffeine-free clear liquids until you feel better. If you have kidney, heart, or liver disease and have to limit fluids, talk with your doctor before you increase the amount of fluids you drink. When should you call for help? Call your doctor now or seek immediate medical care if:    · Your flank pain gets worse.     · You have new symptoms, such as fever, nausea, or vomiting.     · You have symptoms of a urinary problem. For example:  ? You have blood or pus in your urine. ? You have chills or body aches. ? It hurts to urinate. ? You have groin or belly pain. Watch closely for changes in your health, and be sure to contact your doctor if you do not get better as expected. Where can you learn more? Go to http://www.miranda.com/  Enter S191 in the search box to learn more about \"Flank Pain: Care Instructions. \"  Current as of: June 26, 2019               Content Version: 12.6  © 4920-8939 [employer]. Care instructions adapted under license by [employer] (which disclaims liability or warranty for this information). If you have questions about a medical condition or this instruction, always ask your healthcare professional. [place] [address] any warranty or liability for your use of this information.

## 2020-11-10 RX ORDER — DULOXETIN HYDROCHLORIDE 60 MG/1
CAPSULE, DELAYED RELEASE ORAL
Qty: 30 CAP | Refills: 1 | Status: SHIPPED | OUTPATIENT
Start: 2020-11-10 | End: 2020-12-09

## 2020-11-14 NOTE — ED PROVIDER NOTES
EMERGENCY DEPARTMENT HISTORY AND PHYSICAL EXAM      Date: 12/16/2017  Patient Name: Ghada Willson    History of Presenting Illness     Chief Complaint   Patient presents with    Neck Pain    Shoulder Pain    Flank Pain    Syncope     states she fainted after getting in the shower thinking warm water would help her pain. History Provided By: Patient    HPI: Ghada Willson, 33 y.o. female with PMHx significant for GERD, dehydration, asthma, and lupus presents ambulatory to the ED with cc of 8/10 left sided neck pain that radiates down her left shoulder/arm that began a week ago but worsened today with constant pain and intermittent severe pain with any attempted movement. Patient reports being seen for similar sx last week and notes that she was referred to a neurologist where she was seen earlier this week. She states that the neurologist believed that her recent Botox treatment could be the cause of the lump on her neck and her neurological problems with the pain. She notes that the neurologist prescribed her prednisone which she has been taking as directed. She states that the prednisone helped to decrease the size of the lump on the back of her neck but did not help to relieve her pain. She also tried taking some hydrocodone without relief. Patient reports having an MRI scheduled for next week if her insurance approves it. Patient states that her pain worsened tonight so she decided to take a warm shower thinking it would help to reduce her pain. She reports that it did not help her pain and states that she fainted shortly after getting out of the shower hitting the top of her head of the shower wall. She denies any CP or palpitations prior to her syncopal event. When she woke up she called EMS and was brought here. Patient also complaining of pain to her head when she fell and bumped it. She denies sore throat, cold sx, decreased appetite and numbness or weakness in her arm/shoulder.     PCP: Patient Education     Corneal Abrasion    You have received a scratch or scrape (abrasion) to your cornea. The cornea is the clear part in the front of the eye. This sensitive area is very painful when injured. You may make tears frequently, and your vision may be blurry until the injury heals. You may be sensitive to light. This part of the body heals quickly. You can expect the pain to go away within 24 to 48 hours. If the abrasion is large or deep, your doctor may apply an eye patch, although this is not always done. An antibiotic ointment or eye drops may also be used to prevent infection. Numbing drops may be used to relieve the pain temporarily so that your eyes can be examined. But these drops canât be prescribed for home use because that would prevent healing and lead to more serious problems. Also, if you canât feel your eye, there is a chance of accidentally injuring it further without knowing it. Home care  Â· A cold pack may be applied over the eye (or eye patch) for 20 minutes at a time, to reduce pain. To make a cold pack, put ice cubes in a plastic bag that seals at the top. Wrap the bag in a clean, thin towel or cloth. Â· You may use acetaminophen or ibuprofen to control pain, unless another pain medicine was prescribed. Note: If you have chronic liver or kidney disease or have ever had a stomach ulcer or GI bleeding, talk with your doctor before using these medicines. Â· Rest your eyes and donât read until symptoms are gone. Â· If you use contact lenses, donât wear them until all symptoms are gone. Â· If your vision is affected by the corneal abrasion or if an eye patch was applied, donât drive a motor vehicle or operate machinery until all symptoms are gone. You may have trouble judging distances using only one eye. Â· If your eyes are sensitive to light, try wearing sunglasses, or stay indoors until symptoms go away. Follow-up care  Follow up with your healthcare provider, or as advised.   Â· If Joshua Mattson MD    There are no other complaints, changes, or physical findings at this time. Current Outpatient Prescriptions   Medication Sig Dispense Refill    DULoxetine (CYMBALTA) 30 mg capsule Take 30 mg by mouth daily.  BUTALBIT/ACETAMIN/CAFF/CODEINE (FIORICET WITH CODEINE PO) Take  by mouth.  oxyCODONE-acetaminophen (PERCOCET) 5-325 mg per tablet Take 2 Tabs by mouth every six (6) hours as needed for Pain. Max Daily Amount: 8 Tabs. 12 Tab 0    promethazine (PHENERGAN) 25 mg tablet Take 1 Tab by mouth every six (6) hours as needed. 12 Tab 0    fluconazole (DIFLUCAN) 150 mg tablet Take 1 Tab by mouth daily for 1 day. FDA advises cautious prescribing of oral fluconazole in pregnancy. 1 Tab 0    ondansetron hcl (ZOFRAN, AS HYDROCHLORIDE,) 4 mg tablet Take 1 Tab by mouth every eight (8) hours as needed for Nausea. 20 Tab 0    lamoTRIgine (LAMICTAL) 25 mg tablet Take 50 mg by mouth daily.  rizatriptan (MAXALT) 10 mg tablet Take 10 mg by mouth once as needed for Migraine. May repeat in 2 hours if needed      belimumab (BENLYSTA) 400 mg solr injection by IntraVENous route.  ALPRAZolam (XANAX) 1 mg tablet Take 1 Tab by mouth nightly. Max Daily Amount: 1 mg. 30 Tab 6    mirabegron ER (MYRBETRIQ) 50 mg ER tablet Take 50 mg by mouth daily. Indications: BLADDER HYPERACTIVITY      omeprazole (PRILOSEC) 20 mg capsule       Ketorolac 30 mg/mL soln 30 mg by Injection route. Indications: Patient performs IM injections herself.  venlafaxine (EFFEXOR) 100 mg tablet Take 75 mg by mouth daily.          Past History     Past Medical History:  Past Medical History:   Diagnosis Date    Abnormal CT of the abdomen 11/8/2012    Asthma     Autoimmune disease (HCC)     lupus    Cervical prolapse     Dehydration     Gastrointestinal disorder     gerd, twisted colon, IBS    GERD (gastroesophageal reflux disease)     Headache(784.0)     Morbid obesity (HCC)     Nausea & vomiting     no patch was put on your eye and the pain continues for more than 48 hours, you should have another exam. Contact your healthcare provider to arrange this. Â· If your eye was patched and you were asked to remove the patch yourself, see your healthcare provider. Contact your healthcare provider if you still have pain after the patch is removed. Â· If you were given a return appointment for patch removal and re-examination, be sure to keep the appointment. Leaving the patch in place longer than advised could be harmful. When to seek medical advice  Call your healthcare provider right away if any of these occur. Â· Increasing eye pain or pain that does not improve after 24 hours  Â· Discharge from the eye  Â· Increasing redness of the eye or swelling of the eyelids  Â· Worsening vision  Â· Symptoms get worse after the abrasion has healed  Date Last Reviewed: 7/1/2017  Â© 5203-2961 [employer] [address] All rights reserved. This information is not intended as a substitute for professional medical care. Always follow your healthcare professional's instructions. Neurological disorder     cluster headaches    other 2006, 2009    ovarian cyst removal    Other ill-defined conditions(799.89)     Worsening headaches        Past Surgical History:  Past Surgical History:   Procedure Laterality Date    COLONOSCOPY  9/21/2010         HX CHOLECYSTECTOMY      HX GYN  1/2009    right salpingo oopherectomy    HX GYN  2006    right ovarian tumor removed    HX HEENT      left wisdom teeth removal    HX HEENT      top right wisdom tooth removed    HX HERNIA REPAIR  4/2012    HX HERNIA REPAIR  2/28/13    Laparoscopic recurrent incisional hernia repair    HX UROLOGICAL      Kidney Stone Removal    VT EGD TRANSORAL BIOPSY SINGLE/MULTIPLE  9/22/2010            Family History:  History reviewed. No pertinent family history. Social History:  Social History   Substance Use Topics    Smoking status: Never Smoker    Smokeless tobacco: Never Used    Alcohol use No       Allergies: Allergies   Allergen Reactions    Latex Itching     burning    Compazine [Prochlorperazine] Anxiety     High heart rate  Pt can take promethazine with no problems    Pcn [Penicillins] Rash and Nausea and Vomiting    Sulfa (Sulfonamide Antibiotics) Unknown (comments)    Topamax [Topiramate] Unknown (comments)    Adhesive Rash     Allergic to Dermabond    Mushroom Combination No.1 Unknown (comments)    Toradol [Ketorolac Tromethamine] Nausea and Vomiting and Other (comments)     PO & IV causes GI bleed    Valproic Acid Other (comments)     Elevated heart rate and vomiting           Review of Systems   Review of Systems   Constitutional: Positive for activity change. Negative for appetite change, chills, fever and unexpected weight change. HENT: Negative for congestion. Eyes: Negative for pain and visual disturbance. Respiratory: Negative for cough and shortness of breath. Cardiovascular: Positive for palpitations. Negative for chest pain.    Gastrointestinal: Negative for abdominal pain, diarrhea, nausea and vomiting. Genitourinary: Negative for dysuria. Musculoskeletal: Positive for arthralgias, back pain, myalgias and neck pain. Skin: Negative for rash. Neurological: Positive for syncope. Negative for weakness, numbness and headaches. All other systems reviewed and are negative. Physical Exam   Physical Exam   Constitutional: She is oriented to person, place, and time. She appears well-developed and well-nourished. Obese female in moderate distress due to pain continually crying throughout evaluation. HENT:   Head: Normocephalic and atraumatic. Mouth/Throat: Oropharynx is clear and moist.   Scalp without evidence of hematoma or crepitus. Eyes: Conjunctivae and EOM are normal. Pupils are equal, round, and reactive to light. Right eye exhibits no discharge. Left eye exhibits no discharge. Neck: Normal range of motion. No tracheal deviation present. No thyromegaly present. Cardiovascular: Normal rate, regular rhythm, normal heart sounds and intact distal pulses. Exam reveals no friction rub. No murmur heard. Pulmonary/Chest: Effort normal and breath sounds normal. No stridor. No respiratory distress. She has no wheezes. She has no rales. She exhibits no tenderness. Abdominal: Soft. Bowel sounds are normal. She exhibits no distension and no mass. There is no tenderness. There is no rebound and no guarding. Musculoskeletal: Normal range of motion. She exhibits tenderness (Left paracervical and trapezius muscle with diffuse tenderness. ). She exhibits no edema. Lymphadenopathy:     She has no cervical adenopathy. Neurological: She is alert and oriented to person, place, and time. No cranial nerve deficit. She exhibits normal muscle tone. Skin: Skin is warm and dry. No rash noted. She is not diaphoretic. Nursing note and vitals reviewed.         Diagnostic Study Results     Labs -     Recent Results (from the past 12 hour(s))   EKG, 12 LEAD, INITIAL Collection Time: 12/16/17 10:25 PM   Result Value Ref Range    Ventricular Rate 59 BPM    Atrial Rate 59 BPM    P-R Interval 142 ms    QRS Duration 82 ms    Q-T Interval 392 ms    QTC Calculation (Bezet) 388 ms    Calculated P Axis 13 degrees    Calculated R Axis 34 degrees    Calculated T Axis 12 degrees    Diagnosis       Sinus bradycardia  Otherwise normal ECG  When compared with ECG of 12-DEC-2017 18:12,  No significant change was found         Radiologic Studies -   CT NECK SOFT TISSUE W CONT   Final Result        CT Results  (Last 48 hours)               12/16/17 2252  CT NECK SOFT TISSUE W CONT Final result    Impression:  IMPRESSION:   Mildly enlarged bilateral cervical lymph nodes are likely reactive. There is no   evidence for abscess or other acute abnormality in the neck. Narrative:  EXAM:  CT NECK SOFT TISSUE W CONT       INDICATION:  Posterior neck swelling and pain. COMPARISON:  CT head 1/29/2016       TECHNIQUE: Helical neck CT is performed with intravenous contrast. Coronal and   sagittal reformatted images are obtained. CT dose reduction was achieved   through use of a standardized protocol tailored for this examination and   automatic exposure control for dose modulation. FINDINGS:    There is no soft tissue abscess or drainable collection. There are mildly   enlarged bilateral cervical lymph nodes with a representative left internal   jugular lymph node measuring 1.0 x 1.1 cm (series 2, image 58). The paranasal sinuses are clear. The nasopharynx, oropharynx, hypopharynx and   larynx are normal.  There is limitation in evaluating the oral cavity, although   grossly the oral tongue, buccal spaces and floor of mouth are normal.          The thyroid, submandibular, and parotid glands are normal.          Limited intracranial images are grossly normal. The globes and orbits are   symmetric and within normal limits. The visualized lungs are clear.  The bones   are normal for age.               Medical Decision Making   I am the first provider for this patient. I reviewed the vital signs, available nursing notes, past medical history, past surgical history, family history and social history. EKG interpretation: (Preliminary) 22:25  Rhythm: sinus bradycardia; and irregular. Rate (approx.): 59; Axis: normal; MD interval: normal; QRS interval: normal ; ST/T wave: normal;  Other findings: normal.    Vital Signs-Reviewed the patient's vital signs. Patient Vitals for the past 12 hrs:   Temp Pulse Resp BP SpO2   12/16/17 2332 - - - 130/86 -   12/16/17 2306 - (!) 54 10 - 93 %   12/16/17 2304 - - - 127/77 -   12/16/17 2235 - 60 14 136/82 95 %   12/16/17 2215 - 66 11 114/73 98 %   12/16/17 2132 98.5 °F (36.9 °C) 78 16 (!) 135/94 97 %   12/16/17 2130 - 72 13 (!) 135/94 98 %       Records Reviewed: Nursing Notes, Old Medical Records and Ambulance Run Sheet    Provider Notes (Medical Decision Making):   Patient requesting imaging for pain. Discussed different types of radiologic modalities and recommend follow up for MRI next week. DDX: abscess, infection, muscle spasms. Low suspicion for dissection (given history prior to pain as well as lack of sx now), CVA, cord compression. ED Course:   Initial assessment performed. The patients presenting problems have been discussed, and they are in agreement with the care plan formulated and outlined with them. I have encouraged them to ask questions as they arise throughout their visit. 23:15 Patient appears calm and much improved. She states her headache and neck pain have improved, but her muscle pain continues to worsen with medications. Repeat medications ordered. We discussed her results, home care recommendations as well as return precautions. 11:22 PM  Raisa Marshall's final results have been reviewed with her. She has been counseled regarding her diagnosis.   She verbally conveys understanding and agreement of the signs, symptoms, diagnosis, treatment and prognosis . PLAN:  1. Current Discharge Medication List      START taking these medications    Details   oxyCODONE-acetaminophen (PERCOCET) 5-325 mg per tablet Take 2 Tabs by mouth every six (6) hours as needed for Pain. Max Daily Amount: 8 Tabs. Qty: 12 Tab, Refills: 0           2. Follow-up Information     Follow up With Details Comments Contact Info    [facility] [place] [facility]  If symptoms worsen [address] [facility] [address]  [phone]        Return to ED if worse     Diagnosis     Clinical Impression:   1. Neck pain    2. Arm pain, anterior, left    3. Syncope and collapse        Attestations:    DISCHARGE NOTE  11:25 PM  The patient has been re-evaluated and is ready for discharge. Reviewed available results with patient. Counseled pt on diagnosis and care plan. Pt has expressed understanding, and all questions have been answered. Pt agrees with plan and agrees to F/U as recommended, or return to the ED if their sxs worsen. Discharge instructions have been provided and explained to the pt, along with reasons to return to the ED. This note is prepared by Akil Krause, acting as Scribe for Leti Hatch MD.    Leti Hatch MD: The scribe's documentation has been prepared under my direction and personally reviewed by me in its entirety. I confirm that the note above accurately reflects all work, treatment, procedures, and medical decision making performed by me. This note will not be viewable in [address]

## 2020-11-16 RX ORDER — ONDANSETRON 4 MG/1
TABLET, FILM COATED ORAL
Qty: 20 TAB | Refills: 0 | Status: SHIPPED | OUTPATIENT
Start: 2020-11-16 | End: 2020-11-30

## 2020-11-30 RX ORDER — ONDANSETRON 4 MG/1
TABLET, FILM COATED ORAL
Qty: 20 TAB | Refills: 0 | Status: SHIPPED | OUTPATIENT
Start: 2020-11-30 | End: 2020-12-23

## 2020-12-09 RX ORDER — DULOXETIN HYDROCHLORIDE 60 MG/1
CAPSULE, DELAYED RELEASE ORAL
Qty: 30 CAP | Refills: 1 | Status: SHIPPED | OUTPATIENT
Start: 2020-12-09 | End: 2021-01-07

## 2020-12-13 RX ORDER — AZITHROMYCIN 250 MG/1
250 TABLET, FILM COATED ORAL SEE ADMIN INSTRUCTIONS
Qty: 6 TAB | Refills: 0 | Status: SHIPPED | OUTPATIENT
Start: 2020-12-13 | End: 2021-05-27

## 2020-12-15 ENCOUNTER — APPOINTMENT (OUTPATIENT)
Dept: CT IMAGING | Age: 35
End: 2020-12-15
Attending: EMERGENCY MEDICINE
Payer: COMMERCIAL

## 2020-12-15 ENCOUNTER — TELEPHONE (OUTPATIENT)
Dept: INTERNAL MEDICINE CLINIC | Age: 35
End: 2020-12-15

## 2020-12-15 ENCOUNTER — HOSPITAL ENCOUNTER (EMERGENCY)
Age: 35
Discharge: HOME | End: 2020-12-15
Attending: EMERGENCY MEDICINE
Payer: COMMERCIAL

## 2020-12-15 VITALS
BODY MASS INDEX: 43.91 KG/M2 | WEIGHT: 247.8 LBS | OXYGEN SATURATION: 97 % | HEIGHT: 63 IN | SYSTOLIC BLOOD PRESSURE: 125 MMHG | DIASTOLIC BLOOD PRESSURE: 65 MMHG | HEART RATE: 61 BPM | RESPIRATION RATE: 16 BRPM | TEMPERATURE: 98.6 F

## 2020-12-15 DIAGNOSIS — R10.9 ACUTE ABDOMINAL PAIN: Primary | ICD-10-CM

## 2020-12-15 DIAGNOSIS — G43.809 OTHER MIGRAINE WITHOUT STATUS MIGRAINOSUS, NOT INTRACTABLE: ICD-10-CM

## 2020-12-15 LAB
ALBUMIN SERPL-MCNC: 3.8 G/DL (ref 3.5–5)
ALBUMIN/GLOB SERPL: 1.2 {RATIO} (ref 1.1–2.2)
ALP SERPL-CCNC: 125 U/L (ref 45–117)
ALT SERPL-CCNC: 19 U/L (ref 12–78)
ANION GAP SERPL CALC-SCNC: 3 MMOL/L (ref 5–15)
APPEARANCE UR: CLEAR
AST SERPL-CCNC: 10 U/L (ref 15–37)
BACTERIA URNS QL MICRO: NEGATIVE /HPF
BASOPHILS # BLD: 0.1 K/UL (ref 0–0.1)
BASOPHILS NFR BLD: 1 % (ref 0–1)
BILIRUB SERPL-MCNC: 0.2 MG/DL (ref 0.2–1)
BILIRUB UR QL: NEGATIVE
BUN SERPL-MCNC: 12 MG/DL (ref 6–20)
BUN/CREAT SERPL: 11 (ref 12–20)
CALCIUM SERPL-MCNC: 8.9 MG/DL (ref 8.5–10.1)
CHLORIDE SERPL-SCNC: 111 MMOL/L (ref 97–108)
CO2 SERPL-SCNC: 27 MMOL/L (ref 21–32)
COLOR UR: ABNORMAL
CREAT SERPL-MCNC: 1.1 MG/DL (ref 0.55–1.02)
DIFFERENTIAL METHOD BLD: ABNORMAL
EOSINOPHIL # BLD: 0.7 K/UL (ref 0–0.4)
EOSINOPHIL NFR BLD: 7 % (ref 0–7)
EPITH CASTS URNS QL MICRO: ABNORMAL /LPF
ERYTHROCYTE [DISTWIDTH] IN BLOOD BY AUTOMATED COUNT: 13.2 % (ref 11.5–14.5)
GLOBULIN SER CALC-MCNC: 3.3 G/DL (ref 2–4)
GLUCOSE SERPL-MCNC: 109 MG/DL (ref 65–100)
GLUCOSE UR STRIP.AUTO-MCNC: NEGATIVE MG/DL
HCG SERPL QL: NEGATIVE
HCT VFR BLD AUTO: 34.9 % (ref 35–47)
HGB BLD-MCNC: 11.2 G/DL (ref 11.5–16)
HGB UR QL STRIP: ABNORMAL
HYALINE CASTS URNS QL MICRO: ABNORMAL /LPF (ref 0–5)
IMM GRANULOCYTES # BLD AUTO: 0 K/UL (ref 0–0.04)
IMM GRANULOCYTES NFR BLD AUTO: 0 % (ref 0–0.5)
KETONES UR QL STRIP.AUTO: NEGATIVE MG/DL
LEUKOCYTE ESTERASE UR QL STRIP.AUTO: NEGATIVE
LIPASE SERPL-CCNC: 92 U/L (ref 73–393)
LYMPHOCYTES # BLD: 4 K/UL (ref 0.8–3.5)
LYMPHOCYTES NFR BLD: 39 % (ref 12–49)
MCH RBC QN AUTO: 30 PG (ref 26–34)
MCHC RBC AUTO-ENTMCNC: 32.1 G/DL (ref 30–36.5)
MCV RBC AUTO: 93.6 FL (ref 80–99)
MONOCYTES # BLD: 0.8 K/UL (ref 0–1)
MONOCYTES NFR BLD: 8 % (ref 5–13)
NEUTS SEG # BLD: 4.6 K/UL (ref 1.8–8)
NEUTS SEG NFR BLD: 45 % (ref 32–75)
NITRITE UR QL STRIP.AUTO: NEGATIVE
NRBC # BLD: 0 K/UL (ref 0–0.01)
NRBC BLD-RTO: 0 PER 100 WBC
PH UR STRIP: 5.5 [PH] (ref 5–8)
PLATELET # BLD AUTO: 360 K/UL (ref 150–400)
PMV BLD AUTO: 10.2 FL (ref 8.9–12.9)
POTASSIUM SERPL-SCNC: 3.9 MMOL/L (ref 3.5–5.1)
PROT SERPL-MCNC: 7.1 G/DL (ref 6.4–8.2)
PROT UR STRIP-MCNC: NEGATIVE MG/DL
RBC # BLD AUTO: 3.73 M/UL (ref 3.8–5.2)
RBC #/AREA URNS HPF: >100 /HPF (ref 0–5)
SODIUM SERPL-SCNC: 141 MMOL/L (ref 136–145)
SP GR UR REFRACTOMETRY: 1.01 (ref 1–1.03)
UA: UC IF INDICATED,UAUC: ABNORMAL
UROBILINOGEN UR QL STRIP.AUTO: 0.2 EU/DL (ref 0.2–1)
WBC # BLD AUTO: 10.3 K/UL (ref 3.6–11)
WBC URNS QL MICRO: ABNORMAL /HPF (ref 0–4)

## 2020-12-15 PROCEDURE — 99285 EMERGENCY DEPT VISIT HI MDM: CPT

## 2020-12-15 PROCEDURE — 74011000258 HC RX REV CODE- 258: Performed by: EMERGENCY MEDICINE

## 2020-12-15 PROCEDURE — 96374 THER/PROPH/DIAG INJ IV PUSH: CPT

## 2020-12-15 PROCEDURE — 80053 COMPREHEN METABOLIC PANEL: CPT

## 2020-12-15 PROCEDURE — 96375 TX/PRO/DX INJ NEW DRUG ADDON: CPT

## 2020-12-15 PROCEDURE — 81001 URINALYSIS AUTO W/SCOPE: CPT

## 2020-12-15 PROCEDURE — 84703 CHORIONIC GONADOTROPIN ASSAY: CPT

## 2020-12-15 PROCEDURE — 83690 ASSAY OF LIPASE: CPT

## 2020-12-15 PROCEDURE — 70450 CT HEAD/BRAIN W/O DYE: CPT

## 2020-12-15 PROCEDURE — 74011250636 HC RX REV CODE- 250/636: Performed by: EMERGENCY MEDICINE

## 2020-12-15 PROCEDURE — 85025 COMPLETE CBC W/AUTO DIFF WBC: CPT

## 2020-12-15 RX ORDER — FENTANYL CITRATE 50 UG/ML
50 INJECTION, SOLUTION INTRAMUSCULAR; INTRAVENOUS
Status: COMPLETED | OUTPATIENT
Start: 2020-12-15 | End: 2020-12-15

## 2020-12-15 RX ORDER — FENTANYL CITRATE 50 UG/ML
75 INJECTION, SOLUTION INTRAMUSCULAR; INTRAVENOUS
Status: COMPLETED | OUTPATIENT
Start: 2020-12-15 | End: 2020-12-15

## 2020-12-15 RX ORDER — DIPHENHYDRAMINE HYDROCHLORIDE 50 MG/ML
25 INJECTION, SOLUTION INTRAMUSCULAR; INTRAVENOUS
Status: COMPLETED | OUTPATIENT
Start: 2020-12-15 | End: 2020-12-15

## 2020-12-15 RX ORDER — PROCHLORPERAZINE EDISYLATE 5 MG/ML
10 INJECTION INTRAMUSCULAR; INTRAVENOUS
Status: DISCONTINUED | OUTPATIENT
Start: 2020-12-15 | End: 2020-12-15 | Stop reason: SDUPTHER

## 2020-12-15 RX ADMIN — SODIUM CHLORIDE 1000 ML: 900 INJECTION, SOLUTION INTRAVENOUS at 20:56

## 2020-12-15 RX ADMIN — PROMETHAZINE HYDROCHLORIDE 25 MG: 25 INJECTION INTRAMUSCULAR; INTRAVENOUS at 21:43

## 2020-12-15 RX ADMIN — FENTANYL CITRATE 50 MCG: 50 INJECTION, SOLUTION INTRAMUSCULAR; INTRAVENOUS at 20:56

## 2020-12-15 RX ADMIN — FENTANYL CITRATE 75 MCG: 50 INJECTION, SOLUTION INTRAMUSCULAR; INTRAVENOUS at 22:52

## 2020-12-15 RX ADMIN — DIPHENHYDRAMINE HYDROCHLORIDE 25 MG: 50 INJECTION, SOLUTION INTRAMUSCULAR; INTRAVENOUS at 20:55

## 2020-12-15 NOTE — ED NOTES
17:24 Assumed care of pt. Plan of care discussed. Call bell in reach. Will continue to monitor. Pt awaiting evaluation by ED MD.       18:27  Pt call bell answered, requested to walk to bathroom in room . Pt unhooked, ambulatory to bathroom with steady gait.      18:59  Pt hooked back to the monitor, awaiting evaluation by ED MD.

## 2020-12-15 NOTE — TELEPHONE ENCOUNTER
----- Message from Nito Maravilla sent at 12/15/2020  8:21 AM EST -----  Regarding: Dr Luis Machado Message/Vendor Calls    Caller's first and last name: n/a      Reason for call: Fall      Callback required yes/no and why: yes       Best contact number(s):[phone]      Details to clarify the request: Pt has connective tissue disease and fell down the stairs. She bruised and injured hip region but does not think anything is broken. No appt available and she is requesting a call from the nursing staff. Pt can barely walk and is in severe pain.  Pt would also do a nurse visit with Geovany Benitez  Copy/paste [facility]

## 2020-12-16 NOTE — DISCHARGE INSTRUCTIONS
Patient Education        Abdominal Pain: Care Instructions  Your Care Instructions     Abdominal pain has many possible causes. Some aren't serious and get better on their own in a few days. Others need more testing and treatment. If your pain continues or gets worse, you need to be rechecked and may need more tests to find out what is wrong. You may need surgery to correct the problem. Don't ignore new symptoms, such as fever, nausea and vomiting, urination problems, pain that gets worse, and dizziness. These may be signs of a more serious problem. Your doctor may have recommended a follow-up visit in the next 8 to 12 hours. If you are not getting better, you may need more tests or treatment. The doctor has checked you carefully, but problems can develop later. If you notice any problems or new symptoms, get medical treatment right away. Follow-up care is a key part of your treatment and safety. Be sure to make and go to all appointments, and call your doctor if you are having problems. It's also a good idea to know your test results and keep a list of the medicines you take. How can you care for yourself at home? · Rest until you feel better. · To prevent dehydration, drink plenty of fluids, enough so that your urine is light yellow or clear like water. Choose water and other caffeine-free clear liquids until you feel better. If you have kidney, heart, or liver disease and have to limit fluids, talk with your doctor before you increase the amount of fluids you drink. · If your stomach is upset, eat mild foods, such as rice, dry toast or crackers, bananas, and applesauce. Try eating several small meals instead of two or three large ones. · Wait until 48 hours after all symptoms have gone away before you have spicy foods, alcohol, and drinks that contain caffeine. · Do not eat foods that are high in fat. · Avoid anti-inflammatory medicines such as aspirin, ibuprofen (Advil, Motrin), and naproxen (Aleve). These can cause stomach upset. Talk to your doctor if you take daily aspirin for another health problem. When should you call for help? Call 911 anytime you think you may need emergency care. For example, call if:    · You passed out (lost consciousness).     · You pass maroon or very bloody stools.     · You vomit blood or what looks like coffee grounds.     · You have new, severe belly pain. Call your doctor now or seek immediate medical care if:    · Your pain gets worse, especially if it becomes focused in one area of your belly.     · You have a new or higher fever.     · Your stools are black and look like tar, or they have streaks of blood.     · You have unexpected vaginal bleeding.     · You have symptoms of a urinary tract infection. These may include:  ? Pain when you urinate. ? Urinating more often than usual.  ? Blood in your urine.     · You are dizzy or lightheaded, or you feel like you may faint. Watch closely for changes in your health, and be sure to contact your doctor if:    · You are not getting better after 1 day (24 hours). Where can you learn more? Go to http://www.gray.com/  Enter V041 in the search box to learn more about \"Abdominal Pain: Care Instructions. \"  Current as of: June 26, 2019               Content Version: 12.6  © 6970-6299 [employer]. Care instructions adapted under license by [employer] (which disclaims liability or warranty for this information). If you have questions about a medical condition or this instruction, always ask your healthcare professional. [place] [address] any warranty or liability for your use of this information. Patient Education        Migraine Headache: Care Instructions  Your Care Instructions     Migraines are painful, throbbing headaches that often start on one side of the head.  They may cause nausea and vomiting and make you sensitive to light, sound, or smell.  Without treatment, migraines can last from 4 hours to a few days. Medicines can help prevent migraines or stop them after they have started. Your doctor can help you find which ones work best for you. Follow-up care is a key part of your treatment and safety. Be sure to make and go to all appointments, and call your doctor if you are having problems. It's also a good idea to know your test results and keep a list of the medicines you take. How can you care for yourself at home? · Do not drive if you have taken a prescription pain medicine. · Rest in a quiet, dark room until your headache is gone. Close your eyes, and try to relax or go to sleep. Don't watch TV or read. · Put a cold, moist cloth or cold pack on the painful area for 10 to 20 minutes at a time. Put a thin cloth between the cold pack and your skin. · Use a warm, moist towel or a heating pad set on low to relax tight shoulder and neck muscles. · Have someone gently massage your neck and shoulders. · Take your medicines exactly as prescribed. Call your doctor if you think you are having a problem with your medicine. You will get more details on the specific medicines your doctor prescribes. · Don't take medicine for headache pain too often. Talk to your doctor if you are taking medicine more than 2 days a week to stop a headache. Taking too much pain medicine can lead to more headaches. These are called medicine-overuse headaches. To prevent migraines  · Keep a headache diary so you can figure out what triggers your headaches. Avoiding triggers may help you prevent headaches. Record when each headache began, how long it lasted, and what the pain was like. Write down any other symptoms you had with the headache, such as nausea, flashing lights or dark spots, or sensitivity to bright light or loud noise. Note if the headache occurred near your period. List anything that might have triggered the headache.  Triggers may include certain foods (chocolate, cheese, wine) or odors, smoke, bright light, stress, or lack of sleep. · If your doctor has prescribed medicine for your migraines, take it as directed. You may have medicine that you take only when you get a migraine and medicine that you take all the time to help prevent migraines. ? If your doctor has prescribed medicine for when you get a headache, take it at the first sign of a migraine, unless your doctor has given you other instructions. ? If your doctor has prescribed medicine to prevent migraines, take it exactly as prescribed. Call your doctor if you think you are having a problem with your medicine. · Find healthy ways to deal with stress. Migraines are most common during or right after stressful times. Try finding ways to reduce stress like practicing mindfulness or deep breathing exercises. · Get plenty of sleep and exercise. But be careful to not push yourself too hard during exercise. It may trigger a headache. · Eat meals on a regular schedule. Avoid foods and drinks that often trigger migraines. These include chocolate, alcohol (especially red wine and port), aspartame, monosodium glutamate (MSG), and some additives found in foods (such as hot dogs, perez, cold cuts, aged cheeses, and pickled foods). · Limit caffeine. Don't drink too much coffee, tea, or soda. But don't quit caffeine suddenly. That can also give you migraines. · Do not smoke or allow others to smoke around you. If you need help quitting, talk to your doctor about stop-smoking programs and medicines. These can increase your chances of quitting for good. · If you are taking birth control pills or hormone therapy, talk to your doctor about whether they are triggering your migraines. When should you call for help? Call 911 anytime you think you may need emergency care. For example, call if:    · You have signs of a stroke.  These may include:  ? Sudden numbness, paralysis, or weakness in your face, arm, or leg, especially on only one side of your body. ? Sudden vision changes. ? Sudden trouble speaking. ? Sudden confusion or trouble understanding simple statements. ? Sudden problems with walking or balance. ? A sudden, severe headache that is different from past headaches. Call your doctor now or seek immediate medical care if:    · You have new or worse nausea and vomiting.     · You have a new or higher fever.     · Your headache gets much worse. Watch closely for changes in your health, and be sure to contact your doctor if:    · You are not getting better after 2 days (48 hours). Where can you learn more? Go to http://www.gray.com/  Enter E660 in the search box to learn more about \"Migraine Headache: Care Instructions. \"  Current as of: November 20, 2019               Content Version: 12.6  © 2218-2821 [employer]. Care instructions adapted under license by [employer] (which disclaims liability or warranty for this information). If you have questions about a medical condition or this instruction, always ask your healthcare professional. [address] any warranty or liability for your use of this information.

## 2020-12-16 NOTE — ED PROVIDER NOTES
EMERGENCY DEPARTMENT HISTORY AND PHYSICAL EXAM      Date: 12/15/2020  Patient Name: Yenifer Hennessy    History of Presenting Illness     Chief Complaint   Patient presents with    Syncope     Per EMS, patient was found face down at home today. Per EMS patient fell down 4 steps on sunday chasing after her cat and was not evaluated. Per EMS patient is complaining of a headace and neck pain which she states is normal for her. Patient states she has had cdiff since last week. History Provided By: Patient    HPI: Yenifer Hennessy, 37 y.o. female with a past medical history significant for Lupus, C. difficile, Mariah-Danlos, morbid obesity, occipital neuralgia, multimedical problems as stated below, chronic pain syndrome presents to the ED with cc of moderate to severe diffuse body pain, syncopal episode, headache, watery stools, and diffuse myalgias and arthralgias. Patient reports symptoms of been ongoing for 3 to 5 days. Her diarrhea has been going on for 2 weeks and she has been diagnosed with C. difficile and is on the oral vancomycin. She reports that headache was gradual onset and occipital in nature and similar to prior headaches. She reports photophobia and nausea with this as well. She also reports diffuse myalgias arthralgias she feels she is dehydrated. She has mild diffuse abdominal cramping that is affected by bowel movements. She denies any fevers, chills, cough, or any other associated symptoms. No other exacerbating ameliorating factors. She reports that she be in chronic pain management but has run out of her opiates. She reports that was several weeks ago and does not believe she is in withdrawal.    There are no other complaints, changes, or physical findings at this time. PCP: Isabella German MD    No current facility-administered medications on file prior to encounter.       Current Outpatient Medications on File Prior to Encounter   Medication Sig Dispense Refill    azithromycin (ZITHROMAX) 250 mg tablet TAKE 1 TAB BY MOUTH SEE ADMIN INSTRUCTIONS FOR 5 DAYS. 6 Tab 0    DULoxetine (CYMBALTA) 60 mg capsule TAKE 2 CAPSULESBY MOUTH EVERY DAY 30 Cap 1    ondansetron hcl (ZOFRAN) 4 mg tablet TAKE 1 TABLET BY MOUTH EVERY 8 HOURS AS NEEDED FOR NAUSEA 20 Tab 0    methylPREDNISolone (MEDROL DOSEPACK) 4 mg tablet As directed 1 Dose Pack 0    mirtazapine (REMERON) 30 mg tablet TAKE 1 TABLET BY MOUTH AT BEDTIME 30 Tab 1    ALPRAZolam (XANAX) 1 mg tablet TAKE 1 TABLET BY MOUTH TWICE DAILY AS NEEDED FOR ANXIETY MAXIMUM DAILY DOSE 2 60 Tab 3    ondansetron (Zofran ODT) 4 mg disintegrating tablet Take 1 Tab by mouth every eight (8) hours as needed for Nausea. 10 Tab 0    promethazine (PHENERGAN) 25 mg suppository Insert 1 Suppository into rectum every six (6) hours as needed for Nausea. 12 Suppository 0    amoxicillin-clavulanate (Augmentin) 875-125 mg per tablet Take 1 Tab by mouth two (2) times a day. 20 Tab 0    ketorolac (TORADOL) 10 mg tablet Take 1 Tab by mouth every six (6) hours as needed for Pain. 20 Tab 0    metoprolol succinate (TOPROL-XL) 25 mg XL tablet Take 1 Tab by mouth nightly. 30 Tab 2    olmesartan (BENICAR) 5 mg tablet TAKE 1 TABLET BY MOUTH DAILY 90 Tab 3    naproxen (NAPROSYN) 500 mg tablet TK 1 TA PO BID      RisaQuad-2 16 billion cell cap cap TAKE ONE CAPSULE BY MOUTH ONCE DAILY 30 Cap 1    albuterol (PROVENTIL HFA, VENTOLIN HFA, PROAIR HFA) 90 mcg/actuation inhaler Take 1 Puff by inhalation every six (6) hours as needed for Wheezing. 1 Inhaler 0    furosemide (LASIX) 20 mg tablet PRN  0    tiZANidine (ZANAFLEX) 4 mg tablet   0    estradiol (ESTRACE) 1 mg tablet Take 1 mg by mouth daily.   0    indomethacin (INDOCIN) 25 mg capsule PRN      pantoprazole (PROTONIX) 40 mg tablet take 1 tablet by mouth at bedtime  0    codeine-butalbital-acetaminophen-caffeine (FIORICET WITH CODEINE) -39-30 mg capsule take 2 capsules by mouth every 6 hours if needed for MIGRAINE HEADACHE 90 Cap 0    rizatriptan (MAXALT) 10 mg tablet Take 10 mg by mouth once as needed for Migraine. May repeat in 2 hours if needed      belimumab (Benlysta) 400 mg solr injection by IntraVENous route. Past History     Past Medical History:  Past Medical History:   Diagnosis Date    Abnormal CT of the abdomen 11/8/2012    Asthma     Autoimmune disease (HCC)     lupus    C. difficile diarrhea     Cervical prolapse     Dehydration     Mariah-Danlos syndrome     Gastrointestinal disorder     gerd, twisted colon, IBS    GERD (gastroesophageal reflux disease)     Headache(784.0)     Lupus (HCC)     Morbid obesity (HCC)     Nausea & vomiting     Neurological disorder     cluster headaches    Occipital neuralgia     other 2006, 2009    ovarian cyst removal    Other ill-defined conditions(799.89)     Worsening headaches        Past Surgical History:  Past Surgical History:   Procedure Laterality Date    COLONOSCOPY  9/21/2010         HX CHOLECYSTECTOMY      HX CYST INCISION AND DRAINAGE Right 02/27/2020    HX GYN  1/2009    right salpingo oopherectomy    HX GYN  2006    right ovarian tumor removed    HX HEENT      left wisdom teeth removal    HX HEENT      top right wisdom tooth removed    HX HERNIA REPAIR  4/2012    HX HERNIA REPAIR  2/28/13    Laparoscopic recurrent incisional hernia repair    HX HYSTERECTOMY      2016    HX UROLOGICAL      Kidney Stone Removal    NE EGD TRANSORAL BIOPSY SINGLE/MULTIPLE  9/22/2010            Family History:  Family History   Problem Relation Age of Onset    Colon Cancer Maternal Grandfather        Social History:  Social History     Tobacco Use    Smoking status: Never Smoker    Smokeless tobacco: Never Used   Substance Use Topics    Alcohol use: No    Drug use: No       Allergies:   Allergies   Allergen Reactions    Latex Itching     burning    Compazine [Prochlorperazine] Anxiety     High heart rate  Pt can take promethazine with no problems    Sulfa (Sulfonamide Antibiotics) Unknown (comments)    Topamax [Topiramate] Unknown (comments)    Adhesive Rash     Allergic to Dermabond    Clindamycin Diarrhea     Pt states caused her C-Diff    Mushroom Other (comments)    Mushroom Combination No.1 Unknown (comments)    Toradol [Ketorolac Tromethamine] Nausea and Vomiting and Other (comments)     PO & IV causes GI bleed  Tolerated during Feb 2020 stay    Valproic Acid Other (comments)     Elevated heart rate and vomiting           Review of Systems   Review of Systems   Constitutional: Positive for fatigue. Negative for chills, diaphoresis and fever. HENT: Negative for ear pain and sore throat. Eyes: Negative for pain and redness. Respiratory: Negative for cough and shortness of breath. Cardiovascular: Negative for chest pain and leg swelling. Gastrointestinal: Positive for abdominal pain and diarrhea. Negative for nausea and vomiting. Endocrine: Negative for cold intolerance and heat intolerance. Genitourinary: Negative for flank pain and hematuria. Musculoskeletal: Positive for arthralgias and myalgias. Negative for back pain and neck stiffness. Skin: Negative for rash and wound. Neurological: Positive for syncope and headaches. Negative for dizziness. All other systems reviewed and are negative. Physical Exam   Physical Exam  Vitals signs and nursing note reviewed. Constitutional:       General: She is in acute distress. Appearance: She is well-developed. She is obese. She is not ill-appearing or toxic-appearing. HENT:      Head: Normocephalic and atraumatic. Mouth/Throat:      Pharynx: No oropharyngeal exudate. Eyes:      Conjunctiva/sclera: Conjunctivae normal.      Pupils: Pupils are equal, round, and reactive to light. Neck:      Musculoskeletal: Normal range of motion. Cardiovascular:      Rate and Rhythm: Normal rate and regular rhythm. Heart sounds: No murmur.    Pulmonary: Effort: Pulmonary effort is normal. No respiratory distress. Breath sounds: Normal breath sounds. No wheezing. Abdominal:      General: Bowel sounds are normal. There is no distension. Palpations: Abdomen is soft. Tenderness: There is generalized abdominal tenderness. There is no right CVA tenderness, left CVA tenderness, guarding or rebound. Musculoskeletal: Normal range of motion. General: No deformity. Skin:     General: Skin is warm and dry. Findings: No rash. Neurological:      General: No focal deficit present. Mental Status: She is alert and oriented to person, place, and time. GCS: GCS eye subscore is 4. GCS verbal subscore is 5. GCS motor subscore is 6. Cranial Nerves: Cranial nerves are intact. Sensory: Sensation is intact. Motor: Motor function is intact. Coordination: Coordination is intact. Coordination normal.   Psychiatric:         Mood and Affect: Mood is anxious. Affect is tearful. Behavior: Behavior normal.         Diagnostic Study Results     Labs -     Recent Results (from the past 24 hour(s))   CBC WITH AUTOMATED DIFF    Collection Time: 12/15/20  8:51 PM   Result Value Ref Range    WBC 10.3 3.6 - 11.0 K/uL    RBC 3.73 (L) 3.80 - 5.20 M/uL    HGB 11.2 (L) 11.5 - 16.0 g/dL    HCT 34.9 (L) 35.0 - 47.0 %    MCV 93.6 80.0 - 99.0 FL    MCH 30.0 26.0 - 34.0 PG    MCHC 32.1 30.0 - 36.5 g/dL    RDW 13.2 11.5 - 14.5 %    PLATELET 138 385 - 783 K/uL    MPV 10.2 8.9 - 12.9 FL    NRBC 0.0 0  WBC    ABSOLUTE NRBC 0.00 0.00 - 0.01 K/uL    NEUTROPHILS 45 32 - 75 %    LYMPHOCYTES 39 12 - 49 %    MONOCYTES 8 5 - 13 %    EOSINOPHILS 7 0 - 7 %    BASOPHILS 1 0 - 1 %    IMMATURE GRANULOCYTES 0 0.0 - 0.5 %    ABS. NEUTROPHILS 4.6 1.8 - 8.0 K/UL    ABS. LYMPHOCYTES 4.0 (H) 0.8 - 3.5 K/UL    ABS. MONOCYTES 0.8 0.0 - 1.0 K/UL    ABS. EOSINOPHILS 0.7 (H) 0.0 - 0.4 K/UL    ABS. BASOPHILS 0.1 0.0 - 0.1 K/UL    ABS. IMM.  GRANS. 0.0 0.00 - 0.04 K/UL    DF AUTOMATED     LIPASE    Collection Time: 12/15/20  8:51 PM   Result Value Ref Range    Lipase 92 73 - 342 U/L   METABOLIC PANEL, COMPREHENSIVE    Collection Time: 12/15/20  8:51 PM   Result Value Ref Range    Sodium 141 136 - 145 mmol/L    Potassium 3.9 3.5 - 5.1 mmol/L    Chloride 111 (H) 97 - 108 mmol/L    CO2 27 21 - 32 mmol/L    Anion gap 3 (L) 5 - 15 mmol/L    Glucose 109 (H) 65 - 100 mg/dL    BUN 12 6 - 20 MG/DL    Creatinine 1.10 (H) 0.55 - 1.02 MG/DL    BUN/Creatinine ratio 11 (L) 12 - 20      GFR est AA >60 >60 ml/min/1.73m2    GFR est non-AA 56 (L) >60 ml/min/1.73m2    Calcium 8.9 8.5 - 10.1 MG/DL    Bilirubin, total 0.2 0.2 - 1.0 MG/DL    ALT (SGPT) 19 12 - 78 U/L    AST (SGOT) 10 (L) 15 - 37 U/L    Alk. phosphatase 125 (H) 45 - 117 U/L    Protein, total 7.1 6.4 - 8.2 g/dL    Albumin 3.8 3.5 - 5.0 g/dL    Globulin 3.3 2.0 - 4.0 g/dL    A-G Ratio 1.2 1.1 - 2.2     HCG QL SERUM    Collection Time: 12/15/20  8:51 PM   Result Value Ref Range    HCG, Ql. Negative NEG     URINALYSIS W/ REFLEX CULTURE    Collection Time: 12/15/20  9:40 PM    Specimen: Urine   Result Value Ref Range    Color YELLOW/STRAW      Appearance CLEAR CLEAR      Specific gravity 1.010 1.003 - 1.030      pH (UA) 5.5 5.0 - 8.0      Protein Negative NEG mg/dL    Glucose Negative NEG mg/dL    Ketone Negative NEG mg/dL    Bilirubin Negative NEG      Blood LARGE (A) NEG      Urobilinogen 0.2 0.2 - 1.0 EU/dL    Nitrites Negative NEG      Leukocyte Esterase Negative NEG      WBC 0-4 0 - 4 /hpf    RBC >100 (H) 0 - 5 /hpf    Epithelial cells FEW FEW /lpf    Bacteria Negative NEG /hpf    UA:UC IF INDICATED CULTURE NOT INDICATED BY UA RESULT CNI      Hyaline cast 0-2 0 - 5 /lpf       Radiologic Studies -   CT HEAD WO CONT   Final Result   IMPRESSION:    No acute intracranial abnormality.               CT Results  (Last 48 hours)               12/15/20 2035  CT HEAD WO CONT Final result    Impression:  IMPRESSION:    No acute intracranial abnormality. Narrative:  EXAM: CT HEAD WO CONT       INDICATION: head injury, patient has connective tissue disease and fell down the   stairs. COMPARISON: None. CONTRAST: None. TECHNIQUE: Unenhanced CT of the head was performed using 5 mm images. Brain and   bone windows were generated. Coronal and sagittal reformats. CT dose reduction   was achieved through use of a standardized protocol tailored for this   examination and automatic exposure control for dose modulation. FINDINGS:   The ventricles and sulci are normal in size, shape and configuration. . There is   no significant white matter disease. There is no intracranial hemorrhage,   extra-axial collection, or mass effect. The basilar cisterns are open. No CT   evidence of acute infarct. The bone windows demonstrate no abnormalities. The visualized portions of the   paranasal sinuses and mastoid air cells are clear. CXR Results  (Last 48 hours)    None            Medical Decision Making   I am the first provider for this patient. I reviewed the vital signs, available nursing notes, past medical history, past surgical history, family history and social history. Vital Signs-Reviewed the patient's vital signs. No data found. Records Reviewed: Nursing records and medical records reviewed    MDM:  Pt resents with acute headache; afebrile with stable vitals; exam is without focal deficits. DDx: migraine, tension headache, cluster HA, stress, hypertensive urgency, dehydration. HA was gradual onset, pt neuro intact, no nausea/vomiting/focal weakness/sensory change to suggest CVA or ICH. Also pt is not immunocompromised, no fever, no focal weakness to suggest brain abscess. Therefore, no CT head. No neck stiffness, fever, AMS, photophobia to suggest meningitis. Neg kernig and Brudzinski.  Therefore no LP    No jaw claudication, visual changes or temporal pain to suggest temporal arteritis, therefore no ESR/CRP    No eye pain, PERRL, no red eye to suggest glaucoma    No risk factors for CO    Will treat pain and reassess. Pt presents with acute abdominal pain; vital signs stable with currently a non-peritoneal exam; DDx includes: Gastroenteritis, hepatitis, pancreatitis, obstruction, appendicitis, viral illness, IBD, diverticulitis, mesenteric ischemia, AAA or descending dissection, ACS, kidney stone. Will get labs, treat symptomatically and obtain serial abdominal exams to determine if a CT is warranted. Will reassess and monitor closely. Patient presents for diarrhea:  DDx: Gastroenteritis, diverticulitis, colitis, C dificile, bacterial or viral infection, gastrointestinal bleed. Provider Notes (Medical Decision Making):   Patient is a 57-year-old female presenting with complaints of multiple symptoms to include headache, diarrhea, cramping, arthralgias and myalgias. Patient is being treated for C. difficile and this may be the underlying cause of her symptoms. Additionally though she does suffer from chronic pain and chronic occipital neuralgia. She feels much better after migraine cocktail and multiple rounds of pain medications. Given her ongoing outpatient medications and opiates I would not prescribe her any controlled substances. I reviewed her pain management plan with her. She has no signs of significant electrolyte disturbances or renal failure on her lab work. I reviewed her prior medical records and noted multiple presentations with similar headaches and similar abdominal complaints in the past.      ED Course:   Initial assessment performed. The patients presenting problems have been discussed, and they are in agreement with the care plan formulated and outlined with them. I have encouraged them to ask questions as they arise throughout their visit. ED Course as of Dec 16 1207   Tue Dec 15, 2020   0807 Rounded on patient.   Still reporting diffuse body pains. Lab work unremarkable. Awaiting UA. CT scan of the head negative. Will dose 1 more round of medications for pain. [CC]      ED Course User Index  [CC] Nolan Olson MD       Medications Administered     diphenhydrAMINE (BENADRYL) injection 25 mg     Admin Date  12/15/2020 Action  Given Dose  25 mg Route  IntraVENous Administered By  Elpidio Arnold          fentaNYL citrate (PF) injection 50 mcg     Admin Date  12/15/2020 Action  Given Dose  50 mcg Route  IntraVENous Administered By  Angelica Arnoldesh          fentaNYL citrate (PF) injection 75 mcg     Admin Date  12/15/2020 Action  Given Dose  75 mcg Route  IntraVENous Administered By  Sheran Lundborg          promethazine (PHENERGAN) 25 mg in 0.9% sodium chloride 50 mL IVPB     Admin Date  12/15/2020 Action  New Bag Dose  25 mg Rate  200 mL/hr Route  IntraVENous Administered By  Sheran Lundborg          sodium chloride 0.9 % bolus infusion 1,000 mL     Admin Date  12/15/2020 Action  New Bag Dose  1,000 mL Route  IntraVENous Administered By  Sheran Lundborg                    Critical Care:  None      Disposition:  12:24 PM  Zoie Castro  results have been reviewed with her. She has been counseled regarding her diagnosis. She verbally conveys understanding and agreement of the signs, symptoms, diagnosis, treatment and prognosis and additionally agrees to follow up as recommended with Dr. Tova Longoria MD in 24 - 48 hours. She also agrees with the care-plan and conveys that all of her questions have been answered. I have also put together some discharge instructions for her that include: 1) educational information regarding their diagnosis, 2) how to care for their diagnosis at home, as well a 3) list of reasons why they would want to return to the ED prior to their follow-up appointment, should their condition change. DISCHARGE PLAN:  1. Discharge Medication List as of 12/15/2020 11:36 PM        2.    Follow-up Information     Follow up With Specialties Details Why Contact Info    Paramjit Owens MD Internal Medicine In 3 days For a follow-up evaluation. [address] [facility] [address]. [phone]      Your Gastroenterologist  In 1 week For a follow-up evaluation. [facility] EMERGENCY DEPT Emergency Medicine In 1 day If symptoms worsen [address] [place] [address]  [phone]        3. Return to ED if worse     Diagnosis     Clinical Impression:   1. Acute abdominal pain    2. Other migraine without status migrainosus, not intractable        Attestations:    Belkys Young MD    Please note that this dictation was completed with [employer], the computer voice recognition software. Quite often unanticipated grammatical, syntax, homophones, and other interpretive errors are inadvertently transcribed by the computer software. Please disregard these errors. Please excuse any errors that have escaped final proofreading. Thank you.

## 2020-12-17 RX ORDER — METHYLPREDNISOLONE 4 MG/1
TABLET ORAL
Qty: 21 DOSE PACK | Refills: 0 | Status: SHIPPED | OUTPATIENT
Start: 2020-12-17 | End: 2021-04-13

## 2020-12-23 RX ORDER — METOPROLOL SUCCINATE 25 MG/1
TABLET, EXTENDED RELEASE ORAL
Qty: 30 TAB | Refills: 2 | Status: SHIPPED | OUTPATIENT
Start: 2020-12-23 | End: 2021-03-23

## 2020-12-23 RX ORDER — ONDANSETRON 4 MG/1
TABLET, FILM COATED ORAL
Qty: 20 TAB | Refills: 0 | Status: SHIPPED | OUTPATIENT
Start: 2020-12-23 | End: 2021-01-04

## 2020-12-30 RX ORDER — MIRTAZAPINE 30 MG/1
TABLET, FILM COATED ORAL
Qty: 30 TAB | Refills: 1 | Status: SHIPPED | OUTPATIENT
Start: 2020-12-30 | End: 2021-03-01

## 2021-01-04 RX ORDER — ONDANSETRON 4 MG/1
TABLET, FILM COATED ORAL
Qty: 20 TAB | Refills: 0 | Status: SHIPPED | OUTPATIENT
Start: 2021-01-04 | End: 2021-01-21

## 2021-01-07 RX ORDER — DULOXETIN HYDROCHLORIDE 60 MG/1
CAPSULE, DELAYED RELEASE ORAL
Qty: 30 CAP | Refills: 1 | Status: SHIPPED | OUTPATIENT
Start: 2021-01-07 | End: 2021-02-04

## 2021-01-18 ENCOUNTER — TELEPHONE (OUTPATIENT)
Dept: INTERNAL MEDICINE CLINIC | Age: 36
End: 2021-01-18

## 2021-01-18 RX ORDER — MECLIZINE HYDROCHLORIDE 25 MG/1
25 TABLET ORAL
Qty: 30 TAB | Refills: 0 | Status: SHIPPED | OUTPATIENT
Start: 2021-01-18 | End: 2021-01-27

## 2021-01-18 NOTE — TELEPHONE ENCOUNTER
Pt c/o \"severe\" dizziness. Pt requesting meclizine to be sent to pharmacy. Notified pt a message will be sent to Dr. Raheel Reyes. Notified pt if medication does not help to reach out to neurologist.   Pt verbalized understanding of information discussed w/ no further questions at this time.

## 2021-01-21 RX ORDER — ONDANSETRON 4 MG/1
TABLET, FILM COATED ORAL
Qty: 20 TAB | Refills: 0 | Status: SHIPPED | OUTPATIENT
Start: 2021-01-21 | End: 2021-03-05

## 2021-01-27 RX ORDER — MECLIZINE HYDROCHLORIDE 25 MG/1
25 TABLET ORAL
Qty: 30 TAB | Refills: 0 | Status: SHIPPED | OUTPATIENT
Start: 2021-01-27 | End: 2021-02-08

## 2021-02-04 RX ORDER — DULOXETIN HYDROCHLORIDE 60 MG/1
CAPSULE, DELAYED RELEASE ORAL
Qty: 30 CAP | Refills: 1 | Status: SHIPPED | OUTPATIENT
Start: 2021-02-04 | End: 2021-03-09

## 2021-02-08 DIAGNOSIS — F51.01 PRIMARY INSOMNIA: ICD-10-CM

## 2021-02-08 DIAGNOSIS — F41.9 ANXIETY: ICD-10-CM

## 2021-02-08 RX ORDER — MECLIZINE HYDROCHLORIDE 25 MG/1
25 TABLET ORAL
Qty: 30 TAB | Refills: 0 | Status: SHIPPED | OUTPATIENT
Start: 2021-02-08 | End: 2021-02-17

## 2021-02-08 RX ORDER — ALPRAZOLAM 1 MG/1
TABLET ORAL
Qty: 60 TAB | Refills: 0 | Status: SHIPPED | OUTPATIENT
Start: 2021-02-08 | End: 2021-03-11 | Stop reason: SDUPTHER

## 2021-02-08 NOTE — TELEPHONE ENCOUNTER
Patient states she needs a call back to be advised when refill is done for her Alprazolam or if any problems for refill being done. Patient has appt for [facility] on 4/13/21.

## 2021-02-08 NOTE — TELEPHONE ENCOUNTER
Future Appointments:  No future appointments.      Last Appointment With Me:  Visit date not found     Requested Prescriptions     Pending Prescriptions Disp Refills    ALPRAZolam (XANAX) 1 mg tablet 60 Tab 3     Sig: TAKE 1 TABLET BY MOUTH TWICE DAILY AS NEEDED FOR ANXIETY MAXIMUM DAILY DOSE 2

## 2021-02-08 NOTE — TELEPHONE ENCOUNTER
Pt called stating [facility] faxed our office a form for Alprazolam (xanax) refill. Pt calling to enquire the status of the refill request.    Pt states she doesn't need call back but needs call to [facility] or fax responded to.     Phone [phone]

## 2021-02-17 RX ORDER — MECLIZINE HYDROCHLORIDE 25 MG/1
25 TABLET ORAL
Qty: 30 TAB | Refills: 0 | Status: SHIPPED | OUTPATIENT
Start: 2021-02-17 | End: 2021-03-28

## 2021-03-01 ENCOUNTER — OFFICE VISIT (OUTPATIENT)
Dept: SURGERY | Age: 36
End: 2021-03-01
Payer: COMMERCIAL

## 2021-03-01 VITALS — BODY MASS INDEX: 45.32 KG/M2 | HEIGHT: 62 IN

## 2021-03-01 DIAGNOSIS — L03.211 CELLULITIS OF FACE: Primary | ICD-10-CM

## 2021-03-01 PROCEDURE — 99212 OFFICE O/P EST SF 10 MIN: CPT | Performed by: SURGERY

## 2021-03-01 RX ORDER — MUPIROCIN 20 MG/G
OINTMENT TOPICAL DAILY
Qty: 22 G | Refills: 0 | Status: SHIPPED | OUTPATIENT
Start: 2021-03-01 | End: 2021-08-29 | Stop reason: ALTCHOICE

## 2021-03-01 RX ORDER — DOXYCYCLINE 100 MG/1
100 TABLET ORAL 2 TIMES DAILY
Qty: 20 TAB | Refills: 0 | Status: SHIPPED | OUTPATIENT
Start: 2021-03-01 | End: 2021-03-11

## 2021-03-01 NOTE — PROGRESS NOTES
Identified pt with two pt identifiers(name and ). Reviewed record in preparation for visit and have obtained necessary documentation. All patient medications has been reviewed. Chief Complaint   Patient presents with    Skin Problem     seen at the request self eval abscess on chin last seen 2020       Health Maintenance Due   Topic    Hepatitis C Screening     COVID-19 Vaccine (1 of 2)    DTaP/Tdap/Td series (1 - Tdap)    Flu Vaccine (1)       Vitals:    21 1129   Height: 5' 2\" (1.575 m)   LMP: 2016       4. Have you been to the ER, urgent care clinic since your last visit? Hospitalized since your last visit? No    5. Have you seen or consulted any other health care providers outside of the [address] [place] [address] since your last visit? Include any pap smears or colon screening. No      Patient is accompanied by self I have received verbal consent from Leticia Bell to discuss any/all medical information while they are present in the room.

## 2021-03-01 NOTE — Clinical Note
3/28/2021 Patient: Brooke Recinos YOB: 1985 Date of Visit: 3/1/2021 Laura Leonard MD 
[address] [address] [address] [employer] Dear Laura Leonard MD, Thank you for referring Ms. Cameron Fernando to  [facility] for evaluation. My notes for this consultation are attached. If you have questions, please do not hesitate to call me. I look forward to following your patient along with you.  
 
 
Sincerely, 
 
Saira Montoya MD

## 2021-03-05 RX ORDER — ONDANSETRON 4 MG/1
TABLET, FILM COATED ORAL
Qty: 20 TAB | Refills: 0 | Status: SHIPPED | OUTPATIENT
Start: 2021-03-05 | End: 2021-03-17

## 2021-03-05 NOTE — TELEPHONE ENCOUNTER
Future Appointments:  Future Appointments   Date Time Provider Alvaro Kelsey   4/13/2021  2:45 PM Miranda Ryan MD [facility] BS AMB        Last Appointment With Me:  Visit date not found     Requested Prescriptions     Pending Prescriptions Disp Refills    ondansetron hcl (ZOFRAN) 4 mg tablet [Pharmacy Med Name: ONDANSETRON HCL 4 MG TABLET] 20 Tab 0     Sig: TAKE 1 TABLET BY MOUTH EVERY 8 HOURS AS NEEDED FOR NAUSEA

## 2021-03-09 RX ORDER — DULOXETIN HYDROCHLORIDE 60 MG/1
CAPSULE, DELAYED RELEASE ORAL
Qty: 30 CAP | Refills: 5 | Status: SHIPPED | OUTPATIENT
Start: 2021-03-09 | End: 2021-03-15 | Stop reason: SDUPTHER

## 2021-03-11 DIAGNOSIS — F41.9 ANXIETY: ICD-10-CM

## 2021-03-11 DIAGNOSIS — F51.01 PRIMARY INSOMNIA: ICD-10-CM

## 2021-03-11 RX ORDER — ALPRAZOLAM 1 MG/1
TABLET ORAL
Qty: 60 TAB | Refills: 0 | Status: SHIPPED | OUTPATIENT
Start: 2021-03-11 | End: 2021-04-13

## 2021-03-11 NOTE — TELEPHONE ENCOUNTER
Future Appointments:  Future Appointments   Date Time Provider Alvaro Infantei   3/17/2021  2:00 PM AYAD Ferrer BS AMB   4/13/2021  2:45 PM Rafia Heart MD [facility] BS AMB        Last Appointment With Me:  Visit date not found     Requested Prescriptions     Pending Prescriptions Disp Refills    ALPRAZolam (XANAX) 1 mg tablet 60 Tab 0     Sig: TAKE 1 TABLET BY MOUTH TWICE DAILY AS NEEDED FOR ANXIETY MAXIMUM DAILY DOSE 2

## 2021-03-15 NOTE — TELEPHONE ENCOUNTER
Script approved on 3/11/21. Confirmed with pharmacy they received this script, but it was put on hold due to early fill date. Pharmacy will process refill now. Pt updated with this information. Patient given an opportunity to ask questions, repeated information, and verbalized understanding.

## 2021-03-15 NOTE — TELEPHONE ENCOUNTER
----- Message from Savanna Sherwood sent at 3/15/2021 12:45 PM EDT -----  Regarding: Dr. Himanshu Alejandre Message/Vendor Calls    Caller's first and last name: Pt       Reason for call: checking on refill request send last week Mario.       Callback required yes/no and why: yes, to discuss      Best contact number(s): [phone]      Details to clarify the request: N/A      Message from [facility]

## 2021-03-16 PROBLEM — I10 ESSENTIAL HYPERTENSION: Status: ACTIVE | Noted: 2021-03-16

## 2021-03-16 PROBLEM — R00.2 INTERMITTENT PALPITATIONS: Status: ACTIVE | Noted: 2021-03-16

## 2021-03-17 ENCOUNTER — HOSPITAL ENCOUNTER (EMERGENCY)
Age: 36
Discharge: HOME | End: 2021-03-17
Attending: STUDENT IN AN ORGANIZED HEALTH CARE EDUCATION/TRAINING PROGRAM
Payer: COMMERCIAL

## 2021-03-17 ENCOUNTER — APPOINTMENT (OUTPATIENT)
Dept: CT IMAGING | Age: 36
End: 2021-03-17
Attending: STUDENT IN AN ORGANIZED HEALTH CARE EDUCATION/TRAINING PROGRAM
Payer: COMMERCIAL

## 2021-03-17 VITALS
HEIGHT: 63 IN | TEMPERATURE: 98.7 F | HEART RATE: 104 BPM | BODY MASS INDEX: 42.81 KG/M2 | SYSTOLIC BLOOD PRESSURE: 135 MMHG | OXYGEN SATURATION: 98 % | RESPIRATION RATE: 22 BRPM | DIASTOLIC BLOOD PRESSURE: 78 MMHG | WEIGHT: 241.62 LBS

## 2021-03-17 DIAGNOSIS — R10.9 FLANK PAIN: Primary | ICD-10-CM

## 2021-03-17 LAB
ANION GAP SERPL CALC-SCNC: 3 MMOL/L (ref 5–15)
APPEARANCE UR: CLEAR
BACTERIA URNS QL MICRO: NEGATIVE /HPF
BASOPHILS # BLD: 0 K/UL (ref 0–0.1)
BASOPHILS NFR BLD: 0 % (ref 0–1)
BILIRUB UR QL: NEGATIVE
BUN SERPL-MCNC: 14 MG/DL (ref 6–20)
BUN/CREAT SERPL: 16 (ref 12–20)
CALCIUM SERPL-MCNC: 9 MG/DL (ref 8.5–10.1)
CHLORIDE SERPL-SCNC: 108 MMOL/L (ref 97–108)
CO2 SERPL-SCNC: 25 MMOL/L (ref 21–32)
COLOR UR: ABNORMAL
CREAT SERPL-MCNC: 0.85 MG/DL (ref 0.55–1.02)
DIFFERENTIAL METHOD BLD: ABNORMAL
EOSINOPHIL # BLD: 0.5 K/UL (ref 0–0.4)
EOSINOPHIL NFR BLD: 5 % (ref 0–7)
EPITH CASTS URNS QL MICRO: ABNORMAL /LPF
ERYTHROCYTE [DISTWIDTH] IN BLOOD BY AUTOMATED COUNT: 13 % (ref 11.5–14.5)
GLUCOSE SERPL-MCNC: 113 MG/DL (ref 65–100)
GLUCOSE UR STRIP.AUTO-MCNC: NEGATIVE MG/DL
HCT VFR BLD AUTO: 37.6 % (ref 35–47)
HGB BLD-MCNC: 12.1 G/DL (ref 11.5–16)
HGB UR QL STRIP: ABNORMAL
IMM GRANULOCYTES # BLD AUTO: 0 K/UL (ref 0–0.04)
IMM GRANULOCYTES NFR BLD AUTO: 0 % (ref 0–0.5)
KETONES UR QL STRIP.AUTO: NEGATIVE MG/DL
LEUKOCYTE ESTERASE UR QL STRIP.AUTO: NEGATIVE
LYMPHOCYTES # BLD: 2.8 K/UL (ref 0.8–3.5)
LYMPHOCYTES NFR BLD: 28 % (ref 12–49)
MCH RBC QN AUTO: 29.4 PG (ref 26–34)
MCHC RBC AUTO-ENTMCNC: 32.2 G/DL (ref 30–36.5)
MCV RBC AUTO: 91.5 FL (ref 80–99)
MONOCYTES # BLD: 0.7 K/UL (ref 0–1)
MONOCYTES NFR BLD: 7 % (ref 5–13)
NEUTS SEG # BLD: 6 K/UL (ref 1.8–8)
NEUTS SEG NFR BLD: 60 % (ref 32–75)
NITRITE UR QL STRIP.AUTO: NEGATIVE
NRBC # BLD: 0 K/UL (ref 0–0.01)
NRBC BLD-RTO: 0 PER 100 WBC
PH UR STRIP: 6 [PH] (ref 5–8)
PLATELET # BLD AUTO: 377 K/UL (ref 150–400)
PMV BLD AUTO: 9.7 FL (ref 8.9–12.9)
POTASSIUM SERPL-SCNC: 4.2 MMOL/L (ref 3.5–5.1)
PROT UR STRIP-MCNC: NEGATIVE MG/DL
RBC # BLD AUTO: 4.11 M/UL (ref 3.8–5.2)
RBC #/AREA URNS HPF: >100 /HPF (ref 0–5)
SODIUM SERPL-SCNC: 136 MMOL/L (ref 136–145)
SP GR UR REFRACTOMETRY: 1.02 (ref 1–1.03)
UROBILINOGEN UR QL STRIP.AUTO: 0.2 EU/DL (ref 0.2–1)
WBC # BLD AUTO: 10 K/UL (ref 3.6–11)
WBC URNS QL MICRO: ABNORMAL /HPF (ref 0–4)

## 2021-03-17 PROCEDURE — 36415 COLL VENOUS BLD VENIPUNCTURE: CPT

## 2021-03-17 PROCEDURE — 81001 URINALYSIS AUTO W/SCOPE: CPT

## 2021-03-17 PROCEDURE — 99283 EMERGENCY DEPT VISIT LOW MDM: CPT

## 2021-03-17 PROCEDURE — 96374 THER/PROPH/DIAG INJ IV PUSH: CPT

## 2021-03-17 PROCEDURE — 74011250636 HC RX REV CODE- 250/636: Performed by: STUDENT IN AN ORGANIZED HEALTH CARE EDUCATION/TRAINING PROGRAM

## 2021-03-17 PROCEDURE — 80048 BASIC METABOLIC PNL TOTAL CA: CPT

## 2021-03-17 PROCEDURE — 96375 TX/PRO/DX INJ NEW DRUG ADDON: CPT

## 2021-03-17 PROCEDURE — 74176 CT ABD & PELVIS W/O CONTRAST: CPT

## 2021-03-17 PROCEDURE — 85025 COMPLETE CBC W/AUTO DIFF WBC: CPT

## 2021-03-17 RX ORDER — ONDANSETRON 4 MG/1
4 TABLET, ORALLY DISINTEGRATING ORAL
Qty: 6 TAB | Refills: 0 | Status: SHIPPED | OUTPATIENT
Start: 2021-03-17 | End: 2021-04-13

## 2021-03-17 RX ORDER — MORPHINE SULFATE 2 MG/ML
4 INJECTION, SOLUTION INTRAMUSCULAR; INTRAVENOUS ONCE
Status: COMPLETED | OUTPATIENT
Start: 2021-03-17 | End: 2021-03-17

## 2021-03-17 RX ORDER — HYDROCODONE BITARTRATE AND ACETAMINOPHEN 5; 325 MG/1; MG/1
1 TABLET ORAL
Qty: 4 TAB | Refills: 0 | Status: SHIPPED | OUTPATIENT
Start: 2021-03-17 | End: 2021-03-20

## 2021-03-17 RX ORDER — ONDANSETRON 4 MG/1
TABLET, FILM COATED ORAL
Qty: 20 TAB | Refills: 0 | Status: SHIPPED | OUTPATIENT
Start: 2021-03-17 | End: 2021-03-28

## 2021-03-17 RX ORDER — NALOXONE HYDROCHLORIDE 4 MG/.1ML
SPRAY NASAL
Qty: 2 EACH | Refills: 0 | Status: SHIPPED | OUTPATIENT
Start: 2021-03-17 | End: 2022-09-22

## 2021-03-17 RX ORDER — ONDANSETRON 2 MG/ML
4 INJECTION INTRAMUSCULAR; INTRAVENOUS
Status: COMPLETED | OUTPATIENT
Start: 2021-03-17 | End: 2021-03-17

## 2021-03-17 RX ORDER — KETOROLAC TROMETHAMINE 30 MG/ML
15 INJECTION, SOLUTION INTRAMUSCULAR; INTRAVENOUS ONCE
Status: COMPLETED | OUTPATIENT
Start: 2021-03-17 | End: 2021-03-17

## 2021-03-17 RX ADMIN — MORPHINE SULFATE 4 MG: 2 INJECTION, SOLUTION INTRAMUSCULAR; INTRAVENOUS at 11:29

## 2021-03-17 RX ADMIN — ONDANSETRON 4 MG: 2 INJECTION INTRAMUSCULAR; INTRAVENOUS at 11:28

## 2021-03-17 RX ADMIN — KETOROLAC TROMETHAMINE 15 MG: 30 INJECTION, SOLUTION INTRAMUSCULAR at 13:03

## 2021-03-17 NOTE — ED TRIAGE NOTES
Pt presents to the ED with complaints of difficulty urinating, back pain bilaterally but more on the left. Hx of kidney stones and infections. Hx of lupus. Onset of sx last night.

## 2021-03-17 NOTE — ED NOTES
Yuki FINN reviewed discharge instructions with the patient. The patient verbalized understanding. All questions and concerns were addressed. The patient declined a wheelchair and is discharged ambulatory in the care of family members with instructions and prescriptions in hand. Pt is alert and oriented x 4. Respirations are clear and unlabored.

## 2021-03-20 NOTE — ED PROVIDER NOTES
EMERGENCY DEPARTMENT HISTORY AND PHYSICAL EXAM      Date: 3/17/2021  Patient Name: Brooke Recinos    History of Presenting Illness     Chief Complaint   Patient presents with    Flank Pain     L>R- since late last night    Urinary Pain     burns on urination started a few hours ago         HPI: Brooke Recinos, 38 y.o. female presents to the ED with cc of flank pain. This has been going on since last night, she describes a intermittent left flank pain, 10 out of 10, that seems to come and go randomly. It radiates around to the left lower quadrant. She reports associated dysuria as well as nausea and 10 episodes of nonbloody emesis. She denies any diarrhea, no fevers. Reports a history of prior kidney stones, several which have required procedures, states that this does feel similar to prior kidney stones. There are no other complaints, changes, or physical findings at this time. PCP: Sacha Montilla MD    No current facility-administered medications on file prior to encounter. Current Outpatient Medications on File Prior to Encounter   Medication Sig Dispense Refill    ALPRAZolam (XANAX) 1 mg tablet TAKE 1 TABLET BY MOUTH TWICE DAILY AS NEEDED FOR ANXIETY MAXIMUM DAILY DOSE 2 60 Tab 0    mirtazapine (REMERON) 30 mg tablet TAKE 1 TABLET BY MOUTH AT BEDTIME 30 Tab 5    mupirocin (BACTROBAN) 2 % ointment Apply  to affected area daily. 22 g 0    metoprolol succinate (TOPROL-XL) 25 mg XL tablet TAKE 1 TABLET BY MOUTH EVERY DAY AT NIGHT 30 Tab 2    methylPREDNISolone (MEDROL DOSEPACK) 4 mg tablet USE AS DIRECTED 21 Dose Pack 0    ondansetron (Zofran ODT) 4 mg disintegrating tablet Take 1 Tab by mouth every eight (8) hours as needed for Nausea. 10 Tab 0    promethazine (PHENERGAN) 25 mg suppository Insert 1 Suppository into rectum every six (6) hours as needed for Nausea.  (Patient not taking: Reported on 3/1/2021) 12 Suppository 0    amoxicillin-clavulanate (Augmentin) 875-125 mg per tablet Take 1 Tab by mouth two (2) times a day. (Patient not taking: Reported on 3/1/2021) 20 Tab 0    ketorolac (TORADOL) 10 mg tablet Take 1 Tab by mouth every six (6) hours as needed for Pain. 20 Tab 0    olmesartan (BENICAR) 5 mg tablet TAKE 1 TABLET BY MOUTH DAILY 90 Tab 3    naproxen (NAPROSYN) 500 mg tablet TK 1 TA PO BID      RisaQuad-2 16 billion cell cap cap TAKE ONE CAPSULE BY MOUTH ONCE DAILY 30 Cap 1    albuterol (PROVENTIL HFA, VENTOLIN HFA, PROAIR HFA) 90 mcg/actuation inhaler Take 1 Puff by inhalation every six (6) hours as needed for Wheezing. 1 Inhaler 0    furosemide (LASIX) 20 mg tablet PRN  0    tiZANidine (ZANAFLEX) 4 mg tablet   0    estradiol (ESTRACE) 1 mg tablet Take 1 mg by mouth daily. 0    indomethacin (INDOCIN) 25 mg capsule PRN      pantoprazole (PROTONIX) 40 mg tablet take 1 tablet by mouth at bedtime  0    codeine-butalbital-acetaminophen-caffeine (FIORICET WITH CODEINE) -36-30 mg capsule take 2 capsules by mouth every 6 hours if needed for MIGRAINE HEADACHE 90 Cap 0    rizatriptan (MAXALT) 10 mg tablet Take 10 mg by mouth once as needed for Migraine. May repeat in 2 hours if needed      belimumab (Benlysta) 400 mg solr injection by IntraVENous route.          Past History     Past Medical History:  Past Medical History:   Diagnosis Date    Abnormal CT of the abdomen 11/8/2012    Asthma     Autoimmune disease (HCC)     lupus    C. difficile diarrhea     Cervical prolapse     Dehydration     Mariah-Danlos syndrome     Gastrointestinal disorder     gerd, twisted colon, IBS    GERD (gastroesophageal reflux disease)     Headache(784.0)     Lupus (HCC)     Morbid obesity (HCC)     Nausea & vomiting     Neurological disorder     cluster headaches    Occipital neuralgia     other 2006, 2009    ovarian cyst removal    Other ill-defined conditions(799.89)     Worsening headaches        Past Surgical History:  Past Surgical History:   Procedure Laterality Date    COLONOSCOPY  9/21/2010         HX CHOLECYSTECTOMY      HX CYST INCISION AND DRAINAGE Right 02/27/2020    HX GYN  1/2009    right salpingo oopherectomy    HX GYN  2006    right ovarian tumor removed    HX HEENT      left wisdom teeth removal    HX HEENT      top right wisdom tooth removed    HX HERNIA REPAIR  4/2012    HX HERNIA REPAIR  2/28/13    Laparoscopic recurrent incisional hernia repair    HX HYSTERECTOMY      2016    HX UROLOGICAL      Kidney Stone Removal    [facility] EGD TRANSORAL BIOPSY SINGLE/MULTIPLE  9/22/2010            Family History:  Family History   Problem Relation Age of Onset    Colon Cancer Maternal Grandfather        Social History:  Social History     Tobacco Use    Smoking status: Never Smoker    Smokeless tobacco: Never Used   Substance Use Topics    Alcohol use: No    Drug use: No       Allergies: Allergies   Allergen Reactions    Latex Itching     burning    Compazine [Prochlorperazine] Anxiety     High heart rate  Pt can take promethazine with no problems    Sulfa (Sulfonamide Antibiotics) Unknown (comments)    Topamax [Topiramate] Unknown (comments)    Adhesive Rash     Allergic to Dermabond    Clindamycin Diarrhea     Pt states caused her C-Diff    Mushroom Other (comments)    Mushroom Combination No.1 Unknown (comments)    Toradol [Ketorolac Tromethamine] Nausea and Vomiting and Other (comments)     PO & IV causes GI bleed  Tolerated during Feb 2020 stay    Valproic Acid Other (comments)     Elevated heart rate and vomiting           Review of Systems   no fever  No eye pain  No ear pain  no shortness of breath  no chest pain  Reports abdominal pain  Reports dysuria  No eg pain  No rash  No lymphadenopathy  No weight loss    Physical Exam   Physical Exam  Constitutional:       Comments: Uncomfortable appearing   HENT:      Head: Normocephalic.       Mouth/Throat:      Mouth: Mucous membranes are moist.   Cardiovascular:      Rate and Rhythm: Normal rate and regular rhythm. Pulmonary:      Effort: Pulmonary effort is normal.      Breath sounds: Normal breath sounds. Abdominal:      Palpations: Abdomen is soft. Tenderness: There is left CVA tenderness. Skin:     General: Skin is warm. Neurological:      General: No focal deficit present. Mental Status: She is alert. Psychiatric:         Mood and Affect: Mood normal.         Diagnostic Study Results     Labs -   No results found for this or any previous visit (from the past 24 hour(s)). Radiologic Studies -   CT ABD PELV WO CONT   Final Result   No acute abnormality identified. There is a tiny nonobstructing calculus in   kidney. CT Results  (Last 48 hours)    None        CXR Results  (Last 48 hours)    None            Medical Decision Making   I am the first provider for this patient. I reviewed the vital signs, available nursing notes, past medical history, past surgical history, family history and social history. Vital Signs-Reviewed the patient's vital signs. No data found. Provider Notes (Medical Decision Making):   47-year-old female presenting with left-sided flank pain. Concern for possible kidney stone, pyelonephritis, musculoskeletal pain. Her abdominal exam is reassuring, not concerning for any other intra-abdominal infection or obstruction. She is uncomfortable appearing, however nontoxic with reassuring vital signs. Patient is given morphine and Zofran. ED Course:     Initial assessment performed. The patients presenting problems have been discussed, and they are in agreement with the care plan formulated and outlined with them. I have encouraged them to ask questions as they arise throughout their visit. UA shows blood, is negative for UTI. CBC negative for leukocytosis, basic metabolic panel shows normal renal function, no worrisome electrolyte abnormalities. CT scan shows no acute process.     On reevaluation, patient states that she feels somewhat improved, continues to endorse some pain. Patient is counseled on supportive care and return precautions. Will return to the ED for any worsening pain, intractable nausea vomiting, fevers. Will followup with primary care doctor and urology within 5 days. Critical Care Time:         Disposition:  Home    PLAN:  1. Discharge Medication List as of 3/17/2021  1:14 PM      START taking these medications    Details   !! ondansetron (Zofran ODT) 4 mg disintegrating tablet Take 1 Tab by mouth every eight (8) hours as needed for Nausea., Normal, Disp-6 Tab, R-0      HYDROcodone-acetaminophen (Norco) 5-325 mg per tablet Take 1 Tab by mouth every eight (8) hours as needed for Pain for up to 3 days. Max Daily Amount: 3 Tabs., Normal, Disp-4 Tab, R-0      naloxone (NARCAN) 4 mg/actuation nasal spray Use 1 spray intranasally, then discard. Repeat with new spray every 2 min as needed for opioid overdose symptoms, alternating nostrils. , Normal, Disp-2 Each, R-0       !! - Potential duplicate medications found. Please discuss with provider. CONTINUE these medications which have CHANGED    Details   ondansetron hcl (ZOFRAN) 4 mg tablet TAKE 1 TABLET BY MOUTH EVERY 8 HOURS AS NEEDED FOR NAUSEA, Normal, Disp-20 Tab, R-0         CONTINUE these medications which have NOT CHANGED    Details   ALPRAZolam (XANAX) 1 mg tablet TAKE 1 TABLET BY MOUTH TWICE DAILY AS NEEDED FOR ANXIETY MAXIMUM DAILY DOSE 2, Normal, Disp-60 Tab, R-0APPT NEEDED FOR REFILLS      mirtazapine (REMERON) 30 mg tablet TAKE 1 TABLET BY MOUTH AT BEDTIME, Normal, Disp-30 Tab, R-5      mupirocin (BACTROBAN) 2 % ointment Apply  to affected area daily. , Normal, Disp-22 g, R-0      metoprolol succinate (TOPROL-XL) 25 mg XL tablet TAKE 1 TABLET BY MOUTH EVERY DAY AT NIGHT, Normal, Disp-30 Tab, R-2      methylPREDNISolone (MEDROL DOSEPACK) 4 mg tablet USE AS DIRECTED, Normal, Disp-21 Dose Pack, R-0      !! ondansetron (Zofran ODT) 4 mg disintegrating tablet Take 1 Tab by mouth every eight (8) hours as needed for Nausea., Normal, Disp-10 Tab,R-0      promethazine (PHENERGAN) 25 mg suppository Insert 1 Suppository into rectum every six (6) hours as needed for Nausea., Normal, Disp-12 Suppository,R-0      amoxicillin-clavulanate (Augmentin) 875-125 mg per tablet Take 1 Tab by mouth two (2) times a day., Normal, Disp-20 Tab,R-0      ketorolac (TORADOL) 10 mg tablet Take 1 Tab by mouth every six (6) hours as needed for Pain., Normal, Disp-20 Tab,R-0      olmesartan (BENICAR) 5 mg tablet TAKE 1 TABLET BY MOUTH DAILY, Normal, Disp-90 Tab,R-3      naproxen (NAPROSYN) 500 mg tablet TK 1 TA PO BID, Historical Med      RisaQuad-2 16 billion cell cap cap TAKE ONE CAPSULE BY MOUTH ONCE DAILY, Normal, Disp-30 Cap, R-1      albuterol (PROVENTIL HFA, VENTOLIN HFA, PROAIR HFA) 90 mcg/actuation inhaler Take 1 Puff by inhalation every six (6) hours as needed for Wheezing., Normal, Disp-1 Inhaler, R-0      furosemide (LASIX) 20 mg tablet PRN, Historical Med, R-0      tiZANidine (ZANAFLEX) 4 mg tablet Historical Med, R-0      estradiol (ESTRACE) 1 mg tablet Take 1 mg by mouth daily. , Historical Med, R-0      indomethacin (INDOCIN) 25 mg capsule PRN, Historical Med      pantoprazole (PROTONIX) 40 mg tablet take 1 tablet by mouth at bedtime, Historical Med, R-0      codeine-butalbital-acetaminophen-caffeine (FIORICET WITH CODEINE) -75-30 mg capsule take 2 capsules by mouth every 6 hours if needed for MIGRAINE HEADACHE, Print, Disp-90 Cap, R-0      rizatriptan (MAXALT) 10 mg tablet Take 10 mg by mouth once as needed for Migraine. May repeat in 2 hours if needed, Historical Med      belimumab (Benlysta) 400 mg solr injection by IntraVENous route., Historical Med       !! - Potential duplicate medications found. Please discuss with provider.         2.   Follow-up Information     Follow up With Specialties Details Why Contact Info    Love Gates MD Internal Medicine   [address]  [facility] [address]  [facility]  [phone]      Your urologist  Call in 2 days      [facility] EMERGENCY DEPT Emergency Medicine  As needed, If symptoms worsen [address]  [address] [facility]  [phone]        Return to ED if worse     Diagnosis     Clinical Impression: Acute flank pain and hematuria

## 2021-03-23 RX ORDER — METOPROLOL SUCCINATE 25 MG/1
TABLET, EXTENDED RELEASE ORAL
Qty: 30 TAB | Refills: 2 | Status: SHIPPED | OUTPATIENT
Start: 2021-03-23 | End: 2021-06-18

## 2021-03-28 RX ORDER — MECLIZINE HYDROCHLORIDE 25 MG/1
25 TABLET ORAL
Qty: 30 TAB | Refills: 0 | Status: SHIPPED | OUTPATIENT
Start: 2021-03-28 | End: 2021-10-13

## 2021-03-28 RX ORDER — ONDANSETRON 4 MG/1
TABLET, FILM COATED ORAL
Qty: 20 TAB | Refills: 0 | Status: SHIPPED | OUTPATIENT
Start: 2021-03-28 | End: 2021-04-10

## 2021-03-28 NOTE — PROGRESS NOTES
To: Rafia Heart MD    From: Carmela Gonzalez MD    Thank you for sending Lacey Padilla back to see us. Nice to see her again. Encounter Date: 3/1/2021    Subjective:      Megan Reese is a 38 y.o. female presents for evaluation of facial swelling. She says she could not get in with her primary. She has acne and noticed increased swelling of the left face. Objective:     Visit Vitals  Ht 5' 2\" (1.575 m)   BMI 45.32 kg/m²       General:  alert, cooperative, no distress, appears stated age   Face: Left mandible region. Assessment:     Left face acne with cellulitis. H/o MRSA. Plan:     Doxy and Bactroban called in. Call for worsening.       Carmela Gonzalez MD

## 2021-04-10 RX ORDER — ONDANSETRON 4 MG/1
TABLET, FILM COATED ORAL
Qty: 20 TAB | Refills: 0 | Status: SHIPPED | OUTPATIENT
Start: 2021-04-10 | End: 2021-04-13

## 2021-04-13 ENCOUNTER — VIRTUAL VISIT (OUTPATIENT)
Dept: INTERNAL MEDICINE CLINIC | Age: 36
End: 2021-04-13
Payer: COMMERCIAL

## 2021-04-13 DIAGNOSIS — G44.229 CHRONIC TENSION-TYPE HEADACHE, NOT INTRACTABLE: ICD-10-CM

## 2021-04-13 DIAGNOSIS — E66.01 MORBID OBESITY (HCC): ICD-10-CM

## 2021-04-13 DIAGNOSIS — K21.9 GASTROESOPHAGEAL REFLUX DISEASE, UNSPECIFIED WHETHER ESOPHAGITIS PRESENT: ICD-10-CM

## 2021-04-13 DIAGNOSIS — Z11.59 ENCOUNTER FOR HEPATITIS C SCREENING TEST FOR LOW RISK PATIENT: Primary | ICD-10-CM

## 2021-04-13 DIAGNOSIS — F41.9 ANXIETY: ICD-10-CM

## 2021-04-13 DIAGNOSIS — F51.01 PRIMARY INSOMNIA: ICD-10-CM

## 2021-04-13 DIAGNOSIS — R73.09 ELEVATED GLUCOSE: ICD-10-CM

## 2021-04-13 DIAGNOSIS — M32.9 LUPUS (HCC): ICD-10-CM

## 2021-04-13 DIAGNOSIS — R00.0 SINUS TACHYCARDIA: ICD-10-CM

## 2021-04-13 PROCEDURE — 99214 OFFICE O/P EST MOD 30 MIN: CPT | Performed by: INTERNAL MEDICINE

## 2021-04-13 RX ORDER — ALPRAZOLAM 1 MG/1
TABLET ORAL
Qty: 60 TAB | Refills: 0 | Status: SHIPPED | OUTPATIENT
Start: 2021-04-13 | End: 2021-05-10

## 2021-04-13 RX ORDER — DULOXETIN HYDROCHLORIDE 60 MG/1
120 CAPSULE, DELAYED RELEASE ORAL
COMMUNITY
End: 2021-06-13

## 2021-04-13 NOTE — PROGRESS NOTES
HISTORY OF PRESENT ILLNESS  Rashmi Mckinnon is a 38 y.o. female. HPI     Rashmi Mckinnon, who was evaluated through a synchronous (real-time) audio only encounter, and/or her healthcare decision maker, is aware that it is a billable service, with coverage as determined by her insurance carrier. She provided verbal consent to proceed: Yes, and patient identification was verified. This visit was conducted pursuant to the emergency declaration under the [facility], [address] [place] [address] and the [employer] Act. A caregiver was present when appropriate. Ability to conduct physical exam was limited. The patient was located in a state where the provider was credentialed to provide care. --Aaron Chao MD on 4/13/2021 at 3:20 PM    Vv via telephone enocunter x 21 minutes    Hx anxiety , morbid obesity SLE , GERD     Had recent UTI--CT negative for obstructing stone  Sees Dr Lindsey Fowler for SLE on benlysta and rituxan   Working on disability    S/p hyst-no longer sees gyn MD  On cymbalta for anxiety and pain  Takes xanax 1/2 or 1 tab qam every day on average for anxiety  Has tension headaches -fioricet prn  Weight around 226 lbs down several lbs  Not exercising  On metoprolol for sinus tachy--ALEXIA Dinero at [facility]      Last OV    C/o sinus congestion and dry cough x 3-4 d    No f/c  C/o face tenderness from sinus congestion     Last OV  An electronic signature was used to authenticate this note.   Went to ED for CP and dizziness on 1/4/2020  bp was 160/130 in ED , given enalapril IV then down to 130/60  At home BP has been around 130/100  Cardiac enzymes and ekg wnl  Still having headaches and bp at home has been up most of the time  bp drops too low after taking enalapril  Took 1/2 tab of enalapril today leydi is 115/72 today in office  Takes enalapril only when bp is elevated around with DBP around 100mmhg  Headaches occur on top of head and above the eyes  Headaches go away when her BP is wnl. No vision tobias  Has occipital headaches which are different per pt     bp higher in mornings and midday             Patient Active Problem List    Diagnosis Date Noted    Intermittent palpitations 03/16/2021    Essential hypertension 03/16/2021    Ureteric calculus 32/54/0684    Ureteric colic 78/84/9056    Cellulitis 02/24/2020    Severe obesity ([facility] 02/22/2019    Incomplete uterovaginal prolapse 05/17/2016    Migraine with aura and without status migrainosus, not intractable 12/02/2015    Anxiety 12/05/2014    Lupus ([facility] 12/04/2014    Recurrent ventral incisional hernia 03/01/2013    Ventral hernia 02/25/2013    Abnormal CT of the abdomen 11/08/2012     Current Outpatient Medications   Medication Sig Dispense Refill    ALPRAZolam (XANAX) 1 mg tablet TAKE 1 TABLET BY MOUTH TWICE A DAY AS NEEDED FOR ANXIETY 60 Tab 0    DULoxetine (CYMBALTA) 60 mg capsule 120 mg.      metoprolol succinate (TOPROL-XL) 25 mg XL tablet TAKE 1 TABLET BY MOUTH EVERY DAY AT NIGHT 30 Tab 2    mirtazapine (REMERON) 30 mg tablet TAKE 1 TABLET BY MOUTH AT BEDTIME 30 Tab 5    RisaQuad-2 16 billion cell cap cap TAKE ONE CAPSULE BY MOUTH ONCE DAILY 30 Cap 1    pantoprazole (PROTONIX) 40 mg tablet take 1 tablet by mouth at bedtime  0    codeine-butalbital-acetaminophen-caffeine (FIORICET WITH CODEINE) -98-30 mg capsule take 2 capsules by mouth every 6 hours if needed for MIGRAINE HEADACHE 90 Cap 0    belimumab (Benlysta) 400 mg solr injection by IntraVENous route.  naloxone (NARCAN) 4 mg/actuation nasal spray Use 1 spray intranasally, then discard. Repeat with new spray every 2 min as needed for opioid overdose symptoms, alternating nostrils. 2 Each 0    mupirocin (BACTROBAN) 2 % ointment Apply  to affected area daily. 22 g 0    ondansetron (Zofran ODT) 4 mg disintegrating tablet Take 1 Tab by mouth every eight (8) hours as needed for Nausea.  10 Tab 0    ketorolac (TORADOL) 10 mg tablet Take 1 Tab by mouth every six (6) hours as needed for Pain. 20 Tab 0    olmesartan (BENICAR) 5 mg tablet TAKE 1 TABLET BY MOUTH DAILY 90 Tab 3    naproxen (NAPROSYN) 500 mg tablet TK 1 TA PO BID      albuterol (PROVENTIL HFA, VENTOLIN HFA, PROAIR HFA) 90 mcg/actuation inhaler Take 1 Puff by inhalation every six (6) hours as needed for Wheezing. 1 Inhaler 0    furosemide (LASIX) 20 mg tablet PRN  0    tiZANidine (ZANAFLEX) 4 mg tablet   0    estradiol (ESTRACE) 1 mg tablet Take 1 mg by mouth daily. 0    indomethacin (INDOCIN) 25 mg capsule PRN      rizatriptan (MAXALT) 10 mg tablet Take 10 mg by mouth once as needed for Migraine.  May repeat in 2 hours if needed       Allergies   Allergen Reactions    Latex Itching     burning    Compazine [Prochlorperazine] Anxiety     High heart rate  Pt can take promethazine with no problems    Sulfa (Sulfonamide Antibiotics) Unknown (comments)    Topamax [Topiramate] Unknown (comments)    Adhesive Rash     Allergic to Dermabond    Clindamycin Diarrhea     Pt states caused her C-Diff    Mushroom Other (comments)    Mushroom Combination No.1 Unknown (comments)    Toradol [Ketorolac Tromethamine] Nausea and Vomiting and Other (comments)     PO & IV causes GI bleed  Tolerated during Feb 2020 stay    Valproic Acid Other (comments)     Elevated heart rate and vomiting        Lab Results   Component Value Date/Time    WBC 10.0 03/17/2021 11:23 AM    HGB 12.1 03/17/2021 11:23 AM    Hemoglobin (POC) 12.6 05/17/2016 07:47 AM    HCT 37.6 03/17/2021 11:23 AM    Hematocrit (POC) 37 05/17/2016 07:47 AM    PLATELET 489 19/36/7534 11:23 AM    MCV 91.5 03/17/2021 11:23 AM     Lab Results   Component Value Date/Time    Glucose 113 (H) 03/17/2021 11:23 AM    Glucose (POC) 99 05/17/2016 07:47 AM    Creatinine (POC) 0.7 05/17/2016 07:47 AM    Creatinine 0.85 03/17/2021 11:23 AM      No results found for: CHOL, CHOLPOCT, HDL, LDL, LDLC, LDLCPOC, LDLCEXT, TRIGL, TGLPOCT, CHHD, [facility]  Lab Results   Component Value Date/Time    GFR est non-AA >60 03/17/2021 11:23 AM    GFRNA, POC >60 05/17/2016 07:47 AM    GFR est AA >60 03/17/2021 11:23 AM    GFRAA, POC >60 05/17/2016 07:47 AM    Creatinine 0.85 03/17/2021 11:23 AM    Creatinine (POC) 0.7 05/17/2016 07:47 AM    BUN 14 03/17/2021 11:23 AM    BUN (POC) 8 (L) 05/17/2016 07:47 AM    Sodium 136 03/17/2021 11:23 AM    Sodium (POC) 140 05/17/2016 07:47 AM    Potassium 4.2 03/17/2021 11:23 AM    Potassium (POC) 3.8 05/17/2016 07:47 AM    Chloride 108 03/17/2021 11:23 AM    Chloride (POC) 105 05/17/2016 07:47 AM    CO2 25 03/17/2021 11:23 AM    Magnesium 2.3 10/04/2020 03:23 AM        ROS    Physical Exam    ASSESSMENT and PLAN  Diagnoses and all orders for this visit:    1. Encounter for hepatitis C screening test for low risk patient  -     HEPATITIS C AB; Future    2. Lupus ([facility]   Controlled   Fu rheum MD  3. [facility] ([facility]   I have reviewed/discussed the above normal BMI with the patient. I have recommended the following interventions: dietary management education, guidance, and counseling and encourage exercise . Yoav Butterfield 4. Anxiety   Fair control   Refill xanax when needed  5. Sinus tachycardia  -     TSH 3RD GENERATION; Future  -     LIPID PANEL; Future   On BB per cardiologist at [facility]  6. Elevated glucose  -     HEMOGLOBIN A1C WITH EAG; Future  -     LIPID PANEL; Future   Recommended further weight reduction  7. Chronic tension-type headache, not intractable   Refill fioricet when needed  8. Gastroesophageal reflux disease, unspecified whether esophagitis present   Controlled on PPI    Follow-up and Dispositions    · Return in about 6 months (around 10/13/2021) for anxiety obesity .

## 2021-04-13 NOTE — PATIENT INSTRUCTIONS
This is an established visit conducted via telemedicine. The patient has been instructed that this meets HIPAA criteria and acknowledges and agrees to this method of visitation.     Vera Councilman, [place]  82/85/16  5:75 PM

## 2021-04-23 RX ORDER — ONDANSETRON 4 MG/1
TABLET, FILM COATED ORAL
Qty: 20 TAB | Refills: 0 | Status: SHIPPED | OUTPATIENT
Start: 2021-04-23 | End: 2021-05-10

## 2021-05-05 NOTE — ED PROVIDER NOTES
EMERGENCY DEPARTMENT HISTORY AND PHYSICAL EXAM      Date: 11/28/2019  Patient Name: True Smith    History of Presenting Illness     Chief Complaint   Patient presents with    Fall     fall when coughing so hard from pleurisy; she hurt her left hip.  Cold Symptoms     coughing more than a week; dx pleurisy a  week and half ago. it made her dizzy today when she was coughing so hard.  Hip Pain     left hip pain from fall this morning. History Provided By: Patient    HPI: True Smith, 26 y.o. female with PMHx significant for lupus, anxiety who presents with a chief complaint of left hip pain and cough. Patient states that she was diagnosed with pleurisy a week and a half ago. She has been seen again by her primary care as her symptoms not improved and started on antibiotics. She reports she took her last dose of Levaquin today. She states that she has had ongoing issues with severe coughing and states that today she coughed so hard that she had a brief syncopal event. She states that when this happened she fell but only injured her left hip. She is complaining of left hip pain as well as ongoing cough. She states that her chest feels sore from all the coughing she is doing. PCP: Yuliana Wild MD    There are no other complaints, changes, or physical findings at this time. Current Outpatient Medications   Medication Sig Dispense Refill    predniSONE (STERAPRED DS) 10 mg dose pack Follow instructions on 10 day dose pack 1 Package 0    dextromethorphan (DELSYM) 30 mg/5 mL liquid Take 10 mL by mouth two (2) times a day for 10 days. 200 mL 0    promethazine (PHENERGAN) 25 mg tablet TAKE 1 TABLET BY MOUTH EVERY 6 HOURS IF NEEDED FOR NAUSEA 30 Tab 0    amoxicillin-clavulanate (AUGMENTIN) 875-125 mg per tablet Take 1 Tab by mouth every twelve (12) hours for 7 days.  14 Tab 0    albuterol (PROVENTIL HFA, VENTOLIN HFA, PROAIR HFA) 90 mcg/actuation inhaler Take 1 Puff by inhalation every six (6) hours as needed for Wheezing. 1 Inhaler 0    benzonatate (TESSALON) 100 mg capsule Take 1 Cap by mouth three (3) times daily as needed for Cough for up to 7 days. 30 Cap 0    predniSONE (DELTASONE) 20 mg tablet 60mg po daily for 2days, 40mg po daily for 2 days, 20mg po daily for 2days then stop 12 Tab 0    naloxone (NARCAN) 4 mg/actuation nasal spray Use 1 spray intranasally, then discard. Repeat with new spray every 2 min as needed for opioid overdose symptoms, alternating nostrils. 1 Each 0    mirtazapine (REMERON) 30 mg tablet take 1 tablet by mouth at bedtime 30 Tab 3    L.acidoph & parac-S.therm-Bifido (BELGICA Q2/RISAQUAD-2) 16 billion cell cap cap Take 1 Cap by mouth daily. 30 Cap 1    furosemide (LASIX) 20 mg tablet PRN  0    tiZANidine (ZANAFLEX) 4 mg tablet   0    mirtazapine (REMERON) 30 mg tablet Take 30 mg by mouth daily.  doxycycline (MONODOX) 100 mg capsule Take 100 mg by mouth daily. 0    estradiol (ESTRACE) 1 mg tablet Take 1 mg by mouth daily. 0    meclizine (ANTIVERT) 12.5 mg tablet take 1 tablet by mouth three times a day if needed for VERTIGO FOR UP TO 10 DAYS 30 Tab 1    ALPRAZolam (XANAX) 1 mg tablet take 1 tablet by mouth NIGHTLY maximum daily dose of 1 tablet 30 Tab 0    indomethacin (INDOCIN) 25 mg capsule PRN      ondansetron hcl (ZOFRAN) 4 mg tablet Take 1 Tab by mouth every eight (8) hours as needed for Nausea. 20 Tab 0    DULoxetine (CYMBALTA) 60 mg capsule Take 60 mg by mouth daily. 1    pantoprazole (PROTONIX) 40 mg tablet take 1 tablet by mouth at bedtime  0    codeine-butalbital-acetaminophen-caffeine (FIORICET WITH CODEINE) -38-30 mg capsule take 2 capsules by mouth every 6 hours if needed for MIGRAINE HEADACHE 90 Cap 0    rizatriptan (MAXALT) 10 mg tablet Take 10 mg by mouth once as needed for Migraine. May repeat in 2 hours if needed      belimumab (BENLYSTA) 400 mg solr injection by IntraVENous route.        Past History     Past yes Medical History:  Past Medical History:   Diagnosis Date    Abnormal CT of the abdomen 11/8/2012    Asthma     Autoimmune disease (HCC)     lupus    C. difficile diarrhea     Cervical prolapse     Dehydration     Gastrointestinal disorder     gerd, twisted colon, IBS    GERD (gastroesophageal reflux disease)     Headache(784.0)     Lupus (HCC)     Morbid obesity (HCC)     Nausea & vomiting     Neurological disorder     cluster headaches    other 2006, 2009    ovarian cyst removal    Other ill-defined conditions(799.89)     Worsening headaches      Past Surgical History:  Past Surgical History:   Procedure Laterality Date    COLONOSCOPY  9/21/2010         HX CHOLECYSTECTOMY      HX GYN  1/2009    right salpingo oopherectomy    HX GYN  2006    right ovarian tumor removed    HX HEENT      left wisdom teeth removal    HX HEENT      top right wisdom tooth removed    HX HERNIA REPAIR  4/2012    HX HERNIA REPAIR  2/28/13    Laparoscopic recurrent incisional hernia repair    HX HYSTERECTOMY      2016    HX UROLOGICAL      Kidney Stone Removal    AK EGD TRANSORAL BIOPSY SINGLE/MULTIPLE  9/22/2010          Family History:  Family History   Problem Relation Age of Onset    Colon Cancer Maternal Grandfather      Social History:  Social History     Tobacco Use    Smoking status: Never Smoker    Smokeless tobacco: Never Used   Substance Use Topics    Alcohol use: No    Drug use: No     Allergies:   Allergies   Allergen Reactions    Latex Itching     burning    Compazine [Prochlorperazine] Anxiety     High heart rate  Pt can take promethazine with no problems    Sulfa (Sulfonamide Antibiotics) Unknown (comments)    Topamax [Topiramate] Unknown (comments)    Adhesive Rash     Allergic to Dermabond    Clindamycin Diarrhea     Pt states caused her C-Diff    Mushroom Combination No.1 Unknown (comments)    Toradol [Ketorolac Tromethamine] Nausea and Vomiting and Other (comments)     PO & IV causes GI bleed    Valproic Acid Other (comments)     Elevated heart rate and vomiting       Review of Systems   Review of Systems   Constitutional: Negative for chills and fever. HENT: Negative for congestion, rhinorrhea and sore throat. Respiratory: Positive for cough. Negative for shortness of breath. Cardiovascular: Positive for chest pain (with cough). Gastrointestinal: Negative for abdominal pain, nausea and vomiting. Genitourinary: Negative for dysuria and urgency. Skin: Negative for rash. Neurological: Negative for dizziness, light-headedness and headaches. All other systems reviewed and are negative. Physical Exam   Physical Exam  Vitals signs and nursing note reviewed. Constitutional:       General: She is not in acute distress. Appearance: She is well-developed. HENT:      Head: Normocephalic and atraumatic. Eyes:      Conjunctiva/sclera: Conjunctivae normal.      Pupils: Pupils are equal, round, and reactive to light. Neck:      Musculoskeletal: Normal range of motion. Cardiovascular:      Rate and Rhythm: Normal rate and regular rhythm. Pulmonary:      Effort: Pulmonary effort is normal. No respiratory distress. Breath sounds: No stridor. Examination of the right-lower field reveals wheezing. Examination of the left-lower field reveals wheezing. Wheezing present. Abdominal:      General: There is no distension. Palpations: Abdomen is soft. Tenderness: There is no tenderness. Musculoskeletal: Normal range of motion. Skin:     General: Skin is warm and dry. Neurological:      Mental Status: She is alert and oriented to person, place, and time. Diagnostic Study Results   Labs -   No results found for this or any previous visit (from the past 12 hour(s)). Radiologic Studies -   XR HIP LT W OR WO PELV 2-3 VWS   Final Result   IMPRESSION: No acute abnormality. XR CHEST PA LAT   Final Result   IMPRESSION: No acute cardiopulmonary abnormality. Xr Chest Pa Lat    Result Date: 11/28/2019  IMPRESSION: No acute cardiopulmonary abnormality. Xr Hip Lt W Or Wo Pelv 2-3 Vws    Result Date: 11/28/2019  IMPRESSION: No acute abnormality. Medical Decision Making   I am the first provider for this patient. I reviewed the vital signs, available nursing notes, past medical history, past surgical history, family history and social history. Vital Signs-Reviewed the patient's vital signs. Patient Vitals for the past 12 hrs:   Temp Pulse Resp BP SpO2   11/28/19 1542 98.4 °F (36.9 °C) 90 16 108/48 97 %   11/28/19 1505  88 16 126/61 97 %   11/28/19 1430 98.2 °F (36.8 °C) 95 18  97 %       Pulse Oximetry Analysis - 97% on RA      Records Reviewed: Nursing Notes and Old Medical Records    Provider Notes (Medical Decision Making):   Patient presents with a chief complaint of left hip pain following a fall as well as ongoing cough after diagnosis of pleurisy. On exam, patient is well-appearing with stable vital signs. X-rays performed of her chest and left hip prior to my evaluation are unremarkable. Patient does have a care management plan which I reviewed. Discussed with her that I would not be providing narcotics for her for pain. Recommended cough medication and will give another round of steroids given persistent wheezing. ED Course:   Initial assessment performed. The patients presenting problems have been discussed, and they are in agreement with the care plan formulated and outlined with them. I have encouraged them to ask questions as they arise throughout their visit. Critical Care:  none    Disposition:  Discharge Note:  3:27PM  The patient has been re-evaluated and is ready for discharge. Reviewed available results with patient. Counseled patient on diagnosis and care plan. Patient has expressed understanding, and all questions have been answered.  Patient agrees with plan and agrees to follow up as recommended, or to return to the ED if their symptoms worsen. Discharge instructions have been provided and explained to the patient, along with reasons to return to the ED. PLAN:  1. Discharge Medication List as of 11/28/2019  3:27 PM      CONTINUE these medications which have NOT CHANGED    Details   promethazine (PHENERGAN) 25 mg tablet TAKE 1 TABLET BY MOUTH EVERY 6 HOURS IF NEEDED FOR NAUSEA, Normal, Disp-30 Tab, R-0      amoxicillin-clavulanate (AUGMENTIN) 875-125 mg per tablet Take 1 Tab by mouth every twelve (12) hours for 7 days. , Normal, Disp-14 Tab, R-0      albuterol (PROVENTIL HFA, VENTOLIN HFA, PROAIR HFA) 90 mcg/actuation inhaler Take 1 Puff by inhalation every six (6) hours as needed for Wheezing., Normal, Disp-1 Inhaler, R-0      benzonatate (TESSALON) 100 mg capsule Take 1 Cap by mouth three (3) times daily as needed for Cough for up to 7 days. , Normal, Disp-30 Cap, R-0      predniSONE (DELTASONE) 20 mg tablet 60mg po daily for 2days, 40mg po daily for 2 days, 20mg po daily for 2days then stop, Normal, Disp-12 Tab, R-0      naloxone (NARCAN) 4 mg/actuation nasal spray Use 1 spray intranasally, then discard. Repeat with new spray every 2 min as needed for opioid overdose symptoms, alternating nostrils. , Normal, Disp-1 Each, R-0      !! mirtazapine (REMERON) 30 mg tablet take 1 tablet by mouth at bedtime, Normal, Disp-30 Tab, R-3      L.acidoph & parac-S.therm-Bifido (BELGICA Q2/RISAQUAD-2) 16 billion cell cap cap Take 1 Cap by mouth daily. , Normal, Disp-30 Cap, R-1      furosemide (LASIX) 20 mg tablet PRN, Historical Med, R-0      tiZANidine (ZANAFLEX) 4 mg tablet Historical Med, R-0      !! mirtazapine (REMERON) 30 mg tablet Take 30 mg by mouth daily. , Historical Med      doxycycline (MONODOX) 100 mg capsule Take 100 mg by mouth daily. , Historical Med, R-0      estradiol (ESTRACE) 1 mg tablet Take 1 mg by mouth daily. , Historical Med, R-0      meclizine (ANTIVERT) 12.5 mg tablet take 1 tablet by mouth three times a day if needed for VERTIGO FOR UP TO 10 DAYS, Normal, Disp-30 Tab, R-1      ALPRAZolam (XANAX) 1 mg tablet take 1 tablet by mouth NIGHTLY maximum daily dose of 1 tablet, Print, Disp-30 Tab, R-0      indomethacin (INDOCIN) 25 mg capsule PRN, Historical Med      ondansetron hcl (ZOFRAN) 4 mg tablet Take 1 Tab by mouth every eight (8) hours as needed for Nausea., Normal, Disp-20 Tab, R-0      DULoxetine (CYMBALTA) 60 mg capsule Take 60 mg by mouth daily. , Historical Med, R-1      pantoprazole (PROTONIX) 40 mg tablet take 1 tablet by mouth at bedtime, Historical Med, R-0      codeine-butalbital-acetaminophen-caffeine (FIORICET WITH CODEINE) -48-30 mg capsule take 2 capsules by mouth every 6 hours if needed for MIGRAINE HEADACHE, Print, Disp-90 Cap, R-0      rizatriptan (MAXALT) 10 mg tablet Take 10 mg by mouth once as needed for Migraine. May repeat in 2 hours if needed, Historical Med      belimumab (BENLYSTA) 400 mg solr injection by IntraVENous route., Historical Med       !! - Potential duplicate medications found. Please discuss with provider. 2.   Follow-up Information     Follow up With Specialties Details Why Contact Info    Giovanni Olson MD Internal Medicine Schedule an appointment as soon as possible for a visit  [address] [place] [address]  [phone]      \[facility] [place] [facility]\"" EMERGENCY DEPT Emergency Medicine  As needed, If symptoms worsen [address] [place] [address]  [address] [facility]  [phone]        Return to ED if worse     Diagnosis     Clinical Impression:   1. Cough        This note will not be viewable in [employer] Please note that this dictation was completed with [employer], the [employer] voice recognition software. Quite often unanticipated grammatical, syntax, homophones, and other interpretive errors are inadvertently transcribed by the computer software. Please disregard these errors.   Please excuse any errors that have escaped final proofreading

## 2021-05-10 ENCOUNTER — HOSPITAL ENCOUNTER (EMERGENCY)
Age: 36
Discharge: HOME | End: 2021-05-10
Attending: STUDENT IN AN ORGANIZED HEALTH CARE EDUCATION/TRAINING PROGRAM
Payer: COMMERCIAL

## 2021-05-10 ENCOUNTER — APPOINTMENT (OUTPATIENT)
Dept: GENERAL RADIOLOGY | Age: 36
End: 2021-05-10
Attending: STUDENT IN AN ORGANIZED HEALTH CARE EDUCATION/TRAINING PROGRAM
Payer: COMMERCIAL

## 2021-05-10 VITALS
HEIGHT: 62 IN | HEART RATE: 74 BPM | RESPIRATION RATE: 16 BRPM | WEIGHT: 244.71 LBS | DIASTOLIC BLOOD PRESSURE: 81 MMHG | OXYGEN SATURATION: 95 % | TEMPERATURE: 98.7 F | BODY MASS INDEX: 45.03 KG/M2 | SYSTOLIC BLOOD PRESSURE: 133 MMHG

## 2021-05-10 DIAGNOSIS — F51.01 PRIMARY INSOMNIA: ICD-10-CM

## 2021-05-10 DIAGNOSIS — R07.9 CHEST PAIN, UNSPECIFIED TYPE: Primary | ICD-10-CM

## 2021-05-10 DIAGNOSIS — F41.9 ANXIETY: ICD-10-CM

## 2021-05-10 LAB
ALBUMIN SERPL-MCNC: 4 G/DL (ref 3.5–5)
ALBUMIN/GLOB SERPL: 1.1 {RATIO} (ref 1.1–2.2)
ALP SERPL-CCNC: 112 U/L (ref 45–117)
ALT SERPL-CCNC: 18 U/L (ref 12–78)
ANION GAP SERPL CALC-SCNC: 4 MMOL/L (ref 5–15)
AST SERPL-CCNC: 11 U/L (ref 15–37)
ATRIAL RATE: 66 BPM
BASOPHILS # BLD: 0 K/UL (ref 0–0.1)
BASOPHILS NFR BLD: 0 % (ref 0–1)
BILIRUB SERPL-MCNC: 0.3 MG/DL (ref 0.2–1)
BUN SERPL-MCNC: 12 MG/DL (ref 6–20)
BUN/CREAT SERPL: 14 (ref 12–20)
CALCIUM SERPL-MCNC: 9.4 MG/DL (ref 8.5–10.1)
CALCULATED P AXIS, ECG09: 11 DEGREES
CALCULATED R AXIS, ECG10: 39 DEGREES
CALCULATED T AXIS, ECG11: 24 DEGREES
CHLORIDE SERPL-SCNC: 108 MMOL/L (ref 97–108)
CO2 SERPL-SCNC: 26 MMOL/L (ref 21–32)
CREAT SERPL-MCNC: 0.85 MG/DL (ref 0.55–1.02)
D DIMER PPP FEU-MCNC: 0.28 MG/L FEU (ref 0–0.65)
DIAGNOSIS, 93000: NORMAL
DIFFERENTIAL METHOD BLD: NORMAL
EOSINOPHIL # BLD: 0.3 K/UL (ref 0–0.4)
EOSINOPHIL NFR BLD: 3 % (ref 0–7)
ERYTHROCYTE [DISTWIDTH] IN BLOOD BY AUTOMATED COUNT: 13.3 % (ref 11.5–14.5)
GLOBULIN SER CALC-MCNC: 3.6 G/DL (ref 2–4)
GLUCOSE SERPL-MCNC: 100 MG/DL (ref 65–100)
HCT VFR BLD AUTO: 35.7 % (ref 35–47)
HGB BLD-MCNC: 11.7 G/DL (ref 11.5–16)
IMM GRANULOCYTES # BLD AUTO: 0 K/UL (ref 0–0.04)
IMM GRANULOCYTES NFR BLD AUTO: 0 % (ref 0–0.5)
LYMPHOCYTES # BLD: 2.3 K/UL (ref 0.8–3.5)
LYMPHOCYTES NFR BLD: 22 % (ref 12–49)
MCH RBC QN AUTO: 29.5 PG (ref 26–34)
MCHC RBC AUTO-ENTMCNC: 32.8 G/DL (ref 30–36.5)
MCV RBC AUTO: 90.2 FL (ref 80–99)
MONOCYTES # BLD: 0.7 K/UL (ref 0–1)
MONOCYTES NFR BLD: 7 % (ref 5–13)
NEUTS SEG # BLD: 6.9 K/UL (ref 1.8–8)
NEUTS SEG NFR BLD: 68 % (ref 32–75)
NRBC # BLD: 0 K/UL (ref 0–0.01)
NRBC BLD-RTO: 0 PER 100 WBC
P-R INTERVAL, ECG05: 176 MS
PLATELET # BLD AUTO: 389 K/UL (ref 150–400)
PMV BLD AUTO: 9.7 FL (ref 8.9–12.9)
POTASSIUM SERPL-SCNC: 4.1 MMOL/L (ref 3.5–5.1)
PROT SERPL-MCNC: 7.6 G/DL (ref 6.4–8.2)
Q-T INTERVAL, ECG07: 366 MS
QRS DURATION, ECG06: 84 MS
QTC CALCULATION (BEZET), ECG08: 383 MS
RBC # BLD AUTO: 3.96 M/UL (ref 3.8–5.2)
SODIUM SERPL-SCNC: 138 MMOL/L (ref 136–145)
TROPONIN I SERPL-MCNC: <0.05 NG/ML
VENTRICULAR RATE, ECG03: 66 BPM
WBC # BLD AUTO: 10.2 K/UL (ref 3.6–11)

## 2021-05-10 PROCEDURE — 74011250636 HC RX REV CODE- 250/636: Performed by: STUDENT IN AN ORGANIZED HEALTH CARE EDUCATION/TRAINING PROGRAM

## 2021-05-10 PROCEDURE — 96374 THER/PROPH/DIAG INJ IV PUSH: CPT

## 2021-05-10 PROCEDURE — 99284 EMERGENCY DEPT VISIT MOD MDM: CPT

## 2021-05-10 PROCEDURE — 93005 ELECTROCARDIOGRAM TRACING: CPT

## 2021-05-10 PROCEDURE — 74011250637 HC RX REV CODE- 250/637: Performed by: STUDENT IN AN ORGANIZED HEALTH CARE EDUCATION/TRAINING PROGRAM

## 2021-05-10 PROCEDURE — 84484 ASSAY OF TROPONIN QUANT: CPT

## 2021-05-10 PROCEDURE — 36415 COLL VENOUS BLD VENIPUNCTURE: CPT

## 2021-05-10 PROCEDURE — 85025 COMPLETE CBC W/AUTO DIFF WBC: CPT

## 2021-05-10 PROCEDURE — 85379 FIBRIN DEGRADATION QUANT: CPT

## 2021-05-10 PROCEDURE — 71045 X-RAY EXAM CHEST 1 VIEW: CPT

## 2021-05-10 PROCEDURE — 96375 TX/PRO/DX INJ NEW DRUG ADDON: CPT

## 2021-05-10 PROCEDURE — 80053 COMPREHEN METABOLIC PANEL: CPT

## 2021-05-10 RX ORDER — HYDROCODONE BITARTRATE AND ACETAMINOPHEN 5; 325 MG/1; MG/1
1 TABLET ORAL ONCE
Status: COMPLETED | OUTPATIENT
Start: 2021-05-10 | End: 2021-05-10

## 2021-05-10 RX ORDER — PREDNISONE 50 MG/1
50 TABLET ORAL DAILY
Qty: 3 TAB | Refills: 0 | Status: SHIPPED | OUTPATIENT
Start: 2021-05-10 | End: 2021-05-13

## 2021-05-10 RX ORDER — ALPRAZOLAM 1 MG/1
TABLET ORAL
Qty: 60 TAB | Refills: 0 | Status: SHIPPED | OUTPATIENT
Start: 2021-05-10 | End: 2021-06-03

## 2021-05-10 RX ORDER — ONDANSETRON 2 MG/ML
4 INJECTION INTRAMUSCULAR; INTRAVENOUS
Status: COMPLETED | OUTPATIENT
Start: 2021-05-10 | End: 2021-05-10

## 2021-05-10 RX ORDER — MORPHINE SULFATE 2 MG/ML
4 INJECTION, SOLUTION INTRAMUSCULAR; INTRAVENOUS ONCE
Status: COMPLETED | OUTPATIENT
Start: 2021-05-10 | End: 2021-05-10

## 2021-05-10 RX ORDER — ONDANSETRON 4 MG/1
TABLET, FILM COATED ORAL
Qty: 20 TAB | Refills: 0 | Status: SHIPPED | OUTPATIENT
Start: 2021-05-10 | End: 2021-05-24

## 2021-05-10 RX ADMIN — MORPHINE SULFATE 4 MG: 2 INJECTION, SOLUTION INTRAMUSCULAR; INTRAVENOUS at 11:38

## 2021-05-10 RX ADMIN — ONDANSETRON 4 MG: 2 INJECTION INTRAMUSCULAR; INTRAVENOUS at 11:38

## 2021-05-10 RX ADMIN — HYDROCODONE BITARTRATE AND ACETAMINOPHEN 1 TABLET: 5; 325 TABLET ORAL at 13:47

## 2021-05-10 NOTE — ED NOTES
Sharp constant 8/10 CP located under L breast that began at midnight, progressively worse. Worse with deep breaths.  Hx pleurisy, lupus

## 2021-05-10 NOTE — ED NOTES
Reports pain has improved to 6/10 but still has pain deep in chest when she breathes.  Requesting more pain medication, Dr. Lui Lee made aware, no orders  received

## 2021-05-24 RX ORDER — ONDANSETRON 4 MG/1
TABLET, FILM COATED ORAL
Qty: 20 TABLET | Refills: 0 | Status: SHIPPED | OUTPATIENT
Start: 2021-05-24 | End: 2021-06-03

## 2021-05-27 RX ORDER — AZITHROMYCIN 250 MG/1
250 TABLET, FILM COATED ORAL SEE ADMIN INSTRUCTIONS
Qty: 6 TABLET | Refills: 0 | Status: SHIPPED | OUTPATIENT
Start: 2021-05-27 | End: 2021-06-11

## 2021-06-03 DIAGNOSIS — F41.9 ANXIETY: ICD-10-CM

## 2021-06-03 DIAGNOSIS — F51.01 PRIMARY INSOMNIA: ICD-10-CM

## 2021-06-03 RX ORDER — ONDANSETRON 4 MG/1
TABLET, FILM COATED ORAL
Qty: 20 TABLET | Refills: 0 | Status: SHIPPED | OUTPATIENT
Start: 2021-06-03 | End: 2021-06-16

## 2021-06-03 RX ORDER — ALPRAZOLAM 1 MG/1
TABLET ORAL
Qty: 60 TABLET | Refills: 0 | Status: SHIPPED | OUTPATIENT
Start: 2021-06-03 | End: 2021-06-28

## 2021-06-10 ENCOUNTER — TELEPHONE (OUTPATIENT)
Dept: INTERNAL MEDICINE CLINIC | Age: 36
End: 2021-06-10

## 2021-06-10 DIAGNOSIS — R05.9 COUGH: Primary | ICD-10-CM

## 2021-06-10 NOTE — TELEPHONE ENCOUNTER
#[phone] pt states she has ear pain and sore throat. Pt wanted to be seen today or tomorrow, but refused a VV with another provider. Pt would like to be seen by Dr. Lucero Singleton as soon as possible. Please call as there are no appt for me to schedule.      Pt uses [facility] on [address] if you want to call something in for her she states

## 2021-06-11 RX ORDER — AZITHROMYCIN 250 MG/1
250 TABLET, FILM COATED ORAL SEE ADMIN INSTRUCTIONS
Qty: 6 TABLET | Refills: 0 | Status: SHIPPED | OUTPATIENT
Start: 2021-06-11 | End: 2021-06-16

## 2021-06-11 RX ORDER — TIZANIDINE 4 MG/1
TABLET ORAL
Qty: 30 TABLET | Refills: 0 | Status: SHIPPED | OUTPATIENT
Start: 2021-06-11 | End: 2021-07-06

## 2021-06-11 RX ORDER — AZITHROMYCIN 250 MG/1
250 TABLET, FILM COATED ORAL DAILY
COMMUNITY
End: 2021-07-09

## 2021-06-11 NOTE — TELEPHONE ENCOUNTER
----- Message from Anupama Dewitt sent at 6/11/2021 11:04 AM EDT -----  Regarding: Dr. Kelley Puckett  General Message/Vendor Calls    Caller's first and last name: Pt       Reason for call: Patient sent a message yesterday but with no response. She is coughing and has congestion. Wants to get a Z anum and cough medicine. Callback required yes/no and why: yes, to confirm.        Best contact number(s): [phone]           Details to clarify the request: N/A      Message from [facility]

## 2021-06-13 RX ORDER — DULOXETIN HYDROCHLORIDE 60 MG/1
CAPSULE, DELAYED RELEASE ORAL
Qty: 30 CAPSULE | Refills: 5 | Status: SHIPPED | OUTPATIENT
Start: 2021-06-13 | End: 2021-09-10

## 2021-06-14 RX ORDER — CODEINE PHOSPHATE AND GUAIFENESIN 10; 100 MG/5ML; MG/5ML
5 SOLUTION ORAL
Qty: 120 ML | Refills: 0 | Status: SHIPPED | OUTPATIENT
Start: 2021-06-14 | End: 2021-07-16

## 2021-06-14 NOTE — TELEPHONE ENCOUNTER
Noted zpak called in on 6/11. Followed up with patient & she is taking this. Pt reports cough is intense, productive & causing her to throw up with coughing fits. She had an old rx of cheratussin which helps calm the cough to not vomit & is requesting a refill. Denies new sxs, fever, sob, pain or difficulty catching her breath. Discussed zpak will remain working after she completes 6 day course. To complete Rx & supportive care. Call back if sxs fail to improve or worsen.

## 2021-06-16 RX ORDER — ONDANSETRON 4 MG/1
TABLET, FILM COATED ORAL
Qty: 20 TABLET | Refills: 0 | Status: SHIPPED | OUTPATIENT
Start: 2021-06-16 | End: 2021-07-01

## 2021-06-18 RX ORDER — METOPROLOL SUCCINATE 25 MG/1
TABLET, EXTENDED RELEASE ORAL
Qty: 30 TABLET | Refills: 2 | Status: ON HOLD | OUTPATIENT
Start: 2021-06-18 | End: 2021-08-29

## 2021-06-28 DIAGNOSIS — F41.9 ANXIETY: ICD-10-CM

## 2021-06-28 DIAGNOSIS — F51.01 PRIMARY INSOMNIA: ICD-10-CM

## 2021-06-28 RX ORDER — ALPRAZOLAM 1 MG/1
TABLET ORAL
Qty: 60 TABLET | Refills: 0 | Status: SHIPPED | OUTPATIENT
Start: 2021-06-28 | End: 2021-07-30 | Stop reason: SDUPTHER

## 2021-06-28 NOTE — TELEPHONE ENCOUNTER
----- Message from Jax Quinones sent at 6/28/2021 12:15 PM EDT -----  Regarding: Dr. Twyla Burr  Medication Refill    Caller (if not patient): pt       Relationship of caller (if not patient): self       Best contact number(s): [phone]      Name of medication and dosage if known:  ALPRAZolam (XANAX) 1 mg tablet   guaiFENesin-codeine (Cheratussin AC) 100-10 mg/5 mL solution     Is patient out of this medication (yes/no): yes       Pharmacy name: [facility]    Pharmacy listed in chart? (yes/no): yes   Pharmacy phone number:  [phone]    Message from [facility]

## 2021-07-01 RX ORDER — ONDANSETRON 4 MG/1
TABLET, FILM COATED ORAL
Qty: 20 TABLET | Refills: 0 | Status: SHIPPED | OUTPATIENT
Start: 2021-07-01 | End: 2021-07-13

## 2021-07-06 RX ORDER — TIZANIDINE 4 MG/1
TABLET ORAL
Qty: 30 TABLET | Refills: 0 | Status: SHIPPED | OUTPATIENT
Start: 2021-07-06 | End: 2021-07-19

## 2021-07-09 RX ORDER — AZITHROMYCIN 250 MG/1
TABLET, FILM COATED ORAL
Qty: 6 TABLET | Refills: 0 | Status: SHIPPED | OUTPATIENT
Start: 2021-07-09 | End: 2021-08-05

## 2021-07-13 RX ORDER — ONDANSETRON 4 MG/1
TABLET, FILM COATED ORAL
Qty: 20 TABLET | Refills: 0 | Status: SHIPPED | OUTPATIENT
Start: 2021-07-13 | End: 2021-08-05

## 2021-07-15 DIAGNOSIS — R05.9 COUGH: ICD-10-CM

## 2021-07-16 RX ORDER — CODEINE PHOSPHATE AND GUAIFENESIN 10; 100 MG/5ML; MG/5ML
SOLUTION ORAL
Qty: 100 ML | Refills: 0 | Status: SHIPPED | OUTPATIENT
Start: 2021-07-16 | End: 2021-11-22

## 2021-07-17 ENCOUNTER — HOSPITAL ENCOUNTER (EMERGENCY)
Age: 36
Discharge: HOME | End: 2021-07-17
Attending: EMERGENCY MEDICINE | Admitting: EMERGENCY MEDICINE
Payer: COMMERCIAL

## 2021-07-17 ENCOUNTER — APPOINTMENT (OUTPATIENT)
Dept: CT IMAGING | Age: 36
End: 2021-07-17
Attending: EMERGENCY MEDICINE
Payer: COMMERCIAL

## 2021-07-17 VITALS
SYSTOLIC BLOOD PRESSURE: 152 MMHG | HEIGHT: 62 IN | TEMPERATURE: 98.1 F | OXYGEN SATURATION: 100 % | RESPIRATION RATE: 20 BRPM | DIASTOLIC BLOOD PRESSURE: 85 MMHG | WEIGHT: 253.97 LBS | HEART RATE: 76 BPM | BODY MASS INDEX: 46.74 KG/M2

## 2021-07-17 DIAGNOSIS — R10.814 LEFT LOWER QUADRANT ABDOMINAL TENDERNESS WITHOUT REBOUND TENDERNESS: Primary | ICD-10-CM

## 2021-07-17 LAB
ALBUMIN SERPL-MCNC: 3.9 G/DL (ref 3.5–5)
ALBUMIN/GLOB SERPL: 1.1 {RATIO} (ref 1.1–2.2)
ALP SERPL-CCNC: 112 U/L (ref 45–117)
ALT SERPL-CCNC: 20 U/L (ref 12–78)
ANION GAP SERPL CALC-SCNC: 4 MMOL/L (ref 5–15)
APPEARANCE UR: CLEAR
AST SERPL-CCNC: 9 U/L (ref 15–37)
BACTERIA URNS QL MICRO: NEGATIVE /HPF
BASOPHILS # BLD: 0.1 K/UL (ref 0–0.1)
BASOPHILS NFR BLD: 1 % (ref 0–1)
BILIRUB SERPL-MCNC: 0.2 MG/DL (ref 0.2–1)
BILIRUB UR QL: NEGATIVE
BUN SERPL-MCNC: 15 MG/DL (ref 6–20)
BUN/CREAT SERPL: 18 (ref 12–20)
CALCIUM SERPL-MCNC: 9.2 MG/DL (ref 8.5–10.1)
CHLORIDE SERPL-SCNC: 108 MMOL/L (ref 97–108)
CO2 SERPL-SCNC: 28 MMOL/L (ref 21–32)
COLOR UR: ABNORMAL
CREAT SERPL-MCNC: 0.84 MG/DL (ref 0.55–1.02)
DIFFERENTIAL METHOD BLD: ABNORMAL
EOSINOPHIL # BLD: 0.4 K/UL (ref 0–0.4)
EOSINOPHIL NFR BLD: 5 % (ref 0–7)
EPITH CASTS URNS QL MICRO: ABNORMAL /LPF
ERYTHROCYTE [DISTWIDTH] IN BLOOD BY AUTOMATED COUNT: 13.4 % (ref 11.5–14.5)
GLOBULIN SER CALC-MCNC: 3.4 G/DL (ref 2–4)
GLUCOSE SERPL-MCNC: 93 MG/DL (ref 65–100)
GLUCOSE UR STRIP.AUTO-MCNC: NEGATIVE MG/DL
HCT VFR BLD AUTO: 35.7 % (ref 35–47)
HGB BLD-MCNC: 11.7 G/DL (ref 11.5–16)
HGB UR QL STRIP: ABNORMAL
IMM GRANULOCYTES # BLD AUTO: 0 K/UL (ref 0–0.04)
IMM GRANULOCYTES NFR BLD AUTO: 0 % (ref 0–0.5)
KETONES UR QL STRIP.AUTO: NEGATIVE MG/DL
LEUKOCYTE ESTERASE UR QL STRIP.AUTO: NEGATIVE
LIPASE SERPL-CCNC: 98 U/L (ref 73–393)
LYMPHOCYTES # BLD: 3.6 K/UL (ref 0.8–3.5)
LYMPHOCYTES NFR BLD: 42 % (ref 12–49)
MCH RBC QN AUTO: 30.4 PG (ref 26–34)
MCHC RBC AUTO-ENTMCNC: 32.8 G/DL (ref 30–36.5)
MCV RBC AUTO: 92.7 FL (ref 80–99)
MONOCYTES # BLD: 0.9 K/UL (ref 0–1)
MONOCYTES NFR BLD: 11 % (ref 5–13)
NEUTS SEG # BLD: 3.5 K/UL (ref 1.8–8)
NEUTS SEG NFR BLD: 41 % (ref 32–75)
NITRITE UR QL STRIP.AUTO: NEGATIVE
NRBC # BLD: 0 K/UL (ref 0–0.01)
NRBC BLD-RTO: 0 PER 100 WBC
PH UR STRIP: 5.5 [PH] (ref 5–8)
PLATELET # BLD AUTO: 313 K/UL (ref 150–400)
PMV BLD AUTO: 9.8 FL (ref 8.9–12.9)
POTASSIUM SERPL-SCNC: 4 MMOL/L (ref 3.5–5.1)
PROT SERPL-MCNC: 7.3 G/DL (ref 6.4–8.2)
PROT UR STRIP-MCNC: NEGATIVE MG/DL
RBC # BLD AUTO: 3.85 M/UL (ref 3.8–5.2)
RBC #/AREA URNS HPF: >100 /HPF (ref 0–5)
SODIUM SERPL-SCNC: 140 MMOL/L (ref 136–145)
SP GR UR REFRACTOMETRY: 1.02 (ref 1–1.03)
UA: UC IF INDICATED,UAUC: ABNORMAL
UROBILINOGEN UR QL STRIP.AUTO: 0.2 EU/DL (ref 0.2–1)
WBC # BLD AUTO: 8.5 K/UL (ref 3.6–11)
WBC URNS QL MICRO: ABNORMAL /HPF (ref 0–4)

## 2021-07-17 PROCEDURE — 74176 CT ABD & PELVIS W/O CONTRAST: CPT

## 2021-07-17 PROCEDURE — 36415 COLL VENOUS BLD VENIPUNCTURE: CPT

## 2021-07-17 PROCEDURE — 81001 URINALYSIS AUTO W/SCOPE: CPT

## 2021-07-17 PROCEDURE — 74011250636 HC RX REV CODE- 250/636: Performed by: EMERGENCY MEDICINE

## 2021-07-17 PROCEDURE — 96375 TX/PRO/DX INJ NEW DRUG ADDON: CPT

## 2021-07-17 PROCEDURE — 85025 COMPLETE CBC W/AUTO DIFF WBC: CPT

## 2021-07-17 PROCEDURE — 74011250637 HC RX REV CODE- 250/637: Performed by: EMERGENCY MEDICINE

## 2021-07-17 PROCEDURE — 83690 ASSAY OF LIPASE: CPT

## 2021-07-17 PROCEDURE — 96374 THER/PROPH/DIAG INJ IV PUSH: CPT

## 2021-07-17 PROCEDURE — 80053 COMPREHEN METABOLIC PANEL: CPT

## 2021-07-17 PROCEDURE — 96376 TX/PRO/DX INJ SAME DRUG ADON: CPT

## 2021-07-17 PROCEDURE — 99283 EMERGENCY DEPT VISIT LOW MDM: CPT

## 2021-07-17 RX ORDER — OXYCODONE HYDROCHLORIDE 5 MG/1
5 TABLET ORAL
Status: COMPLETED | OUTPATIENT
Start: 2021-07-17 | End: 2021-07-17

## 2021-07-17 RX ORDER — MORPHINE SULFATE 2 MG/ML
4 INJECTION, SOLUTION INTRAMUSCULAR; INTRAVENOUS ONCE
Status: COMPLETED | OUTPATIENT
Start: 2021-07-17 | End: 2021-07-17

## 2021-07-17 RX ORDER — ONDANSETRON 2 MG/ML
4 INJECTION INTRAMUSCULAR; INTRAVENOUS
Status: COMPLETED | OUTPATIENT
Start: 2021-07-17 | End: 2021-07-17

## 2021-07-17 RX ORDER — HYDROMORPHONE HYDROCHLORIDE 1 MG/ML
1 INJECTION, SOLUTION INTRAMUSCULAR; INTRAVENOUS; SUBCUTANEOUS
Status: COMPLETED | OUTPATIENT
Start: 2021-07-17 | End: 2021-07-17

## 2021-07-17 RX ORDER — OXYCODONE HYDROCHLORIDE 5 MG/1
5 TABLET ORAL
Qty: 4 TABLET | Refills: 0 | Status: SHIPPED | OUTPATIENT
Start: 2021-07-17 | End: 2021-07-20

## 2021-07-17 RX ORDER — ONDANSETRON 4 MG/1
4 TABLET, ORALLY DISINTEGRATING ORAL
Qty: 20 TABLET | Refills: 0 | Status: SHIPPED | OUTPATIENT
Start: 2021-07-17 | End: 2021-09-10

## 2021-07-17 RX ADMIN — OXYCODONE 5 MG: 5 TABLET ORAL at 09:55

## 2021-07-17 RX ADMIN — ONDANSETRON 4 MG: 2 INJECTION INTRAMUSCULAR; INTRAVENOUS at 07:38

## 2021-07-17 RX ADMIN — MORPHINE SULFATE 4 MG: 2 INJECTION, SOLUTION INTRAMUSCULAR; INTRAVENOUS at 07:38

## 2021-07-17 RX ADMIN — SODIUM CHLORIDE 1000 ML: 9 INJECTION, SOLUTION INTRAVENOUS at 07:37

## 2021-07-17 RX ADMIN — HYDROMORPHONE HYDROCHLORIDE 1 MG: 1 INJECTION, SOLUTION INTRAMUSCULAR; INTRAVENOUS; SUBCUTANEOUS at 08:57

## 2021-07-17 RX ADMIN — ONDANSETRON 4 MG: 2 INJECTION INTRAMUSCULAR; INTRAVENOUS at 09:56

## 2021-07-17 NOTE — ED PROVIDER NOTES
EMERGENCY DEPARTMENT HISTORY AND PHYSICAL EXAM      Date: 7/17/2021  Patient Name: Mikael Smith    History of Presenting Illness     Chief Complaint   Patient presents with    Abdominal Pain     Patient states that she is having pain in her lower left abdomen. She has a history of kidney stones. She took 4 advil around 0300 and pain has gotten increasingly worse. Patient also has vomited \"a couple times\"       History Provided By: Patient    HPI: Mikael Smith, 38 y.o. female with history of nephrolithiasis, GERD, colitis, Mariah-Danlos syndrome presents to the ED with cc of left lower quadrant abdominal pain. Symptoms onset at 3 AM this morning waking the patient up from sleep. They are described as severe sharp stabbing pains located to left lower quadrant left pelvic area without radiation. No radiation into the flank. She denies any associated urinary symptoms or change in bowel habits. Denies any fevers, chills, chest pain, shortness of breath. She does endorse nausea with multiple episodes of nonbloody nonbilious episode since this morning when pain onset. She does have history of recurrent nephrolithiasis as well as history of ovarian cyst, she is status post hysterectomy and right oophorectomy but still has her left ovary. Currently pain is described as severe 10/10. There are no other complaints, changes, or physical findings at this time. PCP: Haydee Field MD    No current facility-administered medications on file prior to encounter. Current Outpatient Medications on File Prior to Encounter   Medication Sig Dispense Refill    guaiFENesin-codeine (ROBITUSSIN AC) 100-10 mg/5 mL solution TAKE 5 ML BY MOUTH (3) TIMES DAILY AS NEEDED FOR COUGH FOR UP TO 14 DAYS. MAX DAILY AMOUNT: 15 ML.  100 mL 0    ondansetron hcl (ZOFRAN) 4 mg tablet TAKE 1 TABLET BY MOUTH EVERY 8 HOURS AS NEEDED FOR NAUSEA 20 Tablet 0    azithromycin (ZITHROMAX) 250 mg tablet TAKE 1 TAB BY MOUTH SEE ADMIN INSTRUCTIONS FOR 5 DAYS. 6 Tablet 0    tiZANidine (ZANAFLEX) 4 mg tablet TAKE 1 TABLET BY MOUTH 3 TIMES A DAY AS NEEDED FOR PAIN 30 Tablet 0    ALPRAZolam (XANAX) 1 mg tablet TAKE 1 TABLET BY MOUTH TWICE A DAY AS NEEDED FOR ANXIETY 60 Tablet 0    metoprolol succinate (TOPROL-XL) 25 mg XL tablet TAKE 1 TABLET BY MOUTH EVERY DAY AT NIGHT 30 Tablet 2    DULoxetine (CYMBALTA) 60 mg capsule TAKE 2 CAPSULESBY MOUTH EVERY DAY 30 Capsule 5    naloxone (NARCAN) 4 mg/actuation nasal spray Use 1 spray intranasally, then discard. Repeat with new spray every 2 min as needed for opioid overdose symptoms, alternating nostrils. 2 Each 0    mirtazapine (REMERON) 30 mg tablet TAKE 1 TABLET BY MOUTH AT BEDTIME 30 Tab 5    mupirocin (BACTROBAN) 2 % ointment Apply  to affected area daily. 22 g 0    [DISCONTINUED] ondansetron (Zofran ODT) 4 mg disintegrating tablet Take 1 Tab by mouth every eight (8) hours as needed for Nausea. 10 Tab 0    ketorolac (TORADOL) 10 mg tablet Take 1 Tab by mouth every six (6) hours as needed for Pain. 20 Tab 0    olmesartan (BENICAR) 5 mg tablet TAKE 1 TABLET BY MOUTH DAILY 90 Tab 3    naproxen (NAPROSYN) 500 mg tablet TK 1 TA PO BID      RisaQuad-2 16 billion cell cap cap TAKE ONE CAPSULE BY MOUTH ONCE DAILY 30 Cap 1    albuterol (PROVENTIL HFA, VENTOLIN HFA, PROAIR HFA) 90 mcg/actuation inhaler Take 1 Puff by inhalation every six (6) hours as needed for Wheezing. 1 Inhaler 0    furosemide (LASIX) 20 mg tablet PRN  0    estradiol (ESTRACE) 1 mg tablet Take 1 mg by mouth daily. 0    indomethacin (INDOCIN) 25 mg capsule PRN      pantoprazole (PROTONIX) 40 mg tablet take 1 tablet by mouth at bedtime  0    codeine-butalbital-acetaminophen-caffeine (FIORICET WITH CODEINE) -20-30 mg capsule take 2 capsules by mouth every 6 hours if needed for MIGRAINE HEADACHE 90 Cap 0    rizatriptan (MAXALT) 10 mg tablet Take 10 mg by mouth once as needed for Migraine.  May repeat in 2 hours if needed  belimumab (Benlysta) 400 mg solr injection by IntraVENous route. Past History     Past Medical History:  Past Medical History:   Diagnosis Date    Abnormal CT of the abdomen 11/8/2012    Asthma     Autoimmune disease (HCC)     lupus    C. difficile diarrhea     Cervical prolapse     Dehydration     Mariah-Danlos syndrome     Gastrointestinal disorder     gerd, twisted colon, IBS    GERD (gastroesophageal reflux disease)     Headache(784.0)     Lupus (HCC)     Morbid obesity (HCC)     Nausea & vomiting     Neurological disorder     cluster headaches    Occipital neuralgia     other 2006, 2009    ovarian cyst removal    Other ill-defined conditions(799.89)     Worsening headaches        Past Surgical History:  Past Surgical History:   Procedure Laterality Date    COLONOSCOPY  9/21/2010         HX CHOLECYSTECTOMY      HX CYST INCISION AND DRAINAGE Right 02/27/2020    HX GYN  1/2009    right salpingo oopherectomy    HX GYN  2006    right ovarian tumor removed    HX HEENT      left wisdom teeth removal    HX HEENT      top right wisdom tooth removed    HX HERNIA REPAIR  4/2012    HX HERNIA REPAIR  2/28/13    Laparoscopic recurrent incisional hernia repair    HX HYSTERECTOMY      2016    HX UROLOGICAL      Kidney Stone Removal    [place] EGD TRANSORAL BIOPSY SINGLE/MULTIPLE  9/22/2010            Family History:  Family History   Problem Relation Age of Onset    Colon Cancer Maternal Grandfather        Social History:  Social History     Tobacco Use    Smoking status: Never Smoker    Smokeless tobacco: Never Used   Vaping Use    Vaping Use: Never used   Substance Use Topics    Alcohol use: No    Drug use: No       Allergies:   Allergies   Allergen Reactions    Latex Itching     burning    Compazine [Prochlorperazine] Anxiety     High heart rate  Pt can take promethazine with no problems    Sulfa (Sulfonamide Antibiotics) Unknown (comments)    Topamax [Topiramate] Unknown (comments)    Adhesive Rash     Allergic to Dermabond    Clindamycin Diarrhea     Pt states caused her C-Diff    Mushroom Other (comments)    Mushroom Combination No.1 Unknown (comments)    Toradol [Ketorolac Tromethamine] Nausea and Vomiting and Other (comments)     PO & IV causes GI bleed  Tolerated during Feb 2020 stay    Valproic Acid Other (comments)     Elevated heart rate and vomiting           Review of Systems   Review of Systems   Constitutional: Negative for chills and fever. Respiratory: Negative for cough and shortness of breath. Cardiovascular: Negative for chest pain and leg swelling. Gastrointestinal: Positive for abdominal pain, nausea and vomiting. Negative for constipation and diarrhea. Genitourinary: Positive for pelvic pain. Negative for dysuria, flank pain and hematuria. Musculoskeletal: Negative for back pain and gait problem. Skin: Negative for color change and rash. Neurological: Negative for dizziness, weakness, light-headedness and headaches. Hematological: Does not bruise/bleed easily. All other systems reviewed and are negative. Physical Exam   Physical Exam  Vitals and nursing note reviewed. Constitutional:       General: She is in acute distress. Appearance: Normal appearance. She is obese. She is not ill-appearing or toxic-appearing. Comments: Appears very uncomfortable, tearful, due to pain   HENT:      Head: Normocephalic and atraumatic. Nose: Nose normal.      Mouth/Throat:      Mouth: Mucous membranes are moist.   Eyes:      Extraocular Movements: Extraocular movements intact. Pupils: Pupils are equal, round, and reactive to light. Cardiovascular:      Rate and Rhythm: Normal rate and regular rhythm. Heart sounds: No murmur heard. Pulmonary:      Effort: Pulmonary effort is normal. No respiratory distress. Breath sounds: Normal breath sounds. No wheezing. Abdominal:      General: There is no distension. Palpations: Abdomen is soft. Tenderness: There is abdominal tenderness in the left lower quadrant. There is guarding. There is no right CVA tenderness, left CVA tenderness or rebound. Musculoskeletal:         General: No swelling or tenderness. Normal range of motion. Cervical back: Normal range of motion and neck supple. Right lower leg: No edema. Left lower leg: No edema. Skin:     General: Skin is warm and dry. Coloration: Skin is not pale. Findings: No erythema. Neurological:      General: No focal deficit present. Mental Status: She is alert and oriented to person, place, and time. Diagnostic Study Results     Labs -     Recent Results (from the past 12 hour(s))   CBC WITH AUTOMATED DIFF    Collection Time: 07/17/21  7:31 AM   Result Value Ref Range    WBC 8.5 3.6 - 11.0 K/uL    RBC 3.85 3.80 - 5.20 M/uL    HGB 11.7 11.5 - 16.0 g/dL    HCT 35.7 35.0 - 47.0 %    MCV 92.7 80.0 - 99.0 FL    MCH 30.4 26.0 - 34.0 PG    MCHC 32.8 30.0 - 36.5 g/dL    RDW 13.4 11.5 - 14.5 %    PLATELET 272 779 - 674 K/uL    MPV 9.8 8.9 - 12.9 FL    NRBC 0.0 0  WBC    ABSOLUTE NRBC 0.00 0.00 - 0.01 K/uL    NEUTROPHILS 41 32 - 75 %    LYMPHOCYTES 42 12 - 49 %    MONOCYTES 11 5 - 13 %    EOSINOPHILS 5 0 - 7 %    BASOPHILS 1 0 - 1 %    IMMATURE GRANULOCYTES 0 0.0 - 0.5 %    ABS. NEUTROPHILS 3.5 1.8 - 8.0 K/UL    ABS. LYMPHOCYTES 3.6 (H) 0.8 - 3.5 K/UL    ABS. MONOCYTES 0.9 0.0 - 1.0 K/UL    ABS. EOSINOPHILS 0.4 0.0 - 0.4 K/UL    ABS. BASOPHILS 0.1 0.0 - 0.1 K/UL    ABS. IMM.  GRANS. 0.0 0.00 - 0.04 K/UL    DF AUTOMATED     METABOLIC PANEL, COMPREHENSIVE    Collection Time: 07/17/21  7:31 AM   Result Value Ref Range    Sodium 140 136 - 145 mmol/L    Potassium 4.0 3.5 - 5.1 mmol/L    Chloride 108 97 - 108 mmol/L    CO2 28 21 - 32 mmol/L    Anion gap 4 (L) 5 - 15 mmol/L    Glucose 93 65 - 100 mg/dL    BUN 15 6 - 20 MG/DL    Creatinine 0.84 0.55 - 1.02 MG/DL    BUN/Creatinine ratio 18 12 - 20      GFR est AA >60 >60 ml/min/1.73m2    GFR est non-AA >60 >60 ml/min/1.73m2    Calcium 9.2 8.5 - 10.1 MG/DL    Bilirubin, total 0.2 0.2 - 1.0 MG/DL    ALT (SGPT) 20 12 - 78 U/L    AST (SGOT) 9 (L) 15 - 37 U/L    Alk. phosphatase 112 45 - 117 U/L    Protein, total 7.3 6.4 - 8.2 g/dL    Albumin 3.9 3.5 - 5.0 g/dL    Globulin 3.4 2.0 - 4.0 g/dL    A-G Ratio 1.1 1.1 - 2.2     LIPASE    Collection Time: 07/17/21  7:31 AM   Result Value Ref Range    Lipase 98 73 - 393 U/L   URINALYSIS W/ REFLEX CULTURE    Collection Time: 07/17/21  9:01 AM    Specimen: Urine   Result Value Ref Range    Color YELLOW/STRAW      Appearance CLEAR CLEAR      Specific gravity 1.016 1.003 - 1.030      pH (UA) 5.5 5.0 - 8.0      Protein Negative NEG mg/dL    Glucose Negative NEG mg/dL    Ketone Negative NEG mg/dL    Bilirubin Negative NEG      Blood LARGE (A) NEG      Urobilinogen 0.2 0.2 - 1.0 EU/dL    Nitrites Negative NEG      Leukocyte Esterase Negative NEG      WBC 5-10 0 - 4 /hpf    RBC >100 (H) 0 - 5 /hpf    Epithelial cells FEW FEW /lpf    Bacteria Negative NEG /hpf    UA:UC IF INDICATED CULTURE NOT INDICATED BY UA RESULT CNI         Radiologic Studies -   CT ABD PELV WO CONT   Final Result      1. No acute abdominal or pelvic abnormality. 2. Stable nonobstructing intrarenal calcifications and left renal duplication. 3. Status post hysterectomy, cholecystectomy and possible appendectomy. 4. Bilateral pulmonary nodules in the visualized lower lobes. These were not   definitely visualized on the prior study. Correlate with known clinical history   and in the interim imaging. CT Results  (Last 48 hours)               07/17/21 0832  CT ABD PELV WO CONT Final result    Impression:      1. No acute abdominal or pelvic abnormality. 2. Stable nonobstructing intrarenal calcifications and left renal duplication. 3. Status post hysterectomy, cholecystectomy and possible appendectomy.    4. Bilateral pulmonary nodules in the visualized lower lobes. These were not   definitely visualized on the prior study. Correlate with known clinical history   and in the interim imaging. Narrative:  EXAM: CT ABD PELV WO CONT       INDICATION: LLQ pain, h/o stone and ovarian cyst . History of hysterectomy and   right colectomy. Jerris Backer, lupus. COMPARISON: 3/17/2021       CONTRAST:  None. TECHNIQUE:    Thin axial images were obtained through the abdomen and pelvis. Coronal and   sagittal reformats were generated. Oral contrast was not administered. CT dose   reduction was achieved through use of a standardized protocol tailored for this   examination and automatic exposure control for dose modulation. The absence of intravenous contrast material reduces the sensitivity for   evaluation of the vasculature and solid organs. FINDINGS:    LOWER THORAX: Right lower lobe nodule 0.7 cm image 10. Left lower lobe nodule   image 16, 1.5 cm. LIVER: No mass. BILIARY TREE: The gallbladder surgically absent. CBD is not dilated. SPLEEN: within normal limits. PANCREAS: No focal abnormality. ADRENALS: Unremarkable. KIDNEYS/URETERS: Bilateral nonobstructing intrarenal calculi. Stable cortical   hypodense subcentimeter mass to small to characterize but likely representing a   cyst. The left intrarenal collecting system and proximal ureters are duplicated   without dilatation. STOMACH: Unremarkable. SMALL BOWEL: No dilatation or wall thickening. COLON: No dilatation or wall thickening. APPENDIX: Not seen, but no acute appendiceal abnormality is suspected. PERITONEUM: No ascites or pneumoperitoneum. RETROPERITONEUM: No lymphadenopathy or aortic aneurysm. REPRODUCTIVE ORGANS: The uterus is surgically absent. URINARY BLADDER: The urinary bladder is incompletely distended with wall   thickening likely on that basis. BONES: No destructive bone lesion.    ABDOMINAL WALL: Stable small upper ventral hernia containing fat only. ADDITIONAL COMMENTS: N/A               CXR Results  (Last 48 hours)    None          Medical Decision Making   I am the first provider for this patient. I reviewed the vital signs, available nursing notes, past medical history, past surgical history, family history and social history. Vital Signs-Reviewed the patient's vital signs. Patient Vitals for the past 12 hrs:   Temp Pulse Resp BP SpO2   07/17/21 0703 98.1 °F (36.7 °C) 76 20 (!) 152/85 100 %     Records Reviewed: Nursing Notes, Old Medical Records, Previous Radiology Studies and Previous Laboratory Studies  I personally reviewed ED provider records from May 2021. Provider Notes (Medical Decision Making):   23-year-old female here with left lower quadrant abdominal pain. Exam as above, she appears very uncomfortable and tearful due to the pain. She is afebrile and vital signs are stable. She does have history of nephrolithiasis as well as left ovarian cyst and this is her concern today. Will evaluate with blood work, urinalysis, CT imaging. If CT negative and patient is still in significant amount of pain may require TVUS for further evaluation. Will treat symptomatically and reassess. CT negative, urinalysis consistent with some hematuria. Offered TVUS but patient would like to decline. Symptoms likely consistent with passed ureteral stone. Patient is encouraged to follow-up with PCP and given strict return ED precautions. All questions answered and she agrees with plan as above. ED Course:   Initial assessment performed. The patients presenting problems have been discussed, and they are in agreement with the care plan formulated and outlined with them. I have encouraged them to ask questions as they arise throughout their visit. Discharge Note:  The patient has been re-evaluated and is ready for discharge. Reviewed available results with patient. Counseled patient on diagnosis and care plan.  Patient has expressed understanding, and all questions have been answered. Patient agrees with plan and agrees to follow up as recommended, or to return to the ED if their symptoms worsen. Discharge instructions have been provided and explained to the patient, along with reasons to return to the ED. Disposition:  Discharge      DISCHARGE PLAN:  1. Discharge Medication List as of 7/17/2021  9:49 AM      START taking these medications    Details   ondansetron (Zofran ODT) 4 mg disintegrating tablet 1 Tablet by SubLINGual route every eight (8) hours as needed for Nausea or Vomiting., Normal, Disp-20 Tablet, R-0      oxyCODONE IR (Roxicodone) 5 mg immediate release tablet Take 1 Tablet by mouth every six (6) hours as needed for Pain for up to 3 days. Max Daily Amount: 20 mg., Normal, Disp-4 Tablet, R-0         CONTINUE these medications which have NOT CHANGED    Details   guaiFENesin-codeine (ROBITUSSIN AC) 100-10 mg/5 mL solution TAKE 5 ML BY MOUTH (3) TIMES DAILY AS NEEDED FOR COUGH FOR UP TO 14 DAYS. MAX DAILY AMOUNT: 15 ML., Normal, Disp-100 mL, R-0Not to exceed 5 additional fills before 12/11/2021      ondansetron hcl (ZOFRAN) 4 mg tablet TAKE 1 TABLET BY MOUTH EVERY 8 HOURS AS NEEDED FOR NAUSEA, Normal, Disp-20 Tablet, R-0      azithromycin (ZITHROMAX) 250 mg tablet TAKE 1 TAB BY MOUTH SEE ADMIN INSTRUCTIONS FOR 5 DAYS., Normal, Disp-6 Tablet, R-0      tiZANidine (ZANAFLEX) 4 mg tablet TAKE 1 TABLET BY MOUTH 3 TIMES A DAY AS NEEDED FOR PAIN, Normal, Disp-30 Tablet, R-0      ALPRAZolam (XANAX) 1 mg tablet TAKE 1 TABLET BY MOUTH TWICE A DAY AS NEEDED FOR ANXIETY, Normal, Disp-60 Tablet, R-0Not to exceed 4 additional fills before 11/30/2021 DX Code Needed  .       metoprolol succinate (TOPROL-XL) 25 mg XL tablet TAKE 1 TABLET BY MOUTH EVERY DAY AT NIGHT, Normal, Disp-30 Tablet, R-2      DULoxetine (CYMBALTA) 60 mg capsule TAKE 2 CAPSULESBY MOUTH EVERY DAY, Normal, Disp-30 Capsule, R-5      naloxone (NARCAN) 4 mg/actuation nasal spray Use 1 spray intranasally, then discard. Repeat with new spray every 2 min as needed for opioid overdose symptoms, alternating nostrils. , Normal, Disp-2 Each, R-0      mirtazapine (REMERON) 30 mg tablet TAKE 1 TABLET BY MOUTH AT BEDTIME, Normal, Disp-30 Tab, R-5      mupirocin (BACTROBAN) 2 % ointment Apply  to affected area daily. , Normal, Disp-22 g, R-0      ketorolac (TORADOL) 10 mg tablet Take 1 Tab by mouth every six (6) hours as needed for Pain., Normal, Disp-20 Tab,R-0      olmesartan (BENICAR) 5 mg tablet TAKE 1 TABLET BY MOUTH DAILY, Normal, Disp-90 Tab,R-3      naproxen (NAPROSYN) 500 mg tablet TK 1 TA PO BID, Historical Med      RisaQuad-2 16 billion cell cap cap TAKE ONE CAPSULE BY MOUTH ONCE DAILY, Normal, Disp-30 Cap, R-1      albuterol (PROVENTIL HFA, VENTOLIN HFA, PROAIR HFA) 90 mcg/actuation inhaler Take 1 Puff by inhalation every six (6) hours as needed for Wheezing., Normal, Disp-1 Inhaler, R-0      furosemide (LASIX) 20 mg tablet PRN, Historical Med, R-0      estradiol (ESTRACE) 1 mg tablet Take 1 mg by mouth daily. , Historical Med, R-0      indomethacin (INDOCIN) 25 mg capsule PRN, Historical Med      pantoprazole (PROTONIX) 40 mg tablet take 1 tablet by mouth at bedtime, Historical Med, R-0      codeine-butalbital-acetaminophen-caffeine (FIORICET WITH CODEINE) -38-30 mg capsule take 2 capsules by mouth every 6 hours if needed for MIGRAINE HEADACHE, Print, Disp-90 Cap, R-0      rizatriptan (MAXALT) 10 mg tablet Take 10 mg by mouth once as needed for Migraine. May repeat in 2 hours if needed, Historical Med      belimumab (Benlysta) 400 mg solr injection by IntraVENous route., Historical Med           2.    Follow-up Information     Follow up With Specialties Details Why Contact Info    Mendoza Johns MD Internal Medicine Schedule an appointment as soon as possible for a visit   [address] [place] [address]  [phone]      [facility] EMERGENCY DEPT Emergency Medicine Go to  As needed, If symptoms worsen [address]  [address] [facility]  [phone]        3. Return to ED if worse     Diagnosis     Clinical Impression:   1. Left lower quadrant abdominal tenderness without rebound tenderness        Attestations:  I am the first and primary provider of record for this patient's ED encounter. I personally performed the services described above in this documentation. Radha Suarez MD    Please note that this dictation was completed with [employer], the computer voice recognition software. Quite often unanticipated grammatical, syntax, homophones, and other interpretive errors are inadvertently transcribed by the computer software. Please disregard these errors. Please excuse any errors that have escaped final proofreading. Thank you.

## 2021-07-17 NOTE — DISCHARGE INSTRUCTIONS
You were evaluated in the emergency department for left lower quadrant abdominal pain. Your examination was reassuring as was your work-up including CT scan, urinalysis, and blood work. It will be important for you to follow-up with your primary care physician in 3-7 days. If you develop worsening symptoms such as fevers, worsening pain, or inability to eat or drink, please return to the emergency department immediately. It was a pleasure taking care of you in our Emergency Department today. We know that when you come to [facility], you are entrusting us with your health, comfort, and safety. Our physicians and nurses honor that trust, and truly appreciate the opportunity to care for you and your loved ones. We also value your feedback. If you receive a survey about your Emergency Department experience today, please fill it out. We care about our patients' feedback, and we listen to what you have to say. Thank you!

## 2021-07-19 RX ORDER — TIZANIDINE 4 MG/1
TABLET ORAL
Qty: 30 TABLET | Refills: 0 | Status: SHIPPED | OUTPATIENT
Start: 2021-07-19 | End: 2021-08-23

## 2021-07-30 DIAGNOSIS — F51.01 PRIMARY INSOMNIA: ICD-10-CM

## 2021-07-30 DIAGNOSIS — F41.9 ANXIETY: ICD-10-CM

## 2021-07-30 RX ORDER — ALPRAZOLAM 1 MG/1
TABLET ORAL
Qty: 60 TABLET | Refills: 1 | Status: SHIPPED | OUTPATIENT
Start: 2021-07-30 | End: 2021-09-24

## 2021-08-01 RX ORDER — MIRTAZAPINE 30 MG/1
TABLET, FILM COATED ORAL
Qty: 30 TABLET | Refills: 5 | Status: SHIPPED | OUTPATIENT
Start: 2021-08-01 | End: 2022-02-03

## 2021-08-05 RX ORDER — ONDANSETRON 4 MG/1
TABLET, FILM COATED ORAL
Qty: 20 TABLET | Refills: 0 | Status: SHIPPED | OUTPATIENT
Start: 2021-08-05 | End: 2021-08-28

## 2021-08-05 RX ORDER — AZITHROMYCIN 250 MG/1
TABLET, FILM COATED ORAL
Qty: 6 TABLET | Refills: 0 | Status: SHIPPED | OUTPATIENT
Start: 2021-08-05 | End: 2021-08-29

## 2021-08-21 ENCOUNTER — APPOINTMENT (OUTPATIENT)
Dept: CT IMAGING | Age: 36
End: 2021-08-21
Attending: EMERGENCY MEDICINE
Payer: COMMERCIAL

## 2021-08-21 ENCOUNTER — HOSPITAL ENCOUNTER (EMERGENCY)
Age: 36
Discharge: HOME | End: 2021-08-21
Attending: EMERGENCY MEDICINE
Payer: COMMERCIAL

## 2021-08-21 ENCOUNTER — TELEPHONE (OUTPATIENT)
Dept: INTERNAL MEDICINE CLINIC | Age: 36
End: 2021-08-21

## 2021-08-21 VITALS
TEMPERATURE: 98.7 F | RESPIRATION RATE: 18 BRPM | BODY MASS INDEX: 45.23 KG/M2 | DIASTOLIC BLOOD PRESSURE: 88 MMHG | OXYGEN SATURATION: 92 % | WEIGHT: 255.29 LBS | HEIGHT: 63 IN | SYSTOLIC BLOOD PRESSURE: 142 MMHG | HEART RATE: 107 BPM

## 2021-08-21 DIAGNOSIS — G43.119 INTRACTABLE MIGRAINE WITH AURA WITHOUT STATUS MIGRAINOSUS: Primary | ICD-10-CM

## 2021-08-21 LAB
ALBUMIN SERPL-MCNC: 4 G/DL (ref 3.5–5)
ALBUMIN/GLOB SERPL: 1.1 {RATIO} (ref 1.1–2.2)
ALP SERPL-CCNC: 117 U/L (ref 45–117)
ALT SERPL-CCNC: 20 U/L (ref 12–78)
ANION GAP SERPL CALC-SCNC: 4 MMOL/L (ref 5–15)
APPEARANCE UR: CLEAR
AST SERPL-CCNC: 15 U/L (ref 15–37)
BACTERIA URNS QL MICRO: ABNORMAL /HPF
BASOPHILS # BLD: 0 K/UL (ref 0–0.1)
BASOPHILS NFR BLD: 0 % (ref 0–1)
BILIRUB SERPL-MCNC: 0.2 MG/DL (ref 0.2–1)
BILIRUB UR QL: NEGATIVE
BUN SERPL-MCNC: 8 MG/DL (ref 6–20)
BUN/CREAT SERPL: 8 (ref 12–20)
CALCIUM SERPL-MCNC: 9.1 MG/DL (ref 8.5–10.1)
CHLORIDE SERPL-SCNC: 107 MMOL/L (ref 97–108)
CO2 SERPL-SCNC: 28 MMOL/L (ref 21–32)
COLOR UR: ABNORMAL
CREAT SERPL-MCNC: 0.96 MG/DL (ref 0.55–1.02)
DIFFERENTIAL METHOD BLD: NORMAL
EOSINOPHIL # BLD: 0.4 K/UL (ref 0–0.4)
EOSINOPHIL NFR BLD: 5 % (ref 0–7)
EPITH CASTS URNS QL MICRO: ABNORMAL /LPF
ERYTHROCYTE [DISTWIDTH] IN BLOOD BY AUTOMATED COUNT: 13 % (ref 11.5–14.5)
GLOBULIN SER CALC-MCNC: 3.8 G/DL (ref 2–4)
GLUCOSE BLD STRIP.AUTO-MCNC: 112 MG/DL (ref 65–117)
GLUCOSE SERPL-MCNC: 100 MG/DL (ref 65–100)
GLUCOSE UR STRIP.AUTO-MCNC: NEGATIVE MG/DL
HCT VFR BLD AUTO: 39.8 % (ref 35–47)
HGB BLD-MCNC: 13 G/DL (ref 11.5–16)
HGB UR QL STRIP: NEGATIVE
IMM GRANULOCYTES # BLD AUTO: 0 K/UL (ref 0–0.04)
IMM GRANULOCYTES NFR BLD AUTO: 0 % (ref 0–0.5)
KETONES UR QL STRIP.AUTO: NEGATIVE MG/DL
LEUKOCYTE ESTERASE UR QL STRIP.AUTO: NEGATIVE
LYMPHOCYTES # BLD: 3.1 K/UL (ref 0.8–3.5)
LYMPHOCYTES NFR BLD: 35 % (ref 12–49)
MCH RBC QN AUTO: 30.7 PG (ref 26–34)
MCHC RBC AUTO-ENTMCNC: 32.7 G/DL (ref 30–36.5)
MCV RBC AUTO: 94.1 FL (ref 80–99)
MONOCYTES # BLD: 0.7 K/UL (ref 0–1)
MONOCYTES NFR BLD: 8 % (ref 5–13)
NEUTS SEG # BLD: 4.6 K/UL (ref 1.8–8)
NEUTS SEG NFR BLD: 52 % (ref 32–75)
NITRITE UR QL STRIP.AUTO: NEGATIVE
NRBC # BLD: 0 K/UL (ref 0–0.01)
NRBC BLD-RTO: 0 PER 100 WBC
PH UR STRIP: 5.5 [PH] (ref 5–8)
PLATELET # BLD AUTO: 389 K/UL (ref 150–400)
PMV BLD AUTO: 9.5 FL (ref 8.9–12.9)
POTASSIUM SERPL-SCNC: 4 MMOL/L (ref 3.5–5.1)
PROT SERPL-MCNC: 7.8 G/DL (ref 6.4–8.2)
PROT UR STRIP-MCNC: NEGATIVE MG/DL
RBC # BLD AUTO: 4.23 M/UL (ref 3.8–5.2)
RBC #/AREA URNS HPF: ABNORMAL /HPF (ref 0–5)
SERVICE CMNT-IMP: NORMAL
SODIUM SERPL-SCNC: 139 MMOL/L (ref 136–145)
SP GR UR REFRACTOMETRY: 1.01 (ref 1–1.03)
UA: UC IF INDICATED,UAUC: ABNORMAL
UROBILINOGEN UR QL STRIP.AUTO: 0.2 EU/DL (ref 0.2–1)
WBC # BLD AUTO: 8.9 K/UL (ref 3.6–11)
WBC URNS QL MICRO: ABNORMAL /HPF (ref 0–4)

## 2021-08-21 PROCEDURE — 96375 TX/PRO/DX INJ NEW DRUG ADDON: CPT

## 2021-08-21 PROCEDURE — 82962 GLUCOSE BLOOD TEST: CPT

## 2021-08-21 PROCEDURE — 81001 URINALYSIS AUTO W/SCOPE: CPT

## 2021-08-21 PROCEDURE — 99284 EMERGENCY DEPT VISIT MOD MDM: CPT

## 2021-08-21 PROCEDURE — 85025 COMPLETE CBC W/AUTO DIFF WBC: CPT

## 2021-08-21 PROCEDURE — 74011250636 HC RX REV CODE- 250/636: Performed by: EMERGENCY MEDICINE

## 2021-08-21 PROCEDURE — 80053 COMPREHEN METABOLIC PANEL: CPT

## 2021-08-21 PROCEDURE — 74011000636 HC RX REV CODE- 636: Performed by: EMERGENCY MEDICINE

## 2021-08-21 PROCEDURE — 36415 COLL VENOUS BLD VENIPUNCTURE: CPT

## 2021-08-21 PROCEDURE — 96374 THER/PROPH/DIAG INJ IV PUSH: CPT

## 2021-08-21 PROCEDURE — 70470 CT HEAD/BRAIN W/O & W/DYE: CPT

## 2021-08-21 RX ORDER — FENTANYL CITRATE 50 UG/ML
75 INJECTION, SOLUTION INTRAMUSCULAR; INTRAVENOUS
Status: COMPLETED | OUTPATIENT
Start: 2021-08-21 | End: 2021-08-21

## 2021-08-21 RX ORDER — PROCHLORPERAZINE EDISYLATE 5 MG/ML
10 INJECTION INTRAMUSCULAR; INTRAVENOUS
Status: COMPLETED | OUTPATIENT
Start: 2021-08-21 | End: 2021-08-21

## 2021-08-21 RX ORDER — MAGNESIUM SULFATE HEPTAHYDRATE 40 MG/ML
2 INJECTION, SOLUTION INTRAVENOUS
Status: DISCONTINUED | OUTPATIENT
Start: 2021-08-21 | End: 2021-08-21 | Stop reason: HOSPADM

## 2021-08-21 RX ORDER — DIPHENHYDRAMINE HYDROCHLORIDE 50 MG/ML
25 INJECTION, SOLUTION INTRAMUSCULAR; INTRAVENOUS
Status: COMPLETED | OUTPATIENT
Start: 2021-08-21 | End: 2021-08-21

## 2021-08-21 RX ADMIN — IOPAMIDOL 100 ML: 755 INJECTION, SOLUTION INTRAVENOUS at 14:38

## 2021-08-21 RX ADMIN — FENTANYL CITRATE 75 MCG: 50 INJECTION, SOLUTION INTRAMUSCULAR; INTRAVENOUS at 14:50

## 2021-08-21 RX ADMIN — DIPHENHYDRAMINE HYDROCHLORIDE 25 MG: 50 INJECTION, SOLUTION INTRAMUSCULAR; INTRAVENOUS at 14:50

## 2021-08-21 RX ADMIN — SODIUM CHLORIDE 1000 ML: 9 INJECTION, SOLUTION INTRAVENOUS at 14:54

## 2021-08-21 RX ADMIN — PROCHLORPERAZINE EDISYLATE 10 MG: 5 INJECTION INTRAMUSCULAR; INTRAVENOUS at 14:50

## 2021-08-21 NOTE — TELEPHONE ENCOUNTER
She has noted since yesterday, that her eye is \"shaking on its own\" when she closes it. \"Also I have pain behind my eyes. \"     She is scared to go to UC or ER \"because of the delta variant. \"    ? Fasciculation, seizure. Recommended she go to the ER for further evaluation.

## 2021-08-21 NOTE — ED NOTES
Pt returned from CT, complaining of back pain, Dr. Katelyn Ascencio notified. Vital signs obtained, pt provided warm blanket and O2 for comfort.

## 2021-08-21 NOTE — ED NOTES
RN to room to start magnesium for headache, pt states she prefers to leave, Dr. Seth Santiago notified.

## 2021-08-21 NOTE — ED PROVIDER NOTES
EMERGENCY DEPARTMENT HISTORY AND PHYSICAL EXAM      Date: 8/21/2021  Patient Name: Sonja Hummel  Patient Age and Sex: 38 y.o. female     History of Presenting Illness     Chief Complaint   Patient presents with    Pressure Behind the Eyes     Pt states her vision was shaking in the right eye since yesterday        History Provided By: Patient    HPI: Sonja Hummel is a 38year old history lupus, raynauds, connective tissue disorder, Sjogrens, occipital neuralgia, migraines. Vibrating vision in both eyes, severe pain behind R eye, shooting pain into forehead with associated nausea. Not worse with light, sounds, lying flat. No other alleviating or exacerbating factors. Pain has been ongoing since yesterday. No double vision or blurry vision. Started in R eye, now in left eye. Fioricet dropped pain from 10 to 7, took at 3.5 hours PTA. From time of onset of pain, it took about 10 minutes to reach maximal onset of pain. There are no other complaints, changes, or physical findings at this time. PCP: Elaine Morrell MD    No current facility-administered medications on file prior to encounter. Current Outpatient Medications on File Prior to Encounter   Medication Sig Dispense Refill    azithromycin (ZITHROMAX) 250 mg tablet TAKE 2 TABLETS BY MOUTH TODAY, THEN TAKE 1 TABLET DAILY FOR 4 DAYS 6 Tablet 0    ondansetron hcl (ZOFRAN) 4 mg tablet TAKE 1 TABLET BY MOUTH EVERY 8 HOURS AS NEEDED FOR NAUSEA 20 Tablet 0    mirtazapine (REMERON) 30 mg tablet TAKE 1 TABLET BY MOUTH AT BEDTIME 30 Tablet 5    ALPRAZolam (XANAX) 1 mg tablet One bid prn anxiety 60 Tablet 1    tiZANidine (ZANAFLEX) 4 mg tablet TAKE 1 TABLET BY MOUTH 3 TIMES A DAY AS NEEDED FOR PAIN 30 Tablet 0    ondansetron (Zofran ODT) 4 mg disintegrating tablet 1 Tablet by SubLINGual route every eight (8) hours as needed for Nausea or Vomiting.  20 Tablet 0    metoprolol succinate (TOPROL-XL) 25 mg XL tablet TAKE 1 TABLET BY MOUTH EVERY DAY AT NIGHT 30 Tablet 2    DULoxetine (CYMBALTA) 60 mg capsule TAKE 2 CAPSULESBY MOUTH EVERY DAY 30 Capsule 5    naloxone (NARCAN) 4 mg/actuation nasal spray Use 1 spray intranasally, then discard. Repeat with new spray every 2 min as needed for opioid overdose symptoms, alternating nostrils. 2 Each 0    mupirocin (BACTROBAN) 2 % ointment Apply  to affected area daily. 22 g 0    ketorolac (TORADOL) 10 mg tablet Take 1 Tab by mouth every six (6) hours as needed for Pain. 20 Tab 0    olmesartan (BENICAR) 5 mg tablet TAKE 1 TABLET BY MOUTH DAILY 90 Tab 3    naproxen (NAPROSYN) 500 mg tablet TK 1 TA PO BID      RisaQuad-2 16 billion cell cap cap TAKE ONE CAPSULE BY MOUTH ONCE DAILY 30 Cap 1    albuterol (PROVENTIL HFA, VENTOLIN HFA, PROAIR HFA) 90 mcg/actuation inhaler Take 1 Puff by inhalation every six (6) hours as needed for Wheezing. 1 Inhaler 0    furosemide (LASIX) 20 mg tablet PRN  0    estradiol (ESTRACE) 1 mg tablet Take 1 mg by mouth daily. 0    indomethacin (INDOCIN) 25 mg capsule PRN      pantoprazole (PROTONIX) 40 mg tablet take 1 tablet by mouth at bedtime  0    codeine-butalbital-acetaminophen-caffeine (FIORICET WITH CODEINE) -74-30 mg capsule take 2 capsules by mouth every 6 hours if needed for MIGRAINE HEADACHE 90 Cap 0    rizatriptan (MAXALT) 10 mg tablet Take 10 mg by mouth once as needed for Migraine. May repeat in 2 hours if needed      belimumab (Benlysta) 400 mg solr injection by IntraVENous route.          Past History     Past Medical History:  Past Medical History:   Diagnosis Date    Abnormal CT of the abdomen 11/8/2012    Asthma     Autoimmune disease (HCC)     lupus    C. difficile diarrhea     Cervical prolapse     Dehydration     Mariah-Danlos syndrome     Gastrointestinal disorder     gerd, twisted colon, IBS    GERD (gastroesophageal reflux disease)     Headache(784.0)     Lupus (HCC)     Morbid obesity (HCC)     Nausea & vomiting     Neurological disorder cluster headaches    Occipital neuralgia     other 2006, 2009    ovarian cyst removal    Other ill-defined conditions(799.89)     Worsening headaches        Past Surgical History:  Past Surgical History:   Procedure Laterality Date    COLONOSCOPY  9/21/2010         HX CHOLECYSTECTOMY      HX CYST INCISION AND DRAINAGE Right 02/27/2020    HX GYN  1/2009    right salpingo oopherectomy    HX GYN  2006    right ovarian tumor removed    HX HEENT      left wisdom teeth removal    HX HEENT      top right wisdom tooth removed    HX HERNIA REPAIR  4/2012    HX HERNIA REPAIR  2/28/13    Laparoscopic recurrent incisional hernia repair    HX HYSTERECTOMY      2016    HX UROLOGICAL      Kidney Stone Removal    UT EGD TRANSORAL BIOPSY SINGLE/MULTIPLE  9/22/2010            Family History:  Family History   Problem Relation Age of Onset    Colon Cancer Maternal Grandfather      Social History:  Social History     Tobacco Use    Smoking status: Never Smoker    Smokeless tobacco: Never Used   Vaping Use    Vaping Use: Never used   Substance Use Topics    Alcohol use: No    Drug use: No     Allergies: Allergies   Allergen Reactions    Latex Itching     burning    Compazine [Prochlorperazine] Anxiety     High heart rate  Pt can take promethazine with no problems    Sulfa (Sulfonamide Antibiotics) Unknown (comments)    Topamax [Topiramate] Unknown (comments)    Adhesive Rash     Allergic to Dermabond    Clindamycin Diarrhea     Pt states caused her C-Diff    Mushroom Other (comments)    Mushroom Combination No.1 Unknown (comments)    Toradol [Ketorolac Tromethamine] Nausea and Vomiting and Other (comments)     PO & IV causes GI bleed  Tolerated during Feb 2020 stay    Valproic Acid Other (comments)     Elevated heart rate and vomiting         Review of Systems   Review of Systems   Constitutional: Negative. HENT: Negative. Eyes: Positive for visual disturbance. Respiratory: Negative. Cardiovascular: Negative. Gastrointestinal: Positive for nausea. Endocrine: Negative. Genitourinary: Negative. Musculoskeletal: Negative. Skin: Negative. Allergic/Immunologic: Negative. Neurological: Positive for headaches. Negative for dizziness, tremors, seizures, syncope and weakness. Hematological: Negative. Psychiatric/Behavioral: Negative.        Physical Exam   Physical Exam   General: Alert, no acute distress  Skin: Warm, dry  Head: Normocephalic, atraumatic  Eye: EOMI, normal conjunctiva  Ears, Nose, Mouth and Throat: Oral mucosa moist, no pharyngeal erythema or exudate  Cardiovascular: No murmur, normal peripheral perfusion, regular rhythm'  Pulmonary: Normal respiratory effort, normal breath sounds  Gastrointestinal: Soft, nontender, nondistended  Musculoskeletal: No swelling, no deformity  Neurological:  Alert and oriented, speech is fluent and comprehensive  CN: Visual fields intact, extraocular movements normal, pupils equal round and reactive to light, face is symmetric, tongue and uvula midline, symmetric shrug, sensation intact to light touch bilaterally  Strength 5/5 bilateral upper and lower extremities  Sensation symmetric bilateral upper and lower extremities  Reflexes 2+ brachial and patellar  No ataxia finger-to-nose, gait, no nystagmus  Psychiatric: Cooperate, appropriate affect    Diagnostic Study Results     Labs  Recent Results (from the past 12 hour(s))   GLUCOSE, POC    Collection Time: 08/21/21 12:46 PM   Result Value Ref Range    Glucose (POC) 112 65 - 117 mg/dL    Performed by [id number]    CBC WITH AUTOMATED DIFF    Collection Time: 08/21/21  1:42 PM   Result Value Ref Range    WBC 8.9 3.6 - 11.0 K/uL    RBC 4.23 3.80 - 5.20 M/uL    HGB 13.0 11.5 - 16.0 g/dL    HCT 39.8 35.0 - 47.0 %    MCV 94.1 80.0 - 99.0 FL    MCH 30.7 26.0 - 34.0 PG    MCHC 32.7 30.0 - 36.5 g/dL    RDW 13.0 11.5 - 14.5 %    PLATELET 846 219 - 917 K/uL    MPV 9.5 8.9 - 12.9 FL NRBC 0.0 0  WBC    ABSOLUTE NRBC 0.00 0.00 - 0.01 K/uL    NEUTROPHILS 52 32 - 75 %    LYMPHOCYTES 35 12 - 49 %    MONOCYTES 8 5 - 13 %    EOSINOPHILS 5 0 - 7 %    BASOPHILS 0 0 - 1 %    IMMATURE GRANULOCYTES 0 0.0 - 0.5 %    ABS. NEUTROPHILS 4.6 1.8 - 8.0 K/UL    ABS. LYMPHOCYTES 3.1 0.8 - 3.5 K/UL    ABS. MONOCYTES 0.7 0.0 - 1.0 K/UL    ABS. EOSINOPHILS 0.4 0.0 - 0.4 K/UL    ABS. BASOPHILS 0.0 0.0 - 0.1 K/UL    ABS. IMM. GRANS. 0.0 0.00 - 0.04 K/UL    DF AUTOMATED     METABOLIC PANEL, COMPREHENSIVE    Collection Time: 08/21/21  1:42 PM   Result Value Ref Range    Sodium 139 136 - 145 mmol/L    Potassium 4.0 3.5 - 5.1 mmol/L    Chloride 107 97 - 108 mmol/L    CO2 28 21 - 32 mmol/L    Anion gap 4 (L) 5 - 15 mmol/L    Glucose 100 65 - 100 mg/dL    BUN 8 6 - 20 MG/DL    Creatinine 0.96 0.55 - 1.02 MG/DL    BUN/Creatinine ratio 8 (L) 12 - 20      GFR est AA >60 >60 ml/min/1.73m2    GFR est non-AA >60 >60 ml/min/1.73m2    Calcium 9.1 8.5 - 10.1 MG/DL    Bilirubin, total 0.2 0.2 - 1.0 MG/DL    ALT (SGPT) 20 12 - 78 U/L    AST (SGOT) 15 15 - 37 U/L    Alk.  phosphatase 117 45 - 117 U/L    Protein, total 7.8 6.4 - 8.2 g/dL    Albumin 4.0 3.5 - 5.0 g/dL    Globulin 3.8 2.0 - 4.0 g/dL    A-G Ratio 1.1 1.1 - 2.2     URINALYSIS W/ REFLEX CULTURE    Collection Time: 08/21/21  2:56 PM    Specimen: Urine   Result Value Ref Range    Color YELLOW/STRAW      Appearance CLEAR CLEAR      Specific gravity 1.011 1.003 - 1.030      pH (UA) 5.5 5.0 - 8.0      Protein Negative NEG mg/dL    Glucose Negative NEG mg/dL    Ketone Negative NEG mg/dL    Bilirubin Negative NEG      Blood Negative NEG      Urobilinogen 0.2 0.2 - 1.0 EU/dL    Nitrites Negative NEG      Leukocyte Esterase Negative NEG      WBC 0-4 0 - 4 /hpf    RBC 0-5 0 - 5 /hpf    Epithelial cells FEW FEW /lpf    Bacteria 1+ (A) NEG /hpf    UA:UC IF INDICATED CULTURE NOT INDICATED BY UA RESULT CNI         Radiologic Studies -   CT HEAD W WO CONT   Final Result   Normal study CT Results  (Last 48 hours)               08/21/21 1532  CT HEAD W WO CONT Final result    Impression:  Normal study               Narrative:  EXAM: CT HEAD W WO CONT       INDICATION: Retro-ocular pain and fasciculations. Evaluation for cerebral venous   thrombosis       COMPARISON: 12/15/2020. CONTRAST: 100 mL of Isovue-300. TECHNIQUE:CT of the head was performed prior to and following uneventful rapid   bolus intravenous administration of contrast.  Brain and bone windows were   generated. Coronal and sagittal reformats. CT dose reduction was achieved   through use of a standardized protocol tailored for this examination and   automatic exposure control for dose modulation. FINDINGS:   The ventricles and sulci are normal in size, shape and configuration. . There is   no significant white matter disease. There is no intracranial hemorrhage,   extra-axial collection, or mass effect. The basilar cisterns are open. No CT   evidence of acute infarct. There is no evidence for a venous thrombosis. There is normal enhancement. No mass. The bone windows demonstrate no abnormalities. The visualized portions of the   paranasal sinuses and mastoid air cells are clear. CXR Results  (Last 48 hours)    None            Medical Decision Making   I am the first provider for this patient. I reviewed the vital signs, available nursing notes, past medical history, past surgical history, family history and social history. Vital Signs-Reviewed the patient's vital signs.   Patient Vitals for the past 12 hrs:   Temp Pulse Resp BP SpO2   08/21/21 1600 -- -- -- (!) 142/88 92 %   08/21/21 1240 98.7 °F (37.1 °C) (!) 107 18 131/83 99 %       Records Reviewed: Nursing Notes and Old Medical Records    Provider Notes (Medical Decision Making):   Patient is a 38year old history lupus and multiple autoimmune disease presenting with headache and visual symptoms    Differential includes CVT with her lupus, migraine, IIH    Fortunately patient without any objective neurologic deficit on exam.  Strange visual symptoms prompting to further evaluate for CVT. She is given Compazine Benadryl and IV fluid. Patient with reported pain in kidneys from contrast requesting fentanyl prior to CT scan. I did give her a bolus of fentanyl though with significant pain upon return from CT scan requesting repeat boluses of fentanyl. CT read as negative, patient with ongoing pain and had offered IV magnesium for ongoing treatment of migraine and patient refused further treatment. I do not believe further imaging with MRI is indicated at this time in the emergent setting the believe she can follow-up with her neurologist for repeat evaluation and further testing as needed. Patient is amenable with this plan and requesting discharge. ED Course:   Initial assessment performed. The patients presenting problems have been discussed, and they are in agreement with the care plan formulated and outlined with them. I have encouraged them to ask questions as they arise throughout their visit. ED Course as of Aug 21 2254   Sat Aug 21, 2021   1431 Patient requesting fentanyl for contrasted CT scan    [WB]   1614 CT, CTV normal    [WB]   0869 Patient still with severe pain, labs normal    [WB]   1614 UA normal    [WB]   1628 Did call patient's neurologist Emmy givens to consult as patient apparently did speak with neurologist who was worried about possible optic nerve involvement    [id number]    [WB]      ED Course User Index  [WB] Rhianna Vergara MD     Critical Care Time:   0    Disposition:  Discharge Note:  The patient has been re-evaluated and is ready for discharge. Reviewed available results with patient. Counseled patient on diagnosis and care plan. Patient has expressed understanding, and all questions have been answered.  Patient agrees with plan and agrees to follow up as recommended, or to return to the ED if their symptoms worsen. Discharge instructions have been provided and explained to the patient, along with reasons to return to the ED. PLAN:  Discharge Medication List as of 8/21/2021  4:52 PM        2. Follow-up Information     Follow up With Specialties Details Why Fernando Garcia MD Neurology Call today  [address] [address] [phone]          3. Return to ED if worse     Diagnosis     Clinical Impression:   1. Intractable migraine with aura without status migrainosus        Attestations:    Olinda Knowles M.D. Please note that this dictation was completed with [employer], the computer voice recognition software. Quite often unanticipated grammatical, syntax, homophones, and other interpretive errors are inadvertently transcribed by the computer software. Please disregard these errors. Please excuse any errors that have escaped final proofreading. Thank you.

## 2021-08-24 ENCOUNTER — HOSPITAL ENCOUNTER (EMERGENCY)
Age: 36
Discharge: HOME | End: 2021-08-24
Attending: EMERGENCY MEDICINE
Payer: COMMERCIAL

## 2021-08-24 ENCOUNTER — APPOINTMENT (OUTPATIENT)
Dept: CT IMAGING | Age: 36
End: 2021-08-24
Attending: EMERGENCY MEDICINE
Payer: COMMERCIAL

## 2021-08-24 VITALS
WEIGHT: 255 LBS | DIASTOLIC BLOOD PRESSURE: 70 MMHG | HEART RATE: 69 BPM | HEIGHT: 63 IN | SYSTOLIC BLOOD PRESSURE: 140 MMHG | RESPIRATION RATE: 14 BRPM | TEMPERATURE: 98.1 F | OXYGEN SATURATION: 87 % | BODY MASS INDEX: 45.18 KG/M2

## 2021-08-24 DIAGNOSIS — M54.81 OCCIPITAL NEURALGIA OF RIGHT SIDE: Primary | ICD-10-CM

## 2021-08-24 LAB
ANION GAP SERPL CALC-SCNC: 6 MMOL/L (ref 5–15)
BASOPHILS # BLD: 0 K/UL (ref 0–0.1)
BASOPHILS NFR BLD: 0 % (ref 0–1)
BUN SERPL-MCNC: 14 MG/DL (ref 6–20)
BUN/CREAT SERPL: 13 (ref 12–20)
CALCIUM SERPL-MCNC: 9 MG/DL (ref 8.5–10.1)
CHLORIDE SERPL-SCNC: 106 MMOL/L (ref 97–108)
CO2 SERPL-SCNC: 26 MMOL/L (ref 21–32)
CREAT SERPL-MCNC: 1.05 MG/DL (ref 0.55–1.02)
DIFFERENTIAL METHOD BLD: ABNORMAL
EOSINOPHIL # BLD: 0.7 K/UL (ref 0–0.4)
EOSINOPHIL NFR BLD: 6 % (ref 0–7)
ERYTHROCYTE [DISTWIDTH] IN BLOOD BY AUTOMATED COUNT: 12.7 % (ref 11.5–14.5)
GLUCOSE SERPL-MCNC: 100 MG/DL (ref 65–100)
HCT VFR BLD AUTO: 36.9 % (ref 35–47)
HGB BLD-MCNC: 12.2 G/DL (ref 11.5–16)
IMM GRANULOCYTES # BLD AUTO: 0 K/UL (ref 0–0.04)
IMM GRANULOCYTES NFR BLD AUTO: 0 % (ref 0–0.5)
LYMPHOCYTES # BLD: 3.9 K/UL (ref 0.8–3.5)
LYMPHOCYTES NFR BLD: 38 % (ref 12–49)
MCH RBC QN AUTO: 30.9 PG (ref 26–34)
MCHC RBC AUTO-ENTMCNC: 33.1 G/DL (ref 30–36.5)
MCV RBC AUTO: 93.4 FL (ref 80–99)
MONOCYTES # BLD: 0.9 K/UL (ref 0–1)
MONOCYTES NFR BLD: 8 % (ref 5–13)
NEUTS SEG # BLD: 4.9 K/UL (ref 1.8–8)
NEUTS SEG NFR BLD: 48 % (ref 32–75)
NRBC # BLD: 0 K/UL (ref 0–0.01)
NRBC BLD-RTO: 0 PER 100 WBC
PLATELET # BLD AUTO: 375 K/UL (ref 150–400)
PMV BLD AUTO: 9.5 FL (ref 8.9–12.9)
POTASSIUM SERPL-SCNC: 3.7 MMOL/L (ref 3.5–5.1)
RBC # BLD AUTO: 3.95 M/UL (ref 3.8–5.2)
SODIUM SERPL-SCNC: 138 MMOL/L (ref 136–145)
TROPONIN I BLD-MCNC: <0.04 NG/ML (ref 0–0.08)
WBC # BLD AUTO: 10.4 K/UL (ref 3.6–11)

## 2021-08-24 PROCEDURE — 96375 TX/PRO/DX INJ NEW DRUG ADDON: CPT

## 2021-08-24 PROCEDURE — 93005 ELECTROCARDIOGRAM TRACING: CPT

## 2021-08-24 PROCEDURE — 74011250636 HC RX REV CODE- 250/636: Performed by: EMERGENCY MEDICINE

## 2021-08-24 PROCEDURE — 99285 EMERGENCY DEPT VISIT HI MDM: CPT

## 2021-08-24 PROCEDURE — 70450 CT HEAD/BRAIN W/O DYE: CPT

## 2021-08-24 PROCEDURE — 80048 BASIC METABOLIC PNL TOTAL CA: CPT

## 2021-08-24 PROCEDURE — 96374 THER/PROPH/DIAG INJ IV PUSH: CPT

## 2021-08-24 PROCEDURE — 72125 CT NECK SPINE W/O DYE: CPT

## 2021-08-24 PROCEDURE — 84484 ASSAY OF TROPONIN QUANT: CPT

## 2021-08-24 PROCEDURE — 85025 COMPLETE CBC W/AUTO DIFF WBC: CPT

## 2021-08-24 PROCEDURE — 36415 COLL VENOUS BLD VENIPUNCTURE: CPT

## 2021-08-24 RX ORDER — DEXAMETHASONE SODIUM PHOSPHATE 4 MG/ML
4 INJECTION, SOLUTION INTRA-ARTICULAR; INTRALESIONAL; INTRAMUSCULAR; INTRAVENOUS; SOFT TISSUE ONCE
Status: DISCONTINUED | OUTPATIENT
Start: 2021-08-24 | End: 2021-08-25 | Stop reason: HOSPADM

## 2021-08-24 RX ORDER — HYDROCODONE BITARTRATE AND ACETAMINOPHEN 5; 325 MG/1; MG/1
1 TABLET ORAL
Qty: 10 TABLET | Refills: 0 | Status: SHIPPED | OUTPATIENT
Start: 2021-08-24 | End: 2021-08-29

## 2021-08-24 RX ORDER — HYDROMORPHONE HYDROCHLORIDE 1 MG/ML
1 INJECTION, SOLUTION INTRAMUSCULAR; INTRAVENOUS; SUBCUTANEOUS
Status: COMPLETED | OUTPATIENT
Start: 2021-08-24 | End: 2021-08-24

## 2021-08-24 RX ORDER — BUPIVACAINE HYDROCHLORIDE 5 MG/ML
10 INJECTION, SOLUTION EPIDURAL; INTRACAUDAL ONCE
Status: DISCONTINUED | OUTPATIENT
Start: 2021-08-24 | End: 2021-08-25 | Stop reason: HOSPADM

## 2021-08-24 RX ORDER — ONDANSETRON 2 MG/ML
4 INJECTION INTRAMUSCULAR; INTRAVENOUS
Status: COMPLETED | OUTPATIENT
Start: 2021-08-24 | End: 2021-08-24

## 2021-08-24 RX ADMIN — HYDROMORPHONE HYDROCHLORIDE 1 MG: 1 INJECTION, SOLUTION INTRAMUSCULAR; INTRAVENOUS; SUBCUTANEOUS at 19:04

## 2021-08-24 RX ADMIN — ONDANSETRON 4 MG: 2 INJECTION INTRAMUSCULAR; INTRAVENOUS at 19:03

## 2021-08-24 NOTE — ED NOTES
Arrived via EMS in c-collar tearful and complaining of head, neck pain and R. States she passed out from pain behind her right eye and in back of right head and woke up face-down when the EMS squad arrived.  Reports valentine pain beyond baseline

## 2021-08-24 NOTE — ED PROVIDER NOTES
EMERGENCY DEPARTMENT HISTORY AND PHYSICAL EXAM      Date: 8/24/2021  Patient Name: Ricardo Macedo  Patient Age and Sex: 38 y.o. female     History of Presenting Illness     Chief Complaint   Patient presents with    Headache     Arrived via EMS in c-collar tearful and complaining of head, neck pain and R eye pain. States she passed out from pain behind her right eye and in back of right head and woke up face-down when the EMS squad arrived. Reports beck pain beyond baseline       History Provided By: Patient    HPI: Ricardo Macedo is a 47-year-old complex neurologic history presenting with ongoing intractable headache and syncope. Reports she has had a headache behind her right eye for approximately the past week. Reports today that it became so severe that she had an episode of syncope, passed out striking the right side of her face. Reports headache has worsened since that time. She has followed up with ophthalmology who evaluated her and found her optic nerves to be normal, were concerned about possible trigeminal neuralgia. She has an appointment with her neurologist this coming Thursday as well as an outpatient MRI which is scheduled. She presents after this episode of syncope with ongoing severe pain. Patient reports she has had episodes of syncope related to her headache advised    There are no other complaints, changes, or physical findings at this time. PCP: Elvira Ojeda MD    No current facility-administered medications on file prior to encounter.      Current Outpatient Medications on File Prior to Encounter   Medication Sig Dispense Refill    tiZANidine (ZANAFLEX) 4 mg tablet TAKE 1 TABLET BY MOUTH 3 TIMES A DAY AS NEEDED FOR PAIN 30 Tablet 0    azithromycin (ZITHROMAX) 250 mg tablet TAKE 2 TABLETS BY MOUTH TODAY, THEN TAKE 1 TABLET DAILY FOR 4 DAYS 6 Tablet 0    ondansetron hcl (ZOFRAN) 4 mg tablet TAKE 1 TABLET BY MOUTH EVERY 8 HOURS AS NEEDED FOR NAUSEA 20 Tablet 0    mirtazapine (REMERON) 30 mg tablet TAKE 1 TABLET BY MOUTH AT BEDTIME 30 Tablet 5    ALPRAZolam (XANAX) 1 mg tablet One bid prn anxiety 60 Tablet 1    ondansetron (Zofran ODT) 4 mg disintegrating tablet 1 Tablet by SubLINGual route every eight (8) hours as needed for Nausea or Vomiting. 20 Tablet 0    metoprolol succinate (TOPROL-XL) 25 mg XL tablet TAKE 1 TABLET BY MOUTH EVERY DAY AT NIGHT 30 Tablet 2    DULoxetine (CYMBALTA) 60 mg capsule TAKE 2 CAPSULESBY MOUTH EVERY DAY 30 Capsule 5    naloxone (NARCAN) 4 mg/actuation nasal spray Use 1 spray intranasally, then discard. Repeat with new spray every 2 min as needed for opioid overdose symptoms, alternating nostrils. 2 Each 0    mupirocin (BACTROBAN) 2 % ointment Apply  to affected area daily. 22 g 0    ketorolac (TORADOL) 10 mg tablet Take 1 Tab by mouth every six (6) hours as needed for Pain. 20 Tab 0    olmesartan (BENICAR) 5 mg tablet TAKE 1 TABLET BY MOUTH DAILY 90 Tab 3    naproxen (NAPROSYN) 500 mg tablet TK 1 TA PO BID      RisaQuad-2 16 billion cell cap cap TAKE ONE CAPSULE BY MOUTH ONCE DAILY 30 Cap 1    albuterol (PROVENTIL HFA, VENTOLIN HFA, PROAIR HFA) 90 mcg/actuation inhaler Take 1 Puff by inhalation every six (6) hours as needed for Wheezing. 1 Inhaler 0    furosemide (LASIX) 20 mg tablet PRN  0    estradiol (ESTRACE) 1 mg tablet Take 1 mg by mouth daily. 0    indomethacin (INDOCIN) 25 mg capsule PRN      pantoprazole (PROTONIX) 40 mg tablet take 1 tablet by mouth at bedtime  0    codeine-butalbital-acetaminophen-caffeine (FIORICET WITH CODEINE) -03-30 mg capsule take 2 capsules by mouth every 6 hours if needed for MIGRAINE HEADACHE 90 Cap 0    rizatriptan (MAXALT) 10 mg tablet Take 10 mg by mouth once as needed for Migraine. May repeat in 2 hours if needed      belimumab (Benlysta) 400 mg solr injection by IntraVENous route.          Past History     Past Medical History:  Past Medical History:   Diagnosis Date    Abnormal CT of the abdomen 11/8/2012    Asthma     Autoimmune disease (HCC)     lupus    C. difficile diarrhea     Cervical prolapse     Dehydration     Mariah-Danlos syndrome     Gastrointestinal disorder     gerd, twisted colon, IBS    GERD (gastroesophageal reflux disease)     Headache(784.0)     Lupus (HCC)     Morbid obesity (HCC)     Nausea & vomiting     Neurological disorder     cluster headaches    Occipital neuralgia     other 2006, 2009    ovarian cyst removal    Other ill-defined conditions(799.89)     Worsening headaches        Past Surgical History:  Past Surgical History:   Procedure Laterality Date    COLONOSCOPY  9/21/2010         HX CHOLECYSTECTOMY      HX CYST INCISION AND DRAINAGE Right 02/27/2020    HX GYN  1/2009    right salpingo oopherectomy    HX GYN  2006    right ovarian tumor removed    HX HEENT      left wisdom teeth removal    HX HEENT      top right wisdom tooth removed    HX HERNIA REPAIR  4/2012    HX HERNIA REPAIR  2/28/13    Laparoscopic recurrent incisional hernia repair    HX HYSTERECTOMY      2016    HX UROLOGICAL      Kidney Stone Removal    OH EGD TRANSORAL BIOPSY SINGLE/MULTIPLE  9/22/2010            Family History:  Family History   Problem Relation Age of Onset    Colon Cancer Maternal Grandfather        Social History:  Social History     Tobacco Use    Smoking status: Never Smoker    Smokeless tobacco: Never Used   Vaping Use    Vaping Use: Never used   Substance Use Topics    Alcohol use: No    Drug use: No       Allergies:   Allergies   Allergen Reactions    Latex Itching     burning    Compazine [Prochlorperazine] Anxiety     High heart rate  Pt can take promethazine with no problems    Sulfa (Sulfonamide Antibiotics) Unknown (comments)    Topamax [Topiramate] Unknown (comments)    Adhesive Rash     Allergic to Dermabond    Clindamycin Diarrhea     Pt states caused her C-Diff    Mushroom Other (comments)    Mushroom Combination No.1 Unknown (comments)    Toradol [Ketorolac Tromethamine] Nausea and Vomiting and Other (comments)     PO & IV causes GI bleed  Tolerated during Feb 2020 stay    Valproic Acid Other (comments)     Elevated heart rate and vomiting         Review of Systems   Review of Systems   Constitutional: Negative. HENT: Negative. Eyes: Positive for visual disturbance. Respiratory: Negative. Cardiovascular: Negative. Gastrointestinal: Negative. Endocrine: Negative. Genitourinary: Negative. Musculoskeletal: Negative. Skin: Negative. Allergic/Immunologic: Negative. Neurological: Positive for syncope and headaches. Hematological: Negative. Psychiatric/Behavioral: Negative.         Physical Exam   Physical Exam   General: Alert, no acute distress  Skin: Warm, dry  Head: Normocephalic, atraumatic  Eye: EOMI, normal conjunctiva  Ears, Nose, Mouth and Throat: Oral mucosa moist, no pharyngeal erythema or exudate  Cardiovascular: No murmur, normal peripheral perfusion, regular rhythm'  Pulmonary: Normal respiratory effort, normal breath sounds  Gastrointestinal: Soft, nontender, nondistended  Musculoskeletal: No swelling, no deformity  Neurological:   Alert and oriented, speech is fluent and comprehensive  CN: Visual fields intact, extraocular movements normal, pupils equal round and reactive to light, face is symmetric, tongue and uvula midline, symmetric shrug, sensation intact to light touch bilaterally  Strength 5/5 bilateral upper and lower extremities  Sensation symmetric bilateral upper and lower extremities  Reflexes 2+ brachial and patellar  No ataxia finger-to-nose, no nystagmus  Significant tenderness palpation at supraorbital notch as well as at site of greater occipital nerve on right  Psychiatric: Cooperate, appropriate affect       Diagnostic Study Results     Labs  Recent Results (from the past 12 hour(s))   EKG, 12 LEAD, INITIAL    Collection Time: 08/24/21  5:51 PM   Result Value Ref Range    Ventricular Rate 73 BPM    Atrial Rate 73 BPM    P-R Interval 182 ms    QRS Duration 76 ms    Q-T Interval 382 ms    QTC Calculation (Bezet) 420 ms    Calculated P Axis 33 degrees    Calculated R Axis 50 degrees    Calculated T Axis 24 degrees    Diagnosis       Normal sinus rhythm  Normal ECG  When compared with ECG of 10-MAY-2021 10:26,  No significant change was found     CBC WITH AUTOMATED DIFF    Collection Time: 08/24/21  7:08 PM   Result Value Ref Range    WBC 10.4 3.6 - 11.0 K/uL    RBC 3.95 3.80 - 5.20 M/uL    HGB 12.2 11.5 - 16.0 g/dL    HCT 36.9 35.0 - 47.0 %    MCV 93.4 80.0 - 99.0 FL    MCH 30.9 26.0 - 34.0 PG    MCHC 33.1 30.0 - 36.5 g/dL    RDW 12.7 11.5 - 14.5 %    PLATELET 733 786 - 312 K/uL    MPV 9.5 8.9 - 12.9 FL    NRBC 0.0 0  WBC    ABSOLUTE NRBC 0.00 0.00 - 0.01 K/uL    NEUTROPHILS 48 32 - 75 %    LYMPHOCYTES 38 12 - 49 %    MONOCYTES 8 5 - 13 %    EOSINOPHILS 6 0 - 7 %    BASOPHILS 0 0 - 1 %    IMMATURE GRANULOCYTES 0 0.0 - 0.5 %    ABS. NEUTROPHILS 4.9 1.8 - 8.0 K/UL    ABS. LYMPHOCYTES 3.9 (H) 0.8 - 3.5 K/UL    ABS. MONOCYTES 0.9 0.0 - 1.0 K/UL    ABS. EOSINOPHILS 0.7 (H) 0.0 - 0.4 K/UL    ABS. BASOPHILS 0.0 0.0 - 0.1 K/UL    ABS. IMM.  GRANS. 0.0 0.00 - 0.04 K/UL    DF AUTOMATED     METABOLIC PANEL, BASIC    Collection Time: 08/24/21  7:08 PM   Result Value Ref Range    Sodium 138 136 - 145 mmol/L    Potassium 3.7 3.5 - 5.1 mmol/L    Chloride 106 97 - 108 mmol/L    CO2 26 21 - 32 mmol/L    Anion gap 6 5 - 15 mmol/L    Glucose 100 65 - 100 mg/dL    BUN 14 6 - 20 MG/DL    Creatinine 1.05 (H) 0.55 - 1.02 MG/DL    BUN/Creatinine ratio 13 12 - 20      GFR est AA >60 >60 ml/min/1.73m2    GFR est non-AA 59 (L) >60 ml/min/1.73m2    Calcium 9.0 8.5 - 10.1 MG/DL   POC TROPONIN-I    Collection Time: 08/24/21  7:11 PM   Result Value Ref Range    Troponin-I (POC) <0.04 0.00 - 0.08 ng/mL       Radiologic Studies -   CT HEAD WO CONT   Final Result         No change or acute abnormality CT SPINE CERV WO CONT   Final Result   1. No acute abnormality            CT Results  (Last 48 hours)               08/24/21 1931  CT HEAD WO CONT Final result    Impression:          No change or acute abnormality       Narrative:  EXAM: CT HEAD WO CONT       INDICATION: trauma       COMPARISON: 8/21/2021. CONTRAST: None. TECHNIQUE: Unenhanced CT of the head was performed using 5 mm images. Brain and   bone windows were generated. Coronal and sagittal reformats. CT dose reduction   was achieved through use of a standardized protocol tailored for this   examination and automatic exposure control for dose modulation. FINDINGS:   The ventricles and sulci are normal in size, shape and configuration. . There is   no significant white matter disease. There is no intracranial hemorrhage,   extra-axial collection, or mass effect. The basilar cisterns are open. No CT   evidence of acute infarct. The bone windows demonstrate no abnormalities. The visualized portions of the   paranasal sinuses and mastoid air cells are clear. 08/24/21 1931  CT SPINE CERV WO CONT Final result    Impression:  1. No acute abnormality           Narrative:  INDICATION:  trauma        STUDY:  CT SPINE CERV WO CONT        Examination: Cervical spine CT. No contrast. Comparison 12/16/2017. Thin section   axial images were obtained with sagittal and coronal reformats. CT dose   reduction was achieved through use of a standardized protocol tailored for this   examination and automatic exposure control for dose modulation. FINDINGS: Alignment of the cervical spine is normal. Chronic deformity at the   skull base with mild basilar invagination. No significant indentation of the   medulla There is no bony destructive lesion or evidence of acute fracture. Paraspinous soft tissues are within normal limits. No significant degeneration   or canal stenosis.                CXR Results  (Last 48 hours)    None Medical Decision Making   I am the first provider for this patient. I reviewed the vital signs, available nursing notes, past medical history, past surgical history, family history and social history. Vital Signs-Reviewed the patient's vital signs. Patient Vitals for the past 12 hrs:   Temp Pulse Resp BP SpO2   08/24/21 2015 -- 65 17 126/70 96 %   08/24/21 2000 -- 72 17 128/71 95 %   08/24/21 1815 -- 61 16 130/75 99 %   08/24/21 1800 -- 65 11 136/74 98 %   08/24/21 1745 -- 73 13 (!) 165/88 99 %   08/24/21 1743 98 °F (36.7 °C) 74 18 (!) 152/82 100 %       Records Reviewed: Nursing Notes and Old Medical Records    Provider Notes (Medical Decision Making):   78-year-old history autoimmune disease, migraines, occipital neuralgia presenting with headache, syncope    Differential includes SAH, migraine, occipital neuralgia, trigeminal neuralgia     She has been evaluated outpatient by  ophthalmology who performed eyelid exam demonstrated normal optic nerves lower my suspicion for IIH. Nonfocal neurologic exam.  She has exquisite tenderness palpation at the site of her supraorbital notch as well as the greater occipital nerve. In setting of her fall and syncope will obtain CT head and C-spine. She has had episodes of syncope in the past for this and has had ongoing headache now for several days I doubt that this represents SAH. Suspect there may be a component of pain medicine seeking as she reports strange allergies to her typical migraine medicines and is specifically requesting Dilaudid and Phenergan. We will plan pain medicine administration and reevaluation of likely occipital nerve block once we are able to remove c-collar. ED Course:   Initial assessment performed. The patients presenting problems have been discussed, and they are in agreement with the care plan formulated and outlined with them. I have encouraged them to ask questions as they arise throughout their visit.     ED Course as of Aug 24 2108   Tue Aug 24, 2021   1922 Evaluated, no focal deficit appreciated. Significant tenderness to palpation of supraorbital nerve    [WB]   2012 C-collar removed, patient significantly improved pain following Dilaudid and Zofran administration. Awaiting bupivacaine from pharmacy    [WB]   2104 Occipital nerve block performed with improved pain    [WB]      ED Course User Index  [WB] Kavon Sebastian MD       Procedure Note: Right Greater Occipital Nerve Block    Indications:  severe bilateral occipital pain    Verbal consent was obtained (explaining the procedure and risks and benefits of procedure)     A time out was completed, verifying correct patient, procedure,site, positioning, and implants or special equipment. Patient's left occipital area was palpated to identify location of greater occipital nerve. Alcohol was applied topically to the skin. Using a 27 gauge needle (aspirating during insertion), 8 cc of a mixture of dexamethosone, 0.5% bupivacaine was injected on the right side (directing needle to center, left and right of painful focus). Pressure with a gauze pad was held briefly upon the site of puncture to minimize bleeding and to further spread anaesthetic subcutaneously. There were no complications. Patient was comfortable and left without complaint. Critical Care Time:   0    Disposition:  Discharge Note:  The patient has been re-evaluated and is ready for discharge. Reviewed available results with patient. Counseled patient on diagnosis and care plan. Patient has expressed understanding, and all questions have been answered. Patient agrees with plan and agrees to follow up as recommended, or to return to the ED if their symptoms worsen. Discharge instructions have been provided and explained to the patient, along with reasons to return to the ED.       PLAN:  Current Discharge Medication List      START taking these medications    Details   HYDROcodone-acetaminophen (Emiliano Afua) 5-325 mg per tablet Take 1 Tablet by mouth every six (6) hours as needed for Pain for up to 3 days. Max Daily Amount: 4 Tablets. Qty: 10 Tablet, Refills: 0  Start date: 8/24/2021, End date: 8/27/2021    Associated Diagnoses: Occipital neuralgia of right side           2. Follow-up Information     Follow up With Specialties Details Why Contact Yojana Posey MD Neurology  Follow up as scheduled [address] [facility] [address]  [phone]          3. Return to ED if worse     Diagnosis     Clinical Impression:   1. Occipital neuralgia of right side        Attestations:    Olinda Knowles M.D. Please note that this dictation was completed with [employer], the [employer] voice recognition software. Quite often unanticipated grammatical, syntax, homophones, and other interpretive errors are inadvertently transcribed by the computer software. Please disregard these errors. Please excuse any errors that have escaped final proofreading. Thank you.

## 2021-08-24 NOTE — ED NOTES
Verbal shift change report given to Titi Osorio (oncoming nurse) by Sheba Shirley (offgoing nurse). Report included the following information SBAR, Kardex, ED Summary and MAR.  Pt off at CT

## 2021-08-25 LAB
ATRIAL RATE: 73 BPM
CALCULATED P AXIS, ECG09: 33 DEGREES
CALCULATED R AXIS, ECG10: 50 DEGREES
CALCULATED T AXIS, ECG11: 24 DEGREES
DIAGNOSIS, 93000: NORMAL
P-R INTERVAL, ECG05: 182 MS
Q-T INTERVAL, ECG07: 382 MS
QRS DURATION, ECG06: 76 MS
QTC CALCULATION (BEZET), ECG08: 420 MS
VENTRICULAR RATE, ECG03: 73 BPM

## 2021-08-25 NOTE — ED NOTES
I have reviewed written and verbal discharge instructions with the patient. The spouse verbalized understanding. I.V. removed and all questions answered. Pt ambulated out of the ER without difficulty.

## 2021-08-25 NOTE — DISCHARGE INSTRUCTIONS
Increase gabapentin to 300 three times a day. Take hydrocodone acetaminophen only as needed. Follow up with ophthalmology and neurology .

## 2021-08-27 ENCOUNTER — HOSPITAL ENCOUNTER (EMERGENCY)
Age: 36
Discharge: HOME | End: 2021-08-27
Attending: EMERGENCY MEDICINE
Payer: MEDICAID

## 2021-08-27 VITALS
BODY MASS INDEX: 42.52 KG/M2 | DIASTOLIC BLOOD PRESSURE: 46 MMHG | SYSTOLIC BLOOD PRESSURE: 117 MMHG | WEIGHT: 240 LBS | TEMPERATURE: 98.1 F | HEIGHT: 63 IN | RESPIRATION RATE: 15 BRPM | HEART RATE: 67 BPM | OXYGEN SATURATION: 97 %

## 2021-08-27 DIAGNOSIS — G50.0 SUPRAORBITAL NEURALGIA: Primary | ICD-10-CM

## 2021-08-27 DIAGNOSIS — M54.81 OCCIPITAL NEURALGIA OF RIGHT SIDE: ICD-10-CM

## 2021-08-27 LAB
ALBUMIN SERPL-MCNC: 3.4 G/DL (ref 3.5–5)
ALBUMIN/GLOB SERPL: 1.1 {RATIO} (ref 1.1–2.2)
ALP SERPL-CCNC: 92 U/L (ref 45–117)
ALT SERPL-CCNC: 19 U/L (ref 12–78)
ANION GAP SERPL CALC-SCNC: 5 MMOL/L (ref 5–15)
AST SERPL-CCNC: 13 U/L (ref 15–37)
BASOPHILS # BLD: 0 K/UL (ref 0–0.1)
BASOPHILS NFR BLD: 0 % (ref 0–1)
BILIRUB SERPL-MCNC: 0.1 MG/DL (ref 0.2–1)
BUN SERPL-MCNC: 16 MG/DL (ref 6–20)
BUN/CREAT SERPL: 17 (ref 12–20)
CALCIUM SERPL-MCNC: 8 MG/DL (ref 8.5–10.1)
CHLORIDE SERPL-SCNC: 108 MMOL/L (ref 97–108)
CO2 SERPL-SCNC: 27 MMOL/L (ref 21–32)
CREAT SERPL-MCNC: 0.94 MG/DL (ref 0.55–1.02)
DIFFERENTIAL METHOD BLD: ABNORMAL
EOSINOPHIL # BLD: 0.8 K/UL (ref 0–0.4)
EOSINOPHIL NFR BLD: 7 % (ref 0–7)
ERYTHROCYTE [DISTWIDTH] IN BLOOD BY AUTOMATED COUNT: 13.3 % (ref 11.5–14.5)
GLOBULIN SER CALC-MCNC: 3.2 G/DL (ref 2–4)
GLUCOSE SERPL-MCNC: 97 MG/DL (ref 65–100)
HCT VFR BLD AUTO: 34.1 % (ref 35–47)
HGB BLD-MCNC: 10.9 G/DL (ref 11.5–16)
IMM GRANULOCYTES # BLD AUTO: 0 K/UL (ref 0–0.04)
IMM GRANULOCYTES NFR BLD AUTO: 0 % (ref 0–0.5)
LYMPHOCYTES # BLD: 6.2 K/UL (ref 0.8–3.5)
LYMPHOCYTES NFR BLD: 57 % (ref 12–49)
MCH RBC QN AUTO: 30.6 PG (ref 26–34)
MCHC RBC AUTO-ENTMCNC: 32 G/DL (ref 30–36.5)
MCV RBC AUTO: 95.8 FL (ref 80–99)
MONOCYTES # BLD: 0.7 K/UL (ref 0–1)
MONOCYTES NFR BLD: 6 % (ref 5–13)
NEUTS SEG # BLD: 3.3 K/UL (ref 1.8–8)
NEUTS SEG NFR BLD: 30 % (ref 32–75)
NRBC # BLD: 0 K/UL (ref 0–0.01)
NRBC BLD-RTO: 0 PER 100 WBC
PLATELET # BLD AUTO: 345 K/UL (ref 150–400)
PMV BLD AUTO: 9.8 FL (ref 8.9–12.9)
POTASSIUM SERPL-SCNC: 3.7 MMOL/L (ref 3.5–5.1)
PROT SERPL-MCNC: 6.6 G/DL (ref 6.4–8.2)
RBC # BLD AUTO: 3.56 M/UL (ref 3.8–5.2)
RBC MORPH BLD: ABNORMAL
SODIUM SERPL-SCNC: 140 MMOL/L (ref 136–145)
TROPONIN I SERPL-MCNC: <0.05 NG/ML
WBC # BLD AUTO: 11 K/UL (ref 3.6–11)
WBC MORPH BLD: ABNORMAL

## 2021-08-27 PROCEDURE — 74011000250 HC RX REV CODE- 250: Performed by: EMERGENCY MEDICINE

## 2021-08-27 PROCEDURE — 93005 ELECTROCARDIOGRAM TRACING: CPT

## 2021-08-27 PROCEDURE — 80053 COMPREHEN METABOLIC PANEL: CPT

## 2021-08-27 PROCEDURE — 36415 COLL VENOUS BLD VENIPUNCTURE: CPT

## 2021-08-27 PROCEDURE — 96376 TX/PRO/DX INJ SAME DRUG ADON: CPT

## 2021-08-27 PROCEDURE — 64450 NJX AA&/STRD OTHER PN/BRANCH: CPT

## 2021-08-27 PROCEDURE — 84484 ASSAY OF TROPONIN QUANT: CPT

## 2021-08-27 PROCEDURE — 85025 COMPLETE CBC W/AUTO DIFF WBC: CPT

## 2021-08-27 PROCEDURE — 75810000283 HC INJECTION NERVE BLOCK

## 2021-08-27 PROCEDURE — 96375 TX/PRO/DX INJ NEW DRUG ADDON: CPT

## 2021-08-27 PROCEDURE — 96374 THER/PROPH/DIAG INJ IV PUSH: CPT

## 2021-08-27 PROCEDURE — 74011250636 HC RX REV CODE- 250/636: Performed by: EMERGENCY MEDICINE

## 2021-08-27 PROCEDURE — 99285 EMERGENCY DEPT VISIT HI MDM: CPT

## 2021-08-27 RX ORDER — HYDROMORPHONE HYDROCHLORIDE 1 MG/ML
1 INJECTION, SOLUTION INTRAMUSCULAR; INTRAVENOUS; SUBCUTANEOUS
Status: COMPLETED | OUTPATIENT
Start: 2021-08-27 | End: 2021-08-27

## 2021-08-27 RX ORDER — LIDOCAINE HYDROCHLORIDE 10 MG/ML
1 INJECTION, SOLUTION EPIDURAL; INFILTRATION; INTRACAUDAL; PERINEURAL ONCE
Status: COMPLETED | OUTPATIENT
Start: 2021-08-27 | End: 2021-08-27

## 2021-08-27 RX ORDER — BUPIVACAINE HYDROCHLORIDE 5 MG/ML
1 INJECTION, SOLUTION EPIDURAL; INTRACAUDAL ONCE
Status: COMPLETED | OUTPATIENT
Start: 2021-08-27 | End: 2021-08-27

## 2021-08-27 RX ORDER — PREDNISONE 10 MG/1
60 TABLET ORAL
Qty: 27 TABLET | Refills: 0 | Status: ON HOLD | OUTPATIENT
Start: 2021-08-27 | End: 2021-08-29

## 2021-08-27 RX ORDER — ONDANSETRON 2 MG/ML
4 INJECTION INTRAMUSCULAR; INTRAVENOUS
Status: COMPLETED | OUTPATIENT
Start: 2021-08-27 | End: 2021-08-27

## 2021-08-27 RX ORDER — OXYCODONE HYDROCHLORIDE 5 MG/1
5 TABLET ORAL
Qty: 9 TABLET | Refills: 0 | Status: ON HOLD | OUTPATIENT
Start: 2021-08-27 | End: 2021-08-29

## 2021-08-27 RX ADMIN — ONDANSETRON 4 MG: 2 INJECTION INTRAMUSCULAR; INTRAVENOUS at 09:36

## 2021-08-27 RX ADMIN — LIDOCAINE HYDROCHLORIDE 0.5 ML: 10 INJECTION, SOLUTION EPIDURAL; INFILTRATION; INTRACAUDAL; PERINEURAL at 10:03

## 2021-08-27 RX ADMIN — HYDROMORPHONE HYDROCHLORIDE 1 MG: 1 INJECTION, SOLUTION INTRAMUSCULAR; INTRAVENOUS; SUBCUTANEOUS at 09:37

## 2021-08-27 RX ADMIN — HYDROMORPHONE HYDROCHLORIDE 1 MG: 1 INJECTION, SOLUTION INTRAMUSCULAR; INTRAVENOUS; SUBCUTANEOUS at 10:39

## 2021-08-27 RX ADMIN — BUPIVACAINE HYDROCHLORIDE 0.5 ML: 5 INJECTION, SOLUTION EPIDURAL; INTRACAUDAL; PERINEURAL at 10:03

## 2021-08-27 RX ADMIN — ONDANSETRON 4 MG: 2 INJECTION INTRAMUSCULAR; INTRAVENOUS at 10:36

## 2021-08-27 RX ADMIN — METHYLPREDNISOLONE SODIUM SUCCINATE 125 MG: 125 INJECTION, POWDER, FOR SOLUTION INTRAMUSCULAR; INTRAVENOUS at 10:38

## 2021-08-27 NOTE — DISCHARGE INSTRUCTIONS
You were seen here in the emergency department for ongoing head pain, likely related to neuralgia that your neurologist is currently working up. After hydration, pain medication and a nerve block here, it is safe to send you home. It is important that you follow-up with your neurologist as planned so he can finish your evaluation. A prescription for steroids as well as oxycodone for breakthrough pain has been sent to your preferred pharmacy. Please call your pain management physician to let them know you have been provided this prescription. Please continue to hydrate at home and if you take the oxycodone, please also take 1000 mg of Tylenol as well as 800 mg of ibuprofen. Please take these medications with food to avoid irritating her stomach.

## 2021-08-27 NOTE — ED PROVIDER NOTES
EMERGENCY DEPARTMENT HISTORY AND PHYSICAL EXAM      Date: 8/27/2021  Patient Name: Dk Sutton    History of Presenting Illness     Chief Complaint   Patient presents with    Syncope     per ems, patient has a history of syncopal episodes, and has been going in and out of conciousness. History Provided By: Patient and her mother    HPI: Dk Sutton, 38 y.o. female presents to the ED with history of ongoing neurological work-up for who is currently characterized as post occipital neuralgia, has been under the care of neurology is evaluate for additional diagnoses including trigeminal neuralgia, here with syncopal-like episode at home due to severe onset of pain. Patient describes a long history of pain along the right side of her head, beginning in the back of her head and then shooting forward in a sharp, knifelike pain. She said that prior to her divorce, she had good medical insurance and had her condition under control and then postdivorce and without ongoing insurance besides Medicaid, has had limited access to medical care in the pain has resurfaced. She denies any fever, hitting her head, trauma, is not on denied any anticoagulation, no new numbness or weakness in her face arms or legs. She was recently seen in our emergency department and said that the nerve block and medications he received at that time were immensely helpful. There are no other complaints, changes, or physical findings at this time. PCP: Asa Fernandez MD    No current facility-administered medications on file prior to encounter. Current Outpatient Medications on File Prior to Encounter   Medication Sig Dispense Refill    HYDROcodone-acetaminophen (Norco) 5-325 mg per tablet Take 1 Tablet by mouth every six (6) hours as needed for Pain for up to 3 days. Max Daily Amount: 4 Tablets.  10 Tablet 0    tiZANidine (ZANAFLEX) 4 mg tablet TAKE 1 TABLET BY MOUTH 3 TIMES A DAY AS NEEDED FOR PAIN 30 Tablet 0    azithromycin (ZITHROMAX) 250 mg tablet TAKE 2 TABLETS BY MOUTH TODAY, THEN TAKE 1 TABLET DAILY FOR 4 DAYS 6 Tablet 0    ondansetron hcl (ZOFRAN) 4 mg tablet TAKE 1 TABLET BY MOUTH EVERY 8 HOURS AS NEEDED FOR NAUSEA 20 Tablet 0    mirtazapine (REMERON) 30 mg tablet TAKE 1 TABLET BY MOUTH AT BEDTIME 30 Tablet 5    ALPRAZolam (XANAX) 1 mg tablet One bid prn anxiety 60 Tablet 1    ondansetron (Zofran ODT) 4 mg disintegrating tablet 1 Tablet by SubLINGual route every eight (8) hours as needed for Nausea or Vomiting. 20 Tablet 0    metoprolol succinate (TOPROL-XL) 25 mg XL tablet TAKE 1 TABLET BY MOUTH EVERY DAY AT NIGHT 30 Tablet 2    DULoxetine (CYMBALTA) 60 mg capsule TAKE 2 CAPSULESBY MOUTH EVERY DAY 30 Capsule 5    naloxone (NARCAN) 4 mg/actuation nasal spray Use 1 spray intranasally, then discard. Repeat with new spray every 2 min as needed for opioid overdose symptoms, alternating nostrils. 2 Each 0    mupirocin (BACTROBAN) 2 % ointment Apply  to affected area daily. 22 g 0    ketorolac (TORADOL) 10 mg tablet Take 1 Tab by mouth every six (6) hours as needed for Pain. 20 Tab 0    olmesartan (BENICAR) 5 mg tablet TAKE 1 TABLET BY MOUTH DAILY 90 Tab 3    naproxen (NAPROSYN) 500 mg tablet TK 1 TA PO BID      RisaQuad-2 16 billion cell cap cap TAKE ONE CAPSULE BY MOUTH ONCE DAILY 30 Cap 1    albuterol (PROVENTIL HFA, VENTOLIN HFA, PROAIR HFA) 90 mcg/actuation inhaler Take 1 Puff by inhalation every six (6) hours as needed for Wheezing. 1 Inhaler 0    furosemide (LASIX) 20 mg tablet PRN  0    estradiol (ESTRACE) 1 mg tablet Take 1 mg by mouth daily.   0    indomethacin (INDOCIN) 25 mg capsule PRN      pantoprazole (PROTONIX) 40 mg tablet take 1 tablet by mouth at bedtime  0    codeine-butalbital-acetaminophen-caffeine (FIORICET WITH CODEINE) -82-30 mg capsule take 2 capsules by mouth every 6 hours if needed for MIGRAINE HEADACHE 90 Cap 0    rizatriptan (MAXALT) 10 mg tablet Take 10 mg by mouth once as needed for Migraine. May repeat in 2 hours if needed      belimumab (Benlysta) 400 mg solr injection by IntraVENous route. Past History     Past Medical History:  Past Medical History:   Diagnosis Date    Abnormal CT of the abdomen 11/8/2012    Asthma     Autoimmune disease (HCC)     lupus    C. difficile diarrhea     Cervical prolapse     Dehydration     Mariah-Danlos syndrome     Gastrointestinal disorder     gerd, twisted colon, IBS    GERD (gastroesophageal reflux disease)     Headache(784.0)     Lupus (HCC)     Morbid obesity (HCC)     Nausea & vomiting     Neurological disorder     cluster headaches    Occipital neuralgia     other 2006, 2009    ovarian cyst removal    Other ill-defined conditions(799.89)     Worsening headaches        Past Surgical History:  Past Surgical History:   Procedure Laterality Date    COLONOSCOPY  9/21/2010         HX CHOLECYSTECTOMY      HX CYST INCISION AND DRAINAGE Right 02/27/2020    HX GYN  1/2009    right salpingo oopherectomy    HX GYN  2006    right ovarian tumor removed    HX HEENT      left wisdom teeth removal    HX HEENT      top right wisdom tooth removed    HX HERNIA REPAIR  4/2012    HX HERNIA REPAIR  2/28/13    Laparoscopic recurrent incisional hernia repair    HX HYSTERECTOMY      2016    HX UROLOGICAL      Kidney Stone Removal    ND EGD TRANSORAL BIOPSY SINGLE/MULTIPLE  9/22/2010            Family History:  Family History   Problem Relation Age of Onset    Colon Cancer Maternal Grandfather        Social History:  Social History     Tobacco Use    Smoking status: Never Smoker    Smokeless tobacco: Never Used   Vaping Use    Vaping Use: Never used   Substance Use Topics    Alcohol use: No    Drug use: No       Allergies:   Allergies   Allergen Reactions    Latex Itching     burning    Compazine [Prochlorperazine] Anxiety     High heart rate  Pt can take promethazine with no problems    Sulfa (Sulfonamide Antibiotics) Unknown (comments)    Topamax [Topiramate] Unknown (comments)    Adhesive Rash     Allergic to Dermabond    Clindamycin Diarrhea     Pt states caused her C-Diff    Mushroom Other (comments)    Mushroom Combination No.1 Unknown (comments)    Toradol [Ketorolac Tromethamine] Nausea and Vomiting and Other (comments)     PO & IV causes GI bleed  Tolerated during Feb 2020 stay    Valproic Acid Other (comments)     Elevated heart rate and vomiting           Review of Systems   Review of Systems   Constitutional: Negative for activity change and appetite change. HENT: Negative. Negative for dental problem, sore throat and voice change. Respiratory: Negative. Cardiovascular: Negative. Musculoskeletal: Negative for back pain and neck pain. Neurological: Negative for tremors, seizures, facial asymmetry, speech difficulty, weakness, light-headedness, numbness and headaches. Patient does not describe a headache but says that its head and scalp pain. Hematological: Negative for adenopathy. All other systems reviewed and are negative. Physical Exam   Physical Exam   Vital signs and nursing notes reviewed    CONSTITUTIONAL: Alert, in moderate distress; well-developed; well-nourished. Accompanied by mother. HEAD:  Normocephalic, atraumatic  EYES: PERRL; EOM's intact. ENTM: Nose: no rhinorrhea; Throat: no erythema or exudate, mucous membranes moist  Neck:  Supple. trachea is midline. Tenderness along the right superior neck at the occiput insertion site without overlying skin changes including abrasions, contusions, lacerations, rash or vesicles. She has no nuchal rigidity. RESP: Chest clear, equal breath sounds. - W/R/R  CV: S1 and S2 WNL; No murmurs, gallops or rubs. 2+ radial and DP pulses bilaterally. GI: non-distended, normal bowel sounds, abdomen soft and non-tender. No masses or organomegaly. : No costo-vertebral angle tenderness.   BACK:  Non-tender, normal appearance  UPPER EXT:  Normal inspection. no joint or soft tissue swelling  LOWER EXT: No edema, no calf tenderness. NEURO: Alert and oriented x3, 5/5 strength and light touch sensation intact in bilateral upper and lower extremities. SKIN: No rashes; Warm and dry  PSYCH: Normal mood, normal affect    Diagnostic Study Results     Labs -     Recent Results (from the past 12 hour(s))   EKG, 12 LEAD, INITIAL    Collection Time: 08/27/21  8:36 AM   Result Value Ref Range    Ventricular Rate 58 BPM    Atrial Rate 58 BPM    P-R Interval 170 ms    QRS Duration 76 ms    Q-T Interval 402 ms    QTC Calculation (Bezet) 394 ms    Calculated P Axis 71 degrees    Calculated R Axis 51 degrees    Calculated T Axis 22 degrees    Diagnosis       Sinus bradycardia  Low voltage QRS  When compared with ECG of 24-AUG-2021 17:51,  No significant change was found     CBC WITH AUTOMATED DIFF    Collection Time: 08/27/21  9:38 AM   Result Value Ref Range    WBC 11.0 3.6 - 11.0 K/uL    RBC 3.56 (L) 3.80 - 5.20 M/uL    HGB 10.9 (L) 11.5 - 16.0 g/dL    HCT 34.1 (L) 35.0 - 47.0 %    MCV 95.8 80.0 - 99.0 FL    MCH 30.6 26.0 - 34.0 PG    MCHC 32.0 30.0 - 36.5 g/dL    RDW 13.3 11.5 - 14.5 %    PLATELET 112 196 - 330 K/uL    MPV 9.8 8.9 - 12.9 FL    NRBC 0.0 0  WBC    ABSOLUTE NRBC 0.00 0.00 - 0.01 K/uL    NEUTROPHILS 30 (L) 32 - 75 %    LYMPHOCYTES 57 (H) 12 - 49 %    MONOCYTES 6 5 - 13 %    EOSINOPHILS 7 0 - 7 %    BASOPHILS 0 0 - 1 %    IMMATURE GRANULOCYTES 0 0.0 - 0.5 %    ABS. NEUTROPHILS 3.3 1.8 - 8.0 K/UL    ABS. LYMPHOCYTES 6.2 (H) 0.8 - 3.5 K/UL    ABS. MONOCYTES 0.7 0.0 - 1.0 K/UL    ABS. EOSINOPHILS 0.8 (H) 0.0 - 0.4 K/UL    ABS. BASOPHILS 0.0 0.0 - 0.1 K/UL    ABS. IMM.  GRANS. 0.0 0.00 - 0.04 K/UL    DF MANUAL      RBC COMMENTS NORMOCYTIC, NORMOCHROMIC      WBC COMMENTS REACTIVE LYMPHS     METABOLIC PANEL, COMPREHENSIVE    Collection Time: 08/27/21  9:38 AM   Result Value Ref Range    Sodium 140 136 - 145 mmol/L    Potassium 3.7 3.5 - 5.1 mmol/L    Chloride 108 97 - 108 mmol/L    CO2 27 21 - 32 mmol/L    Anion gap 5 5 - 15 mmol/L    Glucose 97 65 - 100 mg/dL    BUN 16 6 - 20 MG/DL    Creatinine 0.94 0.55 - 1.02 MG/DL    BUN/Creatinine ratio 17 12 - 20      GFR est AA >60 >60 ml/min/1.73m2    GFR est non-AA >60 >60 ml/min/1.73m2    Calcium 8.0 (L) 8.5 - 10.1 MG/DL    Bilirubin, total 0.1 (L) 0.2 - 1.0 MG/DL    ALT (SGPT) 19 12 - 78 U/L    AST (SGOT) 13 (L) 15 - 37 U/L    Alk. phosphatase 92 45 - 117 U/L    Protein, total 6.6 6.4 - 8.2 g/dL    Albumin 3.4 (L) 3.5 - 5.0 g/dL    Globulin 3.2 2.0 - 4.0 g/dL    A-G Ratio 1.1 1.1 - 2.2     TROPONIN I    Collection Time: 08/27/21  9:38 AM   Result Value Ref Range    Troponin-I, Qt. <0.05 <0.05 ng/mL       Radiologic Studies -   No orders to display     CT Results  (Last 48 hours)    None        CXR Results  (Last 48 hours)    None          Medical Decision Making   I am the first provider for this patient. I reviewed the vital signs, available nursing notes, past medical history, past surgical history, family history and social history. Vital Signs-Reviewed the patient's vital signs. Patient Vitals for the past 12 hrs:   Temp Pulse Resp BP SpO2   08/27/21 1034 -- 67 15 (!) 117/46 97 %   08/27/21 0930 -- (!) 52 10 (!) 102/57 97 %   08/27/21 0831 98.1 °F (36.7 °C) (!) 56 17 108/70 98 %       EKG interpretation: (Preliminary)  EKG performed at 8:36 AM shows a sinus bradycardia rate of 58, normal axis, normal intervals without visible acute ischemic changes. Records Reviewed: Nursing notes and old medical records    Provider Notes (Medical Decision Making):   78-year-old female with improved symptoms after local nerve block and additional pain medications. Patient is on pain treatment plan with outside clinician and discussed medication realization in the ED as well as follow-up communication and aftercare.   Today's evaluation is not consistent with stroke, intracranial bleed, intracranial hemorrhage or other acute intracranial emergency. Do have some concern for secondary gain. Overall, patient's symptoms are improved, did have concerns of meningitis or encephalitis and will plan for discharge. ED Course:   Initial assessment performed. The patients presenting problems have been discussed, and they are in agreement with the care plan formulated and outlined with them. I have encouraged them to ask questions as they arise throughout their visit. ED Course as of Aug 27 1127   Fri Aug 27, 2021   1121 Patient feeling \"so much better exhalation point\". Discussed home care as well as pain treatment plan options for continue to reduce patient's pain at home while waiting for an MRI and continued work-up with neurology. [TL]      ED Course User Index  [TL] Lev Gifford MD       Procedure Note -right supraorbital nerve block:   10:13 AM  Performed by: Colette Cooper MD    A timeout was performed the head of nerve block and 0.5 cc of bupivacaine 0.25% and 2.5 cc 1% lidocaine without both without epinephrine used to perform right supraorbital nerve block. The procedure took 1-15 minutes, and pt tolerated well. Blood loss less than 1 cc. Disposition:  Discharge    Discharge Note:  11:27 AM  The pt is ready for discharge. The pt's signs, symptoms, diagnosis, and discharge instructions have been discussed and pt has conveyed their understanding. The pt is to follow up as recommended or return to ER should their symptoms worsen. Plan has been discussed and pt is in agreement. DISCHARGE PLAN:  1. Current Discharge Medication List      START taking these medications    Details   predniSONE (DELTASONE) 10 mg tablet Take 60 mg by mouth daily (with breakfast).  Day 1 and 2: 60mg; Day 3: 50mg; Day 4:40mg; Day 5: 30 mg; Day 6: 20mg; Day 7: 10mg  Qty: 27 Tablet, Refills: 0  Start date: 8/27/2021      oxyCODONE IR (Roxicodone) 5 mg immediate release tablet Take 1 Tablet by mouth every eight (8) hours as needed for Pain for up to 3 days. Max Daily Amount: 15 mg.  Qty: 9 Tablet, Refills: 0  Start date: 8/27/2021, End date: 8/30/2021    Associated Diagnoses: Supraorbital neuralgia; Occipital neuralgia of right side           2. Follow-up Information     Follow up With Specialties Details Why Kenan Rosa MD Neurology  Please follow-up with Dr. Dyllan Mccabe regarding getting your MRI done so he can continue your evaluation and treatment plan. [facility] [address]  [phone]      [facility] EMERGENCY DEPT Emergency Medicine  If symptoms worsen including fever, uncontrolled pain or other new concerning symptoms. [address] [place] [address]  [phone]        3. Return to ED if worse     Diagnosis     Clinical Impression:   1. Supraorbital neuralgia    2. Occipital neuralgia of right side        Attestations:    Colette Cooper MD    Please note that this dictation was completed with [employer], the computer voice recognition software. Quite often unanticipated grammatical, syntax, homophones, and other interpretive errors are inadvertently transcribed by the computer software. Please disregard these errors. Please excuse any errors that have escaped final proofreading. Thank you.

## 2021-08-28 ENCOUNTER — APPOINTMENT (OUTPATIENT)
Dept: CT IMAGING | Age: 36
End: 2021-08-28
Attending: EMERGENCY MEDICINE
Payer: MEDICAID

## 2021-08-28 ENCOUNTER — HOSPITAL ENCOUNTER (OUTPATIENT)
Age: 36
Setting detail: OBSERVATION
Discharge: HOME | End: 2021-08-30
Attending: EMERGENCY MEDICINE | Admitting: HOSPITALIST
Payer: MEDICAID

## 2021-08-28 DIAGNOSIS — M54.81 OCCIPITAL NEURALGIA, UNSPECIFIED LATERALITY: ICD-10-CM

## 2021-08-28 DIAGNOSIS — R56.9 SEIZURE-LIKE ACTIVITY (HCC): Primary | ICD-10-CM

## 2021-08-28 DIAGNOSIS — F44.5 PSEUDOSEIZURE: ICD-10-CM

## 2021-08-28 DIAGNOSIS — R55 SYNCOPE, UNSPECIFIED SYNCOPE TYPE: ICD-10-CM

## 2021-08-28 DIAGNOSIS — R51.9 ACUTE INTRACTABLE HEADACHE, UNSPECIFIED HEADACHE TYPE: ICD-10-CM

## 2021-08-28 DIAGNOSIS — R55 SYNCOPE AND COLLAPSE: ICD-10-CM

## 2021-08-28 LAB
ALBUMIN SERPL-MCNC: 4.5 G/DL (ref 3.5–5)
ALBUMIN/GLOB SERPL: 1.3 {RATIO} (ref 1.1–2.2)
ALP SERPL-CCNC: 118 U/L (ref 45–117)
ALT SERPL-CCNC: 22 U/L (ref 12–78)
ANION GAP SERPL CALC-SCNC: 4 MMOL/L (ref 5–15)
AST SERPL-CCNC: 11 U/L (ref 15–37)
ATRIAL RATE: 58 BPM
BASOPHILS # BLD: 0.1 K/UL (ref 0–0.1)
BASOPHILS NFR BLD: 0 % (ref 0–1)
BILIRUB SERPL-MCNC: 0.2 MG/DL (ref 0.2–1)
BUN SERPL-MCNC: 19 MG/DL (ref 6–20)
BUN/CREAT SERPL: 18 (ref 12–20)
CALCIUM SERPL-MCNC: 8.6 MG/DL (ref 8.5–10.1)
CALCULATED P AXIS, ECG09: 71 DEGREES
CALCULATED R AXIS, ECG10: 51 DEGREES
CALCULATED T AXIS, ECG11: 22 DEGREES
CHLORIDE SERPL-SCNC: 108 MMOL/L (ref 97–108)
CO2 SERPL-SCNC: 28 MMOL/L (ref 21–32)
COMMENT, HOLDF: NORMAL
CREAT SERPL-MCNC: 1.04 MG/DL (ref 0.55–1.02)
DIAGNOSIS, 93000: NORMAL
DIFFERENTIAL METHOD BLD: ABNORMAL
EOSINOPHIL # BLD: 0.1 K/UL (ref 0–0.4)
EOSINOPHIL NFR BLD: 0 % (ref 0–7)
ERYTHROCYTE [DISTWIDTH] IN BLOOD BY AUTOMATED COUNT: 13.2 % (ref 11.5–14.5)
ETHANOL SERPL-MCNC: <10 MG/DL
GLOBULIN SER CALC-MCNC: 3.6 G/DL (ref 2–4)
GLUCOSE BLD STRIP.AUTO-MCNC: 156 MG/DL (ref 65–117)
GLUCOSE SERPL-MCNC: 139 MG/DL (ref 65–100)
HCG SERPL QL: NEGATIVE
HCT VFR BLD AUTO: 39.8 % (ref 35–47)
HGB BLD-MCNC: 12.7 G/DL (ref 11.5–16)
IMM GRANULOCYTES # BLD AUTO: 0.3 K/UL (ref 0–0.04)
IMM GRANULOCYTES NFR BLD AUTO: 2 % (ref 0–0.5)
LYMPHOCYTES # BLD: 3.1 K/UL (ref 0.8–3.5)
LYMPHOCYTES NFR BLD: 18 % (ref 12–49)
MCH RBC QN AUTO: 30.6 PG (ref 26–34)
MCHC RBC AUTO-ENTMCNC: 31.9 G/DL (ref 30–36.5)
MCV RBC AUTO: 95.9 FL (ref 80–99)
MONOCYTES # BLD: 1.2 K/UL (ref 0–1)
MONOCYTES NFR BLD: 7 % (ref 5–13)
NEUTS SEG # BLD: 12 K/UL (ref 1.8–8)
NEUTS SEG NFR BLD: 73 % (ref 32–75)
NRBC # BLD: 0 K/UL (ref 0–0.01)
NRBC BLD-RTO: 0 PER 100 WBC
P-R INTERVAL, ECG05: 170 MS
PLATELET # BLD AUTO: 408 K/UL (ref 150–400)
PMV BLD AUTO: 9.7 FL (ref 8.9–12.9)
POTASSIUM SERPL-SCNC: 4.2 MMOL/L (ref 3.5–5.1)
PROT SERPL-MCNC: 8.1 G/DL (ref 6.4–8.2)
Q-T INTERVAL, ECG07: 402 MS
QRS DURATION, ECG06: 76 MS
QTC CALCULATION (BEZET), ECG08: 394 MS
RBC # BLD AUTO: 4.15 M/UL (ref 3.8–5.2)
SAMPLES BEING HELD,HOLD: NORMAL
SERVICE CMNT-IMP: ABNORMAL
SODIUM SERPL-SCNC: 140 MMOL/L (ref 136–145)
VENTRICULAR RATE, ECG03: 58 BPM
WBC # BLD AUTO: 16.8 K/UL (ref 3.6–11)

## 2021-08-28 PROCEDURE — 85025 COMPLETE CBC W/AUTO DIFF WBC: CPT

## 2021-08-28 PROCEDURE — 82077 ASSAY SPEC XCP UR&BREATH IA: CPT

## 2021-08-28 PROCEDURE — 99285 EMERGENCY DEPT VISIT HI MDM: CPT

## 2021-08-28 PROCEDURE — 80053 COMPREHEN METABOLIC PANEL: CPT

## 2021-08-28 PROCEDURE — 36415 COLL VENOUS BLD VENIPUNCTURE: CPT

## 2021-08-28 PROCEDURE — 82962 GLUCOSE BLOOD TEST: CPT

## 2021-08-28 PROCEDURE — 93005 ELECTROCARDIOGRAM TRACING: CPT

## 2021-08-28 PROCEDURE — 84703 CHORIONIC GONADOTROPIN ASSAY: CPT

## 2021-08-28 PROCEDURE — 96374 THER/PROPH/DIAG INJ IV PUSH: CPT

## 2021-08-28 PROCEDURE — 74011250636 HC RX REV CODE- 250/636: Performed by: EMERGENCY MEDICINE

## 2021-08-28 PROCEDURE — 70450 CT HEAD/BRAIN W/O DYE: CPT

## 2021-08-28 PROCEDURE — 84146 ASSAY OF PROLACTIN: CPT

## 2021-08-28 RX ORDER — HYDROMORPHONE HYDROCHLORIDE 1 MG/ML
2 INJECTION, SOLUTION INTRAMUSCULAR; INTRAVENOUS; SUBCUTANEOUS ONCE
Status: COMPLETED | OUTPATIENT
Start: 2021-08-28 | End: 2021-08-29

## 2021-08-28 RX ORDER — LORAZEPAM 2 MG/ML
INJECTION INTRAMUSCULAR
Status: DISCONTINUED
Start: 2021-08-28 | End: 2021-08-29 | Stop reason: WASHOUT

## 2021-08-28 RX ORDER — ONDANSETRON 4 MG/1
TABLET, FILM COATED ORAL
Qty: 20 TABLET | Refills: 0 | Status: SHIPPED | OUTPATIENT
Start: 2021-08-28 | End: 2021-08-29 | Stop reason: ALTCHOICE

## 2021-08-28 RX ORDER — LORAZEPAM 2 MG/ML
1 INJECTION INTRAMUSCULAR ONCE
Status: COMPLETED | OUTPATIENT
Start: 2021-08-28 | End: 2021-08-28

## 2021-08-28 RX ORDER — DIPHENHYDRAMINE HYDROCHLORIDE 50 MG/ML
50 INJECTION, SOLUTION INTRAMUSCULAR; INTRAVENOUS
Status: COMPLETED | OUTPATIENT
Start: 2021-08-28 | End: 2021-08-29

## 2021-08-28 RX ADMIN — LORAZEPAM 1 MG: 2 INJECTION INTRAMUSCULAR; INTRAVENOUS at 22:46

## 2021-08-28 RX ADMIN — SODIUM CHLORIDE 1000 ML: 9 INJECTION, SOLUTION INTRAVENOUS at 22:46

## 2021-08-29 ENCOUNTER — APPOINTMENT (OUTPATIENT)
Dept: NON INVASIVE DIAGNOSTICS | Age: 36
End: 2021-08-29
Attending: HOSPITALIST
Payer: MEDICAID

## 2021-08-29 ENCOUNTER — APPOINTMENT (OUTPATIENT)
Dept: MRI IMAGING | Age: 36
End: 2021-08-29
Attending: HOSPITALIST
Payer: MEDICAID

## 2021-08-29 PROBLEM — R51.9 HEADACHE: Status: ACTIVE | Noted: 2021-08-29

## 2021-08-29 PROBLEM — K59.00 CONSTIPATION: Status: ACTIVE | Noted: 2021-08-29

## 2021-08-29 PROBLEM — R55 SYNCOPE: Status: ACTIVE | Noted: 2021-08-29

## 2021-08-29 PROBLEM — R51.9 CHRONIC HEADACHE DISORDER: Status: ACTIVE | Noted: 2021-08-29

## 2021-08-29 PROBLEM — R56.9 SEIZURE (HCC): Status: ACTIVE | Noted: 2021-08-29

## 2021-08-29 PROBLEM — G89.29 CHRONIC HEADACHE DISORDER: Status: ACTIVE | Noted: 2021-08-29

## 2021-08-29 LAB
AMPHET UR QL SCN: NEGATIVE
APPEARANCE UR: CLEAR
ATRIAL RATE: 65 BPM
BACTERIA URNS QL MICRO: NEGATIVE /HPF
BARBITURATES UR QL SCN: POSITIVE
BENZODIAZ UR QL: POSITIVE
BILIRUB UR QL: NEGATIVE
CALCULATED P AXIS, ECG09: 14 DEGREES
CALCULATED R AXIS, ECG10: 36 DEGREES
CALCULATED T AXIS, ECG11: 11 DEGREES
CANNABINOIDS UR QL SCN: NEGATIVE
COCAINE UR QL SCN: NEGATIVE
COLOR UR: NORMAL
DIAGNOSIS, 93000: NORMAL
DRUG SCRN COMMENT,DRGCM: ABNORMAL
EPITH CASTS URNS QL MICRO: NORMAL /LPF
GLUCOSE UR STRIP.AUTO-MCNC: NEGATIVE MG/DL
HGB UR QL STRIP: NEGATIVE
HYALINE CASTS URNS QL MICRO: NORMAL /LPF (ref 0–5)
KETONES UR QL STRIP.AUTO: NEGATIVE MG/DL
LEUKOCYTE ESTERASE UR QL STRIP.AUTO: NEGATIVE
METHADONE UR QL: NEGATIVE
NITRITE UR QL STRIP.AUTO: NEGATIVE
OPIATES UR QL: POSITIVE
P-R INTERVAL, ECG05: 152 MS
PCP UR QL: NEGATIVE
PH UR STRIP: 6.5 [PH] (ref 5–8)
PROLACTIN SERPL-MCNC: 6.5 NG/ML
PROT UR STRIP-MCNC: NEGATIVE MG/DL
Q-T INTERVAL, ECG07: 392 MS
QRS DURATION, ECG06: 80 MS
QTC CALCULATION (BEZET), ECG08: 407 MS
RBC #/AREA URNS HPF: NORMAL /HPF (ref 0–5)
SP GR UR REFRACTOMETRY: 1.01 (ref 1–1.03)
UA: UC IF INDICATED,UAUC: NORMAL
UROBILINOGEN UR QL STRIP.AUTO: 0.2 EU/DL (ref 0.2–1)
VENTRICULAR RATE, ECG03: 65 BPM
WBC URNS QL MICRO: NORMAL /HPF (ref 0–4)

## 2021-08-29 PROCEDURE — 74011250636 HC RX REV CODE- 250/636: Performed by: NURSE PRACTITIONER

## 2021-08-29 PROCEDURE — 99223 1ST HOSP IP/OBS HIGH 75: CPT | Performed by: PSYCHIATRY & NEUROLOGY

## 2021-08-29 PROCEDURE — 74011636637 HC RX REV CODE- 636/637: Performed by: NURSE PRACTITIONER

## 2021-08-29 PROCEDURE — 74011000258 HC RX REV CODE- 258: Performed by: EMERGENCY MEDICINE

## 2021-08-29 PROCEDURE — 95816 EEG AWAKE AND DROWSY: CPT | Performed by: STUDENT IN AN ORGANIZED HEALTH CARE EDUCATION/TRAINING PROGRAM

## 2021-08-29 PROCEDURE — 80307 DRUG TEST PRSMV CHEM ANLYZR: CPT

## 2021-08-29 PROCEDURE — 96372 THER/PROPH/DIAG INJ SC/IM: CPT

## 2021-08-29 PROCEDURE — 74011250637 HC RX REV CODE- 250/637: Performed by: NURSE PRACTITIONER

## 2021-08-29 PROCEDURE — 74011250636 HC RX REV CODE- 250/636: Performed by: EMERGENCY MEDICINE

## 2021-08-29 PROCEDURE — 74011250637 HC RX REV CODE- 250/637: Performed by: STUDENT IN AN ORGANIZED HEALTH CARE EDUCATION/TRAINING PROGRAM

## 2021-08-29 PROCEDURE — 99356 PR PROLONGED SVC I/P OR OBS SETTING 1ST HOUR: CPT | Performed by: PSYCHIATRY & NEUROLOGY

## 2021-08-29 PROCEDURE — 99218 HC RM OBSERVATION: CPT

## 2021-08-29 PROCEDURE — A9576 INJ PROHANCE MULTIPACK: HCPCS | Performed by: STUDENT IN AN ORGANIZED HEALTH CARE EDUCATION/TRAINING PROGRAM

## 2021-08-29 PROCEDURE — 96374 THER/PROPH/DIAG INJ IV PUSH: CPT

## 2021-08-29 PROCEDURE — 96376 TX/PRO/DX INJ SAME DRUG ADON: CPT

## 2021-08-29 PROCEDURE — 74011250637 HC RX REV CODE- 250/637: Performed by: HOSPITALIST

## 2021-08-29 PROCEDURE — 81001 URINALYSIS AUTO W/SCOPE: CPT

## 2021-08-29 PROCEDURE — 74011636637 HC RX REV CODE- 636/637: Performed by: HOSPITALIST

## 2021-08-29 PROCEDURE — 65660000000 HC RM CCU STEPDOWN

## 2021-08-29 PROCEDURE — 96375 TX/PRO/DX INJ NEW DRUG ADDON: CPT

## 2021-08-29 PROCEDURE — 74011250636 HC RX REV CODE- 250/636: Performed by: HOSPITALIST

## 2021-08-29 PROCEDURE — 93306 TTE W/DOPPLER COMPLETE: CPT

## 2021-08-29 PROCEDURE — 70553 MRI BRAIN STEM W/O & W/DYE: CPT

## 2021-08-29 PROCEDURE — 74011250636 HC RX REV CODE- 250/636: Performed by: STUDENT IN AN ORGANIZED HEALTH CARE EDUCATION/TRAINING PROGRAM

## 2021-08-29 RX ORDER — SUMATRIPTAN 6 MG/.5ML
6 INJECTION, SOLUTION SUBCUTANEOUS
Status: COMPLETED | OUTPATIENT
Start: 2021-08-29 | End: 2021-08-29

## 2021-08-29 RX ORDER — SODIUM CHLORIDE 0.9 % (FLUSH) 0.9 %
5-40 SYRINGE (ML) INJECTION EVERY 8 HOURS
Status: DISCONTINUED | OUTPATIENT
Start: 2021-08-29 | End: 2021-08-30 | Stop reason: HOSPADM

## 2021-08-29 RX ORDER — ALPRAZOLAM 0.5 MG/1
1 TABLET ORAL
Status: DISCONTINUED | OUTPATIENT
Start: 2021-08-29 | End: 2021-08-30 | Stop reason: HOSPADM

## 2021-08-29 RX ORDER — HYDROMORPHONE HYDROCHLORIDE 1 MG/ML
2 INJECTION, SOLUTION INTRAMUSCULAR; INTRAVENOUS; SUBCUTANEOUS ONCE
Status: COMPLETED | OUTPATIENT
Start: 2021-08-29 | End: 2021-08-29

## 2021-08-29 RX ORDER — DIPHENHYDRAMINE HYDROCHLORIDE 50 MG/ML
25 INJECTION, SOLUTION INTRAMUSCULAR; INTRAVENOUS
Status: COMPLETED | OUTPATIENT
Start: 2021-08-29 | End: 2021-08-29

## 2021-08-29 RX ORDER — PROPRANOLOL HYDROCHLORIDE 80 MG/1
80 CAPSULE, EXTENDED RELEASE ORAL DAILY
Status: DISCONTINUED | OUTPATIENT
Start: 2021-08-29 | End: 2021-08-30 | Stop reason: HOSPADM

## 2021-08-29 RX ORDER — CODEINE SULFATE 30 MG/1
60 TABLET ORAL
Status: DISCONTINUED | OUTPATIENT
Start: 2021-08-29 | End: 2021-08-30 | Stop reason: HOSPADM

## 2021-08-29 RX ORDER — DIPHENHYDRAMINE HYDROCHLORIDE 50 MG/ML
25 INJECTION, SOLUTION INTRAMUSCULAR; INTRAVENOUS EVERY 24 HOURS
Status: DISCONTINUED | OUTPATIENT
Start: 2021-08-29 | End: 2021-08-30 | Stop reason: HOSPADM

## 2021-08-29 RX ORDER — ENOXAPARIN SODIUM 100 MG/ML
40 INJECTION SUBCUTANEOUS EVERY 12 HOURS
Status: DISCONTINUED | OUTPATIENT
Start: 2021-08-29 | End: 2021-08-30 | Stop reason: HOSPADM

## 2021-08-29 RX ORDER — ONDANSETRON 4 MG/1
4 TABLET, ORALLY DISINTEGRATING ORAL
Status: DISCONTINUED | OUTPATIENT
Start: 2021-08-29 | End: 2021-08-30 | Stop reason: HOSPADM

## 2021-08-29 RX ORDER — DULOXETIN HYDROCHLORIDE 30 MG/1
120 CAPSULE, DELAYED RELEASE ORAL DAILY
Status: DISCONTINUED | OUTPATIENT
Start: 2021-08-29 | End: 2021-08-30 | Stop reason: HOSPADM

## 2021-08-29 RX ORDER — ACETAMINOPHEN 325 MG/1
650 TABLET ORAL
Status: DISCONTINUED | OUTPATIENT
Start: 2021-08-29 | End: 2021-08-30 | Stop reason: HOSPADM

## 2021-08-29 RX ORDER — PROPRANOLOL HYDROCHLORIDE 80 MG/1
80 CAPSULE, EXTENDED RELEASE ORAL DAILY
COMMUNITY
Start: 2021-08-26 | End: 2021-12-20

## 2021-08-29 RX ORDER — PROPRANOLOL HYDROCHLORIDE 80 MG/1
80 CAPSULE, EXTENDED RELEASE ORAL DAILY
Status: ON HOLD | COMMUNITY
End: 2021-08-29

## 2021-08-29 RX ORDER — BUTALBITAL, ACETAMINOPHEN, CAFFEINE AND CODEINE PHOSPHATE 50; 325; 40; 30 MG/1; MG/1; MG/1; MG/1
2 CAPSULE ORAL
Status: DISCONTINUED | OUTPATIENT
Start: 2021-08-29 | End: 2021-08-29

## 2021-08-29 RX ORDER — PANTOPRAZOLE SODIUM 40 MG/1
40 TABLET, DELAYED RELEASE ORAL
Status: DISCONTINUED | OUTPATIENT
Start: 2021-08-29 | End: 2021-08-30 | Stop reason: HOSPADM

## 2021-08-29 RX ORDER — FAMOTIDINE 20 MG/1
20 TABLET, FILM COATED ORAL DAILY
Status: DISCONTINUED | OUTPATIENT
Start: 2021-08-29 | End: 2021-08-30 | Stop reason: HOSPADM

## 2021-08-29 RX ORDER — GABAPENTIN 100 MG/1
CAPSULE ORAL
COMMUNITY
Start: 2021-08-08 | End: 2021-12-11

## 2021-08-29 RX ORDER — BUTALBITAL, ACETAMINOPHEN, CAFFEINE AND CODEINE PHOSPHATE 50; 325; 40; 30 MG/1; MG/1; MG/1; MG/1
1 CAPSULE ORAL
COMMUNITY
End: 2022-06-15

## 2021-08-29 RX ORDER — MIRTAZAPINE 15 MG/1
30 TABLET, FILM COATED ORAL
Status: DISCONTINUED | OUTPATIENT
Start: 2021-08-29 | End: 2021-08-30 | Stop reason: HOSPADM

## 2021-08-29 RX ORDER — GABAPENTIN 100 MG/1
200 CAPSULE ORAL 3 TIMES DAILY
Status: DISCONTINUED | OUTPATIENT
Start: 2021-08-29 | End: 2021-08-30 | Stop reason: HOSPADM

## 2021-08-29 RX ORDER — ALBUTEROL SULFATE 90 UG/1
1 AEROSOL, METERED RESPIRATORY (INHALATION)
Status: DISCONTINUED | OUTPATIENT
Start: 2021-08-29 | End: 2021-08-30 | Stop reason: HOSPADM

## 2021-08-29 RX ORDER — TIZANIDINE 4 MG/1
4 TABLET ORAL
Status: DISCONTINUED | OUTPATIENT
Start: 2021-08-29 | End: 2021-08-30 | Stop reason: HOSPADM

## 2021-08-29 RX ORDER — BUTALBITAL, ACETAMINOPHEN AND CAFFEINE 50; 325; 40 MG/1; MG/1; MG/1
2 TABLET ORAL
Status: DISCONTINUED | OUTPATIENT
Start: 2021-08-29 | End: 2021-08-30 | Stop reason: HOSPADM

## 2021-08-29 RX ORDER — HYDROMORPHONE HYDROCHLORIDE 1 MG/ML
0.5 INJECTION, SOLUTION INTRAMUSCULAR; INTRAVENOUS; SUBCUTANEOUS
Status: DISCONTINUED | OUTPATIENT
Start: 2021-08-29 | End: 2021-08-30 | Stop reason: HOSPADM

## 2021-08-29 RX ORDER — ACETAMINOPHEN 650 MG/1
650 SUPPOSITORY RECTAL
Status: DISCONTINUED | OUTPATIENT
Start: 2021-08-29 | End: 2021-08-30 | Stop reason: HOSPADM

## 2021-08-29 RX ORDER — BUTALBITAL, ACETAMINOPHEN AND CAFFEINE 50; 325; 40 MG/1; MG/1; MG/1
2 TABLET ORAL
Status: COMPLETED | OUTPATIENT
Start: 2021-08-29 | End: 2021-08-29

## 2021-08-29 RX ORDER — OXYCODONE HYDROCHLORIDE 5 MG/1
5 TABLET ORAL
Status: DISCONTINUED | OUTPATIENT
Start: 2021-08-29 | End: 2021-08-30 | Stop reason: HOSPADM

## 2021-08-29 RX ORDER — SODIUM CHLORIDE 0.9 % (FLUSH) 0.9 %
5-40 SYRINGE (ML) INJECTION AS NEEDED
Status: DISCONTINUED | OUTPATIENT
Start: 2021-08-29 | End: 2021-08-30 | Stop reason: HOSPADM

## 2021-08-29 RX ORDER — GABAPENTIN 100 MG/1
100 CAPSULE ORAL 3 TIMES DAILY
Status: DISCONTINUED | OUTPATIENT
Start: 2021-08-29 | End: 2021-08-29

## 2021-08-29 RX ORDER — ONDANSETRON 2 MG/ML
4 INJECTION INTRAMUSCULAR; INTRAVENOUS
Status: DISCONTINUED | OUTPATIENT
Start: 2021-08-29 | End: 2021-08-30 | Stop reason: HOSPADM

## 2021-08-29 RX ORDER — HYDROMORPHONE HYDROCHLORIDE 1 MG/ML
1 INJECTION, SOLUTION INTRAMUSCULAR; INTRAVENOUS; SUBCUTANEOUS
Status: DISCONTINUED | OUTPATIENT
Start: 2021-08-29 | End: 2021-08-29

## 2021-08-29 RX ORDER — PREDNISONE 5 MG/1
10 TABLET ORAL 2 TIMES DAILY WITH MEALS
Status: DISCONTINUED | OUTPATIENT
Start: 2021-08-29 | End: 2021-08-30 | Stop reason: HOSPADM

## 2021-08-29 RX ORDER — POLYETHYLENE GLYCOL 3350 17 G/17G
17 POWDER, FOR SOLUTION ORAL DAILY PRN
Status: DISCONTINUED | OUTPATIENT
Start: 2021-08-29 | End: 2021-08-30 | Stop reason: HOSPADM

## 2021-08-29 RX ORDER — FUROSEMIDE 20 MG/1
20 TABLET ORAL AS NEEDED
COMMUNITY
End: 2021-09-01 | Stop reason: SDUPTHER

## 2021-08-29 RX ADMIN — BUTALBITAL, ACETAMINOPHEN, AND CAFFEINE 2 TABLET: 50; 325; 40 TABLET ORAL at 05:45

## 2021-08-29 RX ADMIN — Medication 10 ML: at 08:49

## 2021-08-29 RX ADMIN — MIRTAZAPINE 30 MG: 15 TABLET, FILM COATED ORAL at 21:06

## 2021-08-29 RX ADMIN — ONDANSETRON 4 MG: 2 INJECTION INTRAMUSCULAR; INTRAVENOUS at 07:01

## 2021-08-29 RX ADMIN — SUMATRIPTAN 6 MG: 6 INJECTION, SOLUTION SUBCUTANEOUS at 05:48

## 2021-08-29 RX ADMIN — DIPHENHYDRAMINE HYDROCHLORIDE 25 MG: 50 INJECTION, SOLUTION INTRAMUSCULAR; INTRAVENOUS at 05:45

## 2021-08-29 RX ADMIN — PREDNISONE 10 MG: 5 TABLET ORAL at 08:50

## 2021-08-29 RX ADMIN — PREDNISONE 10 MG: 5 TABLET ORAL at 17:25

## 2021-08-29 RX ADMIN — HYDROMORPHONE HYDROCHLORIDE 0.5 MG: 1 INJECTION, SOLUTION INTRAMUSCULAR; INTRAVENOUS; SUBCUTANEOUS at 21:34

## 2021-08-29 RX ADMIN — GABAPENTIN 200 MG: 100 CAPSULE ORAL at 21:07

## 2021-08-29 RX ADMIN — CODEINE SULFATE 60 MG: 30 TABLET ORAL at 06:37

## 2021-08-29 RX ADMIN — ALPRAZOLAM 1 MG: 0.5 TABLET ORAL at 10:23

## 2021-08-29 RX ADMIN — DIPHENHYDRAMINE HYDROCHLORIDE 25 MG: 50 INJECTION, SOLUTION INTRAMUSCULAR; INTRAVENOUS at 10:15

## 2021-08-29 RX ADMIN — GABAPENTIN 200 MG: 100 CAPSULE ORAL at 15:08

## 2021-08-29 RX ADMIN — HYDROMORPHONE HYDROCHLORIDE 2 MG: 1 INJECTION, SOLUTION INTRAMUSCULAR; INTRAVENOUS; SUBCUTANEOUS at 00:24

## 2021-08-29 RX ADMIN — Medication 10 ML: at 04:36

## 2021-08-29 RX ADMIN — LEVETIRACETAM 1000 MG: 100 INJECTION, SOLUTION INTRAVENOUS at 00:20

## 2021-08-29 RX ADMIN — OXYCODONE 5 MG: 5 TABLET ORAL at 13:35

## 2021-08-29 RX ADMIN — ONDANSETRON 4 MG: 2 INJECTION INTRAMUSCULAR; INTRAVENOUS at 13:35

## 2021-08-29 RX ADMIN — BUTALBITAL, ACETAMINOPHEN, AND CAFFEINE 2 TABLET: 50; 325; 40 TABLET ORAL at 03:20

## 2021-08-29 RX ADMIN — ENOXAPARIN SODIUM 40 MG: 40 INJECTION SUBCUTANEOUS at 21:06

## 2021-08-29 RX ADMIN — ONDANSETRON 4 MG: 2 INJECTION INTRAMUSCULAR; INTRAVENOUS at 19:54

## 2021-08-29 RX ADMIN — Medication 10 ML: at 15:07

## 2021-08-29 RX ADMIN — HYDROMORPHONE HYDROCHLORIDE 1 MG: 1 INJECTION, SOLUTION INTRAMUSCULAR; INTRAVENOUS; SUBCUTANEOUS at 04:35

## 2021-08-29 RX ADMIN — OXYCODONE 5 MG: 5 TABLET ORAL at 17:25

## 2021-08-29 RX ADMIN — ALPRAZOLAM 1 MG: 0.5 TABLET ORAL at 03:01

## 2021-08-29 RX ADMIN — HYDROMORPHONE HYDROCHLORIDE 2 MG: 1 INJECTION, SOLUTION INTRAMUSCULAR; INTRAVENOUS; SUBCUTANEOUS at 02:00

## 2021-08-29 RX ADMIN — FAMOTIDINE 20 MG: 20 TABLET ORAL at 10:16

## 2021-08-29 RX ADMIN — DULOXETINE HYDROCHLORIDE 120 MG: 30 CAPSULE, DELAYED RELEASE ORAL at 08:50

## 2021-08-29 RX ADMIN — GADOTERIDOL 20 ML: 279.3 INJECTION, SOLUTION INTRAVENOUS at 18:19

## 2021-08-29 RX ADMIN — DIPHENHYDRAMINE HYDROCHLORIDE 50 MG: 50 INJECTION, SOLUTION INTRAMUSCULAR; INTRAVENOUS at 00:23

## 2021-08-29 RX ADMIN — HYDROMORPHONE HYDROCHLORIDE 1 MG: 1 INJECTION, SOLUTION INTRAMUSCULAR; INTRAVENOUS; SUBCUTANEOUS at 08:49

## 2021-08-29 RX ADMIN — CODEINE SULFATE 60 MG: 30 TABLET ORAL at 03:37

## 2021-08-29 RX ADMIN — GABAPENTIN 100 MG: 100 CAPSULE ORAL at 06:37

## 2021-08-29 RX ADMIN — PANTOPRAZOLE SODIUM 40 MG: 40 TABLET, DELAYED RELEASE ORAL at 01:40

## 2021-08-29 RX ADMIN — HYDROMORPHONE HYDROCHLORIDE 0.5 MG: 1 INJECTION, SOLUTION INTRAMUSCULAR; INTRAVENOUS; SUBCUTANEOUS at 15:07

## 2021-08-29 RX ADMIN — Medication 10 ML: at 21:11

## 2021-08-29 RX ADMIN — PROPRANOLOL HYDROCHLORIDE 80 MG: 80 CAPSULE, EXTENDED RELEASE ORAL at 08:56

## 2021-08-29 RX ADMIN — ENOXAPARIN SODIUM 40 MG: 40 INJECTION SUBCUTANEOUS at 08:50

## 2021-08-29 RX ADMIN — PANTOPRAZOLE SODIUM 40 MG: 40 TABLET, DELAYED RELEASE ORAL at 21:07

## 2021-08-29 NOTE — PROGRESS NOTES
Lovenox 40 mg daily for dvt prophylaxis,  crcl=87,  bmi=42. Lovenox dosing adjusted to 40 mg sc q12h.  Twan Rogers, ROBED

## 2021-08-29 NOTE — ROUTINE PROCESS
Rapid response called around 5 am due to patient having a \"seizure\". Patient stated that \"I'm having another seizure. I need that stuff they gave me in the ER. \" I explained to her that I didn't have either the Ativan nor the Keppra ordered to give her. So I called the rapid. No new medications given at this time. Shortly after, patient complained that her head ache was getting much worse. Contacted [facility] and was given medication orders. Followed as ordered. Was also given the ok to give an extra dose of Codeine to go along with the extra dose of Fioricet. And was permitted to give her 9 am dose of gabapentin early. Patient is still at this time complaining of a headache, but is no longer in visual distress.

## 2021-08-29 NOTE — PROGRESS NOTES
Problem: Falls - Risk of  Goal: *Absence of Falls  Description: Document Kevin Gonzalez Fall Risk and appropriate interventions in the flowsheet.   Note: Fall Risk Interventions:  Mobility Interventions: Bed/chair exit alarm, Patient to call before getting OOB         Medication Interventions: Bed/chair exit alarm, Patient to call before getting OOB         History of Falls Interventions: Bed/chair exit alarm, Door open when patient unattended, Investigate reason for fall, Evaluate medications/consider consulting pharmacy

## 2021-08-29 NOTE — PROGRESS NOTES
Patient seen and examined today, patient states that she is having episode of seizures for the last 1 week. Patient also complaining of passing out because of the headache. Patient states that she is seen neurologist who states that she needs to get the MRI done for the brain has the rest for the eye to make sure that the nerve is not damaged. Patient has significant amount of anxiety. Plan:  Discussed the pain management for the headache. MRI brain pending. MRI orbit pending. Neurology consultation pending. EEG pending. If all the tests came back negative then the patient can be discharged to home.

## 2021-08-29 NOTE — CONSULTS
IP CONSULT TO NEUROLOGY  Consult performed by: Jaycee Moritz, MD  Consult ordered by: Kayy Calderon MD            NEUROLOGY CONSULT    NAME Maria Elena Marshall AGE 38 y.o. MRN [id number]  1985     REQUESTING PHYSICIAN: Neil Mckeon MD      CHIEF COMPLAINT:  seizure     This is a 38 y.o. right handed female with a medical history of chronic occipital neuralgia who has been lately having frequent syncopal episodes and what believes are seizures. She is seeing Dr. Lindsay Mckinnon for her occipital neuralgia and has been for several years. She has been tried and failed on multiple medications. She states that medications that seem to help her most are the narcotic medication and she doesn't understand why we limit prescribing them. I explained the phenomenon of rebound headaches and she did not think that was an acceptable concern. When I walked in the room she was voluntarily shaking her  right arm. It was not rhythmic. She had no change in attitude or sensorium. When distracted the activity would stop and she was able to gesture when needed with that arm while explaining things than would resume the shaking after she had completed the intended motion (like pointing to where the pain was). When I explained that these were pseudoseizures she stopped and when her  entered the room she resumed. She was frustrated with what she saw as a lack of help and I explained to her the role of the hospital in treating or stabilizing acute and chronic conditions and not necessarily finalizing treatment if there is no immediate solution. I used the example of her occipital headaches which have been chronic and have failed several treatment modalities. Similiarly I explained that her syncope and pseudoseizures may not resolve upon discharge but at least we will know that there is no underlying physiologic condition to explain these issues. ASSESSMENT AND PLAN     1.  Pseudoseizures(HCC)  EEG, MRI of brain   If normal may follow up with her Neurologist, Dr. Sharri De La Torre after discharge. I would not renew the keppra based on the seizure like activity I personally witnessed. Will need to follow up with psychiatry after she is discharged. 2. Acute intractable headache, unspecified headache type  Heavily dependent on narcotics. She is probably experiencing rebound headaches in which case narcotics would be contraindicated as a form of treatment. Continue tizanidine, continue Cymbalta, continue gabapentin  The scope of this problem will probably not be addressed during this hospitalization as there are many factors that need addressing including:  her anxiety, rebound headaches which will take weeks to resolve and will most likely get worse before they get better, her lack of insight and most of all our failure in agreeing on a treatment strategy that will not feed her rebound headaches and lead to a resolution of her headaches.      3. Syncope  Will get the MRI of the brain, Carotid dopplers and EEG             ALLERGIES:  Latex, Compazine [prochlorperazine], Sulfa (sulfonamide antibiotics), Topamax [topiramate], Adhesive, Clindamycin, Mushroom, Mushroom combination no.1, Toradol [ketorolac tromethamine], and Valproic acid     Current Facility-Administered Medications   Medication Dose Route Frequency    albuterol (PROVENTIL HFA, VENTOLIN HFA, PROAIR HFA) inhaler 1 Puff  1 Puff Inhalation Q6H PRN    ALPRAZolam (XANAX) tablet 1 mg  1 mg Oral QID PRN    DULoxetine (CYMBALTA) capsule 120 mg  120 mg Oral DAILY    mirtazapine (REMERON) tablet 30 mg  30 mg Oral QHS    pantoprazole (PROTONIX) tablet 40 mg  40 mg Oral QHS    predniSONE (DELTASONE) tablet 10 mg  10 mg Oral BID WITH MEALS    propranolol LA (INDERAL LA) capsule 80 mg  80 mg Oral DAILY    tiZANidine (ZANAFLEX) tablet 4 mg  4 mg Oral TID PRN    sodium chloride (NS) flush 5-40 mL  5-40 mL IntraVENous Q8H    sodium chloride (NS) flush 5-40 mL  5-40 mL IntraVENous PRN    acetaminophen (TYLENOL) tablet 650 mg  650 mg Oral Q6H PRN    Or    acetaminophen (TYLENOL) suppository 650 mg  650 mg Rectal Q6H PRN    polyethylene glycol (MIRALAX) packet 17 g  17 g Oral DAILY PRN    ondansetron (ZOFRAN ODT) tablet 4 mg  4 mg Oral Q8H PRN    Or    ondansetron (ZOFRAN) injection 4 mg  4 mg IntraVENous Q6H PRN    enoxaparin (LOVENOX) injection 40 mg  40 mg SubCUTAneous Q12H    butalbital-acetaminophen-caffeine (FIORICET, ESGIC) -40 mg per tablet 2 Tablet  2 Tablet Oral Q6H PRN    And    codeine sulfate tablet 60 mg  60 mg Oral Q6H PRN    gabapentin (NEURONTIN) capsule 200 mg  200 mg Oral TID    famotidine (PEPCID) tablet 20 mg  20 mg Oral DAILY    oxyCODONE IR (ROXICODONE) tablet 5 mg  5 mg Oral Q4H PRN    HYDROmorphone (DILAUDID) injection 0.5 mg  0.5 mg IntraVENous Q6H PRN    diphenhydrAMINE (BENADRYL) injection 25 mg  25 mg IntraVENous Q24H       Past Medical History:   Diagnosis Date    Abnormal CT of the abdomen 11/8/2012    Asthma     Autoimmune disease (HCC)     lupus    C. difficile diarrhea     Cervical prolapse     Dehydration     Mariah-Danlos syndrome     Gastrointestinal disorder     gerd, twisted colon, IBS    GERD (gastroesophageal reflux disease)     Headache(784.0)     Lupus ([address]     Morbid obesity (HCC)     Nausea & vomiting     Neurological disorder     cluster headaches    Occipital neuralgia     other 2006, 2009    ovarian cyst removal    Other ill-defined conditions(799.89)     Worsening headaches        Social History     Tobacco Use    Smoking status: Never Smoker    Smokeless tobacco: Never Used   Substance Use Topics    Alcohol use: No       Family History   Problem Relation Age of Onset    Colon Cancer Maternal Grandfather        Review of Systems   Constitutional: Negative for chills and fever. HENT: Negative for ear pain. Eyes: Positive for blurred vision. Negative for pain and discharge. Respiratory: Negative for cough and hemoptysis. Cardiovascular: Negative for chest pain and claudication. Gastrointestinal: Negative for constipation and diarrhea. Genitourinary: Negative for flank pain and hematuria. Musculoskeletal: Positive for neck pain. Negative for back pain and myalgias. Skin: Negative for itching and rash. Neurological: Positive for headaches. Endo/Heme/Allergies: Negative for environmental allergies. Does not bruise/bleed easily. Psychiatric/Behavioral: Positive for depression. Negative for hallucinations and suicidal ideas. The patient is nervous/anxious. Visit Vitals  /76   Pulse 98   Temp 99.8 °F (37.7 °C)   Resp 20   Ht 5' 3\" (1.6 m)   Wt 240 lb (108.9 kg)   LMP 02/17/2016   SpO2 100%   BMI 42.51 kg/m²      General: Well developed, well nourished. Patient in no distress   Head: Normocephalic, atraumatic, anicteric sclera   Neck Normal ROM, No thyromegally   Lungs:  Clear to auscultation bilaterally   Cardiac: Regular rate and rhythm with no murmurs. Abd: Bowel sounds were audible. Ext: No pedal edema   Skin: Supple no rash     NeurologicExam:  Mental Status: Alert and oriented to person place and time   Speech: Fluent no aphasia or dysarthria. Cranial Nerves:  II - XII Intact   Motor:  Full and symmetric strength of upper and lower extremities  Normal bulk and tone. Reflexes:   Deep tendon reflexes 3+/4 and symmetric. Sensory:   Symmetric and intact with no deficits    Gait:  deferred    Tremor:   No tremor noted. Cerebellar:  Coordination intact. Neurovascular: No carotid bruits. No JVD       REVIEWED IMAGING:    MRI :  Results from Hospital Encounter encounter on 06/25/13    MRI KNEE LT WO CONT    Narrative  **Final Report**      ICD Codes / Adm. Diagnosis: 719.46  338.29 / Pain in joint, lower leg  Examination:  MR KNEE WO CON LT  - [id number] - Jun 25 2013 10:38AM  Accession No:  [id number]  Reason:  pain      REPORT:  INDICATION: pain of left knee 719.46    COMPARISON: Radiographs 5/31/2013    EXAM: Coronal T1-weighted spin-echo and fat-suppressed proton density  weighted fast spin echo, axial and sagittal fat-suppressed proton density  and T2-weighted fast spin-echo, and sagittal fat-suppressed 3-D gradient  echo MR images of the left knee are obtained with multiplanar reformations  of the gradient echo images. FINDINGS: The cruciate and collateral ligaments are intact. The knee tendons  are normal. No meniscal tear is demonstrated. No chondral or osteochondral  derangement is shown. The joint fluid volume is normal. No soft tissue mass  or collection is demonstrated. Nonspecific subcutaneous edema is shown  overlying the patella and lateral patellar retinaculum. IMPRESSION: Nonspecific subcutaneous edema overlying the patella and lateral  patellar retinacula. Otherwise, normal left knee MRI. Signing/Reading Doctor: Michelle Salinas. Abimbola Post ([id number])  Approved: ANDREW Daily Post ([id number])  Jun 25 2013 11:02AM      CT:  Results from Hospital Encounter encounter on 08/28/21    CT HEAD WO CONT    Narrative  EXAM: CT HEAD WO CONT    INDICATION: Dizziness, syncopal episode, seizures    COMPARISON: 8/24/2021. CONTRAST: None. TECHNIQUE: Unenhanced CT of the head was performed using 5 mm images. Brain and  bone windows were generated. Coronal and sagittal reformats. CT dose reduction  was achieved through use of a standardized protocol tailored for this  examination and automatic exposure control for dose modulation. FINDINGS:  The ventricles and sulci are normal in size, shape and configuration. . There is  no significant white matter disease. There is no intracranial hemorrhage,  extra-axial collection, or mass effect. The basilar cisterns are open. No CT  evidence of acute infarct. The bone windows demonstrate no abnormalities. The visualized portions of the  paranasal sinuses and mastoid air cells are clear.     Impression  No acute process or change compared to the prior exam.      REVIEWED LABS:  Lab Results   Component Value Date/Time    WBC 16.8 (H) 08/28/2021 10:50 PM    HCT 39.8 08/28/2021 10:50 PM    HGB 12.7 08/28/2021 10:50 PM    PLATELET 897 (H) 86/89/9055 10:50 PM     Lab Results   Component Value Date/Time    Sodium 140 08/28/2021 10:50 PM    Potassium 4.2 08/28/2021 10:50 PM    Chloride 108 08/28/2021 10:50 PM    CO2 28 08/28/2021 10:50 PM    Glucose 139 (H) 08/28/2021 10:50 PM    BUN 19 08/28/2021 10:50 PM    Creatinine 1.04 (H) 08/28/2021 10:50 PM    Calcium 8.6 08/28/2021 10:50 PM     90   minutes spent more than 50% spent in chart review and face to face  Examination, personally reviewing imaging and  discussion of treatment options and approach.

## 2021-08-29 NOTE — H&P
Hospitalist Admission Note    NAME: Rosy Byers   :  1985   MRN:  [id number]     Date/Time:  2021 12:24 AM    Patient PCP: Richar Garcia MD  _____________________________________________________________________  Given the patient's current clinical presentation, I have a high level of concern for decompensation if discharged from the emergency department. Complex decision making was performed, which includes reviewing the patient's available past medical records, laboratory results, and x-ray films. My assessment of this patient's clinical condition and my plan of care is as follows.     Assessment / Plan:  Recurrent headaches for several days, in a patient with history of migraine and occipital neuralgia and supraorbital neuralgia  S/p Right Greater Occipital Nerve Block on 2021 and right supraorbital nerve block on 2021  Recurrent ?syncopes  Seizure versus pseudoseizure   Leukocytosis 16.8 likely secondary to steroids which was prescribed for on   Patient's neurologist Diana Hickey   CT head shows no acute intracranial pathology  EKG show sinus bradycardia 58, no acute ischemic changes, will monitor on telemetry, slow heart rate is likely secondary to her metoprolol use will hold it for now    Per EMR report suspect there may be a component of pain medicine seeking is she reports trying allergy to her typical migraine medicine and is specifically requesting Dilaudid and Phenergan in the past  Per EMR report patient has been evaluated outpatient by ophthalmology which demonstrated normal optic nerve which lower suspicion for IIH     Admit to neuro floor, seizure precaution, fall precaution, patient was given Keppra in the ED will hold Damaris Berger for now because I am not sure whether patient had pseudoseizure or real seizure, will check procalcitonin level, patient would need EEG, neurology consulted, will check urine toxicology and urinalysis, will try to avoid opioid medication, continue pain control, per patient she is scheduled for outpatient MRI of the   Brain and orbit per her neurologist and ophthalmologist so we will order MRI brain and orbit. Per patient she is scheduled for follow-up her ophthalmologist as an outpatient soon, will get a 2D echocardiogram to rule out structural pathology and causes for her recurrent ?syncopes    We will continue her steroid which was prescribed for her occipital neurology recently on 8/27/2021 currently she is taking 10 mg twice daily with taper    We will hold her propranolol 80 mg daily which was started per patient yesterday by her doctor secondary to bradycardia, she used to be on metoprolol in the past,    Resume other home medication as per med rec which was confirmed with the patient and her mother at the bedside        Code Status: Full code  Surrogate Decision MakerMargette Meggan Krishna [phone]       DVT Prophylaxis: Subcu Lovenox  GI Prophylaxis: not indicated    Baseline: Independent        Subjective:   CHIEF COMPLAINT: Dizziness/Seizure   Patient has been seen 3 times in the last few days for similar symptoms. Patient has syncopal episode and collapses, today per EMS she has had 3 short seizures en route. Patient denies ever having seizures before today . HISTORY OF PRESENT ILLNESS:     Jourdan Arita is a 38 y.o.  female who presents with recurrent headache behind her right eye for more than 2 weeks and questionable multiple  ?seizures 3 witnessed by EMS and 1 witnessed in the ED. Per ED staff patient had 15 second seizure witnessed by staff, MD.  Per ED MD he thinks that was  not real seizure and  likely she had pseudoseizure but not sure. Of note patient was seen on 8/27/2020  in the ED for headache and recurrent syncope describing going in and out of consciousness and on 8/24/2021 for headache .  She had right greater occipital nerve block on 824/2021 and right supraorbital nerve block on 8/27/2021 and was also started on steroid on 8/27/2021 in the ED visit. Patient has a past medical history of lupus, breast cancer, nephrolithiasis, GERD, colitis, Mariah-Danlos syndrome. There are no other complaints, changes, or physical findings at this time. PCP: Maeve Marie MD       We were asked to admit for work up and evaluation of the above problems.        Past Medical History:   Diagnosis Date    Abnormal CT of the abdomen 11/8/2012    Asthma     Autoimmune disease (HCC)     lupus    C. difficile diarrhea     Cervical prolapse     Dehydration     Mariah-Danlos syndrome     Gastrointestinal disorder     gerd, twisted colon, IBS    GERD (gastroesophageal reflux disease)     Headache(784.0)     Lupus (HCC)     Morbid obesity (HCC)     Nausea & vomiting     Neurological disorder     cluster headaches    Occipital neuralgia     other 2006, 2009    ovarian cyst removal    Other ill-defined conditions(799.89)     Worsening headaches         Past Surgical History:   Procedure Laterality Date    COLONOSCOPY  9/21/2010         HX CHOLECYSTECTOMY      HX CYST INCISION AND DRAINAGE Right 02/27/2020    HX GYN  1/2009    right salpingo oopherectomy    HX GYN  2006    right ovarian tumor removed    HX HEENT      left wisdom teeth removal    HX HEENT      top right wisdom tooth removed    HX HERNIA REPAIR  4/2012    HX HERNIA REPAIR  2/28/13    Laparoscopic recurrent incisional hernia repair    HX HYSTERECTOMY      2016    HX UROLOGICAL      Kidney Stone Removal    NC EGD TRANSORAL BIOPSY SINGLE/MULTIPLE  9/22/2010            Social History     Tobacco Use    Smoking status: Never Smoker    Smokeless tobacco: Never Used   Substance Use Topics    Alcohol use: No        Family History   Problem Relation Age of Onset    Colon Cancer Maternal Grandfather      Allergies   Allergen Reactions    Latex Itching     burning    Compazine [Prochlorperazine] Anxiety High heart rate  Pt can take promethazine with no problems    Sulfa (Sulfonamide Antibiotics) Unknown (comments)    Topamax [Topiramate] Unknown (comments)    Adhesive Rash     Allergic to Dermabond    Clindamycin Diarrhea     Pt states caused her C-Diff    Mushroom Other (comments)    Mushroom Combination No.1 Unknown (comments)    Toradol [Ketorolac Tromethamine] Nausea and Vomiting and Other (comments)     PO & IV causes GI bleed  Tolerated during Feb 2020 stay    Valproic Acid Other (comments)     Elevated heart rate and vomiting          Prior to Admission medications    Medication Sig Start Date End Date Taking? Authorizing Provider   ondansetron hcl (ZOFRAN) 4 mg tablet TAKE 1 TABLET BY MOUTH EVERY 8 HOURS AS NEEDED FOR NAUSEA 8/28/21   Bridget Mcnulty MD   predniSONE (DELTASONE) 10 mg tablet Take 60 mg by mouth daily (with breakfast). Day 1 and 2: 60mg; Day 3: 50mg; Day 4:40mg; Day 5: 30 mg; Day 6: 20mg; Day 7: 10mg 8/27/21   Oren Bledsoe MD   oxyCODONE IR (Roxicodone) 5 mg immediate release tablet Take 1 Tablet by mouth every eight (8) hours as needed for Pain for up to 3 days.  Max Daily Amount: 15 mg. 8/27/21 8/30/21  Oren Bledsoe MD   tiZANidine (ZANAFLEX) 4 mg tablet TAKE 1 TABLET BY MOUTH 3 TIMES A DAY AS NEEDED FOR PAIN 8/23/21   Appa Marques Garcia MD   azithromycin (ZITHROMAX) 250 mg tablet TAKE 2 TABLETS BY MOUTH TODAY, THEN TAKE 1 TABLET DAILY FOR 4 DAYS 8/5/21   Bridget Mcnulty MD   mirtazapine (REMERON) 30 mg tablet TAKE 1 TABLET BY MOUTH AT BEDTIME 8/1/21   Bridget Mcnulty MD   ALPRAZolam Aretta Herd) 1 mg tablet One bid prn anxiety 7/30/21   Bridget Mcnulty MD   ondansetron (Zofran ODT) 4 mg disintegrating tablet 1 Tablet by SubLINGual route every eight (8) hours as needed for Nausea or Vomiting. 7/17/21   Jayne Jiang MD   metoprolol succinate (TOPROL-XL) 25 mg XL tablet TAKE 1 TABLET BY MOUTH EVERY DAY AT NIGHT 6/18/21   Pryor Sharmin, NP   DULoxetine (CYMBALTA) 60 mg capsule TAKE 2 CAPSULESBY MOUTH EVERY DAY 6/13/21   Kely Echavarria MD   naloxone [facility]) 4 mg/actuation nasal spray Use 1 spray intranasally, then discard. Repeat with new spray every 2 min as needed for opioid overdose symptoms, alternating nostrils. 3/17/21   Nataliia Dickens MD   mupirocin (BACTROBAN) 2 % ointment Apply  to affected area daily. 3/1/21   Rizwan Hua MD   ketorolac (TORADOL) 10 mg tablet Take 1 Tab by mouth every six (6) hours as needed for Pain. 10/5/20   Juhi Burns MD   olmesartan (BENICAR) 5 mg tablet TAKE 1 TABLET BY MOUTH DAILY 8/27/20   Elena Fowler NP   naproxen (NAPROSYN) 500 mg tablet TK 1 TA PO BID 8/1/20   Provider, Historical   RisaQuad-2 16 billion cell cap cap TAKE ONE CAPSULE BY MOUTH ONCE DAILY 3/24/20   Germania Chen MD   albuterol (PROVENTIL HFA, VENTOLIN HFA, PROAIR HFA) 90 mcg/actuation inhaler Take 1 Puff by inhalation every six (6) hours as needed for Wheezing. 11/21/19   Jena Feliciano MD   furosemide (LASIX) 20 mg tablet PRN 7/25/19   Provider, Historical   estradiol (ESTRACE) 1 mg tablet Take 1 mg by mouth daily. 7/19/19   Provider, Historical   indomethacin (INDOCIN) 25 mg capsule PRN 3/29/19   Provider, Historical   pantoprazole (PROTONIX) 40 mg tablet take 1 tablet by mouth at bedtime 2/1/19   Provider, Historical   codeine-butalbital-acetaminophen-caffeine (FIORICET WITH CODEINE) -62-30 mg capsule take 2 capsules by mouth every 6 hours if needed for MIGRAINE HEADACHE 2/22/19   Eddie ANDERSEN III, DO   rizatriptan (MAXALT) 10 mg tablet Take 10 mg by mouth once as needed for Migraine. May repeat in 2 hours if needed    Provider, Historical   belimumab (Benlysta) 400 mg solr injection by IntraVENous route. Provider, Historical       REVIEW OF SYSTEMS:     I am not able to complete the review of systems because:    The patient is intubated and sedated    The patient has altered mental status due to his acute medical problems    The patient has baseline aphasia from prior stroke(s)    The patient has baseline dementia and is not reliable historian    The patient is in acute medical distress and unable to provide information           Total of 12 systems reviewed as follows:       POSITIVE= underlined text  Negative = text not underlined  General:  fever, chills, sweats, generalized weakness, weight loss/gain,      loss of appetite   Eyes:    blurred vision, eye pain, loss of vision, double vision  ENT:    rhinorrhea, pharyngitis   Respiratory:   cough, sputum production, SOB, WILDE, wheezing, pleuritic pain   Cardiology:   chest pain, palpitations, orthopnea, PND, edema, syncope   Gastrointestinal:  abdominal pain , N/V, diarrhea, dysphagia, constipation, bleeding   Genitourinary:  frequency, urgency, dysuria, hematuria, incontinence   Muskuloskeletal :  arthralgia, myalgia, back pain  Hematology:  easy bruising, nose or gum bleeding, lymphadenopathy   Dermatological: rash, ulceration, pruritis, color change / jaundice  Endocrine:   hot flashes or polydipsia   Neurological:  headache, dizziness, confusion, focal weakness, paresthesia,     Speech difficulties, memory loss, gait difficulty, questionable seizure  Psychological: Feelings of anxiety, depression, agitation    Objective:   VITALS:    Visit Vitals  BP (!) 143/74 (BP 1 Location: Right upper arm, BP Patient Position: At rest)   Pulse 67   Temp 98.7 °F (37.1 °C)   Resp 18   SpO2 100%       PHYSICAL EXAM:    General:    Alert, cooperative, no distress, appears stated age. HEENT: Atraumatic, anicteric sclerae, pink conjunctivae     No oral ulcers, mucosa moist, throat clear, dentition fair  Neck:  Supple, symmetrical,  thyroid: non tender  Lungs:   Clear to auscultation bilaterally. No Wheezing or Rhonchi. No rales. Chest wall:  No tenderness  No Accessory muscle use. Heart:   Regular  rhythm,  No  murmur   No edema  Abdomen:   Soft, non-tender. Not distended.   Bowel sounds normal  Extremities: No cyanosis. No clubbing,      Skin turgor normal, Capillary refill normal, Radial dial pulse 2+  Skin:     Not pale. Not Jaundiced  No rashes   Psych:  Good insight. Not depressed. Not anxious or agitated. Neurologic: EOMs intact. No facial asymmetry. No aphasia or slurred speech. Symmetrical strength, Sensation grossly intact. Alert and oriented X 4.     _______________________________________________________________________  Care Plan discussed with:    Comments   Patient y    Family      RN y    Care Manager                    Consultant:  dunia Conley md   _______________________________________________________________________  Expected  Disposition:   Home with Family x   HH/PT/OT/RN    SNF/LTC    ABELARDO    ________________________________________________________________________  TOTAL TIME: 72   Minutes    Critical Care Provided     Minutes non procedure based      Comments    x Reviewed previous records   >50% of visit spent in counseling and coordination of care x Discussion with patient and/or family and questions answered       Given the patient's current clinical presentation, I have a high level of concern for decompensation if discharged from the ED. Complex decision making was performed which includes reviewing the patient's available past medical records, laboratory results, and Xray films. I have also directly communicated my plan and discussed this case with the involved ED physician.     ____________________________________________________________________  Trevon Parada MD    Procedures: see electronic medical records for all procedures/Xrays and details which were not copied into this note but were reviewed prior to creation of Plan.     LAB DATA REVIEWED:    Recent Results (from the past 24 hour(s))   GLUCOSE, POC    Collection Time: 08/28/21 10:43 PM   Result Value Ref Range    Glucose (POC) 156 (H) 65 - 117 mg/dL    Performed by Donna Brooks (ANDRET)    CBC WITH AUTOMATED DIFF    Collection Time: 08/28/21 10:50 PM   Result Value Ref Range    WBC 16.8 (H) 3.6 - 11.0 K/uL    RBC 4.15 3.80 - 5.20 M/uL    HGB 12.7 11.5 - 16.0 g/dL    HCT 39.8 35.0 - 47.0 %    MCV 95.9 80.0 - 99.0 FL    MCH 30.6 26.0 - 34.0 PG    MCHC 31.9 30.0 - 36.5 g/dL    RDW 13.2 11.5 - 14.5 %    PLATELET 981 (H) 310 - 400 K/uL    MPV 9.7 8.9 - 12.9 FL    NRBC 0.0 0  WBC    ABSOLUTE NRBC 0.00 0.00 - 0.01 K/uL    NEUTROPHILS 73 32 - 75 %    LYMPHOCYTES 18 12 - 49 %    MONOCYTES 7 5 - 13 %    EOSINOPHILS 0 0 - 7 %    BASOPHILS 0 0 - 1 %    IMMATURE GRANULOCYTES 2 (H) 0.0 - 0.5 %    ABS. NEUTROPHILS 12.0 (H) 1.8 - 8.0 K/UL    ABS. LYMPHOCYTES 3.1 0.8 - 3.5 K/UL    ABS. MONOCYTES 1.2 (H) 0.0 - 1.0 K/UL    ABS. EOSINOPHILS 0.1 0.0 - 0.4 K/UL    ABS. BASOPHILS 0.1 0.0 - 0.1 K/UL    ABS. IMM. GRANS. 0.3 (H) 0.00 - 0.04 K/UL    DF AUTOMATED     METABOLIC PANEL, COMPREHENSIVE    Collection Time: 08/28/21 10:50 PM   Result Value Ref Range    Sodium 140 136 - 145 mmol/L    Potassium 4.2 3.5 - 5.1 mmol/L    Chloride 108 97 - 108 mmol/L    CO2 28 21 - 32 mmol/L    Anion gap 4 (L) 5 - 15 mmol/L    Glucose 139 (H) 65 - 100 mg/dL    BUN 19 6 - 20 MG/DL    Creatinine 1.04 (H) 0.55 - 1.02 MG/DL    BUN/Creatinine ratio 18 12 - 20      GFR est AA >60 >60 ml/min/1.73m2    GFR est non-AA 60 (L) >60 ml/min/1.73m2    Calcium 8.6 8.5 - 10.1 MG/DL    Bilirubin, total 0.2 0.2 - 1.0 MG/DL    ALT (SGPT) 22 12 - 78 U/L    AST (SGOT) 11 (L) 15 - 37 U/L    Alk.  phosphatase 118 (H) 45 - 117 U/L    Protein, total 8.1 6.4 - 8.2 g/dL    Albumin 4.5 3.5 - 5.0 g/dL    Globulin 3.6 2.0 - 4.0 g/dL    A-G Ratio 1.3 1.1 - 2.2     ETHYL ALCOHOL    Collection Time: 08/28/21 10:50 PM   Result Value Ref Range    ALCOHOL(ETHYL),SERUM <10 <10 MG/DL   HCG QL SERUM    Collection Time: 08/28/21 10:50 PM   Result Value Ref Range    HCG, Ql. Negative NEG     SAMPLES BEING HELD    Collection Time: 08/28/21 10:50 PM   Result Value Ref Range    SAMPLES BEING HELD SST,BL     COMMENT        Add-on orders for these samples will be processed based on acceptable specimen integrity and analyte stability, which may vary by analyte.

## 2021-08-29 NOTE — PROGRESS NOTES
End of Shift Note     Bedside shift change report given to Katharina (oncoming nurse) by Dahlia Mendoza RN (offgoing nurse). Report included the following information SBAR, Kardex, Intake/Output and MAR     Shift worked:  7A-7PM   Shift summary and any significant changes:      had pseudo seizure         Concerns for physician to address:  seizure? Pt had constant movement of her right arm while talking on the phone.  Pt. was alert    Zone phone for oncoming shift:   [phone]      Patient Information  Negin Villatoro  38 y.o.  8/28/2021 10:31 PM by Karie Rosen MD. Negin Villatoro was admitted from Home     Problem List       Patient Active Problem List     Diagnosis Date Noted    Chronic headache disorder 08/29/2021    Constipation 08/29/2021    Seizure ([address] 08/29/2021    Syncope 08/29/2021    Headache 08/29/2021    Intermittent palpitations 03/16/2021    Essential hypertension 03/16/2021    Ureteric calculus 49/81/6217    Ureteric colic 13/87/0940    Cellulitis 02/24/2020    Severe obesity ([address] 02/22/2019    Incomplete uterovaginal prolapse 05/17/2016    Migraine with aura and without status migrainosus, not intractable 12/02/2015    Anxiety 12/05/2014    Lupus ([address] 12/04/2014    Recurrent ventral incisional hernia 03/01/2013    Ventral hernia 02/25/2013    Abnormal CT of the abdomen 11/08/2012           Past Medical History:   Diagnosis Date    Abnormal CT of the abdomen 11/8/2012    Asthma      Autoimmune disease (HCC)       lupus    C. difficile diarrhea      Cervical prolapse      Dehydration      Mariah-Danlos syndrome      Gastrointestinal disorder       gerd, twisted colon, IBS    GERD (gastroesophageal reflux disease)      Headache(784.0)      Lupus (HCC)      Morbid obesity (HCC)      Nausea & vomiting      Neurological disorder       cluster headaches    Occipital neuralgia      other 2006, 2009     ovarian cyst removal    Other ill-defined conditions(799.89)      Worsening headaches              Activity:  Activity Level: Ambulate X (#), Up with Assistance     Cardiac:   Cardiac Monitoring: Yes         Access:   Current line(s): PIV         Genitourinary:   Urinary status: voiding     Respiratory:   O2 Device: None     GI:  Last Bowel Movement Date: 08/28/21  Current diet:  ADULT DIET Regular     Pain Management:   Patient states pain is manageable on current regimen: NO     Skin:  Sukhjinder Score: 22  Interventions: increase time out of bed and nutritional support     Patient Safety:  Fall Score:  Total Score: 3  Interventions: bed/chair alarm, gripper socks, pt to call before getting OOB and stay with me (per policy)  High Fall Risk: Yes  @Rollbelt  @dexterity to release roll belt  No ( must document dexterity  here by stating Yes or No here, otherwise this is a restraint and must follow restraint documentation policy.)     DVT prophylaxis:  DVT prophylaxis Med- Yes     Wounds: (If Applicable)  Wounds- No  Location      Active Consults:  IP CONSULT TO NEUROLOGY     Length of Stay:  Expected LOS: - - -  Actual LOS: 0  Discharge Plan: Yes home when ready        Zulay Howard R.N

## 2021-08-29 NOTE — PROGRESS NOTES
Pt. was talking on the phone and shaking her right hands. Pt. is alert and oriented.  I will notify MD.

## 2021-08-29 NOTE — PROGRESS NOTES
Refused CTA of the head and neck with contrast,she said she had one done this week . Notified Dr Robin Evans through perfect serve.

## 2021-08-29 NOTE — PROGRESS NOTES
Received message from patient's nurse stating: She is still complaining of a 10/10 pain in her head, which given her HX she probably is. Is there a 1 time dose of something we can give her? or is it too early? Discussion / orders:    Patient was administered Dilaudid 1 mg IV at 04 36    · Benadryl 25 mg IV x1 now  · Sumatriptan 6 mg subcu x1 now  · Fioricet 2 tablets by mouth now         Please note that this note was dictated using Dragon computer voice recognition software. Quite often unanticipated grammatical, syntax, homophones, and other interpretive errors are inadvertently transcribed by the computer software. Please disregard these errors. Please excuse any errors that have escaped final proofreading.

## 2021-08-29 NOTE — ED NOTES
Patient is being transferred to \[facility]\"" Neuroscience ICU, Room # [phone]. Report given to Jeramie Levi RN on Cuco Flurry for routine progression of care. Report consisted of the following information SBAR, Kardex and ED Summary. Patient transferred to receiving unit by: tech   (RN or tech name). Outstanding consults needed: Yes    Next labs due: No     The following personal items will be sent with the patient during transfer to the floor: All valuables:    Cardiac monitoring ordered: Yes    The following CURRENT information was reported to the receiving RN:    Code status: Full Code at time of transfer    Last set of vital signs:  Vital Signs  Level of Consciousness: Alert (0) (08/29/21 0130)  Temp: 98.4 °F (36.9 °C) (08/29/21 0130)  Temp Source: Oral (08/28/21 2305)  Pulse (Heart Rate): (!) 57 (08/29/21 0130)  Resp Rate: 15 (08/29/21 0130)  BP: 122/71 (08/29/21 0130)  MAP (Monitor): 85 (08/29/21 0130)  MAP (Calculated): 88 (08/29/21 0130)  BP 1 Location: Right upper arm (08/28/21 2305)  BP 1 Method: Automatic (08/28/21 2305)  BP Patient Position: At rest (08/28/21 2305)  MEWS Score: 1 (08/29/21 0130)         Oxygen Therapy  O2 Sat (%): 98 % (08/29/21 0130)  Pulse via Oximetry: 56 beats per minute (08/29/21 0130)  O2 Device: None (08/28/21 2305)      Last pain assessment:  Pain 1  Pain Scale 1: Numeric (0 - 10)  Pain Intensity 1: 10  Patient Stated Pain Goal: 0      Wounds: No     Urinary catheter: voiding  Is there a boswell order: No     LDAs:       Peripheral IV Anterior;Proximal;Right Forearm (Active)         Opportunity for questions and clarification was provided.     Jan Carney

## 2021-08-29 NOTE — ED PROVIDER NOTES
EMERGENCY DEPARTMENT HISTORY AND PHYSICAL EXAM      Please note that this dictation was completed with the assistance of \"Dragon\", the computer voice recognition software. Quite often unanticipated grammatical, syntax, homophones, and other interpretive errors are inadvertently transcribed by the computer software. Please disregard these errors and any errors that have escaped final proofreading. Thank you. Patient Name: Melissa Gill  : 1985  MRN: [id number]  History of Presenting Illness     Chief Complaint   Patient presents with    Dizziness     Patient has been seen 3 times in the last 3 days for similar symptoms. Patient has syncopal episode and collapses, today per EMS she has had 3 short seizures en route. Patient denies ever having seizures before today .  Seizure     History Provided By: Patient and EMS    HPI: Melissa Gill, 38 y.o. female with past medical history as documented below presents to the ED with c/o of acute onset of moderate to severe headache as well as recurrent seizure-like activity. Patient states that she has been in the emergency room multiple times this past week for similar presentation. Patient also reports having a syncopal episode earlier today. According to EMS, they witness 3 separate seizure-like episodes. Each episode lasted for several seconds. Patient does have a history of migraine as well as occipital neuralgia is followed closely by neurology. Patient has had occipital nerve blocks as well as right supraorbital nerve block last month. Patient currently denies any blurry vision, slurred speech, focal numbness or weakness. Pt denies any other exacerbating or ameliorating factors.  Additionally, pt specifically denies any recent fever, chills, nausea, vomiting, abdominal pain, CP, SOB, lightheadedness, dizziness, numbness, weakness, lower extremity swelling, heart palpitations, urinary sxs, diarrhea, constipation, melena, hematochezia, cough, or congestion. There are no other complaints, changes or physical findings pertinent to the HPI at this time. PCP: Jef Mccarthy MD    Past History   Past Medical History:  Past Medical History:   Diagnosis Date    Abnormal CT of the abdomen 11/8/2012    Asthma     Autoimmune disease ([address]     lupus    C. difficile diarrhea     Cervical prolapse     Dehydration     Mariah-Danlos syndrome     Gastrointestinal disorder     gerd, twisted colon, IBS    GERD (gastroesophageal reflux disease)     Headache(784.0)     Lupus (HCC)     Morbid obesity (HCC)     Nausea & vomiting     Neurological disorder     cluster headaches    Occipital neuralgia     other 2006, 2009    ovarian cyst removal    Other ill-defined conditions(799.89)     Worsening headaches        Past Surgical History:  Past Surgical History:   Procedure Laterality Date    COLONOSCOPY  9/21/2010         HX CHOLECYSTECTOMY      HX CYST INCISION AND DRAINAGE Right 02/27/2020    HX GYN  1/2009    right salpingo oopherectomy    HX GYN  2006    right ovarian tumor removed    HX HEENT      left wisdom teeth removal    HX HEENT      top right wisdom tooth removed    HX HERNIA REPAIR  4/2012    HX HERNIA REPAIR  2/28/13    Laparoscopic recurrent incisional hernia repair    HX HYSTERECTOMY      2016    HX UROLOGICAL      Kidney Stone Removal    NM EGD TRANSORAL BIOPSY SINGLE/MULTIPLE  9/22/2010            Family History:  Family history reviewed and non-contributory  Family History   Problem Relation Age of Onset    Colon Cancer Maternal Grandfather          Social History:  Social History     Tobacco Use    Smoking status: Never Smoker    Smokeless tobacco: Never Used   Vaping Use    Vaping Use: Never used   Substance Use Topics    Alcohol use: No    Drug use: No       Allergies:   Allergies   Allergen Reactions    Latex Itching     burning    Compazine [Prochlorperazine] Anxiety     High heart rate  Pt can take promethazine with no problems    Sulfa (Sulfonamide Antibiotics) Unknown (comments)    Topamax [Topiramate] Unknown (comments)    Adhesive Rash     Allergic to Dermabond    Clindamycin Diarrhea     Pt states caused her C-Diff    Mushroom Other (comments)    Mushroom Combination No.1 Unknown (comments)    Toradol [Ketorolac Tromethamine] Nausea and Vomiting and Other (comments)     PO & IV causes GI bleed  Tolerated during Feb 2020 stay    Valproic Acid Other (comments)     Elevated heart rate and vomiting         Current Medications:  No current facility-administered medications on file prior to encounter. Current Outpatient Medications on File Prior to Encounter   Medication Sig Dispense Refill    gabapentin (NEURONTIN) 100 mg capsule TAKE 2 CAPSULES BY MOUTH 3 TIMES A DAY      propranolol LA (INDERAL LA) 80 mg SR capsule 80 mg daily.  codeine-butalbital-acetaminophen-caffeine (FIORICET WITH CODEINE) -69-30 mg capsule Take 1 Capsule by mouth every six (6) hours as needed for Headache.  furosemide (LASIX) 20 mg tablet Take 20 mg by mouth as needed (swelling).  mirtazapine (REMERON) 30 mg tablet TAKE 1 TABLET BY MOUTH AT BEDTIME 30 Tablet 5    ALPRAZolam (XANAX) 1 mg tablet One bid prn anxiety 60 Tablet 1    ondansetron (Zofran ODT) 4 mg disintegrating tablet 1 Tablet by SubLINGual route every eight (8) hours as needed for Nausea or Vomiting. 20 Tablet 0    DULoxetine (CYMBALTA) 60 mg capsule TAKE 2 CAPSULESBY MOUTH EVERY DAY 30 Capsule 5    pantoprazole (PROTONIX) 40 mg tablet take 1 tablet by mouth at bedtime  0    rizatriptan (MAXALT) 10 mg tablet Take 10 mg by mouth once as needed for Migraine. May repeat in 2 hours if needed       belimumab (Benlysta) 400 mg solr injection by IntraVENous route.  [DISCONTINUED] propranolol LA (INDERAL LA) 80 mg SR capsule Take 80 mg by mouth daily.       [DISCONTINUED] ondansetron hcl (ZOFRAN) 4 mg tablet TAKE 1 TABLET BY MOUTH EVERY 8 HOURS AS NEEDED FOR NAUSEA 20 Tablet 0    [DISCONTINUED] predniSONE (DELTASONE) 10 mg tablet Take 60 mg by mouth daily (with breakfast). Day 1 and 2: 60mg; Day 3: 50mg; Day 4:40mg; Day 5: 30 mg; Day 6: 20mg; Day 7: 10mg 27 Tablet 0    [DISCONTINUED] oxyCODONE IR (Roxicodone) 5 mg immediate release tablet Take 1 Tablet by mouth every eight (8) hours as needed for Pain for up to 3 days. Max Daily Amount: 15 mg. (Patient not taking: Reported on 8/29/2021) 9 Tablet 0    [DISCONTINUED] HYDROcodone-acetaminophen (Norco) 5-325 mg per tablet Take 1 Tablet by mouth every six (6) hours as needed for Pain for up to 3 days. Max Daily Amount: 4 Tablets. 10 Tablet 0    tiZANidine (ZANAFLEX) 4 mg tablet TAKE 1 TABLET BY MOUTH 3 TIMES A DAY AS NEEDED FOR PAIN 30 Tablet 0    [DISCONTINUED] azithromycin (ZITHROMAX) 250 mg tablet TAKE 2 TABLETS BY MOUTH TODAY, THEN TAKE 1 TABLET DAILY FOR 4 DAYS (Patient not taking: Reported on 8/29/2021) 6 Tablet 0    [DISCONTINUED] metoprolol succinate (TOPROL-XL) 25 mg XL tablet TAKE 1 TABLET BY MOUTH EVERY DAY AT NIGHT (Patient not taking: Reported on 8/29/2021) 30 Tablet 2    naloxone (NARCAN) 4 mg/actuation nasal spray Use 1 spray intranasally, then discard. Repeat with new spray every 2 min as needed for opioid overdose symptoms, alternating nostrils. (Patient not taking: Reported on 8/29/2021) 2 Each 0    [DISCONTINUED] mupirocin (BACTROBAN) 2 % ointment Apply  to affected area daily. (Patient not taking: Reported on 8/29/2021) 22 g 0    [DISCONTINUED] ketorolac (TORADOL) 10 mg tablet Take 1 Tab by mouth every six (6) hours as needed for Pain.  (Patient not taking: Reported on 8/29/2021) 20 Tab 0    [DISCONTINUED] olmesartan (BENICAR) 5 mg tablet TAKE 1 TABLET BY MOUTH DAILY (Patient not taking: Reported on 8/29/2021) 90 Tab 3    [DISCONTINUED] naproxen (NAPROSYN) 500 mg tablet TK 1 TA PO BID      [DISCONTINUED] RisaQuad-2 16 billion cell cap cap TAKE ONE CAPSULE BY MOUTH ONCE DAILY (Patient not taking: Reported on 8/29/2021) 30 Cap 1    [DISCONTINUED] albuterol (PROVENTIL HFA, VENTOLIN HFA, PROAIR HFA) 90 mcg/actuation inhaler Take 1 Puff by inhalation every six (6) hours as needed for Wheezing. 1 Inhaler 0    [DISCONTINUED] furosemide (LASIX) 20 mg tablet PRN (Patient not taking: Reported on 8/29/2021)  0    [DISCONTINUED] estradiol (ESTRACE) 1 mg tablet Take 1 mg by mouth daily. (Patient not taking: Reported on 8/29/2021)  0    [DISCONTINUED] indomethacin (INDOCIN) 25 mg capsule PRN (Patient not taking: Reported on 8/29/2021)      [DISCONTINUED] codeine-butalbital-acetaminophen-caffeine (FIORICET WITH CODEINE) -53-30 mg capsule take 2 capsules by mouth every 6 hours if needed for MIGRAINE HEADACHE (Patient taking differently: 1 Capsule. take 2 capsules by mouth every 6 hours if needed for MIGRAINE HEADACHE) 90 Cap 0     Review of Systems   A complete ROS was reviewed by me today and all other systems negative, unless otherwise specified below:  Review of Systems   Constitutional: Negative. Negative for chills and fever. HENT: Negative. Negative for congestion and sore throat. Eyes: Negative. Respiratory: Negative. Negative for cough, chest tightness, shortness of breath and wheezing. Cardiovascular: Negative. Negative for chest pain, palpitations and leg swelling. Gastrointestinal: Negative. Negative for abdominal distention, abdominal pain, blood in stool, constipation, diarrhea, nausea and vomiting. Endocrine: Negative. Genitourinary: Negative. Negative for dysuria, flank pain, frequency, hematuria and urgency. Musculoskeletal: Negative. Negative for arthralgias, back pain and myalgias. Skin: Negative. Negative for color change and rash. Neurological: Positive for syncope and headaches. Negative for dizziness, speech difficulty, weakness, light-headedness and numbness. Hematological: Negative. Psychiatric/Behavioral: Negative.   Negative for confusion and self-injury. The patient is not nervous/anxious. All other systems reviewed and are negative. Physical Exam   Physical Exam  Vitals and nursing note reviewed. Constitutional:       General: She is in acute distress. Appearance: She is well-developed. She is ill-appearing, toxic-appearing and diaphoretic. HENT:      Head: Normocephalic and atraumatic. Mouth/Throat:      Pharynx: No oropharyngeal exudate. Eyes:      Conjunctiva/sclera: Conjunctivae normal.      Pupils: Pupils are equal, round, and reactive to light. Cardiovascular:      Rate and Rhythm: Normal rate and regular rhythm. Heart sounds: Normal heart sounds. No murmur heard. No friction rub. No gallop. Pulmonary:      Effort: Pulmonary effort is normal. No respiratory distress. Breath sounds: Normal breath sounds. No wheezing or rales. Chest:      Chest wall: No tenderness. Abdominal:      General: Bowel sounds are normal. There is no distension. Palpations: Abdomen is soft. There is no mass. Tenderness: There is no abdominal tenderness. There is no guarding or rebound. Musculoskeletal:         General: No tenderness or deformity. Normal range of motion. Cervical back: Normal range of motion. Skin:     General: Skin is warm. Findings: No rash. Neurological:      Mental Status: She is alert and oriented to person, place, and time. Cranial Nerves: No cranial nerve deficit. Motor: No abnormal muscle tone. Coordination: Coordination normal.      Deep Tendon Reflexes: Reflexes normal.         Diagnostic Study Results     Labs -   I have personally reviewed and interpreted all available laboratory results.    Recent Results (from the past 24 hour(s))   GLUCOSE, POC    Collection Time: 08/28/21 10:43 PM   Result Value Ref Range    Glucose (POC) 156 (H) 65 - 117 mg/dL    Performed by Nena JIMÉNEZ)    CBC WITH AUTOMATED DIFF    Collection Time: 08/28/21 10:50 PM Result Value Ref Range    WBC 16.8 (H) 3.6 - 11.0 K/uL    RBC 4.15 3.80 - 5.20 M/uL    HGB 12.7 11.5 - 16.0 g/dL    HCT 39.8 35.0 - 47.0 %    MCV 95.9 80.0 - 99.0 FL    MCH 30.6 26.0 - 34.0 PG    MCHC 31.9 30.0 - 36.5 g/dL    RDW 13.2 11.5 - 14.5 %    PLATELET 360 (H) 419 - 400 K/uL    MPV 9.7 8.9 - 12.9 FL    NRBC 0.0 0  WBC    ABSOLUTE NRBC 0.00 0.00 - 0.01 K/uL    NEUTROPHILS 73 32 - 75 %    LYMPHOCYTES 18 12 - 49 %    MONOCYTES 7 5 - 13 %    EOSINOPHILS 0 0 - 7 %    BASOPHILS 0 0 - 1 %    IMMATURE GRANULOCYTES 2 (H) 0.0 - 0.5 %    ABS. NEUTROPHILS 12.0 (H) 1.8 - 8.0 K/UL    ABS. LYMPHOCYTES 3.1 0.8 - 3.5 K/UL    ABS. MONOCYTES 1.2 (H) 0.0 - 1.0 K/UL    ABS. EOSINOPHILS 0.1 0.0 - 0.4 K/UL    ABS. BASOPHILS 0.1 0.0 - 0.1 K/UL    ABS. IMM. GRANS. 0.3 (H) 0.00 - 0.04 K/UL    DF AUTOMATED     METABOLIC PANEL, COMPREHENSIVE    Collection Time: 08/28/21 10:50 PM   Result Value Ref Range    Sodium 140 136 - 145 mmol/L    Potassium 4.2 3.5 - 5.1 mmol/L    Chloride 108 97 - 108 mmol/L    CO2 28 21 - 32 mmol/L    Anion gap 4 (L) 5 - 15 mmol/L    Glucose 139 (H) 65 - 100 mg/dL    BUN 19 6 - 20 MG/DL    Creatinine 1.04 (H) 0.55 - 1.02 MG/DL    BUN/Creatinine ratio 18 12 - 20      GFR est AA >60 >60 ml/min/1.73m2    GFR est non-AA 60 (L) >60 ml/min/1.73m2    Calcium 8.6 8.5 - 10.1 MG/DL    Bilirubin, total 0.2 0.2 - 1.0 MG/DL    ALT (SGPT) 22 12 - 78 U/L    AST (SGOT) 11 (L) 15 - 37 U/L    Alk.  phosphatase 118 (H) 45 - 117 U/L    Protein, total 8.1 6.4 - 8.2 g/dL    Albumin 4.5 3.5 - 5.0 g/dL    Globulin 3.6 2.0 - 4.0 g/dL    A-G Ratio 1.3 1.1 - 2.2     ETHYL ALCOHOL    Collection Time: 08/28/21 10:50 PM   Result Value Ref Range    ALCOHOL(ETHYL),SERUM <10 <10 MG/DL   HCG QL SERUM    Collection Time: 08/28/21 10:50 PM   Result Value Ref Range    HCG, Ql. Negative NEG     SAMPLES BEING HELD    Collection Time: 08/28/21 10:50 PM   Result Value Ref Range    SAMPLES BEING HELD SST,BL     COMMENT        Add-on orders for these samples will be processed based on acceptable specimen integrity and analyte stability, which may vary by analyte.    PROLACTIN    Collection Time: 08/28/21 10:50 PM   Result Value Ref Range    Prolactin 6.5 ng/mL   EKG, 12 LEAD, INITIAL    Collection Time: 08/28/21 10:50 PM   Result Value Ref Range    Ventricular Rate 65 BPM    Atrial Rate 65 BPM    P-R Interval 152 ms    QRS Duration 80 ms    Q-T Interval 392 ms    QTC Calculation (Bezet) 407 ms    Calculated P Axis 14 degrees    Calculated R Axis 36 degrees    Calculated T Axis 11 degrees    Diagnosis       Normal sinus rhythm  Normal ECG  When compared with ECG of 27-AUG-2021 08:36,  No significant change was found  Confirmed by Juan Villa P.VHilario ([id number]) on 8/29/2021 1:14:07 PM     URINALYSIS W/ REFLEX CULTURE    Collection Time: 08/29/21  1:17 AM    Specimen: Urine   Result Value Ref Range    Color YELLOW/STRAW      Appearance CLEAR CLEAR      Specific gravity 1.014 1.003 - 1.030      pH (UA) 6.5 5.0 - 8.0      Protein Negative NEG mg/dL    Glucose Negative NEG mg/dL    Ketone Negative NEG mg/dL    Bilirubin Negative NEG      Blood Negative NEG      Urobilinogen 0.2 0.2 - 1.0 EU/dL    Nitrites Negative NEG      Leukocyte Esterase Negative NEG      WBC 0-4 0 - 4 /hpf    RBC 0-5 0 - 5 /hpf    Epithelial cells FEW FEW /lpf    Bacteria Negative NEG /hpf    UA:UC IF INDICATED CULTURE NOT INDICATED BY UA RESULT CNI      Hyaline cast 0-2 0 - 5 /lpf   DRUG SCREEN, URINE    Collection Time: 08/29/21  1:17 AM   Result Value Ref Range    AMPHETAMINES Negative NEG      BARBITURATES Positive (A) NEG      BENZODIAZEPINES Positive (A) NEG      COCAINE Negative NEG      METHADONE Negative NEG      OPIATES Positive (A) NEG      PCP(PHENCYCLIDINE) Negative NEG      THC (TH-CANNABINOL) Negative NEG      Drug screen comment (NOTE)    ECHO ADULT COMPLETE    Collection Time: 08/29/21  3:29 PM   Result Value Ref Range    IVSd 1.43 (A) 0.60 - 0.90 cm    IVSs 2.00 cm    LVIDd 4.22 3.90 - 5.30 cm    LVIDs 2.67 cm    LVOT d 2.12 cm    LVPWd 1.39 (A) 0.60 - 0.90 cm    LVPWs 2.11 cm    LVOT Peak Gradient 5.49 mmHg    LVOT Peak Velocity 114.94 cm/s    RVIDd 3.30 cm    Left Atrium Major Axis 3.63 cm    LA Volume 65.75 22.0 - 52.0 mL    LA Area 4C 21.44 cm2    LA Vol 2C 66.58 (A) 22.00 - 52.00 mL    LA Vol 4C 57.41 (A) 22.00 - 52.00 mL    Left Atrium to Aortic Root Ratio 1.27     Left Atrium to Aortic Root Ratio 1.27     AV Cusp 1.73 cm    Aortic Valve Area by Continuity of Peak Velocity 2.49 cm2    Aortic Valve Area by Continuity of Peak Velocity 2.46 cm2    AoV PG 10.87 mmHg    AoV PG 10.58 mmHg    Aortic Valve Systolic Mean Gradient 6.67 mmHg    Aortic Valve Systolic Peak Velocity 233.00 cm/s    Aortic Valve Systolic Peak Velocity 043.53 cm/s    AoV VTI 26.32 cm    MV A Deacon 76.82 cm/s    Mitral Valve E Wave Deceleration Time 232.17 ms    MV E Deacon 119.37 cm/s    E/E' ratio (averaged) 11.59     E/E' lateral 11.71     E/E' septal 11.46     LV E' Lateral Velocity 10.19 cm/s    LV E' Septal Velocity 10.42 cm/s    Pulmonic Valve Systolic Peak Instantaneous Gradient 6.47 mmHg    Pulmonic Valve Max Velocity 126.93 cm/s    Ao Root D 2.70 cm    MV E/A 1.55     LV Mass .4 67.0 - 162.0 g    LV Mass AL Index 109.3 43.0 - 95.0 g/m2    Left Atrium Minor Axis 1.74 cm    LA Vol Index 31.46 16.00 - 28.00 ml/m2    LA Vol Index 31.86 16.00 - 28.00 ml/m2    LA Vol Index 27.47 16.00 - 28.00 ml/m2       Radiologic Studies -   I have personally reviewed and interpreted all available imaging studies and agree with radiology interpretation and report.    CT HEAD WO CONT   Final Result   No acute process or change compared to the prior exam.            MRI BRAIN W WO CONT    (Results Pending)   CTA HEAD NECK W CONT    (Results Pending)     CT Results  (Last 48 hours)               08/28/21 2300  CT HEAD WO CONT Final result    Impression:  No acute process or change compared to the prior exam.               Narrative: EXAM: CT HEAD WO CONT       INDICATION: Dizziness, syncopal episode, seizures       COMPARISON: 8/24/2021. CONTRAST: None. TECHNIQUE: Unenhanced CT of the head was performed using 5 mm images. Brain and   bone windows were generated. Coronal and sagittal reformats. CT dose reduction   was achieved through use of a standardized protocol tailored for this   examination and automatic exposure control for dose modulation. FINDINGS:   The ventricles and sulci are normal in size, shape and configuration. . There is   no significant white matter disease. There is no intracranial hemorrhage,   extra-axial collection, or mass effect. The basilar cisterns are open. No CT   evidence of acute infarct. The bone windows demonstrate no abnormalities. The visualized portions of the   paranasal sinuses and mastoid air cells are clear. CXR Results  (Last 48 hours)    None          Medical Decision Making   I reviewed the vital signs, available nursing notes, past medical history, past surgical history, family history and social history. Vital Signs-Reviewed the patient's vital signs.   Patient Vitals for the past 24 hrs:   Temp Pulse Resp BP SpO2   08/29/21 1538 98 °F (36.7 °C) 74 18 (!) 125/59 100 %   08/29/21 1528 -- -- -- 126/76 --   08/29/21 1040 99.8 °F (37.7 °C) 98 20 126/76 100 %   08/29/21 0856 -- 62 -- -- --   08/29/21 0758 99.4 °F (37.4 °C) (!) 56 18 126/82 100 %   08/29/21 0231 99 °F (37.2 °C) (!) 54 18 (!) 148/86 98 %   08/29/21 0130 98.4 °F (36.9 °C) (!) 57 15 122/71 98 %   08/29/21 0100 -- 63 17 126/73 98 %   08/29/21 0045 -- 60 13 (!) 107/54 97 %   08/29/21 0030 -- (!) 57 18 114/65 97 %   08/29/21 0016 -- (!) 57 13 126/71 98 %   08/28/21 2315 -- (!) 57 13 122/75 99 %   08/28/21 2305 98.7 °F (37.1 °C) 67 18 (!) 143/74 100 %     Pulse Oximetry Analysis - 100% on RA    Cardiac Monitor:   Rate: 98 bpm  The cardiac monitor revealed the following rhythm as interpreted by me: Normal Sinus Rhythm    The cardiac monitor was ordered secondary to the patient's history of syncope and to monitor the patient for dysrhythmia    ED EKG interpretation:  Rhythm: normal sinus rhythm; and regular . Rate (approx.): 65; Axis: normal; P wave: normal; QRS interval: normal ; ST/T wave: normal; Other findings: normal. This EKG was interpreted by Ana Fox M.D. Records Reviewed: Nursing Notes, Old Medical Records, Previous electrocardiograms, Previous Radiology Studies and Previous Laboratory Studies    Provider Notes (Medical Decision Making):   Patient presents after severe headache and seizure-like activity; Dx: pseudoseizure; seizure 2/2 noncompliance, infection, increased stressor, electrolyte anomaly (hypoglycemia, etc), ETOH withdrawal. Less likely related to meningitis or brain mass at this time. Will obtain UA, labs and provide loading dose of antiepileptic. Given seizure, I did discuss with the patient about driving restrictions and that he is not allowed to drive for 6 months according to the [place]. ED Course:   I am the first physician for this patient's ED visit today. Initial assessment performed. I discussed presenting problems, concerns and my formulated plan for today's visit with the patient and any available family members at bedside. I encouraged them to ask questions as they arise throughout the visit. Social History     Tobacco Use    Smoking status: Never Smoker    Smokeless tobacco: Never Used   Vaping Use    Vaping Use: Never used   Substance Use Topics    Alcohol use: No    Drug use: No       I reviewed our electronic medical record system for any past medical records that were available that may contribute to the patient's current condition, the nursing notes and vital signs from today's visit.       ED Orders Placed :  Orders Placed This Encounter    MECHANICAL PROPHYLAXIS IS CONTRAINDICATED Other, please document Already on Anticoagulation    CT HEAD WO CONT    MRI BRAIN W WO CONT    CTA HEAD NECK W CONT    CBC WITH AUTOMATED DIFF    METABOLIC PANEL, COMPREHENSIVE    BLOOD ALCOHOL (Ethyl Alcohol)    URINALYSIS W/ REFLEX CULTURE    DRUG SCREEN, URINE    HCG QL., SERUM    Hold Sample    PROLACTIN    METABOLIC PANEL, COMPREHENSIVE    CBC WITH AUTOMATED DIFF    MAGNESIUM    PROLACTIN    ADULT DIET Regular    POC GLUCOSE    CARDIAC/RESPIRATORY MONITORING    VITAL SIGNS    NOTIFY PROVIDER: SPECIFY Notify physician for pulse less than 50 or greater than 120, respiratory rate less than 12 or greater than 25, oral temperature greater than 101.3 F (38.4 C), systolic BP less than 90 or greater than 095, diastolic BP less. ..    ACTIVITY AS TOLERATED W/ASSIST    INTAKE AND OUTPUT    NURSING-MISCELLANEOUS: Palpate, scan or straight cath and document bladder residual as needed CONTINUOUS    CARDIAC MONITORING    FULL CODE    IP CONSULT TO NEUROLOGY    OXYGEN CANNULA Liters per minute: 2; Indications for O2 therapy: HYPOXIA PRN STAT    GLUCOSE, POC    EKG 12 LEAD INITIAL    EEG    EEG    INSERT PERIPHERAL IV ONE TIME STAT    sodium chloride 0.9 % bolus infusion 1,000 mL    LORazepam (ATIVAN) injection 1 mg    DISCONTD: levETIRAcetam (KEPPRA) 1,500 mg in 0.9% sodium chloride 100 mL IVPB    DISCONTD: LORazepam (ATIVAN) 2 mg/mL injection    levETIRAcetam (KEPPRA) 1,000 mg in 0.9% sodium chloride 100 mL IVPB    diphenhydrAMINE (BENADRYL) injection 50 mg    HYDROmorphone (DILAUDID) injection 2 mg    gabapentin (NEURONTIN) 100 mg capsule    propranolol LA (INDERAL LA) 80 mg SR capsule    albuterol (PROVENTIL HFA, VENTOLIN HFA, PROAIR HFA) inhaler 1 Puff    ALPRAZolam (XANAX) tablet 1 mg    DISCONTD: codeine-butalbital-acetaminophen-caffeine (FIORICET WITH CODEINE) capsule 2 Capsule    DULoxetine (CYMBALTA) capsule 120 mg    DISCONTD: gabapentin (NEURONTIN) capsule 100 mg    mirtazapine (REMERON) tablet 30 mg    pantoprazole (PROTONIX) tablet 40 mg    predniSONE (DELTASONE) tablet 10 mg    propranolol LA (INDERAL LA) capsule 80 mg    tiZANidine (ZANAFLEX) tablet 4 mg    sodium chloride (NS) flush 5-40 mL    sodium chloride (NS) flush 5-40 mL    OR Linked Order Group     acetaminophen (TYLENOL) tablet 650 mg     acetaminophen (TYLENOL) suppository 650 mg    polyethylene glycol (MIRALAX) packet 17 g    OR Linked Order Group     ondansetron (ZOFRAN ODT) tablet 4 mg     ondansetron (ZOFRAN) injection 4 mg    enoxaparin (LOVENOX) injection 40 mg    AND Linked Order Group     butalbital-acetaminophen-caffeine (FIORICET, ESGIC) -40 mg per tablet 2 Tablet     codeine sulfate tablet 60 mg    HYDROmorphone (DILAUDID) injection 2 mg    DISCONTD: HYDROmorphone (DILAUDID) injection 1 mg    SUMAtriptan (IMITREX) injection 6 mg    diphenhydrAMINE (BENADRYL) injection 25 mg    butalbital-acetaminophen-caffeine (FIORICET, ESGIC) -40 mg per tablet 2 Tablet    gabapentin (NEURONTIN) capsule 200 mg    famotidine (PEPCID) tablet 20 mg    oxyCODONE IR (ROXICODONE) tablet 5 mg    HYDROmorphone (DILAUDID) injection 0.5 mg    diphenhydrAMINE (BENADRYL) injection 25 mg    codeine-butalbital-acetaminophen-caffeine (FIORICET WITH CODEINE) -18-30 mg capsule    furosemide (LASIX) 20 mg tablet    DISCONTD: propranolol LA (INDERAL LA) 80 mg SR capsule    INITIAL PHYSICIAN ORDER: INPATIENT Telemetry; Yes; 3.  Patient receiving treatment that can only be provided in an inpatient setting (further clarification in H&P documentation)   [address] [place] [address] to complete admission med history       ED Medications Administered:  Medications   albuterol (PROVENTIL HFA, VENTOLIN HFA, PROAIR HFA) inhaler 1 Puff (has no administration in time range)   ALPRAZolam (XANAX) tablet 1 mg (1 mg Oral Given 8/29/21 1023)   DULoxetine (CYMBALTA) capsule 120 mg (120 mg Oral Given 8/29/21 0850)   mirtazapine (REMERON) tablet 30 mg (has no administration in time range)   pantoprazole (PROTONIX) tablet 40 mg (40 mg Oral Given 8/29/21 0140)   predniSONE (DELTASONE) tablet 10 mg (10 mg Oral Given 8/29/21 1725)   propranolol LA (INDERAL LA) capsule 80 mg (80 mg Oral Given 8/29/21 0856)   tiZANidine (ZANAFLEX) tablet 4 mg (has no administration in time range)   sodium chloride (NS) flush 5-40 mL (10 mL IntraVENous Given 8/29/21 1507)   sodium chloride (NS) flush 5-40 mL (10 mL IntraVENous Given 8/29/21 0849)   acetaminophen (TYLENOL) tablet 650 mg (has no administration in time range)     Or   acetaminophen (TYLENOL) suppository 650 mg (has no administration in time range)   polyethylene glycol (MIRALAX) packet 17 g (has no administration in time range)   ondansetron (ZOFRAN ODT) tablet 4 mg ( Oral See Alternative 8/29/21 1335)     Or   ondansetron (ZOFRAN) injection 4 mg (4 mg IntraVENous Given 8/29/21 1335)   enoxaparin (LOVENOX) injection 40 mg (40 mg SubCUTAneous Given 8/29/21 0850)   butalbital-acetaminophen-caffeine (FIORICET, ESGIC) -40 mg per tablet 2 Tablet (2 Tablets Oral Given 8/29/21 0320)     And   codeine sulfate tablet 60 mg (60 mg Oral Given 8/29/21 0637)   gabapentin (NEURONTIN) capsule 200 mg (200 mg Oral Given 8/29/21 1508)   famotidine (PEPCID) tablet 20 mg (20 mg Oral Given 8/29/21 1016)   oxyCODONE IR (ROXICODONE) tablet 5 mg (5 mg Oral Given 8/29/21 1725)   HYDROmorphone (DILAUDID) injection 0.5 mg (0.5 mg IntraVENous Given 8/29/21 1507)   diphenhydrAMINE (BENADRYL) injection 25 mg (25 mg IntraVENous Given 8/29/21 1015)   sodium chloride 0.9 % bolus infusion 1,000 mL (1,000 mL IntraVENous New Bag 8/28/21 2246)   LORazepam (ATIVAN) injection 1 mg (1 mg IntraVENous Given 8/28/21 2246)   levETIRAcetam (KEPPRA) 1,000 mg in 0.9% sodium chloride 100 mL IVPB (1,000 mg IntraVENous New Bag 8/29/21 0020)   diphenhydrAMINE (BENADRYL) injection 50 mg (50 mg IntraVENous Given 8/29/21 0023)   HYDROmorphone (DILAUDID) injection 2 mg (2 mg IntraVENous Given 8/29/21 0024)   HYDROmorphone (DILAUDID) injection 2 mg (2 mg IntraVENous Given 8/29/21 0200)   SUMAtriptan (IMITREX) injection 6 mg (6 mg SubCUTAneous Given 8/29/21 0548)   diphenhydrAMINE (BENADRYL) injection 25 mg (25 mg IntraVENous Given 8/29/21 0545)   butalbital-acetaminophen-caffeine (FIORICET, ESGIC) -40 mg per tablet 2 Tablet (2 Tablets Oral Given 8/29/21 0545)         Progress Note:  Called to bedside as patient had apparently a seizure-like episode. Patient did have ability to respond and therefore question whether this is true seizure versus pseudoseizure. Patient does have elevated white count of 16.8 which is likely reactive secondary to recent steroids that were prescribed to patient. Progress Note:  Stat CT head unremarkable. Patient did receive IV benzos for seizure-like episode. I have reviewed and discussed the results of the prescription monitoring program with Nara Giron. We have discussed patient's current pain, the use of narcotics for pain relief, and that in general narcotics are indicated for acute pain relief and not for chronic pain control. After a certain period of time narcotics should be stopped to avoid dependence. I warned the patient about the dangers of addiction and other side affects of narcotic abuse. We discussed how narcotics are controlled substances with a propensity for abuse/diversion and that the prescribing/usage of controlled substances is monitored very carefully by the [facility]. Pt understands that the most appropriate avenue for treatment of chronic pain is through a single (preferably pain management) physician and through a single pharmacy. Pt has been asked to seek further treatment for chronic pain through a pain management clinic and not through the emergency room. I spent 15 minutes on this discussion with the patient.   Marilee Ayers MD    Progress Note:  Pt given IV Keppra, seizure precautions noted, will need MRI per clinic records from neurology. Progress Note:  Called to bedside again; pt having voluntary tremors of her right arm; not altered, actually awake during episode. Consult Note:  Arlie Essex, MD spoke with Dr. Cleveland Elliott  Specialty: Hospitalist  Discussed pt's hx, disposition, and available diagnostic and imaging results. Reviewed care plans. Agree with management and plan thus far. Consultant will evaluate pt. CRITICAL CARE NOTE :  IMPENDING DETERIORATION -Cardiovascular, CNS, Metabolic and Renal  ASSOCIATED RISK FACTORS - Hypotension, Metabolic changes, Dehydration, Vascular Compromise and CNS Decompensation  MANAGEMENT- Bedside Assessment and Supervision of Care  INTERPRETATION -  Xrays, CT Scan, ECG, Blood Pressure and Cardiac Output Measures   INTERVENTIONS - hemodynamic mngmt, Neurologic interventions  and Metobolic interventions  CASE REVIEW - Hospitalist/Intensivist, Nursing and Family  TREATMENT RESPONSE -Improved  PERFORMED BY - Self    NOTES   :  I personally spent 65 minutes of critical care time with this patient. This is time spent at this critically ill patient's bedside actively involved in patient care as well as the coordination of care and discussions with the patient's family. This includes time involved in lab review, consultations with specialist, family decision-making, bedside attention and documentation. During this entire length of time I was immediately available to the patient. This does not include time spent on separately reported billable procedures. Critical Care: The reason for providing this level of medical care for this critically-ill patient was due to a critical illness that impaired one or more vital organ systems, such that there was a high probability of imminent or life-threatening deterioration in the patient's condition. This care involved the highest level of preparedness to intervene urgently.  This care involved high complexity decision making to assess, manipulate, and support vital system functions, to treat this degree of vital organ system failure, and to prevent further life threatening deterioration of the patients condition requiring frequent assessments and interventions. Meghna Lennon MD    Disposition:   ADMIT  Given the patient's current clinical presentation, I have a high level of concern for decompensation if discharged from the emergency department. Patient is being admitted to the hospital.  The results of their tests and reasons for their admission have been discussed with them and/or available family. They convey agreement and understanding for the need to be admitted and for their admission diagnosis. Consultation has been made with the inpatient physician specialist for hospitalization. Diagnosis   Clinical Impression:  1. Seizure-like activity ([facility] [address]    2. Acute intractable headache, unspecified headache type    3. Syncope, unspecified syncope type    4. Occipital neuralgia, unspecified laterality    5. Pseudoseizure    6. Syncope and collapse        Attestation:  Hilary Sandoval MD, am the attending of record for this patient. I personally performed the services described in this documentation on this date, 8/28/2021 for patientValentino. I have reviewed the chart and verified that the record is accurate and complete.

## 2021-08-29 NOTE — PROGRESS NOTES
Pharmacy Medication Reconciliation     The patient was interviewed regarding current PTA medication list, use and drug allergies;  patient present in room and obtained permission from patient to discuss drug regimen with visitor(s) present. The patient was questioned regarding use of any other inhalers, topical products, over the counter medications, herbal medications, vitamin products or ophthalmic/nasal/otic medication use. Allergy Update: Latex, Compazine [prochlorperazine], Sulfa (sulfonamide antibiotics), Topamax [topiramate], Adhesive, Clindamycin, Mushroom, Mushroom combination no.1, Toradol [ketorolac tromethamine], and Valproic acid    Recommendations/Findings: The following amendments were made to the patient's active medication list on file at [facility]:   1) Additions: propranolol    2) Deletions: albuterol, estradiol, hydrocodone, indomethacin, ketorolac, metoprolol, naproxen, olmesartan, oxycodone, prednisone, risaquad    3) Changes: None      Pertinent Findings: See above, many medications are no longer current, metoprolol recently switched to propranolol. Clarified PTA med list with patient, shelton hx. PTA medication list was corrected to the following:     Prior to Admission Medications   Prescriptions Last Dose Informant Taking? ALPRAZolam (XANAX) 1 mg tablet 8/28/2021 at Unknown time  Yes   Sig: One bid prn anxiety   DULoxetine (CYMBALTA) 60 mg capsule 8/28/2021 at Unknown time  Yes   Sig: TAKE 2 CAPSULESBY MOUTH EVERY DAY   belimumab (Benlysta) 400 mg solr injection 7/29/2021 at Unknown time  Yes   Sig: by IntraVENous route. codeine-butalbital-acetaminophen-caffeine (FIORICET WITH CODEINE) -48-30 mg capsule   Yes   Sig: Take 1 Capsule by mouth every six (6) hours as needed for Headache. furosemide (LASIX) 20 mg tablet   Yes   Sig: Take 20 mg by mouth as needed (swelling).    gabapentin (NEURONTIN) 100 mg capsule 8/28/2021 at Unknown time  Yes   Sig: TAKE 2 CAPSULES BY MOUTH 3 TIMES A DAY   mirtazapine (REMERON) 30 mg tablet 2021 at Unknown time  Yes   Sig: TAKE 1 TABLET BY MOUTH AT BEDTIME   naloxone (NARCAN) 4 mg/actuation nasal spray Unknown at Unknown time  No   Sig: Use 1 spray intranasally, then discard. Repeat with new spray every 2 min as needed for opioid overdose symptoms, alternating nostrils. Patient not taking: Reported on 2021   ondansetron (Zofran ODT) 4 mg disintegrating tablet 2021 at Unknown time  Yes   Si Tablet by SubLINGual route every eight (8) hours as needed for Nausea or Vomiting. pantoprazole (PROTONIX) 40 mg tablet 2021 at Unknown time  Yes   Sig: take 1 tablet by mouth at bedtime   propranolol LA (INDERAL LA) 80 mg SR capsule 2021 at Unknown time  Yes   Si mg daily. rizatriptan (MAXALT) 10 mg tablet 2021 at Unknown time  Yes   Sig: Take 10 mg by mouth once as needed for Migraine.  May repeat in 2 hours if needed    tiZANidine (ZANAFLEX) 4 mg tablet Unknown at Unknown time  No   Sig: TAKE 1 TABLET BY MOUTH 3 TIMES A DAY AS NEEDED FOR PAIN      Facility-Administered Medications: None        Thank you,  Del Briceño, PHARMD

## 2021-08-29 NOTE — ROUTINE PROCESS
End of Shift Note    Bedside shift change report given to Vannessa Pereira Gamaliel (oncoming nurse) by Carito Strange RN (offgoing nurse).   Report included the following information SBAR, Kardex, Intake/Output and MAR    Shift worked:  7p-7a   Shift summary and any significant changes:     new admit       Concerns for physician to address:  new admit   Zone phone for oncoming shift:   [phone]     Patient Information  David Linda  38 y.o.  8/28/2021 10:31 PM by Nataly Clemons MD. David Linda was admitted from Home    Problem List  Patient Active Problem List    Diagnosis Date Noted    Chronic headache disorder 08/29/2021    Constipation 08/29/2021    Seizure ([address] 08/29/2021    Syncope 08/29/2021    Headache 08/29/2021    Intermittent palpitations 03/16/2021    Essential hypertension 03/16/2021    Ureteric calculus 30/65/8187    Ureteric colic 11/16/7682    Cellulitis 02/24/2020    Severe obesity ([address] 02/22/2019    Incomplete uterovaginal prolapse 05/17/2016    Migraine with aura and without status migrainosus, not intractable 12/02/2015    Anxiety 12/05/2014    Lupus ([address] 12/04/2014    Recurrent ventral incisional hernia 03/01/2013    Ventral hernia 02/25/2013    Abnormal CT of the abdomen 11/08/2012     Past Medical History:   Diagnosis Date    Abnormal CT of the abdomen 11/8/2012    Asthma     Autoimmune disease ([address]     lupus    C. difficile diarrhea     Cervical prolapse     Dehydration     Mariah-Danlos syndrome     Gastrointestinal disorder     gerd, twisted colon, IBS    GERD (gastroesophageal reflux disease)     Headache(784.0)     Lupus ([address]     Morbid obesity ([address]     Nausea & vomiting     Neurological disorder     cluster headaches    Occipital neuralgia     other 2006, 2009    ovarian cyst removal    Other ill-defined conditions(799.89)     Worsening headaches          Activity:  Activity Level: Ambulate X (#), Up with Assistance    Cardiac:   Cardiac Monitoring: Yes           Access:   Current line(s): PIV       Genitourinary:   Urinary status: voiding    Respiratory:   O2 Device: None       GI:  Last Bowel Movement Date: 08/28/21  Current diet:  ADULT DIET Regular       Pain Management:   Patient states pain is manageable on current regimen: NO    Skin:  Sukhjinder Score: 22  Interventions: increase time out of bed and nutritional support     Patient Safety:  Fall Score:  Total Score: 3  Interventions: bed/chair alarm, gripper socks, pt to call before getting OOB and stay with me (per policy)  High Fall Risk: Yes  @Rollbelt  @dexterity to release roll belt  Yes/No ( must document dexterity  here by stating Yes or No here, otherwise this is a restraint and must follow restraint documentation policy.)    DVT prophylaxis:  DVT prophylaxis Med- Yes    Wounds: (If Applicable)  Wounds- No  Location     Active Consults:  IP CONSULT TO NEUROLOGY    Length of Stay:  Expected LOS: - - -  Actual LOS: 0  Discharge Plan: Yes home when ready      Jace Joseph RN

## 2021-08-30 VITALS
SYSTOLIC BLOOD PRESSURE: 164 MMHG | BODY MASS INDEX: 42.54 KG/M2 | DIASTOLIC BLOOD PRESSURE: 77 MMHG | TEMPERATURE: 97.8 F | HEART RATE: 53 BPM | RESPIRATION RATE: 18 BRPM | OXYGEN SATURATION: 98 % | HEIGHT: 63 IN | WEIGHT: 240.08 LBS

## 2021-08-30 LAB
ALBUMIN SERPL-MCNC: 3.3 G/DL (ref 3.5–5)
ALBUMIN/GLOB SERPL: 0.9 {RATIO} (ref 1.1–2.2)
ALP SERPL-CCNC: 98 U/L (ref 45–117)
ALT SERPL-CCNC: 17 U/L (ref 12–78)
ANION GAP SERPL CALC-SCNC: 4 MMOL/L (ref 5–15)
AST SERPL-CCNC: 11 U/L (ref 15–37)
BASOPHILS # BLD: 0.1 K/UL (ref 0–0.1)
BASOPHILS NFR BLD: 1 % (ref 0–1)
BILIRUB SERPL-MCNC: 0.2 MG/DL (ref 0.2–1)
BUN SERPL-MCNC: 19 MG/DL (ref 6–20)
BUN/CREAT SERPL: 21 (ref 12–20)
CALCIUM SERPL-MCNC: 8.8 MG/DL (ref 8.5–10.1)
CHLORIDE SERPL-SCNC: 108 MMOL/L (ref 97–108)
CO2 SERPL-SCNC: 29 MMOL/L (ref 21–32)
CREAT SERPL-MCNC: 0.9 MG/DL (ref 0.55–1.02)
DIFFERENTIAL METHOD BLD: ABNORMAL
ECHO AO ROOT DIAM: 2.7 CM
ECHO AV AREA PEAK VELOCITY: 2.46 CM2
ECHO AV AREA PEAK VELOCITY: 2.49 CM2
ECHO AV CUSP MM: 1.73 CM
ECHO AV MEAN GRADIENT: 4.81 MMHG
ECHO AV PEAK GRADIENT: 10.58 MMHG
ECHO AV PEAK GRADIENT: 10.87 MMHG
ECHO AV PEAK VELOCITY: 162.66 CM/S
ECHO AV PEAK VELOCITY: 164.59 CM/S
ECHO AV VTI: 26.32 CM
ECHO LA AREA 4C: 21.44 CM2
ECHO LA MAJOR AXIS: 3.63 CM
ECHO LA MINOR AXIS: 1.74 CM
ECHO LA TO AORTIC ROOT RATIO: 1.27
ECHO LA TO AORTIC ROOT RATIO: 1.27
ECHO LA VOL 2C: 66.58 ML (ref 22–52)
ECHO LA VOL 4C: 57.41 ML (ref 22–52)
ECHO LA VOL BP: 65.75 ML (ref 22–52)
ECHO LA VOL/BSA BIPLANE: 31.46 ML/M2 (ref 16–28)
ECHO LA VOLUME INDEX A2C: 31.86 ML/M2 (ref 16–28)
ECHO LA VOLUME INDEX A4C: 27.47 ML/M2 (ref 16–28)
ECHO LV E' LATERAL VELOCITY: 10.19 CM/S
ECHO LV E' SEPTAL VELOCITY: 10.42 CM/S
ECHO LV INTERNAL DIMENSION DIASTOLIC: 4.22 CM (ref 3.9–5.3)
ECHO LV INTERNAL DIMENSION SYSTOLIC: 2.67 CM
ECHO LV IVSD: 1.43 CM (ref 0.6–0.9)
ECHO LV IVSS: 2 CM
ECHO LV MASS 2D: 228.4 G (ref 67–162)
ECHO LV MASS INDEX 2D: 109.3 G/M2 (ref 43–95)
ECHO LV POSTERIOR WALL DIASTOLIC: 1.39 CM (ref 0.6–0.9)
ECHO LV POSTERIOR WALL SYSTOLIC: 2.11 CM
ECHO LVOT DIAM: 2.12 CM
ECHO LVOT PEAK GRADIENT: 5.49 MMHG
ECHO LVOT PEAK VELOCITY: 114.94 CM/S
ECHO MV A VELOCITY: 76.82 CM/S
ECHO MV E DECELERATION TIME (DT): 232.17 MS
ECHO MV E VELOCITY: 119.37 CM/S
ECHO MV E/A RATIO: 1.55
ECHO MV E/E' LATERAL: 11.71
ECHO MV E/E' RATIO (AVERAGED): 11.59
ECHO MV E/E' SEPTAL: 11.46
ECHO PV MAX VELOCITY: 126.93 CM/S
ECHO PV PEAK INSTANTANEOUS GRADIENT SYSTOLIC: 6.47 MMHG
ECHO RV INTERNAL DIMENSION: 3.3 CM
EOSINOPHIL # BLD: 0.3 K/UL (ref 0–0.4)
EOSINOPHIL NFR BLD: 2 % (ref 0–7)
ERYTHROCYTE [DISTWIDTH] IN BLOOD BY AUTOMATED COUNT: 13.7 % (ref 11.5–14.5)
GLOBULIN SER CALC-MCNC: 3.5 G/DL (ref 2–4)
GLUCOSE SERPL-MCNC: 125 MG/DL (ref 65–100)
HCT VFR BLD AUTO: 37 % (ref 35–47)
HGB BLD-MCNC: 11.6 G/DL (ref 11.5–16)
IMM GRANULOCYTES # BLD AUTO: 0.4 K/UL (ref 0–0.04)
IMM GRANULOCYTES NFR BLD AUTO: 3 % (ref 0–0.5)
LYMPHOCYTES # BLD: 3.6 K/UL (ref 0.8–3.5)
LYMPHOCYTES NFR BLD: 26 % (ref 12–49)
MAGNESIUM SERPL-MCNC: 2.4 MG/DL (ref 1.6–2.4)
MCH RBC QN AUTO: 30.4 PG (ref 26–34)
MCHC RBC AUTO-ENTMCNC: 31.4 G/DL (ref 30–36.5)
MCV RBC AUTO: 97.1 FL (ref 80–99)
MONOCYTES # BLD: 1.1 K/UL (ref 0–1)
MONOCYTES NFR BLD: 8 % (ref 5–13)
NEUTS SEG # BLD: 8.4 K/UL (ref 1.8–8)
NEUTS SEG NFR BLD: 60 % (ref 32–75)
NRBC # BLD: 0.02 K/UL (ref 0–0.01)
NRBC BLD-RTO: 0.1 PER 100 WBC
PLATELET # BLD AUTO: 419 K/UL (ref 150–400)
PMV BLD AUTO: 9.5 FL (ref 8.9–12.9)
POTASSIUM SERPL-SCNC: 3.9 MMOL/L (ref 3.5–5.1)
PROT SERPL-MCNC: 6.8 G/DL (ref 6.4–8.2)
RBC # BLD AUTO: 3.81 M/UL (ref 3.8–5.2)
RBC MORPH BLD: ABNORMAL
SODIUM SERPL-SCNC: 141 MMOL/L (ref 136–145)
WBC # BLD AUTO: 13.9 K/UL (ref 3.6–11)

## 2021-08-30 PROCEDURE — 96372 THER/PROPH/DIAG INJ SC/IM: CPT

## 2021-08-30 PROCEDURE — 74011250637 HC RX REV CODE- 250/637: Performed by: STUDENT IN AN ORGANIZED HEALTH CARE EDUCATION/TRAINING PROGRAM

## 2021-08-30 PROCEDURE — 74011636637 HC RX REV CODE- 636/637: Performed by: HOSPITALIST

## 2021-08-30 PROCEDURE — 74011250637 HC RX REV CODE- 250/637: Performed by: HOSPITALIST

## 2021-08-30 PROCEDURE — 74011250636 HC RX REV CODE- 250/636: Performed by: STUDENT IN AN ORGANIZED HEALTH CARE EDUCATION/TRAINING PROGRAM

## 2021-08-30 PROCEDURE — 85025 COMPLETE CBC W/AUTO DIFF WBC: CPT

## 2021-08-30 PROCEDURE — 99218 HC RM OBSERVATION: CPT

## 2021-08-30 PROCEDURE — 95816 EEG AWAKE AND DROWSY: CPT | Performed by: PSYCHIATRY & NEUROLOGY

## 2021-08-30 PROCEDURE — 99233 SBSQ HOSP IP/OBS HIGH 50: CPT | Performed by: PSYCHIATRY & NEUROLOGY

## 2021-08-30 PROCEDURE — 80053 COMPREHEN METABOLIC PANEL: CPT

## 2021-08-30 PROCEDURE — 93306 TTE W/DOPPLER COMPLETE: CPT | Performed by: INTERNAL MEDICINE

## 2021-08-30 PROCEDURE — 36415 COLL VENOUS BLD VENIPUNCTURE: CPT

## 2021-08-30 PROCEDURE — 83735 ASSAY OF MAGNESIUM: CPT

## 2021-08-30 PROCEDURE — 74011250636 HC RX REV CODE- 250/636: Performed by: HOSPITALIST

## 2021-08-30 RX ORDER — PREDNISONE 10 MG/1
60 TABLET ORAL
Qty: 27 TABLET | Refills: 0 | Status: SHIPPED
Start: 2021-08-30 | End: 2021-12-11

## 2021-08-30 RX ORDER — ALBUTEROL SULFATE 90 UG/1
1 AEROSOL, METERED RESPIRATORY (INHALATION)
Qty: 1 INHALER | Refills: 0 | Status: SHIPPED
Start: 2021-08-30 | End: 2022-01-17 | Stop reason: SDUPTHER

## 2021-08-30 RX ADMIN — FAMOTIDINE 20 MG: 20 TABLET ORAL at 08:34

## 2021-08-30 RX ADMIN — ONDANSETRON 4 MG: 4 TABLET, ORALLY DISINTEGRATING ORAL at 04:24

## 2021-08-30 RX ADMIN — ENOXAPARIN SODIUM 40 MG: 40 INJECTION SUBCUTANEOUS at 08:38

## 2021-08-30 RX ADMIN — PREDNISONE 10 MG: 5 TABLET ORAL at 08:34

## 2021-08-30 RX ADMIN — DULOXETINE HYDROCHLORIDE 120 MG: 30 CAPSULE, DELAYED RELEASE ORAL at 08:34

## 2021-08-30 RX ADMIN — OXYCODONE 5 MG: 5 TABLET ORAL at 08:32

## 2021-08-30 RX ADMIN — ALPRAZOLAM 1 MG: 0.5 TABLET ORAL at 10:54

## 2021-08-30 RX ADMIN — Medication 10 ML: at 06:00

## 2021-08-30 RX ADMIN — HYDROMORPHONE HYDROCHLORIDE 0.5 MG: 1 INJECTION, SOLUTION INTRAMUSCULAR; INTRAVENOUS; SUBCUTANEOUS at 04:25

## 2021-08-30 RX ADMIN — GABAPENTIN 200 MG: 100 CAPSULE ORAL at 08:34

## 2021-08-30 NOTE — PROGRESS NOTES
Chief Complaint: Headache    Discussed the results of the MRI and EEG with the patient. Discussed the rationale of discharging her. She feels frustrated with what she deems is a failure of the medical system in addressing her chronic conditions. I tried to explain to her why we cannot address her chronic migraines to resolution in the hospital and urged her to discuss this with her treating neurologist.  I have reassured her that I empathized with her plight and that we have done our very best in making sure that there were no immediate health risks that we could address and that now she can resume treatment with her neurologist in finding the best available solution to her chronic condition. I explained that sometimes the treatment response falls short of expectation but that does not mean that we are any less eager in treating her. Assesment and Plan  1. Pseudoseizures(HCC)    follow up with her Neurologist, Dr. Ramiro Recinos after discharge. I would not renew the keppra based on the seizure like activity I personally witnessed. Will need to follow up with psychiatry after she is discharged.      2. Acute intractable headache, unspecified headache type  Heavily dependent on narcotics. She is probably experiencing rebound headaches in which case narcotics would be contraindicated as a form of treatment. Continue tizanidine, continue Cymbalta, continue gabapentin  The scope of this problem will probably not be addressed during this hospitalization as there are many factors that need addressing including:  her anxiety, rebound headaches which will take weeks to resolve and will most likely get worse before they get better, her lack of insight and most of all our failure in agreeing on a treatment strategy that will not feed her rebound headaches and lead to a resolution of her headaches.      3. Syncope  Brain MRI and EEG show no abnormalities.         Allergies  Latex, Compazine [prochlorperazine], Sulfa (sulfonamide antibiotics), Topamax [topiramate], Adhesive, Clindamycin, Mushroom, Mushroom combination no.1, Toradol [ketorolac tromethamine], and Valproic acid     Medications  Current Facility-Administered Medications   Medication Dose Route Frequency    albuterol (PROVENTIL HFA, VENTOLIN HFA, PROAIR HFA) inhaler 1 Puff  1 Puff Inhalation Q6H PRN    ALPRAZolam (XANAX) tablet 1 mg  1 mg Oral QID PRN    DULoxetine (CYMBALTA) capsule 120 mg  120 mg Oral DAILY    mirtazapine (REMERON) tablet 30 mg  30 mg Oral QHS    pantoprazole (PROTONIX) tablet 40 mg  40 mg Oral QHS    predniSONE (DELTASONE) tablet 10 mg  10 mg Oral BID WITH MEALS    propranolol LA (INDERAL LA) capsule 80 mg  80 mg Oral DAILY    tiZANidine (ZANAFLEX) tablet 4 mg  4 mg Oral TID PRN    sodium chloride (NS) flush 5-40 mL  5-40 mL IntraVENous Q8H    sodium chloride (NS) flush 5-40 mL  5-40 mL IntraVENous PRN    acetaminophen (TYLENOL) tablet 650 mg  650 mg Oral Q6H PRN    Or    acetaminophen (TYLENOL) suppository 650 mg  650 mg Rectal Q6H PRN    polyethylene glycol (MIRALAX) packet 17 g  17 g Oral DAILY PRN    ondansetron (ZOFRAN ODT) tablet 4 mg  4 mg Oral Q8H PRN    Or    ondansetron (ZOFRAN) injection 4 mg  4 mg IntraVENous Q6H PRN    enoxaparin (LOVENOX) injection 40 mg  40 mg SubCUTAneous Q12H    butalbital-acetaminophen-caffeine (FIORICET, ESGIC) -40 mg per tablet 2 Tablet  2 Tablet Oral Q6H PRN    And    codeine sulfate tablet 60 mg  60 mg Oral Q6H PRN    gabapentin (NEURONTIN) capsule 200 mg  200 mg Oral TID    famotidine (PEPCID) tablet 20 mg  20 mg Oral DAILY    oxyCODONE IR (ROXICODONE) tablet 5 mg  5 mg Oral Q4H PRN    HYDROmorphone (DILAUDID) injection 0.5 mg  0.5 mg IntraVENous Q6H PRN    diphenhydrAMINE (BENADRYL) injection 25 mg  25 mg IntraVENous Q24H     Current Outpatient Medications   Medication Sig    albuterol (PROVENTIL HFA, VENTOLIN HFA, PROAIR HFA) 90 mcg/actuation inhaler Take 1 Puff by inhalation every six (6) hours as needed for Wheezing.  predniSONE (DELTASONE) 10 mg tablet Take 60 mg by mouth daily (with breakfast). Day 1 and 2: 60mg; Day 3: 50mg; Day 4:40mg; Day 5: 30 mg; Day 6: 20mg; Day 7: 10mg    gabapentin (NEURONTIN) 100 mg capsule TAKE 2 CAPSULES BY MOUTH 3 TIMES A DAY    propranolol LA (INDERAL LA) 80 mg SR capsule 80 mg daily.  codeine-butalbital-acetaminophen-caffeine (FIORICET WITH CODEINE) -37-30 mg capsule Take 1 Capsule by mouth every six (6) hours as needed for Headache.  furosemide (LASIX) 20 mg tablet Take 20 mg by mouth as needed (swelling).  mirtazapine (REMERON) 30 mg tablet TAKE 1 TABLET BY MOUTH AT BEDTIME    ALPRAZolam (XANAX) 1 mg tablet One bid prn anxiety    ondansetron (Zofran ODT) 4 mg disintegrating tablet 1 Tablet by SubLINGual route every eight (8) hours as needed for Nausea or Vomiting.  DULoxetine (CYMBALTA) 60 mg capsule TAKE 2 CAPSULESBY MOUTH EVERY DAY    pantoprazole (PROTONIX) 40 mg tablet take 1 tablet by mouth at bedtime    rizatriptan (MAXALT) 10 mg tablet Take 10 mg by mouth once as needed for Migraine. May repeat in 2 hours if needed     belimumab (Benlysta) 400 mg solr injection by IntraVENous route.  tiZANidine (ZANAFLEX) 4 mg tablet TAKE 1 TABLET BY MOUTH 3 TIMES A DAY AS NEEDED FOR PAIN    naloxone (NARCAN) 4 mg/actuation nasal spray Use 1 spray intranasally, then discard. Repeat with new spray every 2 min as needed for opioid overdose symptoms, alternating nostrils.  (Patient not taking: Reported on 8/29/2021)        Medical History  Past Medical History:   Diagnosis Date    Abnormal CT of the abdomen 11/8/2012    Asthma     Autoimmune disease (HCC)     lupus    C. difficile diarrhea     Cervical prolapse     Dehydration     Mariah-Danlos syndrome     Gastrointestinal disorder     gerd, twisted colon, IBS    GERD (gastroesophageal reflux disease)     Headache(784.0)     Lupus ([address]     Morbid obesity ([address]     Nausea & vomiting     Neurological disorder     cluster headaches    Occipital neuralgia     other 2006, 2009    ovarian cyst removal    Other ill-defined conditions(799.89)     Worsening headaches      . Review of Systems   Eyes: Positive for blurred vision. Negative for double vision. Respiratory: Negative for cough and shortness of breath. Cardiovascular: Negative for chest pain, palpitations and orthopnea. Gastrointestinal: Negative for nausea and vomiting. Neurological: Positive for headaches. Negative for dizziness. Psychiatric/Behavioral: Positive for depression. The patient is nervous/anxious. Exam:    Visit Vitals  BP (!) 164/77 (BP 1 Location: Right upper arm, BP Patient Position: At rest)   Pulse (!) 53   Temp 97.8 °F (36.6 °C)   Resp 18   Ht 5' 3\" (1.6 m)   Wt 240 lb 1.3 oz (108.9 kg)   LMP 02/17/2016   SpO2 98%   BMI 42.53 kg/m²        General: Well developed, well nourished. Patient in no apparent distress   Head: Normocephalic, atraumatic, anicteric sclera   Neck Normal ROM, No thyromegally   Cardiac: Regular rate and rhythm   Ext: No pedal edema   Skin: Supple no rash     NeurologicExam:  Mental Status: Alert and oriented to person place and time   Speech: Fluent no aphasia or dysarthria. Cranial Nerves:  II - XII Intact   Motor:  Full and symmetric strength of upper and lower ext. Normal bulk and tone. Sensory:   Symmetric and intact    Gait:  Gait is balanced and fluid with normal arm swing. Tremor:   No tremor noted. Cerebellar:  Coordination intact.                Lab Review  Lab Results   Component Value Date/Time    WBC 13.9 (H) 08/30/2021 04:35 AM    HCT 37.0 08/30/2021 04:35 AM    HGB 11.6 08/30/2021 04:35 AM    PLATELET 529 (H) 79/85/6329 04:35 AM       Lab Results   Component Value Date/Time    Sodium 141 08/30/2021 04:35 AM    Potassium 3.9 08/30/2021 04:35 AM    Chloride 108 08/30/2021 04:35 AM    CO2 29 08/30/2021 04:35 AM    Glucose 125 (H) 08/30/2021 04:35 AM    BUN 19 08/30/2021 04:35 AM    Creatinine 0.90 08/30/2021 04:35 AM    Calcium 8.8 08/30/2021 04:35 AM

## 2021-08-30 NOTE — PROCEDURES
[address]     Electroencephalogram Report    Date: 08/30/21    Patient Name: Jayjay Lucero  YOB: 1985   Medical Record No: [id number]    Procedure ID: [id number]  Procedure Type: Routine    INDICATION: seizure    STATE: Awake/Drowsy    IMPRESSION:  Normal  Absence of seizures on this study does not exclude epilepsy. Clinical correlation is advised. DESCRIPTION OF PROCEDURE: Electrodes were applied in accordance with the international 10-20 system of electrode placement. EEG was reviewed in both bipolar and referential montages. DESCRIPTION OF FINDINGS: Background symmetric 10 Hz background which attenuates with eye opening. With drowsiness there is a drop off the background activity. With Photic stimulations a symmetric occipital drive is notes. No seizures noted. No ictal spike. identified.       Medications:  Current Facility-Administered Medications   Medication Dose Route Frequency    albuterol (PROVENTIL HFA, VENTOLIN HFA, PROAIR HFA) inhaler 1 Puff  1 Puff Inhalation Q6H PRN    ALPRAZolam (XANAX) tablet 1 mg  1 mg Oral QID PRN    DULoxetine (CYMBALTA) capsule 120 mg  120 mg Oral DAILY    mirtazapine (REMERON) tablet 30 mg  30 mg Oral QHS    pantoprazole (PROTONIX) tablet 40 mg  40 mg Oral QHS    predniSONE (DELTASONE) tablet 10 mg  10 mg Oral BID WITH MEALS    propranolol LA (INDERAL LA) capsule 80 mg  80 mg Oral DAILY    tiZANidine (ZANAFLEX) tablet 4 mg  4 mg Oral TID PRN    sodium chloride (NS) flush 5-40 mL  5-40 mL IntraVENous Q8H    sodium chloride (NS) flush 5-40 mL  5-40 mL IntraVENous PRN    acetaminophen (TYLENOL) tablet 650 mg  650 mg Oral Q6H PRN    Or    acetaminophen (TYLENOL) suppository 650 mg  650 mg Rectal Q6H PRN    polyethylene glycol (MIRALAX) packet 17 g  17 g Oral DAILY PRN    ondansetron (ZOFRAN ODT) tablet 4 mg  4 mg Oral Q8H PRN    Or    ondansetron (ZOFRAN) injection 4 mg  4 mg IntraVENous Q6H PRN    enoxaparin (LOVENOX) injection 40 mg  40 mg SubCUTAneous Q12H    butalbital-acetaminophen-caffeine (FIORICET, ESGIC) -40 mg per tablet 2 Tablet  2 Tablet Oral Q6H PRN    And    codeine sulfate tablet 60 mg  60 mg Oral Q6H PRN    gabapentin (NEURONTIN) capsule 200 mg  200 mg Oral TID    famotidine (PEPCID) tablet 20 mg  20 mg Oral DAILY    oxyCODONE IR (ROXICODONE) tablet 5 mg  5 mg Oral Q4H PRN    HYDROmorphone (DILAUDID) injection 0.5 mg  0.5 mg IntraVENous Q6H PRN    diphenhydrAMINE (BENADRYL) injection 25 mg  25 mg IntraVENous Q24H

## 2021-08-30 NOTE — PROGRESS NOTES
Pt ready for discharge from a CM standpoint. Mother headed to hospital for transport home. Transition of Care Plan:    RUR: 20% moderate risk  Disposition: Home with follow up appointments  Follow up appointments: PCP, Neurology, Psychiatry  DME needed: No DME needed for discharge  Transportation at Discharge: Mother to transport  Keys or means to access home:  Pt has access to home      IM Medicare Letter: Medicaid insured  Is patient a BCPI-A Bundle:   no        If yes, was Bundle Letter given?:     Caregiver Contact: Mother, Oswald Mabry  ([phone])  Discharge Caregiver contacted prior to discharge? Mother aware of discharge as patient has contacted mother regarding transport for discharge    Reason for Admission:   Recurrent headaches                  RUR Score:  20% moderate risk                PCP: First and Last name:   Georgina Padgett MD     Name of Practice:    Are you a current patient: Yes/No:  Yes   Approximate date of last visit: 2-3 months ago   Can you participate in a virtual visit if needed:  yes    Do you (patient/family) have any concerns for transition/discharge? Pt states she is not happy with neurology services                 Plan for utilizing home health:   No current need for Home Health    Current Advanced Directive/Advance Care Plan:  Full Code      Healthcare Decision Maker:   Click here to complete [address] including selection of the Healthcare Decision Maker Relationship (ie \"Primary\")  Pt currently reporting that she wants her mother to be contact           Transition of Care Plan: Home with follow up appointments    Care Management Interventions  PCP Verified by CM: Yes (2 -3 months ago)  Mode of Transport at Discharge:  Other (see comment) (Mother to transport)  Discharge Durable Medical Equipment: No  Physical Therapy Consult: No  Occupational Therapy Consult: No  Speech Therapy Consult: No  Current Support Network: Own Home  Discharge Location  Discharge Placement: [facility] RAD[facility] [place], [address] - ED [facility]  [employer] ACP Facilitator  Zone Phone: [phone]

## 2021-08-30 NOTE — PROGRESS NOTES
End of Shift Note    Bedside shift change report given to Bob Adkins, RN (oncoming nurse) by Estefany Wright RN (offgoing nurse). Report included the following information SBAR, Kardex, Intake/Output and MAR    Shift worked:  2300pm -7am   Shift summary and any significant changes:     POC reviewed, pain mgmt, fall precautions in place. Call bell and phone within reach of patient. Made comfortable, lab drawn, tele monitored.        Concerns for physician to address:  none   Zone phone for oncoming shift:        Patient Information  Mela Gresham  38 y.o.  8/28/2021 10:31 PM by Kyaw Valderrama MD. Mela Gresham was admitted from Home    Problem List  Patient Active Problem List    Diagnosis Date Noted    Chronic headache disorder 08/29/2021    Constipation 08/29/2021    Seizure ([address] 08/29/2021    Syncope 08/29/2021    Headache 08/29/2021    Intermittent palpitations 03/16/2021    Essential hypertension 03/16/2021    Ureteric calculus 43/80/1234    Ureteric colic 83/83/3618    Cellulitis 02/24/2020    Severe obesity ([address] 02/22/2019    Incomplete uterovaginal prolapse 05/17/2016    Migraine with aura and without status migrainosus, not intractable 12/02/2015    Anxiety 12/05/2014    Lupus ([address] 12/04/2014    Recurrent ventral incisional hernia 03/01/2013    Ventral hernia 02/25/2013    Abnormal CT of the abdomen 11/08/2012     Past Medical History:   Diagnosis Date    Abnormal CT of the abdomen 11/8/2012    Asthma     Autoimmune disease ([address]     lupus    C. difficile diarrhea     Cervical prolapse     Dehydration     Mariah-Danlos syndrome     Gastrointestinal disorder     gerd, twisted colon, IBS    GERD (gastroesophageal reflux disease)     Headache(784.0)     Lupus (HCC)     Morbid obesity (HCC)     Nausea & vomiting     Neurological disorder     cluster headaches    Occipital neuralgia     other 2006, 2009    ovarian cyst removal    Other ill-defined conditions(799.89)  Worsening headaches          Activity:  Activity Level: Up with Assistance  Number times ambulated in hallways past shift: 0  Number of times OOB to chair past shift: 0    Cardiac:   Cardiac Monitoring: Yes      Cardiac Rhythm: Sinus Tay    Access:   Current line(s): PIV     Genitourinary:   Urinary status: voiding    Respiratory:   O2 Device: None (Room air)  Chronic home O2 use?: NO  Incentive spirometer at bedside: NO       GI:  Last Bowel Movement Date: 08/29/21  Current diet:  ADULT DIET Regular  Passing flatus: YES  Tolerating current diet: YES       Pain Management:   Patient states pain is manageable on current regimen: YES    Skin:  Sukhjinder Score: 20  Interventions: increase time out of bed    Patient Safety:  Fall Score:  Total Score: 3  Interventions: bed/chair alarm, assistive device (walker, cane, etc), gripper socks and pt to call before getting OOB  High Fall Risk: Yes  @Rollbelt  @dexterity to release roll belt  Yes/No ( must document dexterity  here by stating Yes or No here, otherwise this is a restraint and must follow restraint documentation policy.)    DVT prophylaxis:  DVT prophylaxis Med- Yes  DVT prophylaxis SCD or DIANE- No     Wounds: (If Applicable)  Wounds- No  Location     Active Consults:  IP CONSULT TO NEUROLOGY    Length of Stay:  Expected LOS: - - -  Actual LOS: 1  Discharge Plan: Yes       Henny Dawkins RN

## 2021-08-30 NOTE — PROGRESS NOTES
Problem: Falls - Risk of  Goal: *Absence of Falls  Description: Document Yandy Brown Fall Risk and appropriate interventions in the flowsheet.   Outcome: Progressing Towards Goal  Note: Fall Risk Interventions:  Mobility Interventions: Communicate number of staff needed for ambulation/transfer, Bed/chair exit alarm         Medication Interventions: Bed/chair exit alarm, Evaluate medications/consider consulting pharmacy, Patient to call before getting OOB         History of Falls Interventions: Evaluate medications/consider consulting pharmacy, Investigate reason for fall, Bed/chair exit alarm         Problem: Patient Education: Go to Patient Education Activity  Goal: Patient/Family Education  Outcome: Progressing Towards Goal

## 2021-08-30 NOTE — PROGRESS NOTES
2000: Report received from Gino [address] [place] [address]. Assumed care of pt.     2300: Report given to Corinne Osorio [address] [place] [address].

## 2021-08-30 NOTE — DISCHARGE SUMMARY
.                    Hospitalist Discharge Summary     Patient ID:  Brennen Singleton  [id number]  18 y.o.  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  female who presents with recurrent headache behind her right eye for more than 2 weeks and questionable multiple  ?seizures 3 witnessed by EMS and 1 witnessed in the ED. Per ED staff patient had 15 second seizure witnessed by staff, MD.  Per ED MD he thinks that was  not real seizure and  likely she had pseudoseizure but not sure. Of note patient was seen on 8/27/2020  in the ED for headache and recurrent syncope describing going in and out of consciousness and on 8/24/2021 for headache . She had right greater occipital nerve block on 824/2021 and right supraorbital nerve block on 8/27/2021 and was also started on steroid on 8/27/2021 in the ED visit. Patient has a past medical history of lupus, breast cancer, nephrolithiasis, GERD, colitis, Mariah-Danlos syndrome.        There are no other complaints, changes, or physical findings at this time. ______________________________________________________________________  DISCHARGE SUMMARY/HOSPITAL COURSE:  for full details see H&P, daily progress notes, labs, consult notes. Pseudoseizures ruled out seizure   History of Migraines with Syncope   Anxiety/Doression   History of Drug Seeking per chart review   -Patient presented with syncope and reports seizure like activity. Seizure like activity was witnessed by neurology.  Seizure like activity was not rhythmic and ceased with distraction.  -Recently been seen by neurology and ophthalmology OP to work up uncontrolled migraines. -MRI and EEG were both WNL   -Appreciate Neurology input - No need for keppra, recommended follow up with primary neurologist and psychiatry   -Discussed follow up with psychiatry with patient, she seemed agreeable. -Continue tizanidine, cymbalta and gabapentin as PTA   Leukocytosis with Recent Diagnosis of Occipital Neurology Leukocytosis associated with recent prednisone with taper. Recommended to continue to follow up with ophthalmologist   History of Lupus   Hypertension   Morbid Obesity    Patient seen at bedside. Patient's plan of care has been reviewed with them. Patient and/or family have verbally conveyed their understanding and agreement of the patient's signs, symptoms, diagnosis, treatment and prognosis and additionally agree to follow up as recommended or return to [facility] should their condition change prior to follow-up. Discharge instructions have also been provided to the patient with some educational information regarding their diagnosis as well a list of reasons why they would want to return to the office prior to their follow-up appointment should their condition change.  _______________________________________________________________________  Patient seen and examined by me on discharge day. Pertinent Findings:  Gen:    Not in distress  Chest: Clear lungs  CVS:   Regular rhythm. No edema  Abd:  Soft, obese, not tender  Neuro:  Alert and oriented x3   _______________________________________________________________________  DISCHARGE MEDICATIONS:   Current Discharge Medication List      CONTINUE these medications which have CHANGED    Details   albuterol (PROVENTIL HFA, VENTOLIN HFA, PROAIR HFA) 90 mcg/actuation inhaler Take 1 Puff by inhalation every six (6) hours as needed for Wheezing. Qty: 1 Inhaler, Refills: 0  Start date: 8/30/2021      predniSONE (DELTASONE) 10 mg tablet Take 60 mg by mouth daily (with breakfast).  Day 1 and 2: 60mg; Day 3: 50mg; Day 4:40mg; Day 5: 30 mg; Day 6: 20mg; Day 7: 10mg  Qty: 27 Tablet, Refills: 0  Start date: 8/30/2021         CONTINUE these medications which have NOT CHANGED    Details   gabapentin (NEURONTIN) 100 mg capsule TAKE 2 CAPSULES BY MOUTH 3 TIMES A DAY      propranolol LA (INDERAL LA) 80 mg SR capsule 80 mg daily. codeine-butalbital-acetaminophen-caffeine (FIORICET WITH CODEINE) -38-30 mg capsule Take 1 Capsule by mouth every six (6) hours as needed for Headache.      mirtazapine (REMERON) 30 mg tablet TAKE 1 TABLET BY MOUTH AT BEDTIME  Qty: 30 Tablet, Refills: 5      ALPRAZolam (XANAX) 1 mg tablet One bid prn anxiety  Qty: 60 Tablet, Refills: 1    Comments: May fill 8/3/21 then q 30 days. Associated Diagnoses: Primary insomnia; Anxiety      ondansetron (Zofran ODT) 4 mg disintegrating tablet 1 Tablet by SubLINGual route every eight (8) hours as needed for Nausea or Vomiting. Qty: 20 Tablet, Refills: 0      DULoxetine (CYMBALTA) 60 mg capsule TAKE 2 CAPSULESBY MOUTH EVERY DAY  Qty: 30 Capsule, Refills: 5      pantoprazole (PROTONIX) 40 mg tablet take 1 tablet by mouth at bedtime  Refills: 0      rizatriptan (MAXALT) 10 mg tablet Take 10 mg by mouth once as needed for Migraine. May repeat in 2 hours if needed       belimumab (Benlysta) 400 mg solr injection by IntraVENous route. tiZANidine (ZANAFLEX) 4 mg tablet TAKE 1 TABLET BY MOUTH 3 TIMES A DAY AS NEEDED FOR PAIN  Qty: 30 Tablet, Refills: 0      naloxone (NARCAN) 4 mg/actuation nasal spray Use 1 spray intranasally, then discard. Repeat with new spray every 2 min as needed for opioid overdose symptoms, alternating nostrils. Qty: 2 Each, Refills: 0         STOP taking these medications       ondansetron hcl (ZOFRAN) 4 mg tablet Comments:   Reason for Stopping:         mupirocin (BACTROBAN) 2 % ointment Comments:   Reason for Stopping:                 Patient Follow Up Instructions:    Activity: Activity as tolerated  Diet: Regular Diet  Wound Care: None needed  Follow-up Information     Follow up With Specialties Details Why Contact Info    Braulio Wood MD Internal Medicine In 6 days  [facility]  [address] [address] [id number] 9535 [address]  In 3 week  [address] [address] Zabrina phylicia Delong MD Neurology In 1 week  [address] [place] [address]  [phone]          ________________________________________________________________    Risk of deterioration: Low    Condition at Discharge:  Stable  __________________________________________________________________    Disposition  Home with family, no needs    ____________________________________________________________________    Code Status: Full Code  ___________________________________________________________________      Total time in minutes spent coordinating this discharge (includes going over instructions, follow-up, prescriptions, and preparing report for sign off to her PCP) :  >30 minutes    Signed:  Sonja Monroe NP

## 2021-08-30 NOTE — PROGRESS NOTES
Discharge instructions reviewed with patient. She verbalizes understanding of all information. Family taking patient home. No new prescriptions.

## 2021-08-30 NOTE — DISCHARGE INSTRUCTIONS
Patient Education        Learning About Psychogenic Non-Epileptic Seizure  What is psychogenic non-epileptic seizure (PNES)? Psychogenic non-epileptic seizures (PNES) don't have a physical cause. They aren't caused by epilepsy. But people with epilepsy also may have PNES. People who have a lot of stress, mental illness, or emotional trauma may be more likely to have PNES. Even though PNES doesn't have a physical cause, it is a real condition. The seizures can be scary. And not knowing why you're having them can be frustrating. What happens during PNES? PNES may look like epileptic seizures. But epileptic seizures usually follow the same pattern every time. With PNES, each episode may be different. During a PNES episode, you may have jerky movements, tingling skin, or problems with coordination. You may notice changes in your vision or sense of smell. Some people have episodes often. Others have them only once in a while. For some people, episodes stop over time. Other people keep having them. How is PNES diagnosed? Your doctor will do tests to find out if you have epilepsy. An EEG test lets your doctor see the electrical activity of your brain. The test is often used to diagnose epilepsy. It helps your doctor know what types of seizures you are having. Your doctor also may do blood tests. PNES can be mistaken for epilepsy at first. As a result, some people with PNES are treated with epilepsy medicines. But most of the time, these medicines don't help. The right diagnosis allows your doctor to give you treatments that will help with the stress and other issues that may be related to PNES. How is PNES treated? Treatment varies with each person. The goals of treatment are to relieve stress and to help you learn ways to cope with difficult areas of your life. You may get medicines or counseling. A type of counseling called cognitive-behavioral therapy (CBT) may help with the stress and emotional issues.  In Physical Therapy Daily Treatment    Visit Count: 11  Plan of Care: 8/29/2019 Through: 1/16/2020  Insurance Information:  physical and speech therapy combined cap of $2040 per calendar year, has used 0 therapy visits prior this year      MEDICARE/PARTA AND B  ID#: [id number]     Physical therapy / speech therapy cap - $ 0            Occupational therapy cap - $ 0  All visits based on medical necessity.  Reference - [employer]     Part B deductible $ 185 / $ 0 remaining     Secondary Insurance? [employer]/[id number]     Referred by: Jhonny Souza MD; Next provider visit (if known/scheduled): 10/16/2019  Medical Diagnosis (from order):       Diagnosis Information             Diagnosis      V45.89, V15.51 (ICD-9-CM) - Z96.7, Z87.81 (ICD-10-CM) - S/P ORIF (open reduction internal fixation) fracture         Date of Surgery: 8/15/2019 Surgery Performed: right knee arthroscopy - Right and right knee open reduction internal fixation lateral tibial plateau fracture with bone allograft - Right; Physician Guidelines yes  Precautions: Per MD as of 09/19/2019, \"May gradually progress WBAT over the next ~3 weeks\"    Chart reviewed at time of initial evaluation (relevant co-morbidities, allergies, tests and medications listed):                  Chart review of X-ray results from 08/26/2019 states:                                    \"FINDINGS:                                              Two views right knee were taken in this office and are interpreted by myself, compared to       intraoperative films, and again show satisfactory postoperative finding, status post open reduction     internal fixation of right knee lateral tibial plateau fracture.The fixation implants are in good position     without evidence of significant failure.  The articular surface appears congruent. There is a small joint     effusion but no evidence of any surgery related complications\"              Medical History: HTN,  AF              CBT, you learn to identify and change thinking styles that may be adding to your stress. CBT is done by licensed mental health providers, such as psychologists, social workers, and therapists. It can be done in one-on-one sessions or in a group setting. Care at home   Lowering your stress may help with PNES. Here are some things you can do. How to relax your mind   · Write. It may help to write about things that are bothering you. This helps you find out how much stress you feel and what is causing it. When you know this, you can find better ways to cope. · Let your feelings out. Talk, laugh, cry, and express anger when you need to. Talking with friends, family, a counselor, or a member of the clergy about your feelings is a healthy way to relieve stress. · Do something you enjoy. For example, listen to music or go to a movie. Practice your hobby or do volunteer work. · Meditate. This can help you relax, because you are not worrying about what happened before or what may happen in the future. · Do guided imagery. Imagine yourself in any setting that helps you feel calm. You can use audiotapes, books, or a teacher to guide you. How to relax your body   · Do something active. Exercise or activity can help reduce stress. Walking is a great way to get started. Even everyday activities such as housecleaning or yard work can help. · Do breathing exercises. For example:  ? From a standing position, bend forward from the waist with your knees slightly bent. Let your arms dangle close to the floor. ? Breathe in slowly and deeply as you return to a standing position. Roll up slowly, and lift your head last.  ? Hold your breath for just a few seconds in the standing position. ? Breathe out slowly and bend forward from the waist.  · Try yoga or nohemy chi. These techniques combine exercise and meditation. You may need some training at first to learn them.   Talk to your doctor about whether it is safe to do certain Surgical History: s/p CABG (2012), aortic valve replacement    SUBJECTIVE   Reports he's doing okay. Denies any soreness after last visit    Current Pain (0-10 scale): 0  Functional Change:     HEP going well.     OBJECTIVE   Ambulatory with wheeled walker WBAT on right LE exhibiting impaired hip and knee flexion on right LE during mid swing phase of gait.     Treatment   Therapeutic Exercise:   NuStep level 1.0, lower extremities only, 6.5 minutes    Leg Press: double leg 22 pounds x 12, 33 pounds x 12 (cues to not lock out knees and to go slow and controlled)  Standing alternating heel/toe raises with UE support on table x 12  Mini Squat with UE support on table x 12 (occasional cues for form)    Standing alternating hip extension with UE support on table x 12;  cues to keep right knee in extension  Standing alternating hip abduction with UE support on table x 12; occasional cues to keep right knee in extension  Standing alternating knee flexion with UE support on table x 12    Side-stepping in front on table with UE support x 12 feet x 5 (occasional cues to check on right LE alignment, tends to go into slight ER initially if not cued; patient is able to self-correct after that).     Static Airex balance - no upper extremity support, 60 seconds  Static Airex balance feet together - no upper extremity support, 60 seconds  Marching on Airex pad with UE support x 12  Lateral step-ups onto Airex Pad with UE support x 12    Skilled input: verbal instruction/cues, tactile instruction/cues, education/instruction on review of working on increasing hip and knee flexion on right LE during mid swing phase    Home Program:  * above=instructed home program    Exercise: Date issued Date DC Comments   Supine ankle pumps, quad sets, heel slides, SAQ 08/29/2019       Supine straight leg raise, quad set with towel under right ankle, seated right knee extension  9/3/19                 seated heel slides  09/05/2019       Side hip  abduction, prone knee flexion, standing hip extension 9/17/19           Seated knee flexion holds; standing ML, AP weight shifts with UE support 09/19/2019     Mini Squat 10/1/19                    Writer verbally educated the patient and received verbal consent from the patient on hand placement, positioning of patient, and techniques to be performed today including therapist position for techniques, hand placement and palpation for techniques as described above and how they are pertinent to the patient's plan of care.      Suggestions for next session as indicated:   Continue to gradually progress exercises as tolerated  Check technique with side-stepping with UE support    ASSESSMENT   Today's visit went well with continued progressions made to exercises without any adverse effects. Initiated lateral step-ups onto Airex Pad with UE support for balance. Patient requires cues for checking feet placement onto pad. He is receptive.     Technique with mini-squat has improved with decreased cues provided for improved form. He is pleased with results of PT.     Pain after treatment (patient reported, 0-10 scale): 0  Result of above outlined education: Verbalizes understanding, Demonstrates understanding and Needs reinforcement    THERAPY DAILY BILLING   Insurance: MEDICARE 2. [employer]    Evaluation Procedures:  No evaluation codes were used on this date of service    Timed Procedures:  KX modifier applied  Therapeutic Exercise, 42 minutes    Untimed Procedures:  No untimed codes were used on this date of service    Total Treatment Time: 42 minutes   activities, such as drive or swim. Follow-up care is a key part of your treatment and safety. Be sure to make and go to all appointments, and call your doctor if you are having problems. It's also a good idea to know your test results and keep a list of the medicines you take. Where can you learn more? Go to http://www.gray.com/  Enter W975 in the search box to learn more about \"Learning About Psychogenic Non-Epileptic Seizure. \"  Current as of: 2020               Content Version: 12.8  © 7743-8527 [employer]. Care instructions adapted under license by [employer] (which disclaims liability or warranty for this information). If you have questions about a medical condition or this instruction, always ask your healthcare professional. [place] [address] any warranty or liability for your use of this information. HOSPITALIST DISCHARGE INSTRUCTIONS    NAME: Mann Coburn   :  1985   MRN:  [id number]     Date/Time:  2021 9:50 AM    ADMIT DATE: 2021   DISCHARGE DATE: 2021     Attending Physician: Juan Francisco Knight NP    DISCHARGE DIAGNOSIS:    Caroline Nina ruled out seizure   History of Migraines with Syncope   Leukocytosis   History of Lupus   Anxiety   Hypertension   Morbid Obesity    Medications: Per above medication reconciliation. Pain Management: per above medications    Recommended diet: Regular Diet    Recommended activity: Activity as tolerated    Wound care: None    Indwelling devices:  None    Supplemental Oxygen: None    Required Lab work: none    Glucose management:  None    Code status: Full        Outside physician follow up:     Follow-up Information     Follow up With Specialties Details Why Contact Info    Iris Shearer MD Internal Medicine In 6 days  [address] [facility]  [address] [id number]      [address] [place] [address]  In 1 week  Daljit Coyle [facility]  [phone]    Yasmany Farr MD Neurology In 1 week  [address] [facility]  [address] [facility]  [phone]            [facility] to avoid frequent ED visits. [facility] can treat; pains, sprains, cuts, wounds, high fevers, upper respiratory infections and much more. There medical team is equipped with all the tools necessary to provide advanced medical care in the comfort of you home, workplace, or location of need. The medical team consists of doctors, nurse practitioners, and EMTs. [facility] is available 7 days per week 9 am to 9 pm.   Request care by calling [phone] or by going online at [phone] [employer]    Information obtained by :  I understand that if any problems occur once I am at home I am to contact my physician. I understand and acknowledge receipt of the instructions indicated above.                                                                                                                                            Physician's or R.N.'s Signature                                                                  Date/Time                                                                                                                                              Patient or Repres

## 2021-09-01 ENCOUNTER — OFFICE VISIT (OUTPATIENT)
Dept: CARDIOLOGY CLINIC | Age: 36
End: 2021-09-01
Payer: MEDICAID

## 2021-09-01 VITALS
DIASTOLIC BLOOD PRESSURE: 80 MMHG | HEIGHT: 63 IN | RESPIRATION RATE: 19 BRPM | HEART RATE: 55 BPM | BODY MASS INDEX: 45.89 KG/M2 | SYSTOLIC BLOOD PRESSURE: 119 MMHG | OXYGEN SATURATION: 97 % | WEIGHT: 259 LBS

## 2021-09-01 DIAGNOSIS — R60.0 LOCALIZED EDEMA: ICD-10-CM

## 2021-09-01 DIAGNOSIS — I10 ESSENTIAL HYPERTENSION: Primary | ICD-10-CM

## 2021-09-01 PROCEDURE — 99214 OFFICE O/P EST MOD 30 MIN: CPT | Performed by: NURSE PRACTITIONER

## 2021-09-01 RX ORDER — FUROSEMIDE 20 MG/1
20 TABLET ORAL AS NEEDED
Qty: 30 TABLET | Refills: 2 | Status: SHIPPED | OUTPATIENT
Start: 2021-09-01 | End: 2021-12-05

## 2021-09-01 NOTE — PROGRESS NOTES
1. Have you been to the ER, urgent care clinic since your last visit? Hospitalized since your last visit? 8/28/21 ED [facility] ED d/t Seizure. 2. Have you seen or consulted any other health care providers outside of the [address] [place] [address] since your last visit? Include any pap smears or colon screening. No      Chief Complaint   Patient presents with    Follow-up     Nuerologist changed her cardiac medication Metoprolo to Propanolol d/t Migraine issues. Patient reported improvement chest pain with Propanolol.  Leg Swelling     Bilateral Leg swelling. Patient is no longer taking Furosemide, she was told at the hospital to ask Cardiologist about it.

## 2021-09-03 ENCOUNTER — VIRTUAL VISIT (OUTPATIENT)
Dept: INTERNAL MEDICINE CLINIC | Age: 36
End: 2021-09-03
Payer: MEDICAID

## 2021-09-03 DIAGNOSIS — F41.9 ANXIETY: Primary | ICD-10-CM

## 2021-09-03 DIAGNOSIS — M54.81 OCCIPITAL NEURALGIA, UNSPECIFIED LATERALITY: ICD-10-CM

## 2021-09-03 DIAGNOSIS — F44.5 PSEUDOSEIZURE: ICD-10-CM

## 2021-09-03 PROBLEM — R09.89 LABILE BLOOD PRESSURE: Status: ACTIVE | Noted: 2021-09-03

## 2021-09-03 PROCEDURE — 99442 PR PHYS/QHP TELEPHONE EVALUATION 11-20 MIN: CPT | Performed by: INTERNAL MEDICINE

## 2021-09-03 NOTE — PROGRESS NOTES
HISTORY OF PRESENT ILLNESS  Aicha Dimas is a 38 y.o. female. HPI   Aicha Dimas, who was evaluated through a synchronous (real-time) audio only encounter, and/or her healthcare decision maker, is aware that it is a billable service, with coverage as determined by her insurance carrier. She provided verbal consent to proceed: Yes, and patient identification was verified. This visit was conducted pursuant to the emergency declaration under the [facility], [address] [place] [address] and the [employer] Act. A caregiver was present when appropriate. Ability to conduct physical exam was limited. The patient was located in a state where the provider was credentialed to provide care. --Bharat Arroyo MD on 9/3/2021 at 11:27 AM            Vv via telephone encounter x 15 minutes  Hx SLE, anxiety obesity chronic migraine headaches, labile BP  Here for ANDRES SILVEIRA    Admitted for recurrent headaches and possible seizure ?  Witnessed in ER  Had several recent ER visit prior to admit for headaches and also syncope with migraines  EEG negative-no epilepsy  Seen by Neurologist Dr Valentin Harper     Her regular Neurologist is Dr Cyndee Shaikh  On BB for occipital neuralgia per Dr Arely Altamirano and takes fioricet prn  She spoke with Dr Arely Altamirano yesteday-contnue medicines and go to [facility] counseling since on cymbalta for anxiety but he prefers to stop the med and take elavil instead    Admit date: 8/28/2021  Discharge date and time: 8/30/2021     DISCHARGE DIAGNOSIS:     Pseudoseizure ruled out seizure   History of Migraines with Syncope   Leukocytosis   History of Lupus   Anxiety   Hypertension   Morbid Obesity      CONSULTATIONS:  IP CONSULT TO NEUROLOGY     Excerpted HPI from H&P of Marissa Cordoba MD:  Raisa Marshall is a 35 y.o.   female who presents with recurrent headache behind her right eye for more than 2 weeks and questionable multiple  ?seizures 3 witnessed by EMS and 1 witnessed in the ED. Per ED staff patient had 15 second seizure witnessed by staff, MD. Ye Edgar ED MD he thinks that was  not real seizure and  likely she had pseudoseizure but not sure.  Of note patient was seen on 8/27/2020  in the ED for headache and recurrent syncope describing going in and out of consciousness and on 8/24/2021 for headache . She had right greater occipital nerve block on 824/2021 and right supraorbital nerve block on 8/27/2021 and was also started on steroid on 8/27/2021 in the ED visit. Patient has a past medical history of lupus, breast cancer, nephrolithiasis, GERD, colitis, Mariah-Danlos syndrome.        There are no other complaints, changes, or physical findings at this time.     ______________________________________________________________________  DISCHARGE SUMMARY/HOSPITAL COURSE:  for full details see H&P, daily progress notes, labs, consult notes.      Pseudoseizures ruled out seizure   History of Migraines with Syncope   Anxiety/Doression   History of Drug Seeking per chart review   -Patient presented with syncope and reports seizure like activity. Seizure like activity was witnessed by neurology. Seizure like activity was not rhythmic and ceased with distraction.  -Recently been seen by neurology and ophthalmology OP to work up uncontrolled migraines. -MRI and EEG were both WNL   -Appreciate Neurology input - No need for keppra, recommended follow up with primary neurologist and psychiatry   -Discussed follow up with psychiatry with patient, she seemed agreeable. -Continue tizanidine, cymbalta and gabapentin as PTA   Leukocytosis with Recent Diagnosis of Occipital Neurology Leukocytosis associated with recent prednisone with taper. Recommended to continue to follow up with ophthalmologist   History of Lupus   Hypertension   Morbid Obesity     Patient seen at bedside.  Patient's plan of care has been reviewed with them.  Patient and/or family have verbally conveyed their understanding and agreement of the patient's signs, symptoms, diagnosis, treatment and prognosis and additionally agree to follow up as recommended or return to [facility] should their condition change prior to follow-up. Niurka Robledo instructions have also been provided to the patient with some educational information regarding their diagnosis as well a list of reasons why they would want to return to the office prior to their follow-up appointment should their condition change.  _______________________________________________________________________  Patient seen and examined by me on discharge day. Pertinent Findings:  Gen:    Not in distress  Chest:  Clear lungs  CVS:    Regular rhythm. No edema  Abd:     Soft, obese, not tender  Neuro:  Alert and oriented x3   _______________________________________________________________________  DISCHARGE MEDICATIONS:         Current Discharge Medication List             CONTINUE these medications which have CHANGED     Details   albuterol (PROVENTIL HFA, VENTOLIN HFA, PROAIR HFA) 90 mcg/actuation inhaler Take 1 Puff by inhalation every six (6) hours as needed for Wheezing. Qty: 1 Inhaler, Refills: 0  Start date: 8/30/2021       predniSONE (DELTASONE) 10 mg tablet Take 60 mg by mouth daily (with breakfast).  Day 1 and 2: 60mg; Day 3: 50mg; Day 4:40mg; Day 5: 30 mg; Day 6: 20mg; Day 7: 10mg  Qty: 27 Tablet, Refills: 0  Start date: 8/30/2021                 CONTINUE these medications which have NOT CHANGED     Details   gabapentin (NEURONTIN) 100 mg capsule TAKE 2 CAPSULES BY MOUTH 3 TIMES A DAY       propranolol LA (INDERAL LA) 80 mg SR capsule 80 mg daily.       codeine-butalbital-acetaminophen-caffeine (FIORICET WITH CODEINE) -34-30 mg capsule Take 1 Capsule by mouth every six (6) hours as needed for Headache.       mirtazapine (REMERON) 30 mg tablet TAKE 1 TABLET BY MOUTH AT BEDTIME  Qty: 30 Tablet, Refills: 5     ALPRAZolam (XANAX) 1 mg tablet One bid prn anxiety  Qty: 60 Tablet, Refills: 1     Comments: May fill 8/3/21 then q 30 days. Associated Diagnoses: Primary insomnia; Anxiety       ondansetron (Zofran ODT) 4 mg disintegrating tablet 1 Tablet by SubLINGual route every eight (8) hours as needed for Nausea or Vomiting. Qty: 20 Tablet, Refills: 0       DULoxetine (CYMBALTA) 60 mg capsule TAKE 2 CAPSULESBY MOUTH EVERY DAY  Qty: 30 Capsule, Refills: 5       pantoprazole (PROTONIX) 40 mg tablet take 1 tablet by mouth at bedtime  Refills: 0       rizatriptan (MAXALT) 10 mg tablet Take 10 mg by mouth once as needed for Migraine. May repeat in 2 hours if needed        belimumab (Benlysta) 400 mg solr injection by IntraVENous route.       tiZANidine (ZANAFLEX) 4 mg tablet TAKE 1 TABLET BY MOUTH 3 TIMES A DAY AS NEEDED FOR PAIN  Qty: 30 Tablet, Refills: 0       naloxone (NARCAN) 4 mg/actuation nasal spray Use 1 spray intranasally, then discard. Repeat with new spray every 2 min as needed for opioid overdose symptoms, alternating nostrils. Qty: 2 Each, Refills: 0                STOP taking these medications         ondansetron hcl (ZOFRAN) 4 mg tablet Comments:   Reason for Stopping:            mupirocin (BACTROBAN) 2 % ointment Comments:   Reason for Stopping:                       Patient Follow Up Instructions:    Activity: Activity as tolerated  Diet: Regular Diet  Wound Care: None needed           Follow-up Information      Follow up With Specialties Details Why Contact Info     Eleanor Gandhi MD Internal Medicine In 6 days   [address] [facility]  [phone]        [facility]   In 1 week   [address] [address]  [phone]   Da Pal MD Neurology In 1 week   [address]  [phone]             ________________________________________________________________     Risk of deterioration: Low     Condition at Discharge:  Stable        Patient Active Problem List    Diagnosis Date Noted    Labile blood pressure 09/03/2021    Chronic headache disorder 08/29/2021    Constipation 08/29/2021    Seizure ([facility] 08/29/2021    Syncope 08/29/2021    Headache 08/29/2021    Intermittent palpitations 03/16/2021    Essential hypertension 03/16/2021    Ureteric calculus 71/04/8334    Ureteric colic 72/67/7513    Cellulitis 02/24/2020    Severe obesity ([facility] 02/22/2019    Incomplete uterovaginal prolapse 05/17/2016    Migraine with aura and without status migrainosus, not intractable 12/02/2015    Anxiety 12/05/2014    Lupus ([facility] 12/04/2014    Recurrent ventral incisional hernia 03/01/2013    Ventral hernia 02/25/2013    Abnormal CT of the abdomen 11/08/2012     Current Outpatient Medications   Medication Sig Dispense Refill    furosemide (LASIX) 20 mg tablet Take 1 Tablet by mouth as needed (swelling). 30 Tablet 2    albuterol (PROVENTIL HFA, VENTOLIN HFA, PROAIR HFA) 90 mcg/actuation inhaler Take 1 Puff by inhalation every six (6) hours as needed for Wheezing. 1 Inhaler 0    gabapentin (NEURONTIN) 100 mg capsule TAKE 2 CAPSULES BY MOUTH 3 TIMES A DAY      codeine-butalbital-acetaminophen-caffeine (FIORICET WITH CODEINE) -76-30 mg capsule Take 1 Capsule by mouth every six (6) hours as needed for Headache.  tiZANidine (ZANAFLEX) 4 mg tablet TAKE 1 TABLET BY MOUTH 3 TIMES A DAY AS NEEDED FOR PAIN 30 Tablet 0    mirtazapine (REMERON) 30 mg tablet TAKE 1 TABLET BY MOUTH AT BEDTIME 30 Tablet 5    ALPRAZolam (XANAX) 1 mg tablet One bid prn anxiety 60 Tablet 1    ondansetron (Zofran ODT) 4 mg disintegrating tablet 1 Tablet by SubLINGual route every eight (8) hours as needed for Nausea or Vomiting.  20 Tablet 0    DULoxetine (CYMBALTA) 60 mg capsule TAKE 2 CAPSULESBY MOUTH EVERY DAY 30 Capsule 5    pantoprazole (PROTONIX) 40 mg tablet take 1 tablet by mouth at bedtime  0  rizatriptan (MAXALT) 10 mg tablet Take 10 mg by mouth once as needed for Migraine. May repeat in 2 hours if needed      belimumab (Benlysta) 400 mg solr injection by IntraVENous route.  predniSONE (DELTASONE) 10 mg tablet Take 60 mg by mouth daily (with breakfast). Day 1 and 2: 60mg; Day 3: 50mg; Day 4:40mg; Day 5: 30 mg; Day 6: 20mg; Day 7: 10mg (Patient not taking: Reported on 9/1/2021) 27 Tablet 0    propranolol LA (INDERAL LA) 80 mg SR capsule 80 mg daily. (Patient not taking: Reported on 9/3/2021)      naloxone [facility]) 4 mg/actuation nasal spray Use 1 spray intranasally, then discard. Repeat with new spray every 2 min as needed for opioid overdose symptoms, alternating nostrils.  (Patient not taking: Reported on 8/29/2021) 2 Each 0     Allergies   Allergen Reactions    Latex Itching     burning    Compazine [Prochlorperazine] Anxiety     High heart rate  Pt can take promethazine with no problems    Sulfa (Sulfonamide Antibiotics) Unknown (comments)    Topamax [Topiramate] Unknown (comments)    Adhesive Rash     Allergic to Dermabond    Clindamycin Diarrhea     Pt states caused her C-Diff    Mushroom Other (comments)    Mushroom Combination No.1 Unknown (comments)    Toradol [Ketorolac Tromethamine] Nausea and Vomiting and Other (comments)     PO & IV causes GI bleed  Tolerated during Feb 2020 stay    Valproic Acid Other (comments)     Elevated heart rate and vomiting        Lab Results   Component Value Date/Time    WBC 13.9 (H) 08/30/2021 04:35 AM    HGB 11.6 08/30/2021 04:35 AM    Hemoglobin (POC) 12.6 05/17/2016 07:47 AM    HCT 37.0 08/30/2021 04:35 AM    Hematocrit (POC) 37 05/17/2016 07:47 AM    PLATELET 035 (H) 78/84/6176 04:35 AM    MCV 97.1 08/30/2021 04:35 AM     Lab Results   Component Value Date/Time    GFR est non-AA >60 08/30/2021 04:35 AM    GFRNA, POC >60 05/17/2016 07:47 AM    GFR est AA >60 08/30/2021 04:35 AM    GFRAA, POC >60 05/17/2016 07:47 AM    Creatinine 0.90 08/30/2021 04:35 AM    Creatinine (POC) 0.7 05/17/2016 07:47 AM    BUN 19 08/30/2021 04:35 AM    BUN (POC) 8 (L) 05/17/2016 07:47 AM    Sodium 141 08/30/2021 04:35 AM    Sodium (POC) 140 05/17/2016 07:47 AM    Potassium 3.9 08/30/2021 04:35 AM    Potassium (POC) 3.8 05/17/2016 07:47 AM    Chloride 108 08/30/2021 04:35 AM    Chloride (POC) 105 05/17/2016 07:47 AM    CO2 29 08/30/2021 04:35 AM    Magnesium 2.4 08/30/2021 04:35 AM        ROS    Physical Exam    ASSESSMENT and PLAN  Diagnoses and all orders for this visit:    1. Anxiety  -     REFERRAL TO PSYCHIATRY   We can consider slow taper of  cymbalta then start elavil if unable to get  an appt with psych MD  2. Occipital neuralgia, unspecified laterality   Fu Dr Lola Carvajal [facility]  3. Pseudoseizure   Control headaches and anxiety    Follow-up and Dispositions    · Return in about 6 months (around 3/3/2022) for anxiety occipital neuralgia.

## 2021-09-03 NOTE — PROGRESS NOTES
Identified pt with two pt identifiers(name and ). Reviewed record in preparation for visit and have obtained necessary documentation. Chief Complaint   Patient presents with   [place] -21 for migraines/seizure-like symptoms; ([phone] phone call only; no cell service where she is right now)        There were no vitals filed for this visit. Health Maintenance Due   Topic    Hepatitis C Screening     COVID-19 Vaccine (1)    DTaP/Tdap/Td series (1 - Tdap)    Flu Vaccine (1)       Coordination of Care Questionnaire:  :   1) Have you been to an emergency room, urgent care, or hospitalized since your last visit? If yes, where when, and reason for visit? Yes- see today's reason for visit      2. Have seen or consulted any other health care provider since your last visit? If yes, where when, and reason for visit? NO      Patient is accompanied by self I have received verbal consent from Tommy Madison to discuss any/all medical information while they are present in the room.

## 2021-09-04 RX ORDER — TIZANIDINE 4 MG/1
TABLET ORAL
Qty: 30 TABLET | Refills: 0 | Status: SHIPPED | OUTPATIENT
Start: 2021-09-04 | End: 2021-09-14

## 2021-09-06 ENCOUNTER — HOSPITAL ENCOUNTER (EMERGENCY)
Age: 36
Discharge: HOME | End: 2021-09-06
Attending: EMERGENCY MEDICINE
Payer: MEDICAID

## 2021-09-06 VITALS
RESPIRATION RATE: 18 BRPM | OXYGEN SATURATION: 100 % | SYSTOLIC BLOOD PRESSURE: 118 MMHG | DIASTOLIC BLOOD PRESSURE: 65 MMHG | TEMPERATURE: 99.3 F | HEART RATE: 50 BPM

## 2021-09-06 DIAGNOSIS — R45.89 INABILITY TO COPE: Primary | ICD-10-CM

## 2021-09-06 DIAGNOSIS — G89.4 CHRONIC PAIN SYNDROME: ICD-10-CM

## 2021-09-06 DIAGNOSIS — M54.81 CERVICO-OCCIPITAL NEURALGIA: ICD-10-CM

## 2021-09-06 DIAGNOSIS — Z76.5 DRUG-SEEKING BEHAVIOR: ICD-10-CM

## 2021-09-06 PROCEDURE — 99281 EMR DPT VST MAYX REQ PHY/QHP: CPT

## 2021-09-06 PROCEDURE — 74011250636 HC RX REV CODE- 250/636: Performed by: EMERGENCY MEDICINE

## 2021-09-06 PROCEDURE — 74011636637 HC RX REV CODE- 636/637: Performed by: EMERGENCY MEDICINE

## 2021-09-06 PROCEDURE — 96372 THER/PROPH/DIAG INJ SC/IM: CPT

## 2021-09-06 RX ORDER — DEXAMETHASONE SODIUM PHOSPHATE 4 MG/ML
10 INJECTION, SOLUTION INTRA-ARTICULAR; INTRALESIONAL; INTRAMUSCULAR; INTRAVENOUS; SOFT TISSUE ONCE
Status: COMPLETED | OUTPATIENT
Start: 2021-09-06 | End: 2021-09-06

## 2021-09-06 RX ORDER — PREDNISONE 10 MG/1
TABLET ORAL
Qty: 21 TABLET | Refills: 0 | Status: SHIPPED | OUTPATIENT
Start: 2021-09-06 | End: 2021-12-11

## 2021-09-06 RX ORDER — HYDROMORPHONE HYDROCHLORIDE 1 MG/ML
1 INJECTION, SOLUTION INTRAMUSCULAR; INTRAVENOUS; SUBCUTANEOUS ONCE
Status: COMPLETED | OUTPATIENT
Start: 2021-09-06 | End: 2021-09-06

## 2021-09-06 RX ORDER — SUMATRIPTAN 6 MG/.5ML
6 INJECTION, SOLUTION SUBCUTANEOUS
Status: COMPLETED | OUTPATIENT
Start: 2021-09-06 | End: 2021-09-06

## 2021-09-06 RX ORDER — SUMATRIPTAN 25 MG/1
25-100 TABLET, FILM COATED ORAL
Qty: 10 TABLET | Refills: 0 | Status: SHIPPED | OUTPATIENT
Start: 2021-09-06 | End: 2021-09-06

## 2021-09-06 RX ADMIN — HYDROMORPHONE HYDROCHLORIDE 1 MG: 1 INJECTION, SOLUTION INTRAMUSCULAR; INTRAVENOUS; SUBCUTANEOUS at 13:00

## 2021-09-06 RX ADMIN — DEXAMETHASONE SODIUM PHOSPHATE 10 MG: 4 INJECTION, SOLUTION INTRAMUSCULAR; INTRAVENOUS at 13:00

## 2021-09-06 RX ADMIN — SUMATRIPTAN 6 MG: 6 INJECTION, SOLUTION SUBCUTANEOUS at 12:59

## 2021-09-06 NOTE — DISCHARGE INSTRUCTIONS
List of Pain Management Specialists:    Dany Braxton M.D. [phone] Pain Management  Leslie Rose M.D. [phone] Physical Medicine and Deric Ludwig M.D. [phone] Physical Medicine and Krysta Macedo M.D. [phone] Pain Management  Orlando Siddiqui M.D. [phone] Pain Management    Dr. Debbie Jimenez, [address], [address] [facility], 1116 [facility], [address] [place] [address] Nw  [phone]    [facility] Center                            HENOK Miranda MD DO  [address], [address]  [phone]    Dr. Susy Figueroa [facility]. [address]                                     [place] [phone]                                                                            Dr. Maggi Richardson, [facility] [address] [facility]  15[address] [facility]                                           3247 [facility], [address] [place] [address], 1678 [facility], [facility] [address]  [email or web address]                                            [phone] Dr. Maddie Choi, [address]  [place], [address] [place] [address] Pkwy       [phone]              Dr. Renae Thrasher  [facility], [address] [place] [address]  21       Dr. Efrain Setxon 53, [facility] 25. [address] [place] [address]. Larry Daniels 31, [address] [address]  [phone]  [phone]     Please also consider natural alternatives to pain relief such as physical therapy, massage therapy, acupuncture, chiropractors, reflexology, and trigger point injections.

## 2021-09-06 NOTE — ED TRIAGE NOTES
Pt presents to ED with c/o headache pain since being discharge from the hospital on Monday for Occipital neuralgia. Pt also report c/o of N/V since this morning and some vision changes.     Pt reports her headache pain 8/10    Pt is alert/oriented x 4, placed on monitor x 2    Pt is in position of comfort with call bell within reach

## 2021-09-07 ENCOUNTER — ANCILLARY PROCEDURE (OUTPATIENT)
Dept: CARDIOLOGY CLINIC | Age: 36
End: 2021-09-07
Payer: MEDICAID

## 2021-09-07 DIAGNOSIS — R60.0 LOCALIZED EDEMA: ICD-10-CM

## 2021-09-07 DIAGNOSIS — I10 ESSENTIAL HYPERTENSION: ICD-10-CM

## 2021-09-07 PROCEDURE — 93970 EXTREMITY STUDY: CPT | Performed by: INTERNAL MEDICINE

## 2021-09-07 NOTE — ED PROVIDER NOTES
EMERGENCY DEPARTMENT HISTORY AND PHYSICAL EXAM      Please note that this dictation was completed with the assistance of \"Dragon\", the computer voice recognition software. Quite often unanticipated grammatical, syntax, homophones, and other interpretive errors are inadvertently transcribed by the computer software. Please disregard these errors and any errors that have escaped final proofreading. Thank you. Patient Name: Laura Young  : 1985  MRN: [id number]  History of Presenting Illness     Chief Complaint   Patient presents with    Headache     History Provided By: Patient    HPI: Laura Young, 38 y.o. female with past medical history as documented below presents to the ED with c/o of acute on chronic headaches. Pt was recently discharged from the hospital and had a full neuro workup and was evaluated by Neurology as well. Pt notes headache is typical of her occipital neuralgia. No new visual disturbances. No focal numbness or weakness. States headache is 8/10 intensity, not her worst FONTENOT ever. Pt denies any other exacerbating or ameliorating factors. Additionally, pt specifically denies any recent fever, chills, nausea, vomiting, abdominal pain, CP, SOB, lightheadedness, dizziness, numbness, weakness, lower extremity swelling, heart palpitations, urinary sxs, diarrhea, constipation, melena, hematochezia, cough, or congestion. There are no other complaints, changes or physical findings pertinent to the HPI at this time.     PCP: Emma Sheehan MD    Past History   Past Medical History:  Past Medical History:   Diagnosis Date    Abnormal CT of the abdomen 2012    Asthma     Autoimmune disease ([facility] [address]     lupus    C. difficile diarrhea     Cervical prolapse     Dehydration     Mariah-Danlos syndrome     Gastrointestinal disorder     gerd, twisted colon, IBS    GERD (gastroesophageal reflux disease)     Headache(784.0)     Lupus (HCC)     Morbid obesity (HCC)     Nausea & vomiting     Neurological disorder     cluster headaches    Occipital neuralgia     other 2006, 2009    ovarian cyst removal    Other ill-defined conditions(799.89)     Syncope     Worsening headaches        Past Surgical History:  Past Surgical History:   Procedure Laterality Date    COLONOSCOPY  9/21/2010         HX CHOLECYSTECTOMY      HX CYST INCISION AND DRAINAGE Right 02/27/2020    HX GYN  1/2009    right salpingo oopherectomy    HX GYN  2006    right ovarian tumor removed    HX HEENT      left wisdom teeth removal    HX HEENT      top right wisdom tooth removed    HX HERNIA REPAIR  4/2012    HX HERNIA REPAIR  2/28/13    Laparoscopic recurrent incisional hernia repair    HX HYSTERECTOMY      2016    HX UROLOGICAL      Kidney Stone Removal    IA EGD TRANSORAL BIOPSY SINGLE/MULTIPLE  9/22/2010            Family History:  Family history reviewed and non-contributory  Family History   Problem Relation Age of Onset    Colon Cancer Maternal Grandfather          Social History:  Social History     Tobacco Use    Smoking status: Never Smoker    Smokeless tobacco: Never Used   Vaping Use    Vaping Use: Never used   Substance Use Topics    Alcohol use: No    Drug use: No       Allergies:   Allergies   Allergen Reactions    Latex Itching     burning    Compazine [Prochlorperazine] Anxiety     High heart rate  Pt can take promethazine with no problems    Sulfa (Sulfonamide Antibiotics) Unknown (comments)    Topamax [Topiramate] Unknown (comments)    Adhesive Rash     Allergic to Dermabond    Clindamycin Diarrhea     Pt states caused her C-Diff    Mushroom Other (comments)    Mushroom Combination No.1 Unknown (comments)    Toradol [Ketorolac Tromethamine] Nausea and Vomiting and Other (comments)     PO & IV causes GI bleed  Tolerated during Feb 2020 stay    Valproic Acid Other (comments)     Elevated heart rate and vomiting         Current Medications:  No current facility-administered medications on file prior to encounter. Current Outpatient Medications on File Prior to Encounter   Medication Sig Dispense Refill    tiZANidine (ZANAFLEX) 4 mg tablet TAKE 1 TABLET BY MOUTH 3 TIMES A DAY AS NEEDED FOR PAIN 30 Tablet 0    furosemide (LASIX) 20 mg tablet Take 1 Tablet by mouth as needed (swelling). 30 Tablet 2    albuterol (PROVENTIL HFA, VENTOLIN HFA, PROAIR HFA) 90 mcg/actuation inhaler Take 1 Puff by inhalation every six (6) hours as needed for Wheezing. 1 Inhaler 0    gabapentin (NEURONTIN) 100 mg capsule TAKE 2 CAPSULES BY MOUTH 3 TIMES A DAY      codeine-butalbital-acetaminophen-caffeine (FIORICET WITH CODEINE) -57-30 mg capsule Take 1 Capsule by mouth every six (6) hours as needed for Headache.  mirtazapine (REMERON) 30 mg tablet TAKE 1 TABLET BY MOUTH AT BEDTIME 30 Tablet 5    ALPRAZolam (XANAX) 1 mg tablet One bid prn anxiety 60 Tablet 1    ondansetron (Zofran ODT) 4 mg disintegrating tablet 1 Tablet by SubLINGual route every eight (8) hours as needed for Nausea or Vomiting. 20 Tablet 0    DULoxetine (CYMBALTA) 60 mg capsule TAKE 2 CAPSULESBY MOUTH EVERY DAY 30 Capsule 5    predniSONE (DELTASONE) 10 mg tablet Take 60 mg by mouth daily (with breakfast). Day 1 and 2: 60mg; Day 3: 50mg; Day 4:40mg; Day 5: 30 mg; Day 6: 20mg; Day 7: 10mg (Patient not taking: Reported on 9/1/2021) 27 Tablet 0    propranolol LA (INDERAL LA) 80 mg SR capsule 80 mg daily. (Patient not taking: Reported on 9/3/2021)      naloxone [facility]) 4 mg/actuation nasal spray Use 1 spray intranasally, then discard. Repeat with new spray every 2 min as needed for opioid overdose symptoms, alternating nostrils. (Patient not taking: Reported on 8/29/2021) 2 Each 0    pantoprazole (PROTONIX) 40 mg tablet take 1 tablet by mouth at bedtime  0    rizatriptan (MAXALT) 10 mg tablet Take 10 mg by mouth once as needed for Migraine.  May repeat in 2 hours if needed      belimumab (Benlysta) 400 mg solr injection by IntraVENous route. Review of Systems   A complete ROS was reviewed by me today and all other systems negative, unless otherwise specified below:  Review of Systems   Constitutional: Negative. Negative for chills and fever. HENT: Negative. Negative for congestion and sore throat. Eyes: Negative. Respiratory: Negative. Negative for cough, chest tightness, shortness of breath and wheezing. Cardiovascular: Negative. Negative for chest pain, palpitations and leg swelling. Gastrointestinal: Negative. Negative for abdominal distention, abdominal pain, blood in stool, constipation, diarrhea, nausea and vomiting. Endocrine: Negative. Genitourinary: Negative. Negative for dysuria, flank pain, frequency, hematuria and urgency. Musculoskeletal: Negative. Negative for arthralgias, back pain and myalgias. Skin: Negative. Negative for color change and rash. Neurological: Positive for headaches. Negative for dizziness, syncope, speech difficulty, weakness, light-headedness and numbness. Hematological: Negative. Psychiatric/Behavioral: Negative. Negative for confusion and self-injury. The patient is not nervous/anxious. All other systems reviewed and are negative. Physical Exam   Physical Exam  Vitals and nursing note reviewed. Constitutional:       General: She is not in acute distress. Appearance: She is well-developed. She is not diaphoretic. HENT:      Head: Normocephalic and atraumatic. Mouth/Throat:      Pharynx: No oropharyngeal exudate. Eyes:      Conjunctiva/sclera: Conjunctivae normal.   Cardiovascular:      Rate and Rhythm: Normal rate and regular rhythm. Heart sounds: Normal heart sounds. Pulmonary:      Effort: Pulmonary effort is normal. No respiratory distress. Breath sounds: Normal breath sounds. No wheezing or rales. Chest:      Chest wall: No tenderness. Abdominal:      General: Bowel sounds are normal. There is no distension. Palpations: Abdomen is soft. There is no mass. Tenderness: There is no abdominal tenderness. There is no guarding or rebound. Musculoskeletal:         General: Normal range of motion. Cervical back: Normal range of motion. Skin:     General: Skin is warm. Neurological:      Mental Status: She is alert and oriented to person, place, and time. Cranial Nerves: No cranial nerve deficit. Motor: No abnormal muscle tone. Diagnostic Study Results     Labs -   I have personally reviewed and interpreted all available laboratory results. No results found for this or any previous visit (from the past 24 hour(s)). Radiologic Studies -   I have personally reviewed and interpreted all available imaging studies and agree with radiology interpretation and report. No orders to display     CT Results  (Last 48 hours)    None        CXR Results  (Last 48 hours)    None          Medical Decision Making   I reviewed the vital signs, available nursing notes, past medical history, past surgical history, family history and social history. Vital Signs-Reviewed the patient's vital signs. Patient Vitals for the past 24 hrs:   Temp Pulse Resp BP SpO2   09/06/21 1200    118/65 100 %   09/06/21 1152 99.3 °F (37.4 °C) (!) 50 18 117/66 100 %         Records Reviewed: Nursing Notes, Old Medical Records, Previous electrocardiograms, Previous Radiology Studies and Previous Laboratory Studies    Provider Notes (Medical Decision Making):   Pt presents with acute headache; afebrile with stable vitals; exam is without focal deficits. DDx: migraine, tension headache, cluster HA, stress, hypertensive urgency, dehydration. HA was gradual onset, pt neuro intact, no nausea/vomiting/focal weakness/sensory change to suggest CVA or ICH. Also pt is not immunocompromised, no fever, no focal weakness to suggest brain abscess. Therefore, no CT head. No neck stiffness, fever, AMS, photophobia to suggest meningitis.  Neg albertina and Brudzinski. Therefore no LP. No jaw claudication, visual changes or temporal pain to suggest temporal arteritis, therefore no ESR/CRP. No eye pain, PERRL, no red eye to suggest glaucoma. No risk factors for CO. Will treat pain and reassess. ED Course:   I am the first physician for this patient's ED visit today. Initial assessment performed. I discussed presenting problems, concerns and my formulated plan for today's visit with the patient and any available family members at bedside. I encouraged them to ask questions as they arise throughout the visit. Social History     Tobacco Use    Smoking status: Never Smoker    Smokeless tobacco: Never Used   Vaping Use    Vaping Use: Never used   Substance Use Topics    Alcohol use: No    Drug use: No       I reviewed our electronic medical record system for any past medical records that were available that may contribute to the patient's current condition, the nursing notes and vital signs from today's visit. ED Orders Placed :  Orders Placed This Encounter    SUMAtriptan (IMITREX) injection 6 mg    dexamethasone (DECADRON) 4 mg/mL injection 10 mg    HYDROmorphone (DILAUDID) injection 1 mg    predniSONE (STERAPRED DS) 10 mg dose pack    SUMAtriptan (Imitrex) 25 mg tablet       ED Medications Administered:  Medications   SUMAtriptan (IMITREX) injection 6 mg (6 mg SubCUTAneous Given 9/6/21 1259)   dexamethasone (DECADRON) 4 mg/mL injection 10 mg (10 mg IntraMUSCular Given 9/6/21 1300)   HYDROmorphone (DILAUDID) injection 1 mg (1 mg IntraMUSCular Given 9/6/21 1300)         I have reviewed and discussed the results of the prescription monitoring program with Waldo Layne. We have discussed patient's current pain, the use of narcotics for pain relief, and that in general narcotics are indicated for acute pain relief and not for chronic pain control. After a certain period of time narcotics should be stopped to avoid dependence.   I warned the patient about the dangers of addiction and other side affects of narcotic abuse. We discussed how narcotics are controlled substances with a propensity for abuse/diversion and that the prescribing/usage of controlled substances is monitored very carefully by the [facility]. Pt understands that the most appropriate avenue for treatment of chronic pain is through a single (preferably pain management) physician and through a single pharmacy. Pt has been asked to seek further treatment for chronic pain through a pain management clinic and not through the emergency room. I spent 15 minutes on this discussion with the patient. Yovany Ordaz MD    Progress Note:  Patient has been reassessed and reports feeling better and symptoms have improved significantly after ED treatment. Prasad Marshall's final labs and imaging have been reviewed with her and available family and/or caregiver. They have been counseled regarding her diagnosis. She verbally conveys understanding and agreement of the signs, symptoms, diagnosis, treatment and prognosis and additionally agrees to follow up as recommended with Dr. Estela Greenwood MD and/or specialist in 24 - 48 hours. She also agrees with the care-plan we created together and conveys that all of her questions have been answered. I have also put together a packet of discharge instructions for her that include: 1) educational information regarding their diagnosis, 2) how to care for their diagnosis at home, as well a 3) list of reasons why they would want to return to the ED prior to their follow-up appointment should the patient's condition change or symptoms worsen. I have answered all questions to the patient's satisfaction. Strict return precautions given. She both understood and agreed with plan as discussed. Vital signs stable for discharge. Disposition:   DISCHARGE  The pt is ready for discharge.  The pt's signs, symptoms, diagnosis, and discharge instructions have been discussed and pt has conveyed their understanding. The pt is to follow up as recommended or return to ER should their symptoms worsen. Plan has been discussed and pt is in agreement. Plan:  1. Return precautions as discussed with patient and available family and/or caregiver. 2.   Discharge Medication List as of 9/6/2021  1:18 PM      START taking these medications    Details   predniSONE (STERAPRED DS) 10 mg dose pack Take as prescribed, Normal, Disp-21 Tablet, R-0      SUMAtriptan (Imitrex) 25 mg tablet Take 1-4 Tablets by mouth once as needed for Migraine for up to 1 dose. Max 200mg in 24 hours, may repeat every 2 hours once, Normal, Disp-10 Tablet, R-0         CONTINUE these medications which have NOT CHANGED    Details   tiZANidine (ZANAFLEX) 4 mg tablet TAKE 1 TABLET BY MOUTH 3 TIMES A DAY AS NEEDED FOR PAIN, Normal, Disp-30 Tablet, R-0      furosemide (LASIX) 20 mg tablet Take 1 Tablet by mouth as needed (swelling). , Normal, Disp-30 Tablet, R-2      albuterol (PROVENTIL HFA, VENTOLIN HFA, PROAIR HFA) 90 mcg/actuation inhaler Take 1 Puff by inhalation every six (6) hours as needed for Wheezing., No Print, Disp-1 Inhaler, R-0      gabapentin (NEURONTIN) 100 mg capsule TAKE 2 CAPSULES BY MOUTH 3 TIMES A DAY, Historical Med      codeine-butalbital-acetaminophen-caffeine (FIORICET WITH CODEINE) -99-30 mg capsule Take 1 Capsule by mouth every six (6) hours as needed for Headache., Historical Med      mirtazapine (REMERON) 30 mg tablet TAKE 1 TABLET BY MOUTH AT BEDTIME, Normal, Disp-30 Tablet, R-5      ALPRAZolam (XANAX) 1 mg tablet One bid prn anxiety, Normal, Disp-60 Tablet, R-1May fill 8/3/21 then q 30 days.       ondansetron (Zofran ODT) 4 mg disintegrating tablet 1 Tablet by SubLINGual route every eight (8) hours as needed for Nausea or Vomiting., Normal, Disp-20 Tablet, R-0      DULoxetine (CYMBALTA) 60 mg capsule TAKE 2 CAPSULESBY MOUTH EVERY DAY, Normal, Disp-30 Capsule, R-5      predniSONE (DELTASONE) 10 mg tablet Take 60 mg by mouth daily (with breakfast). Day 1 and 2: 60mg; Day 3: 50mg; Day 4:40mg; Day 5: 30 mg; Day 6: 20mg; Day 7: 10mg, No Print, Disp-27 Tablet, R-0      propranolol LA (INDERAL LA) 80 mg SR capsule 80 mg daily. , Historical Med      naloxone (NARCAN) 4 mg/actuation nasal spray Use 1 spray intranasally, then discard. Repeat with new spray every 2 min as needed for opioid overdose symptoms, alternating nostrils. , Normal, Disp-2 Each, R-0      pantoprazole (PROTONIX) 40 mg tablet take 1 tablet by mouth at bedtime, Historical Med, R-0      rizatriptan (MAXALT) 10 mg tablet Take 10 mg by mouth once as needed for Migraine. May repeat in 2 hours if needed, Historical Med      belimumab (Benlysta) 400 mg solr injection by IntraVENous route., Historical Med           3. Follow-up Information     Follow up With Specialties Details Why Contact Info    [facility] EMERGENCY DEPT Emergency Medicine  As needed, If symptoms worsen [address] [place] [address]  [phone]          Instructed to return to ED if worse  Diagnosis   Clinical Impression:  1. Inability to cope    2. Chronic pain syndrome    3. Drug-seeking behavior    4. Cervico-occipital neuralgia        Attestation:  Lonni Severs, MD, am the attending of record for this patient. I personally performed the services described in this documentation on this date, 9/6/2021 for patient, Avis Jorgensen. I have reviewed the chart and verified that the record is accurate and complete.

## 2021-09-08 LAB
LEFT GSV AT KNEE DIAM: 0.38 CM
LEFT GSV AT KNEE RFX: 1681 S
LEFT GSV BK MID DIAM: 0.24 CM
LEFT GSV BK MID RFX: 0 S
LEFT GSV JUNC DIAM: 0.8 CM
LEFT GSV JUNC RFX: 0 S
LEFT GSV THIGH PROX DIAM: 0.81 CM
LEFT GSV THIGH PROX RFX: 0 S
LEFT SSV PROX DIAM: 0.29 CM
LEFT SSV PROX RFX: 1804 S
RIGHT GSV AK RFX: 0 S
RIGHT GSV AT KNEE DIAM: 0.45 CM
RIGHT GSV BK MID DIAM: 0.39 CM
RIGHT GSV BK MID RFX: 0 S
RIGHT GSV JUNC DIAM: 0.77 CM
RIGHT GSV JUNC RFX: 0 S
RIGHT GSV THIGH PROX DIAM: 0.84 CM
RIGHT GSV THIGH PROX RFX: 0 S
RIGHT SSV PROX DIAM: 0.32 CM
RIGHT SSV PROX RFX: 0 S

## 2021-09-09 ENCOUNTER — TELEPHONE (OUTPATIENT)
Dept: CARDIOLOGY CLINIC | Age: 36
End: 2021-09-09

## 2021-09-09 NOTE — TELEPHONE ENCOUNTER
Patient's two identifiers verified. I called and spoke with the patient this. Patient stated that she has been on her Furosemide for 4 days but has not increased her UO as she's expecting. I advised her to give some time for the medication to take effect, call me back if she has not noticed any difference in 2 weeks time. I also suggested measuring her fluid intake and UO. Patient verbalized understanding. No other concerns at this time.

## 2021-09-09 NOTE — PROGRESS NOTES
Please call patient, ultrasound of the leg does not show blood clots, there is some venous insufficiency i.e. leaking in the left leg. Would start wearing compression stockings 20-30 mmHg daily that come thigh-high, during the day, do not sleep with them on if she finds after 3 months were not seeing any improvement then I would recommend consult with Dr. Nikita Fuchs for discussion of ablation.   I prefer the conservative approach first.

## 2021-09-09 NOTE — TELEPHONE ENCOUNTER
----- Message from Meryle Marvel, NP sent at 9/9/2021 12:55 PM EDT -----  Please call patient, ultrasound of the leg does not show blood clots, there is some venous insufficiency i.e. leaking in the left leg. Would start wearing compression stockings 20-30 mmHg daily that come thigh-high, during the day, do not sleep with them on if she finds after 3 months were not seeing any improvement then I would recommend consult with Dr. Polo Cody for discussion of ablation.   I prefer the conservative approach first.

## 2021-09-10 ENCOUNTER — HOSPITAL ENCOUNTER (EMERGENCY)
Age: 36
Discharge: HOME | End: 2021-09-10
Attending: EMERGENCY MEDICINE
Payer: MEDICAID

## 2021-09-10 VITALS
SYSTOLIC BLOOD PRESSURE: 118 MMHG | WEIGHT: 259 LBS | OXYGEN SATURATION: 99 % | HEIGHT: 63 IN | TEMPERATURE: 98.5 F | RESPIRATION RATE: 16 BRPM | HEART RATE: 72 BPM | BODY MASS INDEX: 45.89 KG/M2 | DIASTOLIC BLOOD PRESSURE: 65 MMHG

## 2021-09-10 DIAGNOSIS — M54.81 OCCIPITAL NEURALGIA OF RIGHT SIDE: Primary | ICD-10-CM

## 2021-09-10 DIAGNOSIS — Z86.69 HISTORY OF IDIOPATHIC INTRACRANIAL HYPERTENSION: ICD-10-CM

## 2021-09-10 LAB
ALBUMIN SERPL-MCNC: 3.8 G/DL (ref 3.5–5)
ALBUMIN/GLOB SERPL: 1 {RATIO} (ref 1.1–2.2)
ALP SERPL-CCNC: 100 U/L (ref 45–117)
ALT SERPL-CCNC: 18 U/L (ref 12–78)
ANION GAP SERPL CALC-SCNC: 7 MMOL/L (ref 5–15)
AST SERPL-CCNC: 11 U/L (ref 15–37)
BASOPHILS # BLD: 0 K/UL (ref 0–0.1)
BASOPHILS NFR BLD: 0 % (ref 0–1)
BILIRUB SERPL-MCNC: 0.7 MG/DL (ref 0.2–1)
BUN SERPL-MCNC: 16 MG/DL (ref 6–20)
BUN/CREAT SERPL: 17 (ref 12–20)
CALCIUM SERPL-MCNC: 9.4 MG/DL (ref 8.5–10.1)
CHLORIDE SERPL-SCNC: 105 MMOL/L (ref 97–108)
CO2 SERPL-SCNC: 25 MMOL/L (ref 21–32)
CREAT SERPL-MCNC: 0.92 MG/DL (ref 0.55–1.02)
DIFFERENTIAL METHOD BLD: ABNORMAL
EOSINOPHIL # BLD: 0 K/UL (ref 0–0.4)
EOSINOPHIL NFR BLD: 0 % (ref 0–7)
ERYTHROCYTE [DISTWIDTH] IN BLOOD BY AUTOMATED COUNT: 12.7 % (ref 11.5–14.5)
GLOBULIN SER CALC-MCNC: 3.8 G/DL (ref 2–4)
GLUCOSE SERPL-MCNC: 142 MG/DL (ref 65–100)
HCT VFR BLD AUTO: 39.9 % (ref 35–47)
HGB BLD-MCNC: 13 G/DL (ref 11.5–16)
IMM GRANULOCYTES # BLD AUTO: 0.1 K/UL (ref 0–0.04)
IMM GRANULOCYTES NFR BLD AUTO: 1 % (ref 0–0.5)
LYMPHOCYTES # BLD: 1.8 K/UL (ref 0.8–3.5)
LYMPHOCYTES NFR BLD: 10 % (ref 12–49)
MCH RBC QN AUTO: 30.7 PG (ref 26–34)
MCHC RBC AUTO-ENTMCNC: 32.6 G/DL (ref 30–36.5)
MCV RBC AUTO: 94.3 FL (ref 80–99)
MONOCYTES # BLD: 0.7 K/UL (ref 0–1)
MONOCYTES NFR BLD: 4 % (ref 5–13)
NEUTS SEG # BLD: 15.9 K/UL (ref 1.8–8)
NEUTS SEG NFR BLD: 85 % (ref 32–75)
NRBC # BLD: 0 K/UL (ref 0–0.01)
NRBC BLD-RTO: 0 PER 100 WBC
PLATELET # BLD AUTO: 450 K/UL (ref 150–400)
PMV BLD AUTO: 10.2 FL (ref 8.9–12.9)
POTASSIUM SERPL-SCNC: 4.8 MMOL/L (ref 3.5–5.1)
PROT SERPL-MCNC: 7.6 G/DL (ref 6.4–8.2)
RBC # BLD AUTO: 4.23 M/UL (ref 3.8–5.2)
SODIUM SERPL-SCNC: 137 MMOL/L (ref 136–145)
WBC # BLD AUTO: 18.5 K/UL (ref 3.6–11)

## 2021-09-10 PROCEDURE — 96375 TX/PRO/DX INJ NEW DRUG ADDON: CPT

## 2021-09-10 PROCEDURE — 75810000283 HC INJECTION NERVE BLOCK

## 2021-09-10 PROCEDURE — 80053 COMPREHEN METABOLIC PANEL: CPT

## 2021-09-10 PROCEDURE — 96374 THER/PROPH/DIAG INJ IV PUSH: CPT

## 2021-09-10 PROCEDURE — 96372 THER/PROPH/DIAG INJ SC/IM: CPT

## 2021-09-10 PROCEDURE — 85025 COMPLETE CBC W/AUTO DIFF WBC: CPT

## 2021-09-10 PROCEDURE — 74011636637 HC RX REV CODE- 636/637: Performed by: EMERGENCY MEDICINE

## 2021-09-10 PROCEDURE — 99283 EMERGENCY DEPT VISIT LOW MDM: CPT

## 2021-09-10 PROCEDURE — 74011250636 HC RX REV CODE- 250/636: Performed by: EMERGENCY MEDICINE

## 2021-09-10 PROCEDURE — 93005 ELECTROCARDIOGRAM TRACING: CPT

## 2021-09-10 PROCEDURE — 74011000250 HC RX REV CODE- 250: Performed by: EMERGENCY MEDICINE

## 2021-09-10 PROCEDURE — 36415 COLL VENOUS BLD VENIPUNCTURE: CPT

## 2021-09-10 RX ORDER — ONDANSETRON 2 MG/ML
4 INJECTION INTRAMUSCULAR; INTRAVENOUS
Status: COMPLETED | OUTPATIENT
Start: 2021-09-10 | End: 2021-09-10

## 2021-09-10 RX ORDER — SUMATRIPTAN 6 MG/.5ML
6 INJECTION, SOLUTION SUBCUTANEOUS
Status: COMPLETED | OUTPATIENT
Start: 2021-09-10 | End: 2021-09-10

## 2021-09-10 RX ORDER — SUMATRIPTAN 25 MG/1
25-100 TABLET, FILM COATED ORAL
Qty: 20 TABLET | Refills: 0 | Status: SHIPPED | OUTPATIENT
Start: 2021-09-10 | End: 2021-09-10

## 2021-09-10 RX ORDER — ONDANSETRON 4 MG/1
TABLET, FILM COATED ORAL
Qty: 20 TABLET | Refills: 0 | Status: SHIPPED | OUTPATIENT
Start: 2021-09-10 | End: 2021-10-06

## 2021-09-10 RX ORDER — ONDANSETRON 2 MG/ML
INJECTION INTRAMUSCULAR; INTRAVENOUS
Status: DISCONTINUED
Start: 2021-09-10 | End: 2021-09-11 | Stop reason: HOSPADM

## 2021-09-10 RX ORDER — ONDANSETRON 4 MG/1
4 TABLET, ORALLY DISINTEGRATING ORAL
Qty: 20 TABLET | Refills: 0 | OUTPATIENT
Start: 2021-09-10 | End: 2021-12-11

## 2021-09-10 RX ORDER — HYDROMORPHONE HYDROCHLORIDE 1 MG/ML
1 INJECTION, SOLUTION INTRAMUSCULAR; INTRAVENOUS; SUBCUTANEOUS
Status: COMPLETED | OUTPATIENT
Start: 2021-09-10 | End: 2021-09-10

## 2021-09-10 RX ORDER — BUPIVACAINE HYDROCHLORIDE 5 MG/ML
5 INJECTION, SOLUTION EPIDURAL; INTRACAUDAL
Status: COMPLETED | OUTPATIENT
Start: 2021-09-10 | End: 2021-09-10

## 2021-09-10 RX ORDER — DULOXETIN HYDROCHLORIDE 60 MG/1
CAPSULE, DELAYED RELEASE ORAL
Qty: 30 CAPSULE | Refills: 5 | Status: SHIPPED | OUTPATIENT
Start: 2021-09-10 | End: 2021-12-05

## 2021-09-10 RX ADMIN — HYDROMORPHONE HYDROCHLORIDE 1 MG: 1 INJECTION, SOLUTION INTRAMUSCULAR; INTRAVENOUS; SUBCUTANEOUS at 19:14

## 2021-09-10 RX ADMIN — METHYLPREDNISOLONE SODIUM SUCCINATE 125 MG: 125 INJECTION, POWDER, FOR SOLUTION INTRAMUSCULAR; INTRAVENOUS at 19:14

## 2021-09-10 RX ADMIN — SUMATRIPTAN 6 MG: 6 INJECTION, SOLUTION SUBCUTANEOUS at 19:14

## 2021-09-10 RX ADMIN — ONDANSETRON 4 MG: 2 INJECTION INTRAMUSCULAR; INTRAVENOUS at 20:19

## 2021-09-10 RX ADMIN — BUPIVACAINE HYDROCHLORIDE 25 MG: 5 INJECTION, SOLUTION EPIDURAL; INTRACAUDAL; PERINEURAL at 20:20
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
## 2021-09-11 LAB
ATRIAL RATE: 46 BPM
CALCULATED P AXIS, ECG09: 15 DEGREES
CALCULATED R AXIS, ECG10: 28 DEGREES
CALCULATED T AXIS, ECG11: 16 DEGREES
DIAGNOSIS, 93000: NORMAL
P-R INTERVAL, ECG05: 178 MS
Q-T INTERVAL, ECG07: 438 MS
QRS DURATION, ECG06: 88 MS
QTC CALCULATION (BEZET), ECG08: 383 MS
VENTRICULAR RATE, ECG03: 46 BPM

## 2021-09-11 NOTE — ED NOTES
2130 - patient discharge by   Kameron Mcneil MD - pt sent to the [facility], with strong and steady gait -  Discharge information / home RX / and reasons to return to the ED were reviewed by the doctor;     Discharge was completed by Itzel FINN, charge RN;; this RN was not able to complete the task;;

## 2021-09-14 RX ORDER — TIZANIDINE 4 MG/1
TABLET ORAL
Qty: 30 TABLET | Refills: 0 | Status: SHIPPED | OUTPATIENT
Start: 2021-09-14 | End: 2021-09-22

## 2021-09-19 ENCOUNTER — HOSPITAL ENCOUNTER (EMERGENCY)
Age: 36
Discharge: HOME | End: 2021-09-19
Attending: EMERGENCY MEDICINE
Payer: MEDICAID

## 2021-09-19 ENCOUNTER — APPOINTMENT (OUTPATIENT)
Dept: CT IMAGING | Age: 36
End: 2021-09-19
Attending: EMERGENCY MEDICINE
Payer: MEDICAID

## 2021-09-19 VITALS
TEMPERATURE: 98.8 F | OXYGEN SATURATION: 96 % | SYSTOLIC BLOOD PRESSURE: 150 MMHG | BODY MASS INDEX: 43.41 KG/M2 | DIASTOLIC BLOOD PRESSURE: 96 MMHG | HEART RATE: 68 BPM | RESPIRATION RATE: 16 BRPM | WEIGHT: 245 LBS | HEIGHT: 63 IN

## 2021-09-19 DIAGNOSIS — S09.90XA INJURY OF HEAD, INITIAL ENCOUNTER: Primary | ICD-10-CM

## 2021-09-19 DIAGNOSIS — R55 SYNCOPE AND COLLAPSE: ICD-10-CM

## 2021-09-19 PROCEDURE — 70450 CT HEAD/BRAIN W/O DYE: CPT

## 2021-09-19 PROCEDURE — 74011250636 HC RX REV CODE- 250/636: Performed by: EMERGENCY MEDICINE

## 2021-09-19 PROCEDURE — 96374 THER/PROPH/DIAG INJ IV PUSH: CPT

## 2021-09-19 PROCEDURE — 93005 ELECTROCARDIOGRAM TRACING: CPT

## 2021-09-19 PROCEDURE — 99284 EMERGENCY DEPT VISIT MOD MDM: CPT

## 2021-09-19 PROCEDURE — 96375 TX/PRO/DX INJ NEW DRUG ADDON: CPT

## 2021-09-19 RX ORDER — DEXAMETHASONE SODIUM PHOSPHATE 4 MG/ML
10 INJECTION, SOLUTION INTRA-ARTICULAR; INTRALESIONAL; INTRAMUSCULAR; INTRAVENOUS; SOFT TISSUE ONCE
Status: COMPLETED | OUTPATIENT
Start: 2021-09-19 | End: 2021-09-19

## 2021-09-19 RX ORDER — ONDANSETRON 2 MG/ML
4 INJECTION INTRAMUSCULAR; INTRAVENOUS
Status: COMPLETED | OUTPATIENT
Start: 2021-09-19 | End: 2021-09-19

## 2021-09-19 RX ORDER — ONDANSETRON 4 MG/1
4 TABLET, ORALLY DISINTEGRATING ORAL
Qty: 15 TABLET | Refills: 0 | Status: SHIPPED | OUTPATIENT
Start: 2021-09-19 | End: 2021-10-26

## 2021-09-19 RX ORDER — FENTANYL CITRATE 50 UG/ML
100 INJECTION, SOLUTION INTRAMUSCULAR; INTRAVENOUS ONCE
Status: COMPLETED | OUTPATIENT
Start: 2021-09-19 | End: 2021-09-19

## 2021-09-19 RX ADMIN — DEXAMETHASONE SODIUM PHOSPHATE 10 MG: 4 INJECTION, SOLUTION INTRAMUSCULAR; INTRAVENOUS at 16:04

## 2021-09-19 RX ADMIN — ONDANSETRON 4 MG: 2 INJECTION INTRAMUSCULAR; INTRAVENOUS at 16:04

## 2021-09-19 RX ADMIN — FENTANYL CITRATE 100 MCG: 0.05 INJECTION, SOLUTION INTRAMUSCULAR; INTRAVENOUS at 16:04

## 2021-09-19 NOTE — ED PROVIDER NOTES
EMERGENCY DEPARTMENT HISTORY AND PHYSICAL EXAM      Date: 9/19/2021  Patient Name: Mann Coburn    History of Presenting Illness     Chief Complaint   Patient presents with    Syncope     reports hx of syncope due to occipital neuralgia    Head Injury     struck head on corner of wall during fall. HPI: Mann Coburn, 38 y.o. female presents to the ED with cc of headache, syncope, head trauma. Has a history of occipital neuralgia, has syncope associated with painful episodes of this, had 1 of those today. She hit the back of her head on a wall on the way down. She notes pain at that site. She notes no new symptoms. Records indicate multiple previous visits with the same. Often receives occipital nerve blocks, often receives narcotic analgesia as well. Has been admitted and evaluated by neurology in the past, has further evaluation as an outpatient set up. There are no other complaints, changes, or physical findings at this time. PCP: Iris Shearer MD    No current facility-administered medications on file prior to encounter. Current Outpatient Medications on File Prior to Encounter   Medication Sig Dispense Refill    tiZANidine (ZANAFLEX) 4 mg tablet TAKE 1 TABLET BY MOUTH 3 TIMES A DAY AS NEEDED FOR PAIN 30 Tablet 0    DULoxetine (CYMBALTA) 60 mg capsule TAKE 2 CAPSULESBY MOUTH EVERY DAY 30 Capsule 5    ondansetron hcl (ZOFRAN) 4 mg tablet TAKE 1 TABLET BY MOUTH EVERY 8 HOURS AS NEEDED FOR NAUSEA 20 Tablet 0    ondansetron (Zofran ODT) 4 mg disintegrating tablet 1 Tablet by SubLINGual route every eight (8) hours as needed for Nausea or Vomiting. 20 Tablet 0    predniSONE (STERAPRED DS) 10 mg dose pack Take as prescribed 21 Tablet 0    furosemide (LASIX) 20 mg tablet Take 1 Tablet by mouth as needed (swelling).  30 Tablet 2    albuterol (PROVENTIL HFA, VENTOLIN HFA, PROAIR HFA) 90 mcg/actuation inhaler Take 1 Puff by inhalation every six (6) hours as needed for Wheezing. 1 Inhaler 0    predniSONE (DELTASONE) 10 mg tablet Take 60 mg by mouth daily (with breakfast). Day 1 and 2: 60mg; Day 3: 50mg; Day 4:40mg; Day 5: 30 mg; Day 6: 20mg; Day 7: 10mg (Patient not taking: Reported on 9/1/2021) 27 Tablet 0    gabapentin (NEURONTIN) 100 mg capsule TAKE 2 CAPSULES BY MOUTH 3 TIMES A DAY      propranolol LA (INDERAL LA) 80 mg SR capsule 80 mg daily. (Patient not taking: Reported on 9/3/2021)      codeine-butalbital-acetaminophen-caffeine (FIORICET WITH CODEINE) -70-30 mg capsule Take 1 Capsule by mouth every six (6) hours as needed for Headache.  mirtazapine (REMERON) 30 mg tablet TAKE 1 TABLET BY MOUTH AT BEDTIME 30 Tablet 5    ALPRAZolam (XANAX) 1 mg tablet One bid prn anxiety 60 Tablet 1    naloxone (NARCAN) 4 mg/actuation nasal spray Use 1 spray intranasally, then discard. Repeat with new spray every 2 min as needed for opioid overdose symptoms, alternating nostrils. (Patient not taking: Reported on 8/29/2021) 2 Each 0    pantoprazole (PROTONIX) 40 mg tablet take 1 tablet by mouth at bedtime  0    rizatriptan (MAXALT) 10 mg tablet Take 10 mg by mouth once as needed for Migraine. May repeat in 2 hours if needed      belimumab (Benlysta) 400 mg solr injection by IntraVENous route.          Past History     Past Medical History:  Past Medical History:   Diagnosis Date    Abnormal CT of the abdomen 11/8/2012    Asthma     Autoimmune disease (HCC)     lupus    C. difficile diarrhea     Cervical prolapse     Dehydration     Mariah-Danlos syndrome     Gastrointestinal disorder     gerd, twisted colon, IBS    GERD (gastroesophageal reflux disease)     Headache(784.0)     Lupus (HCC)     Morbid obesity (HCC)     Nausea & vomiting     Neurological disorder     cluster headaches    Occipital neuralgia     other 2006, 2009    ovarian cyst removal    Other ill-defined conditions(799.89)     Syncope     Worsening headaches        Past Surgical History:  Past Surgical History:   Procedure Laterality Date    COLONOSCOPY  9/21/2010         HX CHOLECYSTECTOMY      HX CYST INCISION AND DRAINAGE Right 02/27/2020    HX GYN  1/2009    right salpingo oopherectomy    HX GYN  2006    right ovarian tumor removed    HX HEENT      left wisdom teeth removal    HX HEENT      top right wisdom tooth removed    HX HERNIA REPAIR  4/2012    HX HERNIA REPAIR  2/28/13    Laparoscopic recurrent incisional hernia repair    HX HYSTERECTOMY      2016    HX UROLOGICAL      Kidney Stone Removal    [place] EGD TRANSORAL BIOPSY SINGLE/MULTIPLE  9/22/2010            Family History:  Family History   Problem Relation Age of Onset    Colon Cancer Maternal Grandfather        Social History:  Social History     Tobacco Use    Smoking status: Never Smoker    Smokeless tobacco: Never Used   Vaping Use    Vaping Use: Never used   Substance Use Topics    Alcohol use: No    Drug use: No       Allergies: Allergies   Allergen Reactions    Latex Itching     burning    Compazine [Prochlorperazine] Anxiety     High heart rate  Pt can take promethazine with no problems    Sulfa (Sulfonamide Antibiotics) Unknown (comments)    Topamax [Topiramate] Unknown (comments)    Adhesive Rash     Allergic to Dermabond    Clindamycin Diarrhea     Pt states caused her C-Diff    Mushroom Other (comments)    Mushroom Combination No.1 Unknown (comments)    Toradol [Ketorolac Tromethamine] Nausea and Vomiting and Other (comments)     PO & IV causes GI bleed  Tolerated during Feb 2020 stay    Valproic Acid Other (comments)     Elevated heart rate and vomiting           Review of Systems   Review of Systems   Constitutional: Negative for chills and fever. HENT: Negative for rhinorrhea. Eyes: Negative for redness. Respiratory: Negative for shortness of breath. Cardiovascular: Negative for chest pain. Gastrointestinal: Negative for abdominal pain. Genitourinary: Negative for dysuria. Musculoskeletal: Negative for back pain. Neurological: Positive for syncope and headaches. Psychiatric/Behavioral: The patient is not nervous/anxious. All other systems reviewed and are negative. Physical Exam   Physical Exam  Vitals and nursing note reviewed. Constitutional:       Appearance: Normal appearance. HENT:      Head: Normocephalic and atraumatic. Mouth/Throat:      Mouth: Mucous membranes are moist.   Eyes:      Extraocular Movements: Extraocular movements intact. Cardiovascular:      Rate and Rhythm: Normal rate and regular rhythm. Pulses: Normal pulses. Pulmonary:      Effort: Pulmonary effort is normal.      Breath sounds: Normal breath sounds. Abdominal:      Palpations: Abdomen is soft. Tenderness: There is no abdominal tenderness. Musculoskeletal:         General: No deformity. Cervical back: Neck supple. Skin:     General: Skin is warm and dry. Neurological:      General: No focal deficit present. Mental Status: She is alert. Psychiatric:         Mood and Affect: Mood normal.         Behavior: Behavior normal.         Diagnostic Study Results     Labs -     Recent Results (from the past 24 hour(s))   EKG, 12 LEAD, INITIAL    Collection Time: 09/19/21  3:26 PM   Result Value Ref Range    Ventricular Rate 70 BPM    Atrial Rate 70 BPM    P-R Interval 156 ms    QRS Duration 86 ms    Q-T Interval 358 ms    QTC Calculation (Bezet) 386 ms    Calculated P Axis 18 degrees    Calculated R Axis 36 degrees    Calculated T Axis 12 degrees    Diagnosis       Normal sinus rhythm  Normal ECG  When compared with ECG of 10-SEP-2021 17:21,  Vent.  rate has increased BY  24 BPM         Radiologic Studies -   CT HEAD WO CONT   Final Result   No acute process or change compared to the prior exam.              CT Results  (Last 48 hours)               09/19/21 1643  CT HEAD WO CONT Final result    Impression:  No acute process or change compared to the prior exam. Narrative:  EXAM: CT HEAD WO CONT       INDICATION: Syncopal episode with impact injury of head on wall       COMPARISON: 8/28/2021. CONTRAST: None. TECHNIQUE: Unenhanced CT of the head was performed using 5 mm images. Brain and   bone windows were generated. Coronal and sagittal reformats. CT dose reduction   was achieved through use of a standardized protocol tailored for this   examination and automatic exposure control for dose modulation. FINDINGS:   The ventricles and sulci are normal in size, shape and configuration. . There is   no significant white matter disease. There is no intracranial hemorrhage,   extra-axial collection, or mass effect. The basilar cisterns are open. No CT   evidence of acute infarct. The bone windows demonstrate no abnormalities. The visualized portions of the   paranasal sinuses and mastoid air cells are clear. CXR Results  (Last 48 hours)    None            Medical Decision Making   I am the first provider for this patient. I reviewed the vital signs, available nursing notes, past medical history, past surgical history, family history and social history. Vital Signs-Reviewed the patient's vital signs. Patient Vitals for the past 24 hrs:   Temp Pulse Resp BP SpO2   09/19/21 1550 -- -- -- -- 96 %   09/19/21 1515 98.8 °F (37.1 °C) 68 16 (!) 150/96 99 %         Provider Notes (Medical Decision Making):   75-year-old with complex headache history, multiple recent visits with the same, presenting to ED after hitting her head today. CT head pursued for pain, no acute abnormalities noted. Symptoms sound similar to previous episodes, no concern for cardiac etiology for syncope. Ultimately, I think patient is safe for discharge with plan to follow-up with her physician for ongoing management of complex symptoms.   She was given analgesia in the ED with some improvement, discussed that we would not likely be able to fix her symptoms today as they are very complex. She was encouraged to return to ED with worsening condition or new concerning symptoms. ED Course:     Initial assessment performed. The patients presenting problems have been discussed, and they are in agreement with the care plan formulated and outlined with them. I have encouraged them to ask questions as they arise throughout their visit. Disposition:  dc  PLAN:  1. Current Discharge Medication List      START taking these medications    Details   !! ondansetron (Zofran ODT) 4 mg disintegrating tablet Take 1 Tablet by mouth every eight (8) hours as needed for Nausea or Vomiting. Qty: 15 Tablet, Refills: 0  Start date: 9/19/2021       !! - Potential duplicate medications found. Please discuss with provider. CONTINUE these medications which have NOT CHANGED    Details   !! ondansetron (Zofran ODT) 4 mg disintegrating tablet 1 Tablet by SubLINGual route every eight (8) hours as needed for Nausea or Vomiting. Qty: 20 Tablet, Refills: 0       !! - Potential duplicate medications found. Please discuss with provider.         2.   Follow-up Information    None       Return to ED if worse     Diagnosis     Clinical Impression: head trauma

## 2021-09-20 LAB
ATRIAL RATE: 70 BPM
CALCULATED P AXIS, ECG09: 18 DEGREES
CALCULATED R AXIS, ECG10: 36 DEGREES
CALCULATED T AXIS, ECG11: 12 DEGREES
DIAGNOSIS, 93000: NORMAL
P-R INTERVAL, ECG05: 156 MS
Q-T INTERVAL, ECG07: 358 MS
QRS DURATION, ECG06: 86 MS
QTC CALCULATION (BEZET), ECG08: 386 MS
VENTRICULAR RATE, ECG03: 70 BPM

## 2021-09-22 RX ORDER — TIZANIDINE 4 MG/1
TABLET ORAL
Qty: 30 TABLET | Refills: 0 | Status: SHIPPED | OUTPATIENT
Start: 2021-09-22 | End: 2021-10-01

## 2021-09-24 DIAGNOSIS — F41.9 ANXIETY: ICD-10-CM

## 2021-09-24 DIAGNOSIS — F51.01 PRIMARY INSOMNIA: ICD-10-CM

## 2021-09-24 RX ORDER — ALPRAZOLAM 1 MG/1
TABLET ORAL
Qty: 60 TABLET | Refills: 1 | Status: SHIPPED | OUTPATIENT
Start: 2021-09-24 | End: 2021-11-22

## 2021-10-01 ENCOUNTER — APPOINTMENT (OUTPATIENT)
Dept: CT IMAGING | Age: 36
End: 2021-10-01
Attending: EMERGENCY MEDICINE
Payer: MEDICAID

## 2021-10-01 ENCOUNTER — HOSPITAL ENCOUNTER (EMERGENCY)
Age: 36
Discharge: HOME | End: 2021-10-02
Attending: EMERGENCY MEDICINE
Payer: MEDICAID

## 2021-10-01 DIAGNOSIS — R11.2 NAUSEA AND VOMITING, INTRACTABILITY OF VOMITING NOT SPECIFIED, UNSPECIFIED VOMITING TYPE: ICD-10-CM

## 2021-10-01 DIAGNOSIS — K27.9 PEPTIC ULCER DISEASE: Primary | ICD-10-CM

## 2021-10-01 DIAGNOSIS — R10.13 ABDOMINAL PAIN, EPIGASTRIC: ICD-10-CM

## 2021-10-01 LAB
ALBUMIN SERPL-MCNC: 4.3 G/DL (ref 3.5–5)
ALBUMIN/GLOB SERPL: 1.2 {RATIO} (ref 1.1–2.2)
ALP SERPL-CCNC: 118 U/L (ref 45–117)
ALT SERPL-CCNC: 24 U/L (ref 12–78)
ANION GAP SERPL CALC-SCNC: 2 MMOL/L (ref 5–15)
APPEARANCE UR: ABNORMAL
AST SERPL-CCNC: 14 U/L (ref 15–37)
BACTERIA URNS QL MICRO: ABNORMAL /HPF
BASOPHILS # BLD: 0.1 K/UL (ref 0–0.1)
BASOPHILS NFR BLD: 1 % (ref 0–1)
BILIRUB SERPL-MCNC: 0.4 MG/DL (ref 0.2–1)
BILIRUB UR QL: NEGATIVE
BUN SERPL-MCNC: 16 MG/DL (ref 6–20)
BUN/CREAT SERPL: 15 (ref 12–20)
CALCIUM SERPL-MCNC: 9.4 MG/DL (ref 8.5–10.1)
CHLORIDE SERPL-SCNC: 108 MMOL/L (ref 97–108)
CO2 SERPL-SCNC: 28 MMOL/L (ref 21–32)
COLOR UR: ABNORMAL
CREAT SERPL-MCNC: 1.09 MG/DL (ref 0.55–1.02)
DIFFERENTIAL METHOD BLD: ABNORMAL
EOSINOPHIL # BLD: 0.4 K/UL (ref 0–0.4)
EOSINOPHIL NFR BLD: 4 % (ref 0–7)
EPITH CASTS URNS QL MICRO: ABNORMAL /LPF
ERYTHROCYTE [DISTWIDTH] IN BLOOD BY AUTOMATED COUNT: 12.3 % (ref 11.5–14.5)
GLOBULIN SER CALC-MCNC: 3.5 G/DL (ref 2–4)
GLUCOSE SERPL-MCNC: 104 MG/DL (ref 65–100)
GLUCOSE UR STRIP.AUTO-MCNC: NEGATIVE MG/DL
HCG UR QL: NEGATIVE
HCT VFR BLD AUTO: 38.3 % (ref 35–47)
HGB BLD-MCNC: 12.2 G/DL (ref 11.5–16)
HGB UR QL STRIP: NEGATIVE
IMM GRANULOCYTES # BLD AUTO: 0.1 K/UL (ref 0–0.04)
IMM GRANULOCYTES NFR BLD AUTO: 1 % (ref 0–0.5)
KETONES UR QL STRIP.AUTO: NEGATIVE MG/DL
LEUKOCYTE ESTERASE UR QL STRIP.AUTO: NEGATIVE
LIPASE SERPL-CCNC: 146 U/L (ref 73–393)
LYMPHOCYTES # BLD: 3.9 K/UL (ref 0.8–3.5)
LYMPHOCYTES NFR BLD: 38 % (ref 12–49)
MCH RBC QN AUTO: 30.2 PG (ref 26–34)
MCHC RBC AUTO-ENTMCNC: 31.9 G/DL (ref 30–36.5)
MCV RBC AUTO: 94.8 FL (ref 80–99)
MONOCYTES # BLD: 1 K/UL (ref 0–1)
MONOCYTES NFR BLD: 10 % (ref 5–13)
NEUTS SEG # BLD: 5 K/UL (ref 1.8–8)
NEUTS SEG NFR BLD: 46 % (ref 32–75)
NITRITE UR QL STRIP.AUTO: NEGATIVE
NRBC # BLD: 0 K/UL (ref 0–0.01)
NRBC BLD-RTO: 0 PER 100 WBC
PH UR STRIP: 5.5 [PH] (ref 5–8)
PLATELET # BLD AUTO: 450 K/UL (ref 150–400)
PMV BLD AUTO: 9.7 FL (ref 8.9–12.9)
POTASSIUM SERPL-SCNC: 4 MMOL/L (ref 3.5–5.1)
PROT SERPL-MCNC: 7.8 G/DL (ref 6.4–8.2)
PROT UR STRIP-MCNC: NEGATIVE MG/DL
RBC # BLD AUTO: 4.04 M/UL (ref 3.8–5.2)
RBC #/AREA URNS HPF: ABNORMAL /HPF (ref 0–5)
SODIUM SERPL-SCNC: 138 MMOL/L (ref 136–145)
SP GR UR REFRACTOMETRY: 1.01 (ref 1–1.03)
UA: UC IF INDICATED,UAUC: ABNORMAL
UROBILINOGEN UR QL STRIP.AUTO: 0.2 EU/DL (ref 0.2–1)
WBC # BLD AUTO: 10.4 K/UL (ref 3.6–11)
WBC URNS QL MICRO: ABNORMAL /HPF (ref 0–4)

## 2021-10-01 PROCEDURE — 74177 CT ABD & PELVIS W/CONTRAST: CPT

## 2021-10-01 PROCEDURE — 96374 THER/PROPH/DIAG INJ IV PUSH: CPT

## 2021-10-01 PROCEDURE — 74011000636 HC RX REV CODE- 636: Performed by: EMERGENCY MEDICINE

## 2021-10-01 PROCEDURE — 74011250636 HC RX REV CODE- 250/636: Performed by: EMERGENCY MEDICINE

## 2021-10-01 PROCEDURE — 99284 EMERGENCY DEPT VISIT MOD MDM: CPT

## 2021-10-01 PROCEDURE — 83690 ASSAY OF LIPASE: CPT

## 2021-10-01 PROCEDURE — 80307 DRUG TEST PRSMV CHEM ANLYZR: CPT

## 2021-10-01 PROCEDURE — 80053 COMPREHEN METABOLIC PANEL: CPT

## 2021-10-01 PROCEDURE — 74011000250 HC RX REV CODE- 250: Performed by: EMERGENCY MEDICINE

## 2021-10-01 PROCEDURE — 81001 URINALYSIS AUTO W/SCOPE: CPT

## 2021-10-01 PROCEDURE — 81025 URINE PREGNANCY TEST: CPT

## 2021-10-01 PROCEDURE — 86901 BLOOD TYPING SEROLOGIC RH(D): CPT

## 2021-10-01 PROCEDURE — 83735 ASSAY OF MAGNESIUM: CPT

## 2021-10-01 PROCEDURE — C9113 INJ PANTOPRAZOLE SODIUM, VIA: HCPCS | Performed by: EMERGENCY MEDICINE

## 2021-10-01 PROCEDURE — 85025 COMPLETE CBC W/AUTO DIFF WBC: CPT

## 2021-10-01 PROCEDURE — 36415 COLL VENOUS BLD VENIPUNCTURE: CPT

## 2021-10-01 PROCEDURE — 96375 TX/PRO/DX INJ NEW DRUG ADDON: CPT

## 2021-10-01 RX ORDER — MORPHINE SULFATE 2 MG/ML
4 INJECTION, SOLUTION INTRAMUSCULAR; INTRAVENOUS
Status: COMPLETED | OUTPATIENT
Start: 2021-10-01 | End: 2021-10-01

## 2021-10-01 RX ORDER — TIZANIDINE 4 MG/1
TABLET ORAL
Qty: 30 TABLET | Refills: 0 | Status: SHIPPED | OUTPATIENT
Start: 2021-10-01 | End: 2021-10-11

## 2021-10-01 RX ORDER — ONDANSETRON 2 MG/ML
8 INJECTION INTRAMUSCULAR; INTRAVENOUS
Status: COMPLETED | OUTPATIENT
Start: 2021-10-01 | End: 2021-10-01

## 2021-10-01 RX ADMIN — MORPHINE SULFATE 4 MG: 2 INJECTION, SOLUTION INTRAMUSCULAR; INTRAVENOUS at 23:38

## 2021-10-01 RX ADMIN — SODIUM CHLORIDE 1000 ML: 9 INJECTION, SOLUTION INTRAVENOUS at 23:38

## 2021-10-01 RX ADMIN — SODIUM CHLORIDE 40 MG: 9 INJECTION, SOLUTION INTRAMUSCULAR; INTRAVENOUS; SUBCUTANEOUS at 23:18

## 2021-10-01 RX ADMIN — ONDANSETRON 8 MG: 2 INJECTION INTRAMUSCULAR; INTRAVENOUS at 23:18

## 2021-10-01 RX ADMIN — IOPAMIDOL 100 ML: 755 INJECTION, SOLUTION INTRAVENOUS at 23:52

## 2021-10-02 VITALS
BODY MASS INDEX: 47.22 KG/M2 | DIASTOLIC BLOOD PRESSURE: 70 MMHG | WEIGHT: 256.62 LBS | TEMPERATURE: 98.9 F | RESPIRATION RATE: 20 BRPM | SYSTOLIC BLOOD PRESSURE: 139 MMHG | OXYGEN SATURATION: 97 % | HEART RATE: 65 BPM | HEIGHT: 62 IN

## 2021-10-02 LAB
ABO + RH BLD: NORMAL
AMPHET UR QL SCN: NEGATIVE
BARBITURATES UR QL SCN: POSITIVE
BENZODIAZ UR QL: POSITIVE
BLOOD GROUP ANTIBODIES SERPL: NORMAL
CANNABINOIDS UR QL SCN: NEGATIVE
COCAINE UR QL SCN: NEGATIVE
DRUG SCRN COMMENT,DRGCM: ABNORMAL
ERYTHROCYTE [DISTWIDTH] IN BLOOD BY AUTOMATED COUNT: 12.5 % (ref 11.5–14.5)
HCG UR QL: NEGATIVE
HCT VFR BLD AUTO: 35.6 % (ref 35–47)
HGB BLD-MCNC: 11.7 G/DL (ref 11.5–16)
MAGNESIUM SERPL-MCNC: 2.3 MG/DL (ref 1.6–2.4)
MCH RBC QN AUTO: 30.5 PG (ref 26–34)
MCHC RBC AUTO-ENTMCNC: 32.9 G/DL (ref 30–36.5)
MCV RBC AUTO: 92.7 FL (ref 80–99)
METHADONE UR QL: NEGATIVE
NRBC # BLD: 0 K/UL (ref 0–0.01)
NRBC BLD-RTO: 0 PER 100 WBC
OPIATES UR QL: POSITIVE
PCP UR QL: NEGATIVE
PLATELET # BLD AUTO: 378 K/UL (ref 150–400)
PMV BLD AUTO: 9.6 FL (ref 8.9–12.9)
RBC # BLD AUTO: 3.84 M/UL (ref 3.8–5.2)
SPECIMEN EXP DATE BLD: NORMAL
WBC # BLD AUTO: 5.3 K/UL (ref 3.6–11)

## 2021-10-02 PROCEDURE — 81025 URINE PREGNANCY TEST: CPT

## 2021-10-02 PROCEDURE — 36415 COLL VENOUS BLD VENIPUNCTURE: CPT

## 2021-10-02 PROCEDURE — 96375 TX/PRO/DX INJ NEW DRUG ADDON: CPT

## 2021-10-02 PROCEDURE — 74011250636 HC RX REV CODE- 250/636: Performed by: EMERGENCY MEDICINE

## 2021-10-02 PROCEDURE — 85027 COMPLETE CBC AUTOMATED: CPT

## 2021-10-02 PROCEDURE — 74011250637 HC RX REV CODE- 250/637: Performed by: EMERGENCY MEDICINE

## 2021-10-02 RX ORDER — OXYCODONE HYDROCHLORIDE 5 MG/1
5 TABLET ORAL
Status: COMPLETED | OUTPATIENT
Start: 2021-10-02 | End: 2021-10-02

## 2021-10-02 RX ORDER — HYDROMORPHONE HYDROCHLORIDE 1 MG/ML
1 INJECTION, SOLUTION INTRAMUSCULAR; INTRAVENOUS; SUBCUTANEOUS
Status: COMPLETED | OUTPATIENT
Start: 2021-10-02 | End: 2021-10-02

## 2021-10-02 RX ORDER — SUCRALFATE 1 G/10ML
1 SUSPENSION ORAL 4 TIMES DAILY
Qty: 414 ML | Refills: 0 | Status: SHIPPED | OUTPATIENT
Start: 2021-10-02 | End: 2021-12-11

## 2021-10-02 RX ORDER — PROMETHAZINE HYDROCHLORIDE 25 MG/1
25 TABLET ORAL
Qty: 12 TABLET | Refills: 0 | Status: SHIPPED | OUTPATIENT
Start: 2021-10-02 | End: 2021-12-11

## 2021-10-02 RX ADMIN — OXYCODONE 5 MG: 5 TABLET ORAL at 03:01

## 2021-10-02 RX ADMIN — HYDROMORPHONE HYDROCHLORIDE 1 MG: 1 INJECTION, SOLUTION INTRAMUSCULAR; INTRAVENOUS; SUBCUTANEOUS at 00:27

## 2021-10-02 NOTE — ED PROVIDER NOTES
EMERGENCY DEPARTMENT HISTORY AND PHYSICAL EXAM      Date: 10/1/2021  Patient Name: Jaguar Rockwell    History of Presenting Illness     Chief Complaint   Patient presents with    Abdominal Pain     ED visit d/t abd pain / vomiting blood - onse to sxs, 5 days - hx of ulcers in the past leading to bleeding - severe abd pain, 10/10 - fever at home, 101.2 Tmax        History Provided By: Patient    HPI: Jaguar Rockwell, 38 y.o. female with PMHx patient reports onset of epigastric abdominal pain approximately 5 days ago. She reports the pain is constant, severe at times and seems to wax and wane in intensity. Pain is nonradiating. Patient does report having a fever at home however has not taken any antipyretic medication. Patient reports pain similar to episodes in the past and reports a history of peptic ulcers. Patient does have associated nausea and vomiting and states that she thought it may have been coffee-ground appearance to her emesis earlier today. Denying dark/tarry stools. Pt denies any other alleviating or exacerbating factors. Additionally, pt specifically denies any recent fever, chills, headache,, CP, SOB, lightheadedness, dizziness, numbness, weakness, BLE swelling, heart palpitations, urinary sxs, diarrhea, constipation, melena, hematochezia, cough, or congestion. PCP: Lennie Cody MD    Current Outpatient Medications   Medication Sig Dispense Refill    promethazine (PHENERGAN) 25 mg tablet Take 1 Tablet by mouth every six (6) hours as needed for Nausea. 12 Tablet 0    sucralfate (Carafate) 100 mg/mL suspension Take 5 mL by mouth four (4) times daily.  414 mL 0    ondansetron hcl (ZOFRAN) 4 mg tablet TAKE 1 TABLET BY MOUTH EVERY 8 HOURS AS NEEDED FOR NAUSEA 20 Tablet 0    tiZANidine (ZANAFLEX) 4 mg tablet TAKE 1 TABLET BY MOUTH THREE TIMES A DAY AS NEEDED FOR PAIN 30 Tablet 0    ALPRAZolam (XANAX) 1 mg tablet TAKE 1 TABLET BY MOUTH TWICE A DAY AS NEEDED FOR ANXIETY 60 Tablet 1  ondansetron (Zofran ODT) 4 mg disintegrating tablet Take 1 Tablet by mouth every eight (8) hours as needed for Nausea or Vomiting. 15 Tablet 0    DULoxetine (CYMBALTA) 60 mg capsule TAKE 2 CAPSULESBY MOUTH EVERY DAY 30 Capsule 5    ondansetron (Zofran ODT) 4 mg disintegrating tablet 1 Tablet by SubLINGual route every eight (8) hours as needed for Nausea or Vomiting. 20 Tablet 0    predniSONE (STERAPRED DS) 10 mg dose pack Take as prescribed 21 Tablet 0    furosemide (LASIX) 20 mg tablet Take 1 Tablet by mouth as needed (swelling). 30 Tablet 2    albuterol (PROVENTIL HFA, VENTOLIN HFA, PROAIR HFA) 90 mcg/actuation inhaler Take 1 Puff by inhalation every six (6) hours as needed for Wheezing. 1 Inhaler 0    predniSONE (DELTASONE) 10 mg tablet Take 60 mg by mouth daily (with breakfast). Day 1 and 2: 60mg; Day 3: 50mg; Day 4:40mg; Day 5: 30 mg; Day 6: 20mg; Day 7: 10mg (Patient not taking: Reported on 9/1/2021) 27 Tablet 0    gabapentin (NEURONTIN) 100 mg capsule TAKE 2 CAPSULES BY MOUTH 3 TIMES A DAY      propranolol LA (INDERAL LA) 80 mg SR capsule 80 mg daily. (Patient not taking: Reported on 9/3/2021)      codeine-butalbital-acetaminophen-caffeine (FIORICET WITH CODEINE) -77-30 mg capsule Take 1 Capsule by mouth every six (6) hours as needed for Headache.  mirtazapine (REMERON) 30 mg tablet TAKE 1 TABLET BY MOUTH AT BEDTIME 30 Tablet 5    naloxone (NARCAN) 4 mg/actuation nasal spray Use 1 spray intranasally, then discard. Repeat with new spray every 2 min as needed for opioid overdose symptoms, alternating nostrils. (Patient not taking: Reported on 8/29/2021) 2 Each 0    pantoprazole (PROTONIX) 40 mg tablet take 1 tablet by mouth at bedtime  0    rizatriptan (MAXALT) 10 mg tablet Take 10 mg by mouth once as needed for Migraine. May repeat in 2 hours if needed      belimumab (Benlysta) 400 mg solr injection by IntraVENous route.          Past History     Past Medical History:  Past Medical History:   Diagnosis Date    Abnormal CT of the abdomen 11/8/2012    Asthma     Autoimmune disease (HCC)     lupus    C. difficile diarrhea     Cervical prolapse     Dehydration     Mariah-Danlos syndrome     Gastrointestinal disorder     gerd, twisted colon, IBS    GERD (gastroesophageal reflux disease)     Headache(784.0)     Lupus (HCC)     Morbid obesity (HCC)     Nausea & vomiting     Neurological disorder     cluster headaches    Occipital neuralgia     other 2006, 2009    ovarian cyst removal    Other ill-defined conditions(799.89)     Syncope     Worsening headaches        Past Surgical History:  Past Surgical History:   Procedure Laterality Date    COLONOSCOPY  9/21/2010         HX CHOLECYSTECTOMY      HX CYST INCISION AND DRAINAGE Right 02/27/2020    HX GYN  1/2009    right salpingo oopherectomy    HX GYN  2006    right ovarian tumor removed    HX HEENT      left wisdom teeth removal    HX HEENT      top right wisdom tooth removed    HX HERNIA REPAIR  4/2012    HX HERNIA REPAIR  2/28/13    Laparoscopic recurrent incisional hernia repair    HX HYSTERECTOMY      2016    HX UROLOGICAL      Kidney Stone Removal    UT EGD TRANSORAL BIOPSY SINGLE/MULTIPLE  9/22/2010            Family History:  Family History   Problem Relation Age of Onset    Colon Cancer Maternal Grandfather        Social History:  Social History     Tobacco Use    Smoking status: Never Smoker    Smokeless tobacco: Never Used   Vaping Use    Vaping Use: Never used   Substance Use Topics    Alcohol use: No    Drug use: No       Allergies:   Allergies   Allergen Reactions    Latex Itching     burning    Compazine [Prochlorperazine] Anxiety     High heart rate  Pt can take promethazine with no problems    Sulfa (Sulfonamide Antibiotics) Unknown (comments)    Topamax [Topiramate] Unknown (comments)    Adhesive Rash     Allergic to Dermabond    Clindamycin Diarrhea     Pt states caused her C-Diff    Mushroom Other (comments)    Mushroom Combination No.1 Unknown (comments)    Toradol [Ketorolac Tromethamine] Nausea and Vomiting and Other (comments)     PO & IV causes GI bleed  Tolerated during Feb 2020 stay    Valproic Acid Other (comments)     Elevated heart rate and vomiting           Review of Systems   Review of Systems  Constitutional: Negative for fever, chills, and fatigue. HENT: Negative for congestion, sore throat, rhinorrhea, sneezing and neck stiffness   Eyes: Negative for discharge and redness. Respiratory: Negative for  shortness of breath, wheezing   Cardiovascular: Negative for chest pain, palpitations   Gastrointestinal: Positive for nausea, vomiting, abdominal pain. Negative constipation, diarrhea and blood in stool. Genitourinary: Negative for dysuria, hematuria, flank pain, decreased urine volume, discharge,   Musculoskeletal: Negative for myalgias or joint pain . Skin: Negative for rash or lesions . Neurological: Negative weakness, light-headedness, numbness and headaches. Physical Exam   Physical Exam    GENERAL: alert and oriented, no acute distress  EYES: PEERL, No injection, discharge or icterus. ENT: Mucous membranes pink and moist.  NECK: Supple  LUNGS: Airway patent. Non-labored respirations. Breath sounds clear with good air entry bilaterally. HEART: Regular rate and rhythm. No peripheral edema  ABDOMEN: Non-distended, epigastric tenderness, without guarding or rebound. SKIN:  warm, dry  MSK/EXTREMITIES: Without swelling, tenderness or deformity, symmetric with normal ROM  NEUROLOGICAL: Alert, oriented      Diagnostic Study Results     Labs -     Reviewed and interpreted by me    Radiologic Studies -   CT ABD PELV W CONT   Final Result   No acute findings.         CT Results  (Last 48 hours)    None        CXR Results  (Last 48 hours)    None            Medical Decision Making     Juan RUBIN MD am the first provider for this patient and am the attending of record for this patient encounter. I reviewed the vital signs, available nursing notes, past medical history, past surgical history, family history and social history. Vital Signs-Reviewed the patient's vital signs. Records Reviewed: Nursing Notes and Old Medical Records, compared imaging findings today to old records    Provider Notes (Medical Decision Making): The patient presents with a chief complaint of abdominal pain. Differential diagnosis considered includes acute appendicitis, acute cholecystitis, pancreatitis, gastritis, PUD, diverticulitis, mesenteric ischemia, abdominal aortic aneurysm, bowel obstruction, enteritis, colitis, fecal impaction, volvulus, IBS, inflammatory bowel disease, specific food intolerance, peritonitis, perforated viscous, malignancy, UTI, abscess, and abdominal pain NOS. Pelvic source of pain was also considered. .  All labs and diagnostic studies were reviewed and interpreted by me and noted no significant acute abnormalities. .  Clinical examination, history, and work-up exclude some of the more serious diagnoses as listed above. Cause of the abdominal pain was not clearly identified but may be related to the patient's peptic ulcer disease/gastritis. Patient was given IV pain medication, antiemetics, antacid with significant improvement. Pt is tolerating po, never febrile in the emergency department and had no episodes of emesis while in the ED. The patient states that their symptoms have improved and she feels much better. There are no other new complaints at this time. With report of possible coffee-ground emesis did obtain a repeat hemoglobin which showed no significant decline so no reason to suspect ongoing bleeding. There were no concerns for any acute life-threatening or surgical pathology that would warrant admission at this time. Vital signs were within acceptable limits at the time of discharge.   Patient was in stable condition and felt to be reliable for outpatient management. There were no lab findings of immediate concern. The patient was advised to return to the emergency room for acutely worsening pain, persistence of pain, fevers, bloody stools, intractable vomiting or bloody emesis, inability to tolerate food/liquids, syncope, or change in mental status. Otherwise, the patient is encouraged to follow-up with a primary care physician within the week if possible. .  The patient understood the work-up and assessment and had no further questions. The patient was discharged home in stable clinical condition. ED Course:   Initial assessment performed. The patients presenting problems have been discussed, and they are in agreement with the care plan formulated and outlined with them. I have encouraged them to ask questions as they arise throughout their visit. Medications   ondansetron (ZOFRAN) injection 8 mg (8 mg IntraVENous Given 10/1/21 2318)   pantoprazole (PROTONIX) 40 mg in 0.9% sodium chloride 10 mL injection (40 mg IntraVENous Given 10/1/21 2318)   morphine injection 4 mg (4 mg IntraVENous Given 10/1/21 2338)   sodium chloride 0.9 % bolus infusion 1,000 mL (0 mL IntraVENous IV Completed 10/2/21 0130)   iopamidoL (ISOVUE-370) 76 % injection 100 mL (100 mL IntraVENous Given 10/1/21 2352)   HYDROmorphone (DILAUDID) injection 1 mg (1 mg IntraVENous Given 10/2/21 0027)   oxyCODONE IR (ROXICODONE) tablet 5 mg (5 mg Oral Given 10/2/21 0301)         PROGRESS  Raisa TOYA Marshall's  results have been reviewed with her. She has been counseled regarding her diagnosis. She verbally conveys understanding and agreement of the signs, symptoms, diagnosis, treatment and prognosis and additionally agrees to follow up as recommended with Dr. Rafia Heart MD in 24 - 48 hours. She also agrees with the care-plan and conveys that all of her questions have been answered.   I have also put together some discharge instructions for her that include: 1) educational information regarding their diagnosis, 2) how to care for their diagnosis at home, as well a 3) list of reasons why they would want to return to the ED prior to their follow-up appointment, should their condition change. Disposition:  home    PLAN:  1. Discharge Medication List as of 10/2/2021  3:05 AM      START taking these medications    Details   promethazine (PHENERGAN) 25 mg tablet Take 1 Tablet by mouth every six (6) hours as needed for Nausea., Normal, Disp-12 Tablet, R-0      sucralfate (Carafate) 100 mg/mL suspension Take 5 mL by mouth four (4) times daily. , Normal, Disp-414 mL, R-0         CONTINUE these medications which have NOT CHANGED    Details   tiZANidine (ZANAFLEX) 4 mg tablet TAKE 1 TABLET BY MOUTH THREE TIMES A DAY AS NEEDED FOR PAIN, Normal, Disp-30 Tablet, R-0      ALPRAZolam (XANAX) 1 mg tablet TAKE 1 TABLET BY MOUTH TWICE A DAY AS NEEDED FOR ANXIETY, Normal, Disp-60 Tablet, R-1Not to exceed 4 additional fills before 01/26/2022 DX Code Needed  . !! ondansetron (Zofran ODT) 4 mg disintegrating tablet Take 1 Tablet by mouth every eight (8) hours as needed for Nausea or Vomiting., Normal, Disp-15 Tablet, R-0      DULoxetine (CYMBALTA) 60 mg capsule TAKE 2 CAPSULESBY MOUTH EVERY DAY, Normal, Disp-30 Capsule, R-5      !! ondansetron (Zofran ODT) 4 mg disintegrating tablet 1 Tablet by SubLINGual route every eight (8) hours as needed for Nausea or Vomiting., Normal, Disp-20 Tablet, R-0      ondansetron hcl (ZOFRAN) 4 mg tablet TAKE 1 TABLET BY MOUTH EVERY 8 HOURS AS NEEDED FOR NAUSEA, Normal, Disp-20 Tablet, R-0      predniSONE (STERAPRED DS) 10 mg dose pack Take as prescribed, Normal, Disp-21 Tablet, R-0      furosemide (LASIX) 20 mg tablet Take 1 Tablet by mouth as needed (swelling). , Normal, Disp-30 Tablet, R-2      albuterol (PROVENTIL HFA, VENTOLIN HFA, PROAIR HFA) 90 mcg/actuation inhaler Take 1 Puff by inhalation every six (6) hours as needed for Wheezing., No Print, Disp-1 Inhaler, R-0      predniSONE (DELTASONE) 10 mg tablet Take 60 mg by mouth daily (with breakfast). Day 1 and 2: 60mg; Day 3: 50mg; Day 4:40mg; Day 5: 30 mg; Day 6: 20mg; Day 7: 10mg, No Print, Disp-27 Tablet, R-0      gabapentin (NEURONTIN) 100 mg capsule TAKE 2 CAPSULES BY MOUTH 3 TIMES A DAY, Historical Med      propranolol LA (INDERAL LA) 80 mg SR capsule 80 mg daily. , Historical Med      codeine-butalbital-acetaminophen-caffeine (FIORICET WITH CODEINE) -52-30 mg capsule Take 1 Capsule by mouth every six (6) hours as needed for Headache., Historical Med      mirtazapine (REMERON) 30 mg tablet TAKE 1 TABLET BY MOUTH AT BEDTIME, Normal, Disp-30 Tablet, R-5      naloxone (NARCAN) 4 mg/actuation nasal spray Use 1 spray intranasally, then discard. Repeat with new spray every 2 min as needed for opioid overdose symptoms, alternating nostrils. , Normal, Disp-2 Each, R-0      pantoprazole (PROTONIX) 40 mg tablet take 1 tablet by mouth at bedtime, Historical Med, R-0      rizatriptan (MAXALT) 10 mg tablet Take 10 mg by mouth once as needed for Migraine. May repeat in 2 hours if needed, Historical Med      belimumab (Benlysta) 400 mg solr injection by IntraVENous route., Historical Med       !! - Potential duplicate medications found. Please discuss with provider.         2.   Follow-up Information     Follow up With Specialties Details Why Contact Info    Unknown MD [facility] Schedule an appointment as soon as possible for a visit in 2 days  [address] [facility] [address]. [phone]      [facility] EMERGENCY DEPT Emergency Medicine  If symptoms worsen [address] [place] [address] 31    Lexus Segura MD Gastroenterology Schedule an appointment as soon as possible for a visit in 1 day  [facility]  [phone]          Return to ED if worse     Diagnosis     Clinical Impression: 1. Peptic ulcer disease    2. Abdominal pain, epigastric    3. Nausea and vomiting, intractability of vomiting not specified, unspecified vomiting type        Please note that this dictation was completed with Dragon, computer voice recognition software. Quite often unanticipated grammatical, syntax, homophones, and other interpretive errors are inadvertently transcribed by the computer software. Please disregard these errors. Additionally, please excuse any errors that have escaped final proofreading.

## 2021-10-02 NOTE — ED NOTES
I have reviewed discharge instructions with the patient and spouse. The patient and spouse verbalized understanding.  rx x 2 sent; pt in nad, 2/10 pain, vss;

## 2021-10-05 RX ORDER — NICARDIPINE HYDROCHLORIDE 0.1 MG/ML
5-15 INJECTION INTRAVENOUS
Status: CANCELLED | OUTPATIENT
Start: 2021-10-05

## 2021-10-05 RX ORDER — LABETALOL HCL 20 MG/4 ML
5 SYRINGE (ML) INTRAVENOUS
Status: CANCELLED | OUTPATIENT
Start: 2021-10-05

## 2021-10-06 RX ORDER — ONDANSETRON 4 MG/1
TABLET, FILM COATED ORAL
Qty: 20 TABLET | Refills: 0 | Status: SHIPPED | OUTPATIENT
Start: 2021-10-06 | End: 2021-10-21

## 2021-10-11 RX ORDER — TIZANIDINE 4 MG/1
TABLET ORAL
Qty: 30 TABLET | Refills: 0 | Status: SHIPPED | OUTPATIENT
Start: 2021-10-11 | End: 2021-10-21

## 2021-10-12 ENCOUNTER — TELEPHONE (OUTPATIENT)
Dept: INTERNAL MEDICINE CLINIC | Age: 36
End: 2021-10-12

## 2021-10-12 NOTE — TELEPHONE ENCOUNTER
Patient has cough and congestion. Her mother is in healthcare and has the same symptoms. She tested negative for Covid. Patient is requesting scripts for z pac and Cheratussin.

## 2021-10-13 RX ORDER — MECLIZINE HYDROCHLORIDE 25 MG/1
25 TABLET ORAL
Qty: 30 TABLET | Refills: 0 | Status: SHIPPED | OUTPATIENT
Start: 2021-10-13 | End: 2021-10-21

## 2021-10-13 NOTE — TELEPHONE ENCOUNTER
I have attempted without success to contact this patient by phone. Unable to reach patient at this time. No personal VM, left generic message for patient to return call to office at earliest convenience. Awaiting call back at this time.

## 2021-10-17 ENCOUNTER — APPOINTMENT (OUTPATIENT)
Dept: ULTRASOUND IMAGING | Age: 36
End: 2021-10-17
Attending: EMERGENCY MEDICINE
Payer: MEDICAID

## 2021-10-17 ENCOUNTER — HOSPITAL ENCOUNTER (EMERGENCY)
Age: 36
Discharge: HOME | End: 2021-10-17
Attending: EMERGENCY MEDICINE
Payer: MEDICAID

## 2021-10-17 VITALS
OXYGEN SATURATION: 96 % | RESPIRATION RATE: 18 BRPM | TEMPERATURE: 97.9 F | DIASTOLIC BLOOD PRESSURE: 109 MMHG | SYSTOLIC BLOOD PRESSURE: 119 MMHG | HEART RATE: 54 BPM

## 2021-10-17 DIAGNOSIS — R42 VERTIGO: Primary | ICD-10-CM

## 2021-10-17 DIAGNOSIS — I89.0 LYMPHEDEMA: ICD-10-CM

## 2021-10-17 LAB
ALBUMIN SERPL-MCNC: 3.4 G/DL (ref 3.5–5)
ALBUMIN/GLOB SERPL: 1 {RATIO} (ref 1.1–2.2)
ALP SERPL-CCNC: 118 U/L (ref 45–117)
ALT SERPL-CCNC: 19 U/L (ref 12–78)
ANION GAP SERPL CALC-SCNC: 4 MMOL/L (ref 5–15)
AST SERPL-CCNC: 16 U/L (ref 15–37)
BASOPHILS # BLD: 0.1 K/UL (ref 0–0.1)
BASOPHILS NFR BLD: 1 % (ref 0–1)
BILIRUB SERPL-MCNC: 0.3 MG/DL (ref 0.2–1)
BUN SERPL-MCNC: 19 MG/DL (ref 6–20)
BUN/CREAT SERPL: 17 (ref 12–20)
CALCIUM SERPL-MCNC: 9.1 MG/DL (ref 8.5–10.1)
CHLORIDE SERPL-SCNC: 107 MMOL/L (ref 97–108)
CO2 SERPL-SCNC: 28 MMOL/L (ref 21–32)
CREAT SERPL-MCNC: 1.09 MG/DL (ref 0.55–1.02)
DIFFERENTIAL METHOD BLD: ABNORMAL
EOSINOPHIL # BLD: 0.8 K/UL (ref 0–0.4)
EOSINOPHIL NFR BLD: 9 % (ref 0–7)
ERYTHROCYTE [DISTWIDTH] IN BLOOD BY AUTOMATED COUNT: 12.7 % (ref 11.5–14.5)
GLOBULIN SER CALC-MCNC: 3.3 G/DL (ref 2–4)
GLUCOSE SERPL-MCNC: 126 MG/DL (ref 65–100)
HCT VFR BLD AUTO: 33.5 % (ref 35–47)
HGB BLD-MCNC: 11.2 G/DL (ref 11.5–16)
IMM GRANULOCYTES # BLD AUTO: 0.2 K/UL (ref 0–0.04)
IMM GRANULOCYTES NFR BLD AUTO: 2 % (ref 0–0.5)
LYMPHOCYTES # BLD: 3.6 K/UL (ref 0.8–3.5)
LYMPHOCYTES NFR BLD: 38 % (ref 12–49)
MCH RBC QN AUTO: 30.5 PG (ref 26–34)
MCHC RBC AUTO-ENTMCNC: 33.4 G/DL (ref 30–36.5)
MCV RBC AUTO: 91.3 FL (ref 80–99)
MONOCYTES # BLD: 1.1 K/UL (ref 0–1)
MONOCYTES NFR BLD: 12 % (ref 5–13)
NEUTS SEG # BLD: 3.4 K/UL (ref 1.8–8)
NEUTS SEG NFR BLD: 38 % (ref 32–75)
NRBC # BLD: 0 K/UL (ref 0–0.01)
NRBC BLD-RTO: 0 PER 100 WBC
PLATELET # BLD AUTO: 340 K/UL (ref 150–400)
PMV BLD AUTO: 9.4 FL (ref 8.9–12.9)
POTASSIUM SERPL-SCNC: 4 MMOL/L (ref 3.5–5.1)
PROT SERPL-MCNC: 6.7 G/DL (ref 6.4–8.2)
RBC # BLD AUTO: 3.67 M/UL (ref 3.8–5.2)
SODIUM SERPL-SCNC: 139 MMOL/L (ref 136–145)
WBC # BLD AUTO: 9 K/UL (ref 3.6–11)

## 2021-10-17 PROCEDURE — 80053 COMPREHEN METABOLIC PANEL: CPT

## 2021-10-17 PROCEDURE — 96374 THER/PROPH/DIAG INJ IV PUSH: CPT

## 2021-10-17 PROCEDURE — 85025 COMPLETE CBC W/AUTO DIFF WBC: CPT

## 2021-10-17 PROCEDURE — 96375 TX/PRO/DX INJ NEW DRUG ADDON: CPT

## 2021-10-17 PROCEDURE — 93970 EXTREMITY STUDY: CPT

## 2021-10-17 PROCEDURE — 74011250636 HC RX REV CODE- 250/636: Performed by: EMERGENCY MEDICINE

## 2021-10-17 PROCEDURE — 99282 EMERGENCY DEPT VISIT SF MDM: CPT

## 2021-10-17 PROCEDURE — 36415 COLL VENOUS BLD VENIPUNCTURE: CPT

## 2021-10-17 PROCEDURE — 74011250637 HC RX REV CODE- 250/637: Performed by: EMERGENCY MEDICINE

## 2021-10-17 RX ORDER — ACETAMINOPHEN 500 MG
1000 TABLET ORAL ONCE
Status: COMPLETED | OUTPATIENT
Start: 2021-10-17 | End: 2021-10-17

## 2021-10-17 RX ORDER — HYDROMORPHONE HYDROCHLORIDE 1 MG/ML
1 INJECTION, SOLUTION INTRAMUSCULAR; INTRAVENOUS; SUBCUTANEOUS
Status: COMPLETED | OUTPATIENT
Start: 2021-10-17 | End: 2021-10-17

## 2021-10-17 RX ORDER — DIAZEPAM 10 MG/2ML
2 INJECTION INTRAMUSCULAR
Status: COMPLETED | OUTPATIENT
Start: 2021-10-17 | End: 2021-10-17

## 2021-10-17 RX ORDER — FUROSEMIDE 10 MG/ML
20 INJECTION INTRAMUSCULAR; INTRAVENOUS
Status: COMPLETED | OUTPATIENT
Start: 2021-10-17 | End: 2021-10-17

## 2021-10-17 RX ADMIN — HYDROMORPHONE HYDROCHLORIDE 1 MG: 1 INJECTION, SOLUTION INTRAMUSCULAR; INTRAVENOUS; SUBCUTANEOUS at 17:37

## 2021-10-17 RX ADMIN — DIAZEPAM 2 MG: 5 INJECTION, SOLUTION INTRAMUSCULAR; INTRAVENOUS at 18:23

## 2021-10-17 RX ADMIN — ACETAMINOPHEN 1000 MG: 500 TABLET ORAL at 17:36

## 2021-10-17 RX ADMIN — FUROSEMIDE 20 MG: 10 INJECTION, SOLUTION INTRAMUSCULAR; INTRAVENOUS at 17:37

## 2021-10-17 NOTE — DISCHARGE INSTRUCTIONS
It was a pleasure taking care of you at [facility] Emergency Department today. We know that when you come to [address], you are entrusting us with your health, comfort, and safety. Our physicians and nurses honor that trust, and we truly appreciate the opportunity to care for you and your loved ones. We also value your feedback. If you receive a survey about your Emergency Department experience today, please fill it out. We care about our patients' feedback, and we listen to what you have to say. Thank you! You were seen for dizziness and vertigo. I recommended meclizine and resting in a quiet environment until better. Avoid driving, heights, operating machinery until better. Call or Return to ED if symptoms persist or worsen or you develop new neurologic symptoms.       If meclizine does not improve your symptoms, follow up with [facility]:  [address]  [id number]  [phone]    Vestibular Rehab therapists  Devika Frost, PT

## 2021-10-17 NOTE — ED PROVIDER NOTES
EMERGENCY DEPARTMENT HISTORY AND PHYSICAL EXAM      Date: 10/17/2021  Patient Name: Laura Orozco    History of Presenting Illness     Chief Complaint   Patient presents with    Blood Clot     believes that she has a blood clot in the left foot and lower leg says that both legs are swollen but has a history of clot in the right before       History Provided By: Patient    HPI: Laura Orozco, 38 y.o. female with a past medical history significant for Lupus, Mariah-Danlos syndrome, occipital neuralgia, chronic pain, medical problems as stated below presents to the ED with cc of complaints of bilateral lower leg pain with associated pain. Symptoms been ongoing now for 1 to 2 weeks. Patient reports having chronic lymphedema but is concerned that it is worsened. She reports also feeling dizzy like the room is spinning. This is also not a new symptom for her and she has had it for many years. She denies any headaches, visual changes, slurred speech, or any focal neurologic complaints. She has had no trauma to her legs. She reports the pain in her legs feels like \"pins-and-needles\". She has no incontinence, new no lower back pain, fevers, chills, or any other associated symptoms. No other exacerbating or building factors. There are no other complaints, changes, or physical findings at this time. PCP: Deja Cristina MD    No current facility-administered medications on file prior to encounter. Current Outpatient Medications on File Prior to Encounter   Medication Sig Dispense Refill    [DISCONTINUED] meclizine (ANTIVERT) 25 mg tablet TAKE 1 TAB BY MOUTH THREE (3) TIMES DAILY AS NEEDED FOR DIZZINESS FOR UP TO 10 DAYS.  30 Tablet 0    [DISCONTINUED] tiZANidine (ZANAFLEX) 4 mg tablet TAKE 1 TABLET BY MOUTH THREE TIMES A DAY AS NEEDED FOR PAIN 30 Tablet 0    [DISCONTINUED] ondansetron hcl (ZOFRAN) 4 mg tablet TAKE 1 TABLET BY MOUTH EVERY 8 HOURS AS NEEDED FOR NAUSEA 20 Tablet 0    promethazine (PHENERGAN) 25 mg tablet Take 1 Tablet by mouth every six (6) hours as needed for Nausea. 12 Tablet 0    sucralfate (Carafate) 100 mg/mL suspension Take 5 mL by mouth four (4) times daily. 414 mL 0    ALPRAZolam (XANAX) 1 mg tablet TAKE 1 TABLET BY MOUTH TWICE A DAY AS NEEDED FOR ANXIETY 60 Tablet 1    ondansetron (Zofran ODT) 4 mg disintegrating tablet Take 1 Tablet by mouth every eight (8) hours as needed for Nausea or Vomiting. 15 Tablet 0    DULoxetine (CYMBALTA) 60 mg capsule TAKE 2 CAPSULESBY MOUTH EVERY DAY 30 Capsule 5    ondansetron (Zofran ODT) 4 mg disintegrating tablet 1 Tablet by SubLINGual route every eight (8) hours as needed for Nausea or Vomiting. 20 Tablet 0    predniSONE (STERAPRED DS) 10 mg dose pack Take as prescribed 21 Tablet 0    furosemide (LASIX) 20 mg tablet Take 1 Tablet by mouth as needed (swelling). 30 Tablet 2    albuterol (PROVENTIL HFA, VENTOLIN HFA, PROAIR HFA) 90 mcg/actuation inhaler Take 1 Puff by inhalation every six (6) hours as needed for Wheezing. 1 Inhaler 0    predniSONE (DELTASONE) 10 mg tablet Take 60 mg by mouth daily (with breakfast). Day 1 and 2: 60mg; Day 3: 50mg; Day 4:40mg; Day 5: 30 mg; Day 6: 20mg; Day 7: 10mg (Patient not taking: Reported on 9/1/2021) 27 Tablet 0    gabapentin (NEURONTIN) 100 mg capsule TAKE 2 CAPSULES BY MOUTH 3 TIMES A DAY      propranolol LA (INDERAL LA) 80 mg SR capsule 80 mg daily. (Patient not taking: Reported on 9/3/2021)      codeine-butalbital-acetaminophen-caffeine (FIORICET WITH CODEINE) -15-30 mg capsule Take 1 Capsule by mouth every six (6) hours as needed for Headache.  mirtazapine (REMERON) 30 mg tablet TAKE 1 TABLET BY MOUTH AT BEDTIME 30 Tablet 5    naloxone (NARCAN) 4 mg/actuation nasal spray Use 1 spray intranasally, then discard. Repeat with new spray every 2 min as needed for opioid overdose symptoms, alternating nostrils.  (Patient not taking: Reported on 8/29/2021) 2 Each 0    pantoprazole (PROTONIX) 40 mg tablet take 1 tablet by mouth at bedtime  0    rizatriptan (MAXALT) 10 mg tablet Take 10 mg by mouth once as needed for Migraine. May repeat in 2 hours if needed      belimumab (Benlysta) 400 mg solr injection by IntraVENous route. Past History     Past Medical History:  Past Medical History:   Diagnosis Date    Abnormal CT of the abdomen 11/8/2012    Asthma     Autoimmune disease (HCC)     lupus    C. difficile diarrhea     Cervical prolapse     Dehydration     Mariah-Danlos syndrome     Gastrointestinal disorder     gerd, twisted colon, IBS    GERD (gastroesophageal reflux disease)     Headache(784.0)     Lupus (HCC)     Morbid obesity (HCC)     Nausea & vomiting     Neurological disorder     cluster headaches    Occipital neuralgia     other 2006, 2009    ovarian cyst removal    Other ill-defined conditions(799.89)     Syncope     Worsening headaches        Past Surgical History:  Past Surgical History:   Procedure Laterality Date    COLONOSCOPY  9/21/2010         HX CHOLECYSTECTOMY      HX CYST INCISION AND DRAINAGE Right 02/27/2020    HX GYN  1/2009    right salpingo oopherectomy    HX GYN  2006    right ovarian tumor removed    HX HEENT      left wisdom teeth removal    HX HEENT      top right wisdom tooth removed    HX HERNIA REPAIR  4/2012    HX HERNIA REPAIR  2/28/13    Laparoscopic recurrent incisional hernia repair    HX HYSTERECTOMY      2016    HX UROLOGICAL      Kidney Stone Removal    [place] EGD TRANSORAL BIOPSY SINGLE/MULTIPLE  9/22/2010            Family History:  Family History   Problem Relation Age of Onset    Colon Cancer Maternal Grandfather        Social History:  Social History     Tobacco Use    Smoking status: Never Smoker    Smokeless tobacco: Never Used   Vaping Use    Vaping Use: Never used   Substance Use Topics    Alcohol use: No    Drug use: No       Allergies:   Allergies   Allergen Reactions    Latex Itching     burning    Compazine [Prochlorperazine] Anxiety     High heart rate  Pt can take promethazine with no problems    Sulfa (Sulfonamide Antibiotics) Unknown (comments)    Topamax [Topiramate] Unknown (comments)    Adhesive Rash     Allergic to Dermabond    Clindamycin Diarrhea     Pt states caused her C-Diff    Mushroom Other (comments)    Mushroom Combination No.1 Unknown (comments)    Toradol [Ketorolac Tromethamine] Nausea and Vomiting and Other (comments)     PO & IV causes GI bleed  Tolerated during Feb 2020 stay    Valproic Acid Other (comments)     Elevated heart rate and vomiting           Review of Systems   Review of Systems   Constitutional: Positive for fatigue. Negative for chills, diaphoresis and fever. HENT: Negative for ear pain and sore throat. Eyes: Negative for pain and redness. Respiratory: Negative for cough and shortness of breath. Cardiovascular: Positive for leg swelling. Negative for chest pain. Gastrointestinal: Negative for abdominal pain, diarrhea, nausea and vomiting. Endocrine: Negative for cold intolerance and heat intolerance. Genitourinary: Negative for flank pain and hematuria. Musculoskeletal: Positive for arthralgias. Negative for back pain and neck stiffness. Skin: Negative for rash and wound. Neurological: Positive for dizziness. Negative for syncope and headaches. All other systems reviewed and are negative. Physical Exam   Physical Exam  Vitals and nursing note reviewed. Constitutional:       General: She is in acute distress. Appearance: She is well-developed. She is obese. She is not ill-appearing. HENT:      Head: Normocephalic and atraumatic. Mouth/Throat:      Pharynx: No oropharyngeal exudate. Eyes:      Conjunctiva/sclera: Conjunctivae normal.      Pupils: Pupils are equal, round, and reactive to light. Cardiovascular:      Rate and Rhythm: Normal rate and regular rhythm. Heart sounds: No murmur heard.      Pulmonary:      Effort: Pulmonary effort is normal. No respiratory distress. Breath sounds: Normal breath sounds. No wheezing. Abdominal:      General: Bowel sounds are normal. There is no distension. Palpations: Abdomen is soft. Tenderness: There is no abdominal tenderness. Musculoskeletal:         General: No deformity. Normal range of motion. Cervical back: Normal range of motion. Legs:       Comments: Patient is chronic appearing bilateral lower extremity lymphedema that is nonpitting. She has normal sensation and motor function L2 through S1. She has good distal DP and TP pulses. No overlying rashes. Compartments of the upper and lower legs are soft bilaterally. Skin:     General: Skin is warm and dry. Findings: No rash. Neurological:      Mental Status: She is alert and oriented to person, place, and time. GCS: GCS eye subscore is 4. GCS verbal subscore is 5. GCS motor subscore is 6. Cranial Nerves: No dysarthria or facial asymmetry. Sensory: No sensory deficit. Motor: No pronator drift. Coordination: Coordination is intact. Coordination normal. Finger-Nose-Finger Test normal.   Psychiatric:         Behavior: Behavior normal.         Diagnostic Study Results     Labs -   No results found for this or any previous visit (from the past 24 hour(s)).   Recent Results (from the past 168 hour(s))   CBC WITH AUTOMATED DIFF    Collection Time: 10/17/21  3:47 PM   Result Value Ref Range    WBC 9.0 3.6 - 11.0 K/uL    RBC 3.67 (L) 3.80 - 5.20 M/uL    HGB 11.2 (L) 11.5 - 16.0 g/dL    HCT 33.5 (L) 35.0 - 47.0 %    MCV 91.3 80.0 - 99.0 FL    MCH 30.5 26.0 - 34.0 PG    MCHC 33.4 30.0 - 36.5 g/dL    RDW 12.7 11.5 - 14.5 %    PLATELET 125 273 - 609 K/uL    MPV 9.4 8.9 - 12.9 FL    NRBC 0.0 0  WBC    ABSOLUTE NRBC 0.00 0.00 - 0.01 K/uL    NEUTROPHILS 38 32 - 75 %    LYMPHOCYTES 38 12 - 49 %    MONOCYTES 12 5 - 13 %    EOSINOPHILS 9 (H) 0 - 7 %    BASOPHILS 1 0 - 1 %    IMMATURE GRANULOCYTES 2 (H) 0.0 - 0.5 %    ABS. NEUTROPHILS 3.4 1.8 - 8.0 K/UL    ABS. LYMPHOCYTES 3.6 (H) 0.8 - 3.5 K/UL    ABS. MONOCYTES 1.1 (H) 0.0 - 1.0 K/UL    ABS. EOSINOPHILS 0.8 (H) 0.0 - 0.4 K/UL    ABS. BASOPHILS 0.1 0.0 - 0.1 K/UL    ABS. IMM. GRANS. 0.2 (H) 0.00 - 0.04 K/UL    DF AUTOMATED     METABOLIC PANEL, COMPREHENSIVE    Collection Time: 10/17/21  3:47 PM   Result Value Ref Range    Sodium 139 136 - 145 mmol/L    Potassium 4.0 3.5 - 5.1 mmol/L    Chloride 107 97 - 108 mmol/L    CO2 28 21 - 32 mmol/L    Anion gap 4 (L) 5 - 15 mmol/L    Glucose 126 (H) 65 - 100 mg/dL    BUN 19 6 - 20 MG/DL    Creatinine 1.09 (H) 0.55 - 1.02 MG/DL    BUN/Creatinine ratio 17 12 - 20      GFR est AA >60 >60 ml/min/1.73m2    GFR est non-AA 56 (L) >60 ml/min/1.73m2    Calcium 9.1 8.5 - 10.1 MG/DL    Bilirubin, total 0.3 0.2 - 1.0 MG/DL    ALT (SGPT) 19 12 - 78 U/L    AST (SGOT) 16 15 - 37 U/L    Alk. phosphatase 118 (H) 45 - 117 U/L    Protein, total 6.7 6.4 - 8.2 g/dL    Albumin 3.4 (L) 3.5 - 5.0 g/dL    Globulin 3.3 2.0 - 4.0 g/dL    A-G Ratio 1.0 (L) 1.1 - 2.2         Radiologic Studies -   DUPLEX LOWER EXT VENOUS BILAT   Final Result        CT Results  (Last 48 hours)    None        CXR Results  (Last 48 hours)    None            Medical Decision Making   I am the first provider for this patient. I reviewed the vital signs, available nursing notes, past medical history, past surgical history, family history and social history. Vital Signs-Reviewed the patient's vital signs. No data found. Vitals:    10/17/21 1521 10/17/21 1702   BP: (!) 101/42 (!) 119/109   Pulse: 62 (!) 54   Resp: 18    Temp: 97.9 °F (36.6 °C)    SpO2: 100% 96%       Records Reviewed: Nursing records and medical records reviewed    MDM:  Patient presents with isolated vertigo. Most likely peripheral vertigo rather than Central vertigo.  DDx: BPPV, acute labyrinthitis, vestibular neuritis, Meniere's dx, otitis media, acoustic neuroma, medication toxicity (aminoglycosides, loop diuretics, aspirin). Unlikely central cause of vertigo such as posterior mass or stroke as there are no associated red flag symptoms including diplopia, dysmetria, dysarthria, ataxia, unilateral numbness or weakness. Differential diagnosis for lower extremity swelling includes DVT, chronic lymphedema, CHF, hepatic failure  Provider Notes (Medical Decision Making):   Patient is a 77-year-old female presenting with symptoms consistent with acute exacerbation of her chronic lymphedema. There appears to be neuropathic component to the pain. Bilateral lower extremity duplexes were negative for DVT. She is neurovascularly intact in the lower extremities without signs of arterial occlusion or neurologic deficits. Her vertigo appears to be entirely peripheral in nature. Her neurologic exam is unremarkable and not suggestive of a central cause to her vertigo. She is feeling better after treatment in the ER and I think outpatient follow-up with her primary care doctor is indicated. She was given some IV pain medications to reduce amount of discomfort she had in her legs. Prescribed treatment plan as stated below. ED Course:   Initial assessment performed. The patients presenting problems have been discussed, and they are in agreement with the care plan formulated and outlined with them. I have encouraged them to ask questions as they arise throughout their visit. ED Course as of Oct 21 1306   Tracy Awad Oct 17, 2021   1631  review shows 117 prescriptions from 24 different providers.     [CC]      ED Course User Index  [CC] Ladarius Arriaza MD       Medications Administered     acetaminophen (TYLENOL) tablet 1,000 mg     Admin Date  10/17/2021 Action  Given Dose  1,000 mg Route  Oral Administered By  Lyla Bruno RN          diazePAM (VALIUM) injection 2 mg     Admin Date  10/17/2021 Action  Given Dose  2 mg Route  IntraVENous Administered By  Fidel Grossman RN furosemide (LASIX) injection 20 mg     Admin Date  10/17/2021 Action  Given Dose  20 mg Route  IntraVENous Administered By  Ahsan Luo RN          HYDROmorphone (DILAUDID) injection 1 mg     Admin Date  10/17/2021 Action  Given Dose  1 mg Route  IntraVENous Administered By  Ahsan Luo RN                    Critical Care:  None      Disposition:  1:11 PM  Maurisio Bowne  results have been reviewed with her. She has been counseled regarding her diagnosis. She verbally conveys understanding and agreement of the signs, symptoms, diagnosis, treatment and prognosis and additionally agrees to follow up as recommended with Dr. Guera Vicente MD in 24 - 48 hours. She also agrees with the care-plan and conveys that all of her questions have been answered. I have also put together some discharge instructions for her that include: 1) educational information regarding their diagnosis, 2) how to care for their diagnosis at home, as well a 3) list of reasons why they would want to return to the ED prior to their follow-up appointment, should their condition change. DISCHARGE PLAN:  1. Discharge Medication List as of 10/17/2021  6:16 PM        2. Follow-up Information     Follow up With Specialties Details Why Contact Sofia Vicente MD Internal Medicine In 3 days For a follow-up evaluation. [address] [facility]  [phone]      [facility] EMERGENCY DEPT Emergency Medicine In 2 days If symptoms worsen [address] [place] [address]  [phone]        3. Return to ED if worse     Diagnosis     Clinical Impression:   1. Vertigo    2. Lymphedema        Attestations:    Perla Owen MD    Please note that this dictation was completed with [employer], the [email or web address] voice recognition software.   Quite often unanticipated grammatical, syntax, homophones, and other interpretive errors are inadvertently transcribed by the computer software. Please disregard these errors. Please excuse any errors that have escaped final proofreading. Thank you.

## 2021-10-21 RX ORDER — ONDANSETRON 4 MG/1
TABLET, FILM COATED ORAL
Qty: 20 TABLET | Refills: 0 | Status: SHIPPED | OUTPATIENT
Start: 2021-10-21 | End: 2021-10-26

## 2021-10-21 RX ORDER — TIZANIDINE 4 MG/1
TABLET ORAL
Qty: 30 TABLET | Refills: 0 | Status: SHIPPED | OUTPATIENT
Start: 2021-10-21 | End: 2021-10-29

## 2021-10-21 RX ORDER — MECLIZINE HYDROCHLORIDE 25 MG/1
TABLET ORAL
Qty: 30 TABLET | Refills: 0 | Status: SHIPPED | OUTPATIENT
Start: 2021-10-21 | End: 2021-11-01

## 2021-10-26 ENCOUNTER — OFFICE VISIT (OUTPATIENT)
Dept: CARDIOLOGY CLINIC | Age: 36
End: 2021-10-26
Payer: MEDICAID

## 2021-10-26 VITALS
WEIGHT: 263.6 LBS | RESPIRATION RATE: 18 BRPM | HEART RATE: 115 BPM | HEIGHT: 62 IN | DIASTOLIC BLOOD PRESSURE: 78 MMHG | OXYGEN SATURATION: 96 % | SYSTOLIC BLOOD PRESSURE: 112 MMHG | BODY MASS INDEX: 48.51 KG/M2

## 2021-10-26 DIAGNOSIS — I10 ESSENTIAL HYPERTENSION: ICD-10-CM

## 2021-10-26 DIAGNOSIS — R60.0 LOCALIZED EDEMA: ICD-10-CM

## 2021-10-26 DIAGNOSIS — I87.2 VENOUS INSUFFICIENCY OF LEFT LEG: Primary | ICD-10-CM

## 2021-10-26 PROCEDURE — 99204 OFFICE O/P NEW MOD 45 MIN: CPT | Performed by: INTERNAL MEDICINE

## 2021-10-26 RX ORDER — SUMATRIPTAN 25 MG/1
25 TABLET, FILM COATED ORAL AS NEEDED
COMMUNITY
Start: 2021-10-09 | End: 2022-01-29

## 2021-10-26 NOTE — PROGRESS NOTES
1. Have you been to the ER, urgent care clinic since your last visit? Hospitalized since your last visit? Yes, 10/17/21, ED, Vertigo and Lymphedema    2. Have you seen or consulted any other health care providers outside of the [address] [place] [address] since your last visit? Include any pap smears or colon screening.  No         Chief Complaint   Patient presents with    Leg Swelling     C/O Bilateral Leg swelling, Bilateral leg pain, Feet discoloration

## 2021-10-26 NOTE — PROGRESS NOTES
10/26/2021 10:25 AM      Subjective:     Kimberlee Escudero   Is here to discuss abnormal LE venous reflux study:        · Right great saphenous vein is competent. Right small saphenous vein is competent. Right anterior accessory saphenous vein is competent. · Left great saphenous vein displays venous insufficiency. Left small saphenous vein displays venous insufficiency. Pmhx includes  hypertension,   Mariah-Danlos syndrome, occipital neuralgia, chronic pain, migraines with syncope, pseudoseizure, lupus and GERD. She is followed by Boy Landa DNP for general cardiology. Noted ED visit 10/17/2021, LE dopplers then negative for DVT    Today, reports bilateral leg swelling / cramps / restless leg / leg fatigue. Taking Lasix 20-40 mg as needed, not much relief. 1.  Have you ever had vein stripping surgery NO       2. Have you ever had vein injections? NO        3. Have you ever had a blood clot? NO       4. Have you ever had phlebitis? NO                                                                        Does anyone in your family have (or used to have) varicose veins, spider veins, leg ulcers or swollen legs? Father  YES  Mother YES  Brother(s) NO  Sister(s) NO  Other NO           1. Do you experience any of the following in your legs? Aching/pain? [] One leg [x] Both legs  Heaviness? [] One leg [x] Both legs  Tiredness/fatigue? [] One leg [x] Both legs  Itching/burning? [] One leg [x] Both legs  Swollen ankles? [] One leg [x] Both legs  Leg cramps? [] One leg [x] Both legs  Restless legs? [] One leg [x] Both legs  Throbbing? [] One leg [x] Both legs  Other? 2.  Have your veins gotten worse in recent months? YES        3. Do you take any medication for pain (i.e., Advil, Motrin)  NO           4. Do you elevate your legs to relieve discomfort? YES        5. Do you exercise? Yes but walking causes leg swelling        6. Do you wear prescription compression stockings? NO           7. Do you wear light support hose (i.e., [employer])? NO                    8.    Do you have any problem walking? NO                                 9.   What type of work do you do? Doesn't work      8. Have you ever had any test(s) done on your veins? YES          11. Were you diagnosed with saphenous vein reflux? YES       denies chest pain, chest pressure/discomfort, dyspnea, palpitations, irregular heart beats, near-syncope, syncope, fatigue, orthopnea, paroxysmal nocturnal dyspnea, exertional chest pressure/discomfort, tachypnea, dyspnea on exertion. Visit Vitals  /78 (BP 1 Location: Left upper arm, BP Patient Position: Sitting, BP Cuff Size: Large adult)   Pulse (!) 115   Resp 18   Ht 5' 2\" (1.575 m)   Wt 263 lb 9.6 oz (119.6 kg)   SpO2 96%   BMI 48.21 kg/m²       Current Outpatient Medications   Medication Sig    SUMAtriptan (IMITREX) 25 mg tablet Take 25 mg by mouth as needed.  meclizine (ANTIVERT) 25 mg tablet TAKE 1 TAB BY MOUTH THREE TIMES DAILY AS NEEDED FOR DIZZINESS FOR UP TO 10 DAYS.  tiZANidine (ZANAFLEX) 4 mg tablet TAKE 1 TABLET BY MOUTH THREE TIMES A DAY AS NEEDED FOR PAIN    promethazine (PHENERGAN) 25 mg tablet Take 1 Tablet by mouth every six (6) hours as needed for Nausea.  ALPRAZolam (XANAX) 1 mg tablet TAKE 1 TABLET BY MOUTH TWICE A DAY AS NEEDED FOR ANXIETY    DULoxetine (CYMBALTA) 60 mg capsule TAKE 2 CAPSULESBY MOUTH EVERY DAY    ondansetron (Zofran ODT) 4 mg disintegrating tablet 1 Tablet by SubLINGual route every eight (8) hours as needed for Nausea or Vomiting.  furosemide (LASIX) 20 mg tablet Take 1 Tablet by mouth as needed (swelling).  albuterol (PROVENTIL HFA, VENTOLIN HFA, PROAIR HFA) 90 mcg/actuation inhaler Take 1 Puff by inhalation every six (6) hours as needed for Wheezing.     gabapentin (NEURONTIN) 100 mg capsule TAKE 2 CAPSULES BY MOUTH 3 TIMES A DAY    propranolol LA (INDERAL LA) 80 mg SR capsule 80 mg daily.  codeine-butalbital-acetaminophen-caffeine (FIORICET WITH CODEINE) -59-30 mg capsule Take 1 Capsule by mouth every six (6) hours as needed for Headache.  mirtazapine (REMERON) 30 mg tablet TAKE 1 TABLET BY MOUTH AT BEDTIME    naloxone (NARCAN) 4 mg/actuation nasal spray Use 1 spray intranasally, then discard. Repeat with new spray every 2 min as needed for opioid overdose symptoms, alternating nostrils.  pantoprazole (PROTONIX) 40 mg tablet take 1 tablet by mouth at bedtime    rizatriptan (MAXALT) 10 mg tablet Take 10 mg by mouth once as needed for Migraine. May repeat in 2 hours if needed    belimumab (Benlysta) 400 mg solr injection by IntraVENous route.  sucralfate (Carafate) 100 mg/mL suspension Take 5 mL by mouth four (4) times daily. (Patient not taking: Reported on 10/26/2021)    predniSONE (STERAPRED DS) 10 mg dose pack Take as prescribed (Patient not taking: Reported on 10/26/2021)    predniSONE (DELTASONE) 10 mg tablet Take 60 mg by mouth daily (with breakfast). Day 1 and 2: 60mg; Day 3: 50mg; Day 4:40mg; Day 5: 30 mg; Day 6: 20mg; Day 7: 10mg (Patient not taking: Reported on 9/1/2021)     No current facility-administered medications for this visit.          Objective:      Visit Vitals  /78 (BP 1 Location: Left upper arm, BP Patient Position: Sitting, BP Cuff Size: Large adult)   Pulse (!) 115   Resp 18   Ht 5' 2\" (1.575 m)   Wt 263 lb 9.6 oz (119.6 kg)   SpO2 96%   BMI 48.21 kg/m²       Data Review:         Past Medical History:   Diagnosis Date    Abnormal CT of the abdomen 11/8/2012    Asthma     Autoimmune disease (HCC)     lupus    C. difficile diarrhea     Cervical prolapse     Dehydration     Mariah-Danlos syndrome     Gastrointestinal disorder     gerd, twisted colon, IBS    GERD (gastroesophageal reflux disease)     Headache(784.0)     Lupus (HCC)     Morbid obesity (HCC)     Murmur     Nausea & vomiting     Neurological disorder     cluster headaches    Occipital neuralgia     other 2006, 2009    ovarian cyst removal    Other ill-defined conditions(799.89)     Syncope     Worsening headaches       Past Surgical History:   Procedure Laterality Date    COLONOSCOPY  9/21/2010         HX CHOLECYSTECTOMY      HX CYST INCISION AND DRAINAGE Right 02/27/2020    HX GYN  1/2009    right salpingo oopherectomy    HX GYN  2006    right ovarian tumor removed    HX HEENT      left wisdom teeth removal    HX HEENT      top right wisdom tooth removed    HX HERNIA REPAIR  4/2012    HX HERNIA REPAIR  2/28/13    Laparoscopic recurrent incisional hernia repair    HX HYSTERECTOMY      2016    HX UROLOGICAL      Kidney Stone Removal    NM EGD TRANSORAL BIOPSY SINGLE/MULTIPLE  9/22/2010          Allergies   Allergen Reactions    Latex Itching     burning    Compazine [Prochlorperazine] Anxiety     High heart rate  Pt can take promethazine with no problems    Sulfa (Sulfonamide Antibiotics) Unknown (comments)    Topamax [Topiramate] Unknown (comments)    Adhesive Rash     Allergic to Dermabond    Clindamycin Diarrhea     Pt states caused her C-Diff    Mushroom Other (comments)    Mushroom Combination No.1 Unknown (comments)    Toradol [Ketorolac Tromethamine] Nausea and Vomiting and Other (comments)     PO & IV causes GI bleed  Tolerated during Feb 2020 stay    Valproic Acid Other (comments)     Elevated heart rate and vomiting        Family History   Problem Relation Age of Onset    Colon Cancer Maternal Grandfather       Social History     Socioeconomic History    Marital status: LEGALLY      Spouse name: Not on file    Number of children: Not on file    Years of education: Not on file    Highest education level: Not on file   Occupational History    Not on file   Tobacco Use    Smoking status: Never Smoker    Smokeless tobacco: Never Used   Vaping Use    Vaping Use: Never used   Substance and Sexual Activity    Alcohol use: No    Drug use: No    Sexual activity: Not Currently     Partners: Male     Birth control/protection: Abstinence   Other Topics Concern    Not on file   Social History Narrative    Not on file     Social Determinants of Health     Financial Resource Strain:     Difficulty of Paying Living Expenses:    Food Insecurity:     Worried About Running Out of Food in the Last Year:     Ran Out of Food in the Last Year:    Transportation Needs:     Lack of Transportation (Medical):  Lack of Transportation (Non-Medical):    Physical Activity:     Days of Exercise per Week:     Minutes of Exercise per Session:    Stress:     Feeling of Stress :    Social Connections:     Frequency of Communication with Friends and Family:     Frequency of Social Gatherings with Friends and Family:     Attends Rastafarian Services:     Active Member of Clubs or Organizations:     Attends Club or Organization Meetings:     Marital Status:    Intimate Partner Violence:     Fear of Current or Ex-Partner:     Emotionally Abused:     Physically Abused:     Sexually Abused:        Review of Systems     General: Not Present- Anorexia, Chills, Dietary Changes, Fatigue, Fever, Medication Changes, Night Sweats, Weight Gain > 10lbs. and Weight Loss > 10lbs. .  Skin: Not Present- Bruising and Excessive Sweating. HEENT: Not Present- Headache, Visual Loss and Vertigo. Respiratory: Not Present- Cough, Decreased Exercise Tolerance, Difficulty Breathing, Snoring and Wheezing. Cardiovascular: Not Present- Abnormal Blood Pressure, Chest Pain, Claudications, Difficulty Breathing On Exertion, Edema, Fainting / Blacking Out, Irregular Heart Beat, Night Cramps, Orthopnea, Palpitations, Paroxysmal Nocturnal Dyspnea, Rapid Heart Rate, Shortness of Breath and Swelling of Extremities.   Gastrointestinal: Not Present- Black, Tarry Stool, Bloody Stool, Diarrhea, Hematemesis, Rectal Bleeding and Vomiting. Musculoskeletal: Not Present- Muscle Pain and Muscle Weakness. Neurological: Not Present- Dizziness. Psychiatric: Not Present- Depression. Endocrine: Not Present- Cold Intolerance, Heat Intolerance and Thyroid Problems. Hematology: Not Present- Abnormal Bleeding, Anemia, Blood Clots and Easy Bruising. Physical Exam   The physical exam findings are as follows:       General   Mental Status - Alert. General Appearance - Not in acute distress. Chest and Lung Exam   Inspection: Accessory muscles - No use of accessory muscles in breathing. Auscultation:   Breath sounds: - Normal.      Cardiovascular   Inspection: Jugular vein - Bilateral - Inspection Normal.  Palpation/Percussion:   Apical Impulse: - Normal.  Auscultation: Rhythm - Regular. Heart Sounds - S1 WNL and S2 WNL. No S3 or S4. Murmurs & Other Heart Sounds: Auscultation of the heart reveals - No Murmurs. Carotid arteries - No Carotid bruit. Abdomen   Palpation/Percussion: Palpation and Percussion of the abdomen reveal - No Palpable abdominal masses. Auscultation: Auscultation of the abdomen reveals - Bowel sounds normal.      Neurologic   Mental Status: Affect - normal.  Motor: - Normal. Gait - Normal.      Peripheral Vascular   Upper Extremity: Inspection - Bilateral - No Cyanotic nailbeds or Digital clubbing. Lower Extremity:   Palpation:   Femoral pulse - Rt.  [] normal  [] weak  [] non palpable     Lt.   [] normal  [] weak  [] non palpable                             Popliteal pulse - Rt.  [] normal  [] weak  [] non palpable     Lt.   [] normal  [] weak  [] non palpable       Dorsalis pedis pulse - Rt. [x] normal  [] weak  [] non palpable     Lt. [x] normal  [] weak  [] non palpable    Posterior tibia pulse - Rt. [x] normal  [] weak  [] non palpable     Lt. [x] normal  [] weak  [] non palpable                                             Edema:       Rt.  Leg  [x]             Lt. Leg  [x]  Telangiectasia & spider veins: Rt. Leg  []             Lt. Leg  []  Varicose veins:   Rt. Leg  []             Lt. Leg  []   Skin pigmentation:   Rt. Leg  []             Lt. Leg  []   Healed venous ulcer:   Rt. Leg  []             Lt. Leg  []                                            Assessment:       ICD-10-CM ICD-9-CM    1. Venous insufficiency of left leg  I87.2 459.81    2. Localized edema  R60.0 782.3    3. Essential hypertension  I10 401.9        Plan:     1. Venous insufficiency of left leg  Left GSV and SSV reflux per LE dopplers 9/2021. No DVT per LE duplex in 10/2021. Noted echo in 8/2021. Continue lasix. Start 30-50 mmHg compression stockings x 3 mos     Continue  1.  - Instruction given on daily leg elevation   2.  - Instruction given for mild exercise  3.  - Instruction given for weight reduction    2.  BP controlled  3. HLD:  Per PCP    Continue current care and f/u in 3 mos      Raya Scott NP  10/26/2021      Patient seen and examined by me with nurse practitioner in vascular clinic. I personally performed all components of the history, physical, and medical decision making and agree with the assessment and plan as noted. LE venous reflux study reviewed. Complex situation. Leg swelling most likely due to morbid obesity and steroid effect. Don't think venous reflux is primary process, quintin since reflux is present in left leg only. Photo taken. D/w patient in details. Continue lasix. Will f/u in 3 months.      Kong Quinn MD

## 2021-10-27 ENCOUNTER — HOSPITAL ENCOUNTER (EMERGENCY)
Age: 36
Discharge: HOME | End: 2021-10-27
Attending: EMERGENCY MEDICINE
Payer: MEDICAID

## 2021-10-27 VITALS
SYSTOLIC BLOOD PRESSURE: 143 MMHG | DIASTOLIC BLOOD PRESSURE: 78 MMHG | TEMPERATURE: 98 F | RESPIRATION RATE: 16 BRPM | WEIGHT: 263.45 LBS | HEIGHT: 62 IN | BODY MASS INDEX: 48.48 KG/M2 | OXYGEN SATURATION: 100 % | HEART RATE: 77 BPM

## 2021-10-27 DIAGNOSIS — K64.0 GRADE I HEMORRHOIDS: Primary | ICD-10-CM

## 2021-10-27 DIAGNOSIS — R10.2 PERINEAL PAIN: ICD-10-CM

## 2021-10-27 PROCEDURE — 99283 EMERGENCY DEPT VISIT LOW MDM: CPT

## 2021-10-27 PROCEDURE — 74011000250 HC RX REV CODE- 250: Performed by: EMERGENCY MEDICINE

## 2021-10-27 RX ORDER — LIDOCAINE HYDROCHLORIDE 20 MG/ML
1 JELLY TOPICAL
Qty: 20 ML | Refills: 0 | Status: SHIPPED | OUTPATIENT
Start: 2021-10-27 | End: 2021-12-11

## 2021-10-27 RX ORDER — LIDOCAINE HYDROCHLORIDE 20 MG/ML
SOLUTION OROPHARYNGEAL
Status: COMPLETED | OUTPATIENT
Start: 2021-10-27 | End: 2021-10-27

## 2021-10-27 RX ORDER — CEPHALEXIN 500 MG/1
500 CAPSULE ORAL 3 TIMES DAILY
Qty: 21 CAPSULE | Refills: 0 | Status: SHIPPED | OUTPATIENT
Start: 2021-10-27 | End: 2021-11-03

## 2021-10-27 RX ADMIN — LIDOCAINE HYDROCHLORIDE 15 ML: 20 SOLUTION ORAL; TOPICAL at 10:18
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
## 2021-10-29 RX ORDER — ONDANSETRON 4 MG/1
TABLET, FILM COATED ORAL
Qty: 20 TABLET | Refills: 0 | Status: SHIPPED | OUTPATIENT
Start: 2021-10-29 | End: 2021-11-09

## 2021-10-29 RX ORDER — TIZANIDINE 4 MG/1
TABLET ORAL
Qty: 30 TABLET | Refills: 0 | Status: SHIPPED | OUTPATIENT
Start: 2021-10-29 | End: 2021-11-09

## 2021-11-01 RX ORDER — MECLIZINE HYDROCHLORIDE 25 MG/1
TABLET ORAL
Qty: 30 TABLET | Refills: 0 | Status: SHIPPED | OUTPATIENT
Start: 2021-11-01 | End: 2021-11-15

## 2021-11-03 NOTE — PERIOP NOTES
TRANSFER - IN REPORT:    Verbal report received from [address] [facility] RN(name) on [facility]  being received from Ortho room 3243(unit) for ordered procedure      Report consisted of patients Situation, Background, Assessment and   Recommendations(SBAR). Information from the following report(s) SBAR, Kardex, Intake/Output, MAR, Recent Results, Med Rec Status, Pre Procedure Checklist and Procedure Verification was reviewed with the receiving nurse. Opportunity for questions and clarification was provided. Assessment completed upon patients arrival to unit and care assumed. Not applicable: This is a surgical and/or non-medical patient

## 2021-11-05 ENCOUNTER — HOSPITAL ENCOUNTER (EMERGENCY)
Age: 36
Discharge: HOME | End: 2021-11-05
Attending: STUDENT IN AN ORGANIZED HEALTH CARE EDUCATION/TRAINING PROGRAM
Payer: MEDICAID

## 2021-11-05 VITALS
HEART RATE: 75 BPM | RESPIRATION RATE: 18 BRPM | TEMPERATURE: 98.5 F | BODY MASS INDEX: 47.47 KG/M2 | WEIGHT: 257.94 LBS | DIASTOLIC BLOOD PRESSURE: 98 MMHG | HEIGHT: 62 IN | SYSTOLIC BLOOD PRESSURE: 152 MMHG | OXYGEN SATURATION: 98 %

## 2021-11-05 DIAGNOSIS — R51.9 ACUTE NONINTRACTABLE HEADACHE, UNSPECIFIED HEADACHE TYPE: Primary | ICD-10-CM

## 2021-11-05 PROCEDURE — 96374 THER/PROPH/DIAG INJ IV PUSH: CPT

## 2021-11-05 PROCEDURE — 99282 EMERGENCY DEPT VISIT SF MDM: CPT

## 2021-11-05 PROCEDURE — 74011636637 HC RX REV CODE- 636/637: Performed by: STUDENT IN AN ORGANIZED HEALTH CARE EDUCATION/TRAINING PROGRAM

## 2021-11-05 PROCEDURE — 96372 THER/PROPH/DIAG INJ SC/IM: CPT

## 2021-11-05 PROCEDURE — 74011250636 HC RX REV CODE- 250/636: Performed by: STUDENT IN AN ORGANIZED HEALTH CARE EDUCATION/TRAINING PROGRAM

## 2021-11-05 PROCEDURE — 96375 TX/PRO/DX INJ NEW DRUG ADDON: CPT

## 2021-11-05 RX ORDER — DEXAMETHASONE SODIUM PHOSPHATE 4 MG/ML
6 INJECTION, SOLUTION INTRA-ARTICULAR; INTRALESIONAL; INTRAMUSCULAR; INTRAVENOUS; SOFT TISSUE
Status: COMPLETED | OUTPATIENT
Start: 2021-11-05 | End: 2021-11-05

## 2021-11-05 RX ORDER — SUMATRIPTAN 6 MG/.5ML
6 INJECTION, SOLUTION SUBCUTANEOUS
Status: COMPLETED | OUTPATIENT
Start: 2021-11-05 | End: 2021-11-05

## 2021-11-05 RX ORDER — METOCLOPRAMIDE HYDROCHLORIDE 5 MG/ML
10 INJECTION INTRAMUSCULAR; INTRAVENOUS
Status: COMPLETED | OUTPATIENT
Start: 2021-11-05 | End: 2021-11-05

## 2021-11-05 RX ORDER — DIPHENHYDRAMINE HYDROCHLORIDE 50 MG/ML
25 INJECTION, SOLUTION INTRAMUSCULAR; INTRAVENOUS
Status: COMPLETED | OUTPATIENT
Start: 2021-11-05 | End: 2021-11-05

## 2021-11-05 RX ADMIN — SUMATRIPTAN 6 MG: 6 INJECTION, SOLUTION SUBCUTANEOUS at 17:43

## 2021-11-05 RX ADMIN — METOCLOPRAMIDE 10 MG: 5 INJECTION, SOLUTION INTRAMUSCULAR; INTRAVENOUS at 17:43

## 2021-11-05 RX ADMIN — DEXAMETHASONE SODIUM PHOSPHATE 6 MG: 4 INJECTION INTRA-ARTICULAR; INTRALESIONAL; INTRAMUSCULAR; INTRAVENOUS; SOFT TISSUE at 17:43

## 2021-11-05 RX ADMIN — SODIUM CHLORIDE 1000 ML: 9 INJECTION, SOLUTION INTRAVENOUS at 17:45

## 2021-11-05 RX ADMIN — DIPHENHYDRAMINE HYDROCHLORIDE 25 MG: 50 INJECTION, SOLUTION INTRAMUSCULAR; INTRAVENOUS at 18:26

## 2021-11-06 NOTE — ED PROVIDER NOTES
EMERGENCY DEPARTMENT HISTORY AND PHYSICAL EXAM      Date: 11/5/2021  Patient Name: Monica Coffey    History of Presenting Illness     Chief Complaint   Patient presents with    Migraine     pt states she has occipital nerve damage and neuralgia, states she no longer has insurance coverage for nerve blocks also stated she feels like she is going to pass out. HPI: Monica Coffey, 38 y.o. female presents to the ED with cc of headache. She reports a history of occipital neuralgia, 3 hours ago reports her typical occipital neuralgia headache, she describes this as constant pain in the occipital head accompanied by some right sided \"stars\" in her vision. She states that she always has visual changes on the right side usually with her headaches. She was associated nausea and vomiting, about 4-5 episodes of emesis. No other weakness, numbness or tingling in arms or legs, no fevers. She states that she has been trying to get coverage for occipital nerve blocks, however her insurance has been denying this. She does follow with a neurologist.          There are no other complaints, changes, or physical findings at this time. PCP: Curtis Fisher MD    No current facility-administered medications on file prior to encounter. Current Outpatient Medications on File Prior to Encounter   Medication Sig Dispense Refill    meclizine (ANTIVERT) 25 mg tablet TAKE 1 TAB BY MOUTH THREE TIMES DAILY AS NEEDED FOR DIZZINESS FOR UP TO 10 DAYS. 30 Tablet 0    ondansetron hcl (ZOFRAN) 4 mg tablet TAKE 1 TABLET BY MOUTH EVERY 8 HOURS AS NEEDED FOR NAUSEA 20 Tablet 0    tiZANidine (ZANAFLEX) 4 mg tablet TAKE 1 TABLET BY MOUTH THREE TIMES A DAY AS NEEDED FOR PAIN 30 Tablet 0    lidocaine (URO-JET/ GLYDO) 2 % jelp jelly Apply 1 mL to affected area two (2) times daily as needed for Pain or PRN Reason (Other) (pain).  20 mL 0    jojxmfjxa-Wzxkiiwq-Rkhnj-W.Pet (Preparation H Maximum Strength) 0.25-1 % rectal cream Apply  to affected area two (2) times daily as needed for Pain or Hemorrhoids. 1 g 0    SUMAtriptan (IMITREX) 25 mg tablet Take 25 mg by mouth as needed.  promethazine (PHENERGAN) 25 mg tablet Take 1 Tablet by mouth every six (6) hours as needed for Nausea. 12 Tablet 0    sucralfate (Carafate) 100 mg/mL suspension Take 5 mL by mouth four (4) times daily. (Patient not taking: Reported on 10/26/2021) 414 mL 0    ALPRAZolam (XANAX) 1 mg tablet TAKE 1 TABLET BY MOUTH TWICE A DAY AS NEEDED FOR ANXIETY 60 Tablet 1    DULoxetine (CYMBALTA) 60 mg capsule TAKE 2 CAPSULESBY MOUTH EVERY DAY 30 Capsule 5    ondansetron (Zofran ODT) 4 mg disintegrating tablet 1 Tablet by SubLINGual route every eight (8) hours as needed for Nausea or Vomiting. 20 Tablet 0    predniSONE (STERAPRED DS) 10 mg dose pack Take as prescribed (Patient not taking: Reported on 10/26/2021) 21 Tablet 0    furosemide (LASIX) 20 mg tablet Take 1 Tablet by mouth as needed (swelling). 30 Tablet 2    albuterol (PROVENTIL HFA, VENTOLIN HFA, PROAIR HFA) 90 mcg/actuation inhaler Take 1 Puff by inhalation every six (6) hours as needed for Wheezing. 1 Inhaler 0    predniSONE (DELTASONE) 10 mg tablet Take 60 mg by mouth daily (with breakfast). Day 1 and 2: 60mg; Day 3: 50mg; Day 4:40mg; Day 5: 30 mg; Day 6: 20mg; Day 7: 10mg (Patient not taking: Reported on 9/1/2021) 27 Tablet 0    gabapentin (NEURONTIN) 100 mg capsule TAKE 2 CAPSULES BY MOUTH 3 TIMES A DAY      propranolol LA (INDERAL LA) 80 mg SR capsule 80 mg daily.  codeine-butalbital-acetaminophen-caffeine (FIORICET WITH CODEINE) -76-30 mg capsule Take 1 Capsule by mouth every six (6) hours as needed for Headache.  mirtazapine (REMERON) 30 mg tablet TAKE 1 TABLET BY MOUTH AT BEDTIME 30 Tablet 5    naloxone (NARCAN) 4 mg/actuation nasal spray Use 1 spray intranasally, then discard. Repeat with new spray every 2 min as needed for opioid overdose symptoms, alternating nostrils.  2 Each 0    pantoprazole (PROTONIX) 40 mg tablet take 1 tablet by mouth at bedtime  0    rizatriptan (MAXALT) 10 mg tablet Take 10 mg by mouth once as needed for Migraine. May repeat in 2 hours if needed      belimumab (Benlysta) 400 mg solr injection by IntraVENous route. Past History     Past Medical History:  Past Medical History:   Diagnosis Date    Abnormal CT of the abdomen 11/8/2012    Asthma     Autoimmune disease (HCC)     lupus    C. difficile diarrhea     Cervical prolapse     Dehydration     Mariah-Danlos syndrome     Gastrointestinal disorder     gerd, twisted colon, IBS    GERD (gastroesophageal reflux disease)     Headache(784.0)     Lupus (HCC)     Morbid obesity (HCC)     Murmur     Nausea & vomiting     Neurological disorder     cluster headaches    Occipital neuralgia     other 2006, 2009    ovarian cyst removal    Other ill-defined conditions(799.89)     Syncope     Worsening headaches        Past Surgical History:  Past Surgical History:   Procedure Laterality Date    COLONOSCOPY  9/21/2010         HX CHOLECYSTECTOMY      HX CYST INCISION AND DRAINAGE Right 02/27/2020    HX GYN  1/2009    right salpingo oopherectomy    HX GYN  2006    right ovarian tumor removed    HX HEENT      left wisdom teeth removal    HX HEENT      top right wisdom tooth removed    HX HERNIA REPAIR  4/2012    HX HERNIA REPAIR  2/28/13    Laparoscopic recurrent incisional hernia repair    HX HYSTERECTOMY      2016    HX UROLOGICAL      Kidney Stone Removal    OK EGD TRANSORAL BIOPSY SINGLE/MULTIPLE  9/22/2010            Family History:  Family History   Problem Relation Age of Onset    Colon Cancer Maternal Grandfather        Social History:  Social History     Tobacco Use    Smoking status: Never Smoker    Smokeless tobacco: Never Used   Vaping Use    Vaping Use: Never used   Substance Use Topics    Alcohol use: No    Drug use: No       Allergies:   Allergies   Allergen Reactions  Latex Itching     burning    Compazine [Prochlorperazine] Anxiety     High heart rate  Pt can take promethazine with no problems    Sulfa (Sulfonamide Antibiotics) Unknown (comments)    Topamax [Topiramate] Unknown (comments)    Adhesive Rash     Allergic to Dermabond    Clindamycin Diarrhea     Pt states caused her C-Diff    Mushroom Other (comments)    Mushroom Combination No.1 Unknown (comments)    Toradol [Ketorolac Tromethamine] Nausea and Vomiting and Other (comments)     PO & IV causes GI bleed  Tolerated during Feb 2020 stay    Valproic Acid Other (comments)     Elevated heart rate and vomiting           Review of Systems   no fever  No ear pain  Reports headache  no shortness of breath  no chest pain  no abdominal pain  no dysuria  no leg pain  No rash  No lymphadenopathy  No weight loss    Physical Exam   Physical Exam  Constitutional:       General: She is not in acute distress. Appearance: She is not toxic-appearing. HENT:      Head: Normocephalic. Eyes:      Extraocular Movements: Extraocular movements intact. Cardiovascular:      Rate and Rhythm: Normal rate and regular rhythm. Pulmonary:      Effort: Pulmonary effort is normal.      Breath sounds: Normal breath sounds. Abdominal:      Palpations: Abdomen is soft. Tenderness: There is no abdominal tenderness. Musculoskeletal:         General: No tenderness or deformity. Cervical back: Neck supple. Skin:     General: Skin is warm and dry. Neurological:      General: No focal deficit present. Mental Status: She is alert and oriented to person, place, and time. Cranial Nerves: No cranial nerve deficit. Comments: 5/5 strength with bicep flexion and extension bilaterally, 5/5 strength with ankle flexion and extension bilaterally. Sensation to light touch intact over upper and lower extremities bilaterally.     Psychiatric:         Mood and Affect: Mood normal.         Diagnostic Study Results     Labs -   No results found for this or any previous visit (from the past 24 hour(s)). Radiologic Studies -   No orders to display     CT Results  (Last 48 hours)    None        CXR Results  (Last 48 hours)    None            Medical Decision Making   I am the first provider for this patient. I reviewed the vital signs, available nursing notes, past medical history, past surgical history, family history and social history. Vital Signs-Reviewed the patient's vital signs. Patient Vitals for the past 24 hrs:   Temp Pulse Resp BP SpO2   11/05/21 1520 98.5 °F (36.9 °C) 75 18 (!) 152/98 98 %         Provider Notes (Medical Decision Making):   68-year-old female presenting with headache. Reports history of occipital headaches, and states that this feels similar. Symptoms consistent with occipital neuralgia, possible tension headache, less likely migraine headache. No thunderclap onset, she is well-appearing, neuro exam is unremarkable, not concerning for any other acute intracranial emergency. She is well-hydrated appearing with reassuring vital signs, unlikely any significant electrolyte or metabolic abnormalities. ED Course:     Initial assessment performed. The patients presenting problems have been discussed, and they are in agreement with the care plan formulated and outlined with them. I have encouraged them to ask questions as they arise throughout their visit. Patient is given imitrex, steroid at her request, nausea medication. I am informed by nursing that the patient is requesting Dilaudid. She has care plan in place, management plan to avoid opioids if possible, I agree that this is not first-line treatment for headache. We will try other medications first.  I am informed by nursing that the patient would like to leave. Patient is counseled on supportive care and return precautions.  Will return to the ED for any worsening pain, any fevers, any focal weakness or numbness, or any new or worrisome symptoms. Will followup with primary care doctor, neurologist within 5 days. Critical Care Time:         Disposition:  Home     PLAN:  1. Discharge Medication List as of 11/5/2021  6:53 PM        2.    Follow-up Information     Follow up With Specialties Details Why Contact Info    Tatyana Santos MD Internal Medicine Call in 3 days  [address] [facility] [phone]      Your neurologist  Schedule an appointment as soon as possible for a visit in 1 week      [facility] EMER[facility] Emergency Medicine  As needed, If symptoms worsen [address] [place] [address]  [address] [facility]  [phone]        Return to ED if worse     Diagnosis     Clinical Impression: acute headache

## 2021-11-09 RX ORDER — TIZANIDINE 4 MG/1
TABLET ORAL
Qty: 30 TABLET | Refills: 0 | Status: SHIPPED | OUTPATIENT
Start: 2021-11-09 | End: 2021-11-17

## 2021-11-09 RX ORDER — ONDANSETRON 4 MG/1
TABLET, FILM COATED ORAL
Qty: 20 TABLET | Refills: 0 | Status: SHIPPED | OUTPATIENT
Start: 2021-11-09 | End: 2021-11-29

## 2021-11-14 ENCOUNTER — HOSPITAL ENCOUNTER (EMERGENCY)
Age: 36
Discharge: HOME | End: 2021-11-14
Attending: EMERGENCY MEDICINE
Payer: MEDICAID

## 2021-11-14 VITALS
TEMPERATURE: 98.1 F | HEART RATE: 79 BPM | SYSTOLIC BLOOD PRESSURE: 140 MMHG | DIASTOLIC BLOOD PRESSURE: 86 MMHG | RESPIRATION RATE: 12 BRPM | OXYGEN SATURATION: 100 % | BODY MASS INDEX: 44.04 KG/M2 | WEIGHT: 257.94 LBS | HEIGHT: 64 IN

## 2021-11-14 DIAGNOSIS — N76.4 LEFT GENITAL LABIAL ABSCESS: Primary | ICD-10-CM

## 2021-11-14 PROCEDURE — 99283 EMERGENCY DEPT VISIT LOW MDM: CPT

## 2021-11-14 PROCEDURE — 75810000117 HC INC/DRN VULVA/PERINEUM

## 2021-11-14 PROCEDURE — 74011000250 HC RX REV CODE- 250: Performed by: PHYSICIAN ASSISTANT

## 2021-11-14 PROCEDURE — 74011250637 HC RX REV CODE- 250/637: Performed by: PHYSICIAN ASSISTANT

## 2021-11-14 RX ORDER — HYDROCODONE BITARTRATE AND ACETAMINOPHEN 5; 325 MG/1; MG/1
1 TABLET ORAL
Status: COMPLETED | OUTPATIENT
Start: 2021-11-14 | End: 2021-11-14

## 2021-11-14 RX ORDER — ONDANSETRON 4 MG/1
4 TABLET, ORALLY DISINTEGRATING ORAL
Qty: 10 TABLET | Refills: 0 | Status: SHIPPED | OUTPATIENT
Start: 2021-11-14 | End: 2021-12-11

## 2021-11-14 RX ORDER — HYDROCODONE BITARTRATE AND ACETAMINOPHEN 5; 325 MG/1; MG/1
1 TABLET ORAL
Qty: 9 TABLET | Refills: 0 | Status: SHIPPED | OUTPATIENT
Start: 2021-11-14 | End: 2021-11-17

## 2021-11-14 RX ORDER — LIDOCAINE HYDROCHLORIDE AND EPINEPHRINE 20; 10 MG/ML; UG/ML
10 INJECTION, SOLUTION INFILTRATION; PERINEURAL
Status: COMPLETED | OUTPATIENT
Start: 2021-11-14 | End: 2021-11-14

## 2021-11-14 RX ORDER — ONDANSETRON 4 MG/1
4 TABLET, ORALLY DISINTEGRATING ORAL
Status: COMPLETED | OUTPATIENT
Start: 2021-11-14 | End: 2021-11-14

## 2021-11-14 RX ORDER — DOXYCYCLINE 100 MG/1
100 CAPSULE ORAL 2 TIMES DAILY
Qty: 20 CAPSULE | Refills: 0 | Status: SHIPPED | OUTPATIENT
Start: 2021-11-14 | End: 2021-11-24

## 2021-11-14 RX ADMIN — ONDANSETRON 4 MG: 4 TABLET, ORALLY DISINTEGRATING ORAL at 10:25

## 2021-11-14 RX ADMIN — LIDOCAINE HYDROCHLORIDE AND EPINEPHRINE 200 MG: 20; 10 INJECTION, SOLUTION INFILTRATION; PERINEURAL at 10:19

## 2021-11-14 RX ADMIN — HYDROCODONE BITARTRATE AND ACETAMINOPHEN 1 TABLET: 5; 325 TABLET ORAL at 10:44

## 2021-11-14 NOTE — ED PROVIDER NOTES
EMERGENCY DEPARTMENT HISTORY AND PHYSICAL EXAM      Date: 11/14/2021  Patient Name: Agustina Santos    History of Presenting Illness     Chief Complaint   Patient presents with    Skin Problem     pt states vaginal abcess and states she has hx of skin infections and has been septic from them in the past.        History Provided By: Patient    HPI: Agustina Santos, 38 y.o. female with a history of Erlers-Danlos syndrome, C. difficile, lupus and others as below presents ambulatory with her boyfriend to the ED with cc of a couple of days of 8 out of 10 constant, achy tenderness to the skin of the left vaginal labia that is worse with palpation. She tells me she has a history of recurring abscesses. She tells me she was able to squeeze some pus out the other day, however the area has grown more tender and swollen. There has been no fever. She denies any similar lesions elsewhere on her body. She tells me the pain has caused some nausea but there has been no vomiting. She tells me she is very concerned because she has a history of sepsis. There are no other complaints, changes, or physical findings at this time. PCP: Susanna Ortiz MD    Current Outpatient Medications   Medication Sig Dispense Refill    doxycycline (MONODOX) 100 mg capsule Take 1 Capsule by mouth two (2) times a day for 10 days. 20 Capsule 0    HYDROcodone-acetaminophen (NORCO) 5-325 mg per tablet Take 1 Tablet by mouth every eight (8) hours as needed for Pain for up to 3 days. Max Daily Amount: 3 Tablets. 9 Tablet 0    ondansetron (Zofran ODT) 4 mg disintegrating tablet Take 1 Tablet by mouth every eight (8) hours as needed for Nausea.  10 Tablet 0    tiZANidine (ZANAFLEX) 4 mg tablet TAKE 1 TABLET BY MOUTH THREE TIMES A DAY AS NEEDED FOR PAIN 30 Tablet 0    ondansetron hcl (ZOFRAN) 4 mg tablet TAKE 1 TABLET BY MOUTH EVERY 8 HOURS AS NEEDED FOR NAUSEA 20 Tablet 0    meclizine (ANTIVERT) 25 mg tablet TAKE 1 TAB BY MOUTH THREE TIMES DAILY AS NEEDED FOR DIZZINESS FOR UP TO 10 DAYS. 30 Tablet 0    lidocaine (URO-JET/ GLYDO) 2 % jelp jelly Apply 1 mL to affected area two (2) times daily as needed for Pain or PRN Reason (Other) (pain). 20 mL 0    xlbfkkhbq-Lhvrptvb-Morns-W.Pet (Preparation H Maximum Strength) 0.25-1 % rectal cream Apply  to affected area two (2) times daily as needed for Pain or Hemorrhoids. 1 g 0    SUMAtriptan (IMITREX) 25 mg tablet Take 25 mg by mouth as needed.  promethazine (PHENERGAN) 25 mg tablet Take 1 Tablet by mouth every six (6) hours as needed for Nausea. 12 Tablet 0    sucralfate (Carafate) 100 mg/mL suspension Take 5 mL by mouth four (4) times daily. (Patient not taking: Reported on 10/26/2021) 414 mL 0    ALPRAZolam (XANAX) 1 mg tablet TAKE 1 TABLET BY MOUTH TWICE A DAY AS NEEDED FOR ANXIETY 60 Tablet 1    DULoxetine (CYMBALTA) 60 mg capsule TAKE 2 CAPSULESBY MOUTH EVERY DAY 30 Capsule 5    ondansetron (Zofran ODT) 4 mg disintegrating tablet 1 Tablet by SubLINGual route every eight (8) hours as needed for Nausea or Vomiting. 20 Tablet 0    predniSONE (STERAPRED DS) 10 mg dose pack Take as prescribed (Patient not taking: Reported on 10/26/2021) 21 Tablet 0    furosemide (LASIX) 20 mg tablet Take 1 Tablet by mouth as needed (swelling). 30 Tablet 2    albuterol (PROVENTIL HFA, VENTOLIN HFA, PROAIR HFA) 90 mcg/actuation inhaler Take 1 Puff by inhalation every six (6) hours as needed for Wheezing. 1 Inhaler 0    predniSONE (DELTASONE) 10 mg tablet Take 60 mg by mouth daily (with breakfast). Day 1 and 2: 60mg; Day 3: 50mg; Day 4:40mg; Day 5: 30 mg; Day 6: 20mg; Day 7: 10mg (Patient not taking: Reported on 9/1/2021) 27 Tablet 0    gabapentin (NEURONTIN) 100 mg capsule TAKE 2 CAPSULES BY MOUTH 3 TIMES A DAY      propranolol LA (INDERAL LA) 80 mg SR capsule 80 mg daily.       codeine-butalbital-acetaminophen-caffeine (FIORICET WITH CODEINE) -11-30 mg capsule Take 1 Capsule by mouth every six (6) hours as needed for Headache.  mirtazapine (REMERON) 30 mg tablet TAKE 1 TABLET BY MOUTH AT BEDTIME 30 Tablet 5    naloxone (NARCAN) 4 mg/actuation nasal spray Use 1 spray intranasally, then discard. Repeat with new spray every 2 min as needed for opioid overdose symptoms, alternating nostrils. 2 Each 0    pantoprazole (PROTONIX) 40 mg tablet take 1 tablet by mouth at bedtime  0    rizatriptan (MAXALT) 10 mg tablet Take 10 mg by mouth once as needed for Migraine. May repeat in 2 hours if needed      belimumab (Benlysta) 400 mg solr injection by IntraVENous route.        Past History     Past Medical History:  Past Medical History:   Diagnosis Date    Abnormal CT of the abdomen 11/8/2012    Asthma     Autoimmune disease (HCC)     lupus    C. difficile diarrhea     Cervical prolapse     Dehydration     Mariah-Danlos syndrome     Gastrointestinal disorder     gerd, twisted colon, IBS    GERD (gastroesophageal reflux disease)     Headache(784.0)     Lupus (HCC)     Morbid obesity (HCC)     Murmur     Nausea & vomiting     Neurological disorder     cluster headaches    Occipital neuralgia     other 2006, 2009    ovarian cyst removal    Other ill-defined conditions(799.89)     Syncope     Worsening headaches        Past Surgical History:  Past Surgical History:   Procedure Laterality Date    COLONOSCOPY  9/21/2010         HX CHOLECYSTECTOMY      HX CYST INCISION AND DRAINAGE Right 02/27/2020    HX GYN  1/2009    right salpingo oopherectomy    HX GYN  2006    right ovarian tumor removed    HX HEENT      left wisdom teeth removal    HX HEENT      top right wisdom tooth removed    HX HERNIA REPAIR  4/2012    HX HERNIA REPAIR  2/28/13    Laparoscopic recurrent incisional hernia repair    HX HYSTERECTOMY      2016    HX UROLOGICAL      Kidney Stone Removal    GA EGD TRANSORAL BIOPSY SINGLE/MULTIPLE  9/22/2010            Family History:  Family History   Problem Relation Age of Onset    Colon Cancer Maternal Grandfather        Social History:  Social History     Tobacco Use    Smoking status: Never Smoker    Smokeless tobacco: Never Used   Vaping Use    Vaping Use: Never used   Substance Use Topics    Alcohol use: No    Drug use: No       Allergies: Allergies   Allergen Reactions    Latex Itching     burning    Compazine [Prochlorperazine] Anxiety     High heart rate  Pt can take promethazine with no problems    Sulfa (Sulfonamide Antibiotics) Unknown (comments)    Topamax [Topiramate] Unknown (comments)    Adhesive Rash     Allergic to Dermabond    Clindamycin Diarrhea     Pt states caused her C-Diff    Mushroom Other (comments)    Mushroom Combination No.1 Unknown (comments)    Reglan [Metoclopramide Hcl] Rash    Toradol [Ketorolac Tromethamine] Nausea and Vomiting and Other (comments)     PO & IV causes GI bleed  Tolerated during Feb 2020 stay    Valproic Acid Other (comments)     Elevated heart rate and vomiting       Review of Systems   Review of Systems   Constitutional: Negative for fatigue and fever. HENT: Negative for ear pain and sore throat. Eyes: Negative for pain, redness and visual disturbance. Respiratory: Negative for cough and shortness of breath. Cardiovascular: Negative for chest pain and palpitations. Gastrointestinal: Negative for abdominal pain, nausea and vomiting. Genitourinary: Negative for dysuria, frequency and urgency. Musculoskeletal: Negative for back pain, gait problem, neck pain and neck stiffness. Skin: Negative for rash and wound. Abscess to the left vaginal labia   Neurological: Negative for dizziness, weakness, light-headedness, numbness and headaches. Physical Exam   Physical Exam  Vitals and nursing note reviewed. Exam conducted with a chaperone present. Constitutional:       General: She is not in acute distress. Appearance: She is well-developed. She is obese. She is not toxic-appearing.    HENT:      Head: Normocephalic and atraumatic. Jaw: No trismus. Right Ear: External ear normal.      Left Ear: External ear normal.      Nose: Nose normal.      Mouth/Throat:      Pharynx: Uvula midline. Eyes:      General: No scleral icterus. Conjunctiva/sclera: Conjunctivae normal.      Pupils: Pupils are equal, round, and reactive to light. Cardiovascular:      Rate and Rhythm: Normal rate and regular rhythm. Pulmonary:      Effort: Pulmonary effort is normal. No tachypnea, accessory muscle usage or respiratory distress. Breath sounds: No decreased breath sounds or wheezing. Abdominal:      Palpations: Abdomen is soft. Tenderness: There is no abdominal tenderness. Genitourinary:     Labia:         Left: Tenderness present. Comments:   Tender, rubbery induration of the left vaginal labia. There is mild overlying redness. Musculoskeletal:         General: Normal range of motion. Cervical back: Full passive range of motion without pain and normal range of motion. Skin:     Findings: No rash. Neurological:      Mental Status: She is alert and oriented to person, place, and time. She is not disoriented. GCS: GCS eye subscore is 4. GCS verbal subscore is 5. GCS motor subscore is 6. Cranial Nerves: No cranial nerve deficit. Psychiatric:         Speech: Speech normal.       Diagnostic Study Results     Labs -   No results found for this or any previous visit (from the past 12 hour(s)). Radiologic Studies -   No orders to display     CT Results  (Last 48 hours)    None        CXR Results  (Last 48 hours)    None        Medical Decision Making   I am the first provider for this patient. I reviewed the vital signs, available nursing notes, past medical history, past surgical history, family history and social history. Vital Signs-Reviewed the patient's vital signs.   Patient Vitals for the past 12 hrs:   Temp Pulse Resp BP SpO2   11/14/21 0952 98.1 °F (36.7 °C) 79 12 (!) 140/86 100 %       Pulse Oximetry Analysis - 100% on RA    Records Reviewed: Nursing Notes, Old Medical Records, Previous Radiology Studies, Previous Laboratory Studies and [facility] and Management Plan    Provider Notes (Medical Decision Making):   DDx: Abscess, cellulitis, infected epidermoid cyst    Procedure Note - Incision and Drainage:   11:21 AM  Performed by: Brody Isaacs PA-C  Verbal Consent obtained and witnessed by RN Jolie Turpin who also chaperoned the procedure. Complexity: complex   (Note: Complex drainage include wounds that: 1. involve multiple abscesses, 2. Are probed to break up loculations or 3. Are packed after drainage.)  Skin prepped with Betadine. Sterile field established. Anesthesia achieved using a local infiltration of 4 mL lidocaine 2% with epinephrine. Abscess to left labia was incised with # 11 blade, and a scant to small amount of bloody and purulent drainage was expressed. Wound probed and irrigated. Area was packed using 1/4 inch plain gauze. Sterile dressing applied. Estimated blood loss: minimal  The procedure took 1-15 minutes, and pt tolerated well. ED Course:   Initial assessment performed. The patients presenting problems have been discussed, and they are in agreement with the care plan formulated and outlined with them. I have encouraged them to ask questions as they arise throughout their visit. Disposition:  Discharge    PLAN:  1. Current Discharge Medication List      START taking these medications    Details   doxycycline (MONODOX) 100 mg capsule Take 1 Capsule by mouth two (2) times a day for 10 days. Qty: 20 Capsule, Refills: 0  Start date: 11/14/2021, End date: 11/24/2021      HYDROcodone-acetaminophen (NORCO) 5-325 mg per tablet Take 1 Tablet by mouth every eight (8) hours as needed for Pain for up to 3 days. Max Daily Amount: 3 Tablets.   Qty: 9 Tablet, Refills: 0  Start date: 11/14/2021, End date: 11/17/2021    Associated Diagnoses: Left genital labial abscess      !! ondansetron (Zofran ODT) 4 mg disintegrating tablet Take 1 Tablet by mouth every eight (8) hours as needed for Nausea. Qty: 10 Tablet, Refills: 0  Start date: 11/14/2021       !! - Potential duplicate medications found. Please discuss with provider. CONTINUE these medications which have NOT CHANGED    Details   !! ondansetron (Zofran ODT) 4 mg disintegrating tablet 1 Tablet by SubLINGual route every eight (8) hours as needed for Nausea or Vomiting. Qty: 20 Tablet, Refills: 0       !! - Potential duplicate medications found. Please discuss with provider. 2.   Follow-up Information     Follow up With Specialties Details Why Contact Info    Syd Loaiza MD General Surgery, Breast Surgery, Colon and Rectal Surgery, Endocrinology Call  SURGERY: as needed if symptoms recur or persist. You should have a wound check in 2 days if symptoms persist. [address] [facility] [address]  [phone]          Return to ED if worse     Diagnosis     Clinical Impression:   1.  Left genital labial abscess

## 2021-11-15 RX ORDER — MECLIZINE HYDROCHLORIDE 25 MG/1
TABLET ORAL
Qty: 30 TABLET | Refills: 0 | Status: SHIPPED | OUTPATIENT
Start: 2021-11-15 | End: 2021-12-11

## 2021-11-16 ENCOUNTER — HOSPITAL ENCOUNTER (EMERGENCY)
Age: 36
Discharge: HOME | End: 2021-11-16
Attending: EMERGENCY MEDICINE
Payer: MEDICAID

## 2021-11-16 VITALS
DIASTOLIC BLOOD PRESSURE: 57 MMHG | BODY MASS INDEX: 49.33 KG/M2 | SYSTOLIC BLOOD PRESSURE: 116 MMHG | TEMPERATURE: 98.9 F | HEART RATE: 77 BPM | RESPIRATION RATE: 16 BRPM | HEIGHT: 62 IN | OXYGEN SATURATION: 95 % | WEIGHT: 268.08 LBS

## 2021-11-16 DIAGNOSIS — N76.4 LABIAL ABSCESS: Primary | ICD-10-CM

## 2021-11-16 LAB
ANION GAP SERPL CALC-SCNC: 5 MMOL/L (ref 5–15)
BASOPHILS # BLD: 0.1 K/UL (ref 0–0.1)
BASOPHILS NFR BLD: 0 % (ref 0–1)
BUN SERPL-MCNC: 14 MG/DL (ref 6–20)
BUN/CREAT SERPL: 13 (ref 12–20)
CALCIUM SERPL-MCNC: 9.4 MG/DL (ref 8.5–10.1)
CHLORIDE SERPL-SCNC: 108 MMOL/L (ref 97–108)
CO2 SERPL-SCNC: 26 MMOL/L (ref 21–32)
COMMENT, HOLDF: NORMAL
CREAT SERPL-MCNC: 1.09 MG/DL (ref 0.55–1.02)
DIFFERENTIAL METHOD BLD: ABNORMAL
EOSINOPHIL # BLD: 0.9 K/UL (ref 0–0.4)
EOSINOPHIL NFR BLD: 7 % (ref 0–7)
ERYTHROCYTE [DISTWIDTH] IN BLOOD BY AUTOMATED COUNT: 12.8 % (ref 11.5–14.5)
GLUCOSE SERPL-MCNC: 109 MG/DL (ref 65–100)
HCT VFR BLD AUTO: 33.5 % (ref 35–47)
HGB BLD-MCNC: 10.8 G/DL (ref 11.5–16)
IMM GRANULOCYTES # BLD AUTO: 0.1 K/UL (ref 0–0.04)
IMM GRANULOCYTES NFR BLD AUTO: 1 % (ref 0–0.5)
LACTATE SERPL-SCNC: 0.5 MMOL/L (ref 0.4–2)
LYMPHOCYTES # BLD: 3.2 K/UL (ref 0.8–3.5)
LYMPHOCYTES NFR BLD: 25 % (ref 12–49)
MCH RBC QN AUTO: 29.8 PG (ref 26–34)
MCHC RBC AUTO-ENTMCNC: 32.2 G/DL (ref 30–36.5)
MCV RBC AUTO: 92.3 FL (ref 80–99)
MONOCYTES # BLD: 1.1 K/UL (ref 0–1)
MONOCYTES NFR BLD: 8 % (ref 5–13)
NEUTS SEG # BLD: 7.5 K/UL (ref 1.8–8)
NEUTS SEG NFR BLD: 59 % (ref 32–75)
NRBC # BLD: 0 K/UL (ref 0–0.01)
NRBC BLD-RTO: 0 PER 100 WBC
PLATELET # BLD AUTO: 348 K/UL (ref 150–400)
PMV BLD AUTO: 9.6 FL (ref 8.9–12.9)
POTASSIUM SERPL-SCNC: 4.6 MMOL/L (ref 3.5–5.1)
RBC # BLD AUTO: 3.63 M/UL (ref 3.8–5.2)
SAMPLES BEING HELD,HOLD: NORMAL
SODIUM SERPL-SCNC: 139 MMOL/L (ref 136–145)
WBC # BLD AUTO: 12.8 K/UL (ref 3.6–11)

## 2021-11-16 PROCEDURE — 80048 BASIC METABOLIC PNL TOTAL CA: CPT

## 2021-11-16 PROCEDURE — 85025 COMPLETE CBC W/AUTO DIFF WBC: CPT

## 2021-11-16 PROCEDURE — 96374 THER/PROPH/DIAG INJ IV PUSH: CPT

## 2021-11-16 PROCEDURE — 83605 ASSAY OF LACTIC ACID: CPT

## 2021-11-16 PROCEDURE — 74011250636 HC RX REV CODE- 250/636: Performed by: EMERGENCY MEDICINE

## 2021-11-16 PROCEDURE — 99283 EMERGENCY DEPT VISIT LOW MDM: CPT

## 2021-11-16 PROCEDURE — 96361 HYDRATE IV INFUSION ADD-ON: CPT

## 2021-11-16 PROCEDURE — 74011250637 HC RX REV CODE- 250/637: Performed by: EMERGENCY MEDICINE

## 2021-11-16 RX ORDER — HYDROCODONE BITARTRATE AND ACETAMINOPHEN 7.5; 325 MG/1; MG/1
1 TABLET ORAL
Qty: 12 TABLET | Refills: 0 | Status: SHIPPED | OUTPATIENT
Start: 2021-11-16 | End: 2021-11-19

## 2021-11-16 RX ORDER — HYDROCODONE BITARTRATE AND ACETAMINOPHEN 7.5; 325 MG/1; MG/1
1 TABLET ORAL ONCE
Status: COMPLETED | OUTPATIENT
Start: 2021-11-16 | End: 2021-11-16

## 2021-11-16 RX ORDER — HYDROCODONE BITARTRATE AND ACETAMINOPHEN 7.5; 325 MG/1; MG/1
1 TABLET ORAL ONCE
Status: DISCONTINUED | OUTPATIENT
Start: 2021-11-16 | End: 2021-11-16 | Stop reason: HOSPADM

## 2021-11-16 RX ORDER — ONDANSETRON 8 MG/1
8 TABLET, ORALLY DISINTEGRATING ORAL
Qty: 10 TABLET | Refills: 0 | Status: SHIPPED | OUTPATIENT
Start: 2021-11-16 | End: 2021-11-19

## 2021-11-16 RX ORDER — ONDANSETRON 2 MG/ML
4 INJECTION INTRAMUSCULAR; INTRAVENOUS
Status: COMPLETED | OUTPATIENT
Start: 2021-11-16 | End: 2021-11-16

## 2021-11-16 RX ADMIN — ONDANSETRON 4 MG: 2 INJECTION INTRAMUSCULAR; INTRAVENOUS at 15:32

## 2021-11-16 RX ADMIN — HYDROCODONE BITARTRATE AND ACETAMINOPHEN 1 TABLET: 7.5; 325 TABLET ORAL at 15:32

## 2021-11-16 RX ADMIN — SODIUM CHLORIDE 1000 ML: 9 INJECTION, SOLUTION INTRAVENOUS at 15:51

## 2021-11-16 NOTE — ED PROVIDER NOTES
EMERGENCY DEPARTMENT HISTORY AND PHYSICAL EXAM      Date: 11/16/2021  Patient Name: Brooke Recinos  Patient Age and Sex: 38 y.o. female     History of Presenting Illness     Chief Complaint   Patient presents with    Abscess     pt had a vaginal abscess I&D on Sunday but pain is continuing to get worse and she has been running a low grade fever. She has also been vomiting. She had this happen with an abscess in the past that became septic.  Vomiting       History Provided By: Patient    HPI: Brooke Recinos is a 38year old presenting with pain and swelling to vagina as well as nausea and vomiting. Unable to tolerate any oral PO. Pain and swelling to left vagina about 6-7 days ago. Had it drained on Sunday, pain improved then, but then pain severely worsened yesterday. Wound is packed, with instructions to unpack tomorrow. Able to keep down the oral antibiotics, vomiting within 30 minutes. Patient reports she takes hydrocodone, which helps for a few hours, but then severe worsen. She reports she cannot take NSAIDs because of PUD. Taking tylenol and hydrocodone. There are no other complaints, changes, or physical findings at this time. PCP: Sacha Montilla MD    No current facility-administered medications on file prior to encounter. Current Outpatient Medications on File Prior to Encounter   Medication Sig Dispense Refill    meclizine (ANTIVERT) 25 mg tablet TAKE 1 TAB BY MOUTH THREE TIMES DAILY AS NEEDED FOR DIZZINESS FOR UP TO 10 DAYS. 30 Tablet 0    doxycycline (MONODOX) 100 mg capsule Take 1 Capsule by mouth two (2) times a day for 10 days. 20 Capsule 0    HYDROcodone-acetaminophen (NORCO) 5-325 mg per tablet Take 1 Tablet by mouth every eight (8) hours as needed for Pain for up to 3 days. Max Daily Amount: 3 Tablets. 9 Tablet 0    ondansetron (Zofran ODT) 4 mg disintegrating tablet Take 1 Tablet by mouth every eight (8) hours as needed for Nausea.  10 Tablet 0    tiZANidine (ZANAFLEX) 4 mg tablet TAKE 1 TABLET BY MOUTH THREE TIMES A DAY AS NEEDED FOR PAIN 30 Tablet 0    ondansetron hcl (ZOFRAN) 4 mg tablet TAKE 1 TABLET BY MOUTH EVERY 8 HOURS AS NEEDED FOR NAUSEA 20 Tablet 0    lidocaine (URO-JET/ GLYDO) 2 % jelp jelly Apply 1 mL to affected area two (2) times daily as needed for Pain or PRN Reason (Other) (pain). 20 mL 0    cjamununh-Gtuxcvaq-Uyenq-W.Pet (Preparation H Maximum Strength) 0.25-1 % rectal cream Apply  to affected area two (2) times daily as needed for Pain or Hemorrhoids. 1 g 0    SUMAtriptan (IMITREX) 25 mg tablet Take 25 mg by mouth as needed.  promethazine (PHENERGAN) 25 mg tablet Take 1 Tablet by mouth every six (6) hours as needed for Nausea. 12 Tablet 0    sucralfate (Carafate) 100 mg/mL suspension Take 5 mL by mouth four (4) times daily. (Patient not taking: Reported on 10/26/2021) 414 mL 0    ALPRAZolam (XANAX) 1 mg tablet TAKE 1 TABLET BY MOUTH TWICE A DAY AS NEEDED FOR ANXIETY 60 Tablet 1    DULoxetine (CYMBALTA) 60 mg capsule TAKE 2 CAPSULESBY MOUTH EVERY DAY 30 Capsule 5    ondansetron (Zofran ODT) 4 mg disintegrating tablet 1 Tablet by SubLINGual route every eight (8) hours as needed for Nausea or Vomiting. 20 Tablet 0    predniSONE (STERAPRED DS) 10 mg dose pack Take as prescribed (Patient not taking: Reported on 10/26/2021) 21 Tablet 0    furosemide (LASIX) 20 mg tablet Take 1 Tablet by mouth as needed (swelling). 30 Tablet 2    albuterol (PROVENTIL HFA, VENTOLIN HFA, PROAIR HFA) 90 mcg/actuation inhaler Take 1 Puff by inhalation every six (6) hours as needed for Wheezing. 1 Inhaler 0    predniSONE (DELTASONE) 10 mg tablet Take 60 mg by mouth daily (with breakfast).  Day 1 and 2: 60mg; Day 3: 50mg; Day 4:40mg; Day 5: 30 mg; Day 6: 20mg; Day 7: 10mg (Patient not taking: Reported on 9/1/2021) 27 Tablet 0    gabapentin (NEURONTIN) 100 mg capsule TAKE 2 CAPSULES BY MOUTH 3 TIMES A DAY      propranolol LA (INDERAL LA) 80 mg SR capsule 80 mg daily.      codeine-butalbital-acetaminophen-caffeine (FIORICET WITH CODEINE) -52-30 mg capsule Take 1 Capsule by mouth every six (6) hours as needed for Headache.  mirtazapine (REMERON) 30 mg tablet TAKE 1 TABLET BY MOUTH AT BEDTIME 30 Tablet 5    naloxone (NARCAN) 4 mg/actuation nasal spray Use 1 spray intranasally, then discard. Repeat with new spray every 2 min as needed for opioid overdose symptoms, alternating nostrils. 2 Each 0    pantoprazole (PROTONIX) 40 mg tablet take 1 tablet by mouth at bedtime  0    rizatriptan (MAXALT) 10 mg tablet Take 10 mg by mouth once as needed for Migraine. May repeat in 2 hours if needed      belimumab (Benlysta) 400 mg solr injection by IntraVENous route.          Past History   Past Medical History:  Past Medical History:   Diagnosis Date    Abnormal CT of the abdomen 11/8/2012    Asthma     Autoimmune disease (HCC)     lupus    C. difficile diarrhea     Cervical prolapse     Dehydration     Mariah-Danlos syndrome     Gastrointestinal disorder     gerd, twisted colon, IBS    GERD (gastroesophageal reflux disease)     Headache(784.0)     Lupus (HCC)     Morbid obesity (HCC)     Murmur     Nausea & vomiting     Neurological disorder     cluster headaches    Occipital neuralgia     other 2006, 2009    ovarian cyst removal    Other ill-defined conditions(799.89)     Syncope     Worsening headaches        Past Surgical History:  Past Surgical History:   Procedure Laterality Date    COLONOSCOPY  9/21/2010         HX CHOLECYSTECTOMY      HX CYST INCISION AND DRAINAGE Right 02/27/2020    HX GYN  1/2009    right salpingo oopherectomy    HX GYN  2006    right ovarian tumor removed    HX HEENT      left wisdom teeth removal    HX HEENT      top right wisdom tooth removed    HX HERNIA REPAIR  4/2012    HX HERNIA REPAIR  2/28/13    Laparoscopic recurrent incisional hernia repair    HX HYSTERECTOMY      2016    HX UROLOGICAL      Kidney Kourtney Joness Removal    IL EGD TRANSORAL BIOPSY SINGLE/MULTIPLE  9/22/2010            Family History:  Family History   Problem Relation Age of Onset    Colon Cancer Maternal Grandfather        Social History:  Social History     Tobacco Use    Smoking status: Never Smoker    Smokeless tobacco: Never Used   Vaping Use    Vaping Use: Never used   Substance Use Topics    Alcohol use: No    Drug use: No       Allergies: Allergies   Allergen Reactions    Latex Itching     burning    Compazine [Prochlorperazine] Anxiety     High heart rate  Pt can take promethazine with no problems    Sulfa (Sulfonamide Antibiotics) Unknown (comments)    Topamax [Topiramate] Unknown (comments)    Adhesive Rash     Allergic to Dermabond    Clindamycin Diarrhea     Pt states caused her C-Diff    Mushroom Other (comments)    Mushroom Combination No.1 Unknown (comments)    Reglan [Metoclopramide Hcl] Rash    Toradol [Ketorolac Tromethamine] Nausea and Vomiting and Other (comments)     PO & IV causes GI bleed  Tolerated during Feb 2020 stay    Valproic Acid Other (comments)     Elevated heart rate and vomiting         Review of Systems   Review of Systems   Constitutional: Positive for fever. Gastrointestinal: Positive for nausea and vomiting. Genitourinary: Positive for vaginal pain. All other systems reviewed and are negative. Physical Exam   Physical Exam  Vitals and nursing note reviewed. Exam conducted with a chaperone present. Constitutional:       Appearance: Normal appearance. HENT:      Head: Normocephalic and atraumatic. Mouth/Throat:      Mouth: Mucous membranes are moist.   Eyes:      Pupils: Pupils are equal, round, and reactive to light. Cardiovascular:      Rate and Rhythm: Normal rate and regular rhythm. Pulses: Normal pulses. Pulmonary:      Effort: Pulmonary effort is normal.      Breath sounds: Normal breath sounds. Abdominal:      General: Abdomen is flat.       Palpations: Abdomen is soft.      Tenderness: There is no abdominal tenderness. Genitourinary:     Labia:         Left: Rash and lesion present. Comments: Area of induration, mild erythema, packing in place, no purulent discharge noted at this time. No crepitus no pain beyond areas of erythema. Musculoskeletal:      Cervical back: Normal range of motion. Lymphadenopathy:      Lower Body: No right inguinal adenopathy. No left inguinal adenopathy. Skin:     General: Skin is warm and dry. Neurological:      Mental Status: She is alert and oriented to person, place, and time. Mental status is at baseline. Diagnostic Study Results     Labs   Recent Results (from the past 12 hour(s))   CBC WITH AUTOMATED DIFF    Collection Time: 11/16/21  3:16 PM   Result Value Ref Range    WBC 12.8 (H) 3.6 - 11.0 K/uL    RBC 3.63 (L) 3.80 - 5.20 M/uL    HGB 10.8 (L) 11.5 - 16.0 g/dL    HCT 33.5 (L) 35.0 - 47.0 %    MCV 92.3 80.0 - 99.0 FL    MCH 29.8 26.0 - 34.0 PG    MCHC 32.2 30.0 - 36.5 g/dL    RDW 12.8 11.5 - 14.5 %    PLATELET 206 811 - 217 K/uL    MPV 9.6 8.9 - 12.9 FL    NRBC 0.0 0  WBC    ABSOLUTE NRBC 0.00 0.00 - 0.01 K/uL    NEUTROPHILS 59 32 - 75 %    LYMPHOCYTES 25 12 - 49 %    MONOCYTES 8 5 - 13 %    EOSINOPHILS 7 0 - 7 %    BASOPHILS 0 0 - 1 %    IMMATURE GRANULOCYTES 1 (H) 0.0 - 0.5 %    ABS. NEUTROPHILS 7.5 1.8 - 8.0 K/UL    ABS. LYMPHOCYTES 3.2 0.8 - 3.5 K/UL    ABS. MONOCYTES 1.1 (H) 0.0 - 1.0 K/UL    ABS. EOSINOPHILS 0.9 (H) 0.0 - 0.4 K/UL    ABS. BASOPHILS 0.1 0.0 - 0.1 K/UL    ABS. IMM.  GRANS. 0.1 (H) 0.00 - 0.04 K/UL    DF AUTOMATED     METABOLIC PANEL, BASIC    Collection Time: 11/16/21  3:16 PM   Result Value Ref Range    Sodium 139 136 - 145 mmol/L    Potassium 4.6 3.5 - 5.1 mmol/L    Chloride 108 97 - 108 mmol/L    CO2 26 21 - 32 mmol/L    Anion gap 5 5 - 15 mmol/L    Glucose 109 (H) 65 - 100 mg/dL    BUN 14 6 - 20 MG/DL    Creatinine 1.09 (H) 0.55 - 1.02 MG/DL    BUN/Creatinine ratio 13 12 - 20 GFR est AA >60 >60 ml/min/1.73m2    GFR est non-AA 56 (L) >60 ml/min/1.73m2    Calcium 9.4 8.5 - 10.1 MG/DL   LACTIC ACID    Collection Time: 11/16/21  3:16 PM   Result Value Ref Range    Lactic acid 0.5 0.4 - 2.0 MMOL/L   SAMPLES BEING HELD    Collection Time: 11/16/21  3:16 PM   Result Value Ref Range    SAMPLES BEING HELD  PST     COMMENT        Add-on orders for these samples will be processed based on acceptable specimen integrity and analyte stability, which may vary by analyte. Radiologic Studies -   No orders to display     CT Results  (Last 48 hours)    None        CXR Results  (Last 48 hours)    None            Medical Decision Making   I am the first provider for this patient. I reviewed the vital signs, available nursing notes, past medical history, past surgical history, family history and social history. Vital Signs-Reviewed the patient's vital signs. Patient Vitals for the past 12 hrs:   Temp Pulse Resp BP SpO2   11/16/21 1630 -- -- -- (!) 116/57 95 %   11/16/21 1418 98.9 °F (37.2 °C) 77 16 131/83 95 %       Records Reviewed: Nursing Notes and Old Medical Records    Provider Notes (Medical Decision Making):   Patient with normal vital signs, no fever no tachycardia, no signs of sepsis at this time. Wound is indurated with packing in place, minimal surrounding erythema, no evidence of necrotizing soft tissue infection on exam.    We will obtain lab work including CBC, lactate, BMP. Patient requesting IV opioids. Care plan reviewed to avoid opioids however at this time given that she does have an acute painful condition will give oral hydrocodone and Tylenol, avoid IV narcotics, give IV Zofran and IV fluids for her vomiting and ensure she can tolerate p.o. prior to discharge. Will continue with oral narcotics and brief prescription for same. ED Course:   Initial assessment performed.  The patients presenting problems have been discussed, and they are in agreement with the care plan formulated and outlined with them. I have encouraged them to ask questions as they arise throughout their visit. ED Course as of 11/16/21 2318   Tue Nov 16, 2021   1507 Care plan placed 2018 for management of chronic pain; several ED visits with the past 2 months for multiple complaints including headache, PUD, vertigo, hemorrhoids. Most recently seen for this abscess 2 days ago. Pain not controlled on oral hydrocodone. [WB]   1553 Over the white blood cell count of 12.8 [WB]   1553 Normal differential [WB]   1559 Lactate is normal at 0.5 [WB]   1614 Normal BMP [WB]   1629 Discussed with patient that we will avoid IV narcotics, will discharge with 87 mg ODT zofran [WB]      ED Course User Index  [WB] Leonard Myles MD     Disposition:  Discharge Note:  The patient has been re-evaluated and is ready for discharge. Reviewed available results with patient. Counseled patient on diagnosis and care plan. Patient has expressed understanding, and all questions have been answered. Patient agrees with plan and agrees to follow up as recommended, or to return to the ED if their symptoms worsen. Discharge instructions have been provided and explained to the patient, along with reasons to return to the ED. PLAN:  Discharge Medication List as of 11/16/2021  4:34 PM      START taking these medications    Details   !! ondansetron (Zofran ODT) 8 mg disintegrating tablet Take 1 Tablet by mouth every eight (8) hours as needed for Nausea or Vomiting for up to 3 days. , Normal, Disp-10 Tablet, R-0      HYDROcodone-acetaminophen (Lorcet Plus) 7.5-325 mg per tablet Take 1 Tablet by mouth every six (6) hours as needed for Pain for up to 3 days. Max Daily Amount: 4 Tablets., Normal, Disp-12 Tablet, R-0       !! - Potential duplicate medications found. Please discuss with provider.       CONTINUE these medications which have NOT CHANGED    Details   meclizine (ANTIVERT) 25 mg tablet TAKE 1 TAB BY MOUTH THREE TIMES DAILY AS NEEDED FOR DIZZINESS FOR UP TO 10 DAYS., Normal, Disp-30 Tablet, R-0      doxycycline (MONODOX) 100 mg capsule Take 1 Capsule by mouth two (2) times a day for 10 days. , Normal, Disp-20 Capsule, R-0      HYDROcodone-acetaminophen (NORCO) 5-325 mg per tablet Take 1 Tablet by mouth every eight (8) hours as needed for Pain for up to 3 days. Max Daily Amount: 3 Tablets., Normal, Disp-9 Tablet, R-0      !! ondansetron (Zofran ODT) 4 mg disintegrating tablet Take 1 Tablet by mouth every eight (8) hours as needed for Nausea., Normal, Disp-10 Tablet, R-0      tiZANidine (ZANAFLEX) 4 mg tablet TAKE 1 TABLET BY MOUTH THREE TIMES A DAY AS NEEDED FOR PAIN, Normal, Disp-30 Tablet, R-0      ondansetron hcl (ZOFRAN) 4 mg tablet TAKE 1 TABLET BY MOUTH EVERY 8 HOURS AS NEEDED FOR NAUSEA, Normal, Disp-20 Tablet, R-0      lidocaine (URO-JET/ GLYDO) 2 % jelp jelly Apply 1 mL to affected area two (2) times daily as needed for Pain or PRN Reason (Other) (pain). , Normal, Disp-20 mL, R-0      ozumwmypt-Effihhvs-Tzgxm-W.Pet (Preparation H Maximum Strength) 0.25-1 % rectal cream Apply  to affected area two (2) times daily as needed for Pain or Hemorrhoids. , Normal, Disp-1 g, R-0      SUMAtriptan (IMITREX) 25 mg tablet Take 25 mg by mouth as needed., Historical Med      promethazine (PHENERGAN) 25 mg tablet Take 1 Tablet by mouth every six (6) hours as needed for Nausea., Normal, Disp-12 Tablet, R-0      sucralfate (Carafate) 100 mg/mL suspension Take 5 mL by mouth four (4) times daily. , Normal, Disp-414 mL, R-0      ALPRAZolam (XANAX) 1 mg tablet TAKE 1 TABLET BY MOUTH TWICE A DAY AS NEEDED FOR ANXIETY, Normal, Disp-60 Tablet, R-1Not to exceed 4 additional fills before 01/26/2022 DX Code Needed  .       DULoxetine (CYMBALTA) 60 mg capsule TAKE 2 CAPSULESBY MOUTH EVERY DAY, Normal, Disp-30 Capsule, R-5      !! ondansetron (Zofran ODT) 4 mg disintegrating tablet 1 Tablet by SubLINGual route every eight (8) hours as needed for Nausea or Vomiting., Normal, Disp-20 Tablet, R-0      predniSONE (STERAPRED DS) 10 mg dose pack Take as prescribed, Normal, Disp-21 Tablet, R-0      furosemide (LASIX) 20 mg tablet Take 1 Tablet by mouth as needed (swelling). , Normal, Disp-30 Tablet, R-2      albuterol (PROVENTIL HFA, VENTOLIN HFA, PROAIR HFA) 90 mcg/actuation inhaler Take 1 Puff by inhalation every six (6) hours as needed for Wheezing., No Print, Disp-1 Inhaler, R-0      predniSONE (DELTASONE) 10 mg tablet Take 60 mg by mouth daily (with breakfast). Day 1 and 2: 60mg; Day 3: 50mg; Day 4:40mg; Day 5: 30 mg; Day 6: 20mg; Day 7: 10mg, No Print, Disp-27 Tablet, R-0      gabapentin (NEURONTIN) 100 mg capsule TAKE 2 CAPSULES BY MOUTH 3 TIMES A DAY, Historical Med      propranolol LA (INDERAL LA) 80 mg SR capsule 80 mg daily. , Historical Med      codeine-butalbital-acetaminophen-caffeine (FIORICET WITH CODEINE) -11-30 mg capsule Take 1 Capsule by mouth every six (6) hours as needed for Headache., Historical Med      mirtazapine (REMERON) 30 mg tablet TAKE 1 TABLET BY MOUTH AT BEDTIME, Normal, Disp-30 Tablet, R-5      naloxone (NARCAN) 4 mg/actuation nasal spray Use 1 spray intranasally, then discard. Repeat with new spray every 2 min as needed for opioid overdose symptoms, alternating nostrils. , Normal, Disp-2 Each, R-0      pantoprazole (PROTONIX) 40 mg tablet take 1 tablet by mouth at bedtime, Historical Med, R-0      rizatriptan (MAXALT) 10 mg tablet Take 10 mg by mouth once as needed for Migraine. May repeat in 2 hours if needed, Historical Med      belimumab (Benlysta) 400 mg solr injection by IntraVENous route., Historical Med       !! - Potential duplicate medications found. Please discuss with provider.         2.   Follow-up Information     Follow up With Specialties Details Why Contact Info    Mason Burris MD Internal Medicine Schedule an appointment as soon as possible for a visit   [address] [place] [address]  [phone]  [phone]          3. Return to ED if worse     Diagnosis     Clinical Impression:   1. Labial abscess        Attestations:    Rizwan Sanchez M.D. Please note that this dictation was completed with [employer], the computer voice recognition software. Quite often unanticipated grammatical, syntax, homophones, and other interpretive errors are inadvertently transcribed by the computer software. Please disregard these errors. Please excuse any errors that have escaped final proofreading. Thank you.

## 2021-11-17 RX ORDER — TIZANIDINE 4 MG/1
TABLET ORAL
Qty: 30 TABLET | Refills: 0 | Status: SHIPPED | OUTPATIENT
Start: 2021-11-17 | End: 2021-11-29

## 2021-11-22 ENCOUNTER — TELEPHONE (OUTPATIENT)
Dept: INTERNAL MEDICINE CLINIC | Age: 36
End: 2021-11-22

## 2021-11-22 DIAGNOSIS — F51.01 PRIMARY INSOMNIA: ICD-10-CM

## 2021-11-22 DIAGNOSIS — R05.9 COUGH: ICD-10-CM

## 2021-11-22 DIAGNOSIS — F41.9 ANXIETY: ICD-10-CM

## 2021-11-22 RX ORDER — CODEINE PHOSPHATE AND GUAIFENESIN 10; 100 MG/5ML; MG/5ML
SOLUTION ORAL
Qty: 100 ML | Refills: 0 | Status: SHIPPED | OUTPATIENT
Start: 2021-11-22 | End: 2021-12-02

## 2021-11-22 RX ORDER — ALPRAZOLAM 1 MG/1
TABLET ORAL
Qty: 60 TABLET | Refills: 1 | Status: SHIPPED | OUTPATIENT
Start: 2021-11-22 | End: 2022-01-10 | Stop reason: SDUPTHER

## 2021-11-22 RX ORDER — AZITHROMYCIN 250 MG/1
250 TABLET, FILM COATED ORAL SEE ADMIN INSTRUCTIONS
Qty: 6 TABLET | Refills: 0 | Status: SHIPPED | OUTPATIENT
Start: 2021-11-22 | End: 2021-12-05

## 2021-11-22 NOTE — TELEPHONE ENCOUNTER
Called, spoke with Pt. Two pt identifiers confirmed. Pt informed medication sent to the pharmacy. Pt verbalized understanding of information discussed w/ no further questions at this time.

## 2021-11-22 NOTE — TELEPHONE ENCOUNTER
----- Message from Sonja Joseph sent at 11/22/2021  9:05 AM EST -----  Subject: Message to Provider    QUESTIONS  Information for Provider? Patient is sick and wants to know if Dr. Tere Thapa   will call her or one of his nurses. Please call today.   ---------------------------------------------------------------------------  --------------  CALL BACK INFO  What is the best way for the office to contact you? OK to leave message on   voicemail  Preferred Call Back Phone Number? [phone]  ---------------------------------------------------------------------------  --------------  SCRIPT ANSWERS  Relationship to Patient?  Self

## 2021-11-22 NOTE — TELEPHONE ENCOUNTER
----- Message from Rigoberto Esteban sent at 11/22/2021 12:27 PM EST -----  Subject: Message to Provider    QUESTIONS  Information for Provider? pt spoke with Claudia and was told her prescription   for the zpack was sent over to the pharmacy. pt has contacted pharmacy and   they state they have not received the prescription. please contact pt with   an update on this.   ---------------------------------------------------------------------------  --------------  CALL BACK INFO  What is the best way for the office to contact you? OK to leave message on   voicemail  Preferred Call Back Phone Number? [phone]  ---------------------------------------------------------------------------  --------------  SCRIPT ANSWERS  Relationship to Patient?  Self

## 2021-11-22 NOTE — TELEPHONE ENCOUNTER
Called, spoke with Pt. Two pt identifiers confirmed. Pt stated she's having nasal congestion. Pt stated it happens due to the weather change. Pt is requesting an antibiotic of Augmentin which has helped clear it up in the past.   Pt stated she's having a sinus infection. Pt informed I will let Dr. Lola Almeida know, but he's in clinic right now. Pt stated ok. Pt verbalized understanding of information discussed w/ no further questions at this time.

## 2021-11-29 RX ORDER — TIZANIDINE 4 MG/1
TABLET ORAL
Qty: 30 TABLET | Refills: 0 | Status: SHIPPED | OUTPATIENT
Start: 2021-11-29 | End: 2021-12-11

## 2021-11-29 RX ORDER — ONDANSETRON 4 MG/1
TABLET, FILM COATED ORAL
Qty: 20 TABLET | Refills: 0 | Status: SHIPPED | OUTPATIENT
Start: 2021-11-29 | End: 2022-01-02

## 2021-12-02 DIAGNOSIS — R05.9 COUGH: ICD-10-CM

## 2021-12-02 RX ORDER — CODEINE PHOSPHATE AND GUAIFENESIN 10; 100 MG/5ML; MG/5ML
SOLUTION ORAL
Qty: 100 ML | Refills: 0 | Status: SHIPPED | OUTPATIENT
Start: 2021-12-02 | End: 2021-12-30

## 2021-12-05 RX ORDER — AZITHROMYCIN 250 MG/1
TABLET, FILM COATED ORAL
Qty: 6 TABLET | Refills: 0 | Status: SHIPPED | OUTPATIENT
Start: 2021-12-05 | End: 2021-12-17

## 2021-12-05 RX ORDER — FUROSEMIDE 20 MG/1
20 TABLET ORAL AS NEEDED
Qty: 30 TABLET | Refills: 2 | Status: SHIPPED | OUTPATIENT
Start: 2021-12-05

## 2021-12-05 RX ORDER — DULOXETIN HYDROCHLORIDE 60 MG/1
CAPSULE, DELAYED RELEASE ORAL
Qty: 30 CAPSULE | Refills: 5 | Status: SHIPPED | OUTPATIENT
Start: 2021-12-05 | End: 2022-03-21

## 2021-12-11 ENCOUNTER — APPOINTMENT (OUTPATIENT)
Dept: CT IMAGING | Age: 36
End: 2021-12-11
Attending: STUDENT IN AN ORGANIZED HEALTH CARE EDUCATION/TRAINING PROGRAM
Payer: MEDICAID

## 2021-12-11 ENCOUNTER — HOSPITAL ENCOUNTER (EMERGENCY)
Age: 36
Discharge: HOME | End: 2021-12-11
Attending: STUDENT IN AN ORGANIZED HEALTH CARE EDUCATION/TRAINING PROGRAM
Payer: MEDICAID

## 2021-12-11 VITALS
SYSTOLIC BLOOD PRESSURE: 91 MMHG | BODY MASS INDEX: 48.56 KG/M2 | RESPIRATION RATE: 15 BRPM | TEMPERATURE: 98.3 F | HEART RATE: 63 BPM | OXYGEN SATURATION: 96 % | WEIGHT: 263.89 LBS | DIASTOLIC BLOOD PRESSURE: 50 MMHG | HEIGHT: 62 IN

## 2021-12-11 DIAGNOSIS — R10.31 ABDOMINAL PAIN, RIGHT LOWER QUADRANT: Primary | ICD-10-CM

## 2021-12-11 DIAGNOSIS — R11.2 NON-INTRACTABLE VOMITING WITH NAUSEA, UNSPECIFIED VOMITING TYPE: ICD-10-CM

## 2021-12-11 LAB
ALBUMIN SERPL-MCNC: 3.9 G/DL (ref 3.5–5)
ALBUMIN/GLOB SERPL: 1.3 {RATIO} (ref 1.1–2.2)
ALP SERPL-CCNC: 111 U/L (ref 45–117)
ALT SERPL-CCNC: 20 U/L (ref 12–78)
ANION GAP SERPL CALC-SCNC: 5 MMOL/L (ref 5–15)
APPEARANCE UR: ABNORMAL
AST SERPL-CCNC: 15 U/L (ref 15–37)
BACTERIA URNS QL MICRO: NEGATIVE /HPF
BASOPHILS # BLD: 0.1 K/UL (ref 0–0.1)
BASOPHILS NFR BLD: 1 % (ref 0–1)
BILIRUB SERPL-MCNC: 0.3 MG/DL (ref 0.2–1)
BILIRUB UR QL: NEGATIVE
BUN SERPL-MCNC: 15 MG/DL (ref 6–20)
BUN/CREAT SERPL: 14 (ref 12–20)
CALCIUM SERPL-MCNC: 9.5 MG/DL (ref 8.5–10.1)
CAOX CRY URNS QL MICRO: ABNORMAL
CHLORIDE SERPL-SCNC: 108 MMOL/L (ref 97–108)
CO2 SERPL-SCNC: 27 MMOL/L (ref 21–32)
COLOR UR: ABNORMAL
CREAT SERPL-MCNC: 1.04 MG/DL (ref 0.55–1.02)
DIFFERENTIAL METHOD BLD: ABNORMAL
EOSINOPHIL # BLD: 0.6 K/UL (ref 0–0.4)
EOSINOPHIL NFR BLD: 8 % (ref 0–7)
EPITH CASTS URNS QL MICRO: ABNORMAL /LPF
ERYTHROCYTE [DISTWIDTH] IN BLOOD BY AUTOMATED COUNT: 13.2 % (ref 11.5–14.5)
GLOBULIN SER CALC-MCNC: 3.1 G/DL (ref 2–4)
GLUCOSE SERPL-MCNC: 122 MG/DL (ref 65–100)
GLUCOSE UR STRIP.AUTO-MCNC: NEGATIVE MG/DL
HCT VFR BLD AUTO: 34.2 % (ref 35–47)
HGB BLD-MCNC: 11 G/DL (ref 11.5–16)
HGB UR QL STRIP: ABNORMAL
IMM GRANULOCYTES # BLD AUTO: 0 K/UL (ref 0–0.04)
IMM GRANULOCYTES NFR BLD AUTO: 1 % (ref 0–0.5)
KETONES UR QL STRIP.AUTO: NEGATIVE MG/DL
LEUKOCYTE ESTERASE UR QL STRIP.AUTO: NEGATIVE
LYMPHOCYTES # BLD: 3.1 K/UL (ref 0.8–3.5)
LYMPHOCYTES NFR BLD: 39 % (ref 12–49)
MCH RBC QN AUTO: 29.3 PG (ref 26–34)
MCHC RBC AUTO-ENTMCNC: 32.2 G/DL (ref 30–36.5)
MCV RBC AUTO: 91.2 FL (ref 80–99)
MONOCYTES # BLD: 0.8 K/UL (ref 0–1)
MONOCYTES NFR BLD: 11 % (ref 5–13)
NEUTS SEG # BLD: 3.2 K/UL (ref 1.8–8)
NEUTS SEG NFR BLD: 40 % (ref 32–75)
NITRITE UR QL STRIP.AUTO: NEGATIVE
NRBC # BLD: 0 K/UL (ref 0–0.01)
NRBC BLD-RTO: 0 PER 100 WBC
PH UR STRIP: 5.5 [PH] (ref 5–8)
PLATELET # BLD AUTO: 350 K/UL (ref 150–400)
PMV BLD AUTO: 9.5 FL (ref 8.9–12.9)
POTASSIUM SERPL-SCNC: 3.6 MMOL/L (ref 3.5–5.1)
PROT SERPL-MCNC: 7 G/DL (ref 6.4–8.2)
PROT UR STRIP-MCNC: NEGATIVE MG/DL
RBC # BLD AUTO: 3.75 M/UL (ref 3.8–5.2)
RBC #/AREA URNS HPF: ABNORMAL /HPF (ref 0–5)
SODIUM SERPL-SCNC: 140 MMOL/L (ref 136–145)
SP GR UR REFRACTOMETRY: 1.02 (ref 1–1.03)
UA: UC IF INDICATED,UAUC: ABNORMAL
UROBILINOGEN UR QL STRIP.AUTO: 0.2 EU/DL (ref 0.2–1)
WBC # BLD AUTO: 7.9 K/UL (ref 3.6–11)
WBC URNS QL MICRO: ABNORMAL /HPF (ref 0–4)

## 2021-12-11 PROCEDURE — 96375 TX/PRO/DX INJ NEW DRUG ADDON: CPT

## 2021-12-11 PROCEDURE — 36415 COLL VENOUS BLD VENIPUNCTURE: CPT

## 2021-12-11 PROCEDURE — 96361 HYDRATE IV INFUSION ADD-ON: CPT

## 2021-12-11 PROCEDURE — 81001 URINALYSIS AUTO W/SCOPE: CPT

## 2021-12-11 PROCEDURE — 96374 THER/PROPH/DIAG INJ IV PUSH: CPT

## 2021-12-11 PROCEDURE — 80053 COMPREHEN METABOLIC PANEL: CPT

## 2021-12-11 PROCEDURE — 74176 CT ABD & PELVIS W/O CONTRAST: CPT

## 2021-12-11 PROCEDURE — 99284 EMERGENCY DEPT VISIT MOD MDM: CPT

## 2021-12-11 PROCEDURE — 85025 COMPLETE CBC W/AUTO DIFF WBC: CPT

## 2021-12-11 PROCEDURE — 74011250636 HC RX REV CODE- 250/636: Performed by: STUDENT IN AN ORGANIZED HEALTH CARE EDUCATION/TRAINING PROGRAM

## 2021-12-11 RX ORDER — ONDANSETRON 2 MG/ML
4 INJECTION INTRAMUSCULAR; INTRAVENOUS
Status: COMPLETED | OUTPATIENT
Start: 2021-12-11 | End: 2021-12-11

## 2021-12-11 RX ORDER — MORPHINE SULFATE 2 MG/ML
4 INJECTION, SOLUTION INTRAMUSCULAR; INTRAVENOUS
Status: COMPLETED | OUTPATIENT
Start: 2021-12-11 | End: 2021-12-11

## 2021-12-11 RX ORDER — DROPERIDOL 2.5 MG/ML
0.62 INJECTION, SOLUTION INTRAMUSCULAR; INTRAVENOUS ONCE
Status: COMPLETED | OUTPATIENT
Start: 2021-12-11 | End: 2021-12-11

## 2021-12-11 RX ORDER — ONDANSETRON 4 MG/1
4 TABLET, ORALLY DISINTEGRATING ORAL
Qty: 20 TABLET | Refills: 0 | OUTPATIENT
Start: 2021-12-11 | End: 2022-01-29

## 2021-12-11 RX ORDER — KETOROLAC TROMETHAMINE 30 MG/ML
15 INJECTION, SOLUTION INTRAMUSCULAR; INTRAVENOUS
Status: COMPLETED | OUTPATIENT
Start: 2021-12-11 | End: 2021-12-11

## 2021-12-11 RX ORDER — KETOROLAC TROMETHAMINE 10 MG/1
10 TABLET, FILM COATED ORAL
Qty: 5 TABLET | Refills: 0 | OUTPATIENT
Start: 2021-12-11 | End: 2022-01-29

## 2021-12-11 RX ADMIN — KETOROLAC TROMETHAMINE 15 MG: 30 INJECTION, SOLUTION INTRAMUSCULAR; INTRAVENOUS at 09:11

## 2021-12-11 RX ADMIN — SODIUM CHLORIDE 1000 ML: 9 INJECTION, SOLUTION INTRAVENOUS at 06:55

## 2021-12-11 RX ADMIN — DROPERIDOL 0.62 MG: 2.5 INJECTION, SOLUTION INTRAMUSCULAR; INTRAVENOUS at 09:11

## 2021-12-11 RX ADMIN — MORPHINE SULFATE 4 MG: 2 INJECTION, SOLUTION INTRAMUSCULAR; INTRAVENOUS at 06:58

## 2021-12-11 RX ADMIN — ONDANSETRON 4 MG: 2 INJECTION INTRAMUSCULAR; INTRAVENOUS at 06:56

## 2021-12-11 NOTE — ED PROVIDER NOTES
EMERGENCY DEPARTMENT HISTORY AND PHYSICAL EXAM      Date: 12/11/2021  Patient Name: Laura Talamantes    History of Presenting Illness     Chief Complaint   Patient presents with    Abdominal Pain     ED visit d/t abd pain (R) sided / (R) flank pain - onset of sxs, 0100 12/11/2021 - hx of various abd surgeries - nausea and vomiting - Denies fevers / chills;;       History Provided By: Patient    HPI: Laura Talamantes, 38 y.o. female with pertinent past medical history of multiple abdominal surgeries, kidney stones, IBS, constipation, GERD, presents to the ED with cc of right-sided abdominal pain x1 day. Patient reports right lower quadrant abdominal pain that is intermittently associated with right flank/back pain. Reports associated nausea and vomiting. No diarrhea or changes to BM. No fevers or chills. Patient reports that symptoms remind her of her previous kidney stone pain. States that pain is sharp in nature. 8 out of 10 in intensity. She reports that she still has her appendix in place. No urinary symptoms. PCP: Loco Pederson MD    No current facility-administered medications on file prior to encounter. Current Outpatient Medications on File Prior to Encounter   Medication Sig Dispense Refill    furosemide (LASIX) 20 mg tablet TAKE 1 TABLET BY MOUTH AS NEEDED (SWELLING).  30 Tablet 2    DULoxetine (CYMBALTA) 60 mg capsule TAKE 2 CAPSULESBY MOUTH EVERY DAY 30 Capsule 5    azithromycin (ZITHROMAX) 250 mg tablet TAKE 2 TABLETS BY MOUTH TODAY, THEN TAKE 1 TABLET DAILY FOR 4 DAYS 6 Tablet 0    ondansetron hcl (ZOFRAN) 4 mg tablet TAKE 1 TABLET BY MOUTH EVERY 8 HOURS AS NEEDED FOR NAUSEA 20 Tablet 0    tiZANidine (ZANAFLEX) 4 mg tablet TAKE 1 TABLET BY MOUTH THREE TIMES A DAY AS NEEDED FOR PAIN 30 Tablet 0    ALPRAZolam (XANAX) 1 mg tablet TAKE 1 TABLET BY MOUTH TWICE A DAY AS NEEDED FOR ANXIETY 60 Tablet 1    meclizine (ANTIVERT) 25 mg tablet TAKE 1 TAB BY MOUTH THREE TIMES DAILY AS NEEDED FOR DIZZINESS FOR UP TO 10 DAYS. 30 Tablet 0    ondansetron (Zofran ODT) 4 mg disintegrating tablet Take 1 Tablet by mouth every eight (8) hours as needed for Nausea. 10 Tablet 0    lidocaine (URO-JET/ GLYDO) 2 % jelp jelly Apply 1 mL to affected area two (2) times daily as needed for Pain or PRN Reason (Other) (pain). 20 mL 0    czsdxvwbx-Wkatcgtk-Lykpd-W.Pet (Preparation H Maximum Strength) 0.25-1 % rectal cream Apply  to affected area two (2) times daily as needed for Pain or Hemorrhoids. 1 g 0    SUMAtriptan (IMITREX) 25 mg tablet Take 25 mg by mouth as needed.  promethazine (PHENERGAN) 25 mg tablet Take 1 Tablet by mouth every six (6) hours as needed for Nausea. 12 Tablet 0    sucralfate (Carafate) 100 mg/mL suspension Take 5 mL by mouth four (4) times daily. (Patient not taking: Reported on 10/26/2021) 414 mL 0    ondansetron (Zofran ODT) 4 mg disintegrating tablet 1 Tablet by SubLINGual route every eight (8) hours as needed for Nausea or Vomiting. 20 Tablet 0    predniSONE (STERAPRED DS) 10 mg dose pack Take as prescribed (Patient not taking: Reported on 10/26/2021) 21 Tablet 0    albuterol (PROVENTIL HFA, VENTOLIN HFA, PROAIR HFA) 90 mcg/actuation inhaler Take 1 Puff by inhalation every six (6) hours as needed for Wheezing. 1 Inhaler 0    predniSONE (DELTASONE) 10 mg tablet Take 60 mg by mouth daily (with breakfast). Day 1 and 2: 60mg; Day 3: 50mg; Day 4:40mg; Day 5: 30 mg; Day 6: 20mg; Day 7: 10mg (Patient not taking: Reported on 9/1/2021) 27 Tablet 0    gabapentin (NEURONTIN) 100 mg capsule TAKE 2 CAPSULES BY MOUTH 3 TIMES A DAY      propranolol LA (INDERAL LA) 80 mg SR capsule 80 mg daily.  codeine-butalbital-acetaminophen-caffeine (FIORICET WITH CODEINE) -37-30 mg capsule Take 1 Capsule by mouth every six (6) hours as needed for Headache.       mirtazapine (REMERON) 30 mg tablet TAKE 1 TABLET BY MOUTH AT BEDTIME 30 Tablet 5    naloxone (NARCAN) 4 mg/actuation nasal spray Use 1 spray intranasally, then discard. Repeat with new spray every 2 min as needed for opioid overdose symptoms, alternating nostrils. 2 Each 0    pantoprazole (PROTONIX) 40 mg tablet take 1 tablet by mouth at bedtime  0    rizatriptan (MAXALT) 10 mg tablet Take 10 mg by mouth once as needed for Migraine. May repeat in 2 hours if needed      belimumab (Benlysta) 400 mg solr injection by IntraVENous route.          Past History     Past Medical History:  Past Medical History:   Diagnosis Date    Abnormal CT of the abdomen 11/8/2012    Asthma     Autoimmune disease (HCC)     lupus    C. difficile diarrhea     Cervical prolapse     Dehydration     Mariah-Danlos syndrome     Gastrointestinal disorder     gerd, twisted colon, IBS    GERD (gastroesophageal reflux disease)     Headache(784.0)     Lupus (HCC)     Morbid obesity (HCC)     Murmur     Nausea & vomiting     Neurological disorder     cluster headaches    Occipital neuralgia     other 2006, 2009    ovarian cyst removal    Other ill-defined conditions(799.89)     Syncope     Worsening headaches        Past Surgical History:  Past Surgical History:   Procedure Laterality Date    COLONOSCOPY  9/21/2010         HX CHOLECYSTECTOMY      HX CYST INCISION AND DRAINAGE Right 02/27/2020    HX GYN  1/2009    right salpingo oopherectomy    HX GYN  2006    right ovarian tumor removed    HX HEENT      left wisdom teeth removal    HX HEENT      top right wisdom tooth removed    HX HERNIA REPAIR  4/2012    HX HERNIA REPAIR  2/28/13    Laparoscopic recurrent incisional hernia repair    HX HYSTERECTOMY      2016    HX UROLOGICAL      Kidney Stone Removal    [place] EGD TRANSORAL BIOPSY SINGLE/MULTIPLE  9/22/2010            Family History:  Family History   Problem Relation Age of Onset    Colon Cancer Maternal Grandfather        Social History:  Social History     Tobacco Use    Smoking status: Never Smoker    Smokeless tobacco: Never Used Vaping Use    Vaping Use: Never used   Substance Use Topics    Alcohol use: No    Drug use: No       Allergies: Allergies   Allergen Reactions    Latex Itching     burning    Compazine [Prochlorperazine] Anxiety     High heart rate  Pt can take promethazine with no problems    Sulfa (Sulfonamide Antibiotics) Unknown (comments)    Topamax [Topiramate] Unknown (comments)    Adhesive Rash     Allergic to Dermabond    Clindamycin Diarrhea     Pt states caused her C-Diff    Mushroom Other (comments)    Mushroom Combination No.1 Unknown (comments)    Reglan [Metoclopramide Hcl] Rash    Toradol [Ketorolac Tromethamine] Nausea and Vomiting and Other (comments)     PO & IV causes GI bleed  Tolerated during Feb 2020 stay    Valproic Acid Other (comments)     Elevated heart rate and vomiting           Review of Systems   Review of Systems   Constitutional: Negative for chills and fever. HENT: Negative for congestion and rhinorrhea. Eyes: Negative for visual disturbance. Respiratory: Negative for chest tightness and shortness of breath. Cardiovascular: Negative for chest pain, palpitations and leg swelling. Gastrointestinal: Positive for abdominal pain, nausea and vomiting. Negative for diarrhea. Genitourinary: Positive for flank pain. Negative for dysuria and hematuria. Musculoskeletal: Positive for back pain. Negative for neck pain. Skin: Negative for rash. Allergic/Immunologic: Negative for immunocompromised state. Neurological: Negative for dizziness, speech difficulty, weakness and headaches. Hematological: Negative for adenopathy. Psychiatric/Behavioral: Negative for dysphoric mood and suicidal ideas. Physical Exam   Physical Exam  Vitals and nursing note reviewed. Constitutional:       General: She is not in acute distress. Appearance: She is not ill-appearing or toxic-appearing. HENT:      Head: Normocephalic and atraumatic.       Nose: Nose normal. Mouth/Throat:      Mouth: Mucous membranes are moist.   Eyes:      Extraocular Movements: Extraocular movements intact. Pupils: Pupils are equal, round, and reactive to light. Cardiovascular:      Rate and Rhythm: Normal rate and regular rhythm. Pulses: Normal pulses. Pulmonary:      Effort: Pulmonary effort is normal.      Breath sounds: No stridor. No wheezing or rhonchi. Abdominal:      General: Abdomen is flat. There is no distension. Tenderness: There is abdominal tenderness in the right lower quadrant. There is no right CVA tenderness, left CVA tenderness, guarding or rebound. Musculoskeletal:         General: Normal range of motion. Cervical back: Normal range of motion and neck supple. Skin:     General: Skin is warm and dry. Neurological:      General: No focal deficit present. Mental Status: She is alert and oriented to person, place, and time. Psychiatric:         Judgment: Judgment normal.         Diagnostic Study Results     Labs -     Recent Results (from the past 24 hour(s))   CBC WITH AUTOMATED DIFF    Collection Time: 12/11/21  5:48 AM   Result Value Ref Range    WBC 7.9 3.6 - 11.0 K/uL    RBC 3.75 (L) 3.80 - 5.20 M/uL    HGB 11.0 (L) 11.5 - 16.0 g/dL    HCT 34.2 (L) 35.0 - 47.0 %    MCV 91.2 80.0 - 99.0 FL    MCH 29.3 26.0 - 34.0 PG    MCHC 32.2 30.0 - 36.5 g/dL    RDW 13.2 11.5 - 14.5 %    PLATELET 101 921 - 403 K/uL    MPV 9.5 8.9 - 12.9 FL    NRBC 0.0 0  WBC    ABSOLUTE NRBC 0.00 0.00 - 0.01 K/uL    NEUTROPHILS 40 32 - 75 %    LYMPHOCYTES 39 12 - 49 %    MONOCYTES 11 5 - 13 %    EOSINOPHILS 8 (H) 0 - 7 %    BASOPHILS 1 0 - 1 %    IMMATURE GRANULOCYTES 1 (H) 0.0 - 0.5 %    ABS. NEUTROPHILS 3.2 1.8 - 8.0 K/UL    ABS. LYMPHOCYTES 3.1 0.8 - 3.5 K/UL    ABS. MONOCYTES 0.8 0.0 - 1.0 K/UL    ABS. EOSINOPHILS 0.6 (H) 0.0 - 0.4 K/UL    ABS. BASOPHILS 0.1 0.0 - 0.1 K/UL    ABS. IMM.  GRANS. 0.0 0.00 - 0.04 K/UL    DF AUTOMATED     METABOLIC PANEL, COMPREHENSIVE Collection Time: 12/11/21  5:48 AM   Result Value Ref Range    Sodium 140 136 - 145 mmol/L    Potassium 3.6 3.5 - 5.1 mmol/L    Chloride 108 97 - 108 mmol/L    CO2 27 21 - 32 mmol/L    Anion gap 5 5 - 15 mmol/L    Glucose 122 (H) 65 - 100 mg/dL    BUN 15 6 - 20 MG/DL    Creatinine 1.04 (H) 0.55 - 1.02 MG/DL    BUN/Creatinine ratio 14 12 - 20      GFR est AA >60 >60 ml/min/1.73m2    GFR est non-AA 60 (L) >60 ml/min/1.73m2    Calcium 9.5 8.5 - 10.1 MG/DL    Bilirubin, total 0.3 0.2 - 1.0 MG/DL    ALT (SGPT) 20 12 - 78 U/L    AST (SGOT) 15 15 - 37 U/L    Alk. phosphatase 111 45 - 117 U/L    Protein, total 7.0 6.4 - 8.2 g/dL    Albumin 3.9 3.5 - 5.0 g/dL    Globulin 3.1 2.0 - 4.0 g/dL    A-G Ratio 1.3 1.1 - 2.2     URINALYSIS W/ REFLEX CULTURE    Collection Time: 12/11/21  5:48 AM    Specimen: Urine   Result Value Ref Range    Color YELLOW/STRAW      Appearance CLOUDY (A) CLEAR      Specific gravity 1.023 1.003 - 1.030      pH (UA) 5.5 5.0 - 8.0      Protein Negative NEG mg/dL    Glucose Negative NEG mg/dL    Ketone Negative NEG mg/dL    Bilirubin Negative NEG      Blood MODERATE (A) NEG      Urobilinogen 0.2 0.2 - 1.0 EU/dL    Nitrites Negative NEG      Leukocyte Esterase Negative NEG      WBC 0-4 0 - 4 /hpf    RBC  0 - 5 /hpf    Epithelial cells FEW FEW /lpf    Bacteria Negative NEG /hpf    UA:UC IF INDICATED CULTURE NOT INDICATED BY UA RESULT CNI      CA Oxalate crystals 1+ (A) NEG       Radiologic Studies -   CT ABD PELV WO CONT   Final Result   Bilateral nonobstructive nephrolithiasis. No hydronephrosis. No evidence of   acute process. CT Results  (Last 48 hours)    None        CXR Results  (Last 48 hours)    None          Medical Decision Making   I, Britton Alpers, MD am the first provider for this patient, and I am the attending of record for this patient encounter.     I reviewed the vital signs, available nursing notes, past medical history, past surgical history, family history and social history. Vital Signs-Reviewed the patient's vital signs. Patient Vitals for the past 24 hrs:   Temp Pulse Resp BP SpO2   12/11/21 0520 98 °F (36.7 °C) 74 18 (!) 108/51 99 %     Records Reviewed: Nursing Notes and Old Medical Records    Provider Notes (Medical Decision Making):   Patient's BP is on the low normal side, not tachycardic. All other vital signs within normal limits. On exam, she is nontoxic in appearance, appearing uncomfortable but not otherwise in acute distress. Abdomen is soft, nondistended, tender to palpation in the right lower quadrant. No CVA tenderness bilaterally. Differential diagnosis considered include kidney stone, appendicitis, constipation, colitis, UTI, pyelonephritis, SBO, etc.  Will check CBC, CMP, UA, and CT abdomen pelvis. Will medicate patient for pain, nausea, and administer IV fluids. Labs largely unremarkable. ED Course as of 12/11/21 1749   Sat Dec 11, 2021   0830 CT abdomen pelvis show bilateral nonobstructive nephrolithiasis. No hydronephrosis. No evidence of  acute process. Suspect patient's abdominal pain is likely secondary to intestinal spasms, possibly secondary to IBS. [JM]   1293 On repeat evaluation, patient still complaining of ongoing right lower quadrant abdominal pain as well as ongoing nausea, though symptoms are not as severe as they were previously. Results discussed with the patient. Patient is requesting additional dose of medications initially given prior to discharge for optimized symptomatic control. She did receive morphine initially. She also tells me that fentanyl in particular has also worked well for her pain in the past.  I discussed with the patient that I did not feel comfortable administering additional narcotic medications as she does not have any acute process necessitating use of narcotic pain medications.   Patient verbalized understanding and was agreeable with trying Toradol and droperidol instead for her ongoing symptoms. She was also offered Bentyl, which she declined as she reports this does not help with similar pains that she has had in the past.  She is requesting 5 tabs of Toradol for home as well as rx for zofran. Patient does have a history of known GERD, advised to take Protonix along with Toradol. I think she should be okay with Toradol use if this is limited to 5 tabs only for acute pain. She otherwise felt comfortable with plan for discharge. All of her questions were answered. Advised early follow-up with PCP. Discharged in stable and improved condition. [JM]      ED Course User Index  [JM] Genevieve Wayne MD       ED Course:   Initial assessment performed. The patient's presenting problems have been discussed, and they are in agreement with the care plan formulated and outlined with them. I have encouraged them to ask questions as they arise throughout their visit. Nicole Howard MD      Disposition:  Discharge      DISCHARGE PLAN:  1. Discharge Medication List as of 12/11/2021  9:21 AM      START taking these medications    Details   ketorolac (TORADOL) 10 mg tablet Take 1 Tablet by mouth every six (6) hours as needed for Pain., Normal, Disp-5 Tablet, R-0         CONTINUE these medications which have CHANGED    Details   ondansetron (Zofran ODT) 4 mg disintegrating tablet 1 Tablet by SubLINGual route every eight (8) hours as needed for Nausea or Vomiting., Normal, Disp-20 Tablet, R-0         CONTINUE these medications which have NOT CHANGED    Details   furosemide (LASIX) 20 mg tablet TAKE 1 TABLET BY MOUTH AS NEEDED (SWELLING). , Normal, Disp-30 Tablet, R-2      DULoxetine (CYMBALTA) 60 mg capsule TAKE 2 CAPSULESBY MOUTH EVERY DAY, Normal, Disp-30 Capsule, R-5      azithromycin (ZITHROMAX) 250 mg tablet TAKE 2 TABLETS BY MOUTH TODAY, THEN TAKE 1 TABLET DAILY FOR 4 DAYS, Normal, Disp-6 Tablet, R-0      ondansetron hcl (ZOFRAN) 4 mg tablet TAKE 1 TABLET BY MOUTH EVERY 8 HOURS AS NEEDED FOR NAUSEA, Normal, Disp-20 Tablet, R-0      ALPRAZolam (XANAX) 1 mg tablet TAKE 1 TABLET BY MOUTH TWICE A DAY AS NEEDED FOR ANXIETY, Normal, Disp-60 Tablet, R-1Not to exceed 2 additional fills before 03/23/2022      SUMAtriptan (IMITREX) 25 mg tablet Take 25 mg by mouth as needed., Historical Med      albuterol (PROVENTIL HFA, VENTOLIN HFA, PROAIR HFA) 90 mcg/actuation inhaler Take 1 Puff by inhalation every six (6) hours as needed for Wheezing., No Print, Disp-1 Inhaler, R-0      propranolol LA (INDERAL LA) 80 mg SR capsule 80 mg daily. , Historical Med      codeine-butalbital-acetaminophen-caffeine (FIORICET WITH CODEINE) -63-30 mg capsule Take 1 Capsule by mouth every six (6) hours as needed for Headache., Historical Med      mirtazapine (REMERON) 30 mg tablet TAKE 1 TABLET BY MOUTH AT BEDTIME, Normal, Disp-30 Tablet, R-5      naloxone (NARCAN) 4 mg/actuation nasal spray Use 1 spray intranasally, then discard. Repeat with new spray every 2 min as needed for opioid overdose symptoms, alternating nostrils. , Normal, Disp-2 Each, R-0      pantoprazole (PROTONIX) 40 mg tablet take 1 tablet by mouth at bedtime, Historical Med, R-0      rizatriptan (MAXALT) 10 mg tablet Take 10 mg by mouth once as needed for Migraine. May repeat in 2 hours if needed, Historical Med      belimumab (Benlysta) 400 mg solr injection by IntraVENous route., Historical Med           2. Follow-up Information     Follow up With Specialties Details Why Contact Info    Noah Boles MD Internal Medicine   [address] [place] [address] [address]  [facility]  [phone]          3. Return to ED if worse     Diagnosis     Clinical Impression:   1. Abdominal pain, right lower quadrant    2. Non-intractable vomiting with nausea, unspecified vomiting type        Attestations:    Les Aparicio MD    Please note that this dictation was completed with [employer], the [employer] voice recognition software.   Quite often unanticipated grammatical, syntax, homophones, and other interpretive errors are inadvertently transcribed by the computer software. Please disregard these errors. Please excuse any errors that have escaped final proofreading. Thank you.

## 2021-12-11 NOTE — DISCHARGE INSTRUCTIONS
It was a pleasure taking care of you at [facility] Emergency Department today. We know that when you come to [facility], you are entrusting us with your health, comfort, and safety. Our physicians and nurses honor that trust, and we truly appreciate the opportunity to care for you and your loved ones. We also value your feedback. If you receive a survey about your Emergency Department experience today, please fill it out. We care about our patients' feedback, and we listen to what you have to say. Thank you!     Carnella Galeazzi, MD

## 2021-12-12 RX ORDER — MECLIZINE HYDROCHLORIDE 25 MG/1
TABLET ORAL
Qty: 30 TABLET | Refills: 0 | Status: SHIPPED | OUTPATIENT
Start: 2021-12-12 | End: 2021-12-20

## 2021-12-13 RX ORDER — TIZANIDINE 4 MG/1
TABLET ORAL
Qty: 30 TABLET | Refills: 0 | Status: SHIPPED | OUTPATIENT
Start: 2021-12-13 | End: 2021-12-26

## 2021-12-16 ENCOUNTER — TELEPHONE (OUTPATIENT)
Dept: CARDIOLOGY CLINIC | Age: 36
End: 2021-12-16

## 2021-12-16 NOTE — TELEPHONE ENCOUNTER
Pt was Er 12/11/2021 , having low BP 80/50 and HR 56. Having dizziness,and stomach pain. Pt thinks it is her medication,propranolol may be to strong. Please call her at [phone].       Thanks Chela

## 2021-12-16 NOTE — TELEPHONE ENCOUNTER
I called and spoke with the patient. Patient's two identifiers verified. Patient reported feeling dizzy. \" I feel like I am going to fall every time, because I am extremely dizzy. \"  BP for 2-3 weeks now has been low per patient. Recorded BP readings at homet:  12/15/2021 at 7 AM: 82/51 HR: 44    1PM: 70/54 HR 46    10PM: 69/41 HR: 45  12/16/2021 at 4.30PM: 73/41 HR 44    Other BP readings, not able to record by her. She is on Propanolol 80 mg/tab daily. Patient already took her Propanolol today. I advised to hold tomorrow's dose, I will notify Dr. González Etienne, and call her back tomorrow. I advised safety by slowly changing positions from lying down to sitting then standing. Patient verbalized understanding.

## 2021-12-17 RX ORDER — AZITHROMYCIN 250 MG/1
TABLET, FILM COATED ORAL
Qty: 6 TABLET | Refills: 0 | Status: SHIPPED | OUTPATIENT
Start: 2021-12-17 | End: 2021-12-28

## 2021-12-17 NOTE — TELEPHONE ENCOUNTER
I called and spoke with the patient. Patient's two identifiers verified. I thoroughly discussed this with the patient:  \"If she is having blood pressures systolics in the upper 67N and low 70s and if it is that way today she needs to be seen in the emergency room.  This is a new change from her baseline.  I am okay with her holding propanolol she uses it more for her migraine headaches than hypertension.  If she opts not to go to the emergency room then she needs to hold the propanolol and send us blood pressure readings and heart rates over the weekend and will reassess her on Monday. \"    BP reading at home by patient:  Did not take her Propanolol today. 12/17/21 at 3. 00AM: 100/51 HR:56  8.57 AM: 102/57 HR:54    I strictly advised patient to go to the ED if BP and HR are constantly low even without taking Propanolol, and advised to not drive herself to the ED, but have someone to drive her. She will call me back on Monday to report BP and HR during the weekend.

## 2021-12-18 ENCOUNTER — APPOINTMENT (OUTPATIENT)
Dept: CT IMAGING | Age: 36
End: 2021-12-18
Attending: EMERGENCY MEDICINE
Payer: MEDICAID

## 2021-12-18 ENCOUNTER — HOSPITAL ENCOUNTER (EMERGENCY)
Age: 36
Discharge: HOME | End: 2021-12-18
Attending: EMERGENCY MEDICINE
Payer: MEDICAID

## 2021-12-18 ENCOUNTER — APPOINTMENT (OUTPATIENT)
Dept: GENERAL RADIOLOGY | Age: 36
End: 2021-12-18
Attending: EMERGENCY MEDICINE
Payer: MEDICAID

## 2021-12-18 VITALS
BODY MASS INDEX: 46.66 KG/M2 | RESPIRATION RATE: 17 BRPM | SYSTOLIC BLOOD PRESSURE: 115 MMHG | TEMPERATURE: 98.1 F | HEART RATE: 65 BPM | OXYGEN SATURATION: 97 % | DIASTOLIC BLOOD PRESSURE: 75 MMHG | HEIGHT: 62 IN | WEIGHT: 253.53 LBS

## 2021-12-18 DIAGNOSIS — G89.4 CHRONIC PAIN SYNDROME: ICD-10-CM

## 2021-12-18 DIAGNOSIS — R00.1 SINUS BRADYCARDIA: ICD-10-CM

## 2021-12-18 DIAGNOSIS — R55 SYNCOPE AND COLLAPSE: Primary | ICD-10-CM

## 2021-12-18 DIAGNOSIS — Z76.5 DRUG-SEEKING BEHAVIOR: ICD-10-CM

## 2021-12-18 DIAGNOSIS — R07.89 MUSCULOSKELETAL CHEST PAIN: ICD-10-CM

## 2021-12-18 LAB
ALBUMIN SERPL-MCNC: 3.5 G/DL (ref 3.5–5)
ALBUMIN/GLOB SERPL: 1.1 {RATIO} (ref 1.1–2.2)
ALP SERPL-CCNC: 97 U/L (ref 45–117)
ALT SERPL-CCNC: 23 U/L (ref 12–78)
ANION GAP SERPL CALC-SCNC: 4 MMOL/L (ref 5–15)
AST SERPL-CCNC: 12 U/L (ref 15–37)
ATRIAL RATE: 49 BPM
BASOPHILS # BLD: 0.1 K/UL (ref 0–0.1)
BASOPHILS NFR BLD: 1 % (ref 0–1)
BILIRUB SERPL-MCNC: 0.2 MG/DL (ref 0.2–1)
BUN SERPL-MCNC: 11 MG/DL (ref 6–20)
BUN/CREAT SERPL: 11 (ref 12–20)
CALCIUM SERPL-MCNC: 9.2 MG/DL (ref 8.5–10.1)
CALCULATED P AXIS, ECG09: 22 DEGREES
CALCULATED R AXIS, ECG10: 38 DEGREES
CALCULATED T AXIS, ECG11: 17 DEGREES
CHLORIDE SERPL-SCNC: 109 MMOL/L (ref 97–108)
CO2 SERPL-SCNC: 27 MMOL/L (ref 21–32)
CREAT SERPL-MCNC: 1.01 MG/DL (ref 0.55–1.02)
DIAGNOSIS, 93000: NORMAL
DIFFERENTIAL METHOD BLD: ABNORMAL
EOSINOPHIL # BLD: 0.8 K/UL (ref 0–0.4)
EOSINOPHIL NFR BLD: 9 % (ref 0–7)
ERYTHROCYTE [DISTWIDTH] IN BLOOD BY AUTOMATED COUNT: 13.1 % (ref 11.5–14.5)
GLOBULIN SER CALC-MCNC: 3.1 G/DL (ref 2–4)
GLUCOSE SERPL-MCNC: 141 MG/DL (ref 65–100)
HCT VFR BLD AUTO: 35 % (ref 35–47)
HGB BLD-MCNC: 11.2 G/DL (ref 11.5–16)
IMM GRANULOCYTES # BLD AUTO: 0.1 K/UL (ref 0–0.04)
IMM GRANULOCYTES NFR BLD AUTO: 1 % (ref 0–0.5)
LYMPHOCYTES # BLD: 3.1 K/UL (ref 0.8–3.5)
LYMPHOCYTES NFR BLD: 35 % (ref 12–49)
MAGNESIUM SERPL-MCNC: 2.2 MG/DL (ref 1.6–2.4)
MCH RBC QN AUTO: 29.3 PG (ref 26–34)
MCHC RBC AUTO-ENTMCNC: 32 G/DL (ref 30–36.5)
MCV RBC AUTO: 91.6 FL (ref 80–99)
MONOCYTES # BLD: 1 K/UL (ref 0–1)
MONOCYTES NFR BLD: 11 % (ref 5–13)
NEUTS SEG # BLD: 3.9 K/UL (ref 1.8–8)
NEUTS SEG NFR BLD: 43 % (ref 32–75)
NRBC # BLD: 0 K/UL (ref 0–0.01)
NRBC BLD-RTO: 0 PER 100 WBC
P-R INTERVAL, ECG05: 210 MS
PLATELET # BLD AUTO: 322 K/UL (ref 150–400)
PMV BLD AUTO: 9.8 FL (ref 8.9–12.9)
POTASSIUM SERPL-SCNC: 3.9 MMOL/L (ref 3.5–5.1)
PROT SERPL-MCNC: 6.6 G/DL (ref 6.4–8.2)
Q-T INTERVAL, ECG07: 414 MS
QRS DURATION, ECG06: 84 MS
QTC CALCULATION (BEZET), ECG08: 373 MS
RBC # BLD AUTO: 3.82 M/UL (ref 3.8–5.2)
SODIUM SERPL-SCNC: 140 MMOL/L (ref 136–145)
TROPONIN-HIGH SENSITIVITY: 4 NG/L (ref 0–51)
TSH SERPL DL<=0.05 MIU/L-ACNC: 1.52 UIU/ML (ref 0.36–3.74)
VENTRICULAR RATE, ECG03: 49 BPM
WBC # BLD AUTO: 9 K/UL (ref 3.6–11)

## 2021-12-18 PROCEDURE — 71275 CT ANGIOGRAPHY CHEST: CPT

## 2021-12-18 PROCEDURE — 84443 ASSAY THYROID STIM HORMONE: CPT

## 2021-12-18 PROCEDURE — 96374 THER/PROPH/DIAG INJ IV PUSH: CPT

## 2021-12-18 PROCEDURE — 96375 TX/PRO/DX INJ NEW DRUG ADDON: CPT

## 2021-12-18 PROCEDURE — 83735 ASSAY OF MAGNESIUM: CPT

## 2021-12-18 PROCEDURE — 74011250636 HC RX REV CODE- 250/636: Performed by: EMERGENCY MEDICINE

## 2021-12-18 PROCEDURE — 96361 HYDRATE IV INFUSION ADD-ON: CPT

## 2021-12-18 PROCEDURE — 74011250637 HC RX REV CODE- 250/637: Performed by: EMERGENCY MEDICINE

## 2021-12-18 PROCEDURE — 74011000250 HC RX REV CODE- 250: Performed by: EMERGENCY MEDICINE

## 2021-12-18 PROCEDURE — 74011000636 HC RX REV CODE- 636: Performed by: EMERGENCY MEDICINE

## 2021-12-18 PROCEDURE — 80053 COMPREHEN METABOLIC PANEL: CPT

## 2021-12-18 PROCEDURE — 70450 CT HEAD/BRAIN W/O DYE: CPT

## 2021-12-18 PROCEDURE — 93005 ELECTROCARDIOGRAM TRACING: CPT

## 2021-12-18 PROCEDURE — 71045 X-RAY EXAM CHEST 1 VIEW: CPT

## 2021-12-18 PROCEDURE — 99285 EMERGENCY DEPT VISIT HI MDM: CPT

## 2021-12-18 PROCEDURE — 96376 TX/PRO/DX INJ SAME DRUG ADON: CPT

## 2021-12-18 PROCEDURE — 36415 COLL VENOUS BLD VENIPUNCTURE: CPT

## 2021-12-18 PROCEDURE — 85025 COMPLETE CBC W/AUTO DIFF WBC: CPT

## 2021-12-18 PROCEDURE — 84484 ASSAY OF TROPONIN QUANT: CPT

## 2021-12-18 RX ORDER — MORPHINE SULFATE 2 MG/ML
2 INJECTION, SOLUTION INTRAMUSCULAR; INTRAVENOUS
Status: COMPLETED | OUTPATIENT
Start: 2021-12-18 | End: 2021-12-18

## 2021-12-18 RX ORDER — FENTANYL CITRATE 50 UG/ML
50 INJECTION, SOLUTION INTRAMUSCULAR; INTRAVENOUS
Status: DISCONTINUED | OUTPATIENT
Start: 2021-12-18 | End: 2021-12-18

## 2021-12-18 RX ORDER — ONDANSETRON 2 MG/ML
4 INJECTION INTRAMUSCULAR; INTRAVENOUS
Status: COMPLETED | OUTPATIENT
Start: 2021-12-18 | End: 2021-12-18

## 2021-12-18 RX ORDER — LIDOCAINE 4 G/100G
2 PATCH TOPICAL ONCE
Status: DISCONTINUED | OUTPATIENT
Start: 2021-12-18 | End: 2021-12-18 | Stop reason: HOSPADM

## 2021-12-18 RX ORDER — KETOROLAC TROMETHAMINE 30 MG/ML
30 INJECTION, SOLUTION INTRAMUSCULAR; INTRAVENOUS
Status: DISCONTINUED | OUTPATIENT
Start: 2021-12-18 | End: 2021-12-18

## 2021-12-18 RX ORDER — DIAZEPAM 5 MG/1
5 TABLET ORAL
Status: COMPLETED | OUTPATIENT
Start: 2021-12-18 | End: 2021-12-18

## 2021-12-18 RX ORDER — DROPERIDOL 2.5 MG/ML
0.62 INJECTION, SOLUTION INTRAMUSCULAR; INTRAVENOUS ONCE
Status: DISCONTINUED | OUTPATIENT
Start: 2021-12-18 | End: 2021-12-18

## 2021-12-18 RX ADMIN — IOPAMIDOL 100 ML: 755 INJECTION, SOLUTION INTRAVENOUS at 06:34

## 2021-12-18 RX ADMIN — MORPHINE SULFATE 2 MG: 2 INJECTION, SOLUTION INTRAMUSCULAR; INTRAVENOUS at 06:26

## 2021-12-18 RX ADMIN — SODIUM CHLORIDE 1000 ML: 9 INJECTION, SOLUTION INTRAVENOUS at 05:48

## 2021-12-18 RX ADMIN — DIAZEPAM 5 MG: 5 TABLET ORAL at 07:07

## 2021-12-18 RX ADMIN — MORPHINE SULFATE 2 MG: 2 INJECTION, SOLUTION INTRAMUSCULAR; INTRAVENOUS at 05:48

## 2021-12-18 RX ADMIN — ONDANSETRON 4 MG: 2 INJECTION INTRAMUSCULAR; INTRAVENOUS at 05:48

## 2021-12-18 NOTE — ED PROVIDER NOTES
EMERGENCY DEPARTMENT HISTORY AND PHYSICAL EXAM      Please note that this dictation was completed with the assistance of \"Dragon\", the computer voice recognition software. Quite often unanticipated grammatical, syntax, homophones, and other interpretive errors are inadvertently transcribed by the computer software. Please disregard these errors and any errors that have escaped final proofreading. Thank you. Patient: Brooke Recinos  DOS: 21  : 1985  MRN: [id number]  History of Presenting Illness     Chief Complaint   Patient presents with    Syncope     pt arrives from home she states that \"i got up to pee tonight and urinated on myself, I started walking back to my room to clean myself up and i passed out in the hallway, i am having pleuricy pain and am cold\"      History Provided By: Patient/family/EMS (if applicable)    HPI: Brooke Recinos, 38 y.o. female with past medical history as documented below presents to the ED with c/o of syncope and collapse and subsequent left sided chest wall pain. Pt states she went up to go use the bathroom when she passed out on her way back in the hallway. She called her Cardiologist who told her to stop her beta blocker. She notes having mild to moderate left sided chest wall pain. Pt denies any other exacerbating or ameliorating factors. Additionally, pt specifically denies any recent fever, chills, headache, nausea, vomiting, abdominal pain, SOB, lightheadedness, dizziness, numbness, weakness, lower extremity swelling, heart palpitations, urinary sxs, diarrhea, constipation, melena, hematochezia, cough, or congestion. There are no other complaints, changes or physical findings pertinent to the HPI at this time.     PCP: Sacha Montilla MD  Past History   Past Medical History:  Past Medical History:   Diagnosis Date    Abnormal CT of the abdomen 2012    Asthma     Autoimmune disease (HCC)     lupus    C. difficile diarrhea     Cervical prolapse     Dehydration     Mariah-Danlos syndrome     Gastrointestinal disorder     gerd, twisted colon, IBS    GERD (gastroesophageal reflux disease)     Headache(784.0)     Lupus (HCC)     Morbid obesity (HCC)     Murmur     Nausea & vomiting     Neurological disorder     cluster headaches    Occipital neuralgia     other 2006, 2009    ovarian cyst removal    Other ill-defined conditions(799.89)     Syncope     Worsening headaches        Past Surgical History:  Past Surgical History:   Procedure Laterality Date    COLONOSCOPY  9/21/2010         HX CHOLECYSTECTOMY      HX CYST INCISION AND DRAINAGE Right 02/27/2020    HX GYN  1/2009    right salpingo oopherectomy    HX GYN  2006    right ovarian tumor removed    HX HEENT      left wisdom teeth removal    HX HEENT      top right wisdom tooth removed    HX HERNIA REPAIR  4/2012    HX HERNIA REPAIR  2/28/13    Laparoscopic recurrent incisional hernia repair    HX HYSTERECTOMY      2016    HX UROLOGICAL      Kidney Stone Removal    SC EGD TRANSORAL BIOPSY SINGLE/MULTIPLE  9/22/2010            Family History:   Family history reviewed and was non-contributory, unless specified below:  Family History   Problem Relation Age of Onset    Colon Cancer Maternal Grandfather        Social History:  Social History     Tobacco Use    Smoking status: Never Smoker    Smokeless tobacco: Never Used   Vaping Use    Vaping Use: Never used   Substance Use Topics    Alcohol use: No    Drug use: No       Allergies:   Allergies   Allergen Reactions    Latex Itching     burning    Compazine [Prochlorperazine] Anxiety     High heart rate  Pt can take promethazine with no problems    Sulfa (Sulfonamide Antibiotics) Unknown (comments)    Topamax [Topiramate] Unknown (comments)    Adhesive Rash     Allergic to Dermabond    Clindamycin Diarrhea     Pt states caused her C-Diff    Iodinated Contrast Media Myalgia     Pt c/o severe back spasms after IV contrast administration. Pt requesting IV pain medications and requests I put this as a drug intolerance in her EMR.  Mushroom Other (comments)    Mushroom Combination No.1 Unknown (comments)    Reglan [Metoclopramide Hcl] Rash    Valproic Acid Other (comments)     Elevated heart rate and vomiting         Current Medications:  No current facility-administered medications on file prior to encounter. Current Outpatient Medications on File Prior to Encounter   Medication Sig Dispense Refill    azithromycin (ZITHROMAX) 250 mg tablet TAKE 2 TABLETS BY MOUTH TODAY, THEN TAKE 1 TABLET DAILY FOR 4 DAYS 6 Tablet 0    tiZANidine (ZANAFLEX) 4 mg tablet TAKE 1 TABLET BY MOUTH THREE TIMES A DAY AS NEEDED FOR PAIN 30 Tablet 0    meclizine (ANTIVERT) 25 mg tablet TAKE 1 TAB BY MOUTH THREE TIMES DAILY AS NEEDED FOR DIZZINESS FOR UP TO 10 DAYS. 30 Tablet 0    ondansetron (Zofran ODT) 4 mg disintegrating tablet 1 Tablet by SubLINGual route every eight (8) hours as needed for Nausea or Vomiting. 20 Tablet 0    ketorolac (TORADOL) 10 mg tablet Take 1 Tablet by mouth every six (6) hours as needed for Pain. 5 Tablet 0    furosemide (LASIX) 20 mg tablet TAKE 1 TABLET BY MOUTH AS NEEDED (SWELLING). 30 Tablet 2    DULoxetine (CYMBALTA) 60 mg capsule TAKE 2 CAPSULESBY MOUTH EVERY DAY 30 Capsule 5    ondansetron hcl (ZOFRAN) 4 mg tablet TAKE 1 TABLET BY MOUTH EVERY 8 HOURS AS NEEDED FOR NAUSEA 20 Tablet 0    ALPRAZolam (XANAX) 1 mg tablet TAKE 1 TABLET BY MOUTH TWICE A DAY AS NEEDED FOR ANXIETY 60 Tablet 1    SUMAtriptan (IMITREX) 25 mg tablet Take 25 mg by mouth as needed.  albuterol (PROVENTIL HFA, VENTOLIN HFA, PROAIR HFA) 90 mcg/actuation inhaler Take 1 Puff by inhalation every six (6) hours as needed for Wheezing. 1 Inhaler 0    propranolol LA (INDERAL LA) 80 mg SR capsule 80 mg daily.       codeine-butalbital-acetaminophen-caffeine (FIORICET WITH CODEINE) -85-30 mg capsule Take 1 Capsule by mouth every six (6) hours as needed for Headache.  mirtazapine (REMERON) 30 mg tablet TAKE 1 TABLET BY MOUTH AT BEDTIME 30 Tablet 5    naloxone (NARCAN) 4 mg/actuation nasal spray Use 1 spray intranasally, then discard. Repeat with new spray every 2 min as needed for opioid overdose symptoms, alternating nostrils. 2 Each 0    pantoprazole (PROTONIX) 40 mg tablet take 1 tablet by mouth at bedtime  0    rizatriptan (MAXALT) 10 mg tablet Take 10 mg by mouth once as needed for Migraine. May repeat in 2 hours if needed      belimumab (Benlysta) 400 mg solr injection by IntraVENous route. Review of Systems   A complete ROS was reviewed by me today and all other systems negative, unless otherwise specified below:  Review of Systems   Constitutional: Negative. Negative for chills and fever. HENT: Negative. Negative for congestion and sore throat. Eyes: Negative. Respiratory: Positive for chest tightness. Negative for cough and shortness of breath. Cardiovascular: Positive for chest pain. Negative for palpitations and leg swelling. Gastrointestinal: Negative. Negative for abdominal distention, abdominal pain, blood in stool, constipation, diarrhea, nausea and vomiting. Endocrine: Negative. Genitourinary: Negative. Negative for dysuria, flank pain, frequency, hematuria and urgency. Musculoskeletal: Negative. Negative for arthralgias, back pain and myalgias. Skin: Negative. Negative for color change and rash. Neurological: Positive for syncope. Negative for dizziness, speech difficulty, weakness, light-headedness, numbness and headaches. Hematological: Negative. Psychiatric/Behavioral: Negative. Negative for confusion and self-injury. The patient is not nervous/anxious. All other systems reviewed and are negative. Physical Exam   Physical Exam  Vitals and nursing note reviewed. Constitutional:       General: She is not in acute distress. Appearance: She is well-developed.  She is not diaphoretic. HENT:      Head: Normocephalic and atraumatic. Mouth/Throat:      Pharynx: No oropharyngeal exudate. Eyes:      Conjunctiva/sclera: Conjunctivae normal.   Cardiovascular:      Rate and Rhythm: Regular rhythm. Bradycardia present. Heart sounds: Normal heart sounds. Pulmonary:      Effort: Pulmonary effort is normal. No respiratory distress. Breath sounds: Normal breath sounds. No wheezing or rales. Chest:      Chest wall: No tenderness. Abdominal:      General: Bowel sounds are normal. There is no distension. Palpations: Abdomen is soft. There is no mass. Tenderness: There is no abdominal tenderness. There is no guarding or rebound. Musculoskeletal:         General: Normal range of motion. Cervical back: Normal range of motion. Skin:     General: Skin is warm. Neurological:      Mental Status: She is alert and oriented to person, place, and time. Cranial Nerves: No cranial nerve deficit. Motor: No abnormal muscle tone. Diagnostic Study Results     Laboratory Data  I have personally reviewed and interpreted all available laboratory results.    Recent Results (from the past 24 hour(s))   EKG, 12 LEAD, INITIAL    Collection Time: 12/18/21  4:20 AM   Result Value Ref Range    Ventricular Rate 49 BPM    Atrial Rate 49 BPM    P-R Interval 210 ms    QRS Duration 84 ms    Q-T Interval 414 ms    QTC Calculation (Bezet) 373 ms    Calculated P Axis 22 degrees    Calculated R Axis 38 degrees    Calculated T Axis 17 degrees    Diagnosis       Sinus bradycardia with 1st degree AV block  When compared with ECG of 19-SEP-2021 15:26,  MO interval has increased     CBC WITH AUTOMATED DIFF    Collection Time: 12/18/21  4:37 AM   Result Value Ref Range    WBC 9.0 3.6 - 11.0 K/uL    RBC 3.82 3.80 - 5.20 M/uL    HGB 11.2 (L) 11.5 - 16.0 g/dL    HCT 35.0 35.0 - 47.0 %    MCV 91.6 80.0 - 99.0 FL    MCH 29.3 26.0 - 34.0 PG    MCHC 32.0 30.0 - 36.5 g/dL    RDW 13.1 11.5 - 14.5 %    PLATELET 109 744 - 570 K/uL    MPV 9.8 8.9 - 12.9 FL    NRBC 0.0 0  WBC    ABSOLUTE NRBC 0.00 0.00 - 0.01 K/uL    NEUTROPHILS 43 32 - 75 %    LYMPHOCYTES 35 12 - 49 %    MONOCYTES 11 5 - 13 %    EOSINOPHILS 9 (H) 0 - 7 %    BASOPHILS 1 0 - 1 %    IMMATURE GRANULOCYTES 1 (H) 0.0 - 0.5 %    ABS. NEUTROPHILS 3.9 1.8 - 8.0 K/UL    ABS. LYMPHOCYTES 3.1 0.8 - 3.5 K/UL    ABS. MONOCYTES 1.0 0.0 - 1.0 K/UL    ABS. EOSINOPHILS 0.8 (H) 0.0 - 0.4 K/UL    ABS. BASOPHILS 0.1 0.0 - 0.1 K/UL    ABS. IMM. GRANS. 0.1 (H) 0.00 - 0.04 K/UL    DF AUTOMATED     METABOLIC PANEL, COMPREHENSIVE    Collection Time: 12/18/21  4:37 AM   Result Value Ref Range    Sodium 140 136 - 145 mmol/L    Potassium 3.9 3.5 - 5.1 mmol/L    Chloride 109 (H) 97 - 108 mmol/L    CO2 27 21 - 32 mmol/L    Anion gap 4 (L) 5 - 15 mmol/L    Glucose 141 (H) 65 - 100 mg/dL    BUN 11 6 - 20 MG/DL    Creatinine 1.01 0.55 - 1.02 MG/DL    BUN/Creatinine ratio 11 (L) 12 - 20      GFR est AA >60 >60 ml/min/1.73m2    GFR est non-AA >60 >60 ml/min/1.73m2    Calcium 9.2 8.5 - 10.1 MG/DL    Bilirubin, total 0.2 0.2 - 1.0 MG/DL    ALT (SGPT) 23 12 - 78 U/L    AST (SGOT) 12 (L) 15 - 37 U/L    Alk. phosphatase 97 45 - 117 U/L    Protein, total 6.6 6.4 - 8.2 g/dL    Albumin 3.5 3.5 - 5.0 g/dL    Globulin 3.1 2.0 - 4.0 g/dL    A-G Ratio 1.1 1.1 - 2.2     TROPONIN-HIGH SENSITIVITY    Collection Time: 12/18/21  4:37 AM   Result Value Ref Range    Troponin-High Sensitivity 4 0 - 51 ng/L   MAGNESIUM    Collection Time: 12/18/21  4:37 AM   Result Value Ref Range    Magnesium 2.2 1.6 - 2.4 mg/dL   TSH 3RD GENERATION    Collection Time: 12/18/21  5:54 AM   Result Value Ref Range    TSH 1.52 0.36 - 3.74 uIU/mL       Radiologic Studies   I have personally reviewed and interpreted all available imaging studies and agree with radiology interpretation. CTA CHEST W OR W WO CONT   Final Result   No evidence for pulmonary embolism.  Mild mosaic perfusion suggesting small airways disease. CT HEAD WO CONT   Final Result   No acute findings. XR CHEST PORT   Final Result   Normal chest.        CT Results  (Last 48 hours)               12/18/21 0634  CTA CHEST W OR W WO CONT Final result    Impression:  No evidence for pulmonary embolism. Mild mosaic perfusion suggesting   small airways disease. Narrative:  History: Chest pain and syncope. CTA of the chest was performed. 100 mL Isovue-370 were injected and scanning   was performed during the arterial phase of contrast administration. Post   processing was performed. 3D reconstructions were performed. CT dose reduction   was achieved through use of a standardized protocol tailored for this   examination and automatic exposure control for dose modulation. There are no intraluminal filling defects to suggest pulmonary embolism. The   heart, pericardium, and great vessels appear unremarkable. The chest wall and   axilla appear unremarkable. The lungs are clear. There is mild mosaic perfusion   in the lungs. The upper abdomen demonstrates cholecystectomy. 12/18/21 0634  CT HEAD WO CONT Final result    Impression:  No acute findings. Narrative:  EXAM: CT HEAD WO CONT       INDICATION: syncope, LOC       COMPARISON: September 19, 2021. CONTRAST: None. TECHNIQUE: Unenhanced CT of the head was performed using 5 mm images. Brain and   bone windows were generated. Coronal and sagittal reformats. CT dose reduction   was achieved through use of a standardized protocol tailored for this   examination and automatic exposure control for dose modulation. FINDINGS:   The ventricles and sulci are normal in size, shape and configuration. . There is   no significant white matter disease. There is no intracranial hemorrhage,   extra-axial collection, or mass effect. The basilar cisterns are open. No CT   evidence of acute infarct.        The bone windows demonstrate no abnormalities. The visualized portions of the   paranasal sinuses and mastoid air cells are clear. CXR Results  (Last 48 hours)               12/18/21 0527  XR CHEST PORT Final result    Impression:  Normal chest.       Narrative:  EXAM: XR CHEST PORT       INDICATION: syncope, cp       COMPARISON: May 10       FINDINGS: A portable AP radiograph of the chest was obtained at 0517 hours. The   patient is on a cardiac monitor. The lungs are clear. The cardiac and   mediastinal contours and pulmonary vascularity are normal.  The bones and soft   tissues are grossly within normal limits. Medical Decision Making   I am the first and primary ED physician for this patient's ED visit today. I reviewed our electronic medical record system for any past medical records that may contribute to the patient's current condition, including their past medical history, surgical history, social and family history. This also includes their most recent ED visits, previous hospitalizations and prior diagnostic data. I have reviewed and summarized the most pertinent findings in my HPI and MDM. Vital Signs Reviewed:  Patient Vitals for the past 24 hrs:   Temp Pulse Resp BP SpO2   12/18/21 0701  65  115/75 97 %   12/18/21 0557  (!) 54 17 (!) 114/41 97 %   12/18/21 0432 98.1 °F (36.7 °C) (!) 49 15 (!) 124/54 100 %     Pulse Oximetry Analysis: 100% on RA    Cardiac Monitor:   Rate: 55 bpm  The cardiac monitor revealed the following rhythm as interpreted by me: Normal Sinus Rhythm  Cardiac monitoring was ordered to monitor patient for signs of cardiac dysrhythmia, which they are at risk for based on their history and/or risk for cardiovascular disease and/or metabolic abnormalities. EKG interpretation:   Billable EKG reviewed by ED Physician in the absence of a cardiologist: Yes  Rhythm: sinus bradycardia; and regular . Rate (approx.): 49;  Axis: normal; P wave: normal; QRS interval: normal ; ST/T wave: normal; Other findings: normal. This EKG was interpreted by Gayle Noriega MD    Records Reviewed: Nursing Notes, Old Medical Records, Previous electrocardiograms, Previous Radiology Studies and Previous Laboratory Studies, EMS reports    Provider Notes (Medical Decision Making):   Pt presents with syncope. Stable vitals with nonfocal exam. Clinical presentation most c/w vasovagal syncope. Pt is without c/o of headache, intractable vertigo/dizziness, or ataxia on exam.  Ddx: vasovagal/situational syncope, cardiogenic syncope, orthostatic hypotension, dehydration. Will get labs, EKG, orthostatics and fluids PRN. If negative, pt is safe for discharge home and outpatient f/u per [facility] and [facility] Syncope Rules. Given syncope, I did advise that patient cannot drive for 6 months given [place] [employer]. DDx includes STEMI, NSTEMI, Angina, PE, Aortic Pathology, Chest Wall Pain, Pleurisy, Pneumonia, GERD/esophagitis, Anxiety. No cough/fever or focal lung findings to suggest pneumonia. No tachycardia, hypoxia or pleuritic component to suggest PE. Pulses symmetric and no extremely elevated BP/asymmetry or classic tearing sensation to suggest Aortic Dissection. Also, no neuro findings. No wretching/forceful vomiting to suggest esophageal disaster. Denies IV drug abuse, has native valves, no fevers/murmurs or skin lesions to suggest endocarditis. Will evaluate with EKG, labs, cardiac enzymes, chest x-ray. Will provide pain control and reassess. ED Course:   Initial assessment performed. I discussed presenting problems and concerns, and my formulated plan for today's visit with the patient and any available family members. I have encouraged them to ask questions as they arise throughout the visit.    Social History     Tobacco Use    Smoking status: Never Smoker    Smokeless tobacco: Never Used   Vaping Use    Vaping Use: Never used   Substance Use Topics    Alcohol use: No    Drug use: No       ED Orders Placed:  Orders Placed This Encounter    MUSIC THERAPY    XR CHEST PORT    CTA CHEST W OR W WO CONT    CT HEAD WO CONT    CBC WITH AUTOMATED DIFF    METABOLIC PANEL, COMPREHENSIVE    TROPONIN-HIGH SENSITIVITY    DRUG SCREEN, URINE    URINALYSIS W/ REFLEX CULTURE    TSH 3RD GENERATION    MAGNESIUM    POC URINE PREGNANCY TEST    NURSING-MISCELLANEOUS: DISTRACTION FOR AGITATED PATIENT CONTINUOUS    NURSING-MISCELLANEOUS: PET THERAPY CONTINUOUS    EKG, 12 LEAD, INITIAL    sodium chloride 0.9 % bolus infusion 1,000 mL    DISCONTD: ketorolac (TORADOL) injection 30 mg    ondansetron (ZOFRAN) injection 4 mg    morphine injection 2 mg    lidocaine 4 % patch 2 Patch    iopamidoL (ISOVUE-370) 76 % injection 100 mL    morphine injection 2 mg    DISCONTD: droperidoL (INAPSINE) injection 0.625 mg    DISCONTD: fentaNYL citrate (PF) injection 50 mcg    diazePAM (VALIUM) tablet 5 mg       ED Medications Administered During ED Course:  Medications   lidocaine 4 % patch 2 Patch (2 Patches TransDERmal Apply Patch 12/18/21 0548)   sodium chloride 0.9 % bolus infusion 1,000 mL (0 mL IntraVENous IV Completed 12/18/21 0717)   ondansetron (ZOFRAN) injection 4 mg (4 mg IntraVENous Given 12/18/21 0548)   morphine injection 2 mg (2 mg IntraVENous Given 12/18/21 0548)   iopamidoL (ISOVUE-370) 76 % injection 100 mL (100 mL IntraVENous Given 12/18/21 0634)   morphine injection 2 mg (2 mg IntraVENous Given 12/18/21 0626)   diazePAM (VALIUM) tablet 5 mg (5 mg Oral Given 12/18/21 0707)        Progress Note:  I have just re-evaluated the patient. Patient reports improvement of sx's. I have reviewed Her vital signs and determined there is currently no worsening in their condition or physical exam. Results have been reviewed with them and their questions have been answered. We will continue to review further results as they come available.      Progress Note:  Pt requesting higher doses of pain medications. States 2mg IV morphine isn't enough. States now having severe back spasms from the IV contrast. Pt asked that I make IV contrast a drug allergy. Pt is a part of the ED care management of plan due to his frequency of ED visits. Will obtain UDS.  reviewed with patient. Will consider non-narcotic therapy and will consult his pain management specialist at time of dispo. Pt was educated on the appropriate use of the ED and has been provided with the High ED utilizer letter and [facility] Opioid Prescribing Guidelines. Management plan reviewed with patient. Pt verbalized understanding.  reviewedYes  UDS ObtainedNo  Case Management notifedNo  Referral for treatmentYes  Brochure/letter given/reviewed No  [employer] referralNo     I have reviewed and discussed the results of the prescription monitoring program with Lorena Fair. We have discussed patient's current pain, the use of narcotics for pain relief, and that in general narcotics are indicated for acute pain relief and not for chronic pain control. After a certain period of time narcotics should be stopped to avoid dependence. I warned the patient about the dangers of addiction and other side affects of narcotic abuse. We discussed how narcotics are controlled substances with a propensity for abuse/diversion and that the prescribing/usage of controlled substances is monitored very carefully by the [facility]. Pt understands that the most appropriate avenue for treatment of chronic pain is through a single (preferably pain management) physician and through a single pharmacy. Pt has been asked to seek further treatment for chronic pain through a pain management clinic and not through the emergency room. I spent 8 minutes on this discussion with the patient. Jeanne Welch MD      Progress Note:  Pt reassessed and symptoms noted to have improved significantly after ED treatment. Pt is clinically stable for discharge.  Arlean Runrun.its TOYA Marshall's labs and imaging have been reviewed with her and available family. She verbally conveys understanding and agreement of the signs, symptoms, diagnosis, treatment and prognosis and additionally agrees to follow up as recommended with Dr. Yoli Munson MD and/or specialist as instructed. She agrees with the care plan we have created and conveys that all of her questions have been answered. Additionally, I have put together a packet of discharge instructions for her that include: 1) educational information regarding their diagnosis, 2) how to care for their diagnosis at home, as well a 3) list of reasons why they would want to return to the ED prior to their follow-up appointment should the patient's condition change or symptoms worsen. I have answered all questions to the patient's satisfaction. Strict return precautions given. She conveyed understanding and agreement with care plan. Vital signs stable for discharge. Disposition:  DISCHARGE  The pt is ready for discharge. The pt's signs, symptoms, diagnosis, and discharge instructions have been discussed and pt has conveyed their understanding. The pt is to follow up as recommended or return to ER should their symptoms worsen. Plan has been discussed and pt is in agreement. Plan:  1. Return precautions as discussed with patient and available family/caregiver. 2.   Discharge Medication List as of 12/18/2021  6:53 AM      No current facility-administered medications for this encounter.     Current Outpatient Medications:     meclizine (ANTIVERT) 25 mg tablet, TAKE 1 TABLET BY MOUTH 3 TIMES A DAY AS NEEDED FOR DIZZINESS FOR UP TO 10 DAYS, Disp: 30 Tablet, Rfl: 0    propranoloL (INDERAL) 10 mg tablet, Take 1 Tablet by mouth two (2) times a day., Disp: 60 Tablet, Rfl: 1    azithromycin (ZITHROMAX) 250 mg tablet, TAKE 2 TABLETS BY MOUTH TODAY, THEN TAKE 1 TABLET DAILY FOR 4 DAYS, Disp: 6 Tablet, Rfl: 0    tiZANidine (ZANAFLEX) 4 mg tablet, TAKE 1 TABLET BY MOUTH THREE TIMES A DAY AS NEEDED FOR PAIN, Disp: 30 Tablet, Rfl: 0    ondansetron (Zofran ODT) 4 mg disintegrating tablet, 1 Tablet by SubLINGual route every eight (8) hours as needed for Nausea or Vomiting., Disp: 20 Tablet, Rfl: 0    ketorolac (TORADOL) 10 mg tablet, Take 1 Tablet by mouth every six (6) hours as needed for Pain., Disp: 5 Tablet, Rfl: 0    furosemide (LASIX) 20 mg tablet, TAKE 1 TABLET BY MOUTH AS NEEDED (SWELLING). , Disp: 30 Tablet, Rfl: 2    DULoxetine (CYMBALTA) 60 mg capsule, TAKE 2 CAPSULESBY MOUTH EVERY DAY, Disp: 30 Capsule, Rfl: 5    ondansetron hcl (ZOFRAN) 4 mg tablet, TAKE 1 TABLET BY MOUTH EVERY 8 HOURS AS NEEDED FOR NAUSEA, Disp: 20 Tablet, Rfl: 0    ALPRAZolam (XANAX) 1 mg tablet, TAKE 1 TABLET BY MOUTH TWICE A DAY AS NEEDED FOR ANXIETY, Disp: 60 Tablet, Rfl: 1    SUMAtriptan (IMITREX) 25 mg tablet, Take 25 mg by mouth as needed. , Disp: , Rfl:     albuterol (PROVENTIL HFA, VENTOLIN HFA, PROAIR HFA) 90 mcg/actuation inhaler, Take 1 Puff by inhalation every six (6) hours as needed for Wheezing., Disp: 1 Inhaler, Rfl: 0    codeine-butalbital-acetaminophen-caffeine (FIORICET WITH CODEINE) -77-30 mg capsule, Take 1 Capsule by mouth every six (6) hours as needed for Headache., Disp: , Rfl:     mirtazapine (REMERON) 30 mg tablet, TAKE 1 TABLET BY MOUTH AT BEDTIME, Disp: 30 Tablet, Rfl: 5    naloxone (NARCAN) 4 mg/actuation nasal spray, Use 1 spray intranasally, then discard. Repeat with new spray every 2 min as needed for opioid overdose symptoms, alternating nostrils. , Disp: 2 Each, Rfl: 0    pantoprazole (PROTONIX) 40 mg tablet, take 1 tablet by mouth at bedtime, Disp: , Rfl: 0    rizatriptan (MAXALT) 10 mg tablet, Take 10 mg by mouth once as needed for Migraine. May repeat in 2 hours if needed, Disp: , Rfl:     belimumab (Benlysta) 400 mg solr injection, by IntraVENous route., Disp: , Rfl:     3.    Follow-up Information     Follow up With Specialties Details Why Contact Info    [facility] [place] [facility] Emergency Medicine  As needed, If symptoms worsen [address]  [address] [facility]  [phone]        Instructed to return to ED if worse  Diagnosis   Clinical Impression:  1. Syncope and collapse    2. Musculoskeletal chest pain    3. Chronic pain syndrome    4. Drug-seeking behavior    5. Sinus bradycardia      Attestation:  Luis Miguel Bee MD, am the attending of record for this patient. I personally performed the services described in this documentation on this date, 12/18/2021 for patient, Laura Talamantes. I have reviewed the chart and verified that the record is accurate and complete.

## 2021-12-18 NOTE — DISCHARGE INSTRUCTIONS
Thank You! It was a pleasure taking care of you in our Emergency Department today. We know that when you come to [address] [place] [address], you are entrusting us with your health, comfort, and safety. Our physicians and nurses honor that trust, and truly appreciate the opportunity to care for you and your loved ones. We also value your feedback. If you receive a survey about your Emergency Department experience today, please fill it out. We care about our patients' feedback, and we listen to what you have to say. Thank you. Dr. Laura Aguirre M.D.      ________________________________________________________________________  I have included a copy of your lab results and/or radiologic studies from today's visit so you can have them easily available at your follow-up visit. We hope you feel better and please do not hesitate to contact the ED if you have any questions at all!     Recent Results (from the past 12 hour(s))   EKG, 12 LEAD, INITIAL    Collection Time: 12/18/21  4:20 AM   Result Value Ref Range    Ventricular Rate 49 BPM    Atrial Rate 49 BPM    P-R Interval 210 ms    QRS Duration 84 ms    Q-T Interval 414 ms    QTC Calculation (Bezet) 373 ms    Calculated P Axis 22 degrees    Calculated R Axis 38 degrees    Calculated T Axis 17 degrees    Diagnosis       Sinus bradycardia with 1st degree AV block  When compared with ECG of 19-SEP-2021 15:26,  IL interval has increased     CBC WITH AUTOMATED DIFF    Collection Time: 12/18/21  4:37 AM   Result Value Ref Range    WBC 9.0 3.6 - 11.0 K/uL    RBC 3.82 3.80 - 5.20 M/uL    HGB 11.2 (L) 11.5 - 16.0 g/dL    HCT 35.0 35.0 - 47.0 %    MCV 91.6 80.0 - 99.0 FL    MCH 29.3 26.0 - 34.0 PG    MCHC 32.0 30.0 - 36.5 g/dL    RDW 13.1 11.5 - 14.5 %    PLATELET 678 551 - 582 K/uL    MPV 9.8 8.9 - 12.9 FL    NRBC 0.0 0  WBC    ABSOLUTE NRBC 0.00 0.00 - 0.01 K/uL    NEUTROPHILS 43 32 - 75 %    LYMPHOCYTES 35 12 - 49 %    MONOCYTES 11 5 - 13 % EOSINOPHILS 9 (H) 0 - 7 %    BASOPHILS 1 0 - 1 %    IMMATURE GRANULOCYTES 1 (H) 0.0 - 0.5 %    ABS. NEUTROPHILS 3.9 1.8 - 8.0 K/UL    ABS. LYMPHOCYTES 3.1 0.8 - 3.5 K/UL    ABS. MONOCYTES 1.0 0.0 - 1.0 K/UL    ABS. EOSINOPHILS 0.8 (H) 0.0 - 0.4 K/UL    ABS. BASOPHILS 0.1 0.0 - 0.1 K/UL    ABS. IMM. GRANS. 0.1 (H) 0.00 - 0.04 K/UL    DF AUTOMATED     METABOLIC PANEL, COMPREHENSIVE    Collection Time: 12/18/21  4:37 AM   Result Value Ref Range    Sodium 140 136 - 145 mmol/L    Potassium 3.9 3.5 - 5.1 mmol/L    Chloride 109 (H) 97 - 108 mmol/L    CO2 27 21 - 32 mmol/L    Anion gap 4 (L) 5 - 15 mmol/L    Glucose 141 (H) 65 - 100 mg/dL    BUN 11 6 - 20 MG/DL    Creatinine 1.01 0.55 - 1.02 MG/DL    BUN/Creatinine ratio 11 (L) 12 - 20      GFR est AA >60 >60 ml/min/1.73m2    GFR est non-AA >60 >60 ml/min/1.73m2    Calcium 9.2 8.5 - 10.1 MG/DL    Bilirubin, total 0.2 0.2 - 1.0 MG/DL    ALT (SGPT) 23 12 - 78 U/L    AST (SGOT) 12 (L) 15 - 37 U/L    Alk. phosphatase 97 45 - 117 U/L    Protein, total 6.6 6.4 - 8.2 g/dL    Albumin 3.5 3.5 - 5.0 g/dL    Globulin 3.1 2.0 - 4.0 g/dL    A-G Ratio 1.1 1.1 - 2.2     TROPONIN-HIGH SENSITIVITY    Collection Time: 12/18/21  4:37 AM   Result Value Ref Range    Troponin-High Sensitivity 4 0 - 51 ng/L   MAGNESIUM    Collection Time: 12/18/21  4:37 AM   Result Value Ref Range    Magnesium 2.2 1.6 - 2.4 mg/dL   TSH 3RD GENERATION    Collection Time: 12/18/21  5:54 AM   Result Value Ref Range    TSH 1.52 0.36 - 3.74 uIU/mL       CTA CHEST W OR W WO CONT   Final Result   No evidence for pulmonary embolism. Mild mosaic perfusion suggesting   small airways disease. CT HEAD WO CONT   Final Result   No acute findings. XR CHEST PORT   Final Result   Normal chest.        CT Results  (Last 48 hours)                 12/18/21 0634  CTA CHEST W OR W WO CONT Final result    Impression:  No evidence for pulmonary embolism. Mild mosaic perfusion suggesting   small airways disease. Narrative:  History: Chest pain and syncope. CTA of the chest was performed. 100 mL Isovue-370 were injected and scanning   was performed during the arterial phase of contrast administration. Post   processing was performed. 3D reconstructions were performed. CT dose reduction   was achieved through use of a standardized protocol tailored for this   examination and automatic exposure control for dose modulation. There are no intraluminal filling defects to suggest pulmonary embolism. The   heart, pericardium, and great vessels appear unremarkable. The chest wall and   axilla appear unremarkable. The lungs are clear. There is mild mosaic perfusion   in the lungs. The upper abdomen demonstrates cholecystectomy. 12/18/21 0634  CT HEAD WO CONT Final result    Impression:  No acute findings. Narrative:  EXAM: CT HEAD WO CONT       INDICATION: syncope, LOC       COMPARISON: September 19, 2021. CONTRAST: None. TECHNIQUE: Unenhanced CT of the head was performed using 5 mm images. Brain and   bone windows were generated. Coronal and sagittal reformats. CT dose reduction   was achieved through use of a standardized protocol tailored for this   examination and automatic exposure control for dose modulation. FINDINGS:   The ventricles and sulci are normal in size, shape and configuration. . There is   no significant white matter disease. There is no intracranial hemorrhage,   extra-axial collection, or mass effect. The basilar cisterns are open. No CT   evidence of acute infarct. The bone windows demonstrate no abnormalities. The visualized portions of the   paranasal sinuses and mastoid air cells are clear. The exam and treatment you received in the Emergency Department were for an urgent problem and are not intended as complete care.  It is important that you follow up with a doctor, nurse practitioner, or physician assistant for ongoing care. If your symptoms become worse or you do not improve as expected and you are unable to reach your usual health care provider, you should return to the Emergency Department. We are available 24 hours a day. Please take your discharge instructions with you when you go to your follow-up appointment. If a prescription has been provided, please have it filled as soon as possible to prevent a delay in treatment. Read the entire medication instruction sheet provided to you by the pharmacy. If you have any questions or reservations about taking the medication due to side effects or interactions with other medications, please call your primary care physician or contact the ER to speak with the charge nurse. Please make an appointment with your family doctor or the physician you were referred to for follow-up of this visit as instructed on your discharge paperwork. Return to the ER if you are unable to be seen or if you are unable to be seen in a timely manner. If you have any problem arranging the follow-up visit, contact the Emergency Department immediately.

## 2021-12-20 ENCOUNTER — TELEPHONE (OUTPATIENT)
Dept: CARDIOLOGY CLINIC | Age: 36
End: 2021-12-20

## 2021-12-20 DIAGNOSIS — G43.109 MIGRAINE WITH AURA AND WITHOUT STATUS MIGRAINOSUS, NOT INTRACTABLE: Primary | ICD-10-CM

## 2021-12-20 RX ORDER — MECLIZINE HYDROCHLORIDE 25 MG/1
TABLET ORAL
Qty: 30 TABLET | Refills: 0 | Status: SHIPPED | OUTPATIENT
Start: 2021-12-20 | End: 2022-01-03

## 2021-12-20 RX ORDER — PROPRANOLOL HYDROCHLORIDE 10 MG/1
10 TABLET ORAL 2 TIMES DAILY
Qty: 60 TABLET | Refills: 1 | Status: SHIPPED | OUTPATIENT
Start: 2021-12-20 | End: 2022-02-14

## 2021-12-20 NOTE — TELEPHONE ENCOUNTER
I called and spoke with the patient. Two identifiers verified. Patient stated she went to the ED on Saturday d/t low BP (114/41) and chest pain. BP readin21 at 5PM: 141/83 HR: 83   At 9 PM: 136/86 HR: 91  21 at 1.30 PM: 177/106 HR: 86  21 at 8.15 AM: 149/102 HR: 98    Complains of chest pain, headache with elevated BP, little dizziness. Pt stated she was taking Metoprolol 25 mg prior to Propanolol with no reactions, she wanted to know if she can take Metoprolol instead of Propanolol.

## 2021-12-20 NOTE — TELEPHONE ENCOUNTER
Latest BP today at 2PM: 169/107 HR 98, with headache. Patient is requesting to either go back to Metoprolol or reduce the dose of her Propanolol. I will notify Dr. Mariano Leblanc.

## 2021-12-20 NOTE — TELEPHONE ENCOUNTER
[facility] passed out [id number], was told by nurse to stop taking b/p medication and now her b/p is 177/106 requesting a call back from nurse.

## 2021-12-20 NOTE — TELEPHONE ENCOUNTER
I called and spoke with the patient. Patient's two identifiers verified. I discussed this thoroughly with the patient. Per Dr. Jayne Farr, [facility] to try going back on a lower dose of propanolol.  I know she takes the extended release but would like to gently reintroduce this to her given she did have normal pressures in the ER heart rates in the upper 40s to low 50s.  Start propanolol immediate release 10 mg 1 tablet twice a day.  We can slowly uptitrate this dose dependent on her heart rate/blood pressure as well as migraine status. \"      Patient verbalized understanding. Pharmacy verified. No other concerns at this time.

## 2021-12-26 RX ORDER — TIZANIDINE 4 MG/1
TABLET ORAL
Qty: 30 TABLET | Refills: 0 | Status: SHIPPED | OUTPATIENT
Start: 2021-12-26 | End: 2022-01-03

## 2021-12-28 RX ORDER — AZITHROMYCIN 250 MG/1
TABLET, FILM COATED ORAL
Qty: 6 TABLET | Refills: 0 | Status: SHIPPED | OUTPATIENT
Start: 2021-12-28 | End: 2022-01-10

## 2021-12-29 ENCOUNTER — TELEPHONE (OUTPATIENT)
Dept: INTERNAL MEDICINE CLINIC | Age: 36
End: 2021-12-29

## 2021-12-29 DIAGNOSIS — R05.9 COUGH: ICD-10-CM

## 2021-12-29 NOTE — TELEPHONE ENCOUNTER
Pt is asking if she can get this yet today? Please call pt to let her know what has been done or if Dr. Eduarda King is going to send in today.

## 2021-12-29 NOTE — TELEPHONE ENCOUNTER
PCP: Yobany Javier MD    Last appt: 9/3/2021  Future Appointments   Date Time Provider Alvaro Kelsey   8/25/2022  1:20 PM AYAD Donald AMB       Requested Prescriptions     Pending Prescriptions Disp Refills    guaiFENesin-codeine (ROBITUSSIN AC) 100-10 mg/5 mL solution [Pharmacy Med Name: CODEINE-GUAIFEN  MG/5 ML] 100 mL 0     Sig: TAKE 5 MLS BY MOUTH THREE TIMES DAILY AS NEEDED FOR COUGH FOR UP TO 14 DAYS *MAX 15 MLS DAILY*       Prior labs and Blood pressures:  BP Readings from Last 3 Encounters:   12/18/21 115/75   12/11/21 (!) 91/50   11/16/21 (!) 116/57     Lab Results   Component Value Date/Time    Sodium 140 12/18/2021 04:37 AM    Potassium 3.9 12/18/2021 04:37 AM    Chloride 109 (H) 12/18/2021 04:37 AM    CO2 27 12/18/2021 04:37 AM    Anion gap 4 (L) 12/18/2021 04:37 AM    Glucose 141 (H) 12/18/2021 04:37 AM    BUN 11 12/18/2021 04:37 AM    Creatinine 1.01 12/18/2021 04:37 AM    BUN/Creatinine ratio 11 (L) 12/18/2021 04:37 AM    GFR est AA >60 12/18/2021 04:37 AM    GFR est non-AA >60 12/18/2021 04:37 AM    Calcium 9.2 12/18/2021 04:37 AM     No results found for: HBA1C, UKQ3PVDY, ZMH4WAHK  No results found for: CHOL, CHOLPOCT, CHOLX, CHLST, CHOLV, HDL, HDLPOC, HDLP, LDL, LDLCPOC, LDLC, DLDLP, VLDLC, VLDL, TGLX, TRIGL, TRIGP, TGLPOCT, CHHD, CHHDX  No results found for: VITD3, XQVID2, XQVID3, XQVID, VD3RIA    Lab Results   Component Value Date/Time    TSH 1.52 12/18/2021 05:54 AM

## 2021-12-29 NOTE — TELEPHONE ENCOUNTER
----- Message from Ibeth Bolus sent at 12/29/2021 11:10 AM EST -----  Subject: Message to Provider    QUESTIONS  Information for Provider? PT would like a cough suppressant Cheratussin   called in to pharmacy for her. [id number] [place]  ---------------------------------------------------------------------------  --------------  CALL BACK INFO  What is the best way for the office to contact you? OK to leave message on   voicemail  Preferred Call Back Phone Number? [phone]  ---------------------------------------------------------------------------  --------------  SCRIPT ANSWERS  Relationship to Patient?  Self

## 2021-12-30 RX ORDER — CODEINE PHOSPHATE AND GUAIFENESIN 10; 100 MG/5ML; MG/5ML
SOLUTION ORAL
Qty: 100 ML | Refills: 0 | Status: SHIPPED | OUTPATIENT
Start: 2021-12-30 | End: 2022-01-06

## 2022-01-02 RX ORDER — ONDANSETRON 4 MG/1
TABLET, FILM COATED ORAL
Qty: 20 TABLET | Refills: 0 | Status: SHIPPED | OUTPATIENT
Start: 2022-01-02 | End: 2022-01-13

## 2022-01-03 RX ORDER — TIZANIDINE 4 MG/1
TABLET ORAL
Qty: 30 TABLET | Refills: 0 | Status: SHIPPED | OUTPATIENT
Start: 2022-01-03 | End: 2022-01-13

## 2022-01-03 RX ORDER — MECLIZINE HYDROCHLORIDE 25 MG/1
TABLET ORAL
Qty: 30 TABLET | Refills: 0 | Status: SHIPPED | OUTPATIENT
Start: 2022-01-03 | End: 2022-01-13

## 2022-01-08 DIAGNOSIS — F51.01 PRIMARY INSOMNIA: ICD-10-CM

## 2022-01-08 DIAGNOSIS — F41.9 ANXIETY: ICD-10-CM

## 2022-01-10 DIAGNOSIS — F51.01 PRIMARY INSOMNIA: ICD-10-CM

## 2022-01-10 DIAGNOSIS — F41.9 ANXIETY: ICD-10-CM

## 2022-01-10 RX ORDER — ALPRAZOLAM 1 MG/1
TABLET ORAL
Qty: 60 TABLET | Refills: 1 | Status: SHIPPED | OUTPATIENT
Start: 2022-01-10 | End: 2022-03-14

## 2022-01-10 RX ORDER — AZITHROMYCIN 500 MG/1
500 TABLET, FILM COATED ORAL DAILY
Qty: 6 TABLET | Refills: 0 | Status: SHIPPED | OUTPATIENT
Start: 2022-01-10 | End: 2022-01-17 | Stop reason: ALTCHOICE

## 2022-01-10 RX ORDER — ALPRAZOLAM 1 MG/1
TABLET ORAL
Qty: 60 TABLET | Refills: 1 | Status: CANCELLED | OUTPATIENT
Start: 2022-01-10

## 2022-01-10 NOTE — TELEPHONE ENCOUNTER
Future Appointments:  Future Appointments   Date Time Provider Alvaro Kelsey   8/25/2022  1:20 PM AYAD Thakkar BS AMB        Last Appointment With Me:  9/3/2021     Requested Prescriptions     Pending Prescriptions Disp Refills    azithromycin (ZITHROMAX) 500 mg tab 3 Tablet      Sig: Take 1 Tablet by mouth daily.     ALPRAZolam (XANAX) 1 mg tablet 60 Tablet 1

## 2022-01-10 NOTE — TELEPHONE ENCOUNTER
Future Appointments:  Future Appointments   Date Time Provider Alvaro Klesey   8/25/2022  1:20 PM AYAD Crowder BS AMB        Last Appointment With Me:  9/3/2021     Requested Prescriptions     Pending Prescriptions Disp Refills    ALPRAZolam (XANAX) 1 mg tablet 60 Tablet 1

## 2022-01-10 NOTE — TELEPHONE ENCOUNTER
----- Message from Jacob Grant sent at 1/10/2022 12:54 PM EST -----  Subject: Refill Request    QUESTIONS  Name of Medication? ALPRAZolam (XANAX) 1 mg tablet  Patient-reported dosage and instructions? 1 daily  How many days do you have left? 0  Preferred Pharmacy? [id number]  Pharmacy phone number (if available)? [phone]  ---------------------------------------------------------------------------  --------------  Elzbieta GARCIA  What is the best way for the office to contact you? OK to leave message on   voicemail  Preferred Call Back Phone Number?  [phone]

## 2022-01-13 ENCOUNTER — TELEPHONE (OUTPATIENT)
Dept: INTERNAL MEDICINE CLINIC | Age: 37
End: 2022-01-13

## 2022-01-13 RX ORDER — TIZANIDINE 4 MG/1
TABLET ORAL
Qty: 30 TABLET | Refills: 0 | Status: SHIPPED | OUTPATIENT
Start: 2022-01-13 | End: 2022-01-25

## 2022-01-13 RX ORDER — ONDANSETRON 4 MG/1
TABLET, FILM COATED ORAL
Qty: 20 TABLET | Refills: 0 | Status: SHIPPED | OUTPATIENT
Start: 2022-01-13 | End: 2022-02-11

## 2022-01-13 RX ORDER — MECLIZINE HYDROCHLORIDE 25 MG/1
TABLET ORAL
Qty: 30 TABLET | Refills: 0 | Status: SHIPPED | OUTPATIENT
Start: 2022-01-13 | End: 2022-01-25

## 2022-01-14 RX ORDER — CODEINE PHOSPHATE AND GUAIFENESIN 10; 100 MG/5ML; MG/5ML
5 SOLUTION ORAL
Status: CANCELLED | OUTPATIENT
Start: 2022-01-14 | End: 2022-01-17

## 2022-01-14 NOTE — TELEPHONE ENCOUNTER
----- Message from Sagar Jean Baptiste sent at 1/14/2022 12:28 PM EST -----  Subject: Refill Request    QUESTIONS  Name of Medication? guaiFENesin-codeine (ROBITUSSIN AC) 100-10 mg/5 mL   solution  Patient-reported dosage and instructions? as written  How many days do you have left? 0  Preferred Pharmacy? [id number]  Pharmacy phone number (if available)? [phone]  Additional Information for Provider? patient has been waiting for refill   for seven days, please when request is made  ---------------------------------------------------------------------------  --------------  [address]  What is the best way for the office to contact you? OK to leave message on   voicemail  Preferred Call Back Phone Number?  [phone]

## 2022-01-17 ENCOUNTER — VIRTUAL VISIT (OUTPATIENT)
Dept: INTERNAL MEDICINE CLINIC | Age: 37
End: 2022-01-17
Payer: MEDICAID

## 2022-01-17 DIAGNOSIS — R05.9 COUGH: ICD-10-CM

## 2022-01-17 DIAGNOSIS — J06.9 ACUTE URI: Primary | ICD-10-CM

## 2022-01-17 PROCEDURE — 99212 OFFICE O/P EST SF 10 MIN: CPT | Performed by: INTERNAL MEDICINE

## 2022-01-17 RX ORDER — ALBUTEROL SULFATE 90 UG/1
1 AEROSOL, METERED RESPIRATORY (INHALATION)
Qty: 1 EACH | Refills: 1 | Status: SHIPPED | OUTPATIENT
Start: 2022-01-17

## 2022-01-17 RX ORDER — CODEINE PHOSPHATE AND GUAIFENESIN 10; 100 MG/5ML; MG/5ML
5 SOLUTION ORAL
Qty: 120 ML | Refills: 0 | Status: SHIPPED | OUTPATIENT
Start: 2022-01-17 | End: 2022-01-21

## 2022-01-17 RX ORDER — LEVOFLOXACIN 500 MG/1
500 TABLET, FILM COATED ORAL DAILY
Qty: 7 TABLET | Refills: 0 | Status: SHIPPED | OUTPATIENT
Start: 2022-01-17 | End: 2022-01-24

## 2022-01-17 NOTE — PROGRESS NOTES
Kris Lambert is a 43 y.o. female, evaluated via audio-only technology on 1/17/2022 for Cough, Fever, and O2/Oxygen (low)  . Assessment & Plan:     Diagnoses and all orders for this visit:    1. Acute URI  -     levoFLOXacin (LEVAQUIN) 500 mg tablet; Take 1 Tablet by mouth daily for 7 days. -     albuterol (PROVENTIL HFA, VENTOLIN HFA, PROAIR HFA) 90 mcg/actuation inhaler; Take 1 Puff by inhalation every six (6) hours as needed for Wheezing. 2. Cough  -     guaiFENesin-codeine (Cheratussin AC) 100-10 mg/5 mL solution; Take 5 mL by mouth three (3) times daily as needed for Cough for up to 7 days. Max Daily Amount: 15 mL. Follow-up and Dispositions    · Return if symptoms worsen or fail to improve. 12  Subjective:     Chief Complaint   Patient presents with    Cough    Fever    O2/Oxygen     low     She is a patient of Dr. Breanne Cheng, seen acutely for symptoms noted above. She has had low grade fever x 2 days. She has had a few days of cough, wheezing, and chest discomfort. \"My head hurts and my chest hurts. \" O2 sat 89-93%. She is taking mucinex and delsym. \"I'm prone to getting pneumonia. \" Requests cheratussin and levaquin, as \"those have usually worked well for me when Dr. Breanne Cheng prescribed them. \"     Has lupus; \"I have only been out once in the past week, for my infusion. So I think the chance that this is COVID is very low. \"    She has considered going to ER twice this weekend, but noted that they had a crowded waiting room and long wait times. Prior to Admission medications    Medication Sig Start Date End Date Taking?  Authorizing Provider   meclizine (ANTIVERT) 25 mg tablet TAKE 1 TABLET BY MOUTH THREE TIMES A DAY AS NEEDED FOR DIZZINESS FOR UP TO 10 DAYS 1/13/22  Yes Ghazal Quintana MD   tiZANidine (ZANAFLEX) 4 mg tablet TAKE 1 TABLET BY MOUTH THREE TIMES A DAY AS NEEDED FOR PAIN 1/13/22  Yes Ghazal Quintana MD   ondansetron hcl (ZOFRAN) 4 mg tablet TAKE 1 TABLET BY MOUTH EVERY 8 HOURS AS NEEDED FOR NAUSEA 1/13/22  Yes Sagar Pacheco MD   ALPRAZolam Daryel Capellan) 1 mg tablet One bid prn anxiety 1/10/22  Yes Sagar Pacheco MD   propranoloL (INDERAL) 10 mg tablet Take 1 Tablet by mouth two (2) times a day. 12/20/21  Yes Duyen Cerrato NP   ondansetron (Zofran ODT) 4 mg disintegrating tablet 1 Tablet by SubLINGual route every eight (8) hours as needed for Nausea or Vomiting. 12/11/21  Yes Abel Fields MD   furosemide (LASIX) 20 mg tablet TAKE 1 TABLET BY MOUTH AS NEEDED (SWELLING). 12/5/21  Yes Duyen Cerrato NP   DULoxetine (CYMBALTA) 60 mg capsule TAKE 2 CAPSULESBY MOUTH EVERY DAY 12/5/21  Yes Sagar Pacheco MD   albuterol (PROVENTIL HFA, VENTOLIN HFA, PROAIR HFA) 90 mcg/actuation inhaler Take 1 Puff by inhalation every six (6) hours as needed for Wheezing. 8/30/21  Yes Ngozi Vickers NP   codeine-butalbital-acetaminophen-caffeine (FIORICET WITH CODEINE) -33-30 mg capsule Take 1 Capsule by mouth every six (6) hours as needed for Headache. Yes Provider, Historical   mirtazapine (REMERON) 30 mg tablet TAKE 1 TABLET BY MOUTH AT BEDTIME 8/1/21  Yes Sagar Pacheco MD   belimumab (Benlysta) 400 mg solr injection by IntraVENous route. Yes Provider, Historical   azithromycin (ZITHROMAX) 500 mg tab Take 1 Tablet by mouth daily. Patient not taking: Reported on 1/17/2022 1/10/22   Sagar Pacheco MD   ketorolac (TORADOL) 10 mg tablet Take 1 Tablet by mouth every six (6) hours as needed for Pain. Patient not taking: Reported on 1/17/2022 12/11/21   Abel Fields MD   SUMAtriptan (IMITREX) 25 mg tablet Take 25 mg by mouth as needed. Patient not taking: Reported on 1/17/2022 10/9/21   Provider, Historical   naloxone [facility]) 4 mg/actuation nasal spray Use 1 spray intranasally, then discard. Repeat with new spray every 2 min as needed for opioid overdose symptoms, alternating nostrils.  3/17/21   Min-Benny Rodriguez MD   pantoprazole (PROTONIX) 40 mg tablet take 1 tablet by mouth at bedtime  Patient not taking: Reported on 1/17/2022 2/1/19   Provider, Historical   rizatriptan (MAXALT) 10 mg tablet Take 10 mg by mouth once as needed for Migraine. May repeat in 2 hours if needed  Patient not taking: Reported on 1/17/2022    Provider, Historical         Patient-Reported Vitals 1/17/2022   Patient-Reported Weight 235lb   Patient-Reported SpO2 80       Michelle Quintanilla, who was evaluated through a patient-initiated, synchronous (real-time) audio only encounter, and/or her healthcare decision maker, is aware that it is a billable service, with coverage as determined by her insurance carrier. She provided verbal consent to proceed: Yes. She has not had a related appointment within my department in the past 7 days or scheduled within the next 24 hours.     Time: 5-10 min        Jessica Berry MD

## 2022-01-17 NOTE — PROGRESS NOTES
[facility] Angel is a 43 y.o. female  Chief Complaint   Patient presents with    Cough    Fever    O2/Oxygen     low     Health Maintenance Due   Topic Date Due    Hepatitis C Screening  Never done    COVID-19 Vaccine (1) Never done    DTaP/Tdap/Td series (1 - Tdap) Never done    Flu Vaccine (1) Never done     There were no vitals taken for this visit.     Patient-Reported Vitals 1/17/2022   Patient-Reported Weight 235lb   Patient-Reported SpO2 89     Patient Phone Number/Email:  [phone] (telephone only)

## 2022-01-21 DIAGNOSIS — R05.9 COUGH: ICD-10-CM

## 2022-01-21 RX ORDER — CODEINE PHOSPHATE AND GUAIFENESIN 10; 100 MG/5ML; MG/5ML
SOLUTION ORAL
Qty: 120 ML | Refills: 0 | Status: SHIPPED | OUTPATIENT
Start: 2022-01-21 | End: 2022-01-27

## 2022-01-25 RX ORDER — TIZANIDINE 4 MG/1
TABLET ORAL
Qty: 30 TABLET | Refills: 0 | Status: SHIPPED | OUTPATIENT
Start: 2022-01-25 | End: 2022-02-03

## 2022-01-25 RX ORDER — MECLIZINE HYDROCHLORIDE 25 MG/1
TABLET ORAL
Qty: 30 TABLET | Refills: 0 | Status: SHIPPED | OUTPATIENT
Start: 2022-01-25 | End: 2022-02-05

## 2022-01-27 DIAGNOSIS — R05.9 COUGH: ICD-10-CM

## 2022-01-27 RX ORDER — CODEINE PHOSPHATE AND GUAIFENESIN 10; 100 MG/5ML; MG/5ML
SOLUTION ORAL
Qty: 120 ML | Refills: 0 | Status: SHIPPED | OUTPATIENT
Start: 2022-01-27 | End: 2022-02-07

## 2022-01-29 ENCOUNTER — APPOINTMENT (OUTPATIENT)
Dept: ULTRASOUND IMAGING | Age: 37
End: 2022-01-29
Attending: STUDENT IN AN ORGANIZED HEALTH CARE EDUCATION/TRAINING PROGRAM
Payer: MEDICAID

## 2022-01-29 ENCOUNTER — HOSPITAL ENCOUNTER (EMERGENCY)
Age: 37
Discharge: HOME | End: 2022-01-29
Attending: STUDENT IN AN ORGANIZED HEALTH CARE EDUCATION/TRAINING PROGRAM
Payer: MEDICAID

## 2022-01-29 VITALS
SYSTOLIC BLOOD PRESSURE: 131 MMHG | HEIGHT: 63 IN | BODY MASS INDEX: 47.58 KG/M2 | HEART RATE: 88 BPM | RESPIRATION RATE: 20 BRPM | TEMPERATURE: 98.5 F | DIASTOLIC BLOOD PRESSURE: 79 MMHG | OXYGEN SATURATION: 98 % | WEIGHT: 268.52 LBS

## 2022-01-29 DIAGNOSIS — M79.605 LEG PAIN, LEFT: Primary | ICD-10-CM

## 2022-01-29 PROCEDURE — 74011250637 HC RX REV CODE- 250/637: Performed by: PHYSICIAN ASSISTANT

## 2022-01-29 PROCEDURE — 99282 EMERGENCY DEPT VISIT SF MDM: CPT

## 2022-01-29 PROCEDURE — 93971 EXTREMITY STUDY: CPT

## 2022-01-29 RX ORDER — ONDANSETRON 4 MG/1
4 TABLET, ORALLY DISINTEGRATING ORAL
Qty: 10 TABLET | Refills: 0 | Status: SHIPPED | OUTPATIENT
Start: 2022-01-29 | End: 2022-02-08

## 2022-01-29 RX ORDER — HYDROCODONE BITARTRATE AND ACETAMINOPHEN 5; 325 MG/1; MG/1
1 TABLET ORAL
Qty: 8 TABLET | Refills: 0 | Status: SHIPPED | OUTPATIENT
Start: 2022-01-29 | End: 2022-02-01

## 2022-01-29 RX ORDER — LIDOCAINE 50 MG/G
PATCH TOPICAL
Qty: 5 EACH | Refills: 0 | Status: SHIPPED | OUTPATIENT
Start: 2022-01-29 | End: 2022-02-15 | Stop reason: ALTCHOICE

## 2022-01-29 RX ORDER — OXYCODONE AND ACETAMINOPHEN 5; 325 MG/1; MG/1
1 TABLET ORAL
Status: COMPLETED | OUTPATIENT
Start: 2022-01-29 | End: 2022-01-29

## 2022-01-29 RX ADMIN — OXYCODONE AND ACETAMINOPHEN 1 TABLET: 5; 325 TABLET ORAL at 15:33

## 2022-01-29 NOTE — ED NOTES
Yuridia Morton has reviewed discharge instructions with the patient. The patient verbalized understanding. Patient ambulatory out of ED with significant other at this time.

## 2022-01-29 NOTE — ED PROVIDER NOTES
EMERGENCY DEPARTMENT HISTORY AND PHYSICAL EXAM      Date: 1/29/2022  Patient Name: Vernell Velazquez    History of Presenting Illness     Chief Complaint   Patient presents with    Leg Pain     leg pain with a knot felt behind her left upper leg onset about 12 hours ago with it radiating down left leg to left calf; her mom looked at it and it looks more than a bruise. she has chronic venous insufficiency ongoing for her history       History Provided By: Patient    HPI: Vernell Velazquez, 43 y.o. female presents ambulatory to the Emergency Dept with c/o left LE pain and a knot palpated behind the upper portion of her L LE. She states the symptoms began approx 12 hr ago. The pain reportedly radiates down her leg. She has a h/o varicosities and was referred by her vascular surgeon to the ED for further evaluation. She denied known injury/strain. No recent change in her physical routine. She denied recent travel. She rates her pain a 7/10 on the pain scale. She's a nonsmoker. She has a h/o chronic pain. Pt is o/w healthy without fever, chills, cough, congestion, ST, shortness of breath, chest pain, N/V/D. There are no other complaints, changes, or physical findings at this time. PCP: Koffi Mckinney MD    Current Outpatient Medications   Medication Sig Dispense Refill    HYDROcodone-acetaminophen (NORCO) 5-325 mg per tablet Take 1 Tablet by mouth every eight (8) hours as needed for Pain for up to 3 days. Max Daily Amount: 3 Tablets. 8 Tablet 0    lidocaine (LIDODERM) 5 % Apply patch to the affected area for 12 hours a day and remove for 12 hours a day. 5 Each 0    ondansetron (ZOFRAN ODT) 4 mg disintegrating tablet Take 1 Tablet by mouth every eight (8) hours as needed for Nausea or Vomiting for up to 10 days.  10 Tablet 0    guaiFENesin-codeine (ROBITUSSIN AC) 100-10 mg/5 mL solution TAKE 5ML BY MOUTH 3 TIMES A DAY AS NEEDED FOR COUGH FOR UP TO 7 DAYS 120 mL 0    meclizine (ANTIVERT) 25 mg tablet TAKE 1 TABLET BY MOUTH THREE TIMES A DAY AS NEEDED FOR DIZZINESS FOR UP TO 10 DAYS 30 Tablet 0    tiZANidine (ZANAFLEX) 4 mg tablet TAKE 1 TABLET BY MOUTH THREE TIMES A DAY AS NEEDED FOR PAIN 30 Tablet 0    albuterol (PROVENTIL HFA, VENTOLIN HFA, PROAIR HFA) 90 mcg/actuation inhaler Take 1 Puff by inhalation every six (6) hours as needed for Wheezing. 1 Each 1    ondansetron hcl (ZOFRAN) 4 mg tablet TAKE 1 TABLET BY MOUTH EVERY 8 HOURS AS NEEDED FOR NAUSEA 20 Tablet 0    ALPRAZolam (XANAX) 1 mg tablet One bid prn anxiety 60 Tablet 1    propranoloL (INDERAL) 10 mg tablet Take 1 Tablet by mouth two (2) times a day. 60 Tablet 1    furosemide (LASIX) 20 mg tablet TAKE 1 TABLET BY MOUTH AS NEEDED (SWELLING). 30 Tablet 2    DULoxetine (CYMBALTA) 60 mg capsule TAKE 2 CAPSULESBY MOUTH EVERY DAY 30 Capsule 5    codeine-butalbital-acetaminophen-caffeine (FIORICET WITH CODEINE) -62-30 mg capsule Take 1 Capsule by mouth every six (6) hours as needed for Headache.  mirtazapine (REMERON) 30 mg tablet TAKE 1 TABLET BY MOUTH AT BEDTIME 30 Tablet 5    naloxone (NARCAN) 4 mg/actuation nasal spray Use 1 spray intranasally, then discard. Repeat with new spray every 2 min as needed for opioid overdose symptoms, alternating nostrils. 2 Each 0    belimumab (Benlysta) 400 mg solr injection by IntraVENous route.          Past History     Past Medical History:  Past Medical History:   Diagnosis Date    Abnormal CT of the abdomen 11/8/2012    Asthma     Autoimmune disease (HCC)     lupus    C. difficile diarrhea     Cervical prolapse     Dehydration     Mariah-Danlos syndrome     Gastrointestinal disorder     gerd, twisted colon, IBS    GERD (gastroesophageal reflux disease)     Headache(784.0)     Lupus (HCC)     Morbid obesity (HCC)     Murmur     Nausea & vomiting     Neurological disorder     cluster headaches    Occipital neuralgia     other 2006, 2009    ovarian cyst removal    Other ill-defined conditions(799.89)     Syncope     Worsening headaches        Past Surgical History:  Past Surgical History:   Procedure Laterality Date    COLONOSCOPY  9/21/2010         HX CHOLECYSTECTOMY      HX CYST INCISION AND DRAINAGE Right 02/27/2020    HX GYN  1/2009    right salpingo oopherectomy    HX GYN  2006    right ovarian tumor removed    HX HEENT      left wisdom teeth removal    HX HEENT      top right wisdom tooth removed    HX HERNIA REPAIR  4/2012    HX HERNIA REPAIR  2/28/13    Laparoscopic recurrent incisional hernia repair    HX HYSTERECTOMY      2016    HX UROLOGICAL      Kidney Stone Removal    WA EGD TRANSORAL BIOPSY SINGLE/MULTIPLE  9/22/2010            Family History:  Family History   Problem Relation Age of Onset    Colon Cancer Maternal Grandfather        Social History:  Social History     Tobacco Use    Smoking status: Never Smoker    Smokeless tobacco: Never Used   Vaping Use    Vaping Use: Never used   Substance Use Topics    Alcohol use: No    Drug use: No       Allergies: Allergies   Allergen Reactions    Latex Itching     burning    Compazine [Prochlorperazine] Anxiety     High heart rate  Pt can take promethazine with no problems    Sulfa (Sulfonamide Antibiotics) Unknown (comments)    Topamax [Topiramate] Unknown (comments)    Adhesive Rash     Allergic to Dermabond    Clindamycin Diarrhea     Pt states caused her C-Diff    Iodinated Contrast Media Myalgia     Pt c/o severe back spasms after IV contrast administration. Pt requesting IV pain medications and requests I put this as a drug intolerance in her EMR.  Mushroom Other (comments)    Mushroom Combination No.1 Unknown (comments)    Reglan [Metoclopramide Hcl] Rash    Valproic Acid Other (comments)     Elevated heart rate and vomiting           Review of Systems   Review of Systems   Constitutional: Negative for chills and fever. HENT: Negative for congestion, rhinorrhea and sore throat.     Respiratory: Negative for cough and shortness of breath. Cardiovascular: Negative for chest pain and palpitations. Gastrointestinal: Negative for abdominal pain, diarrhea, nausea and vomiting. Genitourinary: Negative for dysuria and hematuria. Musculoskeletal: Positive for myalgias. Negative for neck pain and neck stiffness. Skin: Positive for color change. Negative for rash and wound. Allergic/Immunologic: Negative for food allergies and immunocompromised state. Neurological: Negative for dizziness and headaches. Psychiatric/Behavioral: Negative for agitation and confusion. All other systems reviewed and are negative. Physical Exam   Physical Exam  Vitals and nursing note reviewed. Constitutional:       General: She is not in acute distress. Appearance: Normal appearance. She is well-developed. She is obese. She is not ill-appearing, toxic-appearing or diaphoretic. HENT:      Head: Normocephalic and atraumatic. Nose: Nose normal. No congestion or rhinorrhea. Eyes:      General: No scleral icterus. Right eye: No discharge. Left eye: No discharge. Conjunctiva/sclera: Conjunctivae normal.   Neck:      Thyroid: No thyromegaly. Vascular: No JVD. Trachea: No tracheal deviation. Cardiovascular:      Rate and Rhythm: Normal rate and regular rhythm. Pulses: Normal pulses. Heart sounds: Normal heart sounds. Pulmonary:      Effort: Pulmonary effort is normal. No respiratory distress. Breath sounds: Normal breath sounds. No wheezing. Abdominal:      Palpations: Abdomen is soft. Tenderness: There is no abdominal tenderness. Musculoskeletal:         General: Tenderness present. No signs of injury. Normal range of motion. Cervical back: Normal range of motion and neck supple. Comments: See SKIN exam   Skin:     General: Skin is warm and dry. Coloration: Skin is not pale. Findings: Bruising present. No erythema or rash. Comments: L LE with palpable tenderness at site of ecchymotic region measuring 3cm to the prox LE/thigh, no erythema, no warmth, no drainage, no fluctuance   Neurological:      General: No focal deficit present. Mental Status: She is alert and oriented to person, place, and time. Sensory: No sensory deficit. Motor: No weakness or abnormal muscle tone. Coordination: Coordination normal.      Gait: Gait normal.   Psychiatric:         Mood and Affect: Mood normal.         Behavior: Behavior normal.         Judgment: Judgment normal.         Diagnostic Study Results     Labs -   No results found for this or any previous visit (from the past 12 hour(s)). Radiologic Studies -   DUPLEX LOWER EXT VENOUS LEFT   Final Result            Medical Decision Making   I am the first provider for this patient. I reviewed the vital signs, available nursing notes, past medical history, past surgical history, family history and social history. Vital Signs-Reviewed the patient's vital signs. Patient Vitals for the past 12 hrs:   Temp Pulse Resp BP SpO2   01/29/22 1337 98.5 °F (36.9 °C) 88 20 131/79 98 %           Records Reviewed: Nursing Notes, Old Medical Records, Previous Radiology Studies and Previous Laboratory Studies    Provider Notes (Medical Decision Making):   DVT, abscess, cyst, varicosity    ED Course:   Initial assessment performed. The patients presenting problems have been discussed, and they are in agreement with the care plan formulated and outlined with them. I have encouraged them to ask questions as they arise throughout their visit. DISCHARGE NOTE:  The care plan has been outline with the patient and/or family, who verbally conveyed understanding and agreement. Available results have been reviewed. Patient and/or family understand the follow up plan as outlined and discharge instructions.  Should their condition deterioration at any time after discharge the patient agrees to return, follow up sooner than outlined or seek medical assistance at the closest Emergency Room as soon as possible. Questions have been answered. Discharge instructions and educational information regarding the patient's diagnosis as well a list of reasons why the patient would want to seek immediate medical attention, should their condition change, were reviewed directly with the patient/family          PLAN:  1. Discharge Medication List as of 1/29/2022  3:51 PM      START taking these medications    Details   HYDROcodone-acetaminophen (NORCO) 5-325 mg per tablet Take 1 Tablet by mouth every eight (8) hours as needed for Pain for up to 3 days.  Max Daily Amount: 3 Tablets., Normal, Disp-8 Tablet, R-0      lidocaine (LIDODERM) 5 % Apply patch to the affected area for 12 hours a day and remove for 12 hours a day., Normal, Disp-5 Each, R-0         CONTINUE these medications which have NOT CHANGED    Details   guaiFENesin-codeine (ROBITUSSIN AC) 100-10 mg/5 mL solution TAKE 5ML BY MOUTH 3 TIMES A DAY AS NEEDED FOR COUGH FOR UP TO 7 DAYS, Normal, Disp-120 mL, R-0Not to exceed 5 additional fills before 07/20/2022      meclizine (ANTIVERT) 25 mg tablet TAKE 1 TABLET BY MOUTH THREE TIMES A DAY AS NEEDED FOR DIZZINESS FOR UP TO 10 DAYS, Normal, Disp-30 Tablet, R-0      tiZANidine (ZANAFLEX) 4 mg tablet TAKE 1 TABLET BY MOUTH THREE TIMES A DAY AS NEEDED FOR PAIN, Normal, Disp-30 Tablet, R-0      albuterol (PROVENTIL HFA, VENTOLIN HFA, PROAIR HFA) 90 mcg/actuation inhaler Take 1 Puff by inhalation every six (6) hours as needed for Wheezing., Normal, Disp-1 Each, R-1      ondansetron hcl (ZOFRAN) 4 mg tablet TAKE 1 TABLET BY MOUTH EVERY 8 HOURS AS NEEDED FOR NAUSEA, Normal, Disp-20 Tablet, R-0      ALPRAZolam (XANAX) 1 mg tablet One bid prn anxiety, Normal, Disp-60 Tablet, R-1Not to exceed 2 additional fills before 03/23/2022      propranoloL (INDERAL) 10 mg tablet Take 1 Tablet by mouth two (2) times a day., Normal, Disp-60 Tablet, R-1      ondansetron (Zofran ODT) 4 mg disintegrating tablet 1 Tablet by SubLINGual route every eight (8) hours as needed for Nausea or Vomiting., Normal, Disp-20 Tablet, R-0      furosemide (LASIX) 20 mg tablet TAKE 1 TABLET BY MOUTH AS NEEDED (SWELLING). , Normal, Disp-30 Tablet, R-2      DULoxetine (CYMBALTA) 60 mg capsule TAKE 2 CAPSULESBY MOUTH EVERY DAY, Normal, Disp-30 Capsule, R-5      codeine-butalbital-acetaminophen-caffeine (FIORICET WITH CODEINE) -10-30 mg capsule Take 1 Capsule by mouth every six (6) hours as needed for Headache., Historical Med      mirtazapine (REMERON) 30 mg tablet TAKE 1 TABLET BY MOUTH AT BEDTIME, Normal, Disp-30 Tablet, R-5      naloxone (NARCAN) 4 mg/actuation nasal spray Use 1 spray intranasally, then discard. Repeat with new spray every 2 min as needed for opioid overdose symptoms, alternating nostrils. , Normal, Disp-2 Each, R-0      belimumab (Benlysta) 400 mg solr injection by IntraVENous route., Historical Med           2. Follow-up Information     Follow up With Specialties Details Why Contact Info    Your Vascular surgeon        [facility] EMERGENCY DEPT Emergency Medicine  If symptoms worsen [address]  [address] [facility]  [phone]        Return to ED if worse     Diagnosis     Clinical Impression:   1.  Leg pain, left

## 2022-02-03 DIAGNOSIS — R05.9 COUGH: ICD-10-CM

## 2022-02-03 RX ORDER — MIRTAZAPINE 30 MG/1
TABLET, FILM COATED ORAL
Qty: 30 TABLET | Refills: 5 | Status: SHIPPED | OUTPATIENT
Start: 2022-02-03 | End: 2022-07-31

## 2022-02-03 RX ORDER — TIZANIDINE 4 MG/1
TABLET ORAL
Qty: 30 TABLET | Refills: 0 | OUTPATIENT
Start: 2022-02-03 | End: 2022-02-12

## 2022-02-05 RX ORDER — MECLIZINE HYDROCHLORIDE 25 MG/1
TABLET ORAL
Qty: 30 TABLET | Refills: 0 | Status: SHIPPED | OUTPATIENT
Start: 2022-02-05 | End: 2022-03-07

## 2022-02-07 RX ORDER — CODEINE PHOSPHATE AND GUAIFENESIN 10; 100 MG/5ML; MG/5ML
SOLUTION ORAL
Qty: 120 ML | Refills: 0 | Status: SHIPPED | OUTPATIENT
Start: 2022-02-07 | End: 2022-02-14

## 2022-02-11 ENCOUNTER — HOSPITAL ENCOUNTER (EMERGENCY)
Age: 37
Discharge: HOME | End: 2022-02-11
Attending: EMERGENCY MEDICINE
Payer: MEDICAID

## 2022-02-11 VITALS
DIASTOLIC BLOOD PRESSURE: 74 MMHG | OXYGEN SATURATION: 18 % | HEIGHT: 63 IN | WEIGHT: 245 LBS | TEMPERATURE: 98.9 F | RESPIRATION RATE: 18 BRPM | BODY MASS INDEX: 43.41 KG/M2 | SYSTOLIC BLOOD PRESSURE: 104 MMHG | HEART RATE: 67 BPM

## 2022-02-11 DIAGNOSIS — I73.9 PVD (PERIPHERAL VASCULAR DISEASE) (HCC): ICD-10-CM

## 2022-02-11 DIAGNOSIS — G43.109 MIGRAINE WITH AURA AND WITHOUT STATUS MIGRAINOSUS, NOT INTRACTABLE: ICD-10-CM

## 2022-02-11 DIAGNOSIS — E86.0 DEHYDRATION: Primary | ICD-10-CM

## 2022-02-11 LAB
ALBUMIN SERPL-MCNC: 3.4 G/DL (ref 3.5–5)
ALBUMIN/GLOB SERPL: 1.1 {RATIO} (ref 1.1–2.2)
ALP SERPL-CCNC: 111 U/L (ref 45–117)
ALT SERPL-CCNC: 21 U/L (ref 12–78)
ANION GAP SERPL CALC-SCNC: 6 MMOL/L (ref 5–15)
AST SERPL-CCNC: 15 U/L (ref 15–37)
BASOPHILS # BLD: 0.1 K/UL (ref 0–0.1)
BASOPHILS NFR BLD: 1 % (ref 0–1)
BILIRUB SERPL-MCNC: 0.2 MG/DL (ref 0.2–1)
BUN SERPL-MCNC: 15 MG/DL (ref 6–20)
BUN/CREAT SERPL: 19 (ref 12–20)
CALCIUM SERPL-MCNC: 8.8 MG/DL (ref 8.5–10.1)
CHLORIDE SERPL-SCNC: 106 MMOL/L (ref 97–108)
CO2 SERPL-SCNC: 25 MMOL/L (ref 21–32)
CREAT SERPL-MCNC: 0.81 MG/DL (ref 0.55–1.02)
DIFFERENTIAL METHOD BLD: ABNORMAL
EOSINOPHIL # BLD: 1.2 K/UL (ref 0–0.4)
EOSINOPHIL NFR BLD: 13 % (ref 0–7)
ERYTHROCYTE [DISTWIDTH] IN BLOOD BY AUTOMATED COUNT: 13.3 % (ref 11.5–14.5)
GLOBULIN SER CALC-MCNC: 3 G/DL (ref 2–4)
GLUCOSE SERPL-MCNC: 114 MG/DL (ref 65–100)
HCT VFR BLD AUTO: 33.3 % (ref 35–47)
HGB BLD-MCNC: 10.6 G/DL (ref 11.5–16)
IMM GRANULOCYTES # BLD AUTO: 0.1 K/UL (ref 0–0.04)
IMM GRANULOCYTES NFR BLD AUTO: 1 % (ref 0–0.5)
LYMPHOCYTES # BLD: 3.4 K/UL (ref 0.8–3.5)
LYMPHOCYTES NFR BLD: 38 % (ref 12–49)
MCH RBC QN AUTO: 28.6 PG (ref 26–34)
MCHC RBC AUTO-ENTMCNC: 31.8 G/DL (ref 30–36.5)
MCV RBC AUTO: 90 FL (ref 80–99)
MONOCYTES # BLD: 1 K/UL (ref 0–1)
MONOCYTES NFR BLD: 11 % (ref 5–13)
NEUTS SEG # BLD: 3.3 K/UL (ref 1.8–8)
NEUTS SEG NFR BLD: 36 % (ref 32–75)
NRBC # BLD: 0.02 K/UL (ref 0–0.01)
NRBC BLD-RTO: 0.2 PER 100 WBC
PLATELET # BLD AUTO: 252 K/UL (ref 150–400)
POTASSIUM SERPL-SCNC: 4.2 MMOL/L (ref 3.5–5.1)
PROT SERPL-MCNC: 6.4 G/DL (ref 6.4–8.2)
RBC # BLD AUTO: 3.7 M/UL (ref 3.8–5.2)
RBC MORPH BLD: ABNORMAL
SODIUM SERPL-SCNC: 137 MMOL/L (ref 136–145)
WBC # BLD AUTO: 9.1 K/UL (ref 3.6–11)

## 2022-02-11 PROCEDURE — 96361 HYDRATE IV INFUSION ADD-ON: CPT

## 2022-02-11 PROCEDURE — 99284 EMERGENCY DEPT VISIT MOD MDM: CPT

## 2022-02-11 PROCEDURE — 96374 THER/PROPH/DIAG INJ IV PUSH: CPT

## 2022-02-11 PROCEDURE — 74011250637 HC RX REV CODE- 250/637: Performed by: EMERGENCY MEDICINE

## 2022-02-11 PROCEDURE — 80053 COMPREHEN METABOLIC PANEL: CPT

## 2022-02-11 PROCEDURE — 85025 COMPLETE CBC W/AUTO DIFF WBC: CPT

## 2022-02-11 PROCEDURE — 74011250636 HC RX REV CODE- 250/636: Performed by: EMERGENCY MEDICINE

## 2022-02-11 PROCEDURE — 96372 THER/PROPH/DIAG INJ SC/IM: CPT

## 2022-02-11 PROCEDURE — 36415 COLL VENOUS BLD VENIPUNCTURE: CPT

## 2022-02-11 RX ORDER — MIDODRINE HYDROCHLORIDE 5 MG/1
5 TABLET ORAL
Status: COMPLETED | OUTPATIENT
Start: 2022-02-11 | End: 2022-02-11

## 2022-02-11 RX ORDER — ONDANSETRON 4 MG/1
TABLET, FILM COATED ORAL
Qty: 20 TABLET | Refills: 0 | Status: SHIPPED | OUTPATIENT
Start: 2022-02-11 | End: 2022-02-22

## 2022-02-11 RX ORDER — KETOROLAC TROMETHAMINE 30 MG/ML
15 INJECTION, SOLUTION INTRAMUSCULAR; INTRAVENOUS
Status: DISCONTINUED | OUTPATIENT
Start: 2022-02-11 | End: 2022-02-11 | Stop reason: HOSPADM

## 2022-02-11 RX ORDER — OXYCODONE HYDROCHLORIDE 5 MG/1
5 TABLET ORAL
Status: COMPLETED | OUTPATIENT
Start: 2022-02-11 | End: 2022-02-11

## 2022-02-11 RX ORDER — DROPERIDOL 2.5 MG/ML
0.62 INJECTION, SOLUTION INTRAMUSCULAR; INTRAVENOUS ONCE
Status: COMPLETED | OUTPATIENT
Start: 2022-02-11 | End: 2022-02-11

## 2022-02-11 RX ADMIN — DROPERIDOL 0.62 MG: 2.5 INJECTION, SOLUTION INTRAMUSCULAR; INTRAVENOUS at 02:28

## 2022-02-11 RX ADMIN — OXYCODONE 5 MG: 5 TABLET ORAL at 02:28

## 2022-02-11 RX ADMIN — SODIUM CHLORIDE 250 ML: 900 INJECTION, SOLUTION INTRAVENOUS at 02:21

## 2022-02-11 RX ADMIN — MIDODRINE HYDROCHLORIDE 5 MG: 5 TABLET ORAL at 02:28

## 2022-02-11 NOTE — ED PROVIDER NOTES
EMERGENCY DEPARTMENT HISTORY AND PHYSICAL EXAM      Date: 2/11/2022  Patient Name: Maylin Rodriguez    History of Presenting Illness     Chief Complaint   Patient presents with    Dehydration       History Provided By: Patient    HPI: Maylin Rodriguez, 43 y.o. female presents to the ED with cc of dehydration. Secondary to feeling dehydrated. She states most recently they had doubled up her diuretic. She states that she has had approximately 10 pound weight loss over the course the past week. This evening she was feeling lightheaded and had called EMS which noted blood pressure to be in the 05L to 76Z systolic when they arrived. Patient complaining of the numbness and tingling all over especially her fingertips and her feet. She states that she has bilateral knee pain which makes it difficult for her to walk. She is also complaining of diffuse myalgias. She rates her pain as an 8 out of 10. Worse with movement exertion and weightbearing activities. She states that she typically does wear compression stockings however they are not on currently because she states she had gotten the bed to go to sleep this evening. She denies any chest pain or shortness of breath. She did not pass out. There have been no palpitations. She denies any melena, hematochezia or bright red blood per rectum. She denies any urinary symptoms to include burning, frequency or urgency. There are no other complaints, changes, or physical findings at this time. PCP: Maru Nguyen MD    No current facility-administered medications on file prior to encounter.      Current Outpatient Medications on File Prior to Encounter   Medication Sig Dispense Refill    guaiFENesin-codeine (ROBITUSSIN AC) 100-10 mg/5 mL solution TAKE 5ML BY MOUTH 3 TIMES A DAY AS NEEDED FOR COUGH FOR UP TO 7 DAYS 120 mL 0    meclizine (ANTIVERT) 25 mg tablet TAKE 1 TABLET BY MOUTH THREE TIMES A DAY AS NEEDED FOR DIZZINESS FOR UP TO 10 DAYS 30 Tablet 0    tiZANidine (ZANAFLEX) 4 mg tablet TAKE 1 TABLET BY MOUTH THREE TIMES A DAY AS NEEDED FOR PAIN 30 Tablet 0    mirtazapine (REMERON) 30 mg tablet TAKE 1 TABLET BY MOUTH EVERYDAY AT BEDTIME 30 Tablet 5    lidocaine (LIDODERM) 5 % Apply patch to the affected area for 12 hours a day and remove for 12 hours a day. 5 Each 0    albuterol (PROVENTIL HFA, VENTOLIN HFA, PROAIR HFA) 90 mcg/actuation inhaler Take 1 Puff by inhalation every six (6) hours as needed for Wheezing. 1 Each 1    ondansetron hcl (ZOFRAN) 4 mg tablet TAKE 1 TABLET BY MOUTH EVERY 8 HOURS AS NEEDED FOR NAUSEA 20 Tablet 0    ALPRAZolam (XANAX) 1 mg tablet One bid prn anxiety 60 Tablet 1    propranoloL (INDERAL) 10 mg tablet Take 1 Tablet by mouth two (2) times a day. 60 Tablet 1    furosemide (LASIX) 20 mg tablet TAKE 1 TABLET BY MOUTH AS NEEDED (SWELLING). 30 Tablet 2    DULoxetine (CYMBALTA) 60 mg capsule TAKE 2 CAPSULESBY MOUTH EVERY DAY 30 Capsule 5    codeine-butalbital-acetaminophen-caffeine (FIORICET WITH CODEINE) -30-30 mg capsule Take 1 Capsule by mouth every six (6) hours as needed for Headache.  naloxone (NARCAN) 4 mg/actuation nasal spray Use 1 spray intranasally, then discard. Repeat with new spray every 2 min as needed for opioid overdose symptoms, alternating nostrils. 2 Each 0    belimumab (Benlysta) 400 mg solr injection by IntraVENous route.          Past History     Past Medical History:  Past Medical History:   Diagnosis Date    Abnormal CT of the abdomen 11/8/2012    Asthma     Autoimmune disease (HCC)     lupus    C. difficile diarrhea     Cervical prolapse     Dehydration     Mariah-Danlos syndrome     Gastrointestinal disorder     gerd, twisted colon, IBS    GERD (gastroesophageal reflux disease)     Headache(784.0)     Lupus (HCC)     Morbid obesity (HCC)     Murmur     Nausea & vomiting     Neurological disorder     cluster headaches    Occipital neuralgia     other 2006, 2009    ovarian cyst removal    Other ill-defined conditions(799.89)     Syncope     Worsening headaches        Past Surgical History:  Past Surgical History:   Procedure Laterality Date    COLONOSCOPY  9/21/2010         HX CHOLECYSTECTOMY      HX CYST INCISION AND DRAINAGE Right 02/27/2020    HX GYN  1/2009    right salpingo oopherectomy    HX GYN  2006    right ovarian tumor removed    HX HEENT      left wisdom teeth removal    HX HEENT      top right wisdom tooth removed    HX HERNIA REPAIR  4/2012    HX HERNIA REPAIR  2/28/13    Laparoscopic recurrent incisional hernia repair    HX HYSTERECTOMY      2016    HX UROLOGICAL      Kidney Stone Removal    OH EGD TRANSORAL BIOPSY SINGLE/MULTIPLE  9/22/2010            Family History:  Family History   Problem Relation Age of Onset    Colon Cancer Maternal Grandfather        Social History:  Social History     Tobacco Use    Smoking status: Never Smoker    Smokeless tobacco: Never Used   Vaping Use    Vaping Use: Never used   Substance Use Topics    Alcohol use: No    Drug use: No       Allergies: Allergies   Allergen Reactions    Latex Itching     burning    Compazine [Prochlorperazine] Anxiety     High heart rate  Pt can take promethazine with no problems    Sulfa (Sulfonamide Antibiotics) Unknown (comments)    Topamax [Topiramate] Unknown (comments)    Adhesive Rash     Allergic to Dermabond    Clindamycin Diarrhea     Pt states caused her C-Diff    Iodinated Contrast Media Myalgia     Pt c/o severe back spasms after IV contrast administration. Pt requesting IV pain medications and requests I put this as a drug intolerance in her EMR.  Mushroom Other (comments)    Mushroom Combination No.1 Unknown (comments)    Reglan [Metoclopramide Hcl] Rash    Valproic Acid Other (comments)     Elevated heart rate and vomiting           Review of Systems   Review of Systems   Constitutional: Negative. Negative for appetite change, chills, fatigue and fever.    HENT: Negative. Negative for congestion, rhinorrhea, sinus pressure and sore throat. Eyes: Negative. Respiratory: Negative. Negative for cough, choking, chest tightness, shortness of breath and wheezing. Cardiovascular: Negative. Negative for chest pain, palpitations and leg swelling. Gastrointestinal: Negative for abdominal pain, constipation, diarrhea, nausea and vomiting. Endocrine: Negative. Genitourinary: Negative. Negative for difficulty urinating, dysuria, flank pain and urgency. Musculoskeletal: Positive for myalgias. Skin: Negative. Neurological: Positive for light-headedness (tingling all over) and numbness. Negative for dizziness, speech difficulty, weakness and headaches. Psychiatric/Behavioral: Negative. All other systems reviewed and are negative. Physical Exam   Physical Exam  Vitals and nursing note reviewed. Constitutional:       General: She is not in acute distress. Appearance: She is well-developed. She is not diaphoretic. Comments: Morbidly obese   HENT:      Head: Normocephalic and atraumatic. Mouth/Throat:      Mouth: Mucous membranes are dry. Pharynx: No oropharyngeal exudate. Eyes:      General: No scleral icterus. Extraocular Movements: Extraocular movements intact. Conjunctiva/sclera: Conjunctivae normal.      Pupils: Pupils are equal, round, and reactive to light. Neck:      Vascular: No JVD. Trachea: No tracheal deviation. Cardiovascular:      Rate and Rhythm: Normal rate and regular rhythm. Heart sounds: Normal heart sounds. No murmur heard. Pulmonary:      Effort: Pulmonary effort is normal. No respiratory distress. Breath sounds: Normal breath sounds. No stridor. No wheezing or rales. Abdominal:      General: There is no distension. Palpations: Abdomen is soft. Tenderness: There is no abdominal tenderness. There is no guarding or rebound.    Musculoskeletal:         General: No tenderness. Normal range of motion. Cervical back: Normal range of motion and neck supple. Right lower leg: Edema (trace) present. Left lower leg: Edema (trace) present. Comments: Distal PMS intact x4   Skin:     General: Skin is warm and dry. Capillary Refill: Capillary refill takes less than 2 seconds. Neurological:      Mental Status: She is alert and oriented to person, place, and time. Cranial Nerves: No cranial nerve deficit. Comments: No gross motor or sensory deficits    Psychiatric:         Mood and Affect: Mood normal.         Behavior: Behavior normal.         Diagnostic Study Results     Labs -     Recent Results (from the past 12 hour(s))   CBC WITH AUTOMATED DIFF    Collection Time: 02/11/22  2:12 AM   Result Value Ref Range    WBC 9.1 3.6 - 11.0 K/uL    RBC 3.70 (L) 3.80 - 5.20 M/uL    HGB 10.6 (L) 11.5 - 16.0 g/dL    HCT 33.3 (L) 35.0 - 47.0 %    MCV 90.0 80.0 - 99.0 FL    MCH 28.6 26.0 - 34.0 PG    MCHC 31.8 30.0 - 36.5 g/dL    RDW 13.3 11.5 - 14.5 %    PLATELET 682 035 - 635 K/uL    NRBC 0.2 (H) 0  WBC    ABSOLUTE NRBC 0.02 (H) 0.00 - 0.01 K/uL    NEUTROPHILS 36 32 - 75 %    LYMPHOCYTES 38 12 - 49 %    MONOCYTES 11 5 - 13 %    EOSINOPHILS 13 (H) 0 - 7 %    BASOPHILS 1 0 - 1 %    IMMATURE GRANULOCYTES 1 (H) 0.0 - 0.5 %    ABS. NEUTROPHILS 3.3 1.8 - 8.0 K/UL    ABS. LYMPHOCYTES 3.4 0.8 - 3.5 K/UL    ABS. MONOCYTES 1.0 0.0 - 1.0 K/UL    ABS. EOSINOPHILS 1.2 (H) 0.0 - 0.4 K/UL    ABS. BASOPHILS 0.1 0.0 - 0.1 K/UL    ABS. IMM.  GRANS. 0.1 (H) 0.00 - 0.04 K/UL    DF SMEAR SCANNED      RBC COMMENTS NORMOCYTIC, NORMOCHROMIC     METABOLIC PANEL, COMPREHENSIVE    Collection Time: 02/11/22  2:12 AM   Result Value Ref Range    Sodium 137 136 - 145 mmol/L    Potassium 4.2 3.5 - 5.1 mmol/L    Chloride 106 97 - 108 mmol/L    CO2 25 21 - 32 mmol/L    Anion gap 6 5 - 15 mmol/L    Glucose 114 (H) 65 - 100 mg/dL    BUN 15 6 - 20 MG/DL    Creatinine 0.81 0.55 - 1.02 MG/DL BUN/Creatinine ratio 19 12 - 20      GFR est AA >60 >60 ml/min/1.73m2    GFR est non-AA >60 >60 ml/min/1.73m2    Calcium 8.8 8.5 - 10.1 MG/DL    Bilirubin, total 0.2 0.2 - 1.0 MG/DL    ALT (SGPT) 21 12 - 78 U/L    AST (SGOT) 15 15 - 37 U/L    Alk. phosphatase 111 45 - 117 U/L    Protein, total 6.4 6.4 - 8.2 g/dL    Albumin 3.4 (L) 3.5 - 5.0 g/dL    Globulin 3.0 2.0 - 4.0 g/dL    A-G Ratio 1.1 1.1 - 2.2         Radiologic Studies -   No orders to display     CT Results  (Last 48 hours)    None        CXR Results  (Last 48 hours)    None          Medical Decision Making   I am the first provider for this patient. I reviewed the vital signs, available nursing notes, past medical history, past surgical history, family history and social history. Vital Signs-Reviewed the patient's vital signs. Patient Vitals for the past 12 hrs:   Temp Pulse Resp BP SpO2   02/11/22 0410 -- -- -- 104/74 (!) 18 %   02/11/22 0134 98.9 °F (37.2 °C) 67 18 104/73 98 %       Records Reviewed: Nursing Notes, Old Medical Records, Previous electrocardiograms, Previous Radiology Studies and Previous Laboratory Studies, patient followed by Dr. Sally Johnson. Recently given a prescription for tizanidine for pain purposes. Patient has history of anxiety as well as insomnia. Patient has a history of edema on the lower extremities. However no blood clots. Provider Notes (Medical Decision Making):   DDx-dehydration, electrolyte abnormality, adverse effects of medication. ED Course:   Initial assessment performed. The patients presenting problems have been discussed, and they are in agreement with the care plan formulated and outlined with them. I have encouraged them to ask questions as they arise throughout their visit. Patient not ill-appearing here in the emergency department there were no orthostatic dizziness with standing after small fluid bolus and midodrine. Systolic pressures still in the upper 90s to low 100s.   Patient is not tachycardic. I discussed with her she should call her primary care physician of the physician who is responsible for managing her diuretic and to discuss her ER visit as to whether they may want to cut back on her dose or discussed the possibility of doubling the dose every other day. There are no electrolyte abnormalities to suggest need for potassium replacement calcium also within normal limits. Patient discharged home    Disposition:  DC home     DISCHARGE PLAN:  1. Discharge Medication List as of 2/11/2022  3:49 AM        2. Follow-up Information     Follow up With Specialties Details Why Contact Info    Ashwin Simpson MD Internal Medicine   [address] [place] [address] [address] [place] [address]  [phone]          3. Return to ED if worse     Diagnosis     Clinical Impression:   1. Dehydration    2. PVD (peripheral vascular disease) ([facility] [address]        Attestations:    Yasmin Form, DO    Please note that this dictation was completed with [employer], the computer voice recognition software. Quite often unanticipated grammatical, syntax, homophones, and other interpretive errors are inadvertently transcribed by the computer software. Please disregard these errors. Please excuse any errors that have escaped final proofreading. Thank you.

## 2022-02-11 NOTE — ED TRIAGE NOTES
PT states her PCP increased her Lasix, now he feels like her K+ is low and she is dehydrated.  Pt states that all her joints her but her knees are really painful

## 2022-02-12 ENCOUNTER — HOSPITAL ENCOUNTER (EMERGENCY)
Age: 37
Discharge: HOME | End: 2022-02-12
Attending: EMERGENCY MEDICINE | Admitting: EMERGENCY MEDICINE
Payer: MEDICAID

## 2022-02-12 VITALS
RESPIRATION RATE: 18 BRPM | DIASTOLIC BLOOD PRESSURE: 77 MMHG | HEART RATE: 77 BPM | HEIGHT: 63 IN | BODY MASS INDEX: 48.12 KG/M2 | OXYGEN SATURATION: 99 % | SYSTOLIC BLOOD PRESSURE: 116 MMHG | WEIGHT: 271.61 LBS | TEMPERATURE: 98.2 F

## 2022-02-12 DIAGNOSIS — R55 SYNCOPE AND COLLAPSE: ICD-10-CM

## 2022-02-12 DIAGNOSIS — Z20.822 PERSON UNDER INVESTIGATION FOR COVID-19: ICD-10-CM

## 2022-02-12 DIAGNOSIS — M79.10 MYALGIA: Primary | ICD-10-CM

## 2022-02-12 LAB
SARS-COV-2, XPLCVT: NOT DETECTED
SOURCE, COVRS: NORMAL

## 2022-02-12 PROCEDURE — U0005 INFEC AGEN DETEC AMPLI PROBE: HCPCS

## 2022-02-12 PROCEDURE — 36415 COLL VENOUS BLD VENIPUNCTURE: CPT

## 2022-02-12 PROCEDURE — 93005 ELECTROCARDIOGRAM TRACING: CPT

## 2022-02-12 PROCEDURE — 74011250637 HC RX REV CODE- 250/637: Performed by: EMERGENCY MEDICINE

## 2022-02-12 PROCEDURE — 74011250636 HC RX REV CODE- 250/636: Performed by: EMERGENCY MEDICINE

## 2022-02-12 RX ORDER — CYCLOBENZAPRINE HCL 10 MG
10 TABLET ORAL
Qty: 21 TABLET | Refills: 0 | Status: SHIPPED | OUTPATIENT
Start: 2022-02-12 | End: 2022-09-22

## 2022-02-12 RX ORDER — TIZANIDINE 4 MG/1
TABLET ORAL
Qty: 30 TABLET | Refills: 0 | Status: SHIPPED | OUTPATIENT
Start: 2022-02-12 | End: 2022-02-22

## 2022-02-12 RX ORDER — DIAZEPAM 10 MG/2ML
5 INJECTION INTRAMUSCULAR
Status: COMPLETED | OUTPATIENT
Start: 2022-02-12 | End: 2022-02-12

## 2022-02-12 RX ORDER — OXYCODONE AND ACETAMINOPHEN 5; 325 MG/1; MG/1
1 TABLET ORAL
Status: COMPLETED | OUTPATIENT
Start: 2022-02-12 | End: 2022-02-12

## 2022-02-12 RX ADMIN — DIAZEPAM 5 MG: 5 INJECTION, SOLUTION INTRAMUSCULAR; INTRAVENOUS at 02:59

## 2022-02-12 RX ADMIN — OXYCODONE AND ACETAMINOPHEN 1 TABLET: 5; 325 TABLET ORAL at 02:24

## 2022-02-12 NOTE — DISCHARGE INSTRUCTIONS
You were given a strong pain medication in th ER. Please follow-up with your primary care doctor. Return for new or worsening symptoms at any time.

## 2022-02-12 NOTE — ED NOTES
This RN agrees with triage note. Pt brought to [facility] in stretcher. Per staff, pt had what appeared to be a syncopal episode in triage. Pt states that she sometimes passes out when she is in pain. Pt states her entire body hurts, especially her knees. Denies any known injuries. Denies being tested for covid recently. Pt A&Ox4 (person, place, time, and situation). Respirations even and unlabored. Skin warm, dry, and appropriate for ethnicity. PMS intact. Pt on continuous monitor. VSS. NAD noted. Stretcher in low and locked position. Denies having any further needs at this time. Call light within reach.

## 2022-02-12 NOTE — ED PROVIDER NOTES
EMERGENCY DEPARTMENT HISTORY AND PHYSICAL EXAM      Date: 2/12/2022  Patient Name: Thea Rocha    History of Presenting Illness     Chief Complaint   Patient presents with    Generalized Body Aches     Patient arrives ambulatory from triage with complaint of \"everything hurts\". Patient was seen here early this morning and was told that she was dehydrated. Patient returns because she is in so much pain that she is unable to sleep. History Provided By: Patient    HPI: Thea Rocha, 43 y.o. female presents to the ED with cc of myalgias. 26-year-old female with a history of venous insufficiency, lupus, occipital neuralgia, anxiety and lower extremity edema known to the ED presents to the emergency department with a chief complaint of body-wide pain. Patient reports she has been having bilateral knee pain due to chronic venous insufficiency. Seen in the emergency department a week ago, underwent duplex which was negative, discharged with short course of narcotic pain medications. Patient reports continued knee pain as well as leg swelling. Has been taking double doses of her Lasix. Seen in the ED this morning stated she felt \"dehydrated. \"  She was given fluids. Patient reports \"body wide pain. \"  Pain in her legs, knees, lower back that hurts. Has happened once   Denies any fevers or chills, known Covid exposures, abdominal pain, and diarrhea. Does report nausea. Denies any cough or shortness of breath. Patient was brought back to the hallway bed after syncopal episode in the waiting room. Patient is in the waiting room and states she looked down and \"that is the last thing she saw. \"  I evaluated the patient in the waiting room, sugar was normal.    There are no other complaints, changes, or physical findings at this time.     PCP: Pedro Gomez MD    Current Facility-Administered Medications on File Prior to Encounter   Medication Dose Route Frequency Provider Last Rate Last Admin  [DISCONTINUED] ketorolac (TORADOL) injection 15 mg  15 mg IntraVENous NOW Lenoard Louisa, DO         Current Outpatient Medications on File Prior to Encounter   Medication Sig Dispense Refill    ondansetron hcl (ZOFRAN) 4 mg tablet TAKE 1 TABLET BY MOUTH EVERY 8 HOURS AS NEEDED FOR NAUSEA 20 Tablet 0    guaiFENesin-codeine (ROBITUSSIN AC) 100-10 mg/5 mL solution TAKE 5ML BY MOUTH 3 TIMES A DAY AS NEEDED FOR COUGH FOR UP TO 7 DAYS 120 mL 0    meclizine (ANTIVERT) 25 mg tablet TAKE 1 TABLET BY MOUTH THREE TIMES A DAY AS NEEDED FOR DIZZINESS FOR UP TO 10 DAYS 30 Tablet 0    mirtazapine (REMERON) 30 mg tablet TAKE 1 TABLET BY MOUTH EVERYDAY AT BEDTIME 30 Tablet 5    [DISCONTINUED] tiZANidine (ZANAFLEX) 4 mg tablet TAKE 1 TABLET BY MOUTH THREE TIMES A DAY AS NEEDED FOR PAIN 30 Tablet 0    lidocaine (LIDODERM) 5 % Apply patch to the affected area for 12 hours a day and remove for 12 hours a day. 5 Each 0    albuterol (PROVENTIL HFA, VENTOLIN HFA, PROAIR HFA) 90 mcg/actuation inhaler Take 1 Puff by inhalation every six (6) hours as needed for Wheezing. 1 Each 1    ALPRAZolam (XANAX) 1 mg tablet One bid prn anxiety 60 Tablet 1    propranoloL (INDERAL) 10 mg tablet Take 1 Tablet by mouth two (2) times a day. 60 Tablet 1    furosemide (LASIX) 20 mg tablet TAKE 1 TABLET BY MOUTH AS NEEDED (SWELLING). 30 Tablet 2    DULoxetine (CYMBALTA) 60 mg capsule TAKE 2 CAPSULESBY MOUTH EVERY DAY 30 Capsule 5    codeine-butalbital-acetaminophen-caffeine (FIORICET WITH CODEINE) -67-30 mg capsule Take 1 Capsule by mouth every six (6) hours as needed for Headache.  naloxone (NARCAN) 4 mg/actuation nasal spray Use 1 spray intranasally, then discard. Repeat with new spray every 2 min as needed for opioid overdose symptoms, alternating nostrils. 2 Each 0    belimumab (Benlysta) 400 mg solr injection by IntraVENous route.          Past History     Past Medical History:  Past Medical History:   Diagnosis Date    Abnormal CT of the abdomen 11/8/2012    Asthma     Autoimmune disease (HCC)     lupus    C. difficile diarrhea     Cervical prolapse     Dehydration     Mariah-Danlos syndrome     Gastrointestinal disorder     gerd, twisted colon, IBS    GERD (gastroesophageal reflux disease)     Headache(784.0)     Lupus (HCC)     Morbid obesity (HCC)     Murmur     Nausea & vomiting     Neurological disorder     cluster headaches    Occipital neuralgia     other 2006, 2009    ovarian cyst removal    Other ill-defined conditions(799.89)     Syncope     Worsening headaches        Past Surgical History:  Past Surgical History:   Procedure Laterality Date    COLONOSCOPY  9/21/2010         HX CHOLECYSTECTOMY      HX CYST INCISION AND DRAINAGE Right 02/27/2020    HX GYN  1/2009    right salpingo oopherectomy    HX GYN  2006    right ovarian tumor removed    HX HEENT      left wisdom teeth removal    HX HEENT      top right wisdom tooth removed    HX HERNIA REPAIR  4/2012    HX HERNIA REPAIR  2/28/13    Laparoscopic recurrent incisional hernia repair    HX HYSTERECTOMY      2016    HX UROLOGICAL      Kidney Stone Removal    VT EGD TRANSORAL BIOPSY SINGLE/MULTIPLE  9/22/2010            Family History:  Family History   Problem Relation Age of Onset    Colon Cancer Maternal Grandfather        Social History:  Social History     Tobacco Use    Smoking status: Never Smoker    Smokeless tobacco: Never Used   Vaping Use    Vaping Use: Never used   Substance Use Topics    Alcohol use: No    Drug use: No       Allergies:   Allergies   Allergen Reactions    Latex Itching     burning    Compazine [Prochlorperazine] Anxiety     High heart rate  Pt can take promethazine with no problems    Sulfa (Sulfonamide Antibiotics) Unknown (comments)    Topamax [Topiramate] Unknown (comments)    Adhesive Rash     Allergic to Dermabond    Clindamycin Diarrhea     Pt states caused her C-Diff    Iodinated Contrast Media Myalgia Pt c/o severe back spasms after IV contrast administration. Pt requesting IV pain medications and requests I put this as a drug intolerance in her EMR.  Mushroom Other (comments)    Mushroom Combination No.1 Unknown (comments)    Reglan [Metoclopramide Hcl] Rash    Valproic Acid Other (comments)     Elevated heart rate and vomiting           Review of Systems   Review of Systems   Constitutional: Negative for activity change, chills and fever. HENT: Negative for facial swelling and voice change. Eyes: Negative for redness. Respiratory: Negative for cough, shortness of breath and wheezing. Cardiovascular: Positive for leg swelling. Negative for chest pain. Gastrointestinal: Positive for nausea. Negative for abdominal pain, diarrhea and vomiting. Genitourinary: Negative for decreased urine volume. Musculoskeletal: Positive for myalgias. Negative for gait problem. Skin: Negative for pallor and rash. Neurological: Positive for syncope. Negative for tremors and facial asymmetry. Psychiatric/Behavioral: Negative for agitation. All other systems reviewed and are negative. Physical Exam   Physical Exam  Vitals and nursing note reviewed. Constitutional:       Comments: 77-year-old female, resting on stretcher, no acute distress   HENT:      Head: Normocephalic and atraumatic. Cardiovascular:      Rate and Rhythm: Normal rate and regular rhythm. Heart sounds: No murmur heard. No friction rub. No gallop. Pulmonary:      Effort: Pulmonary effort is normal.      Breath sounds: Normal breath sounds. Comments: Not hypoxic on room air  Abdominal:      Palpations: Abdomen is soft. Tenderness: There is no abdominal tenderness. Musculoskeletal:         General: Tenderness (Bilateral lower extremity, thigh and bilateral paraspinal tenderness) present. No swelling. Normal range of motion. Cervical back: Normal range of motion. Right lower leg: Edema present. Left lower leg: Edema present. Skin:     General: Skin is warm. Capillary Refill: Capillary refill takes less than 2 seconds. Findings: No lesion or rash. Neurological:      General: No focal deficit present. Mental Status: She is alert. Psychiatric:         Mood and Affect: Mood normal.         Diagnostic Study Results     Labs -   No results found for this or any previous visit (from the past 12 hour(s)). Radiologic Studies -   No orders to display     CT Results  (Last 48 hours)    None        CXR Results  (Last 48 hours)    None          Medical Decision Making   I am the first provider for this patient. I reviewed the vital signs, available nursing notes, past medical history, past surgical history, family history and social history. Vital Signs-Reviewed the patient's vital signs. Patient Vitals for the past 12 hrs:   Temp Pulse Resp BP SpO2   02/12/22 0300 -- 77 18 116/77 99 %   02/12/22 0230 -- 76 17 (!) 148/94 98 %   02/12/22 0130 -- 84 17 (!) 165/82 100 %   02/12/22 0115 -- 82 18 (!) 165/87 100 %   02/12/22 0110 -- 76 17 (!) 160/90 99 %   02/11/22 2340 98.2 °F (36.8 °C) 88 16 (!) 160/103 98 %     Records Reviewed: Nursing Notes, Old Medical Records and Previous Laboratory Studies    Provider Notes (Medical Decision Making):     78-year-old female presents emergency department with a chief complaint of body wide pain and body aches. Vitals are stable. Patient had a syncopal episode in the waiting room. Suspect vasovagal versus conversion vs secondary gain as to charge nurse she states she \"fell out\" while waiting in \"so much pain. \" We will check an EKG for syncope. Reviewed labs from yesterday/this morning, these were normal including potassium. EKG is unremarkable. Consider viral illness, chronic pain. Doubt DVT. Patient appears quite well. We will give 1 percocet and discharge with different muscle relaxer.  Discussed no narcotics as outpatient as patient is on a pain contract. ED Course:   Initial assessment performed. The patients presenting problems have been discussed, and they are in agreement with the care plan formulated and outlined with them. I have encouraged them to ask questions as they arise throughout their visit. ED Course as of 02/12/22 0413   Sat Feb 12, 2022   0119 Preliminary EKG interpreted by me. Shows normal sinus rhythm with a HR of 73. No ST elevations or depressions concerning for ischemia. Normal intervals. [MB]   0310 Patient reports minimal improvement after Percocet, does report myalgias, will give a dose of Valium IM. Will discharge with cyclobenzaprine. Return precautions. Do not believe repeat labs are necessary. [MB]      ED Course User Index  [MB] MD Estela Vital MD      Disposition:    Discharged    DISCHARGE PLAN:  1. Discharge Medication List as of 2/12/2022  3:12 AM      START taking these medications    Details   cyclobenzaprine (FLEXERIL) 10 mg tablet Take 1 Tablet by mouth three (3) times daily as needed for Muscle Spasm(s). , Normal, Disp-21 Tablet, R-0         CONTINUE these medications which have NOT CHANGED    Details   ondansetron hcl (ZOFRAN) 4 mg tablet TAKE 1 TABLET BY MOUTH EVERY 8 HOURS AS NEEDED FOR NAUSEA, Normal, Disp-20 Tablet, R-0      guaiFENesin-codeine (ROBITUSSIN AC) 100-10 mg/5 mL solution TAKE 5ML BY MOUTH 3 TIMES A DAY AS NEEDED FOR COUGH FOR UP TO 7 DAYS, Normal, Disp-120 mL, R-0Not to exceed 5 additional fills before 07/26/2022      meclizine (ANTIVERT) 25 mg tablet TAKE 1 TABLET BY MOUTH THREE TIMES A DAY AS NEEDED FOR DIZZINESS FOR UP TO 10 DAYS, Normal, Disp-30 Tablet, R-0      mirtazapine (REMERON) 30 mg tablet TAKE 1 TABLET BY MOUTH EVERYDAY AT BEDTIME, Normal, Disp-30 Tablet, R-5      lidocaine (LIDODERM) 5 % Apply patch to the affected area for 12 hours a day and remove for 12 hours a day., Normal, Disp-5 Each, R-0      albuterol (PROVENTIL HFA, VENTOLIN HFA, PROAIR HFA) 90 mcg/actuation inhaler Take 1 Puff by inhalation every six (6) hours as needed for Wheezing., Normal, Disp-1 Each, R-1      ALPRAZolam (XANAX) 1 mg tablet One bid prn anxiety, Normal, Disp-60 Tablet, R-1Not to exceed 2 additional fills before 03/23/2022      propranoloL (INDERAL) 10 mg tablet Take 1 Tablet by mouth two (2) times a day., Normal, Disp-60 Tablet, R-1      furosemide (LASIX) 20 mg tablet TAKE 1 TABLET BY MOUTH AS NEEDED (SWELLING). , Normal, Disp-30 Tablet, R-2      DULoxetine (CYMBALTA) 60 mg capsule TAKE 2 CAPSULESBY MOUTH EVERY DAY, Normal, Disp-30 Capsule, R-5      codeine-butalbital-acetaminophen-caffeine (FIORICET WITH CODEINE) -35-30 mg capsule Take 1 Capsule by mouth every six (6) hours as needed for Headache., Historical Med      naloxone (NARCAN) 4 mg/actuation nasal spray Use 1 spray intranasally, then discard. Repeat with new spray every 2 min as needed for opioid overdose symptoms, alternating nostrils. , Normal, Disp-2 Each, R-0      belimumab (Benlysta) 400 mg solr injection by IntraVENous route., Historical Med           2. Follow-up Information     Follow up With Specialties Details Why Contact Info    Sandi Torres MD Internal Medicine In 3 days  [address] [facility]  [phone]          3. Return to ED if worse     Diagnosis     Clinical Impression:   1. Myalgia    2. Person under investigation for COVID-19    3. Syncope and collapse        Attestations:    Jerry Watt MD    Please note that this dictation was completed with [employer], the [employer] voice recognition software. Quite often unanticipated grammatical, syntax, homophones, and other interpretive errors are inadvertently transcribed by the computer software. Please disregard these errors. Please excuse any errors that have escaped final proofreading. Thank you.

## 2022-02-12 NOTE — ED NOTES
Pt medicated per MAR. Pt requesting additional pain medication IM. Pt informed that we will wait to see how the valium affects her prior to additional pain medication administration.

## 2022-02-12 NOTE — ED NOTES
Discharge paperwork reviewed with patient/family. Patient/family verbalize understanding. All questions answered. Patient stable upon discharge.

## 2022-02-13 LAB
ATRIAL RATE: 73 BPM
CALCULATED P AXIS, ECG09: 24 DEGREES
CALCULATED R AXIS, ECG10: 42 DEGREES
CALCULATED T AXIS, ECG11: 6 DEGREES
DIAGNOSIS, 93000: NORMAL
P-R INTERVAL, ECG05: 196 MS
Q-T INTERVAL, ECG07: 376 MS
QRS DURATION, ECG06: 84 MS
QTC CALCULATION (BEZET), ECG08: 414 MS
VENTRICULAR RATE, ECG03: 73 BPM

## 2022-02-14 ENCOUNTER — TELEPHONE (OUTPATIENT)
Dept: CARDIOLOGY CLINIC | Age: 37
End: 2022-02-14

## 2022-02-14 RX ORDER — PROPRANOLOL HYDROCHLORIDE 10 MG/1
TABLET ORAL
Qty: 60 TABLET | Refills: 1 | Status: SHIPPED | OUTPATIENT
Start: 2022-02-14 | End: 2022-04-12

## 2022-02-14 NOTE — TELEPHONE ENCOUNTER
I called patient again, two identifiers verified. I have notified her that the prescription for Propanolol has already been sent. [facility] confirmed. Patient verbalized understanding. No other concerns at this time.

## 2022-02-14 NOTE — TELEPHONE ENCOUNTER
Patient called because she runs out of medication today and [facility] [address] has faxed for refill several times with no response. Please call her and [facility] to put in refill for propranolol 2mg 2x's a day.     Patient [phone]  Pharmacy: [phone]

## 2022-02-14 NOTE — TELEPHONE ENCOUNTER
I returned patient's call- I called home phone number and mobile phone number on file. No answer. I left a VM to callback. I called patient's pharmacy, I spoke with Nancy Zimmer. She stated that the electronic prescription was sent to [phone]. I requested for the fax number to be updated. She confirmed that the Propanolol Rx was received.

## 2022-02-15 ENCOUNTER — OFFICE VISIT (OUTPATIENT)
Dept: INTERNAL MEDICINE CLINIC | Age: 37
End: 2022-02-15
Payer: MEDICAID

## 2022-02-15 VITALS
BODY MASS INDEX: 46.95 KG/M2 | WEIGHT: 265 LBS | DIASTOLIC BLOOD PRESSURE: 80 MMHG | TEMPERATURE: 98.1 F | RESPIRATION RATE: 18 BRPM | SYSTOLIC BLOOD PRESSURE: 140 MMHG | HEART RATE: 82 BPM | OXYGEN SATURATION: 98 % | HEIGHT: 63 IN

## 2022-02-15 DIAGNOSIS — M25.562 ACUTE PAIN OF LEFT KNEE: Primary | ICD-10-CM

## 2022-02-15 PROCEDURE — 99213 OFFICE O/P EST LOW 20 MIN: CPT | Performed by: INTERNAL MEDICINE

## 2022-02-15 RX ORDER — HYDROCODONE BITARTRATE AND ACETAMINOPHEN 5; 325 MG/1; MG/1
1 TABLET ORAL
Qty: 15 TABLET | Refills: 0 | Status: SHIPPED | OUTPATIENT
Start: 2022-02-15 | End: 2022-02-22 | Stop reason: SDUPTHER

## 2022-02-15 RX ORDER — PREGABALIN 50 MG/1
50 CAPSULE ORAL 2 TIMES DAILY
COMMUNITY
Start: 2022-01-14

## 2022-02-15 NOTE — PROGRESS NOTES
HISTORY OF PRESENT ILLNESS  Nikolay Mireles is a 43 y.o. female. HPI   Hx SLE, anxiety obesity chronic migraine headaches, labile BP      C/o severe left knee pain x 4 days constant but worse with walking and weight bearing. Here with boyfriend  Hears crunching sound when with ROM   No injury to the left knee  Has been to ED twice  Pain is severe even at rest  Has CVI --stable leg edema on lasix  Has fu Dr Collin Kenny for possible vein surgery 3/8  Last OV        Patient Active Problem List    Diagnosis Date Noted    Labile blood pressure 09/03/2021    Chronic headache disorder 08/29/2021    Constipation 08/29/2021    Seizure ([facility] [address] 08/29/2021    Syncope 08/29/2021    Headache 08/29/2021    Intermittent palpitations 03/16/2021    Essential hypertension 03/16/2021    Ureteric calculus 94/78/8458    Ureteric colic 24/42/5019    Cellulitis 02/24/2020    Severe obesity ([facility] [address] 02/22/2019    Incomplete uterovaginal prolapse 05/17/2016    Migraine with aura and without status migrainosus, not intractable 12/02/2015    Anxiety 12/05/2014    Lupus ([facility] [address] 12/04/2014    Recurrent ventral incisional hernia 03/01/2013    Ventral hernia 02/25/2013    Abnormal CT of the abdomen 11/08/2012     Current Outpatient Medications   Medication Sig Dispense Refill    pregabalin (LYRICA) 50 mg capsule Take 50 mg by mouth two (2) times a day.  HYDROcodone-acetaminophen (NORCO) 5-325 mg per tablet Take 1 Tablet by mouth every eight (8) hours as needed for Pain for up to 7 days. Max Daily Amount: 3 Tablets. 15 Tablet 0    propranoloL (INDERAL) 10 mg tablet TAKE 1 TABLET BY MOUTH TWO TIMES A DAY.  60 Tablet 1    tiZANidine (ZANAFLEX) 4 mg tablet TAKE 1 TABLET BY MOUTH THREE TIMES A DAY AS NEEDED FOR PAIN 30 Tablet 0    ondansetron hcl (ZOFRAN) 4 mg tablet TAKE 1 TABLET BY MOUTH EVERY 8 HOURS AS NEEDED FOR NAUSEA 20 Tablet 0    mirtazapine (REMERON) 30 mg tablet TAKE 1 TABLET BY MOUTH EVERYDAY AT BEDTIME 30 Tablet 5    ALPRAZolam (XANAX) 1 mg tablet One bid prn anxiety 60 Tablet 1    furosemide (LASIX) 20 mg tablet TAKE 1 TABLET BY MOUTH AS NEEDED (SWELLING). 30 Tablet 2    DULoxetine (CYMBALTA) 60 mg capsule TAKE 2 CAPSULESBY MOUTH EVERY DAY 30 Capsule 5    codeine-butalbital-acetaminophen-caffeine (FIORICET WITH CODEINE) -68-30 mg capsule Take 1 Capsule by mouth every six (6) hours as needed for Headache.  belimumab (Benlysta) 400 mg solr injection by IntraVENous route.  cyclobenzaprine (FLEXERIL) 10 mg tablet Take 1 Tablet by mouth three (3) times daily as needed for Muscle Spasm(s). 21 Tablet 0    meclizine (ANTIVERT) 25 mg tablet TAKE 1 TABLET BY MOUTH THREE TIMES A DAY AS NEEDED FOR DIZZINESS FOR UP TO 10 DAYS 30 Tablet 0    albuterol (PROVENTIL HFA, VENTOLIN HFA, PROAIR HFA) 90 mcg/actuation inhaler Take 1 Puff by inhalation every six (6) hours as needed for Wheezing. 1 Each 1    naloxone (NARCAN) 4 mg/actuation nasal spray Use 1 spray intranasally, then discard. Repeat with new spray every 2 min as needed for opioid overdose symptoms, alternating nostrils. 2 Each 0     Allergies   Allergen Reactions    Latex Itching     burning    Compazine [Prochlorperazine] Anxiety     High heart rate  Pt can take promethazine with no problems    Sulfa (Sulfonamide Antibiotics) Unknown (comments)    Topamax [Topiramate] Unknown (comments)    Adhesive Rash     Allergic to Dermabond    Clindamycin Diarrhea     Pt states caused her C-Diff    Iodinated Contrast Media Myalgia     Pt c/o severe back spasms after IV contrast administration. Pt requesting IV pain medications and requests I put this as a drug intolerance in her EMR.     Mushroom Other (comments)    Mushroom Combination No.1 Unknown (comments)    Reglan [Metoclopramide Hcl] Rash    Valproic Acid Other (comments)     Elevated heart rate and vomiting        Lab Results   Component Value Date/Time    WBC 9.1 02/11/2022 02:12 AM    HGB 10.6 (L) 02/11/2022 02:12 AM    Hemoglobin (POC) 12.6 05/17/2016 07:47 AM    HCT 33.3 (L) 02/11/2022 02:12 AM    Hematocrit (POC) 37 05/17/2016 07:47 AM    PLATELET 237 66/39/6717 02:12 AM    MCV 90.0 02/11/2022 02:12 AM     Lab Results   Component Value Date/Time    Glucose 114 (H) 02/11/2022 02:12 AM    Glucose (POC) 156 (H) 08/28/2021 10:43 PM    Creatinine (POC) 0.7 05/17/2016 07:47 AM    Creatinine 0.81 02/11/2022 02:12 AM      No results found for: CHOL, CHOLPOCT, HDL, LDL, LDLC, LDLCPOC, LDLCEXT, TRIGL, TGLPOCT, CHHD, CHHDX  Lab Results   Component Value Date/Time    GFR est non-AA >60 02/11/2022 02:12 AM    GFRNA, POC >60 05/17/2016 07:47 AM    GFR est AA >60 02/11/2022 02:12 AM    GFRAA, POC >60 05/17/2016 07:47 AM    Creatinine 0.81 02/11/2022 02:12 AM    Creatinine (POC) 0.7 05/17/2016 07:47 AM    BUN 15 02/11/2022 02:12 AM    BUN (POC) 8 (L) 05/17/2016 07:47 AM    Sodium 137 02/11/2022 02:12 AM    Sodium (POC) 140 05/17/2016 07:47 AM    Potassium 4.2 02/11/2022 02:12 AM    Potassium (POC) 3.8 05/17/2016 07:47 AM    Chloride 106 02/11/2022 02:12 AM    Chloride (POC) 105 05/17/2016 07:47 AM    CO2 25 02/11/2022 02:12 AM    Magnesium 2.2 12/18/2021 04:37 AM        ROS    Physical Exam  Vitals and nursing note reviewed. Constitutional:       Appearance: She is well-developed. Comments: Appears stated age   Cardiovascular:      Rate and Rhythm: Normal rate and regular rhythm. Heart sounds: Normal heart sounds. No murmur heard. No friction rub. No gallop. Pulmonary:      Effort: Pulmonary effort is normal. No respiratory distress. Breath sounds: Normal breath sounds. No wheezing. Abdominal:      General: Bowel sounds are normal.      Palpations: Abdomen is soft. Musculoskeletal:      Right lower leg: Edema present. Left lower leg: Edema present.       Comments: Left anterior knee is TTP and some left medial joint lline TTP  slihgt increased warmth  Pain with knee ROM   Neurological: Mental Status: She is alert. ASSESSMENT and PLAN  Diagnoses and all orders for this visit:    1. Acute pain of left knee  -     XR KNEE LT MAX 2 VWS; Future  -     HYDROcodone-acetaminophen (NORCO) 5-325 mg per tablet; Take 1 Tablet by mouth every eight (8) hours as needed for Pain for up to 7 days. Max Daily Amount: 3 Tablets.-15 nr   ? Meniscus tear   Pt will [facility]o MD at [facility]--her mother is  and informs us pt can be seen this week    2.  CVI-   Stockings and elevation   Will fu CARD MD Next month

## 2022-02-15 NOTE — PATIENT INSTRUCTIONS
Office Policies    Phone calls/patient messages:            Please allow up to 24 hours for someone in the office to contact you about your call or message. Be mindful your provider may be out of the office or your message may require further review. We encourage you to use [employer] for your messages as this is a faster, more efficient way to communicate with our office                         Medication Refills:            Prescription medications require 48-72 business hours to process. We encourage you to use [employer] for your refills. For controlled medications: Please allow 72 business hours to process. Certain medications may require you to  a written prescription at our office. NO narcotic/controlled medications will be prescribed after 4pm Monday through Friday or on weekends              Form/Paperwork Completion:            Please note a $25 fee may incur for all paperwork for completed by our providers. We ask that you allow 7-10 business days. Pre-payment is due prior to picking up/faxing the completed form. You may also download your forms to [employer] to have your doctor print off.

## 2022-02-22 DIAGNOSIS — M25.562 ACUTE PAIN OF LEFT KNEE: ICD-10-CM

## 2022-02-22 RX ORDER — ONDANSETRON 4 MG/1
TABLET, FILM COATED ORAL
Qty: 20 TABLET | Refills: 0 | Status: SHIPPED | OUTPATIENT
Start: 2022-02-22 | End: 2022-03-17

## 2022-02-22 RX ORDER — TIZANIDINE 4 MG/1
TABLET ORAL
Qty: 30 TABLET | Refills: 0 | Status: SHIPPED | OUTPATIENT
Start: 2022-02-22 | End: 2022-03-07

## 2022-02-22 RX ORDER — HYDROCODONE BITARTRATE AND ACETAMINOPHEN 5; 325 MG/1; MG/1
1 TABLET ORAL
Qty: 15 TABLET | Refills: 0 | Status: SHIPPED | OUTPATIENT
Start: 2022-02-22 | End: 2022-03-01

## 2022-02-22 NOTE — TELEPHONE ENCOUNTER
----- Message from Melia Schulz sent at 2/22/2022  9:28 AM EST -----  Subject: Refill Request    QUESTIONS  Name of Medication? HYDROcodone-acetaminophen (NORCO) 5-325 mg per tablet  Patient-reported dosage and instructions? 1 every 8 hours   How many days do you have left? 0  Preferred Pharmacy? [facility] #[id number]  Pharmacy phone number (if available)? [phone]  Additional Information for Provider? Pt is having some serve pain in both   knees. She has only gotten a shot in her left knee but there is still some   inflammation. Pt is in a lot of pain and is requesting her meds before   Friday to last until she gets her second shot   ---------------------------------------------------------------------------  --------------  [address]  What is the best way for the office to contact you? OK to leave message on   voicemail  Preferred Call Back Phone Number?  [phone]

## 2022-03-07 RX ORDER — MECLIZINE HYDROCHLORIDE 25 MG/1
TABLET ORAL
Qty: 30 TABLET | Refills: 0 | Status: SHIPPED | OUTPATIENT
Start: 2022-03-07 | End: 2022-04-15

## 2022-03-07 RX ORDER — TIZANIDINE 4 MG/1
TABLET ORAL
Qty: 30 TABLET | Refills: 0 | Status: SHIPPED | OUTPATIENT
Start: 2022-03-07 | End: 2022-03-21

## 2022-03-08 ENCOUNTER — OFFICE VISIT (OUTPATIENT)
Dept: CARDIOLOGY CLINIC | Age: 37
End: 2022-03-08
Payer: MEDICAID

## 2022-03-08 VITALS
DIASTOLIC BLOOD PRESSURE: 78 MMHG | BODY MASS INDEX: 47.73 KG/M2 | OXYGEN SATURATION: 97 % | HEART RATE: 96 BPM | HEIGHT: 63 IN | SYSTOLIC BLOOD PRESSURE: 122 MMHG | WEIGHT: 269.4 LBS | RESPIRATION RATE: 18 BRPM

## 2022-03-08 DIAGNOSIS — E66.01 SEVERE OBESITY (HCC): ICD-10-CM

## 2022-03-08 DIAGNOSIS — I87.2 VENOUS INSUFFICIENCY OF LEFT LEG: Primary | ICD-10-CM

## 2022-03-08 DIAGNOSIS — M32.9 LUPUS (HCC): ICD-10-CM

## 2022-03-08 DIAGNOSIS — M79.89 LEG SWELLING: ICD-10-CM

## 2022-03-08 PROCEDURE — 99214 OFFICE O/P EST MOD 30 MIN: CPT | Performed by: INTERNAL MEDICINE

## 2022-03-08 NOTE — PROGRESS NOTES
3/8/2022 11:54 AM      Subjective:     Isabel Rose is seen in vascular clinic. C/o worsening leg pain and swelling. Per pt with lasix she ended up in ER and was reportedly dehydrated. Now taking lasix as needed. Also has notice bruises over legs. She denies chest pain, chest pressure/discomfort, dyspnea, palpitations, irregular heart beats, near-syncope, syncope, fatigue, orthopnea, paroxysmal nocturnal dyspnea, exertional chest pressure/discomfort. Visit Vitals  /78 (BP 1 Location: Left upper arm, BP Patient Position: Sitting, BP Cuff Size: Large adult)   Pulse 96   Resp 18   Ht 5' 3\" (1.6 m)   Wt 269 lb 6.4 oz (122.2 kg)   LMP 02/17/2016   SpO2 97%   BMI 47.72 kg/m²     Current Outpatient Medications   Medication Sig    tiZANidine (ZANAFLEX) 4 mg tablet TAKE 1 TABLET BY MOUTH THREE TIMES A DAY AS NEEDED FOR PAIN    ondansetron hcl (ZOFRAN) 4 mg tablet TAKE 1 TABLET BY MOUTH EVERY 8 HOURS AS NEEDED FOR NAUSEA    pregabalin (LYRICA) 50 mg capsule Take 50 mg by mouth two (2) times a day.  propranoloL (INDERAL) 10 mg tablet TAKE 1 TABLET BY MOUTH TWO TIMES A DAY.  mirtazapine (REMERON) 30 mg tablet TAKE 1 TABLET BY MOUTH EVERYDAY AT BEDTIME    albuterol (PROVENTIL HFA, VENTOLIN HFA, PROAIR HFA) 90 mcg/actuation inhaler Take 1 Puff by inhalation every six (6) hours as needed for Wheezing.  ALPRAZolam (XANAX) 1 mg tablet One bid prn anxiety    furosemide (LASIX) 20 mg tablet TAKE 1 TABLET BY MOUTH AS NEEDED (SWELLING).  DULoxetine (CYMBALTA) 60 mg capsule TAKE 2 CAPSULESBY MOUTH EVERY DAY    codeine-butalbital-acetaminophen-caffeine (FIORICET WITH CODEINE) -84-30 mg capsule Take 1 Capsule by mouth every six (6) hours as needed for Headache.  naloxone (NARCAN) 4 mg/actuation nasal spray Use 1 spray intranasally, then discard. Repeat with new spray every 2 min as needed for opioid overdose symptoms, alternating nostrils.     belimumab (Benlysta) 400 mg solr injection by IntraVENous route. Every 8 weeks    meclizine (ANTIVERT) 25 mg tablet TAKE 1 TABLET BY MOUTH THREE TIMES A DAY AS NEEDED FOR DIZZINESS FOR UP TO 10 DAYS (Patient not taking: Reported on 3/8/2022)    cyclobenzaprine (FLEXERIL) 10 mg tablet Take 1 Tablet by mouth three (3) times daily as needed for Muscle Spasm(s). (Patient not taking: Reported on 3/8/2022)     No current facility-administered medications for this visit.          Objective:      Visit Vitals  /78 (BP 1 Location: Left upper arm, BP Patient Position: Sitting, BP Cuff Size: Large adult)   Pulse 96   Resp 18   Ht 5' 3\" (1.6 m)   Wt 269 lb 6.4 oz (122.2 kg)   SpO2 97%   BMI 47.72 kg/m²       Past Medical History:   Diagnosis Date    Abnormal CT of the abdomen 11/8/2012    Asthma     Autoimmune disease (HCC)     lupus    C. difficile diarrhea     Cervical prolapse     Dehydration     Mariah-Danlos syndrome     Gastrointestinal disorder     gerd, twisted colon, IBS    GERD (gastroesophageal reflux disease)     Headache(784.0)     Lupus (HCC)     Morbid obesity (HCC)     Murmur     Nausea & vomiting     Neurological disorder     cluster headaches    Occipital neuralgia     other 2006, 2009    ovarian cyst removal    Other ill-defined conditions(799.89)     Syncope     Worsening headaches       Past Surgical History:   Procedure Laterality Date    COLONOSCOPY  9/21/2010         HX CHOLECYSTECTOMY      HX CYST INCISION AND DRAINAGE Right 02/27/2020    HX GYN  1/2009    right salpingo oopherectomy    HX GYN  2006    right ovarian tumor removed    HX HEENT      left wisdom teeth removal    HX HEENT      top right wisdom tooth removed    HX HERNIA REPAIR  4/2012    HX HERNIA REPAIR  2/28/13    Laparoscopic recurrent incisional hernia repair    HX HYSTERECTOMY      2016    HX UROLOGICAL      Kidney Stone Removal    CO EGD TRANSORAL BIOPSY SINGLE/MULTIPLE  9/22/2010          Allergies   Allergen Reactions  Latex Itching     burning    Compazine [Prochlorperazine] Anxiety     High heart rate  Pt can take promethazine with no problems    Sulfa (Sulfonamide Antibiotics) Unknown (comments)    Topamax [Topiramate] Unknown (comments)    Adhesive Rash     Allergic to Dermabond    Clindamycin Diarrhea     Pt states caused her C-Diff    Iodinated Contrast Media Myalgia     Pt c/o severe back spasms after IV contrast administration. Pt requesting IV pain medications and requests I put this as a drug intolerance in her EMR.  Mushroom Other (comments)    Mushroom Combination No.1 Unknown (comments)    Reglan [Metoclopramide Hcl] Rash    Valproic Acid Other (comments)     Elevated heart rate and vomiting        Family History   Problem Relation Age of Onset    Colon Cancer Maternal Grandfather       Social History     Socioeconomic History    Marital status: LEGALLY      Spouse name: Not on file    Number of children: Not on file    Years of education: Not on file    Highest education level: Not on file   Occupational History    Not on file   Tobacco Use    Smoking status: Never Smoker    Smokeless tobacco: Never Used   Vaping Use    Vaping Use: Never used   Substance and Sexual Activity    Alcohol use: No    Drug use: No    Sexual activity: Not Currently     Partners: Male     Birth control/protection: Abstinence   Other Topics Concern    Not on file   Social History Narrative    Not on file     Social Determinants of Health     Financial Resource Strain:     Difficulty of Paying Living Expenses: Not on file   Food Insecurity:     Worried About Running Out of Food in the Last Year: Not on file    Jessica of Food in the Last Year: Not on file   Transportation Needs:     Lack of Transportation (Medical): Not on file    Lack of Transportation (Non-Medical):  Not on file   Physical Activity:     Days of Exercise per Week: Not on file    Minutes of Exercise per Session: Not on file Stress:     Feeling of Stress : Not on file   Social Connections:     Frequency of Communication with Friends and Family: Not on file    Frequency of Social Gatherings with Friends and Family: Not on file    Attends [employer] Services: Not on file    Active Member of [address] [place] [address] [employer] or Organizations: Not on file    Attends Club or Organization Meetings: Not on file    Marital Status: Not on file   Intimate Partner Violence:     Fear of Current or Ex-Partner: Not on file    Emotionally Abused: Not on file    Physically Abused: Not on file    Sexually Abused: Not on file   Housing Stability:     Unable to Pay for Housing in the Last Year: Not on file    Number of [place] in the Last Year: Not on file    Unstable Housing in the Last Year: Not on file       Assessment:       ICD-10-CM ICD-9-CM    1. Venous insufficiency of left leg  I87.2 459.81 DUPLEX LOWER EXT VENOUS BILAT      CBC W/O DIFF      PROTHROMBIN TIME + INR      CANCELED: METABOLIC PANEL, COMPREHENSIVE   2. Leg swelling  M79.89 729.81 DUPLEX LOWER EXT VENOUS BILAT      CBC W/O DIFF      PROTHROMBIN TIME + INR      CANCELED: METABOLIC PANEL, COMPREHENSIVE   3. Severe obesity (HCC)  E66.01 278.01 DUPLEX LOWER EXT VENOUS BILAT      CBC W/O DIFF      PROTHROMBIN TIME + INR      CANCELED: METABOLIC PANEL, COMPREHENSIVE   4. Lupus ([facility] [address]  M32.9 710.0 DUPLEX LOWER EXT VENOUS BILAT      CBC W/O DIFF      PROTHROMBIN TIME + INR      CANCELED: METABOLIC PANEL, COMPREHENSIVE       Plan:     1. Venous insufficiency of left leg  Left GSV and SSV reflux per LE dopplers 9/2021. No DVT per LE duplex in 10/2021. Noted echo in 8/2021. Has worn stockings with no relief. Frustrated. Again had a detailed d/w patient. With normal LE venous doppler on right side, exam and previous h/o lupus and steroid, not sure if left GSV/SSV reflux is culprit. Repeat venous US.  If no significant progression then may want to be evaluated for neuropathy due to h/o lupus and previous steroid use. Morbid obesity also contributing to her symptoms. Continue prn lasix .     2. Skin bruises: check CBC and INR. Last month blood work noted. Appears to be traumatic.

## 2022-03-08 NOTE — PROGRESS NOTES
1. Have you been to the ER, urgent care clinic since your last visit? Hospitalized since your last visit? No.    2. Have you seen or consulted any other health care providers outside of the [address] [place] [address] since your last visit? Include any pap smears or colon screening.    No.        Chief Complaint   Patient presents with    Circulatory problem     6 month- lf leg pain that has gotten worse and bruising is popping up, swelling for a week

## 2022-03-12 ENCOUNTER — HOSPITAL ENCOUNTER (EMERGENCY)
Age: 37
Discharge: HOME | End: 2022-03-12
Attending: EMERGENCY MEDICINE
Payer: MEDICAID

## 2022-03-12 ENCOUNTER — APPOINTMENT (OUTPATIENT)
Dept: CT IMAGING | Age: 37
End: 2022-03-12
Attending: EMERGENCY MEDICINE
Payer: MEDICAID

## 2022-03-12 VITALS
TEMPERATURE: 98.4 F | SYSTOLIC BLOOD PRESSURE: 126 MMHG | RESPIRATION RATE: 14 BRPM | OXYGEN SATURATION: 100 % | WEIGHT: 276.02 LBS | HEART RATE: 73 BPM | DIASTOLIC BLOOD PRESSURE: 57 MMHG | HEIGHT: 63 IN | BODY MASS INDEX: 48.91 KG/M2

## 2022-03-12 DIAGNOSIS — R10.9 RIGHT FLANK PAIN: Primary | ICD-10-CM

## 2022-03-12 DIAGNOSIS — R51.9 ACUTE INTRACTABLE HEADACHE, UNSPECIFIED HEADACHE TYPE: ICD-10-CM

## 2022-03-12 DIAGNOSIS — R31.9 HEMATURIA, UNSPECIFIED TYPE: ICD-10-CM

## 2022-03-12 LAB
ALBUMIN SERPL-MCNC: 4 G/DL (ref 3.5–5)
ALBUMIN/GLOB SERPL: 1.1 {RATIO} (ref 1.1–2.2)
ALP SERPL-CCNC: 139 U/L (ref 45–117)
ALT SERPL-CCNC: 20 U/L (ref 12–78)
ANION GAP SERPL CALC-SCNC: 6 MMOL/L (ref 5–15)
APPEARANCE UR: CLEAR
AST SERPL-CCNC: 8 U/L (ref 15–37)
BACTERIA URNS QL MICRO: NEGATIVE /HPF
BASOPHILS # BLD: 0.1 K/UL (ref 0–0.1)
BASOPHILS NFR BLD: 1 % (ref 0–1)
BILIRUB SERPL-MCNC: 0.3 MG/DL (ref 0.2–1)
BILIRUB UR QL: NEGATIVE
BUN SERPL-MCNC: 24 MG/DL (ref 6–20)
BUN/CREAT SERPL: 13 (ref 12–20)
CALCIUM SERPL-MCNC: 9.2 MG/DL (ref 8.5–10.1)
CHLORIDE SERPL-SCNC: 107 MMOL/L (ref 97–108)
CO2 SERPL-SCNC: 24 MMOL/L (ref 21–32)
COLOR UR: ABNORMAL
CREAT SERPL-MCNC: 1.83 MG/DL (ref 0.55–1.02)
DIFFERENTIAL METHOD BLD: ABNORMAL
EOSINOPHIL # BLD: 1.2 K/UL (ref 0–0.4)
EOSINOPHIL NFR BLD: 11 % (ref 0–7)
EPITH CASTS URNS QL MICRO: ABNORMAL /LPF
ERYTHROCYTE [DISTWIDTH] IN BLOOD BY AUTOMATED COUNT: 14.3 % (ref 11.5–14.5)
GLOBULIN SER CALC-MCNC: 3.5 G/DL (ref 2–4)
GLUCOSE SERPL-MCNC: 109 MG/DL (ref 65–100)
GLUCOSE UR STRIP.AUTO-MCNC: NEGATIVE MG/DL
HCT VFR BLD AUTO: 35.1 % (ref 35–47)
HGB BLD-MCNC: 11.4 G/DL (ref 11.5–16)
HGB UR QL STRIP: ABNORMAL
IMM GRANULOCYTES # BLD AUTO: 0.1 K/UL (ref 0–0.04)
IMM GRANULOCYTES NFR BLD AUTO: 1 % (ref 0–0.5)
KETONES UR QL STRIP.AUTO: NEGATIVE MG/DL
LEUKOCYTE ESTERASE UR QL STRIP.AUTO: NEGATIVE
LYMPHOCYTES # BLD: 3.5 K/UL (ref 0.8–3.5)
LYMPHOCYTES NFR BLD: 32 % (ref 12–49)
MCH RBC QN AUTO: 28.7 PG (ref 26–34)
MCHC RBC AUTO-ENTMCNC: 32.5 G/DL (ref 30–36.5)
MCV RBC AUTO: 88.4 FL (ref 80–99)
MONOCYTES # BLD: 1 K/UL (ref 0–1)
MONOCYTES NFR BLD: 9 % (ref 5–13)
NEUTS SEG # BLD: 5 K/UL (ref 1.8–8)
NEUTS SEG NFR BLD: 46 % (ref 32–75)
NITRITE UR QL STRIP.AUTO: NEGATIVE
NRBC # BLD: 0 K/UL (ref 0–0.01)
NRBC BLD-RTO: 0 PER 100 WBC
PH UR STRIP: 5.5 [PH] (ref 5–8)
PLATELET # BLD AUTO: 347 K/UL (ref 150–400)
PMV BLD AUTO: 9.3 FL (ref 8.9–12.9)
POTASSIUM SERPL-SCNC: 4.1 MMOL/L (ref 3.5–5.1)
PROT SERPL-MCNC: 7.5 G/DL (ref 6.4–8.2)
PROT UR STRIP-MCNC: ABNORMAL MG/DL
RBC # BLD AUTO: 3.97 M/UL (ref 3.8–5.2)
RBC #/AREA URNS HPF: >100 /HPF (ref 0–5)
RBC MORPH BLD: ABNORMAL
SODIUM SERPL-SCNC: 137 MMOL/L (ref 136–145)
SP GR UR REFRACTOMETRY: 1.01 (ref 1–1.03)
UA: UC IF INDICATED,UAUC: ABNORMAL
UROBILINOGEN UR QL STRIP.AUTO: 0.2 EU/DL (ref 0.2–1)
WBC # BLD AUTO: 10.9 K/UL (ref 3.6–11)
WBC URNS QL MICRO: ABNORMAL /HPF (ref 0–4)

## 2022-03-12 PROCEDURE — 81001 URINALYSIS AUTO W/SCOPE: CPT

## 2022-03-12 PROCEDURE — 80053 COMPREHEN METABOLIC PANEL: CPT

## 2022-03-12 PROCEDURE — 99284 EMERGENCY DEPT VISIT MOD MDM: CPT

## 2022-03-12 PROCEDURE — 36415 COLL VENOUS BLD VENIPUNCTURE: CPT

## 2022-03-12 PROCEDURE — 96375 TX/PRO/DX INJ NEW DRUG ADDON: CPT

## 2022-03-12 PROCEDURE — 74011250636 HC RX REV CODE- 250/636: Performed by: EMERGENCY MEDICINE

## 2022-03-12 PROCEDURE — 93005 ELECTROCARDIOGRAM TRACING: CPT

## 2022-03-12 PROCEDURE — 96374 THER/PROPH/DIAG INJ IV PUSH: CPT

## 2022-03-12 PROCEDURE — 96376 TX/PRO/DX INJ SAME DRUG ADON: CPT

## 2022-03-12 PROCEDURE — 85025 COMPLETE CBC W/AUTO DIFF WBC: CPT

## 2022-03-12 PROCEDURE — 74176 CT ABD & PELVIS W/O CONTRAST: CPT

## 2022-03-12 RX ORDER — OXYCODONE HYDROCHLORIDE 5 MG/1
5 TABLET ORAL
Qty: 3 TABLET | Refills: 0 | Status: SHIPPED | OUTPATIENT
Start: 2022-03-12 | End: 2022-03-15

## 2022-03-12 RX ORDER — ONDANSETRON 2 MG/ML
4 INJECTION INTRAMUSCULAR; INTRAVENOUS
Status: COMPLETED | OUTPATIENT
Start: 2022-03-12 | End: 2022-03-12

## 2022-03-12 RX ORDER — MORPHINE SULFATE 2 MG/ML
4 INJECTION, SOLUTION INTRAMUSCULAR; INTRAVENOUS ONCE
Status: COMPLETED | OUTPATIENT
Start: 2022-03-12 | End: 2022-03-12

## 2022-03-12 RX ADMIN — MORPHINE SULFATE 4 MG: 2 INJECTION, SOLUTION INTRAMUSCULAR; INTRAVENOUS at 12:46

## 2022-03-12 RX ADMIN — SODIUM CHLORIDE 1000 ML: 9 INJECTION, SOLUTION INTRAVENOUS at 12:46

## 2022-03-12 RX ADMIN — ONDANSETRON 4 MG: 2 INJECTION INTRAMUSCULAR; INTRAVENOUS at 12:45

## 2022-03-12 RX ADMIN — MORPHINE SULFATE 4 MG: 2 INJECTION, SOLUTION INTRAMUSCULAR; INTRAVENOUS at 14:00

## 2022-03-12 NOTE — ED NOTES
Patient given printed discharge instructions reviewed by the MD. Patient understands instructions/follow up recommendations. Patient discharged out of ED via wheelchair with friend/family at her side.

## 2022-03-12 NOTE — ED PROVIDER NOTES
EMERGENCY DEPARTMENT HISTORY AND PHYSICAL EXAM      Date: 3/12/2022  Patient Name: Thea Rocha    History of Presenting Illness     Chief Complaint   Patient presents with    Dizziness     syncopal x2 d/t optical nerve pain; also c/o new R lower/flank pain that started this morning; hx of kidney stones; denies any urinary pain; ; VSS per EMS       History Provided By: Patient    HPI: Thea Rocha, 43 y.o. female with history of chronic headaches, chronic pain, occipital neuralgia, Mariah-Danlos syndrome, lupus, kidney stones presents to the ED with cc of headache, right lower quadrant pain, and lightheadedness/syncope. Headache has been present over the past 2 days, no thunderclap onset. This feels similar to her prior headaches and has become severe. Is located to occipital region with generalized radiation. No associated photophobia but she has had associated nausea and vomiting. She states pain is usually well controlled with Fioricet with codeine but this has not been helping with her pain at this time. She follows with neurology regarding this. Additionally she has complaints of right lower quadrant pain that onset this morning and is also associated with nausea and vomiting. States it feels like a kidney stone that she has had in the past.  She states that between both the abdominal pain and to the occipital headache she has felt lightheaded and passed out twice today. States that she typically passes out when she has severe uncontrolled pain. There are no other complaints, changes, or physical findings at this time. PCP: Pedro Gomez MD    No current facility-administered medications on file prior to encounter.      Current Outpatient Medications on File Prior to Encounter   Medication Sig Dispense Refill    meclizine (ANTIVERT) 25 mg tablet TAKE 1 TABLET BY MOUTH THREE TIMES A DAY AS NEEDED FOR DIZZINESS FOR UP TO 10 DAYS (Patient not taking: Reported on 3/8/2022) 30 Tablet 0    tiZANidine (ZANAFLEX) 4 mg tablet TAKE 1 TABLET BY MOUTH THREE TIMES A DAY AS NEEDED FOR PAIN 30 Tablet 0    ondansetron hcl (ZOFRAN) 4 mg tablet TAKE 1 TABLET BY MOUTH EVERY 8 HOURS AS NEEDED FOR NAUSEA 20 Tablet 0    pregabalin (LYRICA) 50 mg capsule Take 50 mg by mouth two (2) times a day.  propranoloL (INDERAL) 10 mg tablet TAKE 1 TABLET BY MOUTH TWO TIMES A DAY. 60 Tablet 1    cyclobenzaprine (FLEXERIL) 10 mg tablet Take 1 Tablet by mouth three (3) times daily as needed for Muscle Spasm(s). (Patient not taking: Reported on 3/8/2022) 21 Tablet 0    mirtazapine (REMERON) 30 mg tablet TAKE 1 TABLET BY MOUTH EVERYDAY AT BEDTIME 30 Tablet 5    albuterol (PROVENTIL HFA, VENTOLIN HFA, PROAIR HFA) 90 mcg/actuation inhaler Take 1 Puff by inhalation every six (6) hours as needed for Wheezing. 1 Each 1    ALPRAZolam (XANAX) 1 mg tablet One bid prn anxiety 60 Tablet 1    furosemide (LASIX) 20 mg tablet TAKE 1 TABLET BY MOUTH AS NEEDED (SWELLING). 30 Tablet 2    DULoxetine (CYMBALTA) 60 mg capsule TAKE 2 CAPSULESBY MOUTH EVERY DAY 30 Capsule 5    codeine-butalbital-acetaminophen-caffeine (FIORICET WITH CODEINE) -15-30 mg capsule Take 1 Capsule by mouth every six (6) hours as needed for Headache.  naloxone (NARCAN) 4 mg/actuation nasal spray Use 1 spray intranasally, then discard. Repeat with new spray every 2 min as needed for opioid overdose symptoms, alternating nostrils. 2 Each 0    belimumab (Benlysta) 400 mg solr injection by IntraVENous route.  Every 8 weeks         Past History     Past Medical History:  Past Medical History:   Diagnosis Date    Abnormal CT of the abdomen 11/8/2012    Asthma     Autoimmune disease (HCC)     lupus    C. difficile diarrhea     Cervical prolapse     Dehydration     Mariah-Danlos syndrome     Gastrointestinal disorder     gerd, twisted colon, IBS    GERD (gastroesophageal reflux disease)     Headache(784.0)     Lupus ([address]     Morbid obesity ([address]     Murmur     Nausea & vomiting     Neurological disorder     cluster headaches    Occipital neuralgia     other 2006, 2009    ovarian cyst removal    Other ill-defined conditions(799.89)     Syncope     Worsening headaches        Past Surgical History:  Past Surgical History:   Procedure Laterality Date    COLONOSCOPY  9/21/2010         HX CHOLECYSTECTOMY      HX CYST INCISION AND DRAINAGE Right 02/27/2020    HX GYN  1/2009    right salpingo oopherectomy    HX GYN  2006    right ovarian tumor removed    HX HEENT      left wisdom teeth removal    HX HEENT      top right wisdom tooth removed    HX HERNIA REPAIR  4/2012    HX HERNIA REPAIR  2/28/13    Laparoscopic recurrent incisional hernia repair    HX HYSTERECTOMY      2016    HX UROLOGICAL      Kidney Stone Removal    NV EGD TRANSORAL BIOPSY SINGLE/MULTIPLE  9/22/2010            Family History:  Family History   Problem Relation Age of Onset    Colon Cancer Maternal Grandfather        Social History:  Social History     Tobacco Use    Smoking status: Never Smoker    Smokeless tobacco: Never Used   Vaping Use    Vaping Use: Never used   Substance Use Topics    Alcohol use: No    Drug use: No       Allergies: Allergies   Allergen Reactions    Latex Itching     burning    Compazine [Prochlorperazine] Anxiety     High heart rate  Pt can take promethazine with no problems    Sulfa (Sulfonamide Antibiotics) Unknown (comments)    Topamax [Topiramate] Unknown (comments)    Adhesive Rash     Allergic to Dermabond    Clindamycin Diarrhea     Pt states caused her C-Diff    Iodinated Contrast Media Myalgia     Pt c/o severe back spasms after IV contrast administration. Pt requesting IV pain medications and requests I put this as a drug intolerance in her EMR.     Mushroom Other (comments)    Mushroom Combination No.1 Unknown (comments)    Reglan [Metoclopramide Hcl] Rash    Valproic Acid Other (comments)     Elevated heart rate and vomiting           Review of Systems   Review of Systems   Constitutional: Negative for chills and fever. Eyes: Negative for photophobia. Respiratory: Negative for cough and shortness of breath. Cardiovascular: Negative for chest pain and leg swelling. Gastrointestinal: Positive for abdominal pain, nausea and vomiting. Genitourinary: Positive for flank pain. Musculoskeletal: Negative for back pain and gait problem. Skin: Negative for color change and rash. Neurological: Positive for syncope and headaches. Negative for dizziness, weakness and light-headedness. Hematological: Does not bruise/bleed easily. All other systems reviewed and are negative. Physical Exam   Physical Exam  Vitals and nursing note reviewed. Constitutional:       Appearance: Normal appearance. Comments: Patient sitting upright in bed. Crying. Appears anxious and emotional.   HENT:      Head: Normocephalic and atraumatic. Nose: Nose normal.      Mouth/Throat:      Mouth: Mucous membranes are moist.   Eyes:      Extraocular Movements: Extraocular movements intact. Pupils: Pupils are equal, round, and reactive to light. Cardiovascular:      Rate and Rhythm: Normal rate and regular rhythm. Heart sounds: No murmur heard. Pulmonary:      Effort: Pulmonary effort is normal. No respiratory distress. Breath sounds: Normal breath sounds. No wheezing. Abdominal:      General: There is no distension. Palpations: Abdomen is soft. Tenderness: There is abdominal tenderness. There is no guarding or rebound. Musculoskeletal:         General: No swelling or tenderness. Normal range of motion. Cervical back: Normal range of motion and neck supple. Right lower leg: No edema. Left lower leg: No edema. Skin:     General: Skin is warm and dry. Coloration: Skin is not pale. Findings: No erythema. Neurological:      General: No focal deficit present. Mental Status: She is alert and oriented to person, place, and time. Cranial Nerves: No cranial nerve deficit. Sensory: No sensory deficit. Motor: No weakness. Coordination: Coordination normal.   Psychiatric:      Comments: Anxious, emotionally labile         Diagnostic Study Results     Labs -     Recent Results (from the past 12 hour(s))   CBC WITH AUTOMATED DIFF    Collection Time: 03/12/22 11:51 AM   Result Value Ref Range    WBC 10.9 3.6 - 11.0 K/uL    RBC 3.97 3.80 - 5.20 M/uL    HGB 11.4 (L) 11.5 - 16.0 g/dL    HCT 35.1 35.0 - 47.0 %    MCV 88.4 80.0 - 99.0 FL    MCH 28.7 26.0 - 34.0 PG    MCHC 32.5 30.0 - 36.5 g/dL    RDW 14.3 11.5 - 14.5 %    PLATELET 871 142 - 228 K/uL    MPV 9.3 8.9 - 12.9 FL    NRBC 0.0 0  WBC    ABSOLUTE NRBC 0.00 0.00 - 0.01 K/uL    NEUTROPHILS 46 32 - 75 %    LYMPHOCYTES 32 12 - 49 %    MONOCYTES 9 5 - 13 %    EOSINOPHILS 11 (H) 0 - 7 %    BASOPHILS 1 0 - 1 %    IMMATURE GRANULOCYTES 1 (H) 0.0 - 0.5 %    ABS. NEUTROPHILS 5.0 1.8 - 8.0 K/UL    ABS. LYMPHOCYTES 3.5 0.8 - 3.5 K/UL    ABS. MONOCYTES 1.0 0.0 - 1.0 K/UL    ABS. EOSINOPHILS 1.2 (H) 0.0 - 0.4 K/UL    ABS. BASOPHILS 0.1 0.0 - 0.1 K/UL    ABS. IMM. GRANS. 0.1 (H) 0.00 - 0.04 K/UL    DF SMEAR SCANNED      RBC COMMENTS NORMOCYTIC, NORMOCHROMIC     METABOLIC PANEL, COMPREHENSIVE    Collection Time: 03/12/22 11:51 AM   Result Value Ref Range    Sodium 137 136 - 145 mmol/L    Potassium 4.1 3.5 - 5.1 mmol/L    Chloride 107 97 - 108 mmol/L    CO2 24 21 - 32 mmol/L    Anion gap 6 5 - 15 mmol/L    Glucose 109 (H) 65 - 100 mg/dL    BUN 24 (H) 6 - 20 MG/DL    Creatinine 1.83 (H) 0.55 - 1.02 MG/DL    BUN/Creatinine ratio 13 12 - 20      GFR est AA 37 (L) >60 ml/min/1.73m2    GFR est non-AA 31 (L) >60 ml/min/1.73m2    Calcium 9.2 8.5 - 10.1 MG/DL    Bilirubin, total 0.3 0.2 - 1.0 MG/DL    ALT (SGPT) 20 12 - 78 U/L    AST (SGOT) 8 (L) 15 - 37 U/L    Alk.  phosphatase 139 (H) 45 - 117 U/L    Protein, total 7.5 6.4 - 8.2 g/dL    Albumin 4.0 3.5 - 5.0 g/dL    Globulin 3.5 2.0 - 4.0 g/dL    A-G Ratio 1.1 1.1 - 2.2     URINALYSIS W/ REFLEX CULTURE    Collection Time: 03/12/22 11:51 AM    Specimen: Urine   Result Value Ref Range    Color YELLOW/STRAW      Appearance CLEAR CLEAR      Specific gravity 1.012 1.003 - 1.030      pH (UA) 5.5 5.0 - 8.0      Protein TRACE (A) NEG mg/dL    Glucose Negative NEG mg/dL    Ketone Negative NEG mg/dL    Bilirubin Negative NEG      Blood LARGE (A) NEG      Urobilinogen 0.2 0.2 - 1.0 EU/dL    Nitrites Negative NEG      Leukocyte Esterase Negative NEG      WBC 0-4 0 - 4 /hpf    RBC >100 (H) 0 - 5 /hpf    Epithelial cells FEW FEW /lpf    Bacteria Negative NEG /hpf    UA:UC IF INDICATED CULTURE NOT INDICATED BY UA RESULT CNI         Radiologic Studies -   CT ABD PELV WO CONT   Final Result   Punctate nonobstructing bilateral renal stones. No ureteral stone or evidence of   hydronephrosis. No acute abnormality. CT Results  (Last 48 hours)               03/12/22 1408  CT ABD PELV WO CONT Final result    Impression:  Punctate nonobstructing bilateral renal stones. No ureteral stone or evidence of   hydronephrosis. No acute abnormality. Narrative:  INDICATION: Right flank and right lower quadrant pain       COMPARISON: 12/11/2021       TECHNIQUE:    Thin axial images were obtained through the abdomen and pelvis. Coronal and   sagittal reconstructions were generated. Oral contrast was not administered. CT   dose reduction was achieved through use of a standardized protocol tailored for   this examination and automatic exposure control for dose modulation. The absence of intravenous contrast material reduces the sensitivity for   evaluation of the solid parenchymal organs of the abdomen. FINDINGS:    LUNG BASES: Clear. INCIDENTALLY IMAGED HEART AND MEDIASTINUM: Unremarkable. LIVER: No mass or biliary dilatation. GALLBLADDER: Status post cholecystectomy. SPLEEN: No mass. PANCREAS: No mass or ductal dilatation. ADRENALS: Unremarkable. KIDNEYS/URETERS: Punctate nonobstructing bilateral renal stones. No ureteral   stone or hydronephrosis. STOMACH: Unremarkable. SMALL BOWEL: No dilatation or wall thickening. COLON: No dilatation or wall thickening. APPENDIX: Not visualized. PERITONEUM: No ascites or pneumoperitoneum. RETROPERITONEUM: No lymphadenopathy or aortic aneurysm. REPRODUCTIVE ORGANS: There is no pelvic mass or lymphadenopathy. URINARY BLADDER: No mass or calculus. BONES: No destructive bone lesion. ADDITIONAL COMMENTS: N/A               CXR Results  (Last 48 hours)    None          Medical Decision Making   I am the first provider for this patient. I reviewed the vital signs, available nursing notes, past medical history, past surgical history, family history and social history. Vital Signs-Reviewed the patient's vital signs. Patient Vitals for the past 12 hrs:   Temp Pulse Resp BP SpO2   03/12/22 1400 -- 71 14 109/62 99 %   03/12/22 1215 -- 70 17 118/82 97 %   03/12/22 1145 -- 72 10 119/72 97 %   03/12/22 1130 -- 69 9 105/67 98 %   03/12/22 1125 98.4 °F (36.9 °C) 65 12 105/67 100 %       Records Reviewed: Nursing Notes and Old Medical Records    Provider Notes (Medical Decision Making):   77-year-old female with history as above presenting with acute on chronic occipital headache as well as right lower quadrant pain. Afebrile and vital signs stable. Differential diagnosis includes acute on chronic headache, doubt ICH or dural sinus venous thrombus. Differential also includes nephrolithiasis, pyelonephritis, UTI, appendicitis, cholecystitis, colitis. Will treat symptomatically, assess with CT imaging, and reassess. Negative workup today. Negative CT imaging for stone although there is hematuria on UA. Pt believes she has passed a stone here in the ED. Very similar to presentation July 2021 last time I saw her. Feeling better with meds. Encouraged follow up with PCP, stable for d/c at this time. ED Course:   Initial assessment performed. The patients presenting problems have been discussed, and they are in agreement with the care plan formulated and outlined with them. I have encouraged them to ask questions as they arise throughout their visit. Discharge Note:  The patient has been re-evaluated and is ready for discharge. Reviewed available results with patient. Counseled patient on diagnosis and care plan. Patient has expressed understanding, and all questions have been answered. Patient agrees with plan and agrees to follow up as recommended, or to return to the ED if their symptoms worsen. Discharge instructions have been provided and explained to the patient, along with reasons to return to the ED. Disposition:  Discharge    DISCHARGE PLAN:  1. Discharge Medication List as of 3/12/2022  3:13 PM      START taking these medications    Details   oxyCODONE IR (Roxicodone) 5 mg immediate release tablet Take 1 Tablet by mouth every six (6) hours as needed for Pain for up to 3 days. Max Daily Amount: 20 mg., Normal, Disp-3 Tablet, R-0         CONTINUE these medications which have NOT CHANGED    Details   meclizine (ANTIVERT) 25 mg tablet TAKE 1 TABLET BY MOUTH THREE TIMES A DAY AS NEEDED FOR DIZZINESS FOR UP TO 10 DAYS, Normal, Disp-30 Tablet, R-0      tiZANidine (ZANAFLEX) 4 mg tablet TAKE 1 TABLET BY MOUTH THREE TIMES A DAY AS NEEDED FOR PAIN, Normal, Disp-30 Tablet, R-0      ondansetron hcl (ZOFRAN) 4 mg tablet TAKE 1 TABLET BY MOUTH EVERY 8 HOURS AS NEEDED FOR NAUSEA, Normal, Disp-20 Tablet, R-0      pregabalin (LYRICA) 50 mg capsule Take 50 mg by mouth two (2) times a day., Historical Med      propranoloL (INDERAL) 10 mg tablet TAKE 1 TABLET BY MOUTH TWO TIMES A DAY., Normal, Disp-60 Tablet, R-1      cyclobenzaprine (FLEXERIL) 10 mg tablet Take 1 Tablet by mouth three (3) times daily as needed for Muscle Spasm(s). , Normal, Disp-21 Tablet, R-0      mirtazapine (REMERON) 30 mg tablet TAKE 1 TABLET BY MOUTH EVERYDAY AT BEDTIME, Normal, Disp-30 Tablet, R-5      albuterol (PROVENTIL HFA, VENTOLIN HFA, PROAIR HFA) 90 mcg/actuation inhaler Take 1 Puff by inhalation every six (6) hours as needed for Wheezing., Normal, Disp-1 Each, R-1      ALPRAZolam (XANAX) 1 mg tablet One bid prn anxiety, Normal, Disp-60 Tablet, R-1Not to exceed 2 additional fills before 03/23/2022      furosemide (LASIX) 20 mg tablet TAKE 1 TABLET BY MOUTH AS NEEDED (SWELLING). , Normal, Disp-30 Tablet, R-2      DULoxetine (CYMBALTA) 60 mg capsule TAKE 2 CAPSULESBY MOUTH EVERY DAY, Normal, Disp-30 Capsule, R-5      codeine-butalbital-acetaminophen-caffeine (FIORICET WITH CODEINE) -78-30 mg capsule Take 1 Capsule by mouth every six (6) hours as needed for Headache., Historical Med      naloxone (NARCAN) 4 mg/actuation nasal spray Use 1 spray intranasally, then discard. Repeat with new spray every 2 min as needed for opioid overdose symptoms, alternating nostrils. , Normal, Disp-2 Each, R-0      belimumab (Benlysta) 400 mg solr injection by IntraVENous route. Every 8 weeks, Historical Med           2. Follow-up Information     Follow up With Specialties Details Why Contact Info    Urmila Garcia MD Internal Medicine Schedule an appointment as soon as possible for a visit   [address] [place] [address]  [phone]      [facility] EMERGENCY DEPT Emergency Medicine Go to  As needed, If symptoms worsen [address]  [address] [facility]  [phone]        3. Return to ED if worse     Diagnosis     Clinical Impression:   1. Right flank pain    2. Acute intractable headache, unspecified headache type    3. Hematuria, unspecified type        Attestations:  I am the first and primary provider of record for this patient's ED encounter. I personally performed the services described above in this documentation.   Gege Quiles, MD    Please note that this dictation was completed with [employer], the computer voice recognition software. Quite often unanticipated grammatical, syntax, homophones, and other interpretive errors are inadvertently transcribed by the computer software. Please disregard these errors. Please excuse any errors that have escaped final proofreading. Thank you.

## 2022-03-13 LAB
ATRIAL RATE: 64 BPM
CALCULATED P AXIS, ECG09: 34 DEGREES
CALCULATED R AXIS, ECG10: 55 DEGREES
CALCULATED T AXIS, ECG11: 31 DEGREES
DIAGNOSIS, 93000: NORMAL
P-R INTERVAL, ECG05: 174 MS
Q-T INTERVAL, ECG07: 390 MS
QRS DURATION, ECG06: 84 MS
QTC CALCULATION (BEZET), ECG08: 402 MS
VENTRICULAR RATE, ECG03: 64 BPM

## 2022-03-14 ENCOUNTER — TELEPHONE (OUTPATIENT)
Dept: INTERNAL MEDICINE CLINIC | Age: 37
End: 2022-03-14

## 2022-03-14 DIAGNOSIS — F41.9 ANXIETY: ICD-10-CM

## 2022-03-14 DIAGNOSIS — F51.01 PRIMARY INSOMNIA: ICD-10-CM

## 2022-03-14 RX ORDER — ALPRAZOLAM 1 MG/1
TABLET ORAL
Qty: 60 TABLET | Refills: 1 | Status: SHIPPED | OUTPATIENT
Start: 2022-03-14 | End: 2022-05-16

## 2022-03-14 NOTE — TELEPHONE ENCOUNTER
----- Message from Mirian Michaels sent at 3/12/2022  9:37 AM EST -----  Subject: Message to Provider    QUESTIONS  Information for Provider? Pt is requesting the A/H line about a fall she   had this morning. She stated that she now has severe knee pain in both   kneew but that her knees have been an ongoing problem and wanted to talk   to the on call provider today. She was directed to the A/H line. ---------------------------------------------------------------------------  --------------  Kimberley GARCIA  What is the best way for the office to contact you? Do not leave any   message, patient will call back for answer  Preferred Call Back Phone Number? [phone]  ---------------------------------------------------------------------------  --------------  SCRIPT ANSWERS  Relationship to Patient?  Self

## 2022-03-17 RX ORDER — ONDANSETRON 4 MG/1
TABLET, FILM COATED ORAL
Qty: 20 TABLET | Refills: 0 | Status: SHIPPED | OUTPATIENT
Start: 2022-03-17 | End: 2022-03-24

## 2022-03-18 PROBLEM — M79.89 LEG SWELLING: Status: ACTIVE | Noted: 2022-03-08

## 2022-03-18 PROBLEM — N23 URETERIC COLIC: Status: ACTIVE | Noted: 2020-10-03

## 2022-03-18 PROBLEM — R56.9 SEIZURE (HCC): Status: ACTIVE | Noted: 2021-08-29

## 2022-03-18 PROBLEM — L03.90 CELLULITIS: Status: ACTIVE | Noted: 2020-02-24

## 2022-03-18 PROBLEM — K59.00 CONSTIPATION: Status: ACTIVE | Noted: 2021-08-29

## 2022-03-19 PROBLEM — R09.89 LABILE BLOOD PRESSURE: Status: ACTIVE | Noted: 2021-09-03

## 2022-03-19 PROBLEM — I10 ESSENTIAL HYPERTENSION: Status: ACTIVE | Noted: 2021-03-16

## 2022-03-19 PROBLEM — E66.01 SEVERE OBESITY (HCC): Status: ACTIVE | Noted: 2019-02-22

## 2022-03-19 PROBLEM — R00.2 INTERMITTENT PALPITATIONS: Status: ACTIVE | Noted: 2021-03-16

## 2022-03-19 PROBLEM — G89.29 CHRONIC HEADACHE DISORDER: Status: ACTIVE | Noted: 2021-08-29

## 2022-03-19 PROBLEM — R51.9 HEADACHE: Status: ACTIVE | Noted: 2021-08-29

## 2022-03-19 PROBLEM — I87.2 VENOUS INSUFFICIENCY OF LEFT LEG: Status: ACTIVE | Noted: 2022-03-08

## 2022-03-19 PROBLEM — R51.9 CHRONIC HEADACHE DISORDER: Status: ACTIVE | Noted: 2021-08-29

## 2022-03-19 PROBLEM — R55 SYNCOPE: Status: ACTIVE | Noted: 2021-08-29

## 2022-03-19 PROBLEM — N20.1 URETERIC CALCULUS: Status: ACTIVE | Noted: 2020-10-03

## 2022-03-21 ENCOUNTER — ANCILLARY PROCEDURE (OUTPATIENT)
Dept: CARDIOLOGY CLINIC | Age: 37
End: 2022-03-21
Payer: MEDICAID

## 2022-03-21 PROCEDURE — 93970 EXTREMITY STUDY: CPT | Performed by: INTERNAL MEDICINE

## 2022-03-21 RX ORDER — TIZANIDINE 4 MG/1
TABLET ORAL
Qty: 30 TABLET | Refills: 0 | Status: SHIPPED | OUTPATIENT
Start: 2022-03-21 | End: 2022-03-31

## 2022-03-21 RX ORDER — DULOXETIN HYDROCHLORIDE 60 MG/1
CAPSULE, DELAYED RELEASE ORAL
Qty: 30 CAPSULE | Refills: 5 | Status: SHIPPED | OUTPATIENT
Start: 2022-03-21 | End: 2022-07-04

## 2022-03-22 LAB
LEFT CFV RFX: 0.7 S
LEFT GSV AT KNEE DIAM: 0.64 CM
LEFT GSV AT KNEE RFX: 1016 S
LEFT GSV BK MID DIAM: 0.37 CM
LEFT GSV BK MID RFX: 538 S
LEFT GSV JUNC DIAM: 0.96 CM
LEFT GSV JUNC RFX: 0.8 S
LEFT GSV THIGH PROX DIAM: 0.9 CM
LEFT GSV THIGH PROX RFX: 0 S
LEFT SSV PROX DIAM: 0.33 CM
LEFT SSV PROX RFX: 0 S
RIGHT CFV RFX: 1 S
RIGHT GSV AK RFX: 2081 S
RIGHT GSV AT KNEE DIAM: 0.65 CM
RIGHT GSV BK MID DIAM: 0.52 CM
RIGHT GSV BK MID RFX: 593 S
RIGHT GSV JUNC DIAM: 0.86 CM
RIGHT GSV JUNC RFX: 0.8 S
RIGHT GSV THIGH PROX DIAM: 0.88 CM
RIGHT GSV THIGH PROX RFX: 738 S
RIGHT SSV PROX DIAM: 0.32 CM
RIGHT SSV PROX RFX: 0 S
VAS LEFT AASV DIAM: 0.59 CM
VAS LEFT AASV RFX: 0 S
VAS RIGHT AASV DIAM: 0.53 CM
VAS RIGHT AASV RFX: 0 S

## 2022-03-24 RX ORDER — ONDANSETRON 4 MG/1
TABLET, FILM COATED ORAL
Qty: 20 TABLET | Refills: 0 | Status: SHIPPED | OUTPATIENT
Start: 2022-03-24 | End: 2022-03-31

## 2022-03-31 RX ORDER — ONDANSETRON 4 MG/1
TABLET, FILM COATED ORAL
Qty: 20 TABLET | Refills: 0 | Status: SHIPPED | OUTPATIENT
Start: 2022-03-31 | End: 2022-05-24

## 2022-03-31 RX ORDER — TIZANIDINE 4 MG/1
TABLET ORAL
Qty: 30 TABLET | Refills: 0 | Status: SHIPPED | OUTPATIENT
Start: 2022-03-31 | End: 2022-04-10

## 2022-04-10 RX ORDER — TIZANIDINE 4 MG/1
TABLET ORAL
Qty: 30 TABLET | Refills: 0 | Status: SHIPPED | OUTPATIENT
Start: 2022-04-10 | End: 2022-04-25

## 2022-04-12 RX ORDER — AZITHROMYCIN 500 MG/1
TABLET, FILM COATED ORAL
Qty: 6 TABLET | Refills: 0 | Status: SHIPPED | OUTPATIENT
Start: 2022-04-12 | End: 2022-05-09

## 2022-04-15 ENCOUNTER — TELEPHONE (OUTPATIENT)
Dept: CARDIOLOGY CLINIC | Age: 37
End: 2022-04-15

## 2022-04-15 RX ORDER — MECLIZINE HYDROCHLORIDE 25 MG/1
TABLET ORAL
Qty: 30 TABLET | Refills: 0 | Status: SHIPPED | OUTPATIENT
Start: 2022-04-15 | End: 2022-06-24

## 2022-04-15 NOTE — TELEPHONE ENCOUNTER
I have explained to the patient that [employer] is closed, patient stated that she received the letter. Notified patient about the changes in the practice and will call to schedule her appt.

## 2022-04-25 RX ORDER — TIZANIDINE 4 MG/1
TABLET ORAL
Qty: 30 TABLET | Refills: 0 | Status: SHIPPED | OUTPATIENT
Start: 2022-04-25 | End: 2022-05-12

## 2022-05-09 RX ORDER — AZITHROMYCIN 500 MG/1
TABLET, FILM COATED ORAL
Qty: 6 TABLET | Refills: 0 | Status: SHIPPED | OUTPATIENT
Start: 2022-05-09 | End: 2022-05-17 | Stop reason: ALTCHOICE

## 2022-05-12 RX ORDER — TIZANIDINE 4 MG/1
TABLET ORAL
Qty: 30 TABLET | Refills: 0 | Status: SHIPPED | OUTPATIENT
Start: 2022-05-12 | End: 2022-05-24

## 2022-05-15 DIAGNOSIS — F51.01 PRIMARY INSOMNIA: ICD-10-CM

## 2022-05-15 DIAGNOSIS — F41.9 ANXIETY: ICD-10-CM

## 2022-05-16 RX ORDER — ALPRAZOLAM 1 MG/1
TABLET ORAL
Qty: 60 TABLET | Refills: 1 | Status: SHIPPED | OUTPATIENT
Start: 2022-05-16 | End: 2022-07-12

## 2022-05-17 ENCOUNTER — VIRTUAL VISIT (OUTPATIENT)
Dept: INTERNAL MEDICINE CLINIC | Age: 37
End: 2022-05-17
Payer: MEDICAID

## 2022-05-17 DIAGNOSIS — J40 BRONCHITIS: Primary | ICD-10-CM

## 2022-05-17 DIAGNOSIS — J45.909 UNCOMPLICATED ASTHMA, UNSPECIFIED ASTHMA SEVERITY, UNSPECIFIED WHETHER PERSISTENT: ICD-10-CM

## 2022-05-17 PROCEDURE — 99212 OFFICE O/P EST SF 10 MIN: CPT | Performed by: INTERNAL MEDICINE

## 2022-05-17 RX ORDER — CODEINE PHOSPHATE AND GUAIFENESIN 10; 100 MG/5ML; MG/5ML
5 SOLUTION ORAL
Qty: 100 ML | Refills: 0 | Status: SHIPPED | OUTPATIENT
Start: 2022-05-17 | End: 2022-05-24

## 2022-05-17 RX ORDER — UBROGEPANT 50 MG/1
TABLET ORAL
COMMUNITY
Start: 2022-05-12 | End: 2022-07-18 | Stop reason: ALTCHOICE

## 2022-05-17 RX ORDER — LEVOFLOXACIN 500 MG/1
500 TABLET, FILM COATED ORAL DAILY
Qty: 7 TABLET | Refills: 0 | Status: SHIPPED | OUTPATIENT
Start: 2022-05-17 | End: 2022-06-27 | Stop reason: ALTCHOICE

## 2022-05-17 RX ORDER — BUDESONIDE AND FORMOTEROL FUMARATE DIHYDRATE 160; 4.5 UG/1; UG/1
AEROSOL RESPIRATORY (INHALATION)
COMMUNITY
Start: 2022-04-18

## 2022-05-17 NOTE — PROGRESS NOTES
HISTORY OF PRESENT ILLNESS  Sonja Hummel is a 43 y.o. female. HPI   Sonja Hummel, who was evaluated through a synchronous (real-time) audio only encounter, and/or her healthcare decision maker, is aware that it is a billable service, which includes applicable co-pays, with coverage as determined by her insurance carrier. She provided verbal consent to proceed and patient identification was verified. This visit was conducted pursuant to the emergency declaration under the [facility], [address] [place] [address] authority and the [employer] General Act. A caregiver was present when appropriate. Ability to conduct physical exam was limited. The patient was located at home in a state where the provider was licensed to provide care. --Sy Bales MD on 5/17/2022 at 1:43 PM      Tc x 7 minutes    C/o productive cough x 4-5 days. Chest hurts from coughing  reports f/c sob  Lives with parents-mother was tested for covid this week-negative. Father feels ok  Pt as recently dx with asthma and started on symbicort and prn albuterol per pulm MD Dr Abby Guzman  Completed a z anum 4 d ago for mild cough  An electronic signature was used to authenticate this note.   Hx SLE, anxiety obesity chronic migraine headaches, labile BP    Patient Active Problem List    Diagnosis Date Noted    Venous insufficiency of left leg 03/08/2022    Leg swelling 03/08/2022    Labile blood pressure 09/03/2021    Chronic headache disorder 08/29/2021    Constipation 08/29/2021    Seizure ([address] 08/29/2021    Syncope 08/29/2021    Headache 08/29/2021    Intermittent palpitations 03/16/2021    Essential hypertension 03/16/2021    Ureteric calculus 21/56/7135    Ureteric colic 89/20/9950    Cellulitis 02/24/2020    Severe obesity ([address] 02/22/2019    Incomplete uterovaginal prolapse 05/17/2016    Migraine with aura and without status migrainosus, not intractable 12/02/2015    Anxiety 12/05/2014    Lupus ([facility] 12/04/2014    Recurrent ventral incisional hernia 03/01/2013    Ventral hernia 02/25/2013    Abnormal CT of the abdomen 11/08/2012     Current Outpatient Medications   Medication Sig Dispense Refill    ALPRAZolam (XANAX) 1 mg tablet TAKE 1 TABLET BY MOUTH TWICE A DAY AS NEEDED FOR ANXIETY 60 Tablet 1    tiZANidine (ZANAFLEX) 4 mg tablet TAKE 1 TABLET BY MOUTH THREE TIMES A DAY AS NEEDED FOR PAIN 30 Tablet 0    azithromycin (ZITHROMAX) 500 mg tab TAKE 1 TABLET BY MOUTH EVERY DAY 6 Tablet 0    meclizine (ANTIVERT) 25 mg tablet TAKE 1 TABLET BY MOUTH THREE TIMES A DAY AS NEEDED FOR DIZZINESS FOR UP TO 10 DAYS 30 Tablet 0    propranoloL (INDERAL) 10 mg tablet TAKE 1 TABLET BY MOUTH TWICE A  Tablet 3    ondansetron hcl (ZOFRAN) 4 mg tablet TAKE 1 TABLET BY MOUTH EVERY 8 HOURS AS NEEDED FOR NAUSEA 20 Tablet 0    DULoxetine (CYMBALTA) 60 mg capsule TAKE 2 CAPSULES BY MOUTH EVERY DAY 30 Capsule 5    pregabalin (LYRICA) 50 mg capsule Take 50 mg by mouth two (2) times a day.  cyclobenzaprine (FLEXERIL) 10 mg tablet Take 1 Tablet by mouth three (3) times daily as needed for Muscle Spasm(s). (Patient not taking: Reported on 3/8/2022) 21 Tablet 0    mirtazapine (REMERON) 30 mg tablet TAKE 1 TABLET BY MOUTH EVERYDAY AT BEDTIME 30 Tablet 5    albuterol (PROVENTIL HFA, VENTOLIN HFA, PROAIR HFA) 90 mcg/actuation inhaler Take 1 Puff by inhalation every six (6) hours as needed for Wheezing. 1 Each 1    furosemide (LASIX) 20 mg tablet TAKE 1 TABLET BY MOUTH AS NEEDED (SWELLING). 30 Tablet 2    codeine-butalbital-acetaminophen-caffeine (FIORICET WITH CODEINE) -08-30 mg capsule Take 1 Capsule by mouth every six (6) hours as needed for Headache.  naloxone (NARCAN) 4 mg/actuation nasal spray Use 1 spray intranasally, then discard. Repeat with new spray every 2 min as needed for opioid overdose symptoms, alternating nostrils.  2 Each 0    belimumab (Benlysta) 400 mg solr injection by IntraVENous route. Every 8 weeks       Allergies   Allergen Reactions    Latex Itching     burning    Compazine [Prochlorperazine] Anxiety     High heart rate  Pt can take promethazine with no problems    Sulfa (Sulfonamide Antibiotics) Unknown (comments)    Topamax [Topiramate] Unknown (comments)    Adhesive Rash     Allergic to Dermabond    Clindamycin Diarrhea     Pt states caused her C-Diff    Iodinated Contrast Media Myalgia     Pt c/o severe back spasms after IV contrast administration. Pt requesting IV pain medications and requests I put this as a drug intolerance in her EMR.  Mushroom Other (comments)    Mushroom Combination No.1 Unknown (comments)    Reglan [Metoclopramide Hcl] Rash    Valproic Acid Other (comments)     Elevated heart rate and vomiting        Lab Results   Component Value Date/Time    WBC 10.9 03/12/2022 11:51 AM    HGB 11.4 (L) 03/12/2022 11:51 AM    Hemoglobin (POC) 12.6 05/17/2016 07:47 AM    HCT 35.1 03/12/2022 11:51 AM    Hematocrit (POC) 37 05/17/2016 07:47 AM    PLATELET 538 09/44/6214 11:51 AM    MCV 88.4 03/12/2022 11:51 AM     Lab Results   Component Value Date/Time    GFR est non-AA 31 (L) 03/12/2022 11:51 AM    GFRNA, POC >60 05/17/2016 07:47 AM    GFR est AA 37 (L) 03/12/2022 11:51 AM    GFRAA, POC >60 05/17/2016 07:47 AM    Creatinine 1.83 (H) 03/12/2022 11:51 AM    Creatinine (POC) 0.7 05/17/2016 07:47 AM    BUN 24 (H) 03/12/2022 11:51 AM    BUN (POC) 8 (L) 05/17/2016 07:47 AM    Sodium 137 03/12/2022 11:51 AM    Sodium (POC) 140 05/17/2016 07:47 AM    Potassium 4.1 03/12/2022 11:51 AM    Potassium (POC) 3.8 05/17/2016 07:47 AM    Chloride 107 03/12/2022 11:51 AM    Chloride (POC) 105 05/17/2016 07:47 AM    CO2 24 03/12/2022 11:51 AM    Magnesium 2.2 12/18/2021 04:37 AM        ROS    Physical Exam    ASSESSMENT and PLAN  Diagnoses and all orders for this visit:    1.  Bronchitis  -     guaiFENesin-codeine (ROBITUSSIN AC) 100-10 mg/5 mL solution; Take 5 mL by mouth three (3) times daily as needed for Cough for up to 7 days. Max Daily Amount: 15 mL. levaquin x 7 days   To call if not improving   Recommended covid testing  2. Uncomplicated asthma, unspecified asthma severity, unspecified whether persistent   Continue inhalers  Other orders  -     levoFLOXacin (LEVAQUIN) 500 mg tablet; Take 1 Tablet by mouth daily.

## 2022-05-17 NOTE — PATIENT INSTRUCTIONS
This is an established visit conducted via telemedicine. The patient has been instructed that this meets HIPAA criteria and acknowledges and agrees to this method of visitation.     Vita Bueno, LPN  45/34[birth year]  7:10 PM

## 2022-05-24 DIAGNOSIS — R05.9 COUGH: Primary | ICD-10-CM

## 2022-05-24 RX ORDER — TIZANIDINE 4 MG/1
TABLET ORAL
Qty: 30 TABLET | Refills: 0 | Status: SHIPPED | OUTPATIENT
Start: 2022-05-24 | End: 2022-06-06

## 2022-05-24 RX ORDER — CODEINE PHOSPHATE AND GUAIFENESIN 10; 100 MG/5ML; MG/5ML
5 SOLUTION ORAL
Qty: 100 ML | Refills: 0 | Status: SHIPPED | OUTPATIENT
Start: 2022-05-24 | End: 2022-08-11

## 2022-05-24 RX ORDER — ONDANSETRON 4 MG/1
TABLET, FILM COATED ORAL
Qty: 20 TABLET | Refills: 0 | Status: SHIPPED | OUTPATIENT
Start: 2022-05-24 | End: 2022-06-22

## 2022-05-24 NOTE — TELEPHONE ENCOUNTER
Patient states she needs a call back to get the Status of her request for Cough Medication. Please call.  Thank you

## 2022-05-24 NOTE — TELEPHONE ENCOUNTER
----- Message from Coby Marin sent at 5/24/2022 11:07 AM EDT -----  Subject: Refill Request    QUESTIONS  Name of Medication? guaiFENesin-codeine (ROBITUSSIN AC) 100-10 mg/5 mL   solution  Patient-reported dosage and instructions? as directed  How many days do you have left? 2  Preferred Pharmacy? [id number]  Pharmacy phone number (if available)? [phone]  Additional Information for Provider? Patient would like a refill on her   cough medication; still coughing, sent a request on Monday 5/23/22  ---------------------------------------------------------------------------  --------------  CALL BACK INFO  What is the best way for the office to contact you? OK to leave message on   voicemail  Preferred Call Back Phone Number? [phone]  ---------------------------------------------------------------------------  --------------  SCRIPT ANSWERS  Relationship to Patient?  Self

## 2022-06-06 RX ORDER — TIZANIDINE 4 MG/1
TABLET ORAL
Qty: 30 TABLET | Refills: 0 | Status: SHIPPED | OUTPATIENT
Start: 2022-06-06 | End: 2022-06-14

## 2022-06-07 ENCOUNTER — HOSPITAL ENCOUNTER (EMERGENCY)
Age: 37
Discharge: LEFT BEFORE DISCHARGE | End: 2022-06-07
Payer: MEDICAID

## 2022-06-07 VITALS
HEIGHT: 62 IN | BODY MASS INDEX: 46.01 KG/M2 | SYSTOLIC BLOOD PRESSURE: 114 MMHG | DIASTOLIC BLOOD PRESSURE: 65 MMHG | WEIGHT: 250 LBS | TEMPERATURE: 97.8 F | OXYGEN SATURATION: 100 % | HEART RATE: 75 BPM | RESPIRATION RATE: 17 BRPM

## 2022-06-07 LAB
ALBUMIN SERPL-MCNC: 4.4 G/DL (ref 3.5–5)
ALBUMIN/GLOB SERPL: 1.3 {RATIO} (ref 1.1–2.2)
ALP SERPL-CCNC: 150 U/L (ref 45–117)
ALT SERPL-CCNC: 19 U/L (ref 12–78)
ANION GAP SERPL CALC-SCNC: 5 MMOL/L (ref 5–15)
APPEARANCE UR: CLEAR
AST SERPL-CCNC: 15 U/L (ref 15–37)
BACTERIA URNS QL MICRO: NEGATIVE /HPF
BASOPHILS # BLD: 0.1 K/UL (ref 0–0.1)
BASOPHILS NFR BLD: 1 % (ref 0–1)
BILIRUB SERPL-MCNC: 0.2 MG/DL (ref 0.2–1)
BILIRUB UR QL: NEGATIVE
BUN SERPL-MCNC: 12 MG/DL (ref 6–20)
BUN/CREAT SERPL: 10 (ref 12–20)
CALCIUM SERPL-MCNC: 9.9 MG/DL (ref 8.5–10.1)
CHLORIDE SERPL-SCNC: 105 MMOL/L (ref 97–108)
CO2 SERPL-SCNC: 28 MMOL/L (ref 21–32)
COLOR UR: ABNORMAL
CREAT SERPL-MCNC: 1.17 MG/DL (ref 0.55–1.02)
DIFFERENTIAL METHOD BLD: ABNORMAL
EOSINOPHIL # BLD: 0.7 K/UL (ref 0–0.4)
EOSINOPHIL NFR BLD: 8 % (ref 0–7)
EPITH CASTS URNS QL MICRO: ABNORMAL /LPF
ERYTHROCYTE [DISTWIDTH] IN BLOOD BY AUTOMATED COUNT: 14 % (ref 11.5–14.5)
GLOBULIN SER CALC-MCNC: 3.3 G/DL (ref 2–4)
GLUCOSE SERPL-MCNC: 112 MG/DL (ref 65–100)
GLUCOSE UR STRIP.AUTO-MCNC: NEGATIVE MG/DL
HCT VFR BLD AUTO: 40.4 % (ref 35–47)
HGB BLD-MCNC: 12.6 G/DL (ref 11.5–16)
HGB UR QL STRIP: ABNORMAL
HYALINE CASTS URNS QL MICRO: ABNORMAL /LPF (ref 0–2)
IMM GRANULOCYTES # BLD AUTO: 0.1 K/UL (ref 0–0.04)
IMM GRANULOCYTES NFR BLD AUTO: 1 % (ref 0–0.5)
KETONES UR QL STRIP.AUTO: NEGATIVE MG/DL
LEUKOCYTE ESTERASE UR QL STRIP.AUTO: NEGATIVE
LYMPHOCYTES # BLD: 3.6 K/UL (ref 0.8–3.5)
LYMPHOCYTES NFR BLD: 41 % (ref 12–49)
MCH RBC QN AUTO: 28.4 PG (ref 26–34)
MCHC RBC AUTO-ENTMCNC: 31.2 G/DL (ref 30–36.5)
MCV RBC AUTO: 91 FL (ref 80–99)
MONOCYTES # BLD: 0.7 K/UL (ref 0–1)
MONOCYTES NFR BLD: 8 % (ref 5–13)
NEUTS SEG # BLD: 3.8 K/UL (ref 1.8–8)
NEUTS SEG NFR BLD: 41 % (ref 32–75)
NITRITE UR QL STRIP.AUTO: NEGATIVE
NRBC # BLD: 0 K/UL (ref 0–0.01)
NRBC BLD-RTO: 0 PER 100 WBC
PH UR STRIP: 5.5 [PH] (ref 5–8)
PLATELET # BLD AUTO: 439 K/UL (ref 150–400)
PMV BLD AUTO: 9.4 FL (ref 8.9–12.9)
POTASSIUM SERPL-SCNC: 4.2 MMOL/L (ref 3.5–5.1)
PROT SERPL-MCNC: 7.7 G/DL (ref 6.4–8.2)
PROT UR STRIP-MCNC: NEGATIVE MG/DL
RBC # BLD AUTO: 4.44 M/UL (ref 3.8–5.2)
RBC #/AREA URNS HPF: >100 /HPF (ref 0–5)
SODIUM SERPL-SCNC: 138 MMOL/L (ref 136–145)
SP GR UR REFRACTOMETRY: 1.02
UA: UC IF INDICATED,UAUC: ABNORMAL
UROBILINOGEN UR QL STRIP.AUTO: 0.2 EU/DL (ref 0.2–1)
WBC # BLD AUTO: 8.9 K/UL (ref 3.6–11)
WBC URNS QL MICRO: ABNORMAL /HPF (ref 0–4)

## 2022-06-07 PROCEDURE — 75810000275 HC EMERGENCY DEPT VISIT NO LEVEL OF CARE

## 2022-06-07 PROCEDURE — 80053 COMPREHEN METABOLIC PANEL: CPT

## 2022-06-07 PROCEDURE — 36415 COLL VENOUS BLD VENIPUNCTURE: CPT

## 2022-06-07 PROCEDURE — 81001 URINALYSIS AUTO W/SCOPE: CPT

## 2022-06-07 PROCEDURE — 85025 COMPLETE CBC W/AUTO DIFF WBC: CPT

## 2022-06-14 RX ORDER — TIZANIDINE 4 MG/1
TABLET ORAL
Qty: 30 TABLET | Refills: 0 | Status: SHIPPED | OUTPATIENT
Start: 2022-06-14 | End: 2022-06-28

## 2022-06-15 ENCOUNTER — VIRTUAL VISIT (OUTPATIENT)
Dept: INTERNAL MEDICINE CLINIC | Age: 37
End: 2022-06-15
Payer: MEDICAID

## 2022-06-15 DIAGNOSIS — R31.0 GROSS HEMATURIA: ICD-10-CM

## 2022-06-15 DIAGNOSIS — R10.9 ACUTE FLANK PAIN: Primary | ICD-10-CM

## 2022-06-15 PROCEDURE — 99213 OFFICE O/P EST LOW 20 MIN: CPT | Performed by: FAMILY MEDICINE

## 2022-06-15 RX ORDER — CEPHALEXIN 500 MG/1
CAPSULE ORAL
COMMUNITY
Start: 2022-06-10 | End: 2022-09-07

## 2022-06-15 RX ORDER — HYDROCODONE BITARTRATE AND ACETAMINOPHEN 5; 325 MG/1; MG/1
1 TABLET ORAL
Qty: 21 TABLET | Refills: 0 | Status: SHIPPED | OUTPATIENT
Start: 2022-06-15 | End: 2022-06-22

## 2022-06-15 NOTE — PROGRESS NOTES
1. \"Have you been to the ER, urgent care clinic since your last visit? Hospitalized since your last visit? \" Yes Reason for visit: 6/7/22 for kidney infection. 2. \"Have you seen or consulted any other health care providers outside of the [address] [place] [address] since your last visit? \" No     3. For patients aged 39-70: Has the patient had a colonoscopy / FIT/ Cologuard? NA - based on age      If the patient is female:    4. For patients aged 41-77: Has the patient had a mammogram within the past 2 years? NA - based on age or sex      11. For patients aged 21-65: Has the patient had a pap smear?  Yes - no Care Gap present

## 2022-06-15 NOTE — PROGRESS NOTES
Sudheer Rordiguez is a 43 y.o. female who was seen by synchronous (real-time) audio-video technology on 6/15/2022 for UTI (urine showed blood and protein. ) and Other (kidney stones x2 and in a lot of pain. right side. passed the first kidney stone but not the second. )        Assessment & Plan:   Diagnoses and all orders for this visit:    1. Acute flank pain  -     HYDROcodone-acetaminophen (NORCO) 5-325 mg per tablet; Take 1 Tablet by mouth every eight (8) hours as needed for Pain for up to 7 days. Max Daily Amount: 3 Tablets. 2. Gross hematuria  -     REFERRAL TO UROLOGY      Acute Flank Pain: I prescribed Norco. I advised her to stop taking a muscle relaxer. She will continue to try and hydrate in order to pass the stone. Gross Hematuria: I referred pt to Dr. Rashmi Husain and we will call to try to assist her in getting an urgent appointment. I spent at least 7 minutes on this visit with this established patient. Subjective:   She presents today for a cc of a left side flank pain. Kidney Stones: She notes two kidney stones the past 2 weeks, and she was unable to pass the second kidney stone. She presented to the ER on 06/07 for kidney stone pain. She had a kidney infection. She did not complete a urine culture. At discharge, she was prescribed Keflex and Hydrocodone. She experiences severe back pain and nausea. She has not followed up with a urologist recently, due to the inability to obtain a timely urology visit with Dr. Rashmi Husain. She still has 4 days of Keflex. She does not have any more Hydrocodone. Currently, she is taking Tylenol for pain, which is not effective. She takes a muscle relaxer. Prior to Admission medications    Medication Sig Start Date End Date Taking? Authorizing Provider   HYDROcodone-acetaminophen (NORCO) 5-325 mg per tablet Take 1 Tablet by mouth every eight (8) hours as needed for Pain for up to 7 days. Max Daily Amount: 3 Tablets.  6/15/22 6/22/22 Yes Levert Leyden, MD tiZANidine (ZANAFLEX) 4 mg tablet TAKE 1 TABLET BY MOUTH THREE TIMES A DAY AS NEEDED FOR PAIN 6/14/22  Yes Amy Sy MD   ondansetron hcl (ZOFRAN) 4 mg tablet TAKE 1 TABLET BY MOUTH EVERY 8 HOURS AS NEEDED FOR NAUSEA 5/24/22  Yes Amy Sy MD   Symbicort 160-4.5 mcg/actuation HFAA INHALE 2 PUFFS BY MOUTH TWICE A DAY 4/18/22  Yes Provider, Historical   Ubrelvy 50 mg tablet PLEASE SEE ATTACHED FOR DETAILED DIRECTIONS 5/12/22  Yes Provider, Historical   levoFLOXacin (LEVAQUIN) 500 mg tablet Take 1 Tablet by mouth daily. 5/17/22  Yes Amy Sy MD   ALPRAZolam Ramone Huddle) 1 mg tablet TAKE 1 TABLET BY MOUTH TWICE A DAY AS NEEDED FOR ANXIETY 5/16/22  Yes Amy Sy MD   meclizine (ANTIVERT) 25 mg tablet TAKE 1 TABLET BY MOUTH THREE TIMES A DAY AS NEEDED FOR DIZZINESS FOR UP TO 10 DAYS 4/15/22  Yes Amy Sy MD   propranoloL (INDERAL) 10 mg tablet TAKE 1 TABLET BY MOUTH TWICE A DAY 4/12/22  Yes Marcus Nichols NP   DULoxetine (CYMBALTA) 60 mg capsule TAKE 2 CAPSULES BY MOUTH EVERY DAY 3/21/22  Yes Amy Sy MD   pregabalin (LYRICA) 50 mg capsule Take 50 mg by mouth two (2) times a day. 1/14/22  Yes Provider, Historical   mirtazapine (REMERON) 30 mg tablet TAKE 1 TABLET BY MOUTH EVERYDAY AT BEDTIME 2/3/22  Yes Amy Sy MD   albuterol (PROVENTIL HFA, VENTOLIN HFA, PROAIR HFA) 90 mcg/actuation inhaler Take 1 Puff by inhalation every six (6) hours as needed for Wheezing. 1/17/22  Yes Trinity Valdez MD   furosemide (LASIX) 20 mg tablet TAKE 1 TABLET BY MOUTH AS NEEDED (SWELLING). 12/5/21  Yes Marcus Nichols NP   naloxone [facility]) 4 mg/actuation nasal spray Use 1 spray intranasally, then discard. Repeat with new spray every 2 min as needed for opioid overdose symptoms, alternating nostrils. 3/17/21  Yes Violette Dickens MD   belimumab (Benlysta) 400 mg solr injection by IntraVENous route.  Every 8 weeks   Yes Provider, Historical   cephALEXin (KEFLEX) 500 mg capsule TAKE 1 CAPSULE BY MOUTH 4 TIMES DAILY 6/10/22   Provider, Historical   cyclobenzaprine (FLEXERIL) 10 mg tablet Take 1 Tablet by mouth three (3) times daily as needed for Muscle Spasm(s). Patient not taking: Reported on 6/15/2022 2/12/22   Veto Islas MD   codeine-butalbital-acetaminophen-caffeine (FIORICET WITH CODEINE) -32-30 mg capsule Take 1 Capsule by mouth every six (6) hours as needed for Headache.  6/15/22  Provider, Historical     Patient Active Problem List    Diagnosis Date Noted    Asthma 05/17/2022    Venous insufficiency of left leg 03/08/2022    Leg swelling 03/08/2022    Labile blood pressure 09/03/2021    Chronic headache disorder 08/29/2021    Constipation 08/29/2021    Seizure ([facility] 08/29/2021    Syncope 08/29/2021    Headache 08/29/2021    Intermittent palpitations 03/16/2021    Essential hypertension 03/16/2021    Ureteric calculus 73/87/1453    Ureteric colic 63/57/1165    Cellulitis 02/24/2020    Severe obesity ([facility] 02/22/2019    Incomplete uterovaginal prolapse 05/17/2016    Migraine with aura and without status migrainosus, not intractable 12/02/2015    Anxiety 12/05/2014    Lupus ([facility] 12/04/2014    Recurrent ventral incisional hernia 03/01/2013    Ventral hernia 02/25/2013    Abnormal CT of the abdomen 11/08/2012     Current Outpatient Medications   Medication Sig Dispense Refill    HYDROcodone-acetaminophen (NORCO) 5-325 mg per tablet Take 1 Tablet by mouth every eight (8) hours as needed for Pain for up to 7 days. Max Daily Amount: 3 Tablets.  21 Tablet 0    tiZANidine (ZANAFLEX) 4 mg tablet TAKE 1 TABLET BY MOUTH THREE TIMES A DAY AS NEEDED FOR PAIN 30 Tablet 0    ondansetron hcl (ZOFRAN) 4 mg tablet TAKE 1 TABLET BY MOUTH EVERY 8 HOURS AS NEEDED FOR NAUSEA 20 Tablet 0    Symbicort 160-4.5 mcg/actuation HFAA INHALE 2 PUFFS BY MOUTH TWICE A DAY      Ubrelvy 50 mg tablet PLEASE SEE ATTACHED FOR DETAILED DIRECTIONS      levoFLOXacin (LEVAQUIN) 500 mg tablet Take 1 Tablet by mouth daily. 7 Tablet 0    ALPRAZolam (XANAX) 1 mg tablet TAKE 1 TABLET BY MOUTH TWICE A DAY AS NEEDED FOR ANXIETY 60 Tablet 1    meclizine (ANTIVERT) 25 mg tablet TAKE 1 TABLET BY MOUTH THREE TIMES A DAY AS NEEDED FOR DIZZINESS FOR UP TO 10 DAYS 30 Tablet 0    propranoloL (INDERAL) 10 mg tablet TAKE 1 TABLET BY MOUTH TWICE A  Tablet 3    DULoxetine (CYMBALTA) 60 mg capsule TAKE 2 CAPSULES BY MOUTH EVERY DAY 30 Capsule 5    pregabalin (LYRICA) 50 mg capsule Take 50 mg by mouth two (2) times a day.  mirtazapine (REMERON) 30 mg tablet TAKE 1 TABLET BY MOUTH EVERYDAY AT BEDTIME 30 Tablet 5    albuterol (PROVENTIL HFA, VENTOLIN HFA, PROAIR HFA) 90 mcg/actuation inhaler Take 1 Puff by inhalation every six (6) hours as needed for Wheezing. 1 Each 1    furosemide (LASIX) 20 mg tablet TAKE 1 TABLET BY MOUTH AS NEEDED (SWELLING). 30 Tablet 2    naloxone (NARCAN) 4 mg/actuation nasal spray Use 1 spray intranasally, then discard. Repeat with new spray every 2 min as needed for opioid overdose symptoms, alternating nostrils. 2 Each 0    belimumab (Benlysta) 400 mg solr injection by IntraVENous route. Every 8 weeks      cephALEXin (KEFLEX) 500 mg capsule TAKE 1 CAPSULE BY MOUTH 4 TIMES DAILY      cyclobenzaprine (FLEXERIL) 10 mg tablet Take 1 Tablet by mouth three (3) times daily as needed for Muscle Spasm(s). (Patient not taking: Reported on 6/15/2022) 21 Tablet 0     Allergies   Allergen Reactions    Latex Itching     burning    Compazine [Prochlorperazine] Anxiety     High heart rate  Pt can take promethazine with no problems    Sulfa (Sulfonamide Antibiotics) Unknown (comments)    Topamax [Topiramate] Unknown (comments)    Adhesive Rash     Allergic to Dermabond    Clindamycin Diarrhea     Pt states caused her C-Diff    Iodinated Contrast Media Myalgia     Pt c/o severe back spasms after IV contrast administration.  Pt requesting IV pain medications and requests I put this as a drug intolerance in her EMR.    Mushroom Other (comments)    Mushroom Combination No.1 Unknown (comments)    Reglan [Metoclopramide Hcl] Rash    Valproic Acid Other (comments)     Elevated heart rate and vomiting       Past Medical History:   Diagnosis Date    Abnormal CT of the abdomen 11/8/2012    Asthma     Autoimmune disease ([facility] [address]     lupus    C. difficile diarrhea     Cervical prolapse     Dehydration     Mariah-Danlos syndrome     Gastrointestinal disorder     gerd, twisted colon, IBS    GERD (gastroesophageal reflux disease)     Headache(784.0)     Lupus (HCC)     Morbid obesity (HCC)     Murmur     Nausea & vomiting     Neurological disorder     cluster headaches    Occipital neuralgia     other 2006, 2009    ovarian cyst removal    Other ill-defined conditions(799.89)     Syncope     Worsening headaches      Past Surgical History:   Procedure Laterality Date    COLONOSCOPY  9/21/2010         HX CHOLECYSTECTOMY      HX CYST INCISION AND DRAINAGE Right 02/27/2020    HX GYN  1/2009    right salpingo oopherectomy    HX GYN  2006    right ovarian tumor removed    HX HEENT      left wisdom teeth removal    HX HEENT      top right wisdom tooth removed    HX HERNIA REPAIR  4/2012    HX HERNIA REPAIR  2/28/13    Laparoscopic recurrent incisional hernia repair    HX HYSTERECTOMY      2016    HX UROLOGICAL      Kidney Stone Removal    [place] EGD TRANSORAL BIOPSY SINGLE/MULTIPLE  9/22/2010          Family History   Problem Relation Age of Onset    Colon Cancer Maternal Grandfather      Social History     Tobacco Use    Smoking status: Never Smoker    Smokeless tobacco: Never Used   Substance Use Topics    Alcohol use: No       Review of Systems   Constitutional: Negative. HENT: Negative. Eyes: Negative. Respiratory: Negative. Cardiovascular: Negative. Gastrointestinal: Negative. Genitourinary: Positive for flank pain (left) and hematuria. Skin: Negative. Neurological: Negative. Endo/Heme/Allergies: Negative. Psychiatric/Behavioral: Negative. Objective:     Patient-Reported Vitals 1/17/2022   Patient-Reported Weight 235lb   Patient-Reported SpO2 89        [INSTRUCTIONS:  \"[x]\" Indicates a positive item  \"[]\" Indicates a negative item  -- DELETE ALL ITEMS NOT EXAMINED]    Constitutional: [x] Appears well-developed and well-nourished [x] No apparent distress      [] Abnormal -     Mental status: [x] Alert and awake  [x] Oriented to person/place/time [x] Able to follow commands    [] Abnormal -     Eyes:   EOM    [x]  Normal    [] Abnormal -   Sclera  [x]  Normal    [] Abnormal -          Discharge [x]  None visible   [] Abnormal -     HENT: [x] Normocephalic, atraumatic  [] Abnormal -   [x] Mouth/Throat: Mucous membranes are moist    External Ears [x] Normal  [] Abnormal -    Neck: [x] No visualized mass [] Abnormal -     Pulmonary/Chest: [x] Respiratory effort normal   [x] No visualized signs of difficulty breathing or respiratory distress        [] Abnormal -      Musculoskeletal:   [x] Normal gait with no signs of ataxia         [x] Normal range of motion of neck        [] Abnormal -     Neurological:        [x] No Facial Asymmetry (Cranial nerve 7 motor function) (limited exam due to video visit)          [x] No gaze palsy        [] Abnormal -          Skin:        [x] No significant exanthematous lesions or discoloration noted on facial skin         [] Abnormal -            Psychiatric:       [x] Normal Affect [] Abnormal -        [x] No Hallucinations    Other pertinent observable physical exam findings:-        We discussed the expected course, resolution and complications of the diagnosis(es) in detail. Medication risks, benefits, costs, interactions, and alternatives were discussed as indicated. I advised her to contact the office if her condition worsens, changes or fails to improve as anticipated. She expressed understanding with the diagnosis(es) and plan. Neal Ga, was evaluated through a synchronous (real-time) audio-video encounter. The patient (or guardian if applicable) is aware that this is a billable service, which includes applicable co-pays. This Virtual Visit was conducted with patient's (and/or legal guardian's) consent. The visit was conducted pursuant to the emergency declaration under the [address] [place] [address] and the [email or web address] General Act. Patient identification was verified, and a caregiver was present when appropriate. The patient was located at: Home: [place] [id number]  The provider was located at:  Facility (Appt Department): [address] [place] [address]    Written by Babita Rodrigues, as dictated by Nighat Medrano MD.     Kushal Sewell

## 2022-06-16 ENCOUNTER — TELEPHONE (OUTPATIENT)
Dept: INTERNAL MEDICINE CLINIC | Age: 37
End: 2022-06-16

## 2022-06-16 NOTE — TELEPHONE ENCOUNTER
Received referral to Urology from [address]   Scheduled patient for appt on Monday 6/20/22 with [address], [address]  Notified patient

## 2022-06-22 RX ORDER — ONDANSETRON 4 MG/1
TABLET, FILM COATED ORAL
Qty: 20 TABLET | Refills: 0 | Status: SHIPPED | OUTPATIENT
Start: 2022-06-22 | End: 2022-07-02

## 2022-06-24 RX ORDER — MECLIZINE HYDROCHLORIDE 25 MG/1
TABLET ORAL
Qty: 30 TABLET | Refills: 0 | Status: SHIPPED | OUTPATIENT
Start: 2022-06-24 | End: 2022-09-22

## 2022-06-27 ENCOUNTER — TELEPHONE (OUTPATIENT)
Dept: INTERNAL MEDICINE CLINIC | Age: 37
End: 2022-06-27

## 2022-06-27 DIAGNOSIS — N30.01 ACUTE CYSTITIS WITH HEMATURIA: Primary | ICD-10-CM

## 2022-06-27 RX ORDER — CIPROFLOXACIN 500 MG/1
500 TABLET ORAL 2 TIMES DAILY
Qty: 14 TABLET | Refills: 0 | Status: SHIPPED | OUTPATIENT
Start: 2022-06-27 | End: 2022-08-30 | Stop reason: ALTCHOICE

## 2022-06-28 ENCOUNTER — PATIENT MESSAGE (OUTPATIENT)
Dept: INTERNAL MEDICINE CLINIC | Age: 37
End: 2022-06-28

## 2022-06-28 DIAGNOSIS — N20.0 KIDNEY STONE: Primary | ICD-10-CM

## 2022-06-28 RX ORDER — TIZANIDINE 4 MG/1
TABLET ORAL
Qty: 30 TABLET | Refills: 0 | Status: SHIPPED | OUTPATIENT
Start: 2022-06-28 | End: 2022-07-11

## 2022-06-28 RX ORDER — HYDROCODONE BITARTRATE AND ACETAMINOPHEN 5; 325 MG/1; MG/1
1 TABLET ORAL
Qty: 12 TABLET | Refills: 0 | Status: SHIPPED | OUTPATIENT
Start: 2022-06-28 | End: 2022-07-02

## 2022-06-28 NOTE — TELEPHONE ENCOUNTER
Call received from patient today had severe back pain and burning with urination went to [place] [facility] and told she had a kidney stone  She is concerned reports her WBC is 16.9 from ER with UA with blood and whites  She is having pain and burning with urination   No fever and no chills  She was prescribed few pills of hydrocodone and told to go home. I will prescribe ciprofloxacin and follow up with PCP     Orders Placed This Encounter    ciprofloxacin HCl (CIPRO) 500 mg tablet     Sig: Take 1 Tablet by mouth two (2) times a day.      Dispense:  14 Tablet     Refill:  0

## 2022-06-29 DIAGNOSIS — R10.9 ACUTE FLANK PAIN: Primary | ICD-10-CM

## 2022-06-29 DIAGNOSIS — N20.0 KIDNEY STONE: ICD-10-CM

## 2022-06-29 DIAGNOSIS — R31.0 GROSS HEMATURIA: ICD-10-CM

## 2022-06-30 NOTE — TELEPHONE ENCOUNTER
Spoke to pt, gave her [facility] number per Dr. Alesia Renee, pt verbalizes understanding and will call today.

## 2022-07-02 RX ORDER — ONDANSETRON 4 MG/1
TABLET, FILM COATED ORAL
Qty: 20 TABLET | Refills: 0 | Status: SHIPPED | OUTPATIENT
Start: 2022-07-02 | End: 2022-07-13 | Stop reason: SDUPTHER

## 2022-07-04 RX ORDER — DULOXETIN HYDROCHLORIDE 60 MG/1
CAPSULE, DELAYED RELEASE ORAL
Qty: 30 CAPSULE | Refills: 5 | Status: SHIPPED | OUTPATIENT
Start: 2022-07-04 | End: 2022-10-31

## 2022-07-11 RX ORDER — TIZANIDINE 4 MG/1
TABLET ORAL
Qty: 30 TABLET | Refills: 0 | Status: SHIPPED | OUTPATIENT
Start: 2022-07-11 | End: 2022-07-25

## 2022-07-13 DIAGNOSIS — R11.0 NAUSEA: Primary | ICD-10-CM

## 2022-07-13 RX ORDER — ONDANSETRON 4 MG/1
4 TABLET, FILM COATED ORAL
Qty: 20 TABLET | Refills: 0 | Status: SHIPPED | OUTPATIENT
Start: 2022-07-13 | End: 2022-07-25

## 2022-07-14 ENCOUNTER — TELEPHONE (OUTPATIENT)
Dept: INTERNAL MEDICINE CLINIC | Age: 37
End: 2022-07-14

## 2022-07-14 DIAGNOSIS — K21.9 GASTROESOPHAGEAL REFLUX DISEASE, UNSPECIFIED WHETHER ESOPHAGITIS PRESENT: Primary | ICD-10-CM

## 2022-07-14 DIAGNOSIS — R52 PAIN: Primary | ICD-10-CM

## 2022-07-14 DIAGNOSIS — N20.0 KIDNEY STONE: ICD-10-CM

## 2022-07-14 RX ORDER — HYDROCODONE BITARTRATE AND ACETAMINOPHEN 5; 325 MG/1; MG/1
1 TABLET ORAL
Qty: 8 TABLET | Refills: 0 | Status: SHIPPED | OUTPATIENT
Start: 2022-07-14 | End: 2022-07-17

## 2022-07-14 RX ORDER — OMEPRAZOLE 20 MG/1
20 CAPSULE, DELAYED RELEASE ORAL DAILY
Qty: 30 CAPSULE | Refills: 5 | Status: SHIPPED | OUTPATIENT
Start: 2022-07-14

## 2022-07-14 NOTE — TELEPHONE ENCOUNTER
Called patient. ID verified with Name and . Spoke with patient in regards to info received. Patient states that she did receive message in regards to medication. Patient requested order for omeprazole at this time. Provider okay with request. Information forwarded to provider via refill request at this time.

## 2022-07-14 NOTE — TELEPHONE ENCOUNTER
----- Message from Aureliano Segal sent at 7/14/2022  2:14 PM EDT -----  Subject: Message to Provider    QUESTIONS  Information for Provider? pt was calling for a nurse phone call please,   call pt at [phone]  ---------------------------------------------------------------------------  --------------  [address] [facility]  [phone]; OK to leave message on voicemail  ---------------------------------------------------------------------------  --------------  SCRIPT ANSWERS  Relationship to Patient?  Self

## 2022-07-14 NOTE — TELEPHONE ENCOUNTER
PCP: Ace Vann MD    Last appt: 6/15/2022  Future Appointments   Date Time Provider Alvaro Kelsey   7/18/2022 11:45 AM Ace Vann MD [facility] BS AMB   8/25/2022  1:20 PM AYAD Barnes BS AMB       Requested Prescriptions     Pending Prescriptions Disp Refills    omeprazole (PRILOSEC) 20 mg capsule 30 Capsule 5     Sig: Take 1 Capsule by mouth daily.        Prior labs and Blood pressures:  BP Readings from Last 3 Encounters:   06/07/22 114/65   03/12/22 (!) 126/57   03/08/22 122/78     Lab Results   Component Value Date/Time    Sodium 138 06/07/2022 04:49 AM    Potassium 4.2 06/07/2022 04:49 AM    Chloride 105 06/07/2022 04:49 AM    CO2 28 06/07/2022 04:49 AM    Anion gap 5 06/07/2022 04:49 AM    Glucose 112 (H) 06/07/2022 04:49 AM    BUN 12 06/07/2022 04:49 AM    Creatinine 1.17 (H) 06/07/2022 04:49 AM    BUN/Creatinine ratio 10 (L) 06/07/2022 04:49 AM    GFR est AA >60 06/07/2022 04:49 AM    GFR est non-AA 52 (L) 06/07/2022 04:49 AM    Calcium 9.9 06/07/2022 04:49 AM     No results found for: HBA1C, OZO0ABSP, ILT7TNTM, KBF2BADQ  No results found for: CHOL, CHOLPOCT, CHOLX, CHLST, CHOLV, HDL, HDLPOC, HDLP, LDL, LDLCPOC, LDLC, DLDLP, VLDLC, VLDL, TGLX, TRIGL, TRIGP, TGLPOCT, CHHD, CHHDX  No results found for: VITD3, XQVID2, XQVID3, XQVID, VD3RIA    Lab Results   Component Value Date/Time    TSH 1.52 12/18/2021 05:54 AM

## 2022-07-17 NOTE — PROGRESS NOTES
HISTORY OF PRESENT ILLNESS  Catie Quinonez is a 43 y.o. female. HPI     Catie Quinonez, who was evaluated through a synchronous (real-time) audio only encounter, and/or her healthcare decision maker, is aware that it is a billable service, which includes applicable co-pays, with coverage as determined by her insurance carrier. She provided verbal consent to proceed and patient identification was verified. This visit was conducted pursuant to the emergency declaration under the [facility], [address] [place] [address] authority and the [employer] General Act. A caregiver was present when appropriate. Ability to conduct physical exam was limited. The patient was located at: Home: [address] [facility]  The provider was located at: Facility (Appt Department): [place] [address]    --Clara Harvey MD on 7/18/2022 at 12:32 PM    TC x 12 minutes            An electronic signature was used to authenticate this note. Hx SLE, anxiety obesity chronic migraine headaches, labile BP   sees Dr Mariela Vasquez for SLE  sees Dr Live Ramirez for occipital migraines    Recently in Er at [facility] ER  for kidney stone pain elevated WBC--referred to [facility]. Has appt with URO MD later this week    Had had about 50 pain pills-norco or oxycodone in last 30 days per  reveiw  Here for concerns of depression. Pt called recently depressed about her medical conditions and relationship with her mother  stresed about her own health and financial difficulties  Has anxiety symtpoms too on xanax bid  Takes cymbalta 60 mg bid for headaches -not helping per pt  Took prozax in the past for depresson  No SI/HI    Went to ER a few days ago for right leg pain due to CVI-no dvt.  Vascular recommended-saw Dr Gill Mcdonnell in the past-has support stockings  Patient Active Problem List    Diagnosis Date Noted    Asthma 05/17/2022    Venous insufficiency of left leg 03/08/2022    Leg swelling 03/08/2022    Labile blood pressure 09/03/2021    Chronic headache disorder 08/29/2021    Constipation 08/29/2021    Seizure ([facility] 08/29/2021    Syncope 08/29/2021    Headache 08/29/2021    Intermittent palpitations 03/16/2021    Essential hypertension 03/16/2021    Ureteric calculus 03/10/8798    Ureteric colic 44/25/5697    Cellulitis 02/24/2020    Severe obesity ([facility] 02/22/2019    Incomplete uterovaginal prolapse 05/17/2016    Migraine with aura and without status migrainosus, not intractable 12/02/2015    Anxiety 12/05/2014    Lupus ([facility] 12/04/2014    Recurrent ventral incisional hernia 03/01/2013    Ventral hernia 02/25/2013    Abnormal CT of the abdomen 11/08/2012     Current Outpatient Medications   Medication Sig Dispense Refill    omeprazole (PRILOSEC) 20 mg capsule Take 1 Capsule by mouth daily. 30 Capsule 5    HYDROcodone-acetaminophen (NORCO) 5-325 mg per tablet Take 1 Tablet by mouth every eight (8) hours as needed for Pain for up to 3 days. Max Daily Amount: 3 Tablets. 8 Tablet 0    ondansetron hcl (ZOFRAN) 4 mg tablet Take 1 Tablet by mouth every eight (8) hours as needed for Nausea or Nausea or Vomiting. 20 Tablet 0    ALPRAZolam (XANAX) 1 mg tablet TAKE 1 TABLET BY MOUTH TWICE A DAY AS NEEDED FOR ANXIETY 60 Tablet 1    tiZANidine (ZANAFLEX) 4 mg tablet TAKE 1 TABLET BY MOUTH THREE TIMES A DAY AS NEEDED FOR PAIN 30 Tablet 0    DULoxetine (CYMBALTA) 60 mg capsule TAKE 2 CAPSULES BY MOUTH EVERY DAY 30 Capsule 5    ciprofloxacin HCl (CIPRO) 500 mg tablet Take 1 Tablet by mouth two (2) times a day.  14 Tablet 0    meclizine (ANTIVERT) 25 mg tablet TAKE 1 TABLET BY MOUTH THREE TIMES A DAY AS NEEDED FOR DIZZINESS FOR UP TO 10 DAYS 30 Tablet 0    cephALEXin (KEFLEX) 500 mg capsule TAKE 1 CAPSULE BY MOUTH 4 TIMES DAILY      Symbicort 160-4.5 mcg/actuation HFAA INHALE 2 PUFFS BY MOUTH TWICE A DAY      Ubrelvy 50 mg tablet PLEASE SEE ATTACHED FOR DETAILED DIRECTIONS      propranoloL (INDERAL) 10 mg tablet TAKE 1 TABLET BY MOUTH TWICE A  Tablet 3    pregabalin (LYRICA) 50 mg capsule Take 50 mg by mouth two (2) times a day.  cyclobenzaprine (FLEXERIL) 10 mg tablet Take 1 Tablet by mouth three (3) times daily as needed for Muscle Spasm(s). (Patient not taking: Reported on 6/15/2022) 21 Tablet 0    mirtazapine (REMERON) 30 mg tablet TAKE 1 TABLET BY MOUTH EVERYDAY AT BEDTIME 30 Tablet 5    albuterol (PROVENTIL HFA, VENTOLIN HFA, PROAIR HFA) 90 mcg/actuation inhaler Take 1 Puff by inhalation every six (6) hours as needed for Wheezing. 1 Each 1    furosemide (LASIX) 20 mg tablet TAKE 1 TABLET BY MOUTH AS NEEDED (SWELLING). 30 Tablet 2    naloxone (NARCAN) 4 mg/actuation nasal spray Use 1 spray intranasally, then discard. Repeat with new spray every 2 min as needed for opioid overdose symptoms, alternating nostrils. 2 Each 0    belimumab (Benlysta) 400 mg solr injection by IntraVENous route. Every 8 weeks       Allergies   Allergen Reactions    Latex Itching     burning    Compazine [Prochlorperazine] Anxiety     High heart rate  Pt can take promethazine with no problems    Sulfa (Sulfonamide Antibiotics) Unknown (comments)    Topamax [Topiramate] Unknown (comments)    Adhesive Rash     Allergic to Dermabond    Clindamycin Diarrhea     Pt states caused her C-Diff    Iodinated Contrast Media Myalgia     Pt c/o severe back spasms after IV contrast administration. Pt requesting IV pain medications and requests I put this as a drug intolerance in her EMR.     Mushroom Other (comments)    Mushroom Combination No.1 Unknown (comments)    Reglan [Metoclopramide Hcl] Rash    Valproic Acid Other (comments)     Elevated heart rate and vomiting        Lab Results   Component Value Date/Time    WBC 8.9 06/07/2022 04:49 AM    HGB 12.6 06/07/2022 04:49 AM    Hemoglobin (POC) 12.6 05/17/2016 07:47 AM    HCT 40.4 06/07/2022 04:49 AM    Hematocrit (POC) 37 05/17/2016 07:47 AM    PLATELET 604 (H) 42/17/7044 04:49 AM    MCV 91.0 06/07/2022 04:49 AM     Lab Results   Component Value Date/Time    Glucose 112 (H) 06/07/2022 04:49 AM    Glucose (POC) 156 (H) 08/28/2021 10:43 PM    Creatinine (POC) 0.7 05/17/2016 07:47 AM    Creatinine 1.17 (H) 06/07/2022 04:49 AM      Lab Results   Component Value Date/Time    GFR est non-AA 52 (L) 06/07/2022 04:49 AM    GFRNA, POC >60 05/17/2016 07:47 AM    GFR est AA >60 06/07/2022 04:49 AM    GFRAA, POC >60 05/17/2016 07:47 AM    Creatinine 1.17 (H) 06/07/2022 04:49 AM    Creatinine (POC) 0.7 05/17/2016 07:47 AM    BUN 12 06/07/2022 04:49 AM    BUN (POC) 8 (L) 05/17/2016 07:47 AM    Sodium 138 06/07/2022 04:49 AM    Sodium (POC) 140 05/17/2016 07:47 AM    Potassium 4.2 06/07/2022 04:49 AM    Potassium (POC) 3.8 05/17/2016 07:47 AM    Chloride 105 06/07/2022 04:49 AM    Chloride (POC) 105 05/17/2016 07:47 AM    CO2 28 06/07/2022 04:49 AM    Magnesium 2.2 12/18/2021 04:37 AM        ROS    Physical Exam  Constitutional:       Appearance: Normal appearance. Pulmonary:      Effort: Pulmonary effort is normal.   Neurological:      General: No focal deficit present. Mental Status: She is alert. ASSESSMENT and PLAN  Diagnoses and all orders for this visit:    1. Depression, unspecified depression type  -     REFERRAL TO PSYCHIATRY   Complicated situation with family and other stressors outlined above. Already on cymbalta and remeron and sees counselor weekly. Not sure if cymbalta should be tapered. ? If needs eval for possible PTSD or condition. Advised pt to see Psych MD--refer to Psych at [facility]. Pt has medicaid insurance-can look into [facility] psych if needed.       2. Kidney stone   Will see URO MD    Asked staff to obtain ER records

## 2022-07-18 ENCOUNTER — VIRTUAL VISIT (OUTPATIENT)
Dept: INTERNAL MEDICINE CLINIC | Age: 37
End: 2022-07-18
Payer: MEDICAID

## 2022-07-18 DIAGNOSIS — F32.A DEPRESSION, UNSPECIFIED DEPRESSION TYPE: Primary | ICD-10-CM

## 2022-07-18 PROCEDURE — 99213 OFFICE O/P EST LOW 20 MIN: CPT | Performed by: INTERNAL MEDICINE

## 2022-07-23 DIAGNOSIS — R11.0 NAUSEA: ICD-10-CM

## 2022-07-25 RX ORDER — ONDANSETRON 4 MG/1
4 TABLET, FILM COATED ORAL
Qty: 20 TABLET | Refills: 0 | Status: SHIPPED | OUTPATIENT
Start: 2022-07-25 | End: 2022-09-22

## 2022-07-25 RX ORDER — TIZANIDINE 4 MG/1
TABLET ORAL
Qty: 30 TABLET | Refills: 0 | Status: SHIPPED | OUTPATIENT
Start: 2022-07-25 | End: 2022-08-09

## 2022-07-31 RX ORDER — MIRTAZAPINE 30 MG/1
TABLET, FILM COATED ORAL
Qty: 30 TABLET | Refills: 5 | Status: SHIPPED | OUTPATIENT
Start: 2022-07-31

## 2022-08-01 NOTE — PROGRESS NOTES
Chief Complaint   Patient presents with    Skin Problem     seen as requested by Dr. Iglesia Diana, ER , on 9/6/2020 for abscess in groin area. 1. Have you been to the ER, urgent care clinic since your last visit? Hospitalized since your last visit? yes, Sept 6, 2020 [facility] ER    2. Have you seen or consulted any other health care providers outside of the [address] [place] [address] since your last visit? Include any pap smears or colon screening.  NO
To: Kevin Ly MD   CC: Claude Farias PA-C, Steven Garcia MD    From: Erica Rodriguez MD    Esaw Breaker was referred by the ER at [address] Encounter Date: 9/8/2020    Subjective:      Lo Robles is a 37 y.o. female presents for postop care following I&D. Dressing changes have been going well. Objective:     General:  alert, cooperative, no distress, appears stated age   Incision:  healing well, no erythema, good granulation tissue forming. Assessment:     S/p I&D of left groin, upper medial thigh abscess. Healing well. No active infection. Plan:     Hibiclens soaks BID. Continue packing as instructed. Follow-up in 2 weeks for wound check.       Erica Rodriguez MD
Unknown

## 2022-08-09 RX ORDER — TIZANIDINE 4 MG/1
TABLET ORAL
Qty: 30 TABLET | Refills: 0 | Status: SHIPPED | OUTPATIENT
Start: 2022-08-09 | End: 2022-08-22

## 2022-08-11 ENCOUNTER — TELEPHONE (OUTPATIENT)
Dept: INTERNAL MEDICINE CLINIC | Age: 37
End: 2022-08-11

## 2022-08-11 DIAGNOSIS — R05.9 COUGH: ICD-10-CM

## 2022-08-11 RX ORDER — CODEINE PHOSPHATE AND GUAIFENESIN 10; 100 MG/5ML; MG/5ML
SOLUTION ORAL
Qty: 100 ML | Refills: 0 | Status: SHIPPED | OUTPATIENT
Start: 2022-08-11 | End: 2022-08-18

## 2022-08-11 RX ORDER — NIRMATRELVIR AND RITONAVIR 300-100 MG
KIT ORAL
Qty: 1 BOX | Refills: 0 | Status: SHIPPED | OUTPATIENT
Start: 2022-08-11 | End: 2022-09-22

## 2022-08-11 NOTE — TELEPHONE ENCOUNTER
Called patient. ID verified with Name and . Spoke with patient in regards to information received. Writer informed patient that provider has sent order in for antiviral treatment at this time. Patient states that she understands the information received and has no further questions or concerns at this time.

## 2022-08-11 NOTE — TELEPHONE ENCOUNTER
Called patient. ID verified with Name and . Spoke with patient in regards to message received. Patient states that, last night, she began to experience symptoms of cough and congestion. Patient states that she tested today and received positive results. Patient states that she has Lupus and would like to start antiviral treatment as soon as possible. Patient is also requesting an order for something for cough as well. Patient states that cough is unbearable and has kept her up at night.

## 2022-08-11 NOTE — TELEPHONE ENCOUNTER
----- Message from Maeve Peña sent at 8/11/2022 11:59 AM EDT -----  Subject: Message to Provider    QUESTIONS  Information for Provider? Patient states her boyfriend has tested positive   for COVID19 and due to her lupus and other diseases she has she is   immunocompromised and showing symptoms herself and would like the office   to follow up with her regarding this matter and how she should go about   treatment. Patient would like the office to contact her asap regarding   this matter. Patient is unvaccinated and has concerns about her health   being affected by this.   ---------------------------------------------------------------------------  --------------  [id number]  [phone]; OK to leave message on voicemail  ---------------------------------------------------------------------------  --------------  SCRIPT ANSWERS  Relationship to Patient?  Self

## 2022-08-14 RX ORDER — AZITHROMYCIN 500 MG/1
TABLET, FILM COATED ORAL
Qty: 6 TABLET | Refills: 0 | Status: SHIPPED | OUTPATIENT
Start: 2022-08-14 | End: 2022-08-30

## 2022-08-16 DIAGNOSIS — R05.9 COUGH: ICD-10-CM

## 2022-08-18 RX ORDER — BENZONATATE 100 MG/1
100 CAPSULE ORAL
Qty: 30 CAPSULE | Refills: 0 | Status: SHIPPED | OUTPATIENT
Start: 2022-08-18 | End: 2022-08-28

## 2022-08-18 RX ORDER — CODEINE PHOSPHATE AND GUAIFENESIN 10; 100 MG/5ML; MG/5ML
SOLUTION ORAL
Qty: 100 ML | Refills: 0 | OUTPATIENT
Start: 2022-08-18 | End: 2022-08-25

## 2022-08-22 RX ORDER — TIZANIDINE 4 MG/1
TABLET ORAL
Qty: 30 TABLET | Refills: 0 | Status: SHIPPED | OUTPATIENT
Start: 2022-08-22

## 2022-08-30 RX ORDER — AZITHROMYCIN 500 MG/1
TABLET, FILM COATED ORAL
Qty: 6 TABLET | Refills: 0 | Status: SHIPPED | OUTPATIENT
Start: 2022-08-30 | End: 2022-09-07

## 2022-09-05 LAB — SARS-COV-2, NAA: NEGATIVE

## 2022-09-06 ENCOUNTER — VIRTUAL VISIT (OUTPATIENT)
Dept: INTERNAL MEDICINE CLINIC | Age: 37
End: 2022-09-06
Payer: MEDICAID

## 2022-09-06 DIAGNOSIS — G43.909 MIGRAINE WITHOUT STATUS MIGRAINOSUS, NOT INTRACTABLE, UNSPECIFIED MIGRAINE TYPE: ICD-10-CM

## 2022-09-06 DIAGNOSIS — F41.9 ANXIETY: ICD-10-CM

## 2022-09-06 DIAGNOSIS — R05.9 COUGH: ICD-10-CM

## 2022-09-06 DIAGNOSIS — J40 BRONCHITIS: Primary | ICD-10-CM

## 2022-09-06 DIAGNOSIS — F51.01 PRIMARY INSOMNIA: ICD-10-CM

## 2022-09-06 PROCEDURE — 99442 PR PHYS/QHP TELEPHONE EVALUATION 11-20 MIN: CPT | Performed by: INTERNAL MEDICINE

## 2022-09-06 RX ORDER — CODEINE PHOSPHATE AND GUAIFENESIN 10; 100 MG/5ML; MG/5ML
5 SOLUTION ORAL
Qty: 100 ML | Refills: 0 | Status: SHIPPED | OUTPATIENT
Start: 2022-09-06 | End: 2022-09-12

## 2022-09-06 RX ORDER — TIZANIDINE 4 MG/1
TABLET ORAL
Qty: 30 TABLET | Refills: 0 | OUTPATIENT
Start: 2022-09-06

## 2022-09-06 RX ORDER — BUTALBITAL, ACETAMINOPHEN, CAFFEINE AND CODEINE PHOSPHATE 50; 325; 40; 30 MG/1; MG/1; MG/1; MG/1
CAPSULE ORAL
Status: CANCELLED | OUTPATIENT
Start: 2022-09-06

## 2022-09-06 RX ORDER — ALPRAZOLAM 1 MG/1
TABLET ORAL
Qty: 60 TABLET | Refills: 1 | Status: CANCELLED | OUTPATIENT
Start: 2022-09-06

## 2022-09-06 RX ORDER — BUTALBITAL, ACETAMINOPHEN, CAFFEINE AND CODEINE PHOSPHATE 50; 325; 40; 30 MG/1; MG/1; MG/1; MG/1
CAPSULE ORAL
COMMUNITY
Start: 2022-09-01

## 2022-09-06 RX ORDER — CODEINE PHOSPHATE AND GUAIFENESIN 10; 100 MG/5ML; MG/5ML
SOLUTION ORAL
Qty: 100 ML | Refills: 0 | OUTPATIENT
Start: 2022-09-06 | End: 2022-09-13

## 2022-09-06 NOTE — PROGRESS NOTES
Lisa Abraham is a 43 y.o. female  HIPAA verified by two patient identifiers. Health Maintenance Due   Topic    Hepatitis C Screening     COVID-19 Vaccine (1)    Pneumococcal 0-64 years (1 - PCV)    DTaP/Tdap/Td series (1 - Tdap)    Flu Vaccine (1)     Chief Complaint   Patient presents with    Cough     Patient-Reported Vitals 9/6/2022   Patient-Reported Weight 250lb   Patient-Reported Pulse -   Patient-Reported Temperature -   Patient-Reported SpO2 -   Patient-Reported Systolic  -   Patient-Reported Diastolic -       Pain Scale: /10  Pain Location:             1. Have you been to the ER, urgent care clinic since your last visit? Hospitalized since your last visit? Yes When: 9/5/23 [facility] chest xray done. 2. Have you seen or consulted any other health care providers outside of the [address] [place] [address] since your last visit? Include any pap smears or colon screening.  No'

## 2022-09-06 NOTE — PROGRESS NOTES
HISTORY OF PRESENT ILLNESS  Matheus Segura is a 43 y.o. female. HPI  Matheus Segura, who was evaluated through a synchronous (real-time) audio only encounter, and/or her healthcare decision maker, is aware that it is a billable service, which includes applicable co-pays, with coverage as determined by her insurance carrier. She provided verbal consent to proceed and patient identification was verified. This visit was conducted pursuant to the emergency declaration under the [facility], [address] [place] [address] authority and the [employer] General Act. A caregiver was present when appropriate. Ability to conduct physical exam was limited. The patient was located at: Home: [address] [place] [address]  The provider was located at: Facility (Appt Department): [place] [address]    --Loki Eric MD on 9/6/2022 at 4:04 PM      Tc x 11 minutes     Hx SLE, anxiety obesity chronic migraine headaches, labile BP   sees Dr Bertha Zaragoza for SLE  sees Dr Samantha Gonzalez for occipital migraines  On zanaflex for occiptal neuralgia--takes 1-2 every day for prevention of headaches and for migraines started by Dr Samantha Gonzalez    Has had increased anxiety in past 1 year with family issues -previously taking xanax 1mg every day now 60 tabs per month. Last refill 8/10/22  Unable to get noé with psych MD -has [employer]. She tried offices. She had covid over a month ago  Has ongoing cough  Completed azithromycin a few days ago  Had dispatch health visit yesterday for cough and asthma-cxr result pending. Given prednisone rx.  Still coughing a lot    Patient Active Problem List    Diagnosis Date Noted    Asthma 05/17/2022    Venous insufficiency of left leg 03/08/2022    Leg swelling 03/08/2022    Labile blood pressure 09/03/2021    Chronic headache disorder 08/29/2021    Constipation 08/29/2021    Seizure ([facility] 08/29/2021 Syncope 08/29/2021    Headache 08/29/2021    Intermittent palpitations 03/16/2021    Essential hypertension 03/16/2021    Ureteric calculus 41/75/7879    Ureteric colic 12/57/7363    Cellulitis 02/24/2020    Severe obesity ([facility] 02/22/2019    Incomplete uterovaginal prolapse 05/17/2016    Migraine with aura and without status migrainosus, not intractable 12/02/2015    Anxiety 12/05/2014    Lupus ([facility] 12/04/2014    Recurrent ventral incisional hernia 03/01/2013    Ventral hernia 02/25/2013    Abnormal CT of the abdomen 11/08/2012     Current Outpatient Medications   Medication Sig Dispense Refill    azithromycin (ZITHROMAX) 500 mg tab TAKE 1 TABLET BY MOUTH EVERY DAY 6 Tablet 0    mirtazapine (REMERON) 30 mg tablet TAKE 1 TABLET BY MOUTH EVERYDAY AT BEDTIME 30 Tablet 5    ondansetron hcl (ZOFRAN) 4 mg tablet TAKE 1 TABLET BY MOUTH EVERY EIGHT (8) HOURS AS NEEDED FOR NAUSEA OR NAUSEA OR VOMITING. 20 Tablet 0    omeprazole (PRILOSEC) 20 mg capsule Take 1 Capsule by mouth daily. 30 Capsule 5    ALPRAZolam (XANAX) 1 mg tablet TAKE 1 TABLET BY MOUTH TWICE A DAY AS NEEDED FOR ANXIETY 60 Tablet 1    DULoxetine (CYMBALTA) 60 mg capsule TAKE 2 CAPSULES BY MOUTH EVERY DAY 30 Capsule 5    cephALEXin (KEFLEX) 500 mg capsule TAKE 1 CAPSULE BY MOUTH 4 TIMES DAILY      propranoloL (INDERAL) 10 mg tablet TAKE 1 TABLET BY MOUTH TWICE A  Tablet 3    pregabalin (LYRICA) 50 mg capsule Take 50 mg by mouth two (2) times a day. albuterol (PROVENTIL HFA, VENTOLIN HFA, PROAIR HFA) 90 mcg/actuation inhaler Take 1 Puff by inhalation every six (6) hours as needed for Wheezing. 1 Each 1    furosemide (LASIX) 20 mg tablet TAKE 1 TABLET BY MOUTH AS NEEDED (SWELLING). 30 Tablet 2    naloxone (NARCAN) 4 mg/actuation nasal spray Use 1 spray intranasally, then discard. Repeat with new spray every 2 min as needed for opioid overdose symptoms, alternating nostrils.  2 Each 0    belimumab (Benlysta) 400 mg solr injection by IntraVENous route. Every 8 weeks      codeine-butalbital-acetaminophen-caffeine (FIORICET WITH CODEINE) -86-30 mg capsule TAKE 1 CAPSULE BY MOUTH EVERY 4 HOURS AS NEEDED FOR HEADACHE FOR 10 DAYS AS NEEDED      tiZANidine (ZANAFLEX) 4 mg tablet TAKE 1 TABLET BY MOUTH THREE TIMES A DAY AS NEEDED FOR PAIN 30 Tablet 0    nirmatrelvir-ritonavir (Paxlovid, EUA,) 300 mg (150 mg x 2)-100 mg tablet Take as directed (Patient not taking: Reported on 9/6/2022) 1 Box 0    meclizine (ANTIVERT) 25 mg tablet TAKE 1 TABLET BY MOUTH THREE TIMES A DAY AS NEEDED FOR DIZZINESS FOR UP TO 10 DAYS (Patient not taking: Reported on 9/6/2022) 30 Tablet 0    Symbicort 160-4.5 mcg/actuation HFAA INHALE 2 PUFFS BY MOUTH TWICE A DAY      cyclobenzaprine (FLEXERIL) 10 mg tablet Take 1 Tablet by mouth three (3) times daily as needed for Muscle Spasm(s). (Patient not taking: Reported on 6/15/2022) 21 Tablet 0     Allergies   Allergen Reactions    Latex Itching     burning    Compazine [Prochlorperazine] Anxiety     High heart rate  Pt can take promethazine with no problems    Sulfa (Sulfonamide Antibiotics) Unknown (comments)    Topamax [Topiramate] Unknown (comments)    Adhesive Rash     Allergic to Dermabond    Clindamycin Diarrhea     Pt states caused her C-Diff    Iodinated Contrast Media Myalgia     Pt c/o severe back spasms after IV contrast administration. Pt requesting IV pain medications and requests I put this as a drug intolerance in her EMR.     Mushroom Other (comments)    Mushroom Combination No.1 Unknown (comments)    Nsaids (Non-Steroidal Anti-Inflammatory Drug) Other (comments)     Peptic ulcer    Reglan [Metoclopramide Hcl] Rash    Valproic Acid Other (comments)     Elevated heart rate and vomiting        Lab Results   Component Value Date/Time    GFR est non-AA 52 (L) 06/07/2022 04:49 AM    GFRNA, POC >60 05/17/2016 07:47 AM    GFR est AA >60 06/07/2022 04:49 AM    GFRAA, POC >60 05/17/2016 07:47 AM    Creatinine 1.17 (H) 06/07/2022 04:49 AM    Creatinine (POC) 0.7 05/17/2016 07:47 AM    BUN 12 06/07/2022 04:49 AM    BUN (POC) 8 (L) 05/17/2016 07:47 AM    Sodium 138 06/07/2022 04:49 AM    Sodium (POC) 140 05/17/2016 07:47 AM    Potassium 4.2 06/07/2022 04:49 AM    Potassium (POC) 3.8 05/17/2016 07:47 AM    Chloride 105 06/07/2022 04:49 AM    Chloride (POC) 105 05/17/2016 07:47 AM    CO2 28 06/07/2022 04:49 AM    Magnesium 2.2 12/18/2021 04:37 AM        ROS    Physical Exam    ASSESSMENT and PLAN    ICD-10-CM ICD-9-CM    1. Bronchitis  J40 490 guaiFENesin-codeine (ROBITUSSIN AC) 100-10 mg/5 mL solution      2. Primary insomnia  F51.01 307.42 Lower xanax to 45 tabs per month-has 13 tabs left per pt. 3. Anxiety  F41.9 300.00 Lower xanax to 45 tabs per month-has 13 tabs left per pt      4.  Migraine without status migrainosus, not intractable, unspecified migraine type  G43.909 346.90 Pt will ask Dr Yolanda Spears for refills of zanaflex   Rtc 6 monthd

## 2022-09-07 RX ORDER — LEVOFLOXACIN 500 MG/1
TABLET, FILM COATED ORAL
Qty: 7 TABLET | Refills: 0 | Status: SHIPPED | OUTPATIENT
Start: 2022-09-07 | End: 2022-09-22

## 2022-09-12 DIAGNOSIS — F41.9 ANXIETY: Primary | ICD-10-CM

## 2022-09-12 RX ORDER — ALPRAZOLAM 1 MG/1
1 TABLET ORAL
Qty: 45 TABLET | Refills: 0 | Status: SHIPPED | OUTPATIENT
Start: 2022-09-12 | End: 2022-10-11 | Stop reason: SDUPTHER

## 2022-09-14 NOTE — TELEPHONE ENCOUNTER
----- Message from Bryant Hancock sent at 9/14/2022 10:31 AM EDT -----  Subject: Refill Request    QUESTIONS  Name of Medication? guaiFENesin-codeine (ROBITUSSIN AC) 100-10 mg/5 mL   solution  Patient-reported dosage and instructions? as prescribed   How many days do you have left? 0  Preferred Pharmacy? [id number]  Pharmacy phone number (if available)? [phone]  Additional Information for Provider? patient still coughing a lot   ---------------------------------------------------------------------------  --------------  CALL BACK INFO  What is the best way for the office to contact you? OK to leave message on   voicemail, OK to respond with electronic message via [employer] portal (only   for patients who have registered [employer] account)  Preferred Call Back Phone Number? [phone]  ---------------------------------------------------------------------------  --------------  SCRIPT ANSWERS  Relationship to Patient?  Self

## 2022-09-14 NOTE — TELEPHONE ENCOUNTER
Future Appointments:  No future appointments. Last Appointment With Me:  9/6/2022     Requested Prescriptions     Pending Prescriptions Disp Refills    Codeine-guaiFENesin 6.3-100 mg/5 mL liqd       Sig: Take  by mouth.

## 2022-09-15 ENCOUNTER — TELEPHONE (OUTPATIENT)
Dept: INTERNAL MEDICINE CLINIC | Age: 37
End: 2022-09-15

## 2022-09-15 NOTE — TELEPHONE ENCOUNTER
Patient is calling to check status on Medication refill from yesterday. Patient advised Pending & reminded of [address] Turn around on refill requests.  Thank you same name as above

## 2022-09-15 NOTE — TELEPHONE ENCOUNTER
----- Message from Abel Hernandez sent at 9/15/2022  1:51 PM EDT -----  Subject: Message to Provider    QUESTIONS  Information for Provider? pt. is calling for update on refill request in   message [id number] 9/14/2022 please, call pt. with update per pt. thank   you   ---------------------------------------------------------------------------  --------------  Mireya GARCIA  [phone]; OK to leave message on voicemail  ---------------------------------------------------------------------------  --------------  SCRIPT ANSWERS  Relationship to Patient?  Self

## 2022-09-15 NOTE — TELEPHONE ENCOUNTER
PCP: Susanna Ortiz MD    Last appt: 9/6/2022  No future appointments. Requested Prescriptions     Pending Prescriptions Disp Refills    Codeine-guaiFENesin 6.3-100 mg/5 mL liqd       Sig: Take  by mouth.        Prior labs and Blood pressures:  BP Readings from Last 3 Encounters:   06/07/22 114/65   03/12/22 (!) 126/57   03/08/22 122/78     Lab Results   Component Value Date/Time    Sodium 138 06/07/2022 04:49 AM    Potassium 4.2 06/07/2022 04:49 AM    Chloride 105 06/07/2022 04:49 AM    CO2 28 06/07/2022 04:49 AM    Anion gap 5 06/07/2022 04:49 AM    Glucose 112 (H) 06/07/2022 04:49 AM    BUN 12 06/07/2022 04:49 AM    Creatinine 1.17 (H) 06/07/2022 04:49 AM    BUN/Creatinine ratio 10 (L) 06/07/2022 04:49 AM    GFR est AA >60 06/07/2022 04:49 AM    GFR est non-AA 52 (L) 06/07/2022 04:49 AM    Calcium 9.9 06/07/2022 04:49 AM     No results found for: HBA1C, LLP4GNPD, LZC7PNWA  No results found for: CHOL, CHOLPOCT, CHOLX, CHLST, CHOLV, HDL, HDLPOC, HDLP, LDL, LDLCPOC, LDLC, DLDLP, VLDLC, VLDL, TGLX, TRIGL, TRIGP, TGLPOCT, CHHD, CHHDX  No results found for: VITD3, XQVID2, XQVID3, XQVID, VD3RIA    Lab Results   Component Value Date/Time    TSH 1.52 12/18/2021 05:54 AM

## 2022-09-16 NOTE — TELEPHONE ENCOUNTER
#[phone] pt states that she needs to hear something this morning if she is going to get her script or not. Thanks. 11:24 am pt calling again and states that she needs this medication and requested two days ago. She states that doctor leaves early and she wanted this so she can get some sleep as all she does is cough. Please call pt to let her know. Thanks.

## 2022-09-22 ENCOUNTER — APPOINTMENT (OUTPATIENT)
Dept: GENERAL RADIOLOGY | Age: 37
End: 2022-09-22
Attending: EMERGENCY MEDICINE
Payer: MEDICAID

## 2022-09-22 ENCOUNTER — APPOINTMENT (OUTPATIENT)
Dept: CT IMAGING | Age: 37
End: 2022-09-22
Attending: EMERGENCY MEDICINE
Payer: MEDICAID

## 2022-09-22 ENCOUNTER — HOSPITAL ENCOUNTER (EMERGENCY)
Age: 37
Discharge: HOME | End: 2022-09-22
Attending: EMERGENCY MEDICINE
Payer: MEDICAID

## 2022-09-22 VITALS
WEIGHT: 250 LBS | HEIGHT: 62 IN | OXYGEN SATURATION: 94 % | HEART RATE: 66 BPM | SYSTOLIC BLOOD PRESSURE: 118 MMHG | DIASTOLIC BLOOD PRESSURE: 67 MMHG | RESPIRATION RATE: 15 BRPM | BODY MASS INDEX: 46.01 KG/M2 | TEMPERATURE: 98.2 F

## 2022-09-22 DIAGNOSIS — R55 SYNCOPE AND COLLAPSE: Primary | ICD-10-CM

## 2022-09-22 DIAGNOSIS — S09.90XA CLOSED HEAD INJURY, INITIAL ENCOUNTER: ICD-10-CM

## 2022-09-22 DIAGNOSIS — S20.211A RIB CONTUSION, RIGHT, INITIAL ENCOUNTER: ICD-10-CM

## 2022-09-22 DIAGNOSIS — R51.9 ACUTE NONINTRACTABLE HEADACHE, UNSPECIFIED HEADACHE TYPE: ICD-10-CM

## 2022-09-22 LAB
ALBUMIN SERPL-MCNC: 3.7 G/DL (ref 3.5–5)
ALBUMIN/GLOB SERPL: 1.1 {RATIO} (ref 1.1–2.2)
ALP SERPL-CCNC: 139 U/L (ref 45–117)
ALT SERPL-CCNC: 19 U/L (ref 12–78)
ANION GAP SERPL CALC-SCNC: 6 MMOL/L (ref 5–15)
AST SERPL-CCNC: 11 U/L (ref 15–37)
BASOPHILS # BLD: 0.1 K/UL (ref 0–0.1)
BASOPHILS NFR BLD: 1 % (ref 0–1)
BILIRUB SERPL-MCNC: 0.2 MG/DL (ref 0.2–1)
BUN SERPL-MCNC: 17 MG/DL (ref 6–20)
BUN/CREAT SERPL: 17 (ref 12–20)
CALCIUM SERPL-MCNC: 9.1 MG/DL (ref 8.5–10.1)
CHLORIDE SERPL-SCNC: 109 MMOL/L (ref 97–108)
CO2 SERPL-SCNC: 24 MMOL/L (ref 21–32)
CREAT SERPL-MCNC: 0.98 MG/DL (ref 0.55–1.02)
DIFFERENTIAL METHOD BLD: ABNORMAL
EOSINOPHIL # BLD: 0.5 K/UL (ref 0–0.4)
EOSINOPHIL NFR BLD: 6 % (ref 0–7)
ERYTHROCYTE [DISTWIDTH] IN BLOOD BY AUTOMATED COUNT: 13.4 % (ref 11.5–14.5)
GLOBULIN SER CALC-MCNC: 3.4 G/DL (ref 2–4)
GLUCOSE SERPL-MCNC: 122 MG/DL (ref 65–100)
HCT VFR BLD AUTO: 36.2 % (ref 35–47)
HGB BLD-MCNC: 11.9 G/DL (ref 11.5–16)
IMM GRANULOCYTES # BLD AUTO: 0 K/UL (ref 0–0.04)
IMM GRANULOCYTES NFR BLD AUTO: 0 % (ref 0–0.5)
LYMPHOCYTES # BLD: 2.7 K/UL (ref 0.8–3.5)
LYMPHOCYTES NFR BLD: 28 % (ref 12–49)
MCH RBC QN AUTO: 29.7 PG (ref 26–34)
MCHC RBC AUTO-ENTMCNC: 32.9 G/DL (ref 30–36.5)
MCV RBC AUTO: 90.3 FL (ref 80–99)
MONOCYTES # BLD: 1.1 K/UL (ref 0–1)
MONOCYTES NFR BLD: 12 % (ref 5–13)
NEUTS SEG # BLD: 5 K/UL (ref 1.8–8)
NEUTS SEG NFR BLD: 53 % (ref 32–75)
NRBC # BLD: 0 K/UL (ref 0–0.01)
NRBC BLD-RTO: 0 PER 100 WBC
PLATELET # BLD AUTO: 361 K/UL (ref 150–400)
PMV BLD AUTO: 9.2 FL (ref 8.9–12.9)
POTASSIUM SERPL-SCNC: 4.2 MMOL/L (ref 3.5–5.1)
PROT SERPL-MCNC: 7.1 G/DL (ref 6.4–8.2)
RBC # BLD AUTO: 4.01 M/UL (ref 3.8–5.2)
SODIUM SERPL-SCNC: 139 MMOL/L (ref 136–145)
TROPONIN-HIGH SENSITIVITY: 4 NG/L (ref 0–51)
WBC # BLD AUTO: 9.4 K/UL (ref 3.6–11)

## 2022-09-22 PROCEDURE — 99284 EMERGENCY DEPT VISIT MOD MDM: CPT

## 2022-09-22 PROCEDURE — 96375 TX/PRO/DX INJ NEW DRUG ADDON: CPT

## 2022-09-22 PROCEDURE — 96376 TX/PRO/DX INJ SAME DRUG ADON: CPT

## 2022-09-22 PROCEDURE — 84484 ASSAY OF TROPONIN QUANT: CPT

## 2022-09-22 PROCEDURE — 71045 X-RAY EXAM CHEST 1 VIEW: CPT

## 2022-09-22 PROCEDURE — 74011250636 HC RX REV CODE- 250/636: Performed by: EMERGENCY MEDICINE

## 2022-09-22 PROCEDURE — 36415 COLL VENOUS BLD VENIPUNCTURE: CPT

## 2022-09-22 PROCEDURE — 80053 COMPREHEN METABOLIC PANEL: CPT

## 2022-09-22 PROCEDURE — 70450 CT HEAD/BRAIN W/O DYE: CPT

## 2022-09-22 PROCEDURE — 85025 COMPLETE CBC W/AUTO DIFF WBC: CPT

## 2022-09-22 PROCEDURE — 72125 CT NECK SPINE W/O DYE: CPT

## 2022-09-22 PROCEDURE — 96374 THER/PROPH/DIAG INJ IV PUSH: CPT

## 2022-09-22 RX ORDER — KETOROLAC TROMETHAMINE 30 MG/ML
15 INJECTION, SOLUTION INTRAMUSCULAR; INTRAVENOUS
Status: COMPLETED | OUTPATIENT
Start: 2022-09-22 | End: 2022-09-22

## 2022-09-22 RX ORDER — FENTANYL CITRATE 50 UG/ML
50 INJECTION, SOLUTION INTRAMUSCULAR; INTRAVENOUS ONCE
Status: COMPLETED | OUTPATIENT
Start: 2022-09-22 | End: 2022-09-22

## 2022-09-22 RX ADMIN — KETOROLAC TROMETHAMINE 15 MG: 30 INJECTION, SOLUTION INTRAMUSCULAR at 09:12

## 2022-09-22 RX ADMIN — KETOROLAC TROMETHAMINE 15 MG: 30 INJECTION, SOLUTION INTRAMUSCULAR at 07:42

## 2022-09-22 RX ADMIN — FENTANYL CITRATE 50 MCG: 50 INJECTION, SOLUTION INTRAMUSCULAR; INTRAVENOUS at 09:12

## 2022-09-22 RX ADMIN — FENTANYL CITRATE 50 MCG: 50 INJECTION, SOLUTION INTRAMUSCULAR; INTRAVENOUS at 07:42

## 2022-09-22 NOTE — ED NOTES
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

## 2022-09-22 NOTE — ED NOTES
Cardiac monitor applied  Patient curious about when Er Doctor is coming to see her--  She has just returned from testing--  Should be soon

## 2022-09-22 NOTE — ED NOTES
Patient does not want to provide urine sample at this time  Says she still feels dizzy --and does not want to walk--  Explained that nurse was not planning on having her walk to the bathroom since she already fell this morning  Planned to use a bedpan--  Patient says she will have to do it later

## 2022-09-22 NOTE — DISCHARGE INSTRUCTIONS
You were evaluated in the emergency department for passing out episode with head injury and right rib pain. Your examination was reassuring as was your work-up including chest x-ray, CT head, CT neck, and blood work. It will be important for you to follow-up with your primary care physician in 2-3 days. If you develop worsening symptoms such as another passing out episode, chest pain, or shortness of breath, please return to the emergency department immediately.

## 2022-09-22 NOTE — ED NOTES
Went over all dc papers with pt prior to leaving, iv removed tip intact, pt verbalized understanding. Pt walking home.  ambulatory at time of dc

## 2022-09-22 NOTE — ED PROVIDER NOTES
EMERGENCY DEPARTMENT HISTORY AND PHYSICAL EXAM        Date: 9/22/2022  Patient Name: Sonido Lares    History of Presenting Illness     Chief Complaint   Patient presents with    Syncope    Facial Pain    Rib Pain       History Provided By: Patient    HPI: Sonido Lares, 43 y.o. female with history of occipital neuralgia, Mariah-Danlos syndrome, lupus, high ED utilization presents to the ED with cc of syncope, headache, head injury, right rib pain. Symptoms onset today. She endorses a syncopal episode this morning because she was having a headache consistent with occipital neuralgia. She states that pain is located to the back of her head and consistent with occipital neuralgia, no thunderclap onset. She states that when the pain becomes severe it causes her to pass out and that happened today. She did strike her head causing a hematoma to the left forehead. She also complains of pain overlying the right ribs. Denies shortness of breath, abdominal pain, chest pain. Denies fevers, chills, recent illness. No aggravating or relieving factors. There are no other complaints, changes, or physical findings at this time. PCP: Mason Burris MD    No current facility-administered medications on file prior to encounter. Current Outpatient Medications on File Prior to Encounter   Medication Sig Dispense Refill    ALPRAZolam (XANAX) 1 mg tablet Take 1 Tablet by mouth two (2) times daily as needed for Anxiety. Max Daily Amount: 2 mg. 45 Tablet 0    codeine-butalbital-acetaminophen-caffeine (FIORICET WITH CODEINE) -89-30 mg capsule TAKE 1 CAPSULE BY MOUTH EVERY 4 HOURS AS NEEDED FOR HEADACHE FOR 10 DAYS AS NEEDED      tiZANidine (ZANAFLEX) 4 mg tablet TAKE 1 TABLET BY MOUTH THREE TIMES A DAY AS NEEDED FOR PAIN 30 Tablet 0    mirtazapine (REMERON) 30 mg tablet TAKE 1 TABLET BY MOUTH EVERYDAY AT BEDTIME 30 Tablet 5    omeprazole (PRILOSEC) 20 mg capsule Take 1 Capsule by mouth daily.  30 Capsule 5 DULoxetine (CYMBALTA) 60 mg capsule TAKE 2 CAPSULES BY MOUTH EVERY DAY 30 Capsule 5    Symbicort 160-4.5 mcg/actuation HFAA INHALE 2 PUFFS BY MOUTH TWICE A DAY      propranoloL (INDERAL) 10 mg tablet TAKE 1 TABLET BY MOUTH TWICE A  Tablet 3    pregabalin (LYRICA) 50 mg capsule Take 50 mg by mouth two (2) times a day. albuterol (PROVENTIL HFA, VENTOLIN HFA, PROAIR HFA) 90 mcg/actuation inhaler Take 1 Puff by inhalation every six (6) hours as needed for Wheezing. 1 Each 1    furosemide (LASIX) 20 mg tablet TAKE 1 TABLET BY MOUTH AS NEEDED (SWELLING). 30 Tablet 2    belimumab (Benlysta) 400 mg solr injection by IntraVENous route. Every 8 weeks      [DISCONTINUED] levoFLOXacin (LEVAQUIN) 500 mg tablet TAKE 1 TABLET BY MOUTH EVERY DAY 7 Tablet 0    [DISCONTINUED] nirmatrelvir-ritonavir (Paxlovid, EUA,) 300 mg (150 mg x 2)-100 mg tablet Take as directed (Patient not taking: Reported on 9/6/2022) 1 Box 0    [DISCONTINUED] ondansetron hcl (ZOFRAN) 4 mg tablet TAKE 1 TABLET BY MOUTH EVERY EIGHT (8) HOURS AS NEEDED FOR NAUSEA OR NAUSEA OR VOMITING. 20 Tablet 0    [DISCONTINUED] meclizine (ANTIVERT) 25 mg tablet TAKE 1 TABLET BY MOUTH THREE TIMES A DAY AS NEEDED FOR DIZZINESS FOR UP TO 10 DAYS (Patient not taking: Reported on 9/6/2022) 30 Tablet 0    [DISCONTINUED] cyclobenzaprine (FLEXERIL) 10 mg tablet Take 1 Tablet by mouth three (3) times daily as needed for Muscle Spasm(s). (Patient not taking: Reported on 6/15/2022) 21 Tablet 0    [DISCONTINUED] naloxone (NARCAN) 4 mg/actuation nasal spray Use 1 spray intranasally, then discard. Repeat with new spray every 2 min as needed for opioid overdose symptoms, alternating nostrils.  2 Each 0       Past History     Past Medical History:  Past Medical History:   Diagnosis Date    Abnormal CT of the abdomen 11/8/2012    Asthma     Autoimmune disease (HCC)     lupus    C. difficile diarrhea     Cervical prolapse     Dehydration     Mariah-Danlos syndrome Gastrointestinal disorder     gerd, twisted colon, IBS    GERD (gastroesophageal reflux disease)     Headache(784.0)     Lupus ([address]     Morbid obesity ([address]     Murmur     Nausea & vomiting     Neurological disorder     cluster headaches    Occipital neuralgia     other 2006, 2009    ovarian cyst removal    Other ill-defined conditions(799.89)     Syncope     Worsening headaches        Past Surgical History:  Past Surgical History:   Procedure Laterality Date    COLONOSCOPY  9/21/2010         HX CHOLECYSTECTOMY      HX CYST INCISION AND DRAINAGE Right 02/27/2020    HX GYN  1/2009    right salpingo oopherectomy    HX GYN  2006    right ovarian tumor removed    HX HEENT      left wisdom teeth removal    HX HEENT      top right wisdom tooth removed    HX HERNIA REPAIR  4/2012    HX HERNIA REPAIR  2/28/13    Laparoscopic recurrent incisional hernia repair    HX HYSTERECTOMY      2016    HX UROLOGICAL      Kidney Stone Removal    [place] EGD TRANSORAL BIOPSY SINGLE/MULTIPLE  9/22/2010            Family History:  Family History   Problem Relation Age of Onset    Colon Cancer Maternal Grandfather        Social History:  Social History     Tobacco Use    Smoking status: Never    Smokeless tobacco: Never   Vaping Use    Vaping Use: Never used   Substance Use Topics    Alcohol use: No    Drug use: No       Allergies: Allergies   Allergen Reactions    Latex Itching     burning    Compazine [Prochlorperazine] Anxiety     High heart rate  Pt can take promethazine with no problems    Sulfa (Sulfonamide Antibiotics) Unknown (comments)    Topamax [Topiramate] Unknown (comments)    Adhesive Rash     Allergic to Dermabond    Clindamycin Diarrhea     Pt states caused her C-Diff    Iodinated Contrast Media Myalgia     Pt c/o severe back spasms after IV contrast administration. Pt requesting IV pain medications and requests I put this as a drug intolerance in her EMR.     Mushroom Other (comments)    Mushroom Combination No.1 Unknown (comments)    Nsaids (Non-Steroidal Anti-Inflammatory Drug) Other (comments)     Peptic ulcer    Reglan [Metoclopramide Hcl] Rash    Valproic Acid Other (comments)     Elevated heart rate and vomiting           Review of Systems   Review of Systems   Constitutional:  Negative for chills and fever. Respiratory:  Negative for cough and shortness of breath. Cardiovascular:  Positive for chest pain. Negative for leg swelling. Gastrointestinal:  Positive for nausea. Negative for abdominal pain and vomiting. Genitourinary: Negative. Musculoskeletal:  Negative for back pain and gait problem. Skin:  Negative for color change and rash. Neurological:  Positive for syncope and headaches. Negative for weakness. All other systems reviewed and are negative. Physical Exam   Physical Exam  Vitals and nursing note reviewed. Constitutional:       General: She is not in acute distress. Appearance: Normal appearance. She is not ill-appearing or toxic-appearing. Comments: Sitting upright in bed in no acute distress. HENT:      Head: Normocephalic. Comments: Small left forehead hematoma     Nose: Nose normal.      Mouth/Throat:      Mouth: Mucous membranes are moist.   Eyes:      Extraocular Movements: Extraocular movements intact. Pupils: Pupils are equal, round, and reactive to light. Cardiovascular:      Rate and Rhythm: Normal rate and regular rhythm. Heart sounds: No murmur heard. Pulmonary:      Effort: Pulmonary effort is normal. No respiratory distress. Breath sounds: Normal breath sounds. No wheezing. Chest:      Chest wall: Tenderness (Right-sided) present. Abdominal:      General: There is no distension. Palpations: Abdomen is soft. Tenderness: There is no abdominal tenderness. There is no guarding or rebound. Musculoskeletal:         General: No swelling or tenderness. Normal range of motion. Cervical back: Normal range of motion and neck supple. Right lower leg: No edema. Left lower leg: No edema. Skin:     General: Skin is warm and dry. Coloration: Skin is not pale. Findings: No erythema. Neurological:      General: No focal deficit present. Mental Status: She is alert and oriented to person, place, and time. Diagnostic Study Results     Labs -     Recent Results (from the past 12 hour(s))   METABOLIC PANEL, COMPREHENSIVE    Collection Time: 09/22/22  6:15 AM   Result Value Ref Range    Sodium 139 136 - 145 mmol/L    Potassium 4.2 3.5 - 5.1 mmol/L    Chloride 109 (H) 97 - 108 mmol/L    CO2 24 21 - 32 mmol/L    Anion gap 6 5 - 15 mmol/L    Glucose 122 (H) 65 - 100 mg/dL    BUN 17 6 - 20 MG/DL    Creatinine 0.98 0.55 - 1.02 MG/DL    BUN/Creatinine ratio 17 12 - 20      GFR est AA >60 >60 ml/min/1.73m2    GFR est non-AA >60 >60 ml/min/1.73m2    Calcium 9.1 8.5 - 10.1 MG/DL    Bilirubin, total 0.2 0.2 - 1.0 MG/DL    ALT (SGPT) 19 12 - 78 U/L    AST (SGOT) 11 (L) 15 - 37 U/L    Alk. phosphatase 139 (H) 45 - 117 U/L    Protein, total 7.1 6.4 - 8.2 g/dL    Albumin 3.7 3.5 - 5.0 g/dL    Globulin 3.4 2.0 - 4.0 g/dL    A-G Ratio 1.1 1.1 - 2.2     CBC WITH AUTOMATED DIFF    Collection Time: 09/22/22  6:15 AM   Result Value Ref Range    WBC 9.4 3.6 - 11.0 K/uL    RBC 4.01 3.80 - 5.20 M/uL    HGB 11.9 11.5 - 16.0 g/dL    HCT 36.2 35.0 - 47.0 %    MCV 90.3 80.0 - 99.0 FL    MCH 29.7 26.0 - 34.0 PG    MCHC 32.9 30.0 - 36.5 g/dL    RDW 13.4 11.5 - 14.5 %    PLATELET 177 969 - 716 K/uL    MPV 9.2 8.9 - 12.9 FL    NRBC 0.0 0  WBC    ABSOLUTE NRBC 0.00 0.00 - 0.01 K/uL    NEUTROPHILS 53 32 - 75 %    LYMPHOCYTES 28 12 - 49 %    MONOCYTES 12 5 - 13 %    EOSINOPHILS 6 0 - 7 %    BASOPHILS 1 0 - 1 %    IMMATURE GRANULOCYTES 0 0.0 - 0.5 %    ABS. NEUTROPHILS 5.0 1.8 - 8.0 K/UL    ABS. LYMPHOCYTES 2.7 0.8 - 3.5 K/UL    ABS. MONOCYTES 1.1 (H) 0.0 - 1.0 K/UL    ABS. EOSINOPHILS 0.5 (H) 0.0 - 0.4 K/UL    ABS. BASOPHILS 0.1 0.0 - 0.1 K/UL    ABS. NING Messer. 0.0 0.00 - 0.04 K/UL    DF AUTOMATED     TROPONIN-HIGH SENSITIVITY    Collection Time: 09/22/22  6:15 AM   Result Value Ref Range    Troponin-High Sensitivity 4 0 - 51 ng/L       Radiologic Studies -   XR CHEST PORT   Final Result   No evidence of acute cardiopulmonary process. CT HEAD WO CONT   Final Result   No acute intracranial hemorrhage, mass or infarct. INDICATION: syncope/fall       Exam: Noncontrast CT of the cervical spine is performed with 2.5 mm collimation. Sagittal and coronal reformatted images were also performed. CT dose reduction was achieved through use of a standardized protocol tailored   for this examination and automatic exposure control for dose modulation. Direct comparison is made to prior CT dated September 2021. FINDINGS: There is no acute fracture or subluxation. The prevertebral soft   tissues are within normal limits. Bones are well mineralized. Remaining   visualized soft tissues are normal.       IMPRESSION: No acute fracture or subluxation. CT SPINE CERV WO CONT   Final Result   No acute intracranial hemorrhage, mass or infarct. INDICATION: syncope/fall       Exam: Noncontrast CT of the cervical spine is performed with 2.5 mm collimation. Sagittal and coronal reformatted images were also performed. CT dose reduction was achieved through use of a standardized protocol tailored   for this examination and automatic exposure control for dose modulation. Direct comparison is made to prior CT dated September 2021. FINDINGS: There is no acute fracture or subluxation. The prevertebral soft   tissues are within normal limits. Bones are well mineralized. Remaining   visualized soft tissues are normal.       IMPRESSION: No acute fracture or subluxation.                CT Results  (Last 48 hours)                 09/22/22 0624  CT HEAD WO CONT Final result    Impression:  No acute intracranial hemorrhage, mass or infarct. INDICATION: syncope/fall        Exam: Noncontrast CT of the cervical spine is performed with 2.5 mm collimation. Sagittal and coronal reformatted images were also performed. CT dose reduction was achieved through use of a standardized protocol tailored   for this examination and automatic exposure control for dose modulation. Direct comparison is made to prior CT dated September 2021. FINDINGS: There is no acute fracture or subluxation. The prevertebral soft   tissues are within normal limits. Bones are well mineralized. Remaining   visualized soft tissues are normal.        IMPRESSION: No acute fracture or subluxation. Narrative:  INDICATION: syncope/fall        Exam: Noncontrast CT of the brain is performed with 5 mm collimation. CT dose reduction was achieved with the use of the standardized protocol   tailored for this examination and automatic exposure control for dose   modulation. Direct comparison is made to prior CT dated September 2021. FINDINGS: There is no acute intracranial hemorrhage, mass, mass effect or   herniation. Ventricular system is normal. The gray-white matter differentiation   is well-preserved. The mastoid air cells are well pneumatized. The visualized   paranasal sinuses are normal.           09/22/22 0624  CT SPINE CERV WO CONT Final result    Impression:  No acute intracranial hemorrhage, mass or infarct. INDICATION: syncope/fall        Exam: Noncontrast CT of the cervical spine is performed with 2.5 mm collimation. Sagittal and coronal reformatted images were also performed. CT dose reduction was achieved through use of a standardized protocol tailored   for this examination and automatic exposure control for dose modulation. Direct comparison is made to prior CT dated September 2021. FINDINGS: There is no acute fracture or subluxation.  The prevertebral soft   tissues are within normal limits. Bones are well mineralized. Remaining   visualized soft tissues are normal.        IMPRESSION: No acute fracture or subluxation. Narrative:  INDICATION: syncope/fall        Exam: Noncontrast CT of the brain is performed with 5 mm collimation. CT dose reduction was achieved with the use of the standardized protocol   tailored for this examination and automatic exposure control for dose   modulation. Direct comparison is made to prior CT dated September 2021. FINDINGS: There is no acute intracranial hemorrhage, mass, mass effect or   herniation. Ventricular system is normal. The gray-white matter differentiation   is well-preserved. The mastoid air cells are well pneumatized. The visualized   paranasal sinuses are normal.                 CXR Results  (Last 48 hours)                 09/22/22 0634  XR CHEST PORT Final result    Impression:  No evidence of acute cardiopulmonary process. Narrative:  INDICATION: Syncope       COMPARISON: 12/18/2021       FINDINGS: AP portable imaging of the chest performed at 6:31 AM demonstrates a   stable cardiomediastinal silhouette. The lungs are clear bilaterally. No   significant osseous abnormalities are seen. Medical Decision Making   I am the first provider for this patient. I reviewed the vital signs, available nursing notes, past medical history, past surgical history, family history and social history. Vital Signs-Reviewed the patient's vital signs.   Patient Vitals for the past 12 hrs:   Temp Pulse Resp BP SpO2   09/22/22 0915 -- 66 15 -- 94 %   09/22/22 0900 -- 73 19 118/67 94 %   09/22/22 0845 -- 69 16 123/75 94 %   09/22/22 0830 -- 67 17 113/78 95 %   09/22/22 0815 -- 68 14 125/81 96 %   09/22/22 0800 -- 72 17 139/75 92 %   09/22/22 0745 -- 63 16 137/78 93 %   09/22/22 0730 -- 65 16 119/74 94 %   09/22/22 0715 -- 68 18 105/74 95 %   09/22/22 0700 -- 83 28 114/77 97 %   09/22/22 0646 -- 69 16 -- 95 % 09/22/22 0645 -- 69 9 114/61 93 %   09/22/22 0615 -- 75 14 126/68 94 %   09/22/22 0558 -- 76 14 127/70 95 %   09/22/22 0543 98.2 °F (36.8 °C) 70 16 106/84 99 %       Records Reviewed: Nursing Notes    Provider Notes (Medical Decision Making):   68-year-old female here with syncopal episode which she attributes to acute on chronic episode of occipital neuralgia resulting in head injury and right-sided chest wall pain. Afebrile and vital signs stable. No focal neurologic deficits on exam.  CT head and neck negative for acute process. Chest x-ray negative for pneumothorax or acute rib fracture. Blood work unremarkable. Patient feeling better after administration of medications. I do not have concern for acute subarachnoid hemorrhage, she states this is consistent with her history of occipital neuralgia which has caused her to have syncopal events in the past.  Patient encouraged follow-up with PCP and given strict return precautions. ED Course:   Initial assessment performed. The patients presenting problems have been discussed, and they are in agreement with the care plan formulated and outlined with them. I have encouraged them to ask questions as they arise throughout their visit. ED Course as of 09/22/22 1523   Thu Sep 22, 2022   0845 Patient asking for prescription for narcotic pain medication, she already has Fioricet with codeine prescribed, therefore I told her I would not be comfortable prescribing another opiate on top of this. Encouraged follow-up with PCP/neurology and given strict return precautions. [AK]      ED Course User Index  [AK] Greg Chau MD         Discharge Note:  The patient has been re-evaluated and is ready for discharge. Reviewed available results with patient. Counseled patient on diagnosis and care plan. Patient has expressed understanding, and all questions have been answered.  Patient agrees with plan and agrees to follow up as recommended, or to return to the ED if their symptoms worsen. Discharge instructions have been provided and explained to the patient, along with reasons to return to the ED. Disposition:  discharge    DISCHARGE PLAN:  1. Discharge Medication List as of 9/22/2022  8:49 AM        2. Follow-up Information       Follow up With Specialties Details Why Contact Info    Unknown MD [facility] Physician Schedule an appointment as soon as possible for a visit   [address] [facility]  [phone]      [facility] EMERGENCY DEPT Emergency Medicine Go to  As needed, If symptoms worsen [address] [place] [address]  [address] [facility]  [phone]          3. Return to ED if worse     Diagnosis     Clinical Impression:   1. Syncope and collapse    2. Acute nonintractable headache, unspecified headache type    3. Closed head injury, initial encounter    4. Rib contusion, right, initial encounter        Attestations:  I am the first and primary provider of record for this patient's ED encounter. I personally performed the services described above in this documentation. Destinee Arriaza MD    Please note that this dictation was completed with [employer], the computer voice recognition software. Quite often unanticipated grammatical, syntax, homophones, and other interpretive errors are inadvertently transcribed by the computer software. Please disregard these errors. Please excuse any errors that have escaped final proofreading. Thank you.

## 2022-09-30 DIAGNOSIS — R11.0 NAUSEA: ICD-10-CM

## 2022-09-30 RX ORDER — ONDANSETRON 4 MG/1
4 TABLET, FILM COATED ORAL
Qty: 20 TABLET | Refills: 0 | Status: SHIPPED | OUTPATIENT
Start: 2022-09-30

## 2022-10-01 ENCOUNTER — TELEPHONE (OUTPATIENT)
Dept: INTERNAL MEDICINE CLINIC | Age: 37
End: 2022-10-01

## 2022-10-01 RX ORDER — BENZONATATE 100 MG/1
100 CAPSULE ORAL
Qty: 30 CAPSULE | Refills: 0 | Status: SHIPPED | OUTPATIENT
Start: 2022-10-01 | End: 2022-10-08

## 2022-10-01 RX ORDER — DOXYCYCLINE 100 MG/1
100 CAPSULE ORAL 2 TIMES DAILY
Qty: 14 CAPSULE | Refills: 0 | Status: SHIPPED | OUTPATIENT
Start: 2022-10-01 | End: 2022-10-03 | Stop reason: ALTCHOICE

## 2022-10-03 ENCOUNTER — VIRTUAL VISIT (OUTPATIENT)
Dept: INTERNAL MEDICINE CLINIC | Age: 37
End: 2022-10-03
Payer: MEDICAID

## 2022-10-03 DIAGNOSIS — R05.1 ACUTE COUGH: Primary | ICD-10-CM

## 2022-10-03 DIAGNOSIS — J02.9 ACUTE PHARYNGITIS, UNSPECIFIED ETIOLOGY: ICD-10-CM

## 2022-10-03 PROCEDURE — 99441 PR PHYS/QHP TELEPHONE EVALUATION 5-10 MIN: CPT | Performed by: INTERNAL MEDICINE

## 2022-10-03 RX ORDER — AMOXICILLIN AND CLAVULANATE POTASSIUM 875; 125 MG/1; MG/1
1 TABLET, FILM COATED ORAL EVERY 12 HOURS
Qty: 14 TABLET | Refills: 0 | Status: SHIPPED | OUTPATIENT
Start: 2022-10-03 | End: 2022-10-10

## 2022-10-03 RX ORDER — CODEINE PHOSPHATE AND GUAIFENESIN 10; 100 MG/5ML; MG/5ML
5 SOLUTION ORAL
Qty: 118 ML | Refills: 0 | Status: SHIPPED | OUTPATIENT
Start: 2022-10-03 | End: 2022-10-10

## 2022-10-03 NOTE — PROGRESS NOTES
1. \"Have you been to the ER, urgent care clinic since your last visit? Hospitalized since your last visit? Yes,9/22/22 Occipital neuralgia    2. \"Have you seen or consulted any other health care providers outside of the [address] [place] [address] since your last visit? \" No     3. For patients aged 39-70: Has the patient had a colonoscopy / FIT/ Cologuard? NA - based on age      If the patient is female:    4. For patients aged 41-77: Has the patient had a mammogram within the past 2 years? NA - based on age or sex      11. For patients aged 21-65: Has the patient had a pap smear?  Yes - no Care Gap present

## 2022-10-03 NOTE — TELEPHONE ENCOUNTER
She is a patient of Mayela Cee MD with history of lupus and asthma. She has been feeling \"sick\"--headache, sore throat, coughing, for several days. She tested negative for COVID. Assessment: URI  Plan:   Orders Placed This Encounter    benzonatate (TESSALON) 100 mg capsule     Sig: Take 1 Capsule by mouth three (3) times daily as needed for Cough for up to 7 days. Dispense:  30 Capsule     Refill:  0    DISCONTD: doxycycline (VIBRAMYCIN) 100 mg capsule     Sig: Take 1 Capsule by mouth two (2) times a day for 7 days.      Dispense:  14 Capsule     Refill:  0

## 2022-10-03 NOTE — PROGRESS NOTES
Thomas Manuel is a 43 y.o. female, evaluated via audio-only technology on 10/3/2022 for Cough and Sore Throat (X 5 days)  . Assessment & Plan:   Diagnoses and all orders for this visit:    1. Acute cough  -     guaiFENesin-codeine (ROBITUSSIN AC) 100-10 mg/5 mL solution; Take 5 mL by mouth three (3) times daily as needed for Cough for up to 3 days. Max Daily Amount: 15 mL. 2. Acute pharyngitis, unspecified etiology  -     amoxicillin-clavulanate (AUGMENTIN) 875-125 mg per tablet; Take 1 Tablet by mouth every twelve (12) hours for 7 days. Follow-up and Dispositions    Return if symptoms worsen or fail to improve. Subjective:     Chief Complaint   Patient presents with    Cough    Sore Throat     X 5 days     \"I've only had strep throat once, and I think I have it again. My mom looked in my throat and saw pus pockets. \"     \"I'm coughing my guts up. I'm coughing up green pudding. \" Tessalon perles are not helping. \"Dr. Sheila Jin usually gives me the Robitussin [facility]. \"     Prior to Admission medications    Medication Sig Start Date End Date Taking? Authorizing Provider   benzonatate (TESSALON) 100 mg capsule Take 1 Capsule by mouth three (3) times daily as needed for Cough for up to 7 days. 10/1/22 10/8/22 Yes Prateek Silver MD   doxycycline (VIBRAMYCIN) 100 mg capsule Take 1 Capsule by mouth two (2) times a day for 7 days. 10/1/22 10/8/22 Yes Prateek Silver MD   ondansetron hcl (ZOFRAN) 4 mg tablet TAKE 1 TABLET BY MOUTH EVERY EIGHT (8) HOURS AS NEEDED FOR NAUSEA OR NAUSEA OR VOMITING. 9/30/22  Yes Jumana Dominique MD   ALPRAZolam [place]) 1 mg tablet Take 1 Tablet by mouth two (2) times daily as needed for Anxiety.  Max Daily Amount: 2 mg. 9/12/22  Yes Jumana Dominique MD   tiZANidine (ZANAFLEX) 4 mg tablet TAKE 1 TABLET BY MOUTH THREE TIMES A DAY AS NEEDED FOR PAIN 8/22/22  Yes Jumana Dominique MD   mirtazapine (REMERON) 30 mg tablet TAKE 1 TABLET BY MOUTH EVERYDAY AT BEDTIME 7/31/22  Yes Jumana Dominique MD omeprazole (PRILOSEC) 20 mg capsule Take 1 Capsule by mouth daily. 7/14/22  Yes Chet Aguillon MD   DULoxetine (CYMBALTA) 60 mg capsule TAKE 2 CAPSULES BY MOUTH EVERY DAY 7/4/22  Yes Chet Aguillon MD   Symbicort 160-4.5 mcg/actuation HFAA INHALE 2 PUFFS BY MOUTH TWICE A DAY 4/18/22  Yes Provider, Historical   propranoloL (INDERAL) 10 mg tablet TAKE 1 TABLET BY MOUTH TWICE A DAY 4/12/22  Yes Raymon Young NP   pregabalin (LYRICA) 50 mg capsule Take 50 mg by mouth two (2) times a day. 1/14/22  Yes Provider, Historical   albuterol (PROVENTIL HFA, VENTOLIN HFA, PROAIR HFA) 90 mcg/actuation inhaler Take 1 Puff by inhalation every six (6) hours as needed for Wheezing. 1/17/22  Yes Brandon De MD   furosemide (LASIX) 20 mg tablet TAKE 1 TABLET BY MOUTH AS NEEDED (SWELLING). 12/5/21  Yes Raymon Young NP   belimumab (Benlysta) 400 mg solr injection by IntraVENous route. Every 8 weeks   Yes Provider, Historical   codeine-butalbital-acetaminophen-caffeine (FIORICET WITH CODEINE) -08-30 mg capsule TAKE 1 CAPSULE BY MOUTH EVERY 4 HOURS AS NEEDED FOR HEADACHE FOR 10 DAYS AS NEEDED 9/1/22   Provider, Historical         No data recorded     Alesia Shook was evaluated through a patient-initiated, synchronous (real-time) audio only encounter. She (or guardian if applicable) is aware that it is a billable service, which includes applicable co-pays, with coverage as determined by her insurance carrier. This visit was conducted with the patient's (and/or Felicita Dubin guardian's) verbal consent. She has not had a related appointment within my department in the past 7 days or scheduled within the next 24 hours. The patient was located in a state where the provider was licensed to provide care. The patient was located at: Home: [address] [facility]  The provider was located at:  Facility (Appt Department): [address] [facility] [address]    Total Time: minutes: 5-10 minutes    Riley Howard Zenon Zhang MD

## 2022-10-04 ENCOUNTER — HOSPITAL ENCOUNTER (EMERGENCY)
Age: 37
Discharge: HOME | End: 2022-10-04
Attending: EMERGENCY MEDICINE
Payer: MEDICAID

## 2022-10-04 ENCOUNTER — APPOINTMENT (OUTPATIENT)
Dept: GENERAL RADIOLOGY | Age: 37
End: 2022-10-04
Attending: EMERGENCY MEDICINE
Payer: MEDICAID

## 2022-10-04 VITALS
WEIGHT: 272.71 LBS | SYSTOLIC BLOOD PRESSURE: 124 MMHG | HEART RATE: 64 BPM | HEIGHT: 62 IN | DIASTOLIC BLOOD PRESSURE: 80 MMHG | OXYGEN SATURATION: 96 % | TEMPERATURE: 98 F | BODY MASS INDEX: 50.18 KG/M2 | RESPIRATION RATE: 18 BRPM

## 2022-10-04 DIAGNOSIS — J02.9 ACUTE PHARYNGITIS, UNSPECIFIED ETIOLOGY: Primary | ICD-10-CM

## 2022-10-04 DIAGNOSIS — M79.10 MYALGIA: ICD-10-CM

## 2022-10-04 LAB
ALBUMIN SERPL-MCNC: 3.7 G/DL (ref 3.5–5)
ALBUMIN/GLOB SERPL: 1.3 {RATIO} (ref 1.1–2.2)
ALP SERPL-CCNC: 140 U/L (ref 45–117)
ALT SERPL-CCNC: 25 U/L (ref 12–78)
ANION GAP SERPL CALC-SCNC: 6 MMOL/L (ref 5–15)
AST SERPL-CCNC: 15 U/L (ref 15–37)
BASOPHILS # BLD: 0 K/UL (ref 0–0.1)
BASOPHILS NFR BLD: 1 % (ref 0–1)
BILIRUB SERPL-MCNC: 0.2 MG/DL (ref 0.2–1)
BUN SERPL-MCNC: 12 MG/DL (ref 6–20)
BUN/CREAT SERPL: 14 (ref 12–20)
CALCIUM SERPL-MCNC: 9.8 MG/DL (ref 8.5–10.1)
CHLORIDE SERPL-SCNC: 109 MMOL/L (ref 97–108)
CO2 SERPL-SCNC: 25 MMOL/L (ref 21–32)
COVID-19 RAPID TEST, COVR: NOT DETECTED
CREAT SERPL-MCNC: 0.86 MG/DL (ref 0.55–1.02)
DEPRECATED S PYO AG THROAT QL EIA: NEGATIVE
DIFFERENTIAL METHOD BLD: ABNORMAL
EOSINOPHIL # BLD: 0.6 K/UL (ref 0–0.4)
EOSINOPHIL NFR BLD: 8 % (ref 0–7)
ERYTHROCYTE [DISTWIDTH] IN BLOOD BY AUTOMATED COUNT: 13.5 % (ref 11.5–14.5)
FLUAV AG NPH QL IA: NEGATIVE
FLUBV AG NOSE QL IA: NEGATIVE
GLOBULIN SER CALC-MCNC: 2.9 G/DL (ref 2–4)
GLUCOSE SERPL-MCNC: 161 MG/DL (ref 65–100)
HCT VFR BLD AUTO: 36.5 % (ref 35–47)
HETEROPH AB BLD QL IA: NEGATIVE
HGB BLD-MCNC: 11.8 G/DL (ref 11.5–16)
IMM GRANULOCYTES # BLD AUTO: 0.1 K/UL (ref 0–0.04)
IMM GRANULOCYTES NFR BLD AUTO: 1 % (ref 0–0.5)
LYMPHOCYTES # BLD: 2 K/UL (ref 0.8–3.5)
LYMPHOCYTES NFR BLD: 29 % (ref 12–49)
MCH RBC QN AUTO: 29 PG (ref 26–34)
MCHC RBC AUTO-ENTMCNC: 32.3 G/DL (ref 30–36.5)
MCV RBC AUTO: 89.7 FL (ref 80–99)
MONOCYTES # BLD: 0.8 K/UL (ref 0–1)
MONOCYTES NFR BLD: 12 % (ref 5–13)
NEUTS SEG # BLD: 3.5 K/UL (ref 1.8–8)
NEUTS SEG NFR BLD: 49 % (ref 32–75)
NRBC # BLD: 0 K/UL (ref 0–0.01)
NRBC BLD-RTO: 0 PER 100 WBC
PLATELET # BLD AUTO: 340 K/UL (ref 150–400)
PMV BLD AUTO: 9.1 FL (ref 8.9–12.9)
POTASSIUM SERPL-SCNC: 4.2 MMOL/L (ref 3.5–5.1)
PROT SERPL-MCNC: 6.6 G/DL (ref 6.4–8.2)
RBC # BLD AUTO: 4.07 M/UL (ref 3.8–5.2)
SODIUM SERPL-SCNC: 140 MMOL/L (ref 136–145)
SOURCE, COVRS: NORMAL
WBC # BLD AUTO: 6.9 K/UL (ref 3.6–11)

## 2022-10-04 PROCEDURE — 99284 EMERGENCY DEPT VISIT MOD MDM: CPT

## 2022-10-04 PROCEDURE — 87804 INFLUENZA ASSAY W/OPTIC: CPT

## 2022-10-04 PROCEDURE — 74011250637 HC RX REV CODE- 250/637: Performed by: EMERGENCY MEDICINE

## 2022-10-04 PROCEDURE — 85025 COMPLETE CBC W/AUTO DIFF WBC: CPT

## 2022-10-04 PROCEDURE — 87880 STREP A ASSAY W/OPTIC: CPT

## 2022-10-04 PROCEDURE — 86308 HETEROPHILE ANTIBODY SCREEN: CPT

## 2022-10-04 PROCEDURE — 96374 THER/PROPH/DIAG INJ IV PUSH: CPT

## 2022-10-04 PROCEDURE — 71045 X-RAY EXAM CHEST 1 VIEW: CPT

## 2022-10-04 PROCEDURE — 74011250636 HC RX REV CODE- 250/636: Performed by: EMERGENCY MEDICINE

## 2022-10-04 PROCEDURE — 87635 SARS-COV-2 COVID-19 AMP PRB: CPT

## 2022-10-04 PROCEDURE — 74011000250 HC RX REV CODE- 250: Performed by: EMERGENCY MEDICINE

## 2022-10-04 PROCEDURE — 80053 COMPREHEN METABOLIC PANEL: CPT

## 2022-10-04 PROCEDURE — 36415 COLL VENOUS BLD VENIPUNCTURE: CPT

## 2022-10-04 PROCEDURE — 87070 CULTURE OTHR SPECIMN AEROBIC: CPT

## 2022-10-04 RX ORDER — LIDOCAINE HYDROCHLORIDE 20 MG/ML
10 SOLUTION OROPHARYNGEAL AS NEEDED
Qty: 100 ML | Refills: 0 | Status: SHIPPED | OUTPATIENT
Start: 2022-10-04 | End: 2022-10-14 | Stop reason: ALTCHOICE

## 2022-10-04 RX ORDER — KETOROLAC TROMETHAMINE 30 MG/ML
30 INJECTION, SOLUTION INTRAMUSCULAR; INTRAVENOUS
Status: COMPLETED | OUTPATIENT
Start: 2022-10-04 | End: 2022-10-04

## 2022-10-04 RX ORDER — NYSTATIN 100000 [USP'U]/ML
500000 SUSPENSION ORAL 4 TIMES DAILY
Qty: 140 ML | Refills: 0 | Status: SHIPPED | OUTPATIENT
Start: 2022-10-04 | End: 2022-10-11

## 2022-10-04 RX ADMIN — KETOROLAC TROMETHAMINE 30 MG: 30 INJECTION, SOLUTION INTRAMUSCULAR at 07:24

## 2022-10-04 RX ADMIN — LIDOCAINE HYDROCHLORIDE 40 ML: 20 SOLUTION TOPICAL at 07:24

## 2022-10-04 NOTE — ED NOTES
Bedside shift change report given to Lucero Mckeon and Vickie Alvarez RN  (oncoming nurse) by Frank Jett (offgoing nurse). Report included the following information SBAR, ED Summary, MAR and Recent Results.

## 2022-10-04 NOTE — DISCHARGE INSTRUCTIONS
Make sure you are drinking plenty of fluids    Continue medications prescribed by your physician     Mix lidocaine with Maalox

## 2022-10-04 NOTE — ED NOTES
Assumed care of pt from triage. Pt c/o general malaise, as well as coughing up thick green mucous, a burning in her mouth and tongue with associated exudate on tongue, and generalized body aches that make it hard for her to walk. Pt is also increasingly fatigued. Pt was seen by PCP and placed on antibiotics, with no change or relief. Pt reports her PCP is concerned for mono. Pt is A&OX4. Pt appears pale.

## 2022-10-04 NOTE — ED PROVIDER NOTES
EMERGENCY DEPARTMENT HISTORY AND PHYSICAL EXAM      Date: 10/4/2022  Patient Name: Cal Servin    History of Presenting Illness     Chief Complaint   Patient presents with    Sore Throat     Pt had a virtual visit with her PCP yesterday for her throat pain. Pt states her throat feels like \"someone threw acid down my throat\". Pt states symptoms began 09/30/22. Pt had a fever that began Friday and took Tylenol and is taking it every 4 hours. Pt reports that she is coughing up green sputum and blood. Pt states her PCP said she might have mono/strept. Pt states her PCP prescribed dicyclomine Friday and changed it to Augmentin and Robitussin. White exudate on tongue. Generalized Body Aches     Pt states she is having generalized body pain that began Saturday that has gotten increasingly worse. States it hurts to walk from bed to the bathroom. Cough     Pt states she is coughing up blood and green thick sputum. Pt has a hx of asthma and COPD. History Provided By: Patient    HPI: Cal Servin, 43 y.o. female presents to the ED with cc of sore throat, cough, fever myalgias. Patient states that she had developed her issues a week ago with cough productive greenish-yellow sputum. She states that she completed a course of doxycycline with no improvement. She had a virtual visit with her primary care physician yesterday with complaints of sore throat and continuing to cough up green sputum with some blood in her sputum. She states that she has generalized body aches all over pain rated a 7 out of 10. She states that she has been taking Tylenol scheduled. Patient states that she was concerned for mono or strep. And states was instructed by her primary care physician if her symptoms got worse to come to the emergency department. Patient complaining of a sore throat that she describes it feels like swallowing acid and her tongue is been bleeding.   She denies any abdominal pain, nausea vomiting or diarrhea. She denies any urinary symptoms. There are no other complaints, changes, or physical findings at this time. PCP: Maru Nguyen MD    No current facility-administered medications on file prior to encounter. Current Outpatient Medications on File Prior to Encounter   Medication Sig Dispense Refill    amoxicillin-clavulanate (AUGMENTIN) 875-125 mg per tablet Take 1 Tablet by mouth every twelve (12) hours for 7 days. 14 Tablet 0    guaiFENesin-codeine (ROBITUSSIN AC) 100-10 mg/5 mL solution Take 5 mL by mouth three (3) times daily as needed for Cough for up to 3 days. Max Daily Amount: 15 mL. 118 mL 0    benzonatate (TESSALON) 100 mg capsule Take 1 Capsule by mouth three (3) times daily as needed for Cough for up to 7 days. 30 Capsule 0    ondansetron hcl (ZOFRAN) 4 mg tablet TAKE 1 TABLET BY MOUTH EVERY EIGHT (8) HOURS AS NEEDED FOR NAUSEA OR NAUSEA OR VOMITING. 20 Tablet 0    ALPRAZolam (XANAX) 1 mg tablet Take 1 Tablet by mouth two (2) times daily as needed for Anxiety. Max Daily Amount: 2 mg. 45 Tablet 0    codeine-butalbital-acetaminophen-caffeine (FIORICET WITH CODEINE) -88-30 mg capsule TAKE 1 CAPSULE BY MOUTH EVERY 4 HOURS AS NEEDED FOR HEADACHE FOR 10 DAYS AS NEEDED      tiZANidine (ZANAFLEX) 4 mg tablet TAKE 1 TABLET BY MOUTH THREE TIMES A DAY AS NEEDED FOR PAIN 30 Tablet 0    mirtazapine (REMERON) 30 mg tablet TAKE 1 TABLET BY MOUTH EVERYDAY AT BEDTIME 30 Tablet 5    omeprazole (PRILOSEC) 20 mg capsule Take 1 Capsule by mouth daily. 30 Capsule 5    DULoxetine (CYMBALTA) 60 mg capsule TAKE 2 CAPSULES BY MOUTH EVERY DAY 30 Capsule 5    Symbicort 160-4.5 mcg/actuation HFAA INHALE 2 PUFFS BY MOUTH TWICE A DAY      propranoloL (INDERAL) 10 mg tablet TAKE 1 TABLET BY MOUTH TWICE A  Tablet 3    pregabalin (LYRICA) 50 mg capsule Take 50 mg by mouth two (2) times a day.       albuterol (PROVENTIL HFA, VENTOLIN HFA, PROAIR HFA) 90 mcg/actuation inhaler Take 1 Puff by inhalation every six (6) hours as needed for Wheezing. 1 Each 1    furosemide (LASIX) 20 mg tablet TAKE 1 TABLET BY MOUTH AS NEEDED (SWELLING). 30 Tablet 2    belimumab (Benlysta) 400 mg solr injection by IntraVENous route. Every 8 weeks         Past History     Past Medical History:  Past Medical History:   Diagnosis Date    Abnormal CT of the abdomen 11/8/2012    Asthma     Autoimmune disease (HCC)     lupus    C. difficile diarrhea     Cervical prolapse     Dehydration     Mariah-Danlos syndrome     Gastrointestinal disorder     gerd, twisted colon, IBS    GERD (gastroesophageal reflux disease)     Headache(784.0)     Lupus (HCC)     Morbid obesity (HCC)     Murmur     Nausea & vomiting     Neurological disorder     cluster headaches    Occipital neuralgia     other 2006, 2009    ovarian cyst removal    Other ill-defined conditions(799.89)     Syncope     Worsening headaches        Past Surgical History:  Past Surgical History:   Procedure Laterality Date    COLONOSCOPY  9/21/2010         HX CHOLECYSTECTOMY      HX CYST INCISION AND DRAINAGE Right 02/27/2020    HX GYN  1/2009    right salpingo oopherectomy    HX GYN  2006    right ovarian tumor removed    HX HEENT      left wisdom teeth removal    HX HEENT      top right wisdom tooth removed    HX HERNIA REPAIR  4/2012    HX HERNIA REPAIR  2/28/13    Laparoscopic recurrent incisional hernia repair    HX HYSTERECTOMY      2016    HX UROLOGICAL      Kidney Stone Removal    OR EGD TRANSORAL BIOPSY SINGLE/MULTIPLE  9/22/2010            Family History:  Family History   Problem Relation Age of Onset    Colon Cancer Maternal Grandfather        Social History:  Social History     Tobacco Use    Smoking status: Never    Smokeless tobacco: Never   Vaping Use    Vaping Use: Never used   Substance Use Topics    Alcohol use: No    Drug use: No       Allergies:   Allergies   Allergen Reactions    Latex Itching     burning    Compazine [Prochlorperazine] Anxiety     High heart rate  Pt can take promethazine with no problems    Sulfa (Sulfonamide Antibiotics) Unknown (comments)    Topamax [Topiramate] Unknown (comments)    Adhesive Rash     Allergic to Dermabond    Clindamycin Diarrhea     Pt states caused her C-Diff    Iodinated Contrast Media Myalgia     Pt c/o severe back spasms after IV contrast administration. Pt requesting IV pain medications and requests I put this as a drug intolerance in her EMR. Mushroom Other (comments)    Mushroom Combination No.1 Unknown (comments)    Nsaids (Non-Steroidal Anti-Inflammatory Drug) Other (comments)     Peptic ulcer    Reglan [Metoclopramide Hcl] Rash    Valproic Acid Other (comments)     Elevated heart rate and vomiting           Review of Systems   Review of Systems   Constitutional:  Positive for chills, fatigue and fever. Negative for appetite change. HENT:  Positive for mouth sores and sore throat. Negative for congestion, rhinorrhea and sinus pressure. Eyes: Negative. Respiratory:  Positive for cough and shortness of breath. Negative for choking, chest tightness and wheezing. Cardiovascular: Negative. Negative for chest pain, palpitations and leg swelling. Gastrointestinal:  Negative for abdominal pain, constipation, diarrhea, nausea and vomiting. Endocrine: Negative. Genitourinary: Negative. Negative for difficulty urinating, dysuria, flank pain and urgency. Musculoskeletal:  Positive for myalgias (diffuse). Skin: Negative. Neurological: Negative. Negative for dizziness, speech difficulty, weakness, light-headedness, numbness and headaches. Psychiatric/Behavioral: Negative. All other systems reviewed and are negative. Physical Exam   Physical Exam  Vitals and nursing note reviewed. Constitutional:       General: She is not in acute distress. Appearance: She is well-developed. She is obese. She is not diaphoretic. HENT:      Head: Normocephalic and atraumatic.       Mouth/Throat:      Mouth: Mucous membranes are moist.      Pharynx: Uvula midline. No pharyngeal swelling or oropharyngeal exudate. Posterior oropharyngeal erythema: mild. Tonsils: No tonsillar exudate or tonsillar abscesses. Comments: No bleeding of the tongue noted     Eyes:      Conjunctiva/sclera: Conjunctivae normal.      Pupils: Pupils are equal, round, and reactive to light. Neck:      Vascular: No JVD. Trachea: No tracheal deviation. Cardiovascular:      Rate and Rhythm: Normal rate and regular rhythm. Heart sounds: Normal heart sounds. No murmur heard. Pulmonary:      Effort: Pulmonary effort is normal. No respiratory distress. Breath sounds: Normal breath sounds. No stridor. No wheezing or rales. Abdominal:      General: There is no distension. Palpations: Abdomen is soft. Tenderness: There is no abdominal tenderness. There is no guarding or rebound. Musculoskeletal:         General: No tenderness. Normal range of motion. Cervical back: Normal range of motion and neck supple. Skin:     General: Skin is warm and dry. Capillary Refill: Capillary refill takes less than 2 seconds. Neurological:      Mental Status: She is alert and oriented to person, place, and time. Cranial Nerves: No cranial nerve deficit.       Comments: No gross motor or sensory deficits    Psychiatric:         Behavior: Behavior normal.      Comments: Flat affect         Diagnostic Study Results     Labs -     Recent Results (from the past 12 hour(s))   STREP AG SCREEN, GROUP A    Collection Time: 10/04/22  6:28 AM    Specimen: Swab; Throat   Result Value Ref Range    Group A Strep Ag ID Negative NEG     COVID-19 RAPID TEST    Collection Time: 10/04/22  6:28 AM   Result Value Ref Range    Specimen source Nasopharyngeal      COVID-19 rapid test Not detected NOTD     MONONUCLEOSIS SCREEN    Collection Time: 10/04/22  6:28 AM   Result Value Ref Range    Mononucleosis screen Negative NEG     INFLUENZA A+B VIRAL AGS    Collection Time: 10/04/22  6:28 AM   Result Value Ref Range    Influenza A Antigen Negative NEG      Influenza B Antigen Negative NEG         Radiologic Studies -   XR CHEST PORT   Final Result      No acute process. CT Results  (Last 48 hours)      None          CXR Results  (Last 48 hours)                 10/04/22 0656  XR CHEST PORT Final result    Impression:      No acute process. Narrative:  EXAM:  XR CHEST PORT       INDICATION: Cough       COMPARISON: 9/22/2022       TECHNIQUE: Portable AP upright chest view at 0654 hours       FINDINGS: The cardiomediastinal and hilar contours are within normal limits. The   pulmonary vasculature is within normal limits. The lungs and pleural spaces are clear. There is no pneumothorax. The visualized   bones and upper abdomen are age-appropriate. Medical Decision Making   I am the first provider for this patient. I reviewed the vital signs, available nursing notes, past medical history, past surgical history, family history and social history. Vital Signs-Reviewed the patient's vital signs. Patient Vitals for the past 12 hrs:   Temp Pulse Resp BP SpO2   10/04/22 0724 -- 64 18 124/80 96 %   10/04/22 0615 98 °F (36.7 °C) 73 18 138/84 100 %         Records Reviewed: Nursing Notes, Old Medical Records, Previous Radiology Studies, and Previous Laboratory Studies, Pt last ED visit 9/22/22 for syncope, Pt with recent PCP visit for bronchitis on 9/6 and was seen yesterday by PCP for cough. Pt had Doxy for 1st and just prescribed Augmentin, and Robitussin with Codeine    Provider Notes (Medical Decision Making):   DDx- Strep, COVID, Flu, Mono, Thrush, glossitis    ED Course:   Initial assessment performed. The patients presenting problems have been discussed, and they are in agreement with the care plan formulated and outlined with them. I have encouraged them to ask questions as they arise throughout their visit.        Labs re-assuring, Flu, Strep, mono negative, pt concerned that she has thrush, she states she has had thrush before. Will prescribed Nystatin for her. Pt dc home. Disposition:    DC- Adult Discharges: All of the diagnostic tests were reviewed and questions answered. Diagnosis, care plan and treatment options were discussed. The patient understands the instructions and will follow up as directed. The patients results have been reviewed with them. They have been counseled regarding their diagnosis. The patient verbally convey understanding and agreement of the signs, symptoms, diagnosis, treatment and prognosis and additionally agrees to follow up as recommended with their PCP in 24 - 48 hours. They also agree with the care-plan and convey that all of their questions have been answered. I have also put together some discharge instructions for them that include: 1) educational information regarding their diagnosis, 2) how to care for their diagnosis at home, as well a 3) list of reasons why they would want to return to the ED prior to their follow-up appointment, should their condition change. DISCHARGE PLAN:  1. Discharge Medication List as of 10/4/2022  8:29 AM        START taking these medications    Details   lidocaine (XYLOCAINE) 2 % solution Take 10 mL by mouth as needed for Pain., Normal, Disp-100 mL, R-0      nystatin (MYCOSTATIN) 100,000 unit/mL suspension Take 5 mL by mouth four (4) times daily for 7 days. swish and spit, Normal, Disp-140 mL, R-0           CONTINUE these medications which have NOT CHANGED    Details   amoxicillin-clavulanate (AUGMENTIN) 875-125 mg per tablet Take 1 Tablet by mouth every twelve (12) hours for 7 days. , Normal, Disp-14 Tablet, R-0      guaiFENesin-codeine (ROBITUSSIN AC) 100-10 mg/5 mL solution Take 5 mL by mouth three (3) times daily as needed for Cough for up to 3 days.  Max Daily Amount: 15 mL., Normal, Disp-118 mL, R-0      benzonatate (TESSALON) 100 mg capsule Take 1 Capsule by mouth three (3) times daily as needed for Cough for up to 7 days. , Normal, Disp-30 Capsule, R-0      ondansetron hcl (ZOFRAN) 4 mg tablet TAKE 1 TABLET BY MOUTH EVERY EIGHT (8) HOURS AS NEEDED FOR NAUSEA OR NAUSEA OR VOMITING., Normal, Disp-20 Tablet, R-0      ALPRAZolam (XANAX) 1 mg tablet Take 1 Tablet by mouth two (2) times daily as needed for Anxiety. Max Daily Amount: 2 mg., Normal, Disp-45 Tablet, R-0      codeine-butalbital-acetaminophen-caffeine (FIORICET WITH CODEINE) -92-30 mg capsule TAKE 1 CAPSULE BY MOUTH EVERY 4 HOURS AS NEEDED FOR HEADACHE FOR 10 DAYS AS NEEDED, Historical Med      tiZANidine (ZANAFLEX) 4 mg tablet TAKE 1 TABLET BY MOUTH THREE TIMES A DAY AS NEEDED FOR PAIN, Normal, Disp-30 Tablet, R-0      mirtazapine (REMERON) 30 mg tablet TAKE 1 TABLET BY MOUTH EVERYDAY AT BEDTIME, Normal, Disp-30 Tablet, R-5      omeprazole (PRILOSEC) 20 mg capsule Take 1 Capsule by mouth daily. , Normal, Disp-30 Capsule, R-5      DULoxetine (CYMBALTA) 60 mg capsule TAKE 2 CAPSULES BY MOUTH EVERY DAY, Normal, Disp-30 Capsule, R-5      Symbicort 160-4.5 mcg/actuation HFAA INHALE 2 PUFFS BY MOUTH TWICE A DAY, Historical Med, YARELY      propranoloL (INDERAL) 10 mg tablet TAKE 1 TABLET BY MOUTH TWICE A DAY, Normal, Disp-180 Tablet, R-3      pregabalin (LYRICA) 50 mg capsule Take 50 mg by mouth two (2) times a day., Historical Med      albuterol (PROVENTIL HFA, VENTOLIN HFA, PROAIR HFA) 90 mcg/actuation inhaler Take 1 Puff by inhalation every six (6) hours as needed for Wheezing., Normal, Disp-1 Each, R-1      furosemide (LASIX) 20 mg tablet TAKE 1 TABLET BY MOUTH AS NEEDED (SWELLING). , Normal, Disp-30 Tablet, R-2      belimumab (Benlysta) 400 mg solr injection by IntraVENous route. Every 8 weeks, Historical Med           2.    Follow-up Information       Follow up With Specialties Details Why Contact Info    Robina Guerrero MD Internal Medicine Physician  As needed [address] [place]  [phone]            3. Return to ED if worse     Diagnosis     Clinical Impression:   1. Acute pharyngitis, unspecified etiology    2. Myalgia        Attestations:    Audrey Velazquez, DO        Please note that this dictation was completed with [employer], the computer voice recognition software. Quite often unanticipated grammatical, syntax, homophones, and other interpretive errors are inadvertently transcribed by the computer software. Please disregard these errors. Please excuse any errors that have escaped final proofreading. Thank you.

## 2022-10-06 LAB
BACTERIA SPEC CULT: NORMAL
SERVICE CMNT-IMP: NORMAL

## 2022-10-07 DIAGNOSIS — R05.1 ACUTE COUGH: ICD-10-CM

## 2022-10-09 ENCOUNTER — TELEPHONE (OUTPATIENT)
Dept: INTERNAL MEDICINE CLINIC | Age: 37
End: 2022-10-09

## 2022-10-09 DIAGNOSIS — R05.1 ACUTE COUGH: Primary | ICD-10-CM

## 2022-10-09 DIAGNOSIS — J40 BRONCHITIS: ICD-10-CM

## 2022-10-09 RX ORDER — METHYLPREDNISOLONE 4 MG/1
4 TABLET ORAL
Qty: 1 DOSE PACK | Refills: 0 | Status: SHIPPED | OUTPATIENT
Start: 2022-10-09 | End: 2022-10-14

## 2022-10-09 RX ORDER — HYDROCODONE BITARTRATE AND HOMATROPINE METHYLBROMIDE ORAL SOLUTION 5; 1.5 MG/5ML; MG/5ML
5 LIQUID ORAL
Qty: 120 ML | Refills: 0 | Status: SHIPPED | OUTPATIENT
Start: 2022-10-09 | End: 2022-10-14 | Stop reason: SDUPTHER

## 2022-10-09 NOTE — TELEPHONE ENCOUNTER
Patient called. Saw Dr Aaron Granado, treated for cough and pharyngitis. Given augmentin and michael AC. Seen in ED CXR negative. Took tessalon, no relief. Cough is less productive, but is coughing a lot. Some chest congestion. Has asthma. On symbicort and albuterol.    RX: medrol and hycodan cough syrup

## 2022-10-10 RX ORDER — CODEINE PHOSPHATE AND GUAIFENESIN 10; 100 MG/5ML; MG/5ML
SOLUTION ORAL
Qty: 118 ML | Refills: 0 | Status: SHIPPED | OUTPATIENT
Start: 2022-10-10 | End: 2022-10-14 | Stop reason: SDUPTHER

## 2022-10-11 DIAGNOSIS — F41.9 ANXIETY: ICD-10-CM

## 2022-10-12 RX ORDER — ALPRAZOLAM 1 MG/1
1 TABLET ORAL
Qty: 45 TABLET | Refills: 0 | Status: SHIPPED | OUTPATIENT
Start: 2022-10-12

## 2022-10-12 NOTE — TELEPHONE ENCOUNTER
Future Appointments:  No future appointments. Last Appointment With Me:  9/6/2022     Requested Prescriptions     Pending Prescriptions Disp Refills    ALPRAZolam (XANAX) 1 mg tablet 45 Tablet 0     Sig: Take 1 Tablet by mouth two (2) times daily as needed for Anxiety. Max Daily Amount: 2 mg.

## 2022-10-14 ENCOUNTER — VIRTUAL VISIT (OUTPATIENT)
Dept: INTERNAL MEDICINE CLINIC | Age: 37
End: 2022-10-14
Payer: MEDICAID

## 2022-10-14 ENCOUNTER — TELEPHONE (OUTPATIENT)
Dept: INTERNAL MEDICINE CLINIC | Age: 37
End: 2022-10-14

## 2022-10-14 DIAGNOSIS — R05.1 ACUTE COUGH: ICD-10-CM

## 2022-10-14 DIAGNOSIS — J40 BRONCHITIS: ICD-10-CM

## 2022-10-14 DIAGNOSIS — J40 BRONCHITIS: Primary | ICD-10-CM

## 2022-10-14 PROCEDURE — 99441 PR PHYS/QHP TELEPHONE EVALUATION 5-10 MIN: CPT | Performed by: FAMILY MEDICINE

## 2022-10-14 RX ORDER — CODEINE PHOSPHATE AND GUAIFENESIN 10; 100 MG/5ML; MG/5ML
5 SOLUTION ORAL
Qty: 118 ML | Refills: 0 | Status: SHIPPED | OUTPATIENT
Start: 2022-10-14 | End: 2022-10-22

## 2022-10-14 RX ORDER — ALPRAZOLAM 1 MG/1
1 TABLET ORAL
Qty: 45 TABLET | Refills: 0 | Status: SHIPPED | OUTPATIENT
Start: 2022-10-14

## 2022-10-14 RX ORDER — HYDROCODONE BITARTRATE AND HOMATROPINE METHYLBROMIDE ORAL SOLUTION 5; 1.5 MG/5ML; MG/5ML
5 LIQUID ORAL
Qty: 120 ML | Refills: 0 | Status: SHIPPED | OUTPATIENT
Start: 2022-10-14 | End: 2022-10-20

## 2022-10-14 RX ORDER — LEVOFLOXACIN 500 MG/1
500 TABLET, FILM COATED ORAL DAILY
Qty: 7 TABLET | Refills: 0 | Status: SHIPPED | OUTPATIENT
Start: 2022-10-14

## 2022-10-14 RX ORDER — HYDROCODONE BITARTRATE AND HOMATROPINE METHYLBROMIDE ORAL SOLUTION 5; 1.5 MG/5ML; MG/5ML
5 LIQUID ORAL
Qty: 120 ML | Refills: 0 | Status: CANCELLED | OUTPATIENT
Start: 2022-10-14 | End: 2022-10-20

## 2022-10-14 NOTE — TELEPHONE ENCOUNTER
Left message concerning her recent request for a prescription which was denied by Dr. Abisai Pineda.

## 2022-10-14 NOTE — TELEPHONE ENCOUNTER
----- Message from Darrion Morley sent at 10/14/2022  9:18 AM EDT -----  Subject: Refill Request    QUESTIONS  Name of Medication? HYDROcodone-homatropine (Hycodan, with homatropine,)   5-1.5 mg/5 mL oral solution  Patient-reported dosage and instructions? teaspoon as needed  How many days do you have left? 1  Preferred Pharmacy? [facility]ACY #[id number]  Pharmacy phone number (if available)? [phone]  Additional Information for Provider? Patient has called over to the   pharmacy and they state they do not have it, status shows as needing a new   prescription . Please call and let patient know when it has been sent as   she will as of tomorrow. ---------------------------------------------------------------------------  --------------  Jerald GARCIA  What is the best way for the office to contact you? OK to leave message on   voicemail  Preferred Call Back Phone Number? [phone]  ---------------------------------------------------------------------------  --------------  SCRIPT ANSWERS  Relationship to Patient?  Self

## 2022-10-14 NOTE — TELEPHONE ENCOUNTER
Future Appointments:  No future appointments. Last Appointment With Me:  9/6/2022     Requested Prescriptions     Pending Prescriptions Disp Refills    HYDROcodone-homatropine (Hycodan, with homatropine,) 5-1.5 mg/5 mL oral solution 120 mL 0     Sig: Take 5 mL by mouth four (4) times daily as needed for Cough for up to 6 days. Max Daily Amount: 20 mL.

## 2022-10-14 NOTE — TELEPHONE ENCOUNTER
Pt states that the cough medication that was called in for  her does not work. She thinks it was sent in by accident. Pt states this is the medication she is trying to get for her cough.   Can this medication be called in for pt?

## 2022-10-14 NOTE — PROGRESS NOTES
1. \"Have you been to the ER, urgent care clinic since your last visit? Hospitalized since your last visit? \" No    2. \"Have you seen or consulted any other health care providers outside of the [address] [place] [address] since your last visit? \" No     3. For patients aged 39-70: Has the patient had a colonoscopy / FIT/ Cologuard? NA - based on age      If the patient is female:    4. For patients aged 41-77: Has the patient had a mammogram within the past 2 years? NA - based on age or sex      11. For patients aged 21-65: Has the patient had a pap smear?  No

## 2022-10-14 NOTE — PROGRESS NOTES
Vernell Velazquez is a 43 y.o. female patient Dr. Srini Lackey who presents with URI symptoms. Saw Dr Jareth Paz on 10/3 with cough and sore throat. Cough productive yellow. Chest congestion and wheezing. Took michael AC and augmentin. Some improvement. Took medrol on 10/9. Causes BP to elevate. On symbicort 2 puffs BID, using albuterol 2-3 times a day. This is an established visit conducted via telemedicine, by phone. The patient has been instructed that this meets HIPAA criteria and acknowledges and agrees to this method of visitation. Pursuant to the emergency declaration under the [facility], [facility] waiver authority and the [employer] Act, this Virtual Visit was conducted, with patient's consent, to reduce the patient's risk of exposure to COVID-19 and provide continuity of care for an established patient. Services were provided by phone to substitute for in-person clinic visit.        Past Medical History:   Diagnosis Date    Abnormal CT of the abdomen 11/8/2012    Asthma     Autoimmune disease (HCC)     lupus    C. difficile diarrhea     Cervical prolapse     Dehydration     Mariah-Danlos syndrome     Gastrointestinal disorder     gerd, twisted colon, IBS    GERD (gastroesophageal reflux disease)     Headache(784.0)     Lupus (HCC)     Morbid obesity (HCC)     Murmur     Nausea & vomiting     Neurological disorder     cluster headaches    Occipital neuralgia     other 2006, 2009    ovarian cyst removal    Other ill-defined conditions(799.89)     Syncope     Worsening headaches        Family History   Problem Relation Age of Onset    Colon Cancer Maternal Grandfather        Social History     Socioeconomic History    Marital status: LEGALLY      Spouse name: Not on file    Number of children: Not on file    Years of education: Not on file    Highest education level: Not on file   Occupational History    Not on file   Tobacco Use    Smoking status: Never    Smokeless tobacco: Never   Vaping Use    Vaping Use: Never used   Substance and Sexual Activity    Alcohol use: No    Drug use: No    Sexual activity: Not Currently     Partners: Male     Birth control/protection: Abstinence   Other Topics Concern    Not on file   Social History Narrative    Not on file     Social Determinants of Health     Financial Resource Strain: Not on file   Food Insecurity: Not on file   Transportation Needs: Not on file   Physical Activity: Not on file   Stress: Not on file   Social Connections: Not on file   Intimate Partner Violence: Not on file   Housing Stability: Not on file       Current Outpatient Medications on File Prior to Visit   Medication Sig Dispense Refill    ALPRAZolam (XANAX) 1 mg tablet Take 1 Tablet by mouth two (2) times daily as needed for Anxiety. Max Daily Amount: 2 mg. 45 Tablet 0    guaiFENesin-codeine (ROBITUSSIN AC) 100-10 mg/5 mL solution TAKE 1 TEASPOONFUL (5ML) BY MOUTH THREE TIMES DAILY AS NEEDED FOR COUGH 118 mL 0    methylPREDNISolone (MEDROL DOSEPACK) 4 mg tablet Take 1 Tablet by mouth Specific Days and Specific Times. 1 Dose Pack 0    HYDROcodone-homatropine (Hycodan, with homatropine,) 5-1.5 mg/5 mL oral solution Take 5 mL by mouth four (4) times daily as needed for Cough for up to 6 days. Max Daily Amount: 20 mL. 120 mL 0    lidocaine (XYLOCAINE) 2 % solution Take 10 mL by mouth as needed for Pain. 100 mL 0    ondansetron hcl (ZOFRAN) 4 mg tablet TAKE 1 TABLET BY MOUTH EVERY EIGHT (8) HOURS AS NEEDED FOR NAUSEA OR NAUSEA OR VOMITING.  20 Tablet 0    codeine-butalbital-acetaminophen-caffeine (FIORICET WITH CODEINE) -64-30 mg capsule TAKE 1 CAPSULE BY MOUTH EVERY 4 HOURS AS NEEDED FOR HEADACHE FOR 10 DAYS AS NEEDED      tiZANidine (ZANAFLEX) 4 mg tablet TAKE 1 TABLET BY MOUTH THREE TIMES A DAY AS NEEDED FOR PAIN 30 Tablet 0    mirtazapine (REMERON) 30 mg tablet TAKE 1 TABLET BY MOUTH EVERYDAY AT BEDTIME 30 Tablet 5    omeprazole (PRILOSEC) 20 mg capsule Take 1 Capsule by mouth daily. 30 Capsule 5    DULoxetine (CYMBALTA) 60 mg capsule TAKE 2 CAPSULES BY MOUTH EVERY DAY 30 Capsule 5    Symbicort 160-4.5 mcg/actuation HFAA INHALE 2 PUFFS BY MOUTH TWICE A DAY      propranoloL (INDERAL) 10 mg tablet TAKE 1 TABLET BY MOUTH TWICE A  Tablet 3    pregabalin (LYRICA) 50 mg capsule Take 50 mg by mouth two (2) times a day. albuterol (PROVENTIL HFA, VENTOLIN HFA, PROAIR HFA) 90 mcg/actuation inhaler Take 1 Puff by inhalation every six (6) hours as needed for Wheezing. 1 Each 1    furosemide (LASIX) 20 mg tablet TAKE 1 TABLET BY MOUTH AS NEEDED (SWELLING). 30 Tablet 2    belimumab (Benlysta) 400 mg solr injection by IntraVENous route. Every 8 weeks       No current facility-administered medications on file prior to visit. Review of Systems  Pertinent items are noted in HPI. Objective:     Gen: mildly ill sounding female, active cough  HEENT:  audible congestion, patient does not see oral erythema and sees MMM  Neck: patient does not feel enlarged or tender LAD or masses  Resp: normal respiratory effort, patient notes audible wheezing. CV: patient does not feel palpitations or heart irregularity  Neuro: Alert and oriented, able to answer questions without difficulty    Assessment/Plan:       ICD-10-CM ICD-9-CM    1. Bronchitis  J40 490 guaiFENesin-codeine (ROBITUSSIN AC) 100-10 mg/5 mL solution      levoFLOXacin (LEVAQUIN) 500 mg tablet      2. Acute cough  R05.1 786.2 guaiFENesin-codeine (ROBITUSSIN AC) 100-10 mg/5 mL solution      Continue Symbicort twice daily, albuterol as needed. Repeat antibiotic course. Refill cough medication. Patient advised if not improved we will need to begin a steroid course. This was a telemedicine visit by phone, duration 7 minutes.          Leann Corley MD

## 2022-10-20 DIAGNOSIS — J40 BRONCHITIS: ICD-10-CM

## 2022-10-20 DIAGNOSIS — R05.1 ACUTE COUGH: ICD-10-CM

## 2022-10-20 RX ORDER — HYDROCODONE BITARTRATE AND HOMATROPINE METHYLBROMIDE ORAL SOLUTION 5; 1.5 MG/5ML; MG/5ML
5 LIQUID ORAL
Qty: 120 ML | Refills: 0 | OUTPATIENT
Start: 2022-10-20 | End: 2022-10-26

## 2022-10-20 NOTE — TELEPHONE ENCOUNTER
running a Fever Last night of 103 from her Persistent symptoms. She is coughing and still feeling horrible. Can you please send in cough medication, and a steroid.

## 2022-10-21 ENCOUNTER — TELEPHONE (OUTPATIENT)
Dept: INTERNAL MEDICINE CLINIC | Age: 37
End: 2022-10-21

## 2022-10-21 NOTE — TELEPHONE ENCOUNTER
----- Message from Gilda Caballero sent at 10/21/2022  8:32 AM EDT -----  Subject: Message to Provider    QUESTIONS  Information for Provider? Patient wants to inform Dr. Danielle Mulligan that she had   went to the ER, had a chest CT, and showed pleurisy   ---------------------------------------------------------------------------  --------------  Donita GARCIA  [phone]; OK to leave message on voicemail  ---------------------------------------------------------------------------  --------------  SCRIPT ANSWERS  Relationship to Patient?  Self

## 2022-10-25 NOTE — ROUTINE PROCESS
Bedside and Verbal shift change report given to sadia diaz (oncoming nurse) by Josseline Yang (offgoing nurse). Report included the following information SBAR, Kardex, Intake/Output, MAR and Recent Results. Yes

## 2022-10-31 RX ORDER — DULOXETIN HYDROCHLORIDE 60 MG/1
CAPSULE, DELAYED RELEASE ORAL
Qty: 30 CAPSULE | Refills: 5 | Status: SHIPPED | OUTPATIENT
Start: 2022-10-31

## 2022-11-05 DIAGNOSIS — R11.0 NAUSEA: ICD-10-CM

## 2022-11-05 RX ORDER — ONDANSETRON 4 MG/1
TABLET, FILM COATED ORAL
Qty: 20 TABLET | Refills: 0 | Status: SHIPPED | OUTPATIENT
Start: 2022-11-05

## 2022-11-22 RX ORDER — MECLIZINE HYDROCHLORIDE 25 MG/1
TABLET ORAL
Qty: 30 TABLET | Refills: 0 | Status: SHIPPED | OUTPATIENT
Start: 2022-11-22

## 2022-12-25 DIAGNOSIS — K21.9 GASTROESOPHAGEAL REFLUX DISEASE, UNSPECIFIED WHETHER ESOPHAGITIS PRESENT: ICD-10-CM

## 2022-12-25 RX ORDER — OMEPRAZOLE 20 MG/1
CAPSULE, DELAYED RELEASE ORAL
Qty: 30 CAPSULE | Refills: 5 | Status: SHIPPED | OUTPATIENT
Start: 2022-12-25

## 2022-12-28 ENCOUNTER — DOCUMENTATION ONLY (OUTPATIENT)
Dept: INTERNAL MEDICINE CLINIC | Age: 37
End: 2022-12-28

## 2022-12-28 NOTE — PROGRESS NOTES
Prior Auth for Omeprazole completed through [employer], Key: [id number], Dates: 12/28/2022 - 12/28/2023  PA# [id number] [place] ([address]) [id number] KT

## 2023-01-21 ENCOUNTER — TELEPHONE (OUTPATIENT)
Dept: INTERNAL MEDICINE CLINIC | Age: 38
End: 2023-01-21

## 2023-01-21 DIAGNOSIS — N20.0 KIDNEY STONE: ICD-10-CM

## 2023-01-21 DIAGNOSIS — R52 PAIN: ICD-10-CM

## 2023-01-21 RX ORDER — HYDROCODONE BITARTRATE AND ACETAMINOPHEN 5; 325 MG/1; MG/1
1 TABLET ORAL
Qty: 8 TABLET | Refills: 0 | Status: SHIPPED | OUTPATIENT
Start: 2023-01-21 | End: 2023-01-24

## 2023-01-22 NOTE — TELEPHONE ENCOUNTER
She called regarding worry about kidney stone, as she has had these before. She has developed similar symptoms, including urgency, frequency, and pain with urination. She would like some pain medication, so she doesn't have to go to ER. PLAN:   Orders Placed This Encounter    HYDROcodone-acetaminophen (NORCO) 5-325 mg per tablet     Sig: Take 1 Tablet by mouth every eight (8) hours as needed for Pain for up to 3 days. Max Daily Amount: 3 Tablets. Dispense:  8 Tablet     Refill:  0     If sx worsen, go to ER. Otherwise follow up with Dr. Dany aBi.      BJF

## 2023-01-24 DIAGNOSIS — N20.0 KIDNEY STONE: ICD-10-CM

## 2023-01-24 DIAGNOSIS — R52 PAIN: ICD-10-CM

## 2023-01-24 RX ORDER — MIRTAZAPINE 30 MG/1
TABLET, FILM COATED ORAL
Qty: 30 TABLET | Refills: 5 | Status: SHIPPED | OUTPATIENT
Start: 2023-01-24

## 2023-01-24 NOTE — TELEPHONE ENCOUNTER
Future Appointments:  No future appointments. Last Appointment With Me:  10/3/2022     Requested Prescriptions     Pending Prescriptions Disp Refills    HYDROcodone-acetaminophen (NORCO) 5-325 mg per tablet 8 Tablet 0     Sig: Take 1 Tablet by mouth every eight (8) hours as needed for Pain for up to 3 days. Max Daily Amount: 3 Tablets.

## 2023-01-25 ENCOUNTER — TELEPHONE (OUTPATIENT)
Dept: INTERNAL MEDICINE CLINIC | Age: 38
End: 2023-01-25

## 2023-01-25 RX ORDER — HYDROCODONE BITARTRATE AND ACETAMINOPHEN 5; 325 MG/1; MG/1
1 TABLET ORAL
Qty: 8 TABLET | Refills: 0 | Status: SHIPPED | OUTPATIENT
Start: 2023-01-25 | End: 2023-01-25 | Stop reason: CLARIF

## 2023-01-25 RX ORDER — HYDROCODONE BITARTRATE AND ACETAMINOPHEN 5; 325 MG/1; MG/1
1 TABLET ORAL
Qty: 8 TABLET | Refills: 0 | Status: SHIPPED | OUTPATIENT
Start: 2023-01-25 | End: 2023-01-25 | Stop reason: SDUPTHER

## 2023-01-25 RX ORDER — HYDROCODONE BITARTRATE AND ACETAMINOPHEN 5; 325 MG/1; MG/1
1 TABLET ORAL
Qty: 8 TABLET | Refills: 0 | OUTPATIENT
Start: 2023-01-25 | End: 2023-01-28

## 2023-01-25 NOTE — TELEPHONE ENCOUNTER
the patient called stating she had been seen in clinic for a kidney stone and requested hydrocodone    Reviewed records    No recent evaluation. She has not been in our clinic recently     Discussed office policy of no narcotics after hours. Forwarding message to pcp for review.

## 2023-01-25 NOTE — TELEPHONE ENCOUNTER
----- Message from Jesus Alberto Buitrago sent at 1/25/2023 12:14 PM EST -----  Subject: Medication Problem    Medication: HYDROcodone-acetaminophen (NORCO) 5-325 mg per tablet  Dosage: 3 TIMES A DAY  Ordering Provider: undefined    Question/Problem: [facility] [address] [facility]'t [facility].  Pt   needs a new Rx sent to [facility] on [address] MD#[id number]      Pharmacy: [facility] [address] [facility], [address] [facility]    ---------------------------------------------------------------------------  --------------  [id number] [employer]  [phone]; OK to leave message on voicemail  ---------------------------------------------------------------------------  --------------    SCRIPT ANSWERS  Relationship to Patient: Self

## 2023-01-26 DIAGNOSIS — N20.0 KIDNEY STONE: ICD-10-CM

## 2023-01-26 RX ORDER — HYDROCODONE BITARTRATE AND ACETAMINOPHEN 5; 325 MG/1; MG/1
1 TABLET ORAL
Qty: 5 TABLET | Refills: 0 | Status: SHIPPED | OUTPATIENT
Start: 2023-01-26 | End: 2023-01-29

## 2023-02-01 RX ORDER — DULOXETIN HYDROCHLORIDE 60 MG/1
CAPSULE, DELAYED RELEASE ORAL
Qty: 30 CAPSULE | Refills: 5 | Status: SHIPPED | OUTPATIENT
Start: 2023-02-01

## 2023-02-03 ENCOUNTER — HOSPITAL ENCOUNTER (EMERGENCY)
Age: 38
Discharge: HOME | End: 2023-02-03
Attending: EMERGENCY MEDICINE
Payer: MEDICAID

## 2023-02-03 ENCOUNTER — APPOINTMENT (OUTPATIENT)
Dept: GENERAL RADIOLOGY | Age: 38
End: 2023-02-03
Attending: STUDENT IN AN ORGANIZED HEALTH CARE EDUCATION/TRAINING PROGRAM
Payer: MEDICAID

## 2023-02-03 VITALS
TEMPERATURE: 98.1 F | DIASTOLIC BLOOD PRESSURE: 103 MMHG | SYSTOLIC BLOOD PRESSURE: 150 MMHG | RESPIRATION RATE: 20 BRPM | OXYGEN SATURATION: 96 % | BODY MASS INDEX: 50.05 KG/M2 | WEIGHT: 272 LBS | HEIGHT: 62 IN | HEART RATE: 91 BPM

## 2023-02-03 DIAGNOSIS — R07.9 LEFT-SIDED CHEST PAIN: Primary | ICD-10-CM

## 2023-02-03 LAB
ALBUMIN SERPL-MCNC: 4 G/DL (ref 3.5–5)
ALBUMIN/GLOB SERPL: 1.1 (ref 1.1–2.2)
ALP SERPL-CCNC: 160 U/L (ref 45–117)
ALT SERPL-CCNC: 22 U/L (ref 12–78)
ANION GAP SERPL CALC-SCNC: 5 MMOL/L (ref 5–15)
AST SERPL-CCNC: 8 U/L (ref 15–37)
ATRIAL RATE: 75 BPM
BASOPHILS # BLD: 0.1 K/UL (ref 0–0.1)
BASOPHILS NFR BLD: 1 % (ref 0–1)
BILIRUB SERPL-MCNC: 0.2 MG/DL (ref 0.2–1)
BNP SERPL-MCNC: 14 PG/ML
BUN SERPL-MCNC: 13 MG/DL (ref 6–20)
BUN/CREAT SERPL: 14 (ref 12–20)
CALCIUM SERPL-MCNC: 9.4 MG/DL (ref 8.5–10.1)
CALCULATED P AXIS, ECG09: 39 DEGREES
CALCULATED R AXIS, ECG10: 53 DEGREES
CALCULATED T AXIS, ECG11: 20 DEGREES
CHLORIDE SERPL-SCNC: 107 MMOL/L (ref 97–108)
CO2 SERPL-SCNC: 26 MMOL/L (ref 21–32)
CREAT SERPL-MCNC: 0.94 MG/DL (ref 0.55–1.02)
DIAGNOSIS, 93000: NORMAL
DIFFERENTIAL METHOD BLD: ABNORMAL
EOSINOPHIL # BLD: 0.8 K/UL (ref 0–0.4)
EOSINOPHIL NFR BLD: 7 % (ref 0–7)
ERYTHROCYTE [DISTWIDTH] IN BLOOD BY AUTOMATED COUNT: 13.1 % (ref 11.5–14.5)
GLOBULIN SER CALC-MCNC: 3.5 G/DL (ref 2–4)
GLUCOSE SERPL-MCNC: 119 MG/DL (ref 65–100)
HCT VFR BLD AUTO: 36.8 % (ref 35–47)
HGB BLD-MCNC: 12.1 G/DL (ref 11.5–16)
IMM GRANULOCYTES # BLD AUTO: 0 K/UL (ref 0–0.04)
IMM GRANULOCYTES NFR BLD AUTO: 0 % (ref 0–0.5)
LYMPHOCYTES # BLD: 3.5 K/UL (ref 0.8–3.5)
LYMPHOCYTES NFR BLD: 33 % (ref 12–49)
MCH RBC QN AUTO: 28.9 PG (ref 26–34)
MCHC RBC AUTO-ENTMCNC: 32.9 G/DL (ref 30–36.5)
MCV RBC AUTO: 87.8 FL (ref 80–99)
MONOCYTES # BLD: 1 K/UL (ref 0–1)
MONOCYTES NFR BLD: 9 % (ref 5–13)
NEUTS SEG # BLD: 5.2 K/UL (ref 1.8–8)
NEUTS SEG NFR BLD: 50 % (ref 32–75)
NRBC # BLD: 0 K/UL (ref 0–0.01)
NRBC BLD-RTO: 0 PER 100 WBC
P-R INTERVAL, ECG05: 170 MS
PLATELET # BLD AUTO: 391 K/UL (ref 150–400)
PMV BLD AUTO: 9.1 FL (ref 8.9–12.9)
POTASSIUM SERPL-SCNC: 3.5 MMOL/L (ref 3.5–5.1)
PROT SERPL-MCNC: 7.5 G/DL (ref 6.4–8.2)
Q-T INTERVAL, ECG07: 358 MS
QRS DURATION, ECG06: 82 MS
QTC CALCULATION (BEZET), ECG08: 399 MS
RBC # BLD AUTO: 4.19 M/UL (ref 3.8–5.2)
SODIUM SERPL-SCNC: 138 MMOL/L (ref 136–145)
TROPONIN I SERPL HS-MCNC: 4 NG/L (ref 0–51)
TROPONIN I SERPL HS-MCNC: 5 NG/L (ref 0–51)
VENTRICULAR RATE, ECG03: 75 BPM
WBC # BLD AUTO: 10.5 K/UL (ref 3.6–11)

## 2023-02-03 PROCEDURE — 74011250637 HC RX REV CODE- 250/637: Performed by: EMERGENCY MEDICINE

## 2023-02-03 PROCEDURE — 96375 TX/PRO/DX INJ NEW DRUG ADDON: CPT

## 2023-02-03 PROCEDURE — 74011000250 HC RX REV CODE- 250: Performed by: EMERGENCY MEDICINE

## 2023-02-03 PROCEDURE — 99285 EMERGENCY DEPT VISIT HI MDM: CPT

## 2023-02-03 PROCEDURE — 36415 COLL VENOUS BLD VENIPUNCTURE: CPT

## 2023-02-03 PROCEDURE — 96374 THER/PROPH/DIAG INJ IV PUSH: CPT

## 2023-02-03 PROCEDURE — 84484 ASSAY OF TROPONIN QUANT: CPT

## 2023-02-03 PROCEDURE — 83880 ASSAY OF NATRIURETIC PEPTIDE: CPT

## 2023-02-03 PROCEDURE — 85025 COMPLETE CBC W/AUTO DIFF WBC: CPT

## 2023-02-03 PROCEDURE — 80053 COMPREHEN METABOLIC PANEL: CPT

## 2023-02-03 PROCEDURE — 71046 X-RAY EXAM CHEST 2 VIEWS: CPT

## 2023-02-03 PROCEDURE — 74011250636 HC RX REV CODE- 250/636: Performed by: EMERGENCY MEDICINE

## 2023-02-03 RX ORDER — METHOCARBAMOL 750 MG/1
750 TABLET, FILM COATED ORAL
Qty: 20 TABLET | Refills: 0 | Status: SHIPPED | OUTPATIENT
Start: 2023-02-03

## 2023-02-03 RX ORDER — ONDANSETRON 2 MG/ML
4 INJECTION INTRAMUSCULAR; INTRAVENOUS ONCE
Status: COMPLETED | OUTPATIENT
Start: 2023-02-03 | End: 2023-02-03

## 2023-02-03 RX ORDER — LIDOCAINE 4 G/100G
1 PATCH TOPICAL EVERY 24 HOURS
Status: DISCONTINUED | OUTPATIENT
Start: 2023-02-03 | End: 2023-02-03 | Stop reason: HOSPADM

## 2023-02-03 RX ORDER — KETOROLAC TROMETHAMINE 30 MG/ML
15 INJECTION, SOLUTION INTRAMUSCULAR; INTRAVENOUS
Status: DISCONTINUED | OUTPATIENT
Start: 2023-02-03 | End: 2023-02-03 | Stop reason: HOSPADM

## 2023-02-03 RX ORDER — METHOCARBAMOL 750 MG/1
750 TABLET, FILM COATED ORAL ONCE
Status: COMPLETED | OUTPATIENT
Start: 2023-02-03 | End: 2023-02-03

## 2023-02-03 RX ORDER — LIDOCAINE 4 G/100G
PATCH TOPICAL
Qty: 10 PATCH | Refills: 0 | Status: SHIPPED | OUTPATIENT
Start: 2023-02-03

## 2023-02-03 RX ORDER — MORPHINE SULFATE 2 MG/ML
4 INJECTION, SOLUTION INTRAMUSCULAR; INTRAVENOUS
Status: COMPLETED | OUTPATIENT
Start: 2023-02-03 | End: 2023-02-03

## 2023-02-03 RX ORDER — ONDANSETRON 2 MG/ML
INJECTION INTRAMUSCULAR; INTRAVENOUS
Status: DISCONTINUED
Start: 2023-02-03 | End: 2023-02-03 | Stop reason: HOSPADM

## 2023-02-03 RX ADMIN — MORPHINE SULFATE 4 MG: 2 INJECTION, SOLUTION INTRAMUSCULAR; INTRAVENOUS at 19:42

## 2023-02-03 RX ADMIN — METHOCARBAMOL TABLETS 750 MG: 750 TABLET, COATED ORAL at 19:42

## 2023-02-03 RX ADMIN — ONDANSETRON 4 MG: 2 INJECTION INTRAMUSCULAR; INTRAVENOUS at 19:43

## 2023-02-04 NOTE — ED PROVIDER NOTES
[facility] EMERGENCY DEPT  EMERGENCY DEPARTMENT ENCOUNTER       Pt Name: Alesia Shook  MRN: [id number]  [place] 1985  Date of evaluation: 2/3/2023  Provider: Richard Price MD   PCP: Chet Aguillon MD  Note Started: 7:28 PM 2/3/23     CHIEF COMPLAINT       Chief Complaint   Patient presents with    Chest Pain     Several hours ; pt has h/o AFib. Bp by rescue 124/93; pt took aspirin pta. Pt reports pain is radiating to her left shoulder and neck. HISTORY OF PRESENT ILLNESS: 1 or more elements      History From: Patient, spouse, History limited by: No limitations     Alesia Shook is a 42 y.o. female with history of chronic pain, high ED utilization who presents with chief complaint of left-sided chest pain. Pain onset this morning around 3 AM waking patient up from sleep. Pain is described as sharp and stabbing starting from the left side of her chest with radiation down the left upper extremity. Denies any paresthesias or numbness or weakness of the left arm. Denies any fevers, chills, shortness of breath, pleuritic pain. Denies any recent injury or trauma. She does endorse associated nausea without vomiting. Pain unfamiliar to her. She has used Tylenol, muscle relaxants at home without relief of symptoms. Nursing Notes were all reviewed and agreed with or any disagreements were addressed in the HPI. REVIEW OF SYSTEMS        Positives and Pertinent negatives as per HPI.     PAST HISTORY     Past Medical History:  Past Medical History:   Diagnosis Date    Abnormal CT of the abdomen 11/8/2012    Asthma     Autoimmune disease (HCC)     lupus    C. difficile diarrhea     Cervical prolapse     Dehydration     Mariah-Danlos syndrome     Gastrointestinal disorder     gerd, twisted colon, IBS    GERD (gastroesophageal reflux disease)     Headache(784.0)     Lupus (HCC)     Morbid obesity (HCC)     Murmur     Nausea & vomiting     Neurological disorder     cluster headaches    Occipital neuralgia     other 2006, 2009    ovarian cyst removal    Other ill-defined conditions(799.89)     Syncope     Worsening headaches        Past Surgical History:  Past Surgical History:   Procedure Laterality Date    COLONOSCOPY  9/21/2010         HX CHOLECYSTECTOMY      HX CYST INCISION AND DRAINAGE Right 02/27/2020    HX GYN  1/2009    right salpingo oopherectomy    HX GYN  2006    right ovarian tumor removed    HX HEENT      left wisdom teeth removal    HX HEENT      top right wisdom tooth removed    HX HERNIA REPAIR  4/2012    HX HERNIA REPAIR  2/28/13    Laparoscopic recurrent incisional hernia repair    HX HYSTERECTOMY      2016    HX UROLOGICAL      Kidney Stone Removal    DC EGD TRANSORAL BIOPSY SINGLE/MULTIPLE  9/22/2010            Family History:  Family History   Problem Relation Age of Onset    Colon Cancer Maternal Grandfather        Social History:  Social History     Tobacco Use    Smoking status: Never    Smokeless tobacco: Never   Vaping Use    Vaping Use: Never used   Substance Use Topics    Alcohol use: No    Drug use: No       Allergies: Allergies   Allergen Reactions    Latex Itching     burning    Compazine [Prochlorperazine] Anxiety     High heart rate  Pt can take promethazine with no problems    Sulfa (Sulfonamide Antibiotics) Unknown (comments)    Topamax [Topiramate] Unknown (comments)    Adhesive Rash     Allergic to Dermabond    Clindamycin Diarrhea     Pt states caused her C-Diff    Iodinated Contrast Media Myalgia     Pt c/o severe back spasms after IV contrast administration. Pt requesting IV pain medications and requests I put this as a drug intolerance in her EMR.     Mushroom Other (comments)    Mushroom Combination No.1 Unknown (comments)    Nsaids (Non-Steroidal Anti-Inflammatory Drug) Other (comments)     Peptic ulcer    Reglan [Metoclopramide Hcl] Rash    Valproic Acid Other (comments)     Elevated heart rate and vomiting         CURRENT MEDICATIONS      Discharge Medication List as of 2/3/2023  8:53 PM        CONTINUE these medications which have NOT CHANGED    Details   DULoxetine (CYMBALTA) 60 mg capsule TAKE 2 CAPSULES BY MOUTH EVERY DAY, Normal, Disp-30 Capsule, R-5      mirtazapine (REMERON) 30 mg tablet TAKE 1 TABLET BY MOUTH EVERYDAY AT BEDTIME, Normal, Disp-30 Tablet, R-5      omeprazole (PRILOSEC) 20 mg capsule TAKE 1 CAPSULE BY MOUTH EVERY DAY, Normal, Disp-30 Capsule, R-5      meclizine (ANTIVERT) 25 mg tablet TAKE 1 TABLET BY MOUTH THREE TIMES A DAY AS NEEDED FOR DIZZINESS FOR UP TO 10 DAYS, Normal, Disp-30 Tablet, R-0      ondansetron hcl (ZOFRAN) 4 mg tablet TAKE 1 TABLET BY MOUTH EVERY 8 HOURS AS NEEDED FOR NAUSEA AND VOMITING, Normal, Disp-20 Tablet, R-0      methylPREDNISolone (MEDROL DOSEPACK) 4 mg tablet Take 1 Tablet by mouth Specific Days and Specific Times., Normal, Disp-1 Dose Pack, R-0      levoFLOXacin (LEVAQUIN) 500 mg tablet Take 1 Tablet by mouth daily. , Normal, Disp-7 Tablet, R-0      codeine-butalbital-acetaminophen-caffeine (FIORICET WITH CODEINE) -21-30 mg capsule TAKE 1 CAPSULE BY MOUTH EVERY 4 HOURS AS NEEDED FOR HEADACHE FOR 10 DAYS AS NEEDED, Historical Med      tiZANidine (ZANAFLEX) 4 mg tablet TAKE 1 TABLET BY MOUTH THREE TIMES A DAY AS NEEDED FOR PAIN, Normal, Disp-30 Tablet, R-0      Symbicort 160-4.5 mcg/actuation HFAA INHALE 2 PUFFS BY MOUTH TWICE A DAY, Historical Med, YARELY      propranoloL (INDERAL) 10 mg tablet TAKE 1 TABLET BY MOUTH TWICE A DAY, Normal, Disp-180 Tablet, R-3      pregabalin (LYRICA) 50 mg capsule Take 50 mg by mouth two (2) times a day., Historical Med      albuterol (PROVENTIL HFA, VENTOLIN HFA, PROAIR HFA) 90 mcg/actuation inhaler Take 1 Puff by inhalation every six (6) hours as needed for Wheezing., Normal, Disp-1 Each, R-1      furosemide (LASIX) 20 mg tablet TAKE 1 TABLET BY MOUTH AS NEEDED (SWELLING). , Normal, Disp-30 Tablet, R-2      belimumab (Benlysta) 400 mg solr injection by IntraVENous route.  Every 8 weeks, Historical Med             SCREENINGS               No data recorded         PHYSICAL EXAM      ED Triage Vitals [02/03/23 1621]   ED Encounter Vitals Group      BP (!) 150/103      Pulse (Heart Rate) 91      Resp Rate 20      Temp 98.1 °F (36.7 °C)      Temp src       O2 Sat (%) 96 %      Weight 272 lb      Height 5' 2\"        Physical Exam  Vitals and nursing note reviewed. Constitutional:       Appearance: She is well-developed. She is not ill-appearing. Cardiovascular:      Rate and Rhythm: Normal rate and regular rhythm. Heart sounds: No murmur heard. Pulmonary:      Effort: Pulmonary effort is normal. No respiratory distress. Breath sounds: Normal breath sounds. Chest:      Chest wall: Tenderness (Reproducible left-sided chest wall TTP without crepitus, step-off, before that completely reproduces her pain) present. Musculoskeletal:         General: Normal range of motion. Neurological:      Mental Status: She is alert.         DIAGNOSTIC RESULTS   LABS:     Recent Results (from the past 12 hour(s))   EKG, 12 LEAD, INITIAL    Collection Time: 02/03/23  4:26 PM   Result Value Ref Range    Ventricular Rate 75 BPM    Atrial Rate 75 BPM    P-R Interval 170 ms    QRS Duration 82 ms    Q-T Interval 358 ms    QTC Calculation (Bezet) 399 ms    Calculated P Axis 39 degrees    Calculated R Axis 53 degrees    Calculated T Axis 20 degrees    Diagnosis       Normal sinus rhythm with sinus arrhythmia  Normal ECG  When compared with ECG of 12-MAR-2022 11:58,  No significant change was found     CBC WITH AUTOMATED DIFF    Collection Time: 02/03/23  4:31 PM   Result Value Ref Range    WBC 10.5 3.6 - 11.0 K/uL    RBC 4.19 3.80 - 5.20 M/uL    HGB 12.1 11.5 - 16.0 g/dL    HCT 36.8 35.0 - 47.0 %    MCV 87.8 80.0 - 99.0 FL    MCH 28.9 26.0 - 34.0 PG    MCHC 32.9 30.0 - 36.5 g/dL    RDW 13.1 11.5 - 14.5 %    PLATELET 669 007 - 989 K/uL    MPV 9.1 8.9 - 12.9 FL    NRBC 0.0 0  WBC    ABSOLUTE NRBC 0.00 0.00 - 0.01 K/uL    NEUTROPHILS 50 32 - 75 %    LYMPHOCYTES 33 12 - 49 %    MONOCYTES 9 5 - 13 %    EOSINOPHILS 7 0 - 7 %    BASOPHILS 1 0 - 1 %    IMMATURE GRANULOCYTES 0 0.0 - 0.5 %    ABS. NEUTROPHILS 5.2 1.8 - 8.0 K/UL    ABS. LYMPHOCYTES 3.5 0.8 - 3.5 K/UL    ABS. MONOCYTES 1.0 0.0 - 1.0 K/UL    ABS. EOSINOPHILS 0.8 (H) 0.0 - 0.4 K/UL    ABS. BASOPHILS 0.1 0.0 - 0.1 K/UL    ABS. IMM. GRANS. 0.0 0.00 - 0.04 K/UL    DF AUTOMATED     METABOLIC PANEL, COMPREHENSIVE    Collection Time: 02/03/23  4:31 PM   Result Value Ref Range    Sodium 138 136 - 145 mmol/L    Potassium 3.5 3.5 - 5.1 mmol/L    Chloride 107 97 - 108 mmol/L    CO2 26 21 - 32 mmol/L    Anion gap 5 5 - 15 mmol/L    Glucose 119 (H) 65 - 100 mg/dL    BUN 13 6 - 20 MG/DL    Creatinine 0.94 0.55 - 1.02 MG/DL    BUN/Creatinine ratio 14 12 - 20      eGFR >60 >60 ml/min/1.73m2    Calcium 9.4 8.5 - 10.1 MG/DL    Bilirubin, total 0.2 0.2 - 1.0 MG/DL    ALT (SGPT) 22 12 - 78 U/L    AST (SGOT) 8 (L) 15 - 37 U/L    Alk. phosphatase 160 (H) 45 - 117 U/L    Protein, total 7.5 6.4 - 8.2 g/dL    Albumin 4.0 3.5 - 5.0 g/dL    Globulin 3.5 2.0 - 4.0 g/dL    A-G Ratio 1.1 1.1 - 2.2     NT-PRO BNP    Collection Time: 02/03/23  4:31 PM   Result Value Ref Range    NT pro-BNP 14 <125 PG/ML   TROPONIN-HIGH SENSITIVITY    Collection Time: 02/03/23  4:31 PM   Result Value Ref Range    Troponin-High Sensitivity 4 0 - 51 ng/L   TROPONIN-HIGH SENSITIVITY    Collection Time: 02/03/23  6:43 PM   Result Value Ref Range    Troponin-High Sensitivity 5 0 - 51 ng/L        EKG: If performed, independent interpretation documented below in the MDM section     RADIOLOGY:  Non-plain film images such as CT, Ultrasound and MRI are read by the radiologist. Plain radiographic images are visualized and preliminarily interpreted by the ED Provider with the findings documented in the MDM section.      Interpretation per the Radiologist below, if available at the time of this note:     XR CHEST PA LAT    Result Date: 2/3/2023  EXAM: XR CHEST PA LAT INDICATION: Chest pain COMPARISON: October 4, 2022 TECHNIQUE: PA and lateral chest views FINDINGS: The cardiac size is within normal limits. The pulmonary vasculature is within normal limits. The lungs and pleural spaces are clear. The visualized bones and upper abdomen are age-appropriate. Normal PA and lateral chest views. PROCEDURES   Unless otherwise noted below, none  Procedures     CRITICAL CARE TIME   0    EMERGENCY DEPARTMENT COURSE and DIFFERENTIAL DIAGNOSIS/MDM   Vitals:    Vitals:    02/03/23 1621   BP: (!) 150/103   Pulse: 91   Resp: 20   Temp: 98.1 °F (36.7 °C)   SpO2: 96%   Weight: 123.4 kg (272 lb)   Height: 5' 2\" (1.575 m)        Patient was given the following medications:  Medications   morphine injection 4 mg (4 mg IntraVENous Given 2/3/23 1942)   methocarbamoL (ROBAXIN) tablet 750 mg (750 mg Oral Given 2/3/23 1942)   ondansetron (ZOFRAN) injection 4 mg (4 mg IntraVENous Given 2/3/23 1943)       Medical Decision Making  Amount and/or Complexity of Data Reviewed  Independent Historian: isaiah  Labs: ordered. Decision-making details documented in ED Course. Radiology: ordered and independent interpretation performed. Decision-making details documented in ED Course. ECG/medicine tests: ordered and independent interpretation performed. Decision-making details documented in ED Course. Risk  OTC drugs. Prescription drug management. Parenteral controlled substances. Risk Details: High ED utilization, multiple Rx's of narcotics/benzos and other sedative medications, Chronic pain. 75-year-old female with history of high ED utilization and chronic pain presenting to the emergency department with acute onset left-sided chest pain with radiation down the left upper extremity. Patient is afebrile and vital signs are stable. She does have reproducible chest wall TTP with palpation of the left-sided chest wall.   She has no focal neurologic deficits with full motor and sensory intact bilateral upper extremities. 2+ radial pulse. Differential diagnosis includes chest wall strain versus sprain versus spasm, costochondritis, pleurisy, ACS, PE, dissection. Symptoms do not appear consistent with PE as there is no shortness of breath or pleuritic component, symptoms appear consistent with acute dissection as she has no severe hypertension and there is no ripping tearing sensation of her pain. She is low risk for ACS by heart score (2). Reproducible chest wall pain makes musculoskeletal etiology much more likely. I will treat in the ED with IV morphine, IV Toradol, topical lidocaine patch, p.o. methocarbamol. I will evaluate with high-sensitivity troponin x2, EKG, chest x-ray, blood work, and reassess. ED Course as of 02/04/23 0012 Fri Feb 03, 2023   1858 Chest x-ray per my independent interpretation no acute process such as acute infiltrate or pneumothorax, confirmed by radiologist.   Roderick Morales   1901 I personally reviewed patient's PDMP. Patient had recent prescription for Fioricet with codeine prescribed 1/31/2023. She has had multiple prescriptions containing opiates prescribed. I will hold on further opiate prescriptions today. [AK]   1926 Patient has an active prescription of pregabalin that was just prescribed and also has an active prescription for Xanax 30-day supply that was filled on 1/6. Therefore will not prescribe any benzodiazepines or other sedatives. [AK]   1927 EKG per my interpretation normal sinus rhythm, rate 75 bpm, normal axis, no acute ischemic changes, no interval changes. [AK]      ED Course User Index  [AK] Syble Kussmaul, MD       FINAL IMPRESSION     1. Left-sided chest pain          DISPOSITION/PLAN     Discharge Note:  The patient has been re-evaluated and is ready for discharge. Reviewed available results with patient. Counseled patient on diagnosis and care plan.  Patient has expressed understanding, and all questions have been answered. Patient agrees with plan and agrees to follow up as recommended, or to return to the ED if their symptoms worsen. Discharge instructions have been provided and explained to the patient, along with reasons to return to the ED. CLINICAL IMPRESSION    1. Left-sided chest pain         DISPOSITION  Discharge     PATIENT REFERRED TO:  Follow-up Information       Follow up With Specialties Details Why Contact Info    Sagar Pacheco MD Internal Medicine Physician Schedule an appointment as soon as possible for a visit   [address] [facility]  [phone]      [facility] EMERGENCY DEPT Emergency Medicine Go to  As needed, If symptoms worsen [address] [place] [address]  [phone]              DISCHARGE MEDICATIONS:  Discharge Medication List as of 2/3/2023  8:53 PM        START taking these medications    Details   methocarbamoL (ROBAXIN) 750 mg tablet Take 1 Tablet by mouth four (4) times daily as needed for Muscle Spasm(s). , Normal, Disp-20 Tablet, R-0      lidocaine 4 % patch Apply every 12 hours as needed for pain., Normal, Disp-10 Patch, R-0           CONTINUE these medications which have NOT CHANGED    Details   DULoxetine (CYMBALTA) 60 mg capsule TAKE 2 CAPSULES BY MOUTH EVERY DAY, Normal, Disp-30 Capsule, R-5      mirtazapine (REMERON) 30 mg tablet TAKE 1 TABLET BY MOUTH EVERYDAY AT BEDTIME, Normal, Disp-30 Tablet, R-5      omeprazole (PRILOSEC) 20 mg capsule TAKE 1 CAPSULE BY MOUTH EVERY DAY, Normal, Disp-30 Capsule, R-5      meclizine (ANTIVERT) 25 mg tablet TAKE 1 TABLET BY MOUTH THREE TIMES A DAY AS NEEDED FOR DIZZINESS FOR UP TO 10 DAYS, Normal, Disp-30 Tablet, R-0      ondansetron hcl (ZOFRAN) 4 mg tablet TAKE 1 TABLET BY MOUTH EVERY 8 HOURS AS NEEDED FOR NAUSEA AND VOMITING, Normal, Disp-20 Tablet, R-0      methylPREDNISolone (MEDROL DOSEPACK) 4 mg tablet Take 1 Tablet by mouth Specific Days and Specific Times. , Normal, Disp-1 Dose Pack, R-0      levoFLOXacin (LEVAQUIN) 500 mg tablet Take 1 Tablet by mouth daily. , Normal, Disp-7 Tablet, R-0      codeine-butalbital-acetaminophen-caffeine (FIORICET WITH CODEINE) -43-30 mg capsule TAKE 1 CAPSULE BY MOUTH EVERY 4 HOURS AS NEEDED FOR HEADACHE FOR 10 DAYS AS NEEDED, Historical Med      tiZANidine (ZANAFLEX) 4 mg tablet TAKE 1 TABLET BY MOUTH THREE TIMES A DAY AS NEEDED FOR PAIN, Normal, Disp-30 Tablet, R-0      Symbicort 160-4.5 mcg/actuation HFAA INHALE 2 PUFFS BY MOUTH TWICE A DAY, Historical Med, YARELY      propranoloL (INDERAL) 10 mg tablet TAKE 1 TABLET BY MOUTH TWICE A DAY, Normal, Disp-180 Tablet, R-3      pregabalin (LYRICA) 50 mg capsule Take 50 mg by mouth two (2) times a day., Historical Med      albuterol (PROVENTIL HFA, VENTOLIN HFA, PROAIR HFA) 90 mcg/actuation inhaler Take 1 Puff by inhalation every six (6) hours as needed for Wheezing., Normal, Disp-1 Each, R-1      furosemide (LASIX) 20 mg tablet TAKE 1 TABLET BY MOUTH AS NEEDED (SWELLING). , Normal, Disp-30 Tablet, R-2      belimumab (Benlysta) 400 mg solr injection by IntraVENous route. Every 8 weeks, Historical Med               DISCONTINUED MEDICATIONS:  Discharge Medication List as of 2/3/2023  8:53 PM          I am the Primary Clinician of Record. Bria Peoples MD (electronically signed)    (Please note that parts of this dictation were completed with voice recognition software. Quite often unanticipated grammatical, syntax, homophones, and other interpretive errors are inadvertently transcribed by the computer software. Please disregards these errors.  Please excuse any errors that have escaped final proofreading.)

## 2023-02-11 ENCOUNTER — TELEPHONE (OUTPATIENT)
Dept: INTERNAL MEDICINE CLINIC | Age: 38
End: 2023-02-11

## 2023-02-11 NOTE — TELEPHONE ENCOUNTER
Patient reports she had concussion 02/03 and went to ER and no meds prescribed  She continues to have debilitating HA   No change in mental status denies focal symptoms  She has Fioricet on her med list but has not tried it for the HA- taking tylenol only.  Advised to try Fioricet and on Monday call office for appointment with PCP

## 2023-02-16 ENCOUNTER — HOSPITAL ENCOUNTER (EMERGENCY)
Age: 38
Discharge: HOME | End: 2023-02-16
Attending: EMERGENCY MEDICINE
Payer: MEDICAID

## 2023-02-16 VITALS
SYSTOLIC BLOOD PRESSURE: 155 MMHG | HEIGHT: 62 IN | OXYGEN SATURATION: 99 % | HEART RATE: 92 BPM | RESPIRATION RATE: 17 BRPM | DIASTOLIC BLOOD PRESSURE: 98 MMHG | BODY MASS INDEX: 48.68 KG/M2 | TEMPERATURE: 99.1 F | WEIGHT: 264.55 LBS

## 2023-02-16 DIAGNOSIS — Z87.2 HISTORY OF HIDRADENITIS SUPPURATIVA: ICD-10-CM

## 2023-02-16 DIAGNOSIS — L03.114 CELLULITIS OF LEFT UPPER EXTREMITY: Primary | ICD-10-CM

## 2023-02-16 LAB
ALBUMIN SERPL-MCNC: 4.1 G/DL (ref 3.5–5)
ALBUMIN/GLOB SERPL: 1.2 (ref 1.1–2.2)
ALP SERPL-CCNC: 146 U/L (ref 45–117)
ALT SERPL-CCNC: 24 U/L (ref 12–78)
ANION GAP SERPL CALC-SCNC: 9 MMOL/L (ref 5–15)
AST SERPL-CCNC: 11 U/L (ref 15–37)
BASOPHILS # BLD: 0 K/UL (ref 0–0.1)
BASOPHILS NFR BLD: 0 % (ref 0–1)
BILIRUB SERPL-MCNC: 0.4 MG/DL (ref 0.2–1)
BUN SERPL-MCNC: 20 MG/DL (ref 6–20)
BUN/CREAT SERPL: 19 (ref 12–20)
CALCIUM SERPL-MCNC: 9.1 MG/DL (ref 8.5–10.1)
CHLORIDE SERPL-SCNC: 107 MMOL/L (ref 97–108)
CO2 SERPL-SCNC: 24 MMOL/L (ref 21–32)
CREAT SERPL-MCNC: 1.05 MG/DL (ref 0.55–1.02)
DIFFERENTIAL METHOD BLD: ABNORMAL
EOSINOPHIL # BLD: 0.4 K/UL (ref 0–0.4)
EOSINOPHIL NFR BLD: 3 % (ref 0–7)
ERYTHROCYTE [DISTWIDTH] IN BLOOD BY AUTOMATED COUNT: 13.8 % (ref 11.5–14.5)
GLOBULIN SER CALC-MCNC: 3.3 G/DL (ref 2–4)
GLUCOSE SERPL-MCNC: 93 MG/DL (ref 65–100)
HCT VFR BLD AUTO: 34 % (ref 35–47)
HGB BLD-MCNC: 11 G/DL (ref 11.5–16)
IMM GRANULOCYTES # BLD AUTO: 0 K/UL (ref 0–0.04)
IMM GRANULOCYTES NFR BLD AUTO: 0 % (ref 0–0.5)
LACTATE BLD-SCNC: 0.93 MMOL/L (ref 0.4–2)
LYMPHOCYTES # BLD: 2.8 K/UL (ref 0.8–3.5)
LYMPHOCYTES NFR BLD: 21 % (ref 12–49)
MCH RBC QN AUTO: 29.5 PG (ref 26–34)
MCHC RBC AUTO-ENTMCNC: 32.4 G/DL (ref 30–36.5)
MCV RBC AUTO: 91.2 FL (ref 80–99)
MONOCYTES # BLD: 1 K/UL (ref 0–1)
MONOCYTES NFR BLD: 8 % (ref 5–13)
NEUTS SEG # BLD: 8.8 K/UL (ref 1.8–8)
NEUTS SEG NFR BLD: 68 % (ref 32–75)
NRBC # BLD: 0 K/UL (ref 0–0.01)
NRBC BLD-RTO: 0 PER 100 WBC
PLATELET # BLD AUTO: 334 K/UL (ref 150–400)
PMV BLD AUTO: 9.3 FL (ref 8.9–12.9)
POTASSIUM SERPL-SCNC: 3.7 MMOL/L (ref 3.5–5.1)
PROT SERPL-MCNC: 7.4 G/DL (ref 6.4–8.2)
RBC # BLD AUTO: 3.73 M/UL (ref 3.8–5.2)
SODIUM SERPL-SCNC: 140 MMOL/L (ref 136–145)
WBC # BLD AUTO: 13 K/UL (ref 3.6–11)

## 2023-02-16 PROCEDURE — 96375 TX/PRO/DX INJ NEW DRUG ADDON: CPT

## 2023-02-16 PROCEDURE — 74011250636 HC RX REV CODE- 250/636: Performed by: EMERGENCY MEDICINE

## 2023-02-16 PROCEDURE — 74011000250 HC RX REV CODE- 250: Performed by: PHYSICIAN ASSISTANT

## 2023-02-16 PROCEDURE — 75810000283 HC INJECTION NERVE BLOCK

## 2023-02-16 PROCEDURE — 83605 ASSAY OF LACTIC ACID: CPT

## 2023-02-16 PROCEDURE — 74011000258 HC RX REV CODE- 258: Performed by: EMERGENCY MEDICINE

## 2023-02-16 PROCEDURE — 85025 COMPLETE CBC W/AUTO DIFF WBC: CPT

## 2023-02-16 PROCEDURE — 87040 BLOOD CULTURE FOR BACTERIA: CPT

## 2023-02-16 PROCEDURE — 80053 COMPREHEN METABOLIC PANEL: CPT

## 2023-02-16 PROCEDURE — 96365 THER/PROPH/DIAG IV INF INIT: CPT

## 2023-02-16 PROCEDURE — 99284 EMERGENCY DEPT VISIT MOD MDM: CPT

## 2023-02-16 PROCEDURE — 96376 TX/PRO/DX INJ SAME DRUG ADON: CPT

## 2023-02-16 PROCEDURE — 36415 COLL VENOUS BLD VENIPUNCTURE: CPT

## 2023-02-16 RX ORDER — LIDOCAINE HYDROCHLORIDE AND EPINEPHRINE 10; 10 MG/ML; UG/ML
5 INJECTION, SOLUTION INFILTRATION; PERINEURAL
Status: COMPLETED | OUTPATIENT
Start: 2023-02-16 | End: 2023-02-16

## 2023-02-16 RX ORDER — FENTANYL CITRATE 50 UG/ML
100 INJECTION, SOLUTION INTRAMUSCULAR; INTRAVENOUS
Status: COMPLETED | OUTPATIENT
Start: 2023-02-16 | End: 2023-02-16

## 2023-02-16 RX ORDER — BUPIVACAINE HYDROCHLORIDE 5 MG/ML
5 INJECTION, SOLUTION EPIDURAL; INTRACAUDAL ONCE
Status: COMPLETED | OUTPATIENT
Start: 2023-02-16 | End: 2023-02-16

## 2023-02-16 RX ORDER — ONDANSETRON 2 MG/ML
4 INJECTION INTRAMUSCULAR; INTRAVENOUS
Status: COMPLETED | OUTPATIENT
Start: 2023-02-16 | End: 2023-02-16

## 2023-02-16 RX ORDER — DOXYCYCLINE HYCLATE 100 MG
100 TABLET ORAL 2 TIMES DAILY
Qty: 14 TABLET | Refills: 0 | Status: SHIPPED | OUTPATIENT
Start: 2023-02-16 | End: 2023-02-23

## 2023-02-16 RX ORDER — OXYCODONE AND ACETAMINOPHEN 5; 325 MG/1; MG/1
1 TABLET ORAL
Qty: 10 TABLET | Refills: 0 | Status: SHIPPED | OUTPATIENT
Start: 2023-02-16 | End: 2023-02-21

## 2023-02-16 RX ADMIN — DOXYCYCLINE 100 MG: 100 INJECTION, POWDER, LYOPHILIZED, FOR SOLUTION INTRAVENOUS at 09:00

## 2023-02-16 RX ADMIN — SODIUM CHLORIDE 1000 ML: 9 INJECTION, SOLUTION INTRAVENOUS at 06:41

## 2023-02-16 RX ADMIN — ONDANSETRON 4 MG: 2 INJECTION INTRAMUSCULAR; INTRAVENOUS at 06:42

## 2023-02-16 RX ADMIN — FENTANYL CITRATE 100 MCG: 50 INJECTION, SOLUTION INTRAMUSCULAR; INTRAVENOUS at 08:44

## 2023-02-16 RX ADMIN — LIDOCAINE HYDROCHLORIDE,EPINEPHRINE BITARTRATE 50 MG: 10; .01 INJECTION, SOLUTION INFILTRATION; PERINEURAL at 07:43

## 2023-02-16 RX ADMIN — BUPIVACAINE HYDROCHLORIDE 25 MG: 5 INJECTION, SOLUTION EPIDURAL; INTRACAUDAL; PERINEURAL at 07:42

## 2023-02-16 RX ADMIN — FENTANYL CITRATE 100 MCG: 0.05 INJECTION, SOLUTION INTRAMUSCULAR; INTRAVENOUS at 06:42

## 2023-02-16 NOTE — ED PROVIDER NOTES
EMERGENCY DEPARTMENT HISTORY AND PHYSICAL EXAM     ----------------------------------------------------------------------------  Please note that this dictation was completed with [employer], the [employer] voice recognition software. Quite often unanticipated grammatical, syntax, homophones, and other interpretive errors are inadvertently transcribed by the computer software. Please disregard these errors. Please excuse any errors that have escaped final proofreading  ----------------------------------------------------------------------------      Date: 2/16/2023  Patient Name: Susanna Borja    History of Presenting Illness     Chief Complaint   Patient presents with    Skin Problem     Patient arrives ambulatory to triage with complaint of possible abscess under her left arm. Patient has red, hot area under her left arm. Patient reports that she had the same thing in 2020 and ended up being admitted for it. Patient reports having fever at home and took tylenol PTA       History obtainted from:  Patient    Other independent source of history: None    HPI: Susanna Borja is a 42 y.o. female, with significant pmhx of hidradenitis suppurativa, who presents via private vehicle to the ED with c/o pain, redness and swelling to the underside of her left upper arm near her axilla similar to previous episodes of abscess associated with her hidradenitis supra cava. Patient reports having similar episode in the past requiring hospitalization due to sepsis and multiple procedures performed by Dr. Sailaja Lindsey for I&D. Patient is extremely anxious and notes having concern over recurrence of sepsis. Also reports having nausea and vomiting and is unsure if this is related to her ongoing symptoms with her arm or the fact that she is having pain and nervous with history of peptic ulcer disease. Denies chest pain, abdominal pain, diarrhea or fever.       PCP: Marquise Brito MD    Allergies   Allergen Reactions    Latex Itching burning    Compazine [Prochlorperazine] Anxiety     High heart rate  Pt can take promethazine with no problems    Sulfa (Sulfonamide Antibiotics) Unknown (comments)    Topamax [Topiramate] Unknown (comments)    Adhesive Rash     Allergic to Dermabond    Clindamycin Diarrhea     Pt states caused her C-Diff    Iodinated Contrast Media Myalgia     Pt c/o severe back spasms after IV contrast administration. Pt requesting IV pain medications and requests I put this as a drug intolerance in her EMR. Mushroom Other (comments)    Mushroom Combination No.1 Unknown (comments)    Nsaids (Non-Steroidal Anti-Inflammatory Drug) Other (comments)     Peptic ulcer    Reglan [Metoclopramide Hcl] Rash    Valproic Acid Other (comments)     Elevated heart rate and vomiting         Current Facility-Administered Medications   Medication Dose Route Frequency Provider Last Rate Last Admin    doxycycline (VIBRAMYCIN) 100 mg in 0.9% sodium chloride (MBP/ADV) 100 mL MBP  100 mg IntraVENous Q12H Racheal Alvarado  mL/hr at 02/16/23 0900 100 mg at 02/16/23 0900     Current Outpatient Medications   Medication Sig Dispense Refill    doxycycline (VIBRA-TABS) 100 mg tablet Take 1 Tablet by mouth two (2) times a day for 7 days. 14 Tablet 0    oxyCODONE-acetaminophen (Percocet) 5-325 mg per tablet Take 1 Tablet by mouth every eight (8) hours as needed for Pain for up to 5 days. Max Daily Amount: 3 Tablets. Indications: pain 10 Tablet 0    methocarbamoL (ROBAXIN) 750 mg tablet Take 1 Tablet by mouth four (4) times daily as needed for Muscle Spasm(s). 20 Tablet 0    lidocaine 4 % patch Apply every 12 hours as needed for pain.  10 Patch 0    DULoxetine (CYMBALTA) 60 mg capsule TAKE 2 CAPSULES BY MOUTH EVERY DAY 30 Capsule 5    mirtazapine (REMERON) 30 mg tablet TAKE 1 TABLET BY MOUTH EVERYDAY AT BEDTIME 30 Tablet 5    omeprazole (PRILOSEC) 20 mg capsule TAKE 1 CAPSULE BY MOUTH EVERY DAY 30 Capsule 5    meclizine (ANTIVERT) 25 mg tablet TAKE 1 TABLET BY MOUTH THREE TIMES A DAY AS NEEDED FOR DIZZINESS FOR UP TO 10 DAYS 30 Tablet 0    ondansetron hcl (ZOFRAN) 4 mg tablet TAKE 1 TABLET BY MOUTH EVERY 8 HOURS AS NEEDED FOR NAUSEA AND VOMITING 20 Tablet 0    methylPREDNISolone (MEDROL DOSEPACK) 4 mg tablet Take 1 Tablet by mouth Specific Days and Specific Times. 1 Dose Pack 0    levoFLOXacin (LEVAQUIN) 500 mg tablet Take 1 Tablet by mouth daily. 7 Tablet 0    codeine-butalbital-acetaminophen-caffeine (FIORICET WITH CODEINE) -96-30 mg capsule TAKE 1 CAPSULE BY MOUTH EVERY 4 HOURS AS NEEDED FOR HEADACHE FOR 10 DAYS AS NEEDED      tiZANidine (ZANAFLEX) 4 mg tablet TAKE 1 TABLET BY MOUTH THREE TIMES A DAY AS NEEDED FOR PAIN 30 Tablet 0    Symbicort 160-4.5 mcg/actuation HFAA INHALE 2 PUFFS BY MOUTH TWICE A DAY      propranoloL (INDERAL) 10 mg tablet TAKE 1 TABLET BY MOUTH TWICE A  Tablet 3    pregabalin (LYRICA) 50 mg capsule Take 50 mg by mouth two (2) times a day. albuterol (PROVENTIL HFA, VENTOLIN HFA, PROAIR HFA) 90 mcg/actuation inhaler Take 1 Puff by inhalation every six (6) hours as needed for Wheezing. 1 Each 1    furosemide (LASIX) 20 mg tablet TAKE 1 TABLET BY MOUTH AS NEEDED (SWELLING). 30 Tablet 2    belimumab (Benlysta) 400 mg solr injection by IntraVENous route.  Every 8 weeks         Past History     Past Medical History:  Past Medical History:   Diagnosis Date    Abnormal CT of the abdomen 11/8/2012    Asthma     Autoimmune disease (HCC)     lupus    C. difficile diarrhea     Cervical prolapse     Dehydration     Mariah-Danlos syndrome     Gastrointestinal disorder     gerd, twisted colon, IBS    GERD (gastroesophageal reflux disease)     Headache(784.0)     Lupus (HCC)     Morbid obesity (HCC)     Murmur     Nausea & vomiting     Neurological disorder     cluster headaches    Occipital neuralgia     other 2006, 2009    ovarian cyst removal    Other ill-defined conditions(799.89)     Syncope     Worsening headaches        Past Surgical History:  Past Surgical History:   Procedure Laterality Date    COLONOSCOPY  9/21/2010         HX CHOLECYSTECTOMY      HX CYST INCISION AND DRAINAGE Right 02/27/2020    HX GYN  1/2009    right salpingo oopherectomy    HX GYN  2006    right ovarian tumor removed    HX HEENT      left wisdom teeth removal    HX HEENT      top right wisdom tooth removed    HX HERNIA REPAIR  4/2012    HX HERNIA REPAIR  2/28/13    Laparoscopic recurrent incisional hernia repair    HX HYSTERECTOMY      2016    HX UROLOGICAL      Kidney Stone Removal    CA EGD TRANSORAL BIOPSY SINGLE/MULTIPLE  9/22/2010            Family History:  Family History   Problem Relation Age of Onset    Colon Cancer Maternal Grandfather        Social History:  Social History     Tobacco Use    Smoking status: Never    Smokeless tobacco: Never   Vaping Use    Vaping Use: Never used   Substance Use Topics    Alcohol use: No    Drug use: No       Allergies: Allergies   Allergen Reactions    Latex Itching     burning    Compazine [Prochlorperazine] Anxiety     High heart rate  Pt can take promethazine with no problems    Sulfa (Sulfonamide Antibiotics) Unknown (comments)    Topamax [Topiramate] Unknown (comments)    Adhesive Rash     Allergic to Dermabond    Clindamycin Diarrhea     Pt states caused her C-Diff    Iodinated Contrast Media Myalgia     Pt c/o severe back spasms after IV contrast administration. Pt requesting IV pain medications and requests I put this as a drug intolerance in her EMR. Mushroom Other (comments)    Mushroom Combination No.1 Unknown (comments)    Nsaids (Non-Steroidal Anti-Inflammatory Drug) Other (comments)     Peptic ulcer    Reglan [Metoclopramide Hcl] Rash    Valproic Acid Other (comments)     Elevated heart rate and vomiting           Review of Systems   Review of Systems   Constitutional: Negative. Negative for fever. Eyes: Negative. Respiratory: Negative. Negative for shortness of breath.     Cardiovascular: Negative for chest pain. Gastrointestinal:  Negative for abdominal pain, diarrhea, nausea and vomiting. Endocrine: Negative. Genitourinary: Negative. Musculoskeletal: Negative. Skin:  Positive for color change. Neurological: Negative. Psychiatric/Behavioral:  The patient is nervous/anxious. Physical Exam   Physical Exam  Vitals and nursing note reviewed. Constitutional:       General: She is not in acute distress. Appearance: She is well-developed. She is obese. She is not diaphoretic. HENT:      Head: Normocephalic and atraumatic. Nose: Nose normal.   Eyes:      General: No scleral icterus. Conjunctiva/sclera: Conjunctivae normal.   Neck:      Trachea: No tracheal deviation. Cardiovascular:      Rate and Rhythm: Normal rate and regular rhythm. Heart sounds: Normal heart sounds. Pulmonary:      Effort: Pulmonary effort is normal. No respiratory distress. Abdominal:      General: There is no distension. Musculoskeletal:         General: No tenderness. Normal range of motion. Cervical back: Normal range of motion. Skin:     General: Skin is warm and dry. Findings: Erythema (Along inferior aspect of left upper arm near axilla) present. Neurological:      Mental Status: She is alert and oriented to person, place, and time. Cranial Nerves: No cranial nerve deficit. Psychiatric:         Behavior: Behavior normal.         Thought Content:  Thought content normal.         Diagnostic Study Results     Labs:     Recent Results (from the past 12 hour(s))   CBC WITH AUTOMATED DIFF    Collection Time: 02/16/23  6:55 AM   Result Value Ref Range    WBC 13.0 (H) 3.6 - 11.0 K/uL    RBC 3.73 (L) 3.80 - 5.20 M/uL    HGB 11.0 (L) 11.5 - 16.0 g/dL    HCT 34.0 (L) 35.0 - 47.0 %    MCV 91.2 80.0 - 99.0 FL    MCH 29.5 26.0 - 34.0 PG    MCHC 32.4 30.0 - 36.5 g/dL    RDW 13.8 11.5 - 14.5 %    PLATELET 163 838 - 925 K/uL    MPV 9.3 8.9 - 12.9 FL    NRBC 0.0 0  WBC    ABSOLUTE NRBC 0.00 0.00 - 0.01 K/uL    NEUTROPHILS 68 32 - 75 %    LYMPHOCYTES 21 12 - 49 %    MONOCYTES 8 5 - 13 %    EOSINOPHILS 3 0 - 7 %    BASOPHILS 0 0 - 1 %    IMMATURE GRANULOCYTES 0 0.0 - 0.5 %    ABS. NEUTROPHILS 8.8 (H) 1.8 - 8.0 K/UL    ABS. LYMPHOCYTES 2.8 0.8 - 3.5 K/UL    ABS. MONOCYTES 1.0 0.0 - 1.0 K/UL    ABS. EOSINOPHILS 0.4 0.0 - 0.4 K/UL    ABS. BASOPHILS 0.0 0.0 - 0.1 K/UL    ABS. IMM. GRANS. 0.0 0.00 - 0.04 K/UL    DF AUTOMATED     CULTURE, BLOOD, PAIRED    Collection Time: 02/16/23  6:55 AM    Specimen: Blood   Result Value Ref Range    Special Requests: NO SPECIAL REQUESTS      Culture result: NO GROWTH AFTER 1 HOUR     METABOLIC PANEL, COMPREHENSIVE    Collection Time: 02/16/23  6:55 AM   Result Value Ref Range    Sodium 140 136 - 145 mmol/L    Potassium 3.7 3.5 - 5.1 mmol/L    Chloride 107 97 - 108 mmol/L    CO2 24 21 - 32 mmol/L    Anion gap 9 5 - 15 mmol/L    Glucose 93 65 - 100 mg/dL    BUN 20 6 - 20 MG/DL    Creatinine 1.05 (H) 0.55 - 1.02 MG/DL    BUN/Creatinine ratio 19 12 - 20      eGFR >60 >60 ml/min/1.73m2    Calcium 9.1 8.5 - 10.1 MG/DL    Bilirubin, total 0.4 0.2 - 1.0 MG/DL    ALT (SGPT) 24 12 - 78 U/L    AST (SGOT) 11 (L) 15 - 37 U/L    Alk. phosphatase 146 (H) 45 - 117 U/L    Protein, total 7.4 6.4 - 8.2 g/dL    Albumin 4.1 3.5 - 5.0 g/dL    Globulin 3.3 2.0 - 4.0 g/dL    A-G Ratio 1.2 1.1 - 2.2     POC LACTIC ACID    Collection Time: 02/16/23  6:55 AM   Result Value Ref Range    Lactic Acid (POC) 0.93 0.40 - 2.00 mmol/L       Radiologic Studies:  No orders to display     CT Results  (Last 48 hours)      None          CXR Results  (Last 48 hours)      None              Medical Decision Making     I am the first provider for this patient. Initial assessment performed. The patients presenting problems have been discussed, and they are in agreement with the care plan formulated and outlined with them.   I have encouraged them to ask questions as they arise throughout their visit. DDX:  Cellulitis, abscess, sepsis    Comorbidities:  Past Medical History:   Diagnosis Date    Abnormal CT of the abdomen 11/8/2012    Asthma     Autoimmune disease (HCC)     lupus    C. difficile diarrhea     Cervical prolapse     Dehydration     Mariah-Danlos syndrome     Gastrointestinal disorder     gerd, twisted colon, IBS    GERD (gastroesophageal reflux disease)     Headache(784.0)     Lupus (HCC)     Morbid obesity (HCC)     Murmur     Nausea & vomiting     Neurological disorder     cluster headaches    Occipital neuralgia     other 2006, 2009    ovarian cyst removal    Other ill-defined conditions(799.89)     Syncope     Worsening headaches          Plan:  Labs, lactate, cultures, analgesic, ultrasound, local analgesic, general surgery consultation      ED COURSE      Records Review:  I reviewed and interpreted the nursing notes and and vital signs from today's visit, as well as the electronic medical record system for any external medical records that were available that may contribute to the patients current condition, including independent interpretation of previous microbiology culture results revealing MRSA    Nursing notes will be reviewed and interpreted by me as they become available in realtime while the pt has been in the ED. Pulse Oximetry Analysis:  99% on RA, normal    Heart Monitory Analysis:  Rate: 92 bpm  Rhythm: nsr    Vital Signs-Reviewed the patient's vital signs. Patient Vitals for the past 12 hrs:   Temp Pulse Resp BP SpO2   02/16/23 0723 -- 92 17 -- --   02/16/23 0444 99.1 °F (37.3 °C) 99 20 (!) 155/98 99 %             ED Course as of 02/16/23 0928   Thu Feb 16, 2023   0823 Procedure Note - Local Block: Left Axilla  Performed by: MARNIE Lema   Complexity: Simple  Skin prepped with shurcleanse. Anesthesia achieved with 10 mLs of a 50/50 mixture of Lidocaine 1% with epi and Bupivacaine 0.25% without.      Estimated blood loss: none  The procedure took 1-15 minutes, and pt tolerated well. [TK]      ED Course User Index  [TK] Natalia Lake, [address]     Procedure Note - Limited Bedside Ultrasound- Soft Tissue   8:27 AM  Performed by: Fredy Orozco MD   Indication: evaluation of abscess  Interpretation: Negative  The L upper arm portion of the body was visualized using bedside US. There are not fluid collections appreciated on exam which are not consistent with abscess. Foreign body(ies) are not appreciated. The procedure took 1-15 minutes, and pt tolerated well. Fredy Orozco MD              MDM:  Patient is an extremely anxious 75-year-old female who presents to the emergency department with concern for developing of sepsis due to concern of abscess/exacerbation of her hidradenitis to her left upper arm. Patient noted to have normal lactate and white count on the high side of normal without concern for sepsis. Consulted with general surgery who has managed her condition in the past who recommends providing her with doxycycline and notes that they will follow-up with her in the office for further management. Discharge Planning:  Pt noted to be feeling better, and after shared decision making conversation, pt is ready for discharge. Discussed lab and imaging findings with pt, specifically noting no large fluid collection on ultrasound. Pt will follow up with general surgery as instructed. All questions have been answered, pt voiced understanding and agreement with plan. Specific return precautions provided in addition to instructions for pt to return to the ED immediately should sx worsen at any time. Fredy Orozoc MD      Critical Care Time:    none      Diagnosis     Clinical Impression:   1. Cellulitis of left upper extremity    2. History of hidradenitis suppurativa        PLAN:  1.    Current Discharge Medication List        START taking these medications    Details   doxycycline (VIBRA-TABS) 100 mg tablet Take 1 Tablet by mouth two (2) times a day for 7 days. Qty: 14 Tablet, Refills: 0  Start date: 2/16/2023, End date: 2/23/2023      oxyCODONE-acetaminophen (Percocet) 5-325 mg per tablet Take 1 Tablet by mouth every eight (8) hours as needed for Pain for up to 5 days. Max Daily Amount: 3 Tablets. Indications: pain  Qty: 10 Tablet, Refills: 0  Start date: 2/16/2023, End date: 2/21/2023    Associated Diagnoses: Cellulitis of left upper extremity           2. Follow-up Information       Follow up With Specialties Details Why Contact Info    Silvano Cazares MD Internal Medicine Physician Schedule an appointment as soon as possible for a visit in 2 days  [address] [facility]  [phone]      Danielle Hernandez MD General Surgery, Surgery General, Colon and Rectal Surgery, Endocrinology Physician Schedule an appointment as soon as possible for a visit   [address]  [facility] [address] [facility] [phone]      [facility] [place] EM[facility] Emergency Medicine  As needed, If symptoms worsen [address]  [address] [facility]  [phone]          Return to ED if worse     Social Determinants of Health:  Chronic health conditions    Disposition:  9:27 AM  The patient's results have been reviewed with family and/or caregiver. They verbally convey their understanding and agreement of the patient's signs, symptoms, diagnosis, treatment and prognosis and additionally agree to follow up as recommended in the discharge instructions or to return to the Emergency Room should the patient's condition change prior to their follow-up appointment. The family and/or caregiver verbally agrees with the care-plan and all of their questions have been answered.  The discharge instructions have also been provided to the them with educational information regarding the patient's diagnosis as well a list of reasons why the patient would want to return to the ER prior to their follow-up appointment should their condition change.   Mandy Eaton MD

## 2023-02-16 NOTE — Clinical Note
[address]  [facility] EMERGENCY DEPT  [address] [facility]  [place] [facility] [id number] [phone]    Work/School Note    Date: 2/16/2023    To Whom It May concern:      Juan Colindres was seen and treated today in the emergency room by the following provider(s):  Attending Provider: Julio Gonzalez MD.      Juan Colindres is excused from work/school on 02/16/23. She is clear to return to work/school on 02/17/23.         Sincerely,          Ariel Fulton MD

## 2023-02-16 NOTE — DISCHARGE INSTRUCTIONS
It was a pleasure taking care of you in our Emergency Department today. We know that when you come to [facility], you are entrusting us with your health, comfort, and safety. Our physicians and nurses honor that trust, and truly appreciate the opportunity to care for you and your loved ones. We also value your feedback. If you receive a survey about your Emergency Department experience today, please fill it out. We care about our patients' feedback, and we listen to what you have to say. Thank you!       Dr. Sami Lund MD.

## 2023-02-16 NOTE — ED NOTES
Bedside shift change report given to [facility], ED RN (oncoming nurse) by Abril Alvarez ED RN (offgoing nurse). Report included the following information SBAR, ED Summary and MAR.

## 2023-02-16 NOTE — ED NOTES
Pt is currently anxious. Eight hours ago, \"everything was normal.\" Since then, Pt feels condition has worsened rapidly that mimics experience she had in 2020. Dx with hidradenitis suppurativa \"HS\" at that time. Hospitalized for sepsis, HS flare up, unable to take most antibx due to hx of C. diff x4 (hospitalized twice for it).

## 2023-02-17 ENCOUNTER — HOSPITAL ENCOUNTER (EMERGENCY)
Age: 38
Discharge: HOME | End: 2023-02-18
Attending: EMERGENCY MEDICINE
Payer: MEDICAID

## 2023-02-17 DIAGNOSIS — M79.602 LEFT ARM PAIN: ICD-10-CM

## 2023-02-17 DIAGNOSIS — L03.114 LEFT ARM CELLULITIS: Primary | ICD-10-CM

## 2023-02-17 PROCEDURE — 99284 EMERGENCY DEPT VISIT MOD MDM: CPT

## 2023-02-17 PROCEDURE — 96365 THER/PROPH/DIAG IV INF INIT: CPT

## 2023-02-17 PROCEDURE — 96376 TX/PRO/DX INJ SAME DRUG ADON: CPT

## 2023-02-17 PROCEDURE — 85025 COMPLETE CBC W/AUTO DIFF WBC: CPT

## 2023-02-17 PROCEDURE — 80053 COMPREHEN METABOLIC PANEL: CPT

## 2023-02-17 PROCEDURE — 36415 COLL VENOUS BLD VENIPUNCTURE: CPT

## 2023-02-17 PROCEDURE — 96375 TX/PRO/DX INJ NEW DRUG ADDON: CPT

## 2023-02-17 NOTE — TELEPHONE ENCOUNTER
Future Appointments:  No future appointments. Last Appointment With Me:  9/6/2022     Requested Prescriptions     Pending Prescriptions Disp Refills    HYDROcodone-homatropine (Hycodan, with homatropine,) 5-1.5 mg/5 mL oral solution 120 mL 0     Sig: Take 5 mL by mouth four (4) times daily as needed for Cough for up to 6 days. Max Daily Amount: 20 mL. Impression:    fecal incontinence  B/L buttocks/sacral deep tissue injury in evolution     Recommendations:    1) continue turning and positioning q2 and PRN utilizing offloading assistive devices  2) continue with routine pericare daily and PRN soiling  3) encourage optimal nutrition  4) waffle cushion when oob to chair  5) B/L LE complete cair air fluidized boots to offload heels/feet  6) triad protective barrier cream to B/L buttocks/sacrum daily and PRN soiling  7) incontinence management - continue external female urinary catheter to divert urine from skin if incontinent    Plan discussed with RN Sheeba on unit and PCA Trinity at bedside

## 2023-02-18 ENCOUNTER — APPOINTMENT (OUTPATIENT)
Dept: CT IMAGING | Age: 38
End: 2023-02-18
Attending: EMERGENCY MEDICINE
Payer: MEDICAID

## 2023-02-18 VITALS
BODY MASS INDEX: 48.85 KG/M2 | WEIGHT: 265.43 LBS | HEART RATE: 83 BPM | HEIGHT: 62 IN | DIASTOLIC BLOOD PRESSURE: 83 MMHG | TEMPERATURE: 97.5 F | RESPIRATION RATE: 24 BRPM | OXYGEN SATURATION: 99 % | SYSTOLIC BLOOD PRESSURE: 101 MMHG

## 2023-02-18 LAB
ALBUMIN SERPL-MCNC: 3.4 G/DL (ref 3.5–5)
ALBUMIN/GLOB SERPL: 0.9 (ref 1.1–2.2)
ALP SERPL-CCNC: 154 U/L (ref 45–117)
ALT SERPL-CCNC: 28 U/L (ref 12–78)
ANION GAP SERPL CALC-SCNC: 5 MMOL/L (ref 5–15)
AST SERPL-CCNC: 17 U/L (ref 15–37)
BASOPHILS # BLD: 0.1 K/UL (ref 0–0.1)
BASOPHILS NFR BLD: 0 % (ref 0–1)
BILIRUB SERPL-MCNC: 0.3 MG/DL (ref 0.2–1)
BUN SERPL-MCNC: 19 MG/DL (ref 6–20)
BUN/CREAT SERPL: 16 (ref 12–20)
CALCIUM SERPL-MCNC: 9.2 MG/DL (ref 8.5–10.1)
CHLORIDE SERPL-SCNC: 111 MMOL/L (ref 97–108)
CO2 SERPL-SCNC: 24 MMOL/L (ref 21–32)
CREAT SERPL-MCNC: 1.22 MG/DL (ref 0.55–1.02)
DIFFERENTIAL METHOD BLD: ABNORMAL
EOSINOPHIL # BLD: 0.7 K/UL (ref 0–0.4)
EOSINOPHIL NFR BLD: 4 % (ref 0–7)
ERYTHROCYTE [DISTWIDTH] IN BLOOD BY AUTOMATED COUNT: 14.2 % (ref 11.5–14.5)
GLOBULIN SER CALC-MCNC: 4 G/DL (ref 2–4)
GLUCOSE SERPL-MCNC: 87 MG/DL (ref 65–100)
HCT VFR BLD AUTO: 35.4 % (ref 35–47)
HGB BLD-MCNC: 11.2 G/DL (ref 11.5–16)
IMM GRANULOCYTES # BLD AUTO: 0.1 K/UL (ref 0–0.04)
IMM GRANULOCYTES NFR BLD AUTO: 1 % (ref 0–0.5)
LACTATE BLD-SCNC: 0.58 MMOL/L (ref 0.4–2)
LYMPHOCYTES # BLD: 2.8 K/UL (ref 0.8–3.5)
LYMPHOCYTES NFR BLD: 17 % (ref 12–49)
MCH RBC QN AUTO: 29.1 PG (ref 26–34)
MCHC RBC AUTO-ENTMCNC: 31.6 G/DL (ref 30–36.5)
MCV RBC AUTO: 91.9 FL (ref 80–99)
MONOCYTES # BLD: 1.2 K/UL (ref 0–1)
MONOCYTES NFR BLD: 7 % (ref 5–13)
NEUTS SEG # BLD: 11.2 K/UL (ref 1.8–8)
NEUTS SEG NFR BLD: 71 % (ref 32–75)
NRBC # BLD: 0 K/UL (ref 0–0.01)
NRBC BLD-RTO: 0 PER 100 WBC
PLATELET # BLD AUTO: 390 K/UL (ref 150–400)
PMV BLD AUTO: 9.8 FL (ref 8.9–12.9)
POTASSIUM SERPL-SCNC: 3.6 MMOL/L (ref 3.5–5.1)
PROT SERPL-MCNC: 7.4 G/DL (ref 6.4–8.2)
RBC # BLD AUTO: 3.85 M/UL (ref 3.8–5.2)
SODIUM SERPL-SCNC: 140 MMOL/L (ref 136–145)
WBC # BLD AUTO: 16 K/UL (ref 3.6–11)

## 2023-02-18 PROCEDURE — 74011000258 HC RX REV CODE- 258: Performed by: EMERGENCY MEDICINE

## 2023-02-18 PROCEDURE — 74011250636 HC RX REV CODE- 250/636: Performed by: EMERGENCY MEDICINE

## 2023-02-18 PROCEDURE — 83605 ASSAY OF LACTIC ACID: CPT

## 2023-02-18 PROCEDURE — 73200 CT UPPER EXTREMITY W/O DYE: CPT

## 2023-02-18 RX ORDER — ONDANSETRON 2 MG/ML
8 INJECTION INTRAMUSCULAR; INTRAVENOUS
Status: COMPLETED | OUTPATIENT
Start: 2023-02-18 | End: 2023-02-18

## 2023-02-18 RX ORDER — OXYCODONE AND ACETAMINOPHEN 5; 325 MG/1; MG/1
2 TABLET ORAL
Qty: 16 TABLET | Refills: 0 | Status: SHIPPED | OUTPATIENT
Start: 2023-02-18 | End: 2023-02-21 | Stop reason: ALTCHOICE

## 2023-02-18 RX ORDER — HYDROMORPHONE HYDROCHLORIDE 1 MG/ML
2 INJECTION, SOLUTION INTRAMUSCULAR; INTRAVENOUS; SUBCUTANEOUS ONCE
Status: COMPLETED | OUTPATIENT
Start: 2023-02-18 | End: 2023-02-18

## 2023-02-18 RX ORDER — ONDANSETRON 4 MG/1
4 TABLET, ORALLY DISINTEGRATING ORAL
Qty: 20 TABLET | Refills: 0 | Status: SHIPPED | OUTPATIENT
Start: 2023-02-18

## 2023-02-18 RX ORDER — HYDROMORPHONE HYDROCHLORIDE 1 MG/ML
1 INJECTION, SOLUTION INTRAMUSCULAR; INTRAVENOUS; SUBCUTANEOUS ONCE
Status: COMPLETED | OUTPATIENT
Start: 2023-02-18 | End: 2023-02-18

## 2023-02-18 RX ORDER — ONDANSETRON 2 MG/ML
4 INJECTION INTRAMUSCULAR; INTRAVENOUS
Status: COMPLETED | OUTPATIENT
Start: 2023-02-18 | End: 2023-02-18

## 2023-02-18 RX ADMIN — HYDROMORPHONE HYDROCHLORIDE 1 MG: 1 INJECTION, SOLUTION INTRAMUSCULAR; INTRAVENOUS; SUBCUTANEOUS at 03:50

## 2023-02-18 RX ADMIN — ONDANSETRON 4 MG: 2 INJECTION INTRAMUSCULAR; INTRAVENOUS at 03:50

## 2023-02-18 RX ADMIN — ONDANSETRON 8 MG: 2 INJECTION INTRAMUSCULAR; INTRAVENOUS at 01:40

## 2023-02-18 RX ADMIN — DOXYCYCLINE 100 MG: 100 INJECTION, POWDER, LYOPHILIZED, FOR SOLUTION INTRAVENOUS at 01:44

## 2023-02-18 RX ADMIN — SODIUM CHLORIDE 1000 ML: 9 INJECTION, SOLUTION INTRAVENOUS at 01:36

## 2023-02-18 RX ADMIN — HYDROMORPHONE HYDROCHLORIDE 2 MG: 1 INJECTION, SOLUTION INTRAMUSCULAR; INTRAVENOUS; SUBCUTANEOUS at 01:40

## 2023-02-18 NOTE — ED NOTES
Assumed care of pt. Pt reports was here yesterday and dx with cellulitis given antibiotics and pain medication and is to follow up with provider who did sx on other arm for same thing couple years ago, on Monday. Pain medication is only scheduled to take every 8 hours and it is only lasting 3-4 hours and the redness is getting worse with fever that got up to 102 earlier took 1000 mg Tylenol couple of hours ago,  also reports passed out from pain right before coming in, while deciding if they should come or not. Pt AOX4, PWD, VSS, redness and swelling noted to anterior side of left upper arm.

## 2023-02-18 NOTE — ED PROVIDER NOTES
[facility] EMERGENCY DEPT  EMERGENCY DEPARTMENT ENCOUNTER       Pt Name: Ivis Bosch  MRN: [id number]  [place] 1985  Date of evaluation: 2/17/2023  Provider: Latha Schmidt MD   PCP: Duyen Miles MD  Note Started: 12:03 AM 2/18/23     CHIEF COMPLAINT       Chief Complaint   Patient presents with    Skin Problem     Pt arrives ambulatory to triage for redness/swelling to right inside upper arm. Pt seen here yesterday and was diagnosed with cellulitis. Was given medication; however, pt states pain is worse and she is unable to rest d/t pain. HISTORY OF PRESENT ILLNESS: 1 or more elements      History From: Patient and Patient's   HPI Limitations : None     Ivis Bosch is a 42 y.o. female with pmhx of hidradenitis suppurativa presents via private vehicle to the ED with c/o persistent pain and redness along inner aspect of proximal left upper extremity. She was seen here in the ED for the same yesterday, diagnosed with cellulitis and started on doxy. She is to follow up with gen surg on Monday. No fever, chills, weakness. No abd pain, n/v/d. No cp, sob, cough. Dr. Lowe Course has seen her in the past for I&D of abscess collection. Please see more comprehensive history below under MDM  Nursing Notes were all reviewed in real time as they are made available. Any disagreements addressed in the HPI/MDM. REVIEW OF SYSTEMS      Review of Systems   Constitutional:  Negative for chills and fever. Respiratory:  Negative for cough and shortness of breath. Cardiovascular:  Negative for chest pain and palpitations. Gastrointestinal:  Negative for abdominal pain, nausea and vomiting. Genitourinary:  Negative for dysuria and flank pain. Musculoskeletal:  Negative for neck pain and neck stiffness. Skin:  Positive for color change. Neurological:  Negative for dizziness, syncope, light-headedness and headaches. Hematological:  Negative for adenopathy.    Psychiatric/Behavioral: Negative for confusion. Positives and Pertinent negatives as per HPI and MDM. PAST HISTORY     Past Medical History:  Past Medical History:   Diagnosis Date    Abnormal CT of the abdomen 11/8/2012    Asthma     Autoimmune disease (HCC)     lupus    C. difficile diarrhea     Cervical prolapse     Dehydration     Mariah-Danlos syndrome     Gastrointestinal disorder     gerd, twisted colon, IBS    GERD (gastroesophageal reflux disease)     Headache(784.0)     Lupus (HCC)     Morbid obesity (HCC)     Murmur     Nausea & vomiting     Neurological disorder     cluster headaches    Occipital neuralgia     other 2006, 2009    ovarian cyst removal    Other ill-defined conditions(799.89)     Syncope     Worsening headaches        Past Surgical History:  Past Surgical History:   Procedure Laterality Date    COLONOSCOPY  9/21/2010         HX CHOLECYSTECTOMY      HX CYST INCISION AND DRAINAGE Right 02/27/2020    HX GYN  1/2009    right salpingo oopherectomy    HX GYN  2006    right ovarian tumor removed    HX HEENT      left wisdom teeth removal    HX HEENT      top right wisdom tooth removed    HX HERNIA REPAIR  4/2012    HX HERNIA REPAIR  2/28/13    Laparoscopic recurrent incisional hernia repair    HX HYSTERECTOMY      2016    HX UROLOGICAL      Kidney Stone Removal    PA EGD TRANSORAL BIOPSY SINGLE/MULTIPLE  9/22/2010            Family History:  Family History   Problem Relation Age of Onset    Colon Cancer Maternal Grandfather        Social History:  Social History     Tobacco Use    Smoking status: Never    Smokeless tobacco: Never   Vaping Use    Vaping Use: Never used   Substance Use Topics    Alcohol use: No    Drug use: No       Allergies:   Allergies   Allergen Reactions    Latex Itching     burning    Compazine [Prochlorperazine] Anxiety     High heart rate  Pt can take promethazine with no problems    Sulfa (Sulfonamide Antibiotics) Unknown (comments)    Topamax [Topiramate] Unknown (comments)    Adhesive Rash     Allergic to Dermabond    Clindamycin Diarrhea     Pt states caused her C-Diff    Iodinated Contrast Media Myalgia     Pt c/o severe back spasms after IV contrast administration. Pt requesting IV pain medications and requests I put this as a drug intolerance in her EMR. Mushroom Other (comments)    Mushroom Combination No.1 Unknown (comments)    Nsaids (Non-Steroidal Anti-Inflammatory Drug) Other (comments)     Peptic ulcer    Reglan [Metoclopramide Hcl] Rash    Valproic Acid Other (comments)     Elevated heart rate and vomiting         CURRENT MEDICATIONS      Previous Medications    ALBUTEROL (PROVENTIL HFA, VENTOLIN HFA, PROAIR HFA) 90 MCG/ACTUATION INHALER    Take 1 Puff by inhalation every six (6) hours as needed for Wheezing. BELIMUMAB (BENLYSTA) 400 MG SOLR INJECTION    by IntraVENous route. Every 8 weeks    CODEINE-BUTALBITAL-ACETAMINOPHEN-CAFFEINE (FIORICET WITH CODEINE) -32-30 MG CAPSULE    TAKE 1 CAPSULE BY MOUTH EVERY 4 HOURS AS NEEDED FOR HEADACHE FOR 10 DAYS AS NEEDED    DOXYCYCLINE (VIBRA-TABS) 100 MG TABLET    Take 1 Tablet by mouth two (2) times a day for 7 days. DULOXETINE (CYMBALTA) 60 MG CAPSULE    TAKE 2 CAPSULES BY MOUTH EVERY DAY    FUROSEMIDE (LASIX) 20 MG TABLET    TAKE 1 TABLET BY MOUTH AS NEEDED (SWELLING). LEVOFLOXACIN (LEVAQUIN) 500 MG TABLET    Take 1 Tablet by mouth daily. LIDOCAINE 4 % PATCH    Apply every 12 hours as needed for pain. MECLIZINE (ANTIVERT) 25 MG TABLET    TAKE 1 TABLET BY MOUTH THREE TIMES A DAY AS NEEDED FOR DIZZINESS FOR UP TO 10 DAYS    METHOCARBAMOL (ROBAXIN) 750 MG TABLET    Take 1 Tablet by mouth four (4) times daily as needed for Muscle Spasm(s). METHYLPREDNISOLONE (MEDROL DOSEPACK) 4 MG TABLET    Take 1 Tablet by mouth Specific Days and Specific Times.     MIRTAZAPINE (REMERON) 30 MG TABLET    TAKE 1 TABLET BY MOUTH EVERYDAY AT BEDTIME    OMEPRAZOLE (PRILOSEC) 20 MG CAPSULE    TAKE 1 CAPSULE BY MOUTH EVERY DAY    ONDANSETRON HCL (ZOFRAN) 4 MG TABLET    TAKE 1 TABLET BY MOUTH EVERY 8 HOURS AS NEEDED FOR NAUSEA AND VOMITING    OXYCODONE-ACETAMINOPHEN (PERCOCET) 5-325 MG PER TABLET    Take 1 Tablet by mouth every eight (8) hours as needed for Pain for up to 5 days. Max Daily Amount: 3 Tablets. Indications: pain    PREGABALIN (LYRICA) 50 MG CAPSULE    Take 50 mg by mouth two (2) times a day. PROPRANOLOL (INDERAL) 10 MG TABLET    TAKE 1 TABLET BY MOUTH TWICE A DAY    SYMBICORT 160-4.5 MCG/ACTUATION HFAA    INHALE 2 PUFFS BY MOUTH TWICE A DAY    TIZANIDINE (ZANAFLEX) 4 MG TABLET    TAKE 1 TABLET BY MOUTH THREE TIMES A DAY AS NEEDED FOR PAIN         PHYSICAL EXAM      ED Triage Vitals [02/17/23 2330]   ED Encounter Vitals Group      /65      Pulse (Heart Rate) 83      Resp Rate 24      Temp 97.5 °F (36.4 °C)      Temp src       O2 Sat (%) 99 %      Weight 265 lb 6.9 oz      Height 5' 2\"        Physical Exam  Vitals and nursing note reviewed. Constitutional:       Appearance: She is not diaphoretic. HENT:      Mouth/Throat:      Mouth: Mucous membranes are dry. Eyes:      Extraocular Movements: Extraocular movements intact. Conjunctiva/sclera: Conjunctivae normal.   Cardiovascular:      Rate and Rhythm: Normal rate and regular rhythm. Pulses: Normal pulses. Heart sounds: Normal heart sounds. Pulmonary:      Effort: Pulmonary effort is normal.      Breath sounds: Normal breath sounds. Abdominal:      General: Abdomen is flat. Bowel sounds are normal.      Palpations: Abdomen is soft. Tenderness: There is no abdominal tenderness. Musculoskeletal:         General: No deformity. Normal range of motion. Cervical back: Normal range of motion and neck supple. No tenderness. Lymphadenopathy:      Cervical: No cervical adenopathy. Skin:     Capillary Refill: Capillary refill takes less than 2 seconds. Findings: Erythema present. No abscess, bruising, ecchymosis or petechiae. Rash is not vesicular. Comments: Patch of erythema along inner aspect of left arm near axilla. No swelling. No drainage. Slightly warm to touch. Tender to touch. Neurological:      Mental Status: She is alert and oriented to person, place, and time. Psychiatric:         Mood and Affect: Mood is anxious. DIAGNOSTIC RESULTS   LABS:     Recent Results (from the past 50 hour(s))   CBC WITH AUTOMATED DIFF    Collection Time: 02/17/23 11:41 PM   Result Value Ref Range    WBC 16.0 (H) 3.6 - 11.0 K/uL    RBC 3.85 3.80 - 5.20 M/uL    HGB 11.2 (L) 11.5 - 16.0 g/dL    HCT 35.4 35.0 - 47.0 %    MCV 91.9 80.0 - 99.0 FL    MCH 29.1 26.0 - 34.0 PG    MCHC 31.6 30.0 - 36.5 g/dL    RDW 14.2 11.5 - 14.5 %    PLATELET 068 291 - 838 K/uL    MPV 9.8 8.9 - 12.9 FL    NRBC 0.0 0  WBC    ABSOLUTE NRBC 0.00 0.00 - 0.01 K/uL    NEUTROPHILS 71 32 - 75 %    LYMPHOCYTES 17 12 - 49 %    MONOCYTES 7 5 - 13 %    EOSINOPHILS 4 0 - 7 %    BASOPHILS 0 0 - 1 %    IMMATURE GRANULOCYTES 1 (H) 0.0 - 0.5 %    ABS. NEUTROPHILS 11.2 (H) 1.8 - 8.0 K/UL    ABS. LYMPHOCYTES 2.8 0.8 - 3.5 K/UL    ABS. MONOCYTES 1.2 (H) 0.0 - 1.0 K/UL    ABS. EOSINOPHILS 0.7 (H) 0.0 - 0.4 K/UL    ABS. BASOPHILS 0.1 0.0 - 0.1 K/UL    ABS. IMM. GRANS. 0.1 (H) 0.00 - 0.04 K/UL    DF AUTOMATED     METABOLIC PANEL, COMPREHENSIVE    Collection Time: 02/17/23 11:41 PM   Result Value Ref Range    Sodium 140 136 - 145 mmol/L    Potassium 3.6 3.5 - 5.1 mmol/L    Chloride 111 (H) 97 - 108 mmol/L    CO2 24 21 - 32 mmol/L    Anion gap 5 5 - 15 mmol/L    Glucose 87 65 - 100 mg/dL    BUN 19 6 - 20 MG/DL    Creatinine 1.22 (H) 0.55 - 1.02 MG/DL    BUN/Creatinine ratio 16 12 - 20      eGFR 58 (L) >60 ml/min/1.73m2    Calcium 9.2 8.5 - 10.1 MG/DL    Bilirubin, total 0.3 0.2 - 1.0 MG/DL    ALT (SGPT) 28 12 - 78 U/L    AST (SGOT) 17 15 - 37 U/L    Alk.  phosphatase 154 (H) 45 - 117 U/L    Protein, total 7.4 6.4 - 8.2 g/dL    Albumin 3.4 (L) 3.5 - 5.0 g/dL    Globulin 4.0 2.0 - 4.0 g/dL A-G Ratio 0.9 (L) 1.1 - 2.2     POC LACTIC ACID    Collection Time: 02/18/23 12:19 AM   Result Value Ref Range    Lactic Acid (POC) 0.58 0.40 - 2.00 mmol/L           RADIOLOGY:  Non-plain film images such as CT, Ultrasound and MRI are read by the radiologist. Plain radiographic images are visualized and preliminarily interpreted by the ED Provider with the below findings:        Interpretation per the Radiologist below, if available at the time of this note:     CT UP EXT LT WO CONT   Final Result   Skin thickening and subcutaneous edema-like attenuation of the   posterior and posterolateral upper arm. No localized collection demonstrated. PROCEDURES       SCREENINGS        CRITICAL CARE TIME       EMERGENCY DEPARTMENT COURSE and DIFFERENTIAL DIAGNOSIS/MDM     Vitals:    Vitals:    02/17/23 2330   BP: 110/65   Pulse: 83   Resp: 24   Temp: 97.5 °F (36.4 °C)   SpO2: 99%   Weight: 120.4 kg (265 lb 6.9 oz)   Height: 5' 2\" (1.575 m)       Pertinent Chronic Medical Conditions or Prior Surgeries: As noted above under pmh and includes, lupus, gerd, hidradenitis suppurativa, htn, morbid obesity    Social Determinants affecting Dx or Tx: None    Records Reviewed: Prior medical records, Previous Radiology studies, Previous Laboratory studies, and Nursing notes    Independent history obtained from . They confirm history as described in hpi    CC/HPI Summary, DDx, ED Course, and Reassessment:   66-year-old female who presents to the emergency department with severe pain in her left arm. No fever, chills. Diagnosed with cellulitis yesterday during ed visit and is on day 2 of abx (first dose last night). Vital signs are notable for borderline hypotension which is asymptomatic. No fever. Exam compared to documentation from yesterday's ed visit does not appear to be significantly changed. Specifically has a circular area of erythema over inner aspect of proximal left upper extremity.    Ddx: clinically most likely cellulitis. Considered but have low suspicion for abscess as exam shows no evidence of fluid collection and ultrasound yesterday did not show a fluid collection. Considered sepsis or bacteremia but she is not febrile, has a normal lactate, does not meet sepsis criteria. Lastly, low concern for more aggressive infection such as necrotizing fasciitis based on history and exam - no significant progression of symptoms, no edema, no crepitus, no systemic toxicity or symptoms, no bullae/necrosis/ecchymosis, subcutaneous tissue on palpation is not firm or indurated. Also considered treatment failure but had only 3 doses of doxy and without clear progression of infection, I would not consider this treatment failure. Labs from today reviewed and compared to 24 hours ago. Lactate normal. Mild leukocytosis persists which is expected. Mild ENRICO, likely pre-renal as patient has dry mucus membranes c/w dehydration. She does confirm poor po intake. Obtained ct of LUE today to ensure no abscess is missed on ultrasound (unable to use contrast due to patient intolerance) and ct appears normal.    She is supposed to follow up with gen surg on Monday. She was given pain control, fluids, antiemetics in the ed after which she felt a lot better. Tolerating po. Based on her exam and evaluation today, I do not think that she requires hospitalization at this time. Patient agrees and would like to go home. She has been taking percocet every 8 hours for pain. Will increase frequency to every 4-6 hours as this is probably more appropriate weight based dosing of the medication. Medical Decision Making  Amount and/or Complexity of Data Reviewed  Independent Historian: spouse  Labs: ordered. Decision-making details documented in ED Course. Radiology: ordered. Risk  OTC drugs. Prescription drug management. Decision regarding hospitalization.           Disposition Considerations (Tests not done, Shared Decision Making, Pt Expectation of Test or Tx.):    I have discussed with the patient and/or caregiver my initial clinical impression which is based on an evidence-based clinical evaluation of the patient and interpretation of available results. Involved patient and/or caregiver in management, treatment options and final disposition. Patient and/or caregiver verbalizes understanding and agreement. ED Medications Administered  Medications   HYDROmorphone (DILAUDID) injection 2 mg (2 mg IntraVENous Given 2/18/23 0140)   ondansetron (ZOFRAN) injection 8 mg (8 mg IntraVENous Given 2/18/23 0140)   sodium chloride 0.9 % bolus infusion 1,000 mL (0 mL IntraVENous IV Completed 2/18/23 0215)   HYDROmorphone (DILAUDID) injection 1 mg (1 mg IntraVENous Given 2/18/23 0350)   ondansetron (ZOFRAN) injection 4 mg (4 mg IntraVENous Given 2/18/23 0350)       ED Orders Placed:  Orders Placed This Encounter    CT UP EXT LT WO CONT    CBC WITH AUTOMATED DIFF    METABOLIC PANEL, COMPREHENSIVE    POC LACTIC ACID    HYDROmorphone (DILAUDID) injection 2 mg    ondansetron (ZOFRAN) injection 8 mg    sodium chloride 0.9 % bolus infusion 1,000 mL    DISCONTD: doxycycline (VIBRAMYCIN) 100 mg in 0.9% sodium chloride (MBP/ADV) 100 mL MBP    HYDROmorphone (DILAUDID) injection 1 mg    ondansetron (ZOFRAN) injection 4 mg    oxyCODONE-acetaminophen (Percocet) 5-325 mg per tablet    ondansetron (ZOFRAN ODT) 4 mg disintegrating tablet         FINAL IMPRESSION     1. Left arm cellulitis    2. Left arm pain          DISPOSITION/PLAN     Progress note:  Patient has been reassessed and reports feeling considerably better, has normal vital signs and feels comfortable going home. I think this is reasonable as no findings today suggest a life-threatening condition. DISPOSITION: DISCHARGE  The patient's results have been reviewed with patient and available family and/or caregiver.  They verbally convey their understanding and agreement of the patient's signs, symptoms, diagnosis, treatment and prognosis and additionally agree to follow up as recommended in the discharge instructions or to return to the Emergency Department should the patient's condition change prior to their follow-up appointment. The patient and available family and/or caregiver verbally agree with the care plan and all of their questions have been answered. The discharge instructions have also been provided to the them with educational information regarding the patient's diagnosis as well a list of reasons why the patient would want to return to the ER prior to their follow-up appointment should any concerns arise, the patient's condition change or symptoms worsen. Brad Kamara MD, Msc    PLAN:  Discharge Medication List as of 2/18/2023  3:42 AM        START taking these medications    Details   !! oxyCODONE-acetaminophen (Percocet) 5-325 mg per tablet Take 2 Tablets by mouth every four (4) hours as needed for Pain for up to 5 days. Max Daily Amount: 12 Tablets., Normal, Disp-16 Tablet, R-0      ondansetron (ZOFRAN ODT) 4 mg disintegrating tablet Take 1 Tablet by mouth every eight (8) hours as needed for Nausea or Vomiting., Normal, Disp-20 Tablet, R-0       !! - Potential duplicate medications found. Please discuss with provider. CONTINUE these medications which have NOT CHANGED    Details   doxycycline (VIBRA-TABS) 100 mg tablet Take 1 Tablet by mouth two (2) times a day for 7 days. , Normal, Disp-14 Tablet, R-0      !! oxyCODONE-acetaminophen (Percocet) 5-325 mg per tablet Take 1 Tablet by mouth every eight (8) hours as needed for Pain for up to 5 days. Max Daily Amount: 3 Tablets. Indications: pain, Normal, Disp-10 Tablet, R-0      methocarbamoL (ROBAXIN) 750 mg tablet Take 1 Tablet by mouth four (4) times daily as needed for Muscle Spasm(s). , Normal, Disp-20 Tablet, R-0      lidocaine 4 % patch Apply every 12 hours as needed for pain., Normal, Disp-10 Patch, R-0      DULoxetine (CYMBALTA) 60 mg capsule TAKE 2 CAPSULES BY MOUTH EVERY DAY, Normal, Disp-30 Capsule, R-5      mirtazapine (REMERON) 30 mg tablet TAKE 1 TABLET BY MOUTH EVERYDAY AT BEDTIME, Normal, Disp-30 Tablet, R-5      omeprazole (PRILOSEC) 20 mg capsule TAKE 1 CAPSULE BY MOUTH EVERY DAY, Normal, Disp-30 Capsule, R-5      meclizine (ANTIVERT) 25 mg tablet TAKE 1 TABLET BY MOUTH THREE TIMES A DAY AS NEEDED FOR DIZZINESS FOR UP TO 10 DAYS, Normal, Disp-30 Tablet, R-0      ondansetron hcl (ZOFRAN) 4 mg tablet TAKE 1 TABLET BY MOUTH EVERY 8 HOURS AS NEEDED FOR NAUSEA AND VOMITING, Normal, Disp-20 Tablet, R-0      methylPREDNISolone (MEDROL DOSEPACK) 4 mg tablet Take 1 Tablet by mouth Specific Days and Specific Times., Normal, Disp-1 Dose Pack, R-0      levoFLOXacin (LEVAQUIN) 500 mg tablet Take 1 Tablet by mouth daily. , Normal, Disp-7 Tablet, R-0      codeine-butalbital-acetaminophen-caffeine (FIORICET WITH CODEINE) -08-30 mg capsule TAKE 1 CAPSULE BY MOUTH EVERY 4 HOURS AS NEEDED FOR HEADACHE FOR 10 DAYS AS NEEDED, Historical Med      tiZANidine (ZANAFLEX) 4 mg tablet TAKE 1 TABLET BY MOUTH THREE TIMES A DAY AS NEEDED FOR PAIN, Normal, Disp-30 Tablet, R-0      Symbicort 160-4.5 mcg/actuation HFAA INHALE 2 PUFFS BY MOUTH TWICE A DAY, Historical Med, YARELY      propranoloL (INDERAL) 10 mg tablet TAKE 1 TABLET BY MOUTH TWICE A DAY, Normal, Disp-180 Tablet, R-3      pregabalin (LYRICA) 50 mg capsule Take 50 mg by mouth two (2) times a day., Historical Med      albuterol (PROVENTIL HFA, VENTOLIN HFA, PROAIR HFA) 90 mcg/actuation inhaler Take 1 Puff by inhalation every six (6) hours as needed for Wheezing., Normal, Disp-1 Each, R-1      furosemide (LASIX) 20 mg tablet TAKE 1 TABLET BY MOUTH AS NEEDED (SWELLING). , Normal, Disp-30 Tablet, R-2      belimumab (Benlysta) 400 mg solr injection by IntraVENous route. Every 8 weeks, Historical Med       !! - Potential duplicate medications found. Please discuss with provider.         2. Follow-up Information       Follow up With Specialties Details Why Contact Info    Nikkie Fisher MD Internal Medicine Physician Schedule an appointment as soon as possible for a visit in 3 days for blood pressure check [address]  [facility] [address]  [facility]  [phone]      Verlyn Leventhal, MD General Surgery, Surgery General, Colon and Rectal Surgery, Endocrinology Physician Call on 2/20/2023  [address]  [phone]      [facility] EMERGENCY DEPT Emergency Medicine Go to  As needed, If symptoms worsen [address] [place] [address]  [phone]          3. Return to ED if worse       I am the Primary Clinician of Record. Aruy Sadler MD (electronically signed)    (Please note that parts of this dictation were completed with voice recognition software. Quite often unanticipated grammatical, syntax, homophones, and other interpretive errors are inadvertently transcribed by the computer software. Please disregards these errors.  Please excuse any errors that have escaped final proofreading.)

## 2023-02-18 NOTE — ED NOTES
Discussed discharge instructions with pt-state understanding. Pt remains AOX4, PWD, ambulatory with steady gait to waiting room with .

## 2023-02-18 NOTE — DISCHARGE INSTRUCTIONS
It was a pleasure taking care of you in our Emergency Department today. We know that when you come to [facility], you are entrusting us with your health, comfort, and safety. Our physicians and nurses honor that trust, and truly appreciate the opportunity to care for you and your loved ones. We also value your feedback. If you receive a survey about your Emergency Department experience today, please fill it out. We care about our patients' feedback, and we listen to what you have to say.   Thank you!       --- Dr. Aysha Stauffer MD

## 2023-02-19 LAB
BACTERIA SPEC CULT: NORMAL
SERVICE CMNT-IMP: NORMAL

## 2023-02-20 ENCOUNTER — TELEPHONE (OUTPATIENT)
Dept: INTERNAL MEDICINE CLINIC | Age: 38
End: 2023-02-20

## 2023-02-20 ENCOUNTER — TELEPHONE (OUTPATIENT)
Dept: SURGERY | Age: 38
End: 2023-02-20

## 2023-02-20 NOTE — TELEPHONE ENCOUNTER
Patient called regarding cellulitis, patient stated she has been to the ER twice and was told by MD to be prescribed antibiotics.  Patient stated antibiotics is not helping and she is still running a fever,patient is scheduled for next available but unsure if she should wait that long    Please call  [phone]

## 2023-02-20 NOTE — TELEPHONE ENCOUNTER
Asking if dr Manpreet Bender will refill pain med and antibiotic for her arm cellulitis :  Diclocyclyine 100 mg tab, twice daily  Oxycodone 5-325 mg tab, 1 every 4 hours       States her surgery is Monday and she needs it to hold over until thern    Please call to advise

## 2023-02-21 ENCOUNTER — OFFICE VISIT (OUTPATIENT)
Dept: SURGERY | Age: 38
End: 2023-02-21
Payer: MEDICAID

## 2023-02-21 VITALS
HEART RATE: 80 BPM | TEMPERATURE: 99.1 F | RESPIRATION RATE: 20 BRPM | BODY MASS INDEX: 46.6 KG/M2 | DIASTOLIC BLOOD PRESSURE: 92 MMHG | WEIGHT: 263 LBS | OXYGEN SATURATION: 94 % | HEIGHT: 63 IN | SYSTOLIC BLOOD PRESSURE: 141 MMHG

## 2023-02-21 DIAGNOSIS — L02.91 ABSCESS: Primary | ICD-10-CM

## 2023-02-21 PROCEDURE — 3077F SYST BP >= 140 MM HG: CPT | Performed by: SURGERY

## 2023-02-21 PROCEDURE — 99213 OFFICE O/P EST LOW 20 MIN: CPT | Performed by: SURGERY

## 2023-02-21 PROCEDURE — 3080F DIAST BP >= 90 MM HG: CPT | Performed by: SURGERY

## 2023-02-21 PROCEDURE — 10060 I&D ABSCESS SIMPLE/SINGLE: CPT | Performed by: SURGERY

## 2023-02-21 RX ORDER — RIZATRIPTAN BENZOATE 10 MG/1
TABLET, ORALLY DISINTEGRATING ORAL
COMMUNITY
Start: 2023-02-10

## 2023-02-21 RX ORDER — SULFAMETHOXAZOLE AND TRIMETHOPRIM 800; 160 MG/1; MG/1
1 TABLET ORAL 2 TIMES DAILY
Qty: 20 TABLET | Refills: 0 | Status: SHIPPED | OUTPATIENT
Start: 2023-02-21 | End: 2023-03-03

## 2023-02-21 RX ORDER — ALPRAZOLAM 1 MG/1
TABLET ORAL
COMMUNITY
Start: 2023-02-04

## 2023-02-21 RX ORDER — OXYCODONE AND ACETAMINOPHEN 5; 325 MG/1; MG/1
1-2 TABLET ORAL
Qty: 12 TABLET | Refills: 0 | Status: SHIPPED | OUTPATIENT
Start: 2023-02-21 | End: 2023-02-24

## 2023-02-21 NOTE — Clinical Note
2/21/2023    Patient: Alon Astorga   YOB: 1985   Date of Visit: 2/21/2023     Mathew Waldrop MD  [address]  [facility] [address]  [facility]  [facility] [address]    Dear Mathew Waldrop MD,      Thank you for referring Ms. Kirill Sanderson to [address] [place] [address] for evaluation. My notes for this consultation are attached. If you have questions, please do not hesitate to call me. I look forward to following your patient along with you.       Sincerely,    Jennifer Xiong MD

## 2023-02-21 NOTE — PROGRESS NOTES
Identified pt with two pt identifiers (name and ). Reviewed chart in preparation for visit and have obtained necessary documentation. Neha Thurston is a 42 y.o. female  Chief Complaint   Patient presents with    Follow-up     Follow up cellulitis    Skin Problem     E/P ER followup cellulitis on left underarm. Visit Vitals  BP (!) 141/92 (BP 1 Location: Right upper arm, BP Patient Position: Sitting, BP Cuff Size: Large adult long)   Pulse 80   Temp 99.1 °F (37.3 °C) (Oral)   Resp 20   Ht 5' 3\" (1.6 m)   Wt 119.3 kg (263 lb)   LMP 2016   SpO2 94%   BMI 46.59 kg/m²       1. Have you been to the ER, urgent care clinic since your last visit? Hospitalized since your last visit? [facility] ER, multiple visits. 2. Have you seen or consulted any other health care providers outside of the [address] [place] [address] since your last visit? Include any pap smears or colon screening. No    Patient and provider made aware of elevated BP x2. Patient asymptomatic. Patient reminded to monitor BP, continue to take BP medications if prescribed, and follow up with PCP/Cardiologist.  Patient expressed understanding and agreement. Unable to take in opposite arm due to infection.

## 2023-02-21 NOTE — PROGRESS NOTES
To: Martin Minor MD    From: Garth Muhammad MD      Halle Perry was referred by the ER at [facility]. Please note that this dictation was completed with [employer], the computer voice recognition software. Quite often unanticipated grammatical, syntax, homophones, and other interpretive errors are inadvertently transcribed by the software. Please disregard these errors. Please excuse any errors that have escaped final proofreading. Encounter Date: 2/21/2023  History and Physical    Assessment:   Abscess of the left medial upper arm. Prior history of MRSA. On doxycycline since last week. Some regression of the erythema that was extending toward the elbow. Lupus on belimumab. Body mass index is 46.59 kg/m². Plan:   I recommended I&D. I will also put her on Bactrim. She has a sulfa allergy listed but her mother said that was just eyedrops from when she was a child. She says she has taken Bactrim without problems. Risks including bleeding and infection were explained to the patient. The patient expressed understanding of the risks, and all questions were answered to the patient's satisfaction. HPI:   Sabas Duncan is a 42 y.o. female who is seen after a trip to the ER for soft tissue infection in the medial proximal upper arm on the left. She says this has been going on for about a week. They did a CAT scan at the emergency department and no abscess was seen. She was prescribed antibiotics and released. She says its been very painful since. She has not had any drainage yet. She does have lupus and is on a monoclonal antibody. She has had MRSA soft tissue infections in the past.  She tells me that since being on doxycycline the redness that was extending toward her elbow has regressed.     Past Medical History:   Diagnosis Date    Abnormal CT of the abdomen 11/8/2012    Asthma     Autoimmune disease (HCC)     lupus    C. difficile diarrhea     Cervical prolapse Dehydration     Mariah-Danlos syndrome     Gastrointestinal disorder     gerd, twisted colon, IBS    GERD (gastroesophageal reflux disease)     Headache(784.0)     Lupus (HCC)     Morbid obesity (HCC)     Murmur     Nausea & vomiting     Neurological disorder     cluster headaches    Occipital neuralgia     other 2006, 2009    ovarian cyst removal    Other ill-defined conditions(799.89)     Syncope     Worsening headaches      Past Surgical History:   Procedure Laterality Date    COLONOSCOPY  09/21/2010         HX CHOLECYSTECTOMY      HX CYST INCISION AND DRAINAGE Right 02/27/2020    HX GYN  01/2009    right salpingo oopherectomy    HX GYN  2006    right ovarian tumor removed    HX HEENT      left wisdom teeth removal    HX HEENT      top right wisdom tooth removed    HX HERNIA REPAIR  04/2012    HX HERNIA REPAIR  02/28/2013    Laparoscopic recurrent incisional hernia repair    HX HYSTERECTOMY      2016    HX HYSTERECTOMY  2017    HX UROLOGICAL      Kidney Stone Removal    MT EGD TRANSORAL BIOPSY SINGLE/MULTIPLE  09/22/2010           Family History   Problem Relation Age of Onset    Colon Cancer Maternal Grandfather      Social History     Tobacco Use    Smoking status: Never    Smokeless tobacco: Never   Substance Use Topics    Alcohol use: No      Current Outpatient Medications   Medication Sig    ALPRAZolam (XANAX) 1 mg tablet TAKE 1 TABLET BY MOUTH DAILY AS NEEDED FOR ANXIETY FOR UP TO 30 DAYS.    rizatriptan (MAXALT-MLT) 10 mg disintegrating tablet TAKE 1 TABLET BY MOUTH EVERY DAY AS NEEDED FOR SEVERE MIGRAINE    trimethoprim-sulfamethoxazole (BACTRIM DS, SEPTRA DS) 160-800 mg per tablet Take 1 Tablet by mouth two (2) times a day for 10 days. oxyCODONE-acetaminophen (Percocet) 5-325 mg per tablet Take 1-2 Tablets by mouth every six (6) hours as needed for Pain for up to 3 days. Max Daily Amount: 8 Tablets.     ondansetron (ZOFRAN ODT) 4 mg disintegrating tablet Take 1 Tablet by mouth every eight (8) hours as needed for Nausea or Vomiting. doxycycline (VIBRA-TABS) 100 mg tablet Take 1 Tablet by mouth two (2) times a day for 7 days. methocarbamoL (ROBAXIN) 750 mg tablet Take 1 Tablet by mouth four (4) times daily as needed for Muscle Spasm(s). lidocaine 4 % patch Apply every 12 hours as needed for pain. DULoxetine (CYMBALTA) 60 mg capsule TAKE 2 CAPSULES BY MOUTH EVERY DAY    mirtazapine (REMERON) 30 mg tablet TAKE 1 TABLET BY MOUTH EVERYDAY AT BEDTIME    omeprazole (PRILOSEC) 20 mg capsule TAKE 1 CAPSULE BY MOUTH EVERY DAY    tiZANidine (ZANAFLEX) 4 mg tablet TAKE 1 TABLET BY MOUTH THREE TIMES A DAY AS NEEDED FOR PAIN    Symbicort 160-4.5 mcg/actuation HFAA INHALE 2 PUFFS BY MOUTH TWICE A DAY    propranoloL (INDERAL) 10 mg tablet TAKE 1 TABLET BY MOUTH TWICE A DAY    pregabalin (LYRICA) 50 mg capsule Take 50 mg by mouth two (2) times a day. albuterol (PROVENTIL HFA, VENTOLIN HFA, PROAIR HFA) 90 mcg/actuation inhaler Take 1 Puff by inhalation every six (6) hours as needed for Wheezing. furosemide (LASIX) 20 mg tablet TAKE 1 TABLET BY MOUTH AS NEEDED (SWELLING). belimumab (Benlysta) 400 mg solr injection by IntraVENous route. Every 8 weeks    meclizine (ANTIVERT) 25 mg tablet TAKE 1 TABLET BY MOUTH THREE TIMES A DAY AS NEEDED FOR DIZZINESS FOR UP TO 10 DAYS (Patient not taking: Reported on 2/21/2023)     No current facility-administered medications for this visit. Allergies: Allergies   Allergen Reactions    Latex Itching     burning    Compazine [Prochlorperazine] Anxiety     High heart rate  Pt can take promethazine with no problems    Sulfa (Sulfonamide Antibiotics) Unknown (comments)    Topamax [Topiramate] Unknown (comments)    Adhesive Rash     Allergic to Dermabond    Clindamycin Diarrhea     Pt states caused her C-Diff    Iodinated Contrast Media Myalgia     Pt c/o severe back spasms after IV contrast administration.  Pt requesting IV pain medications and requests I put this as a drug intolerance in her EMR. Mushroom Other (comments)    Mushroom Combination No.1 Unknown (comments)    Nsaids (Non-Steroidal Anti-Inflammatory Drug) Other (comments)     Peptic ulcer    Reglan [Metoclopramide Hcl] Rash    Valproic Acid Other (comments)     Elevated heart rate and vomiting         Review of Systems:  10 systems reviewed. See scanned sheet in \"Media\" section. See HPI for pertinent positives and negatives. Objective:   Visit Vitals  BP (!) 141/92 (BP 1 Location: Right upper arm, BP Patient Position: Sitting, BP Cuff Size: Large adult long)   Pulse 80   Temp 99.1 °F (37.3 °C) (Oral)   Resp 20   Ht 5' 3\" (1.6 m)   Wt 119.3 kg (263 lb)   SpO2 94%   BMI 46.59 kg/m²       Physical Exam:  General appearance  Alert, cooperative, no distress, appears stated age   HEENT Anicteric           Lungs   Clear to auscultation bilaterally   Heart  Regular rate and rhythm. Pulses 2+ right radial       Lymph nodes No palpable axillary LAD. Neurologic Without overt sensory or motor deficit     Focused exam of the left upper arm reveals an area of fluctuance adjacent to the axilla. There is also resolving erythema extending towards the elbow. Incision/Drainage Procedure Note    Encounter Date: 2/21/2023    Pre-operative diagnosis: Left upper arm abscess  Post-operative diagnosis:  same  Procedure: Incision and drainage of left upper arm abscess     Time out at performed by me (see consent form):  * Patient was identified by name and date of birth   * Agreement on procedure being performed was verified  * Risks and Benefits explained to the patient  * Procedure site verified and marked as necessary  * Patient was positioned for comfort  * Consent was signed and verified    Procedure Details: The risks,benefits and alternatives were explained and consent was obtained for the procedure. Time out was performed. The area was prepped and draped in the usual manner.   Local anesthesia was infiltrated into the skin overlying the abscess. An incision was made. Pus was evacuated. A culture was obtained. The wound was irrigated with saline and peroxide. The wound was packed with iodoform gauze. A sterile dressing was then applied. Estimated Blood Loss:  minimal     Disposition:  Procedure well tolerated. Post-procedure pain scale: 2/10. Plan: Daily packing starting Thursday. I will see her back next week. Told to call for spreading erythema. She was told to complete her doxycycline but I also extended the antibiotics and wrote for Bactrim. She says that she tolerates Bactrim. Her sulfa allergy listed was to an eyedrop from when she was a child.     Signed By: Cade Francis MD

## 2023-02-24 ENCOUNTER — OFFICE VISIT (OUTPATIENT)
Dept: SURGERY | Age: 38
End: 2023-02-24
Payer: MEDICAID

## 2023-02-24 VITALS
HEART RATE: 67 BPM | WEIGHT: 259.6 LBS | RESPIRATION RATE: 20 BRPM | DIASTOLIC BLOOD PRESSURE: 83 MMHG | TEMPERATURE: 98.7 F | OXYGEN SATURATION: 95 % | HEIGHT: 63 IN | BODY MASS INDEX: 46 KG/M2 | SYSTOLIC BLOOD PRESSURE: 132 MMHG

## 2023-02-24 DIAGNOSIS — Z09 POSTOPERATIVE EXAMINATION: Primary | ICD-10-CM

## 2023-02-24 LAB
BACTERIA SPEC CULT: ABNORMAL
GRAM STN SPEC: ABNORMAL
SERVICE CMNT-IMP: ABNORMAL

## 2023-02-24 PROCEDURE — 99024 POSTOP FOLLOW-UP VISIT: CPT | Performed by: SURGERY

## 2023-02-24 RX ORDER — OLMESARTAN MEDOXOMIL 5 MG/1
5 TABLET ORAL DAILY
COMMUNITY
Start: 2023-02-08

## 2023-02-24 NOTE — Clinical Note
2/24/2023    Patient: Lizzie Piper   YOB: 1985   Date of Visit: 2/24/2023     Stacey Arceo MD  [address]  [address]  [facility]    Dear Stacey Arceo MD,      Thank you for referring Ms. Martin Peter to [address] [place], [place] [address]  for evaluation. My notes for this consultation are attached. If you have questions, please do not hesitate to call me. I look forward to following your patient along with you.       Sincerely,    Peña Thurston MD

## 2023-02-24 NOTE — PROGRESS NOTES
Status post I&D of left upper arm abscess 3 days ago. Her mom has changed her packing once. She says it was very painful. Culture has come back positive for MRSA. She was started on doxycycline before I had seen her but I added Bactrim the day of I&D. On exam, the erythema is greatly decreased. There is no pus. Assessment and plan: Recovering well. Appropriate antibiotic coverage. I changed her packing today. Her mother will change it on Sunday and she will come back here on Tuesday. Told to call for any concerns.

## 2023-02-28 ENCOUNTER — OFFICE VISIT (OUTPATIENT)
Dept: SURGERY | Age: 38
End: 2023-02-28
Payer: MEDICAID

## 2023-02-28 VITALS
BODY MASS INDEX: 45.89 KG/M2 | SYSTOLIC BLOOD PRESSURE: 127 MMHG | HEART RATE: 84 BPM | HEIGHT: 63 IN | DIASTOLIC BLOOD PRESSURE: 87 MMHG | RESPIRATION RATE: 24 BRPM | TEMPERATURE: 97.7 F | OXYGEN SATURATION: 94 % | WEIGHT: 259 LBS

## 2023-02-28 DIAGNOSIS — L02.91 ABSCESS: Primary | ICD-10-CM

## 2023-02-28 PROCEDURE — 99024 POSTOP FOLLOW-UP VISIT: CPT | Performed by: SURGERY

## 2023-02-28 NOTE — PROGRESS NOTES
Identified pt with two pt identifiers(name and ). Reviewed record in preparation for visit and have obtained necessary documentation. All patient medications has been reviewed. Chief Complaint   Patient presents with    Follow-up     S/P INCISION AND DRAIN LEFT ARM CELLULITIS        Health Maintenance Due   Topic    Hepatitis C Screening     COVID-19 Vaccine (1)    Pneumococcal 0-64 years (1 - PCV)    DTaP/Tdap/Td series (1 - Tdap)    Flu Vaccine (1)       Vitals:    23 1532   BP: 127/87   Pulse: 84   Resp: 24   Temp: 97.7 °F (36.5 °C)   TempSrc: Temporal   SpO2: 94%   Weight: 117.5 kg (259 lb)   Height: 5' 3\" (1.6 m)   PainSc:   2   PainLoc: Arm   LMP: 2016       4. Have you been to the ER, urgent care clinic since your last visit? Hospitalized since your last visit? No    5. Have you seen or consulted any other health care providers outside of the [address] [place] [address] since your last visit? Include any pap smears or colon screening. No      Patient is accompanied by self I have received verbal consent from Juan Colindres to discuss any/all medical information while they are present in the room.

## 2023-03-09 ENCOUNTER — VIRTUAL VISIT (OUTPATIENT)
Dept: INTERNAL MEDICINE CLINIC | Age: 38
End: 2023-03-09

## 2023-03-09 DIAGNOSIS — J40 BRONCHITIS: Primary | ICD-10-CM

## 2023-03-09 RX ORDER — AMOXICILLIN AND CLAVULANATE POTASSIUM 875; 125 MG/1; MG/1
1 TABLET, FILM COATED ORAL EVERY 12 HOURS
Qty: 14 TABLET | Refills: 0 | Status: SHIPPED | OUTPATIENT
Start: 2023-03-09 | End: 2023-03-16

## 2023-03-09 RX ORDER — CODEINE PHOSPHATE AND GUAIFENESIN 10; 100 MG/5ML; MG/5ML
5 SOLUTION ORAL
Qty: 60 ML | Refills: 0 | Status: SHIPPED | OUTPATIENT
Start: 2023-03-09 | End: 2023-03-16

## 2023-03-09 NOTE — PROGRESS NOTES
Chief Complaint   Patient presents with    Cold Symptoms     Bad cough, sorethroat, ear pain & Low grade Fever x 2 days          1. \"Have you been to the ER, urgent care clinic since your last visit? Hospitalized since your last visit? yes, [facility], info is in the chart. 2. \"Have you seen or consulted any other health care providers outside of the [address] [place] [address] since your last visit? \" No     3. For patients aged 39-70: Has the patient had a colonoscopy / FIT/ Cologuard? NA - based on age      If the patient is female:    4. For patients aged 41-77: Has the patient had a mammogram within the past 2 years? NA - based on age or sex      11. For patients aged 21-65: Has the patient had a pap smear?  No

## 2023-03-09 NOTE — PROGRESS NOTES
Luh Le is a 42 y.o. female, evaluated via audio-only technology on 3/9/2023 for Cold Symptoms (Bad cough, sorethroat, ear pain & Low grade Fever x 2 days)    Bronchitis: Pt presents with cold symptoms. She endorses a cough, sore throat, congestion, yellow sputum production, pain with inspiration, and fever (100-101 F). She notes a history of asthma. She reports potential infectious exposures from multiple ED visits and recent surgery of an abscess on her left arm. However, she indicates testing negative for COVID via home test. She denies improvement with tessalon perles. She has not taken medications recently for arm pain. Assessment & Plan:   Diagnoses and all orders for this visit:    1. Bronchitis  -     amoxicillin-clavulanate (AUGMENTIN) 875-125 mg per tablet; Take 1 Tablet by mouth every twelve (12) hours for 7 days.  -     guaiFENesin-codeine (ROBITUSSIN AC) 100-10 mg/5 mL solution; Take 5 mL by mouth three (3) times daily as needed for Cough for up to 7 days. Max Daily Amount: 15 mL. I have prescribed her Augmentin for the infection she may have. I prescribed her Robitussin AC for her cough. She was encouraged to get a PCR COVID test.      Subjective:       Prior to Admission medications    Medication Sig Start Date End Date Taking? Authorizing Provider   amoxicillin-clavulanate (AUGMENTIN) 875-125 mg per tablet Take 1 Tablet by mouth every twelve (12) hours for 7 days. 3/9/23 3/16/23 Yes Zahra Tim MD   guaiFENesin-codeine [facility]) 100-10 mg/5 mL solution Take 5 mL by mouth three (3) times daily as needed for Cough for up to 7 days. Max Daily Amount: 15 mL. 3/9/23 3/16/23 Yes Zahra Tim MD   ALPRAZolam [facility]) 1 mg tablet TAKE 1 TABLET BY MOUTH DAILY AS NEEDED FOR ANXIETY FOR UP TO 30 DAYS. 3/1/23  Yes Migdalia Martini MD   olmesartan (BENICAR) 5 mg tablet Take 5 mg by mouth daily.  2/8/23  Yes Provider, Historical   ondansetron (ZOFRAN ODT) 4 mg disintegrating tablet Take 1 Tablet by mouth every eight (8) hours as needed for Nausea or Vomiting. 2/18/23  Yes Zach Greer MD   DULoxetine (CYMBALTA) 60 mg capsule TAKE 2 CAPSULES BY MOUTH EVERY DAY 2/1/23  Yes Brad Foote MD   mirtazapine (REMERON) 30 mg tablet TAKE 1 TABLET BY MOUTH EVERYDAY AT BEDTIME 1/24/23  Yes Brad Foote MD   omeprazole (PRILOSEC) 20 mg capsule TAKE 1 CAPSULE BY MOUTH EVERY DAY 12/25/22  Yes Brad Foote MD   tiZANidine (ZANAFLEX) 4 mg tablet TAKE 1 TABLET BY MOUTH THREE TIMES A DAY AS NEEDED FOR PAIN 8/22/22  Yes Brad Foote MD   Symbicort 160-4.5 mcg/actuation HFAA INHALE 2 PUFFS BY MOUTH TWICE A DAY 4/18/22  Yes Provider, Historical   propranoloL (INDERAL) 10 mg tablet TAKE 1 TABLET BY MOUTH TWICE A DAY 4/12/22  Yes Elizabeth Jimenez NP   pregabalin (LYRICA) 50 mg capsule Take 50 mg by mouth two (2) times a day. 1/14/22  Yes Provider, Historical   albuterol (PROVENTIL HFA, VENTOLIN HFA, PROAIR HFA) 90 mcg/actuation inhaler Take 1 Puff by inhalation every six (6) hours as needed for Wheezing. 1/17/22  Yes Astrid Marroquin MD   furosemide (LASIX) 20 mg tablet TAKE 1 TABLET BY MOUTH AS NEEDED (SWELLING). 12/5/21  Yes Elizabeth Jimenez NP   belimumab (Benlysta) 400 mg solr injection by IntraVENous route. Every 8 weeks   Yes Provider, Historical   rizatriptan (MAXALT-MLT) 10 mg disintegrating tablet TAKE 1 TABLET BY MOUTH EVERY DAY AS NEEDED FOR SEVERE MIGRAINE  Patient not taking: Reported on 3/9/2023 2/10/23   Provider, Historical   methocarbamoL (ROBAXIN) 750 mg tablet Take 1 Tablet by mouth four (4) times daily as needed for Muscle Spasm(s). 2/3/23   Pricilla Mahmood MD   meclizine (ANTIVERT) 25 mg tablet TAKE 1 TABLET BY MOUTH THREE TIMES A DAY AS NEEDED FOR DIZZINESS FOR UP TO 10 DAYS  Patient not taking: No sig reported 11/22/22   Brad Foote MD         Review of Systems   Constitutional:  Positive for fever. HENT:  Positive for congestion and sore throat.     Respiratory:  Positive for cough and sputum production. Patient-Reported Temperature: 100-101       Peewee Vela was evaluated through a patient-initiated, synchronous (real-time) audio only encounter. She (or guardian if applicable) is aware that it is a billable service, which includes applicable co-pays, with coverage as determined by her insurance carrier. This visit was conducted with the patient's (and/or Timi Crawford guardian's) verbal consent. She has not had a related appointment within my department in the past 7 days or scheduled within the next 24 hours. The patient was located in a state where the provider was licensed to provide care. The patient was located at: Home: [address]  The provider was located at:  Facility (Appt Department): [facility] [address]    Total Time: minutes: 5-10 minutes    Patty Nova   Written by Agusto Mejia, as dictated by Dilan Armstrong MD.

## 2023-03-12 DIAGNOSIS — J40 BRONCHITIS: ICD-10-CM

## 2023-03-13 RX ORDER — CODEINE PHOSPHATE AND GUAIFENESIN 10; 100 MG/5ML; MG/5ML
5 SOLUTION ORAL
Qty: 60 ML | Refills: 0 | Status: SHIPPED | OUTPATIENT
Start: 2023-03-13 | End: 2023-03-20

## 2023-03-13 NOTE — TELEPHONE ENCOUNTER
Future Appointments:  Future Appointments   Date Time Provider Alvaro Kelsey   3/13/2023  2:20 PM Shabbir Sales MD [facility] BS AMB        Last Appointment With Me:  3/9/2023     Requested Prescriptions     Pending Prescriptions Disp Refills    guaiFENesin-codeine (ROBITUSSIN AC) 100-10 mg/5 mL solution 60 mL 0     Sig: Take 5 mL by mouth three (3) times daily as needed for Cough for up to 7 days. Max Daily Amount: 15 mL. Paramedian Forehead Flap Text: A decision was made to reconstruct the defect utilizing an interpolation axial flap and a staged reconstruction.  A telfa template was made of the defect.  This telfa template was then used to outline the paramedian forehead pedicle flap.  The donor area for the pedicle flap was then injected with anesthesia.  The flap was excised through the skin and subcutaneous tissue down to the layer of the underlying musculature.  The pedicle flap was carefully excised within this deep plane to maintain its blood supply.  The edges of the donor site were undermined.   The donor site was closed in a primary fashion.  The pedicle was then rotated into position and sutured.  Once the tube was sutured into place, adequate blood supply was confirmed with blanching and refill.  The pedicle was then wrapped with xeroform gauze and dressed appropriately with a telfa and gauze bandage to ensure continued blood supply and protect the attached pedicle.

## 2023-03-18 DIAGNOSIS — J40 BRONCHITIS: ICD-10-CM

## 2023-03-20 RX ORDER — CODEINE PHOSPHATE AND GUAIFENESIN 10; 100 MG/5ML; MG/5ML
5 SOLUTION ORAL
Qty: 60 ML | Refills: 0 | Status: SHIPPED | OUTPATIENT
Start: 2023-03-20 | End: 2023-03-27

## 2023-03-20 NOTE — TELEPHONE ENCOUNTER
Future Appointments:  No future appointments. Last Appointment With Me:  Visit date not found     Requested Prescriptions     Pending Prescriptions Disp Refills    guaiFENesin-codeine (ROBITUSSIN AC) 100-10 mg/5 mL solution 60 mL 0     Sig: Take 5 mL by mouth three (3) times daily as needed for Cough for up to 7 days. Max Daily Amount: 15 mL.

## 2023-03-21 RX ORDER — MECLIZINE HYDROCHLORIDE 25 MG/1
TABLET ORAL
Qty: 30 TABLET | Refills: 0 | Status: SHIPPED | OUTPATIENT
Start: 2023-03-21

## 2023-03-28 RX ORDER — MECLIZINE HYDROCHLORIDE 25 MG/1
TABLET ORAL
Qty: 30 TABLET | Refills: 0 | Status: SHIPPED | OUTPATIENT
Start: 2023-03-28

## 2023-03-29 ENCOUNTER — VIRTUAL VISIT (OUTPATIENT)
Dept: INTERNAL MEDICINE CLINIC | Age: 38
End: 2023-03-29

## 2023-03-29 DIAGNOSIS — J40 BRONCHITIS: Primary | ICD-10-CM

## 2023-03-29 RX ORDER — CODEINE PHOSPHATE AND GUAIFENESIN 10; 100 MG/5ML; MG/5ML
5 SOLUTION ORAL
Qty: 120 ML | Refills: 0 | Status: SHIPPED | OUTPATIENT
Start: 2023-03-29 | End: 2023-03-29 | Stop reason: SDUPTHER

## 2023-03-29 RX ORDER — DOXYCYCLINE 100 MG/1
100 TABLET ORAL 2 TIMES DAILY
Qty: 14 TABLET | Refills: 0 | Status: SHIPPED | OUTPATIENT
Start: 2023-03-29 | End: 2023-04-05

## 2023-03-29 RX ORDER — CODEINE PHOSPHATE AND GUAIFENESIN 10; 100 MG/5ML; MG/5ML
5 SOLUTION ORAL
Qty: 120 ML | Refills: 0 | Status: SHIPPED | OUTPATIENT
Start: 2023-03-29 | End: 2023-03-30 | Stop reason: SDUPTHER

## 2023-03-29 RX ORDER — PREDNISONE 20 MG/1
TABLET ORAL
Qty: 12 TABLET | Refills: 0 | Status: SHIPPED | OUTPATIENT
Start: 2023-03-29

## 2023-03-29 NOTE — PROGRESS NOTES
HISTORY OF PRESENT ILLNESS  Addis Tran is a 42 y.o. female. HPI  Pt of Dr. Mayda Tyson, last there 3/9/23. Here for acute care. This is an established visit completed with telemedicine was completed with video assist. The patient acknowledges and agrees to this method of visitation. Pt c/o cough with vomiting, wheezing, fever (100.6) x 3 weeks that worsened in the past 2-3 days. Of note, she saw Dr. Antonella Shah 3/9/23 for initial sx's, took augmentin and cough syrup, which helped initially before sx's worsened again. She is taking symbicort and albuterol q 4 hours. Pt tested negative for COVID today with an at-home test.  Rx'd doxycyline, prednisone, robitussin syrup. Ordered CXR. Advised her to not take xanax with robitussin. Recommended tylenol for fever    Patient Active Problem List    Diagnosis Date Noted    Asthma 05/17/2022    Venous insufficiency of left leg 03/08/2022    Leg swelling 03/08/2022    Labile blood pressure 09/03/2021    Chronic headache disorder 08/29/2021    Constipation 08/29/2021    Seizure ([facility] 08/29/2021    Syncope 08/29/2021    Headache 08/29/2021    Intermittent palpitations 03/16/2021    Essential hypertension 03/16/2021    Ureteric calculus 67/41/2066    Ureteric colic 52/82/6112    Cellulitis 02/24/2020    Severe obesity ([address] 02/22/2019    Incomplete uterovaginal prolapse 05/17/2016    Migraine with aura and without status migrainosus, not intractable 12/02/2015    Anxiety 12/05/2014    Lupus ([facility] 12/04/2014    Recurrent ventral incisional hernia 03/01/2013    Ventral hernia 02/25/2013    Abnormal CT of the abdomen 11/08/2012     Current Outpatient Medications   Medication Sig Dispense Refill    meclizine (ANTIVERT) 25 mg tablet TAKE 1 TABLET BY MOUTH THREE TIMES A DAY AS NEEDED FOR DIZZINESS FOR UP TO 10 DAYS 30 Tablet 0    ALPRAZolam (XANAX) 1 mg tablet TAKE 1 TABLET BY MOUTH DAILY AS NEEDED FOR ANXIETY FOR UP TO 30 DAYS.  30 Tablet 2    olmesartan (BENICAR) 5 mg tablet Take 5 mg by mouth daily. rizatriptan (MAXALT-MLT) 10 mg disintegrating tablet TAKE 1 TABLET BY MOUTH EVERY DAY AS NEEDED FOR SEVERE MIGRAINE (Patient not taking: Reported on 3/9/2023)      ondansetron (ZOFRAN ODT) 4 mg disintegrating tablet Take 1 Tablet by mouth every eight (8) hours as needed for Nausea or Vomiting. 20 Tablet 0    methocarbamoL (ROBAXIN) 750 mg tablet Take 1 Tablet by mouth four (4) times daily as needed for Muscle Spasm(s). 20 Tablet 0    DULoxetine (CYMBALTA) 60 mg capsule TAKE 2 CAPSULES BY MOUTH EVERY DAY 30 Capsule 5    mirtazapine (REMERON) 30 mg tablet TAKE 1 TABLET BY MOUTH EVERYDAY AT BEDTIME 30 Tablet 5    omeprazole (PRILOSEC) 20 mg capsule TAKE 1 CAPSULE BY MOUTH EVERY DAY 30 Capsule 5    tiZANidine (ZANAFLEX) 4 mg tablet TAKE 1 TABLET BY MOUTH THREE TIMES A DAY AS NEEDED FOR PAIN 30 Tablet 0    Symbicort 160-4.5 mcg/actuation HFAA INHALE 2 PUFFS BY MOUTH TWICE A DAY      propranoloL (INDERAL) 10 mg tablet TAKE 1 TABLET BY MOUTH TWICE A  Tablet 3    pregabalin (LYRICA) 50 mg capsule Take 50 mg by mouth two (2) times a day. albuterol (PROVENTIL HFA, VENTOLIN HFA, PROAIR HFA) 90 mcg/actuation inhaler Take 1 Puff by inhalation every six (6) hours as needed for Wheezing. 1 Each 1    furosemide (LASIX) 20 mg tablet TAKE 1 TABLET BY MOUTH AS NEEDED (SWELLING). 30 Tablet 2    belimumab (Benlysta) 400 mg solr injection by IntraVENous route.  Every 8 weeks       Past Surgical History:   Procedure Laterality Date    COLONOSCOPY  09/21/2010         HX CHOLECYSTECTOMY      HX CYST INCISION AND DRAINAGE Right 02/27/2020    HX CYST INCISION AND DRAINAGE  02/21/2023    Incision and drainage of left upper arm abscess    HX GYN  01/2009    right salpingo oopherectomy    HX GYN  2006    right ovarian tumor removed    HX HEENT      left wisdom teeth removal    HX HEENT      top right wisdom tooth removed    HX HERNIA REPAIR  04/2012    HX HERNIA REPAIR  02/28/2013    Laparoscopic recurrent incisional hernia repair    HX HYSTERECTOMY      2016    HX HYSTERECTOMY  2017    HX UROLOGICAL      Kidney Stone Removal    ID EGD TRANSORAL BIOPSY SINGLE/MULTIPLE  09/22/2010           Lab Results   Component Value Date/Time    WBC 16.0 (H) 02/17/2023 11:41 PM    HGB 11.2 (L) 02/17/2023 11:41 PM    Hemoglobin (POC) 12.6 05/17/2016 07:47 AM    HCT 35.4 02/17/2023 11:41 PM    Hematocrit (POC) 37 05/17/2016 07:47 AM    PLATELET 945 50/39/3612 11:41 PM    MCV 91.9 02/17/2023 11:41 PM     No results found for: CHOL, CHOLPOCT, HDL, LDL, LDLC, LDLCPOC, LDLCEXT, TRIGL, TGLPOCT, CHHD, CHHDX  Lab Results   Component Value Date/Time    GFR est non-AA >60 09/22/2022 06:15 AM    GFRNA, POC >60 05/17/2016 07:47 AM    GFR est AA >60 09/22/2022 06:15 AM    GFRAA, POC >60 05/17/2016 07:47 AM    Creatinine 1.22 (H) 02/17/2023 11:41 PM    Creatinine (POC) 0.7 05/17/2016 07:47 AM    BUN 19 02/17/2023 11:41 PM    BUN (POC) 8 (L) 05/17/2016 07:47 AM    Sodium 140 02/17/2023 11:41 PM    Sodium (POC) 140 05/17/2016 07:47 AM    Potassium 3.6 02/17/2023 11:41 PM    Potassium (POC) 3.8 05/17/2016 07:47 AM    Chloride 111 (H) 02/17/2023 11:41 PM    Chloride (POC) 105 05/17/2016 07:47 AM    CO2 24 02/17/2023 11:41 PM    Magnesium 2.2 12/18/2021 04:37 AM        Review of Systems   Respiratory:  Positive for cough and wheezing. Negative for shortness of breath. Cardiovascular:  Negative for chest pain. Gastrointestinal:  Positive for vomiting. Physical Exam  Constitutional:       General: She is not in acute distress. Appearance: Normal appearance. She is not ill-appearing, toxic-appearing or diaphoretic. HENT:      Head: Normocephalic and atraumatic. Eyes:      General:         Right eye: No discharge. Left eye: No discharge. Conjunctiva/sclera: Conjunctivae normal.   Neurological:      General: No focal deficit present. Mental Status: She is alert and oriented to person, place, and time.    Psychiatric: Mood and Affect: Mood normal.         Behavior: Behavior normal.   Coughing and wheezing while talking today    ASSESSMENT and PLAN    ICD-10-CM ICD-9-CM    1. Bronchitis  J40 490 XR CHEST PA LAT      guaiFENesin-codeine (ROBITUSSIN AC) 100-10 mg/5 mL solution   Patient with significant bronchitis symptoms started in early March improved a bit and have worsened again we will get chest x-ray rule out pneumonia    Reports low-grade fever reports 2 negative COVID test    We will treat with doxycycline at this time prednisone taper    Continue Symbicort and albuterol    We will give Robitussin-AC for cough    ER for progressive symptoms         Depression screen reviewed and negative. Scribed by Rosendo Donald, as dictated by Dr. Dung De La Fuente. Current diagnosis and concerns discussed with pt at length. Pt understands risks and benefits or current treatment plan and medications, and accepts the treatment and medication with any possible risks. Pt asks appropriate questions, which were answered. Pt was instructed to call with any concerns or problems. I have reviewed the note documented by the scribe. The services provided are my own. The documentation is accurate. Deb Keller, who was evaluated through a synchronous (real-time) audio-video encounter, and/or her healthcare decision maker, is aware that it is a billable service, which includes applicable co-pays, with coverage as determined by her insurance carrier. She provided verbal consent to proceed and patient identification was verified. This visit was conducted pursuant to the emergency declaration under the [facility], [address] [place] [address] authority and the [employer] and [employer] Act. A caregiver was present when appropriate. Ability to conduct physical exam was limited.  The patient was located at: Home: [address]  The provider was located at:  Facility (Appt Department): [facility]    --Gustavo Gray on 3/29/2023 at 9:12 AM

## 2023-03-29 NOTE — PROGRESS NOTES
Status post I&D of left axillary abscess on 2/21/2023. Still sore but getting a bit better. On exam, there is no active infection. Granulation tissue is forming. Assessment plan: Changed packing today. They will continue daily packing changes and return in 2 weeks.

## 2023-03-30 ENCOUNTER — TELEPHONE (OUTPATIENT)
Dept: INTERNAL MEDICINE CLINIC | Age: 38
End: 2023-03-30

## 2023-04-04 ENCOUNTER — VIRTUAL VISIT (OUTPATIENT)
Dept: INTERNAL MEDICINE CLINIC | Age: 38
End: 2023-04-04
Payer: COMMERCIAL

## 2023-04-04 PROCEDURE — 99213 OFFICE O/P EST LOW 20 MIN: CPT | Performed by: FAMILY MEDICINE

## 2023-04-04 RX ORDER — BUTALBITAL, ACETAMINOPHEN, CAFFEINE AND CODEINE PHOSPHATE 50; 325; 40; 30 MG/1; MG/1; MG/1; MG/1
CAPSULE ORAL
Start: 2023-03-29

## 2023-04-04 RX ORDER — HYDROCODONE BITARTRATE AND HOMATROPINE METHYLBROMIDE ORAL SOLUTION 5; 1.5 MG/5ML; MG/5ML
5 LIQUID ORAL
Qty: 120 ML | Refills: 0 | Status: SHIPPED
Start: 2023-04-04 | End: 2023-04-12

## 2023-04-04 RX ORDER — RIMEGEPANT SULFATE 75 MG/75MG
75 TABLET, ORALLY DISINTEGRATING ORAL AS NEEDED
Start: 2023-03-28

## 2023-04-04 NOTE — PROGRESS NOTES
1. \"Have you been to the ER, urgent care clinic since your last visit? Hospitalized since your last visit? \" No    2. \"Have you seen or consulted any other [facility] care providers outside of the [address] [place] [address] since your last visit? \" No     3. For patients aged 39-70: Has the patient had a colonoscopy / FIT/ Cologuard? NA - based on age      If the patient is female:    4. For patients aged 41-77: Has the patient had a mammogram within the past 2 years? NA - based on age or sex      11. For patients aged 21-65: Has the patient had a pap smear? Yes - Care Gap present.  Most recent result on file

## 2023-04-04 NOTE — PROGRESS NOTES
Christopher Luna is a 42 y.o. female patient of Dr. Kem Parry who presents with sore throat, fever, cough. Seen by Dr Lou Thompson 3/9, treated for bronchitis, took augmentin. Reports symptoms resolved. Seen by Dr Kem Parry 3/29. On symbicort and using albuterol every 6 hours. Given doxycycline, prednisone, and cough medication. CXR ordered. Took robitussin AC and tessalon, no relief. Saw pulmonary 3 months ago. This is an established visit conducted via telemedicine with video. The patient has been instructed that this meets HIPAA criteria and acknowledges and agrees to this method of visitation. Pursuant to the emergency declaration under the [facility], [facility] waiver authority and the [employer] Act, this Virtual Visit was conducted, with patient's consent, to reduce the patient's risk of exposure to COVID-19 and provide continuity of care for an established patient. Services were provided through a video synchronous discussion virtually to substitute for in-person clinic visit.         Past Medical History:   Diagnosis Date    Abnormal CT of the abdomen 11/8/2012    Asthma     Autoimmune disease (HCC)     lupus    C. difficile diarrhea     Cervical prolapse     Dehydration     Mariah-Danlos syndrome     Gastrointestinal disorder     gerd, twisted colon, IBS    GERD (gastroesophageal reflux disease)     Headache(784.0)     Lupus (HCC)     Morbid obesity (HCC)     Murmur     Nausea & vomiting     Neurological disorder     cluster headaches    Occipital neuralgia     other 2006, 2009    ovarian cyst removal    Other ill-defined conditions(799.89)     Syncope     Worsening headaches        Family History   Problem Relation Age of Onset    Colon Cancer Maternal Grandfather        Social History     Socioeconomic History    Marital status:      Spouse name: Not on file    Number of children: Not on file Years of education: Not on file    Highest education level: Not on file   Occupational History    Not on file   Tobacco Use    Smoking status: Never    Smokeless tobacco: Never   Vaping Use    Vaping Use: Never used   Substance and Sexual Activity    Alcohol use: No    Drug use: No    Sexual activity: Not Currently     Partners: Male     Birth control/protection: Abstinence   Other Topics Concern    Not on file   Social History Narrative    Not on file     Social Determinants of Health     Financial Resource Strain: Low Risk     Difficulty of Paying Living Expenses: Not hard at all   Food Insecurity: No Food Insecurity    Worried About Running Out of Food in the Last Year: Never true    Ran Out of Food in the Last Year: Never true   Transportation Needs: Not on file   Physical Activity: Not on file   Stress: Not on file   Social Connections: Not on file   Intimate Partner Violence: Not on file   Housing Stability: Not on file       Current Outpatient Medications on File Prior to Visit   Medication Sig Dispense Refill    Nurtec ODT 75 mg disintegrating tablet Take 75 mg by mouth as needed. codeine-butalbital-acetaminophen-caffeine (FIORICET WITH CODEINE) -36-30 mg capsule TAKE 1 CAPSULE BY MOUTH EVERY 4 HOURS AS NEEDED FOR HEADACHE FOR 10 DAYS      guaiFENesin-codeine (ROBITUSSIN AC) 100-10 mg/5 mL solution Take 5 mL by mouth three (3) times daily as needed for Cough for up to 7 days. Max Daily Amount: 15 mL. 100 mL 0    doxycycline (ADOXA) 100 mg tablet Take 1 Tablet by mouth two (2) times a day for 7 days. 14 Tablet 0    ALPRAZolam (XANAX) 1 mg tablet TAKE 1 TABLET BY MOUTH DAILY AS NEEDED FOR ANXIETY FOR UP TO 30 DAYS. 30 Tablet 2    olmesartan (BENICAR) 5 mg tablet Take 5 mg by mouth daily. ondansetron (ZOFRAN ODT) 4 mg disintegrating tablet Take 1 Tablet by mouth every eight (8) hours as needed for Nausea or Vomiting.  20 Tablet 0    DULoxetine (CYMBALTA) 60 mg capsule TAKE 2 CAPSULES BY MOUTH EVERY DAY 30 Capsule 5    mirtazapine (REMERON) 30 mg tablet TAKE 1 TABLET BY MOUTH EVERYDAY AT BEDTIME 30 Tablet 5    omeprazole (PRILOSEC) 20 mg capsule TAKE 1 CAPSULE BY MOUTH EVERY DAY 30 Capsule 5    tiZANidine (ZANAFLEX) 4 mg tablet TAKE 1 TABLET BY MOUTH THREE TIMES A DAY AS NEEDED FOR PAIN 30 Tablet 0    Symbicort 160-4.5 mcg/actuation HFAA INHALE 2 PUFFS BY MOUTH TWICE A DAY      propranoloL (INDERAL) 10 mg tablet TAKE 1 TABLET BY MOUTH TWICE A  Tablet 3    pregabalin (LYRICA) 50 mg capsule Take 50 mg by mouth two (2) times a day. albuterol (PROVENTIL HFA, VENTOLIN HFA, PROAIR HFA) 90 mcg/actuation inhaler Take 1 Puff by inhalation every six (6) hours as needed for Wheezing. 1 Each 1    furosemide (LASIX) 20 mg tablet TAKE 1 TABLET BY MOUTH AS NEEDED (SWELLING). 30 Tablet 2    belimumab (Benlysta) 400 mg solr injection by IntraVENous route. Every 8 weeks      predniSONE (DELTASONE) 20 mg tablet 60mg po daily for 2days, 40mg po daily for 2 days, 20mg po daily for 2days then stop (Patient not taking: Reported on 4/4/2023) 12 Tablet 0    meclizine (ANTIVERT) 25 mg tablet TAKE 1 TABLET BY MOUTH THREE TIMES A DAY AS NEEDED FOR DIZZINESS FOR UP TO 10 DAYS (Patient not taking: Reported on 4/4/2023) 30 Tablet 0    rizatriptan (MAXALT-MLT) 10 mg disintegrating tablet TAKE 1 TABLET BY MOUTH EVERY DAY AS NEEDED FOR SEVERE MIGRAINE (Patient not taking: No sig reported)      methocarbamoL (ROBAXIN) 750 mg tablet Take 1 Tablet by mouth four (4) times daily as needed for Muscle Spasm(s). 20 Tablet 0     No current facility-administered medications on file prior to visit. Review of Systems  Pertinent items are noted in HPI. Objective:     Gen: mildly ill appearing female, active cough  HEENT: normal conjunctiva, audible congestion, patient does not see oral erythema, has MMM  Neck: patient does not feel enlarged or tender LAD or masses  Resp: normal respiratory effort, patient notes audible wheezing. CV: patient does not feel palpitations or heart irregularity        Assessment/Plan:       ICD-10-CM ICD-9-CM    1. Bronchitis  J40 490 HYDROcodone-homatropine (Hydromet) 5-1.5 mg/5 mL oral solution      2. Acute cough  R05.1 786.2 HYDROcodone-homatropine (Hydromet) 5-1.5 mg/5 mL oral solution      Continue doxycycline and prednisone to completion. Patient encouraged to get the chest x-ray that was previously ordered. Stop Robitussin-AC. Will hold alprazolam.  Sent an alternative cough medication    This was a telemedicine visit with video.         Sy Lara MD

## 2023-04-25 ENCOUNTER — VIRTUAL VISIT (OUTPATIENT)
Dept: INTERNAL MEDICINE CLINIC | Age: 38
End: 2023-04-25
Payer: COMMERCIAL

## 2023-04-25 DIAGNOSIS — R31.9 URINARY TRACT INFECTION WITH HEMATURIA, SITE UNSPECIFIED: Primary | ICD-10-CM

## 2023-04-25 DIAGNOSIS — N39.0 URINARY TRACT INFECTION WITH HEMATURIA, SITE UNSPECIFIED: Primary | ICD-10-CM

## 2023-04-25 DIAGNOSIS — R10.9 FLANK PAIN, ACUTE: ICD-10-CM

## 2023-04-25 DIAGNOSIS — N20.0 KIDNEY STONE: ICD-10-CM

## 2023-04-25 PROCEDURE — 99214 OFFICE O/P EST MOD 30 MIN: CPT | Performed by: INTERNAL MEDICINE

## 2023-04-25 RX ORDER — CIPROFLOXACIN 500 MG/1
500 TABLET ORAL 2 TIMES DAILY
Qty: 14 TABLET | Refills: 0 | Status: SHIPPED | OUTPATIENT
Start: 2023-04-25

## 2023-04-25 RX ORDER — CEPHALEXIN 250 MG/1
500 CAPSULE ORAL 2 TIMES DAILY
COMMUNITY
End: 2023-04-25

## 2023-04-25 RX ORDER — HYDROCODONE BITARTRATE AND ACETAMINOPHEN 5; 325 MG/1; MG/1
1 TABLET ORAL
Qty: 12 TABLET | Refills: 0 | Status: SHIPPED | OUTPATIENT
Start: 2023-04-25 | End: 2023-04-28

## 2023-04-25 NOTE — PROGRESS NOTES
HISTORY OF PRESENT ILLNESS  Paige Varner is a 42 y.o. female. HPI  Paige Varner, was evaluated through a synchronous (real-time) audio-video encounter. The patient (or guardian if applicable) is aware that this is a billable service, which includes applicable co-pays. This Virtual Visit was conducted with patient's (and/or legal guardian's) consent. The visit was conducted pursuant to the emergency declaration under the [facility], [address] [place] [address] authority and the [employer] Act. Patient identification was verified, and a caregiver was present when appropriate. The patient was located at: Home: [address]  The provider was located at: Facility (Appt Department): [place] [address]      --Padma Laguna MD on 4/25/2023 at 1:10 PM    An electronic signature was used to authenticate this note. Hx anxiety asthma chronic headaches  Er fu from [facility] 4/24 for nausea , suprapubic pressure right flank pain.   CT not  done  UA -large blood, nit positive 6-10 wbc > 100 rbc  Dx UTI , possible stone  CT not done due to multiple prior CT  [facility]-Dr Niranjan Coffey  Rx Keflex and norco 12 tabs  5/325 not filled -- reviewed today  Still having temp today up to 101, some nausea-taking zofran  Started oral abx last night--keflex 500mg bid--2 doses so far  Right flank pain unchanged  Sees [facility] MD in 2 days    Patient Active Problem List    Diagnosis Date Noted    Asthma 05/17/2022    Venous insufficiency of left leg 03/08/2022    Leg swelling 03/08/2022    Labile blood pressure 09/03/2021    Chronic headache disorder 08/29/2021    Constipation 08/29/2021    Seizure ([address] 08/29/2021    Syncope 08/29/2021    Headache 08/29/2021    Intermittent palpitations 03/16/2021    Essential hypertension 03/16/2021    Ureteric calculus 55/83/6493    Ureteric colic 87/64/0804    Cellulitis 02/24/2020 Severe obesity ([facility] 02/22/2019    Incomplete uterovaginal prolapse 05/17/2016    Migraine with aura and without status migrainosus, not intractable 12/02/2015    Anxiety 12/05/2014    Lupus ([facility] 12/04/2014    Recurrent ventral incisional hernia 03/01/2013    Ventral hernia 02/25/2013    Abnormal CT of the abdomen 11/08/2012     Current Outpatient Medications   Medication Sig Dispense Refill    benzonatate (TESSALON) 200 mg capsule       azithromycin (ZITHROMAX) 250 mg tablet       predniSONE (DELTASONE) 10 mg tablet Take 10 mg by mouth daily (with breakfast). 30 Tablet 0    Nurtec ODT 75 mg disintegrating tablet Take 1 Tablet by mouth as needed. codeine-butalbital-acetaminophen-caffeine (FIORICET WITH CODEINE) -13-30 mg capsule TAKE 1 CAPSULE BY MOUTH EVERY 4 HOURS AS NEEDED FOR HEADACHE FOR 10 DAYS      meclizine (ANTIVERT) 25 mg tablet TAKE 1 TABLET BY MOUTH THREE TIMES A DAY AS NEEDED FOR DIZZINESS FOR UP TO 10 DAYS (Patient not taking: Reported on 4/4/2023) 30 Tablet 0    ALPRAZolam (XANAX) 1 mg tablet TAKE 1 TABLET BY MOUTH DAILY AS NEEDED FOR ANXIETY FOR UP TO 30 DAYS. 30 Tablet 2    olmesartan (BENICAR) 5 mg tablet Take 1 Tablet by mouth daily. rizatriptan (MAXALT-MLT) 10 mg disintegrating tablet TAKE 1 TABLET BY MOUTH EVERY DAY AS NEEDED FOR SEVERE MIGRAINE (Patient not taking: Reported on 3/9/2023)      ondansetron (ZOFRAN ODT) 4 mg disintegrating tablet Take 1 Tablet by mouth every eight (8) hours as needed for Nausea or Vomiting.  20 Tablet 0    DULoxetine (CYMBALTA) 60 mg capsule TAKE 2 CAPSULES BY MOUTH EVERY DAY 30 Capsule 5    mirtazapine (REMERON) 30 mg tablet TAKE 1 TABLET BY MOUTH EVERYDAY AT BEDTIME 30 Tablet 5    omeprazole (PRILOSEC) 20 mg capsule TAKE 1 CAPSULE BY MOUTH EVERY DAY 30 Capsule 5    tiZANidine (ZANAFLEX) 4 mg tablet TAKE 1 TABLET BY MOUTH THREE TIMES A DAY AS NEEDED FOR PAIN 30 Tablet 0    Symbicort 160-4.5 mcg/actuation HFAA INHALE 2 PUFFS BY MOUTH TWICE A DAY propranoloL (INDERAL) 10 mg tablet TAKE 1 TABLET BY MOUTH TWICE A  Tablet 3    pregabalin (LYRICA) 50 mg capsule Take 1 Capsule by mouth two (2) times a day. albuterol (PROVENTIL HFA, VENTOLIN HFA, PROAIR HFA) 90 mcg/actuation inhaler Take 1 Puff by inhalation every six (6) hours as needed for Wheezing. 1 Each 1    furosemide (LASIX) 20 mg tablet TAKE 1 TABLET BY MOUTH AS NEEDED (SWELLING). 30 Tablet 2    belimumab (Benlysta) 400 mg solr injection by IntraVENous route. Every 8 weeks       Allergies   Allergen Reactions    Latex Itching     burning    Compazine [Prochlorperazine] Anxiety     High heart rate  Pt can take promethazine with no problems    Sulfa (Sulfonamide Antibiotics) Unknown (comments)    Topamax [Topiramate] Unknown (comments)    Adhesive Rash     Allergic to Dermabond    Clindamycin Diarrhea     Pt states caused her C-Diff    Iodinated Contrast Media Myalgia     Pt c/o severe back spasms after IV contrast administration. Pt requesting IV pain medications and requests I put this as a drug intolerance in her EMR.     Mushroom Other (comments)    Mushroom Combination No.1 Unknown (comments)    Nsaids (Non-Steroidal Anti-Inflammatory Drug) Other (comments)     Peptic ulcer    Reglan [Metoclopramide Hcl] Rash    Valproic Acid Other (comments)     Elevated heart rate and vomiting        Lab Results   Component Value Date/Time    GFR est non-AA >60 09/22/2022 06:15 AM    GFRNA, POC >60 05/17/2016 07:47 AM    GFR est AA >60 09/22/2022 06:15 AM    GFRAA, POC >60 05/17/2016 07:47 AM    Creatinine 1.22 (H) 02/17/2023 11:41 PM    Creatinine (POC) 0.7 05/17/2016 07:47 AM    BUN 19 02/17/2023 11:41 PM    BUN (POC) 8 (L) 05/17/2016 07:47 AM    Sodium 140 02/17/2023 11:41 PM    Sodium (POC) 140 05/17/2016 07:47 AM    Potassium 3.6 02/17/2023 11:41 PM    Potassium (POC) 3.8 05/17/2016 07:47 AM    Chloride 111 (H) 02/17/2023 11:41 PM    Chloride (POC) 105 05/17/2016 07:47 AM    CO2 24 02/17/2023 11:41 PM    Magnesium 2.2 12/18/2021 04:37 AM        ROS    Physical Exam  Constitutional:       Appearance: Normal appearance. Neurological:      Mental Status: She is alert. ASSESSMENT and PLAN    ICD-10-CM ICD-9-CM    1. Urinary tract infection with hematuria, site unspecified  N39.0 599.0 ciprofloxacin HCl (CIPRO) 500 mg tablet    R31.9 599.70 HYDROcodone-acetaminophen (NORCO) 5-325 mg per tablet  Change abx to cipro  To ER if has continued fever tomorrow or increased pain ,n/v-pt verbalized agreement      2. Kidney stone  N20.0 592.0 ciprofloxacin HCl (CIPRO) 500 mg tablet      HYDROcodone-acetaminophen (NORCO) 5-325 mg per tablet--12 tabs--on backorder at [facility]--rx sent to [address]      3.  Flank pain, acute  R10.9 789.09 HYDROcodone-acetaminophen (NORCO) 5-325 mg per tablet     338.19

## 2023-04-25 NOTE — PROGRESS NOTES
1. Have you been to the ER, urgent care clinic since your last visit? Hospitalized since your last visit? [facility] Doctor last night// kidney infection     2. Have you seen or consulted any other health care providers outside of the [address] [place] [address] since your last visit? Include any pap smears or colon screening.  No

## 2023-04-26 RX ORDER — DULOXETIN HYDROCHLORIDE 60 MG/1
CAPSULE, DELAYED RELEASE ORAL
Qty: 30 CAPSULE | Refills: 5 | Status: SHIPPED | OUTPATIENT
Start: 2023-04-26

## 2023-05-23 DIAGNOSIS — F41.9 ANXIETY DISORDER, UNSPECIFIED: ICD-10-CM

## 2023-05-23 RX ORDER — ALPRAZOLAM 1 MG/1
TABLET ORAL
Qty: 30 TABLET | Refills: 2 | Status: SHIPPED | OUTPATIENT
Start: 2023-05-23 | End: 2023-08-21

## 2023-06-01 ENCOUNTER — TELEPHONE (OUTPATIENT)
Age: 38
End: 2023-06-01

## 2023-06-02 ENCOUNTER — TELEMEDICINE (OUTPATIENT)
Age: 38
End: 2023-06-02
Payer: MEDICAID

## 2023-06-02 DIAGNOSIS — J06.9 URI WITH COUGH AND CONGESTION: Primary | ICD-10-CM

## 2023-06-02 PROCEDURE — 99213 OFFICE O/P EST LOW 20 MIN: CPT | Performed by: INTERNAL MEDICINE

## 2023-06-02 RX ORDER — GUAIFENESIN AND CODEINE PHOSPHATE 100; 10 MG/5ML; MG/5ML
5 SOLUTION ORAL EVERY 4 HOURS PRN
Qty: 118 ML | Refills: 0 | Status: SHIPPED | OUTPATIENT
Start: 2023-06-02 | End: 2023-06-05

## 2023-06-02 RX ORDER — AMOXICILLIN AND CLAVULANATE POTASSIUM 875; 125 MG/1; MG/1
1 TABLET, FILM COATED ORAL 2 TIMES DAILY
Qty: 14 TABLET | Refills: 0 | Status: SHIPPED | OUTPATIENT
Start: 2023-06-02 | End: 2023-06-09

## 2023-06-02 SDOH — ECONOMIC STABILITY: INCOME INSECURITY: HOW HARD IS IT FOR YOU TO PAY FOR THE VERY BASICS LIKE FOOD, HOUSING, MEDICAL CARE, AND HEATING?: VERY HARD

## 2023-06-02 SDOH — ECONOMIC STABILITY: FOOD INSECURITY: WITHIN THE PAST 12 MONTHS, YOU WORRIED THAT YOUR FOOD WOULD RUN OUT BEFORE YOU GOT MONEY TO BUY MORE.: SOMETIMES TRUE

## 2023-06-02 SDOH — ECONOMIC STABILITY: HOUSING INSECURITY
IN THE LAST 12 MONTHS, WAS THERE A TIME WHEN YOU DID NOT HAVE A STEADY PLACE TO SLEEP OR SLEPT IN A SHELTER (INCLUDING NOW)?: NO

## 2023-06-02 SDOH — ECONOMIC STABILITY: FOOD INSECURITY: WITHIN THE PAST 12 MONTHS, THE FOOD YOU BOUGHT JUST DIDN'T LAST AND YOU DIDN'T HAVE MONEY TO GET MORE.: SOMETIMES TRUE

## 2023-06-02 ASSESSMENT — ANXIETY QUESTIONNAIRES
5. BEING SO RESTLESS THAT IT IS HARD TO SIT STILL: 3
3. WORRYING TOO MUCH ABOUT DIFFERENT THINGS: 3
1. FEELING NERVOUS, ANXIOUS, OR ON EDGE: 3
7. FEELING AFRAID AS IF SOMETHING AWFUL MIGHT HAPPEN: 3
2. NOT BEING ABLE TO STOP OR CONTROL WORRYING: 3
4. TROUBLE RELAXING: 3
IF YOU CHECKED OFF ANY PROBLEMS ON THIS QUESTIONNAIRE, HOW DIFFICULT HAVE THESE PROBLEMS MADE IT FOR YOU TO DO YOUR WORK, TAKE CARE OF THINGS AT HOME, OR GET ALONG WITH OTHER PEOPLE: NOT DIFFICULT AT ALL
GAD7 TOTAL SCORE: 21
6. BECOMING EASILY ANNOYED OR IRRITABLE: 3

## 2023-06-02 ASSESSMENT — PATIENT HEALTH QUESTIONNAIRE - PHQ9
SUM OF ALL RESPONSES TO PHQ QUESTIONS 1-9: 0
1. LITTLE INTEREST OR PLEASURE IN DOING THINGS: 0
2. FEELING DOWN, DEPRESSED OR HOPELESS: 0
SUM OF ALL RESPONSES TO PHQ9 QUESTIONS 1 & 2: 0
SUM OF ALL RESPONSES TO PHQ QUESTIONS 1-9: 0

## 2023-06-02 NOTE — PROGRESS NOTES
Nayeli Chanel is a 42 y.o. female who was seen by synchronous (real-time) audio-video technology on 6/2/2023 for URI        Progress Note       Subjective:  Ms. Nayeli Chanel is a pt. of Pamela Hayden MD who presents with upper respiratory complaints. She has had a cough productive of thick yellow sputum. \"I cough so bad I'm throwing up. \" Other symptoms include low grade fevers, chills, sore throat, nasal congestion, runny nose, headache, fatigue. It is reported that her symptoms began several days ago. She has tried OTC medications including Mucinex. She has been using her inhaler more, as well as tessalon perles. COVID home test was (-)    Objective: Vitals: See nurse notes. Assessment: Acute URI. Bronchitis. Plan: Symptomatic care (rest, fluids, OTC analgesics, etc). Antibiotic therapy with Augmentin. Rx for robitussin AC prn. Call if no better in 10 days, or worsens. F/U with primary care physician as planned. Objective:   No flowsheet data found.      [INSTRUCTIONS:  \"[x]\" Indicates a positive item  \"[]\" Indicates a negative item  -- DELETE ALL ITEMS NOT EXAMINED]    Constitutional: [x] Appears well-developed and well-nourished [x] No apparent distress      [] Abnormal -     Mental status: [x] Alert and awake  [x] Oriented to person/place/time [x] Able to follow commands    [] Abnormal -     Eyes:   EOM    [x]  Normal    [] Abnormal -   Sclera  [x]  Normal    [] Abnormal -          Discharge [x]  None visible   [] Abnormal -     HENT: [x] Normocephalic, atraumatic  [] Abnormal -   [x] Mouth/Throat: Mucous membranes are moist    External Ears [x] Normal  [] Abnormal -    Neck: [x] No visualized mass [] Abnormal -     Pulmonary/Chest: [x] Respiratory effort normal   [x] No visualized signs of difficulty breathing or respiratory distress        [] Abnormal -      Musculoskeletal:   [x] Normal gait with no signs of ataxia         [x] Normal range of motion of neck        [] Abnormal

## 2023-06-02 NOTE — PROGRESS NOTES
1. \"Have you been to the ER, urgent care clinic since your last visit? Hospitalized since your last visit? \" No    2. \"Have you seen or consulted any other health care providers outside of the [address] [place] [address] since your last visit? \" No     3. For patients aged 39-70: Has the patient had a colonoscopy / FIT/ Cologuard? Yes - no Care Gap present      If the patient is female:    4. For patients aged 41-77: Has the patient had a mammogram within the past 2 years? Yes - no Care Gap present      5. For patients aged 21-65: Has the patient had a pap smear?  Yes - no Care Gap present

## 2023-06-05 DIAGNOSIS — J06.9 URI WITH COUGH AND CONGESTION: ICD-10-CM

## 2023-06-05 RX ORDER — GUAIFENESIN AND CODEINE PHOSPHATE 100; 10 MG/5ML; MG/5ML
5 SOLUTION ORAL EVERY 4 HOURS PRN
Qty: 118 ML | Refills: 0 | OUTPATIENT
Start: 2023-06-05 | End: 2023-06-08

## 2023-06-07 ENCOUNTER — TELEPHONE (OUTPATIENT)
Age: 38
End: 2023-06-07

## 2023-06-07 NOTE — TELEPHONE ENCOUNTER
----- Message from Iraida Tapia sent at 6/6/2023  3:56 PM EDT -----  Subject: Message to Provider    QUESTIONS  Information for Provider? Patient is calling to find out why her cough   medication was refused. Please call patient and advise.  ---------------------------------------------------------------------------  --------------  [facility] What's Trending INFO  [phone]; OK to leave message on voicemail,OK to respond with electronic   message via [facility] portal (only for patients who have registered [facility]   account)  ---------------------------------------------------------------------------  --------------  SCRIPT ANSWERS  Relationship to Patient?  Self

## 2023-06-08 ENCOUNTER — TELEPHONE (OUTPATIENT)
Age: 38
End: 2023-06-08

## 2023-06-08 ENCOUNTER — TELEMEDICINE (OUTPATIENT)
Age: 38
End: 2023-06-08
Payer: MEDICAID

## 2023-06-08 DIAGNOSIS — J40 BRONCHITIS: Primary | ICD-10-CM

## 2023-06-08 PROCEDURE — 99213 OFFICE O/P EST LOW 20 MIN: CPT | Performed by: FAMILY MEDICINE

## 2023-06-08 RX ORDER — HYDROCODONE BITARTRATE AND HOMATROPINE METHYLBROMIDE ORAL SOLUTION 5; 1.5 MG/5ML; MG/5ML
2.5 LIQUID ORAL EVERY 6 HOURS PRN
Qty: 30 ML | Refills: 0 | Status: SHIPPED | OUTPATIENT
Start: 2023-06-08 | End: 2023-06-11

## 2023-06-08 RX ORDER — METHYLPREDNISOLONE 4 MG/1
TABLET ORAL
Qty: 1 KIT | Refills: 0 | Status: SHIPPED | OUTPATIENT
Start: 2023-06-08 | End: 2023-06-14

## 2023-06-08 RX ORDER — FLUCONAZOLE 150 MG/1
150 TABLET ORAL ONCE
Qty: 1 TABLET | Refills: 0 | Status: SHIPPED | OUTPATIENT
Start: 2023-06-08 | End: 2023-06-08

## 2023-06-08 RX ORDER — NALOXONE HYDROCHLORIDE 4 MG/.1ML
1 SPRAY NASAL PRN
Qty: 1 EACH | Refills: 5 | Status: SHIPPED | OUTPATIENT
Start: 2023-06-08

## 2023-06-08 SDOH — ECONOMIC STABILITY: INCOME INSECURITY: HOW HARD IS IT FOR YOU TO PAY FOR THE VERY BASICS LIKE FOOD, HOUSING, MEDICAL CARE, AND HEATING?: NOT VERY HARD

## 2023-06-08 SDOH — ECONOMIC STABILITY: FOOD INSECURITY: WITHIN THE PAST 12 MONTHS, THE FOOD YOU BOUGHT JUST DIDN'T LAST AND YOU DIDN'T HAVE MONEY TO GET MORE.: NEVER TRUE

## 2023-06-08 SDOH — ECONOMIC STABILITY: FOOD INSECURITY: WITHIN THE PAST 12 MONTHS, YOU WORRIED THAT YOUR FOOD WOULD RUN OUT BEFORE YOU GOT MONEY TO BUY MORE.: NEVER TRUE

## 2023-06-08 ASSESSMENT — PATIENT HEALTH QUESTIONNAIRE - PHQ9
SUM OF ALL RESPONSES TO PHQ9 QUESTIONS 1 & 2: 0
SUM OF ALL RESPONSES TO PHQ QUESTIONS 1-9: 0
1. LITTLE INTEREST OR PLEASURE IN DOING THINGS: 0
2. FEELING DOWN, DEPRESSED OR HOPELESS: 0

## 2023-06-08 ASSESSMENT — ANXIETY QUESTIONNAIRES
4. TROUBLE RELAXING: 0
6. BECOMING EASILY ANNOYED OR IRRITABLE: 0
3. WORRYING TOO MUCH ABOUT DIFFERENT THINGS: 0
1. FEELING NERVOUS, ANXIOUS, OR ON EDGE: 0
5. BEING SO RESTLESS THAT IT IS HARD TO SIT STILL: 0
2. NOT BEING ABLE TO STOP OR CONTROL WORRYING: 0
GAD7 TOTAL SCORE: 0
7. FEELING AFRAID AS IF SOMETHING AWFUL MIGHT HAPPEN: 0
IF YOU CHECKED OFF ANY PROBLEMS ON THIS QUESTIONNAIRE, HOW DIFFICULT HAVE THESE PROBLEMS MADE IT FOR YOU TO DO YOUR WORK, TAKE CARE OF THINGS AT HOME, OR GET ALONG WITH OTHER PEOPLE: NOT DIFFICULT AT ALL

## 2023-06-08 ASSESSMENT — ENCOUNTER SYMPTOMS
WHEEZING: 1
ALLERGIC/IMMUNOLOGIC NEGATIVE: 1
GASTROINTESTINAL NEGATIVE: 1
COUGH: 1
EYES NEGATIVE: 1

## 2023-06-08 NOTE — TELEPHONE ENCOUNTER
Patient states she needs a call back in reference to getting a VV  Appt required per Dr. Corazon Finney Previous Notes for refill on medications for Cough & cold Symptoms. Please Call as No Appts available at time of call with any Provider in Time Frame Needed.  Thank you

## 2023-06-08 NOTE — PROGRESS NOTES
1. \"Have you been to the ER, urgent care clinic since your last visit? Hospitalized since your last visit? \" No    2. \"Have you seen or consulted any other health care providers outside of the [address] [place] [address] since your last visit? \" No     3. For patients aged 39-70: Has the patient had a colonoscopy / FIT/ Cologuard? NA - based on age      If the patient is female:    4. For patients aged 41-77: Has the patient had a mammogram within the past 2 years? NA - based on age or sex      11. For patients aged 21-65: Has the patient had a pap smear?  NA - based on age or sex
tablet TAKE 1 TABLET BY MOUTH DAILY AS NEEDED FOR ANXIETY FOR UP TO 30 DAYS. Pavel Alvarado MD   albuterol sulfate HFA (PROVENTIL;VENTOLIN;PROAIR) 108 (90 Base) MCG/ACT inhaler Inhale 1 puff into the lungs every 6 hours as needed  Ar Automatic Reconciliation   belimumab 400 MG SOLR Infuse intravenously  Ar Automatic Reconciliation   budesonide-formoterol (SYMBICORT) 160-4.5 MCG/ACT AERO INHALE 2 PUFFS BY MOUTH TWICE A DAY  Ar Automatic Reconciliation   DULoxetine (CYMBALTA) 60 MG extended release capsule TAKE 2 CAPSULES BY MOUTH EVERY DAY  Ar Automatic Reconciliation   furosemide (LASIX) 20 MG tablet Take 1 tablet by mouth as needed  Ar Automatic Reconciliation   meclizine (ANTIVERT) 25 MG tablet TAKE 1 TABLET BY MOUTH THREE TIMES A DAY AS NEEDED FOR DIZZINESS FOR UP TO 10 DAYS  Ar Automatic Reconciliation   methocarbamol (ROBAXIN) 750 MG tablet Take 1 tablet by mouth 4 times daily as needed  Ar Automatic Reconciliation   mirtazapine (REMERON) 30 MG tablet TAKE 1 TABLET BY MOUTH EVERYDAY AT BEDTIME  Ar Automatic Reconciliation   olmesartan (BENICAR) 5 MG tablet Take 1 tablet by mouth daily  Ar Automatic Reconciliation   omeprazole (PRILOSEC) 20 MG delayed release capsule TAKE 1 CAPSULE BY MOUTH EVERY DAY  Ar Automatic Reconciliation   ondansetron (ZOFRAN-ODT) 4 MG disintegrating tablet Take 4 mg by mouth every 8 hours as needed  Patient not taking: Reported on 6/2/2023  Ar Automatic Reconciliation   pregabalin (LYRICA) 50 MG capsule Take 1 capsule by mouth 2 times daily.   Ar Automatic Reconciliation   propranolol (INDERAL) 10 MG tablet TAKE 1 TABLET BY MOUTH TWICE A DAY  Ar Automatic Reconciliation   rizatriptan (MAXALT-MLT) 10 MG disintegrating tablet TAKE 1 TABLET BY MOUTH EVERY DAY AS NEEDED FOR SEVERE MIGRAINE  Ar Automatic Reconciliation   tiZANidine (ZANAFLEX) 4 MG tablet TAKE 1 TABLET BY MOUTH THREE TIMES A DAY AS NEEDED FOR PAIN  Ar Automatic Reconciliation       Social History     Tobacco Use    Smoking

## 2023-06-08 NOTE — TELEPHONE ENCOUNTER
Pt called. 2 identifiers confirmed. Per Dr. Miguel Yan:  will need appt for cough med refill . I cannot see her this week   PT requested for vv appointment. Message sent to psr for any provider in office as Dr. Miguel Yan is full.

## 2023-06-28 ENCOUNTER — HOSPITAL ENCOUNTER (EMERGENCY)
Facility: HOSPITAL | Age: 38
Discharge: HOME | End: 2023-06-28
Payer: COMMERCIAL

## 2023-06-28 ENCOUNTER — APPOINTMENT (OUTPATIENT)
Facility: HOSPITAL | Age: 38
End: 2023-06-28
Payer: COMMERCIAL

## 2023-06-28 VITALS
DIASTOLIC BLOOD PRESSURE: 80 MMHG | HEIGHT: 62 IN | RESPIRATION RATE: 20 BRPM | OXYGEN SATURATION: 95 % | BODY MASS INDEX: 48.56 KG/M2 | WEIGHT: 263.89 LBS | SYSTOLIC BLOOD PRESSURE: 116 MMHG | HEART RATE: 55 BPM | TEMPERATURE: 98.4 F

## 2023-06-28 DIAGNOSIS — G89.29 OTHER CHRONIC PAIN: ICD-10-CM

## 2023-06-28 DIAGNOSIS — N20.0 NEPHROLITHIASIS: ICD-10-CM

## 2023-06-28 DIAGNOSIS — R10.9 FLANK PAIN: Primary | ICD-10-CM

## 2023-06-28 LAB
ALBUMIN SERPL-MCNC: 4 G/DL (ref 3.5–5)
ALBUMIN/GLOB SERPL: 1.2 (ref 1.1–2.2)
ALP SERPL-CCNC: 151 U/L (ref 45–117)
ALT SERPL-CCNC: 18 U/L (ref 12–78)
ANION GAP SERPL CALC-SCNC: 2 MMOL/L (ref 5–15)
APPEARANCE UR: CLEAR
AST SERPL-CCNC: 9 U/L (ref 15–37)
BACTERIA URNS QL MICRO: ABNORMAL /HPF
BASOPHILS # BLD: 0.1 K/UL (ref 0–0.1)
BASOPHILS NFR BLD: 1 % (ref 0–1)
BILIRUB SERPL-MCNC: 0.1 MG/DL (ref 0.2–1)
BILIRUB UR QL: NEGATIVE
BUN SERPL-MCNC: 18 MG/DL (ref 6–20)
BUN/CREAT SERPL: 20 (ref 12–20)
CALCIUM SERPL-MCNC: 8.9 MG/DL (ref 8.5–10.1)
CHLORIDE SERPL-SCNC: 111 MMOL/L (ref 97–108)
CO2 SERPL-SCNC: 25 MMOL/L (ref 21–32)
COLOR UR: ABNORMAL
CREAT SERPL-MCNC: 0.91 MG/DL (ref 0.55–1.02)
DIFFERENTIAL METHOD BLD: ABNORMAL
EOSINOPHIL # BLD: 0.8 K/UL (ref 0–0.4)
EOSINOPHIL NFR BLD: 8 % (ref 0–7)
EPITH CASTS URNS QL MICRO: ABNORMAL /LPF
ERYTHROCYTE [DISTWIDTH] IN BLOOD BY AUTOMATED COUNT: 13.2 % (ref 11.5–14.5)
GLOBULIN SER CALC-MCNC: 3.3 G/DL (ref 2–4)
GLUCOSE SERPL-MCNC: 86 MG/DL (ref 65–100)
GLUCOSE UR STRIP.AUTO-MCNC: NEGATIVE MG/DL
HCG UR QL: NEGATIVE
HCT VFR BLD AUTO: 38.2 % (ref 35–47)
HGB BLD-MCNC: 12 G/DL (ref 11.5–16)
HGB UR QL STRIP: ABNORMAL
HYALINE CASTS URNS QL MICRO: ABNORMAL /LPF (ref 0–2)
IMM GRANULOCYTES # BLD AUTO: 0 K/UL (ref 0–0.04)
IMM GRANULOCYTES NFR BLD AUTO: 0 % (ref 0–0.5)
KETONES UR QL STRIP.AUTO: NEGATIVE MG/DL
LACTATE BLD-SCNC: 0.73 MMOL/L (ref 0.4–2)
LEUKOCYTE ESTERASE UR QL STRIP.AUTO: ABNORMAL
LIPASE SERPL-CCNC: 90 U/L (ref 73–393)
LYMPHOCYTES # BLD: 3.6 K/UL (ref 0.8–3.5)
LYMPHOCYTES NFR BLD: 35 % (ref 12–49)
MCH RBC QN AUTO: 28.8 PG (ref 26–34)
MCHC RBC AUTO-ENTMCNC: 31.4 G/DL (ref 30–36.5)
MCV RBC AUTO: 91.6 FL (ref 80–99)
MONOCYTES # BLD: 0.9 K/UL (ref 0–1)
MONOCYTES NFR BLD: 9 % (ref 5–13)
NEUTS SEG # BLD: 5 K/UL (ref 1.8–8)
NEUTS SEG NFR BLD: 47 % (ref 32–75)
NITRITE UR QL STRIP.AUTO: NEGATIVE
NRBC # BLD: 0 K/UL (ref 0–0.01)
NRBC BLD-RTO: 0 PER 100 WBC
PH UR STRIP: 5.5 (ref 5–8)
PLATELET # BLD AUTO: 334 K/UL (ref 150–400)
PMV BLD AUTO: 9.9 FL (ref 8.9–12.9)
POTASSIUM SERPL-SCNC: 4.5 MMOL/L (ref 3.5–5.1)
PROT SERPL-MCNC: 7.3 G/DL (ref 6.4–8.2)
PROT UR STRIP-MCNC: NEGATIVE MG/DL
RBC # BLD AUTO: 4.17 M/UL (ref 3.8–5.2)
RBC #/AREA URNS HPF: >100 /HPF (ref 0–5)
SODIUM SERPL-SCNC: 138 MMOL/L (ref 136–145)
SP GR UR REFRACTOMETRY: 1.02
URINE CULTURE IF INDICATED: ABNORMAL
UROBILINOGEN UR QL STRIP.AUTO: 0.2 EU/DL (ref 0.2–1)
WBC # BLD AUTO: 10.4 K/UL (ref 3.6–11)
WBC URNS QL MICRO: ABNORMAL /HPF (ref 0–4)

## 2023-06-28 PROCEDURE — 96374 THER/PROPH/DIAG INJ IV PUSH: CPT

## 2023-06-28 PROCEDURE — 80053 COMPREHEN METABOLIC PANEL: CPT

## 2023-06-28 PROCEDURE — 99284 EMERGENCY DEPT VISIT MOD MDM: CPT

## 2023-06-28 PROCEDURE — 85025 COMPLETE CBC W/AUTO DIFF WBC: CPT

## 2023-06-28 PROCEDURE — 74176 CT ABD & PELVIS W/O CONTRAST: CPT

## 2023-06-28 PROCEDURE — 83605 ASSAY OF LACTIC ACID: CPT

## 2023-06-28 PROCEDURE — 36415 COLL VENOUS BLD VENIPUNCTURE: CPT

## 2023-06-28 PROCEDURE — 96376 TX/PRO/DX INJ SAME DRUG ADON: CPT

## 2023-06-28 PROCEDURE — 96375 TX/PRO/DX INJ NEW DRUG ADDON: CPT

## 2023-06-28 PROCEDURE — 81001 URINALYSIS AUTO W/SCOPE: CPT

## 2023-06-28 PROCEDURE — 6360000002 HC RX W HCPCS: Performed by: PHYSICIAN ASSISTANT

## 2023-06-28 PROCEDURE — 2580000003 HC RX 258: Performed by: PHYSICIAN ASSISTANT

## 2023-06-28 PROCEDURE — 81025 URINE PREGNANCY TEST: CPT

## 2023-06-28 PROCEDURE — 83690 ASSAY OF LIPASE: CPT

## 2023-06-28 RX ORDER — ONDANSETRON 4 MG/1
4 TABLET, FILM COATED ORAL 3 TIMES DAILY PRN
Qty: 21 TABLET | Refills: 0 | Status: SHIPPED | OUTPATIENT
Start: 2023-06-28

## 2023-06-28 RX ORDER — ONDANSETRON 2 MG/ML
4 INJECTION INTRAMUSCULAR; INTRAVENOUS ONCE
Status: COMPLETED | OUTPATIENT
Start: 2023-06-28 | End: 2023-06-28

## 2023-06-28 RX ORDER — FENTANYL CITRATE 50 UG/ML
25 INJECTION, SOLUTION INTRAMUSCULAR; INTRAVENOUS
Status: COMPLETED | OUTPATIENT
Start: 2023-06-28 | End: 2023-06-28

## 2023-06-28 RX ORDER — 0.9 % SODIUM CHLORIDE 0.9 %
1000 INTRAVENOUS SOLUTION INTRAVENOUS ONCE
Status: COMPLETED | OUTPATIENT
Start: 2023-06-28 | End: 2023-06-28

## 2023-06-28 RX ORDER — FENTANYL CITRATE 50 UG/ML
50 INJECTION, SOLUTION INTRAMUSCULAR; INTRAVENOUS
Status: COMPLETED | OUTPATIENT
Start: 2023-06-28 | End: 2023-06-28

## 2023-06-28 RX ADMIN — SODIUM CHLORIDE 1000 ML: 9 INJECTION, SOLUTION INTRAVENOUS at 15:46

## 2023-06-28 RX ADMIN — FENTANYL CITRATE 50 MCG: 50 INJECTION, SOLUTION INTRAMUSCULAR; INTRAVENOUS at 15:45

## 2023-06-28 RX ADMIN — ONDANSETRON 4 MG: 2 INJECTION INTRAMUSCULAR; INTRAVENOUS at 15:45

## 2023-06-28 RX ADMIN — FENTANYL CITRATE 25 MCG: 50 INJECTION, SOLUTION INTRAMUSCULAR; INTRAVENOUS at 18:14

## 2023-06-28 ASSESSMENT — ENCOUNTER SYMPTOMS
BACK PAIN: 1
SHORTNESS OF BREATH: 0
NAUSEA: 1
VOMITING: 1

## 2023-06-28 ASSESSMENT — LIFESTYLE VARIABLES
HOW OFTEN DO YOU HAVE A DRINK CONTAINING ALCOHOL: NEVER
HOW MANY STANDARD DRINKS CONTAINING ALCOHOL DO YOU HAVE ON A TYPICAL DAY: PATIENT DOES NOT DRINK

## 2023-06-28 ASSESSMENT — PAIN SCALES - GENERAL: PAINLEVEL_OUTOF10: 10

## 2023-07-08 DIAGNOSIS — K21.9 GASTRO-ESOPHAGEAL REFLUX DISEASE WITHOUT ESOPHAGITIS: ICD-10-CM

## 2023-07-10 RX ORDER — OMEPRAZOLE 20 MG/1
CAPSULE, DELAYED RELEASE ORAL
Qty: 30 CAPSULE | Refills: 5 | Status: SHIPPED | OUTPATIENT
Start: 2023-07-10

## 2023-07-12 RX ORDER — MIRTAZAPINE 30 MG/1
TABLET, FILM COATED ORAL
Qty: 30 TABLET | Refills: 5 | Status: SHIPPED | OUTPATIENT
Start: 2023-07-12

## 2023-08-01 RX ORDER — DULOXETIN HYDROCHLORIDE 60 MG/1
CAPSULE, DELAYED RELEASE ORAL
Qty: 30 CAPSULE | Refills: 5 | Status: SHIPPED | OUTPATIENT
Start: 2023-08-01

## 2023-08-14 DIAGNOSIS — F41.9 ANXIETY DISORDER, UNSPECIFIED: ICD-10-CM

## 2023-08-14 RX ORDER — ALPRAZOLAM 1 MG/1
TABLET ORAL
Qty: 30 TABLET | Refills: 2 | Status: SHIPPED | OUTPATIENT
Start: 2023-08-14 | End: 2023-11-12

## 2023-08-14 RX ORDER — ALPRAZOLAM 1 MG/1
TABLET ORAL
Qty: 30 TABLET | Refills: 2 | OUTPATIENT
Start: 2023-08-14 | End: 2023-11-12

## 2023-08-22 ENCOUNTER — TELEMEDICINE (OUTPATIENT)
Age: 38
End: 2023-08-22
Payer: COMMERCIAL

## 2023-08-22 DIAGNOSIS — K08.89 PAIN, DENTAL: Primary | ICD-10-CM

## 2023-08-22 DIAGNOSIS — R30.9 URINARY PAIN: ICD-10-CM

## 2023-08-22 PROCEDURE — 99441 PR PHYS/QHP TELEPHONE EVALUATION 5-10 MIN: CPT | Performed by: FAMILY MEDICINE

## 2023-08-22 SDOH — ECONOMIC STABILITY: FOOD INSECURITY: WITHIN THE PAST 12 MONTHS, THE FOOD YOU BOUGHT JUST DIDN'T LAST AND YOU DIDN'T HAVE MONEY TO GET MORE.: NEVER TRUE

## 2023-08-22 SDOH — ECONOMIC STABILITY: INCOME INSECURITY: HOW HARD IS IT FOR YOU TO PAY FOR THE VERY BASICS LIKE FOOD, HOUSING, MEDICAL CARE, AND HEATING?: NOT HARD AT ALL

## 2023-08-22 SDOH — ECONOMIC STABILITY: FOOD INSECURITY: WITHIN THE PAST 12 MONTHS, YOU WORRIED THAT YOUR FOOD WOULD RUN OUT BEFORE YOU GOT MONEY TO BUY MORE.: NEVER TRUE

## 2023-08-22 ASSESSMENT — PATIENT HEALTH QUESTIONNAIRE - PHQ9
SUM OF ALL RESPONSES TO PHQ QUESTIONS 1-9: 0
SUM OF ALL RESPONSES TO PHQ QUESTIONS 1-9: 0
2. FEELING DOWN, DEPRESSED OR HOPELESS: 0
SUM OF ALL RESPONSES TO PHQ9 QUESTIONS 1 & 2: 0
SUM OF ALL RESPONSES TO PHQ QUESTIONS 1-9: 0
1. LITTLE INTEREST OR PLEASURE IN DOING THINGS: 0
SUM OF ALL RESPONSES TO PHQ QUESTIONS 1-9: 0

## 2023-08-22 NOTE — PROGRESS NOTES
1. \"Have you been to the ER, urgent care clinic since your last visit? Hospitalized since your last visit? \" No     2. \"Have you seen or consulted any other health care providers outside of the [address] [place] [address] since your last visit? \" No      3. For patients aged 43-73: Has the patient had a colonoscopy / FIT/ Cologuard? No      If the patient is female:    4. For patients aged 43-66: Has the patient had a mammogram within the past 2 years? No       5. For patients aged 21-65: Has the patient had a pap smear?  Yes

## 2023-08-22 NOTE — PROGRESS NOTES
Tashia Norris is a 42 y.o. female patient of Dr Latonia Gonzalez who presents with pain with urination, blood in the urine. Reports back pain. Reports history of kidney stones. States had a dental procedure, called dentist and states was given tramadol. Reports still has oral pain. She was seen in the ED at [facility] with left flank pain on 6/28. At that time had a CT scan showing bilateral nonobstructive kidney stones. Patient received tramadol #12 on 8/21 from [facility], hydrocodone 5 mg #6 on 8/18 [facility]. She is also taking alprazolam 1 mg and received #30 on 8/14. .      Documentation:  I communicated with the patient and/or health care decision maker   Details of this discussion including any medical advice provided    Total Time: minutes: 5-10 minutes    Tashia Norris was evaluated through a synchronous (real-time) audio encounter. Patient identification was verified at the start of the visit. She (or guardian if applicable) is aware that this is a billable service, which includes applicable co-pays. This visit was conducted with the patient's (and/or legal guardian's) verbal consent. She has not had a related appointment within my department in the past 7 days or scheduled within the next 24 hours. The patient was located at Home: [address] [place] [address]. The provider was located at Home ([address] [place] [address]): [facility].     Note: not billable if this call serves to triage the patient into an appointment for the relevant concern    Uziel Pena MD       Past Medical History:   Diagnosis Date    Abnormal CT of the abdomen 11/8/2012    Asthma     Autoimmune disease ([address])     lupus    C. difficile diarrhea     Cervical prolapse     Dehydration     Garth-Danlos syndrome     Gastrointestinal disorder     gerd, twisted colon, IBS    GERD (gastroesophageal reflux disease)     Headache(784.0)     Lupus ([address])     Morbid obesity ([address])     Murmur     Nausea & vomiting     Neurological disorder     cluster

## 2023-09-14 ENCOUNTER — APPOINTMENT (OUTPATIENT)
Facility: HOSPITAL | Age: 38
End: 2023-09-14
Payer: COMMERCIAL

## 2023-09-14 ENCOUNTER — HOSPITAL ENCOUNTER (EMERGENCY)
Facility: HOSPITAL | Age: 38
Discharge: HOME | End: 2023-09-14
Payer: COMMERCIAL

## 2023-09-14 VITALS
SYSTOLIC BLOOD PRESSURE: 123 MMHG | RESPIRATION RATE: 18 BRPM | HEIGHT: 62 IN | OXYGEN SATURATION: 94 % | WEIGHT: 275.57 LBS | BODY MASS INDEX: 50.71 KG/M2 | TEMPERATURE: 98.4 F | HEART RATE: 80 BPM | DIASTOLIC BLOOD PRESSURE: 72 MMHG

## 2023-09-14 DIAGNOSIS — R09.1 PLEURISY: Primary | ICD-10-CM

## 2023-09-14 LAB
ALBUMIN SERPL-MCNC: 3.7 G/DL (ref 3.5–5)
ALBUMIN/GLOB SERPL: 1 (ref 1.1–2.2)
ALP SERPL-CCNC: 166 U/L (ref 45–117)
ALT SERPL-CCNC: 23 U/L (ref 12–78)
ANION GAP SERPL CALC-SCNC: 1 MMOL/L (ref 5–15)
AST SERPL-CCNC: 22 U/L (ref 15–37)
BASOPHILS # BLD: 0.1 K/UL (ref 0–0.1)
BASOPHILS NFR BLD: 1 % (ref 0–1)
BILIRUB SERPL-MCNC: 0.2 MG/DL (ref 0.2–1)
BUN SERPL-MCNC: 14 MG/DL (ref 6–20)
BUN/CREAT SERPL: 15 (ref 12–20)
CALCIUM SERPL-MCNC: 9.3 MG/DL (ref 8.5–10.1)
CHLORIDE SERPL-SCNC: 107 MMOL/L (ref 97–108)
CO2 SERPL-SCNC: 28 MMOL/L (ref 21–32)
CREAT SERPL-MCNC: 0.93 MG/DL (ref 0.55–1.02)
DIFFERENTIAL METHOD BLD: ABNORMAL
EOSINOPHIL # BLD: 0.9 K/UL (ref 0–0.4)
EOSINOPHIL NFR BLD: 8 % (ref 0–7)
ERYTHROCYTE [DISTWIDTH] IN BLOOD BY AUTOMATED COUNT: 13.4 % (ref 11.5–14.5)
GLOBULIN SER CALC-MCNC: 3.7 G/DL (ref 2–4)
GLUCOSE SERPL-MCNC: 176 MG/DL (ref 65–100)
HCT VFR BLD AUTO: 37.6 % (ref 35–47)
HGB BLD-MCNC: 12.1 G/DL (ref 11.5–16)
IMM GRANULOCYTES # BLD AUTO: 0.1 K/UL (ref 0–0.04)
IMM GRANULOCYTES NFR BLD AUTO: 1 % (ref 0–0.5)
LYMPHOCYTES # BLD: 2.9 K/UL (ref 0.8–3.5)
LYMPHOCYTES NFR BLD: 26 % (ref 12–49)
MCH RBC QN AUTO: 29.4 PG (ref 26–34)
MCHC RBC AUTO-ENTMCNC: 32.2 G/DL (ref 30–36.5)
MCV RBC AUTO: 91.5 FL (ref 80–99)
MONOCYTES # BLD: 1 K/UL (ref 0–1)
MONOCYTES NFR BLD: 9 % (ref 5–13)
NEUTS SEG # BLD: 6.4 K/UL (ref 1.8–8)
NEUTS SEG NFR BLD: 55 % (ref 32–75)
NRBC # BLD: 0 K/UL (ref 0–0.01)
NRBC BLD-RTO: 0 PER 100 WBC
PLATELET # BLD AUTO: 430 K/UL (ref 150–400)
PMV BLD AUTO: 10.4 FL (ref 8.9–12.9)
POTASSIUM SERPL-SCNC: 4.7 MMOL/L (ref 3.5–5.1)
PROT SERPL-MCNC: 7.4 G/DL (ref 6.4–8.2)
RBC # BLD AUTO: 4.11 M/UL (ref 3.8–5.2)
SODIUM SERPL-SCNC: 136 MMOL/L (ref 136–145)
TROPONIN I SERPL HS-MCNC: 4 NG/L (ref 0–51)
TROPONIN I SERPL HS-MCNC: <4 NG/L (ref 0–51)
WBC # BLD AUTO: 11.4 K/UL (ref 3.6–11)

## 2023-09-14 PROCEDURE — 96374 THER/PROPH/DIAG INJ IV PUSH: CPT

## 2023-09-14 PROCEDURE — 96375 TX/PRO/DX INJ NEW DRUG ADDON: CPT

## 2023-09-14 PROCEDURE — 93005 ELECTROCARDIOGRAM TRACING: CPT | Performed by: EMERGENCY MEDICINE

## 2023-09-14 PROCEDURE — 84484 ASSAY OF TROPONIN QUANT: CPT

## 2023-09-14 PROCEDURE — 85025 COMPLETE CBC W/AUTO DIFF WBC: CPT

## 2023-09-14 PROCEDURE — 6360000002 HC RX W HCPCS

## 2023-09-14 PROCEDURE — 96376 TX/PRO/DX INJ SAME DRUG ADON: CPT

## 2023-09-14 PROCEDURE — 71275 CT ANGIOGRAPHY CHEST: CPT

## 2023-09-14 PROCEDURE — 80053 COMPREHEN METABOLIC PANEL: CPT

## 2023-09-14 PROCEDURE — 6360000004 HC RX CONTRAST MEDICATION

## 2023-09-14 PROCEDURE — 99285 EMERGENCY DEPT VISIT HI MDM: CPT

## 2023-09-14 PROCEDURE — 36415 COLL VENOUS BLD VENIPUNCTURE: CPT

## 2023-09-14 RX ORDER — MORPHINE SULFATE 4 MG/ML
4 INJECTION, SOLUTION INTRAMUSCULAR; INTRAVENOUS
Status: COMPLETED | OUTPATIENT
Start: 2023-09-14 | End: 2023-09-14

## 2023-09-14 RX ORDER — ONDANSETRON 2 MG/ML
INJECTION INTRAMUSCULAR; INTRAVENOUS
Status: COMPLETED
Start: 2023-09-14 | End: 2023-09-14

## 2023-09-14 RX ORDER — DIPHENHYDRAMINE HCL 25 MG
50 CAPSULE ORAL ONCE
Status: DISCONTINUED | OUTPATIENT
Start: 2023-09-14 | End: 2023-09-14

## 2023-09-14 RX ORDER — DIPHENHYDRAMINE HYDROCHLORIDE 50 MG/ML
50 INJECTION INTRAMUSCULAR; INTRAVENOUS
Status: COMPLETED | OUTPATIENT
Start: 2023-09-14 | End: 2023-09-14

## 2023-09-14 RX ORDER — DIPHENHYDRAMINE HYDROCHLORIDE 50 MG/ML
25 INJECTION INTRAMUSCULAR; INTRAVENOUS
Status: COMPLETED | OUTPATIENT
Start: 2023-09-14 | End: 2023-09-14

## 2023-09-14 RX ORDER — ONDANSETRON 2 MG/ML
4 INJECTION INTRAMUSCULAR; INTRAVENOUS ONCE
Status: COMPLETED | OUTPATIENT
Start: 2023-09-14 | End: 2023-09-14

## 2023-09-14 RX ORDER — ONDANSETRON 4 MG/1
4 TABLET, ORALLY DISINTEGRATING ORAL 3 TIMES DAILY PRN
Qty: 15 TABLET | Refills: 0 | Status: SHIPPED | OUTPATIENT
Start: 2023-09-14

## 2023-09-14 RX ORDER — MORPHINE SULFATE 2 MG/ML
2 INJECTION, SOLUTION INTRAMUSCULAR; INTRAVENOUS
Status: COMPLETED | OUTPATIENT
Start: 2023-09-14 | End: 2023-09-14

## 2023-09-14 RX ORDER — TRAMADOL HYDROCHLORIDE 50 MG/1
50 TABLET ORAL EVERY 6 HOURS PRN
Qty: 12 TABLET | Refills: 0 | Status: SHIPPED | OUTPATIENT
Start: 2023-09-14 | End: 2023-09-17

## 2023-09-14 RX ADMIN — MORPHINE SULFATE 4 MG: 4 INJECTION, SOLUTION INTRAMUSCULAR; INTRAVENOUS at 14:00

## 2023-09-14 RX ADMIN — ONDANSETRON 4 MG: 2 INJECTION INTRAMUSCULAR; INTRAVENOUS at 15:17

## 2023-09-14 RX ADMIN — DIPHENHYDRAMINE HYDROCHLORIDE 50 MG: 50 INJECTION INTRAMUSCULAR; INTRAVENOUS at 13:59

## 2023-09-14 RX ADMIN — MORPHINE SULFATE 2 MG: 2 INJECTION, SOLUTION INTRAMUSCULAR; INTRAVENOUS at 16:23

## 2023-09-14 RX ADMIN — MORPHINE SULFATE 4 MG: 4 INJECTION, SOLUTION INTRAMUSCULAR; INTRAVENOUS at 15:17

## 2023-09-14 RX ADMIN — ONDANSETRON 4 MG: 2 INJECTION INTRAMUSCULAR; INTRAVENOUS at 16:21

## 2023-09-14 RX ADMIN — IOPAMIDOL 100 ML: 755 INJECTION, SOLUTION INTRAVENOUS at 15:51

## 2023-09-14 RX ADMIN — DIPHENHYDRAMINE HYDROCHLORIDE 25 MG: 50 INJECTION INTRAMUSCULAR; INTRAVENOUS at 16:21

## 2023-09-14 ASSESSMENT — PAIN SCALES - GENERAL
PAINLEVEL_OUTOF10: 8
PAINLEVEL_OUTOF10: 8

## 2023-09-14 NOTE — DISCHARGE INSTRUCTIONS
Thank you! Thank you for allowing me to care for you in the emergency department. It is my goal to provide you with excellent care. If you have not received excellent quality care, please ask to speak to the nurse manager. Please fill out the survey that will come to you by mail or email since we listen to your feedback! Below you will find a list of your tests from today's visit. Should you have any questions, please do not hesitate to call the emergency department.     Labs  Recent Results (from the past 12 hour(s))   EKG 12 Lead    Collection Time: 09/14/23 12:12 PM   Result Value Ref Range    Ventricular Rate 77 BPM    Atrial Rate 77 BPM    P-R Interval 170 ms    QRS Duration 86 ms    Q-T Interval 368 ms    QTc Calculation (Bazett) 416 ms    P Axis 38 degrees    R Axis 52 degrees    T Axis 30 degrees    Diagnosis       Normal sinus rhythm  Normal ECG  When compared with ECG of 03-FEB-2023 16:26,  No significant change was found     CBC with Auto Differential    Collection Time: 09/14/23 12:28 PM   Result Value Ref Range    WBC 11.4 (H) 3.6 - 11.0 K/uL    RBC 4.11 3.80 - 5.20 M/uL    Hemoglobin 12.1 11.5 - 16.0 g/dL    Hematocrit 37.6 35.0 - 47.0 %    MCV 91.5 80.0 - 99.0 FL    MCH 29.4 26.0 - 34.0 PG    MCHC 32.2 30.0 - 36.5 g/dL    RDW 13.4 11.5 - 14.5 %    Platelets 339 (H) 386 - 400 K/uL    MPV 10.4 8.9 - 12.9 FL    Nucleated RBCs 0.0 0  WBC    nRBC 0.00 0.00 - 0.01 K/uL    Neutrophils % 55 32 - 75 %    Lymphocytes % 26 12 - 49 %    Monocytes % 9 5 - 13 %    Eosinophils % 8 (H) 0 - 7 %    Basophils % 1 0 - 1 %    Immature Granulocytes 1 (H) 0.0 - 0.5 %    Neutrophils Absolute 6.4 1.8 - 8.0 K/UL    Lymphocytes Absolute 2.9 0.8 - 3.5 K/UL    Monocytes Absolute 1.0 0.0 - 1.0 K/UL    Eosinophils Absolute 0.9 (H) 0.0 - 0.4 K/UL    Basophils Absolute 0.1 0.0 - 0.1 K/UL    Absolute Immature Granulocyte 0.1 (H) 0.00 - 0.04 K/UL    Differential Type AUTOMATED     Comprehensive Metabolic Panel

## 2023-09-15 LAB
EKG ATRIAL RATE: 77 BPM
EKG DIAGNOSIS: NORMAL
EKG P AXIS: 38 DEGREES
EKG P-R INTERVAL: 170 MS
EKG Q-T INTERVAL: 368 MS
EKG QRS DURATION: 86 MS
EKG QTC CALCULATION (BAZETT): 416 MS
EKG R AXIS: 52 DEGREES
EKG T AXIS: 30 DEGREES
EKG VENTRICULAR RATE: 77 BPM

## 2023-10-02 ENCOUNTER — TELEPHONE (OUTPATIENT)
Age: 38
End: 2023-10-02

## 2023-10-02 RX ORDER — AZITHROMYCIN 250 MG/1
250 TABLET, FILM COATED ORAL SEE ADMIN INSTRUCTIONS
Qty: 6 TABLET | Refills: 0 | Status: SHIPPED | OUTPATIENT
Start: 2023-10-02 | End: 2023-10-07

## 2023-10-02 RX ORDER — METHYLPREDNISOLONE 4 MG/1
TABLET ORAL
Qty: 1 KIT | Refills: 0 | Status: SHIPPED | OUTPATIENT
Start: 2023-10-02 | End: 2023-10-08

## 2023-10-02 NOTE — TELEPHONE ENCOUNTER
Pt states has lupus    Caller States:  Symptoms/concern:   Stuffy nose  Cough  Throat sore  Fever   Some body ache  Denies headache  Covid test:  Negative; Yesterday 10/2; rapid home test  Onset:   3 days ago (Friday)   What has been tried:   Mucinex, nyquil       APPOINTMENTS:  Appointment offers during call:  No same day  next day vv with dr Didi Bronson; DECLINED  States has appt cannot miss.   requested message put back for med instead  Wed VV with Dr Asa Alcocer: pt scheduled in case dr Rosy Razo send in message prior    Date of last appointment:    8/22/2023     Pharmacy confirmed as:  [id number], [address] [place] [address] Rohan Branch  FABIAN [phone] - F [phone]        REQUEST:  Wants medication

## 2023-10-02 NOTE — TELEPHONE ENCOUNTER
FYI    Patient called with 3 days of fever, sore throat, nasal congestion, chest congestion, wheezing. Covid test negative today. Taking mucinex, symbicort, albuterol every 4 hours. Requesting antibiotic and cough syrup. Per , received #140 tylenol #3 since Sept 5. Recommended:   ED evaluation, Patient refuses ED today. Empiric antibiotic  Steroid course  Advised unable to RX narcotic cough medication. To go to ED if worsens.

## 2023-10-11 ENCOUNTER — APPOINTMENT (OUTPATIENT)
Facility: HOSPITAL | Age: 38
End: 2023-10-11
Payer: COMMERCIAL

## 2023-10-11 ENCOUNTER — HOSPITAL ENCOUNTER (EMERGENCY)
Facility: HOSPITAL | Age: 38
Discharge: HOME | End: 2023-10-11
Attending: EMERGENCY MEDICINE
Payer: COMMERCIAL

## 2023-10-11 VITALS
HEART RATE: 92 BPM | WEIGHT: 279.98 LBS | SYSTOLIC BLOOD PRESSURE: 135 MMHG | HEIGHT: 62 IN | TEMPERATURE: 98.5 F | BODY MASS INDEX: 51.52 KG/M2 | RESPIRATION RATE: 14 BRPM | DIASTOLIC BLOOD PRESSURE: 87 MMHG | OXYGEN SATURATION: 99 %

## 2023-10-11 DIAGNOSIS — K92.1 HEMATOCHEZIA: ICD-10-CM

## 2023-10-11 DIAGNOSIS — K64.4 EXTERNAL HEMORRHOID: ICD-10-CM

## 2023-10-11 DIAGNOSIS — R42 DIZZINESS: ICD-10-CM

## 2023-10-11 DIAGNOSIS — R10.84 GENERALIZED ABDOMINAL PAIN: Primary | ICD-10-CM

## 2023-10-11 LAB
ABO + RH BLD: NORMAL
ALBUMIN SERPL-MCNC: 3.9 G/DL (ref 3.5–5)
ALBUMIN/GLOB SERPL: 1.1 (ref 1.1–2.2)
ALP SERPL-CCNC: 136 U/L (ref 45–117)
ALT SERPL-CCNC: 25 U/L (ref 12–78)
ANION GAP SERPL CALC-SCNC: 5 MMOL/L (ref 5–15)
AST SERPL-CCNC: 14 U/L (ref 15–37)
BASOPHILS # BLD: 0.1 K/UL (ref 0–0.1)
BASOPHILS NFR BLD: 1 % (ref 0–1)
BILIRUB SERPL-MCNC: 0.3 MG/DL (ref 0.2–1)
BLOOD GROUP ANTIBODIES SERPL: NORMAL
BUN SERPL-MCNC: 19 MG/DL (ref 6–20)
BUN/CREAT SERPL: 20 (ref 12–20)
CALCIUM SERPL-MCNC: 9.3 MG/DL (ref 8.5–10.1)
CHLORIDE SERPL-SCNC: 109 MMOL/L (ref 97–108)
CO2 SERPL-SCNC: 25 MMOL/L (ref 21–32)
CREAT SERPL-MCNC: 0.97 MG/DL (ref 0.55–1.02)
DIFFERENTIAL METHOD BLD: ABNORMAL
EOSINOPHIL # BLD: 0.8 K/UL (ref 0–0.4)
EOSINOPHIL NFR BLD: 8 % (ref 0–7)
ERYTHROCYTE [DISTWIDTH] IN BLOOD BY AUTOMATED COUNT: 13.5 % (ref 11.5–14.5)
GLOBULIN SER CALC-MCNC: 3.4 G/DL (ref 2–4)
GLUCOSE SERPL-MCNC: 100 MG/DL (ref 65–100)
HCT VFR BLD AUTO: 37.1 % (ref 35–47)
HGB BLD-MCNC: 11.8 G/DL (ref 11.5–16)
IMM GRANULOCYTES # BLD AUTO: 0.1 K/UL (ref 0–0.04)
IMM GRANULOCYTES NFR BLD AUTO: 1 % (ref 0–0.5)
LYMPHOCYTES # BLD: 2.9 K/UL (ref 0.8–3.5)
LYMPHOCYTES NFR BLD: 29 % (ref 12–49)
MCH RBC QN AUTO: 28.9 PG (ref 26–34)
MCHC RBC AUTO-ENTMCNC: 31.8 G/DL (ref 30–36.5)
MCV RBC AUTO: 90.9 FL (ref 80–99)
MONOCYTES # BLD: 0.9 K/UL (ref 0–1)
MONOCYTES NFR BLD: 9 % (ref 5–13)
NEUTS SEG # BLD: 5.4 K/UL (ref 1.8–8)
NEUTS SEG NFR BLD: 52 % (ref 32–75)
NRBC # BLD: 0 K/UL (ref 0–0.01)
NRBC BLD-RTO: 0 PER 100 WBC
PLATELET # BLD AUTO: 337 K/UL (ref 150–400)
PMV BLD AUTO: 10.2 FL (ref 8.9–12.9)
POTASSIUM SERPL-SCNC: 4.1 MMOL/L (ref 3.5–5.1)
PROT SERPL-MCNC: 7.3 G/DL (ref 6.4–8.2)
RBC # BLD AUTO: 4.08 M/UL (ref 3.8–5.2)
SODIUM SERPL-SCNC: 139 MMOL/L (ref 136–145)
SPECIMEN EXP DATE BLD: NORMAL
WBC # BLD AUTO: 10.1 K/UL (ref 3.6–11)

## 2023-10-11 PROCEDURE — 96374 THER/PROPH/DIAG INJ IV PUSH: CPT

## 2023-10-11 PROCEDURE — 86901 BLOOD TYPING SEROLOGIC RH(D): CPT

## 2023-10-11 PROCEDURE — 86850 RBC ANTIBODY SCREEN: CPT

## 2023-10-11 PROCEDURE — 80053 COMPREHEN METABOLIC PANEL: CPT

## 2023-10-11 PROCEDURE — 2580000003 HC RX 258: Performed by: EMERGENCY MEDICINE

## 2023-10-11 PROCEDURE — 36415 COLL VENOUS BLD VENIPUNCTURE: CPT

## 2023-10-11 PROCEDURE — 96375 TX/PRO/DX INJ NEW DRUG ADDON: CPT

## 2023-10-11 PROCEDURE — 86900 BLOOD TYPING SEROLOGIC ABO: CPT

## 2023-10-11 PROCEDURE — 6360000004 HC RX CONTRAST MEDICATION: Performed by: EMERGENCY MEDICINE

## 2023-10-11 PROCEDURE — A4216 STERILE WATER/SALINE, 10 ML: HCPCS | Performed by: EMERGENCY MEDICINE

## 2023-10-11 PROCEDURE — 96376 TX/PRO/DX INJ SAME DRUG ADON: CPT

## 2023-10-11 PROCEDURE — 93005 ELECTROCARDIOGRAM TRACING: CPT | Performed by: EMERGENCY MEDICINE

## 2023-10-11 PROCEDURE — 2500000003 HC RX 250 WO HCPCS: Performed by: EMERGENCY MEDICINE

## 2023-10-11 PROCEDURE — C9113 INJ PANTOPRAZOLE SODIUM, VIA: HCPCS | Performed by: EMERGENCY MEDICINE

## 2023-10-11 PROCEDURE — 99285 EMERGENCY DEPT VISIT HI MDM: CPT

## 2023-10-11 PROCEDURE — 6360000002 HC RX W HCPCS: Performed by: EMERGENCY MEDICINE

## 2023-10-11 PROCEDURE — 74178 CT ABD&PLV WO CNTR FLWD CNTR: CPT

## 2023-10-11 PROCEDURE — 85025 COMPLETE CBC W/AUTO DIFF WBC: CPT

## 2023-10-11 RX ORDER — LORAZEPAM 2 MG/ML
1 INJECTION INTRAMUSCULAR ONCE
Status: COMPLETED | OUTPATIENT
Start: 2023-10-11 | End: 2023-10-11

## 2023-10-11 RX ORDER — ONDANSETRON 2 MG/ML
4 INJECTION INTRAMUSCULAR; INTRAVENOUS EVERY 6 HOURS PRN
Status: DISCONTINUED | OUTPATIENT
Start: 2023-10-11 | End: 2023-10-11 | Stop reason: HOSPADM

## 2023-10-11 RX ORDER — HYDROMORPHONE HYDROCHLORIDE 1 MG/ML
1 INJECTION, SOLUTION INTRAMUSCULAR; INTRAVENOUS; SUBCUTANEOUS
Status: COMPLETED | OUTPATIENT
Start: 2023-10-11 | End: 2023-10-11

## 2023-10-11 RX ORDER — DIPHENHYDRAMINE HYDROCHLORIDE 50 MG/ML
25 INJECTION INTRAMUSCULAR; INTRAVENOUS
Status: COMPLETED | OUTPATIENT
Start: 2023-10-11 | End: 2023-10-11

## 2023-10-11 RX ORDER — ONDANSETRON 4 MG/1
4 TABLET, ORALLY DISINTEGRATING ORAL 3 TIMES DAILY PRN
Qty: 21 TABLET | Refills: 0 | Status: SHIPPED | OUTPATIENT
Start: 2023-10-11

## 2023-10-11 RX ORDER — MECLIZINE HYDROCHLORIDE 25 MG/1
25 TABLET ORAL 3 TIMES DAILY PRN
Qty: 15 TABLET | Refills: 0 | Status: SHIPPED | OUTPATIENT
Start: 2023-10-11 | End: 2023-10-21

## 2023-10-11 RX ORDER — MORPHINE SULFATE 4 MG/ML
4 INJECTION, SOLUTION INTRAMUSCULAR; INTRAVENOUS
Status: DISCONTINUED | OUTPATIENT
Start: 2023-10-11 | End: 2023-10-11 | Stop reason: HOSPADM

## 2023-10-11 RX ORDER — TRAMADOL HYDROCHLORIDE 50 MG/1
50 TABLET ORAL EVERY 8 HOURS PRN
Qty: 10 TABLET | Refills: 0 | Status: SHIPPED | OUTPATIENT
Start: 2023-10-11 | End: 2023-10-14

## 2023-10-11 RX ORDER — SODIUM CHLORIDE, SODIUM LACTATE, POTASSIUM CHLORIDE, AND CALCIUM CHLORIDE .6; .31; .03; .02 G/100ML; G/100ML; G/100ML; G/100ML
1000 INJECTION, SOLUTION INTRAVENOUS
Status: COMPLETED | OUTPATIENT
Start: 2023-10-11 | End: 2023-10-11

## 2023-10-11 RX ADMIN — LORAZEPAM 1 MG: 2 INJECTION INTRAMUSCULAR; INTRAVENOUS at 20:24

## 2023-10-11 RX ADMIN — ONDANSETRON 4 MG: 2 INJECTION INTRAMUSCULAR; INTRAVENOUS at 17:58

## 2023-10-11 RX ADMIN — HYDROMORPHONE HYDROCHLORIDE 1 MG: 1 INJECTION, SOLUTION INTRAMUSCULAR; INTRAVENOUS; SUBCUTANEOUS at 20:24

## 2023-10-11 RX ADMIN — PANTOPRAZOLE SODIUM 40 MG: 40 INJECTION, POWDER, FOR SOLUTION INTRAVENOUS at 17:58

## 2023-10-11 RX ADMIN — HYDROMORPHONE HYDROCHLORIDE 1 MG: 1 INJECTION, SOLUTION INTRAMUSCULAR; INTRAVENOUS; SUBCUTANEOUS at 19:01

## 2023-10-11 RX ADMIN — MORPHINE SULFATE 4 MG: 4 INJECTION, SOLUTION INTRAMUSCULAR; INTRAVENOUS at 17:58

## 2023-10-11 RX ADMIN — SODIUM CHLORIDE, POTASSIUM CHLORIDE, SODIUM LACTATE AND CALCIUM CHLORIDE 1000 ML: 600; 310; 30; 20 INJECTION, SOLUTION INTRAVENOUS at 17:58

## 2023-10-11 RX ADMIN — DIPHENHYDRAMINE HYDROCHLORIDE 25 MG: 50 INJECTION INTRAMUSCULAR; INTRAVENOUS at 20:02

## 2023-10-11 RX ADMIN — FAMOTIDINE 20 MG: 10 INJECTION, SOLUTION INTRAVENOUS at 20:03

## 2023-10-11 RX ADMIN — DIPHENHYDRAMINE HYDROCHLORIDE 25 MG: 50 INJECTION INTRAMUSCULAR; INTRAVENOUS at 19:33

## 2023-10-11 RX ADMIN — IOPAMIDOL 100 ML: 755 INJECTION, SOLUTION INTRAVENOUS at 19:24

## 2023-10-11 ASSESSMENT — PAIN SCALES - GENERAL
PAINLEVEL_OUTOF10: 10
PAINLEVEL_OUTOF10: 8
PAINLEVEL_OUTOF10: 8

## 2023-10-11 ASSESSMENT — PAIN DESCRIPTION - LOCATION: LOCATION: ABDOMEN

## 2023-10-11 NOTE — ED PROVIDER NOTES
errors are inadvertently transcribed by the computer software. Please disregards these errors.  Please excuse any errors that have escaped final proofreading.)         Josselyn Dave MD  10/11/23 4976

## 2023-10-12 ENCOUNTER — HOSPITAL ENCOUNTER (EMERGENCY)
Facility: HOSPITAL | Age: 38
Discharge: HOME | End: 2023-10-12
Attending: EMERGENCY MEDICINE
Payer: COMMERCIAL

## 2023-10-12 VITALS
DIASTOLIC BLOOD PRESSURE: 73 MMHG | TEMPERATURE: 98.2 F | HEART RATE: 78 BPM | OXYGEN SATURATION: 94 % | BODY MASS INDEX: 51.21 KG/M2 | WEIGHT: 279.98 LBS | SYSTOLIC BLOOD PRESSURE: 134 MMHG | RESPIRATION RATE: 22 BRPM

## 2023-10-12 DIAGNOSIS — R10.9 ABDOMINAL PAIN, UNSPECIFIED ABDOMINAL LOCATION: Primary | ICD-10-CM

## 2023-10-12 LAB
ALBUMIN SERPL-MCNC: 3.7 G/DL (ref 3.5–5)
ALBUMIN/GLOB SERPL: 1 (ref 1.1–2.2)
ALP SERPL-CCNC: 147 U/L (ref 45–117)
ALT SERPL-CCNC: 32 U/L (ref 12–78)
ANION GAP SERPL CALC-SCNC: 5 MMOL/L (ref 5–15)
APPEARANCE UR: CLEAR
AST SERPL-CCNC: 18 U/L (ref 15–37)
BACTERIA URNS QL MICRO: NEGATIVE /HPF
BASOPHILS # BLD: 0.1 K/UL (ref 0–0.1)
BASOPHILS NFR BLD: 1 % (ref 0–1)
BILIRUB SERPL-MCNC: 0.2 MG/DL (ref 0.2–1)
BILIRUB UR QL: NEGATIVE
BUN SERPL-MCNC: 20 MG/DL (ref 6–20)
BUN/CREAT SERPL: 17 (ref 12–20)
CALCIUM SERPL-MCNC: 8.6 MG/DL (ref 8.5–10.1)
CHLORIDE SERPL-SCNC: 109 MMOL/L (ref 97–108)
CO2 SERPL-SCNC: 26 MMOL/L (ref 21–32)
COLOR UR: ABNORMAL
CREAT SERPL-MCNC: 1.15 MG/DL (ref 0.55–1.02)
DIFFERENTIAL METHOD BLD: ABNORMAL
EKG ATRIAL RATE: 70 BPM
EKG DIAGNOSIS: NORMAL
EKG P AXIS: 43 DEGREES
EKG P-R INTERVAL: 192 MS
EKG Q-T INTERVAL: 372 MS
EKG QRS DURATION: 84 MS
EKG QTC CALCULATION (BAZETT): 401 MS
EKG R AXIS: 66 DEGREES
EKG T AXIS: 30 DEGREES
EKG VENTRICULAR RATE: 70 BPM
EOSINOPHIL # BLD: 0.8 K/UL (ref 0–0.4)
EOSINOPHIL NFR BLD: 9 % (ref 0–7)
EPITH CASTS URNS QL MICRO: ABNORMAL /LPF
ERYTHROCYTE [DISTWIDTH] IN BLOOD BY AUTOMATED COUNT: 13.6 % (ref 11.5–14.5)
GLOBULIN SER CALC-MCNC: 3.6 G/DL (ref 2–4)
GLUCOSE SERPL-MCNC: 136 MG/DL (ref 65–100)
GLUCOSE UR STRIP.AUTO-MCNC: NEGATIVE MG/DL
HCG UR QL: NEGATIVE
HCT VFR BLD AUTO: 37.3 % (ref 35–47)
HGB BLD-MCNC: 12 G/DL (ref 11.5–16)
HGB UR QL STRIP: NEGATIVE
HYALINE CASTS URNS QL MICRO: ABNORMAL /LPF (ref 0–2)
IMM GRANULOCYTES # BLD AUTO: 0 K/UL (ref 0–0.04)
IMM GRANULOCYTES NFR BLD AUTO: 1 % (ref 0–0.5)
KETONES UR QL STRIP.AUTO: NEGATIVE MG/DL
LEUKOCYTE ESTERASE UR QL STRIP.AUTO: NEGATIVE
LIPASE SERPL-CCNC: 41 U/L (ref 13–75)
LYMPHOCYTES # BLD: 2.4 K/UL (ref 0.8–3.5)
LYMPHOCYTES NFR BLD: 27 % (ref 12–49)
MCH RBC QN AUTO: 30.1 PG (ref 26–34)
MCHC RBC AUTO-ENTMCNC: 32.2 G/DL (ref 30–36.5)
MCV RBC AUTO: 93.5 FL (ref 80–99)
MONOCYTES # BLD: 0.8 K/UL (ref 0–1)
MONOCYTES NFR BLD: 9 % (ref 5–13)
NEUTS SEG # BLD: 4.6 K/UL (ref 1.8–8)
NEUTS SEG NFR BLD: 53 % (ref 32–75)
NITRITE UR QL STRIP.AUTO: NEGATIVE
NRBC # BLD: 0 K/UL (ref 0–0.01)
NRBC BLD-RTO: 0 PER 100 WBC
PH UR STRIP: 5.5 (ref 5–8)
PLATELET # BLD AUTO: 339 K/UL (ref 150–400)
PMV BLD AUTO: 10.1 FL (ref 8.9–12.9)
POTASSIUM SERPL-SCNC: 4 MMOL/L (ref 3.5–5.1)
PROT SERPL-MCNC: 7.3 G/DL (ref 6.4–8.2)
PROT UR STRIP-MCNC: NEGATIVE MG/DL
RBC # BLD AUTO: 3.99 M/UL (ref 3.8–5.2)
RBC #/AREA URNS HPF: ABNORMAL /HPF (ref 0–5)
SODIUM SERPL-SCNC: 140 MMOL/L (ref 136–145)
SP GR UR REFRACTOMETRY: 1.02
URINE CULTURE IF INDICATED: ABNORMAL
UROBILINOGEN UR QL STRIP.AUTO: 0.2 EU/DL (ref 0.2–1)
WBC # BLD AUTO: 8.8 K/UL (ref 3.6–11)
WBC URNS QL MICRO: ABNORMAL /HPF (ref 0–4)

## 2023-10-12 PROCEDURE — 81001 URINALYSIS AUTO W/SCOPE: CPT

## 2023-10-12 PROCEDURE — 2500000003 HC RX 250 WO HCPCS

## 2023-10-12 PROCEDURE — 96374 THER/PROPH/DIAG INJ IV PUSH: CPT

## 2023-10-12 PROCEDURE — 6360000002 HC RX W HCPCS

## 2023-10-12 PROCEDURE — 96376 TX/PRO/DX INJ SAME DRUG ADON: CPT

## 2023-10-12 PROCEDURE — 81025 URINE PREGNANCY TEST: CPT

## 2023-10-12 PROCEDURE — 36415 COLL VENOUS BLD VENIPUNCTURE: CPT

## 2023-10-12 PROCEDURE — 99284 EMERGENCY DEPT VISIT MOD MDM: CPT

## 2023-10-12 PROCEDURE — 85025 COMPLETE CBC W/AUTO DIFF WBC: CPT

## 2023-10-12 PROCEDURE — 83690 ASSAY OF LIPASE: CPT

## 2023-10-12 PROCEDURE — 96375 TX/PRO/DX INJ NEW DRUG ADDON: CPT

## 2023-10-12 PROCEDURE — 80053 COMPREHEN METABOLIC PANEL: CPT

## 2023-10-12 RX ORDER — HYDROMORPHONE HYDROCHLORIDE 1 MG/ML
0.5 INJECTION, SOLUTION INTRAMUSCULAR; INTRAVENOUS; SUBCUTANEOUS ONCE
Status: COMPLETED | OUTPATIENT
Start: 2023-10-12 | End: 2023-10-12

## 2023-10-12 RX ORDER — FENTANYL CITRATE 50 UG/ML
50 INJECTION, SOLUTION INTRAMUSCULAR; INTRAVENOUS
Status: COMPLETED | OUTPATIENT
Start: 2023-10-12 | End: 2023-10-12

## 2023-10-12 RX ORDER — DOXYCYCLINE HYCLATE 100 MG
100 TABLET ORAL 2 TIMES DAILY
Qty: 6 TABLET | Refills: 0 | Status: SHIPPED | OUTPATIENT
Start: 2023-10-12 | End: 2023-10-15

## 2023-10-12 RX ORDER — MORPHINE SULFATE 4 MG/ML
4 INJECTION, SOLUTION INTRAMUSCULAR; INTRAVENOUS
Status: COMPLETED | OUTPATIENT
Start: 2023-10-12 | End: 2023-10-12

## 2023-10-12 RX ADMIN — MORPHINE SULFATE 4 MG: 4 INJECTION, SOLUTION INTRAMUSCULAR; INTRAVENOUS at 12:47

## 2023-10-12 RX ADMIN — HYDROMORPHONE HYDROCHLORIDE 0.5 MG: 1 INJECTION, SOLUTION INTRAMUSCULAR; INTRAVENOUS; SUBCUTANEOUS at 13:37

## 2023-10-12 RX ADMIN — FENTANYL CITRATE 50 MCG: 50 INJECTION INTRAMUSCULAR; INTRAVENOUS at 15:51

## 2023-10-12 RX ADMIN — FENTANYL CITRATE 50 MCG: 0.05 INJECTION, SOLUTION INTRAMUSCULAR; INTRAVENOUS at 14:40

## 2023-10-12 ASSESSMENT — PAIN SCALES - GENERAL
PAINLEVEL_OUTOF10: 9
PAINLEVEL_OUTOF10: 5

## 2023-10-12 NOTE — ED PROVIDER NOTES
I performed a history and physical examination of the patient and discussed his/her management with the resident. I reviewed the resident's note and agree with the documented findings and plan of care. Patient is a 38-year-old female presenting as a repeat visit from yesterday. She is complaining of lower abdominal cramping, loose stools and intermittent bright red blood per rectum. Yesterday she had normal lab work, normal CAT scan, and was discharged on tramadol and Levsin. Today she returns, bleeding has improved but pain is still persistent. Patient currently is 80 prescription for controlled substance over the last 12 months. Physical examination: Patient is alert and oriented x3, mild tenderness to palpation in the paramedical and suprapubic area without guarding or rebound no signs of peritonitis. Assessment and plan: Acute abdominal pain, possible enteritis with associated bleeding from external hemorrhoid as diagnosed yesterday. Pain control with multiple doses of IV opiates. She has a GI appointment tomorrow which we have personally arranged for her. I do not think repeat imaging is necessary. I discussed the risk and benefits of additional opiate prescriptions which she is requested, however, I think she should continue her tramadol, take a short course of an oral antibiotic for 3 days, and follow-up with a GI physician tomorrow.      Raoul Villatoro MD  10/12/23 2367
Patient provided 50 mcg of fentanyl, will keep for at least 1 hour to observe. Did speak with patient about plan to discharge home after pain is managed for follow-up with GI tomorrow. Patient agrees to plan. [CC]   8702 Patient states that she has seen the updated appointment on her ellie. [CC]      ED Course User Index  [CC] Radha Harding PA-C       Disposition Considerations (Tests not done, Shared Decision Making, Pt Expectation of Test or Tx.):     Patient had extensive work-up yesterday without significant change in symptoms, was able to get in contact with patient's gastroentero clinical her appointment for tomorrow morning. At this time via shared decision making has been decided to forego recurrent imaging, or additional imaging. Patient is feeling much better now that pain is controlled. Patient will be discharged. FINAL IMPRESSION     1. Abdominal pain, unspecified abdominal location          DISPOSITION/PLAN   DISPOSITION Discharge - Pending Orders Complete 10/12/2023 03:40:26 PM    Discharge Note: The patient is stable for discharge home. The signs, symptoms, diagnosis, and discharge instructions have been discussed, understanding conveyed, and agreed upon. The patient is to follow up as recommended or return to ER should their symptoms worsen.       PATIENT REFERRED TO:  Your GI specialist      If symptoms worsen    [facility] EMERGENCY DEPT  [address]  [phone]  Schedule an appointment as soon as possible for a visit       Hyacinth Qiu MD  [address] [facility] [address] [facility] [address]  [facility]  [phone]             DISCHARGE MEDICATIONS:     Medication List        START taking these medications      doxycycline hyclate 100 MG tablet  Commonly known as: VIBRA-TABS  Take 1 tablet by mouth 2 times daily for 3 days            ASK your doctor about these medications      albuterol sulfate  (90 Base) MCG/ACT inhaler  Commonly known as:

## 2023-10-12 NOTE — ED NOTES
Pt's IV removed. Pt provided D/C instructions and states understanding of D/C teaching. Pt has all belongings. Pt uses ambulates self out of ED to personal vehicle at this time. Patient is A&Ox4, on RA, VSS, and is in NAD at the time of discharge.      Mary Granados RN  10/11/23 1457

## 2023-10-12 NOTE — DISCHARGE INSTRUCTIONS
Please use these medicines for pain and dizziness. Please follow-up with her primary care doctor and GI doctor. Please return for new or worse symptoms anytime.

## 2023-10-30 RX ORDER — DULOXETIN HYDROCHLORIDE 60 MG/1
CAPSULE, DELAYED RELEASE ORAL
Qty: 30 CAPSULE | Refills: 5 | Status: SHIPPED | OUTPATIENT
Start: 2023-10-30

## 2023-11-03 DIAGNOSIS — F41.9 ANXIETY DISORDER, UNSPECIFIED: ICD-10-CM

## 2023-11-04 DIAGNOSIS — F41.9 ANXIETY DISORDER, UNSPECIFIED: ICD-10-CM

## 2023-11-06 ENCOUNTER — APPOINTMENT (OUTPATIENT)
Facility: HOSPITAL | Age: 38
End: 2023-11-06
Payer: COMMERCIAL

## 2023-11-06 ENCOUNTER — HOSPITAL ENCOUNTER (EMERGENCY)
Facility: HOSPITAL | Age: 38
Discharge: HOME | End: 2023-11-06
Attending: EMERGENCY MEDICINE
Payer: COMMERCIAL

## 2023-11-06 VITALS
HEART RATE: 87 BPM | TEMPERATURE: 98.2 F | BODY MASS INDEX: 52.82 KG/M2 | HEIGHT: 62 IN | SYSTOLIC BLOOD PRESSURE: 139 MMHG | WEIGHT: 287.04 LBS | OXYGEN SATURATION: 92 % | DIASTOLIC BLOOD PRESSURE: 89 MMHG | RESPIRATION RATE: 20 BRPM

## 2023-11-06 DIAGNOSIS — N30.01 ACUTE CYSTITIS WITH HEMATURIA: ICD-10-CM

## 2023-11-06 DIAGNOSIS — R10.9 BILATERAL FLANK PAIN: Primary | ICD-10-CM

## 2023-11-06 LAB
ALBUMIN SERPL-MCNC: 3.7 G/DL (ref 3.5–5)
ALBUMIN/GLOB SERPL: 0.9 (ref 1.1–2.2)
ALP SERPL-CCNC: 121 U/L (ref 45–117)
ALT SERPL-CCNC: 24 U/L (ref 12–78)
ANION GAP SERPL CALC-SCNC: 5 MMOL/L (ref 5–15)
APPEARANCE UR: CLEAR
AST SERPL-CCNC: 28 U/L (ref 15–37)
BACTERIA URNS QL MICRO: NEGATIVE /HPF
BASOPHILS # BLD: 0.1 K/UL (ref 0–0.1)
BASOPHILS NFR BLD: 0 % (ref 0–1)
BILIRUB SERPL-MCNC: 0.5 MG/DL (ref 0.2–1)
BILIRUB UR QL: NEGATIVE
BUN SERPL-MCNC: 25 MG/DL (ref 6–20)
BUN/CREAT SERPL: 16 (ref 12–20)
CALCIUM SERPL-MCNC: 9.1 MG/DL (ref 8.5–10.1)
CHLORIDE SERPL-SCNC: 105 MMOL/L (ref 97–108)
CO2 SERPL-SCNC: 28 MMOL/L (ref 21–32)
COLOR UR: ABNORMAL
CREAT SERPL-MCNC: 1.54 MG/DL (ref 0.55–1.02)
DIFFERENTIAL METHOD BLD: ABNORMAL
EOSINOPHIL # BLD: 1.2 K/UL (ref 0–0.4)
EOSINOPHIL NFR BLD: 9 % (ref 0–7)
EPITH CASTS URNS QL MICRO: ABNORMAL /LPF
ERYTHROCYTE [DISTWIDTH] IN BLOOD BY AUTOMATED COUNT: 13.4 % (ref 11.5–14.5)
GLOBULIN SER CALC-MCNC: 4 G/DL (ref 2–4)
GLUCOSE SERPL-MCNC: 121 MG/DL (ref 65–100)
GLUCOSE UR STRIP.AUTO-MCNC: NEGATIVE MG/DL
HCT VFR BLD AUTO: 36.2 % (ref 35–47)
HGB BLD-MCNC: 12 G/DL (ref 11.5–16)
HGB UR QL STRIP: ABNORMAL
HYALINE CASTS URNS QL MICRO: ABNORMAL /LPF (ref 0–2)
IMM GRANULOCYTES # BLD AUTO: 0.1 K/UL (ref 0–0.04)
IMM GRANULOCYTES NFR BLD AUTO: 1 % (ref 0–0.5)
KETONES UR QL STRIP.AUTO: NEGATIVE MG/DL
LEUKOCYTE ESTERASE UR QL STRIP.AUTO: ABNORMAL
LIPASE SERPL-CCNC: 23 U/L (ref 13–75)
LYMPHOCYTES # BLD: 3 K/UL (ref 0.8–3.5)
LYMPHOCYTES NFR BLD: 22 % (ref 12–49)
MCH RBC QN AUTO: 29.6 PG (ref 26–34)
MCHC RBC AUTO-ENTMCNC: 33.1 G/DL (ref 30–36.5)
MCV RBC AUTO: 89.4 FL (ref 80–99)
MONOCYTES # BLD: 1.3 K/UL (ref 0–1)
MONOCYTES NFR BLD: 10 % (ref 5–13)
NEUTS SEG # BLD: 7.9 K/UL (ref 1.8–8)
NEUTS SEG NFR BLD: 59 % (ref 32–75)
NITRITE UR QL STRIP.AUTO: NEGATIVE
NRBC # BLD: 0 K/UL (ref 0–0.01)
NRBC BLD-RTO: 0 PER 100 WBC
PH UR STRIP: 6 (ref 5–8)
PLATELET # BLD AUTO: 370 K/UL (ref 150–400)
PMV BLD AUTO: 10.3 FL (ref 8.9–12.9)
POTASSIUM SERPL-SCNC: 3.9 MMOL/L (ref 3.5–5.1)
PROT SERPL-MCNC: 7.7 G/DL (ref 6.4–8.2)
PROT UR STRIP-MCNC: 30 MG/DL
RBC # BLD AUTO: 4.05 M/UL (ref 3.8–5.2)
RBC #/AREA URNS HPF: ABNORMAL /HPF (ref 0–5)
SODIUM SERPL-SCNC: 138 MMOL/L (ref 136–145)
SP GR UR REFRACTOMETRY: 1.01
URINE CULTURE IF INDICATED: ABNORMAL
UROBILINOGEN UR QL STRIP.AUTO: 1 EU/DL (ref 0.2–1)
WBC # BLD AUTO: 13.5 K/UL (ref 3.6–11)
WBC URNS QL MICRO: ABNORMAL /HPF (ref 0–4)

## 2023-11-06 PROCEDURE — 87086 URINE CULTURE/COLONY COUNT: CPT

## 2023-11-06 PROCEDURE — 83690 ASSAY OF LIPASE: CPT

## 2023-11-06 PROCEDURE — 36415 COLL VENOUS BLD VENIPUNCTURE: CPT

## 2023-11-06 PROCEDURE — 74176 CT ABD & PELVIS W/O CONTRAST: CPT

## 2023-11-06 PROCEDURE — 96365 THER/PROPH/DIAG IV INF INIT: CPT

## 2023-11-06 PROCEDURE — 96376 TX/PRO/DX INJ SAME DRUG ADON: CPT

## 2023-11-06 PROCEDURE — 6360000002 HC RX W HCPCS: Performed by: EMERGENCY MEDICINE

## 2023-11-06 PROCEDURE — 96375 TX/PRO/DX INJ NEW DRUG ADDON: CPT

## 2023-11-06 PROCEDURE — 80053 COMPREHEN METABOLIC PANEL: CPT

## 2023-11-06 PROCEDURE — 85025 COMPLETE CBC W/AUTO DIFF WBC: CPT

## 2023-11-06 PROCEDURE — 81001 URINALYSIS AUTO W/SCOPE: CPT

## 2023-11-06 PROCEDURE — 2500000003 HC RX 250 WO HCPCS: Performed by: EMERGENCY MEDICINE

## 2023-11-06 PROCEDURE — 2580000003 HC RX 258: Performed by: EMERGENCY MEDICINE

## 2023-11-06 PROCEDURE — 99284 EMERGENCY DEPT VISIT MOD MDM: CPT

## 2023-11-06 RX ORDER — HYDROMORPHONE HYDROCHLORIDE 1 MG/ML
0.5 INJECTION, SOLUTION INTRAMUSCULAR; INTRAVENOUS; SUBCUTANEOUS
Status: COMPLETED | OUTPATIENT
Start: 2023-11-06 | End: 2023-11-06

## 2023-11-06 RX ORDER — HYDROMORPHONE HYDROCHLORIDE 1 MG/ML
1 INJECTION, SOLUTION INTRAMUSCULAR; INTRAVENOUS; SUBCUTANEOUS
Status: COMPLETED | OUTPATIENT
Start: 2023-11-06 | End: 2023-11-06

## 2023-11-06 RX ORDER — CEPHALEXIN 500 MG/1
500 CAPSULE ORAL 3 TIMES DAILY
Qty: 21 CAPSULE | Refills: 0 | Status: SHIPPED | OUTPATIENT
Start: 2023-11-06

## 2023-11-06 RX ORDER — HYDROCODONE BITARTRATE AND ACETAMINOPHEN 5; 325 MG/1; MG/1
1 TABLET ORAL EVERY 6 HOURS PRN
Qty: 10 TABLET | Refills: 0 | Status: SHIPPED | OUTPATIENT
Start: 2023-11-06 | End: 2023-11-09

## 2023-11-06 RX ORDER — ONDANSETRON 4 MG/1
4 TABLET, FILM COATED ORAL 3 TIMES DAILY PRN
Qty: 15 TABLET | Refills: 0 | Status: SHIPPED | OUTPATIENT
Start: 2023-11-06

## 2023-11-06 RX ORDER — ALPRAZOLAM 1 MG/1
TABLET ORAL
Qty: 30 TABLET | Refills: 0 | Status: SHIPPED | OUTPATIENT
Start: 2023-11-06 | End: 2023-12-08

## 2023-11-06 RX ORDER — 0.9 % SODIUM CHLORIDE 0.9 %
1000 INTRAVENOUS SOLUTION INTRAVENOUS ONCE
Status: COMPLETED | OUTPATIENT
Start: 2023-11-06 | End: 2023-11-06

## 2023-11-06 RX ORDER — ONDANSETRON 2 MG/ML
4 INJECTION INTRAMUSCULAR; INTRAVENOUS ONCE
Status: COMPLETED | OUTPATIENT
Start: 2023-11-06 | End: 2023-11-06

## 2023-11-06 RX ORDER — ALPRAZOLAM 1 MG/1
1 TABLET ORAL DAILY PRN
Qty: 30 TABLET | Refills: 2 | OUTPATIENT
Start: 2023-11-06 | End: 2024-02-04

## 2023-11-06 RX ADMIN — ONDANSETRON 4 MG: 2 INJECTION INTRAMUSCULAR; INTRAVENOUS at 11:44

## 2023-11-06 RX ADMIN — HYDROMORPHONE HYDROCHLORIDE 1 MG: 1 INJECTION, SOLUTION INTRAMUSCULAR; INTRAVENOUS; SUBCUTANEOUS at 13:29

## 2023-11-06 RX ADMIN — SODIUM CHLORIDE 1000 MG: 900 INJECTION INTRAVENOUS at 13:29

## 2023-11-06 RX ADMIN — HYDROMORPHONE HYDROCHLORIDE 0.5 MG: 1 INJECTION, SOLUTION INTRAMUSCULAR; INTRAVENOUS; SUBCUTANEOUS at 11:44

## 2023-11-06 RX ADMIN — SODIUM CHLORIDE 1000 ML: 9 INJECTION, SOLUTION INTRAVENOUS at 11:45

## 2023-11-06 ASSESSMENT — PAIN - FUNCTIONAL ASSESSMENT: PAIN_FUNCTIONAL_ASSESSMENT: 0-10

## 2023-11-06 ASSESSMENT — PAIN SCALES - GENERAL: PAINLEVEL_OUTOF10: 7

## 2023-11-06 NOTE — TELEPHONE ENCOUNTER
ALPRAZolam (XANAX) 1 MG tablet    Pt is asking for refill as soon as possible     Pt takes last pill today.  Advised of 48/72 hour turn around on refills.

## 2023-11-06 NOTE — TELEPHONE ENCOUNTER
Pt states that she is returning someone's call.  No note in chart.      Pt is in the ED now with back pain.      Pt is asking for a call back

## 2023-11-06 NOTE — ED PROVIDER NOTES
History:  Past Surgical History:   Procedure Laterality Date    CHOLECYSTECTOMY      COLONOSCOPY  09/21/2010         CYST INCISION AND DRAINAGE Right 02/27/2020    CYST INCISION AND DRAINAGE  02/21/2023    Incision and drainage of left upper arm abscess    EGD TRANSORAL BIOPSY SINGLE/MULTIPLE  09/22/2010         GYN  2006    right ovarian tumor removed    GYN  01/2009    right salpingo oopherectomy    HEENT      left wisdom teeth removal    HEENT      top right wisdom tooth removed    HERNIA REPAIR  02/28/2013    Laparoscopic recurrent incisional hernia repair    HERNIA REPAIR  04/2012    HYSTERECTOMY (CERVIX STATUS UNKNOWN)      2016    HYSTERECTOMY (CERVIX STATUS UNKNOWN)  2017    UROLOGICAL SURGERY      Kidney Stone Removal       Family History:  Family History   Problem Relation Age of Onset    Colon Cancer Maternal Grandfather        Social History:  Social History     Tobacco Use    Smoking status: Never    Smokeless tobacco: Never   Substance Use Topics    Alcohol use: No    Drug use: No       Allergies: Allergies   Allergen Reactions    Latex Itching     burning    Prochlorperazine Anxiety     High heart rate  Pt can take promethazine with no problems    Sulfa Antibiotics      Other reaction(s): Unknown (comments)    Topiramate      Other reaction(s): Unknown (comments)    Clindamycin Diarrhea     Pt states caused her C-Diff    Iodinated Contrast Media Myalgia     Pt c/o severe back spasms after IV contrast administration. Pt requesting IV pain medications and requests I put this as a drug intolerance in her EMR.     10/11/23: endorsed body wide pruritis after IV contrast, no urticaria, required benadryl for symptoms    Mushroom Extract Complex Other (See Comments)    Nsaids Other (See Comments)     Peptic ulcer    Valproic Acid Other (See Comments)     Elevated heart rate and vomiting    Adhesive Tape Rash     Allergic to Dermabond    Metoclopramide Rash       CURRENT MEDICATIONS      Previous Medications

## 2023-11-07 ENCOUNTER — TELEMEDICINE (OUTPATIENT)
Age: 38
End: 2023-11-07
Payer: COMMERCIAL

## 2023-11-07 DIAGNOSIS — M54.50 ACUTE LOW BACK PAIN WITHOUT SCIATICA, UNSPECIFIED BACK PAIN LATERALITY: ICD-10-CM

## 2023-11-07 DIAGNOSIS — N17.9 AKI (ACUTE KIDNEY INJURY) (HCC): Primary | ICD-10-CM

## 2023-11-07 DIAGNOSIS — R82.81 PYURIA: ICD-10-CM

## 2023-11-07 DIAGNOSIS — F41.9 ANXIETY: ICD-10-CM

## 2023-11-07 LAB
BACTERIA SPEC CULT: NORMAL
CC UR VC: NORMAL
SERVICE CMNT-IMP: NORMAL

## 2023-11-07 PROCEDURE — 99442 PR PHYS/QHP TELEPHONE EVALUATION 11-20 MIN: CPT | Performed by: INTERNAL MEDICINE

## 2023-11-07 RX ORDER — RIMEGEPANT SULFATE 75 MG/75MG
TABLET, ORALLY DISINTEGRATING ORAL
COMMUNITY
Start: 2023-10-18

## 2023-11-07 RX ORDER — PREGABALIN 200 MG/1
200 CAPSULE ORAL 2 TIMES DAILY
COMMUNITY
Start: 2023-10-20

## 2023-11-07 RX ORDER — PSEUDOEPHED/ACETAMINOPH/DIPHEN 30MG-500MG
TABLET ORAL
COMMUNITY
Start: 2023-08-21

## 2023-11-07 RX ORDER — OLMESARTAN MEDOXOMIL 20 MG/1
20 TABLET ORAL DAILY
COMMUNITY
Start: 2023-10-10

## 2023-11-07 NOTE — PROGRESS NOTES
1. \"Have you been to the ER, urgent care clinic since your last visit? Hospitalized since your last visit? \"         ER visit 11/6/2023 // abdominal pain    2. \"Have you seen or consulted any other health care providers outside of the [address] [place] [address] since your last visit? \"         Glorious Alexandra // neurology assoc     3. For patients aged 43-73: Has the patient had a colonoscopy / FIT/ Cologuard? Scheduled 3/2024      If the patient is female:    4. For patients aged 43-66: Has the patient had a mammogram within the past 2 years? No      5. For patients aged 21-65: Has the patient had a pap smear?   No

## 2023-11-13 ENCOUNTER — TELEPHONE (OUTPATIENT)
Age: 38
End: 2023-11-13

## 2023-11-13 ENCOUNTER — NURSE ONLY (OUTPATIENT)
Age: 38
End: 2023-11-13

## 2023-11-13 DIAGNOSIS — B37.31 YEAST VAGINITIS: Primary | ICD-10-CM

## 2023-11-13 DIAGNOSIS — N17.9 AKI (ACUTE KIDNEY INJURY) (HCC): ICD-10-CM

## 2023-11-13 RX ORDER — FLUCONAZOLE 150 MG/1
150 TABLET ORAL
Qty: 2 TABLET | Refills: 0 | Status: SHIPPED | OUTPATIENT
Start: 2023-11-13 | End: 2023-11-19

## 2023-11-13 NOTE — TELEPHONE ENCOUNTER
Spoke with patient. States when she went to the ER her back was mostly urinating. State she was told she does have a kidney infection and sent home on kelfex. She is  now c/o burning during urination and believes she is getting a yeast infection too - requesting diflucan for that.      Please advise if patient needs to come to be seen

## 2023-11-13 NOTE — TELEPHONE ENCOUNTER
Patient came into office to complete labs. Patient stated that the antibiotic from hospital is not helping, she has not seen any improvement. Patient is requesting for a stronger antibiotic to be called in.  Please advise

## 2023-11-14 LAB
ANION GAP SERPL CALC-SCNC: 5 MMOL/L (ref 5–15)
BUN SERPL-MCNC: 18 MG/DL (ref 6–20)
BUN/CREAT SERPL: 22 (ref 12–20)
CALCIUM SERPL-MCNC: 9.9 MG/DL (ref 8.5–10.1)
CHLORIDE SERPL-SCNC: 108 MMOL/L (ref 97–108)
CO2 SERPL-SCNC: 26 MMOL/L (ref 21–32)
CREAT SERPL-MCNC: 0.83 MG/DL (ref 0.55–1.02)
GLUCOSE SERPL-MCNC: 143 MG/DL (ref 65–100)
POTASSIUM SERPL-SCNC: 4.4 MMOL/L (ref 3.5–5.1)
SODIUM SERPL-SCNC: 139 MMOL/L (ref 136–145)

## 2023-11-18 RX ORDER — CYCLOBENZAPRINE HCL 5 MG
5 TABLET ORAL 3 TIMES DAILY PRN
Qty: 30 TABLET | Refills: 0 | Status: SHIPPED | OUTPATIENT
Start: 2023-11-18 | End: 2023-11-28

## 2023-11-18 RX ORDER — NITROFURANTOIN 25; 75 MG/1; MG/1
100 CAPSULE ORAL 2 TIMES DAILY
Qty: 10 CAPSULE | Refills: 0 | Status: SHIPPED | OUTPATIENT
Start: 2023-11-18 | End: 2023-11-23

## 2023-11-18 NOTE — PROGRESS NOTES
Pt called c/o 1-2 burning with urination and flank pain. I sent in macrobid and flexeril.  Appt or ER if feeling worse

## 2023-11-29 RX ORDER — CYCLOBENZAPRINE HCL 5 MG
TABLET ORAL
Qty: 30 TABLET | Refills: 0 | Status: SHIPPED | OUTPATIENT
Start: 2023-11-29

## 2023-12-03 ENCOUNTER — HOSPITAL ENCOUNTER (EMERGENCY)
Facility: HOSPITAL | Age: 38
Discharge: HOME | End: 2023-12-03
Payer: COMMERCIAL

## 2023-12-03 VITALS
BODY MASS INDEX: 52.25 KG/M2 | SYSTOLIC BLOOD PRESSURE: 116 MMHG | HEART RATE: 91 BPM | HEIGHT: 62 IN | TEMPERATURE: 98.6 F | RESPIRATION RATE: 18 BRPM | WEIGHT: 283.95 LBS | DIASTOLIC BLOOD PRESSURE: 69 MMHG | OXYGEN SATURATION: 98 %

## 2023-12-03 DIAGNOSIS — L02.419 AXILLARY ABSCESS: Primary | ICD-10-CM

## 2023-12-03 LAB
ALBUMIN SERPL-MCNC: 3.9 G/DL (ref 3.5–5)
ALBUMIN/GLOB SERPL: 1.1 (ref 1.1–2.2)
ALP SERPL-CCNC: 126 U/L (ref 45–117)
ALT SERPL-CCNC: 32 U/L (ref 12–78)
ANION GAP SERPL CALC-SCNC: 4 MMOL/L (ref 5–15)
AST SERPL-CCNC: 16 U/L (ref 15–37)
BASOPHILS # BLD: 0.1 K/UL (ref 0–0.1)
BASOPHILS NFR BLD: 1 % (ref 0–1)
BILIRUB SERPL-MCNC: 0.3 MG/DL (ref 0.2–1)
BUN SERPL-MCNC: 17 MG/DL (ref 6–20)
BUN/CREAT SERPL: 18 (ref 12–20)
CALCIUM SERPL-MCNC: 9.9 MG/DL (ref 8.5–10.1)
CHLORIDE SERPL-SCNC: 109 MMOL/L (ref 97–108)
CO2 SERPL-SCNC: 27 MMOL/L (ref 21–32)
CREAT SERPL-MCNC: 0.95 MG/DL (ref 0.55–1.02)
DIFFERENTIAL METHOD BLD: ABNORMAL
EOSINOPHIL # BLD: 0.6 K/UL (ref 0–0.4)
EOSINOPHIL NFR BLD: 5 % (ref 0–7)
ERYTHROCYTE [DISTWIDTH] IN BLOOD BY AUTOMATED COUNT: 14.4 % (ref 11.5–14.5)
GLOBULIN SER CALC-MCNC: 3.7 G/DL (ref 2–4)
GLUCOSE SERPL-MCNC: 131 MG/DL (ref 65–100)
HCT VFR BLD AUTO: 36.3 % (ref 35–47)
HGB BLD-MCNC: 11.8 G/DL (ref 11.5–16)
IMM GRANULOCYTES # BLD AUTO: 0.1 K/UL (ref 0–0.04)
IMM GRANULOCYTES NFR BLD AUTO: 0 % (ref 0–0.5)
LACTATE BLD-SCNC: 0.71 MMOL/L (ref 0.4–2)
LYMPHOCYTES # BLD: 2 K/UL (ref 0.8–3.5)
LYMPHOCYTES NFR BLD: 15 % (ref 12–49)
MCH RBC QN AUTO: 29.6 PG (ref 26–34)
MCHC RBC AUTO-ENTMCNC: 32.5 G/DL (ref 30–36.5)
MCV RBC AUTO: 91.2 FL (ref 80–99)
MONOCYTES # BLD: 1 K/UL (ref 0–1)
MONOCYTES NFR BLD: 8 % (ref 5–13)
NEUTS SEG # BLD: 9.2 K/UL (ref 1.8–8)
NEUTS SEG NFR BLD: 71 % (ref 32–75)
NRBC # BLD: 0 K/UL (ref 0–0.01)
NRBC BLD-RTO: 0 PER 100 WBC
PLATELET # BLD AUTO: 370 K/UL (ref 150–400)
PMV BLD AUTO: 10 FL (ref 8.9–12.9)
POTASSIUM SERPL-SCNC: 4.4 MMOL/L (ref 3.5–5.1)
PROT SERPL-MCNC: 7.6 G/DL (ref 6.4–8.2)
RBC # BLD AUTO: 3.98 M/UL (ref 3.8–5.2)
SODIUM SERPL-SCNC: 140 MMOL/L (ref 136–145)
WBC # BLD AUTO: 12.9 K/UL (ref 3.6–11)

## 2023-12-03 PROCEDURE — 96361 HYDRATE IV INFUSION ADD-ON: CPT

## 2023-12-03 PROCEDURE — 96374 THER/PROPH/DIAG INJ IV PUSH: CPT

## 2023-12-03 PROCEDURE — 2580000003 HC RX 258: Performed by: PHYSICIAN ASSISTANT

## 2023-12-03 PROCEDURE — 96375 TX/PRO/DX INJ NEW DRUG ADDON: CPT

## 2023-12-03 PROCEDURE — 2500000003 HC RX 250 WO HCPCS: Performed by: PHYSICIAN ASSISTANT

## 2023-12-03 PROCEDURE — 83605 ASSAY OF LACTIC ACID: CPT

## 2023-12-03 PROCEDURE — 96376 TX/PRO/DX INJ SAME DRUG ADON: CPT

## 2023-12-03 PROCEDURE — 80053 COMPREHEN METABOLIC PANEL: CPT

## 2023-12-03 PROCEDURE — 85025 COMPLETE CBC W/AUTO DIFF WBC: CPT

## 2023-12-03 PROCEDURE — 6360000002 HC RX W HCPCS: Performed by: PHYSICIAN ASSISTANT

## 2023-12-03 PROCEDURE — 99284 EMERGENCY DEPT VISIT MOD MDM: CPT

## 2023-12-03 PROCEDURE — 36415 COLL VENOUS BLD VENIPUNCTURE: CPT

## 2023-12-03 RX ORDER — 0.9 % SODIUM CHLORIDE 0.9 %
500 INTRAVENOUS SOLUTION INTRAVENOUS ONCE
Status: COMPLETED | OUTPATIENT
Start: 2023-12-03 | End: 2023-12-03

## 2023-12-03 RX ORDER — HYDROMORPHONE HYDROCHLORIDE 1 MG/ML
1 INJECTION, SOLUTION INTRAMUSCULAR; INTRAVENOUS; SUBCUTANEOUS
Status: COMPLETED | OUTPATIENT
Start: 2023-12-03 | End: 2023-12-03

## 2023-12-03 RX ORDER — DOXYCYCLINE HYCLATE 100 MG
100 TABLET ORAL 2 TIMES DAILY
Qty: 14 TABLET | Refills: 0 | Status: SHIPPED | OUTPATIENT
Start: 2023-12-03 | End: 2023-12-10

## 2023-12-03 RX ORDER — LIDOCAINE HYDROCHLORIDE 10 MG/ML
5 INJECTION, SOLUTION EPIDURAL; INFILTRATION; INTRACAUDAL; PERINEURAL
Status: COMPLETED | OUTPATIENT
Start: 2023-12-03 | End: 2023-12-03

## 2023-12-03 RX ORDER — ONDANSETRON 2 MG/ML
4 INJECTION INTRAMUSCULAR; INTRAVENOUS ONCE
Status: COMPLETED | OUTPATIENT
Start: 2023-12-03 | End: 2023-12-03

## 2023-12-03 RX ADMIN — HYDROMORPHONE HYDROCHLORIDE 1 MG: 1 INJECTION, SOLUTION INTRAMUSCULAR; INTRAVENOUS; SUBCUTANEOUS at 06:59

## 2023-12-03 RX ADMIN — ONDANSETRON 4 MG: 2 INJECTION INTRAMUSCULAR; INTRAVENOUS at 06:59

## 2023-12-03 RX ADMIN — HYDROMORPHONE HYDROCHLORIDE 1 MG: 1 INJECTION, SOLUTION INTRAMUSCULAR; INTRAVENOUS; SUBCUTANEOUS at 08:10

## 2023-12-03 RX ADMIN — SODIUM CHLORIDE 500 ML: 9 INJECTION, SOLUTION INTRAVENOUS at 06:59

## 2023-12-03 RX ADMIN — LIDOCAINE HYDROCHLORIDE 5 ML: 10 INJECTION, SOLUTION EPIDURAL; INFILTRATION; INTRACAUDAL; PERINEURAL at 08:12

## 2023-12-03 ASSESSMENT — PAIN DESCRIPTION - ORIENTATION: ORIENTATION: RIGHT

## 2023-12-03 ASSESSMENT — PAIN SCALES - GENERAL
PAINLEVEL_OUTOF10: 6
PAINLEVEL_OUTOF10: 10
PAINLEVEL_OUTOF10: 7

## 2023-12-03 ASSESSMENT — PAIN DESCRIPTION - LOCATION
LOCATION: OTHER (COMMENT)
LOCATION: OTHER (COMMENT)

## 2023-12-03 NOTE — ED PROVIDER NOTES
(electronically signed)    (Please note that parts of this dictation were completed with voice recognition software. Quite often unanticipated grammatical, syntax, homophones, and other interpretive errors are inadvertently transcribed by the computer software. Please disregards these errors.  Please excuse any errors that have escaped final proofreading.)        Helen Morel  12/03/23 0190

## 2023-12-05 ENCOUNTER — OFFICE VISIT (OUTPATIENT)
Age: 38
End: 2023-12-05
Payer: COMMERCIAL

## 2023-12-05 VITALS
HEART RATE: 89 BPM | OXYGEN SATURATION: 95 % | WEIGHT: 289 LBS | RESPIRATION RATE: 20 BRPM | DIASTOLIC BLOOD PRESSURE: 82 MMHG | SYSTOLIC BLOOD PRESSURE: 142 MMHG | TEMPERATURE: 97.9 F | HEIGHT: 62 IN | BODY MASS INDEX: 53.18 KG/M2

## 2023-12-05 DIAGNOSIS — L02.419 AXILLARY ABSCESS: Primary | ICD-10-CM

## 2023-12-05 PROCEDURE — 99213 OFFICE O/P EST LOW 20 MIN: CPT | Performed by: SURGERY

## 2023-12-05 PROCEDURE — 3074F SYST BP LT 130 MM HG: CPT | Performed by: SURGERY

## 2023-12-05 PROCEDURE — 3078F DIAST BP <80 MM HG: CPT | Performed by: SURGERY

## 2023-12-05 RX ORDER — HYDROCODONE BITARTRATE AND ACETAMINOPHEN 5; 325 MG/1; MG/1
1 TABLET ORAL EVERY 6 HOURS PRN
Qty: 10 TABLET | Refills: 0 | Status: SHIPPED | OUTPATIENT
Start: 2023-12-05 | End: 2023-12-08

## 2023-12-05 NOTE — PROGRESS NOTES
Identified pt with two pt identifiers(name and ). Reviewed record in preparation for visit and have obtained necessary documentation. All patient medications has been reviewed. Chief Complaint   Patient presents with    Skin Problem     Seen at the request of ER eval axillary abscess       Health Maintenance Due   Topic    Hepatitis B vaccine (1 of 3 - 3-dose series)    COVID-19 Vaccine (1)    Varicella vaccine (1 of 2 - 2-dose childhood series)    Pneumococcal 0-64 years Vaccine (1 - PCV)    HIV screen     Hepatitis C screen     DTaP/Tdap/Td vaccine (1 - Tdap)    Diabetes screen     Flu vaccine (1)       [unfilled]    4. Have you been to the ER, urgent care clinic since your last visit? Hospitalized since your last visit? yes    5. Have you seen or consulted any other health care providers outside of the [address] [place] [address] Avenue since your last visit? Include any pap smears or colon screening. self      Patient is accompanied by mom I have received verbal consent from Lenard Chambers to discuss any/all medical information while they are present in the room.

## 2023-12-05 NOTE — PROGRESS NOTES
To: Zuleyka Cifuentes MD    From: Sera Rivero MD    Ronnie Prader was referred by the ER at [facility]. Encounter Date: 12/5/2023    Subjective:      Subhash Small is a 42 y.o. female presents for evaluation of right axillary abscess status post I&D in the emergency department 2 days ago. She has a history of such abscesses. Objective: There were no vitals filed for this visit. General:  alert, appears stated age, cooperative, and no distress   Chest: CTAB, RRR   Right axilla: No erythema. There is swelling but this is symmetric on the other side. The area is quite tender to palpation. Assessment:     Right axillary abscess status post I&D in the emergency department on 12/3/2023. Plan:     I removed the packing. There was no pus. I then did an ultrasound of the area and there was no undrained collection. She complained of significant pain so we iced the area for a bit and then I placed new packing. She should continue her antibiotics. I will call in a short course of pain meds at her request.  I told her that if the pain does not improve or if she develops erythema or fevers she should go to the ER immediately.     Sera Rivero MD

## 2023-12-07 DIAGNOSIS — F41.9 ANXIETY DISORDER, UNSPECIFIED: ICD-10-CM

## 2023-12-08 RX ORDER — ALPRAZOLAM 1 MG/1
TABLET ORAL
Qty: 30 TABLET | Refills: 2 | Status: SHIPPED | OUTPATIENT
Start: 2023-12-08 | End: 2024-01-07

## 2024-01-02 DIAGNOSIS — K21.9 GASTRO-ESOPHAGEAL REFLUX DISEASE WITHOUT ESOPHAGITIS: ICD-10-CM

## 2024-01-02 RX ORDER — OMEPRAZOLE 20 MG/1
CAPSULE, DELAYED RELEASE ORAL
Qty: 30 CAPSULE | Refills: 5 | Status: SHIPPED | OUTPATIENT
Start: 2024-01-02

## 2024-01-03 RX ORDER — MIRTAZAPINE 30 MG/1
TABLET, FILM COATED ORAL
Qty: 30 TABLET | Refills: 5 | Status: SHIPPED | OUTPATIENT
Start: 2024-01-03

## 2024-01-19 RX ORDER — CYCLOBENZAPRINE HCL 5 MG
TABLET ORAL
Qty: 30 TABLET | Refills: 0 | Status: SHIPPED | OUTPATIENT
Start: 2024-01-19

## 2024-01-24 ENCOUNTER — HOSPITAL ENCOUNTER (INPATIENT)
Facility: HOSPITAL | Age: 39
LOS: 4 days | Discharge: HOME | DRG: 469 | End: 2024-01-28
Attending: EMERGENCY MEDICINE | Admitting: STUDENT IN AN ORGANIZED HEALTH CARE EDUCATION/TRAINING PROGRAM
Payer: COMMERCIAL

## 2024-01-24 ENCOUNTER — APPOINTMENT (OUTPATIENT)
Facility: HOSPITAL | Age: 39
DRG: 469 | End: 2024-01-24
Payer: COMMERCIAL

## 2024-01-24 DIAGNOSIS — E86.0 DEHYDRATION: ICD-10-CM

## 2024-01-24 DIAGNOSIS — R10.9 ACUTE RIGHT FLANK PAIN: ICD-10-CM

## 2024-01-24 DIAGNOSIS — N17.9 ACUTE RENAL FAILURE SUPERIMPOSED ON CHRONIC KIDNEY DISEASE, UNSPECIFIED ACUTE RENAL FAILURE TYPE, UNSPECIFIED CKD STAGE (HCC): Primary | ICD-10-CM

## 2024-01-24 DIAGNOSIS — N18.9 ACUTE RENAL FAILURE SUPERIMPOSED ON CHRONIC KIDNEY DISEASE, UNSPECIFIED ACUTE RENAL FAILURE TYPE, UNSPECIFIED CKD STAGE (HCC): Primary | ICD-10-CM

## 2024-01-24 LAB
ALBUMIN SERPL-MCNC: 3.6 G/DL (ref 3.5–5)
ALBUMIN/GLOB SERPL: 1.1 (ref 1.1–2.2)
ALP SERPL-CCNC: 112 U/L (ref 45–117)
ALT SERPL-CCNC: 37 U/L (ref 12–78)
ANION GAP SERPL CALC-SCNC: 6 MMOL/L (ref 5–15)
APPEARANCE UR: ABNORMAL
AST SERPL-CCNC: 34 U/L (ref 15–37)
BACTERIA URNS QL MICRO: ABNORMAL /HPF
BASOPHILS # BLD: 0.1 K/UL (ref 0–0.1)
BASOPHILS NFR BLD: 1 % (ref 0–1)
BILIRUB SERPL-MCNC: 0.3 MG/DL (ref 0.2–1)
BILIRUB UR QL: NEGATIVE
BUN SERPL-MCNC: 41 MG/DL (ref 6–20)
BUN/CREAT SERPL: 15 (ref 12–20)
CALCIUM SERPL-MCNC: 10 MG/DL (ref 8.5–10.1)
CHLORIDE SERPL-SCNC: 103 MMOL/L (ref 97–108)
CO2 SERPL-SCNC: 25 MMOL/L (ref 21–32)
COLOR UR: ABNORMAL
CREAT SERPL-MCNC: 2.74 MG/DL (ref 0.55–1.02)
CREAT UR-MCNC: 31.4 MG/DL
DIFFERENTIAL METHOD BLD: ABNORMAL
EOSINOPHIL # BLD: 0.5 K/UL (ref 0–0.4)
EOSINOPHIL NFR BLD: 6 % (ref 0–7)
EPITH CASTS URNS QL MICRO: ABNORMAL /LPF
ERYTHROCYTE [DISTWIDTH] IN BLOOD BY AUTOMATED COUNT: 14.2 % (ref 11.5–14.5)
FERRITIN SERPL-MCNC: 26 NG/ML (ref 8–252)
GLOBULIN SER CALC-MCNC: 3.3 G/DL (ref 2–4)
GLUCOSE SERPL-MCNC: 128 MG/DL (ref 65–100)
GLUCOSE UR STRIP.AUTO-MCNC: NEGATIVE MG/DL
HCT VFR BLD AUTO: 34.7 % (ref 35–47)
HGB BLD-MCNC: 10.9 G/DL (ref 11.5–16)
HGB UR QL STRIP: ABNORMAL
IMM GRANULOCYTES # BLD AUTO: 0.1 K/UL (ref 0–0.04)
IMM GRANULOCYTES NFR BLD AUTO: 1 % (ref 0–0.5)
IRON SATN MFR SERPL: 20 % (ref 20–50)
IRON SERPL-MCNC: 67 UG/DL (ref 35–150)
KETONES UR QL STRIP.AUTO: 15 MG/DL
LEUKOCYTE ESTERASE UR QL STRIP.AUTO: ABNORMAL
LIPASE SERPL-CCNC: 60 U/L (ref 13–75)
LYMPHOCYTES # BLD: 3.6 K/UL (ref 0.8–3.5)
LYMPHOCYTES NFR BLD: 39 % (ref 12–49)
MCH RBC QN AUTO: 29.5 PG (ref 26–34)
MCHC RBC AUTO-ENTMCNC: 31.4 G/DL (ref 30–36.5)
MCV RBC AUTO: 94 FL (ref 80–99)
MONOCYTES # BLD: 1 K/UL (ref 0–1)
MONOCYTES NFR BLD: 11 % (ref 5–13)
NEUTS SEG # BLD: 4 K/UL (ref 1.8–8)
NEUTS SEG NFR BLD: 42 % (ref 32–75)
NITRITE UR QL STRIP.AUTO: NEGATIVE
NRBC # BLD: 0 K/UL (ref 0–0.01)
NRBC BLD-RTO: 0 PER 100 WBC
NT PRO BNP: 44 PG/ML
PH UR STRIP: 5 (ref 5–8)
PLATELET # BLD AUTO: 321 K/UL (ref 150–400)
PMV BLD AUTO: 9.4 FL (ref 8.9–12.9)
POTASSIUM SERPL-SCNC: 3.8 MMOL/L (ref 3.5–5.1)
PROT SERPL-MCNC: 6.9 G/DL (ref 6.4–8.2)
PROT UR STRIP-MCNC: 100 MG/DL
RBC # BLD AUTO: 3.69 M/UL (ref 3.8–5.2)
RBC #/AREA URNS HPF: ABNORMAL /HPF (ref 0–5)
SODIUM SERPL-SCNC: 134 MMOL/L (ref 136–145)
SODIUM UR-SCNC: 63 MMOL/L
SP GR UR REFRACTOMETRY: 1.03
SPECIMEN HOLD: NORMAL
TIBC SERPL-MCNC: 328 UG/DL (ref 250–450)
URINE CULTURE IF INDICATED: ABNORMAL
UROBILINOGEN UR QL STRIP.AUTO: 1 EU/DL (ref 0.2–1)
UUN UR-MCNC: 510 MG/DL
WBC # BLD AUTO: 9.3 K/UL (ref 3.6–11)
WBC URNS QL MICRO: ABNORMAL /HPF (ref 0–4)

## 2024-01-24 PROCEDURE — 6370000000 HC RX 637 (ALT 250 FOR IP): Performed by: STUDENT IN AN ORGANIZED HEALTH CARE EDUCATION/TRAINING PROGRAM

## 2024-01-24 PROCEDURE — 82728 ASSAY OF FERRITIN: CPT

## 2024-01-24 PROCEDURE — 83550 IRON BINDING TEST: CPT

## 2024-01-24 PROCEDURE — 6360000002 HC RX W HCPCS: Performed by: STUDENT IN AN ORGANIZED HEALTH CARE EDUCATION/TRAINING PROGRAM

## 2024-01-24 PROCEDURE — 2580000003 HC RX 258: Performed by: INTERNAL MEDICINE

## 2024-01-24 PROCEDURE — 83880 ASSAY OF NATRIURETIC PEPTIDE: CPT

## 2024-01-24 PROCEDURE — 82570 ASSAY OF URINE CREATININE: CPT

## 2024-01-24 PROCEDURE — 96374 THER/PROPH/DIAG INJ IV PUSH: CPT

## 2024-01-24 PROCEDURE — 81001 URINALYSIS AUTO W/SCOPE: CPT

## 2024-01-24 PROCEDURE — 74176 CT ABD & PELVIS W/O CONTRAST: CPT

## 2024-01-24 PROCEDURE — 80053 COMPREHEN METABOLIC PANEL: CPT

## 2024-01-24 PROCEDURE — 87086 URINE CULTURE/COLONY COUNT: CPT

## 2024-01-24 PROCEDURE — 2580000003 HC RX 258: Performed by: EMERGENCY MEDICINE

## 2024-01-24 PROCEDURE — 96376 TX/PRO/DX INJ SAME DRUG ADON: CPT

## 2024-01-24 PROCEDURE — 1100000003 HC PRIVATE W/ TELEMETRY

## 2024-01-24 PROCEDURE — 2500000003 HC RX 250 WO HCPCS: Performed by: HOSPITALIST

## 2024-01-24 PROCEDURE — 85025 COMPLETE CBC W/AUTO DIFF WBC: CPT

## 2024-01-24 PROCEDURE — 96361 HYDRATE IV INFUSION ADD-ON: CPT

## 2024-01-24 PROCEDURE — 84540 ASSAY OF URINE/UREA-N: CPT

## 2024-01-24 PROCEDURE — 2580000003 HC RX 258: Performed by: STUDENT IN AN ORGANIZED HEALTH CARE EDUCATION/TRAINING PROGRAM

## 2024-01-24 PROCEDURE — 83540 ASSAY OF IRON: CPT

## 2024-01-24 PROCEDURE — 83690 ASSAY OF LIPASE: CPT

## 2024-01-24 PROCEDURE — 6360000002 HC RX W HCPCS: Performed by: EMERGENCY MEDICINE

## 2024-01-24 PROCEDURE — 96375 TX/PRO/DX INJ NEW DRUG ADDON: CPT

## 2024-01-24 PROCEDURE — 84300 ASSAY OF URINE SODIUM: CPT

## 2024-01-24 PROCEDURE — 6360000002 HC RX W HCPCS: Performed by: INTERNAL MEDICINE

## 2024-01-24 PROCEDURE — 99285 EMERGENCY DEPT VISIT HI MDM: CPT

## 2024-01-24 PROCEDURE — 2500000003 HC RX 250 WO HCPCS: Performed by: STUDENT IN AN ORGANIZED HEALTH CARE EDUCATION/TRAINING PROGRAM

## 2024-01-24 PROCEDURE — 36415 COLL VENOUS BLD VENIPUNCTURE: CPT

## 2024-01-24 RX ORDER — HYDROMORPHONE HYDROCHLORIDE 1 MG/ML
0.5 INJECTION, SOLUTION INTRAMUSCULAR; INTRAVENOUS; SUBCUTANEOUS EVERY 4 HOURS PRN
Status: DISCONTINUED | OUTPATIENT
Start: 2024-01-24 | End: 2024-01-24

## 2024-01-24 RX ORDER — MECLIZINE HCL 12.5 MG/1
25 TABLET ORAL 3 TIMES DAILY PRN
Status: DISCONTINUED | OUTPATIENT
Start: 2024-01-24 | End: 2024-01-28 | Stop reason: HOSPADM

## 2024-01-24 RX ORDER — ALPRAZOLAM 0.25 MG/1
0.25 TABLET ORAL PRN
COMMUNITY
Start: 2016-02-29

## 2024-01-24 RX ORDER — TIZANIDINE 4 MG/1
4 TABLET ORAL EVERY 8 HOURS PRN
Status: DISCONTINUED | OUTPATIENT
Start: 2024-01-24 | End: 2024-01-24

## 2024-01-24 RX ORDER — ACETAMINOPHEN 325 MG/1
650 TABLET ORAL EVERY 6 HOURS PRN
Status: DISCONTINUED | OUTPATIENT
Start: 2024-01-24 | End: 2024-01-24 | Stop reason: SDUPTHER

## 2024-01-24 RX ORDER — HYDROCODONE BITARTRATE AND ACETAMINOPHEN 7.5; 325 MG/1; MG/1
1 TABLET ORAL EVERY 6 HOURS PRN
Status: DISCONTINUED | OUTPATIENT
Start: 2024-01-24 | End: 2024-01-28 | Stop reason: HOSPADM

## 2024-01-24 RX ORDER — HYDROCODONE BITARTRATE AND ACETAMINOPHEN 7.5; 325 MG/1; MG/1
1 TABLET ORAL EVERY 6 HOURS PRN
COMMUNITY

## 2024-01-24 RX ORDER — 0.9 % SODIUM CHLORIDE 0.9 %
1000 INTRAVENOUS SOLUTION INTRAVENOUS ONCE
Status: COMPLETED | OUTPATIENT
Start: 2024-01-24 | End: 2024-01-24

## 2024-01-24 RX ORDER — TIZANIDINE 4 MG/1
8 TABLET ORAL
Status: DISCONTINUED | OUTPATIENT
Start: 2024-01-24 | End: 2024-01-28 | Stop reason: HOSPADM

## 2024-01-24 RX ORDER — SODIUM CHLORIDE 0.9 % (FLUSH) 0.9 %
5-40 SYRINGE (ML) INJECTION EVERY 12 HOURS SCHEDULED
Status: DISCONTINUED | OUTPATIENT
Start: 2024-01-24 | End: 2024-01-28 | Stop reason: HOSPADM

## 2024-01-24 RX ORDER — ONDANSETRON 2 MG/ML
4 INJECTION INTRAMUSCULAR; INTRAVENOUS EVERY 4 HOURS PRN
Status: DISCONTINUED | OUTPATIENT
Start: 2024-01-24 | End: 2024-01-24 | Stop reason: SDUPTHER

## 2024-01-24 RX ORDER — ONDANSETRON 4 MG/1
4 TABLET, ORALLY DISINTEGRATING ORAL EVERY 8 HOURS PRN
Status: DISCONTINUED | OUTPATIENT
Start: 2024-01-24 | End: 2024-01-28 | Stop reason: HOSPADM

## 2024-01-24 RX ORDER — POTASSIUM CHLORIDE 1.5 G/1.58G
40 POWDER, FOR SOLUTION ORAL PRN
Status: DISCONTINUED | OUTPATIENT
Start: 2024-01-24 | End: 2024-01-28 | Stop reason: HOSPADM

## 2024-01-24 RX ORDER — ONDANSETRON 2 MG/ML
4 INJECTION INTRAMUSCULAR; INTRAVENOUS
Status: COMPLETED | OUTPATIENT
Start: 2024-01-24 | End: 2024-01-24

## 2024-01-24 RX ORDER — SODIUM CHLORIDE 9 MG/ML
INJECTION, SOLUTION INTRAVENOUS PRN
Status: DISCONTINUED | OUTPATIENT
Start: 2024-01-24 | End: 2024-01-28 | Stop reason: HOSPADM

## 2024-01-24 RX ORDER — ALPRAZOLAM 1 MG/1
1 TABLET ORAL NIGHTLY PRN
COMMUNITY

## 2024-01-24 RX ORDER — MAGNESIUM SULFATE IN WATER 40 MG/ML
2000 INJECTION, SOLUTION INTRAVENOUS PRN
Status: DISCONTINUED | OUTPATIENT
Start: 2024-01-24 | End: 2024-01-28 | Stop reason: HOSPADM

## 2024-01-24 RX ORDER — ONDANSETRON 2 MG/ML
4 INJECTION INTRAMUSCULAR; INTRAVENOUS EVERY 6 HOURS PRN
Status: DISCONTINUED | OUTPATIENT
Start: 2024-01-24 | End: 2024-01-28 | Stop reason: HOSPADM

## 2024-01-24 RX ORDER — SODIUM CHLORIDE 9 MG/ML
INJECTION, SOLUTION INTRAVENOUS CONTINUOUS
Status: DISCONTINUED | OUTPATIENT
Start: 2024-01-24 | End: 2024-01-26

## 2024-01-24 RX ORDER — HYDROMORPHONE HYDROCHLORIDE 1 MG/ML
0.5 INJECTION, SOLUTION INTRAMUSCULAR; INTRAVENOUS; SUBCUTANEOUS ONCE
Status: COMPLETED | OUTPATIENT
Start: 2024-01-24 | End: 2024-01-24

## 2024-01-24 RX ORDER — DIAZEPAM 5 MG/ML
2 INJECTION, SOLUTION INTRAMUSCULAR; INTRAVENOUS ONCE
Status: COMPLETED | OUTPATIENT
Start: 2024-01-24 | End: 2024-01-24

## 2024-01-24 RX ORDER — ALPRAZOLAM 0.5 MG/1
0.5 TABLET ORAL 2 TIMES DAILY
Status: DISCONTINUED | OUTPATIENT
Start: 2024-01-24 | End: 2024-01-28 | Stop reason: HOSPADM

## 2024-01-24 RX ORDER — PROPRANOLOL HYDROCHLORIDE 10 MG/1
10 TABLET ORAL 2 TIMES DAILY
Status: DISCONTINUED | OUTPATIENT
Start: 2024-01-24 | End: 2024-01-28 | Stop reason: HOSPADM

## 2024-01-24 RX ORDER — PANTOPRAZOLE SODIUM 40 MG/1
40 TABLET, DELAYED RELEASE ORAL
Status: DISCONTINUED | OUTPATIENT
Start: 2024-01-24 | End: 2024-01-28 | Stop reason: HOSPADM

## 2024-01-24 RX ORDER — ENOXAPARIN SODIUM 100 MG/ML
30 INJECTION SUBCUTANEOUS 2 TIMES DAILY
Status: DISCONTINUED | OUTPATIENT
Start: 2024-01-25 | End: 2024-01-28 | Stop reason: HOSPADM

## 2024-01-24 RX ORDER — ACETAMINOPHEN 325 MG/1
650 TABLET ORAL EVERY 6 HOURS PRN
Status: DISCONTINUED | OUTPATIENT
Start: 2024-01-24 | End: 2024-01-28 | Stop reason: HOSPADM

## 2024-01-24 RX ORDER — ALPRAZOLAM 0.5 MG/1
1 TABLET ORAL NIGHTLY PRN
Status: DISCONTINUED | OUTPATIENT
Start: 2024-01-24 | End: 2024-01-24

## 2024-01-24 RX ORDER — FENTANYL CITRATE 50 UG/ML
50 INJECTION, SOLUTION INTRAMUSCULAR; INTRAVENOUS
Status: COMPLETED | OUTPATIENT
Start: 2024-01-24 | End: 2024-01-24

## 2024-01-24 RX ORDER — DULOXETIN HYDROCHLORIDE 30 MG/1
120 CAPSULE, DELAYED RELEASE ORAL DAILY
Status: DISCONTINUED | OUTPATIENT
Start: 2024-01-25 | End: 2024-01-24

## 2024-01-24 RX ORDER — SODIUM CHLORIDE 0.9 % (FLUSH) 0.9 %
5-40 SYRINGE (ML) INJECTION PRN
Status: DISCONTINUED | OUTPATIENT
Start: 2024-01-24 | End: 2024-01-28 | Stop reason: HOSPADM

## 2024-01-24 RX ORDER — DULOXETIN HYDROCHLORIDE 30 MG/1
120 CAPSULE, DELAYED RELEASE ORAL DAILY
Status: DISCONTINUED | OUTPATIENT
Start: 2024-01-24 | End: 2024-01-28 | Stop reason: HOSPADM

## 2024-01-24 RX ORDER — ACETAMINOPHEN 650 MG/1
650 SUPPOSITORY RECTAL EVERY 6 HOURS PRN
Status: DISCONTINUED | OUTPATIENT
Start: 2024-01-24 | End: 2024-01-28 | Stop reason: HOSPADM

## 2024-01-24 RX ORDER — MIRTAZAPINE 15 MG/1
30 TABLET, FILM COATED ORAL
Status: DISCONTINUED | OUTPATIENT
Start: 2024-01-24 | End: 2024-01-28 | Stop reason: HOSPADM

## 2024-01-24 RX ORDER — POTASSIUM CHLORIDE 20 MEQ/1
40 TABLET, EXTENDED RELEASE ORAL PRN
Status: DISCONTINUED | OUTPATIENT
Start: 2024-01-24 | End: 2024-01-28 | Stop reason: HOSPADM

## 2024-01-24 RX ORDER — PREGABALIN 100 MG/1
200 CAPSULE ORAL 2 TIMES DAILY
Status: DISCONTINUED | OUTPATIENT
Start: 2024-01-24 | End: 2024-01-28 | Stop reason: HOSPADM

## 2024-01-24 RX ORDER — ALBUTEROL SULFATE 90 UG/1
1 AEROSOL, METERED RESPIRATORY (INHALATION) EVERY 6 HOURS PRN
Status: DISCONTINUED | OUTPATIENT
Start: 2024-01-24 | End: 2024-01-24

## 2024-01-24 RX ORDER — POTASSIUM CHLORIDE 7.45 MG/ML
10 INJECTION INTRAVENOUS PRN
Status: DISCONTINUED | OUTPATIENT
Start: 2024-01-24 | End: 2024-01-28 | Stop reason: HOSPADM

## 2024-01-24 RX ORDER — ALBUTEROL SULFATE 2.5 MG/3ML
2.5 SOLUTION RESPIRATORY (INHALATION) EVERY 6 HOURS PRN
Status: DISCONTINUED | OUTPATIENT
Start: 2024-01-24 | End: 2024-01-28 | Stop reason: HOSPADM

## 2024-01-24 RX ORDER — PANTOPRAZOLE SODIUM 40 MG/1
40 TABLET, DELAYED RELEASE ORAL
Status: DISCONTINUED | OUTPATIENT
Start: 2024-01-25 | End: 2024-01-24

## 2024-01-24 RX ORDER — POLYETHYLENE GLYCOL 3350 17 G/17G
17 POWDER, FOR SOLUTION ORAL DAILY PRN
Status: DISCONTINUED | OUTPATIENT
Start: 2024-01-24 | End: 2024-01-28 | Stop reason: HOSPADM

## 2024-01-24 RX ORDER — BUDESONIDE AND FORMOTEROL FUMARATE DIHYDRATE 160; 4.5 UG/1; UG/1
2 AEROSOL RESPIRATORY (INHALATION) 2 TIMES DAILY
Status: DISCONTINUED | OUTPATIENT
Start: 2024-01-24 | End: 2024-01-24 | Stop reason: SDUPTHER

## 2024-01-24 RX ORDER — HYDROMORPHONE HYDROCHLORIDE 1 MG/ML
2 INJECTION, SOLUTION INTRAMUSCULAR; INTRAVENOUS; SUBCUTANEOUS EVERY 4 HOURS PRN
Status: DISCONTINUED | OUTPATIENT
Start: 2024-01-24 | End: 2024-01-26

## 2024-01-24 RX ORDER — HYDROMORPHONE HYDROCHLORIDE 1 MG/ML
2 INJECTION, SOLUTION INTRAMUSCULAR; INTRAVENOUS; SUBCUTANEOUS ONCE
Status: COMPLETED | OUTPATIENT
Start: 2024-01-24 | End: 2024-01-24

## 2024-01-24 RX ADMIN — FENTANYL CITRATE 50 MCG: 50 INJECTION INTRAMUSCULAR; INTRAVENOUS at 04:00

## 2024-01-24 RX ADMIN — MIRTAZAPINE 30 MG: 15 TABLET, FILM COATED ORAL at 21:13

## 2024-01-24 RX ADMIN — SODIUM CHLORIDE: 9 INJECTION, SOLUTION INTRAVENOUS at 13:22

## 2024-01-24 RX ADMIN — HYDROMORPHONE HYDROCHLORIDE 0.5 MG: 1 INJECTION, SOLUTION INTRAMUSCULAR; INTRAVENOUS; SUBCUTANEOUS at 21:20

## 2024-01-24 RX ADMIN — DULOXETINE HYDROCHLORIDE 120 MG: 30 CAPSULE, DELAYED RELEASE ORAL at 13:02

## 2024-01-24 RX ADMIN — PREGABALIN 200 MG: 100 CAPSULE ORAL at 11:47

## 2024-01-24 RX ADMIN — ONDANSETRON 4 MG: 2 INJECTION INTRAMUSCULAR; INTRAVENOUS at 21:29

## 2024-01-24 RX ADMIN — DIAZEPAM 2 MG: 5 INJECTION, SOLUTION INTRAMUSCULAR; INTRAVENOUS at 05:58

## 2024-01-24 RX ADMIN — TIZANIDINE 8 MG: 4 TABLET ORAL at 21:13

## 2024-01-24 RX ADMIN — FENTANYL CITRATE 50 MCG: 50 INJECTION INTRAMUSCULAR; INTRAVENOUS at 05:40

## 2024-01-24 RX ADMIN — HYDROMORPHONE HYDROCHLORIDE 0.5 MG: 1 INJECTION, SOLUTION INTRAMUSCULAR; INTRAVENOUS; SUBCUTANEOUS at 13:02

## 2024-01-24 RX ADMIN — SODIUM CHLORIDE: 9 INJECTION, SOLUTION INTRAVENOUS at 15:11

## 2024-01-24 RX ADMIN — ALPRAZOLAM 0.5 MG: 0.5 TABLET ORAL at 13:02

## 2024-01-24 RX ADMIN — HYDROCODONE BITARTRATE AND ACETAMINOPHEN 1 TABLET: 7.5; 325 TABLET ORAL at 18:24

## 2024-01-24 RX ADMIN — PROPRANOLOL HYDROCHLORIDE 10 MG: 10 TABLET ORAL at 21:14

## 2024-01-24 RX ADMIN — SODIUM CHLORIDE 1000 ML: 9 INJECTION, SOLUTION INTRAVENOUS at 05:57

## 2024-01-24 RX ADMIN — HYDROMORPHONE HYDROCHLORIDE 0.5 MG: 1 INJECTION, SOLUTION INTRAMUSCULAR; INTRAVENOUS; SUBCUTANEOUS at 08:29

## 2024-01-24 RX ADMIN — HYDROMORPHONE HYDROCHLORIDE 2 MG: 1 INJECTION, SOLUTION INTRAMUSCULAR; INTRAVENOUS; SUBCUTANEOUS at 15:04

## 2024-01-24 RX ADMIN — ALPRAZOLAM 0.5 MG: 0.5 TABLET ORAL at 21:13

## 2024-01-24 RX ADMIN — SODIUM CHLORIDE, PRESERVATIVE FREE 10 ML: 5 INJECTION INTRAVENOUS at 22:00

## 2024-01-24 RX ADMIN — PANTOPRAZOLE SODIUM 40 MG: 40 TABLET, DELAYED RELEASE ORAL at 19:01

## 2024-01-24 RX ADMIN — SODIUM CHLORIDE 1000 MG: 900 INJECTION INTRAVENOUS at 13:25

## 2024-01-24 RX ADMIN — HYDROMORPHONE HYDROCHLORIDE 2 MG: 1 INJECTION, SOLUTION INTRAMUSCULAR; INTRAVENOUS; SUBCUTANEOUS at 19:01

## 2024-01-24 RX ADMIN — ONDANSETRON 4 MG: 2 INJECTION INTRAMUSCULAR; INTRAVENOUS at 04:20

## 2024-01-24 RX ADMIN — SODIUM CHLORIDE 1000 ML: 9 INJECTION, SOLUTION INTRAVENOUS at 04:19

## 2024-01-24 RX ADMIN — PREGABALIN 200 MG: 100 CAPSULE ORAL at 21:13

## 2024-01-24 ASSESSMENT — PAIN SCALES - GENERAL
PAINLEVEL_OUTOF10: 8
PAINLEVEL_OUTOF10: 7
PAINLEVEL_OUTOF10: 5
PAINLEVEL_OUTOF10: 8
PAINLEVEL_OUTOF10: 9
PAINLEVEL_OUTOF10: 8
PAINLEVEL_OUTOF10: 8
PAINLEVEL_OUTOF10: 10

## 2024-01-24 ASSESSMENT — PAIN DESCRIPTION - LOCATION
LOCATION: BACK

## 2024-01-24 ASSESSMENT — PAIN DESCRIPTION - PAIN TYPE
TYPE: ACUTE PAIN
TYPE: ACUTE PAIN

## 2024-01-24 ASSESSMENT — PAIN DESCRIPTION - ORIENTATION
ORIENTATION: RIGHT;LOWER
ORIENTATION: LOWER
ORIENTATION: RIGHT;LEFT;LOWER
ORIENTATION: RIGHT
ORIENTATION: RIGHT;LOWER
ORIENTATION: ANTERIOR
ORIENTATION: RIGHT;LOWER
ORIENTATION: RIGHT
ORIENTATION: RIGHT
ORIENTATION: RIGHT;LEFT;LOWER

## 2024-01-24 ASSESSMENT — PAIN DESCRIPTION - DESCRIPTORS
DESCRIPTORS: SHARP;STABBING
DESCRIPTORS: JABBING
DESCRIPTORS: SHARP;ACHING
DESCRIPTORS: SHARP
DESCRIPTORS: ACHING;STABBING
DESCRIPTORS: SHARP

## 2024-01-24 ASSESSMENT — PAIN - FUNCTIONAL ASSESSMENT: PAIN_FUNCTIONAL_ASSESSMENT: 0-10

## 2024-01-24 NOTE — PROGRESS NOTES
TRANSFER - IN REPORT:    Verbal report received from COTY Andrade on Monique Goodman  being received from ED for routine progression of patient care      Report consisted of patient's Situation, Background, Assessment and   Recommendations(SBAR).     Information from the following report(s) Nurse Handoff Report, MAR, Recent Results, and Cardiac Rhythm Sinus Rhythm  was reviewed with the receiving nurse.    Opportunity for questions and clarification was provided.      Assessment completed upon patient's arrival to unit and care assumed.

## 2024-01-24 NOTE — ED PROVIDER NOTES
EMERGENCY DEPARTMENT HISTORY AND PHYSICAL EXAM     ----------------------------------------------------------------------------  Please note that this dictation was completed with [employer], the computer voice recognition software.  Quite often unanticipated grammatical, syntax, homophones, and other interpretive errors are inadvertently transcribed by the computer software.  Please disregard these errors.  Please excuse any errors that have escaped final proofreading  ----------------------------------------------------------------------------      Date: 1/24/2024  Patient Name: Monique Goodman    HISTORY OF PRESENT ILLNESS     Chief Complaint   Patient presents with    Back Pain     Lower right sided back pain, woke patient up       History obtainted from:  Patient    Other independent source of history: none    HPI: Monique Goodman is a 38 y.o. female, with significant pmhx of Garth-Danlos syndrome, lupus, cluster headaches, peptic ulcer disease with recurrent bleedings with use of NSAIDs, morbid obesity, who presents via private vehicle to the ED with c/o sharp, right sided back pain that woke her up out of sleep this evening.  Notes the pain is \"excruciating\" with sharp pain that is not reproducible with palpation or movement.  Notes having predisposition for urinary tract infections, kidney stones and is followed by nephrology for chronic renal disease.  (Dr.Satish Cunha) denies any medication alleviate her symptoms.  Notes that she cannot take any NSAIDs due to recurrent gastric bleeding.    Patient reports that she has been given a salt restriction by her nephrologist due to bilateral lower extremity swelling.  And has been drinking 100 oz of fluid as directed.      PCP: Andrew Rosado MD    Allergy List:   Allergies   Allergen Reactions    Latex Itching     burning    Prochlorperazine Anxiety     High heart rate  Pt can take promethazine with no problems    Sulfa Antibiotics      Other reaction(s):  Unknown (comments)    Topiramate      Other reaction(s): Unknown (comments)    Clindamycin Diarrhea     Pt states caused her C-Diff    Iodinated Contrast Media Myalgia     Pt c/o severe back spasms after IV contrast administration. Pt requesting IV pain medications and requests I put this as a drug intolerance in her EMR.    10/11/23: endorsed body wide pruritis after IV contrast, no urticaria, required benadryl for symptoms    Mushroom Extract Complex Other (See Comments)    Nsaids Other (See Comments)     Peptic ulcer    Valproic Acid Other (See Comments)     Elevated heart rate and vomiting    Adhesive Tape Rash     Allergic to Dermabond    Metoclopramide Rash         Medications:  Current Facility-Administered Medications   Medication Dose Route Frequency Provider Last Rate Last Admin    acetaminophen (TYLENOL) tablet 650 mg  650 mg Oral Q6H PRN Nubia Marroquin MD        ondansetron (ZOFRAN) injection 4 mg  4 mg IntraVENous Q4H PRN Nubia Marroquin MD         Current Outpatient Medications   Medication Sig Dispense Refill    cyclobenzaprine (FLEXERIL) 5 MG tablet TAKE 1 TABLET BY MOUTH THREE TIMES A DAY AS NEEDED FOR MUSCLE SPASM 30 tablet 0    mirtazapine (REMERON) 30 MG tablet TAKE 1 TABLET BY MOUTH EVERYDAY AT BEDTIME 30 tablet 5    omeprazole (PRILOSEC) 20 MG delayed release capsule TAKE 1 CAPSULE BY MOUTH EVERY DAY 30 capsule 5    Acetaminophen Extra Strength 500 MG TABS       NURTEC 75 MG TBDP TAKE 1 TAB BY MOUTH AS NEEDED      pregabalin (LYRICA) 200 MG capsule Take 1 capsule by mouth 2 times daily.      olmesartan (BENICAR) 20 MG tablet Take 1 tablet by mouth daily      cephALEXin (KEFLEX) 500 MG capsule Take 1 capsule by mouth 3 times daily (Patient not taking: Reported on 12/5/2023) 21 capsule 0    ondansetron (ZOFRAN) 4 MG tablet Take 1 tablet by mouth 3 times daily as needed for Nausea or Vomiting 15 tablet 0    DULoxetine (CYMBALTA) 60 MG extended release capsule TAKE 2 CAPSULES BY MOUTH EVERY DAY 30

## 2024-01-24 NOTE — ED NOTES
0348: Assumed care of patient from [facility]. Patient now on monitor x 3, SR x 2 with call light in reach. Patient is AxO x 4 and ambulates independently.     0404: Patient taken to CT at this time     0411: Patient back from CT at this time, placed back on monitor x 3..    0429: MD Marroquin aware of patient being hypotensive, 1,000 ml bolus NS started    0609: MD Marroquin bedside at this time    0713: Bedside and Verbal shift change report given to Lupe KEITH (oncoming nurse) by Vivien  (offgoing nurse). Report included the following information Nurse Handoff Report.

## 2024-01-24 NOTE — H&P
Reconciliation, Ar   methocarbamol (ROBAXIN) 750 MG tablet Take 1 tablet by mouth 4 times daily as needed  Patient not taking: Reported on 1/24/2024 2/3/23   Automatic Reconciliation, Ar   olmesartan (BENICAR) 5 MG tablet Take 1 tablet by mouth daily as needed  Patient not taking: Reported on 11/7/2023 2/8/23   Automatic Reconciliation, Ar   pregabalin (LYRICA) 50 MG capsule Take 1 capsule by mouth 2 times daily.  Patient not taking: Reported on 11/7/2023 1/14/22   Automatic Reconciliation, Ar   propranolol (INDERAL) 10 MG tablet TAKE 1 TABLET BY MOUTH TWICE A DAY 4/12/22   Automatic Reconciliation, Ar   rizatriptan (MAXALT-MLT) 10 MG disintegrating tablet TAKE 1 TABLET BY MOUTH EVERY DAY AS NEEDED FOR SEVERE MIGRAINE  Patient not taking: Reported on 11/7/2023 2/10/23   Automatic Reconciliation, Ar   tiZANidine (ZANAFLEX) 4 MG tablet TAKE 1 TABLET BY MOUTH THREE TIMES A DAY AS NEEDED FOR PAIN 8/22/22   Automatic Reconciliation, Ar         Objective:   VITALS:    Patient Vitals for the past 24 hrs:   BP Temp Temp src Pulse Resp SpO2 Height Weight   01/24/24 0800 103/80 -- -- -- -- -- -- --   01/24/24 0745 99/79 98.1 °F (36.7 °C) Oral 60 15 95 % -- --   01/24/24 0655 (!) 96/43 -- -- 58 14 90 % -- --   01/24/24 0645 (!) 87/68 -- -- 59 13 93 % -- --   01/24/24 0641 -- -- -- 54 12 93 % -- --   01/24/24 0637 -- -- -- 59 17 92 % -- --   01/24/24 0630 -- -- -- 63 17 97 % -- --   01/24/24 0615 (!) 98/50 -- -- 66 13 93 % -- --   01/24/24 0600 (!) 104/50 -- -- 58 12 94 % -- --   01/24/24 0545 (!) 107/54 -- -- 70 29 93 % -- --   01/24/24 0530 (!) 102/48 -- -- 66 14 (!) 85 % -- --   01/24/24 0527 -- -- -- 68 12 90 % -- --   01/24/24 0515 (!) 104/46 -- -- 60 17 94 % -- --   01/24/24 0512 -- -- -- -- -- 96 % -- --   01/24/24 0510 (!) 99/46 -- -- 65 18 -- -- --   01/24/24 0506 -- -- -- 71 19 97 % -- --   01/24/24 0440 -- -- -- 66 17 92 % -- --   01/24/24 0437 -- -- -- 67 20 91 % -- --   01/24/24 0430 (!) 97/40 -- -- 66 18 93 % --  RBC, UA 10-20 0 - 5 /hpf    Epithelial Cells UA MANY (A) FEW /lpf    BACTERIA, URINE 4+ (A) NEG /hpf    Urine Culture if Indicated URINE CULTURE ORDERED (A) CNI           CT ABDOMEN PELVIS WO CONTRAST Additional Contrast? None    Result Date: 1/24/2024  EXAM: CT ABDOMEN PELVIS WO CONTRAST INDICATION: r sided CVA tenderness COMPARISON: 11/6/2023 IV CONTRAST: None. ORAL CONTRAST: None TECHNIQUE: Thin axial images were obtained through the abdomen and pelvis. Coronal and sagittal reformats were generated. CT dose reduction was achieved through use of a standardized protocol tailored for this examination and automatic exposure control for dose modulation. The absence of intravenous contrast material reduces the sensitivity for evaluation of the vasculature and solid organs. FINDINGS: LOWER THORAX: No significant abnormality in the incidentally imaged lower chest. LIVER: Enlarged with evidence of hepatic steatosis. BILIARY TREE: Status post cholecystectomy. CBD is not dilated. SPLEEN: within normal limits. PANCREAS: No focal abnormality. ADRENALS: Unremarkable. KIDNEYS/URETERS: Punctate nonobstructing right renal stones. No ureteral stone or hydronephrosis. STOMACH: Unremarkable. SMALL BOWEL: No dilatation or wall thickening. COLON: No dilatation or wall thickening. APPENDIX: Not visualized. PERITONEUM: No ascites or pneumoperitoneum. RETROPERITONEUM: No lymphadenopathy or aortic aneurysm. REPRODUCTIVE ORGANS: The uterus is surgically absent. URINARY BLADDER: Not distended. BONES: No destructive bone lesion. ABDOMINAL WALL: No mass or hernia. ADDITIONAL COMMENTS: N/A     Punctate nonobstructing right renal stones. No ureteral stone or hydronephrosis. Enlarged liver with evidence of hepatic steatosis. No acute abnormality.    X-ray knee right 4+ views ([id number])    Result Date: 1/2/2024  Weight Bearing. Views: Standing AP, Lat, [place], Sow.     Four views of the right knee reviewed by myself in the office today.  No

## 2024-01-24 NOTE — CONSULTS
Nephrology Consult Note     [facility]                Phone - [phone]   Patient: Monique Goodman   YOB: 1985    Date- 1/24/2024  MRN: [id number]             CONSULTING PHYSICIAN:   REASON FOR CONSULTATION: JIGNESH stage II  ADMIT DATE:1/24/2024 PATIENT PCP:Andrew Rosado MD     IMPRESSION & PLAN:   JIGNESH stage II(secondary to IVVD, UTI)  CKD stage II(baseline creatinine 1.1-1.2)  UTI  Sepsis  Chronic lower extremity swelling  Flank pain    PLAN-  -start on maintenance IV fluids  -Already received 2 L of IV fluid as boluses.  -Ordered Rocephin 1 g IV daily  -Check labs daily  -Thank you for the consult       Principal Problem:    JIGNESH (acute kidney injury) (HCC)  Active Problems:    Acute kidney failure (HCC)  Resolved Problems:    * No resolved hospital problems. *      [] High complexity decision making was performed  [] Patient is at high-risk of decompensation with multiple organ involvement    Subjective:   HPI: Monique Goodman is a 38 y.o. female who recently establish care with our office.  She was seen by my partner Dr. Clover Cunha for multiple episodes of JIGNESH in the past and recent diagnosis of CKD stage II with a baseline creatinine of 1.1-1.2.  History of chronic lower extremity swelling.  She has been complaining of diarrhea for last 1 week and had episodes of vomiting for last 2 to 3 days and complains of flank pain.  She came to the ED for further evaluation for ongoing nausea vomiting and diarrhea.  She has history of peptic ulcer disease and follows up with GI.  She recently was placed on combination of Lasix and metolazone as an outpatient for lower extremity swelling.  She was not able to  take this medication for last couple of days because of ongoing vomiting.  Initial workup in the ED showed her to have ongoing UTI with elevated creatinine of 2.74.  She is being admitted.  Renal consult quested for evaluation of JIGNESH stage  daily   Patient not taking: Reported on 2023   cyclobenzaprine (FLEXERIL) 5 MG tablet Not Taking  No No   Sig: TAKE 1 TABLET BY MOUTH THREE TIMES A DAY AS NEEDED FOR MUSCLE SPASM   Patient not taking: Reported on 2024   furosemide (LASIX) 20 MG tablet   Yes No   Sig: Take 1 tablet by mouth as needed   hyoscyamine (LEVSIN/SL) 125 MCG sublingual tablet   No No   Sig: Place 1 tablet under the tongue every 4 hours as needed for Cramping   meclizine (ANTIVERT) 25 MG tablet   Yes No   Sig: TAKE 1 TABLET BY MOUTH THREE TIMES A DAY AS NEEDED FOR DIZZINESS FOR UP TO 10 DAYS   methocarbamol (ROBAXIN) 750 MG tablet Not Taking  Yes No   Sig: Take 1 tablet by mouth 4 times daily as needed   Patient not taking: Reported on 2024   mirtazapine (REMERON) 30 MG tablet   No No   Sig: TAKE 1 TABLET BY MOUTH EVERYDAY AT BEDTIME   naloxone 4 MG/0.1ML LIQD nasal spray   No No   Si spray by Nasal route as needed for Opioid Reversal   olmesartan (BENICAR) 20 MG tablet   Yes No   Sig: Take 1 tablet by mouth daily   olmesartan (BENICAR) 5 MG tablet   Yes No   Sig: Take 1 tablet by mouth daily as needed   Patient not taking: Reported on 2023   omeprazole (PRILOSEC) 20 MG delayed release capsule   No No   Sig: TAKE 1 CAPSULE BY MOUTH EVERY DAY   ondansetron (ZOFRAN) 4 MG tablet   No No   Sig: Take 1 tablet by mouth 3 times daily as needed for Nausea or Vomiting   ondansetron (ZOFRAN-ODT) 4 MG disintegrating tablet   No No   Sig: Take 1 tablet by mouth 3 times daily as needed for Nausea or Vomiting   ondansetron (ZOFRAN-ODT) 4 MG disintegrating tablet   No No   Sig: Take 1 tablet by mouth 3 times daily as needed for Nausea or Vomiting   pregabalin (LYRICA) 200 MG capsule   Yes No   Sig: Take 1 capsule by mouth 2 times daily.   pregabalin (LYRICA) 50 MG capsule   Yes No   Sig: Take 1 capsule by mouth 2 times daily.   Patient not taking: Reported on 2023   propranolol (INDERAL) 10 MG tablet 2024  Yes No   Sig:

## 2024-01-24 NOTE — ED TRIAGE NOTES
Patient ambulatory to triage from waiting room with complaint of lower right sided back pain. Patient reports pain woke her up from her sleep. Patient states she has a history of kidney stones. Patient also complaining of dry mouth.

## 2024-01-25 LAB
ANION GAP SERPL CALC-SCNC: 3 MMOL/L (ref 5–15)
BACTERIA SPEC CULT: NORMAL
BASOPHILS # BLD: 0.1 K/UL (ref 0–0.1)
BASOPHILS NFR BLD: 1 % (ref 0–1)
BUN SERPL-MCNC: 30 MG/DL (ref 6–20)
BUN/CREAT SERPL: 12 (ref 12–20)
CALCIUM SERPL-MCNC: 9.3 MG/DL (ref 8.5–10.1)
CHLORIDE SERPL-SCNC: 111 MMOL/L (ref 97–108)
CO2 SERPL-SCNC: 27 MMOL/L (ref 21–32)
CREAT SERPL-MCNC: 2.44 MG/DL (ref 0.55–1.02)
CREAT UR-MCNC: 198 MG/DL
DIFFERENTIAL METHOD BLD: ABNORMAL
EOSINOPHIL # BLD: 0.5 K/UL (ref 0–0.4)
EOSINOPHIL NFR BLD: 7 % (ref 0–7)
ERYTHROCYTE [DISTWIDTH] IN BLOOD BY AUTOMATED COUNT: 14.7 % (ref 11.5–14.5)
GLUCOSE SERPL-MCNC: 104 MG/DL (ref 65–100)
HCT VFR BLD AUTO: 33.7 % (ref 35–47)
HGB BLD-MCNC: 10.6 G/DL (ref 11.5–16)
IMM GRANULOCYTES # BLD AUTO: 0 K/UL (ref 0–0.04)
IMM GRANULOCYTES NFR BLD AUTO: 1 % (ref 0–0.5)
LYMPHOCYTES # BLD: 3 K/UL (ref 0.8–3.5)
LYMPHOCYTES NFR BLD: 39 % (ref 12–49)
MCH RBC QN AUTO: 30.1 PG (ref 26–34)
MCHC RBC AUTO-ENTMCNC: 31.5 G/DL (ref 30–36.5)
MCV RBC AUTO: 95.7 FL (ref 80–99)
MONOCYTES # BLD: 0.9 K/UL (ref 0–1)
MONOCYTES NFR BLD: 12 % (ref 5–13)
NEUTS SEG # BLD: 3.2 K/UL (ref 1.8–8)
NEUTS SEG NFR BLD: 40 % (ref 32–75)
NRBC # BLD: 0 K/UL (ref 0–0.01)
NRBC BLD-RTO: 0 PER 100 WBC
PLATELET # BLD AUTO: 343 K/UL (ref 150–400)
PMV BLD AUTO: 9.4 FL (ref 8.9–12.9)
POTASSIUM SERPL-SCNC: 4.6 MMOL/L (ref 3.5–5.1)
PROT UR-MCNC: 94 MG/DL (ref 0–11.9)
PROT/CREAT UR-RTO: 0.5
RBC # BLD AUTO: 3.52 M/UL (ref 3.8–5.2)
SERVICE CMNT-IMP: NORMAL
SODIUM SERPL-SCNC: 141 MMOL/L (ref 136–145)
WBC # BLD AUTO: 7.7 K/UL (ref 3.6–11)

## 2024-01-25 PROCEDURE — 80048 BASIC METABOLIC PNL TOTAL CA: CPT

## 2024-01-25 PROCEDURE — 82570 ASSAY OF URINE CREATININE: CPT

## 2024-01-25 PROCEDURE — 2580000003 HC RX 258: Performed by: STUDENT IN AN ORGANIZED HEALTH CARE EDUCATION/TRAINING PROGRAM

## 2024-01-25 PROCEDURE — 2500000003 HC RX 250 WO HCPCS: Performed by: STUDENT IN AN ORGANIZED HEALTH CARE EDUCATION/TRAINING PROGRAM

## 2024-01-25 PROCEDURE — 6360000002 HC RX W HCPCS: Performed by: STUDENT IN AN ORGANIZED HEALTH CARE EDUCATION/TRAINING PROGRAM

## 2024-01-25 PROCEDURE — 6360000002 HC RX W HCPCS: Performed by: INTERNAL MEDICINE

## 2024-01-25 PROCEDURE — 2580000003 HC RX 258: Performed by: INTERNAL MEDICINE

## 2024-01-25 PROCEDURE — 85025 COMPLETE CBC W/AUTO DIFF WBC: CPT

## 2024-01-25 PROCEDURE — 1100000003 HC PRIVATE W/ TELEMETRY

## 2024-01-25 PROCEDURE — 6370000000 HC RX 637 (ALT 250 FOR IP): Performed by: STUDENT IN AN ORGANIZED HEALTH CARE EDUCATION/TRAINING PROGRAM

## 2024-01-25 PROCEDURE — 84156 ASSAY OF PROTEIN URINE: CPT

## 2024-01-25 PROCEDURE — 36415 COLL VENOUS BLD VENIPUNCTURE: CPT

## 2024-01-25 RX ADMIN — ALPRAZOLAM 0.5 MG: 0.5 TABLET ORAL at 21:33

## 2024-01-25 RX ADMIN — HYDROMORPHONE HYDROCHLORIDE 1.5 MG: 1 INJECTION, SOLUTION INTRAMUSCULAR; INTRAVENOUS; SUBCUTANEOUS at 02:05

## 2024-01-25 RX ADMIN — PROPRANOLOL HYDROCHLORIDE 10 MG: 10 TABLET ORAL at 21:31

## 2024-01-25 RX ADMIN — HYDROMORPHONE HYDROCHLORIDE 2 MG: 1 INJECTION, SOLUTION INTRAMUSCULAR; INTRAVENOUS; SUBCUTANEOUS at 05:35

## 2024-01-25 RX ADMIN — HYDROMORPHONE HYDROCHLORIDE 2 MG: 1 INJECTION, SOLUTION INTRAMUSCULAR; INTRAVENOUS; SUBCUTANEOUS at 22:34

## 2024-01-25 RX ADMIN — HYDROMORPHONE HYDROCHLORIDE 2 MG: 1 INJECTION, SOLUTION INTRAMUSCULAR; INTRAVENOUS; SUBCUTANEOUS at 14:42

## 2024-01-25 RX ADMIN — ENOXAPARIN SODIUM 30 MG: 100 INJECTION SUBCUTANEOUS at 09:15

## 2024-01-25 RX ADMIN — SODIUM CHLORIDE 1000 MG: 900 INJECTION INTRAVENOUS at 12:37

## 2024-01-25 RX ADMIN — ALPRAZOLAM 0.5 MG: 0.5 TABLET ORAL at 09:07

## 2024-01-25 RX ADMIN — ONDANSETRON 4 MG: 4 TABLET, ORALLY DISINTEGRATING ORAL at 17:00

## 2024-01-25 RX ADMIN — PANTOPRAZOLE SODIUM 40 MG: 40 TABLET, DELAYED RELEASE ORAL at 09:13

## 2024-01-25 RX ADMIN — DULOXETINE HYDROCHLORIDE 120 MG: 30 CAPSULE, DELAYED RELEASE ORAL at 09:10

## 2024-01-25 RX ADMIN — ENOXAPARIN SODIUM 30 MG: 100 INJECTION SUBCUTANEOUS at 21:30

## 2024-01-25 RX ADMIN — SODIUM CHLORIDE, PRESERVATIVE FREE 10 ML: 5 INJECTION INTRAVENOUS at 21:34

## 2024-01-25 RX ADMIN — SODIUM CHLORIDE: 9 INJECTION, SOLUTION INTRAVENOUS at 12:35

## 2024-01-25 RX ADMIN — ONDANSETRON 4 MG: 2 INJECTION INTRAMUSCULAR; INTRAVENOUS at 21:30

## 2024-01-25 RX ADMIN — HYDROCODONE BITARTRATE AND ACETAMINOPHEN 1 TABLET: 7.5; 325 TABLET ORAL at 21:32

## 2024-01-25 RX ADMIN — PREGABALIN 200 MG: 100 CAPSULE ORAL at 21:33

## 2024-01-25 RX ADMIN — HYDROCODONE BITARTRATE AND ACETAMINOPHEN 1 TABLET: 7.5; 325 TABLET ORAL at 09:07

## 2024-01-25 RX ADMIN — HYDROMORPHONE HYDROCHLORIDE 2 MG: 1 INJECTION, SOLUTION INTRAMUSCULAR; INTRAVENOUS; SUBCUTANEOUS at 18:54

## 2024-01-25 RX ADMIN — SODIUM CHLORIDE: 9 INJECTION, SOLUTION INTRAVENOUS at 21:30

## 2024-01-25 RX ADMIN — HYDROMORPHONE HYDROCHLORIDE 0.5 MG: 1 INJECTION, SOLUTION INTRAMUSCULAR; INTRAVENOUS; SUBCUTANEOUS at 02:31

## 2024-01-25 RX ADMIN — MIRTAZAPINE 30 MG: 15 TABLET, FILM COATED ORAL at 21:33

## 2024-01-25 RX ADMIN — PREGABALIN 200 MG: 100 CAPSULE ORAL at 09:14

## 2024-01-25 RX ADMIN — SODIUM CHLORIDE, PRESERVATIVE FREE 10 ML: 5 INJECTION INTRAVENOUS at 09:15

## 2024-01-25 RX ADMIN — HYDROMORPHONE HYDROCHLORIDE 2 MG: 1 INJECTION, SOLUTION INTRAMUSCULAR; INTRAVENOUS; SUBCUTANEOUS at 09:59

## 2024-01-25 RX ADMIN — TIZANIDINE 8 MG: 4 TABLET ORAL at 21:31

## 2024-01-25 ASSESSMENT — PAIN DESCRIPTION - LOCATION
LOCATION: BACK

## 2024-01-25 ASSESSMENT — PAIN - FUNCTIONAL ASSESSMENT
PAIN_FUNCTIONAL_ASSESSMENT: PREVENTS OR INTERFERES SOME ACTIVE ACTIVITIES AND ADLS

## 2024-01-25 ASSESSMENT — PAIN SCALES - GENERAL
PAINLEVEL_OUTOF10: 7
PAINLEVEL_OUTOF10: 2
PAINLEVEL_OUTOF10: 1
PAINLEVEL_OUTOF10: 7
PAINLEVEL_OUTOF10: 7
PAINLEVEL_OUTOF10: 8
PAINLEVEL_OUTOF10: 8
PAINLEVEL_OUTOF10: 9

## 2024-01-25 ASSESSMENT — PAIN DESCRIPTION - ORIENTATION
ORIENTATION: LOWER
ORIENTATION: RIGHT
ORIENTATION: ANTERIOR
ORIENTATION: LOWER
ORIENTATION: LOWER;LEFT
ORIENTATION: LOWER;RIGHT
ORIENTATION: ANTERIOR
ORIENTATION: LOWER;LEFT

## 2024-01-25 ASSESSMENT — PAIN DESCRIPTION - DESCRIPTORS
DESCRIPTORS: ACHING;DISCOMFORT
DESCRIPTORS: ACHING
DESCRIPTORS: ACHING;DISCOMFORT
DESCRIPTORS: ACHING
DESCRIPTORS: ACHING

## 2024-01-25 NOTE — RT PROTOCOL NOTE
ADULT PROTOCOL: JET AEROSOL ASSESSMENT    Patient  Monique Goodman     38 y.o.   female     1/25/2024  7:22 AM        Oxygen:     ROOM AIR       Changed: No    SpO2:  SpO2: 98 %   without Oxygen                Nebulizer Therapy: Current medications        Changed: Yes    Comments: Discontinued brovana/pulmicort. Pt refusing txs.     Problem List:   Patient Active Problem List   Diagnosis    Cellulitis    Lupus (HCC)    Constipation    Leg swelling    Recurrent ventral incisional hernia    Seizure (HCC)    Ureteric colic    Intermittent palpitations    Essential hypertension    Ureteric calculus    Anxiety    Ventral hernia    Severe obesity (HCC)    Migraine with aura and without status migrainosus, not intractable    Abnormal CT of the abdomen    Syncope    Venous insufficiency of left leg    Labile blood pressure    Headache    Chronic headache disorder    Incomplete uterovaginal prolapse    Asthma    JIGNESH (acute kidney injury) (HCC)    Acute kidney failure (HCC)       Respiratory Therapist: Paul Leos, RT

## 2024-01-25 NOTE — PROGRESS NOTES
End of Shift Note    Bedside shift change report given to COTY Arriaga (oncoming nurse) by Benita Prescott RN (offgoing nurse).  Report included the following information SBAR, Kardex, MAR, Recent Results, and Cardiac Rhythm Sinus Rhythm    Shift worked:  7 am to 7:30 pm     Shift summary and any significant changes:     Patient admitted from ED at 1445. Dual skin, vitals and admission assessment completed. Patient had severe right back/flank pain; 2 doses PRN IV Dilaudid and 1 dose PRN Norco administered, see MAR. Creatinine and urea nitrogen urine samples collected. Patient's IV in left AC is working but barely hanging on; new IV placed per ED tech via Ultrasound but that IV is infiltrated. Night nurse aware of need for new IV. Patient's mom came to visit during shift. Nursing rounds and education completed.     Concerns for physician to address:       Zone phone for oncoming shift:          Activity:     Number times ambulated in hallways past shift: 0  Number of times OOB to chair past shift: sat in chair all shift    Cardiac:   Cardiac Monitoring: Yes           Access:  Current line(s): PIV     Genitourinary:   Urinary status: voiding    Respiratory:      Chronic home O2 use?: NO  Incentive spirometer at bedside: NO       GI:     Current diet:  ADULT DIET; Regular  Passing flatus: YES  Tolerating current diet: YES       Pain Management:   Patient states pain is manageable on current regimen: YES    Skin:     Interventions: float heels and increase time out of bed    Patient Safety:  Fall Score:    Interventions: gripper socks and pt to call before getting OOB       Length of Stay:  Expected LOS: 2  Actual LOS: 0      Benita Prescott RN

## 2024-01-25 NOTE — PROGRESS NOTES
lesion.  ABDOMINAL WALL: No mass or hernia.  ADDITIONAL COMMENTS: N/A  Impression: Punctate nonobstructing right renal stones. No ureteral stone or hydronephrosis.  Enlarged liver with evidence of hepatic steatosis. No acute abnormality.     Microbiology:  Results       Procedure Component Value Units Date/Time    Urine Culture Hold Sample [[id number]] Collected: 01/24/24 1451    Order Status: Completed Specimen: Urine Updated: 01/24/24 1503     Specimen HOld       Urine on hold in Microbiology dept for 2 days.  If unpreserved urine is submitted, it cannot be used for addtional testing after 24 hours, recollection will be required.          Culture, Urine [[id number]] Collected: 01/24/24 0502    Order Status: Completed Specimen: Urine Updated: 01/25/24 0631     Special Requests --        NO SPECIAL REQUESTS  Reflexed from [id number]       Culture No growth (<1,000 CFU/ML)             ECHO:   07/28/20 07/28/2020  1:18 PM (Final)    Narrative  This is a summary report. The complete report is available in the patient's medical record. If you cannot access the medical record, please contact the sending organization for a detailed fax or copy.    · Baseline ECG: Normal sinus rhythm.  · Negative stress test.  · Normal stress echocardiogram. Low risk study.  · Low risk [facility] treadmill score.    Signed by: Carmen Brandon MD on 7/28/2020  1:18 PM    Procedures: see electronic medical records for all procedures/Xrays and details which were not copied into this note but were reviewed prior to creation of Plan.    Reviewed most current lab test results and cultures  YES  Reviewed most current radiology test results   YES  Review and summation of old records today    NO  Reviewed patient's current orders and MAR    YES  PMH/ reviewed - no change compared to H&P    ________________________________________________________________________      Total NON critical care TIME:  35  Minutes      Total CRITICAL CARE TIME  Spent:   Minutes non procedure based          Comments   >50% of visit spent in counseling and coordination of care x    ________________________________________________________________________        Signed: Jordan Negrete MD

## 2024-01-25 NOTE — PROGRESS NOTES
Nephrology Progress Note  [facility] [place] [facility]  [address]  [place] [address]  Phone - [phone]  Fax - [phone]                 Patient: Monique Goodman                     YOB: 1985        Date- 1/25/2024                                     Admit Date: 1/24/2024   CC: Follow up for JIGNESH          IMPRESSION & PLAN:   JIGNESH stage II(secondary to IVVD, UTI)  CKD stage II(baseline creatinine 1.1-1.2)  UTI  Sepsis  Chronic lower extremity swelling  Flank pain      PLAN-  Continue IV fluids  Check UPCR  Labs daily  IV antibiotics for UTI  Spoke to medicine team     Subjective:  Interval History:   -Seen and examined today  -Feels much better    Objective:   Vitals:    01/25/24 0907 01/25/24 0915 01/25/24 0959 01/25/24 1442   BP:  97/85     Pulse:  (!) 103     Resp: 22  18 16   Temp:       TempSrc:       SpO2:       Weight:       Height:          No intake/output data recorded.  No intake/output data recorded.      Physical exam:    GEN: NAD  NECK- no mass  RESP: No wheezing, decreased BS b/l  CVS: S1,S2  RRR  NEURO: Normal speech, Non focal  EXT: Trace edema    Chart reviewed.         Pertinent Notes reviewed.     Data Review :  Lab Results   Component Value Date/Time     01/25/2024 04:13 AM    K 4.6 01/25/2024 04:13 AM     01/25/2024 04:13 AM    CO2 27 01/25/2024 04:13 AM    BUN 30 01/25/2024 04:13 AM    CREATININE 2.44 01/25/2024 04:13 AM    GLUCOSE 104 01/25/2024 04:13 AM    CALCIUM 9.3 01/25/2024 04:13 AM       Lab Results   Component Value Date    WBC 7.7 01/25/2024    HGB 10.6 (L) 01/25/2024    HCT 33.7 (L) 01/25/2024    MCV 95.7 01/25/2024     01/25/2024      Recent Labs     01/24/24  0359 01/25/24  0413   * 141   K 3.8 4.6    111*   CO2 25 27   GLUCOSE 128* 104*   BUN 41* 30*   CREATININE 2.74* 2.44*   CALCIUM 10.0 9.3       Recent Labs     01/24/24  0359 01/25/24  0411    WBC 9.3 7.7   RBC 3.69* 3.52*   HGB 10.9* 10.6*   HCT 34.7* 33.7*   MCV 94.0 95.7   MCH 29.5 30.1   MCHC 31.4 31.5   RDW 14.2 14.7*    343   MPV 9.4 9.4     Lab Results   Component Value Date/Time    IRON 67 01/24/2024 01:06 PM    TIBC 328 01/24/2024 01:06 PM     No results found for: \"PTH\"  No results found for: \"LABA1C\"  Lab Results   Component Value Date/Time    COLORU DARK YELLOW 01/24/2024 05:02 AM    CLARITYU CLEAR 06/07/2022 04:49 AM    GLUCOSEU Negative 01/24/2024 05:02 AM    BILIRUBINUR Negative 01/24/2024 05:02 AM    BILIRUBINUR Negative 06/07/2022 04:49 AM    KETUA 15 (A) 01/24/2024 05:02 AM    SPECGRAV 1.028 01/24/2024 05:02 AM    BLOODU MODERATE (A) 01/24/2024 05:02 AM    PHUR 5.5 06/07/2022 04:49 AM    PROTEINU 100 (A) 01/24/2024 05:02 AM    NITRU Negative 01/24/2024 05:02 AM    LEUKOCYTESUR SMALL (A) 01/24/2024 05:02 AM     US Results (most recent):  Medication list  reviewed  Current Facility-Administered Medications   Medication Dose Route Frequency    sodium chloride flush 0.9 % injection 5-40 mL  5-40 mL IntraVENous 2 times per day    sodium chloride flush 0.9 % injection 5-40 mL  5-40 mL IntraVENous PRN    0.9 % sodium chloride infusion   IntraVENous PRN    potassium chloride (KLOR-CON M) extended release tablet 40 mEq  40 mEq Oral PRN    Or    potassium chloride (KLOR-CON) 20 MEQ packet 40 mEq  40 mEq Oral PRN    Or    potassium chloride 10 mEq/100 mL IVPB (Peripheral Line)  10 mEq IntraVENous PRN    magnesium sulfate 2000 mg in 50 mL IVPB premix  2,000 mg IntraVENous PRN    enoxaparin Sodium (LOVENOX) injection 30 mg  30 mg SubCUTAneous BID    ondansetron (ZOFRAN-ODT) disintegrating tablet 4 mg  4 mg Oral Q8H PRN    Or    ondansetron (ZOFRAN) injection 4 mg  4 mg IntraVENous Q6H PRN    polyethylene glycol (GLYCOLAX) packet 17 g  17 g Oral Daily PRN    acetaminophen (TYLENOL) tablet 650 mg  650 mg Oral Q6H PRN    Or    acetaminophen (TYLENOL) suppository 650 mg  650 mg Rectal Q6H PRN

## 2024-01-25 NOTE — CARE COORDINATION
Care Management Initial Assessment       RUR: 16%  Readmission? No  1st IM letter given? No  1st  letter given: No    CM completed assessment with pt, at bedside.  Pt is known to reside with family in their one story home.  Pt reported that she is active with PCP and uses [facility] ([place]  Pt is known to be independent with ADLs and does limited driving.  Pt is known to use [facility].  Pt reported HHC in the past and no SNF.    CM will continue to follw.    KYLE Marino CM  [phone]       01/25/24 1023   Service Assessment   Patient Orientation Alert and Oriented   Cognition Alert   History Provided By Patient   Primary Caregiver Self   Support Systems Family Members   PCP Verified by CM Yes   Last Visit to PCP Within last 3 months   Prior Functional Level Independent in ADLs/IADLs   Current Functional Level Independent in ADLs/IADLs   Can patient return to prior living arrangement Yes   Ability to make needs known: Fair   Family able to assist with home care needs: Yes   Would you like for me to discuss the discharge plan with any other family members/significant others, and if so, who? Yes   Financial Resources Medicaid   Social/Functional History   Lives With Family;Parent   Type of Home House   Active  Yes   Mode of Transportation Family;Car   Discharge Planning   Type of Residence House   Living Arrangements Family Members;Parent   Patient expects to be discharged to: House

## 2024-01-26 ENCOUNTER — APPOINTMENT (OUTPATIENT)
Facility: HOSPITAL | Age: 39
DRG: 469 | End: 2024-01-26
Payer: COMMERCIAL

## 2024-01-26 LAB
ANION GAP SERPL CALC-SCNC: 6 MMOL/L (ref 5–15)
BASOPHILS # BLD: 0.1 K/UL (ref 0–0.1)
BASOPHILS NFR BLD: 1 % (ref 0–1)
BUN SERPL-MCNC: 34 MG/DL (ref 6–20)
BUN/CREAT SERPL: 14 (ref 12–20)
CALCIUM SERPL-MCNC: 9.2 MG/DL (ref 8.5–10.1)
CHLORIDE SERPL-SCNC: 107 MMOL/L (ref 97–108)
CO2 SERPL-SCNC: 27 MMOL/L (ref 21–32)
CREAT SERPL-MCNC: 2.41 MG/DL (ref 0.55–1.02)
DIFFERENTIAL METHOD BLD: ABNORMAL
EOSINOPHIL # BLD: 0.4 K/UL (ref 0–0.4)
EOSINOPHIL NFR BLD: 5 % (ref 0–7)
ERYTHROCYTE [DISTWIDTH] IN BLOOD BY AUTOMATED COUNT: 14.5 % (ref 11.5–14.5)
GLUCOSE SERPL-MCNC: 159 MG/DL (ref 65–100)
HCT VFR BLD AUTO: 34.4 % (ref 35–47)
HGB BLD-MCNC: 10.6 G/DL (ref 11.5–16)
IMM GRANULOCYTES # BLD AUTO: 0 K/UL (ref 0–0.04)
IMM GRANULOCYTES NFR BLD AUTO: 0 % (ref 0–0.5)
LYMPHOCYTES # BLD: 3.5 K/UL (ref 0.8–3.5)
LYMPHOCYTES NFR BLD: 42 % (ref 12–49)
MCH RBC QN AUTO: 29.7 PG (ref 26–34)
MCHC RBC AUTO-ENTMCNC: 30.8 G/DL (ref 30–36.5)
MCV RBC AUTO: 96.4 FL (ref 80–99)
MONOCYTES # BLD: 0.7 K/UL (ref 0–1)
MONOCYTES NFR BLD: 9 % (ref 5–13)
NEUTS SEG # BLD: 3.6 K/UL (ref 1.8–8)
NEUTS SEG NFR BLD: 43 % (ref 32–75)
NRBC # BLD: 0 K/UL (ref 0–0.01)
NRBC BLD-RTO: 0 PER 100 WBC
NT PRO BNP: 77 PG/ML
PLATELET # BLD AUTO: 332 K/UL (ref 150–400)
PMV BLD AUTO: 9.5 FL (ref 8.9–12.9)
POTASSIUM SERPL-SCNC: 4.4 MMOL/L (ref 3.5–5.1)
RBC # BLD AUTO: 3.57 M/UL (ref 3.8–5.2)
SODIUM SERPL-SCNC: 140 MMOL/L (ref 136–145)
WBC # BLD AUTO: 8.3 K/UL (ref 3.6–11)

## 2024-01-26 PROCEDURE — 2500000003 HC RX 250 WO HCPCS: Performed by: STUDENT IN AN ORGANIZED HEALTH CARE EDUCATION/TRAINING PROGRAM

## 2024-01-26 PROCEDURE — 2580000003 HC RX 258: Performed by: INTERNAL MEDICINE

## 2024-01-26 PROCEDURE — 2500000003 HC RX 250 WO HCPCS: Performed by: GENERAL ACUTE CARE HOSPITAL

## 2024-01-26 PROCEDURE — 2580000003 HC RX 258: Performed by: STUDENT IN AN ORGANIZED HEALTH CARE EDUCATION/TRAINING PROGRAM

## 2024-01-26 PROCEDURE — 6360000002 HC RX W HCPCS: Performed by: INTERNAL MEDICINE

## 2024-01-26 PROCEDURE — 94640 AIRWAY INHALATION TREATMENT: CPT

## 2024-01-26 PROCEDURE — 86038 ANTINUCLEAR ANTIBODIES: CPT

## 2024-01-26 PROCEDURE — 36415 COLL VENOUS BLD VENIPUNCTURE: CPT

## 2024-01-26 PROCEDURE — 51798 US URINE CAPACITY MEASURE: CPT

## 2024-01-26 PROCEDURE — 86037 ANCA TITER EACH ANTIBODY: CPT

## 2024-01-26 PROCEDURE — 86160 COMPLEMENT ANTIGEN: CPT

## 2024-01-26 PROCEDURE — 85025 COMPLETE CBC W/AUTO DIFF WBC: CPT

## 2024-01-26 PROCEDURE — 71046 X-RAY EXAM CHEST 2 VIEWS: CPT

## 2024-01-26 PROCEDURE — 6370000000 HC RX 637 (ALT 250 FOR IP): Performed by: GENERAL ACUTE CARE HOSPITAL

## 2024-01-26 PROCEDURE — 6360000002 HC RX W HCPCS: Performed by: STUDENT IN AN ORGANIZED HEALTH CARE EDUCATION/TRAINING PROGRAM

## 2024-01-26 PROCEDURE — 6370000000 HC RX 637 (ALT 250 FOR IP): Performed by: STUDENT IN AN ORGANIZED HEALTH CARE EDUCATION/TRAINING PROGRAM

## 2024-01-26 PROCEDURE — 86225 DNA ANTIBODY NATIVE: CPT

## 2024-01-26 PROCEDURE — 83516 IMMUNOASSAY NONANTIBODY: CPT

## 2024-01-26 PROCEDURE — 83880 ASSAY OF NATRIURETIC PEPTIDE: CPT

## 2024-01-26 PROCEDURE — 80048 BASIC METABOLIC PNL TOTAL CA: CPT

## 2024-01-26 PROCEDURE — 1100000003 HC PRIVATE W/ TELEMETRY

## 2024-01-26 RX ORDER — LIDOCAINE 4 G/G
1 PATCH TOPICAL DAILY
Status: DISCONTINUED | OUTPATIENT
Start: 2024-01-26 | End: 2024-01-28 | Stop reason: HOSPADM

## 2024-01-26 RX ORDER — GUAIFENESIN 600 MG/1
600 TABLET, EXTENDED RELEASE ORAL ONCE
Status: DISCONTINUED | OUTPATIENT
Start: 2024-01-26 | End: 2024-01-28 | Stop reason: HOSPADM

## 2024-01-26 RX ORDER — HYDROMORPHONE HYDROCHLORIDE 1 MG/ML
1 INJECTION, SOLUTION INTRAMUSCULAR; INTRAVENOUS; SUBCUTANEOUS EVERY 4 HOURS PRN
Status: DISCONTINUED | OUTPATIENT
Start: 2024-01-26 | End: 2024-01-27

## 2024-01-26 RX ORDER — ACETAMINOPHEN 325 MG/1
650 TABLET ORAL EVERY 6 HOURS SCHEDULED
Status: DISCONTINUED | OUTPATIENT
Start: 2024-01-26 | End: 2024-01-28 | Stop reason: HOSPADM

## 2024-01-26 RX ADMIN — PREGABALIN 200 MG: 100 CAPSULE ORAL at 21:15

## 2024-01-26 RX ADMIN — ENOXAPARIN SODIUM 30 MG: 100 INJECTION SUBCUTANEOUS at 21:13

## 2024-01-26 RX ADMIN — ENOXAPARIN SODIUM 30 MG: 100 INJECTION SUBCUTANEOUS at 09:36

## 2024-01-26 RX ADMIN — PROPRANOLOL HYDROCHLORIDE 10 MG: 10 TABLET ORAL at 09:35

## 2024-01-26 RX ADMIN — ALBUTEROL SULFATE 2.5 MG: 2.5 SOLUTION RESPIRATORY (INHALATION) at 02:19

## 2024-01-26 RX ADMIN — HYDROCODONE BITARTRATE AND ACETAMINOPHEN 1 TABLET: 7.5; 325 TABLET ORAL at 12:45

## 2024-01-26 RX ADMIN — MIRTAZAPINE 30 MG: 15 TABLET, FILM COATED ORAL at 21:19

## 2024-01-26 RX ADMIN — HYDROMORPHONE HYDROCHLORIDE 1 MG: 1 INJECTION, SOLUTION INTRAMUSCULAR; INTRAVENOUS; SUBCUTANEOUS at 04:51

## 2024-01-26 RX ADMIN — ACETAMINOPHEN 650 MG: 325 TABLET ORAL at 18:17

## 2024-01-26 RX ADMIN — PROPRANOLOL HYDROCHLORIDE 10 MG: 10 TABLET ORAL at 21:21

## 2024-01-26 RX ADMIN — SODIUM CHLORIDE 1000 MG: 900 INJECTION INTRAVENOUS at 12:51

## 2024-01-26 RX ADMIN — HYDROCODONE BITARTRATE AND ACETAMINOPHEN 1 TABLET: 7.5; 325 TABLET ORAL at 03:30

## 2024-01-26 RX ADMIN — SODIUM CHLORIDE, PRESERVATIVE FREE 10 ML: 5 INJECTION INTRAVENOUS at 09:37

## 2024-01-26 RX ADMIN — PANTOPRAZOLE SODIUM 40 MG: 40 TABLET, DELAYED RELEASE ORAL at 09:46

## 2024-01-26 RX ADMIN — SODIUM CHLORIDE: 9 INJECTION, SOLUTION INTRAVENOUS at 12:51

## 2024-01-26 RX ADMIN — HYDROMORPHONE HYDROCHLORIDE 1 MG: 1 INJECTION, SOLUTION INTRAMUSCULAR; INTRAVENOUS; SUBCUTANEOUS at 21:22

## 2024-01-26 RX ADMIN — SODIUM CHLORIDE, PRESERVATIVE FREE 10 ML: 5 INJECTION INTRAVENOUS at 21:10

## 2024-01-26 RX ADMIN — ALPRAZOLAM 0.5 MG: 0.5 TABLET ORAL at 09:35

## 2024-01-26 RX ADMIN — HYDROMORPHONE HYDROCHLORIDE 1 MG: 1 INJECTION, SOLUTION INTRAMUSCULAR; INTRAVENOUS; SUBCUTANEOUS at 18:18

## 2024-01-26 RX ADMIN — TIZANIDINE 8 MG: 4 TABLET ORAL at 21:20

## 2024-01-26 RX ADMIN — HYDROMORPHONE HYDROCHLORIDE 2 MG: 1 INJECTION, SOLUTION INTRAMUSCULAR; INTRAVENOUS; SUBCUTANEOUS at 02:15

## 2024-01-26 RX ADMIN — ALPRAZOLAM 0.5 MG: 0.5 TABLET ORAL at 21:21

## 2024-01-26 RX ADMIN — DULOXETINE HYDROCHLORIDE 120 MG: 30 CAPSULE, DELAYED RELEASE ORAL at 09:34

## 2024-01-26 RX ADMIN — HYDROMORPHONE HYDROCHLORIDE 1 MG: 1 INJECTION, SOLUTION INTRAMUSCULAR; INTRAVENOUS; SUBCUTANEOUS at 14:10

## 2024-01-26 RX ADMIN — PREGABALIN 200 MG: 100 CAPSULE ORAL at 09:35

## 2024-01-26 RX ADMIN — HYDROMORPHONE HYDROCHLORIDE 1 MG: 1 INJECTION, SOLUTION INTRAMUSCULAR; INTRAVENOUS; SUBCUTANEOUS at 09:35

## 2024-01-26 ASSESSMENT — PAIN DESCRIPTION - DESCRIPTORS
DESCRIPTORS: ACHING

## 2024-01-26 ASSESSMENT — PAIN DESCRIPTION - ORIENTATION
ORIENTATION: LOWER

## 2024-01-26 ASSESSMENT — PAIN SCALES - GENERAL
PAINLEVEL_OUTOF10: 6
PAINLEVEL_OUTOF10: 6
PAINLEVEL_OUTOF10: 1
PAINLEVEL_OUTOF10: 8
PAINLEVEL_OUTOF10: 7
PAINLEVEL_OUTOF10: 6
PAINLEVEL_OUTOF10: 7
PAINLEVEL_OUTOF10: 10
PAINLEVEL_OUTOF10: 10
PAINLEVEL_OUTOF10: 7
PAINLEVEL_OUTOF10: 10
PAINLEVEL_OUTOF10: 7

## 2024-01-26 ASSESSMENT — PAIN DESCRIPTION - LOCATION
LOCATION: BACK
LOCATION: ABDOMEN;BACK

## 2024-01-26 ASSESSMENT — PAIN DESCRIPTION - PAIN TYPE: TYPE: ACUTE PAIN

## 2024-01-26 ASSESSMENT — PAIN - FUNCTIONAL ASSESSMENT: PAIN_FUNCTIONAL_ASSESSMENT: PREVENTS OR INTERFERES SOME ACTIVE ACTIVITIES AND ADLS

## 2024-01-27 LAB
ANION GAP SERPL CALC-SCNC: 6 MMOL/L (ref 5–15)
BASOPHILS # BLD: 0.1 K/UL (ref 0–0.1)
BASOPHILS NFR BLD: 1 % (ref 0–1)
BUN SERPL-MCNC: 30 MG/DL (ref 6–20)
BUN/CREAT SERPL: 20 (ref 12–20)
CALCIUM SERPL-MCNC: 9.3 MG/DL (ref 8.5–10.1)
CHLORIDE SERPL-SCNC: 109 MMOL/L (ref 97–108)
CO2 SERPL-SCNC: 26 MMOL/L (ref 21–32)
CREAT SERPL-MCNC: 1.5 MG/DL (ref 0.55–1.02)
DIFFERENTIAL METHOD BLD: ABNORMAL
EOSINOPHIL # BLD: 0.5 K/UL (ref 0–0.4)
EOSINOPHIL NFR BLD: 7 % (ref 0–7)
ERYTHROCYTE [DISTWIDTH] IN BLOOD BY AUTOMATED COUNT: 14.3 % (ref 11.5–14.5)
GLUCOSE SERPL-MCNC: 143 MG/DL (ref 65–100)
HCT VFR BLD AUTO: 31.6 % (ref 35–47)
HGB BLD-MCNC: 9.9 G/DL (ref 11.5–16)
IMM GRANULOCYTES # BLD AUTO: 0 K/UL (ref 0–0.04)
IMM GRANULOCYTES NFR BLD AUTO: 0 % (ref 0–0.5)
LYMPHOCYTES # BLD: 3 K/UL (ref 0.8–3.5)
LYMPHOCYTES NFR BLD: 42 % (ref 12–49)
MCH RBC QN AUTO: 29.8 PG (ref 26–34)
MCHC RBC AUTO-ENTMCNC: 31.3 G/DL (ref 30–36.5)
MCV RBC AUTO: 95.2 FL (ref 80–99)
MONOCYTES # BLD: 0.6 K/UL (ref 0–1)
MONOCYTES NFR BLD: 9 % (ref 5–13)
NEUTS SEG # BLD: 2.9 K/UL (ref 1.8–8)
NEUTS SEG NFR BLD: 41 % (ref 32–75)
NRBC # BLD: 0 K/UL (ref 0–0.01)
NRBC BLD-RTO: 0 PER 100 WBC
PLATELET # BLD AUTO: 294 K/UL (ref 150–400)
POTASSIUM SERPL-SCNC: 4.3 MMOL/L (ref 3.5–5.1)
RBC # BLD AUTO: 3.32 M/UL (ref 3.8–5.2)
RBC MORPH BLD: ABNORMAL
SODIUM SERPL-SCNC: 141 MMOL/L (ref 136–145)
WBC # BLD AUTO: 7.1 K/UL (ref 3.6–11)

## 2024-01-27 PROCEDURE — 6360000002 HC RX W HCPCS: Performed by: STUDENT IN AN ORGANIZED HEALTH CARE EDUCATION/TRAINING PROGRAM

## 2024-01-27 PROCEDURE — 85025 COMPLETE CBC W/AUTO DIFF WBC: CPT

## 2024-01-27 PROCEDURE — 2580000003 HC RX 258: Performed by: STUDENT IN AN ORGANIZED HEALTH CARE EDUCATION/TRAINING PROGRAM

## 2024-01-27 PROCEDURE — 6370000000 HC RX 637 (ALT 250 FOR IP): Performed by: STUDENT IN AN ORGANIZED HEALTH CARE EDUCATION/TRAINING PROGRAM

## 2024-01-27 PROCEDURE — 6360000002 HC RX W HCPCS: Performed by: GENERAL ACUTE CARE HOSPITAL

## 2024-01-27 PROCEDURE — 1100000003 HC PRIVATE W/ TELEMETRY

## 2024-01-27 PROCEDURE — 36415 COLL VENOUS BLD VENIPUNCTURE: CPT

## 2024-01-27 PROCEDURE — 2500000003 HC RX 250 WO HCPCS: Performed by: GENERAL ACUTE CARE HOSPITAL

## 2024-01-27 PROCEDURE — 80048 BASIC METABOLIC PNL TOTAL CA: CPT

## 2024-01-27 PROCEDURE — 2580000003 HC RX 258: Performed by: GENERAL ACUTE CARE HOSPITAL

## 2024-01-27 RX ORDER — HYDROMORPHONE HYDROCHLORIDE 1 MG/ML
0.5 INJECTION, SOLUTION INTRAMUSCULAR; INTRAVENOUS; SUBCUTANEOUS EVERY 4 HOURS PRN
Status: DISCONTINUED | OUTPATIENT
Start: 2024-01-27 | End: 2024-01-28 | Stop reason: HOSPADM

## 2024-01-27 RX ADMIN — PREGABALIN 200 MG: 100 CAPSULE ORAL at 20:33

## 2024-01-27 RX ADMIN — SODIUM CHLORIDE 1000 MG: 900 INJECTION INTRAVENOUS at 12:37

## 2024-01-27 RX ADMIN — SODIUM CHLORIDE, PRESERVATIVE FREE 10 ML: 5 INJECTION INTRAVENOUS at 10:14

## 2024-01-27 RX ADMIN — ENOXAPARIN SODIUM 30 MG: 100 INJECTION SUBCUTANEOUS at 20:33

## 2024-01-27 RX ADMIN — HYDROCODONE BITARTRATE AND ACETAMINOPHEN 1 TABLET: 7.5; 325 TABLET ORAL at 13:41

## 2024-01-27 RX ADMIN — HYDROMORPHONE HYDROCHLORIDE 0.5 MG: 1 INJECTION, SOLUTION INTRAMUSCULAR; INTRAVENOUS; SUBCUTANEOUS at 16:27

## 2024-01-27 RX ADMIN — HYDROMORPHONE HYDROCHLORIDE 1 MG: 1 INJECTION, SOLUTION INTRAMUSCULAR; INTRAVENOUS; SUBCUTANEOUS at 06:54

## 2024-01-27 RX ADMIN — PANTOPRAZOLE SODIUM 40 MG: 40 TABLET, DELAYED RELEASE ORAL at 06:55

## 2024-01-27 RX ADMIN — SODIUM CHLORIDE: 9 INJECTION, SOLUTION INTRAVENOUS at 12:35

## 2024-01-27 RX ADMIN — MIRTAZAPINE 30 MG: 15 TABLET, FILM COATED ORAL at 20:34

## 2024-01-27 RX ADMIN — ENOXAPARIN SODIUM 30 MG: 100 INJECTION SUBCUTANEOUS at 10:14

## 2024-01-27 RX ADMIN — TIZANIDINE 8 MG: 4 TABLET ORAL at 20:33

## 2024-01-27 RX ADMIN — HYDROMORPHONE HYDROCHLORIDE 0.5 MG: 1 INJECTION, SOLUTION INTRAMUSCULAR; INTRAVENOUS; SUBCUTANEOUS at 20:34

## 2024-01-27 RX ADMIN — PROPRANOLOL HYDROCHLORIDE 10 MG: 10 TABLET ORAL at 10:13

## 2024-01-27 RX ADMIN — HYDROMORPHONE HYDROCHLORIDE 0.5 MG: 1 INJECTION, SOLUTION INTRAMUSCULAR; INTRAVENOUS; SUBCUTANEOUS at 12:27

## 2024-01-27 RX ADMIN — PROPRANOLOL HYDROCHLORIDE 10 MG: 10 TABLET ORAL at 20:33

## 2024-01-27 RX ADMIN — ALPRAZOLAM 0.5 MG: 0.5 TABLET ORAL at 20:33

## 2024-01-27 RX ADMIN — DULOXETINE HYDROCHLORIDE 120 MG: 30 CAPSULE, DELAYED RELEASE ORAL at 10:13

## 2024-01-27 RX ADMIN — SODIUM CHLORIDE, PRESERVATIVE FREE 10 ML: 5 INJECTION INTRAVENOUS at 20:34

## 2024-01-27 RX ADMIN — ALPRAZOLAM 0.5 MG: 0.5 TABLET ORAL at 10:13

## 2024-01-27 RX ADMIN — PREGABALIN 200 MG: 100 CAPSULE ORAL at 10:13

## 2024-01-27 RX ADMIN — HYDROCODONE BITARTRATE AND ACETAMINOPHEN 1 TABLET: 7.5; 325 TABLET ORAL at 02:31

## 2024-01-27 ASSESSMENT — PAIN SCALES - GENERAL
PAINLEVEL_OUTOF10: 7
PAINLEVEL_OUTOF10: 4
PAINLEVEL_OUTOF10: 8
PAINLEVEL_OUTOF10: 7
PAINLEVEL_OUTOF10: 8

## 2024-01-27 ASSESSMENT — PAIN DESCRIPTION - LOCATION
LOCATION: BACK
LOCATION: BACK
LOCATION: BACK;LEG
LOCATION: BACK
LOCATION: BACK
LOCATION: BACK;HEAD
LOCATION: BACK

## 2024-01-27 ASSESSMENT — PAIN DESCRIPTION - DESCRIPTORS
DESCRIPTORS: STABBING
DESCRIPTORS: SHARP
DESCRIPTORS: ACHING
DESCRIPTORS: ACHING
DESCRIPTORS: STABBING
DESCRIPTORS: ACHING
DESCRIPTORS: SHARP

## 2024-01-27 ASSESSMENT — PAIN DESCRIPTION - PAIN TYPE: TYPE: ACUTE PAIN

## 2024-01-27 ASSESSMENT — PAIN DESCRIPTION - ORIENTATION
ORIENTATION: LOWER;RIGHT
ORIENTATION: RIGHT;LOWER
ORIENTATION: POSTERIOR;RIGHT
ORIENTATION: ANTERIOR
ORIENTATION: ANTERIOR;POSTERIOR;RIGHT
ORIENTATION: ANTERIOR;POSTERIOR

## 2024-01-27 ASSESSMENT — PAIN - FUNCTIONAL ASSESSMENT: PAIN_FUNCTIONAL_ASSESSMENT: PREVENTS OR INTERFERES WITH MANY ACTIVE NOT PASSIVE ACTIVITIES

## 2024-01-27 NOTE — PROGRESS NOTES
Hospitalist Progress Note    NAME:   Monique Goodman   : 1985   MRN: [id number]     Date/Time: 2024    Patient PCP: Andrew Rosado MD       Assessment / Plan:      Back pain possibly related to renal stones  Possible UTI   CT abdomen and pelvis-punctate nonobstructing right renal stone  Urine analysis with moderate blood, does, protein, LEC multiple bacteria  Neg urine culture  Cont ceftriaxone for now  Concerns for drug seeking behavior, will cut down dilaudid from 2 to 1 mg prn, cont norco     JIGNESH  Creatinine on 12/3/2023 0.95, on admission 2.71  Recently started on diuretics, on hold now  Possibly related to diuretics/dehydration  Nephrology consulted  Avoid nephrotoxic agents and renally dose any medication  IVF as per nephrology     Mild anemia  Iron deficiency anemia  F/up w PCP   May start iron pills on DC     History of lupus on 6-8 weekly injection therapy  Multiple rash on her bilateral lower extremity-well-demarcated at multiple stages of healing  Patient does not complain of any joint pain any photosensitive rash  Patient will need to follow-up with her rheumatologist upon discharge  Last treatment infusion was 8 weeks ago     Bilateral leg swelling  No respiratory symptoms at this moment  Patient does not have any cardiac history  BNP normal X2  CXR w no edema  Leg elevation     Chronic: Lupus, Garth-Danlos syndrome, 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  Last night pt c/o SOB and feeling fluids in lungs and body swelling and not able to make a fist   Walks around with out much apparent

## 2024-01-27 NOTE — PROGRESS NOTES
Nephrology Progress Note  [facility] / [place] [facility]  [address], [address]  [place] [address]  Phone - [phone]  Fax - [phone]                 Patient: Monique Goodman                     YOB: 1985        Date- 1/27/2024                                     Admit Date: 1/24/2024   CC: Follow up for jignesh        IMPRESSION & PLAN:   JIGNESH (secondary to IVVD, UTI)  CKD stage II(baseline creatinine 1.1-1.2)  UTI  PROTEINURIA- urine pr/cr is 0.5  Sepsis  Edema-- unclear etiology   Flank pain      PLAN-  No more ivf  Check bmp today  Hold diuretics today  Check echo-- she has minimal proteinuria-- her edema is not due to renal etiology-- albumin is 3.6     Subjective:  Interval History:   -no labs today  Bp stable  C/o edema    Objective:   Vitals:    01/26/24 1440 01/26/24 1726 01/26/24 1818 01/26/24 2027   BP:  138/62  129/60   Pulse:  86  72   Resp: 18 16 18 18   Temp:  97.5 °F (36.4 °C)  98.6 °F (37 °C)   TempSrc:  Oral  Oral   SpO2:  96%  99%   Weight:       Height:          I/O last 3 completed shifts:  In: -   Out: 400 [Urine:400]  No intake/output data recorded.      Physical exam:    GEN:  NAD  NECK:  Supple, no thyromegaly  RESP: Clear  b/l, no  wheezing,   CVS: RRR,S1,S2   NEURO: non focal, normal speech  EXT: Edema +nt         Chart reviewed.         Pertinent Notes reviewed.     Data Review :  Lab Results   Component Value Date/Time     01/26/2024 02:50 AM    K 4.4 01/26/2024 02:50 AM     01/26/2024 02:50 AM    CO2 27 01/26/2024 02:50 AM    BUN 34 01/26/2024 02:50 AM    CREATININE 2.41 01/26/2024 02:50 AM    GLUCOSE 159 01/26/2024 02:50 AM    CALCIUM 9.2 01/26/2024 02:50 AM       Lab Results   Component Value Date    WBC 8.3 01/26/2024    HGB 10.6 (L) 01/26/2024    HCT 34.4 (L) 01/26/2024    MCV 96.4 01/26/2024     01/26/2024      Recent Labs     01/25/24  0413 01/26/24  0250    140   K 4.6

## 2024-01-27 NOTE — PROGRESS NOTES
End of Shift Note    Bedside shift change report given to COTY Harry (oncoming nurse) by Benita Prescott RN (offgoing nurse).  Report included the following information SBAR, Kardex, MAR, Recent Results, and Cardiac Rhythm Sinus Rhythm    Shift worked:  7 am to 7:30 pm     Shift summary and any significant changes:     Patient declined Lidocaine patch and scheduled Tylenol, see MAR. All other scheduled medications administered. Patient had back pain and headache; 1 dose PRN Norco and 2 doses PRN IV Dilaudid administered, see MAR. Patient stand by assist to bathroom; urine output documented. Fluid intake documented for shift. IV flushed and patent. Nursing rounds and education completed.     Concerns for physician to address:       Zone phone for oncoming shift:          Activity:     Number times ambulated in hallways past shift: 1  Number of times OOB to chair past shift: sat on side of bed    Cardiac:   Cardiac Monitoring: Yes           Access:  Current line(s): PIV     Genitourinary:   Urinary status: voiding    Respiratory:      Chronic home O2 use?: NO  Incentive spirometer at bedside: NO       GI:     Current diet:  ADULT DIET; Regular  Passing flatus: YES  Tolerating current diet: YES       Pain Management:   Patient states pain is manageable on current regimen: YES    Skin:     Interventions: float heels and increase time out of bed    Patient Safety:  Fall Score:    Interventions: gripper socks and pt to call before getting OOB       Length of Stay:  Expected LOS: 4  Actual LOS: 3      Benita Prescott RN

## 2024-01-28 VITALS
SYSTOLIC BLOOD PRESSURE: 114 MMHG | WEIGHT: 286.16 LBS | HEIGHT: 62 IN | OXYGEN SATURATION: 98 % | RESPIRATION RATE: 17 BRPM | BODY MASS INDEX: 52.66 KG/M2 | TEMPERATURE: 98.2 F | DIASTOLIC BLOOD PRESSURE: 60 MMHG | HEART RATE: 88 BPM

## 2024-01-28 LAB
ANA SER QL: NEGATIVE
ANION GAP SERPL CALC-SCNC: 4 MMOL/L (ref 5–15)
BUN SERPL-MCNC: 26 MG/DL (ref 6–20)
BUN/CREAT SERPL: 20 (ref 12–20)
C3 SERPL-MCNC: 201 MG/DL (ref 82–167)
C4 SERPL-MCNC: 34 MG/DL (ref 12–38)
CALCIUM SERPL-MCNC: 9 MG/DL (ref 8.5–10.1)
CHLORIDE SERPL-SCNC: 108 MMOL/L (ref 97–108)
CO2 SERPL-SCNC: 26 MMOL/L (ref 21–32)
CREAT SERPL-MCNC: 1.32 MG/DL (ref 0.55–1.02)
DSDNA AB SER-ACNC: <1 IU/ML (ref 0–9)
GLUCOSE SERPL-MCNC: 231 MG/DL (ref 65–100)
POTASSIUM SERPL-SCNC: 4.1 MMOL/L (ref 3.5–5.1)
SODIUM SERPL-SCNC: 138 MMOL/L (ref 136–145)

## 2024-01-28 PROCEDURE — 6360000002 HC RX W HCPCS: Performed by: STUDENT IN AN ORGANIZED HEALTH CARE EDUCATION/TRAINING PROGRAM

## 2024-01-28 PROCEDURE — 6360000002 HC RX W HCPCS: Performed by: GENERAL ACUTE CARE HOSPITAL

## 2024-01-28 PROCEDURE — 80048 BASIC METABOLIC PNL TOTAL CA: CPT

## 2024-01-28 PROCEDURE — 6370000000 HC RX 637 (ALT 250 FOR IP): Performed by: STUDENT IN AN ORGANIZED HEALTH CARE EDUCATION/TRAINING PROGRAM

## 2024-01-28 PROCEDURE — 2580000003 HC RX 258: Performed by: STUDENT IN AN ORGANIZED HEALTH CARE EDUCATION/TRAINING PROGRAM

## 2024-01-28 PROCEDURE — 36415 COLL VENOUS BLD VENIPUNCTURE: CPT

## 2024-01-28 PROCEDURE — 2500000003 HC RX 250 WO HCPCS: Performed by: GENERAL ACUTE CARE HOSPITAL

## 2024-01-28 PROCEDURE — 2580000003 HC RX 258: Performed by: GENERAL ACUTE CARE HOSPITAL

## 2024-01-28 RX ORDER — CEFUROXIME AXETIL 500 MG/1
500 TABLET ORAL 2 TIMES DAILY
Qty: 4 TABLET | Refills: 0 | Status: SHIPPED | OUTPATIENT
Start: 2024-01-28 | End: 2024-01-30

## 2024-01-28 RX ADMIN — DULOXETINE HYDROCHLORIDE 120 MG: 30 CAPSULE, DELAYED RELEASE ORAL at 09:52

## 2024-01-28 RX ADMIN — HYDROMORPHONE HYDROCHLORIDE 0.5 MG: 1 INJECTION, SOLUTION INTRAMUSCULAR; INTRAVENOUS; SUBCUTANEOUS at 13:09

## 2024-01-28 RX ADMIN — HYDROCODONE BITARTRATE AND ACETAMINOPHEN 1 TABLET: 7.5; 325 TABLET ORAL at 00:35

## 2024-01-28 RX ADMIN — ENOXAPARIN SODIUM 30 MG: 100 INJECTION SUBCUTANEOUS at 09:53

## 2024-01-28 RX ADMIN — SODIUM CHLORIDE: 9 INJECTION, SOLUTION INTRAVENOUS at 12:02

## 2024-01-28 RX ADMIN — HYDROMORPHONE HYDROCHLORIDE 0.5 MG: 1 INJECTION, SOLUTION INTRAMUSCULAR; INTRAVENOUS; SUBCUTANEOUS at 08:26

## 2024-01-28 RX ADMIN — HYDROCODONE BITARTRATE AND ACETAMINOPHEN 1 TABLET: 7.5; 325 TABLET ORAL at 12:06

## 2024-01-28 RX ADMIN — PANTOPRAZOLE SODIUM 40 MG: 40 TABLET, DELAYED RELEASE ORAL at 05:57

## 2024-01-28 RX ADMIN — PROPRANOLOL HYDROCHLORIDE 10 MG: 10 TABLET ORAL at 09:53

## 2024-01-28 RX ADMIN — PREGABALIN 200 MG: 100 CAPSULE ORAL at 09:52

## 2024-01-28 RX ADMIN — SODIUM CHLORIDE, PRESERVATIVE FREE 10 ML: 5 INJECTION INTRAVENOUS at 08:26

## 2024-01-28 RX ADMIN — SODIUM CHLORIDE 1000 MG: 900 INJECTION INTRAVENOUS at 12:04

## 2024-01-28 RX ADMIN — ALPRAZOLAM 0.5 MG: 0.5 TABLET ORAL at 09:52

## 2024-01-28 ASSESSMENT — PAIN SCALES - GENERAL
PAINLEVEL_OUTOF10: 7
PAINLEVEL_OUTOF10: 6
PAINLEVEL_OUTOF10: 4
PAINLEVEL_OUTOF10: 7
PAINLEVEL_OUTOF10: 4
PAINLEVEL_OUTOF10: 7

## 2024-01-28 ASSESSMENT — PAIN DESCRIPTION - ORIENTATION
ORIENTATION: ANTERIOR
ORIENTATION: ANTERIOR
ORIENTATION: POSTERIOR;RIGHT;LEFT

## 2024-01-28 ASSESSMENT — PAIN DESCRIPTION - DESCRIPTORS
DESCRIPTORS: THROBBING;TIGHTNESS
DESCRIPTORS: THROBBING
DESCRIPTORS: THROBBING

## 2024-01-28 ASSESSMENT — PAIN DESCRIPTION - LOCATION
LOCATION: BACK;LEG;ARM
LOCATION: HEAD
LOCATION: HEAD

## 2024-01-28 NOTE — PLAN OF CARE
Problem: Pain  Goal: Verbalizes/displays adequate comfort level or baseline comfort level  Outcome: Adequate for Discharge     Problem: Safety - Adult  Goal: Free from fall injury  Outcome: Adequate for Discharge

## 2024-01-28 NOTE — DISCHARGE INSTRUCTIONS
HOSPITALIST DISCHARGE INSTRUCTIONS    It is important that you take the medication exactly as they are prescribed.   Keep your medication in the bottles provided by the pharmacist and keep a list of the medication names, dosages, and times to be taken in your wallet.   Do not take other medications without consulting your doctor.       What to do at Home    Recommended diet:  regular diet    Recommended activity: activity as tolerated      If you have questions regarding the hospital related prescriptions or hospital related issues please call [facility]' office at . You can always direct your questions to your primary care doctor if you are unable to reach your hospital physician; your PCP works as an extension of your hospital doctor just like your hospital doctor is an extension of your PCP for your time at the hospital ([facility])    If you experience any of the following symptoms then please call your primary care physician or return to the emergency room if you cannot get hold of your doctor:    Fever, chills, nausea, vomiting, or persistent diarrhea  Worsening weakness or new problems with your speech or balance  Dark stools or visible blood in your stools  New Leg swelling or shortness of breath as these could be signs of a clot    Additional Instructions:    Please follow up with your PCP in one week.  Please check you kidney function test (BMP) in one week.  Please follow up with  your nephrologist.  Bring these papers with you to your follow up appointments. The papers will help your doctors be sure to continue the care plan from the hospital.              Information obtained by :  I understand that if any problems occur once I am at home I am to contact my physician.    I understand and acknowledge receipt of the instructions indicated above.                                                                                                                                            Physician's or R.N.'s Signature                                                                  Date/Time                                                                                                                                              Patient or Representative Signature

## 2024-01-28 NOTE — PROGRESS NOTES
I have reviewed discharge instructions with the patient.  The patient verbalized understanding.  Discharge medications reviewed with patient and appropriate educational materials and side effects teaching were provided.  Follow-up appointments reviewed with patient.  Opportunity for questions or concerns given.  Venous access removed without difficulty, pt tolerated well.  Patients belongings gathered and sent with patient.  Patient is ready for discharge.  Discharged via private car.

## 2024-01-28 NOTE — DISCHARGE SUMMARY
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  female with PMHx significant as below was brought to the ED for evaluation after she started complaining of backache for 1 day.  The backache was sudden onset about 10 x 10 located in the right lower lumbar region, nonradiating, not associated with tingling or numbness in the lower extremity.  There is no specific aggravating factors or relieving factors.  The back pain was not associated with any trauma.  There was however low-grade fever fever.  No IV drug use.  For the past 1 week patient has been noticing decreased urine output-her urine color has changed to dark brown-reddish.  Patient mentions she has history of kidney stones however does not feel like she was passing any stones at this time.  She also mentioned that she has congenital abnormality with a third kidney.  She was given metolazone by her nephrologist 2.5 mg 2-3 times per week to take for the swelling of both of her lower extremity.  She has however not taken the diuretics for past couple of weeks.  Patient also complained of having on and off diarrhea-took Imodium and it got better.  She did not complain of any blood in her stool any mucus no recent travel or sick contacts.  Patient is

## 2024-01-29 ENCOUNTER — TRANSCRIBE ORDERS (OUTPATIENT)
Facility: HOSPITAL | Age: 39
End: 2024-01-29

## 2024-01-29 DIAGNOSIS — Z12.31 SCREENING MAMMOGRAM FOR HIGH-RISK PATIENT: Primary | ICD-10-CM

## 2024-01-29 LAB
C-ANCA TITR SER IF: NORMAL TITER
MYELOPEROXIDASE AB SER IA-ACNC: <0.2 UNITS (ref 0–0.9)
P-ANCA ATYPICAL TITR SER IF: NORMAL TITER
P-ANCA TITR SER IF: NORMAL TITER
PROTEINASE3 AB SER IA-ACNC: <0.2 UNITS (ref 0–0.9)

## 2024-01-29 RX ORDER — MIRTAZAPINE 30 MG/1
30 TABLET, FILM COATED ORAL
Qty: 30 TABLET | Refills: 5 | Status: SHIPPED | OUTPATIENT
Start: 2024-01-29 | End: 2024-01-31 | Stop reason: SDUPTHER

## 2024-01-29 NOTE — TELEPHONE ENCOUNTER
Future Appointments:  Future Appointments   Date Time Provider Department Center   2/8/2024  9:30 AM [facility] 5 [facility]        Last Appointment With Me:  11/7/2023     Requested Prescriptions     Pending Prescriptions Disp Refills    mirtazapine (REMERON) 30 MG tablet 30 tablet 5     Sig: Take 1 tablet by mouth nightly       Patient vital signs are at baseline: Yes  Patient able to ambulate as they were prior to admission or with assist devices provided by therapies during their stay:  Yes  Patient MUST void prior to discharge:  Yes  Patient able to tolerate oral intake:  Yes  Pain has adequate pain control using Oral analgesics:  Yes  No untoward signs and symptoms noted during the 12-hr shift.

## 2024-01-30 ENCOUNTER — TELEPHONE (OUTPATIENT)
Age: 39
End: 2024-01-30

## 2024-01-30 DIAGNOSIS — F41.9 ANXIETY DISORDER, UNSPECIFIED TYPE: Primary | ICD-10-CM

## 2024-01-30 DIAGNOSIS — F32.A DEPRESSION, UNSPECIFIED DEPRESSION TYPE: ICD-10-CM

## 2024-01-30 RX ORDER — DULOXETIN HYDROCHLORIDE 60 MG/1
120 CAPSULE, DELAYED RELEASE ORAL DAILY
Qty: 180 CAPSULE | Refills: 1 | Status: SHIPPED | OUTPATIENT
Start: 2024-01-30

## 2024-01-30 NOTE — TELEPHONE ENCOUNTER
PCP: Andrew Rosado MD    Last appt: 11/7/2023  Future Appointments   Date Time Provider Department Center   2/8/2024  9:30 AM [facility] 5 [facility]       Requested Prescriptions     Pending Prescriptions Disp Refills    DULoxetine (CYMBALTA) 60 MG extended release capsule 180 capsule 1     Sig: Take 2 capsules by mouth daily

## 2024-01-30 NOTE — TELEPHONE ENCOUNTER
Care Transitions Initial Follow Up Call    Outreach made within 2 business days of discharge: Yes    Patient: Monique Goodman Patient : 1985   MRN: [id number]  Reason for Admission: There are no discharge diagnoses documented for the most recent discharge.  Discharge Date: 24       Spoke with:     Discharge department/facility:     TCM Interactive Patient Contact:  Was patient able to fill all prescriptions: Yes  Was patient instructed to bring all medications to the follow-up visit: Yes  Is patient taking all medications as directed in the discharge summary? Yes  Does patient understand their discharge instructions: Yes  Does patient have questions or concerns that need addressed prior to 7-14 day follow up office visit: no    Scheduled appointment with PCP within 7-14 days    Follow Up  Future Appointments   Date Time Provider Department Center   2024  9:30 AM [facility]       Yue Rea MA

## 2024-01-30 NOTE — TELEPHONE ENCOUNTER
Care Transitions Initial Follow Up Call    Outreach made within 2 business days of discharge: Yes    Patient: Monique Goodman Patient : 1985   MRN: [id number]  Reason for Admission: There are no discharge diagnoses documented for the most recent discharge.  Discharge Date: 24       Spoke with: KELLI to schedule    Discharge department/facility: [facility]      Scheduled appointment with PCP within 7-14 days    Follow Up  Future Appointments   Date Time Provider Department Center   2024  9:30 AM [facility]       Jada Galeana

## 2024-01-31 DIAGNOSIS — F32.A DEPRESSION, UNSPECIFIED DEPRESSION TYPE: Primary | ICD-10-CM

## 2024-01-31 DIAGNOSIS — F41.9 ANXIETY DISORDER, UNSPECIFIED TYPE: ICD-10-CM

## 2024-01-31 RX ORDER — MIRTAZAPINE 30 MG/1
30 TABLET, FILM COATED ORAL
Qty: 90 TABLET | Refills: 1 | Status: SHIPPED | OUTPATIENT
Start: 2024-01-31

## 2024-01-31 NOTE — TELEPHONE ENCOUNTER
Received faxed refill request from pharmacy for 90 day supply        PCP: Andrew Rosado MD    Last appt: 11/7/2023  Future Appointments   Date Time Provider Department Center   2/8/2024  9:30 AM [facility]       Requested Prescriptions     Pending Prescriptions Disp Refills    mirtazapine (REMERON) 30 MG tablet 90 tablet 1     Sig: Take 1 tablet by mouth nightly

## 2024-02-07 NOTE — PROGRESS NOTES
Physician Progress Note      PATIENT:               SLICK LOPEZ  CSN #:                  [id number]  :                       1985  ADMIT DATE:       2024 3:42 AM  DISCH DATE:        2024 2:57 PM  RESPONDING  PROVIDER #:        Jordan Negrete MD          QUERY TEXT:    Patient admitted with Increasing backache, leg swelling, and diarrhea. Noted   documentation of \"Sepsis\" in Nephrology consult note dated . In order to   support the diagnosis of \"Sepsis\", please include additional clinical   indicators in your documentation.  Or please document if the diagnosis of   \"Sepsis\" has been ruled out after further study    The medical record reflects the following:  Risk Factors: Hx: Lupus...C/o Increasing backache, leg swelling, diarrhea  Clinical Indicators: HR: 92; 102; 95; RR: 20; 20; 29; Bp: 83/46; 105/92;   99/76; 93/65; 83/57; 97/40; Wbc: 9.3; Bun/Cr: 41/2.74; Trop. HS: 44; ;UA: le:   small; wbc: 10-20; bact: 4+;UCx: NG; CT Abd/pelvis: Punctate non-obstructing   right renal stones; CXR: Neg     AP: Back pain possibly related to renal stones  Possible UTI     Nephro PN: Sepsis    Treatment: Labs; CXR; CT Abd/Pelvis; IVF NS Bolus 1,000ml x 2; IVF NS   infusion; Tylenol PO; IV ABx; UA/UCx;    Thank you,    [email or web address]  Options provided:  -- Sepsis present as evidenced by, Please document evidence.  -- Sepsis was ruled out after study  -- Other - I will add my own diagnosis  -- Disagree - Not applicable / Not valid  -- Disagree - Clinically unable to determine / Unknown  -- Refer to Clinical Documentation Reviewer    PROVIDER RESPONSE TEXT:    Sepsis was ruled out after study.    Query created by: Adela Barrios on 2024 11:48 AM      Electronically signed by:  Jordan Negrete MD 2024 10:47 PM

## 2024-02-08 ENCOUNTER — HOSPITAL ENCOUNTER (OUTPATIENT)
Facility: HOSPITAL | Age: 39
Discharge: HOME | End: 2024-02-08
Attending: INTERNAL MEDICINE
Payer: COMMERCIAL

## 2024-02-08 DIAGNOSIS — Z12.31 SCREENING MAMMOGRAM FOR HIGH-RISK PATIENT: ICD-10-CM

## 2024-02-08 PROCEDURE — 77063 BREAST TOMOSYNTHESIS BI: CPT

## 2024-02-17 ENCOUNTER — APPOINTMENT (OUTPATIENT)
Facility: HOSPITAL | Age: 39
End: 2024-02-17
Payer: COMMERCIAL

## 2024-02-17 ENCOUNTER — HOSPITAL ENCOUNTER (EMERGENCY)
Facility: HOSPITAL | Age: 39
Discharge: HOME | End: 2024-02-17
Attending: STUDENT IN AN ORGANIZED HEALTH CARE EDUCATION/TRAINING PROGRAM
Payer: COMMERCIAL

## 2024-02-17 VITALS
HEART RATE: 82 BPM | WEIGHT: 252 LBS | HEIGHT: 63 IN | TEMPERATURE: 98.1 F | DIASTOLIC BLOOD PRESSURE: 102 MMHG | OXYGEN SATURATION: 97 % | BODY MASS INDEX: 44.65 KG/M2 | RESPIRATION RATE: 15 BRPM | SYSTOLIC BLOOD PRESSURE: 145 MMHG

## 2024-02-17 DIAGNOSIS — L03.116 CELLULITIS OF LEFT LOWER EXTREMITY: Primary | ICD-10-CM

## 2024-02-17 LAB
ALBUMIN SERPL-MCNC: 3.7 G/DL (ref 3.5–5)
ALBUMIN/GLOB SERPL: 1.1 (ref 1.1–2.2)
ALP SERPL-CCNC: 112 U/L (ref 45–117)
ALT SERPL-CCNC: 35 U/L (ref 12–78)
ANION GAP SERPL CALC-SCNC: 6 MMOL/L (ref 5–15)
AST SERPL-CCNC: 28 U/L (ref 15–37)
BASOPHILS # BLD: 0.1 K/UL (ref 0–0.1)
BASOPHILS NFR BLD: 1 % (ref 0–1)
BILIRUB SERPL-MCNC: 0.3 MG/DL (ref 0.2–1)
BUN SERPL-MCNC: 17 MG/DL (ref 6–20)
BUN/CREAT SERPL: 18 (ref 12–20)
CALCIUM SERPL-MCNC: 9.7 MG/DL (ref 8.5–10.1)
CHLORIDE SERPL-SCNC: 106 MMOL/L (ref 97–108)
CO2 SERPL-SCNC: 31 MMOL/L (ref 21–32)
CREAT SERPL-MCNC: 0.95 MG/DL (ref 0.55–1.02)
DIFFERENTIAL METHOD BLD: ABNORMAL
ECHO BSA: 2.25 M2
EOSINOPHIL # BLD: 1 K/UL (ref 0–0.4)
EOSINOPHIL NFR BLD: 9 % (ref 0–7)
ERYTHROCYTE [DISTWIDTH] IN BLOOD BY AUTOMATED COUNT: 13.5 % (ref 11.5–14.5)
GLOBULIN SER CALC-MCNC: 3.4 G/DL (ref 2–4)
GLUCOSE SERPL-MCNC: 120 MG/DL (ref 65–100)
HCT VFR BLD AUTO: 36.7 % (ref 35–47)
HGB BLD-MCNC: 11.4 G/DL (ref 11.5–16)
IMM GRANULOCYTES # BLD AUTO: 0.1 K/UL (ref 0–0.04)
IMM GRANULOCYTES NFR BLD AUTO: 1 % (ref 0–0.5)
LACTATE BLD-SCNC: 1.14 MMOL/L (ref 0.4–2)
LYMPHOCYTES # BLD: 3.1 K/UL (ref 0.8–3.5)
LYMPHOCYTES NFR BLD: 28 % (ref 12–49)
MCH RBC QN AUTO: 28.9 PG (ref 26–34)
MCHC RBC AUTO-ENTMCNC: 31.1 G/DL (ref 30–36.5)
MCV RBC AUTO: 92.9 FL (ref 80–99)
MONOCYTES # BLD: 0.9 K/UL (ref 0–1)
MONOCYTES NFR BLD: 8 % (ref 5–13)
NEUTS SEG # BLD: 5.9 K/UL (ref 1.8–8)
NEUTS SEG NFR BLD: 53 % (ref 32–75)
NRBC # BLD: 0 K/UL (ref 0–0.01)
NRBC BLD-RTO: 0 PER 100 WBC
PLATELET # BLD AUTO: 302 K/UL (ref 150–400)
PMV BLD AUTO: 10.2 FL (ref 8.9–12.9)
POTASSIUM SERPL-SCNC: 4.1 MMOL/L (ref 3.5–5.1)
PROT SERPL-MCNC: 7.1 G/DL (ref 6.4–8.2)
RBC # BLD AUTO: 3.95 M/UL (ref 3.8–5.2)
RBC MORPH BLD: ABNORMAL
SODIUM SERPL-SCNC: 143 MMOL/L (ref 136–145)
WBC # BLD AUTO: 11.1 K/UL (ref 3.6–11)

## 2024-02-17 PROCEDURE — 73630 X-RAY EXAM OF FOOT: CPT

## 2024-02-17 PROCEDURE — 96374 THER/PROPH/DIAG INJ IV PUSH: CPT

## 2024-02-17 PROCEDURE — 36415 COLL VENOUS BLD VENIPUNCTURE: CPT

## 2024-02-17 PROCEDURE — 93971 EXTREMITY STUDY: CPT

## 2024-02-17 PROCEDURE — 80053 COMPREHEN METABOLIC PANEL: CPT

## 2024-02-17 PROCEDURE — 6370000000 HC RX 637 (ALT 250 FOR IP): Performed by: STUDENT IN AN ORGANIZED HEALTH CARE EDUCATION/TRAINING PROGRAM

## 2024-02-17 PROCEDURE — 2500000003 HC RX 250 WO HCPCS: Performed by: STUDENT IN AN ORGANIZED HEALTH CARE EDUCATION/TRAINING PROGRAM

## 2024-02-17 PROCEDURE — 83605 ASSAY OF LACTIC ACID: CPT

## 2024-02-17 PROCEDURE — 85025 COMPLETE CBC W/AUTO DIFF WBC: CPT

## 2024-02-17 PROCEDURE — 99284 EMERGENCY DEPT VISIT MOD MDM: CPT

## 2024-02-17 RX ORDER — OXYCODONE HYDROCHLORIDE 5 MG/1
5 TABLET ORAL
Status: COMPLETED | OUTPATIENT
Start: 2024-02-17 | End: 2024-02-17

## 2024-02-17 RX ORDER — OXYCODONE HYDROCHLORIDE 5 MG/1
5 TABLET ORAL EVERY 6 HOURS PRN
Qty: 6 TABLET | Refills: 0 | Status: SHIPPED | OUTPATIENT
Start: 2024-02-17 | End: 2024-02-20 | Stop reason: ALTCHOICE

## 2024-02-17 RX ORDER — CEPHALEXIN 500 MG/1
500 CAPSULE ORAL 4 TIMES DAILY
Qty: 28 CAPSULE | Refills: 0 | Status: SHIPPED | OUTPATIENT
Start: 2024-02-17 | End: 2024-02-24

## 2024-02-17 RX ORDER — HYDROMORPHONE HYDROCHLORIDE 1 MG/ML
1 INJECTION, SOLUTION INTRAMUSCULAR; INTRAVENOUS; SUBCUTANEOUS
Status: COMPLETED | OUTPATIENT
Start: 2024-02-17 | End: 2024-02-17

## 2024-02-17 RX ADMIN — HYDROMORPHONE HYDROCHLORIDE 1 MG: 1 INJECTION, SOLUTION INTRAMUSCULAR; INTRAVENOUS; SUBCUTANEOUS at 14:41

## 2024-02-17 RX ADMIN — OXYCODONE 5 MG: 5 TABLET ORAL at 14:05

## 2024-02-17 ASSESSMENT — PAIN SCALES - GENERAL
PAINLEVEL_OUTOF10: 10
PAINLEVEL_OUTOF10: 7
PAINLEVEL_OUTOF10: 10

## 2024-02-17 ASSESSMENT — PAIN DESCRIPTION - ORIENTATION: ORIENTATION: LEFT

## 2024-02-17 ASSESSMENT — PAIN DESCRIPTION - LOCATION: LOCATION: FOOT

## 2024-02-17 NOTE — DISCHARGE INSTRUCTIONS
See your primary doctor in 2 days  for wound re-check. If you experience any new or concerning symptoms or your symptoms change or worsen, return to the ER as soon as possible.

## 2024-02-17 NOTE — ED PROVIDER NOTES
obtain outpatient follow up of patient’s symptoms and findings, with strict return precautions if patient develops new or worsening symptoms.            Disposition Considerations (Tests not done, Shared Decision Making, Pt Expectation of Test or Tx.):      FINAL IMPRESSION     1. Cellulitis of left lower extremity          DISPOSITION/PLAN   DISPOSITION Decision To Discharge 02/17/2024 03:38:33 PM      Discharge Note: The patient is stable for discharge home. The signs, symptoms, diagnosis, and discharge instructions have been discussed, understanding conveyed, and agreed upon. The patient is to follow up as recommended or return to ER should their symptoms worsen.      PATIENT REFERRED TO:  [facility] EMERGENCY DEPT  [address]  [facility] [id number] [phone]    If symptoms worsen    Andrew Rosado MD  [address]  [place] [address] [phone]    Schedule an appointment as soon as possible for a visit in 2 days           DISCHARGE MEDICATIONS:     Medication List        START taking these medications      cephALEXin 500 MG capsule  Commonly known as: Keflex  Take 1 capsule by mouth 4 times daily for 7 days     oxyCODONE 5 MG immediate release tablet  Commonly known as: Roxicodone  Take 1 tablet by mouth every 6 hours as needed for Pain for up to 3 days. Intended supply: 3 days. Take lowest dose possible to manage pain Max Daily Amount: 20 mg            ASK your doctor about these medications      Acetaminophen Extra Strength 500 MG Tabs     albuterol sulfate  (90 Base) MCG/ACT inhaler  Commonly known as: PROVENTIL;VENTOLIN;PROAIR     * ALPRAZolam 1 MG tablet  Commonly known as: XANAX     * ALPRAZolam 0.25 MG tablet  Commonly known as: XANAX     belimumab 400 MG Solr  Generic drug: belimumab     budesonide-formoterol 160-4.5 MCG/ACT Aero  Commonly known as: SYMBICORT     DULoxetine 60 MG extended release capsule  Commonly known as: CYMBALTA  Take 2 capsules by  mouth daily     HYDROcodone-acetaminophen 7.5-325 MG per tablet  Commonly known as: NORCO     hyoscyamine 125 MCG sublingual tablet  Commonly known as: LEVSIN/SL  Place 1 tablet under the tongue every 4 hours as needed for Cramping     meclizine 25 MG tablet  Commonly known as: ANTIVERT     mirtazapine 30 MG tablet  Commonly known as: REMERON  Take 1 tablet by mouth nightly     naloxone 4 MG/0.1ML Liqd nasal spray  1 spray by Nasal route as needed for Opioid Reversal     Nurtec 75 MG Tbdp  Generic drug: Rimegepant Sulfate     omeprazole 20 MG delayed release capsule  Commonly known as: PRILOSEC  TAKE 1 CAPSULE BY MOUTH EVERY DAY     * ondansetron 4 MG disintegrating tablet  Commonly known as: ZOFRAN-ODT  Take 1 tablet by mouth 3 times daily as needed for Nausea or Vomiting     * ondansetron 4 MG disintegrating tablet  Commonly known as: ZOFRAN-ODT  Take 1 tablet by mouth 3 times daily as needed for Nausea or Vomiting     ondansetron 4 MG tablet  Commonly known as: ZOFRAN  Take 1 tablet by mouth 3 times daily as needed for Nausea or Vomiting     pregabalin 200 MG capsule  Commonly known as: LYRICA     propranolol 10 MG tablet  Commonly known as: INDERAL     tiZANidine 4 MG tablet  Commonly known as: ZANAFLEX           * This list has 4 medication(s) that are the same as other medications prescribed for you. Read the directions carefully, and ask your doctor or other care provider to review them with you.                   Where to Get Your Medications        These medications were sent to [facility]/pharmacy #[id number] - [place], [place] - [address] - P [phone] - F [phone]  [address] [address]      Hours: 24-hours Phone: [phone]   cephALEXin 500 MG capsule  oxyCODONE 5 MG immediate release tablet           DISCONTINUED MEDICATIONS:  Discharge Medication List as of 2/17/2024  3:46 PM          I am the Primary Clinician of Record.   Kristian Leo DO (electronically

## 2024-02-19 ENCOUNTER — TELEPHONE (OUTPATIENT)
Age: 39
End: 2024-02-19

## 2024-02-19 NOTE — TELEPHONE ENCOUNTER
Received call transferred from [facility] for worsening symptoms post ED visit    Needs ED follow up     Reports worsening of swelling to Left foot and pain is so severe she can barely walk.   Pain occurs whether standing or sitting    Is taking Keflex as prescribed by ED  Did not  Oxycodone due to pain management agreement with her pain specialist, she is using her hydrocodone as prescribed    Patient is wanting to be seen in office as soon as possible

## 2024-02-20 ENCOUNTER — OFFICE VISIT (OUTPATIENT)
Age: 39
End: 2024-02-20
Payer: COMMERCIAL

## 2024-02-20 VITALS
HEART RATE: 83 BPM | RESPIRATION RATE: 16 BRPM | TEMPERATURE: 97.5 F | BODY MASS INDEX: 44.65 KG/M2 | SYSTOLIC BLOOD PRESSURE: 126 MMHG | OXYGEN SATURATION: 99 % | WEIGHT: 252 LBS | DIASTOLIC BLOOD PRESSURE: 82 MMHG | HEIGHT: 63 IN

## 2024-02-20 DIAGNOSIS — M79.672 LEFT FOOT PAIN: Primary | ICD-10-CM

## 2024-02-20 PROCEDURE — 99214 OFFICE O/P EST MOD 30 MIN: CPT | Performed by: INTERNAL MEDICINE

## 2024-02-20 PROCEDURE — 3074F SYST BP LT 130 MM HG: CPT | Performed by: INTERNAL MEDICINE

## 2024-02-20 PROCEDURE — 3079F DIAST BP 80-89 MM HG: CPT | Performed by: INTERNAL MEDICINE

## 2024-02-20 RX ORDER — OXYCODONE HYDROCHLORIDE 5 MG/1
5 TABLET ORAL EVERY 6 HOURS PRN
Qty: 20 TABLET | Refills: 0 | Status: SHIPPED | OUTPATIENT
Start: 2024-02-20 | End: 2024-02-25

## 2024-02-20 ASSESSMENT — PATIENT HEALTH QUESTIONNAIRE - PHQ9
2. FEELING DOWN, DEPRESSED OR HOPELESS: 0
SUM OF ALL RESPONSES TO PHQ QUESTIONS 1-9: 0
1. LITTLE INTEREST OR PLEASURE IN DOING THINGS: 0
SUM OF ALL RESPONSES TO PHQ QUESTIONS 1-9: 0
SUM OF ALL RESPONSES TO PHQ QUESTIONS 1-9: 0
SUM OF ALL RESPONSES TO PHQ9 QUESTIONS 1 & 2: 0
SUM OF ALL RESPONSES TO PHQ QUESTIONS 1-9: 0

## 2024-02-20 NOTE — PROGRESS NOTES
HISTORY OF PRESENT ILLNESS   Monique Goodman   is a 38 y.o.  female.   Hx  anxiety asthma chronic headaches   Er fu for left foot pain on 2/17/24 here with father  Cut to left heel with cellulitis or possible gout. Symptoms started 1 week ago  Foot xr-negative in ER. No injury per pt  Duplex-no dvt  Foot not red or swollen but wbc 11k with eos elevation -- started on keflex by ER  No fever  States the redness is less     Having severe throbbing and sharp pain and some edema of left lateral foot  Unable to bear weight-presents to office in wheelchair sometimes shaking lefrt left foot   No drainage   The cut to her heel is very shallow now  No hx gout  She states seh cannot take oral steroids due to PUD    On lortab per neurologist for headaches but she tells me she called Neuro MD that seeing PCP for pain and ok if PCP writes short term pain med since he was notified. Pain not controlled on hydrocodone 7.5 q6 hrs prn  per pt    Patient Active Problem List    Diagnosis Date Noted    JIGNESH (acute kidney injury) (Former[facility]) 01/24/2024    Acute kidney failure (HCC) 01/24/2024    Asthma 05/17/2022    Leg swelling 03/08/2022    Venous insufficiency of left leg 03/08/2022    Labile blood pressure 09/03/2021    Constipation 08/29/2021    Seizure ([facility]) 08/29/2021    Syncope 08/29/2021    Headache 08/29/2021    Chronic headache disorder 08/29/2021    Intermittent palpitations 03/16/2021    Essential hypertension 03/16/2021    Ureteric colic 10/03/2020    Ureteric calculus 10/03/2020    Cellulitis 02/24/2020    Severe obesity ([facility]) 02/22/2019    Incomplete uterovaginal prolapse 05/17/2016    Migraine with aura and without status migrainosus, not intractable 12/02/2015    Anxiety 12/05/2014    Lupus (HCC) 12/04/2014    Recurrent ventral incisional hernia 03/01/2013    Ventral hernia 02/25/2013    Abnormal CT of the abdomen 11/08/2012     Current Outpatient Medications   Medication Sig Dispense Refill    cephALEXin (KEFLEX) 500 MG

## 2024-02-20 NOTE — PROGRESS NOTES
\"Have you been to the ER, urgent care clinic since your last visit?  Hospitalized since your last visit?\"    02/17/2024 Cellulitis left foot [facility]    “Have you seen or consulted any other health care providers outside of [facility] since your last visit?”    NO

## 2024-02-29 ENCOUNTER — APPOINTMENT (OUTPATIENT)
Facility: HOSPITAL | Age: 39
End: 2024-02-29
Payer: COMMERCIAL

## 2024-02-29 ENCOUNTER — HOSPITAL ENCOUNTER (INPATIENT)
Facility: HOSPITAL | Age: 39
LOS: 1 days | Discharge: HOME | End: 2024-03-01
Attending: EMERGENCY MEDICINE | Admitting: STUDENT IN AN ORGANIZED HEALTH CARE EDUCATION/TRAINING PROGRAM
Payer: COMMERCIAL

## 2024-02-29 DIAGNOSIS — R10.9 FLANK PAIN: Primary | ICD-10-CM

## 2024-02-29 DIAGNOSIS — N30.00 ACUTE CYSTITIS WITHOUT HEMATURIA: ICD-10-CM

## 2024-02-29 DIAGNOSIS — E86.0 DEHYDRATION: ICD-10-CM

## 2024-02-29 DIAGNOSIS — N17.9 AKI (ACUTE KIDNEY INJURY) (HCC): ICD-10-CM

## 2024-02-29 PROBLEM — I95.9 HYPOTENSION: Status: ACTIVE | Noted: 2024-02-29

## 2024-02-29 LAB
ALBUMIN SERPL-MCNC: 4.2 G/DL (ref 3.5–5)
ALBUMIN/GLOB SERPL: 1.1 (ref 1.1–2.2)
ALP SERPL-CCNC: 114 U/L (ref 45–117)
ALT SERPL-CCNC: 39 U/L (ref 12–78)
ANION GAP SERPL CALC-SCNC: 7 MMOL/L (ref 5–15)
APPEARANCE UR: ABNORMAL
AST SERPL-CCNC: 35 U/L (ref 15–37)
BACTERIA URNS QL MICRO: ABNORMAL /HPF
BASOPHILS # BLD: 0 K/UL (ref 0–0.1)
BASOPHILS NFR BLD: 0 % (ref 0–1)
BILIRUB SERPL-MCNC: 0.4 MG/DL (ref 0.2–1)
BILIRUB UR QL: NEGATIVE
BUN SERPL-MCNC: 30 MG/DL (ref 6–20)
BUN/CREAT SERPL: 12 (ref 12–20)
CALCIUM SERPL-MCNC: 10.2 MG/DL (ref 8.5–10.1)
CAOX CRY URNS QL MICRO: ABNORMAL
CHLORIDE SERPL-SCNC: 100 MMOL/L (ref 97–108)
CO2 SERPL-SCNC: 27 MMOL/L (ref 21–32)
COLOR UR: ABNORMAL
COMMENT:: NORMAL
CREAT SERPL-MCNC: 2.6 MG/DL (ref 0.55–1.02)
DIFFERENTIAL METHOD BLD: ABNORMAL
EOSINOPHIL # BLD: 1.4 K/UL (ref 0–0.4)
EOSINOPHIL NFR BLD: 11 % (ref 0–7)
EPITH CASTS URNS QL MICRO: ABNORMAL /LPF
ERYTHROCYTE [DISTWIDTH] IN BLOOD BY AUTOMATED COUNT: 14.6 % (ref 11.5–14.5)
GLOBULIN SER CALC-MCNC: 3.8 G/DL (ref 2–4)
GLUCOSE SERPL-MCNC: 127 MG/DL (ref 65–100)
GLUCOSE UR STRIP.AUTO-MCNC: NEGATIVE MG/DL
HCT VFR BLD AUTO: 39.3 % (ref 35–47)
HGB BLD-MCNC: 12.5 G/DL (ref 11.5–16)
HGB UR QL STRIP: NEGATIVE
IMM GRANULOCYTES # BLD AUTO: 0 K/UL (ref 0–0.04)
IMM GRANULOCYTES NFR BLD AUTO: 0 % (ref 0–0.5)
KETONES UR QL STRIP.AUTO: 15 MG/DL
LACTATE SERPL-SCNC: 0.7 MMOL/L (ref 0.4–2)
LEUKOCYTE ESTERASE UR QL STRIP.AUTO: ABNORMAL
LYMPHOCYTES # BLD: 2.9 K/UL (ref 0.8–3.5)
LYMPHOCYTES NFR BLD: 23 % (ref 12–49)
MAGNESIUM SERPL-MCNC: 2.4 MG/DL (ref 1.6–2.4)
MCH RBC QN AUTO: 30 PG (ref 26–34)
MCHC RBC AUTO-ENTMCNC: 31.8 G/DL (ref 30–36.5)
MCV RBC AUTO: 94.2 FL (ref 80–99)
MONOCYTES # BLD: 0.8 K/UL (ref 0–1)
MONOCYTES NFR BLD: 6 % (ref 5–13)
NEUTS SEG # BLD: 7.7 K/UL (ref 1.8–8)
NEUTS SEG NFR BLD: 60 % (ref 32–75)
NITRITE UR QL STRIP.AUTO: NEGATIVE
NRBC # BLD: 0 K/UL (ref 0–0.01)
NRBC BLD-RTO: 0 PER 100 WBC
PH UR STRIP: 5 (ref 5–8)
PLATELET # BLD AUTO: 419 K/UL (ref 150–400)
PMV BLD AUTO: 10.1 FL (ref 8.9–12.9)
POTASSIUM SERPL-SCNC: 4.2 MMOL/L (ref 3.5–5.1)
PROT SERPL-MCNC: 8 G/DL (ref 6.4–8.2)
PROT UR STRIP-MCNC: 100 MG/DL
RBC # BLD AUTO: 4.17 M/UL (ref 3.8–5.2)
RBC #/AREA URNS HPF: ABNORMAL /HPF (ref 0–5)
RBC MORPH BLD: ABNORMAL
SODIUM SERPL-SCNC: 134 MMOL/L (ref 136–145)
SP GR UR REFRACTOMETRY: 1.03
SPECIMEN HOLD: NORMAL
URINE CULTURE IF INDICATED: ABNORMAL
UROBILINOGEN UR QL STRIP.AUTO: 1 EU/DL (ref 0.2–1)
WBC # BLD AUTO: 12.8 K/UL (ref 3.6–11)
WBC URNS QL MICRO: ABNORMAL /HPF (ref 0–4)

## 2024-02-29 PROCEDURE — 96361 HYDRATE IV INFUSION ADD-ON: CPT

## 2024-02-29 PROCEDURE — 2580000003 HC RX 258: Performed by: EMERGENCY MEDICINE

## 2024-02-29 PROCEDURE — 36415 COLL VENOUS BLD VENIPUNCTURE: CPT

## 2024-02-29 PROCEDURE — 2060000000 HC ICU INTERMEDIATE R&B

## 2024-02-29 PROCEDURE — 6370000000 HC RX 637 (ALT 250 FOR IP): Performed by: STUDENT IN AN ORGANIZED HEALTH CARE EDUCATION/TRAINING PROGRAM

## 2024-02-29 PROCEDURE — 83735 ASSAY OF MAGNESIUM: CPT

## 2024-02-29 PROCEDURE — 87040 BLOOD CULTURE FOR BACTERIA: CPT

## 2024-02-29 PROCEDURE — 96375 TX/PRO/DX INJ NEW DRUG ADDON: CPT

## 2024-02-29 PROCEDURE — 2500000003 HC RX 250 WO HCPCS: Performed by: STUDENT IN AN ORGANIZED HEALTH CARE EDUCATION/TRAINING PROGRAM

## 2024-02-29 PROCEDURE — 6360000002 HC RX W HCPCS: Performed by: STUDENT IN AN ORGANIZED HEALTH CARE EDUCATION/TRAINING PROGRAM

## 2024-02-29 PROCEDURE — 81001 URINALYSIS AUTO W/SCOPE: CPT

## 2024-02-29 PROCEDURE — 85025 COMPLETE CBC W/AUTO DIFF WBC: CPT

## 2024-02-29 PROCEDURE — 96376 TX/PRO/DX INJ SAME DRUG ADON: CPT

## 2024-02-29 PROCEDURE — 96365 THER/PROPH/DIAG IV INF INIT: CPT

## 2024-02-29 PROCEDURE — 2580000003 HC RX 258: Performed by: STUDENT IN AN ORGANIZED HEALTH CARE EDUCATION/TRAINING PROGRAM

## 2024-02-29 PROCEDURE — 83605 ASSAY OF LACTIC ACID: CPT

## 2024-02-29 PROCEDURE — 99285 EMERGENCY DEPT VISIT HI MDM: CPT

## 2024-02-29 PROCEDURE — 6360000002 HC RX W HCPCS: Performed by: EMERGENCY MEDICINE

## 2024-02-29 PROCEDURE — 74176 CT ABD & PELVIS W/O CONTRAST: CPT

## 2024-02-29 PROCEDURE — 80053 COMPREHEN METABOLIC PANEL: CPT

## 2024-02-29 PROCEDURE — 87086 URINE CULTURE/COLONY COUNT: CPT

## 2024-02-29 RX ORDER — POLYETHYLENE GLYCOL 3350 17 G/17G
17 POWDER, FOR SOLUTION ORAL DAILY PRN
Status: DISCONTINUED | OUTPATIENT
Start: 2024-02-29 | End: 2024-03-01 | Stop reason: HOSPADM

## 2024-02-29 RX ORDER — TIZANIDINE 4 MG/1
2 TABLET ORAL 3 TIMES DAILY PRN
Status: DISCONTINUED | OUTPATIENT
Start: 2024-02-29 | End: 2024-03-01 | Stop reason: HOSPADM

## 2024-02-29 RX ORDER — TORSEMIDE 20 MG/1
20 TABLET ORAL 2 TIMES DAILY PRN
Status: ON HOLD | COMMUNITY
End: 2024-03-01

## 2024-02-29 RX ORDER — ACETAMINOPHEN 325 MG/1
650 TABLET ORAL EVERY 4 HOURS PRN
Status: DISCONTINUED | OUTPATIENT
Start: 2024-02-29 | End: 2024-03-01

## 2024-02-29 RX ORDER — ACETAMINOPHEN 325 MG/1
650 TABLET ORAL EVERY 6 HOURS PRN
Status: DISCONTINUED | OUTPATIENT
Start: 2024-02-29 | End: 2024-03-01 | Stop reason: HOSPADM

## 2024-02-29 RX ORDER — FENTANYL CITRATE 50 UG/ML
50 INJECTION, SOLUTION INTRAMUSCULAR; INTRAVENOUS
Status: COMPLETED | OUTPATIENT
Start: 2024-02-29 | End: 2024-02-29

## 2024-02-29 RX ORDER — DULOXETIN HYDROCHLORIDE 30 MG/1
120 CAPSULE, DELAYED RELEASE ORAL DAILY
Status: DISCONTINUED | OUTPATIENT
Start: 2024-02-29 | End: 2024-03-01 | Stop reason: HOSPADM

## 2024-02-29 RX ORDER — SODIUM CHLORIDE 9 MG/ML
125 INJECTION, SOLUTION INTRAVENOUS ONCE
Status: COMPLETED | OUTPATIENT
Start: 2024-02-29 | End: 2024-02-29

## 2024-02-29 RX ORDER — PANTOPRAZOLE SODIUM 40 MG/1
40 TABLET, DELAYED RELEASE ORAL
Status: DISCONTINUED | OUTPATIENT
Start: 2024-02-29 | End: 2024-03-01 | Stop reason: HOSPADM

## 2024-02-29 RX ORDER — MIRTAZAPINE 15 MG/1
30 TABLET, FILM COATED ORAL
Status: DISCONTINUED | OUTPATIENT
Start: 2024-02-29 | End: 2024-03-01 | Stop reason: HOSPADM

## 2024-02-29 RX ORDER — ALBUTEROL SULFATE 2.5 MG/3ML
2.5 SOLUTION RESPIRATORY (INHALATION) EVERY 6 HOURS PRN
Status: DISCONTINUED | OUTPATIENT
Start: 2024-02-29 | End: 2024-03-01 | Stop reason: HOSPADM

## 2024-02-29 RX ORDER — ONDANSETRON 2 MG/ML
4 INJECTION INTRAMUSCULAR; INTRAVENOUS EVERY 6 HOURS PRN
Status: DISCONTINUED | OUTPATIENT
Start: 2024-02-29 | End: 2024-03-01 | Stop reason: HOSPADM

## 2024-02-29 RX ORDER — ENOXAPARIN SODIUM 100 MG/ML
30 INJECTION SUBCUTANEOUS 2 TIMES DAILY
Status: DISCONTINUED | OUTPATIENT
Start: 2024-02-29 | End: 2024-03-01 | Stop reason: HOSPADM

## 2024-02-29 RX ORDER — ONDANSETRON 2 MG/ML
4 INJECTION INTRAMUSCULAR; INTRAVENOUS EVERY 4 HOURS PRN
Status: DISCONTINUED | OUTPATIENT
Start: 2024-02-29 | End: 2024-03-01 | Stop reason: HOSPADM

## 2024-02-29 RX ORDER — SODIUM CHLORIDE 0.9 % (FLUSH) 0.9 %
5-40 SYRINGE (ML) INJECTION EVERY 12 HOURS SCHEDULED
Status: DISCONTINUED | OUTPATIENT
Start: 2024-02-29 | End: 2024-03-01 | Stop reason: HOSPADM

## 2024-02-29 RX ORDER — ONDANSETRON 2 MG/ML
8 INJECTION INTRAMUSCULAR; INTRAVENOUS ONCE
Status: COMPLETED | OUTPATIENT
Start: 2024-02-29 | End: 2024-02-29

## 2024-02-29 RX ORDER — HYDROCODONE BITARTRATE AND ACETAMINOPHEN 10; 325 MG/1; MG/1
1 TABLET ORAL EVERY 4 HOURS PRN
Status: DISCONTINUED | OUTPATIENT
Start: 2024-02-29 | End: 2024-03-01 | Stop reason: HOSPADM

## 2024-02-29 RX ORDER — ALPRAZOLAM 0.5 MG/1
0.5 TABLET ORAL 2 TIMES DAILY PRN
Status: DISCONTINUED | OUTPATIENT
Start: 2024-02-29 | End: 2024-03-01 | Stop reason: HOSPADM

## 2024-02-29 RX ORDER — HYDROCODONE BITARTRATE AND ACETAMINOPHEN 7.5; 325 MG/1; MG/1
1 TABLET ORAL EVERY 4 HOURS PRN
Status: DISCONTINUED | OUTPATIENT
Start: 2024-02-29 | End: 2024-03-01 | Stop reason: HOSPADM

## 2024-02-29 RX ORDER — SODIUM CHLORIDE 0.9 % (FLUSH) 0.9 %
5-40 SYRINGE (ML) INJECTION PRN
Status: DISCONTINUED | OUTPATIENT
Start: 2024-02-29 | End: 2024-03-01 | Stop reason: HOSPADM

## 2024-02-29 RX ORDER — ONDANSETRON 4 MG/1
4 TABLET, ORALLY DISINTEGRATING ORAL EVERY 8 HOURS PRN
Status: DISCONTINUED | OUTPATIENT
Start: 2024-02-29 | End: 2024-03-01 | Stop reason: HOSPADM

## 2024-02-29 RX ORDER — PREGABALIN 100 MG/1
200 CAPSULE ORAL 2 TIMES DAILY
Status: DISCONTINUED | OUTPATIENT
Start: 2024-02-29 | End: 2024-03-01 | Stop reason: HOSPADM

## 2024-02-29 RX ORDER — SODIUM CHLORIDE, SODIUM LACTATE, POTASSIUM CHLORIDE, CALCIUM CHLORIDE 600; 310; 30; 20 MG/100ML; MG/100ML; MG/100ML; MG/100ML
INJECTION, SOLUTION INTRAVENOUS CONTINUOUS
Status: ACTIVE | OUTPATIENT
Start: 2024-02-29 | End: 2024-03-01

## 2024-02-29 RX ORDER — SODIUM CHLORIDE 9 MG/ML
INJECTION, SOLUTION INTRAVENOUS PRN
Status: DISCONTINUED | OUTPATIENT
Start: 2024-02-29 | End: 2024-03-01 | Stop reason: HOSPADM

## 2024-02-29 RX ORDER — PROPRANOLOL HYDROCHLORIDE 10 MG/1
10 TABLET ORAL 2 TIMES DAILY
Status: DISCONTINUED | OUTPATIENT
Start: 2024-02-29 | End: 2024-03-01 | Stop reason: HOSPADM

## 2024-02-29 RX ORDER — TORSEMIDE 20 MG/1
20 TABLET ORAL 2 TIMES DAILY PRN
Status: DISCONTINUED | OUTPATIENT
Start: 2024-02-29 | End: 2024-03-01 | Stop reason: HOSPADM

## 2024-02-29 RX ORDER — 0.9 % SODIUM CHLORIDE 0.9 %
1000 INTRAVENOUS SOLUTION INTRAVENOUS ONCE
Status: COMPLETED | OUTPATIENT
Start: 2024-02-29 | End: 2024-02-29

## 2024-02-29 RX ORDER — ACETAMINOPHEN 650 MG/1
650 SUPPOSITORY RECTAL EVERY 6 HOURS PRN
Status: DISCONTINUED | OUTPATIENT
Start: 2024-02-29 | End: 2024-03-01 | Stop reason: HOSPADM

## 2024-02-29 RX ORDER — HYDROMORPHONE HYDROCHLORIDE 1 MG/ML
0.5 INJECTION, SOLUTION INTRAMUSCULAR; INTRAVENOUS; SUBCUTANEOUS EVERY 6 HOURS PRN
Status: DISCONTINUED | OUTPATIENT
Start: 2024-02-29 | End: 2024-03-01

## 2024-02-29 RX ADMIN — HYDROMORPHONE HYDROCHLORIDE 0.5 MG: 1 INJECTION, SOLUTION INTRAMUSCULAR; INTRAVENOUS; SUBCUTANEOUS at 22:01

## 2024-02-29 RX ADMIN — FENTANYL CITRATE 50 MCG: 50 INJECTION INTRAMUSCULAR; INTRAVENOUS at 10:26

## 2024-02-29 RX ADMIN — ENOXAPARIN SODIUM 30 MG: 100 INJECTION SUBCUTANEOUS at 21:02

## 2024-02-29 RX ADMIN — HYDROCODONE BITARTRATE AND ACETAMINOPHEN 1 TABLET: 7.5; 325 TABLET ORAL at 18:30

## 2024-02-29 RX ADMIN — PROPRANOLOL HYDROCHLORIDE 10 MG: 10 TABLET ORAL at 21:00

## 2024-02-29 RX ADMIN — TIZANIDINE 2 MG: 4 TABLET ORAL at 21:02

## 2024-02-29 RX ADMIN — PREGABALIN 200 MG: 100 CAPSULE ORAL at 13:34

## 2024-02-29 RX ADMIN — SODIUM CHLORIDE 1000 ML: 9 INJECTION, SOLUTION INTRAVENOUS at 10:26

## 2024-02-29 RX ADMIN — MIRTAZAPINE 30 MG: 15 TABLET, FILM COATED ORAL at 21:00

## 2024-02-29 RX ADMIN — SODIUM CHLORIDE, POTASSIUM CHLORIDE, SODIUM LACTATE AND CALCIUM CHLORIDE 100 ML/HR: 600; 310; 30; 20 INJECTION, SOLUTION INTRAVENOUS at 18:28

## 2024-02-29 RX ADMIN — SODIUM CHLORIDE 125 ML/HR: 9 INJECTION, SOLUTION INTRAVENOUS at 11:58

## 2024-02-29 RX ADMIN — PANTOPRAZOLE SODIUM 40 MG: 40 TABLET, DELAYED RELEASE ORAL at 18:29

## 2024-02-29 RX ADMIN — SODIUM CHLORIDE, PRESERVATIVE FREE 10 ML: 5 INJECTION INTRAVENOUS at 21:00

## 2024-02-29 RX ADMIN — PREGABALIN 200 MG: 100 CAPSULE ORAL at 21:02

## 2024-02-29 RX ADMIN — HYDROCODONE BITARTRATE AND ACETAMINOPHEN 1 TABLET: 10; 325 TABLET ORAL at 13:34

## 2024-02-29 RX ADMIN — HYDROMORPHONE HYDROCHLORIDE 0.5 MG: 1 INJECTION, SOLUTION INTRAMUSCULAR; INTRAVENOUS; SUBCUTANEOUS at 15:54

## 2024-02-29 RX ADMIN — SODIUM CHLORIDE 1000 ML: 9 INJECTION, SOLUTION INTRAVENOUS at 12:00

## 2024-02-29 RX ADMIN — DULOXETINE HYDROCHLORIDE 120 MG: 30 CAPSULE, DELAYED RELEASE ORAL at 13:34

## 2024-02-29 RX ADMIN — ENOXAPARIN SODIUM 30 MG: 100 INJECTION SUBCUTANEOUS at 13:35

## 2024-02-29 RX ADMIN — FENTANYL CITRATE 50 MCG: 50 INJECTION INTRAMUSCULAR; INTRAVENOUS at 09:01

## 2024-02-29 RX ADMIN — SODIUM CHLORIDE 1000 ML: 9 INJECTION, SOLUTION INTRAVENOUS at 09:06

## 2024-02-29 RX ADMIN — ONDANSETRON 8 MG: 2 INJECTION INTRAMUSCULAR; INTRAVENOUS at 09:02

## 2024-02-29 RX ADMIN — SODIUM CHLORIDE 1000 MG: 900 INJECTION INTRAVENOUS at 10:25

## 2024-02-29 ASSESSMENT — PAIN SCALES - GENERAL
PAINLEVEL_OUTOF10: 7
PAINLEVEL_OUTOF10: 6
PAINLEVEL_OUTOF10: 10
PAINLEVEL_OUTOF10: 7
PAINLEVEL_OUTOF10: 6
PAINLEVEL_OUTOF10: 7
PAINLEVEL_OUTOF10: 9
PAINLEVEL_OUTOF10: 10
PAINLEVEL_OUTOF10: 7

## 2024-02-29 ASSESSMENT — PAIN DESCRIPTION - LOCATION
LOCATION: BACK
LOCATION: BACK
LOCATION: FLANK
LOCATION: BACK;OTHER (COMMENT)
LOCATION: BACK
LOCATION: BACK

## 2024-02-29 ASSESSMENT — PAIN DESCRIPTION - ORIENTATION
ORIENTATION: RIGHT;LOWER
ORIENTATION: RIGHT;LEFT

## 2024-02-29 ASSESSMENT — PAIN - FUNCTIONAL ASSESSMENT
PAIN_FUNCTIONAL_ASSESSMENT: ACTIVITIES ARE NOT PREVENTED

## 2024-02-29 ASSESSMENT — PAIN DESCRIPTION - DESCRIPTORS: DESCRIPTORS: ACHING

## 2024-02-29 NOTE — ED NOTES
TRANSFER - OUT REPORT:    Verbal report given to Lupe Cohn RN on Monique Goodman  being transferred to [facility] for routine progression of patient care       Report consisted of patient's Situation, Background, Assessment and   Recommendations(SBAR).     Information from the following report(s) ED SBAR, Adult Overview, MAR, and Recent Results was reviewed with the receiving nurse.    [place] Fall Assessment:    Presents to emergency department  because of falls (Syncope, seizure, or loss of consciousness): No  Age > 70: No  Altered Mental Status, Intoxication with alcohol or substance confusion (Disorientation, impaired judgment, poor safety awaremess, or inability to follow instructions): No  Impaired Mobility: Ambulates or transfers with assistive devices or assistance; Unable to ambulate or transer.: No  Nursing Judgement: No          Lines:   Peripheral IV 02/29/24 Left Cephalic (Active)        Opportunity for questions and clarification was provided.      Patient transported with:  Registered Nurse

## 2024-02-29 NOTE — H&P
_______________________________________________________________________    TOTAL TIME:  76 Minutes    Critical Care Provided     Minutes non procedure based    Signed: Shala Garrido MD    Procedures: see electronic medical records for all procedures/Xrays and details which were not copied into this note but were reviewed prior to creation of Plan.

## 2024-02-29 NOTE — ED PROVIDER NOTES
Rate 23 (L) >60 ml/min/1.73m2    Calcium 10.2 (H) 8.5 - 10.1 MG/DL    Total Bilirubin 0.4 0.2 - 1.0 MG/DL    ALT 39 12 - 78 U/L    AST 35 15 - 37 U/L    Alk Phosphatase 114 45 - 117 U/L    Total Protein 8.0 6.4 - 8.2 g/dL    Albumin 4.2 3.5 - 5.0 g/dL    Globulin 3.8 2.0 - 4.0 g/dL    Albumin/Globulin Ratio 1.1 1.1 - 2.2     CBC with Auto Differential    Collection Time: 02/29/24  8:44 AM   Result Value Ref Range    WBC 12.8 (H) 3.6 - 11.0 K/uL    RBC 4.17 3.80 - 5.20 M/uL    Hemoglobin 12.5 11.5 - 16.0 g/dL    Hematocrit 39.3 35.0 - 47.0 %    MCV 94.2 80.0 - 99.0 FL    MCH 30.0 26.0 - 34.0 PG    MCHC 31.8 30.0 - 36.5 g/dL    RDW 14.6 (H) 11.5 - 14.5 %    Platelets 419 (H) 150 - 400 K/uL    MPV 10.1 8.9 - 12.9 FL    Nucleated RBCs 0.0 0  WBC    nRBC 0.00 0.00 - 0.01 K/uL    Neutrophils % 60 32 - 75 %    Lymphocytes % 23 12 - 49 %    Monocytes % 6 5 - 13 %    Eosinophils % 11 (H) 0 - 7 %    Basophils % 0 0 - 1 %    Immature Granulocytes 0 0.0 - 0.5 %    Neutrophils Absolute 7.7 1.8 - 8.0 K/UL    Lymphocytes Absolute 2.9 0.8 - 3.5 K/UL    Monocytes Absolute 0.8 0.0 - 1.0 K/UL    Eosinophils Absolute 1.4 (H) 0.0 - 0.4 K/UL    Basophils Absolute 0.0 0.0 - 0.1 K/UL    Absolute Immature Granulocyte 0.0 0.00 - 0.04 K/UL    Differential Type MANUAL      RBC Comment ANISOCYTOSIS  PRESENT       Extra Tubes Hold    Collection Time: 02/29/24  8:44 AM   Result Value Ref Range    Specimen HOld red,pst, blood cultures     Comment:        Add-on orders for these samples will be processed based on acceptable specimen integrity and analyte stability, which may vary by analyte.   Magnesium    Collection Time: 02/29/24  8:44 AM   Result Value Ref Range    Magnesium 2.4 1.6 - 2.4 mg/dL   Lactic Acid    Collection Time: 02/29/24  9:45 AM   Result Value Ref Range    Lactic Acid, Plasma 0.7 0.4 - 2.0 MMOL/L       Radiologic Studies -   CT ABDOMEN PELVIS WO CONTRAST Additional Contrast? None   Final Result   No renal obstruction  in time range)   sodium chloride flush 0.9 % injection 5-40 mL (has no administration in time range)   0.9 % sodium chloride infusion (has no administration in time range)   enoxaparin Sodium (LOVENOX) injection 30 mg (30 mg SubCUTAneous Given 2/29/24 1335)   ondansetron (ZOFRAN-ODT) disintegrating tablet 4 mg (has no administration in time range)     Or   ondansetron (ZOFRAN) injection 4 mg (has no administration in time range)   polyethylene glycol (GLYCOLAX) packet 17 g (has no administration in time range)   acetaminophen (TYLENOL) tablet 650 mg (has no administration in time range)     Or   acetaminophen (TYLENOL) suppository 650 mg (has no administration in time range)   HYDROcodone-acetaminophen (NORCO) 7.5-325 MG per tablet 1 tablet (has no administration in time range)   HYDROcodone-acetaminophen (NORCO)  MG per tablet 1 tablet (1 tablet Oral Given 2/29/24 1334)   HYDROmorphone HCl PF (DILAUDID) injection 0.5 mg (0.5 mg IntraVENous Given 2/29/24 1554)   arformoterol 15 mcg-budesonide 0.25 mg neb solution ( Nebulization Not Given 2/29/24 1315)   lactated ringers IV soln infusion (has no administration in time range)   albuterol (PROVENTIL) (2.5 MG/3ML) 0.083% nebulizer solution 2.5 mg (has no administration in time range)   sodium chloride 0.9 % bolus 1,000 mL (0 mLs IntraVENous Stopped 2/29/24 1027)   fentaNYL (SUBLIMAZE) injection 50 mcg (50 mcg IntraVENous Given 2/29/24 0901)   ondansetron (ZOFRAN) injection 8 mg (8 mg IntraVENous Given 2/29/24 0902)   sodium chloride 0.9 % bolus 1,000 mL (0 mLs IntraVENous Stopped 2/29/24 1227)   fentaNYL (SUBLIMAZE) injection 50 mcg (50 mcg IntraVENous Given 2/29/24 1026)   sodium chloride 0.9 % bolus 1,000 mL (0 mLs IntraVENous Stopped 2/29/24 1319)   0.9 % sodium chloride infusion (125 mL/hr IntraVENous New Bag 2/29/24 1158)        ED Course as of 02/29/24 1630   Thu Feb 29, 2024   1030 SBP dropping, MAP remains above 64. Continue IVF infusion. [JT]   1130 SBP

## 2024-03-01 VITALS
TEMPERATURE: 98.4 F | HEART RATE: 81 BPM | WEIGHT: 284.83 LBS | SYSTOLIC BLOOD PRESSURE: 126 MMHG | HEIGHT: 63 IN | RESPIRATION RATE: 16 BRPM | OXYGEN SATURATION: 95 % | BODY MASS INDEX: 50.47 KG/M2 | DIASTOLIC BLOOD PRESSURE: 57 MMHG

## 2024-03-01 LAB
ALBUMIN SERPL-MCNC: 3.8 G/DL (ref 3.5–5)
ALBUMIN/GLOB SERPL: 1.1 (ref 1.1–2.2)
ALP SERPL-CCNC: 102 U/L (ref 45–117)
ALT SERPL-CCNC: 36 U/L (ref 12–78)
ANION GAP SERPL CALC-SCNC: 3 MMOL/L (ref 5–15)
AST SERPL-CCNC: 24 U/L (ref 15–37)
BACTERIA SPEC CULT: NORMAL
BASOPHILS # BLD: 0.1 K/UL (ref 0–0.1)
BASOPHILS NFR BLD: 1 % (ref 0–1)
BILIRUB SERPL-MCNC: 0.3 MG/DL (ref 0.2–1)
BUN SERPL-MCNC: 19 MG/DL (ref 6–20)
BUN/CREAT SERPL: 14 (ref 12–20)
CALCIUM SERPL-MCNC: 9.3 MG/DL (ref 8.5–10.1)
CC UR VC: NORMAL
CHLORIDE SERPL-SCNC: 109 MMOL/L (ref 97–108)
CO2 SERPL-SCNC: 27 MMOL/L (ref 21–32)
CREAT SERPL-MCNC: 1.36 MG/DL (ref 0.55–1.02)
DIFFERENTIAL METHOD BLD: ABNORMAL
EOSINOPHIL # BLD: 1.5 K/UL (ref 0–0.4)
EOSINOPHIL NFR BLD: 15 % (ref 0–7)
ERYTHROCYTE [DISTWIDTH] IN BLOOD BY AUTOMATED COUNT: 14.6 % (ref 11.5–14.5)
GLOBULIN SER CALC-MCNC: 3.4 G/DL (ref 2–4)
GLUCOSE SERPL-MCNC: 113 MG/DL (ref 65–100)
HCT VFR BLD AUTO: 37.2 % (ref 35–47)
HGB BLD-MCNC: 11.4 G/DL (ref 11.5–16)
IMM GRANULOCYTES # BLD AUTO: 0 K/UL (ref 0–0.04)
IMM GRANULOCYTES NFR BLD AUTO: 0 % (ref 0–0.5)
LYMPHOCYTES # BLD: 4.6 K/UL (ref 0.8–3.5)
LYMPHOCYTES NFR BLD: 44 % (ref 12–49)
MCH RBC QN AUTO: 29.1 PG (ref 26–34)
MCHC RBC AUTO-ENTMCNC: 30.6 G/DL (ref 30–36.5)
MCV RBC AUTO: 94.9 FL (ref 80–99)
MONOCYTES # BLD: 1.6 K/UL (ref 0–1)
MONOCYTES NFR BLD: 16 % (ref 5–13)
NEUTS SEG # BLD: 2.5 K/UL (ref 1.8–8)
NEUTS SEG NFR BLD: 24 % (ref 32–75)
NRBC # BLD: 0 K/UL (ref 0–0.01)
NRBC BLD-RTO: 0 PER 100 WBC
PLATELET # BLD AUTO: 337 K/UL (ref 150–400)
PMV BLD AUTO: 9.9 FL (ref 8.9–12.9)
POTASSIUM SERPL-SCNC: 4.1 MMOL/L (ref 3.5–5.1)
PROT SERPL-MCNC: 7.2 G/DL (ref 6.4–8.2)
RBC # BLD AUTO: 3.92 M/UL (ref 3.8–5.2)
RBC MORPH BLD: ABNORMAL
SERVICE CMNT-IMP: NORMAL
SODIUM SERPL-SCNC: 139 MMOL/L (ref 136–145)
WBC # BLD AUTO: 10.3 K/UL (ref 3.6–11)

## 2024-03-01 PROCEDURE — 6360000002 HC RX W HCPCS: Performed by: STUDENT IN AN ORGANIZED HEALTH CARE EDUCATION/TRAINING PROGRAM

## 2024-03-01 PROCEDURE — 85025 COMPLETE CBC W/AUTO DIFF WBC: CPT

## 2024-03-01 PROCEDURE — 80053 COMPREHEN METABOLIC PANEL: CPT

## 2024-03-01 PROCEDURE — 2500000003 HC RX 250 WO HCPCS: Performed by: STUDENT IN AN ORGANIZED HEALTH CARE EDUCATION/TRAINING PROGRAM

## 2024-03-01 PROCEDURE — 6370000000 HC RX 637 (ALT 250 FOR IP): Performed by: STUDENT IN AN ORGANIZED HEALTH CARE EDUCATION/TRAINING PROGRAM

## 2024-03-01 PROCEDURE — 2580000003 HC RX 258: Performed by: STUDENT IN AN ORGANIZED HEALTH CARE EDUCATION/TRAINING PROGRAM

## 2024-03-01 PROCEDURE — 36415 COLL VENOUS BLD VENIPUNCTURE: CPT

## 2024-03-01 RX ORDER — TORSEMIDE 20 MG/1
10 TABLET ORAL 2 TIMES DAILY PRN
Qty: 30 TABLET | Refills: 0 | Status: SHIPPED | OUTPATIENT
Start: 2024-03-04

## 2024-03-01 RX ORDER — NITROFURANTOIN 25; 75 MG/1; MG/1
100 CAPSULE ORAL 2 TIMES DAILY
Qty: 14 CAPSULE | Refills: 0 | Status: SHIPPED | OUTPATIENT
Start: 2024-03-01 | End: 2024-03-08

## 2024-03-01 RX ORDER — HYDROCODONE BITARTRATE AND ACETAMINOPHEN 7.5; 325 MG/1; MG/1
1 TABLET ORAL EVERY 6 HOURS PRN
Qty: 12 TABLET | Refills: 0 | Status: SHIPPED | OUTPATIENT
Start: 2024-03-01 | End: 2024-03-04

## 2024-03-01 RX ADMIN — SODIUM CHLORIDE 1000 MG: 900 INJECTION INTRAVENOUS at 09:34

## 2024-03-01 RX ADMIN — HYDROMORPHONE HYDROCHLORIDE 0.5 MG: 1 INJECTION, SOLUTION INTRAMUSCULAR; INTRAVENOUS; SUBCUTANEOUS at 05:57

## 2024-03-01 RX ADMIN — ALPRAZOLAM 0.5 MG: 0.5 TABLET ORAL at 08:36

## 2024-03-01 RX ADMIN — HYDROCODONE BITARTRATE AND ACETAMINOPHEN 1 TABLET: 10; 325 TABLET ORAL at 04:29

## 2024-03-01 RX ADMIN — DULOXETINE HYDROCHLORIDE 120 MG: 30 CAPSULE, DELAYED RELEASE ORAL at 08:29

## 2024-03-01 RX ADMIN — PANTOPRAZOLE SODIUM 40 MG: 40 TABLET, DELAYED RELEASE ORAL at 08:29

## 2024-03-01 RX ADMIN — HYDROCODONE BITARTRATE AND ACETAMINOPHEN 1 TABLET: 10; 325 TABLET ORAL at 13:34

## 2024-03-01 RX ADMIN — ENOXAPARIN SODIUM 30 MG: 100 INJECTION SUBCUTANEOUS at 08:29

## 2024-03-01 RX ADMIN — PROPRANOLOL HYDROCHLORIDE 10 MG: 10 TABLET ORAL at 08:28

## 2024-03-01 RX ADMIN — HYDROCODONE BITARTRATE AND ACETAMINOPHEN 1 TABLET: 7.5; 325 TABLET ORAL at 09:27

## 2024-03-01 RX ADMIN — SODIUM CHLORIDE: 9 INJECTION, SOLUTION INTRAVENOUS at 09:33

## 2024-03-01 RX ADMIN — SODIUM CHLORIDE, PRESERVATIVE FREE 10 ML: 5 INJECTION INTRAVENOUS at 09:29

## 2024-03-01 RX ADMIN — PREGABALIN 200 MG: 100 CAPSULE ORAL at 08:28

## 2024-03-01 RX ADMIN — HYDROCODONE BITARTRATE AND ACETAMINOPHEN 1 TABLET: 7.5; 325 TABLET ORAL at 00:05

## 2024-03-01 ASSESSMENT — PAIN SCALES - GENERAL
PAINLEVEL_OUTOF10: 7
PAINLEVEL_OUTOF10: 4
PAINLEVEL_OUTOF10: 4
PAINLEVEL_OUTOF10: 6
PAINLEVEL_OUTOF10: 8
PAINLEVEL_OUTOF10: 6
PAINLEVEL_OUTOF10: 4

## 2024-03-01 ASSESSMENT — PAIN DESCRIPTION - DESCRIPTORS
DESCRIPTORS: ACHING
DESCRIPTORS: ACHING

## 2024-03-01 ASSESSMENT — PAIN DESCRIPTION - LOCATION
LOCATION: BACK
LOCATION: FLANK
LOCATION: FLANK

## 2024-03-01 ASSESSMENT — PAIN DESCRIPTION - ORIENTATION
ORIENTATION: RIGHT
ORIENTATION: RIGHT

## 2024-03-01 NOTE — PLAN OF CARE
Problem: Pain  Goal: Verbalizes/displays adequate comfort level or baseline comfort level  Outcome: Not Progressing  Flowsheets (Taken 2/29/2024 1943 by Raquel Diana RN)  Verbalizes/displays adequate comfort level or baseline comfort level:   Encourage patient to monitor pain and request assistance   Assess pain using appropriate pain scale     Problem: Discharge Planning  Goal: Discharge to home or other facility with appropriate resources  Outcome: Progressing  Flowsheets  Taken 2/29/2024 2142 by Raquel Diana RN  Discharge to home or other facility with appropriate resources:   Identify barriers to discharge with patient and caregiver   Arrange for needed discharge resources and transportation as appropriate  Taken 2/29/2024 1943 by Raquel Diana RN  Discharge to home or other facility with appropriate resources:   Identify barriers to discharge with patient and caregiver   Arrange for needed discharge resources and transportation as appropriate     Problem: Safety - Adult  Goal: Free from fall injury  Outcome: Progressing     Problem: Pain  Goal: Verbalizes/displays adequate comfort level or baseline comfort level  Outcome: Not Progressing  Flowsheets (Taken 2/29/2024 1943 by Raquel Diana, RN)  Verbalizes/displays adequate comfort level or baseline comfort level:   Encourage patient to monitor pain and request assistance   Assess pain using appropriate pain scale

## 2024-03-01 NOTE — CONSULTS
Requesting Provider: Shala Garrido MD - Reason for Consultation: \"Nonobstructing nephrolithiasis, desires close outpatient follow-up\"  Pre-existing [facility] Patient:   No                Patient: Monique Goodman MRN: [id number]  SSN: [id number]    YOB: 1985  Age: 38 y.o.  Sex: female     Location: 2157/01       Code Status: Full Code   PCP: Andrew Rosado MD  - [phone]   Emergency Contact:  Primary Emergency Contact: Castro,Javier, Franck Phone: [phone]   Race/Gnosticism/Language: White (non-) / Rastafarian / Speaks English   [facility] / Plan: [facility] A[facility] BETTER [facility] / Product Type: *No Product type* /    Prior Admission Data: 2/17/24 [facility] EMERGENCY DEPT Kristian Leo   Hospitalized:  Hospital Day: 2 - Admitted 2/29/2024  7:48 AM   POD # * No surgery found *  by * Surgery not found * - Blood Loss: * No surgery found * * Surgery not found *     CONSULTANTS  IP CONSULT TO NEPHROLOGY  IP CONSULT TO UROLOGY   ADMISSION DIAGNOSES  [unfilled]      Assessment/Plan:       Right flank pain   JIGNESH  Bilateral renal calculi - non-obstructing  - No urgent urological surgical intervention recommended at this time  - Agree IV antibiotics  - Recommend safe adequate hydration to be balanced based on her issues related to fluid collection  - Nephrology following  - Urology will follow-up with patient in clinic status post d/c  - Urology will sign off at this time; available for questions or concerns    D/w Dr. Venkatesh Michel     CC: [unfilled]   HPI: She is a 38 y.o. female we are asked to see out of concern for nonobstructing nephrolithiasis and patient desires a close outpatient follow-up.  She has a significant past medical history.  She states she has pain issues all the time.  She reports past medical history of ureteral stone with treatment.  When she came to the ER she had stated she had not urinated since the day prior.  The urine that she had  Occipital neuralgia, Other ill-defined conditions(799.89), Other ill-defined conditions(799.89), Syncope, and Worsening headaches.   PSurgHx:  has a past surgical history that includes gyn (2006); Urological Surgery; heent; heent; Hysterectomy; Breast cyst incision and drainage (Right, 02/27/2020); Hysterectomy (2017); Breast cyst incision and drainage (02/21/2023); Colonoscopy (09/21/2010); gyn (01/2009); hernia repair (02/28/2013); hernia repair (04/2012); egd transoral biopsy single/multiple (09/22/2010); and Cholecystectomy.  PSocHx:  reports that she has never smoked. She has never used smokeless tobacco. She reports that she does not drink alcohol and does not use drugs.   ROS:  (Complete - 10 systems) - Negative unless reported in HPI  Denies:   [x] Weight Loss (Constitutional)  [x] Dry Mouth (ENMT)  [x] Palpitations (CV)                         [x] SOB (Respiratory)  [x] Constipation (GI)  [x] Major Focal Weakness (MS)  [x] Pallor (Skin)                            [x] TIA Sx (Neuro)  [x] Hallicinations (Psych)                [x] Easy Bleeding (Heme)       Physical Exam: (Comprehesive - 8+ 1995 Systems)     (1) Constitutional:  NAD, pleasant  FIO2:   on SpO2: SpO2: 95 %       Patient Vitals for the past 24 hrs:   BP Temp Temp src Pulse Resp SpO2   03/01/24 0927 -- -- -- -- 16 --   03/01/24 0722 130/68 98.3 °F (36.8 °C) Oral 76 13 95 %   03/01/24 0434 (!) 108/51 98 °F (36.7 °C) Oral 77 16 --   03/01/24 0308 -- 98.2 °F (36.8 °C) Oral 80 -- 95 %   03/01/24 0035 -- -- -- -- 16 --   02/29/24 2201 -- -- -- -- 18 --   02/29/24 2100 (!) 157/75 -- -- 91 -- --   02/29/24 1943 (!) 157/75 98 °F (36.7 °C) Oral 91 20 94 %   02/29/24 1800 (!) 146/79 98.3 °F (36.8 °C) Oral -- 19 95 %   02/29/24 1554 -- -- -- -- 13 --   02/29/24 1300 135/70 -- -- 77 19 94 %   02/29/24 1245 117/67 -- -- 59 28 99 %   02/29/24 1230 127/73 -- -- 71 -- 98 %   02/29/24 1215 133/71 -- -- 69 13 99 %   02/29/24 1200 (!) 127/93 -- -- 67 14 94 %

## 2024-03-01 NOTE — CARE COORDINATION
Care Management Initial Assessment       RUR: 20%  Readmission? No      Patient to d/c today. CM completed initial assessment (see below). CM offered to arrange PCP follow up appointment for patient. Pt declined, reporting she has a nephrologist appointment in two weeks. CM encouraged follow up with PCP within a week of d/c as a preventative of readmission. CM explained benefits of PCP follow up, Pt continued to decline. Parents to transport pt home.          03/01/24 8015   Service Assessment   Patient Orientation Alert and Oriented   Cognition Alert   History Provided By Patient   Primary Caregiver Self   Support Systems Parent;Friends/Neighbors   Patient's Healthcare Decision Maker is: Legal Next of Kin   PCP Verified by CM Yes   Prior Functional Level Independent in ADLs/IADLs   Current Functional Level Independent in ADLs/IADLs   Can patient return to prior living arrangement Yes   Family able to assist with home care needs: Yes   Would you like for me to discuss the discharge plan with any other family members/significant others, and if so, who? No   Financial Resources Medicaid   Social/Functional History   Lives With Significant other  (boyfriend)   Type of Home House   Home Equipment None   Active  Yes   Services At/After Discharge   Mode of Transport at Discharge Other (see comment)  (parents)   Confirm Follow Up Transport Family   Condition of Participation: Discharge Planning   The Plan for Transition of Care is related to the following treatment goals: home   The Patient and/or Patient Representative was provided with a Choice of Provider? Patient   The Patient and/Or Patient Representative agree with the Discharge Plan? Yes       ALFREDO Blum, CM  [phone]

## 2024-03-01 NOTE — DISCHARGE SUMMARY
Discharge Summary    Name: Monique Goodman  [id number]  YOB: 1985 (Age: 38 y.o.)   Date of Admission: 2/29/2024  Date of Discharge: 3/1/2024  Attending Physician: No att. providers found    Discharge Diagnosis:     JIGNESH  UTI  Nonobstructing right renal calculi  Asthma  Occipital neuralgia  Anxiety  Depression  Chronic pain  Hepatic steatosis    Consultations:  IP CONSULT TO NEPHROLOGY  IP CONSULT TO UROLOGY      Brief Admission History/Reason for Admission Per Shala Garrido MD:     Monique Goodman is a 38 y.o.  female with PMHx significant for lupus, Garth-Danlos, IBS, GERD, and occipital neuralgia presenting for right-sided flank pain.     Patient reports that she noted for approximately 1 week her blood pressure fluctuated between very high and very low.  She called her nephrologist, Dr. Dilan Cunha who reportedly told her to hold the Lasix if her blood pressure was less than 120/80.  She reports she has been very diligent with monitoring this and has missed a few days secondary to her blood pressure.  She reports that yesterday she noted right-sided flank pain described as sharp and stabbing that radiates to her right pelvis.  She reports this does not feel like previous times when she has passed the kidney stone.  She does endorse some hematuria and dry mouth.  Patient has history of seeing a urologist in the past for stone intervention, does not recall her name.  Patient reports \"I know that my kidneys are getting dry when my back starts hurting really bad and that's why I came in here.\"  Endorses subjective fever, chills.  Denies CP, SOB, abdominal pain.     We were asked to admit for work up and evaluation of the above problems.    Brief Hospital Course by Main Problems:     JIGNESH  Baseline creatinine around 0.9-1.0  Patient normally follows with Dr. GIUSEPPE Cunha.  CT abdomen with no renal obstruction, nonobstructing right renal calculi     Creatinine

## 2024-03-01 NOTE — CONSULTS
[facility]         NAME:Monique Goodman  MRN:[id number]   :1985     Patient seen  Jignesh due to dehydration    Plan  No more ivf  Hold diuretics on weekend  Resume torsemide 10 mg daily on Monday  Labs next week    Okay to d/c renal stand point      Blood pressure 130/68, pulse 76, temperature 98.3 °F (36.8 °C), temperature source Oral, resp. rate 16, height 1.6 m (5' 3\"), weight 129.2 kg (284 lb 13.4 oz), SpO2 95 %.    Patient Active Problem List   Diagnosis    Cellulitis    Lupus (HCC)    Constipation    Leg swelling    Recurrent ventral incisional hernia    Seizure (HCC)    Ureteric colic    Intermittent palpitations    Essential hypertension    Ureteric calculus    Anxiety    Ventral hernia    Severe obesity (HCC)    Migraine with aura and without status migrainosus, not intractable    Abnormal CT of the abdomen    Syncope    Venous insufficiency of left leg    Labile blood pressure    Headache    Chronic headache disorder    Incomplete uterovaginal prolapse    Asthma    JIGNESH (acute kidney injury) (HCC)    Acute kidney failure (HCC)    Hypotension              Dehydration [E86.0]  Hypotension [I95.9]  Flank pain [R10.9]  JIGNESH (acute kidney injury) (HCC) [N17.9]  Acute cystitis without hematuria [N30.00]     Dilan Cunha MD  [facility]  Formerly [facility]  [address], [address]  [place] [address]  Phone - [phone]         Fax - [phone] [address], [place] [address]  Phone - [phone]        Fax - [phone]

## 2024-03-01 NOTE — PROGRESS NOTES
I have reviewed discharge instructions with the patient. The patient verbalized understanding. Discharge medications reviewed with patient and appropriate educational materials and side effects teaching were provided. Follow-up appointments reviewed. Opportunity for questions and clarification was provided.  Venous access removed without difficulty.  Patient's belongings gathered and sent with patient. Patient is ready for discharge.     LEONARDO HACKETT RN    
Shift change report given to  Faiza  
HYDROcodone-acetaminophen (NORCO)  MG per tablet 1 tablet  1 tablet Oral Q4H PRN    albuterol (PROVENTIL) (2.5 MG/3ML) 0.083% nebulizer solution 2.5 mg  2.5 mg Nebulization Q6H PRN    arformoterol 15 mcg-budesonide 0.25 mg neb solution   Nebulization BID PRN        Dilan Cunha MD  3/1/2024

## 2024-03-03 LAB
BACTERIA SPEC CULT: NORMAL
BACTERIA SPEC CULT: NORMAL
SERVICE CMNT-IMP: NORMAL
SERVICE CMNT-IMP: NORMAL

## 2024-03-04 DIAGNOSIS — F41.9 ANXIETY DISORDER, UNSPECIFIED: ICD-10-CM

## 2024-03-04 RX ORDER — ALPRAZOLAM 1 MG/1
1 TABLET ORAL DAILY PRN
Qty: 30 TABLET | Refills: 2 | Status: SHIPPED | OUTPATIENT
Start: 2024-03-04 | End: 2024-03-06 | Stop reason: SDUPTHER

## 2024-03-06 ENCOUNTER — TELEPHONE (OUTPATIENT)
Age: 39
End: 2024-03-06

## 2024-03-06 DIAGNOSIS — F41.9 ANXIETY DISORDER, UNSPECIFIED: ICD-10-CM

## 2024-03-06 RX ORDER — ALPRAZOLAM 1 MG/1
1 TABLET ORAL DAILY PRN
Qty: 30 TABLET | Refills: 2 | Status: SHIPPED | OUTPATIENT
Start: 2024-03-06 | End: 2024-06-04

## 2024-03-06 NOTE — TELEPHONE ENCOUNTER
Spoke with patient. States pharmacy is only filling 30 tablets to last 90 days due to how script is written: Take 1 tablet by mouth daily as needed for Anxiety for up to 90 days       States needs to say \" take 1 tablet by mouth daily as needed for anxiety for up to 30 days\".    Please advise.

## 2024-03-06 NOTE — TELEPHONE ENCOUNTER
Pt states that there is a problem with the script the way this is dispensed.     Please call pt to get straight as to what needs to be done.  Pt states that the script needs to say \"must last 30 days\" NOT 90 days.     Please call pt to let her know when this has been corrected.

## 2024-03-10 ENCOUNTER — HOSPITAL ENCOUNTER (EMERGENCY)
Facility: HOSPITAL | Age: 39
Discharge: HOME | End: 2024-03-10
Attending: STUDENT IN AN ORGANIZED HEALTH CARE EDUCATION/TRAINING PROGRAM
Payer: COMMERCIAL

## 2024-03-10 ENCOUNTER — APPOINTMENT (OUTPATIENT)
Facility: HOSPITAL | Age: 39
End: 2024-03-10
Payer: COMMERCIAL

## 2024-03-10 VITALS
HEART RATE: 68 BPM | TEMPERATURE: 98 F | RESPIRATION RATE: 15 BRPM | OXYGEN SATURATION: 97 % | BODY MASS INDEX: 50.94 KG/M2 | WEIGHT: 287.48 LBS | DIASTOLIC BLOOD PRESSURE: 75 MMHG | SYSTOLIC BLOOD PRESSURE: 149 MMHG | HEIGHT: 63 IN

## 2024-03-10 DIAGNOSIS — R10.9 RIGHT FLANK PAIN: Primary | ICD-10-CM

## 2024-03-10 LAB
ALBUMIN SERPL-MCNC: 3.6 G/DL (ref 3.5–5)
ALBUMIN/GLOB SERPL: 1.1 (ref 1.1–2.2)
ALP SERPL-CCNC: 117 U/L (ref 45–117)
ALT SERPL-CCNC: 20 U/L (ref 12–78)
ANION GAP SERPL CALC-SCNC: 6 MMOL/L (ref 5–15)
APPEARANCE UR: CLEAR
AST SERPL-CCNC: 17 U/L (ref 15–37)
BACTERIA URNS QL MICRO: ABNORMAL /HPF
BASOPHILS # BLD: 0.1 K/UL (ref 0–0.1)
BASOPHILS NFR BLD: 1 % (ref 0–1)
BILIRUB SERPL-MCNC: 0.2 MG/DL (ref 0.2–1)
BILIRUB UR QL: NEGATIVE
BUN SERPL-MCNC: 28 MG/DL (ref 6–20)
BUN/CREAT SERPL: 21 (ref 12–20)
CALCIUM SERPL-MCNC: 9 MG/DL (ref 8.5–10.1)
CHLORIDE SERPL-SCNC: 107 MMOL/L (ref 97–108)
CO2 SERPL-SCNC: 25 MMOL/L (ref 21–32)
COLOR UR: ABNORMAL
CREAT SERPL-MCNC: 1.31 MG/DL (ref 0.55–1.02)
DIFFERENTIAL METHOD BLD: ABNORMAL
EOSINOPHIL # BLD: 1.7 K/UL (ref 0–0.4)
EOSINOPHIL NFR BLD: 15 % (ref 0–7)
EPITH CASTS URNS QL MICRO: ABNORMAL /LPF
ERYTHROCYTE [DISTWIDTH] IN BLOOD BY AUTOMATED COUNT: 14.3 % (ref 11.5–14.5)
GLOBULIN SER CALC-MCNC: 3.2 G/DL (ref 2–4)
GLUCOSE SERPL-MCNC: 176 MG/DL (ref 65–100)
GLUCOSE UR STRIP.AUTO-MCNC: NEGATIVE MG/DL
HCG SERPL QL: NEGATIVE
HCT VFR BLD AUTO: 36.1 % (ref 35–47)
HGB BLD-MCNC: 11.3 G/DL (ref 11.5–16)
HGB UR QL STRIP: ABNORMAL
IMM GRANULOCYTES # BLD AUTO: 0.2 K/UL (ref 0–0.04)
IMM GRANULOCYTES NFR BLD AUTO: 2 % (ref 0–0.5)
KETONES UR QL STRIP.AUTO: NEGATIVE MG/DL
LEUKOCYTE ESTERASE UR QL STRIP.AUTO: NEGATIVE
LYMPHOCYTES # BLD: 4.3 K/UL (ref 0.8–3.5)
LYMPHOCYTES NFR BLD: 38 % (ref 12–49)
MCH RBC QN AUTO: 29.5 PG (ref 26–34)
MCHC RBC AUTO-ENTMCNC: 31.3 G/DL (ref 30–36.5)
MCV RBC AUTO: 94.3 FL (ref 80–99)
MONOCYTES # BLD: 1 K/UL (ref 0–1)
MONOCYTES NFR BLD: 9 % (ref 5–13)
NEUTS SEG # BLD: 3.9 K/UL (ref 1.8–8)
NEUTS SEG NFR BLD: 35 % (ref 32–75)
NITRITE UR QL STRIP.AUTO: NEGATIVE
NRBC # BLD: 0 K/UL (ref 0–0.01)
NRBC BLD-RTO: 0 PER 100 WBC
PH UR STRIP: 5.5 (ref 5–8)
PLATELET # BLD AUTO: 407 K/UL (ref 150–400)
PMV BLD AUTO: 9.8 FL (ref 8.9–12.9)
POTASSIUM SERPL-SCNC: 3.7 MMOL/L (ref 3.5–5.1)
PROT SERPL-MCNC: 6.8 G/DL (ref 6.4–8.2)
PROT UR STRIP-MCNC: 30 MG/DL
RBC # BLD AUTO: 3.83 M/UL (ref 3.8–5.2)
RBC #/AREA URNS HPF: >100 /HPF (ref 0–5)
RBC MORPH BLD: ABNORMAL
SODIUM SERPL-SCNC: 138 MMOL/L (ref 136–145)
SP GR UR REFRACTOMETRY: 1.02
URINE CULTURE IF INDICATED: ABNORMAL
UROBILINOGEN UR QL STRIP.AUTO: 0.2 EU/DL (ref 0.2–1)
WBC # BLD AUTO: 11.2 K/UL (ref 3.6–11)
WBC URNS QL MICRO: ABNORMAL /HPF (ref 0–4)

## 2024-03-10 PROCEDURE — 6360000002 HC RX W HCPCS: Performed by: STUDENT IN AN ORGANIZED HEALTH CARE EDUCATION/TRAINING PROGRAM

## 2024-03-10 PROCEDURE — 2580000003 HC RX 258: Performed by: STUDENT IN AN ORGANIZED HEALTH CARE EDUCATION/TRAINING PROGRAM

## 2024-03-10 PROCEDURE — 99284 EMERGENCY DEPT VISIT MOD MDM: CPT

## 2024-03-10 PROCEDURE — 84703 CHORIONIC GONADOTROPIN ASSAY: CPT

## 2024-03-10 PROCEDURE — 81001 URINALYSIS AUTO W/SCOPE: CPT

## 2024-03-10 PROCEDURE — 80053 COMPREHEN METABOLIC PANEL: CPT

## 2024-03-10 PROCEDURE — 74176 CT ABD & PELVIS W/O CONTRAST: CPT

## 2024-03-10 PROCEDURE — 96375 TX/PRO/DX INJ NEW DRUG ADDON: CPT

## 2024-03-10 PROCEDURE — 87086 URINE CULTURE/COLONY COUNT: CPT

## 2024-03-10 PROCEDURE — 2500000003 HC RX 250 WO HCPCS: Performed by: STUDENT IN AN ORGANIZED HEALTH CARE EDUCATION/TRAINING PROGRAM

## 2024-03-10 PROCEDURE — 96374 THER/PROPH/DIAG INJ IV PUSH: CPT

## 2024-03-10 PROCEDURE — 85025 COMPLETE CBC W/AUTO DIFF WBC: CPT

## 2024-03-10 PROCEDURE — 36415 COLL VENOUS BLD VENIPUNCTURE: CPT

## 2024-03-10 RX ORDER — ONDANSETRON 4 MG/1
4 TABLET, ORALLY DISINTEGRATING ORAL 3 TIMES DAILY PRN
Qty: 21 TABLET | Refills: 0 | Status: SHIPPED | OUTPATIENT
Start: 2024-03-10

## 2024-03-10 RX ORDER — HYDROMORPHONE HYDROCHLORIDE 1 MG/ML
1 INJECTION, SOLUTION INTRAMUSCULAR; INTRAVENOUS; SUBCUTANEOUS
Status: COMPLETED | OUTPATIENT
Start: 2024-03-10 | End: 2024-03-10

## 2024-03-10 RX ORDER — OXYCODONE HYDROCHLORIDE AND ACETAMINOPHEN 5; 325 MG/1; MG/1
1 TABLET ORAL ONCE
Status: DISCONTINUED | OUTPATIENT
Start: 2024-03-10 | End: 2024-03-10 | Stop reason: HOSPADM

## 2024-03-10 RX ORDER — 0.9 % SODIUM CHLORIDE 0.9 %
1000 INTRAVENOUS SOLUTION INTRAVENOUS ONCE
Status: COMPLETED | OUTPATIENT
Start: 2024-03-10 | End: 2024-03-10

## 2024-03-10 RX ORDER — MECLIZINE HYDROCHLORIDE 25 MG/1
25 TABLET ORAL 3 TIMES DAILY PRN
Qty: 15 TABLET | Refills: 0 | Status: SHIPPED | OUTPATIENT
Start: 2024-03-10 | End: 2024-03-20

## 2024-03-10 RX ORDER — FENTANYL CITRATE 50 UG/ML
50 INJECTION, SOLUTION INTRAMUSCULAR; INTRAVENOUS
Status: COMPLETED | OUTPATIENT
Start: 2024-03-10 | End: 2024-03-10

## 2024-03-10 RX ORDER — MORPHINE SULFATE 4 MG/ML
4 INJECTION, SOLUTION INTRAMUSCULAR; INTRAVENOUS ONCE
Status: COMPLETED | OUTPATIENT
Start: 2024-03-10 | End: 2024-03-10

## 2024-03-10 RX ORDER — ONDANSETRON 2 MG/ML
4 INJECTION INTRAMUSCULAR; INTRAVENOUS ONCE
Status: COMPLETED | OUTPATIENT
Start: 2024-03-10 | End: 2024-03-10

## 2024-03-10 RX ORDER — CEFDINIR 300 MG/1
300 CAPSULE ORAL 2 TIMES DAILY
Qty: 20 CAPSULE | Refills: 0 | Status: SHIPPED | OUTPATIENT
Start: 2024-03-10 | End: 2024-03-20

## 2024-03-10 RX ADMIN — SODIUM CHLORIDE 1000 ML: 9 INJECTION, SOLUTION INTRAVENOUS at 04:21

## 2024-03-10 RX ADMIN — ONDANSETRON 4 MG: 2 INJECTION INTRAMUSCULAR; INTRAVENOUS at 04:24

## 2024-03-10 RX ADMIN — FENTANYL CITRATE 50 MCG: 50 INJECTION INTRAMUSCULAR; INTRAVENOUS at 04:24

## 2024-03-10 RX ADMIN — HYDROMORPHONE HYDROCHLORIDE 1 MG: 1 INJECTION, SOLUTION INTRAMUSCULAR; INTRAVENOUS; SUBCUTANEOUS at 06:51

## 2024-03-10 RX ADMIN — MORPHINE SULFATE 4 MG: 4 INJECTION, SOLUTION INTRAMUSCULAR; INTRAVENOUS at 05:53

## 2024-03-10 ASSESSMENT — PAIN - FUNCTIONAL ASSESSMENT: PAIN_FUNCTIONAL_ASSESSMENT: 0-10

## 2024-03-10 ASSESSMENT — PAIN DESCRIPTION - DESCRIPTORS: DESCRIPTORS: SHARP;THROBBING

## 2024-03-10 ASSESSMENT — LIFESTYLE VARIABLES
HOW MANY STANDARD DRINKS CONTAINING ALCOHOL DO YOU HAVE ON A TYPICAL DAY: PATIENT DOES NOT DRINK
HOW OFTEN DO YOU HAVE A DRINK CONTAINING ALCOHOL: NEVER

## 2024-03-10 ASSESSMENT — PAIN SCALES - GENERAL
PAINLEVEL_OUTOF10: 8
PAINLEVEL_OUTOF10: 8

## 2024-03-10 ASSESSMENT — PAIN DESCRIPTION - LOCATION: LOCATION: BACK;FLANK

## 2024-03-10 NOTE — ED PROVIDER NOTES
Valproic Acid Other (See Comments)     Elevated heart rate and vomiting    Adhesive Tape Rash     Allergic to Dermabond    Metoclopramide Rash       CURRENT MEDICATIONS      Discharge Medication List as of 3/10/2024  6:35 AM        CONTINUE these medications which have NOT CHANGED    Details   ALPRAZolam (XANAX) 1 MG tablet Take 1 tablet by mouth daily as needed for Anxiety for up to 90 days. One qd prn anxiety Max Daily Amount: 1 mg, Disp-30 tablet, R-2Normal      torsemide (DEMADEX) 20 MG tablet Take 0.5 tablets by mouth 2 times daily as needed (Hold for BP <120/80), Disp-30 tablet, R-0Normal      mirtazapine (REMERON) 30 MG tablet Take 1 tablet by mouth nightly, Disp-90 tablet, R-1Normal      DULoxetine (CYMBALTA) 60 MG extended release capsule Take 2 capsules by mouth daily, Disp-180 capsule, R-1Normal      omeprazole (PRILOSEC) 20 MG delayed release capsule TAKE 1 CAPSULE BY MOUTH EVERY DAY, Disp-30 capsule, R-5Normal      pregabalin (LYRICA) 200 MG capsule Take 1 capsule by mouth 2 times daily.Historical Med      naloxone 4 MG/0.1ML LIQD nasal spray 1 spray by Nasal route as needed for Opioid Reversal, Disp-1 each, R-5Normal      albuterol sulfate HFA (PROVENTIL;VENTOLIN;PROAIR) 108 (90 Base) MCG/ACT inhaler Inhale 1 puff into the lungs every 6 hours as neededHistorical Med      propranolol (INDERAL) 10 MG tablet TAKE 1 TABLET BY MOUTH TWICE A DAYHistorical Med      tiZANidine (ZANAFLEX) 4 MG tablet Take 0.5 tablets by mouth 3 times daily as neededHistorical Med               PHYSICAL EXAM      ED Triage Vitals [03/10/24 4033]   Enc Vitals Group      BP (!) 149/77      Pulse 86      Respirations 20      Temp 98 °F (36.7 °C)      Temp Source Oral      SpO2 98 %      Weight - Scale 130.4 kg (287 lb 7.7 oz)      Height 1.6 m (5' 3\")      Head Circumference       Peak Flow       Pain Score       Pain Loc       Pain Edu?       Excl. in GC?               Physical Exam  Vitals and nursing note reviewed.

## 2024-03-11 LAB
BACTERIA SPEC CULT: NORMAL
SERVICE CMNT-IMP: NORMAL

## 2024-03-18 ENCOUNTER — APPOINTMENT (OUTPATIENT)
Facility: HOSPITAL | Age: 39
End: 2024-03-18
Payer: COMMERCIAL

## 2024-03-18 ENCOUNTER — HOSPITAL ENCOUNTER (EMERGENCY)
Facility: HOSPITAL | Age: 39
Discharge: HOME | End: 2024-03-18
Attending: EMERGENCY MEDICINE
Payer: COMMERCIAL

## 2024-03-18 VITALS
BODY MASS INDEX: 50.59 KG/M2 | DIASTOLIC BLOOD PRESSURE: 66 MMHG | HEART RATE: 63 BPM | OXYGEN SATURATION: 97 % | SYSTOLIC BLOOD PRESSURE: 102 MMHG | RESPIRATION RATE: 15 BRPM | TEMPERATURE: 97.4 F | HEIGHT: 63 IN | WEIGHT: 285.5 LBS

## 2024-03-18 DIAGNOSIS — R10.9 FLANK PAIN: ICD-10-CM

## 2024-03-18 DIAGNOSIS — N17.9 AKI (ACUTE KIDNEY INJURY) (HCC): Primary | ICD-10-CM

## 2024-03-18 LAB
ALBUMIN SERPL-MCNC: 3.9 G/DL (ref 3.5–5)
ALBUMIN/GLOB SERPL: 1.1 (ref 1.1–2.2)
ALP SERPL-CCNC: 120 U/L (ref 45–117)
ALT SERPL-CCNC: 26 U/L (ref 12–78)
ANION GAP SERPL CALC-SCNC: 9 MMOL/L (ref 5–15)
APPEARANCE UR: CLEAR
AST SERPL-CCNC: 22 U/L (ref 15–37)
BACTERIA URNS QL MICRO: NEGATIVE /HPF
BASOPHILS # BLD: 0.1 K/UL (ref 0–0.1)
BASOPHILS NFR BLD: 1 % (ref 0–1)
BILIRUB SERPL-MCNC: 0.3 MG/DL (ref 0.2–1)
BILIRUB UR QL: NEGATIVE
BUN SERPL-MCNC: 30 MG/DL (ref 6–20)
BUN/CREAT SERPL: 16 (ref 12–20)
CALCIUM SERPL-MCNC: 9.3 MG/DL (ref 8.5–10.1)
CHLORIDE SERPL-SCNC: 100 MMOL/L (ref 97–108)
CO2 SERPL-SCNC: 25 MMOL/L (ref 21–32)
COLOR UR: ABNORMAL
CREAT SERPL-MCNC: 1.87 MG/DL (ref 0.55–1.02)
DIFFERENTIAL METHOD BLD: ABNORMAL
EOSINOPHIL # BLD: 1.2 K/UL (ref 0–0.4)
EOSINOPHIL NFR BLD: 11 % (ref 0–7)
EPITH CASTS URNS QL MICRO: ABNORMAL /LPF
ERYTHROCYTE [DISTWIDTH] IN BLOOD BY AUTOMATED COUNT: 14.4 % (ref 11.5–14.5)
GLOBULIN SER CALC-MCNC: 3.5 G/DL (ref 2–4)
GLUCOSE SERPL-MCNC: 111 MG/DL (ref 65–100)
GLUCOSE UR STRIP.AUTO-MCNC: NEGATIVE MG/DL
HCT VFR BLD AUTO: 38.3 % (ref 35–47)
HGB BLD-MCNC: 12 G/DL (ref 11.5–16)
HGB UR QL STRIP: NEGATIVE
HYALINE CASTS URNS QL MICRO: ABNORMAL /LPF (ref 0–5)
IMM GRANULOCYTES # BLD AUTO: 0 K/UL (ref 0–0.04)
IMM GRANULOCYTES NFR BLD AUTO: 0 % (ref 0–0.5)
KETONES UR QL STRIP.AUTO: NEGATIVE MG/DL
LEUKOCYTE ESTERASE UR QL STRIP.AUTO: NEGATIVE
LIPASE SERPL-CCNC: 24 U/L (ref 13–75)
LYMPHOCYTES # BLD: 4 K/UL (ref 0.8–3.5)
LYMPHOCYTES NFR BLD: 36 % (ref 12–49)
MCH RBC QN AUTO: 29.6 PG (ref 26–34)
MCHC RBC AUTO-ENTMCNC: 31.3 G/DL (ref 30–36.5)
MCV RBC AUTO: 94.6 FL (ref 80–99)
MONOCYTES # BLD: 1 K/UL (ref 0–1)
MONOCYTES NFR BLD: 9 % (ref 5–13)
NEUTS SEG # BLD: 4.8 K/UL (ref 1.8–8)
NEUTS SEG NFR BLD: 43 % (ref 32–75)
NITRITE UR QL STRIP.AUTO: NEGATIVE
NRBC # BLD: 0 K/UL (ref 0–0.01)
NRBC BLD-RTO: 0 PER 100 WBC
NT PRO BNP: 30 PG/ML
PH UR STRIP: 5.5 (ref 5–8)
PLATELET # BLD AUTO: 412 K/UL (ref 150–400)
PMV BLD AUTO: 9.7 FL (ref 8.9–12.9)
POTASSIUM SERPL-SCNC: 3.9 MMOL/L (ref 3.5–5.1)
PROT SERPL-MCNC: 7.4 G/DL (ref 6.4–8.2)
PROT UR STRIP-MCNC: NEGATIVE MG/DL
RBC # BLD AUTO: 4.05 M/UL (ref 3.8–5.2)
RBC #/AREA URNS HPF: ABNORMAL /HPF (ref 0–5)
RBC MORPH BLD: ABNORMAL
SODIUM SERPL-SCNC: 134 MMOL/L (ref 136–145)
SP GR UR REFRACTOMETRY: 1.01
URINE CULTURE IF INDICATED: ABNORMAL
UROBILINOGEN UR QL STRIP.AUTO: 0.2 EU/DL (ref 0.2–1)
WBC # BLD AUTO: 11.1 K/UL (ref 3.6–11)
WBC URNS QL MICRO: ABNORMAL /HPF (ref 0–4)

## 2024-03-18 PROCEDURE — 2580000003 HC RX 258: Performed by: EMERGENCY MEDICINE

## 2024-03-18 PROCEDURE — 83690 ASSAY OF LIPASE: CPT

## 2024-03-18 PROCEDURE — 99284 EMERGENCY DEPT VISIT MOD MDM: CPT

## 2024-03-18 PROCEDURE — 85025 COMPLETE CBC W/AUTO DIFF WBC: CPT

## 2024-03-18 PROCEDURE — 81001 URINALYSIS AUTO W/SCOPE: CPT

## 2024-03-18 PROCEDURE — 76770 US EXAM ABDO BACK WALL COMP: CPT

## 2024-03-18 PROCEDURE — 96375 TX/PRO/DX INJ NEW DRUG ADDON: CPT

## 2024-03-18 PROCEDURE — 6360000002 HC RX W HCPCS: Performed by: STUDENT IN AN ORGANIZED HEALTH CARE EDUCATION/TRAINING PROGRAM

## 2024-03-18 PROCEDURE — 96374 THER/PROPH/DIAG INJ IV PUSH: CPT

## 2024-03-18 PROCEDURE — 36415 COLL VENOUS BLD VENIPUNCTURE: CPT

## 2024-03-18 PROCEDURE — 2500000003 HC RX 250 WO HCPCS: Performed by: EMERGENCY MEDICINE

## 2024-03-18 PROCEDURE — 2580000003 HC RX 258: Performed by: STUDENT IN AN ORGANIZED HEALTH CARE EDUCATION/TRAINING PROGRAM

## 2024-03-18 PROCEDURE — 83880 ASSAY OF NATRIURETIC PEPTIDE: CPT

## 2024-03-18 PROCEDURE — 96361 HYDRATE IV INFUSION ADD-ON: CPT

## 2024-03-18 PROCEDURE — 80053 COMPREHEN METABOLIC PANEL: CPT

## 2024-03-18 PROCEDURE — 6360000002 HC RX W HCPCS: Performed by: EMERGENCY MEDICINE

## 2024-03-18 RX ORDER — ONDANSETRON 2 MG/ML
4 INJECTION INTRAMUSCULAR; INTRAVENOUS EVERY 6 HOURS PRN
Status: DISCONTINUED | OUTPATIENT
Start: 2024-03-18 | End: 2024-03-18 | Stop reason: HOSPADM

## 2024-03-18 RX ORDER — SODIUM CHLORIDE, SODIUM LACTATE, POTASSIUM CHLORIDE, AND CALCIUM CHLORIDE .6; .31; .03; .02 G/100ML; G/100ML; G/100ML; G/100ML
500 INJECTION, SOLUTION INTRAVENOUS ONCE
Status: COMPLETED | OUTPATIENT
Start: 2024-03-18 | End: 2024-03-18

## 2024-03-18 RX ORDER — HYDROMORPHONE HYDROCHLORIDE 1 MG/ML
1 INJECTION, SOLUTION INTRAMUSCULAR; INTRAVENOUS; SUBCUTANEOUS ONCE
Status: COMPLETED | OUTPATIENT
Start: 2024-03-18 | End: 2024-03-18

## 2024-03-18 RX ORDER — 0.9 % SODIUM CHLORIDE 0.9 %
1000 INTRAVENOUS SOLUTION INTRAVENOUS ONCE
Status: COMPLETED | OUTPATIENT
Start: 2024-03-18 | End: 2024-03-18

## 2024-03-18 RX ORDER — FENTANYL CITRATE 50 UG/ML
50 INJECTION, SOLUTION INTRAMUSCULAR; INTRAVENOUS
Status: COMPLETED | OUTPATIENT
Start: 2024-03-18 | End: 2024-03-18

## 2024-03-18 RX ORDER — MORPHINE SULFATE 4 MG/ML
4 INJECTION, SOLUTION INTRAMUSCULAR; INTRAVENOUS ONCE
Status: COMPLETED | OUTPATIENT
Start: 2024-03-18 | End: 2024-03-18

## 2024-03-18 RX ORDER — 0.9 % SODIUM CHLORIDE 0.9 %
500 INTRAVENOUS SOLUTION INTRAVENOUS ONCE
Status: COMPLETED | OUTPATIENT
Start: 2024-03-18 | End: 2024-03-18

## 2024-03-18 RX ADMIN — ONDANSETRON 4 MG: 2 INJECTION INTRAMUSCULAR; INTRAVENOUS at 04:44

## 2024-03-18 RX ADMIN — SODIUM CHLORIDE 1000 ML: 9 INJECTION, SOLUTION INTRAVENOUS at 04:44

## 2024-03-18 RX ADMIN — FENTANYL CITRATE 50 MCG: 50 INJECTION INTRAMUSCULAR; INTRAVENOUS at 07:43

## 2024-03-18 RX ADMIN — SODIUM CHLORIDE 500 ML: 9 INJECTION, SOLUTION INTRAVENOUS at 05:38

## 2024-03-18 RX ADMIN — MORPHINE SULFATE 4 MG: 4 INJECTION, SOLUTION INTRAMUSCULAR; INTRAVENOUS at 04:45

## 2024-03-18 RX ADMIN — SODIUM CHLORIDE, POTASSIUM CHLORIDE, SODIUM LACTATE AND CALCIUM CHLORIDE 500 ML: 600; 310; 30; 20 INJECTION, SOLUTION INTRAVENOUS at 07:48

## 2024-03-18 RX ADMIN — HYDROMORPHONE HYDROCHLORIDE 1 MG: 1 INJECTION, SOLUTION INTRAMUSCULAR; INTRAVENOUS; SUBCUTANEOUS at 06:01

## 2024-03-18 ASSESSMENT — PAIN DESCRIPTION - FREQUENCY
FREQUENCY: CONTINUOUS

## 2024-03-18 ASSESSMENT — PAIN DESCRIPTION - DESCRIPTORS
DESCRIPTORS: STABBING;SHARP
DESCRIPTORS: THROBBING

## 2024-03-18 ASSESSMENT — PAIN DESCRIPTION - ONSET
ONSET: PROGRESSIVE
ONSET: PROGRESSIVE

## 2024-03-18 ASSESSMENT — LIFESTYLE VARIABLES
HOW MANY STANDARD DRINKS CONTAINING ALCOHOL DO YOU HAVE ON A TYPICAL DAY: PATIENT DOES NOT DRINK
HOW OFTEN DO YOU HAVE A DRINK CONTAINING ALCOHOL: NEVER
HOW MANY STANDARD DRINKS CONTAINING ALCOHOL DO YOU HAVE ON A TYPICAL DAY: PATIENT DOES NOT DRINK
HOW OFTEN DO YOU HAVE A DRINK CONTAINING ALCOHOL: NEVER

## 2024-03-18 ASSESSMENT — PAIN SCALES - GENERAL
PAINLEVEL_OUTOF10: 8
PAINLEVEL_OUTOF10: 7
PAINLEVEL_OUTOF10: 9
PAINLEVEL_OUTOF10: 6
PAINLEVEL_OUTOF10: 8
PAINLEVEL_OUTOF10: 4
PAINLEVEL_OUTOF10: 7

## 2024-03-18 ASSESSMENT — PAIN DESCRIPTION - PAIN TYPE
TYPE: ACUTE PAIN;CHRONIC PAIN

## 2024-03-18 ASSESSMENT — PAIN DESCRIPTION - LOCATION
LOCATION: ABDOMEN;BACK
LOCATION: ABDOMEN;FLANK

## 2024-03-18 ASSESSMENT — PAIN - FUNCTIONAL ASSESSMENT
PAIN_FUNCTIONAL_ASSESSMENT: PREVENTS OR INTERFERES SOME ACTIVE ACTIVITIES AND ADLS
PAIN_FUNCTIONAL_ASSESSMENT: PREVENTS OR INTERFERES SOME ACTIVE ACTIVITIES AND ADLS
PAIN_FUNCTIONAL_ASSESSMENT: PREVENTS OR INTERFERES WITH MANY ACTIVE NOT PASSIVE ACTIVITIES
PAIN_FUNCTIONAL_ASSESSMENT: 0-10

## 2024-03-18 ASSESSMENT — PAIN DESCRIPTION - ORIENTATION
ORIENTATION: RIGHT;LOWER
ORIENTATION: RIGHT
ORIENTATION: RIGHT;LOWER

## 2024-03-18 ASSESSMENT — PAIN DESCRIPTION - DIRECTION
RADIATING_TOWARDS: LEGS

## 2024-03-18 NOTE — ED NOTES
Assumed care of pt at this time. Bedside report received from COTY Puga. US at bedside, pt on monitor x3, rebecca bell in reach.

## 2024-03-18 NOTE — ED PROVIDER NOTES
[facility] EMERGENCY DEPT  EMERGENCY DEPARTMENT ENCOUNTER       Pt Name: Monique Goodman  MRN: [id number]  Birthdate 1985  Date of evaluation: 3/18/2024  Provider: Donovan Schafer DO   PCP: Andrew Rosado MD  Note Started: 4:35 AM EDT 3/18/24     CHIEF COMPLAINT       Chief Complaint   Patient presents with    Flank Pain     Pt reports frequent JIGNESH and dehydration, started worsening two days ago; bilateral flank pain that is worse on R side. Difficulty urinating and hydrating. Reports fluid retention due to these symptoms, taking diuretics worsens dehydration. Concerned for associated hypotension.        HISTORY OF PRESENT ILLNESS: 1 or more elements      History From: Patient, History limited by: none     Monique Goodman is a 38 y.o. female past medical history significant for lupus, ureteral colic, chronic kidney disease with hospitalizations for acute on chronic renal failure, twice this past year.  Presenting the emergency department today complaining of bilateral flank pain worse on the right.  Trouble urinating, but no painful urinating.  Reports nausea, vomiting.  Nothing makes her symptoms better.       Please See [facility] for Additional Details of the HPI/PMH  Nursing Notes were all reviewed and agreed with or any disagreements were addressed in the HPI.     REVIEW OF SYSTEMS        Positives and Pertinent negatives as per HPI.    PAST HISTORY     Past Medical History:  Past Medical History:   Diagnosis Date    Abnormal CT of the abdomen 11/8/2012    JIGNESH (acute kidney injury) (HCC) 1/24/2024    Asthma     Autoimmune disease (HCC)     lupus    C. difficile diarrhea     Cervical prolapse     Dehydration     Garth-Danlos syndrome     Gastrointestinal disorder     gerd, twisted colon, IBS    GERD (gastroesophageal reflux disease)     Headache(784.0)     Lupus (HCC)     Morbid obesity (HCC)     Murmur     Nausea & vomiting     Neurological disorder     cluster headaches    Occipital neuralgia     Other ill-defined

## 2024-03-18 NOTE — ED NOTES
0413: Assumed care of patient at this time from TR, patient ambulatory to the room. Patient placed on the monitor x 3, SR x 1 with call bell in reach. Patient AxO 4 with GCS 15.    0428: MD Schafer bedside    0552: MD Schafer bedside    0603: XRAY called at this time for US    0631: Patient ambulatory to the bathroom at this time for a urine sample    0638: Patient given ice and placed back on the monitor at this time    0702: US bedside at this time

## 2024-03-25 ENCOUNTER — TELEPHONE (OUTPATIENT)
Age: 39
End: 2024-03-25

## 2024-03-25 RX ORDER — AMOXICILLIN 500 MG/1
500 CAPSULE ORAL 3 TIMES DAILY
Qty: 21 CAPSULE | Refills: 0 | Status: SHIPPED | OUTPATIENT
Start: 2024-03-25 | End: 2024-04-01

## 2024-03-25 NOTE — TELEPHONE ENCOUNTER
Pt has cough, sore throat, ear pain in both ears and stopped up.  COVID neg per pt.    Pt has VV tomorrow, 3-26-24 w/Dr. Smith, but is asking for medication to be called in today for her.    [phone]      Please call pt to let her know what you can do for her.

## 2024-03-26 ENCOUNTER — TELEMEDICINE (OUTPATIENT)
Age: 39
End: 2024-03-26
Payer: COMMERCIAL

## 2024-03-26 DIAGNOSIS — J45.21 MILD INTERMITTENT ASTHMA WITH EXACERBATION: ICD-10-CM

## 2024-03-26 DIAGNOSIS — J40 BRONCHITIS: Primary | ICD-10-CM

## 2024-03-26 PROCEDURE — 99213 OFFICE O/P EST LOW 20 MIN: CPT | Performed by: FAMILY MEDICINE

## 2024-03-26 RX ORDER — METHYLPREDNISOLONE 4 MG/1
4 TABLET ORAL SEE ADMIN INSTRUCTIONS
Qty: 1 KIT | Refills: 0 | Status: SHIPPED | OUTPATIENT
Start: 2024-03-26

## 2024-03-26 RX ORDER — BUDESONIDE AND FORMOTEROL FUMARATE DIHYDRATE 160; 4.5 UG/1; UG/1
2 AEROSOL RESPIRATORY (INHALATION) 2 TIMES DAILY
COMMUNITY

## 2024-03-26 NOTE — PROGRESS NOTES
yesterday.  Stop Tessalon if not helpful for cough.  Advised patient that codeine cough syrup not appropriate with her chronic narcotic usage.    This was a telemedicine visit with video.        Rosy Smith MD

## 2024-03-28 ENCOUNTER — HOSPITAL ENCOUNTER (EMERGENCY)
Facility: HOSPITAL | Age: 39
Discharge: HOME | End: 2024-03-29
Attending: EMERGENCY MEDICINE
Payer: COMMERCIAL

## 2024-03-28 ENCOUNTER — APPOINTMENT (OUTPATIENT)
Facility: HOSPITAL | Age: 39
End: 2024-03-28
Payer: COMMERCIAL

## 2024-03-28 DIAGNOSIS — R10.9 ACUTE FLANK PAIN: ICD-10-CM

## 2024-03-28 DIAGNOSIS — K64.9 HEMORRHOIDS, UNSPECIFIED HEMORRHOID TYPE: ICD-10-CM

## 2024-03-28 DIAGNOSIS — K92.2 LOWER GI BLEED: Primary | ICD-10-CM

## 2024-03-28 DIAGNOSIS — N20.0 KIDNEY STONE: ICD-10-CM

## 2024-03-28 LAB
ALBUMIN SERPL-MCNC: 4 G/DL (ref 3.5–5)
ALBUMIN/GLOB SERPL: 1.1 (ref 1.1–2.2)
ALP SERPL-CCNC: 115 U/L (ref 45–117)
ALT SERPL-CCNC: 26 U/L (ref 12–78)
ANION GAP SERPL CALC-SCNC: 4 MMOL/L (ref 5–15)
AST SERPL-CCNC: 47 U/L (ref 15–37)
BASOPHILS # BLD: 0.1 K/UL (ref 0–0.1)
BASOPHILS NFR BLD: 1 % (ref 0–1)
BILIRUB SERPL-MCNC: 0.5 MG/DL (ref 0.2–1)
BUN SERPL-MCNC: 17 MG/DL (ref 6–20)
BUN/CREAT SERPL: 14 (ref 12–20)
CALCIUM SERPL-MCNC: 9.6 MG/DL (ref 8.5–10.1)
CHLORIDE SERPL-SCNC: 108 MMOL/L (ref 97–108)
CO2 SERPL-SCNC: 25 MMOL/L (ref 21–32)
CREAT SERPL-MCNC: 1.22 MG/DL (ref 0.55–1.02)
DIFFERENTIAL METHOD BLD: ABNORMAL
EOSINOPHIL # BLD: 1.4 K/UL (ref 0–0.4)
EOSINOPHIL NFR BLD: 14 % (ref 0–7)
ERYTHROCYTE [DISTWIDTH] IN BLOOD BY AUTOMATED COUNT: 14.2 % (ref 11.5–14.5)
GLOBULIN SER CALC-MCNC: 3.8 G/DL (ref 2–4)
GLUCOSE SERPL-MCNC: 102 MG/DL (ref 65–100)
HCT VFR BLD AUTO: 39 % (ref 35–47)
HGB BLD-MCNC: 12.1 G/DL (ref 11.5–16)
IMM GRANULOCYTES # BLD AUTO: 0 K/UL (ref 0–0.04)
IMM GRANULOCYTES NFR BLD AUTO: 0 % (ref 0–0.5)
LYMPHOCYTES # BLD: 2.8 K/UL (ref 0.8–3.5)
LYMPHOCYTES NFR BLD: 29 % (ref 12–49)
MCH RBC QN AUTO: 29.6 PG (ref 26–34)
MCHC RBC AUTO-ENTMCNC: 31 G/DL (ref 30–36.5)
MCV RBC AUTO: 95.4 FL (ref 80–99)
MONOCYTES # BLD: 0.8 K/UL (ref 0–1)
MONOCYTES NFR BLD: 8 % (ref 5–13)
NEUTS SEG # BLD: 4.6 K/UL (ref 1.8–8)
NEUTS SEG NFR BLD: 48 % (ref 32–75)
NRBC # BLD: 0 K/UL (ref 0–0.01)
NRBC BLD-RTO: 0 PER 100 WBC
PLATELET # BLD AUTO: 366 K/UL (ref 150–400)
PMV BLD AUTO: 9.6 FL (ref 8.9–12.9)
POTASSIUM SERPL-SCNC: 5.1 MMOL/L (ref 3.5–5.1)
PROT SERPL-MCNC: 7.8 G/DL (ref 6.4–8.2)
RBC # BLD AUTO: 4.09 M/UL (ref 3.8–5.2)
RBC MORPH BLD: ABNORMAL
SODIUM SERPL-SCNC: 137 MMOL/L (ref 136–145)
WBC # BLD AUTO: 9.7 K/UL (ref 3.6–11)

## 2024-03-28 PROCEDURE — 74176 CT ABD & PELVIS W/O CONTRAST: CPT

## 2024-03-28 PROCEDURE — 96361 HYDRATE IV INFUSION ADD-ON: CPT

## 2024-03-28 PROCEDURE — 2500000003 HC RX 250 WO HCPCS: Performed by: EMERGENCY MEDICINE

## 2024-03-28 PROCEDURE — 85025 COMPLETE CBC W/AUTO DIFF WBC: CPT

## 2024-03-28 PROCEDURE — 96374 THER/PROPH/DIAG INJ IV PUSH: CPT

## 2024-03-28 PROCEDURE — 80053 COMPREHEN METABOLIC PANEL: CPT

## 2024-03-28 PROCEDURE — 81001 URINALYSIS AUTO W/SCOPE: CPT

## 2024-03-28 PROCEDURE — 6360000002 HC RX W HCPCS: Performed by: EMERGENCY MEDICINE

## 2024-03-28 PROCEDURE — 2580000003 HC RX 258: Performed by: EMERGENCY MEDICINE

## 2024-03-28 PROCEDURE — 36415 COLL VENOUS BLD VENIPUNCTURE: CPT

## 2024-03-28 PROCEDURE — 99284 EMERGENCY DEPT VISIT MOD MDM: CPT

## 2024-03-28 PROCEDURE — 96376 TX/PRO/DX INJ SAME DRUG ADON: CPT

## 2024-03-28 PROCEDURE — 96375 TX/PRO/DX INJ NEW DRUG ADDON: CPT

## 2024-03-28 RX ORDER — 0.9 % SODIUM CHLORIDE 0.9 %
1000 INTRAVENOUS SOLUTION INTRAVENOUS ONCE
Status: COMPLETED | OUTPATIENT
Start: 2024-03-28 | End: 2024-03-29

## 2024-03-28 RX ORDER — ONDANSETRON 2 MG/ML
4 INJECTION INTRAMUSCULAR; INTRAVENOUS ONCE
Status: COMPLETED | OUTPATIENT
Start: 2024-03-28 | End: 2024-03-28

## 2024-03-28 RX ORDER — HYDROMORPHONE HYDROCHLORIDE 1 MG/ML
1 INJECTION, SOLUTION INTRAMUSCULAR; INTRAVENOUS; SUBCUTANEOUS
Status: COMPLETED | OUTPATIENT
Start: 2024-03-28 | End: 2024-03-28

## 2024-03-28 RX ADMIN — SODIUM CHLORIDE 1000 ML: 9 INJECTION, SOLUTION INTRAVENOUS at 22:10

## 2024-03-28 RX ADMIN — HYDROMORPHONE HYDROCHLORIDE 1 MG: 1 INJECTION, SOLUTION INTRAMUSCULAR; INTRAVENOUS; SUBCUTANEOUS at 22:11

## 2024-03-28 RX ADMIN — HYDROMORPHONE HYDROCHLORIDE 1 MG: 1 INJECTION, SOLUTION INTRAMUSCULAR; INTRAVENOUS; SUBCUTANEOUS at 23:38

## 2024-03-28 RX ADMIN — ONDANSETRON 4 MG: 2 INJECTION INTRAMUSCULAR; INTRAVENOUS at 22:10

## 2024-03-28 ASSESSMENT — PAIN - FUNCTIONAL ASSESSMENT: PAIN_FUNCTIONAL_ASSESSMENT: 0-10

## 2024-03-28 ASSESSMENT — PAIN DESCRIPTION - LOCATION: LOCATION: ABDOMEN

## 2024-03-28 ASSESSMENT — PAIN SCALES - GENERAL: PAINLEVEL_OUTOF10: 7

## 2024-03-29 VITALS
HEART RATE: 80 BPM | SYSTOLIC BLOOD PRESSURE: 147 MMHG | BODY MASS INDEX: 50.08 KG/M2 | DIASTOLIC BLOOD PRESSURE: 93 MMHG | TEMPERATURE: 98.2 F | HEIGHT: 63 IN | WEIGHT: 282.63 LBS | OXYGEN SATURATION: 99 % | RESPIRATION RATE: 11 BRPM

## 2024-03-29 LAB
APPEARANCE UR: CLEAR
BACTERIA URNS QL MICRO: NEGATIVE /HPF
BILIRUB UR QL: NEGATIVE
COLOR UR: ABNORMAL
EPITH CASTS URNS QL MICRO: ABNORMAL /LPF
GLUCOSE UR STRIP.AUTO-MCNC: NEGATIVE MG/DL
HGB UR QL STRIP: ABNORMAL
HYALINE CASTS URNS QL MICRO: ABNORMAL /LPF (ref 0–2)
KETONES UR QL STRIP.AUTO: NEGATIVE MG/DL
LEUKOCYTE ESTERASE UR QL STRIP.AUTO: NEGATIVE
NITRITE UR QL STRIP.AUTO: NEGATIVE
PH UR STRIP: 5.5 (ref 5–8)
PROT UR STRIP-MCNC: NEGATIVE MG/DL
RBC #/AREA URNS HPF: >100 /HPF (ref 0–5)
SP GR UR REFRACTOMETRY: 1.01
URINE CULTURE IF INDICATED: ABNORMAL
UROBILINOGEN UR QL STRIP.AUTO: 0.2 EU/DL (ref 0.2–1)
WBC URNS QL MICRO: ABNORMAL /HPF (ref 0–4)

## 2024-03-29 PROCEDURE — 96375 TX/PRO/DX INJ NEW DRUG ADDON: CPT

## 2024-03-29 PROCEDURE — 6360000002 HC RX W HCPCS: Performed by: EMERGENCY MEDICINE

## 2024-03-29 RX ORDER — ONDANSETRON 4 MG/1
4 TABLET, ORALLY DISINTEGRATING ORAL 3 TIMES DAILY PRN
Qty: 30 TABLET | Refills: 0 | Status: SHIPPED | OUTPATIENT
Start: 2024-03-29

## 2024-03-29 RX ORDER — HYDROCODONE BITARTRATE AND ACETAMINOPHEN 5; 325 MG/1; MG/1
1 TABLET ORAL EVERY 6 HOURS PRN
Qty: 10 TABLET | Refills: 0 | Status: SHIPPED | OUTPATIENT
Start: 2024-03-29 | End: 2024-04-01

## 2024-03-29 RX ORDER — BENZOCAINE/MENTHOL 6 MG-10 MG
LOZENGE MUCOUS MEMBRANE
Qty: 30 G | Refills: 1 | Status: SHIPPED | OUTPATIENT
Start: 2024-03-29 | End: 2024-04-05

## 2024-03-29 RX ORDER — FENTANYL CITRATE 50 UG/ML
50 INJECTION, SOLUTION INTRAMUSCULAR; INTRAVENOUS ONCE
Status: COMPLETED | OUTPATIENT
Start: 2024-03-29 | End: 2024-03-29

## 2024-03-29 RX ADMIN — FENTANYL CITRATE 50 MCG: 50 INJECTION INTRAMUSCULAR; INTRAVENOUS at 00:36

## 2024-03-29 ASSESSMENT — ENCOUNTER SYMPTOMS
NAUSEA: 0
ABDOMINAL PAIN: 0
BLOOD IN STOOL: 1
SHORTNESS OF BREATH: 0
COUGH: 0
VOMITING: 0
COLOR CHANGE: 0
BACK PAIN: 0
DIARRHEA: 0

## 2024-03-29 NOTE — DISCHARGE INSTRUCTIONS
It was a pleasure taking care of you in our Emergency Department today.  We know that when you come to [facility], you are entrusting us with your health, comfort, and safety.  Our physicians and nurses honor that trust, and truly appreciate the opportunity to care for you and your loved ones.    We also value your feedback.  If you receive a survey about your Emergency Department experience today, please fill it out.  We care about our patients' feedback, and we listen to what you have to say.  Thank you!       --- Dr. Marj Potter MD

## 2024-03-29 NOTE — ED PROVIDER NOTES
account for her severe flank pain    ED Course as of 03/29/24 0047   Fri Mar 29, 2024   0045 Patient has received multiple doses of pain medications but unfortunately without sufficient control of her pain.  Noted workup has confirmed that there are no acute or life-threatening abnormalities to account for her pain and the pain may be either musculoskeletal or just acute on chronic pain that she has been experiencing in the past, will attempt to transition to oral pain medications and reassess. [TZ]      ED Course User Index  [TZ] Marj Potter MD       Progress Note:   I have re-examined the patient. I engaged patient in shared decision making re disposition. Patient/caregiver reports feeling well and comfortable with plan to discharge followed by close PCP/Specialist follow-up. No other symptoms or concerns.     Disposition Considerations (Tests not done, Shared Decision Making, Pt Expectation of Test or Tx.):      I have discussed with the patient and/or caregiver my initial clinical impression which is based on an evidence-based clinical evaluation of the patient and interpretation of available results. Involved patient and/or caregiver in management, treatment options and final disposition. Patient/caregiver verbalize understanding of and agreement.    Patient was given the following medications:  Medications   HYDROmorphone HCl PF (DILAUDID) injection 1 mg (1 mg IntraVENous Given 3/28/24 2211)   ondansetron (ZOFRAN) injection 4 mg (4 mg IntraVENous Given 3/28/24 2210)   sodium chloride 0.9 % bolus 1,000 mL (1,000 mLs IntraVENous New Bag 3/28/24 2210)   HYDROmorphone HCl PF (DILAUDID) injection 1 mg (1 mg IntraVENous Given 3/28/24 2338)           CONSULTS: (Who and What was discussed)  None      ED Orders Placed:  Orders Placed This Encounter   Procedures   • CT ABDOMEN PELVIS WO CONTRAST Additional Contrast? None   • CBC with Auto Differential   • CMP   • Urinalysis with Reflex to Culture   • Vital Signs

## 2024-04-04 RX ORDER — AMOXICILLIN 500 MG/1
CAPSULE ORAL
Qty: 21 CAPSULE | Refills: 0 | Status: SHIPPED | OUTPATIENT
Start: 2024-04-04

## 2024-04-07 ENCOUNTER — HOSPITAL ENCOUNTER (EMERGENCY)
Facility: HOSPITAL | Age: 39
Discharge: HOME | End: 2024-04-07
Payer: COMMERCIAL

## 2024-04-07 VITALS
OXYGEN SATURATION: 98 % | RESPIRATION RATE: 16 BRPM | TEMPERATURE: 97.5 F | HEART RATE: 54 BPM | SYSTOLIC BLOOD PRESSURE: 124 MMHG | DIASTOLIC BLOOD PRESSURE: 68 MMHG | HEIGHT: 63 IN | WEIGHT: 293 LBS | BODY MASS INDEX: 51.91 KG/M2

## 2024-04-07 DIAGNOSIS — N20.0 KIDNEY STONE: ICD-10-CM

## 2024-04-07 DIAGNOSIS — R10.9 FLANK PAIN: Primary | ICD-10-CM

## 2024-04-07 LAB
ALBUMIN SERPL-MCNC: 3.6 G/DL (ref 3.5–5)
ALBUMIN/GLOB SERPL: 1.2 (ref 1.1–2.2)
ALP SERPL-CCNC: 110 U/L (ref 45–117)
ALT SERPL-CCNC: 23 U/L (ref 12–78)
ANION GAP SERPL CALC-SCNC: 3 MMOL/L (ref 5–15)
APPEARANCE UR: CLEAR
AST SERPL-CCNC: 16 U/L (ref 15–37)
BACTERIA URNS QL MICRO: NEGATIVE /HPF
BASOPHILS # BLD: 0.1 K/UL (ref 0–0.1)
BASOPHILS NFR BLD: 1 % (ref 0–1)
BILIRUB SERPL-MCNC: 0.3 MG/DL (ref 0.2–1)
BILIRUB UR QL: NEGATIVE
BUN SERPL-MCNC: 20 MG/DL (ref 6–20)
BUN/CREAT SERPL: 16 (ref 12–20)
CALCIUM SERPL-MCNC: 9.5 MG/DL (ref 8.5–10.1)
CHLORIDE SERPL-SCNC: 107 MMOL/L (ref 97–108)
CO2 SERPL-SCNC: 28 MMOL/L (ref 21–32)
COLOR UR: ABNORMAL
CREAT SERPL-MCNC: 1.28 MG/DL (ref 0.55–1.02)
DIFFERENTIAL METHOD BLD: ABNORMAL
EKG ATRIAL RATE: 59 BPM
EKG DIAGNOSIS: NORMAL
EKG P AXIS: 52 DEGREES
EKG P-R INTERVAL: 162 MS
EKG Q-T INTERVAL: 384 MS
EKG QRS DURATION: 70 MS
EKG QTC CALCULATION (BAZETT): 380 MS
EKG R AXIS: 57 DEGREES
EKG T AXIS: 37 DEGREES
EKG VENTRICULAR RATE: 59 BPM
EOSINOPHIL # BLD: 0.6 K/UL (ref 0–0.4)
EOSINOPHIL NFR BLD: 7 % (ref 0–7)
EPITH CASTS URNS QL MICRO: ABNORMAL /LPF
ERYTHROCYTE [DISTWIDTH] IN BLOOD BY AUTOMATED COUNT: 14.1 % (ref 11.5–14.5)
GLOBULIN SER CALC-MCNC: 3 G/DL (ref 2–4)
GLUCOSE SERPL-MCNC: 113 MG/DL (ref 65–100)
GLUCOSE UR STRIP.AUTO-MCNC: NEGATIVE MG/DL
HCT VFR BLD AUTO: 33.7 % (ref 35–47)
HGB BLD-MCNC: 10.6 G/DL (ref 11.5–16)
HGB UR QL STRIP: ABNORMAL
HYALINE CASTS URNS QL MICRO: ABNORMAL /LPF (ref 0–2)
IMM GRANULOCYTES # BLD AUTO: 0 K/UL (ref 0–0.04)
IMM GRANULOCYTES NFR BLD AUTO: 0 % (ref 0–0.5)
KETONES UR QL STRIP.AUTO: NEGATIVE MG/DL
LEUKOCYTE ESTERASE UR QL STRIP.AUTO: ABNORMAL
LIPASE SERPL-CCNC: 29 U/L (ref 13–75)
LYMPHOCYTES # BLD: 2.7 K/UL (ref 0.8–3.5)
LYMPHOCYTES NFR BLD: 32 % (ref 12–49)
MCH RBC QN AUTO: 29.3 PG (ref 26–34)
MCHC RBC AUTO-ENTMCNC: 31.5 G/DL (ref 30–36.5)
MCV RBC AUTO: 93.1 FL (ref 80–99)
MONOCYTES # BLD: 0.9 K/UL (ref 0–1)
MONOCYTES NFR BLD: 11 % (ref 5–13)
NEUTS SEG # BLD: 4.2 K/UL (ref 1.8–8)
NEUTS SEG NFR BLD: 49 % (ref 32–75)
NITRITE UR QL STRIP.AUTO: NEGATIVE
NRBC # BLD: 0 K/UL (ref 0–0.01)
NRBC BLD-RTO: 0 PER 100 WBC
NT PRO BNP: 158 PG/ML
PH UR STRIP: 6 (ref 5–8)
PLATELET # BLD AUTO: 303 K/UL (ref 150–400)
PMV BLD AUTO: 9.7 FL (ref 8.9–12.9)
POTASSIUM SERPL-SCNC: 4.1 MMOL/L (ref 3.5–5.1)
PROT SERPL-MCNC: 6.6 G/DL (ref 6.4–8.2)
PROT UR STRIP-MCNC: NEGATIVE MG/DL
RBC # BLD AUTO: 3.62 M/UL (ref 3.8–5.2)
RBC #/AREA URNS HPF: ABNORMAL /HPF (ref 0–5)
SODIUM SERPL-SCNC: 138 MMOL/L (ref 136–145)
SP GR UR REFRACTOMETRY: 1.01
URINE CULTURE IF INDICATED: ABNORMAL
UROBILINOGEN UR QL STRIP.AUTO: 0.2 EU/DL (ref 0.2–1)
WBC # BLD AUTO: 8.6 K/UL (ref 3.6–11)
WBC URNS QL MICRO: ABNORMAL /HPF (ref 0–4)

## 2024-04-07 PROCEDURE — 87077 CULTURE AEROBIC IDENTIFY: CPT

## 2024-04-07 PROCEDURE — 36415 COLL VENOUS BLD VENIPUNCTURE: CPT

## 2024-04-07 PROCEDURE — 2500000003 HC RX 250 WO HCPCS

## 2024-04-07 PROCEDURE — 99284 EMERGENCY DEPT VISIT MOD MDM: CPT

## 2024-04-07 PROCEDURE — 83880 ASSAY OF NATRIURETIC PEPTIDE: CPT

## 2024-04-07 PROCEDURE — 81001 URINALYSIS AUTO W/SCOPE: CPT

## 2024-04-07 PROCEDURE — 85025 COMPLETE CBC W/AUTO DIFF WBC: CPT

## 2024-04-07 PROCEDURE — 96376 TX/PRO/DX INJ SAME DRUG ADON: CPT

## 2024-04-07 PROCEDURE — 96375 TX/PRO/DX INJ NEW DRUG ADDON: CPT

## 2024-04-07 PROCEDURE — 93005 ELECTROCARDIOGRAM TRACING: CPT

## 2024-04-07 PROCEDURE — 80053 COMPREHEN METABOLIC PANEL: CPT

## 2024-04-07 PROCEDURE — 96374 THER/PROPH/DIAG INJ IV PUSH: CPT

## 2024-04-07 PROCEDURE — 87086 URINE CULTURE/COLONY COUNT: CPT

## 2024-04-07 PROCEDURE — 2580000003 HC RX 258

## 2024-04-07 PROCEDURE — 83690 ASSAY OF LIPASE: CPT

## 2024-04-07 PROCEDURE — 6360000002 HC RX W HCPCS

## 2024-04-07 PROCEDURE — 87186 SC STD MICRODIL/AGAR DIL: CPT

## 2024-04-07 RX ORDER — HYDROMORPHONE HYDROCHLORIDE 1 MG/ML
1 INJECTION, SOLUTION INTRAMUSCULAR; INTRAVENOUS; SUBCUTANEOUS
Status: COMPLETED | OUTPATIENT
Start: 2024-04-07 | End: 2024-04-07

## 2024-04-07 RX ORDER — ONDANSETRON 4 MG/1
4 TABLET, ORALLY DISINTEGRATING ORAL 3 TIMES DAILY PRN
Qty: 21 TABLET | Refills: 0 | Status: SHIPPED | OUTPATIENT
Start: 2024-04-07

## 2024-04-07 RX ORDER — MORPHINE SULFATE 2 MG/ML
2 INJECTION, SOLUTION INTRAMUSCULAR; INTRAVENOUS
Status: COMPLETED | OUTPATIENT
Start: 2024-04-07 | End: 2024-04-07

## 2024-04-07 RX ORDER — ONDANSETRON 2 MG/ML
4 INJECTION INTRAMUSCULAR; INTRAVENOUS EVERY 6 HOURS PRN
Status: DISCONTINUED | OUTPATIENT
Start: 2024-04-07 | End: 2024-04-07 | Stop reason: HOSPADM

## 2024-04-07 RX ORDER — 0.9 % SODIUM CHLORIDE 0.9 %
1000 INTRAVENOUS SOLUTION INTRAVENOUS ONCE
Status: COMPLETED | OUTPATIENT
Start: 2024-04-07 | End: 2024-04-07

## 2024-04-07 RX ADMIN — SODIUM CHLORIDE 1000 ML: 9 INJECTION, SOLUTION INTRAVENOUS at 06:52

## 2024-04-07 RX ADMIN — HYDROMORPHONE HYDROCHLORIDE 1 MG: 1 INJECTION, SOLUTION INTRAMUSCULAR; INTRAVENOUS; SUBCUTANEOUS at 10:20

## 2024-04-07 RX ADMIN — ONDANSETRON 4 MG: 2 INJECTION INTRAMUSCULAR; INTRAVENOUS at 06:51

## 2024-04-07 RX ADMIN — MORPHINE SULFATE 2 MG: 2 INJECTION, SOLUTION INTRAMUSCULAR; INTRAVENOUS at 06:52

## 2024-04-07 RX ADMIN — HYDROMORPHONE HYDROCHLORIDE 1 MG: 1 INJECTION, SOLUTION INTRAMUSCULAR; INTRAVENOUS; SUBCUTANEOUS at 07:39

## 2024-04-07 ASSESSMENT — PAIN SCALES - GENERAL
PAINLEVEL_OUTOF10: 10
PAINLEVEL_OUTOF10: 8

## 2024-04-07 ASSESSMENT — PAIN DESCRIPTION - ORIENTATION
ORIENTATION: LEFT
ORIENTATION: LEFT;RIGHT

## 2024-04-07 ASSESSMENT — PAIN DESCRIPTION - PAIN TYPE: TYPE: ACUTE PAIN

## 2024-04-07 ASSESSMENT — PAIN DESCRIPTION - LOCATION
LOCATION: BACK
LOCATION: FLANK

## 2024-04-07 ASSESSMENT — PAIN - FUNCTIONAL ASSESSMENT: PAIN_FUNCTIONAL_ASSESSMENT: 0-10

## 2024-04-07 NOTE — ED NOTES
Pt asked to provide a urine sample.  Pt ambulated to the restroom with a steady gait to provide sample.

## 2024-04-07 NOTE — ED PROVIDER NOTES
[facility] EMERGENCY DEPT  EMERGENCY DEPARTMENT ENCOUNTER       Pt Name: Monique Goodman  MRN: [id number]  Birthdate 1985  Date of evaluation: 4/7/2024  Provider: Dee Fernandez PA-C   PCP: Andrew Rosado MD  Note Started: 6:39 AM EDT 4/7/24     CHIEF COMPLAINT       Chief Complaint   Patient presents with    Flank Pain     Ambulatory to triage with cc of b/l flank pain, 8/10. States urine is dark and only urinated 2x yesterday. Reports hx of JIGNESH. States she took BP at home and it was 70s/30s around 0300, 113/71 in triage.         HISTORY OF PRESENT ILLNESS: 1 or more elements      History From: Patient  HPI Limitations: None     Monique Goodman is a 38 y.o. female who presents with bilateral flank pain, history of JIGNESH, states feels like this is happening again.     Nursing Notes were all reviewed and agreed with or any disagreements were addressed in the HPI.     REVIEW OF SYSTEMS      Review of Systems   Constitutional:  Positive for chills and fatigue.   HENT: Negative.     Eyes: Negative.    Respiratory: Negative.  Negative for chest tightness and shortness of breath.    Cardiovascular:  Positive for leg swelling. Negative for chest pain.   Gastrointestinal: Negative.    Endocrine: Negative.    Genitourinary:  Positive for decreased urine volume.   Musculoskeletal:  Positive for back pain.   Skin: Negative.    Allergic/Immunologic: Negative.    Neurological: Negative.  Negative for dizziness and headaches.   Hematological: Negative.    Psychiatric/Behavioral: Negative.          Positives and Pertinent negatives as per HPI.    PAST HISTORY     Past Medical History:  Past Medical History:   Diagnosis Date    Abnormal CT of the abdomen 11/8/2012    JIGNESH (acute kidney injury) (HCC) 1/24/2024    Asthma     Autoimmune disease (HCC)     lupus    C. difficile diarrhea     Cervical prolapse     Dehydration     Garth-Danlos syndrome     Gastrointestinal disorder     gerd, twisted colon, IBS    GERD (gastroesophageal

## 2024-04-08 ENCOUNTER — TELEPHONE (OUTPATIENT)
Age: 39
End: 2024-04-08

## 2024-04-08 NOTE — TELEPHONE ENCOUNTER
----- Message from Carmencita Hernándezjake sent at 4/8/2024 11:35 AM EDT -----  Subject: Appointment Request    Reason for Call: Established Patient Appointment needed: Routine ED Follow   Up Visit    QUESTIONS    Reason for appointment request? No appointments available during search     Additional Information for Provider? Monique was seen on 04/07/2024   [facility] ED for flank pain and dark urine,   shortness of breath. She would like to schedule an ED follow up and   discuss weight loss medication as well as test results, no availabilities   showing, she can be reached at [phone] ok to leave a message  ---------------------------------------------------------------------------  --------------  CALL BACK INFO  [phone]; OK to leave message on voicemail  ---------------------------------------------------------------------------  --------------  SCRIPT ANSWERS

## 2024-04-09 LAB
BACTERIA SPEC CULT: ABNORMAL
BACTERIA SPEC CULT: ABNORMAL
CC UR VC: ABNORMAL
SERVICE CMNT-IMP: ABNORMAL

## 2024-04-17 ENCOUNTER — HOSPITAL ENCOUNTER (EMERGENCY)
Facility: HOSPITAL | Age: 39
Discharge: HOME | End: 2024-04-17
Attending: EMERGENCY MEDICINE
Payer: COMMERCIAL

## 2024-04-17 VITALS
HEART RATE: 63 BPM | BODY MASS INDEX: 50.82 KG/M2 | SYSTOLIC BLOOD PRESSURE: 151 MMHG | OXYGEN SATURATION: 100 % | TEMPERATURE: 98.2 F | RESPIRATION RATE: 10 BRPM | HEIGHT: 63 IN | WEIGHT: 286.82 LBS | DIASTOLIC BLOOD PRESSURE: 87 MMHG

## 2024-04-17 DIAGNOSIS — F11.20 OPIATE DEPENDENCE, CONTINUOUS (HCC): ICD-10-CM

## 2024-04-17 DIAGNOSIS — R82.79 POSITIVE URINE CULTURE: ICD-10-CM

## 2024-04-17 DIAGNOSIS — M54.50 CHRONIC RIGHT-SIDED LOW BACK PAIN WITHOUT SCIATICA: Primary | ICD-10-CM

## 2024-04-17 DIAGNOSIS — G89.29 CHRONIC RIGHT-SIDED LOW BACK PAIN WITHOUT SCIATICA: Primary | ICD-10-CM

## 2024-04-17 LAB
ALBUMIN SERPL-MCNC: 4.4 G/DL (ref 3.5–5)
ALBUMIN/GLOB SERPL: 1.3 (ref 1.1–2.2)
ALP SERPL-CCNC: 116 U/L (ref 45–117)
ALT SERPL-CCNC: 36 U/L (ref 12–78)
ANION GAP SERPL CALC-SCNC: 4 MMOL/L (ref 5–15)
APPEARANCE UR: CLEAR
AST SERPL-CCNC: 29 U/L (ref 15–37)
BACTERIA URNS QL MICRO: NEGATIVE /HPF
BASOPHILS # BLD: 0.1 K/UL (ref 0–0.1)
BASOPHILS NFR BLD: 1 % (ref 0–1)
BILIRUB SERPL-MCNC: 0.5 MG/DL (ref 0.2–1)
BILIRUB UR QL: NEGATIVE
BUN SERPL-MCNC: 12 MG/DL (ref 6–20)
BUN/CREAT SERPL: 10 (ref 12–20)
CALCIUM SERPL-MCNC: 10.2 MG/DL (ref 8.5–10.1)
CHLORIDE SERPL-SCNC: 104 MMOL/L (ref 97–108)
CO2 SERPL-SCNC: 29 MMOL/L (ref 21–32)
COLOR UR: ABNORMAL
CREAT SERPL-MCNC: 1.2 MG/DL (ref 0.55–1.02)
DIFFERENTIAL METHOD BLD: ABNORMAL
EKG ATRIAL RATE: 74 BPM
EKG DIAGNOSIS: NORMAL
EKG P AXIS: 29 DEGREES
EKG P-R INTERVAL: 202 MS
EKG Q-T INTERVAL: 366 MS
EKG QRS DURATION: 82 MS
EKG QTC CALCULATION (BAZETT): 406 MS
EKG R AXIS: 42 DEGREES
EKG T AXIS: 21 DEGREES
EKG VENTRICULAR RATE: 74 BPM
EOSINOPHIL # BLD: 0.8 K/UL (ref 0–0.4)
EOSINOPHIL NFR BLD: 7 % (ref 0–7)
EPITH CASTS URNS QL MICRO: ABNORMAL /LPF
ERYTHROCYTE [DISTWIDTH] IN BLOOD BY AUTOMATED COUNT: 13.8 % (ref 11.5–14.5)
GLOBULIN SER CALC-MCNC: 3.5 G/DL (ref 2–4)
GLUCOSE SERPL-MCNC: 93 MG/DL (ref 65–100)
GLUCOSE UR STRIP.AUTO-MCNC: NEGATIVE MG/DL
HCT VFR BLD AUTO: 38.8 % (ref 35–47)
HGB BLD-MCNC: 12.5 G/DL (ref 11.5–16)
HGB UR QL STRIP: ABNORMAL
HYALINE CASTS URNS QL MICRO: ABNORMAL /LPF (ref 0–2)
IMM GRANULOCYTES # BLD AUTO: 0.1 K/UL (ref 0–0.04)
IMM GRANULOCYTES NFR BLD AUTO: 1 % (ref 0–0.5)
KETONES UR QL STRIP.AUTO: NEGATIVE MG/DL
LEUKOCYTE ESTERASE UR QL STRIP.AUTO: ABNORMAL
LIPASE SERPL-CCNC: 25 U/L (ref 13–75)
LYMPHOCYTES # BLD: 3.6 K/UL (ref 0.8–3.5)
LYMPHOCYTES NFR BLD: 31 % (ref 12–49)
MCH RBC QN AUTO: 30.1 PG (ref 26–34)
MCHC RBC AUTO-ENTMCNC: 32.2 G/DL (ref 30–36.5)
MCV RBC AUTO: 93.5 FL (ref 80–99)
MONOCYTES # BLD: 0.9 K/UL (ref 0–1)
MONOCYTES NFR BLD: 8 % (ref 5–13)
NEUTS SEG # BLD: 6.2 K/UL (ref 1.8–8)
NEUTS SEG NFR BLD: 52 % (ref 32–75)
NITRITE UR QL STRIP.AUTO: NEGATIVE
NRBC # BLD: 0 K/UL (ref 0–0.01)
NRBC BLD-RTO: 0 PER 100 WBC
NT PRO BNP: 56 PG/ML
PH UR STRIP: 5.5 (ref 5–8)
PLATELET # BLD AUTO: 367 K/UL (ref 150–400)
PMV BLD AUTO: 9.9 FL (ref 8.9–12.9)
POTASSIUM SERPL-SCNC: 4.1 MMOL/L (ref 3.5–5.1)
PROT SERPL-MCNC: 7.9 G/DL (ref 6.4–8.2)
PROT UR STRIP-MCNC: NEGATIVE MG/DL
RBC # BLD AUTO: 4.15 M/UL (ref 3.8–5.2)
RBC #/AREA URNS HPF: ABNORMAL /HPF (ref 0–5)
SODIUM SERPL-SCNC: 137 MMOL/L (ref 136–145)
SP GR UR REFRACTOMETRY: 1.01
URINE CULTURE IF INDICATED: ABNORMAL
UROBILINOGEN UR QL STRIP.AUTO: 0.2 EU/DL (ref 0.2–1)
WBC # BLD AUTO: 11.6 K/UL (ref 3.6–11)
WBC URNS QL MICRO: ABNORMAL /HPF (ref 0–4)

## 2024-04-17 PROCEDURE — 80053 COMPREHEN METABOLIC PANEL: CPT

## 2024-04-17 PROCEDURE — 83690 ASSAY OF LIPASE: CPT

## 2024-04-17 PROCEDURE — 99284 EMERGENCY DEPT VISIT MOD MDM: CPT

## 2024-04-17 PROCEDURE — 36415 COLL VENOUS BLD VENIPUNCTURE: CPT

## 2024-04-17 PROCEDURE — 96374 THER/PROPH/DIAG INJ IV PUSH: CPT

## 2024-04-17 PROCEDURE — 2580000003 HC RX 258: Performed by: EMERGENCY MEDICINE

## 2024-04-17 PROCEDURE — 6360000002 HC RX W HCPCS: Performed by: EMERGENCY MEDICINE

## 2024-04-17 PROCEDURE — 81001 URINALYSIS AUTO W/SCOPE: CPT

## 2024-04-17 PROCEDURE — 85025 COMPLETE CBC W/AUTO DIFF WBC: CPT

## 2024-04-17 PROCEDURE — 83880 ASSAY OF NATRIURETIC PEPTIDE: CPT

## 2024-04-17 PROCEDURE — 6370000000 HC RX 637 (ALT 250 FOR IP): Performed by: EMERGENCY MEDICINE

## 2024-04-17 RX ORDER — 0.9 % SODIUM CHLORIDE 0.9 %
1000 INTRAVENOUS SOLUTION INTRAVENOUS ONCE
Status: COMPLETED | OUTPATIENT
Start: 2024-04-17 | End: 2024-04-17

## 2024-04-17 RX ORDER — DOXYCYCLINE HYCLATE 100 MG
100 TABLET ORAL 2 TIMES DAILY
Qty: 20 TABLET | Refills: 0 | Status: SHIPPED | OUTPATIENT
Start: 2024-04-17 | End: 2024-04-27

## 2024-04-17 RX ORDER — OXYCODONE HYDROCHLORIDE 5 MG/1
5 TABLET ORAL EVERY 6 HOURS PRN
Qty: 10 TABLET | Refills: 0 | Status: SHIPPED | OUTPATIENT
Start: 2024-04-17 | End: 2024-04-20

## 2024-04-17 RX ORDER — DOXYCYCLINE HYCLATE 100 MG
100 TABLET ORAL ONCE
Status: COMPLETED | OUTPATIENT
Start: 2024-04-17 | End: 2024-04-17

## 2024-04-17 RX ORDER — OXYCODONE HYDROCHLORIDE 5 MG/1
10 TABLET ORAL
Status: COMPLETED | OUTPATIENT
Start: 2024-04-17 | End: 2024-04-17

## 2024-04-17 RX ORDER — DIAZEPAM 5 MG/ML
2 INJECTION, SOLUTION INTRAMUSCULAR; INTRAVENOUS ONCE
Status: COMPLETED | OUTPATIENT
Start: 2024-04-17 | End: 2024-04-17

## 2024-04-17 RX ADMIN — DIAZEPAM 2 MG: 10 INJECTION, SOLUTION INTRAMUSCULAR; INTRAVENOUS at 18:45

## 2024-04-17 RX ADMIN — SODIUM CHLORIDE 1000 ML: 9 INJECTION, SOLUTION INTRAVENOUS at 17:38

## 2024-04-17 RX ADMIN — OXYCODONE 10 MG: 5 TABLET ORAL at 17:39

## 2024-04-17 RX ADMIN — DOXYCYCLINE HYCLATE 100 MG: 100 TABLET, COATED ORAL at 18:02

## 2024-04-17 ASSESSMENT — ENCOUNTER SYMPTOMS
BACK PAIN: 1
VOMITING: 0
SHORTNESS OF BREATH: 0
DIARRHEA: 0
NAUSEA: 0
ABDOMINAL PAIN: 0

## 2024-04-17 ASSESSMENT — PAIN DESCRIPTION - DESCRIPTORS: DESCRIPTORS: STABBING

## 2024-04-17 ASSESSMENT — PAIN DESCRIPTION - ORIENTATION: ORIENTATION: LOWER

## 2024-04-17 ASSESSMENT — PAIN DESCRIPTION - LOCATION: LOCATION: BACK

## 2024-04-17 ASSESSMENT — PAIN SCALES - GENERAL
PAINLEVEL_OUTOF10: 6
PAINLEVEL_OUTOF10: 7

## 2024-04-17 NOTE — ED NOTES
Assumed care of patient from waiting room w c/o lower back pain, patient states that she has been in kidney failure and has had multiple JIGNESH's this year already and has been having the same symptoms today. Patient placed on the monitor at this time and in a position of comfort. Patient'  to bedside at this time.    2051: Patient ambulatory out of ED w a steady gait.

## 2024-04-17 NOTE — ED PROVIDER NOTES
unanticipated grammatical, syntax, homophones, and other interpretive errors are inadvertently transcribed by the computer software. Please disregards these errors. Please excuse any errors that have escaped final proofreading.)           Nubia Marroquin MD  04/17/24 0479

## 2024-04-17 NOTE — DISCHARGE INSTRUCTIONS
List of Pain Management Specialists:    Mike Montoya M.D. [phone] Pain Management  Eric Martin M.D. [phone] Physical Medicine and Rehabilitation  Donovan Perales M.D. [phone] Physical Medicine and Rehabilitation  Asif Mercedes M.D. [phone] Pain Management  Danny Nelson M.D. [phone] Pain Management    Dr. Asif Buckley  [facility]                   [facility]  [address]                                         [address], [address]  [place] [address]                                     [place], [place] [address] [phone]    [facility] Management Center                            BECKY Lucio MD DO  [address] [address]                               [address], [address]  [place] [address]                            [place], [place] [id number] [phone]    Dr. Eric Meyer  [address]                                   [address] [place] [address]                                     [place] [address] [phone]                                                                            Dr. Juan Amor  [facility] [address]          [facility]  [address]                                           [address], [address]  [place] [address]                                        [place] [address]  [email or web address]                                            [phone]

## 2024-04-18 RX ORDER — SODIUM CHLORIDE 9 MG/ML
INJECTION, SOLUTION INTRAVENOUS PRN
Status: CANCELLED | OUTPATIENT
Start: 2024-04-18

## 2024-04-18 RX ORDER — SODIUM CHLORIDE 0.9 % (FLUSH) 0.9 %
5-40 SYRINGE (ML) INJECTION PRN
Status: CANCELLED | OUTPATIENT
Start: 2024-04-18

## 2024-04-18 RX ORDER — THIAMINE HYDROCHLORIDE 100 MG/ML
100 INJECTION, SOLUTION INTRAMUSCULAR; INTRAVENOUS DAILY
Status: CANCELLED | OUTPATIENT
Start: 2024-04-18

## 2024-04-18 RX ORDER — SODIUM CHLORIDE 0.9 % (FLUSH) 0.9 %
5-40 SYRINGE (ML) INJECTION EVERY 12 HOURS SCHEDULED
Status: CANCELLED | OUTPATIENT
Start: 2024-04-18

## 2024-04-19 ENCOUNTER — OFFICE VISIT (OUTPATIENT)
Age: 39
End: 2024-04-19
Payer: COMMERCIAL

## 2024-04-19 VITALS
HEIGHT: 63 IN | WEIGHT: 290.2 LBS | SYSTOLIC BLOOD PRESSURE: 110 MMHG | BODY MASS INDEX: 51.42 KG/M2 | TEMPERATURE: 97.2 F | OXYGEN SATURATION: 96 % | HEART RATE: 78 BPM | RESPIRATION RATE: 18 BRPM | DIASTOLIC BLOOD PRESSURE: 83 MMHG

## 2024-04-19 DIAGNOSIS — E66.01 MORBID OBESITY (HCC): ICD-10-CM

## 2024-04-19 DIAGNOSIS — N39.0 URINARY TRACT INFECTION WITHOUT HEMATURIA, SITE UNSPECIFIED: ICD-10-CM

## 2024-04-19 DIAGNOSIS — G43.909 MIGRAINE WITHOUT STATUS MIGRAINOSUS, NOT INTRACTABLE, UNSPECIFIED MIGRAINE TYPE: Primary | ICD-10-CM

## 2024-04-19 DIAGNOSIS — B37.31 YEAST VAGINITIS: ICD-10-CM

## 2024-04-19 PROCEDURE — 3074F SYST BP LT 130 MM HG: CPT | Performed by: INTERNAL MEDICINE

## 2024-04-19 PROCEDURE — 3079F DIAST BP 80-89 MM HG: CPT | Performed by: INTERNAL MEDICINE

## 2024-04-19 PROCEDURE — 99214 OFFICE O/P EST MOD 30 MIN: CPT | Performed by: INTERNAL MEDICINE

## 2024-04-19 RX ORDER — HYDROCODONE BITARTRATE AND ACETAMINOPHEN 7.5; 325 MG/1; MG/1
1 TABLET ORAL DAILY PRN
Qty: 10 TABLET | Refills: 0 | Status: CANCELLED | OUTPATIENT
Start: 2024-04-19 | End: 2024-05-19

## 2024-04-19 RX ORDER — TIRZEPATIDE 2.5 MG/.5ML
1 INJECTION, SOLUTION SUBCUTANEOUS
Qty: 2 ML | Refills: 1 | Status: SHIPPED | OUTPATIENT
Start: 2024-04-19

## 2024-04-19 RX ORDER — FLUCONAZOLE 150 MG/1
150 TABLET ORAL
Qty: 2 TABLET | Refills: 0 | Status: SHIPPED | OUTPATIENT
Start: 2024-04-19 | End: 2024-04-25

## 2024-04-19 NOTE — PROGRESS NOTES
Chief Complaint   Patient presents with    Follow-Up from Hospital     ER on 3/28 (GI bleed), 4/7 (flank pain) and 4/17 (lower back pain)    Neurologic Problem     Wants to know if urgent referral can be sent in due to pt not being able to go to [facility]     \"Have you been to the ER, urgent care clinic since your last visit?  Hospitalized since your last visit?\"    Yes, [facility] on 3/28, 4/7 and 4/17    “Have you seen or consulted any other health care providers outside of [facility] since your last visit?”    NO

## 2024-05-06 NOTE — ED NOTES
Patient medicated with Percocet x 2 for c/o pain \"all over\" at 9/10. Lidocaine coming from central pharmacy. none

## 2024-05-12 ENCOUNTER — APPOINTMENT (OUTPATIENT)
Facility: HOSPITAL | Age: 39
End: 2024-05-12
Payer: COMMERCIAL

## 2024-05-12 ENCOUNTER — HOSPITAL ENCOUNTER (EMERGENCY)
Facility: HOSPITAL | Age: 39
Discharge: HOME | End: 2024-05-12
Attending: STUDENT IN AN ORGANIZED HEALTH CARE EDUCATION/TRAINING PROGRAM
Payer: COMMERCIAL

## 2024-05-12 VITALS
RESPIRATION RATE: 18 BRPM | BODY MASS INDEX: 50.08 KG/M2 | TEMPERATURE: 98.6 F | HEART RATE: 69 BPM | DIASTOLIC BLOOD PRESSURE: 81 MMHG | WEIGHT: 282.63 LBS | HEIGHT: 63 IN | SYSTOLIC BLOOD PRESSURE: 129 MMHG | OXYGEN SATURATION: 96 %

## 2024-05-12 DIAGNOSIS — K52.9 GASTROENTERITIS: Primary | ICD-10-CM

## 2024-05-12 LAB
ALBUMIN SERPL-MCNC: 4.2 G/DL (ref 3.5–5)
ALBUMIN/GLOB SERPL: 1.1 (ref 1.1–2.2)
ALP SERPL-CCNC: 124 U/L (ref 45–117)
ALT SERPL-CCNC: 27 U/L (ref 12–78)
ANION GAP SERPL CALC-SCNC: 3 MMOL/L (ref 5–15)
APPEARANCE UR: CLEAR
AST SERPL-CCNC: 11 U/L (ref 15–37)
BACTERIA URNS QL MICRO: NEGATIVE /HPF
BASOPHILS # BLD: 0.1 K/UL (ref 0–0.1)
BASOPHILS NFR BLD: 1 % (ref 0–1)
BILIRUB SERPL-MCNC: 0.4 MG/DL (ref 0.2–1)
BILIRUB UR QL: NEGATIVE
BUN SERPL-MCNC: 18 MG/DL (ref 6–20)
BUN/CREAT SERPL: 14 (ref 12–20)
CALCIUM SERPL-MCNC: 10.1 MG/DL (ref 8.5–10.1)
CHLORIDE SERPL-SCNC: 109 MMOL/L (ref 97–108)
CO2 SERPL-SCNC: 25 MMOL/L (ref 21–32)
COLOR UR: ABNORMAL
CREAT SERPL-MCNC: 1.27 MG/DL (ref 0.55–1.02)
DIFFERENTIAL METHOD BLD: ABNORMAL
EOSINOPHIL # BLD: 0.3 K/UL (ref 0–0.4)
EOSINOPHIL NFR BLD: 3 % (ref 0–7)
EPITH CASTS URNS QL MICRO: ABNORMAL /LPF
ERYTHROCYTE [DISTWIDTH] IN BLOOD BY AUTOMATED COUNT: 13.8 % (ref 11.5–14.5)
GLOBULIN SER CALC-MCNC: 3.7 G/DL (ref 2–4)
GLUCOSE SERPL-MCNC: 104 MG/DL (ref 65–100)
GLUCOSE UR STRIP.AUTO-MCNC: NEGATIVE MG/DL
HCT VFR BLD AUTO: 38.9 % (ref 35–47)
HGB BLD-MCNC: 12.2 G/DL (ref 11.5–16)
HGB UR QL STRIP: NEGATIVE
HYALINE CASTS URNS QL MICRO: ABNORMAL /LPF (ref 0–2)
IMM GRANULOCYTES # BLD AUTO: 0 K/UL (ref 0–0.04)
IMM GRANULOCYTES NFR BLD AUTO: 0 % (ref 0–0.5)
KETONES UR QL STRIP.AUTO: NEGATIVE MG/DL
LEUKOCYTE ESTERASE UR QL STRIP.AUTO: NEGATIVE
LIPASE SERPL-CCNC: 37 U/L (ref 13–75)
LYMPHOCYTES # BLD: 3.4 K/UL (ref 0.8–3.5)
LYMPHOCYTES NFR BLD: 34 % (ref 12–49)
MCH RBC QN AUTO: 29 PG (ref 26–34)
MCHC RBC AUTO-ENTMCNC: 31.4 G/DL (ref 30–36.5)
MCV RBC AUTO: 92.4 FL (ref 80–99)
MONOCYTES # BLD: 1.1 K/UL (ref 0–1)
MONOCYTES NFR BLD: 11 % (ref 5–13)
NEUTS SEG # BLD: 5.3 K/UL (ref 1.8–8)
NEUTS SEG NFR BLD: 51 % (ref 32–75)
NITRITE UR QL STRIP.AUTO: NEGATIVE
NRBC # BLD: 0 K/UL (ref 0–0.01)
NRBC BLD-RTO: 0 PER 100 WBC
PH UR STRIP: 6 (ref 5–8)
PLATELET # BLD AUTO: 420 K/UL (ref 150–400)
PMV BLD AUTO: 10 FL (ref 8.9–12.9)
POTASSIUM SERPL-SCNC: 4.3 MMOL/L (ref 3.5–5.1)
PROT SERPL-MCNC: 7.9 G/DL (ref 6.4–8.2)
PROT UR STRIP-MCNC: NEGATIVE MG/DL
RBC # BLD AUTO: 4.21 M/UL (ref 3.8–5.2)
RBC #/AREA URNS HPF: ABNORMAL /HPF (ref 0–5)
SODIUM SERPL-SCNC: 137 MMOL/L (ref 136–145)
SP GR UR REFRACTOMETRY: 1.01
URINE CULTURE IF INDICATED: ABNORMAL
UROBILINOGEN UR QL STRIP.AUTO: 0.2 EU/DL (ref 0.2–1)
WBC # BLD AUTO: 10.1 K/UL (ref 3.6–11)
WBC URNS QL MICRO: ABNORMAL /HPF (ref 0–4)

## 2024-05-12 PROCEDURE — 36415 COLL VENOUS BLD VENIPUNCTURE: CPT

## 2024-05-12 PROCEDURE — 6360000002 HC RX W HCPCS: Performed by: STUDENT IN AN ORGANIZED HEALTH CARE EDUCATION/TRAINING PROGRAM

## 2024-05-12 PROCEDURE — 96361 HYDRATE IV INFUSION ADD-ON: CPT

## 2024-05-12 PROCEDURE — 83690 ASSAY OF LIPASE: CPT

## 2024-05-12 PROCEDURE — 6370000000 HC RX 637 (ALT 250 FOR IP): Performed by: STUDENT IN AN ORGANIZED HEALTH CARE EDUCATION/TRAINING PROGRAM

## 2024-05-12 PROCEDURE — 74176 CT ABD & PELVIS W/O CONTRAST: CPT

## 2024-05-12 PROCEDURE — 80053 COMPREHEN METABOLIC PANEL: CPT

## 2024-05-12 PROCEDURE — 81001 URINALYSIS AUTO W/SCOPE: CPT

## 2024-05-12 PROCEDURE — 99284 EMERGENCY DEPT VISIT MOD MDM: CPT

## 2024-05-12 PROCEDURE — 96375 TX/PRO/DX INJ NEW DRUG ADDON: CPT

## 2024-05-12 PROCEDURE — 2580000003 HC RX 258: Performed by: STUDENT IN AN ORGANIZED HEALTH CARE EDUCATION/TRAINING PROGRAM

## 2024-05-12 PROCEDURE — 96366 THER/PROPH/DIAG IV INF ADDON: CPT

## 2024-05-12 PROCEDURE — 85025 COMPLETE CBC W/AUTO DIFF WBC: CPT

## 2024-05-12 PROCEDURE — 96365 THER/PROPH/DIAG IV INF INIT: CPT

## 2024-05-12 RX ORDER — MORPHINE SULFATE 4 MG/ML
4 INJECTION, SOLUTION INTRAMUSCULAR; INTRAVENOUS
Status: COMPLETED | OUTPATIENT
Start: 2024-05-12 | End: 2024-05-12

## 2024-05-12 RX ORDER — 0.9 % SODIUM CHLORIDE 0.9 %
1000 INTRAVENOUS SOLUTION INTRAVENOUS ONCE
Status: COMPLETED | OUTPATIENT
Start: 2024-05-12 | End: 2024-05-12

## 2024-05-12 RX ORDER — DICYCLOMINE HCL 20 MG
20 TABLET ORAL
Status: COMPLETED | OUTPATIENT
Start: 2024-05-12 | End: 2024-05-12

## 2024-05-12 RX ORDER — OXYCODONE HYDROCHLORIDE 5 MG/1
5 TABLET ORAL
Status: COMPLETED | OUTPATIENT
Start: 2024-05-12 | End: 2024-05-12

## 2024-05-12 RX ORDER — PROMETHAZINE HYDROCHLORIDE 25 MG/1
25 TABLET ORAL 4 TIMES DAILY PRN
Qty: 20 TABLET | Refills: 0 | Status: SHIPPED | OUTPATIENT
Start: 2024-05-12 | End: 2024-05-19

## 2024-05-12 RX ORDER — ACETAMINOPHEN 325 MG/1
650 TABLET ORAL
Status: COMPLETED | OUTPATIENT
Start: 2024-05-12 | End: 2024-05-12

## 2024-05-12 RX ORDER — ONDANSETRON 2 MG/ML
4 INJECTION INTRAMUSCULAR; INTRAVENOUS
Status: COMPLETED | OUTPATIENT
Start: 2024-05-12 | End: 2024-05-12

## 2024-05-12 RX ADMIN — OXYCODONE 5 MG: 5 TABLET ORAL at 13:02

## 2024-05-12 RX ADMIN — DICYCLOMINE HYDROCHLORIDE 20 MG: 20 TABLET ORAL at 13:02

## 2024-05-12 RX ADMIN — OXYCODONE 5 MG: 5 TABLET ORAL at 14:46

## 2024-05-12 RX ADMIN — ACETAMINOPHEN 650 MG: 325 TABLET ORAL at 13:01

## 2024-05-12 RX ADMIN — PROMETHAZINE HYDROCHLORIDE 6.25 MG: 25 INJECTION INTRAMUSCULAR; INTRAVENOUS at 12:51

## 2024-05-12 RX ADMIN — SODIUM CHLORIDE 1000 ML: 9 INJECTION, SOLUTION INTRAVENOUS at 11:39

## 2024-05-12 RX ADMIN — MORPHINE SULFATE 4 MG: 4 INJECTION INTRAVENOUS at 11:39

## 2024-05-12 RX ADMIN — ALUMINUM HYDROXIDE, MAGNESIUM HYDROXIDE, AND SIMETHICONE 40 ML: 1200; 120; 1200 SUSPENSION ORAL at 13:00

## 2024-05-12 RX ADMIN — ONDANSETRON 4 MG: 2 INJECTION INTRAMUSCULAR; INTRAVENOUS at 11:39

## 2024-05-12 ASSESSMENT — PAIN DESCRIPTION - LOCATION
LOCATION: ABDOMEN

## 2024-05-12 ASSESSMENT — PAIN SCALES - GENERAL
PAINLEVEL_OUTOF10: 5
PAINLEVEL_OUTOF10: 7

## 2024-05-12 ASSESSMENT — PAIN - FUNCTIONAL ASSESSMENT: PAIN_FUNCTIONAL_ASSESSMENT: 0-10

## 2024-05-12 NOTE — DISCHARGE INSTRUCTIONS
Thank You!    It was a pleasure taking care of you in our Emergency Department today. We know that when you come to our Emergency Department, you are entrusting us with your health, comfort, and safety. Our physicians and nurses honor that trust, and truly appreciate the opportunity to care for you and your loved ones.      We also value your feedback. If you receive a survey about your Emergency Department experience today, please fill it out.  We care about our patients' feedback, and we listen to what you have to say.  Thank you.    Michael Ortiz MD  ________________________________________________________________________  I have included a copy of your lab results and/or radiologic studies from today's visit so you can have them easily available at your follow-up visit. We hope you feel better and please do not hesitate to contact the ED if you have any questions at all!    Recent Results (from the past 12 hour(s))   CBC with Auto Differential    Collection Time: 05/12/24 11:35 AM   Result Value Ref Range    WBC 10.1 3.6 - 11.0 K/uL    RBC 4.21 3.80 - 5.20 M/uL    Hemoglobin 12.2 11.5 - 16.0 g/dL    Hematocrit 38.9 35.0 - 47.0 %    MCV 92.4 80.0 - 99.0 FL    MCH 29.0 26.0 - 34.0 PG    MCHC 31.4 30.0 - 36.5 g/dL    RDW 13.8 11.5 - 14.5 %    Platelets 420 (H) 150 - 400 K/uL    MPV 10.0 8.9 - 12.9 FL    Nucleated RBCs 0.0 0  WBC    nRBC 0.00 0.00 - 0.01 K/uL    Neutrophils % 51 32 - 75 %    Lymphocytes % 34 12 - 49 %    Monocytes % 11 5 - 13 %    Eosinophils % 3 0 - 7 %    Basophils % 1 0 - 1 %    Immature Granulocytes % 0 0.0 - 0.5 %    Neutrophils Absolute 5.3 1.8 - 8.0 K/UL    Lymphocytes Absolute 3.4 0.8 - 3.5 K/UL    Monocytes Absolute 1.1 (H) 0.0 - 1.0 K/UL    Eosinophils Absolute 0.3 0.0 - 0.4 K/UL    Basophils Absolute 0.1 0.0 - 0.1 K/UL    Immature Granulocytes Absolute 0.0 0.00 - 0.04 K/UL    Differential Type AUTOMATED     CMP    Collection Time: 05/12/24 11:35 AM   Result Value Ref Range

## 2024-05-12 NOTE — ED PROVIDER NOTES
inhaler  Commonly known as: PROVENTIL;VENTOLIN;PROAIR     ALPRAZolam 1 MG tablet  Commonly known as: XANAX  Take 1 tablet by mouth daily as needed for Anxiety for up to 90 days. One qd prn anxiety Max Daily Amount: 1 mg     amoxicillin 500 MG capsule  Commonly known as: AMOXIL  TAKE 1 CAPSULE BY MOUTH THREE TIMES A DAY FOR 7 DAYS     DULoxetine 60 MG extended release capsule  Commonly known as: CYMBALTA  Take 2 capsules by mouth daily     methylPREDNISolone 4 MG tablet  Commonly known as: MEDROL DOSEPACK  Take 1 tablet by mouth See Admin Instructions Take by mouth.     mirtazapine 30 MG tablet  Commonly known as: REMERON  Take 1 tablet by mouth nightly     omeprazole 20 MG delayed release capsule  Commonly known as: PRILOSEC  TAKE 1 CAPSULE BY MOUTH EVERY DAY     ondansetron 4 MG disintegrating tablet  Commonly known as: ZOFRAN-ODT  Take 1 tablet by mouth 3 times daily as needed for Nausea or Vomiting     pregabalin 200 MG capsule  Commonly known as: LYRICA     propranolol 10 MG tablet  Commonly known as: INDERAL     Symbicort 160-4.5 MCG/ACT Aero  Generic drug: budesonide-formoterol     torsemide 20 MG tablet  Commonly known as: DEMADEX  Take 0.5 tablets by mouth 2 times daily as needed (Hold for BP <120/80)     Zepbound 2.5 MG/0.5ML Soaj  Generic drug: Tirzepatide-Weight Management  Inject 1 Dose into the skin every 7 days                DISCONTINUED MEDICATIONS:  Current Discharge Medication List          I am the Primary Clinician of Record.   Michael Ortiz MD (electronically signed)      (Please note that parts of this dictation were completed with voice recognition software. Quite often unanticipated grammatical, syntax, homophones, and other interpretive errors are inadvertently transcribed by the computer software. Please disregards these errors. Please excuse any errors that have escaped final proofreading.)         Michael Ortiz MD  05/15/24 7774

## 2024-05-12 NOTE — ED NOTES
Pt discharged by Angel PRATHER. Discharge instructions discussed and pt given opportunity to ask questions. Pt ambulatory out of ED

## 2024-05-13 ENCOUNTER — APPOINTMENT (OUTPATIENT)
Facility: HOSPITAL | Age: 39
End: 2024-05-13
Payer: COMMERCIAL

## 2024-05-13 ENCOUNTER — HOSPITAL ENCOUNTER (EMERGENCY)
Facility: HOSPITAL | Age: 39
Discharge: HOME | End: 2024-05-13
Attending: EMERGENCY MEDICINE
Payer: COMMERCIAL

## 2024-05-13 VITALS
BODY MASS INDEX: 51.03 KG/M2 | WEIGHT: 288 LBS | HEART RATE: 87 BPM | DIASTOLIC BLOOD PRESSURE: 92 MMHG | HEIGHT: 63 IN | OXYGEN SATURATION: 94 % | SYSTOLIC BLOOD PRESSURE: 134 MMHG | TEMPERATURE: 97.5 F | RESPIRATION RATE: 18 BRPM

## 2024-05-13 DIAGNOSIS — R10.9 ABDOMINAL PAIN, UNSPECIFIED ABDOMINAL LOCATION: Primary | ICD-10-CM

## 2024-05-13 LAB
ALBUMIN SERPL-MCNC: 4.2 G/DL (ref 3.5–5)
ALBUMIN/GLOB SERPL: 1.2 (ref 1.1–2.2)
ALP SERPL-CCNC: 126 U/L (ref 45–117)
ALT SERPL-CCNC: 31 U/L (ref 12–78)
ANION GAP SERPL CALC-SCNC: 5 MMOL/L (ref 5–15)
AST SERPL-CCNC: 18 U/L (ref 15–37)
BASOPHILS # BLD: 0.1 K/UL (ref 0–0.1)
BASOPHILS NFR BLD: 1 % (ref 0–1)
BILIRUB SERPL-MCNC: 0.4 MG/DL (ref 0.2–1)
BUN SERPL-MCNC: 17 MG/DL (ref 6–20)
BUN/CREAT SERPL: 12 (ref 12–20)
CALCIUM SERPL-MCNC: 9.2 MG/DL (ref 8.5–10.1)
CHLORIDE SERPL-SCNC: 108 MMOL/L (ref 97–108)
CO2 SERPL-SCNC: 25 MMOL/L (ref 21–32)
COMMENT:: NORMAL
CREAT SERPL-MCNC: 1.39 MG/DL (ref 0.55–1.02)
DIFFERENTIAL METHOD BLD: ABNORMAL
EOSINOPHIL # BLD: 0.3 K/UL (ref 0–0.4)
EOSINOPHIL NFR BLD: 3 % (ref 0–7)
ERYTHROCYTE [DISTWIDTH] IN BLOOD BY AUTOMATED COUNT: 14.2 % (ref 11.5–14.5)
GLOBULIN SER CALC-MCNC: 3.5 G/DL (ref 2–4)
GLUCOSE SERPL-MCNC: 131 MG/DL (ref 65–100)
HCT VFR BLD AUTO: 38.7 % (ref 35–47)
HGB BLD-MCNC: 12.2 G/DL (ref 11.5–16)
IMM GRANULOCYTES # BLD AUTO: 0 K/UL (ref 0–0.04)
IMM GRANULOCYTES NFR BLD AUTO: 0 % (ref 0–0.5)
LIPASE SERPL-CCNC: 50 U/L (ref 13–75)
LYMPHOCYTES # BLD: 3.9 K/UL (ref 0.8–3.5)
LYMPHOCYTES NFR BLD: 44 % (ref 12–49)
MCH RBC QN AUTO: 29.3 PG (ref 26–34)
MCHC RBC AUTO-ENTMCNC: 31.5 G/DL (ref 30–36.5)
MCV RBC AUTO: 93 FL (ref 80–99)
MONOCYTES # BLD: 1.1 K/UL (ref 0–1)
MONOCYTES NFR BLD: 12 % (ref 5–13)
NEUTS SEG # BLD: 3.5 K/UL (ref 1.8–8)
NEUTS SEG NFR BLD: 40 % (ref 32–75)
NRBC # BLD: 0 K/UL (ref 0–0.01)
NRBC BLD-RTO: 0 PER 100 WBC
PLATELET # BLD AUTO: 405 K/UL (ref 150–400)
PMV BLD AUTO: 10 FL (ref 8.9–12.9)
POTASSIUM SERPL-SCNC: 4.3 MMOL/L (ref 3.5–5.1)
PROT SERPL-MCNC: 7.7 G/DL (ref 6.4–8.2)
RBC # BLD AUTO: 4.16 M/UL (ref 3.8–5.2)
SODIUM SERPL-SCNC: 138 MMOL/L (ref 136–145)
SPECIMEN HOLD: NORMAL
WBC # BLD AUTO: 8.8 K/UL (ref 3.6–11)

## 2024-05-13 PROCEDURE — 99284 EMERGENCY DEPT VISIT MOD MDM: CPT

## 2024-05-13 PROCEDURE — 2500000003 HC RX 250 WO HCPCS: Performed by: EMERGENCY MEDICINE

## 2024-05-13 PROCEDURE — 96376 TX/PRO/DX INJ SAME DRUG ADON: CPT

## 2024-05-13 PROCEDURE — 83690 ASSAY OF LIPASE: CPT

## 2024-05-13 PROCEDURE — 74022 RADEX COMPL AQT ABD SERIES: CPT

## 2024-05-13 PROCEDURE — 76830 TRANSVAGINAL US NON-OB: CPT

## 2024-05-13 PROCEDURE — 36415 COLL VENOUS BLD VENIPUNCTURE: CPT

## 2024-05-13 PROCEDURE — 80053 COMPREHEN METABOLIC PANEL: CPT

## 2024-05-13 PROCEDURE — 96374 THER/PROPH/DIAG INJ IV PUSH: CPT

## 2024-05-13 PROCEDURE — 85025 COMPLETE CBC W/AUTO DIFF WBC: CPT

## 2024-05-13 RX ORDER — HYDROMORPHONE HYDROCHLORIDE 1 MG/ML
1 INJECTION, SOLUTION INTRAMUSCULAR; INTRAVENOUS; SUBCUTANEOUS
Status: COMPLETED | OUTPATIENT
Start: 2024-05-13 | End: 2024-05-13

## 2024-05-13 RX ORDER — OXYCODONE HYDROCHLORIDE 5 MG/1
5 TABLET ORAL EVERY 6 HOURS PRN
Qty: 8 TABLET | Refills: 0 | Status: SHIPPED | OUTPATIENT
Start: 2024-05-13 | End: 2024-05-16

## 2024-05-13 RX ORDER — ONDANSETRON 2 MG/ML
4 INJECTION INTRAMUSCULAR; INTRAVENOUS EVERY 6 HOURS PRN
Status: DISCONTINUED | OUTPATIENT
Start: 2024-05-13 | End: 2024-05-13 | Stop reason: HOSPADM

## 2024-05-13 RX ADMIN — HYDROMORPHONE HYDROCHLORIDE 1 MG: 1 INJECTION, SOLUTION INTRAMUSCULAR; INTRAVENOUS; SUBCUTANEOUS at 03:55

## 2024-05-13 RX ADMIN — HYDROMORPHONE HYDROCHLORIDE 1 MG: 1 INJECTION, SOLUTION INTRAMUSCULAR; INTRAVENOUS; SUBCUTANEOUS at 01:57

## 2024-05-13 ASSESSMENT — PAIN DESCRIPTION - DESCRIPTORS
DESCRIPTORS: STABBING
DESCRIPTORS: STABBING

## 2024-05-13 ASSESSMENT — PAIN DESCRIPTION - LOCATION
LOCATION: ABDOMEN
LOCATION: ABDOMEN

## 2024-05-13 ASSESSMENT — PAIN SCALES - GENERAL
PAINLEVEL_OUTOF10: 8
PAINLEVEL_OUTOF10: 8
PAINLEVEL_OUTOF10: 6
PAINLEVEL_OUTOF10: 7

## 2024-05-13 ASSESSMENT — PAIN DESCRIPTION - ORIENTATION
ORIENTATION: RIGHT;LOWER
ORIENTATION: RIGHT;LOWER

## 2024-05-13 NOTE — ED PROVIDER NOTES
[facility] EMERGENCY DEPT  EMERGENCY DEPARTMENT ENCOUNTER       Pt Name: Monique Goodman  MRN: [id number]  Birthdate 1985  Date of evaluation: 5/13/2024  Provider: Donovan Schafer DO   PCP: Andrew Rosado MD  Note Started: 1:47 AM EDT 5/13/24     CHIEF COMPLAINT       Chief Complaint   Patient presents with    Abdominal Pain     Pt amb to triage for c/o RLQ pain. Pt seen yesterday for same thing, and presents tonight for worsening pain. Also having vomiting.         HISTORY OF PRESENT ILLNESS: 1 or more elements      History From: Patient, History limited by: none     Monique Goodman is a 38 y.o. female presenting the emergency department with right lower abdominal pain, was seen here yesterday for the same, had CT imaging which was unremarkable, she states she went home and continue to have severe pain, became worse and presented back to the emergency department for reevaluation.       Please See MDM for Additional Details of the HPI/PMH  Nursing Notes were all reviewed and agreed with or any disagreements were addressed in the HPI.     REVIEW OF SYSTEMS        Positives and Pertinent negatives as per HPI.    PAST HISTORY     Past Medical History:  Past Medical History:   Diagnosis Date    Abnormal CT of the abdomen 11/8/2012    JIGNESH (acute kidney injury) (HCC) 1/24/2024    Asthma     Autoimmune disease (HCC)     lupus    C. difficile diarrhea     Cervical prolapse     Dehydration     Garth-Danlos syndrome     Gastrointestinal disorder     gerd, twisted colon, IBS    GERD (gastroesophageal reflux disease)     Headache(784.0)     Lupus (HCC)     Morbid obesity (HCC)     Murmur     Nausea & vomiting     Neurological disorder     cluster headaches    Occipital neuralgia     Other ill-defined conditions(799.89) 2006, 2009    ovarian cyst removal    Other ill-defined conditions(799.89)     Syncope     Worsening headaches        Past Surgical History:  Past Surgical History:   Procedure Laterality Date    CHOLECYSTECTOMY

## 2024-05-20 ENCOUNTER — TELEPHONE (OUTPATIENT)
Age: 39
End: 2024-05-20

## 2024-05-20 DIAGNOSIS — R52 PAIN MANAGEMENT: Primary | ICD-10-CM

## 2024-05-20 NOTE — TELEPHONE ENCOUNTER
Pt states she is ready to sign pain contract for 10 hydrocodone a month.     Pt is asking if she can come by today to sign that?  She is home today.  Does she sign that up front or with you?    She would like to do this today and then have a script sent in.  Please call pt to let her know.  Thanks.

## 2024-05-20 NOTE — TELEPHONE ENCOUNTER
Pt states there to to be a PA started on the Tirzepatide (ZEPBOUND)    Can that be started? Thanks.

## 2024-05-20 NOTE — TELEPHONE ENCOUNTER
Spoke with patient. Advised per Dr. Rosado  She is getting too many pain medicines and cough syrup with codeine precscriptions when I reviwed the . I do not feel the pain contract will work       States pain medication and cough syrup she got from the hospital. States she is not getting pain medications from any other providers.     Pt is wiling to sign whatever she needs to and to get medication only from PCP.     Please advise.

## 2024-05-20 NOTE — TELEPHONE ENCOUNTER
Patient states she needs a call back Asap in reference to getting Pain Contract Signed as she would like to do that today. Please call. Thank you

## 2024-05-21 NOTE — TELEPHONE ENCOUNTER
Patient states she cansee information in My Chart sent to Dr. Rosado & she wants Dr. Rosado to know that Pain Medications that she had from [facility] was While she was Inpatient/Admitted at Hospital for her Asthma, Not at ER & that she will sign Whatever Pain Management agreement she needs to sign. Please call to discuss. Thank you

## 2024-05-22 ENCOUNTER — TELEPHONE (OUTPATIENT)
Age: 39
End: 2024-05-22

## 2024-05-22 RX ORDER — TIZANIDINE 4 MG/1
TABLET ORAL
COMMUNITY
End: 2024-05-23

## 2024-05-22 RX ORDER — TIZANIDINE 4 MG/1
TABLET ORAL
Status: CANCELLED | OUTPATIENT
Start: 2024-05-22

## 2024-05-22 RX ORDER — PREGABALIN 200 MG/1
200 CAPSULE ORAL 2 TIMES DAILY
Status: CANCELLED | OUTPATIENT
Start: 2024-05-22

## 2024-05-22 NOTE — TELEPHONE ENCOUNTER
States regarding pain management    States [facility] pain management is not in network but will try them again.    Pt also states:  Dr Shaw declined accepting pt.  Dr Coelho and Dr Green no longer do pain management, but were listed as in network.        Pt wants to know why Dr estrada cannot do the contract.      States what is next step  Call to advise.

## 2024-05-22 NOTE — TELEPHONE ENCOUNTER
Called, spoke to pt.  Two pt identifiers confirmed.     Patient informed per Andrew Rosado MD    Please see message below.    She is getting too many pain medicines and cough syrup with codeine precscriptions when I reviwed the . I do not feel the pain contract will work     Patient states she saw Dr. Shaw on 05/20/2024 and states that  Dr. Shaw  told her \"no pain management doctor will see her because she has been prescribed narcotics by 10 different doctors in the past 2 years.\" She is deemed dependant and high risk.    Patient advised she needs needs to call her member services on the back of her insurance card to find a pain management doctor.    Also contact her specialist/ other member of the care team such as Gastroenterology, Neurologist, Psychologist,Rheumatology, Nephrology, Cardiology, and  for other outside resources that may be able to assist her.    At the last ER visit on 05/13/2024 Dr. Donovan Schafer  told patient to follow up with her GI doctor.    The patient states she not made any appointments with GI doctor yet.    Patient states the one provided by insurance as in network will not accept her as a patient.  Patient states she went down a list on google and  no one will accept her.    Patient advised that Dr. Rosado will not prescribe any pain medication nor a pain management contract for her.    Patient is upset and crying.Patient advised she needs needs to call her member services on the back of her insurance card to find a pain management doctor.    Also contact her specialist/ other member of the care team such as Gastroenterology, Neurologist, Psychologist,Rheumatology, Nephrology, Cardiology, and  for other outside resources that may be able to assist her.    Patient reminded to follow up with GI doctor per Dr. Donovan Schafer on 05/13/2024  emergency room visit.      Patient verbalized understanding of information discussed.    Carolee Ramos,

## 2024-05-22 NOTE — TELEPHONE ENCOUNTER
Pt wants ED follow up  And wants to discuss the pain management medication issue    Soonest appt is June 25, declined too far out    Pt wants worked in sooner

## 2024-05-22 NOTE — TELEPHONE ENCOUNTER
Called, spoke to pt.  Two pt identifiers confirmed.     Patient informed her request for refill on Tizanidine and Lyrica has been sent to Dr. Rosado.    Patient verbalized understanding of information discussed.    Carolee Ramos LPN

## 2024-05-22 NOTE — TELEPHONE ENCOUNTER
Regarding medication:  Lyrica 200 mg, 2 times a day  Tizanidine 4 mg tablets, 2 at bedtime    Adjustment needed:  States dr estrada was supposed to take these over.  Needs them sent in.    [facility]/pharmacy #[id number] - [place], [place] - [address] - P [phone] - F [phone]          Caller confirms readback of documented phone/fax number(s) as correct.

## 2024-05-22 NOTE — TELEPHONE ENCOUNTER
PCP: Andrew Rosado MD    Last appt: 4/19/2024  No future appointments.    Requested Prescriptions     Pending Prescriptions Disp Refills    pregabalin (LYRICA) 200 MG capsule       Sig: Take 1 capsule by mouth 2 times daily. Max Daily Amount: 400 mg    tiZANidine (ZANAFLEX) 4 MG tablet       Sig: TAKE 2 TAB BY MOUTH AT NIGHT AS DIRECTED ORALLY

## 2024-05-22 NOTE — TELEPHONE ENCOUNTER
LVM for patient on home and cell numbers listed in the chart to return call to the office.   Carolee Ramos LPN

## 2024-05-23 ENCOUNTER — HOSPITAL ENCOUNTER (EMERGENCY)
Facility: HOSPITAL | Age: 39
Discharge: HOME | End: 2024-05-23
Payer: COMMERCIAL

## 2024-05-23 VITALS
OXYGEN SATURATION: 99 % | HEART RATE: 71 BPM | RESPIRATION RATE: 18 BRPM | SYSTOLIC BLOOD PRESSURE: 137 MMHG | DIASTOLIC BLOOD PRESSURE: 78 MMHG | HEIGHT: 63 IN | BODY MASS INDEX: 50.74 KG/M2 | TEMPERATURE: 98 F | WEIGHT: 286.38 LBS

## 2024-05-23 DIAGNOSIS — M62.838 NECK MUSCLE SPASM: ICD-10-CM

## 2024-05-23 DIAGNOSIS — R52 PAIN: Primary | ICD-10-CM

## 2024-05-23 DIAGNOSIS — G89.29 CHRONIC NECK PAIN: Primary | ICD-10-CM

## 2024-05-23 DIAGNOSIS — M54.2 CHRONIC NECK PAIN: Primary | ICD-10-CM

## 2024-05-23 PROCEDURE — 2500000003 HC RX 250 WO HCPCS

## 2024-05-23 PROCEDURE — 96372 THER/PROPH/DIAG INJ SC/IM: CPT

## 2024-05-23 PROCEDURE — 99284 EMERGENCY DEPT VISIT MOD MDM: CPT

## 2024-05-23 RX ORDER — BUTALBITAL, ACETAMINOPHEN AND CAFFEINE 300; 40; 50 MG/1; MG/1; MG/1
1 CAPSULE ORAL EVERY 6 HOURS PRN
Qty: 30 CAPSULE | Refills: 0 | Status: SHIPPED | OUTPATIENT
Start: 2024-05-23

## 2024-05-23 RX ORDER — PREGABALIN 200 MG/1
200 CAPSULE ORAL 2 TIMES DAILY
Qty: 60 CAPSULE | Refills: 2 | Status: SHIPPED | OUTPATIENT
Start: 2024-05-23 | End: 2024-08-21

## 2024-05-23 RX ORDER — HYDROMORPHONE HYDROCHLORIDE 1 MG/ML
1 INJECTION, SOLUTION INTRAMUSCULAR; INTRAVENOUS; SUBCUTANEOUS ONCE
Status: COMPLETED | OUTPATIENT
Start: 2024-05-23 | End: 2024-05-23

## 2024-05-23 RX ORDER — TIZANIDINE 4 MG/1
TABLET ORAL
Qty: 60 TABLET | Refills: 2 | Status: SHIPPED | OUTPATIENT
Start: 2024-05-23

## 2024-05-23 RX ORDER — BACLOFEN 10 MG/1
10 TABLET ORAL 3 TIMES DAILY
Qty: 30 TABLET | Refills: 0 | Status: SHIPPED | OUTPATIENT
Start: 2024-05-23

## 2024-05-23 RX ORDER — BUTALBITAL, ACETAMINOPHEN AND CAFFEINE 300; 40; 50 MG/1; MG/1; MG/1
1 CAPSULE ORAL EVERY 6 HOURS PRN
Qty: 30 CAPSULE | Refills: 0 | Status: SHIPPED | OUTPATIENT
Start: 2024-05-23 | End: 2024-05-23

## 2024-05-23 RX ADMIN — HYDROMORPHONE HYDROCHLORIDE 1 MG: 1 INJECTION, SOLUTION INTRAMUSCULAR; INTRAVENOUS; SUBCUTANEOUS at 13:05

## 2024-05-23 ASSESSMENT — PAIN DESCRIPTION - LOCATION: LOCATION: HEAD

## 2024-05-23 ASSESSMENT — PAIN SCALES - GENERAL
PAINLEVEL_OUTOF10: 8
PAINLEVEL_OUTOF10: 7

## 2024-05-23 ASSESSMENT — PAIN DESCRIPTION - DESCRIPTORS: DESCRIPTORS: SHOOTING

## 2024-05-23 ASSESSMENT — PAIN DESCRIPTION - ORIENTATION: ORIENTATION: RIGHT

## 2024-05-23 NOTE — TELEPHONE ENCOUNTER
Called again    States needs asap.      States very concerned, its \"an emergency\" pt off med 3 days and passes out.  States inhumane making pt wait.  Advised caller that if they are concerned there is a medical emergency to seek care at the nearest emergency room.      Advised that pt requested medication yesterday and the policy is 72 hours but dr will try to do asap if able.     Advised it is awaiting dr approval.    Caller states understanding.    Please refill as soon as able.    Please call back to advise care plan regarding information in this note if deemed clinically appropriate.

## 2024-05-23 NOTE — ED PROVIDER NOTES
[facility] EMERGENCY DEPT  EMERGENCY DEPARTMENT ENCOUNTER       Pt Name: Monique Goodman  MRN: [id number]  Birthdate 1985  Date of evaluation: 5/23/2024  Provider: ADAMARIS Cross - GABRIELE   PCP: Andrew Rosado MD  Note Started:  12:51 PM EDT 5/23/24     CHIEF COMPLAINT       Chief Complaint   Patient presents with    Neck Pain     Ambulatory to triage c/o pain shooting from base of R skull down neck into upper back. Pt states pain started yesterday and has gradually worsened since. Denies any vision changes, numbness, tingling.        HISTORY OF PRESENT ILLNESS: 1 or more elements      History From: Patient and Patient's Mother  HPI Limitations: None     Monique Goodman is a 38 y.o. female who presents complaining of posterior neck pain radiating to the left side of face x 2 days.  Patient reports that this is her typical pain usually controlled by her Lyrica, however she has been out of her medication for 3 days.  Reports that she called her primary care for refills but has not received it.  Patient rates pain 10 out of 10.  She denies injury, denies nausea, vomiting, headache, blurry vision, fever, chills, nausea, vomiting, diarrhea or neck stiffness.     Nursing Notes were all reviewed and agreed with or any disagreements were addressed in the HPI.     REVIEW OF SYSTEMS      Review of Systems     Positives and Pertinent negatives as per HPI.    PAST HISTORY     Past Medical History:  Past Medical History:   Diagnosis Date    Abnormal CT of the abdomen 11/8/2012    JIGNESH (acute kidney injury) (HCC) 1/24/2024    Asthma     Autoimmune disease (HCC)     lupus    C. difficile diarrhea     Cervical prolapse     Dehydration     Garth-Danlos syndrome     Gastrointestinal disorder     gerd, twisted colon, IBS    GERD (gastroesophageal reflux disease)     Headache(784.0)     Lupus (HCC)     Morbid obesity (HCC)     Murmur     Nausea & vomiting     Neurological disorder     cluster headaches    Occipital neuralgia

## 2024-05-23 NOTE — TELEPHONE ENCOUNTER
Pt has been up all night crying with migraine in back of head due to lack of medication.    Wants lyrica called in for the headache    States pt cannot walk.          Can this be sent in please.

## 2024-05-23 NOTE — TELEPHONE ENCOUNTER
PCP: Andrew Rosado MD    Last appt: 4/19/2024  Future Appointments   Date Time Provider Department Center   5/28/2024  1:45 PM Andrew Rosado MD [facility]       Requested Prescriptions     Pending Prescriptions Disp Refills    pregabalin (LYRICA) 200 MG capsule       Sig: Take 1 capsule by mouth 2 times daily. Max Daily Amount: 400 mg    tiZANidine (ZANAFLEX) 4 MG tablet       Sig: TAKE 2 TAB BY MOUTH AT NIGHT AS DIRECTED ORALLY

## 2024-05-23 NOTE — TELEPHONE ENCOUNTER
Patient's Mother, Javier, states patient states patient is at the ER & that ER doctor is seeing in Notes that Dr. Rosado is not doing refill because patient needs to see Neurologist. Javier states patient is No Longer seeing Neurologist & is having a Difficult time getting an appt soon enough. Javier states that \"Dr. Rosado agreed to take care of this medication, Lyrica until patient can see Neurologist\". Please call Javier Back as the ER Doctor is seeing notes in Chart from Dr. Rosado. Thank you

## 2024-05-24 ENCOUNTER — TELEPHONE (OUTPATIENT)
Age: 39
End: 2024-05-24

## 2024-05-28 ENCOUNTER — HOSPITAL ENCOUNTER (EMERGENCY)
Facility: HOSPITAL | Age: 39
Discharge: HOME | End: 2024-05-29
Attending: EMERGENCY MEDICINE
Payer: COMMERCIAL

## 2024-05-28 DIAGNOSIS — F41.9 ANXIETY DISORDER, UNSPECIFIED: ICD-10-CM

## 2024-05-28 DIAGNOSIS — F11.20 CHRONIC NARCOTIC DEPENDENCE (HCC): ICD-10-CM

## 2024-05-28 DIAGNOSIS — R10.9 RIGHT FLANK PAIN: Primary | ICD-10-CM

## 2024-05-28 DIAGNOSIS — G89.4 CHRONIC PAIN SYNDROME: ICD-10-CM

## 2024-05-28 PROCEDURE — 96375 TX/PRO/DX INJ NEW DRUG ADDON: CPT

## 2024-05-28 PROCEDURE — 96376 TX/PRO/DX INJ SAME DRUG ADON: CPT

## 2024-05-28 PROCEDURE — 96374 THER/PROPH/DIAG INJ IV PUSH: CPT

## 2024-05-28 PROCEDURE — 99284 EMERGENCY DEPT VISIT MOD MDM: CPT

## 2024-05-28 RX ORDER — ALPRAZOLAM 1 MG/1
1 TABLET ORAL DAILY PRN
Qty: 30 TABLET | Refills: 2 | OUTPATIENT
Start: 2024-05-28 | End: 2024-08-26

## 2024-05-28 RX ORDER — ALPRAZOLAM 1 MG/1
1 TABLET ORAL DAILY PRN
Qty: 30 TABLET | Refills: 2 | Status: SHIPPED | OUTPATIENT
Start: 2024-05-28 | End: 2024-08-26

## 2024-05-28 ASSESSMENT — PAIN - FUNCTIONAL ASSESSMENT: PAIN_FUNCTIONAL_ASSESSMENT: 0-10

## 2024-05-28 ASSESSMENT — PAIN DESCRIPTION - LOCATION: LOCATION: FLANK

## 2024-05-28 ASSESSMENT — PAIN SCALES - GENERAL: PAINLEVEL_OUTOF10: 10

## 2024-05-28 NOTE — TELEPHONE ENCOUNTER
ALPRAZolam (XANAX) 1 MG tablet  [facility]/pharmacy #[id number] - [place], [place] - [address] - P [phone] - F [phone]

## 2024-05-28 NOTE — TELEPHONE ENCOUNTER
PCP: Andrew Rosado MD    Last appt: 4/19/2024  Future Appointments   Date Time Provider Department Center   5/31/2024  3:45 PM Andrew Rosado MD [facility] AMB       Requested Prescriptions     Pending Prescriptions Disp Refills    ALPRAZolam (XANAX) 1 MG tablet 30 tablet 2     Sig: Take 1 tablet by mouth daily as needed for Anxiety for up to 90 days. One qd prn anxiety Max Daily Amount: 1 mg

## 2024-05-29 VITALS
DIASTOLIC BLOOD PRESSURE: 56 MMHG | HEART RATE: 73 BPM | SYSTOLIC BLOOD PRESSURE: 111 MMHG | RESPIRATION RATE: 16 BRPM | BODY MASS INDEX: 51.91 KG/M2 | HEIGHT: 63 IN | WEIGHT: 293 LBS | TEMPERATURE: 98.8 F | OXYGEN SATURATION: 98 %

## 2024-05-29 LAB
ALBUMIN SERPL-MCNC: 3.6 G/DL (ref 3.5–5)
ALBUMIN/GLOB SERPL: 1.3 (ref 1.1–2.2)
ALP SERPL-CCNC: 123 U/L (ref 45–117)
ALT SERPL-CCNC: 36 U/L (ref 12–78)
ANION GAP SERPL CALC-SCNC: 4 MMOL/L (ref 5–15)
APPEARANCE UR: CLEAR
AST SERPL-CCNC: 103 U/L (ref 15–37)
BACTERIA URNS QL MICRO: NEGATIVE /HPF
BASOPHILS # BLD: 0 K/UL (ref 0–0.1)
BASOPHILS NFR BLD: 0 % (ref 0–1)
BILIRUB SERPL-MCNC: 0.2 MG/DL (ref 0.2–1)
BILIRUB UR QL: NEGATIVE
BUN SERPL-MCNC: 32 MG/DL (ref 6–20)
BUN/CREAT SERPL: 21 (ref 12–20)
CALCIUM SERPL-MCNC: 9.3 MG/DL (ref 8.5–10.1)
CHLORIDE SERPL-SCNC: 112 MMOL/L (ref 97–108)
CO2 SERPL-SCNC: 24 MMOL/L (ref 21–32)
COLOR UR: ABNORMAL
COMMENT:: NORMAL
CREAT SERPL-MCNC: 1.52 MG/DL (ref 0.55–1.02)
DIFFERENTIAL METHOD BLD: ABNORMAL
EOSINOPHIL # BLD: 0.5 K/UL (ref 0–0.4)
EOSINOPHIL NFR BLD: 5 % (ref 0–7)
EPITH CASTS URNS QL MICRO: ABNORMAL /LPF
ERYTHROCYTE [DISTWIDTH] IN BLOOD BY AUTOMATED COUNT: 14.6 % (ref 11.5–14.5)
GLOBULIN SER CALC-MCNC: 2.8 G/DL (ref 2–4)
GLUCOSE SERPL-MCNC: 130 MG/DL (ref 65–100)
GLUCOSE UR STRIP.AUTO-MCNC: NEGATIVE MG/DL
HCG UR QL: NEGATIVE
HCT VFR BLD AUTO: 33.1 % (ref 35–47)
HGB BLD-MCNC: 10.3 G/DL (ref 11.5–16)
HGB UR QL STRIP: ABNORMAL
HYALINE CASTS URNS QL MICRO: ABNORMAL /LPF (ref 0–2)
IMM GRANULOCYTES # BLD AUTO: 0.1 K/UL (ref 0–0.04)
IMM GRANULOCYTES NFR BLD AUTO: 1 % (ref 0–0.5)
KETONES UR QL STRIP.AUTO: NEGATIVE MG/DL
LEUKOCYTE ESTERASE UR QL STRIP.AUTO: NEGATIVE
LIPASE SERPL-CCNC: 32 U/L (ref 13–75)
LYMPHOCYTES # BLD: 3 K/UL (ref 0.8–3.5)
LYMPHOCYTES NFR BLD: 29 % (ref 12–49)
MCH RBC QN AUTO: 29.1 PG (ref 26–34)
MCHC RBC AUTO-ENTMCNC: 31.1 G/DL (ref 30–36.5)
MCV RBC AUTO: 93.5 FL (ref 80–99)
MONOCYTES # BLD: 1.1 K/UL (ref 0–1)
MONOCYTES NFR BLD: 10 % (ref 5–13)
NEUTS SEG # BLD: 5.7 K/UL (ref 1.8–8)
NEUTS SEG NFR BLD: 55 % (ref 32–75)
NITRITE UR QL STRIP.AUTO: NEGATIVE
NRBC # BLD: 0 K/UL (ref 0–0.01)
NRBC BLD-RTO: 0 PER 100 WBC
PH UR STRIP: 5.5 (ref 5–8)
PLATELET # BLD AUTO: 342 K/UL (ref 150–400)
PMV BLD AUTO: 10.2 FL (ref 8.9–12.9)
POTASSIUM SERPL-SCNC: 4.3 MMOL/L (ref 3.5–5.1)
PROT SERPL-MCNC: 6.4 G/DL (ref 6.4–8.2)
PROT UR STRIP-MCNC: 30 MG/DL
RBC # BLD AUTO: 3.54 M/UL (ref 3.8–5.2)
RBC #/AREA URNS HPF: >100 /HPF (ref 0–5)
SODIUM SERPL-SCNC: 140 MMOL/L (ref 136–145)
SP GR UR REFRACTOMETRY: 1.02
SPECIMEN HOLD: NORMAL
URINE CULTURE IF INDICATED: ABNORMAL
UROBILINOGEN UR QL STRIP.AUTO: 0.2 EU/DL (ref 0.2–1)
WBC # BLD AUTO: 10.4 K/UL (ref 3.6–11)
WBC URNS QL MICRO: ABNORMAL /HPF (ref 0–4)

## 2024-05-29 PROCEDURE — 81001 URINALYSIS AUTO W/SCOPE: CPT

## 2024-05-29 PROCEDURE — 2580000003 HC RX 258: Performed by: EMERGENCY MEDICINE

## 2024-05-29 PROCEDURE — 85025 COMPLETE CBC W/AUTO DIFF WBC: CPT

## 2024-05-29 PROCEDURE — 81025 URINE PREGNANCY TEST: CPT

## 2024-05-29 PROCEDURE — 6360000002 HC RX W HCPCS: Performed by: EMERGENCY MEDICINE

## 2024-05-29 PROCEDURE — 2500000003 HC RX 250 WO HCPCS: Performed by: EMERGENCY MEDICINE

## 2024-05-29 PROCEDURE — 36415 COLL VENOUS BLD VENIPUNCTURE: CPT

## 2024-05-29 PROCEDURE — 83690 ASSAY OF LIPASE: CPT

## 2024-05-29 PROCEDURE — 80053 COMPREHEN METABOLIC PANEL: CPT

## 2024-05-29 RX ORDER — HYDROMORPHONE HYDROCHLORIDE 1 MG/ML
1 INJECTION, SOLUTION INTRAMUSCULAR; INTRAVENOUS; SUBCUTANEOUS
Status: DISCONTINUED | OUTPATIENT
Start: 2024-05-29 | End: 2024-05-29 | Stop reason: HOSPADM

## 2024-05-29 RX ORDER — HYDROMORPHONE HYDROCHLORIDE 1 MG/ML
1 INJECTION, SOLUTION INTRAMUSCULAR; INTRAVENOUS; SUBCUTANEOUS
Status: COMPLETED | OUTPATIENT
Start: 2024-05-29 | End: 2024-05-29

## 2024-05-29 RX ORDER — ONDANSETRON 2 MG/ML
4 INJECTION INTRAMUSCULAR; INTRAVENOUS ONCE
Status: COMPLETED | OUTPATIENT
Start: 2024-05-29 | End: 2024-05-29

## 2024-05-29 RX ORDER — 0.9 % SODIUM CHLORIDE 0.9 %
1000 INTRAVENOUS SOLUTION INTRAVENOUS ONCE
Status: DISCONTINUED | OUTPATIENT
Start: 2024-05-29 | End: 2024-05-29

## 2024-05-29 RX ORDER — 0.9 % SODIUM CHLORIDE 0.9 %
500 INTRAVENOUS SOLUTION INTRAVENOUS ONCE
Status: COMPLETED | OUTPATIENT
Start: 2024-05-29 | End: 2024-05-29

## 2024-05-29 RX ORDER — ONDANSETRON 4 MG/1
4 TABLET, FILM COATED ORAL 3 TIMES DAILY PRN
Qty: 30 TABLET | Refills: 0 | Status: SHIPPED | OUTPATIENT
Start: 2024-05-29

## 2024-05-29 RX ORDER — OXYCODONE HYDROCHLORIDE 5 MG/1
5 TABLET ORAL EVERY 6 HOURS PRN
Qty: 8 TABLET | Refills: 0 | Status: SHIPPED | OUTPATIENT
Start: 2024-05-29 | End: 2024-06-01

## 2024-05-29 RX ADMIN — SODIUM CHLORIDE 500 ML: 9 INJECTION, SOLUTION INTRAVENOUS at 03:12

## 2024-05-29 RX ADMIN — HYDROMORPHONE HYDROCHLORIDE 1 MG: 1 INJECTION, SOLUTION INTRAMUSCULAR; INTRAVENOUS; SUBCUTANEOUS at 02:25

## 2024-05-29 RX ADMIN — ONDANSETRON 4 MG: 2 INJECTION INTRAMUSCULAR; INTRAVENOUS at 03:13

## 2024-05-29 RX ADMIN — HYDROMORPHONE HYDROCHLORIDE 1 MG: 1 INJECTION, SOLUTION INTRAMUSCULAR; INTRAVENOUS; SUBCUTANEOUS at 03:14

## 2024-05-29 ASSESSMENT — ENCOUNTER SYMPTOMS
COUGH: 0
NAUSEA: 1
VOMITING: 0
DIARRHEA: 0
COLOR CHANGE: 0
BACK PAIN: 1
SHORTNESS OF BREATH: 0
ABDOMINAL PAIN: 0

## 2024-05-29 NOTE — DISCHARGE INSTRUCTIONS
Substance Abuse and Addiction Resources    [facility]  [phone]  Closed meeting- family members that have been affected bysomeone alcohol abuse  Open meeting everyone can attend.    [facility] [phone]  Non professional (alcoholics in recovery helping others)  Hold Meetings (talk about sobriety, have desire)  No charge    [facility] [phone]  Jae Pascal is the Treatment Consultant in the [facility]  Free one-on-one phone consultation and insurance verification  12 step based meetings and philosophy  Family programs and sessions    [facility] [phone]  Free individual assessment  Intensive outpatient outpatient program  Ambulatory withdrawal management/detoxification  Insurance required    [facility] [phone] or [phone]  For patients who are homeless, getting ready to be homeless or with no insurance  Sliding fee scale available for self pay  Patients go Monday-Friday from 8-4:30 to central intake for a shelter and then to [facility] for services  Offers a variety of services I.e. Laundry, shower, eye clinic, medical clinic, dental, substance abuse services, case management, etc.    [facility] [phone]  Make appointment with counselor  $60 fee for initial hour intake    [facility] [phone]    [facility] [phone]  Offers Detox and Inpatient Treatment for men only. Social detox  Is a homeless shelter with longterm 12 step program. Can choose just access to the hshelter component  Must be able to walk <1miles one way to attend classes, be highly motivated and willing to complete all 12 steps.  8 months- 1 year is the typical length of stay if accepted    [facility] [phone]  For residents of [facility]  They offer outpatient treatment and can make referrals for inpatient careBased on income.   Will bill insurance. Accept Medicaid.  They have a walk in clinic that patients can go to

## 2024-05-29 NOTE — ED PROVIDER NOTES
93.5 80.0 - 99.0 FL    MCH 29.1 26.0 - 34.0 PG    MCHC 31.1 30.0 - 36.5 g/dL    RDW 14.6 (H) 11.5 - 14.5 %    Platelets 342 150 - 400 K/uL    MPV 10.2 8.9 - 12.9 FL    Nucleated RBCs 0.0 0  WBC    nRBC 0.00 0.00 - 0.01 K/uL    Neutrophils % 55 32 - 75 %    Lymphocytes % 29 12 - 49 %    Monocytes % 10 5 - 13 %    Eosinophils % 5 0 - 7 %    Basophils % 0 0 - 1 %    Immature Granulocytes % 1 (H) 0.0 - 0.5 %    Neutrophils Absolute 5.7 1.8 - 8.0 K/UL    Lymphocytes Absolute 3.0 0.8 - 3.5 K/UL    Monocytes Absolute 1.1 (H) 0.0 - 1.0 K/UL    Eosinophils Absolute 0.5 (H) 0.0 - 0.4 K/UL    Basophils Absolute 0.0 0.0 - 0.1 K/UL    Immature Granulocytes Absolute 0.1 (H) 0.00 - 0.04 K/UL    Differential Type AUTOMATED     Extra Tubes Hold    Collection Time: 05/29/24  1:18 AM   Result Value Ref Range    Specimen HOld LAV     Comment:        Add-on orders for these samples will be processed based on acceptable specimen integrity and analyte stability, which may vary by analyte.   Urinalysis with Reflex to Culture    Collection Time: 05/29/24  1:32 AM    Specimen: Urine   Result Value Ref Range    Color, UA YELLOW/STRAW      Appearance CLEAR CLEAR      Specific Gravity, UA 1.016      pH, Urine 5.5 5.0 - 8.0      Protein, UA 30 (A) NEG mg/dL    Glucose, Ur Negative NEG mg/dL    Ketones, Urine Negative NEG mg/dL    Bilirubin, Urine Negative NEG      Blood, Urine LARGE (A) NEG      Urobilinogen, Urine 0.2 0.2 - 1.0 EU/dL    Nitrite, Urine Negative NEG      Leukocyte Esterase, Urine Negative NEG      WBC, UA 0-4 0 - 4 /hpf    RBC, UA >100 (H) 0 - 5 /hpf    Epithelial Cells, UA MODERATE (A) FEW /lpf    BACTERIA, URINE Negative NEG /hpf    Urine Culture if Indicated CULTURE NOT INDICATED BY UA RESULT CNI      Hyaline Casts, UA 0-2 0 - 2 /lpf   POC Pregnancy Urine Qual    Collection Time: 05/29/24  1:43 AM   Result Value Ref Range    Preg Test, Ur Negative NEG            RADIOLOGY:    Interpretation per the Radiologist below, if

## 2024-06-04 ENCOUNTER — HOSPITAL ENCOUNTER (EMERGENCY)
Facility: HOSPITAL | Age: 39
Discharge: HOME | End: 2024-06-04
Attending: EMERGENCY MEDICINE
Payer: COMMERCIAL

## 2024-06-04 VITALS
WEIGHT: 270 LBS | TEMPERATURE: 99.1 F | BODY MASS INDEX: 47.83 KG/M2 | OXYGEN SATURATION: 94 % | HEART RATE: 64 BPM | RESPIRATION RATE: 18 BRPM | SYSTOLIC BLOOD PRESSURE: 146 MMHG | DIASTOLIC BLOOD PRESSURE: 80 MMHG

## 2024-06-04 DIAGNOSIS — R19.7 DIARRHEA OF PRESUMED INFECTIOUS ORIGIN: Primary | ICD-10-CM

## 2024-06-04 DIAGNOSIS — R11.2 NAUSEA AND VOMITING, UNSPECIFIED VOMITING TYPE: ICD-10-CM

## 2024-06-04 DIAGNOSIS — R10.84 GENERALIZED ABDOMINAL PAIN: ICD-10-CM

## 2024-06-04 LAB
ALBUMIN SERPL-MCNC: 3.7 G/DL (ref 3.5–5)
ALBUMIN/GLOB SERPL: 1.2 (ref 1.1–2.2)
ALP SERPL-CCNC: 106 U/L (ref 45–117)
ALT SERPL-CCNC: 33 U/L (ref 12–78)
ANION GAP SERPL CALC-SCNC: 5 MMOL/L (ref 5–15)
AST SERPL-CCNC: 28 U/L (ref 15–37)
BASOPHILS # BLD: 0.1 K/UL (ref 0–0.1)
BASOPHILS NFR BLD: 0 % (ref 0–1)
BILIRUB SERPL-MCNC: 0.4 MG/DL (ref 0.2–1)
BUN SERPL-MCNC: 9 MG/DL (ref 6–20)
BUN/CREAT SERPL: 10 (ref 12–20)
CALCIUM SERPL-MCNC: 9.1 MG/DL (ref 8.5–10.1)
CHLORIDE SERPL-SCNC: 107 MMOL/L (ref 97–108)
CO2 SERPL-SCNC: 28 MMOL/L (ref 21–32)
CREAT SERPL-MCNC: 0.92 MG/DL (ref 0.55–1.02)
DIFFERENTIAL METHOD BLD: ABNORMAL
EOSINOPHIL # BLD: 0.5 K/UL (ref 0–0.4)
EOSINOPHIL NFR BLD: 4 % (ref 0–7)
ERYTHROCYTE [DISTWIDTH] IN BLOOD BY AUTOMATED COUNT: 14 % (ref 11.5–14.5)
GLOBULIN SER CALC-MCNC: 3.1 G/DL (ref 2–4)
GLUCOSE SERPL-MCNC: 100 MG/DL (ref 65–100)
HCT VFR BLD AUTO: 33.6 % (ref 35–47)
HGB BLD-MCNC: 10.8 G/DL (ref 11.5–16)
IMM GRANULOCYTES # BLD AUTO: 0.1 K/UL (ref 0–0.04)
IMM GRANULOCYTES NFR BLD AUTO: 1 % (ref 0–0.5)
LYMPHOCYTES # BLD: 2.8 K/UL (ref 0.8–3.5)
LYMPHOCYTES NFR BLD: 25 % (ref 12–49)
MCH RBC QN AUTO: 28.8 PG (ref 26–34)
MCHC RBC AUTO-ENTMCNC: 32.1 G/DL (ref 30–36.5)
MCV RBC AUTO: 89.6 FL (ref 80–99)
MONOCYTES # BLD: 1 K/UL (ref 0–1)
MONOCYTES NFR BLD: 9 % (ref 5–13)
NEUTS SEG # BLD: 7 K/UL (ref 1.8–8)
NEUTS SEG NFR BLD: 61 % (ref 32–75)
NRBC # BLD: 0 K/UL (ref 0–0.01)
NRBC BLD-RTO: 0 PER 100 WBC
PLATELET # BLD AUTO: 314 K/UL (ref 150–400)
PMV BLD AUTO: 9.9 FL (ref 8.9–12.9)
POTASSIUM SERPL-SCNC: 4 MMOL/L (ref 3.5–5.1)
PROT SERPL-MCNC: 6.8 G/DL (ref 6.4–8.2)
RBC # BLD AUTO: 3.75 M/UL (ref 3.8–5.2)
SODIUM SERPL-SCNC: 140 MMOL/L (ref 136–145)
WBC # BLD AUTO: 11.4 K/UL (ref 3.6–11)

## 2024-06-04 PROCEDURE — 85025 COMPLETE CBC W/AUTO DIFF WBC: CPT

## 2024-06-04 PROCEDURE — 87324 CLOSTRIDIUM AG IA: CPT

## 2024-06-04 PROCEDURE — 87506 IADNA-DNA/RNA PROBE TQ 6-11: CPT

## 2024-06-04 PROCEDURE — 6360000002 HC RX W HCPCS

## 2024-06-04 PROCEDURE — 96374 THER/PROPH/DIAG INJ IV PUSH: CPT

## 2024-06-04 PROCEDURE — 80053 COMPREHEN METABOLIC PANEL: CPT

## 2024-06-04 PROCEDURE — 96361 HYDRATE IV INFUSION ADD-ON: CPT

## 2024-06-04 PROCEDURE — 89055 LEUKOCYTE ASSESSMENT FECAL: CPT

## 2024-06-04 PROCEDURE — 96372 THER/PROPH/DIAG INJ SC/IM: CPT

## 2024-06-04 PROCEDURE — 2580000003 HC RX 258

## 2024-06-04 PROCEDURE — 99284 EMERGENCY DEPT VISIT MOD MDM: CPT

## 2024-06-04 PROCEDURE — 87449 NOS EACH ORGANISM AG IA: CPT

## 2024-06-04 PROCEDURE — 36415 COLL VENOUS BLD VENIPUNCTURE: CPT

## 2024-06-04 PROCEDURE — 96376 TX/PRO/DX INJ SAME DRUG ADON: CPT

## 2024-06-04 PROCEDURE — 6360000002 HC RX W HCPCS: Performed by: EMERGENCY MEDICINE

## 2024-06-04 PROCEDURE — 96375 TX/PRO/DX INJ NEW DRUG ADDON: CPT

## 2024-06-04 RX ORDER — DICYCLOMINE HYDROCHLORIDE 10 MG/ML
20 INJECTION INTRAMUSCULAR ONCE
Status: COMPLETED | OUTPATIENT
Start: 2024-06-04 | End: 2024-06-04

## 2024-06-04 RX ORDER — MORPHINE SULFATE 4 MG/ML
4 INJECTION, SOLUTION INTRAMUSCULAR; INTRAVENOUS ONCE
Status: COMPLETED | OUTPATIENT
Start: 2024-06-04 | End: 2024-06-04

## 2024-06-04 RX ORDER — ONDANSETRON 2 MG/ML
4 INJECTION INTRAMUSCULAR; INTRAVENOUS ONCE
Status: COMPLETED | OUTPATIENT
Start: 2024-06-04 | End: 2024-06-04

## 2024-06-04 RX ORDER — DICYCLOMINE HCL 20 MG
20 TABLET ORAL 4 TIMES DAILY
Qty: 40 TABLET | Refills: 0 | Status: SHIPPED | OUTPATIENT
Start: 2024-06-04 | End: 2024-06-14

## 2024-06-04 RX ORDER — SODIUM CHLORIDE, SODIUM LACTATE, POTASSIUM CHLORIDE, AND CALCIUM CHLORIDE .6; .31; .03; .02 G/100ML; G/100ML; G/100ML; G/100ML
1000 INJECTION, SOLUTION INTRAVENOUS
Status: COMPLETED | OUTPATIENT
Start: 2024-06-04 | End: 2024-06-04

## 2024-06-04 RX ORDER — METRONIDAZOLE 500 MG/1
500 TABLET ORAL 3 TIMES DAILY
Qty: 42 TABLET | Refills: 0 | Status: SHIPPED | OUTPATIENT
Start: 2024-06-04 | End: 2024-06-18

## 2024-06-04 RX ORDER — HYDROCODONE BITARTRATE AND ACETAMINOPHEN 5; 325 MG/1; MG/1
1 TABLET ORAL EVERY 4 HOURS PRN
Qty: 6 TABLET | Refills: 0 | Status: SHIPPED | OUTPATIENT
Start: 2024-06-04 | End: 2024-06-07

## 2024-06-04 RX ORDER — ONDANSETRON 4 MG/1
4 TABLET, ORALLY DISINTEGRATING ORAL EVERY 8 HOURS PRN
Qty: 20 TABLET | Refills: 0 | Status: SHIPPED | OUTPATIENT
Start: 2024-06-04

## 2024-06-04 RX ORDER — MORPHINE SULFATE 4 MG/ML
4 INJECTION, SOLUTION INTRAMUSCULAR; INTRAVENOUS
Status: COMPLETED | OUTPATIENT
Start: 2024-06-04 | End: 2024-06-04

## 2024-06-04 RX ADMIN — DICYCLOMINE HYDROCHLORIDE 20 MG: 10 INJECTION, SOLUTION INTRAMUSCULAR at 16:18

## 2024-06-04 RX ADMIN — ONDANSETRON 4 MG: 2 INJECTION INTRAMUSCULAR; INTRAVENOUS at 14:47

## 2024-06-04 RX ADMIN — SODIUM CHLORIDE, POTASSIUM CHLORIDE, SODIUM LACTATE AND CALCIUM CHLORIDE 1000 ML: 600; 310; 30; 20 INJECTION, SOLUTION INTRAVENOUS at 14:45

## 2024-06-04 RX ADMIN — MORPHINE SULFATE 4 MG: 4 INJECTION INTRAVENOUS at 14:47

## 2024-06-04 RX ADMIN — MORPHINE SULFATE 4 MG: 4 INJECTION INTRAVENOUS at 16:17

## 2024-06-04 ASSESSMENT — PAIN SCALES - GENERAL: PAINLEVEL_OUTOF10: 9

## 2024-06-04 ASSESSMENT — PAIN DESCRIPTION - LOCATION: LOCATION: ABDOMEN

## 2024-06-04 ASSESSMENT — PAIN - FUNCTIONAL ASSESSMENT: PAIN_FUNCTIONAL_ASSESSMENT: 0-10

## 2024-06-04 NOTE — ED PROVIDER NOTES
[facility] EMERGENCY DEPT  EMERGENCY DEPARTMENT ENCOUNTER       Pt Name: Monique Goodman  MRN: [id number]  Birthdate 1985  Date of evaluation: 6/4/2024  Provider: Delta Boles MD   PCP: Andrew Rosado MD  Note Started: 2:12 PM EDT 6/4/24     CHIEF COMPLAINT       Chief Complaint   Patient presents with    Diarrhea     Diarrhea for 4 days not relieved by imodium. Was treated for c diff in the past. 7/10 abd pain, subjective fevers. PMH- lupus. Reports nausea and vomiting.     Abdominal Pain        HISTORY OF PRESENT ILLNESS: 1 or more elements      History From: patient, History limited by: none     Monique Goodman is a 38 y.o. female with a past med history of 3 prior C. difficile infections once requiring hospitalization, occipital neuralgia, anxiety, depression, lupus, Garth-Danlos, IBS, GERD presents with nausea, vomiting, diarrhea, abdominal pain.  Patient reports the symptoms have been ongoing for about the past week.  She reports some days she has had up to 20 episodes of diarrhea.  She initially had some small amounts of blood in the stool but this is gone away.  She has been experiencing sharp stabbing intermittent right lower quadrant abdominal pain and does have a history of chronic right-sided abdominal pain.  She has also been vomiting 5-7 times a day.  She reports this occurs when her abdominal pain ramps up and believes it is mostly pain related.  She did take Imodium this morning with some relief of her diarrhea.  She reports fevers at home up to 101.  Last treated for C. difficile over a year ago.  Was in the hospital at the beginning of May for pneumonia and treated with IV antibiotics and discharged with Augmentin.       Please See MDM for Additional Details of the HPI/PMH  Nursing Notes were all reviewed and agreed with or any disagreements were addressed in the HPI.     REVIEW OF SYSTEMS        Positives and Pertinent negatives as per HPI.    PAST HISTORY     Past Medical History:  Past

## 2024-06-04 NOTE — DISCHARGE INSTRUCTIONS
You were seen in the emergency room for vomiting, abdominal pain, diarrhea.  We obtained bacterial and C. difficile testing of your stool.  Will follow-up with you on the results.  In the meantime we will treat you empirically with metronidazole which is an antibiotic.  You have also been prescribed Bentyl which will help with abdominal pain and Zofran for nausea or vomiting.  Please return to the emergency department if you develop any worsening symptoms.

## 2024-06-04 NOTE — ED PROVIDER NOTES
THIS IS MY Resident SUPERVISORY AND SHARED VISIT NOTE:    I personally saw the patient and made/approved the management plan and take responsibility for the patient management.    History: ***  Exam: ***  MDM: ***    I independently interpreted the following study(s) *** which show ***  I personally discussed the patient's management with ***, who stated ***    Paul Lyn MD

## 2024-06-05 ENCOUNTER — APPOINTMENT (OUTPATIENT)
Facility: HOSPITAL | Age: 39
End: 2024-06-05
Payer: COMMERCIAL

## 2024-06-05 ENCOUNTER — HOSPITAL ENCOUNTER (EMERGENCY)
Facility: HOSPITAL | Age: 39
Discharge: HOME | End: 2024-06-05
Attending: EMERGENCY MEDICINE
Payer: COMMERCIAL

## 2024-06-05 VITALS
DIASTOLIC BLOOD PRESSURE: 77 MMHG | WEIGHT: 291.01 LBS | HEIGHT: 63 IN | RESPIRATION RATE: 11 BRPM | HEART RATE: 60 BPM | BODY MASS INDEX: 51.56 KG/M2 | SYSTOLIC BLOOD PRESSURE: 126 MMHG | OXYGEN SATURATION: 97 % | TEMPERATURE: 97.9 F

## 2024-06-05 DIAGNOSIS — R10.31 ABDOMINAL PAIN, RIGHT LOWER QUADRANT: Primary | ICD-10-CM

## 2024-06-05 DIAGNOSIS — R19.7 DIARRHEA, UNSPECIFIED TYPE: ICD-10-CM

## 2024-06-05 LAB
ALBUMIN SERPL-MCNC: 3.7 G/DL (ref 3.5–5)
ALBUMIN/GLOB SERPL: 1.2 (ref 1.1–2.2)
ALP SERPL-CCNC: 159 U/L (ref 45–117)
ALT SERPL-CCNC: 41 U/L (ref 12–78)
ANION GAP SERPL CALC-SCNC: 5 MMOL/L (ref 5–15)
APPEARANCE UR: CLEAR
AST SERPL-CCNC: 36 U/L (ref 15–37)
BACTERIA URNS QL MICRO: ABNORMAL /HPF
BASOPHILS # BLD: 0.1 K/UL (ref 0–0.1)
BASOPHILS NFR BLD: 1 % (ref 0–1)
BILIRUB SERPL-MCNC: 0.3 MG/DL (ref 0.2–1)
BILIRUB UR QL: NEGATIVE
BUN SERPL-MCNC: 14 MG/DL (ref 6–20)
BUN/CREAT SERPL: 9 (ref 12–20)
CALCIUM SERPL-MCNC: 9.2 MG/DL (ref 8.5–10.1)
CAOX CRY URNS QL MICRO: ABNORMAL
CHLORIDE SERPL-SCNC: 106 MMOL/L (ref 97–108)
CO2 SERPL-SCNC: 28 MMOL/L (ref 21–32)
COLOR UR: ABNORMAL
CREAT SERPL-MCNC: 1.64 MG/DL (ref 0.55–1.02)
DIFFERENTIAL METHOD BLD: ABNORMAL
EKG ATRIAL RATE: 58 BPM
EKG DIAGNOSIS: NORMAL
EKG P AXIS: 58 DEGREES
EKG P-R INTERVAL: 196 MS
EKG Q-T INTERVAL: 424 MS
EKG QRS DURATION: 88 MS
EKG QTC CALCULATION (BAZETT): 416 MS
EKG R AXIS: 79 DEGREES
EKG T AXIS: 52 DEGREES
EKG VENTRICULAR RATE: 58 BPM
EOSINOPHIL # BLD: 0.4 K/UL (ref 0–0.4)
EOSINOPHIL NFR BLD: 5 % (ref 0–7)
EPITH CASTS URNS QL MICRO: ABNORMAL /LPF
ERYTHROCYTE [DISTWIDTH] IN BLOOD BY AUTOMATED COUNT: 14.2 % (ref 11.5–14.5)
GLOBULIN SER CALC-MCNC: 3.2 G/DL (ref 2–4)
GLUCOSE SERPL-MCNC: 104 MG/DL (ref 65–100)
GLUCOSE UR STRIP.AUTO-MCNC: NEGATIVE MG/DL
HCT VFR BLD AUTO: 33.2 % (ref 35–47)
HGB BLD-MCNC: 10.6 G/DL (ref 11.5–16)
HGB UR QL STRIP: NEGATIVE
IMM GRANULOCYTES # BLD AUTO: 0 K/UL (ref 0–0.04)
IMM GRANULOCYTES NFR BLD AUTO: 1 % (ref 0–0.5)
KETONES UR QL STRIP.AUTO: NEGATIVE MG/DL
LACTATE BLD-SCNC: 0.95 MMOL/L (ref 0.4–2)
LEUKOCYTE ESTERASE UR QL STRIP.AUTO: NEGATIVE
LIPASE SERPL-CCNC: 45 U/L (ref 13–75)
LYMPHOCYTES # BLD: 2.4 K/UL (ref 0.8–3.5)
LYMPHOCYTES NFR BLD: 33 % (ref 12–49)
MCH RBC QN AUTO: 29.5 PG (ref 26–34)
MCHC RBC AUTO-ENTMCNC: 31.9 G/DL (ref 30–36.5)
MCV RBC AUTO: 92.5 FL (ref 80–99)
MONOCYTES # BLD: 0.9 K/UL (ref 0–1)
MONOCYTES NFR BLD: 12 % (ref 5–13)
NEUTS SEG # BLD: 3.5 K/UL (ref 1.8–8)
NEUTS SEG NFR BLD: 48 % (ref 32–75)
NITRITE UR QL STRIP.AUTO: NEGATIVE
NRBC # BLD: 0 K/UL (ref 0–0.01)
NRBC BLD-RTO: 0 PER 100 WBC
PH UR STRIP: 6 (ref 5–8)
PLATELET # BLD AUTO: 308 K/UL (ref 150–400)
PMV BLD AUTO: 10.1 FL (ref 8.9–12.9)
POTASSIUM SERPL-SCNC: 3.3 MMOL/L (ref 3.5–5.1)
PROT SERPL-MCNC: 6.9 G/DL (ref 6.4–8.2)
PROT UR STRIP-MCNC: ABNORMAL MG/DL
RBC # BLD AUTO: 3.59 M/UL (ref 3.8–5.2)
RBC #/AREA URNS HPF: ABNORMAL /HPF (ref 0–5)
SODIUM SERPL-SCNC: 139 MMOL/L (ref 136–145)
SP GR UR REFRACTOMETRY: 1.01 (ref 1–1.03)
URINE CULTURE IF INDICATED: ABNORMAL
UROBILINOGEN UR QL STRIP.AUTO: 0.2 EU/DL (ref 0.2–1)
WBC # BLD AUTO: 7.2 K/UL (ref 3.6–11)
WBC URNS QL MICRO: ABNORMAL /HPF (ref 0–4)

## 2024-06-05 PROCEDURE — 6360000002 HC RX W HCPCS: Performed by: EMERGENCY MEDICINE

## 2024-06-05 PROCEDURE — 93005 ELECTROCARDIOGRAM TRACING: CPT | Performed by: EMERGENCY MEDICINE

## 2024-06-05 PROCEDURE — 96361 HYDRATE IV INFUSION ADD-ON: CPT

## 2024-06-05 PROCEDURE — 96375 TX/PRO/DX INJ NEW DRUG ADDON: CPT

## 2024-06-05 PROCEDURE — 83690 ASSAY OF LIPASE: CPT

## 2024-06-05 PROCEDURE — 83605 ASSAY OF LACTIC ACID: CPT

## 2024-06-05 PROCEDURE — 80053 COMPREHEN METABOLIC PANEL: CPT

## 2024-06-05 PROCEDURE — 6360000004 HC RX CONTRAST MEDICATION: Performed by: EMERGENCY MEDICINE

## 2024-06-05 PROCEDURE — 2500000003 HC RX 250 WO HCPCS: Performed by: EMERGENCY MEDICINE

## 2024-06-05 PROCEDURE — 74176 CT ABD & PELVIS W/O CONTRAST: CPT

## 2024-06-05 PROCEDURE — 85025 COMPLETE CBC W/AUTO DIFF WBC: CPT

## 2024-06-05 PROCEDURE — 6370000000 HC RX 637 (ALT 250 FOR IP): Performed by: STUDENT IN AN ORGANIZED HEALTH CARE EDUCATION/TRAINING PROGRAM

## 2024-06-05 PROCEDURE — 2580000003 HC RX 258: Performed by: EMERGENCY MEDICINE

## 2024-06-05 PROCEDURE — 36415 COLL VENOUS BLD VENIPUNCTURE: CPT

## 2024-06-05 PROCEDURE — 96374 THER/PROPH/DIAG INJ IV PUSH: CPT

## 2024-06-05 PROCEDURE — 99285 EMERGENCY DEPT VISIT HI MDM: CPT

## 2024-06-05 PROCEDURE — 6370000000 HC RX 637 (ALT 250 FOR IP): Performed by: EMERGENCY MEDICINE

## 2024-06-05 PROCEDURE — 81001 URINALYSIS AUTO W/SCOPE: CPT

## 2024-06-05 RX ORDER — 0.9 % SODIUM CHLORIDE 0.9 %
1000 INTRAVENOUS SOLUTION INTRAVENOUS ONCE
Status: COMPLETED | OUTPATIENT
Start: 2024-06-05 | End: 2024-06-05

## 2024-06-05 RX ORDER — DIPHENOXYLATE HYDROCHLORIDE AND ATROPINE SULFATE 2.5; .025 MG/1; MG/1
1 TABLET ORAL ONCE
Status: COMPLETED | OUTPATIENT
Start: 2024-06-05 | End: 2024-06-05

## 2024-06-05 RX ORDER — ONDANSETRON 2 MG/ML
4 INJECTION INTRAMUSCULAR; INTRAVENOUS
Status: COMPLETED | OUTPATIENT
Start: 2024-06-05 | End: 2024-06-05

## 2024-06-05 RX ORDER — DICYCLOMINE HCL 20 MG
20 TABLET ORAL
Status: DISCONTINUED | OUTPATIENT
Start: 2024-06-05 | End: 2024-06-05 | Stop reason: HOSPADM

## 2024-06-05 RX ORDER — FENTANYL CITRATE 50 UG/ML
50 INJECTION, SOLUTION INTRAMUSCULAR; INTRAVENOUS
Status: COMPLETED | OUTPATIENT
Start: 2024-06-05 | End: 2024-06-05

## 2024-06-05 RX ORDER — METRONIDAZOLE 250 MG/1
500 TABLET ORAL
Status: COMPLETED | OUTPATIENT
Start: 2024-06-05 | End: 2024-06-05

## 2024-06-05 RX ORDER — OXYCODONE HYDROCHLORIDE 5 MG/1
10 TABLET ORAL
Status: COMPLETED | OUTPATIENT
Start: 2024-06-05 | End: 2024-06-05

## 2024-06-05 RX ADMIN — DIATRIZOATE MEGLUMINE AND DIATRIZOATE SODIUM 30 ML: 660; 100 LIQUID ORAL; RECTAL at 05:21

## 2024-06-05 RX ADMIN — Medication 39.6 MG: at 07:25

## 2024-06-05 RX ADMIN — SODIUM CHLORIDE 1000 ML: 9 INJECTION, SOLUTION INTRAVENOUS at 04:39

## 2024-06-05 RX ADMIN — DICYCLOMINE HYDROCHLORIDE 20 MG: 20 TABLET ORAL at 05:21

## 2024-06-05 RX ADMIN — DIPHENOXYLATE HYDROCHLORIDE AND ATROPINE SULFATE 1 TABLET: 2.5; .025 TABLET ORAL at 04:38

## 2024-06-05 RX ADMIN — FENTANYL CITRATE 50 MCG: 50 INJECTION INTRAMUSCULAR; INTRAVENOUS at 05:21

## 2024-06-05 RX ADMIN — ONDANSETRON 4 MG: 2 INJECTION INTRAMUSCULAR; INTRAVENOUS at 04:09

## 2024-06-05 RX ADMIN — SODIUM CHLORIDE 1000 ML: 9 INJECTION, SOLUTION INTRAVENOUS at 04:35

## 2024-06-05 RX ADMIN — OXYCODONE HYDROCHLORIDE 10 MG: 5 TABLET ORAL at 09:20

## 2024-06-05 RX ADMIN — DIPHENOXYLATE HYDROCHLORIDE AND ATROPINE SULFATE 1 TABLET: 2.5; .025 TABLET ORAL at 07:34

## 2024-06-05 RX ADMIN — METRONIDAZOLE 500 MG: 250 TABLET ORAL at 07:34

## 2024-06-05 ASSESSMENT — ENCOUNTER SYMPTOMS
NAUSEA: 0
VOMITING: 0
ABDOMINAL PAIN: 1
DIARRHEA: 1
SHORTNESS OF BREATH: 0

## 2024-06-05 ASSESSMENT — PAIN DESCRIPTION - ORIENTATION: ORIENTATION: RIGHT;LOWER

## 2024-06-05 ASSESSMENT — PAIN DESCRIPTION - PAIN TYPE: TYPE: ACUTE PAIN

## 2024-06-05 ASSESSMENT — PAIN SCALES - GENERAL
PAINLEVEL_OUTOF10: 8
PAINLEVEL_OUTOF10: 7

## 2024-06-05 ASSESSMENT — PAIN DESCRIPTION - DESCRIPTORS: DESCRIPTORS: ACHING

## 2024-06-05 ASSESSMENT — PAIN DESCRIPTION - LOCATION: LOCATION: ABDOMEN

## 2024-06-05 ASSESSMENT — PAIN - FUNCTIONAL ASSESSMENT: PAIN_FUNCTIONAL_ASSESSMENT: 0-10

## 2024-06-05 NOTE — ED NOTES
Report given to COTY Gordon. Nurse was informed of reason for arrival, vitals, labs, medications, orders, procedures, results, anything left pending and current plan of action. Questions were asked and received prior to departure from the patient.

## 2024-06-05 NOTE — DISCHARGE INSTRUCTIONS
It was a pleasure taking care of you in our Emergency Department today.  We know that when you come to [facility], you are entrusting us with your health, comfort, and safety.  Our physicians and nurses honor that trust, and truly appreciate the opportunity to care for you and your loved ones.      We also value your feedback.  If you receive a survey about your Emergency Department experience today, please fill it out.  We care about our patients' feedback, and we listen to what you have to say.  Thank you!      Dr. Nubia Marroquin MD.

## 2024-06-05 NOTE — ED NOTES
Discharge paperwork reviewed and provided to pt. Time was given to ask any questions or concerns which there were none att.

## 2024-06-05 NOTE — ED NOTES
Pt reports she wants to discontinue the Ketamine as she doesn't like the way it makes her head feel. Ketamine discontinued, IV flushed, and locked. Provider notified.

## 2024-06-05 NOTE — ED PROVIDER NOTES
Diarrhea     Pt states caused her C-Diff    Iodinated Contrast Media Myalgia     Pt c/o severe back spasms after IV contrast administration. Pt requesting IV pain medications and requests I put this as a drug intolerance in her EMR.    10/11/23: endorsed body wide pruritis after IV contrast, no urticaria, required benadryl for symptoms    Mushroom Extract Complex Other (See Comments)    Nsaids Other (See Comments)     Peptic ulcer    Valproic Acid Other (See Comments)     Elevated heart rate and vomiting    Adhesive Tape Rash     Allergic to Dermabond    Metoclopramide Rash         CURRENT MEDICATIONS      Previous Medications    ALBUTEROL SULFATE HFA (PROVENTIL;VENTOLIN;PROAIR) 108 (90 BASE) MCG/ACT INHALER    Inhale 1 puff into the lungs every 6 hours as needed    ALPRAZOLAM (XANAX) 1 MG TABLET    Take 1 tablet by mouth daily as needed for Anxiety for up to 90 days. One qd prn anxiety Max Daily Amount: 1 mg    AMOXICILLIN (AMOXIL) 500 MG CAPSULE    TAKE 1 CAPSULE BY MOUTH THREE TIMES A DAY FOR 7 DAYS    BACLOFEN (LIORESAL) 10 MG TABLET    Take 1 tablet by mouth 3 times daily    BUDESONIDE-FORMOTEROL (SYMBICORT) 160-4.5 MCG/ACT AERO    Inhale 2 puffs into the lungs 2 times daily    BUTALBITAL-APAP-CAFFEINE -40 MG CAPS PER CAPSULE    Take 1 capsule by mouth every 6 hours as needed for Headaches or Migraine    DICYCLOMINE (BENTYL) 20 MG TABLET    Take 1 tablet by mouth 4 times daily for 10 days    DULOXETINE (CYMBALTA) 60 MG EXTENDED RELEASE CAPSULE    Take 2 capsules by mouth daily    HYDROCODONE-ACETAMINOPHEN (LORCET) 5-325 MG PER TABLET    Take 1 tablet by mouth every 4 hours as needed for Pain for up to 3 days. Intended supply: 5 days. Take lowest dose possible to manage pain Max Daily Amount: 6 tablets    METHYLPREDNISOLONE (MEDROL DOSEPACK) 4 MG TABLET    Take 1 tablet by mouth See Admin Instructions Take by mouth.    METRONIDAZOLE (FLAGYL) 500 MG TABLET    Take 1 tablet by mouth 3 times daily for 14 days

## 2024-06-06 LAB
C COLI+JEJUNI TUF STL QL NAA+PROBE: NEGATIVE
C DIFF GDH STL QL: NEGATIVE
C DIFF TOX A+B STL QL IA: NEGATIVE
C DIFF TOXIN INTERPRETATION: NORMAL
EC STX1+STX2 GENES STL QL NAA+PROBE: NEGATIVE
ETEC ELTA+ESTB GENES STL QL NAA+PROBE: NEGATIVE
P SHIGELLOIDES DNA STL QL NAA+PROBE: NEGATIVE
SALMONELLA SP SPAO STL QL NAA+PROBE: NEGATIVE
SHIGELLA SP+EIEC IPAH STL QL NAA+PROBE: NEGATIVE
V CHOL+PARA+VUL DNA STL QL NAA+NON-PROBE: NEGATIVE
WBC #/AREA STL HPF: NORMAL /HPF (ref 0–4)
Y ENTEROCOL DNA STL QL NAA+NON-PROBE: NEGATIVE

## 2024-06-22 DIAGNOSIS — B37.31 YEAST VAGINITIS: ICD-10-CM

## 2024-06-24 RX ORDER — FLUCONAZOLE 150 MG/1
150 TABLET ORAL
Qty: 2 TABLET | Refills: 0 | Status: SHIPPED | OUTPATIENT
Start: 2024-06-24 | End: 2024-06-30

## 2024-06-25 ENCOUNTER — HOSPITAL ENCOUNTER (EMERGENCY)
Facility: HOSPITAL | Age: 39
Discharge: HOME | End: 2024-06-25
Attending: STUDENT IN AN ORGANIZED HEALTH CARE EDUCATION/TRAINING PROGRAM
Payer: COMMERCIAL

## 2024-06-25 ENCOUNTER — APPOINTMENT (OUTPATIENT)
Facility: HOSPITAL | Age: 39
End: 2024-06-25
Payer: COMMERCIAL

## 2024-06-25 VITALS
BODY MASS INDEX: 47.84 KG/M2 | DIASTOLIC BLOOD PRESSURE: 92 MMHG | SYSTOLIC BLOOD PRESSURE: 146 MMHG | WEIGHT: 270 LBS | OXYGEN SATURATION: 96 % | RESPIRATION RATE: 16 BRPM | TEMPERATURE: 99.3 F | HEART RATE: 89 BPM | HEIGHT: 63 IN

## 2024-06-25 DIAGNOSIS — N39.0 URINARY TRACT INFECTION IN FEMALE: Primary | ICD-10-CM

## 2024-06-25 DIAGNOSIS — N20.0 KIDNEY STONE: ICD-10-CM

## 2024-06-25 DIAGNOSIS — E86.0 DEHYDRATION: ICD-10-CM

## 2024-06-25 LAB
ALBUMIN SERPL-MCNC: 4.3 G/DL (ref 3.5–5)
ALBUMIN/GLOB SERPL: 1.3 (ref 1.1–2.2)
ALP SERPL-CCNC: 140 U/L (ref 45–117)
ALT SERPL-CCNC: 31 U/L (ref 12–78)
ANION GAP SERPL CALC-SCNC: 5 MMOL/L (ref 5–15)
APPEARANCE UR: CLEAR
AST SERPL-CCNC: 19 U/L (ref 15–37)
BACTERIA URNS QL MICRO: ABNORMAL /HPF
BASOPHILS # BLD: 0.1 K/UL (ref 0–0.1)
BASOPHILS NFR BLD: 1 % (ref 0–1)
BILIRUB SERPL-MCNC: 0.5 MG/DL (ref 0.2–1)
BILIRUB UR QL: NEGATIVE
BUN SERPL-MCNC: 18 MG/DL (ref 6–20)
BUN/CREAT SERPL: 12 (ref 12–20)
CALCIUM SERPL-MCNC: 9.9 MG/DL (ref 8.5–10.1)
CHLORIDE SERPL-SCNC: 109 MMOL/L (ref 97–108)
CO2 SERPL-SCNC: 26 MMOL/L (ref 21–32)
COLOR UR: ABNORMAL
CREAT SERPL-MCNC: 1.46 MG/DL (ref 0.55–1.02)
DIFFERENTIAL METHOD BLD: ABNORMAL
EOSINOPHIL # BLD: 1.6 K/UL (ref 0–0.4)
EOSINOPHIL NFR BLD: 14 % (ref 0–7)
EPITH CASTS URNS QL MICRO: ABNORMAL /LPF
ERYTHROCYTE [DISTWIDTH] IN BLOOD BY AUTOMATED COUNT: 13.7 % (ref 11.5–14.5)
GLOBULIN SER CALC-MCNC: 3.4 G/DL (ref 2–4)
GLUCOSE SERPL-MCNC: 104 MG/DL (ref 65–100)
GLUCOSE UR STRIP.AUTO-MCNC: NEGATIVE MG/DL
HCT VFR BLD AUTO: 41.4 % (ref 35–47)
HGB BLD-MCNC: 12.9 G/DL (ref 11.5–16)
HGB UR QL STRIP: ABNORMAL
HYALINE CASTS URNS QL MICRO: ABNORMAL /LPF (ref 0–2)
IMM GRANULOCYTES # BLD AUTO: 0 K/UL (ref 0–0.04)
IMM GRANULOCYTES NFR BLD AUTO: 0 % (ref 0–0.5)
KETONES UR QL STRIP.AUTO: NEGATIVE MG/DL
LEUKOCYTE ESTERASE UR QL STRIP.AUTO: ABNORMAL
LYMPHOCYTES # BLD: 4.1 K/UL (ref 0.8–3.5)
LYMPHOCYTES NFR BLD: 35 % (ref 12–49)
MCH RBC QN AUTO: 28.4 PG (ref 26–34)
MCHC RBC AUTO-ENTMCNC: 31.2 G/DL (ref 30–36.5)
MCV RBC AUTO: 91 FL (ref 80–99)
MONOCYTES # BLD: 0.9 K/UL (ref 0–1)
MONOCYTES NFR BLD: 8 % (ref 5–13)
NEUTS SEG # BLD: 5 K/UL (ref 1.8–8)
NEUTS SEG NFR BLD: 42 % (ref 32–75)
NITRITE UR QL STRIP.AUTO: NEGATIVE
NRBC # BLD: 0 K/UL (ref 0–0.01)
NRBC BLD-RTO: 0 PER 100 WBC
PH UR STRIP: 5.5 (ref 5–8)
PLATELET # BLD AUTO: 364 K/UL (ref 150–400)
PMV BLD AUTO: 9.7 FL (ref 8.9–12.9)
POTASSIUM SERPL-SCNC: 4.3 MMOL/L (ref 3.5–5.1)
PROT SERPL-MCNC: 7.7 G/DL (ref 6.4–8.2)
PROT UR STRIP-MCNC: ABNORMAL MG/DL
RBC # BLD AUTO: 4.55 M/UL (ref 3.8–5.2)
RBC #/AREA URNS HPF: >100 /HPF (ref 0–5)
RBC MORPH BLD: ABNORMAL
SODIUM SERPL-SCNC: 140 MMOL/L (ref 136–145)
SP GR UR REFRACTOMETRY: 1.02
URINE CULTURE IF INDICATED: ABNORMAL
UROBILINOGEN UR QL STRIP.AUTO: 0.2 EU/DL (ref 0.2–1)
WBC # BLD AUTO: 11.7 K/UL (ref 3.6–11)
WBC MORPH BLD: ABNORMAL
WBC URNS QL MICRO: ABNORMAL /HPF (ref 0–4)

## 2024-06-25 PROCEDURE — 2500000003 HC RX 250 WO HCPCS: Performed by: STUDENT IN AN ORGANIZED HEALTH CARE EDUCATION/TRAINING PROGRAM

## 2024-06-25 PROCEDURE — 99284 EMERGENCY DEPT VISIT MOD MDM: CPT

## 2024-06-25 PROCEDURE — 6360000002 HC RX W HCPCS: Performed by: STUDENT IN AN ORGANIZED HEALTH CARE EDUCATION/TRAINING PROGRAM

## 2024-06-25 PROCEDURE — 85025 COMPLETE CBC W/AUTO DIFF WBC: CPT

## 2024-06-25 PROCEDURE — 96375 TX/PRO/DX INJ NEW DRUG ADDON: CPT

## 2024-06-25 PROCEDURE — 81001 URINALYSIS AUTO W/SCOPE: CPT

## 2024-06-25 PROCEDURE — 96365 THER/PROPH/DIAG IV INF INIT: CPT

## 2024-06-25 PROCEDURE — 96361 HYDRATE IV INFUSION ADD-ON: CPT

## 2024-06-25 PROCEDURE — 80053 COMPREHEN METABOLIC PANEL: CPT

## 2024-06-25 PROCEDURE — 74176 CT ABD & PELVIS W/O CONTRAST: CPT

## 2024-06-25 PROCEDURE — 2580000003 HC RX 258: Performed by: STUDENT IN AN ORGANIZED HEALTH CARE EDUCATION/TRAINING PROGRAM

## 2024-06-25 PROCEDURE — 36415 COLL VENOUS BLD VENIPUNCTURE: CPT

## 2024-06-25 RX ORDER — ONDANSETRON 4 MG/1
4 TABLET, ORALLY DISINTEGRATING ORAL 3 TIMES DAILY PRN
Qty: 21 TABLET | Refills: 0 | Status: SHIPPED | OUTPATIENT
Start: 2024-06-25

## 2024-06-25 RX ORDER — HYDROMORPHONE HYDROCHLORIDE 1 MG/ML
1 INJECTION, SOLUTION INTRAMUSCULAR; INTRAVENOUS; SUBCUTANEOUS
Status: COMPLETED | OUTPATIENT
Start: 2024-06-25 | End: 2024-06-25

## 2024-06-25 RX ORDER — CEPHALEXIN 500 MG/1
500 CAPSULE ORAL 2 TIMES DAILY
Qty: 14 CAPSULE | Refills: 0 | Status: SHIPPED | OUTPATIENT
Start: 2024-06-25 | End: 2024-07-02

## 2024-06-25 RX ORDER — 0.9 % SODIUM CHLORIDE 0.9 %
1000 INTRAVENOUS SOLUTION INTRAVENOUS ONCE
Status: COMPLETED | OUTPATIENT
Start: 2024-06-25 | End: 2024-06-25

## 2024-06-25 RX ORDER — ONDANSETRON 2 MG/ML
4 INJECTION INTRAMUSCULAR; INTRAVENOUS ONCE
Status: COMPLETED | OUTPATIENT
Start: 2024-06-25 | End: 2024-06-25

## 2024-06-25 RX ORDER — OXYCODONE HYDROCHLORIDE 5 MG/1
5 TABLET ORAL EVERY 6 HOURS PRN
Qty: 12 TABLET | Refills: 0 | Status: SHIPPED | OUTPATIENT
Start: 2024-06-25 | End: 2024-06-28

## 2024-06-25 RX ADMIN — ONDANSETRON 4 MG: 2 INJECTION INTRAMUSCULAR; INTRAVENOUS at 19:51

## 2024-06-25 RX ADMIN — SODIUM CHLORIDE 1000 ML: 9 INJECTION, SOLUTION INTRAVENOUS at 19:52

## 2024-06-25 RX ADMIN — SODIUM CHLORIDE 1000 MG: 900 INJECTION INTRAVENOUS at 19:52

## 2024-06-25 RX ADMIN — HYDROMORPHONE HYDROCHLORIDE 1 MG: 1 INJECTION, SOLUTION INTRAMUSCULAR; INTRAVENOUS; SUBCUTANEOUS at 19:52

## 2024-06-25 ASSESSMENT — PAIN SCALES - GENERAL: PAINLEVEL_OUTOF10: 8

## 2024-06-25 ASSESSMENT — PAIN - FUNCTIONAL ASSESSMENT: PAIN_FUNCTIONAL_ASSESSMENT: 0-10

## 2024-06-26 DIAGNOSIS — K21.9 GASTRO-ESOPHAGEAL REFLUX DISEASE WITHOUT ESOPHAGITIS: ICD-10-CM

## 2024-06-26 RX ORDER — OMEPRAZOLE 20 MG/1
CAPSULE, DELAYED RELEASE ORAL
Qty: 90 CAPSULE | Refills: 1 | Status: SHIPPED | OUTPATIENT
Start: 2024-06-26

## 2024-06-26 NOTE — DISCHARGE INSTRUCTIONS
Thank you for choosing our Emergency Department for your care.  It is our privilege to care for you in your time of need.  In the next several days, you may receive a survey via email or mailed to your home about your experience with our team.  We would greatly appreciate you taking a few minutes to complete the survey, as we use this information to learn what we have done well and what we could be doing better. Thank you for trusting us with your care!    Below you will find a list of your tests from today's visit.   Labs  Recent Results (from the past 12 hour(s))   Urinalysis with Reflex to Culture    Collection Time: 06/25/24  2:53 PM    Specimen: Urine   Result Value Ref Range    Color, UA YELLOW/STRAW      Appearance CLEAR CLEAR      Specific Gravity, UA 1.017      pH, Urine 5.5 5.0 - 8.0      Protein, UA TRACE (A) NEG mg/dL    Glucose, Ur Negative NEG mg/dL    Ketones, Urine Negative NEG mg/dL    Bilirubin, Urine Negative NEG      Blood, Urine LARGE (A) NEG      Urobilinogen, Urine 0.2 0.2 - 1.0 EU/dL    Nitrite, Urine Negative NEG      Leukocyte Esterase, Urine TRACE (A) NEG      WBC, UA 5-10 0 - 4 /hpf    RBC, UA >100 (H) 0 - 5 /hpf    Epithelial Cells, UA MANY (A) FEW /lpf    BACTERIA, URINE 2+ (A) NEG /hpf    Urine Culture if Indicated CULTURE NOT INDICATED BY UA RESULT      Hyaline Casts, UA 2-5 0 - 2 /lpf   CBC with Auto Differential    Collection Time: 06/25/24  5:58 PM   Result Value Ref Range    WBC 11.7 (H) 3.6 - 11.0 K/uL    RBC 4.55 3.80 - 5.20 M/uL    Hemoglobin 12.9 11.5 - 16.0 g/dL    Hematocrit 41.4 35.0 - 47.0 %    MCV 91.0 80.0 - 99.0 FL    MCH 28.4 26.0 - 34.0 PG    MCHC 31.2 30.0 - 36.5 g/dL    RDW 13.7 11.5 - 14.5 %    Platelets 364 150 - 400 K/uL    MPV 9.7 8.9 - 12.9 FL    Nucleated RBCs 0.0 0  WBC    nRBC 0.00 0.00 - 0.01 K/uL    Neutrophils % 42 32 - 75 %    Lymphocytes % 35 12 - 49 %    Monocytes % 8 5 - 13 %    Eosinophils % 14 (H) 0 - 7 %    Basophils % 1 0 - 1 %

## 2024-06-26 NOTE — ED PROVIDER NOTES
dictation were completed with voice recognition software. Quite often unanticipated grammatical, syntax, homophones, and other interpretive errors are inadvertently transcribed by the computer software. Please disregards these errors. Please excuse any errors that have escaped final proofreading.)     Reynaldo Garza, DO  06/26/24 1813       Reynaldo Garza, DO  06/26/24 1816

## 2024-06-27 ENCOUNTER — HOSPITAL ENCOUNTER (EMERGENCY)
Facility: HOSPITAL | Age: 39
Discharge: HOME | End: 2024-06-27
Attending: EMERGENCY MEDICINE
Payer: COMMERCIAL

## 2024-06-27 VITALS
OXYGEN SATURATION: 96 % | TEMPERATURE: 98.4 F | RESPIRATION RATE: 17 BRPM | SYSTOLIC BLOOD PRESSURE: 123 MMHG | HEART RATE: 86 BPM | DIASTOLIC BLOOD PRESSURE: 92 MMHG | WEIGHT: 270.06 LBS | BODY MASS INDEX: 47.84 KG/M2

## 2024-06-27 DIAGNOSIS — G89.29 CHRONIC LOW BACK PAIN, UNSPECIFIED BACK PAIN LATERALITY, UNSPECIFIED WHETHER SCIATICA PRESENT: ICD-10-CM

## 2024-06-27 DIAGNOSIS — M54.50 CHRONIC LOW BACK PAIN, UNSPECIFIED BACK PAIN LATERALITY, UNSPECIFIED WHETHER SCIATICA PRESENT: ICD-10-CM

## 2024-06-27 DIAGNOSIS — R11.0 NAUSEA: Primary | ICD-10-CM

## 2024-06-27 LAB
ALBUMIN SERPL-MCNC: 3.8 G/DL (ref 3.5–5)
ALBUMIN/GLOB SERPL: 1.2 (ref 1.1–2.2)
ALP SERPL-CCNC: 142 U/L (ref 45–117)
ALT SERPL-CCNC: 29 U/L (ref 12–78)
ANION GAP SERPL CALC-SCNC: 3 MMOL/L (ref 5–15)
APPEARANCE UR: CLEAR
AST SERPL-CCNC: 18 U/L (ref 15–37)
BACTERIA URNS QL MICRO: NEGATIVE /HPF
BASOPHILS # BLD: 0.1 K/UL (ref 0–0.1)
BASOPHILS NFR BLD: 1 % (ref 0–1)
BILIRUB SERPL-MCNC: 0.7 MG/DL (ref 0.2–1)
BILIRUB UR QL: NEGATIVE
BUN SERPL-MCNC: 18 MG/DL (ref 6–20)
BUN/CREAT SERPL: 14 (ref 12–20)
CALCIUM SERPL-MCNC: 9.5 MG/DL (ref 8.5–10.1)
CHLORIDE SERPL-SCNC: 111 MMOL/L (ref 97–108)
CO2 SERPL-SCNC: 26 MMOL/L (ref 21–32)
COLOR UR: ABNORMAL
CREAT SERPL-MCNC: 1.27 MG/DL (ref 0.55–1.02)
DIFFERENTIAL METHOD BLD: ABNORMAL
EOSINOPHIL # BLD: 1.4 K/UL (ref 0–0.4)
EOSINOPHIL NFR BLD: 16 % (ref 0–7)
EPITH CASTS URNS QL MICRO: ABNORMAL /LPF
ERYTHROCYTE [DISTWIDTH] IN BLOOD BY AUTOMATED COUNT: 13.7 % (ref 11.5–14.5)
GLOBULIN SER CALC-MCNC: 3.3 G/DL (ref 2–4)
GLUCOSE SERPL-MCNC: 106 MG/DL (ref 65–100)
GLUCOSE UR STRIP.AUTO-MCNC: NEGATIVE MG/DL
HCT VFR BLD AUTO: 38.3 % (ref 35–47)
HGB BLD-MCNC: 11.8 G/DL (ref 11.5–16)
HGB UR QL STRIP: ABNORMAL
HYALINE CASTS URNS QL MICRO: ABNORMAL /LPF (ref 0–2)
IMM GRANULOCYTES # BLD AUTO: 0 K/UL (ref 0–0.04)
IMM GRANULOCYTES NFR BLD AUTO: 0 % (ref 0–0.5)
KETONES UR QL STRIP.AUTO: NEGATIVE MG/DL
LEUKOCYTE ESTERASE UR QL STRIP.AUTO: NEGATIVE
LIPASE SERPL-CCNC: 22 U/L (ref 13–75)
LYMPHOCYTES # BLD: 3.7 K/UL (ref 0.8–3.5)
LYMPHOCYTES NFR BLD: 41 % (ref 12–49)
MCH RBC QN AUTO: 28.6 PG (ref 26–34)
MCHC RBC AUTO-ENTMCNC: 30.8 G/DL (ref 30–36.5)
MCV RBC AUTO: 93 FL (ref 80–99)
MONOCYTES # BLD: 0.9 K/UL (ref 0–1)
MONOCYTES NFR BLD: 10 % (ref 5–13)
NEUTS SEG # BLD: 2.9 K/UL (ref 1.8–8)
NEUTS SEG NFR BLD: 32 % (ref 32–75)
NITRITE UR QL STRIP.AUTO: NEGATIVE
NRBC # BLD: 0 K/UL (ref 0–0.01)
NRBC BLD-RTO: 0 PER 100 WBC
PH UR STRIP: 6 (ref 5–8)
PLATELET # BLD AUTO: 326 K/UL (ref 150–400)
PMV BLD AUTO: 10.2 FL (ref 8.9–12.9)
POTASSIUM SERPL-SCNC: 4.7 MMOL/L (ref 3.5–5.1)
PROT SERPL-MCNC: 7.1 G/DL (ref 6.4–8.2)
PROT UR STRIP-MCNC: NEGATIVE MG/DL
RBC # BLD AUTO: 4.12 M/UL (ref 3.8–5.2)
RBC #/AREA URNS HPF: >100 /HPF (ref 0–5)
RBC MORPH BLD: ABNORMAL
SODIUM SERPL-SCNC: 140 MMOL/L (ref 136–145)
SP GR UR REFRACTOMETRY: 1.01
UROBILINOGEN UR QL STRIP.AUTO: 0.2 EU/DL (ref 0.2–1)
WBC # BLD AUTO: 9 K/UL (ref 3.6–11)
WBC URNS QL MICRO: ABNORMAL /HPF (ref 0–4)

## 2024-06-27 PROCEDURE — 99284 EMERGENCY DEPT VISIT MOD MDM: CPT

## 2024-06-27 PROCEDURE — 85025 COMPLETE CBC W/AUTO DIFF WBC: CPT

## 2024-06-27 PROCEDURE — 2580000003 HC RX 258: Performed by: EMERGENCY MEDICINE

## 2024-06-27 PROCEDURE — 81001 URINALYSIS AUTO W/SCOPE: CPT

## 2024-06-27 PROCEDURE — 6370000000 HC RX 637 (ALT 250 FOR IP): Performed by: EMERGENCY MEDICINE

## 2024-06-27 PROCEDURE — 96365 THER/PROPH/DIAG IV INF INIT: CPT

## 2024-06-27 PROCEDURE — 96361 HYDRATE IV INFUSION ADD-ON: CPT

## 2024-06-27 PROCEDURE — 6360000002 HC RX W HCPCS: Performed by: EMERGENCY MEDICINE

## 2024-06-27 PROCEDURE — 83690 ASSAY OF LIPASE: CPT

## 2024-06-27 PROCEDURE — 80053 COMPREHEN METABOLIC PANEL: CPT

## 2024-06-27 PROCEDURE — 36415 COLL VENOUS BLD VENIPUNCTURE: CPT

## 2024-06-27 PROCEDURE — 96375 TX/PRO/DX INJ NEW DRUG ADDON: CPT

## 2024-06-27 RX ORDER — 0.9 % SODIUM CHLORIDE 0.9 %
500 INTRAVENOUS SOLUTION INTRAVENOUS
Status: COMPLETED | OUTPATIENT
Start: 2024-06-27 | End: 2024-06-27

## 2024-06-27 RX ORDER — TRAMADOL HYDROCHLORIDE 50 MG/1
25 TABLET ORAL ONCE
Status: COMPLETED | OUTPATIENT
Start: 2024-06-27 | End: 2024-06-27

## 2024-06-27 RX ORDER — ACETAMINOPHEN 500 MG
1000 TABLET ORAL
Status: COMPLETED | OUTPATIENT
Start: 2024-06-27 | End: 2024-06-27

## 2024-06-27 RX ORDER — PROMETHAZINE HYDROCHLORIDE 25 MG/1
25 SUPPOSITORY RECTAL EVERY 6 HOURS PRN
Qty: 10 SUPPOSITORY | Refills: 0 | Status: SHIPPED | OUTPATIENT
Start: 2024-06-27 | End: 2024-07-04

## 2024-06-27 RX ORDER — ONDANSETRON 2 MG/ML
4 INJECTION INTRAMUSCULAR; INTRAVENOUS ONCE
Status: COMPLETED | OUTPATIENT
Start: 2024-06-27 | End: 2024-06-27

## 2024-06-27 RX ORDER — 0.9 % SODIUM CHLORIDE 0.9 %
1000 INTRAVENOUS SOLUTION INTRAVENOUS ONCE
Status: COMPLETED | OUTPATIENT
Start: 2024-06-27 | End: 2024-06-27

## 2024-06-27 RX ADMIN — ONDANSETRON HYDROCHLORIDE 4 MG: 2 INJECTION, SOLUTION INTRAMUSCULAR; INTRAVENOUS at 21:00

## 2024-06-27 RX ADMIN — PROMETHAZINE HYDROCHLORIDE 25 MG: 25 INJECTION INTRAMUSCULAR; INTRAVENOUS at 22:02

## 2024-06-27 RX ADMIN — SODIUM CHLORIDE 1000 ML: 9 INJECTION, SOLUTION INTRAVENOUS at 20:57

## 2024-06-27 RX ADMIN — TRAMADOL HYDROCHLORIDE 25 MG: 50 TABLET ORAL at 21:39

## 2024-06-27 RX ADMIN — SODIUM CHLORIDE 500 ML: 9 INJECTION, SOLUTION INTRAVENOUS at 21:58

## 2024-06-27 RX ADMIN — ACETAMINOPHEN 1000 MG: 500 TABLET ORAL at 21:40

## 2024-06-27 ASSESSMENT — PAIN SCALES - GENERAL
PAINLEVEL_OUTOF10: 8
PAINLEVEL_OUTOF10: 7

## 2024-06-28 NOTE — ED PROVIDER NOTES
[facility] EMERGENCY DEPT  EMERGENCY DEPARTMENT HISTORY AND PHYSICAL EXAM      Date: 6/27/2024  Patient Name: Monique Goodman  MRN: [id number]  Birthdate 1985  Date of evaluation: 6/27/2024  Provider: David Edwards MD   Note Started: 8:33 PM EDT 6/27/24    HISTORY OF PRESENT ILLNESS     Chief Complaint   Patient presents with    Hypotension     Was here a couple of days ago for kidney stone and UTI. Pt bp dropped low at home today. Feels like \"I have kidney failure\" again. Feels like she is dehydrated and still in pain right back area.        History Provided By: Patient    HPI: Monique Goodman is a 38 y.o. female presents with feeling like she has kidney failure again.  Says she feels dehydrated and is got some right-sided back pain with known history of kidney stone.  She is followed by urology.  She tolerating p.o. without complication at this point in time.    PAST MEDICAL HISTORY   Past Medical History:  Past Medical History:   Diagnosis Date    Abnormal CT of the abdomen 11/8/2012    JIGNESH (acute kidney injury) (HCC) 1/24/2024    Asthma     Autoimmune disease (HCC)     lupus    C. difficile diarrhea     Cervical prolapse     Dehydration     Garth-Danlos syndrome     Gastrointestinal disorder     gerd, twisted colon, IBS    GERD (gastroesophageal reflux disease)     Headache(784.0)     Lupus (HCC)     Morbid obesity (HCC)     Murmur     Nausea & vomiting     Neurological disorder     cluster headaches    Occipital neuralgia     Other ill-defined conditions(799.89) 2006, 2009    ovarian cyst removal    Other ill-defined conditions(799.89)     Syncope     Worsening headaches        Past Surgical History:  Past Surgical History:   Procedure Laterality Date    CHOLECYSTECTOMY      COLONOSCOPY  09/21/2010         CYST INCISION AND DRAINAGE Right 02/27/2020    CYST INCISION AND DRAINAGE  02/21/2023    Incision and drainage of left upper arm abscess    EGD TRANSORAL BIOPSY SINGLE/MULTIPLE  09/22/2010         GYN

## 2024-06-29 ENCOUNTER — TELEPHONE (OUTPATIENT)
Age: 39
End: 2024-06-29

## 2024-06-29 NOTE — TELEPHONE ENCOUNTER
On-call page:     Pt reports that she went to the ED on 6/25/24 and dx w kidney stone. She was prescribed 12 pills of oxycodone. She states she is almost out and asking more another prescription. She has numerous recent narcotic prescriptions from multiple different providers [place].   Informed patient that she would need to go back to the ED if she remains in severe pain not controlled with tylenol.      Catracho Barrientos MD

## 2024-07-07 ENCOUNTER — HOSPITAL ENCOUNTER (EMERGENCY)
Facility: HOSPITAL | Age: 39
Discharge: HOME | End: 2024-07-07
Attending: STUDENT IN AN ORGANIZED HEALTH CARE EDUCATION/TRAINING PROGRAM
Payer: COMMERCIAL

## 2024-07-07 VITALS
BODY MASS INDEX: 51.68 KG/M2 | RESPIRATION RATE: 16 BRPM | TEMPERATURE: 98.6 F | OXYGEN SATURATION: 92 % | HEART RATE: 73 BPM | SYSTOLIC BLOOD PRESSURE: 112 MMHG | HEIGHT: 63 IN | WEIGHT: 291.67 LBS | DIASTOLIC BLOOD PRESSURE: 72 MMHG

## 2024-07-07 DIAGNOSIS — N20.0 NEPHROLITHIASIS: Primary | ICD-10-CM

## 2024-07-07 DIAGNOSIS — R31.0 GROSS HEMATURIA: ICD-10-CM

## 2024-07-07 LAB
ALBUMIN SERPL-MCNC: 3.7 G/DL (ref 3.5–5)
ALBUMIN/GLOB SERPL: 1.2 (ref 1.1–2.2)
ALP SERPL-CCNC: 121 U/L (ref 45–117)
ALT SERPL-CCNC: 28 U/L (ref 12–78)
ANION GAP SERPL CALC-SCNC: 5 MMOL/L (ref 5–15)
APPEARANCE UR: CLEAR
AST SERPL-CCNC: 36 U/L (ref 15–37)
BACTERIA URNS QL MICRO: NEGATIVE /HPF
BASOPHILS # BLD: 0.1 K/UL (ref 0–0.1)
BASOPHILS NFR BLD: 1 % (ref 0–1)
BILIRUB SERPL-MCNC: 0.4 MG/DL (ref 0.2–1)
BILIRUB UR QL: NEGATIVE
BUN SERPL-MCNC: 13 MG/DL (ref 6–20)
BUN/CREAT SERPL: 13 (ref 12–20)
CALCIUM SERPL-MCNC: 8.8 MG/DL (ref 8.5–10.1)
CHLORIDE SERPL-SCNC: 110 MMOL/L (ref 97–108)
CO2 SERPL-SCNC: 23 MMOL/L (ref 21–32)
COLOR UR: ABNORMAL
CREAT SERPL-MCNC: 1 MG/DL (ref 0.55–1.02)
DIFFERENTIAL METHOD BLD: ABNORMAL
EOSINOPHIL # BLD: 1.4 K/UL (ref 0–0.4)
EOSINOPHIL NFR BLD: 16 % (ref 0–7)
EPITH CASTS URNS QL MICRO: ABNORMAL /LPF
ERYTHROCYTE [DISTWIDTH] IN BLOOD BY AUTOMATED COUNT: 14.3 % (ref 11.5–14.5)
GLOBULIN SER CALC-MCNC: 3 G/DL (ref 2–4)
GLUCOSE SERPL-MCNC: 181 MG/DL (ref 65–100)
GLUCOSE UR STRIP.AUTO-MCNC: NEGATIVE MG/DL
HCG UR QL: NEGATIVE
HCT VFR BLD AUTO: 37.3 % (ref 35–47)
HGB BLD-MCNC: 11.7 G/DL (ref 11.5–16)
HGB UR QL STRIP: ABNORMAL
HYALINE CASTS URNS QL MICRO: ABNORMAL /LPF (ref 0–2)
IMM GRANULOCYTES # BLD AUTO: 0.1 K/UL (ref 0–0.04)
IMM GRANULOCYTES NFR BLD AUTO: 1 % (ref 0–0.5)
KETONES UR QL STRIP.AUTO: NEGATIVE MG/DL
LEUKOCYTE ESTERASE UR QL STRIP.AUTO: NEGATIVE
LYMPHOCYTES # BLD: 2.5 K/UL (ref 0.8–3.5)
LYMPHOCYTES NFR BLD: 29 % (ref 12–49)
MCH RBC QN AUTO: 28.6 PG (ref 26–34)
MCHC RBC AUTO-ENTMCNC: 31.4 G/DL (ref 30–36.5)
MCV RBC AUTO: 91.2 FL (ref 80–99)
MONOCYTES # BLD: 0.7 K/UL (ref 0–1)
MONOCYTES NFR BLD: 8 % (ref 5–13)
NEUTS SEG # BLD: 3.8 K/UL (ref 1.8–8)
NEUTS SEG NFR BLD: 45 % (ref 32–75)
NITRITE UR QL STRIP.AUTO: NEGATIVE
NRBC # BLD: 0 K/UL (ref 0–0.01)
NRBC BLD-RTO: 0 PER 100 WBC
PH UR STRIP: 6.5 (ref 5–8)
PLATELET # BLD AUTO: 344 K/UL (ref 150–400)
PMV BLD AUTO: 10.6 FL (ref 8.9–12.9)
POTASSIUM SERPL-SCNC: 5.2 MMOL/L (ref 3.5–5.1)
PROT SERPL-MCNC: 6.7 G/DL (ref 6.4–8.2)
PROT UR STRIP-MCNC: NEGATIVE MG/DL
RBC # BLD AUTO: 4.09 M/UL (ref 3.8–5.2)
RBC #/AREA URNS HPF: >100 /HPF (ref 0–5)
RBC MORPH BLD: ABNORMAL
SODIUM SERPL-SCNC: 138 MMOL/L (ref 136–145)
SP GR UR REFRACTOMETRY: 1.01
URINE CULTURE IF INDICATED: ABNORMAL
UROBILINOGEN UR QL STRIP.AUTO: 0.2 EU/DL (ref 0.2–1)
WBC # BLD AUTO: 8.6 K/UL (ref 3.6–11)
WBC URNS QL MICRO: ABNORMAL /HPF (ref 0–4)

## 2024-07-07 PROCEDURE — 96361 HYDRATE IV INFUSION ADD-ON: CPT

## 2024-07-07 PROCEDURE — 96375 TX/PRO/DX INJ NEW DRUG ADDON: CPT

## 2024-07-07 PROCEDURE — 2580000003 HC RX 258: Performed by: STUDENT IN AN ORGANIZED HEALTH CARE EDUCATION/TRAINING PROGRAM

## 2024-07-07 PROCEDURE — 81001 URINALYSIS AUTO W/SCOPE: CPT

## 2024-07-07 PROCEDURE — 99284 EMERGENCY DEPT VISIT MOD MDM: CPT

## 2024-07-07 PROCEDURE — 81025 URINE PREGNANCY TEST: CPT

## 2024-07-07 PROCEDURE — 36415 COLL VENOUS BLD VENIPUNCTURE: CPT

## 2024-07-07 PROCEDURE — 96374 THER/PROPH/DIAG INJ IV PUSH: CPT

## 2024-07-07 PROCEDURE — 85025 COMPLETE CBC W/AUTO DIFF WBC: CPT

## 2024-07-07 PROCEDURE — 96376 TX/PRO/DX INJ SAME DRUG ADON: CPT

## 2024-07-07 PROCEDURE — 6360000002 HC RX W HCPCS: Performed by: STUDENT IN AN ORGANIZED HEALTH CARE EDUCATION/TRAINING PROGRAM

## 2024-07-07 PROCEDURE — 80053 COMPREHEN METABOLIC PANEL: CPT

## 2024-07-07 PROCEDURE — 6370000000 HC RX 637 (ALT 250 FOR IP): Performed by: STUDENT IN AN ORGANIZED HEALTH CARE EDUCATION/TRAINING PROGRAM

## 2024-07-07 RX ORDER — OXYCODONE HYDROCHLORIDE AND ACETAMINOPHEN 5; 325 MG/1; MG/1
1 TABLET ORAL ONCE
Status: COMPLETED | OUTPATIENT
Start: 2024-07-07 | End: 2024-07-07

## 2024-07-07 RX ORDER — MORPHINE SULFATE 4 MG/ML
4 INJECTION, SOLUTION INTRAMUSCULAR; INTRAVENOUS
Status: COMPLETED | OUTPATIENT
Start: 2024-07-07 | End: 2024-07-07

## 2024-07-07 RX ORDER — 0.9 % SODIUM CHLORIDE 0.9 %
1000 INTRAVENOUS SOLUTION INTRAVENOUS ONCE
Status: COMPLETED | OUTPATIENT
Start: 2024-07-07 | End: 2024-07-07

## 2024-07-07 RX ORDER — OXYCODONE HYDROCHLORIDE AND ACETAMINOPHEN 5; 325 MG/1; MG/1
1 TABLET ORAL EVERY 6 HOURS PRN
Qty: 12 TABLET | Refills: 0 | Status: SHIPPED | OUTPATIENT
Start: 2024-07-07 | End: 2024-07-10

## 2024-07-07 RX ORDER — ONDANSETRON 4 MG/1
4 TABLET, ORALLY DISINTEGRATING ORAL 3 TIMES DAILY PRN
Qty: 8 TABLET | Refills: 0 | Status: SHIPPED | OUTPATIENT
Start: 2024-07-07

## 2024-07-07 RX ORDER — ONDANSETRON 2 MG/ML
4 INJECTION INTRAMUSCULAR; INTRAVENOUS ONCE
Status: COMPLETED | OUTPATIENT
Start: 2024-07-07 | End: 2024-07-07

## 2024-07-07 RX ADMIN — ONDANSETRON 4 MG: 2 INJECTION INTRAMUSCULAR; INTRAVENOUS at 11:43

## 2024-07-07 RX ADMIN — MORPHINE SULFATE 4 MG: 4 INJECTION, SOLUTION INTRAMUSCULAR; INTRAVENOUS at 11:43

## 2024-07-07 RX ADMIN — SODIUM CHLORIDE 1000 ML: 9 INJECTION, SOLUTION INTRAVENOUS at 11:39

## 2024-07-07 RX ADMIN — OXYCODONE HYDROCHLORIDE AND ACETAMINOPHEN 1 TABLET: 5; 325 TABLET ORAL at 14:16

## 2024-07-07 RX ADMIN — MORPHINE SULFATE 4 MG: 4 INJECTION, SOLUTION INTRAMUSCULAR; INTRAVENOUS at 12:27

## 2024-07-07 ASSESSMENT — PAIN SCALES - GENERAL
PAINLEVEL_OUTOF10: 7
PAINLEVEL_OUTOF10: 8

## 2024-07-07 ASSESSMENT — PAIN - FUNCTIONAL ASSESSMENT: PAIN_FUNCTIONAL_ASSESSMENT: 0-10

## 2024-07-07 NOTE — ED PROVIDER NOTES
[facility] EMERGENCY DEPT  EMERGENCY DEPARTMENT ENCOUNTER       Pt Name: Monique Goodman  MRN: [id number]  Birthdate 1985  Date of evaluation: 7/7/2024  Provider: Constantino Teague MD   PCP: Andrew Rosado MD  Note Started: 11:27 AM EDT 7/7/24     CHIEF COMPLAINT       Chief Complaint   Patient presents with    Hematuria     Patient with blood in urine today and pain that started about 2 days ago.  Patient has surgery scheduled for urinary surgery on 7/24.         HISTORY OF PRESENT ILLNESS: 1 or more elements      History From: patient, History limited by: none     Monique Goodman is a 38 y.o. female presenting with hematuria, flank pain, vomiting, dysuria.  She just got worse today.  Has had some hematuria and dysuria throughout the week.  Saw her urologist on Tuesday and is due to have a procedure to \"clean out the kidney stones\" on the 24th of this month.  Notes she is taking tramadol.  Vomited this morning.       Please See MDM for Additional Details of the HPI/PMH  Nursing Notes were all reviewed and agreed with or any disagreements were addressed in the HPI.     REVIEW OF SYSTEMS        Positives and Pertinent negatives as per HPI.    PAST HISTORY     Past Medical History:  Past Medical History:   Diagnosis Date    Abnormal CT of the abdomen 11/8/2012    JIGNESH (acute kidney injury) (HCC) 1/24/2024    Asthma     Autoimmune disease (HCC)     lupus    C. difficile diarrhea     Cervical prolapse     Dehydration     Garth-Danlos syndrome     Gastrointestinal disorder     gerd, twisted colon, IBS    GERD (gastroesophageal reflux disease)     Headache(784.0)     Lupus (HCC)     Morbid obesity (HCC)     Murmur     Nausea & vomiting     Neurological disorder     cluster headaches    Occipital neuralgia     Other ill-defined conditions(799.89) 2006, 2009    ovarian cyst removal    Other ill-defined conditions(799.89)     Syncope     Worsening headaches        Past Surgical History:  Past Surgical History:

## 2024-07-14 ENCOUNTER — TELEPHONE (OUTPATIENT)
Age: 39
End: 2024-07-14

## 2024-07-14 DIAGNOSIS — R51.9 ACUTE NONINTRACTABLE HEADACHE, UNSPECIFIED HEADACHE TYPE: Primary | ICD-10-CM

## 2024-07-14 RX ORDER — BUTALBITAL, ACETAMINOPHEN AND CAFFEINE 50; 325; 40 MG/1; MG/1; MG/1
1 TABLET ORAL EVERY 8 HOURS PRN
Qty: 10 TABLET | Refills: 0 | Status: SHIPPED | OUTPATIENT
Start: 2024-07-14

## 2024-07-16 DIAGNOSIS — F41.9 ANXIETY DISORDER, UNSPECIFIED TYPE: ICD-10-CM

## 2024-07-16 DIAGNOSIS — F32.A DEPRESSION, UNSPECIFIED DEPRESSION TYPE: ICD-10-CM

## 2024-07-16 RX ORDER — MIRTAZAPINE 30 MG/1
30 TABLET, FILM COATED ORAL NIGHTLY
Qty: 90 TABLET | Refills: 1 | Status: SHIPPED | OUTPATIENT
Start: 2024-07-16

## 2024-07-19 RX ORDER — AMOXICILLIN 500 MG/1
CAPSULE ORAL
Qty: 21 CAPSULE | Refills: 0 | Status: SHIPPED | OUTPATIENT
Start: 2024-07-19

## 2024-07-22 RX ORDER — SEMAGLUTIDE 0.25 MG/.5ML
0.25 INJECTION, SOLUTION SUBCUTANEOUS
COMMUNITY

## 2024-07-22 NOTE — PERIOP NOTE
MOST RECENT LABS AND EKG FROM [facility] FAXED TO SURGEON'S OFFICE WITH CONFIRMATION RECEIVED.

## 2024-07-22 NOTE — PERIOP NOTE
PAT PHONE INTERVIEW COMPLETED WITH PT AND INSTRUCTED AS BELOW. ALL QUESTIONS ANSWERED. INSTRUCTIONS EMAILED TO PT PER PT REQUEST.   [address] [address]   MAIN OR                                  [phone]    MAIN PRE OP             [phone]                                                                                AMBULATORY PRE OP          [phone]  PRE-ADMISSION TESTING    [phone]     Surgery Date:  7/24/24       Is surgery arrival time given by surgeon?  YES  NO    If “NO”, [facility] staff will call you between 4 and 7pm the day before your surgery with your arrival time. (If your surgery is on a Monday, we will call you the Friday before.)    Call [phone] after 7pm Monday-Friday if you did not receive this call.    INSTRUCTIONS BEFORE YOUR SURGERY   When You  Arrive Arrive at [facility] Patient Access on 1st floor the day of your surgery.  Have your insurance card, photo ID, living will/advanced directive/POA (if applicable),  and any copayment (if needed)   Food   and   Drink NO food or drink after midnight the night before surgery    This means NO water, gum, mints, coffee, juice, etc.  No alcohol (beer, wine, liquor) or marijuana (smoking) 24 hours prior to surgery, or Marijuana edibles for 3 days prior to surgery.  Stop smoking cigarettes 14 days before surgery (helps w/healing and breathing).   Medications to   TAKE   Morning of Surgery MEDICATIONS TO TAKE THE MORNING OF SURGERY WITH A SIP OF WATER: SYMBICORT, LYRICA, CYMBALTA, PROPRANOLOL, OMEPRAZOLE    You may take these medications, IF NEEDED, the morning of surgery: XANAX, ALBUTEROL, ZOFRAN  Ask your surgeon/prescribing doctor for instructions on taking or stopping these medications prior to surgery: NONE   Medications to STOP  before surgery Non-Steroidal anti-inflammatory Drugs (NSAID's): for example, Diclofenac (Voltaren), Ibuprofen (Advil, Motrin),

## 2024-07-24 ENCOUNTER — HOSPITAL ENCOUNTER (OUTPATIENT)
Facility: HOSPITAL | Age: 39
Setting detail: OUTPATIENT SURGERY
Discharge: HOME | End: 2024-07-24
Attending: STUDENT IN AN ORGANIZED HEALTH CARE EDUCATION/TRAINING PROGRAM | Admitting: STUDENT IN AN ORGANIZED HEALTH CARE EDUCATION/TRAINING PROGRAM
Payer: COMMERCIAL

## 2024-07-24 ENCOUNTER — ANESTHESIA EVENT (OUTPATIENT)
Facility: HOSPITAL | Age: 39
End: 2024-07-24
Payer: COMMERCIAL

## 2024-07-24 ENCOUNTER — APPOINTMENT (OUTPATIENT)
Facility: HOSPITAL | Age: 39
End: 2024-07-24
Attending: STUDENT IN AN ORGANIZED HEALTH CARE EDUCATION/TRAINING PROGRAM
Payer: COMMERCIAL

## 2024-07-24 ENCOUNTER — ANESTHESIA (OUTPATIENT)
Facility: HOSPITAL | Age: 39
End: 2024-07-24
Payer: COMMERCIAL

## 2024-07-24 VITALS
RESPIRATION RATE: 18 BRPM | HEIGHT: 63 IN | WEIGHT: 293 LBS | OXYGEN SATURATION: 93 % | BODY MASS INDEX: 51.91 KG/M2 | SYSTOLIC BLOOD PRESSURE: 128 MMHG | DIASTOLIC BLOOD PRESSURE: 95 MMHG | HEART RATE: 70 BPM | TEMPERATURE: 98 F

## 2024-07-24 DIAGNOSIS — R31.0 GROSS HEMATURIA: Primary | ICD-10-CM

## 2024-07-24 PROCEDURE — 6360000002 HC RX W HCPCS

## 2024-07-24 PROCEDURE — 3700000001 HC ADD 15 MINUTES (ANESTHESIA): Performed by: STUDENT IN AN ORGANIZED HEALTH CARE EDUCATION/TRAINING PROGRAM

## 2024-07-24 PROCEDURE — 2709999900 HC NON-CHARGEABLE SUPPLY: Performed by: STUDENT IN AN ORGANIZED HEALTH CARE EDUCATION/TRAINING PROGRAM

## 2024-07-24 PROCEDURE — 6370000000 HC RX 637 (ALT 250 FOR IP): Performed by: ANESTHESIOLOGY

## 2024-07-24 PROCEDURE — C1758 CATHETER, URETERAL: HCPCS | Performed by: STUDENT IN AN ORGANIZED HEALTH CARE EDUCATION/TRAINING PROGRAM

## 2024-07-24 PROCEDURE — 3700000000 HC ANESTHESIA ATTENDED CARE: Performed by: STUDENT IN AN ORGANIZED HEALTH CARE EDUCATION/TRAINING PROGRAM

## 2024-07-24 PROCEDURE — 7100000011 HC PHASE II RECOVERY - ADDTL 15 MIN: Performed by: STUDENT IN AN ORGANIZED HEALTH CARE EDUCATION/TRAINING PROGRAM

## 2024-07-24 PROCEDURE — 7100000010 HC PHASE II RECOVERY - FIRST 15 MIN: Performed by: STUDENT IN AN ORGANIZED HEALTH CARE EDUCATION/TRAINING PROGRAM

## 2024-07-24 PROCEDURE — 2500000003 HC RX 250 WO HCPCS

## 2024-07-24 PROCEDURE — 6360000002 HC RX W HCPCS: Performed by: ANESTHESIOLOGY

## 2024-07-24 PROCEDURE — 3600000012 HC SURGERY LEVEL 2 ADDTL 15MIN: Performed by: STUDENT IN AN ORGANIZED HEALTH CARE EDUCATION/TRAINING PROGRAM

## 2024-07-24 PROCEDURE — 2580000003 HC RX 258

## 2024-07-24 PROCEDURE — C1769 GUIDE WIRE: HCPCS | Performed by: STUDENT IN AN ORGANIZED HEALTH CARE EDUCATION/TRAINING PROGRAM

## 2024-07-24 PROCEDURE — 6360000004 HC RX CONTRAST MEDICATION: Performed by: STUDENT IN AN ORGANIZED HEALTH CARE EDUCATION/TRAINING PROGRAM

## 2024-07-24 PROCEDURE — 3600000002 HC SURGERY LEVEL 2 BASE: Performed by: STUDENT IN AN ORGANIZED HEALTH CARE EDUCATION/TRAINING PROGRAM

## 2024-07-24 PROCEDURE — 7100000000 HC PACU RECOVERY - FIRST 15 MIN: Performed by: STUDENT IN AN ORGANIZED HEALTH CARE EDUCATION/TRAINING PROGRAM

## 2024-07-24 PROCEDURE — 7100000001 HC PACU RECOVERY - ADDTL 15 MIN: Performed by: STUDENT IN AN ORGANIZED HEALTH CARE EDUCATION/TRAINING PROGRAM

## 2024-07-24 RX ORDER — HYDRALAZINE HYDROCHLORIDE 20 MG/ML
10 INJECTION INTRAMUSCULAR; INTRAVENOUS ONCE
Status: DISCONTINUED | OUTPATIENT
Start: 2024-07-24 | End: 2024-07-24 | Stop reason: HOSPADM

## 2024-07-24 RX ORDER — SODIUM CHLORIDE 0.9 % (FLUSH) 0.9 %
5-40 SYRINGE (ML) INJECTION EVERY 12 HOURS SCHEDULED
Status: DISCONTINUED | OUTPATIENT
Start: 2024-07-24 | End: 2024-07-24 | Stop reason: HOSPADM

## 2024-07-24 RX ORDER — SODIUM CHLORIDE, SODIUM LACTATE, POTASSIUM CHLORIDE, CALCIUM CHLORIDE 600; 310; 30; 20 MG/100ML; MG/100ML; MG/100ML; MG/100ML
INJECTION, SOLUTION INTRAVENOUS CONTINUOUS PRN
Status: DISCONTINUED | OUTPATIENT
Start: 2024-07-24 | End: 2024-07-24 | Stop reason: SDUPTHER

## 2024-07-24 RX ORDER — ROCURONIUM BROMIDE 10 MG/ML
INJECTION, SOLUTION INTRAVENOUS PRN
Status: DISCONTINUED | OUTPATIENT
Start: 2024-07-24 | End: 2024-07-24 | Stop reason: SDUPTHER

## 2024-07-24 RX ORDER — SODIUM CHLORIDE 9 MG/ML
INJECTION, SOLUTION INTRAVENOUS PRN
Status: DISCONTINUED | OUTPATIENT
Start: 2024-07-24 | End: 2024-07-24 | Stop reason: HOSPADM

## 2024-07-24 RX ORDER — LIDOCAINE HYDROCHLORIDE 10 MG/ML
1 INJECTION, SOLUTION EPIDURAL; INFILTRATION; INTRACAUDAL; PERINEURAL
Status: DISCONTINUED | OUTPATIENT
Start: 2024-07-24 | End: 2024-07-24 | Stop reason: HOSPADM

## 2024-07-24 RX ORDER — DEXAMETHASONE SODIUM PHOSPHATE 4 MG/ML
INJECTION, SOLUTION INTRA-ARTICULAR; INTRALESIONAL; INTRAMUSCULAR; INTRAVENOUS; SOFT TISSUE PRN
Status: DISCONTINUED | OUTPATIENT
Start: 2024-07-24 | End: 2024-07-24 | Stop reason: SDUPTHER

## 2024-07-24 RX ORDER — SUCCINYLCHOLINE/SOD CL,ISO/PF 200MG/10ML
SYRINGE (ML) INTRAVENOUS PRN
Status: DISCONTINUED | OUTPATIENT
Start: 2024-07-24 | End: 2024-07-24 | Stop reason: SDUPTHER

## 2024-07-24 RX ORDER — NALOXONE HYDROCHLORIDE 0.4 MG/ML
INJECTION, SOLUTION INTRAMUSCULAR; INTRAVENOUS; SUBCUTANEOUS PRN
Status: DISCONTINUED | OUTPATIENT
Start: 2024-07-24 | End: 2024-07-24 | Stop reason: HOSPADM

## 2024-07-24 RX ORDER — EPHEDRINE SULFATE 50 MG/ML
INJECTION INTRAVENOUS PRN
Status: DISCONTINUED | OUTPATIENT
Start: 2024-07-24 | End: 2024-07-24 | Stop reason: SDUPTHER

## 2024-07-24 RX ORDER — HYDROMORPHONE HYDROCHLORIDE 1 MG/ML
0.5 INJECTION, SOLUTION INTRAMUSCULAR; INTRAVENOUS; SUBCUTANEOUS EVERY 5 MIN PRN
Status: COMPLETED | OUTPATIENT
Start: 2024-07-24 | End: 2024-07-24

## 2024-07-24 RX ORDER — FENTANYL CITRATE 50 UG/ML
INJECTION, SOLUTION INTRAMUSCULAR; INTRAVENOUS PRN
Status: DISCONTINUED | OUTPATIENT
Start: 2024-07-24 | End: 2024-07-24 | Stop reason: SDUPTHER

## 2024-07-24 RX ORDER — OXYCODONE HYDROCHLORIDE 5 MG/1
5 TABLET ORAL
Status: DISCONTINUED | OUTPATIENT
Start: 2024-07-24 | End: 2024-07-24 | Stop reason: HOSPADM

## 2024-07-24 RX ORDER — SODIUM CHLORIDE, SODIUM LACTATE, POTASSIUM CHLORIDE, CALCIUM CHLORIDE 600; 310; 30; 20 MG/100ML; MG/100ML; MG/100ML; MG/100ML
INJECTION, SOLUTION INTRAVENOUS CONTINUOUS
Status: DISCONTINUED | OUTPATIENT
Start: 2024-07-24 | End: 2024-07-24 | Stop reason: HOSPADM

## 2024-07-24 RX ORDER — MIDAZOLAM HYDROCHLORIDE 2 MG/2ML
2 INJECTION, SOLUTION INTRAMUSCULAR; INTRAVENOUS
Status: DISCONTINUED | OUTPATIENT
Start: 2024-07-24 | End: 2024-07-24 | Stop reason: HOSPADM

## 2024-07-24 RX ORDER — PHENYLEPHRINE HCL IN 0.9% NACL 0.4MG/10ML
SYRINGE (ML) INTRAVENOUS PRN
Status: DISCONTINUED | OUTPATIENT
Start: 2024-07-24 | End: 2024-07-24 | Stop reason: SDUPTHER

## 2024-07-24 RX ORDER — PROCHLORPERAZINE EDISYLATE 5 MG/ML
5 INJECTION INTRAMUSCULAR; INTRAVENOUS
Status: DISCONTINUED | OUTPATIENT
Start: 2024-07-24 | End: 2024-07-24 | Stop reason: HOSPADM

## 2024-07-24 RX ORDER — FENTANYL CITRATE 50 UG/ML
100 INJECTION, SOLUTION INTRAMUSCULAR; INTRAVENOUS
Status: DISCONTINUED | OUTPATIENT
Start: 2024-07-24 | End: 2024-07-24 | Stop reason: HOSPADM

## 2024-07-24 RX ORDER — FENTANYL CITRATE 50 UG/ML
25 INJECTION, SOLUTION INTRAMUSCULAR; INTRAVENOUS EVERY 5 MIN PRN
Status: DISCONTINUED | OUTPATIENT
Start: 2024-07-24 | End: 2024-07-24 | Stop reason: HOSPADM

## 2024-07-24 RX ORDER — ONDANSETRON 2 MG/ML
4 INJECTION INTRAMUSCULAR; INTRAVENOUS
Status: DISCONTINUED | OUTPATIENT
Start: 2024-07-24 | End: 2024-07-24 | Stop reason: HOSPADM

## 2024-07-24 RX ORDER — ACETAMINOPHEN 500 MG
1000 TABLET ORAL ONCE
Status: DISCONTINUED | OUTPATIENT
Start: 2024-07-24 | End: 2024-07-24 | Stop reason: HOSPADM

## 2024-07-24 RX ORDER — SODIUM CHLORIDE 0.9 % (FLUSH) 0.9 %
5-40 SYRINGE (ML) INJECTION PRN
Status: DISCONTINUED | OUTPATIENT
Start: 2024-07-24 | End: 2024-07-24 | Stop reason: HOSPADM

## 2024-07-24 RX ORDER — PROPOFOL 10 MG/ML
INJECTION, EMULSION INTRAVENOUS PRN
Status: DISCONTINUED | OUTPATIENT
Start: 2024-07-24 | End: 2024-07-24 | Stop reason: SDUPTHER

## 2024-07-24 RX ORDER — DEXMEDETOMIDINE HYDROCHLORIDE 100 UG/ML
INJECTION, SOLUTION INTRAVENOUS PRN
Status: DISCONTINUED | OUTPATIENT
Start: 2024-07-24 | End: 2024-07-24 | Stop reason: SDUPTHER

## 2024-07-24 RX ORDER — MIDAZOLAM HYDROCHLORIDE 1 MG/ML
INJECTION INTRAMUSCULAR; INTRAVENOUS PRN
Status: DISCONTINUED | OUTPATIENT
Start: 2024-07-24 | End: 2024-07-24 | Stop reason: SDUPTHER

## 2024-07-24 RX ORDER — LIDOCAINE HYDROCHLORIDE 20 MG/ML
INJECTION, SOLUTION EPIDURAL; INFILTRATION; INTRACAUDAL; PERINEURAL PRN
Status: DISCONTINUED | OUTPATIENT
Start: 2024-07-24 | End: 2024-07-24 | Stop reason: SDUPTHER

## 2024-07-24 RX ORDER — TRAMADOL HYDROCHLORIDE 50 MG/1
50 TABLET ORAL EVERY 6 HOURS PRN
Qty: 8 TABLET | Refills: 0 | Status: SHIPPED | OUTPATIENT
Start: 2024-07-24 | End: 2024-07-27

## 2024-07-24 RX ORDER — ONDANSETRON 2 MG/ML
INJECTION INTRAMUSCULAR; INTRAVENOUS PRN
Status: DISCONTINUED | OUTPATIENT
Start: 2024-07-24 | End: 2024-07-24 | Stop reason: SDUPTHER

## 2024-07-24 RX ORDER — SCOLOPAMINE TRANSDERMAL SYSTEM 1 MG/1
1 PATCH, EXTENDED RELEASE TRANSDERMAL
Status: DISCONTINUED | OUTPATIENT
Start: 2024-07-24 | End: 2024-07-24 | Stop reason: HOSPADM

## 2024-07-24 RX ADMIN — PROPOFOL 100 MG: 10 INJECTION, EMULSION INTRAVENOUS at 08:40

## 2024-07-24 RX ADMIN — DEXAMETHASONE SODIUM PHOSPHATE 4 MG: 4 INJECTION INTRA-ARTICULAR; INTRALESIONAL; INTRAMUSCULAR; INTRAVENOUS; SOFT TISSUE at 08:54

## 2024-07-24 RX ADMIN — SODIUM CHLORIDE, SODIUM LACTATE, POTASSIUM CHLORIDE, AND CALCIUM CHLORIDE: 600; 310; 30; 20 INJECTION, SOLUTION INTRAVENOUS at 08:26

## 2024-07-24 RX ADMIN — ROCURONIUM BROMIDE 5 MG: 10 INJECTION, SOLUTION INTRAVENOUS at 08:31

## 2024-07-24 RX ADMIN — FENTANYL CITRATE 25 MCG: 50 INJECTION INTRAMUSCULAR; INTRAVENOUS at 09:28

## 2024-07-24 RX ADMIN — MIDAZOLAM 2 MG: 1 INJECTION INTRAMUSCULAR; INTRAVENOUS at 08:23

## 2024-07-24 RX ADMIN — PROPOFOL 50 MCG/KG/MIN: 10 INJECTION, EMULSION INTRAVENOUS at 08:39

## 2024-07-24 RX ADMIN — Medication 160 MCG: at 08:45

## 2024-07-24 RX ADMIN — LIDOCAINE HYDROCHLORIDE 100 MG: 20 INJECTION, SOLUTION EPIDURAL; INFILTRATION; INTRACAUDAL; PERINEURAL at 08:31

## 2024-07-24 RX ADMIN — HYDROMORPHONE HYDROCHLORIDE 0.5 MG: 1 INJECTION, SOLUTION INTRAMUSCULAR; INTRAVENOUS; SUBCUTANEOUS at 09:43

## 2024-07-24 RX ADMIN — Medication 200 MG: at 08:32

## 2024-07-24 RX ADMIN — FENTANYL CITRATE 100 MCG: 50 INJECTION, SOLUTION INTRAMUSCULAR; INTRAVENOUS at 08:31

## 2024-07-24 RX ADMIN — SODIUM CHLORIDE 3000 MG: 900 INJECTION INTRAVENOUS at 08:41

## 2024-07-24 RX ADMIN — EPHEDRINE SULFATE 10 MG: 50 INJECTION INTRAVENOUS at 08:52

## 2024-07-24 RX ADMIN — ONDANSETRON 4 MG: 2 INJECTION INTRAMUSCULAR; INTRAVENOUS at 08:54

## 2024-07-24 RX ADMIN — FENTANYL CITRATE 25 MCG: 50 INJECTION INTRAMUSCULAR; INTRAVENOUS at 09:36

## 2024-07-24 RX ADMIN — PROPOFOL 200 MG: 10 INJECTION, EMULSION INTRAVENOUS at 08:31

## 2024-07-24 RX ADMIN — PHENYLEPHRINE HYDROCHLORIDE 40 MCG/MIN: 10 INJECTION INTRAVENOUS at 08:42

## 2024-07-24 RX ADMIN — DEXMEDETOMIDINE 12 MCG: 100 INJECTION, SOLUTION, CONCENTRATE INTRAVENOUS at 08:52

## 2024-07-24 RX ADMIN — HYDROMORPHONE HYDROCHLORIDE 0.5 MG: 1 INJECTION, SOLUTION INTRAMUSCULAR; INTRAVENOUS; SUBCUTANEOUS at 09:50

## 2024-07-24 NOTE — OR NURSING
Discussed with surgeon and anesthesia about patient history of allergy to contrast media. MD confirmed isovue 370 to be used during the procedure.

## 2024-07-24 NOTE — DISCHARGE INSTRUCTIONS
You have a scopolamine patch behind your r ear. This treats nausea. This may stay in place for 72 hrsScopolamine Patch Information Sheet      1.  We have placed a Scopolamine Patch behind your left ear.  This is used for prevention of post-operative nausea and vomiting.      2.  This patch can remain in place for up to 72 hours.  Wash your hands immediately after removing it, as there may be some active medicine remaining on the patch.       3.  Discontinue use of the patch if prolonged drowsiness or dizziness should occur.       4.  Temporary blurred vision, dry mouth, or widening of the pupils may occur if the scopolamine drug comes in contact with your eye through  hand transmission.  These symptoms are temporary and will go away on their own.       5.  Please do not drive, operate machinery, or consume alcohol while wearing the patch.    0  After anesthesia your first meal should be light. Avoid foods that are greasy or spicy. Progress to your regular diet as tolerated.    Anesthesia Discharge Instructions    After general anesthesia or intervenous sedation, for 24 hours or while taking prescription Narcotics:  Limit your activities  Do not drive or operate hazardous machinery  If you have not urinated within 8 hours after discharge, please contact your surgeon on call.  Do not make important personal or business decisions  Do not drink alcoholic beverages    Report the following to your surgeon:  Excessive pain, swelling, redness or odor of or around the surgical area  Temperature over 100.5 degrees  Nausea and vomiting lasting longer than 4 hours or if unable to take medication  Any signs of decreased circulation or nerve impairment to extremity:  Change in color, persistent numbness, tingling, coldness or increased pain.  Any questions

## 2024-07-24 NOTE — OP NOTE
DATE OF PROCEDURE: 7/24/2024    SURGEON: Juan Goodman MD    ASSISTANT: none    PREOPERATIVE DIAGNOSES:   1.  Hematuria  2.  Abdominal pain  3.  Duplicated right collecting system      POSTOPERATIVE DIAGNOSES:   Same    PROCEDURES PERFORMED:   1.  Cystourethroscopy  2.  Bilateral retrograde pyelograms  3.  Intraoperative fluoroscopy interpretation less than 1 hour    PERIOPERATIVE ANTIBIOTICS: Ancef    ANAESTHESIA: General    INDICATIONS:This patient has a history of gross hematuria and vague abdominal pain.  She underwent a noncontrast CT which showed a punctate nonobstructing right lower pole stone and no ureteral calculi.  She had gross hematuria and negative urine culture.  The above procedures were recommended to complete evaluation.. After discussion of management options the patient elected to proceed with the procedures listed above.     PROCEDURE NARRATIVE: The patient signed consent for the operation prior to being brought back to the operating room. Upon arrival on the operating room, a time out was performed for the patient's safety and the correct patient, procedure, site and side were identified. The patient was placed in a supine position and anesthesia was administered.  The patient was placed in a dorsal lithotomy position. All pressure points were appropriately padded in order to prevent compartment syndrome and neuropathy. The patient was prepped and draped in the usual sterile fashion.     Cystourethroscopy was performed the 21 E[place] sheath and 30 degree lens.  The urethra appeared normal.  Bladder was notable for duplicated ureteral orifice ease on the right and a single ureteral orifice on the left.  These were all seen to effluxed clear urine.  Remainder of the bladder was surveyed with 30 and 70 degree lenses it appeared normal without tumors or lesions seen.    5 E[place]an ureteral catheter was advanced to the left ureteral orifice.  Gentle retrograde instillation of dilute contrast  CC: FLANK PAIN        INTERVAL HISTORY:  feels well  no complaints      ROS: No chest pain, no sob, no abd pain. No n/v/d    PAST MEDICAL & SURGICAL HISTORY:  Renal colic, bilateral    Ulcerative colitis    Nephrolithiasis    History of lithotripsy        PHYSICAL EXAM:  T(C): 36.8 (21 @ 10:57), Max: 36.8 (21 @ 10:57)  HR: 77 (21 @ 10:57)  BP: 105/72 (21 @ 10:57) (94/64 - 110/71)  RR: 18 (21 @ 10:57)  SpO2: 97% (21 @ 10:57)  Wt(kg): --  I&O's Summary    2021 07:01  -  01 May 2021 07:00  --------------------------------------------------------  IN: 0 mL / OUT: 2725 mL / NET: -2725 mL      Weight 74.8 ( @ 11:24)  General: AAO x 3,  NAD.  HEENT: moist mucous membranes, no pallor/cyanosis.  Neck: no JVD visible.  Cardiac: S1, S2. RRR. No murmurs   Respratory: CTA b/l, no access muscle use.   Abdomen: soft. nontender. nondistended  Skin: no rashes.  Extremities: no LE edema b/l  Access:       DATA:                        8.9<L>  8.31  )-----------( 248      ( 01 May 2021 07:29 )             27.5<L>        139    |  104    |  8      ----------------------------<  69<L>  Ca:9.0   (01 May 2021 07:29)  3.8     |  27     |  0.62       eGFR if Non : 122  eGFR if : 141    TPro  4.8<L>  /  Alb  2.3<L>  /  TBili  <0.2   /  DBili  x      /  AST  16     /  ALT  8<L>   /  AlkPhos  91     2021 06:25        Urinalysis Basic - ( 2021 12:28 )  Color: Yellow / Appearance: SL Cloudy<!> / S.010 / pH: x  Gluc: x / Ketone: NEGATIVE  / Bili: Negative / Urobili: 0.2 E.U./dL   Blood: x / Protein: Trace mg/dL<!> / Nitrite: POSITIVE<!>   Leuk Esterase: Moderate<!> / RBC: < 5 /HPF / WBC Many /HPF<!>   Sq Epi: x / Non Sq Epi: 0-5 /HPF / Bacteria: Present /HPF<!>                MEDICATIONS  (STANDING):  acetaminophen   Tablet .. 975 milliGRAM(s) Oral <User Schedule>  amoxicillin 500 milliGRAM(s) Oral every 8 hours  cephalexin 500 milliGRAM(s) Oral every 6 hours  diphtheria/tetanus/pertussis (acellular) Vaccine (ADAcel) 0.5 milliLiter(s) IntraMuscular once  enoxaparin Injectable 40 milliGRAM(s) SubCutaneous every 24 hours  escitalopram 20 milliGRAM(s) Oral daily  ibuprofen  Tablet. 600 milliGRAM(s) Oral every 6 hours  lactated ringers. 1000 milliLiter(s) (125 mL/Hr) IV Continuous <Continuous>  mesalamine DR (24-Hour) Tablet 4.8 Gram(s) Oral with breakfast  oxytocin Infusion 333.333 milliUNIT(s)/Min (1000 mL/Hr) IV Continuous <Continuous>    MEDICATIONS  (PRN):  diphenhydrAMINE 25 milliGRAM(s) Oral every 6 hours PRN Pruritus  lanolin Ointment 1 Application(s) Topical every 6 hours PRN Sore Nipples  magnesium hydroxide Suspension 30 milliLiter(s) Oral two times a day PRN Constipation  oxyCODONE    IR 5 milliGRAM(s) Oral every 3 hours PRN Moderate to Severe Pain (4-10)  oxyCODONE    IR 5 milliGRAM(s) Oral once PRN Moderate to Severe Pain (4-10)  simethicone 80 milliGRAM(s) Chew every 4 hours PRN Gas

## 2024-07-24 NOTE — H&P
HISTORY AND PHYSICAL             Date: 7/24/2024        Patient Name: Monique Goodman     YOB: 1985      Age:  38 y.o.    Chief Complaint   hematuria    History Obtained From   patient    History of Present Illness   39yo F with history of hematuria presents for cystoscopy with bilateral retrograde pyelograms    Last OV:  HPI  She has a history of kidney stones, last seen 2022.  Records reviewed from 6/27/2024 ED visit at [facility]. Presented with bilateral flank pain. Cr 1.28. UA with no evidence of infection. No imaging performed.  There was a CT performed on 6/5/2024. This demonstrated punctate calculus on the right, no obstructing renal calculi. She underwent an additional CT scan on 6/25/2024 demonstrating a punctate right renal calculus again and no other pathologic findings. Urinalysis had TNTC RBC and was not sent for culture. She reports she was started on Keflex.     She is in some emotional distress today and reports she has had kidney failure 3 times this year which she relates to diuretic use related to fluid retention. She describes something along the lines of cardiorenal syndrome as a reason for diuretic use.     She has been having abdominal pain localizing to the right lower quadrant intermittently over the last month. She has had multiple narcotic prescriptions for this. She cannot take NSAIDs due to her fluctuating kidney function.  Associated with this, she has had gross hematuria intermittently over the last month.    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 Urine was not sent for culture. She has persistent hematuria on urinalysis today. I recommend further workup to include urine culture. She cannot undergo CT IVP, and we discussed cystoscopy with bilateral retrogrades as an alternative to complete hematuria evaluation. She is

## 2024-07-24 NOTE — ANESTHESIA PRE PROCEDURE
02/27/2020    3 RIGHT ARM, 2 LEFT ARM   • CYST INCISION AND DRAINAGE  02/21/2023    Incision and drainage of left upper arm abscess   • EGD TRANSORAL BIOPSY SINGLE/MULTIPLE  09/22/2010    AT LEAST 5 PER PT   • GYN  2006    right ovarian tumor removed   • GYN  01/2009    right salpingo oopherectomy   • HEENT      left wisdom teeth removal   • HEENT      top right wisdom tooth removed   • HERNIA REPAIR  02/28/2013    Laparoscopic recurrent incisional hernia repair   • HERNIA REPAIR  04/2012   • HYSTERECTOMY (CERVIX STATUS UNKNOWN)      2016   • HYSTERECTOMY (CERVIX STATUS UNKNOWN)  2017   • MULTIPLE TOOTH EXTRACTIONS      FULL DENTURES   • RECTAL PROLAPSE REPAIR      CERVICAL PROLAPSE, PELVIC FLOOR PROLAPSE REPAIR   • UROLOGICAL SURGERY      Kidney Stone Removal   • WISDOM TOOTH EXTRACTION         Social History:    Social History     Tobacco Use   • Smoking status: Never   • Smokeless tobacco: Never   Substance Use Topics   • Alcohol use: No                                Counseling given: Not Answered      Vital Signs (Current):   Vitals:    07/22/24 1132   Weight: 134.7 kg (297 lb)   Height: 1.6 m (5' 3\")                                              BP Readings from Last 3 Encounters:   07/07/24 112/72   06/27/24 (!) 123/92   06/25/24 (!) 146/92       NPO Status:                                                                                 BMI:   Wt Readings from Last 3 Encounters:   07/22/24 134.7 kg (297 lb)   07/07/24 132.3 kg (291 lb 10.7 oz)   06/27/24 122.5 kg (270 lb 1 oz)     Body mass index is 52.61 kg/m².    CBC:   Lab Results   Component Value Date/Time    WBC 8.6 07/07/2024 11:24 AM    RBC 4.09 07/07/2024 11:24 AM    HGB 11.7 07/07/2024 11:24 AM    HCT 37.3 07/07/2024 11:24 AM    MCV 91.2 07/07/2024 11:24 AM    RDW 14.3 07/07/2024 11:24 AM     07/07/2024 11:24 AM       CMP:   Lab Results   Component Value Date/Time     07/07/2024 11:24 AM    K 5.2 07/07/2024 11:24 AM     07/07/2024

## 2024-07-24 NOTE — ANESTHESIA POSTPROCEDURE EVALUATION
Department of Anesthesiology  Postprocedure Note    Patient: Monique Goodman  MRN: [id number]  YOB: 1985  Date of evaluation: 7/24/2024    Procedure Summary       Date: 07/24/24 Room / Location: [facility] MAIN OR [address] [place] [address] MAIN OR    Anesthesia Start: 0826 Anesthesia Stop: 0927    Procedure: CYSTOSCOPY WITH BILATERAL RETROGRADES (Ureter) Diagnosis:       Ureteral stone      (Ureteral stone [N20.1])    Providers: Juan Goodman MD Responsible Provider: Natalia Lima DO    Anesthesia Type: General ASA Status: 3            Anesthesia Type: General    Yesenia Phase I: Yesenia Score: 9    Yesenia Phase II:      Anesthesia Post Evaluation    Patient location during evaluation: PACU  Level of consciousness: awake  Airway patency: patent  Nausea & Vomiting: no nausea  Cardiovascular status: hemodynamically stable  Respiratory status: acceptable  Hydration status: stable  Multimodal analgesia pain management approach  Pain management: adequate    No notable events documented.

## 2024-08-02 ENCOUNTER — HOSPITAL ENCOUNTER (EMERGENCY)
Facility: HOSPITAL | Age: 39
Discharge: HOME | End: 2024-08-02
Payer: COMMERCIAL

## 2024-08-02 ENCOUNTER — APPOINTMENT (OUTPATIENT)
Facility: HOSPITAL | Age: 39
End: 2024-08-02
Payer: COMMERCIAL

## 2024-08-02 VITALS
HEIGHT: 63 IN | WEIGHT: 287.48 LBS | OXYGEN SATURATION: 96 % | DIASTOLIC BLOOD PRESSURE: 67 MMHG | RESPIRATION RATE: 18 BRPM | SYSTOLIC BLOOD PRESSURE: 128 MMHG | BODY MASS INDEX: 50.94 KG/M2 | HEART RATE: 85 BPM | TEMPERATURE: 97.5 F

## 2024-08-02 DIAGNOSIS — G89.29 OTHER CHRONIC PAIN: ICD-10-CM

## 2024-08-02 DIAGNOSIS — R79.89 ELEVATED SERUM CREATININE: ICD-10-CM

## 2024-08-02 DIAGNOSIS — R10.9 FLANK PAIN: Primary | ICD-10-CM

## 2024-08-02 LAB
ALBUMIN SERPL-MCNC: 4.1 G/DL (ref 3.5–5)
ALBUMIN/GLOB SERPL: 1.2 (ref 1.1–2.2)
ALP SERPL-CCNC: 128 U/L (ref 45–117)
ALT SERPL-CCNC: 26 U/L (ref 12–78)
ANION GAP SERPL CALC-SCNC: 5 MMOL/L (ref 5–15)
APPEARANCE UR: CLEAR
AST SERPL-CCNC: 22 U/L (ref 15–37)
BACTERIA URNS QL MICRO: NEGATIVE /HPF
BASOPHILS # BLD: 0.1 K/UL (ref 0–0.1)
BASOPHILS NFR BLD: 1 % (ref 0–1)
BILIRUB SERPL-MCNC: 0.3 MG/DL (ref 0.2–1)
BILIRUB UR QL: NEGATIVE
BUN SERPL-MCNC: 26 MG/DL (ref 6–20)
BUN/CREAT SERPL: 12 (ref 12–20)
CALCIUM SERPL-MCNC: 10.2 MG/DL (ref 8.5–10.1)
CHLORIDE SERPL-SCNC: 107 MMOL/L (ref 97–108)
CO2 SERPL-SCNC: 24 MMOL/L (ref 21–32)
COLOR UR: ABNORMAL
CREAT SERPL-MCNC: 2.26 MG/DL (ref 0.55–1.02)
DIFFERENTIAL METHOD BLD: ABNORMAL
EOSINOPHIL # BLD: 0.6 K/UL (ref 0–0.4)
EOSINOPHIL NFR BLD: 8 % (ref 0–7)
EPITH CASTS URNS QL MICRO: ABNORMAL /LPF
ERYTHROCYTE [DISTWIDTH] IN BLOOD BY AUTOMATED COUNT: 14.7 % (ref 11.5–14.5)
GLOBULIN SER CALC-MCNC: 3.3 G/DL (ref 2–4)
GLUCOSE SERPL-MCNC: 115 MG/DL (ref 65–100)
GLUCOSE UR STRIP.AUTO-MCNC: NEGATIVE MG/DL
HCG UR QL: NEGATIVE
HCT VFR BLD AUTO: 37.2 % (ref 35–47)
HGB BLD-MCNC: 11.6 G/DL (ref 11.5–16)
HGB UR QL STRIP: NEGATIVE
HYALINE CASTS URNS QL MICRO: ABNORMAL /LPF (ref 0–2)
IMM GRANULOCYTES # BLD AUTO: 0 K/UL (ref 0–0.04)
IMM GRANULOCYTES NFR BLD AUTO: 0 % (ref 0–0.5)
KETONES UR QL STRIP.AUTO: NEGATIVE MG/DL
LEUKOCYTE ESTERASE UR QL STRIP.AUTO: NEGATIVE
LYMPHOCYTES # BLD: 2.9 K/UL (ref 0.8–3.5)
LYMPHOCYTES NFR BLD: 34 % (ref 12–49)
MCH RBC QN AUTO: 28.8 PG (ref 26–34)
MCHC RBC AUTO-ENTMCNC: 31.2 G/DL (ref 30–36.5)
MCV RBC AUTO: 92.3 FL (ref 80–99)
MONOCYTES # BLD: 0.9 K/UL (ref 0–1)
MONOCYTES NFR BLD: 11 % (ref 5–13)
NEUTS SEG # BLD: 4 K/UL (ref 1.8–8)
NEUTS SEG NFR BLD: 46 % (ref 32–75)
NITRITE UR QL STRIP.AUTO: NEGATIVE
NRBC # BLD: 0 K/UL (ref 0–0.01)
NRBC BLD-RTO: 0 PER 100 WBC
PH UR STRIP: 6 (ref 5–8)
PLATELET # BLD AUTO: 418 K/UL (ref 150–400)
PMV BLD AUTO: 10 FL (ref 8.9–12.9)
POTASSIUM SERPL-SCNC: 4.8 MMOL/L (ref 3.5–5.1)
PROT SERPL-MCNC: 7.4 G/DL (ref 6.4–8.2)
PROT UR STRIP-MCNC: ABNORMAL MG/DL
RBC # BLD AUTO: 4.03 M/UL (ref 3.8–5.2)
RBC #/AREA URNS HPF: ABNORMAL /HPF (ref 0–5)
SODIUM SERPL-SCNC: 136 MMOL/L (ref 136–145)
SP GR UR REFRACTOMETRY: 1.01
URINE CULTURE IF INDICATED: ABNORMAL
UROBILINOGEN UR QL STRIP.AUTO: 0.2 EU/DL (ref 0.2–1)
WBC # BLD AUTO: 8.5 K/UL (ref 3.6–11)
WBC URNS QL MICRO: ABNORMAL /HPF (ref 0–4)

## 2024-08-02 PROCEDURE — 36415 COLL VENOUS BLD VENIPUNCTURE: CPT

## 2024-08-02 PROCEDURE — 81001 URINALYSIS AUTO W/SCOPE: CPT

## 2024-08-02 PROCEDURE — 96374 THER/PROPH/DIAG INJ IV PUSH: CPT

## 2024-08-02 PROCEDURE — 99284 EMERGENCY DEPT VISIT MOD MDM: CPT

## 2024-08-02 PROCEDURE — 80053 COMPREHEN METABOLIC PANEL: CPT

## 2024-08-02 PROCEDURE — 96361 HYDRATE IV INFUSION ADD-ON: CPT

## 2024-08-02 PROCEDURE — 6360000002 HC RX W HCPCS

## 2024-08-02 PROCEDURE — 74176 CT ABD & PELVIS W/O CONTRAST: CPT

## 2024-08-02 PROCEDURE — 81025 URINE PREGNANCY TEST: CPT

## 2024-08-02 PROCEDURE — 2580000003 HC RX 258

## 2024-08-02 PROCEDURE — 6370000000 HC RX 637 (ALT 250 FOR IP)

## 2024-08-02 PROCEDURE — 85025 COMPLETE CBC W/AUTO DIFF WBC: CPT

## 2024-08-02 PROCEDURE — 96375 TX/PRO/DX INJ NEW DRUG ADDON: CPT

## 2024-08-02 RX ORDER — ONDANSETRON 2 MG/ML
8 INJECTION INTRAMUSCULAR; INTRAVENOUS ONCE
Status: COMPLETED | OUTPATIENT
Start: 2024-08-02 | End: 2024-08-02

## 2024-08-02 RX ORDER — ONDANSETRON 4 MG/1
8 TABLET, ORALLY DISINTEGRATING ORAL 3 TIMES DAILY PRN
Qty: 21 TABLET | Refills: 0 | Status: SHIPPED | OUTPATIENT
Start: 2024-08-02

## 2024-08-02 RX ORDER — ACETAMINOPHEN 500 MG
500 TABLET ORAL 4 TIMES DAILY PRN
Qty: 120 TABLET | Refills: 0 | Status: SHIPPED | OUTPATIENT
Start: 2024-08-02

## 2024-08-02 RX ORDER — 0.9 % SODIUM CHLORIDE 0.9 %
1000 INTRAVENOUS SOLUTION INTRAVENOUS ONCE
Status: COMPLETED | OUTPATIENT
Start: 2024-08-02 | End: 2024-08-02

## 2024-08-02 RX ORDER — OXYCODONE HYDROCHLORIDE AND ACETAMINOPHEN 5; 325 MG/1; MG/1
1 TABLET ORAL
Status: COMPLETED | OUTPATIENT
Start: 2024-08-02 | End: 2024-08-02

## 2024-08-02 RX ORDER — HYDROMORPHONE HYDROCHLORIDE 1 MG/ML
1 INJECTION, SOLUTION INTRAMUSCULAR; INTRAVENOUS; SUBCUTANEOUS ONCE
Status: COMPLETED | OUTPATIENT
Start: 2024-08-02 | End: 2024-08-02

## 2024-08-02 RX ORDER — TRAMADOL HYDROCHLORIDE 50 MG/1
50 TABLET ORAL EVERY 8 HOURS PRN
Qty: 9 TABLET | Refills: 0 | Status: SHIPPED | OUTPATIENT
Start: 2024-08-02 | End: 2024-08-05

## 2024-08-02 RX ADMIN — OXYCODONE HYDROCHLORIDE AND ACETAMINOPHEN 1 TABLET: 5; 325 TABLET ORAL at 13:05

## 2024-08-02 RX ADMIN — HYDROMORPHONE HYDROCHLORIDE 1 MG: 1 INJECTION, SOLUTION INTRAMUSCULAR; INTRAVENOUS; SUBCUTANEOUS at 11:23

## 2024-08-02 RX ADMIN — SODIUM CHLORIDE 1000 ML: 9 INJECTION, SOLUTION INTRAVENOUS at 10:07

## 2024-08-02 RX ADMIN — SODIUM CHLORIDE 1000 ML: 9 INJECTION, SOLUTION INTRAVENOUS at 11:23

## 2024-08-02 RX ADMIN — ONDANSETRON 8 MG: 2 INJECTION INTRAMUSCULAR; INTRAVENOUS at 11:23

## 2024-08-02 ASSESSMENT — PAIN DESCRIPTION - LOCATION: LOCATION: FLANK

## 2024-08-02 ASSESSMENT — PAIN SCALES - GENERAL
PAINLEVEL_OUTOF10: 9
PAINLEVEL_OUTOF10: 9
PAINLEVEL_OUTOF10: 7

## 2024-08-02 ASSESSMENT — PAIN DESCRIPTION - PAIN TYPE: TYPE: ACUTE PAIN

## 2024-08-02 ASSESSMENT — PAIN - FUNCTIONAL ASSESSMENT
PAIN_FUNCTIONAL_ASSESSMENT: 0-10
PAIN_FUNCTIONAL_ASSESSMENT: 0-10
PAIN_FUNCTIONAL_ASSESSMENT: ACTIVITIES ARE NOT PREVENTED

## 2024-08-02 ASSESSMENT — PAIN DESCRIPTION - ONSET: ONSET: ON-GOING

## 2024-08-02 ASSESSMENT — PAIN DESCRIPTION - FREQUENCY: FREQUENCY: CONTINUOUS

## 2024-08-02 ASSESSMENT — PAIN DESCRIPTION - ORIENTATION: ORIENTATION: RIGHT

## 2024-08-02 ASSESSMENT — PAIN DESCRIPTION - DESCRIPTORS: DESCRIPTORS: SHARP;DISCOMFORT;ACHING

## 2024-08-02 NOTE — ED PROVIDER NOTES
[facility] EMERGENCY DEPT  EMERGENCY DEPARTMENT ENCOUNTER       Pt Name: Monique Goodman  MRN: [id number]  Birthdate 1985  Date of evaluation: 8/2/2024  Provider: ADAMARIS Cross - GABRIELE   PCP: Andrew Rosado MD  Note Started:  10:54 AM EDT 8/2/24     CHIEF COMPLAINT       Chief Complaint   Patient presents with    Flank Pain     Patient reports flank pain that started a day ago and has been getting worse.  She states that the pain comes around to her ribs. Patient reports difficulty with urination.         HISTORY OF PRESENT ILLNESS: 1 or more elements      History From: Patient  HPI Limitations: None     Monique Goodman is a 38 y.o. female who presents complaining of bilateral flank pain radiating to the upper abdomen and x 1 day.  Patient reports nausea and vomiting since the pain has gotten worse.  Reports difficulty urinating.  She denies fever, chills, generalized myalgia, frequency, urgency, dysuria, suprapubic pain, vaginal discharge or vaginal lesion.     Nursing Notes were all reviewed and agreed with or any disagreements were addressed in the HPI.     REVIEW OF SYSTEMS      Review of Systems     Positives and Pertinent negatives as per HPI.    PAST HISTORY     Past Medical History:  Past Medical History:   Diagnosis Date    Abnormal CT of the abdomen 11/8/2012    JIGNESH (acute kidney injury) (HCC) 01/24/2024    PT STATES SHE HAS BEEN IN KIDNEY FAILURE DUE TO DIURETICS    Anxiety and depression     Arthritis     Asthma     Autoimmune disease (HCC)     lupus    C. difficile diarrhea 2022    PER PT    Cervical prolapse     Dehydration     Garth-Danlos syndrome     Gastrointestinal disorder     gerd, twisted colon, IBS, PT DENIES IBS    GERD (gastroesophageal reflux disease)     Headache(784.0)     History of blood transfusion 2018    PT STATES SHE HAD A GI BLEED    Hx of blood clots 2023    PT STATES SHE HAD A VERY SMALL BLOOD CLOT IN HER RIGHT LEG    Hypertension     Kidney stone     Lupus (HCC)      rule out infection, metabolic derangements, anemia.   Will check lipase to evaluate for possible acute pancreatitis.  Will check UA and urine pregnancy to r/o acute UTI/cystitis.   Will check CT A/P w/contrast to rule out pancreatitis, SBO/ileus, appendicitis, cholecystitis, diverticulitis and other causes of possible acute abdomen.      ED Course as of 08/02/24 1242   Fri Aug 02, 2024   1239 Patient states pain persist.  CT indicates nephrolithiasis without obstruction, no hydronephrosis.  Will medicate with oral Percocet 5 mg/325 x 1.  Second liter of IV fluid is infusing.  Plan to discharge with nephrology follow-up, patient has established care with Dr. Dilan Cunha. [RT]      ED Course User Index  [RT] Pooja Hurtado, APRN - CNP     Work-up and imaging reassuring. Benign abdominal exam. Low suspicion of acute surgical intra-abdominal etiology or other emergent conditions requiring further evaluation or management acutely at this time.  Patient already has established care with nephrology.  Encouraged to follow-up with as soon as possible.  Return precautions reviewed.    Shared decision-making performed care plan created together, discussed results, diagnosis, and treatment plan.  PCP follow-up.  GI follow-up.  Verbal return precautions advised.  Patient verbalizes understanding and agreement of current plan of care. Pt tolerating PO. Patient will be discharged with strict return precautions and follow up with primary MD within 12-24 hours for further evaluation.   Disposition Considerations (Tests not done, Shared Decision Making, Pt Expectation of Test or Tx.): As above     FINAL IMPRESSION     1. Flank pain    2. Elevated serum creatinine          DISPOSITION/PLAN   DISPOSITION        Discharge Note: The patient is stable for discharge home. The signs, symptoms, diagnosis, and discharge instructions have been discussed, understanding conveyed, and agreed upon. The patient is to follow up as recommended

## 2024-08-02 NOTE — ED NOTES
Pt's IV removed. Pt & Family provided D/C instructions including but not limited to follow-up care, medications, how to access MyChart and verbalizes understanding of D/C teaching. Pt has all belongings. At this time, pt leaving ambulatory w/ family out of ED to personal transport at this time. Patient is A&Ox4, on RA, VSS, and is in NAD at the time of discharge.

## 2024-08-02 NOTE — ED NOTES
Patient is continuing to complain that she is in pain and would like another does of pain medication before she leaves today.    Patient is requesting to get nausea medication and pain medication for home.    She is concerned that she may need to be admitted.

## 2024-08-03 DIAGNOSIS — R51.9 ACUTE NONINTRACTABLE HEADACHE, UNSPECIFIED HEADACHE TYPE: ICD-10-CM

## 2024-08-05 RX ORDER — BUTALBITAL, ACETAMINOPHEN AND CAFFEINE 50; 325; 40 MG/1; MG/1; MG/1
1 TABLET ORAL EVERY 8 HOURS PRN
Qty: 10 TABLET | Refills: 0 | Status: SHIPPED | OUTPATIENT
Start: 2024-08-05

## 2024-08-06 ENCOUNTER — HOSPITAL ENCOUNTER (EMERGENCY)
Facility: HOSPITAL | Age: 39
Discharge: HOME | End: 2024-08-06
Payer: COMMERCIAL

## 2024-08-06 VITALS
SYSTOLIC BLOOD PRESSURE: 144 MMHG | TEMPERATURE: 97.9 F | HEART RATE: 88 BPM | OXYGEN SATURATION: 96 % | DIASTOLIC BLOOD PRESSURE: 80 MMHG | RESPIRATION RATE: 18 BRPM

## 2024-08-06 DIAGNOSIS — R10.9 FLANK PAIN: ICD-10-CM

## 2024-08-06 DIAGNOSIS — T14.8XXA BLISTER: ICD-10-CM

## 2024-08-06 DIAGNOSIS — R79.89 ELEVATED SERUM CREATININE: ICD-10-CM

## 2024-08-06 DIAGNOSIS — R11.2 NAUSEA AND VOMITING, UNSPECIFIED VOMITING TYPE: Primary | ICD-10-CM

## 2024-08-06 LAB
ALBUMIN SERPL-MCNC: 4.2 G/DL (ref 3.5–5)
ALBUMIN/GLOB SERPL: 1.4 (ref 1.1–2.2)
ALP SERPL-CCNC: 124 U/L (ref 45–117)
ALT SERPL-CCNC: 26 U/L (ref 12–78)
ANION GAP SERPL CALC-SCNC: 8 MMOL/L (ref 5–15)
APPEARANCE UR: CLEAR
AST SERPL-CCNC: 23 U/L (ref 15–37)
BACTERIA URNS QL MICRO: ABNORMAL /HPF
BASOPHILS # BLD: 0 K/UL (ref 0–0.1)
BASOPHILS NFR BLD: 1 % (ref 0–1)
BILIRUB SERPL-MCNC: 0.6 MG/DL (ref 0.2–1)
BILIRUB UR QL: NEGATIVE
BUN SERPL-MCNC: 20 MG/DL (ref 6–20)
BUN/CREAT SERPL: 14 (ref 12–20)
CALCIUM SERPL-MCNC: 9.8 MG/DL (ref 8.5–10.1)
CHLORIDE SERPL-SCNC: 108 MMOL/L (ref 97–108)
CO2 SERPL-SCNC: 24 MMOL/L (ref 21–32)
COLOR UR: ABNORMAL
CREAT SERPL-MCNC: 1.44 MG/DL (ref 0.55–1.02)
DIFFERENTIAL METHOD BLD: ABNORMAL
EOSINOPHIL # BLD: 0.5 K/UL (ref 0–0.4)
EOSINOPHIL NFR BLD: 7 % (ref 0–7)
EPITH CASTS URNS QL MICRO: ABNORMAL /LPF
ERYTHROCYTE [DISTWIDTH] IN BLOOD BY AUTOMATED COUNT: 14.1 % (ref 11.5–14.5)
GLOBULIN SER CALC-MCNC: 2.9 G/DL (ref 2–4)
GLUCOSE SERPL-MCNC: 145 MG/DL (ref 65–100)
GLUCOSE UR STRIP.AUTO-MCNC: NEGATIVE MG/DL
HCT VFR BLD AUTO: 37.9 % (ref 35–47)
HGB BLD-MCNC: 12.1 G/DL (ref 11.5–16)
HGB UR QL STRIP: NEGATIVE
HYALINE CASTS URNS QL MICRO: ABNORMAL /LPF (ref 0–2)
IMM GRANULOCYTES # BLD AUTO: 0 K/UL (ref 0–0.04)
IMM GRANULOCYTES NFR BLD AUTO: 0 % (ref 0–0.5)
KETONES UR QL STRIP.AUTO: NEGATIVE MG/DL
LEUKOCYTE ESTERASE UR QL STRIP.AUTO: ABNORMAL
LIPASE SERPL-CCNC: 28 U/L (ref 13–75)
LYMPHOCYTES # BLD: 1.8 K/UL (ref 0.8–3.5)
LYMPHOCYTES NFR BLD: 25 % (ref 12–49)
MCH RBC QN AUTO: 28.5 PG (ref 26–34)
MCHC RBC AUTO-ENTMCNC: 31.9 G/DL (ref 30–36.5)
MCV RBC AUTO: 89.2 FL (ref 80–99)
MONOCYTES # BLD: 0.6 K/UL (ref 0–1)
MONOCYTES NFR BLD: 8 % (ref 5–13)
NEUTS SEG # BLD: 4.3 K/UL (ref 1.8–8)
NEUTS SEG NFR BLD: 59 % (ref 32–75)
NITRITE UR QL STRIP.AUTO: NEGATIVE
NRBC # BLD: 0 K/UL (ref 0–0.01)
NRBC BLD-RTO: 0 PER 100 WBC
PH UR STRIP: 6 (ref 5–8)
PLATELET # BLD AUTO: 380 K/UL (ref 150–400)
PMV BLD AUTO: 9.7 FL (ref 8.9–12.9)
POTASSIUM SERPL-SCNC: 4.3 MMOL/L (ref 3.5–5.1)
PROT SERPL-MCNC: 7.1 G/DL (ref 6.4–8.2)
PROT UR STRIP-MCNC: 30 MG/DL
RBC # BLD AUTO: 4.25 M/UL (ref 3.8–5.2)
RBC #/AREA URNS HPF: ABNORMAL /HPF (ref 0–5)
SODIUM SERPL-SCNC: 140 MMOL/L (ref 136–145)
SP GR UR REFRACTOMETRY: 1.02
URINE CULTURE IF INDICATED: ABNORMAL
UROBILINOGEN UR QL STRIP.AUTO: 1 EU/DL (ref 0.2–1)
WBC # BLD AUTO: 7.3 K/UL (ref 3.6–11)
WBC URNS QL MICRO: ABNORMAL /HPF (ref 0–4)

## 2024-08-06 PROCEDURE — 87086 URINE CULTURE/COLONY COUNT: CPT

## 2024-08-06 PROCEDURE — 6360000002 HC RX W HCPCS: Performed by: PHYSICIAN ASSISTANT

## 2024-08-06 PROCEDURE — 81001 URINALYSIS AUTO W/SCOPE: CPT

## 2024-08-06 PROCEDURE — 96375 TX/PRO/DX INJ NEW DRUG ADDON: CPT

## 2024-08-06 PROCEDURE — 96376 TX/PRO/DX INJ SAME DRUG ADON: CPT

## 2024-08-06 PROCEDURE — 99284 EMERGENCY DEPT VISIT MOD MDM: CPT

## 2024-08-06 PROCEDURE — 83690 ASSAY OF LIPASE: CPT

## 2024-08-06 PROCEDURE — 96361 HYDRATE IV INFUSION ADD-ON: CPT

## 2024-08-06 PROCEDURE — 80053 COMPREHEN METABOLIC PANEL: CPT

## 2024-08-06 PROCEDURE — 85025 COMPLETE CBC W/AUTO DIFF WBC: CPT

## 2024-08-06 PROCEDURE — 96374 THER/PROPH/DIAG INJ IV PUSH: CPT

## 2024-08-06 PROCEDURE — 36415 COLL VENOUS BLD VENIPUNCTURE: CPT

## 2024-08-06 PROCEDURE — 2580000003 HC RX 258: Performed by: PHYSICIAN ASSISTANT

## 2024-08-06 RX ORDER — HYDROCODONE BITARTRATE AND ACETAMINOPHEN 5; 325 MG/1; MG/1
1 TABLET ORAL EVERY 6 HOURS PRN
Qty: 5 TABLET | Refills: 0 | Status: SHIPPED | OUTPATIENT
Start: 2024-08-06 | End: 2024-08-09

## 2024-08-06 RX ORDER — ONDANSETRON 4 MG/1
4 TABLET, ORALLY DISINTEGRATING ORAL 3 TIMES DAILY PRN
Qty: 21 TABLET | Refills: 0 | Status: SHIPPED | OUTPATIENT
Start: 2024-08-06

## 2024-08-06 RX ORDER — MORPHINE SULFATE 4 MG/ML
4 INJECTION, SOLUTION INTRAMUSCULAR; INTRAVENOUS
Status: COMPLETED | OUTPATIENT
Start: 2024-08-06 | End: 2024-08-06

## 2024-08-06 RX ORDER — HYDROCODONE BITARTRATE AND ACETAMINOPHEN 5; 325 MG/1; MG/1
1 TABLET ORAL EVERY 6 HOURS PRN
Qty: 5 TABLET | Refills: 0 | Status: SHIPPED | OUTPATIENT
Start: 2024-08-06 | End: 2024-08-06

## 2024-08-06 RX ORDER — ONDANSETRON 2 MG/ML
4 INJECTION INTRAMUSCULAR; INTRAVENOUS ONCE
Status: COMPLETED | OUTPATIENT
Start: 2024-08-06 | End: 2024-08-06

## 2024-08-06 RX ORDER — 0.9 % SODIUM CHLORIDE 0.9 %
1000 INTRAVENOUS SOLUTION INTRAVENOUS ONCE
Status: COMPLETED | OUTPATIENT
Start: 2024-08-06 | End: 2024-08-06

## 2024-08-06 RX ORDER — MORPHINE SULFATE 2 MG/ML
2 INJECTION, SOLUTION INTRAMUSCULAR; INTRAVENOUS
Status: COMPLETED | OUTPATIENT
Start: 2024-08-06 | End: 2024-08-06

## 2024-08-06 RX ADMIN — MORPHINE SULFATE 4 MG: 4 INJECTION, SOLUTION INTRAMUSCULAR; INTRAVENOUS at 11:08

## 2024-08-06 RX ADMIN — ONDANSETRON 4 MG: 2 INJECTION INTRAMUSCULAR; INTRAVENOUS at 11:08

## 2024-08-06 RX ADMIN — MORPHINE SULFATE 2 MG: 2 INJECTION, SOLUTION INTRAMUSCULAR; INTRAVENOUS at 12:01

## 2024-08-06 RX ADMIN — SODIUM CHLORIDE 1000 ML: 9 INJECTION, SOLUTION INTRAVENOUS at 11:06

## 2024-08-06 ASSESSMENT — PAIN SCALES - GENERAL
PAINLEVEL_OUTOF10: 6
PAINLEVEL_OUTOF10: 8

## 2024-08-06 ASSESSMENT — PAIN DESCRIPTION - LOCATION: LOCATION: BACK

## 2024-08-06 NOTE — ED PROVIDER NOTES
/hpf    Urine Culture if Indicated CULTURE NOT INDICATED BY UA RESULT      Hyaline Casts, UA 0-2 0 - 2 /lpf   Urine Culture    Collection Time: 08/06/24 11:07 AM    Specimen: Urine, clean catch   Result Value Ref Range    Special Requests NO SPECIAL REQUESTS      Culture No growth (<1,000 CFU/ML)         EKG: When ordered, EKG's are interpreted by the Emergency Department Physician in the absence of a cardiologist.  Please see their note for interpretation of EKG.      RADIOLOGY:  Non-plain film images such as CT, Ultrasound and MRI are read by the radiologist. Plain radiographic images are visualized and preliminarily interpreted by the ED Provider with the below findings:     Defers      Interpretation per the Radiologist below, if available at the time of this note:     No orders to display        PROCEDURES   Unless otherwise noted below, none  Procedures     CRITICAL CARE TIME       EMERGENCY DEPARTMENT COURSE and DIFFERENTIAL DIAGNOSIS/MDM   Vitals:    Vitals:    08/06/24 1030 08/06/24 1137 08/06/24 1148 08/06/24 1200   BP: (!) 146/91 (!) 140/79  (!) 144/80   Pulse:       Resp: 18      Temp:       SpO2: 96% 93% 96%         Patient was given the following medications:  Medications   sodium chloride 0.9 % bolus 1,000 mL (0 mLs IntraVENous Stopped 8/6/24 1247)   ondansetron (ZOFRAN) injection 4 mg (4 mg IntraVENous Given 8/6/24 1108)   morphine sulfate (PF) injection 4 mg (4 mg IntraVENous Given 8/6/24 1108)   morphine (PF) injection 2 mg (2 mg IntraVENous Given 8/6/24 1201)       CONSULTS: (Who and What was discussed)  None    Chronic Conditions: as noted above     Records Reviewed (source and summary of external records): Nursing Notes, Old Medical Records, Previous Radiology Studies, and Previous Laboratory Studies    MDM (CC/HPI Summary, DDx, ED Course, Reassessment, Disposition Considerations -Tests not done, Shared Decision Making, Pt Expectation of Test or Tx.):      Patient is a 37 yo female with PMHx

## 2024-08-06 NOTE — DISCHARGE INSTRUCTIONS
Thank You!    It was a pleasure taking care of you in our Emergency Department today. We know that when you come to [facility], you are entrusting us with your health, comfort, and safety. Our clinicians honor that trust, and truly appreciate the opportunity to care for you and your loved ones.    If you receive a survey about your Emergency Department experience today, please fill it out.  We value your feedback. Thank you.      Hien Sharma PA-C    ___________________________________  I have included a copy of your lab results and/or radiologic studies from today's visit so you can have them easily available at your follow-up visit.   Recent Results (from the past 12 hour(s))   CBC with Auto Differential    Collection Time: 08/06/24 10:52 AM   Result Value Ref Range    WBC 7.3 3.6 - 11.0 K/uL    RBC 4.25 3.80 - 5.20 M/uL    Hemoglobin 12.1 11.5 - 16.0 g/dL    Hematocrit 37.9 35.0 - 47.0 %    MCV 89.2 80.0 - 99.0 FL    MCH 28.5 26.0 - 34.0 PG    MCHC 31.9 30.0 - 36.5 g/dL    RDW 14.1 11.5 - 14.5 %    Platelets 380 150 - 400 K/uL    MPV 9.7 8.9 - 12.9 FL    Nucleated RBCs 0.0 0  WBC    nRBC 0.00 0.00 - 0.01 K/uL    Neutrophils % 59 32 - 75 %    Lymphocytes % 25 12 - 49 %    Monocytes % 8 5 - 13 %    Eosinophils % 7 0 - 7 %    Basophils % 1 0 - 1 %    Immature Granulocytes % 0 0.0 - 0.5 %    Neutrophils Absolute 4.3 1.8 - 8.0 K/UL    Lymphocytes Absolute 1.8 0.8 - 3.5 K/UL    Monocytes Absolute 0.6 0.0 - 1.0 K/UL    Eosinophils Absolute 0.5 (H) 0.0 - 0.4 K/UL    Basophils Absolute 0.0 0.0 - 0.1 K/UL    Immature Granulocytes Absolute 0.0 0.00 - 0.04 K/UL    Differential Type AUTOMATED     Comprehensive Metabolic Panel    Collection Time: 08/06/24 10:52 AM   Result Value Ref Range    Sodium 140 136 - 145 mmol/L    Potassium 4.3 3.5 - 5.1 mmol/L    Chloride 108 97 - 108 mmol/L    CO2 24 21 - 32 mmol/L    Anion Gap 8 5 - 15 mmol/L    Glucose 145 (H) 65 - 100 mg/dL    BUN 20 6 -

## 2024-08-07 LAB
BACTERIA SPEC CULT: NORMAL
SERVICE CMNT-IMP: NORMAL

## 2024-08-10 DIAGNOSIS — F41.9 ANXIETY DISORDER, UNSPECIFIED TYPE: ICD-10-CM

## 2024-08-10 DIAGNOSIS — F32.A DEPRESSION, UNSPECIFIED DEPRESSION TYPE: ICD-10-CM

## 2024-08-12 RX ORDER — TIZANIDINE 4 MG/1
TABLET ORAL
Qty: 60 TABLET | Refills: 2 | Status: SHIPPED | OUTPATIENT
Start: 2024-08-12

## 2024-08-12 RX ORDER — DULOXETIN HYDROCHLORIDE 60 MG/1
120 CAPSULE, DELAYED RELEASE ORAL DAILY
Qty: 60 CAPSULE | Refills: 5 | Status: SHIPPED | OUTPATIENT
Start: 2024-08-12

## 2024-08-13 DIAGNOSIS — R52 PAIN: ICD-10-CM

## 2024-08-13 RX ORDER — PREGABALIN 200 MG/1
200 CAPSULE ORAL 2 TIMES DAILY
Qty: 60 CAPSULE | Refills: 5 | Status: SHIPPED | OUTPATIENT
Start: 2024-08-13 | End: 2025-02-09

## 2024-08-18 ENCOUNTER — HOSPITAL ENCOUNTER (EMERGENCY)
Facility: HOSPITAL | Age: 39
Discharge: HOME | End: 2024-08-18
Payer: COMMERCIAL

## 2024-08-18 VITALS
OXYGEN SATURATION: 97 % | WEIGHT: 278.22 LBS | SYSTOLIC BLOOD PRESSURE: 153 MMHG | HEIGHT: 63 IN | HEART RATE: 82 BPM | BODY MASS INDEX: 49.3 KG/M2 | RESPIRATION RATE: 16 BRPM | TEMPERATURE: 98.2 F | DIASTOLIC BLOOD PRESSURE: 76 MMHG

## 2024-08-18 DIAGNOSIS — R11.0 NAUSEA: ICD-10-CM

## 2024-08-18 DIAGNOSIS — R79.89 ELEVATED SERUM CREATININE: ICD-10-CM

## 2024-08-18 DIAGNOSIS — R10.9 BILATERAL FLANK PAIN: Primary | ICD-10-CM

## 2024-08-18 LAB
ALBUMIN SERPL-MCNC: 4.5 G/DL (ref 3.5–5)
ALBUMIN/GLOB SERPL: 1.2 (ref 1.1–2.2)
ALP SERPL-CCNC: 138 U/L (ref 45–117)
ALT SERPL-CCNC: 25 U/L (ref 12–78)
ANION GAP SERPL CALC-SCNC: 6 MMOL/L (ref 5–15)
APPEARANCE UR: CLEAR
AST SERPL-CCNC: 27 U/L (ref 15–37)
BACTERIA URNS QL MICRO: NEGATIVE /HPF
BASOPHILS # BLD: 0.1 K/UL (ref 0–0.1)
BASOPHILS NFR BLD: 1 % (ref 0–1)
BILIRUB SERPL-MCNC: 0.2 MG/DL (ref 0.2–1)
BILIRUB UR QL: NEGATIVE
BUN SERPL-MCNC: 26 MG/DL (ref 6–20)
BUN/CREAT SERPL: 12 (ref 12–20)
CALCIUM SERPL-MCNC: 10.3 MG/DL (ref 8.5–10.1)
CHLORIDE SERPL-SCNC: 113 MMOL/L (ref 97–108)
CO2 SERPL-SCNC: 20 MMOL/L (ref 21–32)
COLOR UR: ABNORMAL
CREAT SERPL-MCNC: 2.11 MG/DL (ref 0.55–1.02)
DIFFERENTIAL METHOD BLD: ABNORMAL
EOSINOPHIL # BLD: 1.4 K/UL (ref 0–0.4)
EOSINOPHIL NFR BLD: 14 % (ref 0–7)
EPITH CASTS URNS QL MICRO: ABNORMAL /LPF
ERYTHROCYTE [DISTWIDTH] IN BLOOD BY AUTOMATED COUNT: 14.7 % (ref 11.5–14.5)
GLOBULIN SER CALC-MCNC: 3.7 G/DL (ref 2–4)
GLUCOSE SERPL-MCNC: 113 MG/DL (ref 65–100)
GLUCOSE UR STRIP.AUTO-MCNC: NEGATIVE MG/DL
HCT VFR BLD AUTO: 39.2 % (ref 35–47)
HGB BLD-MCNC: 12.8 G/DL (ref 11.5–16)
HGB UR QL STRIP: NEGATIVE
IMM GRANULOCYTES # BLD AUTO: 0.1 K/UL (ref 0–0.04)
IMM GRANULOCYTES NFR BLD AUTO: 1 % (ref 0–0.5)
KETONES UR QL STRIP.AUTO: NEGATIVE MG/DL
LEUKOCYTE ESTERASE UR QL STRIP.AUTO: NEGATIVE
LYMPHOCYTES # BLD: 2.9 K/UL (ref 0.8–3.5)
LYMPHOCYTES NFR BLD: 29 % (ref 12–49)
MCH RBC QN AUTO: 29.2 PG (ref 26–34)
MCHC RBC AUTO-ENTMCNC: 32.7 G/DL (ref 30–36.5)
MCV RBC AUTO: 89.3 FL (ref 80–99)
MONOCYTES # BLD: 0.9 K/UL (ref 0–1)
MONOCYTES NFR BLD: 9 % (ref 5–13)
NEUTS SEG # BLD: 4.5 K/UL (ref 1.8–8)
NEUTS SEG NFR BLD: 46 % (ref 32–75)
NITRITE UR QL STRIP.AUTO: NEGATIVE
NRBC # BLD: 0 K/UL (ref 0–0.01)
NRBC BLD-RTO: 0 PER 100 WBC
PH UR STRIP: 6 (ref 5–8)
PLATELET # BLD AUTO: 366 K/UL (ref 150–400)
PMV BLD AUTO: 9.8 FL (ref 8.9–12.9)
POTASSIUM SERPL-SCNC: 5.1 MMOL/L (ref 3.5–5.1)
PROT SERPL-MCNC: 8.2 G/DL (ref 6.4–8.2)
PROT UR STRIP-MCNC: 30 MG/DL
RBC # BLD AUTO: 4.39 M/UL (ref 3.8–5.2)
RBC #/AREA URNS HPF: ABNORMAL /HPF (ref 0–5)
RBC MORPH BLD: ABNORMAL
SODIUM SERPL-SCNC: 139 MMOL/L (ref 136–145)
SP GR UR REFRACTOMETRY: 1.01
URINE CULTURE IF INDICATED: ABNORMAL
UROBILINOGEN UR QL STRIP.AUTO: 0.2 EU/DL (ref 0.2–1)
WBC # BLD AUTO: 9.9 K/UL (ref 3.6–11)
WBC URNS QL MICRO: ABNORMAL /HPF (ref 0–4)
YEAST URNS QL MICRO: PRESENT

## 2024-08-18 PROCEDURE — 99284 EMERGENCY DEPT VISIT MOD MDM: CPT

## 2024-08-18 PROCEDURE — 36415 COLL VENOUS BLD VENIPUNCTURE: CPT

## 2024-08-18 PROCEDURE — 80053 COMPREHEN METABOLIC PANEL: CPT

## 2024-08-18 PROCEDURE — 6360000002 HC RX W HCPCS: Performed by: PHYSICIAN ASSISTANT

## 2024-08-18 PROCEDURE — 85025 COMPLETE CBC W/AUTO DIFF WBC: CPT

## 2024-08-18 PROCEDURE — 2580000003 HC RX 258: Performed by: PHYSICIAN ASSISTANT

## 2024-08-18 PROCEDURE — 96375 TX/PRO/DX INJ NEW DRUG ADDON: CPT

## 2024-08-18 PROCEDURE — 6370000000 HC RX 637 (ALT 250 FOR IP): Performed by: PHYSICIAN ASSISTANT

## 2024-08-18 PROCEDURE — 96376 TX/PRO/DX INJ SAME DRUG ADON: CPT

## 2024-08-18 PROCEDURE — 96374 THER/PROPH/DIAG INJ IV PUSH: CPT

## 2024-08-18 PROCEDURE — 81001 URINALYSIS AUTO W/SCOPE: CPT

## 2024-08-18 RX ORDER — 0.9 % SODIUM CHLORIDE 0.9 %
1000 INTRAVENOUS SOLUTION INTRAVENOUS ONCE
Status: COMPLETED | OUTPATIENT
Start: 2024-08-18 | End: 2024-08-18

## 2024-08-18 RX ORDER — ONDANSETRON 2 MG/ML
4 INJECTION INTRAMUSCULAR; INTRAVENOUS ONCE
Status: COMPLETED | OUTPATIENT
Start: 2024-08-18 | End: 2024-08-18

## 2024-08-18 RX ORDER — FLUCONAZOLE 100 MG/1
200 TABLET ORAL
Status: COMPLETED | OUTPATIENT
Start: 2024-08-18 | End: 2024-08-18

## 2024-08-18 RX ORDER — HYDROCODONE BITARTRATE AND ACETAMINOPHEN 5; 325 MG/1; MG/1
1 TABLET ORAL
Status: COMPLETED | OUTPATIENT
Start: 2024-08-18 | End: 2024-08-18

## 2024-08-18 RX ORDER — MORPHINE SULFATE 4 MG/ML
4 INJECTION, SOLUTION INTRAMUSCULAR; INTRAVENOUS
Status: COMPLETED | OUTPATIENT
Start: 2024-08-18 | End: 2024-08-18

## 2024-08-18 RX ORDER — MORPHINE SULFATE 2 MG/ML
2 INJECTION, SOLUTION INTRAMUSCULAR; INTRAVENOUS
Status: COMPLETED | OUTPATIENT
Start: 2024-08-18 | End: 2024-08-18

## 2024-08-18 RX ORDER — ONDANSETRON 4 MG/1
4 TABLET, ORALLY DISINTEGRATING ORAL 3 TIMES DAILY PRN
Qty: 21 TABLET | Refills: 0 | Status: SHIPPED | OUTPATIENT
Start: 2024-08-18

## 2024-08-18 RX ADMIN — MORPHINE SULFATE 2 MG: 2 INJECTION, SOLUTION INTRAMUSCULAR; INTRAVENOUS at 17:54

## 2024-08-18 RX ADMIN — FLUCONAZOLE 200 MG: 100 TABLET ORAL at 17:54

## 2024-08-18 RX ADMIN — HYDROCODONE BITARTRATE AND ACETAMINOPHEN 1 TABLET: 5; 325 TABLET ORAL at 19:51

## 2024-08-18 RX ADMIN — ONDANSETRON 4 MG: 2 INJECTION INTRAMUSCULAR; INTRAVENOUS at 17:00

## 2024-08-18 RX ADMIN — SODIUM CHLORIDE 1000 ML: 9 INJECTION, SOLUTION INTRAVENOUS at 17:54

## 2024-08-18 RX ADMIN — SODIUM CHLORIDE 1000 ML: 9 INJECTION, SOLUTION INTRAVENOUS at 16:28

## 2024-08-18 RX ADMIN — MORPHINE SULFATE 4 MG: 4 INJECTION, SOLUTION INTRAMUSCULAR; INTRAVENOUS at 17:00

## 2024-08-18 RX ADMIN — ONDANSETRON 4 MG: 2 INJECTION INTRAMUSCULAR; INTRAVENOUS at 17:54

## 2024-08-18 ASSESSMENT — PAIN SCALES - GENERAL
PAINLEVEL_OUTOF10: 6
PAINLEVEL_OUTOF10: 9
PAINLEVEL_OUTOF10: 7
PAINLEVEL_OUTOF10: 9

## 2024-08-18 ASSESSMENT — PAIN DESCRIPTION - ORIENTATION: ORIENTATION: LOWER

## 2024-08-18 ASSESSMENT — PAIN - FUNCTIONAL ASSESSMENT: PAIN_FUNCTIONAL_ASSESSMENT: 0-10

## 2024-08-18 ASSESSMENT — PAIN DESCRIPTION - DESCRIPTORS: DESCRIPTORS: ACHING

## 2024-08-18 ASSESSMENT — PAIN DESCRIPTION - LOCATION: LOCATION: BACK

## 2024-08-18 NOTE — ED NOTES
Bedside shift change report given to Francine (oncoming nurse) by Isabel (offgoing nurse). Report included the following information Nurse Handoff Report and Recent Results.

## 2024-08-18 NOTE — DISCHARGE INSTRUCTIONS
Thank You!    It was a pleasure taking care of you in our Emergency Department today. We know that when you come to [facility], you are entrusting us with your health, comfort, and safety. Our clinicians honor that trust, and truly appreciate the opportunity to care for you and your loved ones.    If you receive a survey about your Emergency Department experience today, please fill it out.  We value your feedback. Thank you.      Hien Sharma PA-C    ___________________________________  I have included a copy of your lab results and/or radiologic studies from today's visit so you can have them easily available at your follow-up visit.   Recent Results (from the past 12 hour(s))   Urinalysis with Reflex to Culture    Collection Time: 08/18/24  4:07 PM    Specimen: Urine   Result Value Ref Range    Color, UA YELLOW/STRAW      Appearance CLEAR CLEAR      Specific Gravity, UA 1.010      pH, Urine 6.0 5.0 - 8.0      Protein, UA 30 (A) NEG mg/dL    Glucose, Ur Negative NEG mg/dL    Ketones, Urine Negative NEG mg/dL    Bilirubin, Urine Negative NEG      Blood, Urine Negative NEG      Urobilinogen, Urine 0.2 0.2 - 1.0 EU/dL    Nitrite, Urine Negative NEG      Leukocyte Esterase, Urine Negative NEG      WBC, UA 0-4 0 - 4 /hpf    RBC, UA 0-5 0 - 5 /hpf    Epithelial Cells, UA FEW FEW /lpf    BACTERIA, URINE Negative NEG /hpf    Urine Culture if Indicated CULTURE NOT INDICATED BY UA RESULT CNI      Yeast, UA PRESENT (A) NEG     CBC with Auto Differential    Collection Time: 08/18/24  4:25 PM   Result Value Ref Range    WBC 9.9 3.6 - 11.0 K/uL    RBC 4.39 3.80 - 5.20 M/uL    Hemoglobin 12.8 11.5 - 16.0 g/dL    Hematocrit 39.2 35.0 - 47.0 %    MCV 89.3 80.0 - 99.0 FL    MCH 29.2 26.0 - 34.0 PG    MCHC 32.7 30.0 - 36.5 g/dL    RDW 14.7 (H) 11.5 - 14.5 %    Platelets 366 150 - 400 K/uL    MPV 9.8 8.9 - 12.9 FL    Nucleated RBCs 0.0 0  WBC    nRBC 0.00 0.00 - 0.01 K/uL    Neutrophils % 46

## 2024-08-18 NOTE — ED PROVIDER NOTES
orders to display        PROCEDURES   Unless otherwise noted below, none  Procedures     CRITICAL CARE TIME       EMERGENCY DEPARTMENT COURSE and DIFFERENTIAL DIAGNOSIS/MDM   Vitals:    Vitals:    08/18/24 1800 08/18/24 1845 08/18/24 1945 08/18/24 2025   BP: (!) 113/42 (!) 144/89 (!) 153/76    Pulse:    82   Resp:    16   Temp:       TempSrc:       SpO2: 98% 97% 97% 97%   Weight:       Height:            Patient was given the following medications:  Medications   sodium chloride 0.9 % bolus 1,000 mL (0 mLs IntraVENous Stopped 8/18/24 1756)   ondansetron (ZOFRAN) injection 4 mg (4 mg IntraVENous Given 8/18/24 1700)   morphine sulfate (PF) injection 4 mg (4 mg IntraVENous Given 8/18/24 1700)   sodium chloride 0.9 % bolus 1,000 mL (0 mLs IntraVENous Stopped 8/18/24 1927)   fluconazole (DIFLUCAN) tablet 200 mg (200 mg Oral Given 8/18/24 1754)   morphine (PF) injection 2 mg (2 mg IntraVENous Given 8/18/24 1754)   ondansetron (ZOFRAN) injection 4 mg (4 mg IntraVENous Given 8/18/24 1754)   HYDROcodone-acetaminophen (NORCO) 5-325 MG per tablet 1 tablet (1 tablet Oral Given 8/18/24 1951)       CONSULTS: (Who and What was discussed)  None    Chronic Conditions: kidney stones, and additional history as noted above     Social Determinants affecting Dx or Tx: None    Records Reviewed (source and summary of external records): Nursing Notes, Old Medical Records, Previous Radiology Studies, and Previous Laboratory Studies    MDM (CC/HPI Summary, DDx, ED Course, Reassessment, Disposition Considerations -Tests not done, Shared Decision Making, Pt Expectation of Test or Tx.):     Patient is a 39 yo with a past medical history of kidney stones, who presents to the ED for evaluation of lower flank pain and foamy urine.  States she has also had some nausea and intermittent episodes of nonbilious, nonbloody vomiting.  States she is worried as she has had the symptoms in the past and she has had kidney failure.  States she is followed by

## 2024-08-22 ENCOUNTER — HOSPITAL ENCOUNTER (EMERGENCY)
Facility: HOSPITAL | Age: 39
Discharge: HOME | End: 2024-08-22
Payer: COMMERCIAL

## 2024-08-22 ENCOUNTER — APPOINTMENT (OUTPATIENT)
Facility: HOSPITAL | Age: 39
End: 2024-08-22
Payer: COMMERCIAL

## 2024-08-22 VITALS
BODY MASS INDEX: 48.63 KG/M2 | HEART RATE: 64 BPM | OXYGEN SATURATION: 99 % | RESPIRATION RATE: 13 BRPM | HEIGHT: 63 IN | DIASTOLIC BLOOD PRESSURE: 81 MMHG | WEIGHT: 274.47 LBS | TEMPERATURE: 97.9 F | SYSTOLIC BLOOD PRESSURE: 146 MMHG

## 2024-08-22 DIAGNOSIS — R79.89 ELEVATED SERUM CREATININE: ICD-10-CM

## 2024-08-22 DIAGNOSIS — R10.9 FLANK PAIN: Primary | ICD-10-CM

## 2024-08-22 DIAGNOSIS — R11.0 NAUSEA: ICD-10-CM

## 2024-08-22 DIAGNOSIS — N20.0 BILATERAL RENAL STONES: ICD-10-CM

## 2024-08-22 LAB
ALBUMIN SERPL-MCNC: 4.6 G/DL (ref 3.5–5)
ALBUMIN/GLOB SERPL: 1.2 (ref 1.1–2.2)
ALP SERPL-CCNC: 157 U/L (ref 45–117)
ALT SERPL-CCNC: 25 U/L (ref 12–78)
ANION GAP SERPL CALC-SCNC: 6 MMOL/L (ref 5–15)
APPEARANCE UR: CLEAR
AST SERPL-CCNC: 13 U/L (ref 15–37)
BACTERIA URNS QL MICRO: NEGATIVE /HPF
BASOPHILS # BLD: 0.1 K/UL (ref 0–0.1)
BASOPHILS NFR BLD: 1 % (ref 0–1)
BILIRUB SERPL-MCNC: 0.2 MG/DL (ref 0.2–1)
BILIRUB UR QL: NEGATIVE
BUN SERPL-MCNC: 30 MG/DL (ref 6–20)
BUN/CREAT SERPL: 15 (ref 12–20)
CALCIUM SERPL-MCNC: 9.8 MG/DL (ref 8.5–10.1)
CHLORIDE SERPL-SCNC: 109 MMOL/L (ref 97–108)
CO2 SERPL-SCNC: 19 MMOL/L (ref 21–32)
COLOR UR: ABNORMAL
CREAT SERPL-MCNC: 2.04 MG/DL (ref 0.55–1.02)
DIFFERENTIAL METHOD BLD: ABNORMAL
EOSINOPHIL # BLD: 1.2 K/UL (ref 0–0.4)
EOSINOPHIL NFR BLD: 12 % (ref 0–7)
EPITH CASTS URNS QL MICRO: ABNORMAL /LPF
ERYTHROCYTE [DISTWIDTH] IN BLOOD BY AUTOMATED COUNT: 14.7 % (ref 11.5–14.5)
GLOBULIN SER CALC-MCNC: 3.8 G/DL (ref 2–4)
GLUCOSE SERPL-MCNC: 119 MG/DL (ref 65–100)
GLUCOSE UR STRIP.AUTO-MCNC: NEGATIVE MG/DL
HCT VFR BLD AUTO: 42 % (ref 35–47)
HGB BLD-MCNC: 13.4 G/DL (ref 11.5–16)
HGB UR QL STRIP: NEGATIVE
HYALINE CASTS URNS QL MICRO: ABNORMAL /LPF (ref 0–2)
IMM GRANULOCYTES # BLD AUTO: 0 K/UL (ref 0–0.04)
IMM GRANULOCYTES NFR BLD AUTO: 0 % (ref 0–0.5)
KETONES UR QL STRIP.AUTO: NEGATIVE MG/DL
LEUKOCYTE ESTERASE UR QL STRIP.AUTO: NEGATIVE
LYMPHOCYTES # BLD: 2.9 K/UL (ref 0.8–3.5)
LYMPHOCYTES NFR BLD: 28 % (ref 12–49)
MCH RBC QN AUTO: 28.6 PG (ref 26–34)
MCHC RBC AUTO-ENTMCNC: 31.9 G/DL (ref 30–36.5)
MCV RBC AUTO: 89.7 FL (ref 80–99)
MONOCYTES # BLD: 0.7 K/UL (ref 0–1)
MONOCYTES NFR BLD: 7 % (ref 5–13)
NEUTS SEG # BLD: 5.5 K/UL (ref 1.8–8)
NEUTS SEG NFR BLD: 52 % (ref 32–75)
NITRITE UR QL STRIP.AUTO: NEGATIVE
NRBC # BLD: 0 K/UL (ref 0–0.01)
NRBC BLD-RTO: 0 PER 100 WBC
PH UR STRIP: 6 (ref 5–8)
PLATELET # BLD AUTO: 405 K/UL (ref 150–400)
PMV BLD AUTO: 9.6 FL (ref 8.9–12.9)
POTASSIUM SERPL-SCNC: 5.1 MMOL/L (ref 3.5–5.1)
PROT SERPL-MCNC: 8.4 G/DL (ref 6.4–8.2)
PROT UR STRIP-MCNC: ABNORMAL MG/DL
RBC # BLD AUTO: 4.68 M/UL (ref 3.8–5.2)
RBC #/AREA URNS HPF: ABNORMAL /HPF (ref 0–5)
RBC MORPH BLD: ABNORMAL
SODIUM SERPL-SCNC: 134 MMOL/L (ref 136–145)
SP GR UR REFRACTOMETRY: 1.01
URINE CULTURE IF INDICATED: ABNORMAL
UROBILINOGEN UR QL STRIP.AUTO: 0.2 EU/DL (ref 0.2–1)
WBC # BLD AUTO: 10.4 K/UL (ref 3.6–11)
WBC URNS QL MICRO: ABNORMAL /HPF (ref 0–4)

## 2024-08-22 PROCEDURE — 36415 COLL VENOUS BLD VENIPUNCTURE: CPT

## 2024-08-22 PROCEDURE — 96375 TX/PRO/DX INJ NEW DRUG ADDON: CPT

## 2024-08-22 PROCEDURE — 6360000002 HC RX W HCPCS: Performed by: PHYSICIAN ASSISTANT

## 2024-08-22 PROCEDURE — 81001 URINALYSIS AUTO W/SCOPE: CPT

## 2024-08-22 PROCEDURE — 99284 EMERGENCY DEPT VISIT MOD MDM: CPT

## 2024-08-22 PROCEDURE — 85025 COMPLETE CBC W/AUTO DIFF WBC: CPT

## 2024-08-22 PROCEDURE — 2580000003 HC RX 258: Performed by: PHYSICIAN ASSISTANT

## 2024-08-22 PROCEDURE — 80053 COMPREHEN METABOLIC PANEL: CPT

## 2024-08-22 PROCEDURE — 74176 CT ABD & PELVIS W/O CONTRAST: CPT

## 2024-08-22 PROCEDURE — 96374 THER/PROPH/DIAG INJ IV PUSH: CPT

## 2024-08-22 PROCEDURE — 96376 TX/PRO/DX INJ SAME DRUG ADON: CPT

## 2024-08-22 RX ORDER — ONDANSETRON 2 MG/ML
4 INJECTION INTRAMUSCULAR; INTRAVENOUS ONCE
Status: COMPLETED | OUTPATIENT
Start: 2024-08-22 | End: 2024-08-22

## 2024-08-22 RX ORDER — FENTANYL CITRATE 50 UG/ML
50 INJECTION, SOLUTION INTRAMUSCULAR; INTRAVENOUS
Status: COMPLETED | OUTPATIENT
Start: 2024-08-22 | End: 2024-08-22

## 2024-08-22 RX ORDER — MORPHINE SULFATE 2 MG/ML
2 INJECTION, SOLUTION INTRAMUSCULAR; INTRAVENOUS
Status: COMPLETED | OUTPATIENT
Start: 2024-08-22 | End: 2024-08-22

## 2024-08-22 RX ORDER — 0.9 % SODIUM CHLORIDE 0.9 %
1000 INTRAVENOUS SOLUTION INTRAVENOUS ONCE
Status: COMPLETED | OUTPATIENT
Start: 2024-08-22 | End: 2024-08-22

## 2024-08-22 RX ORDER — 0.9 % SODIUM CHLORIDE 0.9 %
500 INTRAVENOUS SOLUTION INTRAVENOUS ONCE
Status: COMPLETED | OUTPATIENT
Start: 2024-08-22 | End: 2024-08-22

## 2024-08-22 RX ORDER — MORPHINE SULFATE 2 MG/ML
2 INJECTION, SOLUTION INTRAMUSCULAR; INTRAVENOUS
Status: DISCONTINUED | OUTPATIENT
Start: 2024-08-22 | End: 2024-08-22 | Stop reason: HOSPADM

## 2024-08-22 RX ADMIN — SODIUM CHLORIDE 1000 ML: 9 INJECTION, SOLUTION INTRAVENOUS at 14:28

## 2024-08-22 RX ADMIN — MORPHINE SULFATE 2 MG: 2 INJECTION, SOLUTION INTRAMUSCULAR; INTRAVENOUS at 13:09

## 2024-08-22 RX ADMIN — ONDANSETRON 4 MG: 2 INJECTION, SOLUTION INTRAMUSCULAR; INTRAVENOUS at 13:09

## 2024-08-22 RX ADMIN — SODIUM CHLORIDE 500 ML: 9 INJECTION, SOLUTION INTRAVENOUS at 16:32

## 2024-08-22 RX ADMIN — SODIUM CHLORIDE 1000 ML: 9 INJECTION, SOLUTION INTRAVENOUS at 13:09

## 2024-08-22 RX ADMIN — FENTANYL CITRATE 50 MCG: 50 INJECTION INTRAMUSCULAR; INTRAVENOUS at 16:38

## 2024-08-22 RX ADMIN — FENTANYL CITRATE 50 MCG: 50 INJECTION INTRAMUSCULAR; INTRAVENOUS at 14:49

## 2024-08-22 ASSESSMENT — PAIN SCALES - GENERAL
PAINLEVEL_OUTOF10: 8
PAINLEVEL_OUTOF10: 8
PAINLEVEL_OUTOF10: 4

## 2024-08-22 ASSESSMENT — PAIN DESCRIPTION - LOCATION
LOCATION: BACK
LOCATION: BACK

## 2024-08-22 ASSESSMENT — PAIN DESCRIPTION - ORIENTATION
ORIENTATION: RIGHT;LEFT
ORIENTATION: RIGHT;LEFT

## 2024-08-22 ASSESSMENT — PAIN - FUNCTIONAL ASSESSMENT: PAIN_FUNCTIONAL_ASSESSMENT: 0-10

## 2024-08-22 NOTE — DISCHARGE INSTRUCTIONS
Thank You!    It was a pleasure taking care of you in our Emergency Department today. We know that when you come to [facility], you are entrusting us with your health, comfort, and safety. Our clinicians honor that trust, and truly appreciate the opportunity to care for you and your loved ones.    If you receive a survey about your Emergency Department experience today, please fill it out.  We value your feedback. Thank you.      Hien Sharma PA-C    ___________________________________  I have included a copy of your lab results and/or radiologic studies from today's visit so you can have them easily available at your follow-up visit.   Recent Results (from the past 12 hour(s))   CBC with Auto Differential    Collection Time: 08/22/24 12:49 PM   Result Value Ref Range    WBC 10.4 3.6 - 11.0 K/uL    RBC 4.68 3.80 - 5.20 M/uL    Hemoglobin 13.4 11.5 - 16.0 g/dL    Hematocrit 42.0 35.0 - 47.0 %    MCV 89.7 80.0 - 99.0 FL    MCH 28.6 26.0 - 34.0 PG    MCHC 31.9 30.0 - 36.5 g/dL    RDW 14.7 (H) 11.5 - 14.5 %    Platelets 405 (H) 150 - 400 K/uL    MPV 9.6 8.9 - 12.9 FL    Nucleated RBCs 0.0 0  WBC    nRBC 0.00 0.00 - 0.01 K/uL    Neutrophils % 52 32 - 75 %    Lymphocytes % 28 12 - 49 %    Monocytes % 7 5 - 13 %    Eosinophils % 12 (H) 0 - 7 %    Basophils % 1 0 - 1 %    Immature Granulocytes % 0 0.0 - 0.5 %    Neutrophils Absolute 5.5 1.8 - 8.0 K/UL    Lymphocytes Absolute 2.9 0.8 - 3.5 K/UL    Monocytes Absolute 0.7 0.0 - 1.0 K/UL    Eosinophils Absolute 1.2 (H) 0.0 - 0.4 K/UL    Basophils Absolute 0.1 0.0 - 0.1 K/UL    Immature Granulocytes Absolute 0.0 0.00 - 0.04 K/UL    Differential Type SMEAR SCANNED      RBC Comment NORMOCYTIC, NORMOCHROMIC     Comprehensive Metabolic Panel    Collection Time: 08/22/24 12:49 PM   Result Value Ref Range    Sodium 134 (L) 136 - 145 mmol/L    Potassium 5.1 3.5 - 5.1 mmol/L    Chloride 109 (H) 97 - 108 mmol/L    CO2 19 (L) 21 - 32 mmol/L

## 2024-08-22 NOTE — ED PROVIDER NOTES
[facility] EMERGENCY DEPT  EMERGENCY DEPARTMENT ENCOUNTER       Pt Name: Monique Goodman  MRN: [id number]  Birthdate 1985  Date of evaluation: 8/22/2024  Provider: MAXINE Guevara   PCP: Javi Krishnan MD  Note Started: 4:33 PM EDT 8/22/24     CHIEF COMPLAINT       Chief Complaint   Patient presents with    Oliguria     Pt states she had history of CKD with JIGNESH, decreased urine outpt, back pain, feels generally unwell        HISTORY OF PRESENT ILLNESS: 1 or more elements      History From: Patient  None     Monique Goodman is a 38 y.o. female  with a past medical history of kidney stones, who presents to the ED for evaluation of lower flank pain and decreased urinary output.  States she has also had some nausea and intermittent episodes of nonbilious, nonbloody vomiting. States she is just feeling unwell. States she is worried as she has had the symptoms in the past and she has had kidney failure.  States she is followed by nephrology.  Denies fevers, chest pain, shortness of breath, abdominal pain.  No changes in bowel or bladder habits.  Denies blood in urine or stool. States she is otherwise in her usual state of health        Nursing Notes were all reviewed and agreed with or any disagreements were addressed in the HPI.     REVIEW OF SYSTEMS      Review of Systems   All other systems reviewed and are negative.       Positives and Pertinent negatives as per HPI.    PAST HISTORY     Past Medical History:  Past Medical History:   Diagnosis Date    Abnormal CT of the abdomen 11/8/2012    JIGNESH (acute kidney injury) (HCC) 01/24/2024    PT STATES SHE HAS BEEN IN KIDNEY FAILURE DUE TO DIURETICS    Anxiety and depression     Arthritis     Asthma     Autoimmune disease (HCC)     lupus    C. difficile diarrhea 2022    PER PT    Cervical prolapse     Dehydration     Garth-Danlos syndrome     Gastrointestinal disorder     gerd, twisted colon, IBS, PT DENIES IBS    GERD (gastroesophageal reflux disease)      this is stable for trended baseline.       ED Course as of 08/25/24 2214   Thu Aug 22, 2024   1508 Given patient is complaining of persistent pain with minimal improvement with pain control obtain CT.  She declines repeat dose of morphine and would prefer fentanyl as the morphine did not assist with her pain. [TL]   1630 Patient is requesting an additional bag of IV fluids.  Discussed risk-benefit alternatives of this with patient.  Discussed she is already had 2 L, and I do not advise an additional bag of fluid.  She states she would like some more fluid, we did agree upon a 500 bolus.  Will give 1 additional dose of pain medication while waiting for CT scan. [TL]   1631 Patient signed out to MAXINE Whalen, with disposition pending CT results     Patient condition is stable  [TL]   1658 Her CT scan shows no acute process.  It does show bilateral punctate nonobstructing renal stones.  Will discharge patient with plan as detailed by Hien Sharma PA-C. [LK]      ED Course User Index  [LK] Brittney Whalen PA-C  [TL] Hien Sharma PA           FINAL IMPRESSION     1. Flank pain    2. Elevated serum creatinine    3. Nausea    4. Bilateral renal stones          DISPOSITION/PLAN   DISPOSITION Decision To Discharge 08/22/2024 05:29:42 PM  Condition at Disposition: Data Unavailable      Discharge Note: The patient is stable for discharge home. The signs, symptoms, diagnosis, and discharge instructions have been discussed, understanding conveyed, and agreed upon. The patient is to follow up as recommended or return to ER should their symptoms worsen.      PATIENT REFERRED TO:  [facility] EMERGENCY DEPT  [address] [address] [phone]    As needed, If symptoms worsen    Javi Krishnan MD  [address] [address] [phone]          your nephrologist              DISCHARGE MEDICATIONS:     Medication List        ASK your doctor about these medications      acetaminophen 500  MG tablet  Commonly known as: TYLENOL  Take 1 tablet by mouth 4 times daily as needed for Pain     albuterol sulfate  (90 Base) MCG/ACT inhaler  Commonly known as: PROVENTIL;VENTOLIN;PROAIR     ALPRAZolam 1 MG tablet  Commonly known as: XANAX  Take 1 tablet by mouth daily as needed for Anxiety for up to 90 days. One qd prn anxiety Max Daily Amount: 1 mg     butalbital-acetaminophen-caffeine -40 MG per tablet  Commonly known as: FIORICET, ESGIC  Take 1 tablet by mouth every 8 hours as needed for Headaches     DULoxetine 60 MG extended release capsule  Commonly known as: CYMBALTA  TAKE 2 CAPSULES BY MOUTH EVERY DAY     mirtazapine 30 MG tablet  Commonly known as: REMERON  TAKE 1 TABLET BY MOUTH EVERY DAY AT NIGHT     omeprazole 20 MG delayed release capsule  Commonly known as: PRILOSEC  TAKE 1 CAPSULE BY MOUTH EVERY DAY     ondansetron 4 MG disintegrating tablet  Commonly known as: ZOFRAN-ODT  Take 1 tablet by mouth 3 times daily as needed for Nausea or Vomiting     pregabalin 200 MG capsule  Commonly known as: LYRICA  Take 1 capsule by mouth 2 times daily for 180 days. Max Daily Amount: 400 mg     propranolol 10 MG tablet  Commonly known as: INDERAL     Symbicort 160-4.5 MCG/ACT Aero  Generic drug: budesonide-formoterol     tiZANidine 4 MG tablet  Commonly known as: ZANAFLEX  TAKE 2 TAB BY MOUTH AT NIGHT AS DIRECTED ORALLY     Wegovy 0.25 MG/0.5ML Soaj SC injection  Generic drug: Semaglutide-Weight Management                DISCONTINUED MEDICATIONS:  Discharge Medication List as of 8/22/2024  4:58 PM            I am the Primary Clinician of Record.   MAXINE Guevara (electronically signed)    (Please note that parts of this dictation were completed with voice recognition software. Quite often unanticipated grammatical, syntax, homophones, and other interpretive errors are inadvertently transcribed by the computer software. Please disregards these errors. Please excuse any errors that have escaped final

## 2024-08-26 ENCOUNTER — HOSPITAL ENCOUNTER (EMERGENCY)
Facility: HOSPITAL | Age: 39
Discharge: HOME | End: 2024-08-26
Attending: EMERGENCY MEDICINE
Payer: COMMERCIAL

## 2024-08-26 VITALS
WEIGHT: 275 LBS | HEIGHT: 63 IN | BODY MASS INDEX: 48.73 KG/M2 | OXYGEN SATURATION: 98 % | HEART RATE: 61 BPM | TEMPERATURE: 98.7 F | RESPIRATION RATE: 16 BRPM | SYSTOLIC BLOOD PRESSURE: 121 MMHG | DIASTOLIC BLOOD PRESSURE: 65 MMHG

## 2024-08-26 DIAGNOSIS — R79.89 ELEVATED SERUM CREATININE: ICD-10-CM

## 2024-08-26 DIAGNOSIS — R10.9 BILATERAL FLANK PAIN: ICD-10-CM

## 2024-08-26 DIAGNOSIS — R19.7 NAUSEA VOMITING AND DIARRHEA: Primary | ICD-10-CM

## 2024-08-26 DIAGNOSIS — R11.2 NAUSEA VOMITING AND DIARRHEA: Primary | ICD-10-CM

## 2024-08-26 LAB
ALBUMIN SERPL-MCNC: 4.3 G/DL (ref 3.5–5)
ALBUMIN/GLOB SERPL: 1.3 (ref 1.1–2.2)
ALP SERPL-CCNC: 149 U/L (ref 45–117)
ALT SERPL-CCNC: 23 U/L (ref 12–78)
ANION GAP SERPL CALC-SCNC: 8 MMOL/L (ref 5–15)
APPEARANCE UR: CLEAR
AST SERPL-CCNC: 18 U/L (ref 15–37)
BACTERIA URNS QL MICRO: NEGATIVE /HPF
BASOPHILS # BLD: 0 K/UL (ref 0–0.1)
BASOPHILS NFR BLD: 0 % (ref 0–1)
BILIRUB SERPL-MCNC: 0.4 MG/DL (ref 0.2–1)
BILIRUB UR QL: NEGATIVE
BUN SERPL-MCNC: 27 MG/DL (ref 6–20)
BUN/CREAT SERPL: 13 (ref 12–20)
CALCIUM SERPL-MCNC: 9.2 MG/DL (ref 8.5–10.1)
CHLORIDE SERPL-SCNC: 110 MMOL/L (ref 97–108)
CO2 SERPL-SCNC: 20 MMOL/L (ref 21–32)
COLOR UR: ABNORMAL
CREAT SERPL-MCNC: 2.16 MG/DL (ref 0.55–1.02)
DIFFERENTIAL METHOD BLD: ABNORMAL
EOSINOPHIL # BLD: 1.5 K/UL (ref 0–0.4)
EOSINOPHIL NFR BLD: 10 % (ref 0–7)
EPITH CASTS URNS QL MICRO: ABNORMAL /LPF
ERYTHROCYTE [DISTWIDTH] IN BLOOD BY AUTOMATED COUNT: 14.9 % (ref 11.5–14.5)
GLOBULIN SER CALC-MCNC: 3.3 G/DL (ref 2–4)
GLUCOSE SERPL-MCNC: 120 MG/DL (ref 65–100)
GLUCOSE UR STRIP.AUTO-MCNC: NEGATIVE MG/DL
HCT VFR BLD AUTO: 36.2 % (ref 35–47)
HGB BLD-MCNC: 11.5 G/DL (ref 11.5–16)
HGB UR QL STRIP: NEGATIVE
HYALINE CASTS URNS QL MICRO: ABNORMAL /LPF (ref 0–2)
IMM GRANULOCYTES # BLD AUTO: 0 K/UL (ref 0–0.04)
IMM GRANULOCYTES NFR BLD AUTO: 0 % (ref 0–0.5)
KETONES UR QL STRIP.AUTO: NEGATIVE MG/DL
LEUKOCYTE ESTERASE UR QL STRIP.AUTO: NEGATIVE
LYMPHOCYTES # BLD: 2.8 K/UL (ref 0.8–3.5)
LYMPHOCYTES NFR BLD: 19 % (ref 12–49)
MCH RBC QN AUTO: 29.4 PG (ref 26–34)
MCHC RBC AUTO-ENTMCNC: 31.8 G/DL (ref 30–36.5)
MCV RBC AUTO: 92.6 FL (ref 80–99)
MONOCYTES # BLD: 1.3 K/UL (ref 0–1)
MONOCYTES NFR BLD: 9 % (ref 5–13)
NEUTS SEG # BLD: 9.2 K/UL (ref 1.8–8)
NEUTS SEG NFR BLD: 62 % (ref 32–75)
NITRITE UR QL STRIP.AUTO: NEGATIVE
NRBC # BLD: 0 K/UL (ref 0–0.01)
NRBC BLD-RTO: 0 PER 100 WBC
PH UR STRIP: 5.5 (ref 5–8)
PLATELET # BLD AUTO: 340 K/UL (ref 150–400)
PMV BLD AUTO: 9.5 FL (ref 8.9–12.9)
POTASSIUM SERPL-SCNC: 4 MMOL/L (ref 3.5–5.1)
PROT SERPL-MCNC: 7.6 G/DL (ref 6.4–8.2)
PROT UR STRIP-MCNC: ABNORMAL MG/DL
RBC # BLD AUTO: 3.91 M/UL (ref 3.8–5.2)
RBC #/AREA URNS HPF: ABNORMAL /HPF (ref 0–5)
RBC MORPH BLD: ABNORMAL
SODIUM SERPL-SCNC: 138 MMOL/L (ref 136–145)
SP GR UR REFRACTOMETRY: 1.01
URINE CULTURE IF INDICATED: ABNORMAL
UROBILINOGEN UR QL STRIP.AUTO: 0.2 EU/DL (ref 0.2–1)
WBC # BLD AUTO: 14.8 K/UL (ref 3.6–11)
WBC URNS QL MICRO: ABNORMAL /HPF (ref 0–4)

## 2024-08-26 PROCEDURE — 80053 COMPREHEN METABOLIC PANEL: CPT

## 2024-08-26 PROCEDURE — 99284 EMERGENCY DEPT VISIT MOD MDM: CPT

## 2024-08-26 PROCEDURE — 6360000002 HC RX W HCPCS: Performed by: PHYSICIAN ASSISTANT

## 2024-08-26 PROCEDURE — 96374 THER/PROPH/DIAG INJ IV PUSH: CPT

## 2024-08-26 PROCEDURE — 96375 TX/PRO/DX INJ NEW DRUG ADDON: CPT

## 2024-08-26 PROCEDURE — 96376 TX/PRO/DX INJ SAME DRUG ADON: CPT

## 2024-08-26 PROCEDURE — 6370000000 HC RX 637 (ALT 250 FOR IP): Performed by: PHYSICIAN ASSISTANT

## 2024-08-26 PROCEDURE — 2580000003 HC RX 258: Performed by: PHYSICIAN ASSISTANT

## 2024-08-26 PROCEDURE — 96361 HYDRATE IV INFUSION ADD-ON: CPT

## 2024-08-26 PROCEDURE — 36415 COLL VENOUS BLD VENIPUNCTURE: CPT

## 2024-08-26 PROCEDURE — 85025 COMPLETE CBC W/AUTO DIFF WBC: CPT

## 2024-08-26 PROCEDURE — 81001 URINALYSIS AUTO W/SCOPE: CPT

## 2024-08-26 RX ORDER — FENTANYL CITRATE 50 UG/ML
50 INJECTION, SOLUTION INTRAMUSCULAR; INTRAVENOUS
Status: COMPLETED | OUTPATIENT
Start: 2024-08-26 | End: 2024-08-26

## 2024-08-26 RX ORDER — ONDANSETRON 2 MG/ML
4 INJECTION INTRAMUSCULAR; INTRAVENOUS ONCE
Status: COMPLETED | OUTPATIENT
Start: 2024-08-26 | End: 2024-08-26

## 2024-08-26 RX ORDER — HYDROMORPHONE HYDROCHLORIDE 2 MG/1
1 TABLET ORAL
Status: COMPLETED | OUTPATIENT
Start: 2024-08-26 | End: 2024-08-26

## 2024-08-26 RX ORDER — 0.9 % SODIUM CHLORIDE 0.9 %
1000 INTRAVENOUS SOLUTION INTRAVENOUS ONCE
Status: COMPLETED | OUTPATIENT
Start: 2024-08-26 | End: 2024-08-26

## 2024-08-26 RX ADMIN — HYDROMORPHONE HYDROCHLORIDE 1 MG: 2 TABLET ORAL at 11:08

## 2024-08-26 RX ADMIN — FENTANYL CITRATE 50 MCG: 50 INJECTION INTRAMUSCULAR; INTRAVENOUS at 09:03

## 2024-08-26 RX ADMIN — FENTANYL CITRATE 50 MCG: 50 INJECTION INTRAMUSCULAR; INTRAVENOUS at 08:16

## 2024-08-26 RX ADMIN — ONDANSETRON 4 MG: 2 INJECTION INTRAMUSCULAR; INTRAVENOUS at 08:09

## 2024-08-26 RX ADMIN — SODIUM CHLORIDE 1000 ML: 9 INJECTION, SOLUTION INTRAVENOUS at 09:45

## 2024-08-26 RX ADMIN — SODIUM CHLORIDE 1000 ML: 9 INJECTION, SOLUTION INTRAVENOUS at 08:09

## 2024-08-26 ASSESSMENT — PAIN - FUNCTIONAL ASSESSMENT: PAIN_FUNCTIONAL_ASSESSMENT: 0-10

## 2024-08-26 ASSESSMENT — PAIN SCALES - GENERAL
PAINLEVEL_OUTOF10: 6
PAINLEVEL_OUTOF10: 8

## 2024-08-26 NOTE — DISCHARGE INSTRUCTIONS
Thank You!    It was a pleasure taking care of you in our Emergency Department today. We know that when you come to [facility], you are entrusting us with your health, comfort, and safety. Our clinicians honor that trust, and truly appreciate the opportunity to care for you and your loved ones.    If you receive a survey about your Emergency Department experience today, please fill it out.  We value your feedback. Thank you.      Hien Sharma PA-C    ___________________________________  I have included a copy of your lab results and/or radiologic studies from today's visit so you can have them easily available at your follow-up visit.   Recent Results (from the past 12 hour(s))   CBC with Auto Differential    Collection Time: 08/26/24  8:01 AM   Result Value Ref Range    WBC 14.8 (H) 3.6 - 11.0 K/uL    RBC 3.91 3.80 - 5.20 M/uL    Hemoglobin 11.5 11.5 - 16.0 g/dL    Hematocrit 36.2 35.0 - 47.0 %    MCV 92.6 80.0 - 99.0 FL    MCH 29.4 26.0 - 34.0 PG    MCHC 31.8 30.0 - 36.5 g/dL    RDW 14.9 (H) 11.5 - 14.5 %    Platelets 340 150 - 400 K/uL    MPV 9.5 8.9 - 12.9 FL    Nucleated RBCs 0.0 0  WBC    nRBC 0.00 0.00 - 0.01 K/uL    Neutrophils % 62 32 - 75 %    Lymphocytes % 19 12 - 49 %    Monocytes % 9 5 - 13 %    Eosinophils % 10 (H) 0 - 7 %    Basophils % 0 0 - 1 %    Immature Granulocytes % 0 0.0 - 0.5 %    Neutrophils Absolute 9.2 (H) 1.8 - 8.0 K/UL    Lymphocytes Absolute 2.8 0.8 - 3.5 K/UL    Monocytes Absolute 1.3 (H) 0.0 - 1.0 K/UL    Eosinophils Absolute 1.5 (H) 0.0 - 0.4 K/UL    Basophils Absolute 0.0 0.0 - 0.1 K/UL    Immature Granulocytes Absolute 0.0 0.00 - 0.04 K/UL    Differential Type AUTOMATED      RBC Comment NORMOCYTIC, NORMOCHROMIC     Comprehensive Metabolic Panel    Collection Time: 08/26/24  8:01 AM   Result Value Ref Range    Sodium 138 136 - 145 mmol/L    Potassium 4.0 3.5 - 5.1 mmol/L    Chloride 110 (H) 97 - 108 mmol/L    CO2 20 (L) 21 - 32 mmol/L     operating room

## 2024-08-26 NOTE — ED PROVIDER NOTES
Face-To-Face Shared Encounter with an CINDY's Patient:      The patient presents with acute on chronic abdominal pain.  Patient concern for renal failure.    My exam shows 38-year-old female, sitting in bed, awake and oriented.  No focal deficits.  Slightly agitated on exam.    Impression/Plan: 38-year-old female with a history of CKD presents emerged part with chief complaint of acute on chronic abdominal pain.  Vitals are unremarkable.  I was asked to evaluate patient as this is her third ED visit.  On my assessment, patient concerned for renal failure given her elevated creatinine noted.    Patient very adamant that she is in renal failure as her creatinine is 2.  I discussed with patient that I suspect this is her new baseline.  She was receiving IV fluids.  Patient given IV fentanyl as well as antiemetics for symptoms.  I do not believe imaging is necessary at this time given stable renal function and otherwise reassuring labs.    After IV fentanyl, patient requesting Dilaudid.  I discussed with patient, will give p.o. pain medicines, however IV pain medicines will not be ordered as my concern with requesting IV pain medicines for by name is for euphoria and opioid dependence and not pain control.  I discussed that p.o. medicines have equivalent pain control and I am willing to order these, however if patient declines p.o. medicines, additional narcotics will be declined. I do believe patient's poorly controlled chronic pain is contributing to frequent ED visits.    Patient frustrated stating that she has called her nephrologist has not been able to get an answer due to her elevated creatinine.  I will place a consult to our nephrology team to have them evaluate patient emergency department.  Anticipate if this is negative, patient to be discharged with continued follow-up and return precautions.    ED Course as of 08/26/24 1704   Mon Aug 26, 2024   1127 Case discussed with attending physician who also kindly

## 2024-08-26 NOTE — ED PROVIDER NOTES
twisted colon, IBS, PT DENIES IBS    GERD (gastroesophageal reflux disease)     Headache(784.0)     History of blood transfusion 2018    PT STATES SHE HAD A GI BLEED    Hx of blood clots 2023    PT STATES SHE HAD A VERY SMALL BLOOD CLOT IN HER RIGHT LEG    Hypertension     Kidney stone     Lupus (HCC)     Morbid obesity (HCC)     MRSA (methicillin resistant staph aureus) culture positive     2020 ARM SURGERY, SEVERAL POSITIVES SINCE THEN PER PT    Murmur     Nausea & vomiting     Neurological disorder     cluster headaches    Occipital neuralgia     Other ill-defined conditions(799.89) 2006, 2009    ovarian cyst removal    Other ill-defined conditions(799.89)     PONV (postoperative nausea and vomiting)     Rectal prolapse     Sepsis (HCC) 2020    FROM MRSA IN ARM PER PT    Syncope     Worsening headaches        Past Surgical History:  Past Surgical History:   Procedure Laterality Date    CHOLECYSTECTOMY      COLONOSCOPY  09/21/2010    AT LEAST 3 PER PT    CYST INCISION AND DRAINAGE Bilateral 02/27/2020    3 RIGHT ARM, 2 LEFT ARM    CYST INCISION AND DRAINAGE  02/21/2023    Incision and drainage of left upper arm abscess    CYSTOSCOPY N/A 7/24/2024    CYSTOSCOPY WITH BILATERAL RETROGRADES performed by Juan Goodman MD at [facility] MAIN OR    EGD TRANSORAL BIOPSY SINGLE/MULTIPLE  09/22/2010    AT LEAST 5 PER PT    GYN  2006    right ovarian tumor removed    GYN  01/2009    right salpingo oopherectomy    HEENT      left wisdom teeth removal    HEENT      top right wisdom tooth removed    HERNIA REPAIR  02/28/2013    Laparoscopic recurrent incisional hernia repair    HERNIA REPAIR  04/2012    HYSTERECTOMY (CERVIX STATUS UNKNOWN)      2016    HYSTERECTOMY (CERVIX STATUS UNKNOWN)  2017    MULTIPLE TOOTH EXTRACTIONS      FULL DENTURES    RECTAL PROLAPSE REPAIR      CERVICAL PROLAPSE, PELVIC FLOOR PROLAPSE REPAIR    UROLOGICAL SURGERY      Kidney Stone Removal    WISDOM TOOTH EXTRACTION         Family History:  Family  intermittent loose stools, denies dark tarry or bright red bloody stools. Has had some nausea and a few intermittent episodes of non-bilious, non-bloody vomiting.    States she is followed by nephrology who she called the on call line and they advised she come to the emergency department.  Denies fevers, chest pain, shortness of breath, abdominal pain.        Patient is afebrile, non-toxic appearing   Benign abdominal exam   Shared decision making performed and care plan.  Together, patient has been seen here in the emergency department multiple times for this in the recent past, she has had multiple CT scans, most recently on her prior ED visit which showed no acute findings.  Defers repeat CT at this time.  Elects to initiate IV fluids, antiemetics and pain control.  Will obtain screening labs and urinalysis and reevaluate        ED Course as of 08/26/24 1513   Mon Aug 26, 2024   1127 Case discussed with attending physician who also kindly evaluated the patient.  Creatinine 2.16 today, although stable for trended baseline.  Given slight elevation along with multiple visits for similar symptoms and having been in urology to come to the emergency department, will consult nephrology for evaluation  Patient condition is stable [TL]   1156 Nephrology at bedside evaluating the patient  [TL]      ED Course User Index  [TL] Hien Sharma PA     Patient evaluated by nephrology advised patient's condition is stable for discharge.  They recommended additional outpatient follow-up.    Shared decision making performed and care plan created together, discussed results, diagnosis, treatment plan.       No evidence of emergent conditions requiring further evaluation or management acutely here at this time.         FINAL IMPRESSION     1. Nausea vomiting and diarrhea    2. Bilateral flank pain    3. Elevated serum creatinine          DISPOSITION/PLAN   DISPOSITION Decision To Discharge 08/26/2024 12:24:52 PM  Condition at

## 2024-08-26 NOTE — CONSULTS
use: No      Family History   Problem Relation Age of Onset    Breast Cancer Paternal Aunt     Breast Cancer Maternal Grandmother     Anesth Problems Maternal Grandmother         PT'S MOTHER STATES SHE WAS \"ALLERGIC TO ANESTHESIA\"    Colon Cancer Maternal Grandfather     Breast Cancer Paternal Grandmother         Objective:      Wt Readings from Last 3 Encounters:   08/26/24 124.7 kg (275 lb)   08/22/24 124.5 kg (274 lb 7.6 oz)   08/18/24 126.2 kg (278 lb 3.5 oz)     Temp Readings from Last 3 Encounters:   08/26/24 98.7 °F (37.1 °C) (Oral)   08/22/24 97.9 °F (36.6 °C)   08/18/24 98.2 °F (36.8 °C) (Oral)     BP Readings from Last 3 Encounters:   08/26/24 121/65   08/22/24 (!) 146/81   08/18/24 (!) 153/76     Pulse Readings from Last 3 Encounters:   08/26/24 61   08/22/24 64   08/18/24 82      No intake/output data recorded.  Physical Exam:  General:obese  Eyes:No scleral icterus, No conjunctival pallor  Neck:Supple,no mass palpable,no thyromegaly  Lungs:Clears to auscultation Bilaterally, normal respiratory effort  CVS:RRR, S1 S2 normal,  No rub,  Abdomen:Soft, Non tender, No hepatosplenomegaly  Extremities: trace LE edema  Skin:No rash or lesions, Warm and DRY   Psych: appropriate affect  ,anxiety  :  no martinez  Musculoskeletal : no redness, no joint tenderness  NEURO: Normal Speech, Non focal deficit       CODE STATUS:  Full  Care Plan discussed with:  ED team.     Chart reviewed.    Lab and Radiology Data Personally Reviewed: (see below)  .labs  Recent Labs     08/26/24  0801      K 4.0   *   CO2 20*   GLUCOSE 120*   BUN 27*   CREATININE 2.16*   CALCIUM 9.2       Recent Labs     08/26/24  0801   WBC 14.8*   RBC 3.91   HGB 11.5   HCT 36.2   MCV 92.6   MCH 29.4   MCHC 31.8   RDW 14.9*      MPV 9.5     Lab Results   Component Value Date/Time    IRON 67 01/24/2024 01:06 PM    TIBC 328 01/24/2024 01:06 PM     No results found for: \"PTH\"  No results found for: \"LABA1C\"  Lab Results   Component Value  (INDERAL) 10 MG tablet   Yes No   Sig: TAKE 1 TABLET BY MOUTH TWICE A DAY   tiZANidine (ZANAFLEX) 4 MG tablet   No No   Sig: TAKE 2 TAB BY MOUTH AT NIGHT AS DIRECTED ORALLY      Facility-Administered Medications: None         Imaging:    Medications list Personally Reviewed   [x]      Yes     []               No    Thank you for allowing us to participate in the care this patient.   We will follow patient with you.  Signed By: Dm Loving MD  [facility]  [facility] Office  [address], [address]  [place] [address]  Phone - [phone]         Fax - [phone] [address], [facility] [place] [address]  Phone - [phone]        Fax - [phone]

## 2024-09-02 ENCOUNTER — APPOINTMENT (OUTPATIENT)
Facility: HOSPITAL | Age: 39
End: 2024-09-02
Payer: COMMERCIAL

## 2024-09-02 ENCOUNTER — HOSPITAL ENCOUNTER (EMERGENCY)
Facility: HOSPITAL | Age: 39
Discharge: HOME | End: 2024-09-02
Attending: EMERGENCY MEDICINE
Payer: COMMERCIAL

## 2024-09-02 VITALS
SYSTOLIC BLOOD PRESSURE: 104 MMHG | OXYGEN SATURATION: 98 % | TEMPERATURE: 98 F | HEIGHT: 63 IN | BODY MASS INDEX: 50.16 KG/M2 | DIASTOLIC BLOOD PRESSURE: 64 MMHG | WEIGHT: 283.07 LBS | RESPIRATION RATE: 15 BRPM | HEART RATE: 63 BPM

## 2024-09-02 DIAGNOSIS — N17.9 ACUTE RENAL FAILURE SUPERIMPOSED ON CHRONIC KIDNEY DISEASE, UNSPECIFIED ACUTE RENAL FAILURE TYPE, UNSPECIFIED CKD STAGE (HCC): ICD-10-CM

## 2024-09-02 DIAGNOSIS — I95.9 HYPOTENSION, UNSPECIFIED HYPOTENSION TYPE: ICD-10-CM

## 2024-09-02 DIAGNOSIS — M54.50 ACUTE BILATERAL LOW BACK PAIN WITHOUT SCIATICA: Primary | ICD-10-CM

## 2024-09-02 DIAGNOSIS — N18.9 ACUTE RENAL FAILURE SUPERIMPOSED ON CHRONIC KIDNEY DISEASE, UNSPECIFIED ACUTE RENAL FAILURE TYPE, UNSPECIFIED CKD STAGE (HCC): ICD-10-CM

## 2024-09-02 LAB
ALBUMIN SERPL-MCNC: 4 G/DL (ref 3.5–5)
ALBUMIN/GLOB SERPL: 1.2 (ref 1.1–2.2)
ALP SERPL-CCNC: 150 U/L (ref 45–117)
ALT SERPL-CCNC: 29 U/L (ref 12–78)
ANION GAP SERPL CALC-SCNC: 6 MMOL/L (ref 5–15)
AST SERPL-CCNC: 19 U/L (ref 15–37)
BASOPHILS # BLD: 0 K/UL (ref 0–0.1)
BASOPHILS NFR BLD: 0 % (ref 0–1)
BILIRUB SERPL-MCNC: 0.3 MG/DL (ref 0.2–1)
BUN SERPL-MCNC: 35 MG/DL (ref 6–20)
BUN/CREAT SERPL: 16 (ref 12–20)
CALCIUM SERPL-MCNC: 9.3 MG/DL (ref 8.5–10.1)
CHLORIDE SERPL-SCNC: 106 MMOL/L (ref 97–108)
CO2 SERPL-SCNC: 23 MMOL/L (ref 21–32)
CREAT SERPL-MCNC: 2.19 MG/DL (ref 0.55–1.02)
DIFFERENTIAL METHOD BLD: ABNORMAL
EOSINOPHIL # BLD: 1.6 K/UL (ref 0–0.4)
EOSINOPHIL NFR BLD: 14 % (ref 0–7)
ERYTHROCYTE [DISTWIDTH] IN BLOOD BY AUTOMATED COUNT: 15 % (ref 11.5–14.5)
GLOBULIN SER CALC-MCNC: 3.4 G/DL (ref 2–4)
GLUCOSE SERPL-MCNC: 102 MG/DL (ref 65–100)
HCT VFR BLD AUTO: 33 % (ref 35–47)
HGB BLD-MCNC: 10.6 G/DL (ref 11.5–16)
IMM GRANULOCYTES # BLD AUTO: 0.1 K/UL (ref 0–0.04)
IMM GRANULOCYTES NFR BLD AUTO: 1 % (ref 0–0.5)
LYMPHOCYTES # BLD: 4 K/UL (ref 0.8–3.5)
LYMPHOCYTES NFR BLD: 35 % (ref 12–49)
MCH RBC QN AUTO: 29.3 PG (ref 26–34)
MCHC RBC AUTO-ENTMCNC: 32.1 G/DL (ref 30–36.5)
MCV RBC AUTO: 91.2 FL (ref 80–99)
MONOCYTES # BLD: 1 K/UL (ref 0–1)
MONOCYTES NFR BLD: 9 % (ref 5–13)
NEUTS SEG # BLD: 4.6 K/UL (ref 1.8–8)
NEUTS SEG NFR BLD: 41 % (ref 32–75)
NRBC # BLD: 0 K/UL (ref 0–0.01)
NRBC BLD-RTO: 0 PER 100 WBC
PLATELET # BLD AUTO: 359 K/UL (ref 150–400)
PMV BLD AUTO: 9.6 FL (ref 8.9–12.9)
POTASSIUM SERPL-SCNC: 4.5 MMOL/L (ref 3.5–5.1)
PROT SERPL-MCNC: 7.4 G/DL (ref 6.4–8.2)
RBC # BLD AUTO: 3.62 M/UL (ref 3.8–5.2)
RBC MORPH BLD: ABNORMAL
SODIUM SERPL-SCNC: 135 MMOL/L (ref 136–145)
TROPONIN I SERPL HS-MCNC: <4 NG/L (ref 0–51)
WBC # BLD AUTO: 11.3 K/UL (ref 3.6–11)

## 2024-09-02 PROCEDURE — 76770 US EXAM ABDO BACK WALL COMP: CPT

## 2024-09-02 PROCEDURE — 2580000003 HC RX 258: Performed by: EMERGENCY MEDICINE

## 2024-09-02 PROCEDURE — 6360000002 HC RX W HCPCS: Performed by: EMERGENCY MEDICINE

## 2024-09-02 PROCEDURE — 93005 ELECTROCARDIOGRAM TRACING: CPT | Performed by: EMERGENCY MEDICINE

## 2024-09-02 PROCEDURE — 80053 COMPREHEN METABOLIC PANEL: CPT

## 2024-09-02 PROCEDURE — 96374 THER/PROPH/DIAG INJ IV PUSH: CPT

## 2024-09-02 PROCEDURE — 96361 HYDRATE IV INFUSION ADD-ON: CPT

## 2024-09-02 PROCEDURE — 99284 EMERGENCY DEPT VISIT MOD MDM: CPT

## 2024-09-02 PROCEDURE — 6370000000 HC RX 637 (ALT 250 FOR IP): Performed by: EMERGENCY MEDICINE

## 2024-09-02 PROCEDURE — 36415 COLL VENOUS BLD VENIPUNCTURE: CPT

## 2024-09-02 PROCEDURE — 84484 ASSAY OF TROPONIN QUANT: CPT

## 2024-09-02 PROCEDURE — 85025 COMPLETE CBC W/AUTO DIFF WBC: CPT

## 2024-09-02 RX ORDER — 0.9 % SODIUM CHLORIDE 0.9 %
1000 INTRAVENOUS SOLUTION INTRAVENOUS ONCE
Status: COMPLETED | OUTPATIENT
Start: 2024-09-02 | End: 2024-09-02

## 2024-09-02 RX ORDER — OXYCODONE HYDROCHLORIDE 5 MG/1
5 TABLET ORAL ONCE
Status: COMPLETED | OUTPATIENT
Start: 2024-09-02 | End: 2024-09-02

## 2024-09-02 RX ORDER — ONDANSETRON 2 MG/ML
4 INJECTION INTRAMUSCULAR; INTRAVENOUS EVERY 6 HOURS PRN
Status: DISCONTINUED | OUTPATIENT
Start: 2024-09-02 | End: 2024-09-02 | Stop reason: HOSPADM

## 2024-09-02 RX ADMIN — ONDANSETRON 4 MG: 2 INJECTION INTRAMUSCULAR; INTRAVENOUS at 06:06

## 2024-09-02 RX ADMIN — SODIUM CHLORIDE 1000 ML: 9 INJECTION, SOLUTION INTRAVENOUS at 05:46

## 2024-09-02 RX ADMIN — OXYCODONE HYDROCHLORIDE 5 MG: 5 TABLET ORAL at 07:34

## 2024-09-02 ASSESSMENT — PAIN DESCRIPTION - LOCATION
LOCATION: FLANK;BACK
LOCATION: BACK;FLANK
LOCATION: BACK
LOCATION: FLANK

## 2024-09-02 ASSESSMENT — PAIN - FUNCTIONAL ASSESSMENT
PAIN_FUNCTIONAL_ASSESSMENT: 0-10

## 2024-09-02 ASSESSMENT — PAIN DESCRIPTION - FREQUENCY
FREQUENCY: CONTINUOUS

## 2024-09-02 ASSESSMENT — PAIN DESCRIPTION - PAIN TYPE
TYPE: ACUTE PAIN

## 2024-09-02 ASSESSMENT — PAIN SCALES - GENERAL
PAINLEVEL_OUTOF10: 8
PAINLEVEL_OUTOF10: 7
PAINLEVEL_OUTOF10: 9
PAINLEVEL_OUTOF10: 8

## 2024-09-02 ASSESSMENT — PAIN DESCRIPTION - ORIENTATION
ORIENTATION: RIGHT;LEFT;MID
ORIENTATION: RIGHT;LEFT
ORIENTATION: RIGHT;LEFT;LOWER
ORIENTATION: RIGHT;LEFT

## 2024-09-02 ASSESSMENT — PAIN DESCRIPTION - ONSET
ONSET: ON-GOING

## 2024-09-02 ASSESSMENT — PAIN DESCRIPTION - DESCRIPTORS
DESCRIPTORS: STABBING;THROBBING
DESCRIPTORS: SHARP;SHOOTING
DESCRIPTORS: SHOOTING;SHARP;THROBBING
DESCRIPTORS: SHARP;SHOOTING;THROBBING

## 2024-09-02 NOTE — ED NOTES
This RN has reviewed discharge instructions with the patient at this time. Patient has been made aware of prescription(s) called into pharmacy on file for , follow up care, and diagnoses care instructions. The Patient verbalized understanding and denies any further questions. VSS and pt AOx4. Patient ambulatory out to waiting room at this time.

## 2024-09-02 NOTE — ED TRIAGE NOTES
Patient ambulatory to triage from waiting room with complaint of lower back pain and hypotension. Patient reports back pain is bilateral lower back \"where her kidneys are\", patient reports history of kidney problems. Patient reports pain began yesterday and has been getting worse since then. Patient also reporting hypotension at home.

## 2024-09-02 NOTE — ED PROVIDER NOTES
TYLENOL  Take 1 tablet by mouth 4 times daily as needed for Pain     albuterol sulfate  (90 Base) MCG/ACT inhaler  Commonly known as: PROVENTIL;VENTOLIN;PROAIR     butalbital-acetaminophen-caffeine -40 MG per tablet  Commonly known as: FIORICET, ESGIC  Take 1 tablet by mouth every 8 hours as needed for Headaches     DULoxetine 60 MG extended release capsule  Commonly known as: CYMBALTA  TAKE 2 CAPSULES BY MOUTH EVERY DAY     mirtazapine 30 MG tablet  Commonly known as: REMERON  TAKE 1 TABLET BY MOUTH EVERY DAY AT NIGHT     omeprazole 20 MG delayed release capsule  Commonly known as: PRILOSEC  TAKE 1 CAPSULE BY MOUTH EVERY DAY     ondansetron 4 MG disintegrating tablet  Commonly known as: ZOFRAN-ODT  Take 1 tablet by mouth 3 times daily as needed for Nausea or Vomiting     pregabalin 200 MG capsule  Commonly known as: LYRICA  Take 1 capsule by mouth 2 times daily for 180 days. Max Daily Amount: 400 mg     propranolol 10 MG tablet  Commonly known as: INDERAL     Symbicort 160-4.5 MCG/ACT Aero  Generic drug: budesonide-formoterol     tiZANidine 4 MG tablet  Commonly known as: ZANAFLEX  TAKE 2 TAB BY MOUTH AT NIGHT AS DIRECTED ORALLY     Wegovy 0.25 MG/0.5ML Soaj SC injection  Generic drug: Semaglutide-Weight Management                DISCONTINUED MEDICATIONS:  Discharge Medication List as of 9/2/2024  7:20 AM          I am the Primary Clinician of Record.   Donovan Schafer DO (electronically signed)    (Please note that parts of this dictation were completed with voice recognition software. Quite often unanticipated grammatical, syntax, homophones, and other interpretive errors are inadvertently transcribed by the computer software. Please disregards these errors. Please excuse any errors that have escaped final proofreading.)         Donovan Schafer DO  09/03/24 0106

## 2024-09-02 NOTE — ED NOTES
Report given to Alisha RN. Nurse was informed of reason for arrival, vitals, labs, medications, orders, procedures, results, anything left pending and current plan of action. Questions were asked and received prior to departure from the patient.

## 2024-09-02 NOTE — ED NOTES
Bedside report given to Benja Fletcher RN by COTY Jordan. Nurse was informed of reason for arrival, vitals, labs, medications, orders, procedures, results, cardiac rhythm, any outstanding and pending orders and plan of care. Opportunity for questions were provided for receiving RN at this time.

## 2024-09-04 LAB
EKG ATRIAL RATE: 52 BPM
EKG DIAGNOSIS: NORMAL
EKG P AXIS: 35 DEGREES
EKG P-R INTERVAL: 190 MS
EKG Q-T INTERVAL: 406 MS
EKG QRS DURATION: 90 MS
EKG QTC CALCULATION (BAZETT): 377 MS
EKG R AXIS: 41 DEGREES
EKG T AXIS: 22 DEGREES
EKG VENTRICULAR RATE: 52 BPM

## 2024-09-13 ENCOUNTER — HOSPITAL ENCOUNTER (EMERGENCY)
Facility: HOSPITAL | Age: 39
Discharge: HOME | End: 2024-09-14
Attending: EMERGENCY MEDICINE
Payer: COMMERCIAL

## 2024-09-13 VITALS
DIASTOLIC BLOOD PRESSURE: 90 MMHG | WEIGHT: 278.44 LBS | HEART RATE: 79 BPM | BODY MASS INDEX: 49.32 KG/M2 | SYSTOLIC BLOOD PRESSURE: 152 MMHG | TEMPERATURE: 99 F | OXYGEN SATURATION: 97 %

## 2024-09-13 DIAGNOSIS — N30.00 ACUTE CYSTITIS WITHOUT HEMATURIA: Primary | ICD-10-CM

## 2024-09-13 DIAGNOSIS — R10.9 BILATERAL FLANK PAIN: ICD-10-CM

## 2024-09-13 DIAGNOSIS — N20.0 KIDNEY STONE: ICD-10-CM

## 2024-09-13 DIAGNOSIS — G89.29 OTHER CHRONIC PAIN: ICD-10-CM

## 2024-09-13 LAB
ALBUMIN SERPL-MCNC: 4.3 G/DL (ref 3.5–5)
ALBUMIN/GLOB SERPL: 1.3 (ref 1.1–2.2)
ALP SERPL-CCNC: 146 U/L (ref 45–117)
ALT SERPL-CCNC: 22 U/L (ref 12–78)
ANION GAP SERPL CALC-SCNC: 4 MMOL/L (ref 2–12)
APPEARANCE UR: ABNORMAL
AST SERPL-CCNC: 20 U/L (ref 15–37)
BACTERIA URNS QL MICRO: ABNORMAL /HPF
BASOPHILS # BLD: 0 K/UL (ref 0–0.1)
BASOPHILS NFR BLD: 1 % (ref 0–1)
BILIRUB SERPL-MCNC: 0.6 MG/DL (ref 0.2–1)
BILIRUB UR QL: NEGATIVE
BUN SERPL-MCNC: 15 MG/DL (ref 6–20)
BUN/CREAT SERPL: 12 (ref 12–20)
CALCIUM SERPL-MCNC: 9.5 MG/DL (ref 8.5–10.1)
CHLORIDE SERPL-SCNC: 108 MMOL/L (ref 97–108)
CO2 SERPL-SCNC: 26 MMOL/L (ref 21–32)
COLOR UR: ABNORMAL
CREAT SERPL-MCNC: 1.28 MG/DL (ref 0.55–1.02)
DIFFERENTIAL METHOD BLD: ABNORMAL
EOSINOPHIL # BLD: 0.8 K/UL (ref 0–0.4)
EOSINOPHIL NFR BLD: 9 % (ref 0–7)
EPITH CASTS URNS QL MICRO: ABNORMAL /LPF
ERYTHROCYTE [DISTWIDTH] IN BLOOD BY AUTOMATED COUNT: 15.3 % (ref 11.5–14.5)
GLOBULIN SER CALC-MCNC: 3.4 G/DL (ref 2–4)
GLUCOSE SERPL-MCNC: 104 MG/DL (ref 65–100)
GLUCOSE UR STRIP.AUTO-MCNC: NEGATIVE MG/DL
HCT VFR BLD AUTO: 36.3 % (ref 35–47)
HGB BLD-MCNC: 11.8 G/DL (ref 11.5–16)
HGB UR QL STRIP: ABNORMAL
HYALINE CASTS URNS QL MICRO: ABNORMAL /LPF (ref 0–2)
IMM GRANULOCYTES # BLD AUTO: 0 K/UL (ref 0–0.04)
IMM GRANULOCYTES NFR BLD AUTO: 0 % (ref 0–0.5)
KETONES UR QL STRIP.AUTO: NEGATIVE MG/DL
LEUKOCYTE ESTERASE UR QL STRIP.AUTO: NEGATIVE
LIPASE SERPL-CCNC: 29 U/L (ref 13–75)
LYMPHOCYTES # BLD: 3.6 K/UL (ref 0.8–3.5)
LYMPHOCYTES NFR BLD: 41 % (ref 12–49)
MCH RBC QN AUTO: 29.6 PG (ref 26–34)
MCHC RBC AUTO-ENTMCNC: 32.5 G/DL (ref 30–36.5)
MCV RBC AUTO: 91.2 FL (ref 80–99)
MONOCYTES # BLD: 0.6 K/UL (ref 0–1)
MONOCYTES NFR BLD: 7 % (ref 5–13)
NEUTS SEG # BLD: 3.7 K/UL (ref 1.8–8)
NEUTS SEG NFR BLD: 42 % (ref 32–75)
NITRITE UR QL STRIP.AUTO: NEGATIVE
NRBC # BLD: 0 K/UL (ref 0–0.01)
NRBC BLD-RTO: 0 PER 100 WBC
PH UR STRIP: 6 (ref 5–8)
PLATELET # BLD AUTO: 343 K/UL (ref 150–400)
PMV BLD AUTO: 9.7 FL (ref 8.9–12.9)
POTASSIUM SERPL-SCNC: 4.1 MMOL/L (ref 3.5–5.1)
PROT SERPL-MCNC: 7.7 G/DL (ref 6.4–8.2)
PROT UR STRIP-MCNC: NEGATIVE MG/DL
RBC # BLD AUTO: 3.98 M/UL (ref 3.8–5.2)
RBC #/AREA URNS HPF: >100 /HPF (ref 0–5)
SODIUM SERPL-SCNC: 138 MMOL/L (ref 136–145)
SP GR UR REFRACTOMETRY: 1.01
URINE CULTURE IF INDICATED: ABNORMAL
UROBILINOGEN UR QL STRIP.AUTO: 0.2 EU/DL (ref 0.2–1)
WBC # BLD AUTO: 8.7 K/UL (ref 3.6–11)
WBC URNS QL MICRO: ABNORMAL /HPF (ref 0–4)

## 2024-09-13 PROCEDURE — 36415 COLL VENOUS BLD VENIPUNCTURE: CPT

## 2024-09-13 PROCEDURE — 81001 URINALYSIS AUTO W/SCOPE: CPT

## 2024-09-13 PROCEDURE — 99284 EMERGENCY DEPT VISIT MOD MDM: CPT

## 2024-09-13 PROCEDURE — 96374 THER/PROPH/DIAG INJ IV PUSH: CPT

## 2024-09-13 PROCEDURE — 80053 COMPREHEN METABOLIC PANEL: CPT

## 2024-09-13 PROCEDURE — 83690 ASSAY OF LIPASE: CPT

## 2024-09-13 PROCEDURE — 85025 COMPLETE CBC W/AUTO DIFF WBC: CPT

## 2024-09-13 ASSESSMENT — PAIN DESCRIPTION - ORIENTATION: ORIENTATION: LEFT;RIGHT

## 2024-09-13 ASSESSMENT — PAIN DESCRIPTION - DESCRIPTORS: DESCRIPTORS: THROBBING

## 2024-09-13 ASSESSMENT — PAIN DESCRIPTION - LOCATION: LOCATION: BACK

## 2024-09-13 ASSESSMENT — PAIN SCALES - GENERAL: PAINLEVEL_OUTOF10: 8

## 2024-09-14 ENCOUNTER — APPOINTMENT (OUTPATIENT)
Facility: HOSPITAL | Age: 39
End: 2024-09-14
Payer: COMMERCIAL

## 2024-09-14 PROCEDURE — 96374 THER/PROPH/DIAG INJ IV PUSH: CPT

## 2024-09-14 PROCEDURE — 74176 CT ABD & PELVIS W/O CONTRAST: CPT

## 2024-09-14 PROCEDURE — 2580000003 HC RX 258: Performed by: EMERGENCY MEDICINE

## 2024-09-14 PROCEDURE — 96375 TX/PRO/DX INJ NEW DRUG ADDON: CPT

## 2024-09-14 PROCEDURE — 6360000002 HC RX W HCPCS: Performed by: EMERGENCY MEDICINE

## 2024-09-14 RX ORDER — CEPHALEXIN 500 MG/1
500 CAPSULE ORAL 2 TIMES DAILY
Qty: 10 CAPSULE | Refills: 0 | Status: SHIPPED | OUTPATIENT
Start: 2024-09-14 | End: 2024-09-19

## 2024-09-14 RX ORDER — HYDROMORPHONE HYDROCHLORIDE 1 MG/ML
1 INJECTION, SOLUTION INTRAMUSCULAR; INTRAVENOUS; SUBCUTANEOUS
Status: COMPLETED | OUTPATIENT
Start: 2024-09-14 | End: 2024-09-14

## 2024-09-14 RX ORDER — ONDANSETRON 2 MG/ML
8 INJECTION INTRAMUSCULAR; INTRAVENOUS ONCE
Status: COMPLETED | OUTPATIENT
Start: 2024-09-14 | End: 2024-09-14

## 2024-09-14 RX ORDER — HYDROMORPHONE HYDROCHLORIDE 1 MG/ML
1 INJECTION, SOLUTION INTRAMUSCULAR; INTRAVENOUS; SUBCUTANEOUS ONCE
Status: DISCONTINUED | OUTPATIENT
Start: 2024-09-14 | End: 2024-09-14 | Stop reason: HOSPADM

## 2024-09-14 RX ORDER — 0.9 % SODIUM CHLORIDE 0.9 %
1000 INTRAVENOUS SOLUTION INTRAVENOUS ONCE
Status: COMPLETED | OUTPATIENT
Start: 2024-09-14 | End: 2024-09-14

## 2024-09-14 RX ADMIN — HYDROMORPHONE HYDROCHLORIDE 1 MG: 1 INJECTION, SOLUTION INTRAMUSCULAR; INTRAVENOUS; SUBCUTANEOUS at 00:25

## 2024-09-14 RX ADMIN — ONDANSETRON 8 MG: 2 INJECTION INTRAMUSCULAR; INTRAVENOUS at 00:26

## 2024-09-14 RX ADMIN — SODIUM CHLORIDE 1000 ML: 9 INJECTION, SOLUTION INTRAVENOUS at 00:25

## 2024-09-18 DIAGNOSIS — F41.9 ANXIETY DISORDER, UNSPECIFIED: ICD-10-CM

## 2024-09-18 RX ORDER — ALPRAZOLAM 1 MG/1
TABLET ORAL
Qty: 30 TABLET | OUTPATIENT
Start: 2024-09-18

## 2024-09-18 ASSESSMENT — ENCOUNTER SYMPTOMS
NAUSEA: 0
SHORTNESS OF BREATH: 0
COUGH: 0
COLOR CHANGE: 0
ABDOMINAL PAIN: 0
VOMITING: 0
DIARRHEA: 0
BACK PAIN: 0

## 2024-10-11 ENCOUNTER — APPOINTMENT (OUTPATIENT)
Facility: HOSPITAL | Age: 39
End: 2024-10-11
Payer: MEDICAID

## 2024-10-11 ENCOUNTER — HOSPITAL ENCOUNTER (EMERGENCY)
Facility: HOSPITAL | Age: 39
Discharge: HOME | End: 2024-10-11
Attending: EMERGENCY MEDICINE
Payer: MEDICAID

## 2024-10-11 VITALS
BODY MASS INDEX: 48.5 KG/M2 | HEART RATE: 84 BPM | OXYGEN SATURATION: 98 % | RESPIRATION RATE: 18 BRPM | TEMPERATURE: 98.4 F | DIASTOLIC BLOOD PRESSURE: 64 MMHG | SYSTOLIC BLOOD PRESSURE: 125 MMHG | WEIGHT: 273.81 LBS

## 2024-10-11 DIAGNOSIS — R31.9 HEMATURIA, UNSPECIFIED TYPE: ICD-10-CM

## 2024-10-11 DIAGNOSIS — R10.9 BILATERAL FLANK PAIN: Primary | ICD-10-CM

## 2024-10-11 LAB
ALBUMIN SERPL-MCNC: 4.2 G/DL (ref 3.5–5)
ALBUMIN/GLOB SERPL: 1.4 (ref 1.1–2.2)
ALP SERPL-CCNC: 141 U/L (ref 45–117)
ALT SERPL-CCNC: 16 U/L (ref 12–78)
ANION GAP SERPL CALC-SCNC: 5 MMOL/L (ref 2–12)
APPEARANCE UR: CLEAR
AST SERPL-CCNC: 9 U/L (ref 15–37)
BACTERIA URNS QL MICRO: ABNORMAL /HPF
BASOPHILS # BLD: 0 K/UL (ref 0–0.1)
BASOPHILS NFR BLD: 1 % (ref 0–1)
BILIRUB SERPL-MCNC: 0.3 MG/DL (ref 0.2–1)
BILIRUB UR QL: NEGATIVE
BUN SERPL-MCNC: 21 MG/DL (ref 6–20)
BUN/CREAT SERPL: 16 (ref 12–20)
CALCIUM SERPL-MCNC: 9.5 MG/DL (ref 8.5–10.1)
CHLORIDE SERPL-SCNC: 111 MMOL/L (ref 97–108)
CO2 SERPL-SCNC: 25 MMOL/L (ref 21–32)
COLOR UR: ABNORMAL
CREAT SERPL-MCNC: 1.31 MG/DL (ref 0.55–1.02)
DIFFERENTIAL METHOD BLD: ABNORMAL
EOSINOPHIL # BLD: 0.5 K/UL (ref 0–0.4)
EOSINOPHIL NFR BLD: 6 % (ref 0–7)
EPITH CASTS URNS QL MICRO: ABNORMAL /LPF
ERYTHROCYTE [DISTWIDTH] IN BLOOD BY AUTOMATED COUNT: 15 % (ref 11.5–14.5)
GLOBULIN SER CALC-MCNC: 3.1 G/DL (ref 2–4)
GLUCOSE SERPL-MCNC: 118 MG/DL (ref 65–100)
GLUCOSE UR STRIP.AUTO-MCNC: NEGATIVE MG/DL
HCT VFR BLD AUTO: 38 % (ref 35–47)
HGB BLD-MCNC: 11.9 G/DL (ref 11.5–16)
HGB UR QL STRIP: ABNORMAL
HYALINE CASTS URNS QL MICRO: ABNORMAL /LPF (ref 0–2)
IMM GRANULOCYTES # BLD AUTO: 0 K/UL (ref 0–0.04)
IMM GRANULOCYTES NFR BLD AUTO: 0 % (ref 0–0.5)
KETONES UR QL STRIP.AUTO: NEGATIVE MG/DL
LEUKOCYTE ESTERASE UR QL STRIP.AUTO: ABNORMAL
LIPASE SERPL-CCNC: 58 U/L (ref 13–75)
LYMPHOCYTES # BLD: 2.6 K/UL (ref 0.8–3.5)
LYMPHOCYTES NFR BLD: 33 % (ref 12–49)
MCH RBC QN AUTO: 29.8 PG (ref 26–34)
MCHC RBC AUTO-ENTMCNC: 31.3 G/DL (ref 30–36.5)
MCV RBC AUTO: 95.2 FL (ref 80–99)
MONOCYTES # BLD: 0.6 K/UL (ref 0–1)
MONOCYTES NFR BLD: 8 % (ref 5–13)
NEUTS SEG # BLD: 4.3 K/UL (ref 1.8–8)
NEUTS SEG NFR BLD: 52 % (ref 32–75)
NITRITE UR QL STRIP.AUTO: NEGATIVE
NRBC # BLD: 0 K/UL (ref 0–0.01)
NRBC BLD-RTO: 0 PER 100 WBC
PH UR STRIP: 6 (ref 5–8)
PLATELET # BLD AUTO: 315 K/UL (ref 150–400)
PMV BLD AUTO: 9.9 FL (ref 8.9–12.9)
POTASSIUM SERPL-SCNC: 4 MMOL/L (ref 3.5–5.1)
PROT SERPL-MCNC: 7.3 G/DL (ref 6.4–8.2)
PROT UR STRIP-MCNC: NEGATIVE MG/DL
RBC # BLD AUTO: 3.99 M/UL (ref 3.8–5.2)
RBC #/AREA URNS HPF: >100 /HPF (ref 0–5)
SODIUM SERPL-SCNC: 141 MMOL/L (ref 136–145)
SP GR UR REFRACTOMETRY: 1.01
URINE CULTURE IF INDICATED: ABNORMAL
UROBILINOGEN UR QL STRIP.AUTO: 0.2 EU/DL (ref 0.2–1)
WBC # BLD AUTO: 8.1 K/UL (ref 3.6–11)
WBC URNS QL MICRO: ABNORMAL /HPF (ref 0–4)

## 2024-10-11 PROCEDURE — 76770 US EXAM ABDO BACK WALL COMP: CPT

## 2024-10-11 PROCEDURE — 2500000003 HC RX 250 WO HCPCS: Performed by: EMERGENCY MEDICINE

## 2024-10-11 PROCEDURE — 85025 COMPLETE CBC W/AUTO DIFF WBC: CPT

## 2024-10-11 PROCEDURE — 99284 EMERGENCY DEPT VISIT MOD MDM: CPT

## 2024-10-11 PROCEDURE — 96375 TX/PRO/DX INJ NEW DRUG ADDON: CPT

## 2024-10-11 PROCEDURE — 80053 COMPREHEN METABOLIC PANEL: CPT

## 2024-10-11 PROCEDURE — 6360000002 HC RX W HCPCS: Performed by: EMERGENCY MEDICINE

## 2024-10-11 PROCEDURE — 81001 URINALYSIS AUTO W/SCOPE: CPT

## 2024-10-11 PROCEDURE — 2580000003 HC RX 258: Performed by: EMERGENCY MEDICINE

## 2024-10-11 PROCEDURE — 83690 ASSAY OF LIPASE: CPT

## 2024-10-11 PROCEDURE — 36415 COLL VENOUS BLD VENIPUNCTURE: CPT

## 2024-10-11 PROCEDURE — 96374 THER/PROPH/DIAG INJ IV PUSH: CPT

## 2024-10-11 RX ORDER — ONDANSETRON 4 MG/1
4 TABLET, ORALLY DISINTEGRATING ORAL 3 TIMES DAILY PRN
Qty: 21 TABLET | Refills: 0 | Status: SHIPPED | OUTPATIENT
Start: 2024-10-11

## 2024-10-11 RX ORDER — MORPHINE SULFATE 4 MG/ML
4 INJECTION, SOLUTION INTRAMUSCULAR; INTRAVENOUS
Status: COMPLETED | OUTPATIENT
Start: 2024-10-11 | End: 2024-10-11

## 2024-10-11 RX ORDER — HYDROMORPHONE HYDROCHLORIDE 1 MG/ML
0.25 INJECTION, SOLUTION INTRAMUSCULAR; INTRAVENOUS; SUBCUTANEOUS
Status: COMPLETED | OUTPATIENT
Start: 2024-10-11 | End: 2024-10-11

## 2024-10-11 RX ORDER — ONDANSETRON 2 MG/ML
4 INJECTION INTRAMUSCULAR; INTRAVENOUS ONCE
Status: COMPLETED | OUTPATIENT
Start: 2024-10-11 | End: 2024-10-11

## 2024-10-11 RX ORDER — 0.9 % SODIUM CHLORIDE 0.9 %
1000 INTRAVENOUS SOLUTION INTRAVENOUS ONCE
Status: COMPLETED | OUTPATIENT
Start: 2024-10-11 | End: 2024-10-11

## 2024-10-11 RX ADMIN — SODIUM CHLORIDE 1000 ML: 9 INJECTION, SOLUTION INTRAVENOUS at 15:50

## 2024-10-11 RX ADMIN — ONDANSETRON 4 MG: 2 INJECTION INTRAMUSCULAR; INTRAVENOUS at 15:52

## 2024-10-11 RX ADMIN — MORPHINE SULFATE 4 MG: 4 INJECTION, SOLUTION INTRAMUSCULAR; INTRAVENOUS at 15:52

## 2024-10-11 RX ADMIN — HYDROMORPHONE HYDROCHLORIDE 0.25 MG: 1 INJECTION, SOLUTION INTRAMUSCULAR; INTRAVENOUS; SUBCUTANEOUS at 16:42

## 2024-10-11 ASSESSMENT — PAIN DESCRIPTION - LOCATION: LOCATION: FLANK

## 2024-10-11 ASSESSMENT — PAIN SCALES - GENERAL
PAINLEVEL_OUTOF10: 8
PAINLEVEL_OUTOF10: 8

## 2024-10-11 ASSESSMENT — PAIN DESCRIPTION - ORIENTATION: ORIENTATION: RIGHT;LEFT

## 2024-10-11 ASSESSMENT — PAIN - FUNCTIONAL ASSESSMENT: PAIN_FUNCTIONAL_ASSESSMENT: 0-10

## 2024-10-11 NOTE — ED PROVIDER NOTES
[facility] EMERGENCY DEPT  EMERGENCY DEPARTMENT ENCOUNTER       Pt Name: Monique Goodman  MRN: [id number]  Birthdate 1985  Date of evaluation: 10/11/2024  Provider: Luis Alfredo Ingram MD   PCP: Javi Krishnan MD  Note Started: 3:22 PM EDT 10/11/24     CHIEF COMPLAINT       Chief Complaint   Patient presents with    Flank Pain     Pt presents ambulatory to triage w/ complaints of bilateral flank pain and dysuria x3 days. Pt states she was referred by PCP to ER for fluids due to her concern for dehydration. Pt also reports pain to L ankle from recent FX        HISTORY OF PRESENT ILLNESS: 1 or more elements      History From: Patient, History limited by: none     Monique Goodman is a 38 y.o. female patient with history of frequent JIGNESH's, kidney stone, lupus, asthma, here for bilateral flank pain.  Her symptoms started 3 days ago.  Her flank pain is severe.  Associated with dysuria.  Her PCP tried to order IV fluids for her, but due to the shortage, it was not able to be arranged as an outpatient.  She has had several episodes of vomiting, but no diarrhea.  She denies abdominal pain, chest pain or shortness of breath.  She also states that she fractured her left ankle 2 weeks ago and is still having significant pain.  She has been taking Ultram for that which is not helping.       Please See MDM for Additional Details of the HPI/PMH  Nursing Notes were all reviewed and agreed with or any disagreements were addressed in the HPI.     REVIEW OF SYSTEMS        Positives and Pertinent negatives as per HPI.    PAST HISTORY     Past Medical History:  Past Medical History:   Diagnosis Date    Abnormal CT of the abdomen 11/8/2012    JIGNESH (acute kidney injury) (HCC) 01/24/2024    PT STATES SHE HAS BEEN IN KIDNEY FAILURE DUE TO DIURETICS    Anxiety and depression     Arthritis     Asthma     Autoimmune disease (HCC)     lupus    C. difficile diarrhea 2022    PER PT    Cervical prolapse     Dehydration     Garth-Danlos syndrome      Gastrointestinal disorder     gerd, twisted colon, IBS, PT DENIES IBS    GERD (gastroesophageal reflux disease)     Headache(784.0)     History of blood transfusion 2018    PT STATES SHE HAD A GI BLEED    Hx of blood clots 2023    PT STATES SHE HAD A VERY SMALL BLOOD CLOT IN HER RIGHT LEG    Hypertension     Kidney stone     Lupus (HCC)     Morbid obesity     MRSA (methicillin resistant staph aureus) culture positive     2020 ARM SURGERY, SEVERAL POSITIVES SINCE THEN PER PT    Murmur     Nausea & vomiting     Neurological disorder     cluster headaches    Occipital neuralgia     Other ill-defined conditions(799.89) 2006, 2009    ovarian cyst removal    Other ill-defined conditions(799.89)     PONV (postoperative nausea and vomiting)     Rectal prolapse     Sepsis (HCC) 2020    FROM MRSA IN ARM PER PT    Syncope     Worsening headaches        Past Surgical History:  Past Surgical History:   Procedure Laterality Date    CHOLECYSTECTOMY      COLONOSCOPY  09/21/2010    AT LEAST 3 PER PT    CYST INCISION AND DRAINAGE Bilateral 02/27/2020    3 RIGHT ARM, 2 LEFT ARM    CYST INCISION AND DRAINAGE  02/21/2023    Incision and drainage of left upper arm abscess    CYSTOSCOPY N/A 7/24/2024    CYSTOSCOPY WITH BILATERAL RETROGRADES performed by Juan Goodman MD at [facility] MAIN OR    EGD TRANSORAL BIOPSY SINGLE/MULTIPLE  09/22/2010    AT LEAST 5 PER PT    GYN  2006    right ovarian tumor removed    GYN  01/2009    right salpingo oopherectomy    HEENT      left wisdom teeth removal    HEENT      top right wisdom tooth removed    HERNIA REPAIR  02/28/2013    Laparoscopic recurrent incisional hernia repair    HERNIA REPAIR  04/2012    HYSTERECTOMY (CERVIX STATUS UNKNOWN)      2016    HYSTERECTOMY (CERVIX STATUS UNKNOWN)  2017    MULTIPLE TOOTH EXTRACTIONS      FULL DENTURES    RECTAL PROLAPSE REPAIR      CERVICAL PROLAPSE, PELVIC FLOOR PROLAPSE REPAIR    UROLOGICAL SURGERY      Kidney Stone Removal    WISDOM TOOTH

## 2024-10-18 ENCOUNTER — TELEPHONE (OUTPATIENT)
Age: 39
End: 2024-10-18

## 2024-10-18 ENCOUNTER — APPOINTMENT (OUTPATIENT)
Facility: HOSPITAL | Age: 39
End: 2024-10-18
Payer: MEDICAID

## 2024-10-18 ENCOUNTER — HOSPITAL ENCOUNTER (EMERGENCY)
Facility: HOSPITAL | Age: 39
Discharge: HOME | End: 2024-10-18
Payer: MEDICAID

## 2024-10-18 VITALS
OXYGEN SATURATION: 100 % | DIASTOLIC BLOOD PRESSURE: 65 MMHG | RESPIRATION RATE: 18 BRPM | HEIGHT: 63 IN | HEART RATE: 67 BPM | TEMPERATURE: 98.2 F | BODY MASS INDEX: 48.59 KG/M2 | WEIGHT: 274.25 LBS | SYSTOLIC BLOOD PRESSURE: 149 MMHG

## 2024-10-18 DIAGNOSIS — R10.9 BILATERAL FLANK PAIN: ICD-10-CM

## 2024-10-18 DIAGNOSIS — N18.32 STAGE 3B CHRONIC KIDNEY DISEASE (HCC): ICD-10-CM

## 2024-10-18 DIAGNOSIS — R31.29 MICROSCOPIC HEMATURIA: Primary | ICD-10-CM

## 2024-10-18 DIAGNOSIS — N20.0 BILATERAL NEPHROLITHIASIS: ICD-10-CM

## 2024-10-18 LAB
ALBUMIN SERPL-MCNC: 4.1 G/DL (ref 3.5–5)
ALBUMIN/GLOB SERPL: 1.3 (ref 1.1–2.2)
ALP SERPL-CCNC: 139 U/L (ref 45–117)
ALT SERPL-CCNC: 16 U/L (ref 12–78)
ANION GAP SERPL CALC-SCNC: 4 MMOL/L (ref 2–12)
APPEARANCE UR: CLEAR
AST SERPL-CCNC: 12 U/L (ref 15–37)
BACTERIA URNS QL MICRO: ABNORMAL /HPF
BASOPHILS # BLD: 0 K/UL (ref 0–0.1)
BASOPHILS NFR BLD: 0 % (ref 0–1)
BILIRUB SERPL-MCNC: 0.3 MG/DL (ref 0.2–1)
BILIRUB UR QL: NEGATIVE
BUN SERPL-MCNC: 20 MG/DL (ref 6–20)
BUN/CREAT SERPL: 13 (ref 12–20)
CALCIUM SERPL-MCNC: 9.3 MG/DL (ref 8.5–10.1)
CHLORIDE SERPL-SCNC: 113 MMOL/L (ref 97–108)
CO2 SERPL-SCNC: 23 MMOL/L (ref 21–32)
COLOR UR: ABNORMAL
CREAT SERPL-MCNC: 1.6 MG/DL (ref 0.55–1.02)
DIFFERENTIAL METHOD BLD: ABNORMAL
EOSINOPHIL # BLD: 0.4 K/UL (ref 0–0.4)
EOSINOPHIL NFR BLD: 5 % (ref 0–7)
EPITH CASTS URNS QL MICRO: ABNORMAL /LPF
ERYTHROCYTE [DISTWIDTH] IN BLOOD BY AUTOMATED COUNT: 14.7 % (ref 11.5–14.5)
GLOBULIN SER CALC-MCNC: 3.2 G/DL (ref 2–4)
GLUCOSE SERPL-MCNC: 90 MG/DL (ref 65–100)
GLUCOSE UR STRIP.AUTO-MCNC: NEGATIVE MG/DL
HCT VFR BLD AUTO: 34.3 % (ref 35–47)
HGB BLD-MCNC: 11 G/DL (ref 11.5–16)
HGB UR QL STRIP: ABNORMAL
IMM GRANULOCYTES # BLD AUTO: 0 K/UL (ref 0–0.04)
IMM GRANULOCYTES NFR BLD AUTO: 0 % (ref 0–0.5)
KETONES UR QL STRIP.AUTO: NEGATIVE MG/DL
LEUKOCYTE ESTERASE UR QL STRIP.AUTO: NEGATIVE
LIPASE SERPL-CCNC: 43 U/L (ref 13–75)
LYMPHOCYTES # BLD: 2.2 K/UL (ref 0.8–3.5)
LYMPHOCYTES NFR BLD: 27 % (ref 12–49)
MCH RBC QN AUTO: 29.6 PG (ref 26–34)
MCHC RBC AUTO-ENTMCNC: 32.1 G/DL (ref 30–36.5)
MCV RBC AUTO: 92.5 FL (ref 80–99)
MONOCYTES # BLD: 0.6 K/UL (ref 0–1)
MONOCYTES NFR BLD: 8 % (ref 5–13)
NEUTS SEG # BLD: 4.9 K/UL (ref 1.8–8)
NEUTS SEG NFR BLD: 59 % (ref 32–75)
NITRITE UR QL STRIP.AUTO: NEGATIVE
NRBC # BLD: 0 K/UL (ref 0–0.01)
NRBC BLD-RTO: 0 PER 100 WBC
PH UR STRIP: 6 (ref 5–8)
PLATELET # BLD AUTO: 312 K/UL (ref 150–400)
PMV BLD AUTO: 10.2 FL (ref 8.9–12.9)
POTASSIUM SERPL-SCNC: 4.5 MMOL/L (ref 3.5–5.1)
PROT SERPL-MCNC: 7.3 G/DL (ref 6.4–8.2)
PROT UR STRIP-MCNC: NEGATIVE MG/DL
RBC # BLD AUTO: 3.71 M/UL (ref 3.8–5.2)
RBC #/AREA URNS HPF: ABNORMAL /HPF (ref 0–5)
SODIUM SERPL-SCNC: 140 MMOL/L (ref 136–145)
SP GR UR REFRACTOMETRY: 1.01
URINE CULTURE IF INDICATED: ABNORMAL
UROBILINOGEN UR QL STRIP.AUTO: 0.2 EU/DL (ref 0.2–1)
WBC # BLD AUTO: 8.2 K/UL (ref 3.6–11)
WBC URNS QL MICRO: ABNORMAL /HPF (ref 0–4)

## 2024-10-18 PROCEDURE — 81001 URINALYSIS AUTO W/SCOPE: CPT

## 2024-10-18 PROCEDURE — 96374 THER/PROPH/DIAG INJ IV PUSH: CPT

## 2024-10-18 PROCEDURE — 85025 COMPLETE CBC W/AUTO DIFF WBC: CPT

## 2024-10-18 PROCEDURE — 2580000003 HC RX 258: Performed by: PHYSICIAN ASSISTANT

## 2024-10-18 PROCEDURE — 80053 COMPREHEN METABOLIC PANEL: CPT

## 2024-10-18 PROCEDURE — 96361 HYDRATE IV INFUSION ADD-ON: CPT

## 2024-10-18 PROCEDURE — 96375 TX/PRO/DX INJ NEW DRUG ADDON: CPT

## 2024-10-18 PROCEDURE — 6360000002 HC RX W HCPCS: Performed by: PHYSICIAN ASSISTANT

## 2024-10-18 PROCEDURE — 99284 EMERGENCY DEPT VISIT MOD MDM: CPT

## 2024-10-18 PROCEDURE — 36415 COLL VENOUS BLD VENIPUNCTURE: CPT

## 2024-10-18 PROCEDURE — 96376 TX/PRO/DX INJ SAME DRUG ADON: CPT

## 2024-10-18 PROCEDURE — 74176 CT ABD & PELVIS W/O CONTRAST: CPT

## 2024-10-18 PROCEDURE — 83690 ASSAY OF LIPASE: CPT

## 2024-10-18 RX ORDER — HYDROCODONE BITARTRATE AND ACETAMINOPHEN 5; 325 MG/1; MG/1
1 TABLET ORAL
COMMUNITY
Start: 2024-05-12 | End: 2024-10-18 | Stop reason: CLARIF

## 2024-10-18 RX ORDER — MORPHINE SULFATE 4 MG/ML
4 INJECTION, SOLUTION INTRAMUSCULAR; INTRAVENOUS
Status: COMPLETED | OUTPATIENT
Start: 2024-10-18 | End: 2024-10-18

## 2024-10-18 RX ORDER — 0.9 % SODIUM CHLORIDE 0.9 %
500 INTRAVENOUS SOLUTION INTRAVENOUS ONCE
Status: COMPLETED | OUTPATIENT
Start: 2024-10-18 | End: 2024-10-18

## 2024-10-18 RX ORDER — OXCARBAZEPINE 150 MG/1
150 TABLET, FILM COATED ORAL 2 TIMES DAILY
COMMUNITY
Start: 2024-03-13

## 2024-10-18 RX ORDER — QUETIAPINE FUMARATE 25 MG/1
TABLET, FILM COATED ORAL
COMMUNITY
Start: 2024-09-30

## 2024-10-18 RX ORDER — PROMETHAZINE HYDROCHLORIDE 25 MG/1
1 TABLET ORAL 4 TIMES DAILY PRN
COMMUNITY

## 2024-10-18 RX ORDER — ATOGEPANT 60 MG/1
TABLET ORAL
COMMUNITY

## 2024-10-18 RX ORDER — ONDANSETRON 8 MG/1
8 TABLET, FILM COATED ORAL EVERY 8 HOURS PRN
COMMUNITY
Start: 2024-07-17

## 2024-10-18 RX ORDER — ONDANSETRON 4 MG/1
4 TABLET, ORALLY DISINTEGRATING ORAL 3 TIMES DAILY PRN
Qty: 21 TABLET | Refills: 0 | Status: SHIPPED | OUTPATIENT
Start: 2024-10-18

## 2024-10-18 RX ORDER — DOXYCYCLINE 100 MG/1
100 CAPSULE ORAL
COMMUNITY
Start: 2024-06-15 | End: 2024-10-18 | Stop reason: CLARIF

## 2024-10-18 RX ORDER — MORPHINE SULFATE 10 MG/ML
6 INJECTION, SOLUTION INTRAMUSCULAR; INTRAVENOUS
Status: COMPLETED | OUTPATIENT
Start: 2024-10-18 | End: 2024-10-18

## 2024-10-18 RX ORDER — ONDANSETRON 2 MG/ML
4 INJECTION INTRAMUSCULAR; INTRAVENOUS EVERY 6 HOURS PRN
Status: DISCONTINUED | OUTPATIENT
Start: 2024-10-18 | End: 2024-10-18 | Stop reason: HOSPADM

## 2024-10-18 RX ORDER — ONDANSETRON 2 MG/ML
4 INJECTION INTRAMUSCULAR; INTRAVENOUS ONCE
Status: COMPLETED | OUTPATIENT
Start: 2024-10-18 | End: 2024-10-18

## 2024-10-18 RX ORDER — OXYCODONE AND ACETAMINOPHEN 5; 325 MG/1; MG/1
1 TABLET ORAL EVERY 8 HOURS PRN
Qty: 9 TABLET | Refills: 0 | Status: SHIPPED | OUTPATIENT
Start: 2024-10-18 | End: 2024-10-21

## 2024-10-18 RX ADMIN — ONDANSETRON 4 MG: 2 INJECTION INTRAMUSCULAR; INTRAVENOUS at 11:43

## 2024-10-18 RX ADMIN — MORPHINE SULFATE 6 MG: 10 INJECTION INTRAVENOUS at 11:42

## 2024-10-18 RX ADMIN — ONDANSETRON 4 MG: 2 INJECTION INTRAMUSCULAR; INTRAVENOUS at 13:09

## 2024-10-18 RX ADMIN — MORPHINE SULFATE 4 MG: 4 INJECTION, SOLUTION INTRAMUSCULAR; INTRAVENOUS at 13:09

## 2024-10-18 RX ADMIN — SODIUM CHLORIDE 500 ML: 9 INJECTION, SOLUTION INTRAVENOUS at 11:43

## 2024-10-18 ASSESSMENT — PAIN SCALES - GENERAL
PAINLEVEL_OUTOF10: 8

## 2024-10-18 ASSESSMENT — VISUAL ACUITY: OU: 1

## 2024-10-18 ASSESSMENT — PAIN DESCRIPTION - DESCRIPTORS: DESCRIPTORS: STABBING

## 2024-10-18 ASSESSMENT — PAIN DESCRIPTION - ORIENTATION
ORIENTATION: RIGHT;LEFT
ORIENTATION: LOWER

## 2024-10-18 ASSESSMENT — PAIN DESCRIPTION - LOCATION
LOCATION: ABDOMEN
LOCATION: ABDOMEN;BACK

## 2024-10-18 ASSESSMENT — ENCOUNTER SYMPTOMS
ABDOMINAL PAIN: 1
VOMITING: 1
NAUSEA: 1

## 2024-10-18 NOTE — DISCHARGE INSTRUCTIONS
Thank You!    It was a pleasure taking care of you in our Emergency Department today. We know that when you come to our Emergency Department, you are entrusting us with your health, comfort, and safety. Our physicians and nurses honor that trust, and truly appreciate the opportunity to care for you and your loved ones.      We also value your feedback. If you receive a survey about your Emergency Department experience today, please fill it out.  We care about our patients' feedback, and we listen to what you have to say.  Thank you.    Pasha Perales PA-C      ________________________________________________________________________  I have included a copy of your lab results and/or radiologic studies from today's visit so you can have them easily available at your follow-up visit. We hope you feel better and please do not hesitate to contact the ED if you have any questions at all!    Recent Results (from the past 12 hour(s))   CBC with Auto Differential    Collection Time: 10/18/24 10:54 AM   Result Value Ref Range    WBC 8.2 3.6 - 11.0 K/uL    RBC 3.71 (L) 3.80 - 5.20 M/uL    Hemoglobin 11.0 (L) 11.5 - 16.0 g/dL    Hematocrit 34.3 (L) 35.0 - 47.0 %    MCV 92.5 80.0 - 99.0 FL    MCH 29.6 26.0 - 34.0 PG    MCHC 32.1 30.0 - 36.5 g/dL    RDW 14.7 (H) 11.5 - 14.5 %    Platelets 312 150 - 400 K/uL    MPV 10.2 8.9 - 12.9 FL    Nucleated RBCs 0.0 0  WBC    nRBC 0.00 0.00 - 0.01 K/uL    Neutrophils % 59 32 - 75 %    Lymphocytes % 27 12 - 49 %    Monocytes % 8 5 - 13 %    Eosinophils % 5 0 - 7 %    Basophils % 0 0 - 1 %    Immature Granulocytes % 0 0.0 - 0.5 %    Neutrophils Absolute 4.9 1.8 - 8.0 K/UL    Lymphocytes Absolute 2.2 0.8 - 3.5 K/UL    Monocytes Absolute 0.6 0.0 - 1.0 K/UL    Eosinophils Absolute 0.4 0.0 - 0.4 K/UL    Basophils Absolute 0.0 0.0 - 0.1 K/UL    Immature Granulocytes Absolute 0.0 0.00 - 0.04 K/UL    Differential Type AUTOMATED     Comprehensive Metabolic Panel    Collection Time: 10/18/24

## 2024-10-18 NOTE — ED PROVIDER NOTES
PHYSICAL EXAM      ED Triage Vitals [10/18/24 1030]   Encounter Vitals Group      BP (!) 149/65      Systolic BP Percentile       Diastolic BP Percentile       Pulse 67      Respirations 18      Temp 98.2 °F (36.8 °C)      Temp Source Oral      SpO2 100 %      Weight - Scale 124.4 kg (274 lb 4 oz)      Height 1.6 m (5' 3\")      Head Circumference       Peak Flow       Pain Score       Pain Loc       Pain Education       Exclude from Growth Chart         Physical Exam  Constitutional:       General: She is not in acute distress.     Appearance: She is obese.   HENT:      Head: Normocephalic.      Nose: Nose normal.      Mouth/Throat:      Mouth: Mucous membranes are moist.   Eyes:      General: Vision grossly intact.   Cardiovascular:      Rate and Rhythm: Normal rate.   Pulmonary:      Effort: Pulmonary effort is normal.   Abdominal:      Palpations: Abdomen is soft.      Comments:   Express for exam  Soft  Bilateral lower abdominal discomfort with deep palpation  Deep palpation elicits no grimace or guarding   Neurological:      Mental Status: She is alert and oriented to person, place, and time.   Psychiatric:         Mood and Affect: Mood normal.          DIAGNOSTIC RESULTS   LABS:     Recent Results (from the past 24 hour(s))   CBC with Auto Differential    Collection Time: 10/18/24 10:54 AM   Result Value Ref Range    WBC 8.2 3.6 - 11.0 K/uL    RBC 3.71 (L) 3.80 - 5.20 M/uL    Hemoglobin 11.0 (L) 11.5 - 16.0 g/dL    Hematocrit 34.3 (L) 35.0 - 47.0 %    MCV 92.5 80.0 - 99.0 FL    MCH 29.6 26.0 - 34.0 PG    MCHC 32.1 30.0 - 36.5 g/dL    RDW 14.7 (H) 11.5 - 14.5 %    Platelets 312 150 - 400 K/uL    MPV 10.2 8.9 - 12.9 FL    Nucleated RBCs 0.0 0  WBC    nRBC 0.00 0.00 - 0.01 K/uL    Neutrophils % 59 32 - 75 %    Lymphocytes % 27 12 - 49 %    Monocytes % 8 5 - 13 %    Eosinophils % 5 0 - 7 %    Basophils % 0 0 - 1 %    Immature Granulocytes % 0 0.0 - 0.5 %    Neutrophils Absolute 4.9 1.8 - 8.0 K/UL     consultation if needed.     I am the Primary Clinician of Record.   MAXINE Oseguera (electronically signed)    (Please note that parts of this dictation were completed with voice recognition software. Quite often unanticipated grammatical, syntax, homophones, and other interpretive errors are inadvertently transcribed by the computer software. Please disregards these errors. Please excuse any errors that have escaped final proofreading.)       Pasha Perales PA  10/18/24 [id number]

## 2024-10-31 ENCOUNTER — APPOINTMENT (OUTPATIENT)
Facility: HOSPITAL | Age: 39
End: 2024-10-31
Payer: MEDICAID

## 2024-10-31 ENCOUNTER — HOSPITAL ENCOUNTER (EMERGENCY)
Facility: HOSPITAL | Age: 39
Discharge: HOME | End: 2024-10-31
Attending: EMERGENCY MEDICINE
Payer: MEDICAID

## 2024-10-31 VITALS
HEIGHT: 63 IN | HEART RATE: 62 BPM | RESPIRATION RATE: 12 BRPM | WEIGHT: 278.22 LBS | OXYGEN SATURATION: 97 % | SYSTOLIC BLOOD PRESSURE: 126 MMHG | DIASTOLIC BLOOD PRESSURE: 101 MMHG | TEMPERATURE: 97.8 F | BODY MASS INDEX: 49.3 KG/M2

## 2024-10-31 DIAGNOSIS — M54.9 ACUTE ON CHRONIC BACK PAIN: ICD-10-CM

## 2024-10-31 DIAGNOSIS — G89.29 ACUTE ON CHRONIC BACK PAIN: ICD-10-CM

## 2024-10-31 DIAGNOSIS — E86.0 DEHYDRATION: Primary | ICD-10-CM

## 2024-10-31 DIAGNOSIS — N17.9 AKI (ACUTE KIDNEY INJURY) (HCC): ICD-10-CM

## 2024-10-31 LAB
ALBUMIN SERPL-MCNC: 3.9 G/DL (ref 3.5–5)
ALBUMIN/GLOB SERPL: 1.1 (ref 1.1–2.2)
ALP SERPL-CCNC: 147 U/L (ref 45–117)
ALT SERPL-CCNC: 18 U/L (ref 12–78)
ANION GAP SERPL CALC-SCNC: 6 MMOL/L (ref 2–12)
APPEARANCE UR: ABNORMAL
AST SERPL-CCNC: 9 U/L (ref 15–37)
BACTERIA URNS QL MICRO: NEGATIVE /HPF
BASOPHILS # BLD: 0 K/UL (ref 0–0.1)
BASOPHILS NFR BLD: 0 % (ref 0–1)
BILIRUB SERPL-MCNC: 0.2 MG/DL (ref 0.2–1)
BILIRUB UR QL: NEGATIVE
BUN SERPL-MCNC: 29 MG/DL (ref 6–20)
BUN/CREAT SERPL: 15 (ref 12–20)
CALCIUM SERPL-MCNC: 9.2 MG/DL (ref 8.5–10.1)
CHLORIDE SERPL-SCNC: 114 MMOL/L (ref 97–108)
CO2 SERPL-SCNC: 20 MMOL/L (ref 21–32)
COLOR UR: ABNORMAL
CREAT SERPL-MCNC: 1.88 MG/DL (ref 0.55–1.02)
DIFFERENTIAL METHOD BLD: ABNORMAL
EOSINOPHIL # BLD: 0.4 K/UL (ref 0–0.4)
EOSINOPHIL NFR BLD: 6 % (ref 0–7)
EPITH CASTS URNS QL MICRO: ABNORMAL /LPF
ERYTHROCYTE [DISTWIDTH] IN BLOOD BY AUTOMATED COUNT: 14.6 % (ref 11.5–14.5)
GLOBULIN SER CALC-MCNC: 3.4 G/DL (ref 2–4)
GLUCOSE SERPL-MCNC: 98 MG/DL (ref 65–100)
GLUCOSE UR STRIP.AUTO-MCNC: NEGATIVE MG/DL
HCT VFR BLD AUTO: 34.9 % (ref 35–47)
HGB BLD-MCNC: 11.2 G/DL (ref 11.5–16)
HGB UR QL STRIP: ABNORMAL
IMM GRANULOCYTES # BLD AUTO: 0 K/UL (ref 0–0.04)
IMM GRANULOCYTES NFR BLD AUTO: 0 % (ref 0–0.5)
KETONES UR QL STRIP.AUTO: ABNORMAL MG/DL
LACTATE BLD-SCNC: 0.88 MMOL/L (ref 0.4–2)
LACTATE SERPL-SCNC: 0.8 MMOL/L (ref 0.4–2)
LEUKOCYTE ESTERASE UR QL STRIP.AUTO: ABNORMAL
LYMPHOCYTES # BLD: 2.8 K/UL (ref 0.8–3.5)
LYMPHOCYTES NFR BLD: 38 % (ref 12–49)
MCH RBC QN AUTO: 30.1 PG (ref 26–34)
MCHC RBC AUTO-ENTMCNC: 32.1 G/DL (ref 30–36.5)
MCV RBC AUTO: 93.8 FL (ref 80–99)
MONOCYTES # BLD: 0.8 K/UL (ref 0–1)
MONOCYTES NFR BLD: 10 % (ref 5–13)
NEUTS SEG # BLD: 3.4 K/UL (ref 1.8–8)
NEUTS SEG NFR BLD: 46 % (ref 32–75)
NITRITE UR QL STRIP.AUTO: NEGATIVE
NRBC # BLD: 0 K/UL (ref 0–0.01)
NRBC BLD-RTO: 0 PER 100 WBC
PH UR STRIP: 5.5 (ref 5–8)
PLATELET # BLD AUTO: 360 K/UL (ref 150–400)
PMV BLD AUTO: 10 FL (ref 8.9–12.9)
POTASSIUM SERPL-SCNC: 3.9 MMOL/L (ref 3.5–5.1)
PROCALCITONIN SERPL-MCNC: <0.05 NG/ML
PROT SERPL-MCNC: 7.3 G/DL (ref 6.4–8.2)
PROT UR STRIP-MCNC: 30 MG/DL
RBC # BLD AUTO: 3.72 M/UL (ref 3.8–5.2)
RBC #/AREA URNS HPF: >100 /HPF (ref 0–5)
SODIUM SERPL-SCNC: 140 MMOL/L (ref 136–145)
SP GR UR REFRACTOMETRY: 1.02
TROPONIN I SERPL HS-MCNC: 4 NG/L (ref 0–51)
URINE CULTURE IF INDICATED: ABNORMAL
UROBILINOGEN UR QL STRIP.AUTO: 1 EU/DL (ref 0.2–1)
WBC # BLD AUTO: 7.4 K/UL (ref 3.6–11)
WBC URNS QL MICRO: ABNORMAL /HPF (ref 0–4)

## 2024-10-31 PROCEDURE — 96375 TX/PRO/DX INJ NEW DRUG ADDON: CPT

## 2024-10-31 PROCEDURE — 83605 ASSAY OF LACTIC ACID: CPT

## 2024-10-31 PROCEDURE — 96376 TX/PRO/DX INJ SAME DRUG ADON: CPT

## 2024-10-31 PROCEDURE — 71045 X-RAY EXAM CHEST 1 VIEW: CPT

## 2024-10-31 PROCEDURE — 36415 COLL VENOUS BLD VENIPUNCTURE: CPT

## 2024-10-31 PROCEDURE — 87040 BLOOD CULTURE FOR BACTERIA: CPT

## 2024-10-31 PROCEDURE — 2580000003 HC RX 258: Performed by: EMERGENCY MEDICINE

## 2024-10-31 PROCEDURE — 96374 THER/PROPH/DIAG INJ IV PUSH: CPT

## 2024-10-31 PROCEDURE — 85025 COMPLETE CBC W/AUTO DIFF WBC: CPT

## 2024-10-31 PROCEDURE — 6360000002 HC RX W HCPCS: Performed by: EMERGENCY MEDICINE

## 2024-10-31 PROCEDURE — 2500000003 HC RX 250 WO HCPCS: Performed by: EMERGENCY MEDICINE

## 2024-10-31 PROCEDURE — 81001 URINALYSIS AUTO W/SCOPE: CPT

## 2024-10-31 PROCEDURE — 84484 ASSAY OF TROPONIN QUANT: CPT

## 2024-10-31 PROCEDURE — 99285 EMERGENCY DEPT VISIT HI MDM: CPT

## 2024-10-31 PROCEDURE — 84145 PROCALCITONIN (PCT): CPT

## 2024-10-31 PROCEDURE — 80053 COMPREHEN METABOLIC PANEL: CPT

## 2024-10-31 PROCEDURE — 96361 HYDRATE IV INFUSION ADD-ON: CPT

## 2024-10-31 PROCEDURE — 93005 ELECTROCARDIOGRAM TRACING: CPT | Performed by: EMERGENCY MEDICINE

## 2024-10-31 RX ORDER — 0.9 % SODIUM CHLORIDE 0.9 %
30 INTRAVENOUS SOLUTION INTRAVENOUS ONCE
Status: DISCONTINUED | OUTPATIENT
Start: 2024-10-31 | End: 2024-10-31

## 2024-10-31 RX ORDER — ONDANSETRON 4 MG/1
4 TABLET, ORALLY DISINTEGRATING ORAL 3 TIMES DAILY PRN
Qty: 21 TABLET | Refills: 0 | Status: SHIPPED | OUTPATIENT
Start: 2024-10-31

## 2024-10-31 RX ORDER — ONDANSETRON 2 MG/ML
8 INJECTION INTRAMUSCULAR; INTRAVENOUS ONCE
Status: DISCONTINUED | OUTPATIENT
Start: 2024-10-31 | End: 2024-10-31 | Stop reason: HOSPADM

## 2024-10-31 RX ORDER — FENTANYL CITRATE 50 UG/ML
100 INJECTION, SOLUTION INTRAMUSCULAR; INTRAVENOUS
Status: COMPLETED | OUTPATIENT
Start: 2024-10-31 | End: 2024-10-31

## 2024-10-31 RX ORDER — HYDROMORPHONE HYDROCHLORIDE 1 MG/ML
1 INJECTION, SOLUTION INTRAMUSCULAR; INTRAVENOUS; SUBCUTANEOUS
Status: COMPLETED | OUTPATIENT
Start: 2024-10-31 | End: 2024-10-31

## 2024-10-31 RX ORDER — HYDROMORPHONE HYDROCHLORIDE 2 MG/1
2 TABLET ORAL
Qty: 8 TABLET | Refills: 0 | Status: SHIPPED | OUTPATIENT
Start: 2024-10-31 | End: 2024-11-03

## 2024-10-31 RX ORDER — HYDROMORPHONE HYDROCHLORIDE 1 MG/ML
1 INJECTION, SOLUTION INTRAMUSCULAR; INTRAVENOUS; SUBCUTANEOUS
Status: DISCONTINUED | OUTPATIENT
Start: 2024-10-31 | End: 2024-10-31

## 2024-10-31 RX ORDER — 0.9 % SODIUM CHLORIDE 0.9 %
30 INTRAVENOUS SOLUTION INTRAVENOUS ONCE
Status: COMPLETED | OUTPATIENT
Start: 2024-10-31 | End: 2024-10-31

## 2024-10-31 RX ADMIN — HYDROMORPHONE HYDROCHLORIDE 1 MG: 1 INJECTION, SOLUTION INTRAMUSCULAR; INTRAVENOUS; SUBCUTANEOUS at 05:21

## 2024-10-31 RX ADMIN — FENTANYL CITRATE 100 MCG: 50 INJECTION INTRAMUSCULAR; INTRAVENOUS at 02:13

## 2024-10-31 RX ADMIN — HYDROMORPHONE HYDROCHLORIDE 1 MG: 1 INJECTION, SOLUTION INTRAMUSCULAR; INTRAVENOUS; SUBCUTANEOUS at 03:10

## 2024-10-31 RX ADMIN — SODIUM CHLORIDE 1572 ML: 9 INJECTION, SOLUTION INTRAVENOUS at 01:32

## 2024-10-31 ASSESSMENT — PAIN DESCRIPTION - ORIENTATION
ORIENTATION: LOWER

## 2024-10-31 ASSESSMENT — PAIN DESCRIPTION - LOCATION
LOCATION: BACK

## 2024-10-31 ASSESSMENT — PAIN SCALES - GENERAL
PAINLEVEL_OUTOF10: 7
PAINLEVEL_OUTOF10: 6
PAINLEVEL_OUTOF10: 7
PAINLEVEL_OUTOF10: 8

## 2024-10-31 ASSESSMENT — PAIN DESCRIPTION - DESCRIPTORS: DESCRIPTORS: ACHING;THROBBING

## 2024-10-31 ASSESSMENT — PAIN - FUNCTIONAL ASSESSMENT
PAIN_FUNCTIONAL_ASSESSMENT: ACTIVITIES ARE NOT PREVENTED
PAIN_FUNCTIONAL_ASSESSMENT: 0-10

## 2024-10-31 ASSESSMENT — PAIN DESCRIPTION - ONSET: ONSET: SUDDEN

## 2024-10-31 ASSESSMENT — PAIN DESCRIPTION - PAIN TYPE: TYPE: ACUTE PAIN

## 2024-10-31 ASSESSMENT — PAIN DESCRIPTION - FREQUENCY: FREQUENCY: CONTINUOUS

## 2024-10-31 NOTE — ED NOTES
This RN reviewed discharge instructions with the patient. The Patient verbalized understanding. All questions and concerns were addressed. The patient is discharged with papers in hand.  Pt is alert and oriented. Respirations are clear and unlabored. Patient has been made aware of prescription(s) called into pharmacy for .

## 2024-10-31 NOTE — DISCHARGE INSTRUCTIONS
It was a pleasure taking care of you in our Emergency Department today.  We know that when you come to [facility], you are entrusting us with your health, comfort, and safety.  Our physicians and nurses honor that trust, and truly appreciate the opportunity to care for you and your loved ones.    We also value your feedback.  If you receive a survey about your Emergency Department experience today, please fill it out.  We care about our patients' feedback, and we listen to what you have to say.  Thank you!       --- Dr. Marj Potter MD

## 2024-10-31 NOTE — ED PROVIDER NOTES
[facility] EMERGENCY DEPT  EMERGENCY DEPARTMENT ENCOUNTER         Pt Name: Monique Goodman  MRN: [id number]  Birthdate 1985  Date of evaluation: 10/31/2024  Provider: Marj Potter MD   PCP: Javi Krishnan MD  Note Started: 1:24 AM 10/31/24     CHIEF COMPLAINT       Chief Complaint   Patient presents with    Hypotension     Pt arrived to ED from home with hypotension and back pain.  Pt reports vomiting.         HISTORY OF PRESENT ILLNESS: 1 or more elements      History From: {SAHPI:76563}  HPI Limitations: {HPI Limitations (Optional):64231}     Monique Goodman is a 38 y.o. female whose medical history is listed below presents ***  Pt denies any other exacerbating or ameliorating factors. There are no other complaints, changes or physical findings pertinent to the HPI at this time.        PAST HISTORY     Past Medical History:  Past Medical History:   Diagnosis Date    Abnormal CT of the abdomen 11/8/2012    JIGNESH (acute kidney injury) (HCC) 01/24/2024    PT STATES SHE HAS BEEN IN KIDNEY FAILURE DUE TO DIURETICS    Anxiety and depression     Arthritis     Asthma     Autoimmune disease (HCC)     lupus    C. difficile diarrhea 2022    PER PT    Cervical prolapse     Dehydration     Garth-Danlos syndrome     Gastrointestinal disorder     gerd, twisted colon, IBS, PT DENIES IBS    GERD (gastroesophageal reflux disease)     Headache(784.0)     History of blood transfusion 2018    PT STATES SHE HAD A GI BLEED    Hx of blood clots 2023    PT STATES SHE HAD A VERY SMALL BLOOD CLOT IN HER RIGHT LEG    Hypertension     Kidney stone     Lupus     Morbid obesity     MRSA (methicillin resistant staph aureus) culture positive     2020 ARM SURGERY, SEVERAL POSITIVES SINCE THEN PER PT    Murmur     Nausea & vomiting     Neurological disorder     cluster headaches    Occipital neuralgia     Other ill-defined conditions(799.89) 2006, 2009    ovarian cyst removal    Other ill-defined conditions(799.89)     PONV (postoperative

## 2024-11-01 LAB
EKG ATRIAL RATE: 64 BPM
EKG DIAGNOSIS: NORMAL
EKG P AXIS: 47 DEGREES
EKG P-R INTERVAL: 162 MS
EKG Q-T INTERVAL: 384 MS
EKG QRS DURATION: 84 MS
EKG QTC CALCULATION (BAZETT): 396 MS
EKG R AXIS: 73 DEGREES
EKG T AXIS: 46 DEGREES
EKG VENTRICULAR RATE: 64 BPM

## 2024-11-05 ENCOUNTER — APPOINTMENT (OUTPATIENT)
Facility: HOSPITAL | Age: 39
End: 2024-11-05
Payer: MEDICAID

## 2024-11-05 ENCOUNTER — HOSPITAL ENCOUNTER (EMERGENCY)
Facility: HOSPITAL | Age: 39
Discharge: LEFT BEFORE DISCHARGE | End: 2024-11-05
Payer: MEDICAID

## 2024-11-05 VITALS
HEIGHT: 63 IN | HEART RATE: 63 BPM | SYSTOLIC BLOOD PRESSURE: 108 MMHG | BODY MASS INDEX: 47.84 KG/M2 | OXYGEN SATURATION: 100 % | DIASTOLIC BLOOD PRESSURE: 60 MMHG | TEMPERATURE: 98.1 F | WEIGHT: 270 LBS | RESPIRATION RATE: 20 BRPM

## 2024-11-05 DIAGNOSIS — M54.50 ACUTE BILATERAL LOW BACK PAIN, UNSPECIFIED WHETHER SCIATICA PRESENT: ICD-10-CM

## 2024-11-05 DIAGNOSIS — I95.9 HYPOTENSION, UNSPECIFIED HYPOTENSION TYPE: ICD-10-CM

## 2024-11-05 DIAGNOSIS — R19.7 DIARRHEA, UNSPECIFIED TYPE: Primary | ICD-10-CM

## 2024-11-05 LAB
ALBUMIN SERPL-MCNC: 3.8 G/DL (ref 3.5–5)
ALBUMIN/GLOB SERPL: 1.2 (ref 1.1–2.2)
ALP SERPL-CCNC: 158 U/L (ref 45–117)
ALT SERPL-CCNC: 18 U/L (ref 12–78)
ANION GAP SERPL CALC-SCNC: 6 MMOL/L (ref 2–12)
APPEARANCE UR: CLEAR
AST SERPL-CCNC: 9 U/L (ref 15–37)
BACTERIA SPEC CULT: NORMAL
BACTERIA SPEC CULT: NORMAL
BACTERIA URNS QL MICRO: NEGATIVE /HPF
BASOPHILS # BLD: 0 K/UL (ref 0–0.1)
BASOPHILS NFR BLD: 0 % (ref 0–1)
BILIRUB SERPL-MCNC: 0.2 MG/DL (ref 0.2–1)
BILIRUB UR QL: NEGATIVE
BUN SERPL-MCNC: 24 MG/DL (ref 6–20)
BUN/CREAT SERPL: 15 (ref 12–20)
CALCIUM SERPL-MCNC: 9.4 MG/DL (ref 8.5–10.1)
CHLORIDE SERPL-SCNC: 112 MMOL/L (ref 97–108)
CO2 SERPL-SCNC: 22 MMOL/L (ref 21–32)
COLOR UR: ABNORMAL
CREAT SERPL-MCNC: 1.55 MG/DL (ref 0.55–1.02)
DIFFERENTIAL METHOD BLD: ABNORMAL
EOSINOPHIL # BLD: 0.4 K/UL (ref 0–0.4)
EOSINOPHIL NFR BLD: 4 % (ref 0–7)
EPITH CASTS URNS QL MICRO: ABNORMAL /LPF
ERYTHROCYTE [DISTWIDTH] IN BLOOD BY AUTOMATED COUNT: 14.2 % (ref 11.5–14.5)
GLOBULIN SER CALC-MCNC: 3.1 G/DL (ref 2–4)
GLUCOSE SERPL-MCNC: 89 MG/DL (ref 65–100)
GLUCOSE UR STRIP.AUTO-MCNC: NEGATIVE MG/DL
HCT VFR BLD AUTO: 34.2 % (ref 35–47)
HGB BLD-MCNC: 11.1 G/DL (ref 11.5–16)
HGB UR QL STRIP: ABNORMAL
IMM GRANULOCYTES # BLD AUTO: 0 K/UL (ref 0–0.04)
IMM GRANULOCYTES NFR BLD AUTO: 0 % (ref 0–0.5)
KETONES UR QL STRIP.AUTO: NEGATIVE MG/DL
LACTATE BLD-SCNC: 0.7 MMOL/L (ref 0.4–2)
LEUKOCYTE ESTERASE UR QL STRIP.AUTO: NEGATIVE
LIPASE SERPL-CCNC: 39 U/L (ref 13–75)
LYMPHOCYTES # BLD: 3.3 K/UL (ref 0.8–3.5)
LYMPHOCYTES NFR BLD: 34 % (ref 12–49)
MCH RBC QN AUTO: 30 PG (ref 26–34)
MCHC RBC AUTO-ENTMCNC: 32.5 G/DL (ref 30–36.5)
MCV RBC AUTO: 92.4 FL (ref 80–99)
MONOCYTES # BLD: 0.8 K/UL (ref 0–1)
MONOCYTES NFR BLD: 9 % (ref 5–13)
NEUTS SEG # BLD: 5.1 K/UL (ref 1.8–8)
NEUTS SEG NFR BLD: 53 % (ref 32–75)
NITRITE UR QL STRIP.AUTO: NEGATIVE
NRBC # BLD: 0 K/UL (ref 0–0.01)
NRBC BLD-RTO: 0 PER 100 WBC
PH UR STRIP: 6 (ref 5–8)
PLATELET # BLD AUTO: 325 K/UL (ref 150–400)
PMV BLD AUTO: 9.8 FL (ref 8.9–12.9)
POTASSIUM SERPL-SCNC: 3.5 MMOL/L (ref 3.5–5.1)
PROT SERPL-MCNC: 6.9 G/DL (ref 6.4–8.2)
PROT UR STRIP-MCNC: NEGATIVE MG/DL
RBC # BLD AUTO: 3.7 M/UL (ref 3.8–5.2)
RBC #/AREA URNS HPF: ABNORMAL /HPF (ref 0–5)
SERVICE CMNT-IMP: NORMAL
SERVICE CMNT-IMP: NORMAL
SODIUM SERPL-SCNC: 140 MMOL/L (ref 136–145)
SP GR UR REFRACTOMETRY: 1.01
URINE CULTURE IF INDICATED: ABNORMAL
UROBILINOGEN UR QL STRIP.AUTO: 0.2 EU/DL (ref 0.2–1)
WBC # BLD AUTO: 9.6 K/UL (ref 3.6–11)
WBC URNS QL MICRO: ABNORMAL /HPF (ref 0–4)

## 2024-11-05 PROCEDURE — 74176 CT ABD & PELVIS W/O CONTRAST: CPT

## 2024-11-05 PROCEDURE — 81001 URINALYSIS AUTO W/SCOPE: CPT

## 2024-11-05 PROCEDURE — 96374 THER/PROPH/DIAG INJ IV PUSH: CPT

## 2024-11-05 PROCEDURE — 85025 COMPLETE CBC W/AUTO DIFF WBC: CPT

## 2024-11-05 PROCEDURE — 2580000003 HC RX 258: Performed by: PHYSICIAN ASSISTANT

## 2024-11-05 PROCEDURE — 99284 EMERGENCY DEPT VISIT MOD MDM: CPT

## 2024-11-05 PROCEDURE — 6370000000 HC RX 637 (ALT 250 FOR IP): Performed by: PHYSICIAN ASSISTANT

## 2024-11-05 PROCEDURE — 83690 ASSAY OF LIPASE: CPT

## 2024-11-05 PROCEDURE — 6360000002 HC RX W HCPCS: Performed by: PHYSICIAN ASSISTANT

## 2024-11-05 PROCEDURE — 96361 HYDRATE IV INFUSION ADD-ON: CPT

## 2024-11-05 PROCEDURE — 83605 ASSAY OF LACTIC ACID: CPT

## 2024-11-05 PROCEDURE — 80053 COMPREHEN METABOLIC PANEL: CPT

## 2024-11-05 PROCEDURE — 36415 COLL VENOUS BLD VENIPUNCTURE: CPT

## 2024-11-05 RX ORDER — 0.9 % SODIUM CHLORIDE 0.9 %
1000 INTRAVENOUS SOLUTION INTRAVENOUS ONCE
Status: COMPLETED | OUTPATIENT
Start: 2024-11-05 | End: 2024-11-05

## 2024-11-05 RX ORDER — OXYCODONE HYDROCHLORIDE 5 MG/1
5 TABLET ORAL
Status: COMPLETED | OUTPATIENT
Start: 2024-11-05 | End: 2024-11-05

## 2024-11-05 RX ORDER — FENTANYL CITRATE 50 UG/ML
25 INJECTION, SOLUTION INTRAMUSCULAR; INTRAVENOUS
Status: COMPLETED | OUTPATIENT
Start: 2024-11-05 | End: 2024-11-05

## 2024-11-05 RX ADMIN — FENTANYL CITRATE 25 MCG: 50 INJECTION INTRAMUSCULAR; INTRAVENOUS at 14:04

## 2024-11-05 RX ADMIN — OXYCODONE 5 MG: 5 TABLET ORAL at 15:57

## 2024-11-05 RX ADMIN — SODIUM CHLORIDE 1000 ML: 9 INJECTION, SOLUTION INTRAVENOUS at 13:45

## 2024-11-05 ASSESSMENT — PAIN SCALES - GENERAL: PAINLEVEL_OUTOF10: 7

## 2024-11-05 NOTE — ED PROVIDER NOTES
\[facility]\"" EMERGENCY DEPT  EMERGENCY DEPARTMENT ENCOUNTER       Pt Name: Monique Goodman  MRN: [id number]  Birthdate 1985  Date of Evaluation: 11/5/2024  Provider: Aria Lucio PA-C   PCP: Javi Krishnan MD  Note Started: 5:00 PM 11/5/24     CHIEF COMPLAINT       Chief Complaint   Patient presents with    Flank Pain    Diarrhea    Dehydration     Pt arrives ambulatory into [facility] of bilateral flank pain, x16 occurrences of diarrhea and dehydration onset last night. Pt reports chronic kidney disease and dehydration, due to fluid shortage has been unable to receive outpatient fluids.         HISTORY OF PRESENT ILLNESS: 1 or more elements      History From: Patient  None     Monique Goodman is a 38 y.o. female who presents to the ED today bilateral back and flank pain.  Reports that she has had diarrhea.  Reports that she gets pain from her chronic kidney disease and will oftentimes get dehydrated requiring IV fluids due to the IV fluid shortage has not been able to get IV fluids.  Patient states that she had multiple episodes of diarrhea.  Comes here for further evaluation     Nursing Notes were all reviewed and agreed with or any disagreements were addressed in the HPI.     REVIEW OF SYSTEMS      Review of Systems     Positives and Pertinent negatives as per HPI.    PAST HISTORY     Past Medical History:  Past Medical History:   Diagnosis Date    Abnormal CT of the abdomen 11/8/2012    JIGNESH (acute kidney injury) (HCC) 01/24/2024    PT STATES SHE HAS BEEN IN KIDNEY FAILURE DUE TO DIURETICS    Anxiety and depression     Arthritis     Asthma     Autoimmune disease (HCC)     lupus    C. difficile diarrhea 2022    PER PT    Cervical prolapse     Dehydration     Garth-Danlos syndrome     Gastrointestinal disorder     gerd, twisted colon, IBS, PT DENIES IBS    GERD (gastroesophageal reflux disease)     Headache(784.0)     History of blood transfusion 2018    PT STATES SHE HAD A GI BLEED    Hx of blood  MEDICATIONS:     Medication List        ASK your doctor about these medications      acetaminophen 500 MG tablet  Commonly known as: TYLENOL  Take 1 tablet by mouth 4 times daily as needed for Pain     albuterol sulfate  (90 Base) MCG/ACT inhaler  Commonly known as: PROVENTIL;VENTOLIN;PROAIR     butalbital-acetaminophen-caffeine -40 MG per tablet  Commonly known as: FIORICET, ESGIC  Take 1 tablet by mouth every 8 hours as needed for Headaches     DULoxetine 60 MG extended release capsule  Commonly known as: CYMBALTA  TAKE 2 CAPSULES BY MOUTH EVERY DAY     mirtazapine 30 MG tablet  Commonly known as: REMERON  TAKE 1 TABLET BY MOUTH EVERY DAY AT NIGHT     omeprazole 20 MG delayed release capsule  Commonly known as: PRILOSEC  TAKE 1 CAPSULE BY MOUTH EVERY DAY     * ondansetron 4 MG disintegrating tablet  Commonly known as: ZOFRAN-ODT  Take 1 tablet by mouth 3 times daily as needed for Nausea or Vomiting     * ondansetron 4 MG disintegrating tablet  Commonly known as: ZOFRAN-ODT  Take 1 tablet by mouth 3 times daily as needed for Nausea or Vomiting     * ondansetron 4 MG disintegrating tablet  Commonly known as: ZOFRAN-ODT  Take 1 tablet by mouth 3 times daily as needed for Nausea or Vomiting     ondansetron 8 MG tablet  Commonly known as: ZOFRAN     OXcarbazepine 150 MG tablet  Commonly known as: TRILEPTAL     pregabalin 200 MG capsule  Commonly known as: LYRICA  Take 1 capsule by mouth 2 times daily for 180 days. Max Daily Amount: 400 mg     promethazine 25 MG tablet  Commonly known as: PHENERGAN     propranolol 10 MG tablet  Commonly known as: INDERAL     QUEtiapine 25 MG tablet  Commonly known as: SEROQUEL     Qulipta 60 MG Tabs  Generic drug: Atogepant     Symbicort 160-4.5 MCG/ACT Aero  Generic drug: budesonide-formoterol     tiZANidine 4 MG tablet  Commonly known as: ZANAFLEX  TAKE 2 TAB BY MOUTH AT NIGHT AS DIRECTED ORALLY     Wegovy 0.25 MG/0.5ML Soaj SC injection  Generic drug: Semaglutide-Weight

## 2024-11-11 ASSESSMENT — ENCOUNTER SYMPTOMS
BACK PAIN: 0
COUGH: 0
NAUSEA: 1
ABDOMINAL PAIN: 0
DIARRHEA: 1
VOMITING: 1
SHORTNESS OF BREATH: 0
COLOR CHANGE: 0

## 2024-11-15 ENCOUNTER — HOSPITAL ENCOUNTER (EMERGENCY)
Facility: HOSPITAL | Age: 39
Discharge: HOME | End: 2024-11-15
Payer: MEDICAID

## 2024-11-15 VITALS
SYSTOLIC BLOOD PRESSURE: 137 MMHG | DIASTOLIC BLOOD PRESSURE: 78 MMHG | HEART RATE: 79 BPM | TEMPERATURE: 98.2 F | RESPIRATION RATE: 18 BRPM | OXYGEN SATURATION: 100 %

## 2024-11-15 DIAGNOSIS — M54.6 ACUTE LEFT-SIDED THORACIC BACK PAIN: Primary | ICD-10-CM

## 2024-11-15 LAB
ANION GAP BLD CALC-SCNC: 6 (ref 10–20)
BASE EXCESS BLD CALC-SCNC: 0.2 MMOL/L
CA-I BLD-MCNC: 1.44 MMOL/L (ref 1.15–1.33)
CHLORIDE BLD-SCNC: 111 MMOL/L (ref 100–111)
CO2 BLD-SCNC: 29 MMOL/L (ref 22–29)
CREAT UR-MCNC: 1.1 MG/DL (ref 0.6–1.3)
GLUCOSE BLD STRIP.AUTO-MCNC: 123 MG/DL (ref 74–99)
HCO3 BLDA-SCNC: 29 MMOL/L
LACTATE BLD-SCNC: 0.6 MMOL/L (ref 0.4–2)
PCO2 BLDV: 64.4 MMHG (ref 41–51)
PH BLDV: 7.26 (ref 7.32–7.42)
PO2 BLDV: <27 MMHG (ref 25–40)
POTASSIUM BLD-SCNC: 4.3 MMOL/L (ref 3.5–5.5)
SODIUM BLD-SCNC: 146 MMOL/L (ref 136–145)
SPECIMEN SITE: ABNORMAL

## 2024-11-15 PROCEDURE — 6370000000 HC RX 637 (ALT 250 FOR IP)

## 2024-11-15 PROCEDURE — 82803 BLOOD GASES ANY COMBINATION: CPT

## 2024-11-15 PROCEDURE — 96374 THER/PROPH/DIAG INJ IV PUSH: CPT

## 2024-11-15 PROCEDURE — 82947 ASSAY GLUCOSE BLOOD QUANT: CPT

## 2024-11-15 PROCEDURE — 84295 ASSAY OF SERUM SODIUM: CPT

## 2024-11-15 PROCEDURE — 2580000003 HC RX 258

## 2024-11-15 PROCEDURE — 6360000002 HC RX W HCPCS

## 2024-11-15 PROCEDURE — 99284 EMERGENCY DEPT VISIT MOD MDM: CPT

## 2024-11-15 PROCEDURE — 96361 HYDRATE IV INFUSION ADD-ON: CPT

## 2024-11-15 PROCEDURE — 84132 ASSAY OF SERUM POTASSIUM: CPT

## 2024-11-15 PROCEDURE — 82330 ASSAY OF CALCIUM: CPT

## 2024-11-15 RX ORDER — DIAZEPAM 5 MG/1
5 TABLET ORAL EVERY 6 HOURS PRN
Qty: 12 TABLET | Refills: 0 | Status: SHIPPED | OUTPATIENT
Start: 2024-11-15 | End: 2024-11-25

## 2024-11-15 RX ORDER — DIAZEPAM 10 MG/2ML
1 INJECTION, SOLUTION INTRAMUSCULAR; INTRAVENOUS ONCE
Status: COMPLETED | OUTPATIENT
Start: 2024-11-15 | End: 2024-11-15

## 2024-11-15 RX ORDER — OXYCODONE AND ACETAMINOPHEN 5; 325 MG/1; MG/1
1 TABLET ORAL
Status: COMPLETED | OUTPATIENT
Start: 2024-11-15 | End: 2024-11-15

## 2024-11-15 RX ORDER — 0.9 % SODIUM CHLORIDE 0.9 %
500 INTRAVENOUS SOLUTION INTRAVENOUS ONCE
Status: COMPLETED | OUTPATIENT
Start: 2024-11-15 | End: 2024-11-15

## 2024-11-15 RX ADMIN — SODIUM CHLORIDE 500 ML: 9 INJECTION, SOLUTION INTRAVENOUS at 15:13

## 2024-11-15 RX ADMIN — OXYCODONE HYDROCHLORIDE AND ACETAMINOPHEN 1 TABLET: 5; 325 TABLET ORAL at 15:10

## 2024-11-15 RX ADMIN — DIAZEPAM 1 MG: 5 INJECTION, SOLUTION INTRAMUSCULAR; INTRAVENOUS at 15:21

## 2024-11-15 NOTE — ED PROVIDER NOTES
[facility] EMERGENCY DEPT  EMERGENCY DEPARTMENT HISTORY AND PHYSICAL EXAM      Date: 11/15/2024  Patient Name: Monique Goodman  MRN: [id number]  YOB: 1985  Date of evaluation: 11/15/2024  Provider: ADAMARIS Kruse NP   Note Started: 3:18 PM EST 11/15/24    HISTORY OF PRESENT ILLNESS     Chief Complaint   Patient presents with    Back Pain     Patient ambulatory to triage with c/o left sided back pain, patient states she was supposed to receive IV fluids for dehydration at the infusion center, but they will not give fluids due to shortage.        History Provided By: Patient    HPI: Monique Goodman is a 38 y.o. female with past medical history as listed below, presents ambulatory into the emergency department with complaints of acute on chronic bilateral flank pain (L>R).  Patient states she has history of kidney disease and \"I get JIGNESH's very easily.\"  She usually receives weekly IV fluids at the infusion center, but since the IV fluid shortage began, she has not been receiving her regular infusions.  She went to the infusion center today and they declined her treatment.  Patient here asking to have her renal function checked and would like an IV fluid bolus.  Patient also asking for pain medication.  She has been taking tramadol at home, but states this is not helping.  Denies fever/chills, chest pain, difficulty breathing, dysuria, hematuria, nausea/vomiting, diarrhea, rash. Upon arrival to the ED pt is alert and oriented x 3, well-appearing, and interacting appropriately; no obvious distress noted.      PAST MEDICAL HISTORY   Past Medical History:  Past Medical History:   Diagnosis Date    Abnormal CT of the abdomen 11/8/2012    JIGNESH (acute kidney injury) (HCC) 01/24/2024    PT STATES SHE HAS BEEN IN KIDNEY FAILURE DUE TO DIURETICS    Anxiety and depression     Arthritis     Asthma     Autoimmune disease (HCC)     lupus    C. difficile diarrhea 2022    PER PT    Cervical prolapse     Dehydration

## 2024-11-15 NOTE — DISCHARGE INSTRUCTIONS
Thank you for choosing our Emergency Department for your care.  It is our privilege to care for you in your time of need.  In the next several days, you may receive a survey via email or mailed to your home about your experience with our team.  We would greatly appreciate you taking a few minutes to complete the survey, as we use this information to learn what we have done well and what we could be doing better. Thank you for trusting us with your care!    Below you will find a list of your tests from today's visit.   Labs  Recent Results (from the past 12 hour(s))   POCT Blood Gas & Electrolytes    Collection Time: 11/15/24  2:54 PM   Result Value Ref Range    PH, VENOUS (POC) 7.26 (L) 7.32 - 7.42      PCO2, [place], POC 64.4 (H) 41 - 51 MMHG    PO2, VENOUS (POC) <27 25 - 40 mmHg    HCO3, Arterial 29 mmol/L    Base Excess 0.2 mmol/L    POC Sodium 146 (H) 136 - 145 MMOL/L    POC Potassium 4.3 3.5 - 5.5 MMOL/L    POC Chloride 111 100 - 111 MMOL/L    POC TCO2 29 22 - 29 MMOL/L    Anion Gap, POC 6 (L) 10 - 20      POC Glucose 123 (H) 74 - 99 MG/DL    POC Creatinine 1.1 0.6 - 1.3 MG/DL    eGFR, POC 65 >60 ml/min/1.73m2    POC Ionized Calcium 1.44 (H) 1.15 - 1.33 mmol/L    POC Lactic Acid 0.60 0.40 - 2.00 mmol/L    Source VENOUS BLOOD         Radiologic Studies  No orders to display     ------------------------------------------------------------------------------------------------------------  The evaluation and treatment you received in the Emergency Department were for an urgent problem. It is important that you follow-up with a doctor, nurse practitioner, or physician assistant to:  (1) confirm your diagnosis,  (2) re-evaluation of changes in your illness and treatment, and (3) for ongoing care. Please take your discharge instructions with you when you go to your follow-up appointment.     If you have any problem arranging a follow-up appointment, contact us!  If your symptoms become worse or you do not improve as

## 2024-11-18 DIAGNOSIS — F41.9 ANXIETY DISORDER, UNSPECIFIED TYPE: ICD-10-CM

## 2024-11-18 DIAGNOSIS — F32.A DEPRESSION, UNSPECIFIED DEPRESSION TYPE: ICD-10-CM

## 2024-11-18 RX ORDER — MIRTAZAPINE 30 MG/1
30 TABLET, FILM COATED ORAL NIGHTLY
Qty: 90 TABLET | Refills: 1 | Status: SHIPPED | OUTPATIENT
Start: 2024-11-18

## 2024-11-24 ENCOUNTER — HOSPITAL ENCOUNTER (EMERGENCY)
Facility: HOSPITAL | Age: 39
Discharge: HOME | End: 2024-11-24
Payer: MEDICAID

## 2024-11-24 ENCOUNTER — APPOINTMENT (OUTPATIENT)
Facility: HOSPITAL | Age: 39
End: 2024-11-24
Payer: MEDICAID

## 2024-11-24 VITALS
RESPIRATION RATE: 17 BRPM | OXYGEN SATURATION: 97 % | DIASTOLIC BLOOD PRESSURE: 74 MMHG | SYSTOLIC BLOOD PRESSURE: 125 MMHG | TEMPERATURE: 98.4 F | HEART RATE: 70 BPM

## 2024-11-24 DIAGNOSIS — N30.01 ACUTE CYSTITIS WITH HEMATURIA: Primary | ICD-10-CM

## 2024-11-24 DIAGNOSIS — K21.9 GASTRO-ESOPHAGEAL REFLUX DISEASE WITHOUT ESOPHAGITIS: ICD-10-CM

## 2024-11-24 LAB
ALBUMIN SERPL-MCNC: 4.3 G/DL (ref 3.5–5)
ALBUMIN/GLOB SERPL: 1.1 (ref 1.1–2.2)
ALP SERPL-CCNC: 169 U/L (ref 45–117)
ALT SERPL-CCNC: 20 U/L (ref 12–78)
ANION GAP SERPL CALC-SCNC: 3 MMOL/L (ref 2–12)
APPEARANCE UR: ABNORMAL
AST SERPL-CCNC: 13 U/L (ref 15–37)
BACTERIA URNS QL MICRO: ABNORMAL /HPF
BASOPHILS # BLD: 0.1 K/UL (ref 0–0.1)
BASOPHILS NFR BLD: 1 % (ref 0–1)
BILIRUB SERPL-MCNC: 0.1 MG/DL (ref 0.2–1)
BILIRUB UR QL: NEGATIVE
BUN SERPL-MCNC: 19 MG/DL (ref 6–20)
BUN/CREAT SERPL: 15 (ref 12–20)
CALCIUM SERPL-MCNC: 9.9 MG/DL (ref 8.5–10.1)
CHLORIDE SERPL-SCNC: 108 MMOL/L (ref 97–108)
CO2 SERPL-SCNC: 27 MMOL/L (ref 21–32)
COLOR UR: ABNORMAL
CREAT SERPL-MCNC: 1.26 MG/DL (ref 0.55–1.02)
DIFFERENTIAL METHOD BLD: ABNORMAL
EOSINOPHIL # BLD: 0.5 K/UL (ref 0–0.4)
EOSINOPHIL NFR BLD: 5 % (ref 0–7)
EPITH CASTS URNS QL MICRO: ABNORMAL /LPF
ERYTHROCYTE [DISTWIDTH] IN BLOOD BY AUTOMATED COUNT: 13.2 % (ref 11.5–14.5)
GLOBULIN SER CALC-MCNC: 4 G/DL (ref 2–4)
GLUCOSE SERPL-MCNC: 103 MG/DL (ref 65–100)
GLUCOSE UR STRIP.AUTO-MCNC: NEGATIVE MG/DL
HCG UR QL: NEGATIVE
HCT VFR BLD AUTO: 37.3 % (ref 35–47)
HGB BLD-MCNC: 11.9 G/DL (ref 11.5–16)
HGB UR QL STRIP: ABNORMAL
HYALINE CASTS URNS QL MICRO: ABNORMAL /LPF (ref 0–2)
IMM GRANULOCYTES # BLD AUTO: 0.1 K/UL (ref 0–0.04)
IMM GRANULOCYTES NFR BLD AUTO: 1 % (ref 0–0.5)
KETONES UR QL STRIP.AUTO: NEGATIVE MG/DL
LEUKOCYTE ESTERASE UR QL STRIP.AUTO: ABNORMAL
LYMPHOCYTES # BLD: 2.7 K/UL (ref 0.8–3.5)
LYMPHOCYTES NFR BLD: 28 % (ref 12–49)
MCH RBC QN AUTO: 29.5 PG (ref 26–34)
MCHC RBC AUTO-ENTMCNC: 31.9 G/DL (ref 30–36.5)
MCV RBC AUTO: 92.6 FL (ref 80–99)
MONOCYTES # BLD: 0.8 K/UL (ref 0–1)
MONOCYTES NFR BLD: 8 % (ref 5–13)
NEUTS SEG # BLD: 5.8 K/UL (ref 1.8–8)
NEUTS SEG NFR BLD: 57 % (ref 32–75)
NITRITE UR QL STRIP.AUTO: NEGATIVE
NRBC # BLD: 0 K/UL (ref 0–0.01)
NRBC BLD-RTO: 0 PER 100 WBC
PH UR STRIP: 6 (ref 5–8)
PLATELET # BLD AUTO: 390 K/UL (ref 150–400)
PMV BLD AUTO: 10.1 FL (ref 8.9–12.9)
POTASSIUM SERPL-SCNC: 4.3 MMOL/L (ref 3.5–5.1)
PROT SERPL-MCNC: 8.3 G/DL (ref 6.4–8.2)
PROT UR STRIP-MCNC: ABNORMAL MG/DL
RBC # BLD AUTO: 4.03 M/UL (ref 3.8–5.2)
RBC #/AREA URNS HPF: >100 /HPF (ref 0–5)
SODIUM SERPL-SCNC: 138 MMOL/L (ref 136–145)
SP GR UR REFRACTOMETRY: 1.02
URINE CULTURE IF INDICATED: ABNORMAL
UROBILINOGEN UR QL STRIP.AUTO: 0.2 EU/DL (ref 0.2–1)
WBC # BLD AUTO: 9.9 K/UL (ref 3.6–11)
WBC URNS QL MICRO: ABNORMAL /HPF (ref 0–4)

## 2024-11-24 PROCEDURE — 96374 THER/PROPH/DIAG INJ IV PUSH: CPT

## 2024-11-24 PROCEDURE — 6370000000 HC RX 637 (ALT 250 FOR IP)

## 2024-11-24 PROCEDURE — 81025 URINE PREGNANCY TEST: CPT

## 2024-11-24 PROCEDURE — 74176 CT ABD & PELVIS W/O CONTRAST: CPT

## 2024-11-24 PROCEDURE — 96361 HYDRATE IV INFUSION ADD-ON: CPT

## 2024-11-24 PROCEDURE — 81001 URINALYSIS AUTO W/SCOPE: CPT

## 2024-11-24 PROCEDURE — 6360000002 HC RX W HCPCS

## 2024-11-24 PROCEDURE — 36415 COLL VENOUS BLD VENIPUNCTURE: CPT

## 2024-11-24 PROCEDURE — 2580000003 HC RX 258

## 2024-11-24 PROCEDURE — 85025 COMPLETE CBC W/AUTO DIFF WBC: CPT

## 2024-11-24 PROCEDURE — 80053 COMPREHEN METABOLIC PANEL: CPT

## 2024-11-24 PROCEDURE — 99284 EMERGENCY DEPT VISIT MOD MDM: CPT

## 2024-11-24 RX ORDER — 0.9 % SODIUM CHLORIDE 0.9 %
500 INTRAVENOUS SOLUTION INTRAVENOUS ONCE
Status: COMPLETED | OUTPATIENT
Start: 2024-11-24 | End: 2024-11-24

## 2024-11-24 RX ORDER — HYDROMORPHONE HYDROCHLORIDE 1 MG/ML
0.5 INJECTION, SOLUTION INTRAMUSCULAR; INTRAVENOUS; SUBCUTANEOUS
Status: COMPLETED | OUTPATIENT
Start: 2024-11-24 | End: 2024-11-24

## 2024-11-24 RX ORDER — CEPHALEXIN 500 MG/1
500 CAPSULE ORAL 2 TIMES DAILY
Qty: 14 CAPSULE | Refills: 0 | Status: SHIPPED | OUTPATIENT
Start: 2024-11-24 | End: 2024-12-01

## 2024-11-24 RX ORDER — DIAZEPAM 5 MG/1
5 TABLET ORAL ONCE
Status: COMPLETED | OUTPATIENT
Start: 2024-11-24 | End: 2024-11-24

## 2024-11-24 RX ADMIN — DIAZEPAM 5 MG: 5 TABLET ORAL at 12:59

## 2024-11-24 RX ADMIN — HYDROMORPHONE HYDROCHLORIDE 0.5 MG: 1 INJECTION, SOLUTION INTRAMUSCULAR; INTRAVENOUS; SUBCUTANEOUS at 15:29

## 2024-11-24 RX ADMIN — SODIUM CHLORIDE 500 ML: 9 INJECTION, SOLUTION INTRAVENOUS at 13:19

## 2024-11-24 ASSESSMENT — PAIN DESCRIPTION - FREQUENCY: FREQUENCY: CONTINUOUS

## 2024-11-24 ASSESSMENT — PAIN SCALES - GENERAL
PAINLEVEL_OUTOF10: 8
PAINLEVEL_OUTOF10: 6
PAINLEVEL_OUTOF10: 8

## 2024-11-24 ASSESSMENT — PAIN DESCRIPTION - DESCRIPTORS: DESCRIPTORS: DISCOMFORT

## 2024-11-24 ASSESSMENT — PAIN DESCRIPTION - PAIN TYPE: TYPE: ACUTE PAIN

## 2024-11-24 ASSESSMENT — PAIN DESCRIPTION - ONSET: ONSET: ON-GOING

## 2024-11-24 ASSESSMENT — PAIN DESCRIPTION - LOCATION
LOCATION: BACK
LOCATION: FLANK
LOCATION: BACK;FLANK

## 2024-11-24 ASSESSMENT — PAIN - FUNCTIONAL ASSESSMENT: PAIN_FUNCTIONAL_ASSESSMENT: ACTIVITIES ARE NOT PREVENTED

## 2024-11-24 ASSESSMENT — PAIN DESCRIPTION - ORIENTATION: ORIENTATION: RIGHT

## 2024-11-24 NOTE — DISCHARGE INSTRUCTIONS
Thank You!    It was a pleasure taking care of you in our Emergency Department today. We know that when you come to our Emergency Department, you are entrusting us with your health, comfort, and safety. Our physicians and nurses honor that trust, and truly appreciate the opportunity to care for you and your loved ones.      We also value your feedback. If you receive a survey about your Emergency Department experience today, please fill it out.  We care about our patients' feedback, and we listen to what you have to say.  Thank you.    Richard Dewey MD  ________________________________________________________________________  I have included a copy of your lab results and/or radiologic studies from today's visit so you can have them easily available at your follow-up visit. We hope you feel better and please do not hesitate to contact the ED if you have any questions at all!    Recent Results (from the past 12 hour(s))   CBC with Auto Differential    Collection Time: 11/24/24  1:19 PM   Result Value Ref Range    WBC 9.9 3.6 - 11.0 K/uL    RBC 4.03 3.80 - 5.20 M/uL    Hemoglobin 11.9 11.5 - 16.0 g/dL    Hematocrit 37.3 35.0 - 47.0 %    MCV 92.6 80.0 - 99.0 FL    MCH 29.5 26.0 - 34.0 PG    MCHC 31.9 30.0 - 36.5 g/dL    RDW 13.2 11.5 - 14.5 %    Platelets 390 150 - 400 K/uL    MPV 10.1 8.9 - 12.9 FL    Nucleated RBCs 0.0 0  WBC    nRBC 0.00 0.00 - 0.01 K/uL    Neutrophils % 57 32 - 75 %    Lymphocytes % 28 12 - 49 %    Monocytes % 8 5 - 13 %    Eosinophils % 5 0 - 7 %    Basophils % 1 0 - 1 %    Immature Granulocytes % 1 (H) 0.0 - 0.5 %    Neutrophils Absolute 5.8 1.8 - 8.0 K/UL    Lymphocytes Absolute 2.7 0.8 - 3.5 K/UL    Monocytes Absolute 0.8 0.0 - 1.0 K/UL    Eosinophils Absolute 0.5 (H) 0.0 - 0.4 K/UL    Basophils Absolute 0.1 0.0 - 0.1 K/UL    Immature Granulocytes Absolute 0.1 (H) 0.00 - 0.04 K/UL    Differential Type AUTOMATED     Comprehensive Metabolic Panel    Collection Time: 11/24/24  1:19 PM  assistant for ongoing care. If your symptoms become worse or you do not improve as expected and you are unable to reach your usual health care provider, you should return to the Emergency Department. We are available 24 hours a day.    Please take your discharge instructions with you when you go to your follow-up appointment.     If a prescription has been provided, please have it filled as soon as possible to prevent a delay in treatment. Read the entire medication instruction sheet provided to you by the pharmacy. If you have any questions or reservations about taking the medication due to side effects or interactions with other medications, please call your primary care physician or contact the ER to speak with the charge nurse.     Please make an appointment with your family doctor or the physician you were referred to for follow-up of this visit as instructed on your discharge paperwork. Return to the ER if you are unable to be seen or if you are unable to be seen in a timely manner.    Should you experience abdominal pain lasting greater than 6 hours, chest pain, difficulty breathing, fever/chills, numbness/tingling, skin changes or other symptoms that concern you, return to the ED sooner. If you feel worse over the next 24 hours, please return to the ED. We are available 24 hours a day. Thank you for trusting us with your care!

## 2024-11-24 NOTE — ED PROVIDER NOTES
[facility] EMERGENCY DEPT  EMERGENCY DEPARTMENT ENCOUNTER    Patient Name: Monique Goodman  MRN: [id number]  YOB: 1985  Provider: Richard Dewey MD  PCP: Javi Krishnan MD  ***  Time/Date of evaluation: 2:12 PM EST on 11/24/24    History of Presenting Illness     Chief Complaint   Patient presents with    Back Pain     Pt ambulatory into triage with reports of having CKD and chronic dehydration. Pt states she cannot go to normal facility to get fluids d/t fluid shortage so she has to come to the ED for her fluids.      History Provided by: {History Source:88881::\"Patient\"}   History is limited by: {History Limitations:86757::\"Nothing\"}    HISTORY (Narrative):   Monique Goodman is a 38 y.o. female ***      Nursing Notes were all reviewed and agreed with or any disagreements were addressed in the HPI.    Past History     PAST MEDICAL HISTORY:  Past Medical History:   Diagnosis Date    Abnormal CT of the abdomen 11/8/2012    JIGNESH (acute kidney injury) (HCC) 01/24/2024    PT STATES SHE HAS BEEN IN KIDNEY FAILURE DUE TO DIURETICS    Anxiety and depression     Arthritis     Asthma     Autoimmune disease (HCC)     lupus    C. difficile diarrhea 2022    PER PT    Cervical prolapse     Dehydration     Garth-Danlos syndrome     Gastrointestinal disorder     gerd, twisted colon, IBS, PT DENIES IBS    GERD (gastroesophageal reflux disease)     Headache(784.0)     History of blood transfusion 2018    PT STATES SHE HAD A GI BLEED    Hx of blood clots 2023    PT STATES SHE HAD A VERY SMALL BLOOD CLOT IN HER RIGHT LEG    Hypertension     Kidney stone     Lupus     Morbid obesity     MRSA (methicillin resistant staph aureus) culture positive     2020 ARM SURGERY, SEVERAL POSITIVES SINCE THEN PER PT    Murmur     Nausea & vomiting     Neurological disorder     cluster headaches    Occipital neuralgia     Other ill-defined conditions(799.89) 2006, 2009    ovarian cyst removal    Other ill-defined conditions(799.89)

## 2024-12-04 DIAGNOSIS — B37.31 YEAST VAGINITIS: ICD-10-CM

## 2024-12-04 RX ORDER — FLUCONAZOLE 150 MG/1
150 TABLET ORAL
Qty: 2 TABLET | Refills: 0 | Status: SHIPPED | OUTPATIENT
Start: 2024-12-04 | End: 2024-12-10

## 2024-12-11 ENCOUNTER — TELEPHONE (OUTPATIENT)
Age: 39
End: 2024-12-11

## 2024-12-11 NOTE — TELEPHONE ENCOUNTER
Received PA form for zepbound from cover my meds. When checking chart, PCP was listed as Dr. Eulogio Caldwell.  Spoke with patient who confirmed she is seeing Dr. Eulogio Caldwell for her primary care. Will no longer be seeing Dr. Rosado.     Do meds need to be discontinued in chart?

## 2024-12-20 ENCOUNTER — PROCEDURE VISIT (OUTPATIENT)
Age: 39
End: 2024-12-20
Payer: MEDICAID

## 2024-12-20 DIAGNOSIS — R00.0 TACHYCARDIA: Primary | ICD-10-CM

## 2024-12-20 PROCEDURE — 93660 TILT TABLE EVALUATION: CPT | Performed by: PSYCHIATRY & NEUROLOGY

## 2024-12-20 NOTE — PROGRESS NOTES
[facility]  [address], [address]  [place] [address]  Phone: [phone]  FAX: [phone]     Clinical Autonomic Testing Report     Patient ID:  Monique Goodman  [id number]  38 y.o.  1985     REFERRED BY: Melody Blakely MD  PCP: Javi Krishnan MD    Date of Testin2024      Indication/History:    Episodes of sinus tachycardia; (+) Lupus, Garth Danlos, Asthma, Anxiety/depression, HTN, migraines, obesity    Patient is coming for syncope/autonomic dysfunction evaluation.    Medications taken 48 hrs before the test: Xanax, Mirtazapine, Lyrica, Qulipta, Zanaflex, Trileptal, Prilosec     Procedure: Referring provider only requested Head-Up Tilt Table Testing. It is performed by utilizing [employer] [facility] Work Autonomic System, with established protocol.       Result:HUT (head-up tilt) : [id number] BP and HR were measured, up to 15 minutes post tilt.  No significant BP reduction or HR acceleration/deceleration.      Impression:     Baseline sinus tachycardia (-121 bpm), which did not increase more than 30 bpm from baseline.    No orthostatic hypotension noted.         Elvis Judge MD  Diplomate, American Board of Psychiatry and Neurology  Diplomate, Neuromuscular Medicine  Diplomate, American Board of Electrodiagnostic Medicine    Note: Raw Data will be scanned separately in Media

## 2024-12-23 DIAGNOSIS — K21.9 GASTRO-ESOPHAGEAL REFLUX DISEASE WITHOUT ESOPHAGITIS: ICD-10-CM

## 2024-12-23 RX ORDER — OMEPRAZOLE 20 MG/1
CAPSULE, DELAYED RELEASE ORAL
Qty: 90 CAPSULE | Refills: 1 | OUTPATIENT
Start: 2024-12-23

## 2024-12-25 ENCOUNTER — HOSPITAL ENCOUNTER (EMERGENCY)
Facility: HOSPITAL | Age: 39
Discharge: HOME | End: 2024-12-25
Attending: EMERGENCY MEDICINE
Payer: MEDICAID

## 2024-12-25 ENCOUNTER — APPOINTMENT (OUTPATIENT)
Facility: HOSPITAL | Age: 39
End: 2024-12-25
Payer: MEDICAID

## 2024-12-25 VITALS
HEIGHT: 63 IN | SYSTOLIC BLOOD PRESSURE: 126 MMHG | RESPIRATION RATE: 18 BRPM | DIASTOLIC BLOOD PRESSURE: 64 MMHG | TEMPERATURE: 98.6 F | WEIGHT: 250 LBS | BODY MASS INDEX: 44.3 KG/M2 | HEART RATE: 84 BPM | OXYGEN SATURATION: 97 %

## 2024-12-25 DIAGNOSIS — R10.9 FLANK PAIN: Primary | ICD-10-CM

## 2024-12-25 DIAGNOSIS — R10.30 LOWER ABDOMINAL PAIN: ICD-10-CM

## 2024-12-25 DIAGNOSIS — K64.4 EXTERNAL HEMORRHOID, BLEEDING: ICD-10-CM

## 2024-12-25 LAB
ANION GAP SERPL CALC-SCNC: 3 MMOL/L (ref 2–12)
APPEARANCE UR: CLEAR
BACTERIA URNS QL MICRO: NEGATIVE /HPF
BASOPHILS # BLD: 0.1 K/UL (ref 0–0.1)
BASOPHILS NFR BLD: 1 % (ref 0–1)
BILIRUB UR QL: NEGATIVE
BUN SERPL-MCNC: 21 MG/DL (ref 6–20)
BUN/CREAT SERPL: 22 (ref 12–20)
CALCIUM SERPL-MCNC: 9.1 MG/DL (ref 8.5–10.1)
CHLORIDE SERPL-SCNC: 111 MMOL/L (ref 97–108)
CO2 SERPL-SCNC: 24 MMOL/L (ref 21–32)
COLOR UR: ABNORMAL
CREAT SERPL-MCNC: 0.97 MG/DL (ref 0.55–1.02)
DIFFERENTIAL METHOD BLD: ABNORMAL
EOSINOPHIL # BLD: 0.5 K/UL (ref 0–0.4)
EOSINOPHIL NFR BLD: 6 % (ref 0–7)
EPITH CASTS URNS QL MICRO: ABNORMAL /LPF
ERYTHROCYTE [DISTWIDTH] IN BLOOD BY AUTOMATED COUNT: 13.2 % (ref 11.5–14.5)
GLUCOSE SERPL-MCNC: 108 MG/DL (ref 65–100)
GLUCOSE UR STRIP.AUTO-MCNC: NEGATIVE MG/DL
HCT VFR BLD AUTO: 35.5 % (ref 35–47)
HGB BLD-MCNC: 11.4 G/DL (ref 11.5–16)
HGB UR QL STRIP: ABNORMAL
HYALINE CASTS URNS QL MICRO: ABNORMAL /LPF (ref 0–2)
IMM GRANULOCYTES # BLD AUTO: 0.1 K/UL (ref 0–0.04)
IMM GRANULOCYTES NFR BLD AUTO: 1 % (ref 0–0.5)
KETONES UR QL STRIP.AUTO: NEGATIVE MG/DL
LEUKOCYTE ESTERASE UR QL STRIP.AUTO: NEGATIVE
LYMPHOCYTES # BLD: 3 K/UL (ref 0.8–3.5)
LYMPHOCYTES NFR BLD: 31 % (ref 12–49)
MCH RBC QN AUTO: 28.6 PG (ref 26–34)
MCHC RBC AUTO-ENTMCNC: 32.1 G/DL (ref 30–36.5)
MCV RBC AUTO: 89 FL (ref 80–99)
MONOCYTES # BLD: 0.8 K/UL (ref 0–1)
MONOCYTES NFR BLD: 9 % (ref 5–13)
NEUTS SEG # BLD: 5.2 K/UL (ref 1.8–8)
NEUTS SEG NFR BLD: 52 % (ref 32–75)
NITRITE UR QL STRIP.AUTO: NEGATIVE
NRBC # BLD: 0 K/UL (ref 0–0.01)
NRBC BLD-RTO: 0 PER 100 WBC
PH UR STRIP: 6 (ref 5–8)
PLATELET # BLD AUTO: 365 K/UL (ref 150–400)
PMV BLD AUTO: 10.4 FL (ref 8.9–12.9)
POTASSIUM SERPL-SCNC: 5 MMOL/L (ref 3.5–5.1)
PROT UR STRIP-MCNC: NEGATIVE MG/DL
RBC # BLD AUTO: 3.99 M/UL (ref 3.8–5.2)
RBC #/AREA URNS HPF: >100 /HPF (ref 0–5)
SODIUM SERPL-SCNC: 138 MMOL/L (ref 136–145)
SP GR UR REFRACTOMETRY: 1.01
URINE CULTURE IF INDICATED: ABNORMAL
UROBILINOGEN UR QL STRIP.AUTO: 0.2 EU/DL (ref 0.2–1)
WBC # BLD AUTO: 9.7 K/UL (ref 3.6–11)
WBC URNS QL MICRO: ABNORMAL /HPF (ref 0–4)

## 2024-12-25 PROCEDURE — 80048 BASIC METABOLIC PNL TOTAL CA: CPT

## 2024-12-25 PROCEDURE — 6370000000 HC RX 637 (ALT 250 FOR IP): Performed by: EMERGENCY MEDICINE

## 2024-12-25 PROCEDURE — 85025 COMPLETE CBC W/AUTO DIFF WBC: CPT

## 2024-12-25 PROCEDURE — 96375 TX/PRO/DX INJ NEW DRUG ADDON: CPT

## 2024-12-25 PROCEDURE — 96374 THER/PROPH/DIAG INJ IV PUSH: CPT

## 2024-12-25 PROCEDURE — 81001 URINALYSIS AUTO W/SCOPE: CPT

## 2024-12-25 PROCEDURE — 99284 EMERGENCY DEPT VISIT MOD MDM: CPT

## 2024-12-25 PROCEDURE — 36415 COLL VENOUS BLD VENIPUNCTURE: CPT

## 2024-12-25 PROCEDURE — 74176 CT ABD & PELVIS W/O CONTRAST: CPT

## 2024-12-25 PROCEDURE — 6360000002 HC RX W HCPCS: Performed by: EMERGENCY MEDICINE

## 2024-12-25 RX ORDER — LIDOCAINE HYDROCHLORIDE 20 MG/ML
JELLY TOPICAL ONCE
Status: COMPLETED | OUTPATIENT
Start: 2024-12-25 | End: 2024-12-25

## 2024-12-25 RX ORDER — FENTANYL CITRATE 50 UG/ML
50 INJECTION, SOLUTION INTRAMUSCULAR; INTRAVENOUS
Status: COMPLETED | OUTPATIENT
Start: 2024-12-25 | End: 2024-12-25

## 2024-12-25 RX ORDER — MORPHINE SULFATE 4 MG/ML
4 INJECTION, SOLUTION INTRAMUSCULAR; INTRAVENOUS
Status: COMPLETED | OUTPATIENT
Start: 2024-12-25 | End: 2024-12-25

## 2024-12-25 RX ORDER — DICYCLOMINE HCL 20 MG
20 TABLET ORAL 4 TIMES DAILY PRN
Qty: 20 TABLET | Refills: 0 | Status: SHIPPED | OUTPATIENT
Start: 2024-12-25

## 2024-12-25 RX ORDER — ONDANSETRON 2 MG/ML
4 INJECTION INTRAMUSCULAR; INTRAVENOUS ONCE
Status: COMPLETED | OUTPATIENT
Start: 2024-12-25 | End: 2024-12-25

## 2024-12-25 RX ORDER — METHOCARBAMOL 750 MG/1
750 TABLET, FILM COATED ORAL 4 TIMES DAILY PRN
Qty: 20 TABLET | Refills: 0 | Status: SHIPPED | OUTPATIENT
Start: 2024-12-25 | End: 2025-01-04

## 2024-12-25 RX ADMIN — LIDOCAINE HYDROCHLORIDE: 20 JELLY TOPICAL at 14:56

## 2024-12-25 RX ADMIN — ONDANSETRON HYDROCHLORIDE 4 MG: 2 SOLUTION INTRAMUSCULAR; INTRAVENOUS at 14:56

## 2024-12-25 RX ADMIN — MORPHINE SULFATE 4 MG: 4 INJECTION, SOLUTION INTRAMUSCULAR; INTRAVENOUS at 16:07

## 2024-12-25 RX ADMIN — FENTANYL CITRATE 50 MCG: 50 INJECTION INTRAMUSCULAR; INTRAVENOUS at 14:56

## 2024-12-25 ASSESSMENT — PAIN DESCRIPTION - LOCATION
LOCATION: RECTUM
LOCATION: RECTUM;FLANK

## 2024-12-25 ASSESSMENT — PAIN DESCRIPTION - ONSET: ONSET: ON-GOING

## 2024-12-25 ASSESSMENT — PAIN DESCRIPTION - FREQUENCY: FREQUENCY: CONTINUOUS

## 2024-12-25 ASSESSMENT — PAIN DESCRIPTION - ORIENTATION: ORIENTATION: LEFT;RIGHT

## 2024-12-25 ASSESSMENT — PAIN SCALES - GENERAL
PAINLEVEL_OUTOF10: 6
PAINLEVEL_OUTOF10: 8
PAINLEVEL_OUTOF10: 8

## 2024-12-25 NOTE — ED PROVIDER NOTES
abdomen 11/8/2012    JIGNESH (acute kidney injury) (HCC) 01/24/2024    PT STATES SHE HAS BEEN IN KIDNEY FAILURE DUE TO DIURETICS    Anxiety and depression     Arthritis     Asthma     Autoimmune disease (HCC)     lupus    C. difficile diarrhea 2022    PER PT    Cervical prolapse     Dehydration     Garth-Danlos syndrome     Gastrointestinal disorder     gerd, twisted colon, IBS, PT DENIES IBS    GERD (gastroesophageal reflux disease)     Headache(784.0)     History of blood transfusion 2018    PT STATES SHE HAD A GI BLEED    Hx of blood clots 2023    PT STATES SHE HAD A VERY SMALL BLOOD CLOT IN HER RIGHT LEG    Hypertension     Kidney stone     Lupus     Morbid obesity     MRSA (methicillin resistant staph aureus) culture positive     2020 ARM SURGERY, SEVERAL POSITIVES SINCE THEN PER PT    Murmur     Nausea & vomiting     Neurological disorder     cluster headaches    Occipital neuralgia     Other ill-defined conditions(799.89) 2006, 2009    ovarian cyst removal    Other ill-defined conditions(799.89)     PONV (postoperative nausea and vomiting)     Rectal prolapse     Sepsis (HCC) 2020    FROM MRSA IN ARM PER PT    Supernumerary kidney     Syncope     Worsening headaches        Past Surgical History:  Past Surgical History:   Procedure Laterality Date    CHOLECYSTECTOMY      COLONOSCOPY  09/21/2010    AT LEAST 3 PER PT    CYST INCISION AND DRAINAGE Bilateral 02/27/2020    3 RIGHT ARM, 2 LEFT ARM    CYST INCISION AND DRAINAGE  02/21/2023    Incision and drainage of left upper arm abscess    CYSTOSCOPY N/A 7/24/2024    CYSTOSCOPY WITH BILATERAL RETROGRADES performed by Juan Goodman MD at [facility] MAIN OR    EGD TRANSORAL BIOPSY SINGLE/MULTIPLE  09/22/2010    AT LEAST 5 PER PT    GYN  2006    right ovarian tumor removed    GYN  01/2009    right salpingo oopherectomy    HEENT      left wisdom teeth removal    HEENT      top right wisdom tooth removed    HERNIA REPAIR  02/28/2013    Laparoscopic recurrent incisional

## 2024-12-26 ENCOUNTER — TELEPHONE (OUTPATIENT)
Age: 39
End: 2024-12-26

## 2025-01-12 ENCOUNTER — HOSPITAL ENCOUNTER (EMERGENCY)
Facility: HOSPITAL | Age: 40
Discharge: HOME | End: 2025-01-12
Payer: MEDICAID

## 2025-01-12 ENCOUNTER — APPOINTMENT (OUTPATIENT)
Facility: HOSPITAL | Age: 40
End: 2025-01-12
Payer: MEDICAID

## 2025-01-12 VITALS
BODY MASS INDEX: 49.22 KG/M2 | SYSTOLIC BLOOD PRESSURE: 119 MMHG | HEART RATE: 78 BPM | OXYGEN SATURATION: 98 % | DIASTOLIC BLOOD PRESSURE: 87 MMHG | RESPIRATION RATE: 20 BRPM | HEIGHT: 63 IN | TEMPERATURE: 97.5 F | WEIGHT: 277.78 LBS

## 2025-01-12 DIAGNOSIS — E86.0 DEHYDRATION: Primary | ICD-10-CM

## 2025-01-12 LAB
ALBUMIN SERPL-MCNC: 4.3 G/DL (ref 3.5–5)
ALBUMIN/GLOB SERPL: 1.3 (ref 1.1–2.2)
ALP SERPL-CCNC: 170 U/L (ref 45–117)
ALT SERPL-CCNC: 17 U/L (ref 12–78)
ANION GAP SERPL CALC-SCNC: 6 MMOL/L (ref 2–12)
APPEARANCE UR: CLEAR
AST SERPL-CCNC: 13 U/L (ref 15–37)
BACTERIA URNS QL MICRO: NEGATIVE /HPF
BASOPHILS # BLD: 0.04 K/UL (ref 0–0.1)
BASOPHILS NFR BLD: 0.4 % (ref 0–1)
BILIRUB SERPL-MCNC: 0.1 MG/DL (ref 0.2–1)
BILIRUB UR QL: NEGATIVE
BUN SERPL-MCNC: 19 MG/DL (ref 6–20)
BUN/CREAT SERPL: 15 (ref 12–20)
CALCIUM SERPL-MCNC: 9.4 MG/DL (ref 8.5–10.1)
CHLORIDE SERPL-SCNC: 109 MMOL/L (ref 97–108)
CO2 SERPL-SCNC: 24 MMOL/L (ref 21–32)
COLOR UR: ABNORMAL
CREAT SERPL-MCNC: 1.27 MG/DL (ref 0.55–1.02)
DIFFERENTIAL METHOD BLD: ABNORMAL
EOSINOPHIL # BLD: 0.53 K/UL (ref 0–0.4)
EOSINOPHIL NFR BLD: 5.7 % (ref 0–7)
EPITH CASTS URNS QL MICRO: ABNORMAL /LPF
ERYTHROCYTE [DISTWIDTH] IN BLOOD BY AUTOMATED COUNT: 13.9 % (ref 11.5–14.5)
FLUAV RNA SPEC QL NAA+PROBE: NOT DETECTED
FLUBV RNA SPEC QL NAA+PROBE: NOT DETECTED
GLOBULIN SER CALC-MCNC: 3.2 G/DL (ref 2–4)
GLUCOSE SERPL-MCNC: 88 MG/DL (ref 65–100)
GLUCOSE UR STRIP.AUTO-MCNC: NEGATIVE MG/DL
HCT VFR BLD AUTO: 37.8 % (ref 35–47)
HGB BLD-MCNC: 12 G/DL (ref 11.5–16)
HGB UR QL STRIP: ABNORMAL
HYALINE CASTS URNS QL MICRO: ABNORMAL /LPF (ref 0–2)
IMM GRANULOCYTES # BLD AUTO: 0.04 K/UL (ref 0–0.04)
IMM GRANULOCYTES NFR BLD AUTO: 0.4 % (ref 0–0.5)
KETONES UR QL STRIP.AUTO: NEGATIVE MG/DL
LEUKOCYTE ESTERASE UR QL STRIP.AUTO: NEGATIVE
LYMPHOCYTES # BLD: 3.69 K/UL (ref 0.8–3.5)
LYMPHOCYTES NFR BLD: 40 % (ref 12–49)
MCH RBC QN AUTO: 28.4 PG (ref 26–34)
MCHC RBC AUTO-ENTMCNC: 31.7 G/DL (ref 30–36.5)
MCV RBC AUTO: 89.4 FL (ref 80–99)
MONOCYTES # BLD: 0.82 K/UL (ref 0–1)
MONOCYTES NFR BLD: 8.9 % (ref 5–13)
NEUTS SEG # BLD: 4.1 K/UL (ref 1.8–8)
NEUTS SEG NFR BLD: 44.6 % (ref 32–75)
NITRITE UR QL STRIP.AUTO: NEGATIVE
NRBC # BLD: 0 K/UL (ref 0–0.01)
NRBC BLD-RTO: 0 PER 100 WBC
PH UR STRIP: 6 (ref 5–8)
PLATELET # BLD AUTO: 359 K/UL (ref 150–400)
PMV BLD AUTO: 9.8 FL (ref 8.9–12.9)
POTASSIUM SERPL-SCNC: 4.8 MMOL/L (ref 3.5–5.1)
PROT SERPL-MCNC: 7.5 G/DL (ref 6.4–8.2)
PROT UR STRIP-MCNC: ABNORMAL MG/DL
RBC # BLD AUTO: 4.23 M/UL (ref 3.8–5.2)
RBC #/AREA URNS HPF: >100 /HPF (ref 0–5)
SARS-COV-2 RNA RESP QL NAA+PROBE: NOT DETECTED
SODIUM SERPL-SCNC: 139 MMOL/L (ref 136–145)
SOURCE: NORMAL
SP GR UR REFRACTOMETRY: 1.01
TROPONIN I SERPL HS-MCNC: <4 NG/L (ref 0–51)
URINE CULTURE IF INDICATED: ABNORMAL
UROBILINOGEN UR QL STRIP.AUTO: 0.2 EU/DL (ref 0.2–1)
WBC # BLD AUTO: 9.2 K/UL (ref 3.6–11)
WBC URNS QL MICRO: ABNORMAL /HPF (ref 0–4)

## 2025-01-12 PROCEDURE — 71046 X-RAY EXAM CHEST 2 VIEWS: CPT

## 2025-01-12 PROCEDURE — 87636 SARSCOV2 & INF A&B AMP PRB: CPT

## 2025-01-12 PROCEDURE — 36415 COLL VENOUS BLD VENIPUNCTURE: CPT

## 2025-01-12 PROCEDURE — 99285 EMERGENCY DEPT VISIT HI MDM: CPT

## 2025-01-12 PROCEDURE — 96361 HYDRATE IV INFUSION ADD-ON: CPT

## 2025-01-12 PROCEDURE — 96376 TX/PRO/DX INJ SAME DRUG ADON: CPT

## 2025-01-12 PROCEDURE — 80053 COMPREHEN METABOLIC PANEL: CPT

## 2025-01-12 PROCEDURE — 96375 TX/PRO/DX INJ NEW DRUG ADDON: CPT

## 2025-01-12 PROCEDURE — 96374 THER/PROPH/DIAG INJ IV PUSH: CPT

## 2025-01-12 PROCEDURE — 84484 ASSAY OF TROPONIN QUANT: CPT

## 2025-01-12 PROCEDURE — 85025 COMPLETE CBC W/AUTO DIFF WBC: CPT

## 2025-01-12 PROCEDURE — 93005 ELECTROCARDIOGRAM TRACING: CPT

## 2025-01-12 PROCEDURE — 2580000003 HC RX 258

## 2025-01-12 PROCEDURE — 81001 URINALYSIS AUTO W/SCOPE: CPT

## 2025-01-12 PROCEDURE — 6360000002 HC RX W HCPCS

## 2025-01-12 RX ORDER — HYDROMORPHONE HYDROCHLORIDE 1 MG/ML
0.5 INJECTION, SOLUTION INTRAMUSCULAR; INTRAVENOUS; SUBCUTANEOUS
Status: COMPLETED | OUTPATIENT
Start: 2025-01-12 | End: 2025-01-12

## 2025-01-12 RX ORDER — HYDROMORPHONE HYDROCHLORIDE 1 MG/ML
1 INJECTION, SOLUTION INTRAMUSCULAR; INTRAVENOUS; SUBCUTANEOUS
Status: COMPLETED | OUTPATIENT
Start: 2025-01-12 | End: 2025-01-12

## 2025-01-12 RX ORDER — DIAZEPAM 10 MG/2ML
5 INJECTION, SOLUTION INTRAMUSCULAR; INTRAVENOUS EVERY 4 HOURS PRN
Status: DISCONTINUED | OUTPATIENT
Start: 2025-01-12 | End: 2025-01-12 | Stop reason: HOSPADM

## 2025-01-12 RX ORDER — ONDANSETRON 2 MG/ML
4 INJECTION INTRAMUSCULAR; INTRAVENOUS ONCE
Status: COMPLETED | OUTPATIENT
Start: 2025-01-12 | End: 2025-01-12

## 2025-01-12 RX ORDER — 0.9 % SODIUM CHLORIDE 0.9 %
500 INTRAVENOUS SOLUTION INTRAVENOUS ONCE
Status: COMPLETED | OUTPATIENT
Start: 2025-01-12 | End: 2025-01-12

## 2025-01-12 RX ADMIN — HYDROMORPHONE HYDROCHLORIDE 0.5 MG: 1 INJECTION, SOLUTION INTRAMUSCULAR; INTRAVENOUS; SUBCUTANEOUS at 18:47

## 2025-01-12 RX ADMIN — ONDANSETRON 4 MG: 2 INJECTION INTRAMUSCULAR; INTRAVENOUS at 16:08

## 2025-01-12 RX ADMIN — HYDROMORPHONE HYDROCHLORIDE 1 MG: 1 INJECTION, SOLUTION INTRAMUSCULAR; INTRAVENOUS; SUBCUTANEOUS at 16:08

## 2025-01-12 RX ADMIN — SODIUM CHLORIDE 500 ML: 9 INJECTION, SOLUTION INTRAVENOUS at 17:14

## 2025-01-12 RX ADMIN — DIAZEPAM 5 MG: 5 INJECTION, SOLUTION INTRAMUSCULAR; INTRAVENOUS at 16:08

## 2025-01-12 RX ADMIN — SODIUM CHLORIDE 500 ML: 9 INJECTION, SOLUTION INTRAVENOUS at 18:23

## 2025-01-12 ASSESSMENT — PAIN SCALES - GENERAL
PAINLEVEL_OUTOF10: 8

## 2025-01-12 NOTE — ED PROVIDER NOTES
(L) 15 - 37 U/L    Alk Phosphatase 170 (H) 45 - 117 U/L    Total Protein 7.5 6.4 - 8.2 g/dL    Albumin 4.3 3.5 - 5.0 g/dL    Globulin 3.2 2.0 - 4.0 g/dL    Albumin/Globulin Ratio 1.3 1.1 - 2.2     Troponin    Collection Time: 01/12/25  4:04 PM   Result Value Ref Range    Troponin, High Sensitivity <4 0 - 51 ng/L   COVID-19 & Influenza Combo    Collection Time: 01/12/25  4:13 PM    Specimen: Nasopharyngeal   Result Value Ref Range    Source Nasopharyngeal      SARS-CoV-2, PCR Not detected NOTD      Rapid Influenza A By PCR Not detected NOTD      Rapid Influenza B By PCR Not detected NOTD     Urinalysis with Reflex to Culture    Collection Time: 01/12/25  5:02 PM    Specimen: Urine   Result Value Ref Range    Color, UA YELLOW/STRAW      Appearance CLEAR CLEAR      Specific Gravity, UA 1.006      pH, Urine 6.0 5.0 - 8.0      Protein, UA TRACE (A) NEG mg/dL    Glucose, Ur Negative NEG mg/dL    Ketones, Urine Negative NEG mg/dL    Bilirubin, Urine Negative NEG      Blood, Urine LARGE (A) NEG      Urobilinogen, Urine 0.2 0.2 - 1.0 EU/dL    Nitrite, Urine Negative NEG      Leukocyte Esterase, Urine Negative NEG      WBC, UA 0-4 0 - 4 /hpf    RBC, UA >100 (H) 0 - 5 /hpf    Epithelial Cells, UA FEW FEW /lpf    BACTERIA, URINE Negative NEG /hpf    Urine Culture if Indicated CULTURE NOT INDICATED BY UA RESULT CNI      Hyaline Casts, UA 0-2 0 - 2 /lpf       RADIOLOGY:  Non-plain film images such as CT, Ultrasound and MRI are read by the radiologist. Plain radiographic images are visualized and preliminarily interpreted by myself with the below findings:          Interpretation per the Radiologist below, if available at the time of this note:     XR CHEST (2 VW)    Result Date: 1/12/2025  EXAM: XR CHEST (2 VW) INDICATION:  SOB COMPARISON: 10/31/2024 TECHNIQUE: PA and lateral chest views FINDINGS: The cardiac size is within normal limits. The pulmonary vasculature is within normal limits. The lungs and pleural spaces are clear.

## 2025-01-13 LAB
EKG ATRIAL RATE: 74 BPM
EKG DIAGNOSIS: NORMAL
EKG P AXIS: 44 DEGREES
EKG P-R INTERVAL: 188 MS
EKG Q-T INTERVAL: 362 MS
EKG QRS DURATION: 88 MS
EKG QTC CALCULATION (BAZETT): 401 MS
EKG R AXIS: 73 DEGREES
EKG T AXIS: 30 DEGREES
EKG VENTRICULAR RATE: 74 BPM

## 2025-01-13 NOTE — TELEPHONE ENCOUNTER
This needs to come from neurology, and she is asking too soon regardless. You can access the FollowMyHealth Patient Portal offered by [facility] by registering at the following website: [email or web address]. By joining [employer]’s FollowMyHealth portal, you will also be able to view your health information using other applications (apps) compatible with our system.

## 2025-01-31 ENCOUNTER — TRANSCRIBE ORDERS (OUTPATIENT)
Facility: HOSPITAL | Age: 40
End: 2025-01-31

## 2025-01-31 DIAGNOSIS — Z12.31 ENCOUNTER FOR SCREENING MAMMOGRAM FOR MALIGNANT NEOPLASM OF BREAST: Primary | ICD-10-CM

## 2025-02-14 ENCOUNTER — HOSPITAL ENCOUNTER (EMERGENCY)
Facility: HOSPITAL | Age: 40
Discharge: HOME | End: 2025-02-14
Payer: MEDICAID

## 2025-02-14 ENCOUNTER — APPOINTMENT (OUTPATIENT)
Facility: HOSPITAL | Age: 40
End: 2025-02-14
Payer: MEDICAID

## 2025-02-14 VITALS
WEIGHT: 278.66 LBS | OXYGEN SATURATION: 96 % | BODY MASS INDEX: 49.38 KG/M2 | HEART RATE: 70 BPM | TEMPERATURE: 98.4 F | DIASTOLIC BLOOD PRESSURE: 65 MMHG | RESPIRATION RATE: 20 BRPM | SYSTOLIC BLOOD PRESSURE: 121 MMHG | HEIGHT: 63 IN

## 2025-02-14 DIAGNOSIS — R11.2 NAUSEA AND VOMITING, UNSPECIFIED VOMITING TYPE: ICD-10-CM

## 2025-02-14 DIAGNOSIS — R10.31 RIGHT LOWER QUADRANT ABDOMINAL PAIN: Primary | ICD-10-CM

## 2025-02-14 LAB
ALBUMIN SERPL-MCNC: 3.6 G/DL (ref 3.5–5)
ALBUMIN/GLOB SERPL: 1.1 (ref 1.1–2.2)
ALP SERPL-CCNC: 156 U/L (ref 45–117)
ALT SERPL-CCNC: 16 U/L (ref 12–78)
ANION GAP SERPL CALC-SCNC: 5 MMOL/L (ref 2–12)
APPEARANCE UR: CLEAR
AST SERPL-CCNC: 47 U/L (ref 15–37)
BACTERIA URNS QL MICRO: ABNORMAL /HPF
BASOPHILS # BLD: 0.03 K/UL (ref 0–0.1)
BASOPHILS NFR BLD: 0.4 % (ref 0–1)
BILIRUB SERPL-MCNC: 0.4 MG/DL (ref 0.2–1)
BILIRUB UR QL: NEGATIVE
BUN SERPL-MCNC: 15 MG/DL (ref 6–20)
BUN/CREAT SERPL: 14 (ref 12–20)
CALCIUM SERPL-MCNC: 8.9 MG/DL (ref 8.5–10.1)
CHLORIDE SERPL-SCNC: 112 MMOL/L (ref 97–108)
CO2 SERPL-SCNC: 22 MMOL/L (ref 21–32)
COLOR UR: ABNORMAL
CREAT SERPL-MCNC: 1.04 MG/DL (ref 0.55–1.02)
DIFFERENTIAL METHOD BLD: ABNORMAL
EOSINOPHIL # BLD: 0.57 K/UL (ref 0–0.4)
EOSINOPHIL NFR BLD: 7.7 % (ref 0–7)
EPITH CASTS URNS QL MICRO: ABNORMAL /LPF
ERYTHROCYTE [DISTWIDTH] IN BLOOD BY AUTOMATED COUNT: 14.6 % (ref 11.5–14.5)
FLUAV RNA SPEC QL NAA+PROBE: NOT DETECTED
FLUBV RNA SPEC QL NAA+PROBE: NOT DETECTED
GLOBULIN SER CALC-MCNC: 3.2 G/DL (ref 2–4)
GLUCOSE SERPL-MCNC: 131 MG/DL (ref 65–100)
GLUCOSE UR STRIP.AUTO-MCNC: NEGATIVE MG/DL
HCT VFR BLD AUTO: 36 % (ref 35–47)
HGB BLD-MCNC: 11.6 G/DL (ref 11.5–16)
HGB UR QL STRIP: ABNORMAL
HYALINE CASTS URNS QL MICRO: ABNORMAL /LPF (ref 0–2)
IMM GRANULOCYTES # BLD AUTO: 0.02 K/UL (ref 0–0.04)
IMM GRANULOCYTES NFR BLD AUTO: 0.3 % (ref 0–0.5)
KETONES UR QL STRIP.AUTO: NEGATIVE MG/DL
LEUKOCYTE ESTERASE UR QL STRIP.AUTO: ABNORMAL
LIPASE SERPL-CCNC: 25 U/L (ref 13–75)
LYMPHOCYTES # BLD: 2.17 K/UL (ref 0.8–3.5)
LYMPHOCYTES NFR BLD: 29.5 % (ref 12–49)
MCH RBC QN AUTO: 28.8 PG (ref 26–34)
MCHC RBC AUTO-ENTMCNC: 32.2 G/DL (ref 30–36.5)
MCV RBC AUTO: 89.3 FL (ref 80–99)
MONOCYTES # BLD: 0.59 K/UL (ref 0–1)
MONOCYTES NFR BLD: 8 % (ref 5–13)
NEUTS SEG # BLD: 3.98 K/UL (ref 1.8–8)
NEUTS SEG NFR BLD: 54.1 % (ref 32–75)
NITRITE UR QL STRIP.AUTO: NEGATIVE
NRBC # BLD: 0 K/UL (ref 0–0.01)
NRBC BLD-RTO: 0 PER 100 WBC
PH UR STRIP: 6 (ref 5–8)
PLATELET # BLD AUTO: 294 K/UL (ref 150–400)
POTASSIUM SERPL-SCNC: 5.3 MMOL/L (ref 3.5–5.1)
PROT SERPL-MCNC: 6.8 G/DL (ref 6.4–8.2)
PROT UR STRIP-MCNC: NEGATIVE MG/DL
RBC # BLD AUTO: 4.03 M/UL (ref 3.8–5.2)
RBC #/AREA URNS HPF: >100 /HPF (ref 0–5)
SARS-COV-2 RNA RESP QL NAA+PROBE: NOT DETECTED
SODIUM SERPL-SCNC: 139 MMOL/L (ref 136–145)
SOURCE: NORMAL
SP GR UR REFRACTOMETRY: 1.01
URINE CULTURE IF INDICATED: ABNORMAL
UROBILINOGEN UR QL STRIP.AUTO: 0.2 EU/DL (ref 0.2–1)
WBC # BLD AUTO: 7.4 K/UL (ref 3.6–11)
WBC URNS QL MICRO: ABNORMAL /HPF (ref 0–4)

## 2025-02-14 PROCEDURE — 93005 ELECTROCARDIOGRAM TRACING: CPT

## 2025-02-14 PROCEDURE — 6360000002 HC RX W HCPCS

## 2025-02-14 PROCEDURE — 2580000003 HC RX 258

## 2025-02-14 PROCEDURE — 85025 COMPLETE CBC W/AUTO DIFF WBC: CPT

## 2025-02-14 PROCEDURE — 80053 COMPREHEN METABOLIC PANEL: CPT

## 2025-02-14 PROCEDURE — 36415 COLL VENOUS BLD VENIPUNCTURE: CPT

## 2025-02-14 PROCEDURE — 87636 SARSCOV2 & INF A&B AMP PRB: CPT

## 2025-02-14 PROCEDURE — 96374 THER/PROPH/DIAG INJ IV PUSH: CPT

## 2025-02-14 PROCEDURE — 81001 URINALYSIS AUTO W/SCOPE: CPT

## 2025-02-14 PROCEDURE — 6370000000 HC RX 637 (ALT 250 FOR IP)

## 2025-02-14 PROCEDURE — 74176 CT ABD & PELVIS W/O CONTRAST: CPT

## 2025-02-14 PROCEDURE — 96375 TX/PRO/DX INJ NEW DRUG ADDON: CPT

## 2025-02-14 PROCEDURE — 99284 EMERGENCY DEPT VISIT MOD MDM: CPT

## 2025-02-14 PROCEDURE — 83690 ASSAY OF LIPASE: CPT

## 2025-02-14 PROCEDURE — 96361 HYDRATE IV INFUSION ADD-ON: CPT

## 2025-02-14 RX ORDER — OXYCODONE HYDROCHLORIDE 5 MG/1
5 TABLET ORAL
Status: COMPLETED | OUTPATIENT
Start: 2025-02-14 | End: 2025-02-14

## 2025-02-14 RX ORDER — ONDANSETRON 2 MG/ML
4 INJECTION INTRAMUSCULAR; INTRAVENOUS ONCE
Status: COMPLETED | OUTPATIENT
Start: 2025-02-14 | End: 2025-02-14

## 2025-02-14 RX ORDER — ONDANSETRON 4 MG/1
4 TABLET, ORALLY DISINTEGRATING ORAL ONCE
Status: COMPLETED | OUTPATIENT
Start: 2025-02-14 | End: 2025-02-14

## 2025-02-14 RX ORDER — 0.9 % SODIUM CHLORIDE 0.9 %
1000 INTRAVENOUS SOLUTION INTRAVENOUS ONCE
Status: COMPLETED | OUTPATIENT
Start: 2025-02-14 | End: 2025-02-14

## 2025-02-14 RX ORDER — DICYCLOMINE HYDROCHLORIDE 10 MG/1
10 CAPSULE ORAL
Qty: 120 CAPSULE | Refills: 0 | Status: SHIPPED | OUTPATIENT
Start: 2025-02-14

## 2025-02-14 RX ORDER — HYDROMORPHONE HYDROCHLORIDE 1 MG/ML
0.5 INJECTION, SOLUTION INTRAMUSCULAR; INTRAVENOUS; SUBCUTANEOUS
Status: COMPLETED | OUTPATIENT
Start: 2025-02-14 | End: 2025-02-14

## 2025-02-14 RX ORDER — ONDANSETRON 4 MG/1
4 TABLET, FILM COATED ORAL 3 TIMES DAILY PRN
Qty: 15 TABLET | Refills: 0 | Status: SHIPPED | OUTPATIENT
Start: 2025-02-14

## 2025-02-14 RX ADMIN — SODIUM CHLORIDE 1000 ML: 0.9 INJECTION, SOLUTION INTRAVENOUS at 16:47

## 2025-02-14 RX ADMIN — ONDANSETRON 4 MG: 2 INJECTION, SOLUTION INTRAMUSCULAR; INTRAVENOUS at 16:47

## 2025-02-14 RX ADMIN — OXYCODONE 5 MG: 5 TABLET ORAL at 18:35

## 2025-02-14 RX ADMIN — HYDROMORPHONE HYDROCHLORIDE 0.5 MG: 1 INJECTION, SOLUTION INTRAMUSCULAR; INTRAVENOUS; SUBCUTANEOUS at 16:47

## 2025-02-14 RX ADMIN — ONDANSETRON 4 MG: 4 TABLET, ORALLY DISINTEGRATING ORAL at 18:35

## 2025-02-14 ASSESSMENT — PAIN DESCRIPTION - ONSET: ONSET: ON-GOING

## 2025-02-14 ASSESSMENT — PAIN DESCRIPTION - ORIENTATION
ORIENTATION: LOWER
ORIENTATION: LOWER

## 2025-02-14 ASSESSMENT — PAIN DESCRIPTION - PAIN TYPE: TYPE: ACUTE PAIN

## 2025-02-14 ASSESSMENT — PAIN SCALES - GENERAL
PAINLEVEL_OUTOF10: 7
PAINLEVEL_OUTOF10: 7

## 2025-02-14 ASSESSMENT — PAIN - FUNCTIONAL ASSESSMENT
PAIN_FUNCTIONAL_ASSESSMENT: 0-10
PAIN_FUNCTIONAL_ASSESSMENT: PREVENTS OR INTERFERES SOME ACTIVE ACTIVITIES AND ADLS

## 2025-02-14 ASSESSMENT — PAIN DESCRIPTION - LOCATION
LOCATION: ABDOMEN
LOCATION: ABDOMEN

## 2025-02-14 ASSESSMENT — PAIN DESCRIPTION - FREQUENCY: FREQUENCY: INTERMITTENT

## 2025-02-14 ASSESSMENT — PAIN DESCRIPTION - DESCRIPTORS
DESCRIPTORS: ACHING
DESCRIPTORS: ACHING

## 2025-02-14 NOTE — DISCHARGE INSTRUCTIONS
Time: 02/14/25  4:12 PM   Result Value Ref Range    Sodium 139 136 - 145 mmol/L    Potassium 5.3 (H) 3.5 - 5.1 mmol/L    Chloride 112 (H) 97 - 108 mmol/L    CO2 22 21 - 32 mmol/L    Anion Gap 5 2 - 12 mmol/L    Glucose 131 (H) 65 - 100 mg/dL    BUN 15 6 - 20 MG/DL    Creatinine 1.04 (H) 0.55 - 1.02 MG/DL    BUN/Creatinine Ratio 14 12 - 20      Est, Glom Filt Rate 69 >60 ml/min/1.73m2    Calcium 8.9 8.5 - 10.1 MG/DL    Total Bilirubin 0.4 0.2 - 1.0 MG/DL    ALT 16 12 - 78 U/L    AST 47 (H) 15 - 37 U/L    Alk Phosphatase 156 (H) 45 - 117 U/L    Total Protein 6.8 6.4 - 8.2 g/dL    Albumin 3.6 3.5 - 5.0 g/dL    Globulin 3.2 2.0 - 4.0 g/dL    Albumin/Globulin Ratio 1.1 1.1 - 2.2     Lipase    Collection Time: 02/14/25  4:12 PM   Result Value Ref Range    Lipase 25 13 - 75 U/L   COVID-19 & Influenza Combo    Collection Time: 02/14/25  4:12 PM    Specimen: Nasopharyngeal   Result Value Ref Range    Source Nasopharyngeal      SARS-CoV-2, PCR Not detected NOTD      Rapid Influenza A By PCR Not detected NOTD      Rapid Influenza B By PCR Not detected NOTD     EKG 12 Lead (Chest Pain)    Collection Time: 02/14/25  4:23 PM   Result Value Ref Range    Ventricular Rate 71 BPM    Atrial Rate 71 BPM    P-R Interval 182 ms    QRS Duration 88 ms    Q-T Interval 364 ms    QTc Calculation (Bazett) 395 ms    P Axis 35 degrees    R Axis 53 degrees    T Axis 24 degrees    Diagnosis       Normal sinus rhythm  Normal ECG  When compared with ECG of 12-JAN-2025 15:39,  No significant change was found     Urinalysis with Reflex to Culture    Collection Time: 02/14/25  5:29 PM    Specimen: Urine   Result Value Ref Range    Color, UA YELLOW/STRAW      Appearance CLEAR CLEAR      Specific Gravity, UA 1.014      pH, Urine 6.0 5.0 - 8.0      Protein, UA Negative NEG mg/dL    Glucose, Ur Negative NEG mg/dL    Ketones, Urine Negative NEG mg/dL    Bilirubin, Urine Negative NEG      Blood, Urine LARGE (A) NEG      Urobilinogen, Urine 0.2 0.2 - 1.0  EU/dL    Nitrite, Urine Negative NEG      Leukocyte Esterase, Urine TRACE (A) NEG      WBC, UA 0-4 0 - 4 /hpf    RBC, UA >100 (H) 0 - 5 /hpf    Epithelial Cells, UA MANY (A) FEW /lpf    BACTERIA, URINE 1+ (A) NEG /hpf    Urine Culture if Indicated CULTURE NOT INDICATED BY UA RESULT      Hyaline Casts, UA 0-2 0 - 2 /lpf     CT ABDOMEN PELVIS WO CONTRAST Additional Contrast? None   Final Result   No acute intraperitoneal process is identified.          Incidental and/or nonemergent findings are as described in detail above.         Electronically signed by RICARDO ALVA          The exam and treatment you received in the Emergency Department were for an urgent problem and are not intended as complete care. It is important that you follow up with a doctor, nurse practitioner, or physician assistant for ongoing care. If your symptoms become worse or you do not improve as expected and you are unable to reach your usual health care provider, you should return to the Emergency Department. We are available 24 hours a day.    Please take your discharge instructions with you when you go to your follow-up appointment.     If a prescription has been provided, please have it filled as soon as possible to prevent a delay in treatment. Read the entire medication instruction sheet provided to you by the pharmacy. If you have any questions or reservations about taking the medication due to side effects or interactions with other medications, please call your primary care physician or contact the ER to speak with the charge nurse.     Please make an appointment with your family doctor or the physician you were referred to for follow-up of this visit as instructed on your discharge paperwork. Return to the ER if you are unable to be seen or if you are unable to be seen in a timely manner.    Should you experience abdominal pain lasting greater than 6 hours, chest pain, difficulty breathing, fever/chills, numbness/tingling, skin changes

## 2025-02-14 NOTE — ED PROVIDER NOTES
[facility] EMERGENCY DEPARTMENT  EMERGENCY DEPARTMENT ENCOUNTER    Patient Name: Monique Goodman  MRN: [id number]  YOB: 1985  Provider: Richard Dewey MD  PCP: Javi Krishnan MD  Time/Date of evaluation:  2/14/25    History of Presenting Illness     Chief Complaint   Patient presents with    Abdominal Pain     Pt ambulatory into TR. Pt has been taking her rx tramadol for her RLQ abd pain. Pt seen in our ED frequently for kidney disease complaints. Pt states her DBP at home has \"been in the 40's\" . Pt states she still has her appendix.        HISTORY (Narrative):   Monique Goodman is a 39 y.o. female presents with severe pain in the right lower quadrant that has been ongoing for 2 days. The pain radiates to the back. The patient reports taking tramadol at home without relief. Associated symptoms include nausea, vomiting (onset yesterday, multiple episodes), and diarrhea (>20 episodes). The patient also reports fever, with a maximum temperature of 102°F at home. The patient denies any similarity to previous kidney stone experiences.    The patient has a history of lupus and chronic kidney disease, with multiple hospitalizations for kidney failure in the past year, most recently in October. The patient is unable to take NSAIDs due to kidney issues and can only use acetaminophen for pain relief.    Medical History  - Lupus  - Chronic kidney disease  - History of kidney failure (hospitalized 6 times last year, most recently in October)  - History of kidney stones    Current and Past Medications and Supplements  - Tramadol  - Tylenol    Social History  - Living situation: stays at home as much as possible    Review of Systems  - General: Fever (102°F)  - Gastrointestinal: Severe pain in lower right side, nausea, vomiting, diarrhea (>20 times)  - Genitourinary: Pain radiating to back      Nursing Notes were all reviewed and agreed with or any disagreements were addressed in the HPI.    Past    02/14/25 1510 (!) 140/85 98.4 °F (36.9 °C) 84 18 97 % -- --   02/14/25 1509 -- -- -- -- -- 1.6 m (5' 3\") 126.4 kg (278 lb 10.6 oz)       ED COURSE/Records Reviewed with summary (prior medical records and Nursing notes)  Nursing Notes, Old Medical Records, Previous EKGs, Previous Radiology Studies, and Previous Laboratory Studies    ED Course as of 02/14/25 1852 Fri Feb 14, 2025 1852 Discharge Note:  The patient has been re-evaluated and is ready for discharge. Reviewed available results with patient. Counseled patient on diagnosis and care plan. Patient has expressed understanding, and all questions have been answered. Patient agrees with plan and agrees to follow up as recommended, or to return to the ED if their symptoms worsen. Discharge instructions have been provided and explained to the patient, along with reasons to return to the ED.      PLAN:  FU with PCP,   Return to ED if worse    [ZD]      ED Course User Index  [ZD] Richard Dewey MD       Patient was given the following medications:    Medications   HYDROmorphone HCl PF (DILAUDID) injection 0.5 mg (0.5 mg IntraVENous Given 2/14/25 1647)   sodium chloride 0.9 % bolus 1,000 mL (0 mLs IntraVENous Stopped 2/14/25 1835)   ondansetron (ZOFRAN) injection 4 mg (4 mg IntraVENous Given 2/14/25 1647)   oxyCODONE (ROXICODONE) immediate release tablet 5 mg (5 mg Oral Given 2/14/25 1835)   ondansetron (ZOFRAN-ODT) disintegrating tablet 4 mg (4 mg Oral Given 2/14/25 1835)     CONSULTS:    None    Social Determinants affecting Diagnosis/Treatment: None    DISCUSSION:  Acute right lower quadrant abdominal pain  Patient presents with 2-day history of severe right lower quadrant abdominal pain, associated with nausea, vomiting, and diarrhea. Patient reports fever up to 102°F at home. Given the location of pain and presence of fever, there is concern for acute appendicitis. However, differential diagnosis includes gastroenteritis (including norovirus) and

## 2025-02-16 LAB
EKG ATRIAL RATE: 71 BPM
EKG DIAGNOSIS: NORMAL
EKG P AXIS: 35 DEGREES
EKG P-R INTERVAL: 182 MS
EKG Q-T INTERVAL: 364 MS
EKG QRS DURATION: 88 MS
EKG QTC CALCULATION (BAZETT): 395 MS
EKG R AXIS: 53 DEGREES
EKG T AXIS: 24 DEGREES
EKG VENTRICULAR RATE: 71 BPM

## 2025-03-07 ENCOUNTER — HOSPITAL ENCOUNTER (EMERGENCY)
Facility: HOSPITAL | Age: 40
Discharge: HOME | DRG: 052 | End: 2025-03-07
Payer: MEDICAID

## 2025-03-07 ENCOUNTER — APPOINTMENT (OUTPATIENT)
Facility: HOSPITAL | Age: 40
DRG: 052 | End: 2025-03-07
Payer: MEDICAID

## 2025-03-07 VITALS
TEMPERATURE: 98.2 F | HEIGHT: 63 IN | SYSTOLIC BLOOD PRESSURE: 107 MMHG | RESPIRATION RATE: 18 BRPM | BODY MASS INDEX: 50.27 KG/M2 | WEIGHT: 283.73 LBS | DIASTOLIC BLOOD PRESSURE: 52 MMHG | HEART RATE: 74 BPM | OXYGEN SATURATION: 99 %

## 2025-03-07 DIAGNOSIS — N23 RENAL COLIC: Primary | ICD-10-CM

## 2025-03-07 DIAGNOSIS — E86.0 DEHYDRATION: ICD-10-CM

## 2025-03-07 DIAGNOSIS — R11.2 NAUSEA AND VOMITING, UNSPECIFIED VOMITING TYPE: ICD-10-CM

## 2025-03-07 LAB
ALBUMIN SERPL-MCNC: 3.6 G/DL (ref 3.5–5)
ALBUMIN/GLOB SERPL: 1 (ref 1.1–2.2)
ALP SERPL-CCNC: 172 U/L (ref 45–117)
ALT SERPL-CCNC: 22 U/L (ref 12–78)
ANION GAP SERPL CALC-SCNC: 5 MMOL/L (ref 2–12)
APPEARANCE UR: CLEAR
AST SERPL-CCNC: 17 U/L (ref 15–37)
BACTERIA URNS QL MICRO: NEGATIVE /HPF
BASOPHILS # BLD: 0.05 K/UL (ref 0–0.1)
BASOPHILS NFR BLD: 0.4 % (ref 0–1)
BILIRUB SERPL-MCNC: 0.2 MG/DL (ref 0.2–1)
BILIRUB UR QL: NEGATIVE
BUN SERPL-MCNC: 21 MG/DL (ref 6–20)
BUN/CREAT SERPL: 15 (ref 12–20)
CALCIUM SERPL-MCNC: 8.8 MG/DL (ref 8.5–10.1)
CHLORIDE SERPL-SCNC: 111 MMOL/L (ref 97–108)
CO2 SERPL-SCNC: 21 MMOL/L (ref 21–32)
COLOR UR: ABNORMAL
CREAT SERPL-MCNC: 1.36 MG/DL (ref 0.55–1.02)
DIFFERENTIAL METHOD BLD: ABNORMAL
EOSINOPHIL # BLD: 0.83 K/UL (ref 0–0.4)
EOSINOPHIL NFR BLD: 6.6 % (ref 0–7)
EPITH CASTS URNS QL MICRO: ABNORMAL /LPF
ERYTHROCYTE [DISTWIDTH] IN BLOOD BY AUTOMATED COUNT: 15.2 % (ref 11.5–14.5)
GLOBULIN SER CALC-MCNC: 3.6 G/DL (ref 2–4)
GLUCOSE SERPL-MCNC: 108 MG/DL (ref 65–100)
GLUCOSE UR STRIP.AUTO-MCNC: NEGATIVE MG/DL
HCT VFR BLD AUTO: 36.7 % (ref 35–47)
HGB BLD-MCNC: 11.6 G/DL (ref 11.5–16)
HGB UR QL STRIP: ABNORMAL
HYALINE CASTS URNS QL MICRO: ABNORMAL /LPF (ref 0–2)
IMM GRANULOCYTES # BLD AUTO: 0.06 K/UL (ref 0–0.04)
IMM GRANULOCYTES NFR BLD AUTO: 0.5 % (ref 0–0.5)
KETONES UR QL STRIP.AUTO: NEGATIVE MG/DL
LEUKOCYTE ESTERASE UR QL STRIP.AUTO: NEGATIVE
LIPASE SERPL-CCNC: 28 U/L (ref 13–75)
LYMPHOCYTES # BLD: 3.56 K/UL (ref 0.8–3.5)
LYMPHOCYTES NFR BLD: 28.2 % (ref 12–49)
MCH RBC QN AUTO: 28.7 PG (ref 26–34)
MCHC RBC AUTO-ENTMCNC: 31.6 G/DL (ref 30–36.5)
MCV RBC AUTO: 90.8 FL (ref 80–99)
MONOCYTES # BLD: 1.1 K/UL (ref 0–1)
MONOCYTES NFR BLD: 8.7 % (ref 5–13)
NEUTS SEG # BLD: 7.02 K/UL (ref 1.8–8)
NEUTS SEG NFR BLD: 55.6 % (ref 32–75)
NITRITE UR QL STRIP.AUTO: NEGATIVE
NRBC # BLD: 0 K/UL (ref 0–0.01)
NRBC BLD-RTO: 0 PER 100 WBC
PH UR STRIP: 6 (ref 5–8)
PLATELET # BLD AUTO: 351 K/UL (ref 150–400)
PMV BLD AUTO: 10 FL (ref 8.9–12.9)
POTASSIUM SERPL-SCNC: 4.4 MMOL/L (ref 3.5–5.1)
PROT SERPL-MCNC: 7.2 G/DL (ref 6.4–8.2)
PROT UR STRIP-MCNC: 30 MG/DL
RBC # BLD AUTO: 4.04 M/UL (ref 3.8–5.2)
RBC #/AREA URNS HPF: >100 /HPF (ref 0–5)
SODIUM SERPL-SCNC: 137 MMOL/L (ref 136–145)
SP GR UR REFRACTOMETRY: 1.02
URINE CULTURE IF INDICATED: ABNORMAL
UROBILINOGEN UR QL STRIP.AUTO: 0.2 EU/DL (ref 0.2–1)
WBC # BLD AUTO: 12.6 K/UL (ref 3.6–11)
WBC URNS QL MICRO: ABNORMAL /HPF (ref 0–4)

## 2025-03-07 PROCEDURE — 36415 COLL VENOUS BLD VENIPUNCTURE: CPT

## 2025-03-07 PROCEDURE — 96375 TX/PRO/DX INJ NEW DRUG ADDON: CPT

## 2025-03-07 PROCEDURE — 99284 EMERGENCY DEPT VISIT MOD MDM: CPT

## 2025-03-07 PROCEDURE — 80053 COMPREHEN METABOLIC PANEL: CPT

## 2025-03-07 PROCEDURE — 83690 ASSAY OF LIPASE: CPT

## 2025-03-07 PROCEDURE — 74176 CT ABD & PELVIS W/O CONTRAST: CPT

## 2025-03-07 PROCEDURE — 6360000002 HC RX W HCPCS

## 2025-03-07 PROCEDURE — 2580000003 HC RX 258

## 2025-03-07 PROCEDURE — 81001 URINALYSIS AUTO W/SCOPE: CPT

## 2025-03-07 PROCEDURE — 96374 THER/PROPH/DIAG INJ IV PUSH: CPT

## 2025-03-07 PROCEDURE — 96361 HYDRATE IV INFUSION ADD-ON: CPT

## 2025-03-07 PROCEDURE — 85025 COMPLETE CBC W/AUTO DIFF WBC: CPT

## 2025-03-07 RX ORDER — 0.9 % SODIUM CHLORIDE 0.9 %
500 INTRAVENOUS SOLUTION INTRAVENOUS ONCE
Status: COMPLETED | OUTPATIENT
Start: 2025-03-07 | End: 2025-03-07

## 2025-03-07 RX ORDER — ONDANSETRON 4 MG/1
4 TABLET, ORALLY DISINTEGRATING ORAL 3 TIMES DAILY PRN
Qty: 21 TABLET | Refills: 0 | Status: SHIPPED | OUTPATIENT
Start: 2025-03-07

## 2025-03-07 RX ORDER — MORPHINE SULFATE 4 MG/ML
4 INJECTION, SOLUTION INTRAMUSCULAR; INTRAVENOUS
Status: COMPLETED | OUTPATIENT
Start: 2025-03-07 | End: 2025-03-07

## 2025-03-07 RX ORDER — ONDANSETRON 2 MG/ML
4 INJECTION INTRAMUSCULAR; INTRAVENOUS
Status: COMPLETED | OUTPATIENT
Start: 2025-03-07 | End: 2025-03-07

## 2025-03-07 RX ORDER — HYDROMORPHONE HYDROCHLORIDE 1 MG/ML
0.25 INJECTION, SOLUTION INTRAMUSCULAR; INTRAVENOUS; SUBCUTANEOUS ONCE
Status: COMPLETED | OUTPATIENT
Start: 2025-03-07 | End: 2025-03-07

## 2025-03-07 RX ADMIN — HYDROMORPHONE HYDROCHLORIDE 0.25 MG: 1 INJECTION, SOLUTION INTRAMUSCULAR; INTRAVENOUS; SUBCUTANEOUS at 10:44

## 2025-03-07 RX ADMIN — ONDANSETRON 4 MG: 2 INJECTION, SOLUTION INTRAMUSCULAR; INTRAVENOUS at 09:05

## 2025-03-07 RX ADMIN — SODIUM CHLORIDE 500 ML: 0.9 INJECTION, SOLUTION INTRAVENOUS at 09:13

## 2025-03-07 RX ADMIN — SODIUM CHLORIDE 500 ML: 0.9 INJECTION, SOLUTION INTRAVENOUS at 09:44

## 2025-03-07 RX ADMIN — MORPHINE SULFATE 4 MG: 4 INJECTION, SOLUTION INTRAMUSCULAR; INTRAVENOUS at 09:06

## 2025-03-07 ASSESSMENT — PAIN SCALES - GENERAL
PAINLEVEL_OUTOF10: 6
PAINLEVEL_OUTOF10: 9
PAINLEVEL_OUTOF10: 8
PAINLEVEL_OUTOF10: 9

## 2025-03-07 ASSESSMENT — PAIN DESCRIPTION - DESCRIPTORS
DESCRIPTORS: ACHING
DESCRIPTORS: SHARP;STABBING

## 2025-03-07 ASSESSMENT — PAIN DESCRIPTION - ORIENTATION
ORIENTATION: RIGHT;LEFT;LOWER
ORIENTATION: LEFT;RIGHT

## 2025-03-07 ASSESSMENT — PAIN DESCRIPTION - LOCATION
LOCATION: FLANK
LOCATION: BACK
LOCATION: ABDOMEN;BACK

## 2025-03-07 ASSESSMENT — PAIN DESCRIPTION - PAIN TYPE
TYPE: ACUTE PAIN
TYPE: ACUTE PAIN

## 2025-03-07 ASSESSMENT — PAIN DESCRIPTION - FREQUENCY: FREQUENCY: CONTINUOUS

## 2025-03-07 NOTE — ED PROVIDER NOTES
Cervical prolapse     Dehydration     Garth-Danlos syndrome     Gastrointestinal disorder     gerd, twisted colon, IBS, PT DENIES IBS    GERD (gastroesophageal reflux disease)     Headache(784.0)     History of blood transfusion 2018    PT STATES SHE HAD A GI BLEED    Hx of blood clots 2023    PT STATES SHE HAD A VERY SMALL BLOOD CLOT IN HER RIGHT LEG    Hypertension     Kidney stone     Lupus     Morbid obesity     MRSA (methicillin resistant staph aureus) culture positive     2020 ARM SURGERY, SEVERAL POSITIVES SINCE THEN PER PT    Murmur     Nausea & vomiting     Neurological disorder     cluster headaches    Occipital neuralgia     Other ill-defined conditions(799.89) 2006, 2009    ovarian cyst removal    Other ill-defined conditions(799.89)     PONV (postoperative nausea and vomiting)     Rectal prolapse     Sepsis (HCC) 2020    FROM MRSA IN ARM PER PT    Supernumerary kidney     Syncope     Worsening headaches        Past Surgical History:  Past Surgical History:   Procedure Laterality Date    CHOLECYSTECTOMY      COLONOSCOPY  09/21/2010    AT LEAST 3 PER PT    CYST INCISION AND DRAINAGE Bilateral 02/27/2020    3 RIGHT ARM, 2 LEFT ARM    CYST INCISION AND DRAINAGE  02/21/2023    Incision and drainage of left upper arm abscess    CYSTOSCOPY N/A 7/24/2024    CYSTOSCOPY WITH BILATERAL RETROGRADES performed by Juan Goodman MD at [facility] MAIN OR    EGD TRANSORAL BIOPSY SINGLE/MULTIPLE  09/22/2010    AT LEAST 5 PER PT    GYN  2006    right ovarian tumor removed    GYN  01/2009    right salpingo oopherectomy    HEENT      left wisdom teeth removal    HEENT      top right wisdom tooth removed    HERNIA REPAIR  02/28/2013    Laparoscopic recurrent incisional hernia repair    HERNIA REPAIR  04/2012    HYSTERECTOMY (CERVIX STATUS UNKNOWN)      2016    HYSTERECTOMY (CERVIX STATUS UNKNOWN)  2017    MULTIPLE TOOTH EXTRACTIONS      FULL DENTURES    RECTAL PROLAPSE REPAIR      CERVICAL PROLAPSE, PELVIC FLOOR  patient receiving 1 L IV fluid [LK]   0946    IMPRESSION:  1.  There are no acute abnormalities.  2.  Mild bilateral nephrolithiasis. No hydronephrosis.  3.  Hepatomegaly.      [LK]   1018 Patient continued to complain of persistent pain. [LK]   1053 Alkaline Phosphatase(!): 172  At baseline [LK]   1053 WBC(!): 12.6  Possibly gastroenteritis with her vomiting, CT does not show acute process.  Differential with elevated lymphocytes, which is likely viral in nature, patient afebrile here, no tachycardia, do not suspect acute bacterial infection or systemic bacterial infection warranting antibiotics, urine is negative for leuk esterase, bacteria and white blood cells, does not appear to have UTI, will recommend over-the-counter symptomatic supportive care for gastroenteritis symptoms. [LK]   1053 Patient reevaluated, improvement in symptoms after pain medication, and Zofran, patient has not had any vomiting since receiving Zofran, she is tolerating p.o., drinking fluids, has not vomited since receiving zofran.  She did receive pain control and reports improvement in symptoms.  Recommend continue oral hydration at home and recommended close outpatient follow-up with primary care, return precautions were discussed with patient who verbalized understanding and agrees with this plan. [LK]      ED Course User Index  [LK] Brittney Whalen, ANTONIETA           FINAL IMPRESSION     1. Renal colic    2. Dehydration    3. Nausea and vomiting, unspecified vomiting type          DISPOSITION/PLAN   DISPOSITION Decision To Discharge 03/07/2025 10:57:32 AM   DISPOSITION CONDITION Stable         Discharge Note: The patient is stable for discharge home. The signs, symptoms, diagnosis, and discharge instructions have been discussed, understanding conveyed, and agreed upon. The patient is to follow up as recommended or return to ER should their symptoms worsen.      PATIENT REFERRED TO:  Javi Krishnan MD  [address] [facility]

## 2025-03-09 ENCOUNTER — APPOINTMENT (OUTPATIENT)
Facility: HOSPITAL | Age: 40
DRG: 052 | End: 2025-03-09
Payer: MEDICAID

## 2025-03-09 ENCOUNTER — HOSPITAL ENCOUNTER (INPATIENT)
Facility: HOSPITAL | Age: 40
LOS: 1 days | Discharge: LEFT BEFORE DISCHARGE | DRG: 052 | End: 2025-03-09
Attending: STUDENT IN AN ORGANIZED HEALTH CARE EDUCATION/TRAINING PROGRAM | Admitting: STUDENT IN AN ORGANIZED HEALTH CARE EDUCATION/TRAINING PROGRAM
Payer: MEDICAID

## 2025-03-09 VITALS
OXYGEN SATURATION: 94 % | WEIGHT: 285 LBS | TEMPERATURE: 99.3 F | DIASTOLIC BLOOD PRESSURE: 93 MMHG | SYSTOLIC BLOOD PRESSURE: 122 MMHG | RESPIRATION RATE: 18 BRPM | BODY MASS INDEX: 50.49 KG/M2 | HEART RATE: 103 BPM

## 2025-03-09 DIAGNOSIS — F11.90 METHADONE USE: ICD-10-CM

## 2025-03-09 DIAGNOSIS — N17.9 AKI (ACUTE KIDNEY INJURY): Primary | ICD-10-CM

## 2025-03-09 DIAGNOSIS — E86.0 DEHYDRATION: ICD-10-CM

## 2025-03-09 DIAGNOSIS — R41.82 ALTERED MENTAL STATUS, UNSPECIFIED ALTERED MENTAL STATUS TYPE: ICD-10-CM

## 2025-03-09 PROBLEM — G92.8 TOXIC METABOLIC ENCEPHALOPATHY: Status: ACTIVE | Noted: 2025-03-09

## 2025-03-09 LAB
ALBUMIN SERPL-MCNC: 3.5 G/DL (ref 3.5–5)
ALBUMIN SERPL-MCNC: 3.6 G/DL (ref 3.5–5)
ALBUMIN/GLOB SERPL: 0.9 (ref 1.1–2.2)
ALBUMIN/GLOB SERPL: 1 (ref 1.1–2.2)
ALP SERPL-CCNC: 172 U/L (ref 45–117)
ALP SERPL-CCNC: 178 U/L (ref 45–117)
ALT SERPL-CCNC: 35 U/L (ref 12–78)
ALT SERPL-CCNC: 36 U/L (ref 12–78)
AMMONIA PLAS-SCNC: 32 UMOL/L
AMPHET UR QL SCN: NEGATIVE
ANION GAP SERPL CALC-SCNC: 6 MMOL/L (ref 2–12)
ANION GAP SERPL CALC-SCNC: 6 MMOL/L (ref 2–12)
APAP SERPL-MCNC: <2 UG/ML (ref 10–30)
APPEARANCE UR: CLEAR
AST SERPL-CCNC: 111 U/L (ref 15–37)
AST SERPL-CCNC: 112 U/L (ref 15–37)
BACTERIA URNS QL MICRO: NEGATIVE /HPF
BARBITURATES UR QL SCN: POSITIVE
BASOPHILS # BLD: 0.03 K/UL (ref 0–0.1)
BASOPHILS # BLD: 0.04 K/UL (ref 0–0.1)
BASOPHILS NFR BLD: 0.3 % (ref 0–1)
BASOPHILS NFR BLD: 0.3 % (ref 0–1)
BENZODIAZ UR QL: POSITIVE
BILIRUB SERPL-MCNC: 0.3 MG/DL (ref 0.2–1)
BILIRUB SERPL-MCNC: 0.3 MG/DL (ref 0.2–1)
BILIRUB UR QL: NEGATIVE
BUN SERPL-MCNC: 23 MG/DL (ref 6–20)
BUN SERPL-MCNC: 28 MG/DL (ref 6–20)
BUN/CREAT SERPL: 12 (ref 12–20)
BUN/CREAT SERPL: 14 (ref 12–20)
CALCIUM SERPL-MCNC: 8.8 MG/DL (ref 8.5–10.1)
CALCIUM SERPL-MCNC: 8.9 MG/DL (ref 8.5–10.1)
CANNABINOIDS UR QL SCN: NEGATIVE
CHLORIDE SERPL-SCNC: 110 MMOL/L (ref 97–108)
CHLORIDE SERPL-SCNC: 110 MMOL/L (ref 97–108)
CO2 SERPL-SCNC: 20 MMOL/L (ref 21–32)
CO2 SERPL-SCNC: 21 MMOL/L (ref 21–32)
COCAINE UR QL SCN: NEGATIVE
COLOR UR: ABNORMAL
CREAT SERPL-MCNC: 1.68 MG/DL (ref 0.55–1.02)
CREAT SERPL-MCNC: 2.29 MG/DL (ref 0.55–1.02)
DIFFERENTIAL METHOD BLD: ABNORMAL
DIFFERENTIAL METHOD BLD: ABNORMAL
EKG ATRIAL RATE: 91 BPM
EKG DIAGNOSIS: NORMAL
EKG P AXIS: 51 DEGREES
EKG P-R INTERVAL: 162 MS
EKG Q-T INTERVAL: 318 MS
EKG QRS DURATION: 76 MS
EKG QTC CALCULATION (BAZETT): 391 MS
EKG R AXIS: 30 DEGREES
EKG T AXIS: 4 DEGREES
EKG VENTRICULAR RATE: 91 BPM
EOSINOPHIL # BLD: 0.36 K/UL (ref 0–0.4)
EOSINOPHIL # BLD: 0.52 K/UL (ref 0–0.4)
EOSINOPHIL NFR BLD: 3 % (ref 0–7)
EOSINOPHIL NFR BLD: 4.4 % (ref 0–7)
EPITH CASTS URNS QL MICRO: ABNORMAL /LPF
ERYTHROCYTE [DISTWIDTH] IN BLOOD BY AUTOMATED COUNT: 15.5 % (ref 11.5–14.5)
ERYTHROCYTE [DISTWIDTH] IN BLOOD BY AUTOMATED COUNT: 15.9 % (ref 11.5–14.5)
ETHANOL SERPL-MCNC: <10 MG/DL (ref 0–0.08)
GLOBULIN SER CALC-MCNC: 3.5 G/DL (ref 2–4)
GLOBULIN SER CALC-MCNC: 3.8 G/DL (ref 2–4)
GLUCOSE BLD STRIP.AUTO-MCNC: 92 MG/DL (ref 65–117)
GLUCOSE SERPL-MCNC: 105 MG/DL (ref 65–100)
GLUCOSE SERPL-MCNC: 114 MG/DL (ref 65–100)
GLUCOSE UR STRIP.AUTO-MCNC: NEGATIVE MG/DL
HCT VFR BLD AUTO: 34.6 % (ref 35–47)
HCT VFR BLD AUTO: 34.8 % (ref 35–47)
HGB BLD-MCNC: 10.6 G/DL (ref 11.5–16)
HGB BLD-MCNC: 10.7 G/DL (ref 11.5–16)
HGB UR QL STRIP: ABNORMAL
HYALINE CASTS URNS QL MICRO: ABNORMAL /LPF (ref 0–2)
IMM GRANULOCYTES # BLD AUTO: 0.11 K/UL (ref 0–0.04)
IMM GRANULOCYTES # BLD AUTO: 0.11 K/UL (ref 0–0.04)
IMM GRANULOCYTES NFR BLD AUTO: 0.9 % (ref 0–0.5)
IMM GRANULOCYTES NFR BLD AUTO: 0.9 % (ref 0–0.5)
KETONES UR QL STRIP.AUTO: ABNORMAL MG/DL
LEUKOCYTE ESTERASE UR QL STRIP.AUTO: ABNORMAL
LYMPHOCYTES # BLD: 1.79 K/UL (ref 0.8–3.5)
LYMPHOCYTES # BLD: 2.72 K/UL (ref 0.8–3.5)
LYMPHOCYTES NFR BLD: 15.1 % (ref 12–49)
LYMPHOCYTES NFR BLD: 23.1 % (ref 12–49)
Lab: ABNORMAL
MAGNESIUM SERPL-MCNC: 2.5 MG/DL (ref 1.6–2.4)
MCH RBC QN AUTO: 28.6 PG (ref 26–34)
MCH RBC QN AUTO: 29 PG (ref 26–34)
MCHC RBC AUTO-ENTMCNC: 30.6 G/DL (ref 30–36.5)
MCHC RBC AUTO-ENTMCNC: 30.7 G/DL (ref 30–36.5)
MCV RBC AUTO: 93.5 FL (ref 80–99)
MCV RBC AUTO: 94.3 FL (ref 80–99)
METHADONE UR QL: POSITIVE
MONOCYTES # BLD: 1.01 K/UL (ref 0–1)
MONOCYTES # BLD: 1.09 K/UL (ref 0–1)
MONOCYTES NFR BLD: 8.5 % (ref 5–13)
MONOCYTES NFR BLD: 9.3 % (ref 5–13)
NEUTS SEG # BLD: 7.27 K/UL (ref 1.8–8)
NEUTS SEG # BLD: 8.52 K/UL (ref 1.8–8)
NEUTS SEG NFR BLD: 62 % (ref 32–75)
NEUTS SEG NFR BLD: 72.2 % (ref 32–75)
NITRITE UR QL STRIP.AUTO: NEGATIVE
NRBC # BLD: 0 K/UL (ref 0–0.01)
NRBC # BLD: 0 K/UL (ref 0–0.01)
NRBC BLD-RTO: 0 PER 100 WBC
NRBC BLD-RTO: 0 PER 100 WBC
OPIATES UR QL: NEGATIVE
PCP UR QL: NEGATIVE
PH UR STRIP: 6 (ref 5–8)
PLATELET # BLD AUTO: 253 K/UL (ref 150–400)
PLATELET # BLD AUTO: 314 K/UL (ref 150–400)
PMV BLD AUTO: 10 FL (ref 8.9–12.9)
PMV BLD AUTO: 10.3 FL (ref 8.9–12.9)
POTASSIUM SERPL-SCNC: 4.6 MMOL/L (ref 3.5–5.1)
POTASSIUM SERPL-SCNC: 4.6 MMOL/L (ref 3.5–5.1)
PROT SERPL-MCNC: 7.1 G/DL (ref 6.4–8.2)
PROT SERPL-MCNC: 7.3 G/DL (ref 6.4–8.2)
PROT UR STRIP-MCNC: 30 MG/DL
RBC # BLD AUTO: 3.69 M/UL (ref 3.8–5.2)
RBC # BLD AUTO: 3.7 M/UL (ref 3.8–5.2)
RBC #/AREA URNS HPF: ABNORMAL /HPF (ref 0–5)
SALICYLATES SERPL-MCNC: 4.8 MG/DL (ref 2.8–20)
SERVICE CMNT-IMP: NORMAL
SODIUM SERPL-SCNC: 136 MMOL/L (ref 136–145)
SODIUM SERPL-SCNC: 137 MMOL/L (ref 136–145)
SP GR UR REFRACTOMETRY: 1.01
TSH SERPL DL<=0.05 MIU/L-ACNC: 1.58 UIU/ML (ref 0.36–3.74)
URINE CULTURE IF INDICATED: ABNORMAL
UROBILINOGEN UR QL STRIP.AUTO: 0.2 EU/DL (ref 0.2–1)
WBC # BLD AUTO: 11.8 K/UL (ref 3.6–11)
WBC # BLD AUTO: 11.8 K/UL (ref 3.6–11)
WBC URNS QL MICRO: ABNORMAL /HPF (ref 0–4)

## 2025-03-09 PROCEDURE — 2500000003 HC RX 250 WO HCPCS: Performed by: STUDENT IN AN ORGANIZED HEALTH CARE EDUCATION/TRAINING PROGRAM

## 2025-03-09 PROCEDURE — 82140 ASSAY OF AMMONIA: CPT

## 2025-03-09 PROCEDURE — 82962 GLUCOSE BLOOD TEST: CPT

## 2025-03-09 PROCEDURE — 73030 X-RAY EXAM OF SHOULDER: CPT

## 2025-03-09 PROCEDURE — 76770 US EXAM ABDO BACK WALL COMP: CPT

## 2025-03-09 PROCEDURE — 6370000000 HC RX 637 (ALT 250 FOR IP): Performed by: STUDENT IN AN ORGANIZED HEALTH CARE EDUCATION/TRAINING PROGRAM

## 2025-03-09 PROCEDURE — 80143 DRUG ASSAY ACETAMINOPHEN: CPT

## 2025-03-09 PROCEDURE — 96361 HYDRATE IV INFUSION ADD-ON: CPT

## 2025-03-09 PROCEDURE — 99285 EMERGENCY DEPT VISIT HI MDM: CPT

## 2025-03-09 PROCEDURE — 80307 DRUG TEST PRSMV CHEM ANLYZR: CPT

## 2025-03-09 PROCEDURE — 2060000000 HC ICU INTERMEDIATE R&B

## 2025-03-09 PROCEDURE — 6360000002 HC RX W HCPCS: Performed by: INTERNAL MEDICINE

## 2025-03-09 PROCEDURE — 80053 COMPREHEN METABOLIC PANEL: CPT

## 2025-03-09 PROCEDURE — G0378 HOSPITAL OBSERVATION PER HR: HCPCS

## 2025-03-09 PROCEDURE — 2580000003 HC RX 258: Performed by: INTERNAL MEDICINE

## 2025-03-09 PROCEDURE — 70450 CT HEAD/BRAIN W/O DYE: CPT

## 2025-03-09 PROCEDURE — 94640 AIRWAY INHALATION TREATMENT: CPT

## 2025-03-09 PROCEDURE — 93005 ELECTROCARDIOGRAM TRACING: CPT | Performed by: STUDENT IN AN ORGANIZED HEALTH CARE EDUCATION/TRAINING PROGRAM

## 2025-03-09 PROCEDURE — 94761 N-INVAS EAR/PLS OXIMETRY MLT: CPT

## 2025-03-09 PROCEDURE — 82077 ASSAY SPEC XCP UR&BREATH IA: CPT

## 2025-03-09 PROCEDURE — 36415 COLL VENOUS BLD VENIPUNCTURE: CPT

## 2025-03-09 PROCEDURE — 2580000003 HC RX 258: Performed by: STUDENT IN AN ORGANIZED HEALTH CARE EDUCATION/TRAINING PROGRAM

## 2025-03-09 PROCEDURE — 80179 DRUG ASSAY SALICYLATE: CPT

## 2025-03-09 PROCEDURE — 83735 ASSAY OF MAGNESIUM: CPT

## 2025-03-09 PROCEDURE — 2500000003 HC RX 250 WO HCPCS: Performed by: INTERNAL MEDICINE

## 2025-03-09 PROCEDURE — 84443 ASSAY THYROID STIM HORMONE: CPT

## 2025-03-09 PROCEDURE — 96375 TX/PRO/DX INJ NEW DRUG ADDON: CPT

## 2025-03-09 PROCEDURE — 96360 HYDRATION IV INFUSION INIT: CPT

## 2025-03-09 PROCEDURE — 85025 COMPLETE CBC W/AUTO DIFF WBC: CPT

## 2025-03-09 PROCEDURE — 81001 URINALYSIS AUTO W/SCOPE: CPT

## 2025-03-09 PROCEDURE — 6360000002 HC RX W HCPCS: Performed by: STUDENT IN AN ORGANIZED HEALTH CARE EDUCATION/TRAINING PROGRAM

## 2025-03-09 RX ORDER — ONDANSETRON 2 MG/ML
4 INJECTION INTRAMUSCULAR; INTRAVENOUS EVERY 4 HOURS PRN
Status: DISCONTINUED | OUTPATIENT
Start: 2025-03-09 | End: 2025-03-09

## 2025-03-09 RX ORDER — ACETAMINOPHEN 325 MG/1
650 TABLET ORAL EVERY 4 HOURS PRN
Status: DISCONTINUED | OUTPATIENT
Start: 2025-03-09 | End: 2025-03-09

## 2025-03-09 RX ORDER — ENOXAPARIN SODIUM 100 MG/ML
40 INJECTION SUBCUTANEOUS DAILY
Status: DISCONTINUED | OUTPATIENT
Start: 2025-03-09 | End: 2025-03-09 | Stop reason: HOSPADM

## 2025-03-09 RX ORDER — PROPRANOLOL HYDROCHLORIDE 10 MG/1
10 TABLET ORAL 2 TIMES DAILY
Status: DISCONTINUED | OUTPATIENT
Start: 2025-03-09 | End: 2025-03-09 | Stop reason: HOSPADM

## 2025-03-09 RX ORDER — PANTOPRAZOLE SODIUM 40 MG/1
40 TABLET, DELAYED RELEASE ORAL
Status: DISCONTINUED | OUTPATIENT
Start: 2025-03-09 | End: 2025-03-09 | Stop reason: HOSPADM

## 2025-03-09 RX ORDER — 0.9 % SODIUM CHLORIDE 0.9 %
1000 INTRAVENOUS SOLUTION INTRAVENOUS ONCE
Status: COMPLETED | OUTPATIENT
Start: 2025-03-09 | End: 2025-03-09

## 2025-03-09 RX ORDER — ALBUTEROL SULFATE 90 UG/1
1 INHALANT RESPIRATORY (INHALATION) EVERY 6 HOURS PRN
Status: DISCONTINUED | OUTPATIENT
Start: 2025-03-09 | End: 2025-03-09 | Stop reason: CLARIF

## 2025-03-09 RX ORDER — ACETAMINOPHEN 325 MG/1
650 TABLET ORAL EVERY 6 HOURS PRN
Status: DISCONTINUED | OUTPATIENT
Start: 2025-03-09 | End: 2025-03-09 | Stop reason: HOSPADM

## 2025-03-09 RX ORDER — ACETAMINOPHEN 650 MG/1
650 SUPPOSITORY RECTAL EVERY 6 HOURS PRN
Status: DISCONTINUED | OUTPATIENT
Start: 2025-03-09 | End: 2025-03-09 | Stop reason: HOSPADM

## 2025-03-09 RX ORDER — SODIUM CHLORIDE 9 MG/ML
INJECTION, SOLUTION INTRAVENOUS PRN
Status: DISCONTINUED | OUTPATIENT
Start: 2025-03-09 | End: 2025-03-09 | Stop reason: HOSPADM

## 2025-03-09 RX ORDER — ALBUTEROL SULFATE 0.83 MG/ML
2.5 SOLUTION RESPIRATORY (INHALATION) EVERY 6 HOURS PRN
Status: DISCONTINUED | OUTPATIENT
Start: 2025-03-09 | End: 2025-03-09 | Stop reason: HOSPADM

## 2025-03-09 RX ORDER — SODIUM CHLORIDE 0.9 % (FLUSH) 0.9 %
5-40 SYRINGE (ML) INJECTION EVERY 12 HOURS SCHEDULED
Status: DISCONTINUED | OUTPATIENT
Start: 2025-03-09 | End: 2025-03-09 | Stop reason: HOSPADM

## 2025-03-09 RX ORDER — SODIUM CHLORIDE 0.9 % (FLUSH) 0.9 %
5-40 SYRINGE (ML) INJECTION PRN
Status: DISCONTINUED | OUTPATIENT
Start: 2025-03-09 | End: 2025-03-09 | Stop reason: HOSPADM

## 2025-03-09 RX ORDER — SODIUM CHLORIDE 9 MG/ML
INJECTION, SOLUTION INTRAVENOUS CONTINUOUS
Status: DISCONTINUED | OUTPATIENT
Start: 2025-03-09 | End: 2025-03-09 | Stop reason: HOSPADM

## 2025-03-09 RX ORDER — ONDANSETRON 2 MG/ML
4 INJECTION INTRAMUSCULAR; INTRAVENOUS EVERY 6 HOURS PRN
Status: DISCONTINUED | OUTPATIENT
Start: 2025-03-09 | End: 2025-03-09 | Stop reason: HOSPADM

## 2025-03-09 RX ADMIN — SODIUM CHLORIDE 1000 ML: 0.9 INJECTION, SOLUTION INTRAVENOUS at 05:09

## 2025-03-09 RX ADMIN — SODIUM CHLORIDE: 0.9 INJECTION, SOLUTION INTRAVENOUS at 10:17

## 2025-03-09 RX ADMIN — ACETAMINOPHEN 650 MG: 325 TABLET ORAL at 18:10

## 2025-03-09 RX ADMIN — ARFORMOTEROL TARTRATE: 15 SOLUTION RESPIRATORY (INHALATION) at 10:56

## 2025-03-09 RX ADMIN — SODIUM CHLORIDE, PRESERVATIVE FREE 10 ML: 5 INJECTION INTRAVENOUS at 09:40

## 2025-03-09 RX ADMIN — WATER 1000 MG: 1 INJECTION INTRAMUSCULAR; INTRAVENOUS; SUBCUTANEOUS at 09:40

## 2025-03-09 RX ADMIN — PROPRANOLOL HYDROCHLORIDE 10 MG: 10 TABLET ORAL at 09:06

## 2025-03-09 RX ADMIN — ACETAMINOPHEN 650 MG: 325 TABLET ORAL at 09:06

## 2025-03-09 ASSESSMENT — PAIN DESCRIPTION - LOCATION
LOCATION: SHOULDER
LOCATION: SHOULDER

## 2025-03-09 ASSESSMENT — PAIN SCALES - GENERAL
PAINLEVEL_OUTOF10: 10
PAINLEVEL_OUTOF10: 0
PAINLEVEL_OUTOF10: 7

## 2025-03-09 ASSESSMENT — PAIN DESCRIPTION - DESCRIPTORS
DESCRIPTORS: ACHING
DESCRIPTORS: ACHING

## 2025-03-09 ASSESSMENT — PAIN DESCRIPTION - FREQUENCY: FREQUENCY: INTERMITTENT

## 2025-03-09 ASSESSMENT — PAIN DESCRIPTION - ORIENTATION
ORIENTATION: LEFT
ORIENTATION: LEFT

## 2025-03-09 NOTE — PROGRESS NOTES
PATIENT REQUESTING TO LEAVE AGAINST MEDICAL ADVICE (AMA) NOTE:    ENCOUNTER/ DISCUSSION SUMMARY:  The patient has decided to leave against medical advice, because she felt her mother brought her to the hospital against her will.    The risks have been explained to the patient, including worsening illness, worsening renal disease or failure, falls, worsening confusion, chronic pain, permanent disability, and death. I additionally discussed risk of missed diagnoses, incomplete work up, and breakdown in communication transitioning to outpatient care, also that the renal ultrasound has not resulted yet. The benefits of continued admission have also been explained, including the availability and proximity of nurses, physicians, monitoring, diagnostic testing, and treatment.    Patient had the opportunity to ask questions about their medical condition.    The patient was treated to the extent that they would allow, and I explicitly stated may return for care at any time via the emergency department.    Hospital AMA paperwork completed and signed by patient with nurse witness: Yes    I Recommend follow up with Javi Krishnan MD (PCP) and nephrology to ensure continuity of care and further management of current pathology    Decision Making Capacity: Yes    Patient demonstrates understanding of their medical condition  Patient understands risk/benefits and potential consequences of leaving AMA  Patient can coherently communicate their thought process and wishes    Nurse witness: Carol     Discharge summary to be completed by daytime attending physician: Dr Cunha  Update Dr López on plan of care as above and patients leaving AMA    Signed: ADAMARIS Henry NP  [facility] - Internal Medicine Hospitalist/Nocturnist  3/9/2025 7:44 PM

## 2025-03-09 NOTE — ED NOTES
Pt up to bedside commode for 10 minutes, able to void, oncoming RN bedside to transport patient upstairs-

## 2025-03-09 NOTE — ED NOTES
Boyfriend brought the purse to see if we had the list of medications, attempted to find it in her purse and she was not able to find it

## 2025-03-09 NOTE — ED NOTES
Pt up to bedside commode with assistance, pt reports feeling a little better but is still taking about people that are not here and things that didn't happen, mother remains bedside, stretchy underwear provided to patient and lights dimmed, will toilet again in 1.5 hours

## 2025-03-09 NOTE — ED NOTES
Went into room for timed ability to get up to the bedside commode, pt resting well and stated that she wanted to wait another 30 minutes, call bell within reach, both side rails up and family bedside with patient

## 2025-03-09 NOTE — ED NOTES
Renal study bedside completed, boyfriend bedside with patient, reinforced the use of the purewick

## 2025-03-09 NOTE — ED NOTES
Pt was allowed out of the bed by family and monitoring equipment removed, pt up to bedside commode with boyfriend, explained the need to call the RN when the patient becomes agitated and wanting to get out of the bed, still awaiting antibiotic verification- charge nurse made aware

## 2025-03-09 NOTE — ED NOTES
Report given to COTY June. Nurse was informed of reason for arrival, vitals, labs, medications, orders, procedures, results, anything left pending and current plan of action. Questions were asked and received prior to departure from the patient.

## 2025-03-09 NOTE — ED TRIAGE NOTES
Note: Daylight savings taking effect at time of arrival/triage. MD Frankel at bedside when rooming Pt.

## 2025-03-09 NOTE — PROGRESS NOTES
End of Shift Note    Bedside shift change report given to Trista ANSARI  (oncoming nurse) by Lupe Carrizales RN (offgoing nurse).  Report included the following information SBAR and MAR    Shift worked:  Days      Shift summary and any significant changes:     1655 Received report from Roxie ANSARI   1730 patient on floor   0700 During shift change patient became agitated code JOSE called      Concerns for physician to address:       Zone phone for oncoming shift:          Activity:  Level of Assistance: Minimal assist, patient does 75% or more  Number times ambulated in hallways past shift: 0  Number of times OOB to chair past shift: 1    Cardiac:   Cardiac Monitoring: Yes      Cardiac Rhythm: Sinus tachy    Access:  Current line(s): PIV     Genitourinary:   Urinary Status: Voiding    Respiratory:   O2 Device: None (Room air)  Chronic home O2 use?: NO  Incentive spirometer at bedside: NO    GI:     Current diet:  Diet NPO Exceptions are: Sips of Water with Meds  Passing flatus: NO    Pain Management:   Patient states pain is manageable on current regimen: NO    Skin:  Vish Scale Score: 17  Interventions: Wound Offloading (Prevention Methods): Bed, pressure reduction mattress, Pillows, Repositioning    Patient Safety:  Fall Risk: Nursing Judgement-Fall Risk High(Add Comments): Yes  Fall Risk Interventions  Nursing Judgement-Fall Risk High(Add Comments): Yes  Toilet Every 2 Hours-In Advance of Need: Yes  Hourly Visual Checks: Confused, In bed  Fall Visual Posted: Armband, Fall sign posted, Socks  Room Door Open: Yes  Alarm On: Bed, Chair  Patient Moved Closer to Nursing Station: No    Active Consults:   IP CONSULT TO PHARMACY  IP CONSULT TO PHARMACY    Length of Stay:  Expected LOS: 3  Actual LOS: 0    Lupe Carrizales RN

## 2025-03-09 NOTE — ED NOTES
Opened the door to find the patient sitting on the side of the bed with boyfriend in front her holding her right arm bleeding, pt was attempting to get out of the bed to void when she pulled out her iv and iv fluids, urine cleaned up, socks changed, pt back in the bed, call bell within reach, encouraged her to call out to get up and requested that family not put down the railing, pt placed back on the monitor times three, iv fluids discontinued- charge nurse made aware of situation

## 2025-03-09 NOTE — PROGRESS NOTES
Pt seen and examined briefly today in FT 04 room with Mother and Boyfriend at bedside.  Pt more alert but still talking through her head - confused.  Fever of 101 noted and started on IV Rocephin , IVF added  Boyfriend to bring all the med bottles from home and complete med recc with RN/Pharmacy.  Will resume psychotropic meds one by one as needed as planned-- mother and Boyfriend in agreement with the plan.  Pt Hemodynamically stable otherwise  Cr improved at 1.68 now (down from 2.29)  WBC stable at 11.8, awaiting Urine culture results this admission          NON BILLABLE rounding

## 2025-03-09 NOTE — ED NOTES
Boyfriend requesting this RN to room to assess Pt. The boyfriend asked the patient for her mother's phone number, and she replied to us, \"I was told him it's three bands, it's not a regular number like most people. I told him it's on my mind like those three bands on their arms.\" Then the patient provided a phone number that is not her mother's phone number.

## 2025-03-09 NOTE — ED NOTES
Pt took off her oxygen, responded to room, pt wants to get on bedside commode, explained the purewick concept but patient did not understand, with assist times two up to bedside commode, pt is agitated because she is arguing with her mother and requesting that her boyfriend be called, refusing to believe that he had been here all night with her, secured msg received from hospitalist without answering tylenol question, pt able to void and was placed back in bed, mother bedside with patient

## 2025-03-09 NOTE — ED PROVIDER NOTES
Comments: Drowsy, wakes to voice   HENT:      Head: Normocephalic and atraumatic.      Mouth/Throat:      Mouth: Mucous membranes are dry.      Comments: Airway is patent  Eyes:      Conjunctiva/sclera: Conjunctivae normal.      Pupils: Pupils are equal, round, and reactive to light.      Comments: Pupils are 2 mm and sluggishly reactive to light   Cardiovascular:      Rate and Rhythm: Normal rate and regular rhythm.   Pulmonary:      Effort: Pulmonary effort is normal. No respiratory distress.   Abdominal:      Palpations: Abdomen is soft.   Musculoskeletal:         General: No deformity.   Skin:     General: Skin is warm and dry.      Capillary Refill: Capillary refill takes less than 2 seconds.          DIAGNOSTIC RESULTS   LABS:     Recent Results (from the past 24 hours)   POCT Glucose    Collection Time: 03/09/25  1:50 AM   Result Value Ref Range    POC Glucose 92 65 - 117 mg/dL    Performed by: AMIRAH Nguyen EDT    CBC with Auto Differential    Collection Time: 03/09/25  4:07 AM   Result Value Ref Range    WBC 11.8 (H) 3.6 - 11.0 K/uL    RBC 3.69 (L) 3.80 - 5.20 M/uL    Hemoglobin 10.7 (L) 11.5 - 16.0 g/dL    Hematocrit 34.8 (L) 35.0 - 47.0 %    MCV 94.3 80.0 - 99.0 FL    MCH 29.0 26.0 - 34.0 PG    MCHC 30.7 30.0 - 36.5 g/dL    RDW 15.9 (H) 11.5 - 14.5 %    Platelets 314 150 - 400 K/uL    MPV 10.3 8.9 - 12.9 FL    Nucleated RBCs 0.0 0  WBC    nRBC 0.00 0.00 - 0.01 K/uL    Neutrophils % 62.0 32.0 - 75.0 %    Lymphocytes % 23.1 12.0 - 49.0 %    Monocytes % 9.3 5.0 - 13.0 %    Eosinophils % 4.4 0.0 - 7.0 %    Basophils % 0.3 0.0 - 1.0 %    Immature Granulocytes % 0.9 (H) 0.0 - 0.5 %    Neutrophils Absolute 7.27 1.80 - 8.00 K/UL    Lymphocytes Absolute 2.72 0.80 - 3.50 K/UL    Monocytes Absolute 1.09 (H) 0.00 - 1.00 K/UL    Eosinophils Absolute 0.52 (H) 0.00 - 0.40 K/UL    Basophils Absolute 0.04 0.00 - 0.10 K/UL    Immature Granulocytes Absolute 0.11 (H) 0.00 - 0.04 K/UL    Differential Type AUTOMATED

## 2025-03-09 NOTE — PROGRESS NOTES
1910:  entered room to get report.  Pt extremely agitated and wanting to leave.  Quick assessment showed her AOX3.  Family arrived and she became even more agitated.  Code JOSE called, NP notified via perfect serve.      1945:  pt signing out AMA see provider notes.  Handoff was not completed, no full assessment was done and care was not assumed of the patient.  IV's removed and pt signed AMA form.

## 2025-03-09 NOTE — ED NOTES
While this RN ws in the room with patient, patient's o2 dropped to 88% with a pleth. This RN placed patient on 2L 02 via NC.

## 2025-03-09 NOTE — ED TRIAGE NOTES
Wheeled through triage by boyfriend for worsening AMS, dizziness since approx noon yesterday (03/08). Pt unsteady to stretcher with diaphoresis, hand tremors, weakness, and with some difficulty following commands. Boyfriend also reports recent UTI infection currently being treated with antibx. Hx lupus.

## 2025-03-09 NOTE — H&P
Hospitalist Admission Note    NAME:Monique Goodman   : 1985   MRN: [id number]     Date/Time: 3/9/2025 6:39 AM    Patient PCP: Javi Krishnan MD    *Please be aware this note is formulated with assistance from Dragon voice-recognition dictation software. Please excuse any errors that may be present*    ______________________________________________________________________  Given the patient's current clinical presentation, I have a high level of concern for decompensation if discharged from the emergency department.  Complex decision making was performed, which includes reviewing the patient's available past medical records, laboratory results, and x-ray films.       My assessment of this patient's clinical condition and my plan of care is as follows.    Problem List:  Patient Active Problem List   Diagnosis    Cellulitis    Lupus    Constipation    Leg swelling    Recurrent ventral incisional hernia    Seizure (HCC)    Ureteric colic    Intermittent palpitations    Essential hypertension    Ureteric calculus    Anxiety    Ventral hernia    Severe obesity    Migraine with aura and without status migrainosus, not intractable    Abnormal CT of the abdomen    Syncope    Venous insufficiency of left leg    Labile blood pressure    Headache    Chronic headache disorder    Incomplete uterovaginal prolapse    Asthma    JIGNESH (acute kidney injury)    Acute kidney failure    Hypotension    Toxic metabolic encephalopathy         Assessment / Plan:    Acute toxic metabolic encephalopathy, likely medication induced  Presented with somnolence and altered mental status  Has been at bedside.  Noted patient recently started on methadone 4 days ago and she also takes tramadol; initially 10 mg however titrated to 25 mg every 4 days  Also on multiple psychotropic medications likely contributing  -Hold all psychotropic medications including  -Urine drug screen is ordered and pending however patient not yet

## 2025-03-09 NOTE — ED NOTES
Pt attempting to get out of bed to urinate, explained the purewick system and the need for her to remain in bed, pt repositioned , call bell and family bedside

## 2025-03-20 NOTE — PROGRESS NOTES
HISTORY OF PRESENT ILLNESS   Lolis Maguire   is a 42 y.o.  female. Lolis Maguire is a 42 y.o. female evaluated via telephone on 11/7/2023 for Medication Refill (xanax)  . Documentation:  I communicated with the patient and/or health care decision maker about anxiety. Details of this discussion including any medical advice provided: see note    Total Time: minutes: 11-20 minutes    Lolis Maguire was evaluated through a synchronous (real-time) audio encounter. Patient identification was verified at the start of the visit. She (or guardian if applicable) is aware that this is a billable service, which includes applicable co-pays. This visit was conducted with the patient's (and/or legal guardian's) verbal consent. She has not had a related appointment within my department in the past 7 days or scheduled within the next 24 hours. The patient was located at Home: [address] [place] [address]. The provider was located at [facility] ([address] [place] [address]): [facility] [phone] [facility],  [address] [place] [address].     Note: not billable if this call serves to triage the patient into an appointment for the relevant concern    Redd Garrison MD   FU anxiety asthma chronic headaches    ER yesterday for flank pain--dx uti--tx keflex and norco 10 tabs  UA 20-50 rbc wbc but many epithelial cells  CT-negative for acute process  Cx pending    Bun/cr up 25/1.54    Sees Neuro for headaches Dr Brook Thompson neuralgia -insurance will not cover nerve block-Dr Nair-rx norco 7.5 --74 tabs on 10/16    Has nasal narcan at home  Denies any sedation with narcotis and xanax 1 mg qd refilled yesterday  Patient Active Problem List    Diagnosis Date Noted    Asthma 05/17/2022    Leg swelling 03/08/2022    Venous insufficiency of left leg 03/08/2022    Labile blood pressure 09/03/2021    Constipation 08/29/2021    Seizure ([address]) 08/29/2021    Syncope 08/29/2021    Headache 08/29/2021    Chronic headache disorder 08/29/2021 4 = No assist / stand by assistance

## 2025-03-27 ENCOUNTER — HOSPITAL ENCOUNTER (EMERGENCY)
Facility: HOSPITAL | Age: 40
Discharge: HOME | End: 2025-03-27
Attending: EMERGENCY MEDICINE
Payer: MEDICAID

## 2025-03-27 ENCOUNTER — APPOINTMENT (OUTPATIENT)
Facility: HOSPITAL | Age: 40
End: 2025-03-27
Payer: MEDICAID

## 2025-03-27 VITALS
HEART RATE: 84 BPM | WEIGHT: 271.17 LBS | BODY MASS INDEX: 48.03 KG/M2 | TEMPERATURE: 98.2 F | OXYGEN SATURATION: 96 % | SYSTOLIC BLOOD PRESSURE: 113 MMHG | RESPIRATION RATE: 18 BRPM | DIASTOLIC BLOOD PRESSURE: 49 MMHG

## 2025-03-27 DIAGNOSIS — M54.50 ACUTE BILATERAL LOW BACK PAIN WITHOUT SCIATICA: Primary | ICD-10-CM

## 2025-03-27 DIAGNOSIS — R30.0 DYSURIA: ICD-10-CM

## 2025-03-27 LAB
ALBUMIN SERPL-MCNC: 4.2 G/DL (ref 3.5–5)
ALBUMIN/GLOB SERPL: 1.3 (ref 1.1–2.2)
ALP SERPL-CCNC: 150 U/L (ref 45–117)
ALT SERPL-CCNC: 20 U/L (ref 12–78)
ANION GAP SERPL CALC-SCNC: 7 MMOL/L (ref 2–12)
APPEARANCE UR: CLEAR
AST SERPL-CCNC: 11 U/L (ref 15–37)
BACTERIA URNS QL MICRO: NEGATIVE /HPF
BASOPHILS # BLD: 0.08 K/UL (ref 0–0.1)
BASOPHILS NFR BLD: 0.7 % (ref 0–1)
BILIRUB SERPL-MCNC: 0.3 MG/DL (ref 0.2–1)
BILIRUB UR QL: NEGATIVE
BUN SERPL-MCNC: 35 MG/DL (ref 6–20)
BUN/CREAT SERPL: 27 (ref 12–20)
CALCIUM SERPL-MCNC: 9.7 MG/DL (ref 8.5–10.1)
CHLORIDE SERPL-SCNC: 105 MMOL/L (ref 97–108)
CO2 SERPL-SCNC: 26 MMOL/L (ref 21–32)
COLOR UR: NORMAL
COMMENT:: NORMAL
CREAT SERPL-MCNC: 1.29 MG/DL (ref 0.55–1.02)
DIFFERENTIAL METHOD BLD: ABNORMAL
EOSINOPHIL # BLD: 0.6 K/UL (ref 0–0.4)
EOSINOPHIL NFR BLD: 5.2 % (ref 0–7)
EPITH CASTS URNS QL MICRO: NORMAL /LPF
ERYTHROCYTE [DISTWIDTH] IN BLOOD BY AUTOMATED COUNT: 14.7 % (ref 11.5–14.5)
GLOBULIN SER CALC-MCNC: 3.2 G/DL (ref 2–4)
GLUCOSE SERPL-MCNC: 101 MG/DL (ref 65–100)
GLUCOSE UR STRIP.AUTO-MCNC: NEGATIVE MG/DL
HCT VFR BLD AUTO: 36.6 % (ref 35–47)
HGB BLD-MCNC: 11.7 G/DL (ref 11.5–16)
HGB UR QL STRIP: NEGATIVE
IMM GRANULOCYTES # BLD AUTO: 0.06 K/UL (ref 0–0.04)
IMM GRANULOCYTES NFR BLD AUTO: 0.5 % (ref 0–0.5)
KETONES UR QL STRIP.AUTO: NEGATIVE MG/DL
LEUKOCYTE ESTERASE UR QL STRIP.AUTO: NEGATIVE
LYMPHOCYTES # BLD: 3 K/UL (ref 0.8–3.5)
LYMPHOCYTES NFR BLD: 26 % (ref 12–49)
MCH RBC QN AUTO: 28.9 PG (ref 26–34)
MCHC RBC AUTO-ENTMCNC: 32 G/DL (ref 30–36.5)
MCV RBC AUTO: 90.4 FL (ref 80–99)
MONOCYTES # BLD: 0.84 K/UL (ref 0–1)
MONOCYTES NFR BLD: 7.3 % (ref 5–13)
NEUTS SEG # BLD: 6.98 K/UL (ref 1.8–8)
NEUTS SEG NFR BLD: 60.3 % (ref 32–75)
NITRITE UR QL STRIP.AUTO: NEGATIVE
NRBC # BLD: 0 K/UL (ref 0–0.01)
NRBC BLD-RTO: 0 PER 100 WBC
PH UR STRIP: 6 (ref 5–8)
PLATELET # BLD AUTO: 371 K/UL (ref 150–400)
PMV BLD AUTO: 9.8 FL (ref 8.9–12.9)
POTASSIUM SERPL-SCNC: 4.1 MMOL/L (ref 3.5–5.1)
PROT SERPL-MCNC: 7.4 G/DL (ref 6.4–8.2)
PROT UR STRIP-MCNC: NEGATIVE MG/DL
RBC # BLD AUTO: 4.05 M/UL (ref 3.8–5.2)
RBC #/AREA URNS HPF: NORMAL /HPF (ref 0–5)
SODIUM SERPL-SCNC: 138 MMOL/L (ref 136–145)
SP GR UR REFRACTOMETRY: 1.02
SPECIMEN HOLD: NORMAL
URINE CULTURE IF INDICATED: NORMAL
UROBILINOGEN UR QL STRIP.AUTO: 0.2 EU/DL (ref 0.2–1)
WBC # BLD AUTO: 11.6 K/UL (ref 3.6–11)
WBC URNS QL MICRO: NORMAL /HPF (ref 0–4)

## 2025-03-27 PROCEDURE — 99284 EMERGENCY DEPT VISIT MOD MDM: CPT

## 2025-03-27 PROCEDURE — 80053 COMPREHEN METABOLIC PANEL: CPT

## 2025-03-27 PROCEDURE — 96374 THER/PROPH/DIAG INJ IV PUSH: CPT

## 2025-03-27 PROCEDURE — 74176 CT ABD & PELVIS W/O CONTRAST: CPT

## 2025-03-27 PROCEDURE — 96375 TX/PRO/DX INJ NEW DRUG ADDON: CPT

## 2025-03-27 PROCEDURE — 6370000000 HC RX 637 (ALT 250 FOR IP): Performed by: EMERGENCY MEDICINE

## 2025-03-27 PROCEDURE — 36415 COLL VENOUS BLD VENIPUNCTURE: CPT

## 2025-03-27 PROCEDURE — 81001 URINALYSIS AUTO W/SCOPE: CPT

## 2025-03-27 PROCEDURE — 2580000003 HC RX 258: Performed by: EMERGENCY MEDICINE

## 2025-03-27 PROCEDURE — 6360000002 HC RX W HCPCS: Performed by: EMERGENCY MEDICINE

## 2025-03-27 PROCEDURE — 85025 COMPLETE CBC W/AUTO DIFF WBC: CPT

## 2025-03-27 RX ORDER — PHENAZOPYRIDINE HYDROCHLORIDE 100 MG/1
200 TABLET, FILM COATED ORAL ONCE
Status: COMPLETED | OUTPATIENT
Start: 2025-03-27 | End: 2025-03-27

## 2025-03-27 RX ORDER — MORPHINE SULFATE 4 MG/ML
4 INJECTION, SOLUTION INTRAMUSCULAR; INTRAVENOUS ONCE
Refills: 0 | Status: COMPLETED | OUTPATIENT
Start: 2025-03-27 | End: 2025-03-27

## 2025-03-27 RX ORDER — PHENAZOPYRIDINE HYDROCHLORIDE 200 MG/1
200 TABLET, FILM COATED ORAL 3 TIMES DAILY PRN
Qty: 9 TABLET | Refills: 0 | Status: SHIPPED | OUTPATIENT
Start: 2025-03-27 | End: 2025-03-30

## 2025-03-27 RX ORDER — 0.9 % SODIUM CHLORIDE 0.9 %
1000 INTRAVENOUS SOLUTION INTRAVENOUS ONCE
Status: COMPLETED | OUTPATIENT
Start: 2025-03-27 | End: 2025-03-27

## 2025-03-27 RX ORDER — FENTANYL CITRATE 50 UG/ML
50 INJECTION, SOLUTION INTRAMUSCULAR; INTRAVENOUS ONCE
Refills: 0 | Status: COMPLETED | OUTPATIENT
Start: 2025-03-27 | End: 2025-03-27

## 2025-03-27 RX ORDER — ONDANSETRON 2 MG/ML
4 INJECTION INTRAMUSCULAR; INTRAVENOUS
Status: COMPLETED | OUTPATIENT
Start: 2025-03-27 | End: 2025-03-27

## 2025-03-27 RX ADMIN — ONDANSETRON 4 MG: 2 INJECTION, SOLUTION INTRAMUSCULAR; INTRAVENOUS at 09:42

## 2025-03-27 RX ADMIN — SODIUM CHLORIDE 1000 ML: 0.9 INJECTION, SOLUTION INTRAVENOUS at 09:53

## 2025-03-27 RX ADMIN — FENTANYL CITRATE 50 MCG: 50 INJECTION INTRAMUSCULAR; INTRAVENOUS at 09:43

## 2025-03-27 RX ADMIN — PHENAZOPYRIDINE 200 MG: 100 TABLET ORAL at 11:44

## 2025-03-27 RX ADMIN — MORPHINE SULFATE 4 MG: 4 INJECTION, SOLUTION INTRAMUSCULAR; INTRAVENOUS at 11:45

## 2025-03-27 ASSESSMENT — PAIN DESCRIPTION - ORIENTATION: ORIENTATION: LOWER

## 2025-03-27 ASSESSMENT — PAIN - FUNCTIONAL ASSESSMENT: PAIN_FUNCTIONAL_ASSESSMENT: 0-10

## 2025-03-27 ASSESSMENT — PAIN DESCRIPTION - DESCRIPTORS: DESCRIPTORS: ACHING

## 2025-03-27 ASSESSMENT — PAIN DESCRIPTION - LOCATION
LOCATION: BACK

## 2025-03-27 ASSESSMENT — PAIN SCALES - GENERAL
PAINLEVEL_OUTOF10: 8
PAINLEVEL_OUTOF10: 5
PAINLEVEL_OUTOF10: 6

## 2025-03-27 NOTE — ED PROVIDER NOTES
[facility] EMERGENCY DEPARTMENT  EMERGENCY DEPARTMENT ENCOUNTER    Patient Name: Monique Goodman  MRN: [id number]  YOB: 1985  Provider: Paul Lyn MD  PCP: Javi Krishnan MD  Time/Date of evaluation: 8:33 AM EDT on 3/27/25    History of Presenting Illness     Chief Complaint   Patient presents with    Lower Back Pain    Dysuria     Patient ambulatory to triage complaining of lower back pain and dysuria since last week. Patient reports being seen at PCP last week and diagnosed with UTI, has been taking antibiotics with no relief.      History Provided by: Patient   History is limited by: Nothing    HISTORY (Narrative):   Monique Goodman is a 39 y.o. female with a PMHX of lupus, anxiety, depression, Garth-Danlos syndrome, GERD, hypertension, and rectal prolapse  who presents to the emergency department (room 4) by POV C/O dysuria, low back pain, and fevers for the past 7 days.  Patient saw her PCP a week ago and was prescribed doxycycline for a \"nasty urinary tract infection\".  Patient has been taking the medications as prescribed without any improvement.    Nursing Notes were all reviewed and agreed with or any disagreements were addressed in the HPI.    Past History     PAST MEDICAL HISTORY:  Past Medical History:   Diagnosis Date    Abnormal CT of the abdomen 11/8/2012    JIGNESH (acute kidney injury) 01/24/2024    PT STATES SHE HAS BEEN IN KIDNEY FAILURE DUE TO DIURETICS    Anxiety and depression     Arthritis     Asthma     Autoimmune disease     lupus    C. difficile diarrhea 2022    PER PT    Cervical prolapse     Dehydration     Garth-Danlos syndrome     Gastrointestinal disorder     gerd, twisted colon, IBS, PT DENIES IBS    GERD (gastroesophageal reflux disease)     Headache(784.0)     History of blood transfusion 2018    PT STATES SHE HAD A GI BLEED    Hx of blood clots 2023    PT STATES SHE HAD A VERY SMALL BLOOD CLOT IN HER RIGHT LEG    Hypertension     Kidney stone   drug: Atogepant     Symbicort 160-4.5 MCG/ACT Aero  Generic drug: budesonide-formoterol     tiZANidine 4 MG tablet  Commonly known as: ZANAFLEX  TAKE 2 TAB BY MOUTH AT NIGHT AS DIRECTED ORALLY     Wegovy 0.25 MG/0.5ML Soaj SC injection  Generic drug: Semaglutide-Weight Management           * This list has 2 medication(s) that are the same as other medications prescribed for you. Read the directions carefully, and ask your doctor or other care provider to review them with you.                   Where to Get Your Medications        These medications were sent to [facility] #[id number] - [place], [place] - [address] [facility] - P [phone] - F [phone]  [address] [facility] [address]      Hours: 24-hours Phone: [phone]   phenazopyridine 200 MG tablet         DISCONTINUED MEDICATIONS:    Discharge Medication List as of 3/27/2025 11:53 AM          (Please note that parts of this dictation were completed with voice recognition software. Quite often unanticipated grammatical, syntax, homophones, and other interpretive errors are inadvertently transcribed by the computer software. Please disregards these errors. Please excuse any errors that have escaped final proofreading.)    I am the Primary Clinician of Record.   Paul Lyn MD  (Electronically signed)           Paul Lyn MD  03/27/25 3599

## 2025-04-03 ENCOUNTER — HOSPITAL ENCOUNTER (OUTPATIENT)
Facility: HOSPITAL | Age: 40
Discharge: HOME | End: 2025-04-03
Attending: FAMILY MEDICINE
Payer: MEDICAID

## 2025-04-03 VITALS — WEIGHT: 271.17 LBS | HEIGHT: 63 IN | BODY MASS INDEX: 48.05 KG/M2

## 2025-04-03 DIAGNOSIS — Z12.31 ENCOUNTER FOR SCREENING MAMMOGRAM FOR MALIGNANT NEOPLASM OF BREAST: ICD-10-CM

## 2025-04-03 PROBLEM — G90.A POSTURAL ORTHOSTATIC TACHYCARDIA SYNDROME: Status: ACTIVE | Noted: 2025-04-03

## 2025-04-03 PROCEDURE — 77067 SCR MAMMO BI INCL CAD: CPT

## 2025-04-03 PROCEDURE — 77063 BREAST TOMOSYNTHESIS BI: CPT

## 2025-04-03 RX ORDER — 0.9 % SODIUM CHLORIDE 0.9 %
1000 INTRAVENOUS SOLUTION INTRAVENOUS ONCE
Status: CANCELLED
Start: 2025-04-08

## 2025-04-03 RX ORDER — 0.9 % SODIUM CHLORIDE 0.9 %
1000 INTRAVENOUS SOLUTION INTRAVENOUS ONCE
Status: CANCELLED | OUTPATIENT
Start: 2025-04-08 | End: 2025-04-08

## 2025-04-07 RX ORDER — 0.9 % SODIUM CHLORIDE 0.9 %
1500 INTRAVENOUS SOLUTION INTRAVENOUS ONCE
Status: CANCELLED | OUTPATIENT
Start: 2025-04-08 | End: 2025-04-08

## 2025-04-07 RX ORDER — 0.9 % SODIUM CHLORIDE 0.9 %
1500 INTRAVENOUS SOLUTION INTRAVENOUS ONCE
Start: 2025-04-08 | End: 2025-04-08

## 2025-04-07 RX ORDER — 0.9 % SODIUM CHLORIDE 0.9 %
1500 INTRAVENOUS SOLUTION INTRAVENOUS ONCE
Status: CANCELLED
Start: 2025-04-08 | End: 2025-04-08

## 2025-04-08 ENCOUNTER — HOSPITAL ENCOUNTER (OUTPATIENT)
Facility: HOSPITAL | Age: 40
Setting detail: INFUSION SERIES
Discharge: HOME | End: 2025-04-08
Payer: MEDICAID

## 2025-04-08 VITALS
WEIGHT: 271.7 LBS | DIASTOLIC BLOOD PRESSURE: 83 MMHG | RESPIRATION RATE: 15 BRPM | HEIGHT: 63 IN | SYSTOLIC BLOOD PRESSURE: 135 MMHG | HEART RATE: 72 BPM | BODY MASS INDEX: 48.14 KG/M2 | TEMPERATURE: 99 F | OXYGEN SATURATION: 95 %

## 2025-04-08 DIAGNOSIS — G90.A POSTURAL ORTHOSTATIC TACHYCARDIA SYNDROME: Primary | ICD-10-CM

## 2025-04-08 PROCEDURE — 96360 HYDRATION IV INFUSION INIT: CPT

## 2025-04-08 PROCEDURE — 2580000003 HC RX 258: Performed by: PHYSICIAN ASSISTANT

## 2025-04-08 RX ORDER — 0.9 % SODIUM CHLORIDE 0.9 %
1500 INTRAVENOUS SOLUTION INTRAVENOUS ONCE
Status: COMPLETED | OUTPATIENT
Start: 2025-04-08 | End: 2025-04-08

## 2025-04-08 RX ORDER — 0.9 % SODIUM CHLORIDE 0.9 %
1500 INTRAVENOUS SOLUTION INTRAVENOUS ONCE
OUTPATIENT
Start: 2025-04-11 | End: 2025-04-11

## 2025-04-08 RX ORDER — 0.9 % SODIUM CHLORIDE 0.9 %
1500 INTRAVENOUS SOLUTION INTRAVENOUS ONCE
Start: 2025-04-11 | End: 2025-04-11

## 2025-04-08 RX ADMIN — SODIUM CHLORIDE 1500 ML: 0.9 INJECTION, SOLUTION INTRAVENOUS at 09:46

## 2025-04-08 ASSESSMENT — PAIN DESCRIPTION - LOCATION: LOCATION: BACK

## 2025-04-08 ASSESSMENT — PAIN SCALES - GENERAL: PAINLEVEL_OUTOF10: 5

## 2025-04-08 NOTE — PROGRESS NOTES
Name: Monique Goodman    MRN: [id number]         : 1985    Ms. Goodman Arrived ambulatory and in no distress for Hydration 1.5 L.  Assessment was completed. 24 G PIV established to left AC by Crystal ANSARI. Patient is a hardstick.     No labs ordered.     Ms. Goodman's vitals were reviewed.  Patient Vitals for the past 24 hrs:   BP Temp Temp src Pulse Resp SpO2 Height Weight   25 0930 135/83 99 °F (37.2 °C) Temporal 72 15 95 % 1.6 m (5' 2.99\") 123.2 kg (271 lb 11.2 oz)     Medications:  Medications Administered         sodium chloride 0.9 % bolus 1,500 mL Admin Date  2025 Action  New Bag Dose  1,500 mL Rate  989 mL/hr Route  IntraVENous Documented By  Ronaldo Landa, COTY          Patient complained of IV site burning after 1000 ml bolus was given, PIV has good blood return and NS was running through gravity. Patient declined another IV stick, made her aware that she only received 1 liter out of the 1.5 liter ordered.     Ms. Goodman tolerated treatment well and [facility]. PIV flushed & removed.Discharge instructions reviewed with patient, verbalized understanding. She is to return on 25 at 1400 for her next appointment.    Ronaldo Landa RN  2025

## 2025-04-11 ENCOUNTER — HOSPITAL ENCOUNTER (OUTPATIENT)
Facility: HOSPITAL | Age: 40
Setting detail: INFUSION SERIES
End: 2025-04-11

## 2025-04-18 ENCOUNTER — APPOINTMENT (OUTPATIENT)
Facility: HOSPITAL | Age: 40
End: 2025-04-18
Payer: MEDICAID

## 2025-04-22 ENCOUNTER — HOSPITAL ENCOUNTER (OUTPATIENT)
Facility: HOSPITAL | Age: 40
Setting detail: INFUSION SERIES
Discharge: HOME | End: 2025-04-22
Payer: MEDICAID

## 2025-04-22 VITALS
RESPIRATION RATE: 16 BRPM | TEMPERATURE: 97.9 F | HEART RATE: 69 BPM | OXYGEN SATURATION: 96 % | SYSTOLIC BLOOD PRESSURE: 143 MMHG | DIASTOLIC BLOOD PRESSURE: 88 MMHG

## 2025-04-22 DIAGNOSIS — G90.A POSTURAL ORTHOSTATIC TACHYCARDIA SYNDROME: Primary | ICD-10-CM

## 2025-04-22 PROCEDURE — 96360 HYDRATION IV INFUSION INIT: CPT

## 2025-04-22 PROCEDURE — 2580000003 HC RX 258: Performed by: PHYSICIAN ASSISTANT

## 2025-04-22 PROCEDURE — 2500000003 HC RX 250 WO HCPCS: Performed by: PHYSICIAN ASSISTANT

## 2025-04-22 RX ORDER — 0.9 % SODIUM CHLORIDE 0.9 %
1500 INTRAVENOUS SOLUTION INTRAVENOUS ONCE
Status: COMPLETED | OUTPATIENT
Start: 2025-04-22 | End: 2025-04-22

## 2025-04-22 RX ORDER — 0.9 % SODIUM CHLORIDE 0.9 %
1500 INTRAVENOUS SOLUTION INTRAVENOUS ONCE
Status: CANCELLED | OUTPATIENT
Start: 2025-04-25 | End: 2025-04-25

## 2025-04-22 RX ORDER — SODIUM CHLORIDE 0.9 % (FLUSH) 0.9 %
5-40 SYRINGE (ML) INJECTION PRN
Status: DISCONTINUED | OUTPATIENT
Start: 2025-04-22 | End: 2025-04-23 | Stop reason: HOSPADM

## 2025-04-22 RX ORDER — 0.9 % SODIUM CHLORIDE 0.9 %
1500 INTRAVENOUS SOLUTION INTRAVENOUS ONCE
Status: CANCELLED
Start: 2025-04-25 | End: 2025-04-25

## 2025-04-22 RX ADMIN — SODIUM CHLORIDE 1500 ML: 0.9 INJECTION, SOLUTION INTRAVENOUS at 14:12

## 2025-04-22 RX ADMIN — SODIUM CHLORIDE, PRESERVATIVE FREE 10 ML: 5 INJECTION INTRAVENOUS at 14:08

## 2025-04-22 ASSESSMENT — PAIN DESCRIPTION - DESCRIPTORS: DESCRIPTORS: ACHING

## 2025-04-22 ASSESSMENT — PAIN SCALES - GENERAL: PAINLEVEL_OUTOF10: 5

## 2025-04-22 ASSESSMENT — PAIN DESCRIPTION - LOCATION: LOCATION: GENERALIZED

## 2025-04-22 NOTE — PROGRESS NOTES
[facility] Visit Note    Pt arrived to [facility] ambulatory in no acute distress for Hydration. Assessment unremarkable except shortness of breath and swelling to lower legs. Patient is worried she may be going into fluid overload but still wants to receive fluids today. IV established in left AC without issue and positive blood return noted.     Medications Administered         sodium chloride 0.9 % bolus 1,500 mL Admin Date  04/22/2025 Action  New Bag Dose  1,500 mL Rate  989 mL/hr Route  IntraVENous Documented By  Dahiana Carrizales, RN        sodium chloride flush 0.9 % injection 5-40 mL Admin Date  04/22/2025 Action  Given Dose  10 mL Rate   Route  IntraVENous Documented By  Dahiana Carrizales, RN           Patient did not want to get entire 1500 ml of fluids, she received about 1250 ml and wished to be done with hydration.     Patient Vitals for the past 24 hrs:   BP Temp Pulse Resp SpO2   04/22/25 1530 (!) 143/88 -- 69 16 96 %   04/22/25 1400 (!) 148/94 97.9 °F (36.6 °C) 73 16 93 %     Pt tolerated treatment well. No adverse reaction noted. IV flushed per policy and removed, 2x2 and coban placed.  Pt discharged ambulatory in no acute distress, accompanied by self. Next appointment 4/25/25.

## 2025-04-25 ENCOUNTER — HOSPITAL ENCOUNTER (OUTPATIENT)
Facility: HOSPITAL | Age: 40
Setting detail: INFUSION SERIES
Discharge: HOME | End: 2025-04-25
Payer: MEDICAID

## 2025-04-25 VITALS
RESPIRATION RATE: 18 BRPM | TEMPERATURE: 97.6 F | OXYGEN SATURATION: 98 % | SYSTOLIC BLOOD PRESSURE: 138 MMHG | HEART RATE: 69 BPM | DIASTOLIC BLOOD PRESSURE: 86 MMHG

## 2025-04-25 DIAGNOSIS — G90.A POSTURAL ORTHOSTATIC TACHYCARDIA SYNDROME: Primary | ICD-10-CM

## 2025-04-25 PROCEDURE — 96360 HYDRATION IV INFUSION INIT: CPT

## 2025-04-25 PROCEDURE — 2580000003 HC RX 258: Performed by: PHYSICIAN ASSISTANT

## 2025-04-25 PROCEDURE — 2500000003 HC RX 250 WO HCPCS: Performed by: PHYSICIAN ASSISTANT

## 2025-04-25 RX ORDER — 0.9 % SODIUM CHLORIDE 0.9 %
1500 INTRAVENOUS SOLUTION INTRAVENOUS ONCE
Status: CANCELLED
Start: 2025-05-06 | End: 2025-05-06

## 2025-04-25 RX ORDER — 0.9 % SODIUM CHLORIDE 0.9 %
1500 INTRAVENOUS SOLUTION INTRAVENOUS ONCE
Status: COMPLETED | OUTPATIENT
Start: 2025-04-25 | End: 2025-04-25

## 2025-04-25 RX ORDER — SODIUM CHLORIDE 0.9 % (FLUSH) 0.9 %
5-40 SYRINGE (ML) INJECTION PRN
Status: DISCONTINUED | OUTPATIENT
Start: 2025-04-25 | End: 2025-04-26 | Stop reason: HOSPADM

## 2025-04-25 RX ORDER — 0.9 % SODIUM CHLORIDE 0.9 %
1500 INTRAVENOUS SOLUTION INTRAVENOUS ONCE
OUTPATIENT
Start: 2025-05-06 | End: 2025-05-06

## 2025-04-25 RX ADMIN — SODIUM CHLORIDE, PRESERVATIVE FREE 20 ML: 5 INJECTION INTRAVENOUS at 15:50

## 2025-04-25 RX ADMIN — SODIUM CHLORIDE, PRESERVATIVE FREE 10 ML: 5 INJECTION INTRAVENOUS at 17:04

## 2025-04-25 RX ADMIN — SODIUM CHLORIDE 1000 ML: 0.9 INJECTION, SOLUTION INTRAVENOUS at 15:52

## 2025-04-25 ASSESSMENT — PAIN SCALES - GENERAL: PAINLEVEL_OUTOF10: 5

## 2025-04-25 ASSESSMENT — PAIN DESCRIPTION - LOCATION: LOCATION: GENERALIZED

## 2025-04-25 ASSESSMENT — PAIN DESCRIPTION - DESCRIPTORS: DESCRIPTORS: ACHING

## 2025-04-25 NOTE — PROGRESS NOTES
[facility] Visit Note:    Pt arrived to [facility] ambulatory in no acute distress at 1527 for hydration.  Assessment completed.  #24g PIV established in left AC without issue by COTY Trejo and positive blood return noted.      Patient reports dyspnea with exertion, tightness and swelling in legs and feels like she is retaining fluid. She only wants 1L NS today instead of 1.5L.      Patient Vitals for the past 24 hrs:   BP Temp Temp src Pulse Resp SpO2   04/25/25 1658 138/86 -- -- 69 -- 98 %   04/25/25 1527 (!) 145/82 97.6 °F (36.4 °C) Temporal 76 18 92 %        Medications Administered         sodium chloride 0.9 % bolus 1,500 mL Admin Date  04/25/2025 Action  New Bag Dose  1,000 mL Rate  989 mL/hr Route  IntraVENous Documented By  Teresa Encinas, RN        sodium chloride flush 0.9 % injection 5-40 mL Admin Date  04/25/2025 Action  Given Dose  20 mL Rate   Route  IntraVENous Documented By  Teresa Encinas, RN        sodium chloride flush 0.9 % injection 5-40 mL Admin Date  04/25/2025 Action  Given Dose  10 mL Rate   Route  IntraVENous Documented By  Teresa Encinas, RN           Pt tolerated treatment well.  No adverse reaction noted.  IV flushed per policy and removed, 2x2 and coban placed.  Pt discharged ambulatory in no acute distress at 1704, accompanied by self.  Next appointment 5/2/25 @ 1530.

## 2025-05-02 ENCOUNTER — HOSPITAL ENCOUNTER (OUTPATIENT)
Facility: HOSPITAL | Age: 40
Setting detail: INFUSION SERIES
Discharge: HOME | End: 2025-05-02
Payer: MEDICAID

## 2025-05-02 VITALS
TEMPERATURE: 97.4 F | OXYGEN SATURATION: 95 % | DIASTOLIC BLOOD PRESSURE: 86 MMHG | SYSTOLIC BLOOD PRESSURE: 156 MMHG | RESPIRATION RATE: 18 BRPM | HEART RATE: 95 BPM

## 2025-05-02 DIAGNOSIS — G90.A POSTURAL ORTHOSTATIC TACHYCARDIA SYNDROME: Primary | ICD-10-CM

## 2025-05-02 PROCEDURE — 2580000003 HC RX 258: Performed by: PHYSICIAN ASSISTANT

## 2025-05-02 PROCEDURE — 96360 HYDRATION IV INFUSION INIT: CPT

## 2025-05-02 RX ORDER — 0.9 % SODIUM CHLORIDE 0.9 %
1500 INTRAVENOUS SOLUTION INTRAVENOUS ONCE
Start: 2025-05-06 | End: 2025-05-06

## 2025-05-02 RX ORDER — 0.9 % SODIUM CHLORIDE 0.9 %
1500 INTRAVENOUS SOLUTION INTRAVENOUS ONCE
OUTPATIENT
Start: 2025-05-06 | End: 2025-05-06

## 2025-05-02 RX ORDER — 0.9 % SODIUM CHLORIDE 0.9 %
1500 INTRAVENOUS SOLUTION INTRAVENOUS ONCE
Status: COMPLETED | OUTPATIENT
Start: 2025-05-02 | End: 2025-05-02

## 2025-05-02 RX ADMIN — SODIUM CHLORIDE 1500 ML: 0.9 INJECTION, SOLUTION INTRAVENOUS at 15:35

## 2025-05-02 ASSESSMENT — PAIN DESCRIPTION - LOCATION: LOCATION: GENERALIZED

## 2025-05-02 ASSESSMENT — PAIN SCALES - GENERAL: PAINLEVEL_OUTOF10: 5

## 2025-05-02 NOTE — PROGRESS NOTES
Pt arrived at [facility] ambulatory and in no acute distress for Hydration. Assessment complete, pt denies any new questions or concerns. 24 gauge IV started in left arm without difficulty, brisk blood return.     Pt request 1/2 liter NS, not 1.5L. Reports she has been discussing with provider who may change hydration to every other week.    Patient Vitals for the past 12 hrs:   Temp Pulse Resp BP SpO2   05/02/25 1522 97.4 °F (36.3 °C) 95 18 (!) 156/86 95 %       Medications Administered         sodium chloride 0.9 % bolus 1,500 mL Admin Date  05/02/2025 Action  New Bag Dose  1,500 mL Rate  989 mL/hr Route  IntraVENous Documented By  Crystal Srinivasan, RN          Pt tolerated infusionwell, no adverse reaction noted. Peripheral IV flushed and removed. D/Cd from [facility] ambulatory and in no acute distress.    Future Appointments   Date Time Provider Department Center   5/7/2025  2:45 PM [facility] 2 [facility]   5/14/2025  3:15 PM [facility] CHAIR 19 [facility]

## 2025-05-07 ENCOUNTER — HOSPITAL ENCOUNTER (OUTPATIENT)
Facility: HOSPITAL | Age: 40
Setting detail: INFUSION SERIES
Discharge: HOME | End: 2025-05-07
Payer: MEDICAID

## 2025-05-07 VITALS
DIASTOLIC BLOOD PRESSURE: 90 MMHG | TEMPERATURE: 98.4 F | SYSTOLIC BLOOD PRESSURE: 136 MMHG | HEART RATE: 65 BPM | OXYGEN SATURATION: 97 % | RESPIRATION RATE: 18 BRPM

## 2025-05-07 DIAGNOSIS — G90.A POSTURAL ORTHOSTATIC TACHYCARDIA SYNDROME: Primary | ICD-10-CM

## 2025-05-07 PROCEDURE — 2580000003 HC RX 258: Performed by: PHYSICIAN ASSISTANT

## 2025-05-07 PROCEDURE — 96360 HYDRATION IV INFUSION INIT: CPT

## 2025-05-07 PROCEDURE — 2500000003 HC RX 250 WO HCPCS: Performed by: PHYSICIAN ASSISTANT

## 2025-05-07 RX ORDER — 0.9 % SODIUM CHLORIDE 0.9 %
1500 INTRAVENOUS SOLUTION INTRAVENOUS ONCE
Status: CANCELLED
Start: 2025-05-16 | End: 2025-05-16

## 2025-05-07 RX ORDER — SODIUM CHLORIDE 0.9 % (FLUSH) 0.9 %
5-40 SYRINGE (ML) INJECTION PRN
Status: DISCONTINUED | OUTPATIENT
Start: 2025-05-07 | End: 2025-05-08 | Stop reason: HOSPADM

## 2025-05-07 RX ORDER — 0.9 % SODIUM CHLORIDE 0.9 %
1500 INTRAVENOUS SOLUTION INTRAVENOUS ONCE
OUTPATIENT
Start: 2025-05-16 | End: 2025-05-16

## 2025-05-07 RX ORDER — 0.9 % SODIUM CHLORIDE 0.9 %
1500 INTRAVENOUS SOLUTION INTRAVENOUS ONCE
Status: COMPLETED | OUTPATIENT
Start: 2025-05-07 | End: 2025-05-07

## 2025-05-07 RX ADMIN — SODIUM CHLORIDE, PRESERVATIVE FREE 10 ML: 5 INJECTION INTRAVENOUS at 15:40

## 2025-05-07 RX ADMIN — SODIUM CHLORIDE, PRESERVATIVE FREE 10 ML: 5 INJECTION INTRAVENOUS at 15:05

## 2025-05-07 RX ADMIN — SODIUM CHLORIDE 500 ML: 9 INJECTION, SOLUTION INTRAVENOUS at 15:06

## 2025-05-07 ASSESSMENT — PAIN SCALES - GENERAL: PAINLEVEL_OUTOF10: 4

## 2025-05-07 ASSESSMENT — PAIN DESCRIPTION - LOCATION: LOCATION: GENERALIZED

## 2025-05-07 NOTE — PROGRESS NOTES
[facility] Visit Note:    Pt arrived to [facility] ambulatory in no acute distress at 1451 for hydration.  Assessment unremarkable except 2+ edema in lower extremities, dyspnea with exertion, and fatigue.  #24g PIV established in left AC and positive blood return noted.      Patient Vitals for the past 24 hrs:   BP Temp Temp src Pulse Resp SpO2   05/07/25 1540 (!) 136/90 -- -- 65 -- --   05/07/25 1451 (!) 147/90 98.4 °F (36.9 °C) Temporal 64 18 97 %      Patient requested only 500mL today due to swelling.    Medications Administered         sodium chloride 0.9 % bolus 1,500 mL Admin Date  05/07/2025 Action  New Bag Dose  500 mL Rate  989 mL/hr Route  IntraVENous Documented By  Teresa Encinas, RN        sodium chloride flush 0.9 % injection 5-40 mL Admin Date  05/07/2025 Action  Given Dose  10 mL Rate   Route  IntraVENous Documented By  Teresa Encinas, RN        sodium chloride flush 0.9 % injection 5-40 mL Admin Date  05/07/2025 Action  Given Dose  10 mL Rate   Route  IntraVENous Documented By  Teresa Encinas, RN           Pt tolerated treatment well.  No adverse reaction noted.  IV flushed per policy and removed, 2x2 and coban placed.  Pt discharged ambulatory in no acute distress at 1542, accompanied by self.  Next appointment 5/14/25 @ 1515.

## 2025-05-14 ENCOUNTER — HOSPITAL ENCOUNTER (OUTPATIENT)
Facility: HOSPITAL | Age: 40
Setting detail: INFUSION SERIES
Discharge: HOME | End: 2025-05-14
Payer: MEDICAID

## 2025-05-14 VITALS
TEMPERATURE: 98.1 F | OXYGEN SATURATION: 95 % | RESPIRATION RATE: 18 BRPM | SYSTOLIC BLOOD PRESSURE: 120 MMHG | DIASTOLIC BLOOD PRESSURE: 77 MMHG | HEART RATE: 71 BPM

## 2025-05-14 DIAGNOSIS — G90.A POSTURAL ORTHOSTATIC TACHYCARDIA SYNDROME: Primary | ICD-10-CM

## 2025-05-14 PROCEDURE — 2580000003 HC RX 258: Performed by: PHYSICIAN ASSISTANT

## 2025-05-14 PROCEDURE — 96360 HYDRATION IV INFUSION INIT: CPT

## 2025-05-14 RX ORDER — 0.9 % SODIUM CHLORIDE 0.9 %
1500 INTRAVENOUS SOLUTION INTRAVENOUS ONCE
OUTPATIENT
Start: 2025-05-16 | End: 2025-05-16

## 2025-05-14 RX ORDER — 0.9 % SODIUM CHLORIDE 0.9 %
1500 INTRAVENOUS SOLUTION INTRAVENOUS ONCE
Status: COMPLETED | OUTPATIENT
Start: 2025-05-14 | End: 2025-05-14

## 2025-05-14 RX ORDER — 0.9 % SODIUM CHLORIDE 0.9 %
1500 INTRAVENOUS SOLUTION INTRAVENOUS ONCE
Start: 2025-05-16 | End: 2025-05-16

## 2025-05-14 RX ADMIN — SODIUM CHLORIDE 1000 ML: 0.9 INJECTION, SOLUTION INTRAVENOUS at 15:42

## 2025-05-14 ASSESSMENT — PAIN DESCRIPTION - LOCATION: LOCATION: GENERALIZED

## 2025-05-14 ASSESSMENT — PAIN SCALES - GENERAL: PAINLEVEL_OUTOF10: 5

## 2025-05-14 NOTE — PROGRESS NOTES
hospitals Progress Note      Date: May 14, 2025    Name: Monique Goodman    MRN: [id number]         : 1985    1530 Patient arrives for Hydration without acute problems. Please see Epic for complete assessment and education provided.    Vital signs stable throughout and prior to discharge. Patient tolerated procedure well and was discharged without incident. Patient is aware of next hospitals appointment on 2025.  Appointment card give to the Patient.      Ms. Goodman's vitals were reviewed prior to and after treatment.   Patient Vitals for the past 12 hrs:   Temp Pulse Resp BP SpO2   25 1640 -- 71 -- 120/77 --   25 1530 98.1 °F (36.7 °C) 82 18 96/69 95 %         Medications given:   Medications Administered         sodium chloride 0.9 % bolus 1,500 mL Admin Date  2025 Action  New Bag Dose  1,000 mL Rate  989 mL/hr Route  IntraVENous Documented By  Deyanira Valle RN          **After approximately 600 ml, patient's IV was infiltrated.  Patient asked to have   BP rechecked at this time.  After BP check, patient stated she was fine with the fluids received and did not want another IV attempt.      Ms. Goodman tolerated the infusion, and had no complaints.    Ms. Goodman was [facility] in [facility] condition. Discharge Instructions provided to patient, patient verbalized understanding but denied the request for a copy of after visit summary.     Future Appointments   Date Time Provider Department Center   2025 11:30 AM [facility] CHAIR 3 [facility]   2025  3:45 PM [facility] CHAIR 16 [facility]   6/10/2025  3:45 PM [facility] CHAIR 19 [facility]       DEYANIRA VALLE RN  May 14, 2025  5:16 PM

## 2025-05-14 NOTE — PROGRESS NOTES
Spiritual Care Partner Volunteer visited patient at [facility] in [facility] on 5/14/2025   Documented by:  Manasa Perales MPS, BCC, Staff [facility]     Paging Service [phone] ([phone])

## 2025-05-16 ENCOUNTER — APPOINTMENT (OUTPATIENT)
Facility: HOSPITAL | Age: 40
End: 2025-05-16
Payer: MEDICAID

## 2025-05-16 ENCOUNTER — HOSPITAL ENCOUNTER (EMERGENCY)
Facility: HOSPITAL | Age: 40
Discharge: HOME | End: 2025-05-16
Payer: MEDICAID

## 2025-05-16 VITALS
WEIGHT: 272 LBS | TEMPERATURE: 97.7 F | RESPIRATION RATE: 16 BRPM | OXYGEN SATURATION: 100 % | SYSTOLIC BLOOD PRESSURE: 147 MMHG | DIASTOLIC BLOOD PRESSURE: 78 MMHG | HEART RATE: 77 BPM | BODY MASS INDEX: 48.19 KG/M2

## 2025-05-16 DIAGNOSIS — N20.0 KIDNEY STONE: Primary | ICD-10-CM

## 2025-05-16 LAB
ALBUMIN SERPL-MCNC: 4.2 G/DL (ref 3.5–5)
ALBUMIN/GLOB SERPL: 1.3 (ref 1.1–2.2)
ALP SERPL-CCNC: 137 U/L (ref 45–117)
ALT SERPL-CCNC: 20 U/L (ref 12–78)
ANION GAP SERPL CALC-SCNC: 2 MMOL/L (ref 2–12)
APPEARANCE UR: CLEAR
AST SERPL-CCNC: 12 U/L (ref 15–37)
BACTERIA URNS QL MICRO: NEGATIVE /HPF
BASOPHILS # BLD: 0.04 K/UL (ref 0–0.1)
BASOPHILS NFR BLD: 0.5 % (ref 0–1)
BILIRUB SERPL-MCNC: 0.2 MG/DL (ref 0.2–1)
BILIRUB UR QL: NEGATIVE
BUN SERPL-MCNC: 25 MG/DL (ref 6–20)
BUN/CREAT SERPL: 19 (ref 12–20)
CALCIUM SERPL-MCNC: 10.1 MG/DL (ref 8.5–10.1)
CHLORIDE SERPL-SCNC: 110 MMOL/L (ref 97–108)
CO2 SERPL-SCNC: 26 MMOL/L (ref 21–32)
COLOR UR: ABNORMAL
CREAT SERPL-MCNC: 1.34 MG/DL (ref 0.55–1.02)
DIFFERENTIAL METHOD BLD: ABNORMAL
EOSINOPHIL # BLD: 0.45 K/UL (ref 0–0.4)
EOSINOPHIL NFR BLD: 5.4 % (ref 0–7)
EPITH CASTS URNS QL MICRO: ABNORMAL /LPF
ERYTHROCYTE [DISTWIDTH] IN BLOOD BY AUTOMATED COUNT: 14.4 % (ref 11.5–14.5)
GLOBULIN SER CALC-MCNC: 3.3 G/DL (ref 2–4)
GLUCOSE SERPL-MCNC: 100 MG/DL (ref 65–100)
GLUCOSE UR STRIP.AUTO-MCNC: NEGATIVE MG/DL
HCG UR QL: NEGATIVE
HCT VFR BLD AUTO: 38.2 % (ref 35–47)
HGB BLD-MCNC: 11.7 G/DL (ref 11.5–16)
HGB UR QL STRIP: NEGATIVE
HYALINE CASTS URNS QL MICRO: ABNORMAL /LPF (ref 0–2)
IMM GRANULOCYTES # BLD AUTO: 0.03 K/UL (ref 0–0.04)
IMM GRANULOCYTES NFR BLD AUTO: 0.4 % (ref 0–0.5)
KETONES UR QL STRIP.AUTO: NEGATIVE MG/DL
LACTATE BLD-SCNC: 0.81 MMOL/L (ref 0.4–2)
LEUKOCYTE ESTERASE UR QL STRIP.AUTO: ABNORMAL
LIPASE SERPL-CCNC: 48 U/L (ref 13–75)
LYMPHOCYTES # BLD: 2.76 K/UL (ref 0.8–3.5)
LYMPHOCYTES NFR BLD: 32.8 % (ref 12–49)
MCH RBC QN AUTO: 28.7 PG (ref 26–34)
MCHC RBC AUTO-ENTMCNC: 30.6 G/DL (ref 30–36.5)
MCV RBC AUTO: 93.6 FL (ref 80–99)
MONOCYTES # BLD: 0.76 K/UL (ref 0–1)
MONOCYTES NFR BLD: 9 % (ref 5–13)
NEUTS SEG # BLD: 4.37 K/UL (ref 1.8–8)
NEUTS SEG NFR BLD: 51.9 % (ref 32–75)
NITRITE UR QL STRIP.AUTO: NEGATIVE
NRBC # BLD: 0 K/UL (ref 0–0.01)
NRBC BLD-RTO: 0 PER 100 WBC
PH UR STRIP: 6 (ref 5–8)
PLATELET # BLD AUTO: 336 K/UL (ref 150–400)
PMV BLD AUTO: 9.5 FL (ref 8.9–12.9)
POTASSIUM SERPL-SCNC: 4.9 MMOL/L (ref 3.5–5.1)
PROT SERPL-MCNC: 7.5 G/DL (ref 6.4–8.2)
PROT UR STRIP-MCNC: NEGATIVE MG/DL
RBC # BLD AUTO: 4.08 M/UL (ref 3.8–5.2)
RBC #/AREA URNS HPF: ABNORMAL /HPF (ref 0–5)
SODIUM SERPL-SCNC: 138 MMOL/L (ref 136–145)
SP GR UR REFRACTOMETRY: 1.01
SPECIMEN HOLD: NORMAL
UROBILINOGEN UR QL STRIP.AUTO: 0.2 EU/DL (ref 0.2–1)
WBC # BLD AUTO: 8.4 K/UL (ref 3.6–11)
WBC URNS QL MICRO: ABNORMAL /HPF (ref 0–4)

## 2025-05-16 PROCEDURE — 99284 EMERGENCY DEPT VISIT MOD MDM: CPT

## 2025-05-16 PROCEDURE — 80053 COMPREHEN METABOLIC PANEL: CPT

## 2025-05-16 PROCEDURE — 96361 HYDRATE IV INFUSION ADD-ON: CPT

## 2025-05-16 PROCEDURE — 96375 TX/PRO/DX INJ NEW DRUG ADDON: CPT

## 2025-05-16 PROCEDURE — 83690 ASSAY OF LIPASE: CPT

## 2025-05-16 PROCEDURE — 81025 URINE PREGNANCY TEST: CPT

## 2025-05-16 PROCEDURE — 6360000002 HC RX W HCPCS

## 2025-05-16 PROCEDURE — 85025 COMPLETE CBC W/AUTO DIFF WBC: CPT

## 2025-05-16 PROCEDURE — 96374 THER/PROPH/DIAG INJ IV PUSH: CPT

## 2025-05-16 PROCEDURE — 36415 COLL VENOUS BLD VENIPUNCTURE: CPT

## 2025-05-16 PROCEDURE — 2580000003 HC RX 258

## 2025-05-16 PROCEDURE — 96376 TX/PRO/DX INJ SAME DRUG ADON: CPT

## 2025-05-16 PROCEDURE — 81001 URINALYSIS AUTO W/SCOPE: CPT

## 2025-05-16 PROCEDURE — 83605 ASSAY OF LACTIC ACID: CPT

## 2025-05-16 PROCEDURE — 74176 CT ABD & PELVIS W/O CONTRAST: CPT

## 2025-05-16 RX ORDER — HYDROMORPHONE HYDROCHLORIDE 1 MG/ML
0.5 INJECTION, SOLUTION INTRAMUSCULAR; INTRAVENOUS; SUBCUTANEOUS
Status: COMPLETED | OUTPATIENT
Start: 2025-05-16 | End: 2025-05-16

## 2025-05-16 RX ORDER — IOPAMIDOL 755 MG/ML
100 INJECTION, SOLUTION INTRAVASCULAR
Status: DISCONTINUED | OUTPATIENT
Start: 2025-05-16 | End: 2025-05-16 | Stop reason: HOSPADM

## 2025-05-16 RX ORDER — MORPHINE SULFATE 2 MG/ML
2 INJECTION, SOLUTION INTRAMUSCULAR; INTRAVENOUS
Refills: 0 | Status: COMPLETED | OUTPATIENT
Start: 2025-05-16 | End: 2025-05-16

## 2025-05-16 RX ORDER — SODIUM CHLORIDE, SODIUM LACTATE, POTASSIUM CHLORIDE, AND CALCIUM CHLORIDE .6; .31; .03; .02 G/100ML; G/100ML; G/100ML; G/100ML
1000 INJECTION, SOLUTION INTRAVENOUS ONCE
Status: COMPLETED | OUTPATIENT
Start: 2025-05-16 | End: 2025-05-16

## 2025-05-16 RX ORDER — ONDANSETRON 2 MG/ML
4 INJECTION INTRAMUSCULAR; INTRAVENOUS ONCE
Status: COMPLETED | OUTPATIENT
Start: 2025-05-16 | End: 2025-05-16

## 2025-05-16 RX ORDER — OXYCODONE HYDROCHLORIDE 5 MG/1
5 TABLET ORAL EVERY 6 HOURS PRN
Qty: 12 TABLET | Refills: 0 | Status: SHIPPED | OUTPATIENT
Start: 2025-05-16 | End: 2025-05-19

## 2025-05-16 RX ADMIN — HYDROMORPHONE HYDROCHLORIDE 0.5 MG: 1 INJECTION, SOLUTION INTRAMUSCULAR; INTRAVENOUS; SUBCUTANEOUS at 17:50

## 2025-05-16 RX ADMIN — MORPHINE SULFATE 2 MG: 2 INJECTION, SOLUTION INTRAMUSCULAR; INTRAVENOUS at 14:54

## 2025-05-16 RX ADMIN — ONDANSETRON 4 MG: 2 INJECTION, SOLUTION INTRAMUSCULAR; INTRAVENOUS at 14:54

## 2025-05-16 RX ADMIN — MORPHINE SULFATE 2 MG: 2 INJECTION, SOLUTION INTRAMUSCULAR; INTRAVENOUS at 16:07

## 2025-05-16 RX ADMIN — SODIUM CHLORIDE, SODIUM LACTATE, POTASSIUM CHLORIDE, AND CALCIUM CHLORIDE 1000 ML: .6; .31; .03; .02 INJECTION, SOLUTION INTRAVENOUS at 14:53

## 2025-05-16 ASSESSMENT — PAIN DESCRIPTION - LOCATION: LOCATION: PELVIS;FLANK

## 2025-05-16 ASSESSMENT — PAIN SCALES - GENERAL
PAINLEVEL_OUTOF10: 8
PAINLEVEL_OUTOF10: 8

## 2025-05-16 ASSESSMENT — PAIN - FUNCTIONAL ASSESSMENT: PAIN_FUNCTIONAL_ASSESSMENT: 0-10

## 2025-05-16 ASSESSMENT — PAIN DESCRIPTION - DESCRIPTORS: DESCRIPTORS: ACHING

## 2025-05-16 ASSESSMENT — PAIN DESCRIPTION - ORIENTATION: ORIENTATION: RIGHT

## 2025-05-16 NOTE — DISCHARGE INSTRUCTIONS
Thank You!    It was a pleasure taking care of you in our Emergency Department today. We know that when you come to our Emergency Department, you are entrusting us with your health, comfort, and safety. Our physicians and nurses honor that trust, and truly appreciate the opportunity to care for you and your loved ones.      We also value your feedback. If you receive a survey about your Emergency Department experience today, please fill it out.  We care about our patients' feedback, and we listen to what you have to say.  Thank you.    Richard Dewey MD  ________________________________________________________________________  I have included a copy of your lab results and/or radiologic studies from today's visit so you can have them easily available at your follow-up visit. We hope you feel better and please do not hesitate to contact the ED if you have any questions at all!    Recent Results (from the past 12 hours)   CBC with Auto Differential    Collection Time: 05/16/25  2:12 PM   Result Value Ref Range    WBC 8.4 3.6 - 11.0 K/uL    RBC 4.08 3.80 - 5.20 M/uL    Hemoglobin 11.7 11.5 - 16.0 g/dL    Hematocrit 38.2 35.0 - 47.0 %    MCV 93.6 80.0 - 99.0 FL    MCH 28.7 26.0 - 34.0 PG    MCHC 30.6 30.0 - 36.5 g/dL    RDW 14.4 11.5 - 14.5 %    Platelets 336 150 - 400 K/uL    MPV 9.5 8.9 - 12.9 FL    Nucleated RBCs 0.0 0  WBC    nRBC 0.00 0.00 - 0.01 K/uL    Neutrophils % 51.9 32.0 - 75.0 %    Lymphocytes % 32.8 12.0 - 49.0 %    Monocytes % 9.0 5.0 - 13.0 %    Eosinophils % 5.4 0.0 - 7.0 %    Basophils % 0.5 0.0 - 1.0 %    Immature Granulocytes % 0.4 0.0 - 0.5 %    Neutrophils Absolute 4.37 1.80 - 8.00 K/UL    Lymphocytes Absolute 2.76 0.80 - 3.50 K/UL    Monocytes Absolute 0.76 0.00 - 1.00 K/UL    Eosinophils Absolute 0.45 (H) 0.00 - 0.40 K/UL    Basophils Absolute 0.04 0.00 - 0.10 K/UL    Immature Granulocytes Absolute 0.03 0.00 - 0.04 K/UL    Differential Type AUTOMATED     Comprehensive Metabolic Panel

## 2025-05-16 NOTE — ED PROVIDER NOTES
[facility] EMERGENCY DEPARTMENT  EMERGENCY DEPARTMENT ENCOUNTER    Patient Name: Monique Goodman  MRN: [id number]  YOB: 1985  Provider: Richard Dewey MD  PCP: Javi Krishnan MD  Time/Date of evaluation:  5/16/25    History of Presenting Illness     Chief Complaint   Patient presents with    Pelvic Pain     Patient ambulatory to triage c/o R-sided pelvic pain that radiates to the R flank over the last couple days. Patient reports that she is not urinating as frequently as usual. Patient reports that she has a long history of kidney issues.     Flank Pain       HISTORY (Narrative):   Monique Goodman is a 39 y.o. female presents to the Emergency Department with complaints of low blood pressure, severe abdominal and back pain, and vomiting. The patient has a history of kidney issues and chronic dehydration requiring regular IV fluid infusions.    On Wednesday, the patient received infusions for fluids. During this treatment, they reported feeling unwell, and their blood pressure was noted to be low. They received approximately half a bag of IV fluids, which temporarily improved their blood pressure. However, by that evening, their blood pressure had decreased again. The patient reports that their blood pressure has been fluctuating since then, with the diastolic pressure sometimes dropping as low as the 40s.    The patient is experiencing severe pain described as wrapping around from their left lower back to their abdomen, specifically in the area of their kidneys. The pain is characterized as intense, with the patient stating it \"hurts like a mother.\" Associated symptoms include vomiting, which has been occurring throughout the morning. The patient has attempted to manage the pain with tramadol but reports it has been ineffective.    The patient has a history of chronic dehydration leading to multiple hospitalizations. They report being hospitalized six times last year due to  capsule TAKE 1 CAPSULE BY MOUTH EVERY DAY 90 capsule 1    budesonide-formoterol (SYMBICORT) 160-4.5 MCG/ACT AERO Inhale 2 puffs into the lungs 2 times daily      albuterol sulfate HFA (PROVENTIL;VENTOLIN;PROAIR) 108 (90 Base) MCG/ACT inhaler Inhale 1 puff into the lungs every 6 hours as needed      propranolol (INDERAL) 10 MG tablet TAKE 1 TABLET BY MOUTH TWICE A DAY         ALLERGIES:  Allergies   Allergen Reactions    Latex Itching     burning    Sulfa Antibiotics      Other reaction(s): Unknown (comments)    Topiramate      Other reaction(s): Unknown (comments)    Clindamycin Diarrhea     Pt states caused her C-Diff    Iodinated Contrast Media Myalgia     Pt c/o severe back spasms after IV contrast administration. Pt requesting IV pain medications and requests I put this as a drug intolerance in her EMR.    10/11/23: endorsed body wide pruritis after IV contrast, no urticaria, required benadryl for symptoms    Mushroom Extract Complex (Obsolete) Other (See Comments)    Nsaids Other (See Comments)     Peptic ulcer    Valproic Acid Other (See Comments)     Elevated heart rate and vomiting    Adhesive Tape Rash     Allergic to Dermabond    Haldol [Haloperidol] Anxiety and Rash    Metoclopramide Rash    Prochlorperazine Anxiety     High heart rate  Pt can take promethazine with no problems       SOCIAL DETERMINANTS OF HEALTH:  Social Drivers of Health     Tobacco Use: Low Risk  (5/7/2025)    Patient History     Smoking Tobacco Use: Never     Smokeless Tobacco Use: Never     Passive Exposure: Not on file   Alcohol Use: Not At Risk (3/7/2025)    AUDIT-C     Frequency of Alcohol Consumption: Never     Average Number of Drinks: Patient does not drink     Frequency of Binge Drinking: Never   Financial Resource Strain: Low Risk  (8/22/2023)    Overall Financial Resource Strain (CARDIA)     Difficulty of Paying Living Expenses: Not hard at all   Food Insecurity: Food Insecurity Present (2/29/2024)    Hunger Vital Sign      Worried About Running Out of Food in the Last Year: Never true     Ran Out of Food in the Last Year: Sometimes true   Transportation Needs: No Transportation Needs (2/29/2024)    PRAPARE - Transportation     Lack of Transportation (Medical): No     Lack of Transportation (Non-Medical): No   Physical Activity: Not on file   Stress: Not on file   Social Connections: Not on file   Intimate Partner Violence: Not on file   Depression: Not at risk (2/20/2024)    PHQ-2     PHQ-2 Score: 0   Housing Stability: High Risk (2/29/2024)    Housing Stability Vital Sign     Unable to Pay for Housing in the Last Year: Yes     Number of Places Lived in the Last Year: 1     Unstable Housing in the Last Year: No   Interpersonal Safety: Not At Risk (4/8/2025)    Interpersonal Safety Domain Source: IP Abuse Screening     Physical abuse: Denies     Verbal abuse: Denies     Emotional abuse: Denies     Financial abuse: Denies     Sexual abuse: Denies   Utilities: At Risk (2/29/2024)    [facility] Utilities     Threatened with loss of utilities: Yes       Review of Systems     Negative except as listed above in HPI.    Physical Exam     Vitals:    05/16/25 1243   BP: (!) 147/78   Pulse: 77   Resp: 16   Temp: 97.7 °F (36.5 °C)   TempSrc: Oral   SpO2: 100%   Weight: 123.4 kg (272 lb)       Physical Exam  Vitals and nursing note reviewed.   Constitutional:       General: She is not in acute distress.     Appearance: Normal appearance.   HENT:      Head: Normocephalic and atraumatic.      Right Ear: External ear normal.      Left Ear: External ear normal.      Nose: Nose normal.      Mouth/Throat:      Mouth: Mucous membranes are moist.   Eyes:      Conjunctiva/sclera: Conjunctivae normal.      Pupils: Pupils are equal, round, and reactive to light.   Cardiovascular:      Rate and Rhythm: Normal rate and regular rhythm.   Pulmonary:      Effort: Pulmonary effort is normal. No respiratory distress.      Breath sounds: Normal breath sounds. No wheezing.

## 2025-05-23 ENCOUNTER — APPOINTMENT (OUTPATIENT)
Facility: HOSPITAL | Age: 40
End: 2025-05-23
Payer: MEDICAID

## 2025-05-23 ENCOUNTER — HOSPITAL ENCOUNTER (EMERGENCY)
Facility: HOSPITAL | Age: 40
Discharge: HOME | End: 2025-05-23
Payer: MEDICAID

## 2025-05-23 VITALS
SYSTOLIC BLOOD PRESSURE: 154 MMHG | TEMPERATURE: 98.1 F | RESPIRATION RATE: 16 BRPM | DIASTOLIC BLOOD PRESSURE: 85 MMHG | OXYGEN SATURATION: 97 % | HEART RATE: 70 BPM

## 2025-05-23 DIAGNOSIS — R10.9 RIGHT SIDED ABDOMINAL PAIN: Primary | ICD-10-CM

## 2025-05-23 LAB
ALBUMIN SERPL-MCNC: 4.4 G/DL (ref 3.5–5)
ALBUMIN/GLOB SERPL: 1.2 (ref 1.1–2.2)
ALP SERPL-CCNC: 153 U/L (ref 45–117)
ALT SERPL-CCNC: 25 U/L (ref 12–78)
ANION GAP SERPL CALC-SCNC: 2 MMOL/L (ref 2–12)
APPEARANCE UR: CLEAR
AST SERPL-CCNC: 22 U/L (ref 15–37)
BACTERIA URNS QL MICRO: ABNORMAL /HPF
BASOPHILS # BLD: 0.05 K/UL (ref 0–0.1)
BASOPHILS NFR BLD: 0.5 % (ref 0–1)
BILIRUB SERPL-MCNC: 0.4 MG/DL (ref 0.2–1)
BILIRUB UR QL: NEGATIVE
BUN SERPL-MCNC: 25 MG/DL (ref 6–20)
BUN/CREAT SERPL: 17 (ref 12–20)
CALCIUM SERPL-MCNC: 9.9 MG/DL (ref 8.5–10.1)
CHLORIDE SERPL-SCNC: 108 MMOL/L (ref 97–108)
CO2 SERPL-SCNC: 27 MMOL/L (ref 21–32)
COLOR UR: ABNORMAL
CREAT SERPL-MCNC: 1.49 MG/DL (ref 0.55–1.02)
DIFFERENTIAL METHOD BLD: ABNORMAL
EOSINOPHIL # BLD: 0.36 K/UL (ref 0–0.4)
EOSINOPHIL NFR BLD: 3.3 % (ref 0–7)
EPITH CASTS URNS QL MICRO: ABNORMAL /LPF
ERYTHROCYTE [DISTWIDTH] IN BLOOD BY AUTOMATED COUNT: 13.9 % (ref 11.5–14.5)
GLOBULIN SER CALC-MCNC: 3.8 G/DL (ref 2–4)
GLUCOSE SERPL-MCNC: 89 MG/DL (ref 65–100)
GLUCOSE UR STRIP.AUTO-MCNC: NEGATIVE MG/DL
HCT VFR BLD AUTO: 40.7 % (ref 35–47)
HGB BLD-MCNC: 13 G/DL (ref 11.5–16)
HGB UR QL STRIP: NEGATIVE
HYALINE CASTS URNS QL MICRO: ABNORMAL /LPF (ref 0–2)
IMM GRANULOCYTES # BLD AUTO: 0.05 K/UL (ref 0–0.04)
IMM GRANULOCYTES NFR BLD AUTO: 0.5 % (ref 0–0.5)
KETONES UR QL STRIP.AUTO: NEGATIVE MG/DL
LEUKOCYTE ESTERASE UR QL STRIP.AUTO: NEGATIVE
LIPASE SERPL-CCNC: 30 U/L (ref 13–75)
LYMPHOCYTES # BLD: 2.84 K/UL (ref 0.8–3.5)
LYMPHOCYTES NFR BLD: 26 % (ref 12–49)
MCH RBC QN AUTO: 29.2 PG (ref 26–34)
MCHC RBC AUTO-ENTMCNC: 31.9 G/DL (ref 30–36.5)
MCV RBC AUTO: 91.5 FL (ref 80–99)
MONOCYTES # BLD: 0.97 K/UL (ref 0–1)
MONOCYTES NFR BLD: 8.9 % (ref 5–13)
NEUTS SEG # BLD: 6.66 K/UL (ref 1.8–8)
NEUTS SEG NFR BLD: 60.8 % (ref 32–75)
NITRITE UR QL STRIP.AUTO: NEGATIVE
NRBC # BLD: 0 K/UL (ref 0–0.01)
NRBC BLD-RTO: 0 PER 100 WBC
PH UR STRIP: 6.5 (ref 5–8)
PLATELET # BLD AUTO: 352 K/UL (ref 150–400)
PMV BLD AUTO: 10.1 FL (ref 8.9–12.9)
POTASSIUM SERPL-SCNC: 5.2 MMOL/L (ref 3.5–5.1)
PROT SERPL-MCNC: 8.2 G/DL (ref 6.4–8.2)
PROT UR STRIP-MCNC: ABNORMAL MG/DL
RBC # BLD AUTO: 4.45 M/UL (ref 3.8–5.2)
RBC #/AREA URNS HPF: ABNORMAL /HPF (ref 0–5)
SODIUM SERPL-SCNC: 137 MMOL/L (ref 136–145)
SP GR UR REFRACTOMETRY: 1.02
SPECIMEN HOLD: NORMAL
UROBILINOGEN UR QL STRIP.AUTO: 0.2 EU/DL (ref 0.2–1)
WBC # BLD AUTO: 10.9 K/UL (ref 3.6–11)
WBC URNS QL MICRO: ABNORMAL /HPF (ref 0–4)

## 2025-05-23 PROCEDURE — 85025 COMPLETE CBC W/AUTO DIFF WBC: CPT

## 2025-05-23 PROCEDURE — 83690 ASSAY OF LIPASE: CPT

## 2025-05-23 PROCEDURE — 99284 EMERGENCY DEPT VISIT MOD MDM: CPT

## 2025-05-23 PROCEDURE — 36415 COLL VENOUS BLD VENIPUNCTURE: CPT

## 2025-05-23 PROCEDURE — 74176 CT ABD & PELVIS W/O CONTRAST: CPT

## 2025-05-23 PROCEDURE — 2500000003 HC RX 250 WO HCPCS: Performed by: PHYSICIAN ASSISTANT

## 2025-05-23 PROCEDURE — 80053 COMPREHEN METABOLIC PANEL: CPT

## 2025-05-23 PROCEDURE — 96375 TX/PRO/DX INJ NEW DRUG ADDON: CPT

## 2025-05-23 PROCEDURE — 6360000002 HC RX W HCPCS: Performed by: PHYSICIAN ASSISTANT

## 2025-05-23 PROCEDURE — 81001 URINALYSIS AUTO W/SCOPE: CPT

## 2025-05-23 PROCEDURE — 96374 THER/PROPH/DIAG INJ IV PUSH: CPT

## 2025-05-23 PROCEDURE — 2580000003 HC RX 258: Performed by: PHYSICIAN ASSISTANT

## 2025-05-23 RX ORDER — ONDANSETRON 2 MG/ML
4 INJECTION INTRAMUSCULAR; INTRAVENOUS ONCE
Status: COMPLETED | OUTPATIENT
Start: 2025-05-23 | End: 2025-05-23

## 2025-05-23 RX ORDER — 0.9 % SODIUM CHLORIDE 0.9 %
500 INTRAVENOUS SOLUTION INTRAVENOUS ONCE
Status: COMPLETED | OUTPATIENT
Start: 2025-05-23 | End: 2025-05-23

## 2025-05-23 RX ORDER — HYDROMORPHONE HYDROCHLORIDE 1 MG/ML
1 INJECTION, SOLUTION INTRAMUSCULAR; INTRAVENOUS; SUBCUTANEOUS
Status: COMPLETED | OUTPATIENT
Start: 2025-05-23 | End: 2025-05-23

## 2025-05-23 RX ORDER — OXYCODONE HYDROCHLORIDE 5 MG/1
5 TABLET ORAL EVERY 6 HOURS PRN
Qty: 6 TABLET | Refills: 0 | Status: SHIPPED | OUTPATIENT
Start: 2025-05-23 | End: 2025-05-26

## 2025-05-23 RX ADMIN — HYDROMORPHONE HYDROCHLORIDE 1 MG: 1 INJECTION, SOLUTION INTRAMUSCULAR; INTRAVENOUS; SUBCUTANEOUS at 16:32

## 2025-05-23 RX ADMIN — ONDANSETRON 4 MG: 2 INJECTION, SOLUTION INTRAMUSCULAR; INTRAVENOUS at 16:30

## 2025-05-23 RX ADMIN — SODIUM CHLORIDE 500 ML: 0.9 INJECTION, SOLUTION INTRAVENOUS at 16:32

## 2025-05-23 ASSESSMENT — PAIN DESCRIPTION - LOCATION: LOCATION: FLANK

## 2025-05-23 ASSESSMENT — PAIN SCALES - GENERAL
PAINLEVEL_OUTOF10: 8
PAINLEVEL_OUTOF10: 6

## 2025-05-23 ASSESSMENT — PAIN DESCRIPTION - ORIENTATION: ORIENTATION: RIGHT

## 2025-05-23 ASSESSMENT — PAIN - FUNCTIONAL ASSESSMENT: PAIN_FUNCTIONAL_ASSESSMENT: 0-10

## 2025-05-23 ASSESSMENT — PAIN DESCRIPTION - DESCRIPTORS: DESCRIPTORS: ACHING

## 2025-05-23 NOTE — ED PROVIDER NOTES
[facility] EMERGENCY DEPARTMENT  EMERGENCY DEPARTMENT ENCOUNTER       Pt Name: Monique Goodman  MRN: [id number]  Birthdate 1985  Date of Evaluation: 5/23/2025  Provider: Aria Lucio PA-C   PCP: Javi Krishnan MD  Note Started: 6:09 PM 5/23/25     CHIEF COMPLAINT       Chief Complaint   Patient presents with    Flank Pain     Patient ambulatory to triage c/o R-sided flank pain for over a week. Patient reports being seen here 1 week ago and reports being told that she has a kidney stone on the R-side. Patient reports trying to call urology but they have no openings until next week.         HISTORY OF PRESENT ILLNESS: 1 or more elements      History From: Patient  None     Monique Goodman is a 39 y.o. female who presents to the ED today with right-sided flank pain.  She has been seen numerous times for in our emergency department.  States she called her urologist and not able to get an appointment and therefore came here.     Nursing Notes were all reviewed and agreed with or any disagreements were addressed in the HPI.     REVIEW OF SYSTEMS      Review of Systems     Positives and Pertinent negatives as per HPI.    PAST HISTORY     Past Medical History:  Past Medical History:   Diagnosis Date    Abnormal CT of the abdomen 11/8/2012    JIGNESH (acute kidney injury) 01/24/2024    PT STATES SHE HAS BEEN IN KIDNEY FAILURE DUE TO DIURETICS    Anxiety and depression     Arthritis     Asthma     Autoimmune disease     lupus    C. difficile diarrhea 2022    PER PT    Cervical prolapse     Dehydration     Garth-Danlos syndrome     Gastrointestinal disorder     gerd, twisted colon, IBS, PT DENIES IBS    GERD (gastroesophageal reflux disease)     Headache(784.0)     History of blood transfusion 2018    PT STATES SHE HAD A GI BLEED    Hx of blood clots 2023    PT STATES SHE HAD A VERY SMALL BLOOD CLOT IN HER RIGHT LEG    Hypertension     Kidney stone     Lupus     Morbid obesity (HCC)     MRSA  ZOFRAN-ODT  Take 1 tablet by mouth 3 times daily as needed for Nausea or Vomiting     OXcarbazepine 150 MG tablet  Commonly known as: TRILEPTAL     pregabalin 200 MG capsule  Commonly known as: LYRICA  Take 1 capsule by mouth 2 times daily for 180 days. Max Daily Amount: 400 mg     promethazine 25 MG tablet  Commonly known as: PHENERGAN     propranolol 10 MG tablet  Commonly known as: INDERAL     QUEtiapine 25 MG tablet  Commonly known as: SEROQUEL     Qulipta 60 MG Tabs  Generic drug: Atogepant     Symbicort 160-4.5 MCG/ACT Aero  Generic drug: budesonide-formoterol     tiZANidine 4 MG tablet  Commonly known as: ZANAFLEX  TAKE 2 TAB BY MOUTH AT NIGHT AS DIRECTED ORALLY     Wegovy 0.25 MG/0.5ML Soaj SC injection  Generic drug: Semaglutide-Weight Management           * This list has 2 medication(s) that are the same as other medications prescribed for you. Read the directions carefully, and ask your doctor or other care provider to review them with you.                   Where to Get Your Medications        These medications were sent to [facility]/pharmacy #[id number] - [place], [place] - [address] [facility] [address] - P [phone] - F [phone]  [address] [address]      Hours: 24-hours Phone: [phone]   oxyCODONE 5 MG immediate release tablet           DISCONTINUED MEDICATIONS:  Current Discharge Medication List          {ED Attending Involvement (Optional):80345}    I am the Primary Clinician of Record.   Aria Lucio PA-C (electronically signed)    (Please note that parts of this dictation were completed with voice recognition software. Quite often unanticipated grammatical, syntax, homophones, and other interpretive errors are inadvertently transcribed by the computer software. Please disregards these errors. Please excuse any errors that have escaped final proofreading.)

## 2025-06-02 ENCOUNTER — HOSPITAL ENCOUNTER (OUTPATIENT)
Facility: HOSPITAL | Age: 40
Setting detail: INFUSION SERIES
Discharge: HOME | End: 2025-06-02
Payer: MEDICAID

## 2025-06-02 VITALS
SYSTOLIC BLOOD PRESSURE: 109 MMHG | RESPIRATION RATE: 16 BRPM | OXYGEN SATURATION: 96 % | HEART RATE: 70 BPM | DIASTOLIC BLOOD PRESSURE: 75 MMHG | TEMPERATURE: 98.8 F

## 2025-06-02 DIAGNOSIS — G90.A POSTURAL ORTHOSTATIC TACHYCARDIA SYNDROME: Primary | ICD-10-CM

## 2025-06-02 PROCEDURE — 2580000003 HC RX 258: Performed by: PHYSICIAN ASSISTANT

## 2025-06-02 PROCEDURE — 96360 HYDRATION IV INFUSION INIT: CPT

## 2025-06-02 RX ORDER — 0.9 % SODIUM CHLORIDE 0.9 %
1500 INTRAVENOUS SOLUTION INTRAVENOUS ONCE
OUTPATIENT
Start: 2025-06-04 | End: 2025-06-04

## 2025-06-02 RX ORDER — 0.9 % SODIUM CHLORIDE 0.9 %
1500 INTRAVENOUS SOLUTION INTRAVENOUS ONCE
Status: COMPLETED | OUTPATIENT
Start: 2025-06-02 | End: 2025-06-02

## 2025-06-02 RX ORDER — 0.9 % SODIUM CHLORIDE 0.9 %
1500 INTRAVENOUS SOLUTION INTRAVENOUS ONCE
Status: CANCELLED
Start: 2025-06-04 | End: 2025-06-04

## 2025-06-02 RX ADMIN — SODIUM CHLORIDE 500 ML: 0.9 INJECTION, SOLUTION INTRAVENOUS at 16:34

## 2025-06-02 NOTE — PROGRESS NOTES
Pt arrived at [facility] ambulatory and in no acute distress for Hydration. Pt denies any new questions or concerns. Multiple attempts at IV access. Advised patient she may need to go to ER for hydration. Pt request additional attempts. Altagracia RN able to obtain IV after multiple sticks by 3 RN's     Patient Vitals for the past 12 hrs:   Temp Pulse Resp BP SpO2   06/02/25 1706 -- 70 16 109/75 --   06/02/25 1542 98.8 °F (37.1 °C) 82 16 100/66 96 %       Medications Administered         sodium chloride 0.9 % bolus 1,500 mL Admin Date  06/02/2025 Action  New Bag Dose  500 mL Rate  989 mL/hr Route  IntraVENous Documented By  Altagracia Espinal RN        Per patient request, infusion stopped after 500 ml of fluid    Pt tolerated infusion well, no adverse reaction noted. D/Cd from [facility] ambulatory and in no acute distress.    Future Appointments   Date Time Provider Department Center   6/10/2025  3:45 PM [facility] CHEMO CHAIR 19 [facility]   6/18/2025  4:00 PM [facility] CHEMO CHAIR 20 [facility]   6/25/2025  4:00 PM [facility] CHEMO CHAIR 15 [facility]   7/2/2025  3:00 PM [facility] CHEMO CHAIR 7 [facility]   7/9/2025  3:00 PM [facility] CHEMO CHAIR 4 [facility]   7/16/2025  3:15 PM [facility] CHEMO CHAIR 4 [facility]

## 2025-06-10 ENCOUNTER — HOSPITAL ENCOUNTER (OUTPATIENT)
Facility: HOSPITAL | Age: 40
Setting detail: INFUSION SERIES
Discharge: HOME | End: 2025-06-10
Payer: MEDICAID

## 2025-06-10 VITALS
HEART RATE: 66 BPM | OXYGEN SATURATION: 99 % | SYSTOLIC BLOOD PRESSURE: 119 MMHG | DIASTOLIC BLOOD PRESSURE: 80 MMHG | TEMPERATURE: 98.2 F | RESPIRATION RATE: 18 BRPM

## 2025-06-10 DIAGNOSIS — G90.A POSTURAL ORTHOSTATIC TACHYCARDIA SYNDROME: Primary | ICD-10-CM

## 2025-06-10 PROCEDURE — 96360 HYDRATION IV INFUSION INIT: CPT

## 2025-06-10 PROCEDURE — 2580000003 HC RX 258: Performed by: PHYSICIAN ASSISTANT

## 2025-06-10 RX ORDER — 0.9 % SODIUM CHLORIDE 0.9 %
1500 INTRAVENOUS SOLUTION INTRAVENOUS ONCE
Status: CANCELLED | OUTPATIENT
Start: 2025-06-11 | End: 2025-06-11

## 2025-06-10 RX ORDER — 0.9 % SODIUM CHLORIDE 0.9 %
1500 INTRAVENOUS SOLUTION INTRAVENOUS ONCE
Status: COMPLETED | OUTPATIENT
Start: 2025-06-10 | End: 2025-06-10

## 2025-06-10 RX ORDER — 0.9 % SODIUM CHLORIDE 0.9 %
1500 INTRAVENOUS SOLUTION INTRAVENOUS ONCE
Status: CANCELLED
Start: 2025-06-11 | End: 2025-06-11

## 2025-06-10 RX ADMIN — SODIUM CHLORIDE 500 ML: 0.9 INJECTION, SOLUTION INTRAVENOUS at 16:02

## 2025-06-10 ASSESSMENT — PAIN DESCRIPTION - ORIENTATION: ORIENTATION: LEFT

## 2025-06-10 ASSESSMENT — PAIN SCALES - GENERAL: PAINLEVEL_OUTOF10: 5

## 2025-06-10 ASSESSMENT — PAIN DESCRIPTION - DESCRIPTORS: DESCRIPTORS: STABBING

## 2025-06-10 ASSESSMENT — PAIN DESCRIPTION - LOCATION: LOCATION: FOOT

## 2025-06-10 NOTE — PROGRESS NOTES
[facility] Visit Note    1530  Pt arrived to [facility] ambulatory and in no distress for IV hydration.  Assessment completed, no new complaints voiced.  A 24 gauge PIV was placed to left AC using aseptic technique.      /84   Pulse 74   Temp 98.2 °F (36.8 °C) (Temporal)   Resp 18   SpO2 99%       Medications Administered         sodium chloride 0.9 % bolus 1,500 mL Admin Date  06/10/2025 Action  New Bag Dose  500 mL Rate  989 mL/hr Route  IntraVENous Documented By  Juanita King RN        **Patient received 750mL of the normal saline and requested to stop infusion. Patient declined to receive the remainder of saline that was ordered.      Patient tolerated treatment well, no adverse reaction noted. PIV was flushed and removed per protocol. Patient was  D/Cd from [facility] ambulatory and in no distress accompanied.  Next appt 6/18/2025.    Patient Vitals for the past 24 hrs:   BP Temp Temp src Pulse Resp SpO2   06/10/25 1651 119/80 -- -- 66 18 --   06/10/25 1544 137/84 98.2 °F (36.8 °C) Temporal 74 18 99 %

## 2025-06-18 ENCOUNTER — HOSPITAL ENCOUNTER (OUTPATIENT)
Facility: HOSPITAL | Age: 40
Setting detail: INFUSION SERIES
Discharge: HOME | End: 2025-06-18
Payer: MEDICAID

## 2025-06-18 VITALS
RESPIRATION RATE: 16 BRPM | WEIGHT: 274.6 LBS | DIASTOLIC BLOOD PRESSURE: 70 MMHG | HEIGHT: 63 IN | TEMPERATURE: 98.7 F | HEART RATE: 64 BPM | SYSTOLIC BLOOD PRESSURE: 114 MMHG | OXYGEN SATURATION: 95 % | BODY MASS INDEX: 48.66 KG/M2

## 2025-06-18 DIAGNOSIS — G90.A POSTURAL ORTHOSTATIC TACHYCARDIA SYNDROME: Primary | ICD-10-CM

## 2025-06-18 PROCEDURE — 96360 HYDRATION IV INFUSION INIT: CPT

## 2025-06-18 PROCEDURE — 2500000003 HC RX 250 WO HCPCS: Performed by: PHYSICIAN ASSISTANT

## 2025-06-18 PROCEDURE — 2580000003 HC RX 258: Performed by: PHYSICIAN ASSISTANT

## 2025-06-18 RX ORDER — 0.9 % SODIUM CHLORIDE 0.9 %
1500 INTRAVENOUS SOLUTION INTRAVENOUS ONCE
Status: CANCELLED
Start: 2025-06-24 | End: 2025-06-24

## 2025-06-18 RX ORDER — 0.9 % SODIUM CHLORIDE 0.9 %
1500 INTRAVENOUS SOLUTION INTRAVENOUS ONCE
OUTPATIENT
Start: 2025-06-24 | End: 2025-06-24

## 2025-06-18 RX ORDER — 0.9 % SODIUM CHLORIDE 0.9 %
1500 INTRAVENOUS SOLUTION INTRAVENOUS ONCE
Status: COMPLETED | OUTPATIENT
Start: 2025-06-18 | End: 2025-06-18

## 2025-06-18 RX ORDER — SODIUM CHLORIDE 0.9 % (FLUSH) 0.9 %
5-40 SYRINGE (ML) INJECTION PRN
Status: DISCONTINUED | OUTPATIENT
Start: 2025-06-18 | End: 2025-06-19 | Stop reason: HOSPADM

## 2025-06-18 RX ADMIN — SODIUM CHLORIDE, PRESERVATIVE FREE 10 ML: 5 INJECTION INTRAVENOUS at 16:16

## 2025-06-18 RX ADMIN — SODIUM CHLORIDE 1000 ML: 0.9 INJECTION, SOLUTION INTRAVENOUS at 16:14

## 2025-06-18 ASSESSMENT — PAIN DESCRIPTION - LOCATION: LOCATION: BACK

## 2025-06-18 ASSESSMENT — PAIN SCALES - GENERAL: PAINLEVEL_OUTOF10: 5

## 2025-06-18 NOTE — PROGRESS NOTES
[facility] Visit Note:    Pt arrived to [facility] ambulatory in no acute distress at 1607 for hydration.  Assessment completed. Pt reports feeling more dehydrated, and requests 1L of NS today. #24g PIV established in left AC without issue and positive blood return noted.      Patient Vitals for the past 24 hrs:   BP Temp Temp src Pulse Resp SpO2 Height Weight   06/18/25 1715 114/70 -- -- 64 16 95 % -- --   06/18/25 1607 121/79 98.7 °F (37.1 °C) Temporal 71 16 93 % 1.6 m (5' 3\") 124.6 kg (274 lb 9.6 oz)      Medications Administered         sodium chloride 0.9 % bolus 1,500 mL Admin Date  06/18/2025 Action  New Bag Dose  1,000 mL Rate  989 mL/hr Route  IntraVENous Documented By  Teresa Encinas, RN        sodium chloride flush 0.9 % injection 5-40 mL Admin Date  06/18/2025 Action  Given Dose  10 mL Rate   Route  IntraVENous Documented By  Teresa Encinas, RN           Pt tolerated treatment well.  No adverse reaction noted.  IV flushed per policy and removed, 2x2 and coban placed.  Pt discharged ambulatory in no acute distress at 1719, accompanied by self.  Next appointment 6/25/25 @ 1600.

## 2025-06-25 ENCOUNTER — HOSPITAL ENCOUNTER (OUTPATIENT)
Facility: HOSPITAL | Age: 40
Setting detail: INFUSION SERIES
Discharge: HOME | End: 2025-06-25
Payer: MEDICAID

## 2025-06-25 VITALS
DIASTOLIC BLOOD PRESSURE: 78 MMHG | OXYGEN SATURATION: 95 % | TEMPERATURE: 98.3 F | HEART RATE: 86 BPM | SYSTOLIC BLOOD PRESSURE: 123 MMHG | RESPIRATION RATE: 18 BRPM

## 2025-06-25 DIAGNOSIS — G90.A POSTURAL ORTHOSTATIC TACHYCARDIA SYNDROME: Primary | ICD-10-CM

## 2025-06-25 PROCEDURE — 2580000003 HC RX 258: Performed by: PHYSICIAN ASSISTANT

## 2025-06-25 PROCEDURE — 96360 HYDRATION IV INFUSION INIT: CPT

## 2025-06-25 RX ORDER — 0.9 % SODIUM CHLORIDE 0.9 %
1500 INTRAVENOUS SOLUTION INTRAVENOUS ONCE
OUTPATIENT
Start: 2025-07-02 | End: 2025-07-02

## 2025-06-25 RX ORDER — 0.9 % SODIUM CHLORIDE 0.9 %
1500 INTRAVENOUS SOLUTION INTRAVENOUS ONCE
Status: COMPLETED | OUTPATIENT
Start: 2025-06-25 | End: 2025-06-25

## 2025-06-25 RX ORDER — 0.9 % SODIUM CHLORIDE 0.9 %
1500 INTRAVENOUS SOLUTION INTRAVENOUS ONCE
Start: 2025-07-02 | End: 2025-07-02

## 2025-06-25 RX ADMIN — SODIUM CHLORIDE 1500 ML: 0.9 INJECTION, SOLUTION INTRAVENOUS at 15:04

## 2025-06-25 ASSESSMENT — PAIN DESCRIPTION - LOCATION: LOCATION: BACK;LEG

## 2025-06-25 ASSESSMENT — PAIN SCALES - GENERAL: PAINLEVEL_OUTOF10: 5

## 2025-06-25 ASSESSMENT — PAIN DESCRIPTION - ORIENTATION: ORIENTATION: RIGHT;LEFT

## 2025-06-25 NOTE — PROGRESS NOTES
Pt arrived at [facility] ambulatory and in no acute distress for Hydration. Assessment complete, pt denies any new questions or concerns. 24 gauge IV started in left AC, brisk blood return.    Patient Vitals for the past 12 hrs:   Temp Pulse Resp BP SpO2   06/25/25 1455 98.3 °F (36.8 °C) 86 18 123/78 95 %       Medications Administered         sodium chloride 0.9 % bolus 1,500 mL Admin Date  06/25/2025 Action  New Bag Dose  1,500 mL Rate  989 mL/hr Route  IntraVENous Documented By  Crystal Srinivasan, RN          Pt request 1L today.   Pt tolerated infusion well, no adverse reaction noted. Peripheral IV flushed and removed. D/Cd from [facility] ambulatory and in no acute distress.    Future Appointments   Date Time Provider Department Center   7/2/2025  3:00 PM [facility]   7/9/2025  3:00 PM [facility]   7/16/2025  3:15 PM [facility]   7/22/2025 11:30 AM [facility]   7/31/2025 11:30 AM [facility] CHA[facility]

## 2025-06-29 ENCOUNTER — HOSPITAL ENCOUNTER (EMERGENCY)
Facility: HOSPITAL | Age: 40
Discharge: HOME | End: 2025-06-29
Attending: EMERGENCY MEDICINE
Payer: MEDICAID

## 2025-06-29 VITALS
OXYGEN SATURATION: 94 % | HEART RATE: 77 BPM | TEMPERATURE: 98.1 F | RESPIRATION RATE: 22 BRPM | DIASTOLIC BLOOD PRESSURE: 51 MMHG | SYSTOLIC BLOOD PRESSURE: 132 MMHG

## 2025-06-29 DIAGNOSIS — K60.2 ANAL FISSURE: Primary | ICD-10-CM

## 2025-06-29 DIAGNOSIS — K62.5 BRBPR (BRIGHT RED BLOOD PER RECTUM): ICD-10-CM

## 2025-06-29 LAB
ALBUMIN SERPL-MCNC: 3.5 G/DL (ref 3.5–5)
ALBUMIN/GLOB SERPL: 1.1 (ref 1.1–2.2)
ALP SERPL-CCNC: 138 U/L (ref 45–117)
ALT SERPL-CCNC: 17 U/L (ref 12–78)
ANION GAP SERPL CALC-SCNC: 3 MMOL/L (ref 2–12)
AST SERPL-CCNC: 13 U/L (ref 15–37)
BASOPHILS # BLD: 0.07 K/UL (ref 0–0.1)
BASOPHILS NFR BLD: 1 % (ref 0–1)
BILIRUB SERPL-MCNC: 0.3 MG/DL (ref 0.2–1)
BUN SERPL-MCNC: 15 MG/DL (ref 6–20)
BUN/CREAT SERPL: 15 (ref 12–20)
CALCIUM SERPL-MCNC: 8.9 MG/DL (ref 8.5–10.1)
CHLORIDE SERPL-SCNC: 107 MMOL/L (ref 97–108)
CO2 SERPL-SCNC: 29 MMOL/L (ref 21–32)
CREAT SERPL-MCNC: 1.02 MG/DL (ref 0.55–1.02)
DIFFERENTIAL METHOD BLD: ABNORMAL
EOSINOPHIL # BLD: 0.42 K/UL (ref 0–0.4)
EOSINOPHIL NFR BLD: 5.9 % (ref 0–7)
ERYTHROCYTE [DISTWIDTH] IN BLOOD BY AUTOMATED COUNT: 13.6 % (ref 11.5–14.5)
GLOBULIN SER CALC-MCNC: 3.3 G/DL (ref 2–4)
GLUCOSE SERPL-MCNC: 96 MG/DL (ref 65–100)
HCT VFR BLD AUTO: 33.8 % (ref 35–47)
HGB BLD-MCNC: 10.4 G/DL (ref 11.5–16)
IMM GRANULOCYTES # BLD AUTO: 0.08 K/UL (ref 0–0.04)
IMM GRANULOCYTES NFR BLD AUTO: 1.1 % (ref 0–0.5)
LYMPHOCYTES # BLD: 3.05 K/UL (ref 0.8–3.5)
LYMPHOCYTES NFR BLD: 42.5 % (ref 12–49)
MCH RBC QN AUTO: 29 PG (ref 26–34)
MCHC RBC AUTO-ENTMCNC: 30.8 G/DL (ref 30–36.5)
MCV RBC AUTO: 94.2 FL (ref 80–99)
MONOCYTES # BLD: 0.88 K/UL (ref 0–1)
MONOCYTES NFR BLD: 12.3 % (ref 5–13)
NEUTS SEG # BLD: 2.67 K/UL (ref 1.8–8)
NEUTS SEG NFR BLD: 37.2 % (ref 32–75)
NRBC # BLD: 0 K/UL (ref 0–0.01)
NRBC BLD-RTO: 0 PER 100 WBC
PLATELET # BLD AUTO: 300 K/UL (ref 150–400)
PMV BLD AUTO: 9.9 FL (ref 8.9–12.9)
POTASSIUM SERPL-SCNC: 4.3 MMOL/L (ref 3.5–5.1)
PROT SERPL-MCNC: 6.8 G/DL (ref 6.4–8.2)
RBC # BLD AUTO: 3.59 M/UL (ref 3.8–5.2)
SODIUM SERPL-SCNC: 139 MMOL/L (ref 136–145)
WBC # BLD AUTO: 7.2 K/UL (ref 3.6–11)

## 2025-06-29 PROCEDURE — 96374 THER/PROPH/DIAG INJ IV PUSH: CPT

## 2025-06-29 PROCEDURE — 36415 COLL VENOUS BLD VENIPUNCTURE: CPT

## 2025-06-29 PROCEDURE — 85025 COMPLETE CBC W/AUTO DIFF WBC: CPT

## 2025-06-29 PROCEDURE — 6360000002 HC RX W HCPCS: Performed by: EMERGENCY MEDICINE

## 2025-06-29 PROCEDURE — 6370000000 HC RX 637 (ALT 250 FOR IP): Performed by: EMERGENCY MEDICINE

## 2025-06-29 PROCEDURE — 80053 COMPREHEN METABOLIC PANEL: CPT

## 2025-06-29 PROCEDURE — 99284 EMERGENCY DEPT VISIT MOD MDM: CPT

## 2025-06-29 RX ORDER — LIDOCAINE HYDROCHLORIDE 20 MG/ML
5 SOLUTION OROPHARYNGEAL
Status: COMPLETED | OUTPATIENT
Start: 2025-06-29 | End: 2025-06-29

## 2025-06-29 RX ORDER — LIDOCAINE HYDROCHLORIDE AND HYDROCORTISONE ACETATE 30; 25 MG/G; MG/G
1 GEL RECTAL PRN
Qty: 1 KIT | Refills: 0 | Status: SHIPPED | OUTPATIENT
Start: 2025-06-29

## 2025-06-29 RX ORDER — MORPHINE SULFATE 4 MG/ML
4 INJECTION, SOLUTION INTRAMUSCULAR; INTRAVENOUS
Status: COMPLETED | OUTPATIENT
Start: 2025-06-29 | End: 2025-06-29

## 2025-06-29 RX ADMIN — LIDOCAINE HYDROCHLORIDE 5 ML: 20 SOLUTION ORAL at 06:23

## 2025-06-29 RX ADMIN — MORPHINE SULFATE 4 MG: 4 INJECTION, SOLUTION INTRAMUSCULAR; INTRAVENOUS at 06:23

## 2025-06-29 ASSESSMENT — PAIN SCALES - GENERAL
PAINLEVEL_OUTOF10: 10
PAINLEVEL_OUTOF10: 4

## 2025-06-29 NOTE — DISCHARGE INSTRUCTIONS
You were evaluated in the emergency department for rectal bleeding due to an anal fissure.  Your examination was reassuring as was your work-up including blood work.  It will be important for you to follow-up with your primary care physician in 2-3 days.  If you develop worsening symptoms such as worsening bleeding/pain or fevers, please return to the emergency department immediately.

## 2025-06-29 NOTE — ED PROVIDER NOTES
known as: BENTYL  Take 1 tablet by mouth 4 times daily as needed (Abdominal cramping)     * dicyclomine 10 MG capsule  Commonly known as: BENTYL  Take 1 capsule by mouth 4 times daily (before meals and nightly)     DULoxetine 60 MG extended release capsule  Commonly known as: CYMBALTA  TAKE 2 CAPSULES BY MOUTH EVERY DAY     Lidocaine (Anorectal) 5 % Gel  Apply to rectal area, 3 times a day as needed     mirtazapine 30 MG tablet  Commonly known as: REMERON  TAKE 1 TABLET BY MOUTH EVERY DAY AT NIGHT     omeprazole 20 MG delayed release capsule  Commonly known as: PRILOSEC  TAKE 1 CAPSULE BY MOUTH EVERY DAY     ondansetron 4 MG disintegrating tablet  Commonly known as: ZOFRAN-ODT  Take 1 tablet by mouth 3 times daily as needed for Nausea or Vomiting     OXcarbazepine 150 MG tablet  Commonly known as: TRILEPTAL     pregabalin 200 MG capsule  Commonly known as: LYRICA  Take 1 capsule by mouth 2 times daily for 180 days. Max Daily Amount: 400 mg     promethazine 25 MG tablet  Commonly known as: PHENERGAN     propranolol 10 MG immediate release tablet  Commonly known as: INDERAL     QUEtiapine 25 MG tablet  Commonly known as: SEROQUEL     Qulipta 60 MG Tabs  Generic drug: Atogepant     Symbicort 160-4.5 MCG/ACT Aero  Generic drug: budesonide-formoterol     tiZANidine 4 MG tablet  Commonly known as: ZANAFLEX  TAKE 2 TAB BY MOUTH AT NIGHT AS DIRECTED ORALLY     Wegovy 0.25 MG/0.5ML Soaj SC injection  Generic drug: Semaglutide-Weight Management           * This list has 2 medication(s) that are the same as other medications prescribed for you. Read the directions carefully, and ask your doctor or other care provider to review them with you.                   Where to Get Your Medications        These medications were sent to [facility]/pharmacy #[id number] - [place], [place] - [address] - P [phone] - F [phone] [address] [address]      Hours: 24-hours Phone: [phone]

## 2025-07-08 ENCOUNTER — APPOINTMENT (OUTPATIENT)
Facility: HOSPITAL | Age: 40
End: 2025-07-08
Payer: MEDICAID

## 2025-07-08 ENCOUNTER — HOSPITAL ENCOUNTER (INPATIENT)
Facility: HOSPITAL | Age: 40
LOS: 3 days | Discharge: HOME | End: 2025-07-11
Attending: EMERGENCY MEDICINE | Admitting: INTERNAL MEDICINE
Payer: MEDICAID

## 2025-07-08 DIAGNOSIS — N23 URETERIC COLIC: ICD-10-CM

## 2025-07-08 DIAGNOSIS — R42 DIZZINESS: ICD-10-CM

## 2025-07-08 DIAGNOSIS — N17.9 AKI (ACUTE KIDNEY INJURY): Primary | ICD-10-CM

## 2025-07-08 PROBLEM — R55 SYNCOPE, NEAR: Status: ACTIVE | Noted: 2025-07-08

## 2025-07-08 LAB
ALBUMIN SERPL-MCNC: 4 G/DL (ref 3.5–5)
ALBUMIN/GLOB SERPL: 1.4 (ref 1.1–2.2)
ALP SERPL-CCNC: 129 U/L (ref 45–117)
ALT SERPL-CCNC: 19 U/L (ref 12–78)
ANION GAP SERPL CALC-SCNC: 5 MMOL/L (ref 2–12)
APPEARANCE UR: CLEAR
AST SERPL-CCNC: 10 U/L (ref 15–37)
BACTERIA URNS QL MICRO: NEGATIVE /HPF
BASOPHILS # BLD: 0.06 K/UL (ref 0–0.1)
BASOPHILS NFR BLD: 0.6 % (ref 0–1)
BILIRUB SERPL-MCNC: 0.3 MG/DL (ref 0.2–1)
BILIRUB UR QL: NEGATIVE
BUN SERPL-MCNC: 39 MG/DL (ref 6–20)
BUN/CREAT SERPL: 15 (ref 12–20)
CALCIUM SERPL-MCNC: 9.5 MG/DL (ref 8.5–10.1)
CHLORIDE SERPL-SCNC: 108 MMOL/L (ref 97–108)
CK SERPL-CCNC: 91 U/L (ref 26–192)
CO2 SERPL-SCNC: 22 MMOL/L (ref 21–32)
COLOR UR: ABNORMAL
CREAT SERPL-MCNC: 2.67 MG/DL (ref 0.55–1.02)
DIFFERENTIAL METHOD BLD: ABNORMAL
EKG ATRIAL RATE: 64 BPM
EKG DIAGNOSIS: NORMAL
EKG P AXIS: 44 DEGREES
EKG P-R INTERVAL: 162 MS
EKG Q-T INTERVAL: 374 MS
EKG QRS DURATION: 74 MS
EKG QTC CALCULATION (BAZETT): 385 MS
EKG R AXIS: 80 DEGREES
EKG T AXIS: 56 DEGREES
EKG VENTRICULAR RATE: 64 BPM
EOSINOPHIL # BLD: 0.33 K/UL (ref 0–0.4)
EOSINOPHIL NFR BLD: 3.3 % (ref 0–7)
EPITH CASTS URNS QL MICRO: ABNORMAL /LPF
ERYTHROCYTE [DISTWIDTH] IN BLOOD BY AUTOMATED COUNT: 14 % (ref 11.5–14.5)
GLOBULIN SER CALC-MCNC: 2.9 G/DL (ref 2–4)
GLUCOSE SERPL-MCNC: 98 MG/DL (ref 65–100)
GLUCOSE UR STRIP.AUTO-MCNC: NEGATIVE MG/DL
HCT VFR BLD AUTO: 34.5 % (ref 35–47)
HGB BLD-MCNC: 10.9 G/DL (ref 11.5–16)
HGB UR QL STRIP: NEGATIVE
HYALINE CASTS URNS QL MICRO: ABNORMAL /LPF (ref 0–2)
IMM GRANULOCYTES # BLD AUTO: 0.03 K/UL (ref 0–0.04)
IMM GRANULOCYTES NFR BLD AUTO: 0.3 % (ref 0–0.5)
KETONES UR QL STRIP.AUTO: NEGATIVE MG/DL
LEUKOCYTE ESTERASE UR QL STRIP.AUTO: NEGATIVE
LYMPHOCYTES # BLD: 2.95 K/UL (ref 0.8–3.5)
LYMPHOCYTES NFR BLD: 29.6 % (ref 12–49)
MCH RBC QN AUTO: 29.4 PG (ref 26–34)
MCHC RBC AUTO-ENTMCNC: 31.6 G/DL (ref 30–36.5)
MCV RBC AUTO: 93 FL (ref 80–99)
MONOCYTES # BLD: 1.28 K/UL (ref 0–1)
MONOCYTES NFR BLD: 12.9 % (ref 5–13)
NEUTS SEG # BLD: 5.31 K/UL (ref 1.8–8)
NEUTS SEG NFR BLD: 53.3 % (ref 32–75)
NITRITE UR QL STRIP.AUTO: NEGATIVE
NRBC # BLD: 0 K/UL (ref 0–0.01)
NRBC BLD-RTO: 0 PER 100 WBC
PH UR STRIP: 6 (ref 5–8)
PLATELET # BLD AUTO: 339 K/UL (ref 150–400)
PMV BLD AUTO: 9.8 FL (ref 8.9–12.9)
POTASSIUM SERPL-SCNC: 4.6 MMOL/L (ref 3.5–5.1)
PROT SERPL-MCNC: 6.9 G/DL (ref 6.4–8.2)
PROT UR STRIP-MCNC: 30 MG/DL
RBC # BLD AUTO: 3.71 M/UL (ref 3.8–5.2)
RBC #/AREA URNS HPF: ABNORMAL /HPF (ref 0–5)
SODIUM SERPL-SCNC: 135 MMOL/L (ref 136–145)
SP GR UR REFRACTOMETRY: 1.01
URINE CULTURE IF INDICATED: ABNORMAL
UROBILINOGEN UR QL STRIP.AUTO: 0.2 EU/DL (ref 0.2–1)
WBC # BLD AUTO: 10 K/UL (ref 3.6–11)
WBC URNS QL MICRO: ABNORMAL /HPF (ref 0–4)

## 2025-07-08 PROCEDURE — 96375 TX/PRO/DX INJ NEW DRUG ADDON: CPT

## 2025-07-08 PROCEDURE — 82550 ASSAY OF CK (CPK): CPT

## 2025-07-08 PROCEDURE — 6360000002 HC RX W HCPCS: Performed by: INTERNAL MEDICINE

## 2025-07-08 PROCEDURE — 80053 COMPREHEN METABOLIC PANEL: CPT

## 2025-07-08 PROCEDURE — 6370000000 HC RX 637 (ALT 250 FOR IP)

## 2025-07-08 PROCEDURE — 93005 ELECTROCARDIOGRAM TRACING: CPT | Performed by: EMERGENCY MEDICINE

## 2025-07-08 PROCEDURE — 2500000003 HC RX 250 WO HCPCS: Performed by: INTERNAL MEDICINE

## 2025-07-08 PROCEDURE — 6370000000 HC RX 637 (ALT 250 FOR IP): Performed by: INTERNAL MEDICINE

## 2025-07-08 PROCEDURE — 96374 THER/PROPH/DIAG INJ IV PUSH: CPT

## 2025-07-08 PROCEDURE — 96361 HYDRATE IV INFUSION ADD-ON: CPT

## 2025-07-08 PROCEDURE — 74176 CT ABD & PELVIS W/O CONTRAST: CPT

## 2025-07-08 PROCEDURE — 81001 URINALYSIS AUTO W/SCOPE: CPT

## 2025-07-08 PROCEDURE — 85025 COMPLETE CBC W/AUTO DIFF WBC: CPT

## 2025-07-08 PROCEDURE — 6360000002 HC RX W HCPCS: Performed by: EMERGENCY MEDICINE

## 2025-07-08 PROCEDURE — 36415 COLL VENOUS BLD VENIPUNCTURE: CPT

## 2025-07-08 PROCEDURE — 99285 EMERGENCY DEPT VISIT HI MDM: CPT

## 2025-07-08 PROCEDURE — 2580000003 HC RX 258: Performed by: INTERNAL MEDICINE

## 2025-07-08 PROCEDURE — 1100000003 HC PRIVATE W/ TELEMETRY

## 2025-07-08 PROCEDURE — 2580000003 HC RX 258: Performed by: EMERGENCY MEDICINE

## 2025-07-08 RX ORDER — POTASSIUM CHLORIDE 7.45 MG/ML
10 INJECTION INTRAVENOUS PRN
Status: DISCONTINUED | OUTPATIENT
Start: 2025-07-08 | End: 2025-07-11 | Stop reason: HOSPADM

## 2025-07-08 RX ORDER — SODIUM CHLORIDE 9 MG/ML
INJECTION, SOLUTION INTRAVENOUS CONTINUOUS
Status: ACTIVE | OUTPATIENT
Start: 2025-07-08 | End: 2025-07-10

## 2025-07-08 RX ORDER — PREGABALIN 100 MG/1
100 CAPSULE ORAL 2 TIMES DAILY
Status: DISCONTINUED | OUTPATIENT
Start: 2025-07-09 | End: 2025-07-11 | Stop reason: HOSPADM

## 2025-07-08 RX ORDER — ALBUTEROL SULFATE 90 UG/1
1 INHALANT RESPIRATORY (INHALATION) EVERY 6 HOURS PRN
Status: DISCONTINUED | OUTPATIENT
Start: 2025-07-08 | End: 2025-07-08 | Stop reason: CLARIF

## 2025-07-08 RX ORDER — 0.9 % SODIUM CHLORIDE 0.9 %
1000 INTRAVENOUS SOLUTION INTRAVENOUS ONCE
Status: COMPLETED | OUTPATIENT
Start: 2025-07-08 | End: 2025-07-08

## 2025-07-08 RX ORDER — SODIUM CHLORIDE 9 MG/ML
INJECTION, SOLUTION INTRAVENOUS PRN
Status: DISCONTINUED | OUTPATIENT
Start: 2025-07-08 | End: 2025-07-11 | Stop reason: HOSPADM

## 2025-07-08 RX ORDER — PREGABALIN 50 MG/1
100 CAPSULE ORAL ONCE
Status: COMPLETED | OUTPATIENT
Start: 2025-07-08 | End: 2025-07-08

## 2025-07-08 RX ORDER — DIAZEPAM 5 MG/1
5 TABLET ORAL EVERY 12 HOURS PRN
Status: DISCONTINUED | OUTPATIENT
Start: 2025-07-08 | End: 2025-07-11 | Stop reason: HOSPADM

## 2025-07-08 RX ORDER — TRAMADOL HYDROCHLORIDE 50 MG/1
50 TABLET ORAL EVERY 6 HOURS PRN
Status: ON HOLD | COMMUNITY
End: 2025-07-11 | Stop reason: HOSPADM

## 2025-07-08 RX ORDER — ENOXAPARIN SODIUM 100 MG/ML
30 INJECTION SUBCUTANEOUS 2 TIMES DAILY
Status: DISCONTINUED | OUTPATIENT
Start: 2025-07-08 | End: 2025-07-11 | Stop reason: HOSPADM

## 2025-07-08 RX ORDER — QUETIAPINE FUMARATE 100 MG/1
100 TABLET, FILM COATED ORAL 2 TIMES DAILY
Status: DISCONTINUED | OUTPATIENT
Start: 2025-07-08 | End: 2025-07-11 | Stop reason: HOSPADM

## 2025-07-08 RX ORDER — POTASSIUM CHLORIDE 1500 MG/1
40 TABLET, EXTENDED RELEASE ORAL PRN
Status: DISCONTINUED | OUTPATIENT
Start: 2025-07-08 | End: 2025-07-11 | Stop reason: HOSPADM

## 2025-07-08 RX ORDER — HYDROCORTISONE 25 MG/G
CREAM TOPICAL 2 TIMES DAILY PRN
COMMUNITY

## 2025-07-08 RX ORDER — DICYCLOMINE HYDROCHLORIDE 10 MG/1
10 CAPSULE ORAL
Status: DISCONTINUED | OUTPATIENT
Start: 2025-07-08 | End: 2025-07-08

## 2025-07-08 RX ORDER — ACETAMINOPHEN 650 MG/1
650 SUPPOSITORY RECTAL EVERY 6 HOURS PRN
Status: DISCONTINUED | OUTPATIENT
Start: 2025-07-08 | End: 2025-07-11 | Stop reason: HOSPADM

## 2025-07-08 RX ORDER — ONDANSETRON 2 MG/ML
4 INJECTION INTRAMUSCULAR; INTRAVENOUS EVERY 6 HOURS PRN
Status: DISCONTINUED | OUTPATIENT
Start: 2025-07-08 | End: 2025-07-11 | Stop reason: HOSPADM

## 2025-07-08 RX ORDER — MIRTAZAPINE 15 MG/1
30 TABLET, FILM COATED ORAL NIGHTLY
Status: DISCONTINUED | OUTPATIENT
Start: 2025-07-08 | End: 2025-07-11 | Stop reason: HOSPADM

## 2025-07-08 RX ORDER — MORPHINE SULFATE 2 MG/ML
2 INJECTION, SOLUTION INTRAMUSCULAR; INTRAVENOUS EVERY 4 HOURS PRN
Refills: 0 | Status: DISCONTINUED | OUTPATIENT
Start: 2025-07-08 | End: 2025-07-08

## 2025-07-08 RX ORDER — PANTOPRAZOLE SODIUM 40 MG/1
40 TABLET, DELAYED RELEASE ORAL
Status: DISCONTINUED | OUTPATIENT
Start: 2025-07-09 | End: 2025-07-11 | Stop reason: HOSPADM

## 2025-07-08 RX ORDER — FAMOTIDINE 20 MG/1
20 TABLET, FILM COATED ORAL DAILY PRN
COMMUNITY

## 2025-07-08 RX ORDER — SODIUM CHLORIDE 0.9 % (FLUSH) 0.9 %
5-40 SYRINGE (ML) INJECTION EVERY 12 HOURS SCHEDULED
Status: DISCONTINUED | OUTPATIENT
Start: 2025-07-08 | End: 2025-07-11 | Stop reason: HOSPADM

## 2025-07-08 RX ORDER — FENTANYL CITRATE 50 UG/ML
50 INJECTION, SOLUTION INTRAMUSCULAR; INTRAVENOUS ONCE
Refills: 0 | Status: COMPLETED | OUTPATIENT
Start: 2025-07-08 | End: 2025-07-08

## 2025-07-08 RX ORDER — ONDANSETRON 2 MG/ML
4 INJECTION INTRAMUSCULAR; INTRAVENOUS
Status: COMPLETED | OUTPATIENT
Start: 2025-07-08 | End: 2025-07-08

## 2025-07-08 RX ORDER — SODIUM CHLORIDE 0.9 % (FLUSH) 0.9 %
5-40 SYRINGE (ML) INJECTION PRN
Status: DISCONTINUED | OUTPATIENT
Start: 2025-07-08 | End: 2025-07-11 | Stop reason: HOSPADM

## 2025-07-08 RX ORDER — ALBUTEROL SULFATE 0.83 MG/ML
1.25 SOLUTION RESPIRATORY (INHALATION) EVERY 6 HOURS PRN
Status: DISCONTINUED | OUTPATIENT
Start: 2025-07-08 | End: 2025-07-11 | Stop reason: HOSPADM

## 2025-07-08 RX ORDER — MAGNESIUM SULFATE IN WATER 40 MG/ML
2000 INJECTION, SOLUTION INTRAVENOUS PRN
Status: DISCONTINUED | OUTPATIENT
Start: 2025-07-08 | End: 2025-07-11 | Stop reason: HOSPADM

## 2025-07-08 RX ORDER — POLYETHYLENE GLYCOL 3350 17 G/17G
17 POWDER, FOR SOLUTION ORAL DAILY PRN
Status: DISCONTINUED | OUTPATIENT
Start: 2025-07-08 | End: 2025-07-11 | Stop reason: HOSPADM

## 2025-07-08 RX ORDER — DULOXETIN HYDROCHLORIDE 30 MG/1
120 CAPSULE, DELAYED RELEASE ORAL DAILY
Status: DISCONTINUED | OUTPATIENT
Start: 2025-07-08 | End: 2025-07-08

## 2025-07-08 RX ORDER — DIAZEPAM 5 MG/1
5 TABLET ORAL EVERY 12 HOURS PRN
COMMUNITY

## 2025-07-08 RX ORDER — ACETAMINOPHEN 500 MG
500 TABLET ORAL EVERY 8 HOURS SCHEDULED
Status: DISCONTINUED | OUTPATIENT
Start: 2025-07-08 | End: 2025-07-11 | Stop reason: HOSPADM

## 2025-07-08 RX ORDER — ACETAMINOPHEN 325 MG/1
650 TABLET ORAL EVERY 6 HOURS PRN
Status: DISCONTINUED | OUTPATIENT
Start: 2025-07-08 | End: 2025-07-11 | Stop reason: HOSPADM

## 2025-07-08 RX ORDER — HYDROMORPHONE HYDROCHLORIDE 1 MG/ML
0.5 INJECTION, SOLUTION INTRAMUSCULAR; INTRAVENOUS; SUBCUTANEOUS EVERY 4 HOURS PRN
Status: DISCONTINUED | OUTPATIENT
Start: 2025-07-08 | End: 2025-07-11 | Stop reason: HOSPADM

## 2025-07-08 RX ORDER — OLMESARTAN MEDOXOMIL 40 MG/1
40 TABLET ORAL NIGHTLY
Status: ON HOLD | COMMUNITY
End: 2025-07-11 | Stop reason: HOSPADM

## 2025-07-08 RX ORDER — ONDANSETRON 4 MG/1
4 TABLET, ORALLY DISINTEGRATING ORAL EVERY 8 HOURS PRN
Status: DISCONTINUED | OUTPATIENT
Start: 2025-07-08 | End: 2025-07-11 | Stop reason: HOSPADM

## 2025-07-08 RX ORDER — PROPRANOLOL HYDROCHLORIDE 10 MG/1
10 TABLET ORAL 2 TIMES DAILY
Status: DISCONTINUED | OUTPATIENT
Start: 2025-07-08 | End: 2025-07-11 | Stop reason: HOSPADM

## 2025-07-08 RX ADMIN — SODIUM CHLORIDE: 0.9 INJECTION, SOLUTION INTRAVENOUS at 21:58

## 2025-07-08 RX ADMIN — SODIUM CHLORIDE 1000 ML: 0.9 INJECTION, SOLUTION INTRAVENOUS at 14:21

## 2025-07-08 RX ADMIN — MORPHINE SULFATE 2 MG: 2 INJECTION, SOLUTION INTRAMUSCULAR; INTRAVENOUS at 15:26

## 2025-07-08 RX ADMIN — QUETIAPINE FUMARATE 100 MG: 100 TABLET ORAL at 20:57

## 2025-07-08 RX ADMIN — ENOXAPARIN SODIUM 30 MG: 100 INJECTION SUBCUTANEOUS at 20:57

## 2025-07-08 RX ADMIN — FENTANYL CITRATE 50 MCG: 50 INJECTION INTRAMUSCULAR; INTRAVENOUS at 13:59

## 2025-07-08 RX ADMIN — DIAZEPAM 5 MG: 5 TABLET ORAL at 21:58

## 2025-07-08 RX ADMIN — ONDANSETRON 4 MG: 2 INJECTION, SOLUTION INTRAMUSCULAR; INTRAVENOUS at 14:00

## 2025-07-08 RX ADMIN — SODIUM CHLORIDE 1000 ML: 0.9 INJECTION, SOLUTION INTRAVENOUS at 13:07

## 2025-07-08 RX ADMIN — ENOXAPARIN SODIUM 30 MG: 100 INJECTION SUBCUTANEOUS at 15:25

## 2025-07-08 RX ADMIN — TIZANIDINE 4 MG: 4 TABLET ORAL at 20:58

## 2025-07-08 RX ADMIN — ACETAMINOPHEN 500 MG: 500 TABLET ORAL at 15:25

## 2025-07-08 RX ADMIN — PREGABALIN 100 MG: 50 CAPSULE ORAL at 19:43

## 2025-07-08 RX ADMIN — PROPRANOLOL HYDROCHLORIDE 10 MG: 10 TABLET ORAL at 20:57

## 2025-07-08 RX ADMIN — SODIUM CHLORIDE: 0.9 INJECTION, SOLUTION INTRAVENOUS at 16:23

## 2025-07-08 RX ADMIN — HYDROMORPHONE HYDROCHLORIDE 0.5 MG: 1 INJECTION, SOLUTION INTRAMUSCULAR; INTRAVENOUS; SUBCUTANEOUS at 17:06

## 2025-07-08 RX ADMIN — MIRTAZAPINE 30 MG: 15 TABLET, FILM COATED ORAL at 20:57

## 2025-07-08 RX ADMIN — SODIUM CHLORIDE, PRESERVATIVE FREE 10 ML: 5 INJECTION INTRAVENOUS at 21:00

## 2025-07-08 RX ADMIN — HYDROMORPHONE HYDROCHLORIDE 0.5 MG: 1 INJECTION, SOLUTION INTRAMUSCULAR; INTRAVENOUS; SUBCUTANEOUS at 21:06

## 2025-07-08 ASSESSMENT — PAIN DESCRIPTION - LOCATION
LOCATION: BACK
LOCATION: BACK
LOCATION: FLANK
LOCATION: FLANK;BACK
LOCATION: BACK
LOCATION: FLANK
LOCATION: BACK

## 2025-07-08 ASSESSMENT — PAIN SCALES - GENERAL
PAINLEVEL_OUTOF10: 3
PAINLEVEL_OUTOF10: 10
PAINLEVEL_OUTOF10: 8
PAINLEVEL_OUTOF10: 7
PAINLEVEL_OUTOF10: 8

## 2025-07-08 ASSESSMENT — PAIN DESCRIPTION - ORIENTATION
ORIENTATION: RIGHT;LEFT
ORIENTATION: LOWER;POSTERIOR
ORIENTATION: POSTERIOR
ORIENTATION: RIGHT;LEFT
ORIENTATION: POSTERIOR
ORIENTATION: RIGHT
ORIENTATION: LEFT;RIGHT

## 2025-07-08 ASSESSMENT — PAIN - FUNCTIONAL ASSESSMENT
PAIN_FUNCTIONAL_ASSESSMENT: PREVENTS OR INTERFERES SOME ACTIVE ACTIVITIES AND ADLS
PAIN_FUNCTIONAL_ASSESSMENT: PREVENTS OR INTERFERES SOME ACTIVE ACTIVITIES AND ADLS
PAIN_FUNCTIONAL_ASSESSMENT: 0-10
PAIN_FUNCTIONAL_ASSESSMENT: 0-10
PAIN_FUNCTIONAL_ASSESSMENT: PREVENTS OR INTERFERES SOME ACTIVE ACTIVITIES AND ADLS
PAIN_FUNCTIONAL_ASSESSMENT: 0-10
PAIN_FUNCTIONAL_ASSESSMENT: PREVENTS OR INTERFERES WITH MANY ACTIVE NOT PASSIVE ACTIVITIES
PAIN_FUNCTIONAL_ASSESSMENT: PREVENTS OR INTERFERES SOME ACTIVE ACTIVITIES AND ADLS
PAIN_FUNCTIONAL_ASSESSMENT: 0-10
PAIN_FUNCTIONAL_ASSESSMENT: 0-10

## 2025-07-08 ASSESSMENT — PAIN DESCRIPTION - ONSET: ONSET: ON-GOING

## 2025-07-08 ASSESSMENT — PAIN DESCRIPTION - FREQUENCY: FREQUENCY: INTERMITTENT

## 2025-07-08 ASSESSMENT — PAIN DESCRIPTION - DESCRIPTORS
DESCRIPTORS: ACHING
DESCRIPTORS: SHARP
DESCRIPTORS: ACHING
DESCRIPTORS: THROBBING;STABBING
DESCRIPTORS: ACHING

## 2025-07-08 ASSESSMENT — PAIN DESCRIPTION - PAIN TYPE
TYPE: ACUTE PAIN

## 2025-07-08 NOTE — PROGRESS NOTES
Pharmacy Medication History Review    Medication history obtained by Kayla Block while patient was in room 3207/01 and was completed based on information available during current patient encounter. Medication history was completed after home meds were reconciled by provider.    Pharmacist Admission Medication Reconciliation Recommendations:            PTA medication list was corrected to the following:   Prior to Admission Medications   Prescriptions Last Dose Informant   Atogepant (QULIPTA) 60 MG TABS DISCONTINUED     Sig: Take by mouth   DULoxetine (CYMBALTA) 60 MG extended release capsule Not Taking Self   Sig: TAKE 2 CAPSULES BY MOUTH EVERY DAY   Patient not taking: Reported on 7/8/2025   Lidocaine-Hydrocortisone Ace 3-2.5 % KIT DISCONTINUED    Sig: Place 1 each rectally as needed (anal pain)   OXcarbazepine (TRILEPTAL) 150 MG tablet DISCONTINUED     Sig: Take 1 tablet by mouth 2 times daily   QUEtiapine (SEROQUEL) 100 MG tablet 7/8/2025 Self   Sig: Take 1 tablet by mouth 2 times daily   Semaglutide-Weight Management (WEGOVY) 0.25 MG/0.5ML SOAJ SC injection DISCONTINUED    Sig: Inject 0.25 mg into the skin every 7 days   acetaminophen (TYLENOL) 500 MG tablet Unknown Self   Sig: Take 1 tablet by mouth 4 times daily as needed for Pain   albuterol sulfate HFA (PROVENTIL;VENTOLIN;PROAIR) 108 (90 Base) MCG/ACT inhaler Unknown Self   Sig: Inhale 1 puff into the lungs every 6 hours as needed   budesonide-formoterol (SYMBICORT) 160-4.5 MCG/ACT AERO DISCONTINUED    Sig: Inhale 2 puffs into the lungs 2 times daily   butalbital-acetaminophen-caffeine (FIORICET, ESGIC) -40 MG per tablet DISCONTINUED    Sig: Take 1 tablet by mouth every 8 hours as needed for Headaches   diazePAM (VALIUM) 5 MG tablet 7/7/2025 Self   Sig: Take 1 tablet by mouth every 12 hours as needed for Anxiety. Max Daily Amount: 10 mg   dicyclomine (BENTYL) 10 MG capsule Unknown Self   Sig: Take 1 capsule by mouth 4 times daily (before meals and

## 2025-07-08 NOTE — ED NOTES
TRANSFER - OUT REPORT:    Verbal report given to STEPHANIE Vidales on Monique Goodman  being transferred to [facility] 3207 for routine progression of patient care       Report consisted of patient's Situation, Background, Assessment and   Recommendations(SBAR).     Information from the following report(s) Nurse Handoff Report, Index, ED Encounter Summary, ED SBAR, Adult Overview, Surgery Report, Intake/Output, MAR, Recent Results, Med Rec Status, and Cardiac Rhythm NSR was reviewed with the receiving nurse.    Sebree Fall Assessment:    Presents to emergency department  because of falls (Syncope, seizure, or loss of consciousness): No  Age > 70: No  Altered Mental Status, Intoxication with alcohol or substance confusion (Disorientation, impaired judgment, poor safety awaremess, or inability to follow instructions): No  Impaired Mobility: Ambulates or transfers with assistive devices or assistance; Unable to ambulate or transer.: No  Nursing Judgement: Yes (hypotensive upon arrival)        Lines:   Peripheral IV 07/08/25 Left Antecubital (Active)   Site Assessment Clean, dry & intact 07/08/25 1305   Line Status Blood return noted;Flushed;Specimen collected 07/08/25 1305   Line Care Connections checked and tightened;Chlorhexidine wipes;Line pulled back 07/08/25 1305   Phlebitis Assessment No symptoms 07/08/25 1305   Infiltration Assessment 0 07/08/25 1305   Alcohol Cap Used No 07/08/25 1305   Dressing Status Clean, dry & intact 07/08/25 1305   Dressing Type Transparent 07/08/25 1305   Dressing Intervention New 07/08/25 1305      Opportunity for questions and clarification was provided.      Patient transported with:  Monitor

## 2025-07-08 NOTE — ED PROVIDER NOTES
[facility] EMERGENCY DEPARTMENT  EMERGENCY DEPARTMENT ENCOUNTER    Patient Name: Monique Goodman  MRN: [id number]  YOB: 1985  Provider: Paul Lyn MD  PCP: Javi Krishnan MD  Nephrology: Dr. Dilan Cunha  Time/Date of evaluation: 12:46 PM EDT on 7/8/25    History of Presenting Illness     Chief Complaint   Patient presents with    Dizziness     Pt ambulatory into TR. Pt chronic frequent pt to our ED for \"kidney issues.\" Pt states her mother manually checked her SBP at home and it was in the \"60's\". Pt states she is feeling dizzy and having back pain.      History Provided by: Patient   History is limited by: Nothing    HISTORY (Narrative):   Monique Goodman is a 39 y.o. female with a PMHX of anxiety, depression, JIGNESH, lupus, Garth-Danlos syndrome, GERD, hypertension, and kidney stonewho presents to the emergency department (room 13) by POV C/O chronic dehydration, back pain, dizziness, and low blood pressure. The patient reports a history of weekly fluid infusions for chronic dehydration, which they missed last week due to illness.    The patient describes severe back pain, stating it \"hurts awful\" and is constant. They also report significant dizziness, particularly when attempting to walk. Blood pressure fluctuations are noted, with the patient reporting readings as low as 50/29 or 60/40 when sitting at home, which increase slightly with movement but remain concerning.    The patient attributes the low blood pressure to chronic dehydration, stating this is a recurring issue. They last took fluid pills \"a year ago, March.\" The patient reports a history of lupus and is followed by a rheumatologist. They also mention a recent history of acute kidney injury (JIGNESH) in March, stating they've experienced JIGNESH nine times previously and are familiar with the symptoms, though they're not certain it's occurring now.    The patient reports nausea in addition to the back pain and  unknown reason.  Creatinine 2.6 today, elevated from her baseline of 1.  Patient reports blood pressures have been labile including a blood pressure of 60/40 earlier this morning at home.    Sepsis present:  No  Reassessment needed: No  Code Status:  Full Code  Readmission: No  Recommended Level of Care: med/surg  Zone Phone: [phone]    Requesting doctor/clinician: Paul Lyn MD     ADDITIONAL CONSIDERATIONS:  None  Procedures/Critical Care     Critical Care    Performed by: Paul Lyn MD  Authorized by: Paul Lyn MD    Critical care provider statement:     Critical care time (minutes):  45    Critical care time was exclusive of:  Separately billable procedures and treating other patients and teaching time    Critical care was necessary to treat or prevent imminent or life-threatening deterioration of the following conditions:  Renal failure and dehydration    Critical care was time spent personally by me on the following activities:  Ordering and review of laboratory studies, ordering and performing treatments and interventions, development of treatment plan with patient or surrogate, examination of patient, review of old charts and re-evaluation of patient's condition    Care discussed with: admitting provider      ED FINAL IMPRESSION     1. JIGNESH (acute kidney injury)    2. Dizziness      DISPOSITION/PLAN     Admit Note: Pt is being admitted by Hospitalist. The results of their tests and reason(s) for their admission have been discussed with pt and/or available family. They convey agreement and understanding for the need to be admitted and for the admission diagnosis.    (Please note that parts of this dictation were completed with voice recognition software. Quite often unanticipated grammatical, syntax, homophones, and other interpretive errors are inadvertently transcribed by the computer software. Please disregards these errors. Please excuse any errors that have escaped final

## 2025-07-08 NOTE — PROGRESS NOTES
End of Shift Note    Bedside shift change report given to COTY Sanabria (oncoming nurse) by Sobeida Still LPN (offgoing nurse).  Report included the following information SBAR and MAR    Shift worked:  [id number]     Shift summary and any significant changes:     Patient came to unit around 1630. Patient having 8/10 flank pain, dilaudid given x1 for pain. NS running continuously. Plan of care ongoing.      Concerns for physician to address:  None     Zone phone for oncoming shift:   [phone]       Sobeida Still LPN

## 2025-07-08 NOTE — H&P
Hospitalist Admission Note    NAME:   Monique Goodman   : 1985   MRN: [id number]     Date/Time: 2025 3:19 PM    Patient PCP: Javi Krishnan MD    ______________________________________________________________________  Given the patient's current clinical presentation, I have a high level of concern for decompensation if discharged from the emergency department.  Complex decision making was performed, which includes reviewing the patient's available past medical records, laboratory results, and x-ray films.       My assessment of this patient's clinical condition and my plan of care is as follows.    Assessment / Plan:      JIGNESH on CKD  HTN    -Reports chronic/intermittent dehydration, getting IV fluid as infusion every week, reported SBP 60s at home  - Creatinine 2.61 on admission, baseline around 1.0  - Received 1 L bolus in the ED  - Admit to medical floor  - Check CK, continue IVF  Get CT abdomen  If no improvement, may need nephro eval,   Hold olmesartan        Chronic pain  History of occipital neuralgia  Reported history of lupus  Depression      -Continue Lyrica(takes 200 mg twice daily), reduced to 100 mg twice daily for now due to JIGNESH  - Continue PTA  Seroquel 100 mg p.o. twice daily  - Does not take Cymbalta anymore  - Continue PTA mirtazapine  - Continue Zanaflex    GERD, continue omeprazole    HTN        Medical Decision Making:   I personally reviewed labs: CBC/BMP  I personally reviewed imaging:  I personally reviewed EKG:NSR  Toxic drug monitoring:   Discussed case with: ED provider. After discussion I am in agreement that acuity of patient's medical condition necessitates hospital stay.      Code Status: Full code  DVT Prophylaxis: Lovenox  Baseline: Independent    Subjective:   CHIEF COMPLAINT: Back pain    HISTORY OF PRESENT ILLNESS:     Monique Goodman is a 39 y.o.  female with PMHx significant for anxiety and depression, arthritis, reported history of lupus, Garth-Danlos  pulse 2+. Capillary refill normal  Neurologic: No facial asymmetry. No aphasia or slurred speech. Symmetrical strength, Sensation grossly intact. AAOx4.         LAB DATA REVIEWED:    Recent Results (from the past 12 hours)   EKG 12 Lead    Collection Time: 07/08/25 12:58 PM   Result Value Ref Range    Ventricular Rate 64 BPM    Atrial Rate 64 BPM    P-R Interval 162 ms    QRS Duration 74 ms    Q-T Interval 374 ms    QTc Calculation (Bazett) 385 ms    P Axis 44 degrees    R Axis 80 degrees    T Axis 56 degrees    Diagnosis       Normal sinus rhythm  Normal ECG  When compared with ECG of 09-MAR-2025 01:55,  fusion complexes are no longer present  ST elevation now present in Inferior leads  T wave inversion no longer evident in Inferior leads  Confirmed by MD Lyn Michael ([id number]) on 7/8/2025 1:40:20 PM     CBC with Auto Differential    Collection Time: 07/08/25  1:01 PM   Result Value Ref Range    WBC 10.0 3.6 - 11.0 K/uL    RBC 3.71 (L) 3.80 - 5.20 M/uL    Hemoglobin 10.9 (L) 11.5 - 16.0 g/dL    Hematocrit 34.5 (L) 35.0 - 47.0 %    MCV 93.0 80.0 - 99.0 FL    MCH 29.4 26.0 - 34.0 PG    MCHC 31.6 30.0 - 36.5 g/dL    RDW 14.0 11.5 - 14.5 %    Platelets 339 150 - 400 K/uL    MPV 9.8 8.9 - 12.9 FL    Nucleated RBCs 0.0 0  WBC    nRBC 0.00 0.00 - 0.01 K/uL    Neutrophils % 53.3 32.0 - 75.0 %    Lymphocytes % 29.6 12.0 - 49.0 %    Monocytes % 12.9 5.0 - 13.0 %    Eosinophils % 3.3 0.0 - 7.0 %    Basophils % 0.6 0.0 - 1.0 %    Immature Granulocytes % 0.3 0.0 - 0.5 %    Neutrophils Absolute 5.31 1.80 - 8.00 K/UL    Lymphocytes Absolute 2.95 0.80 - 3.50 K/UL    Monocytes Absolute 1.28 (H) 0.00 - 1.00 K/UL    Eosinophils Absolute 0.33 0.00 - 0.40 K/UL    Basophils Absolute 0.06 0.00 - 0.10 K/UL    Immature Granulocytes Absolute 0.03 0.00 - 0.04 K/UL    Differential Type AUTOMATED     Comprehensive Metabolic Panel    Collection Time: 07/08/25  1:01 PM   Result Value Ref Range    Sodium 135 (L) 136 - 145 mmol/L

## 2025-07-09 ENCOUNTER — APPOINTMENT (OUTPATIENT)
Facility: HOSPITAL | Age: 40
End: 2025-07-09
Payer: MEDICAID

## 2025-07-09 LAB
ALBUMIN SERPL-MCNC: 3.6 G/DL (ref 3.5–5)
ALBUMIN/GLOB SERPL: 1.4 (ref 1.1–2.2)
ALP SERPL-CCNC: 118 U/L (ref 45–117)
ALT SERPL-CCNC: 17 U/L (ref 12–78)
ANION GAP SERPL CALC-SCNC: 6 MMOL/L (ref 2–12)
AST SERPL-CCNC: 10 U/L (ref 15–37)
BASOPHILS # BLD: 0.03 K/UL (ref 0–0.1)
BASOPHILS NFR BLD: 0.4 % (ref 0–1)
BILIRUB SERPL-MCNC: 0.2 MG/DL (ref 0.2–1)
BUN SERPL-MCNC: 32 MG/DL (ref 6–20)
BUN/CREAT SERPL: 12 (ref 12–20)
CALCIUM SERPL-MCNC: 8.5 MG/DL (ref 8.5–10.1)
CHLORIDE SERPL-SCNC: 113 MMOL/L (ref 97–108)
CHLORIDE UR-SCNC: 54 MMOL/L
CO2 SERPL-SCNC: 20 MMOL/L (ref 21–32)
CREAT SERPL-MCNC: 2.67 MG/DL (ref 0.55–1.02)
CREAT UR-MCNC: 29 MG/DL
CREAT UR-MCNC: 36.2 MG/DL
DIFFERENTIAL METHOD BLD: ABNORMAL
EOSINOPHIL # BLD: 0.33 K/UL (ref 0–0.4)
EOSINOPHIL NFR BLD: 4.6 % (ref 0–7)
ERYTHROCYTE [DISTWIDTH] IN BLOOD BY AUTOMATED COUNT: 14.1 % (ref 11.5–14.5)
GLOBULIN SER CALC-MCNC: 2.6 G/DL (ref 2–4)
GLUCOSE SERPL-MCNC: 121 MG/DL (ref 65–100)
HCT VFR BLD AUTO: 33.9 % (ref 35–47)
HGB BLD-MCNC: 10.3 G/DL (ref 11.5–16)
IMM GRANULOCYTES # BLD AUTO: 0.02 K/UL (ref 0–0.04)
IMM GRANULOCYTES NFR BLD AUTO: 0.3 % (ref 0–0.5)
LYMPHOCYTES # BLD: 3.24 K/UL (ref 0.8–3.5)
LYMPHOCYTES NFR BLD: 45 % (ref 12–49)
MAGNESIUM SERPL-MCNC: 2.4 MG/DL (ref 1.6–2.4)
MCH RBC QN AUTO: 29.4 PG (ref 26–34)
MCHC RBC AUTO-ENTMCNC: 30.4 G/DL (ref 30–36.5)
MCV RBC AUTO: 96.9 FL (ref 80–99)
MONOCYTES # BLD: 1.02 K/UL (ref 0–1)
MONOCYTES NFR BLD: 14.2 % (ref 5–13)
NEUTS SEG # BLD: 2.56 K/UL (ref 1.8–8)
NEUTS SEG NFR BLD: 35.5 % (ref 32–75)
NRBC # BLD: 0 K/UL (ref 0–0.01)
NRBC BLD-RTO: 0 PER 100 WBC
PHOSPHATE SERPL-MCNC: 6.1 MG/DL (ref 2.6–4.7)
PLATELET # BLD AUTO: 284 K/UL (ref 150–400)
PMV BLD AUTO: 10 FL (ref 8.9–12.9)
POTASSIUM SERPL-SCNC: 5.1 MMOL/L (ref 3.5–5.1)
PROT SERPL-MCNC: 6.2 G/DL (ref 6.4–8.2)
PROT UR-MCNC: 19 MG/DL (ref 0–11.9)
PROT UR-MCNC: 23 MG/DL (ref 0–11.9)
PROT/CREAT UR-RTO: 0.5
PROT/CREAT UR-RTO: 0.8
RBC # BLD AUTO: 3.5 M/UL (ref 3.8–5.2)
SODIUM SERPL-SCNC: 139 MMOL/L (ref 136–145)
SODIUM UR-SCNC: 59 MMOL/L
WBC # BLD AUTO: 7.2 K/UL (ref 3.6–11)

## 2025-07-09 PROCEDURE — 83735 ASSAY OF MAGNESIUM: CPT

## 2025-07-09 PROCEDURE — 2580000003 HC RX 258

## 2025-07-09 PROCEDURE — 71045 X-RAY EXAM CHEST 1 VIEW: CPT

## 2025-07-09 PROCEDURE — 51798 US URINE CAPACITY MEASURE: CPT

## 2025-07-09 PROCEDURE — 36415 COLL VENOUS BLD VENIPUNCTURE: CPT

## 2025-07-09 PROCEDURE — 6360000002 HC RX W HCPCS: Performed by: INTERNAL MEDICINE

## 2025-07-09 PROCEDURE — 85025 COMPLETE CBC W/AUTO DIFF WBC: CPT

## 2025-07-09 PROCEDURE — 82436 ASSAY OF URINE CHLORIDE: CPT

## 2025-07-09 PROCEDURE — 2500000003 HC RX 250 WO HCPCS: Performed by: INTERNAL MEDICINE

## 2025-07-09 PROCEDURE — 2060000000 HC ICU INTERMEDIATE R&B

## 2025-07-09 PROCEDURE — 82570 ASSAY OF URINE CREATININE: CPT

## 2025-07-09 PROCEDURE — 84156 ASSAY OF PROTEIN URINE: CPT

## 2025-07-09 PROCEDURE — 2580000003 HC RX 258: Performed by: INTERNAL MEDICINE

## 2025-07-09 PROCEDURE — 6370000000 HC RX 637 (ALT 250 FOR IP): Performed by: INTERNAL MEDICINE

## 2025-07-09 PROCEDURE — 84100 ASSAY OF PHOSPHORUS: CPT

## 2025-07-09 PROCEDURE — 80053 COMPREHEN METABOLIC PANEL: CPT

## 2025-07-09 PROCEDURE — 84300 ASSAY OF URINE SODIUM: CPT

## 2025-07-09 PROCEDURE — 6370000000 HC RX 637 (ALT 250 FOR IP)

## 2025-07-09 RX ORDER — LIDOCAINE 4 G/G
1 PATCH TOPICAL DAILY
Status: DISCONTINUED | OUTPATIENT
Start: 2025-07-09 | End: 2025-07-11 | Stop reason: HOSPADM

## 2025-07-09 RX ORDER — 0.9 % SODIUM CHLORIDE 0.9 %
500 INTRAVENOUS SOLUTION INTRAVENOUS ONCE
Status: COMPLETED | OUTPATIENT
Start: 2025-07-09 | End: 2025-07-09

## 2025-07-09 RX ORDER — MIDODRINE HYDROCHLORIDE 5 MG/1
5 TABLET ORAL
Status: DISCONTINUED | OUTPATIENT
Start: 2025-07-09 | End: 2025-07-10

## 2025-07-09 RX ADMIN — HYDROMORPHONE HYDROCHLORIDE 0.5 MG: 1 INJECTION, SOLUTION INTRAMUSCULAR; INTRAVENOUS; SUBCUTANEOUS at 08:14

## 2025-07-09 RX ADMIN — TIZANIDINE 4 MG: 4 TABLET ORAL at 20:30

## 2025-07-09 RX ADMIN — SODIUM CHLORIDE 500 ML: 0.9 INJECTION, SOLUTION INTRAVENOUS at 04:46

## 2025-07-09 RX ADMIN — HYDROMORPHONE HYDROCHLORIDE 0.5 MG: 1 INJECTION, SOLUTION INTRAMUSCULAR; INTRAVENOUS; SUBCUTANEOUS at 22:21

## 2025-07-09 RX ADMIN — ONDANSETRON 4 MG: 2 INJECTION, SOLUTION INTRAMUSCULAR; INTRAVENOUS at 22:22

## 2025-07-09 RX ADMIN — QUETIAPINE FUMARATE 100 MG: 100 TABLET ORAL at 20:24

## 2025-07-09 RX ADMIN — DIAZEPAM 5 MG: 5 TABLET ORAL at 15:17

## 2025-07-09 RX ADMIN — HYDROMORPHONE HYDROCHLORIDE 0.5 MG: 1 INJECTION, SOLUTION INTRAMUSCULAR; INTRAVENOUS; SUBCUTANEOUS at 20:20

## 2025-07-09 RX ADMIN — ACETAMINOPHEN 500 MG: 500 TABLET ORAL at 20:24

## 2025-07-09 RX ADMIN — MIDODRINE HYDROCHLORIDE 5 MG: 5 TABLET ORAL at 16:30

## 2025-07-09 RX ADMIN — PREGABALIN 100 MG: 100 CAPSULE ORAL at 08:15

## 2025-07-09 RX ADMIN — HYDROMORPHONE HYDROCHLORIDE 0.5 MG: 1 INJECTION, SOLUTION INTRAMUSCULAR; INTRAVENOUS; SUBCUTANEOUS at 16:30

## 2025-07-09 RX ADMIN — ENOXAPARIN SODIUM 30 MG: 100 INJECTION SUBCUTANEOUS at 20:31

## 2025-07-09 RX ADMIN — HYDROMORPHONE HYDROCHLORIDE 0.5 MG: 1 INJECTION, SOLUTION INTRAMUSCULAR; INTRAVENOUS; SUBCUTANEOUS at 12:24

## 2025-07-09 RX ADMIN — MIDODRINE HYDROCHLORIDE 5 MG: 5 TABLET ORAL at 04:45

## 2025-07-09 RX ADMIN — PANTOPRAZOLE SODIUM 40 MG: 40 TABLET, DELAYED RELEASE ORAL at 08:15

## 2025-07-09 RX ADMIN — PREGABALIN 100 MG: 100 CAPSULE ORAL at 20:31

## 2025-07-09 RX ADMIN — SODIUM CHLORIDE: 0.9 INJECTION, SOLUTION INTRAVENOUS at 20:19

## 2025-07-09 RX ADMIN — PROPRANOLOL HYDROCHLORIDE 10 MG: 10 TABLET ORAL at 20:23

## 2025-07-09 RX ADMIN — SODIUM CHLORIDE: 0.9 INJECTION, SOLUTION INTRAVENOUS at 09:33

## 2025-07-09 RX ADMIN — QUETIAPINE FUMARATE 100 MG: 100 TABLET ORAL at 08:15

## 2025-07-09 RX ADMIN — HYDROMORPHONE HYDROCHLORIDE 0.5 MG: 1 INJECTION, SOLUTION INTRAMUSCULAR; INTRAVENOUS; SUBCUTANEOUS at 01:14

## 2025-07-09 RX ADMIN — ACETAMINOPHEN 500 MG: 500 TABLET ORAL at 14:19

## 2025-07-09 RX ADMIN — SODIUM CHLORIDE, PRESERVATIVE FREE 10 ML: 5 INJECTION INTRAVENOUS at 20:20

## 2025-07-09 RX ADMIN — MIDODRINE HYDROCHLORIDE 5 MG: 5 TABLET ORAL at 11:30

## 2025-07-09 RX ADMIN — ONDANSETRON 4 MG: 2 INJECTION, SOLUTION INTRAMUSCULAR; INTRAVENOUS at 06:49

## 2025-07-09 RX ADMIN — ACETAMINOPHEN 500 MG: 500 TABLET ORAL at 04:53

## 2025-07-09 RX ADMIN — MIRTAZAPINE 30 MG: 15 TABLET, FILM COATED ORAL at 20:24

## 2025-07-09 RX ADMIN — SODIUM CHLORIDE 500 ML: 0.9 INJECTION, SOLUTION INTRAVENOUS at 03:23

## 2025-07-09 RX ADMIN — ENOXAPARIN SODIUM 30 MG: 100 INJECTION SUBCUTANEOUS at 08:15

## 2025-07-09 ASSESSMENT — PAIN DESCRIPTION - ORIENTATION
ORIENTATION: RIGHT;LEFT
ORIENTATION: RIGHT
ORIENTATION: RIGHT;LEFT

## 2025-07-09 ASSESSMENT — PAIN DESCRIPTION - LOCATION
LOCATION: BACK
LOCATION: BACK;FLANK
LOCATION: BACK;FLANK
LOCATION: BACK
LOCATION: BACK;FLANK
LOCATION: FLANK
LOCATION: BACK
LOCATION: BACK;FLANK
LOCATION: FLANK

## 2025-07-09 ASSESSMENT — PAIN DESCRIPTION - DESCRIPTORS
DESCRIPTORS: SHOOTING;STABBING
DESCRIPTORS: THROBBING;TIGHTNESS
DESCRIPTORS: ACHING;THROBBING
DESCRIPTORS: ACHING
DESCRIPTORS: THROBBING;ACHING
DESCRIPTORS: SHARP;STABBING
DESCRIPTORS: ACHING;STABBING
DESCRIPTORS: ACHING;THROBBING
DESCRIPTORS: TIGHTNESS;THROBBING

## 2025-07-09 ASSESSMENT — PAIN SCALES - GENERAL
PAINLEVEL_OUTOF10: 6
PAINLEVEL_OUTOF10: 5
PAINLEVEL_OUTOF10: 8
PAINLEVEL_OUTOF10: 6
PAINLEVEL_OUTOF10: 7
PAINLEVEL_OUTOF10: 8
PAINLEVEL_OUTOF10: 8
PAINLEVEL_OUTOF10: 9
PAINLEVEL_OUTOF10: 10
PAINLEVEL_OUTOF10: 8
PAINLEVEL_OUTOF10: 3
PAINLEVEL_OUTOF10: 5
PAINLEVEL_OUTOF10: 7
PAINLEVEL_OUTOF10: 6
PAINLEVEL_OUTOF10: 7

## 2025-07-09 NOTE — PROGRESS NOTES
[facility] 7p-7a:    Nursing contacted Nocturnist/cross cover provider via [employer] and notified that patient is hypotensive, map 60-66. Reportedly SBP sometimes 60s at home. She was given fluid boluses for dehydration, dizziness, and JIGNESH yesterday. Complaining of severe back pain since presenting to ER. She was given a liter overnight and started on midodrine. Will transfer to stepdown for closer monitoring of bp, bladder scan, and eval labs.      Radha COMER

## 2025-07-09 NOTE — CONSULTS
[facility]         NAME:Monique Goodman  MRN:[id number]   :1985      Jignesh due to dehydration    Continue ivf  Check urine lytes  Follow bmp  Avoid acei or arb        Blood pressure 128/73, pulse 64, temperature 97.9 °F (36.6 °C), temperature source Oral, resp. rate 20, height 1.6 m (5' 3\"), weight 121.1 kg (267 lb), SpO2 99%.    Dizziness [R42]  JIGNESH (acute kidney injury) [N17.9]  Syncope, near [R55]      Thank You.    Dilan Cunha MD

## 2025-07-09 NOTE — PROGRESS NOTES
TRANSFER - OUT REPORT:    Verbal report given to [place] ANSARI on Monique Goodman  being transferred to [facility] for change in patient condition (Hypotension)       Report consisted of patient's Situation, Background, Assessment and   Recommendations(SBAR).     Information from the following report(s) Nurse Handoff Report, MAR, Recent Results, Cardiac Rhythm NSR-Sinus Carmelo, and Alarm Parameters was reviewed with the receiving nurse.  [place] Assessment: Presents to emergency department  because of falls (Syncope, seizure, or loss of consciousness): No, Age > 70: No, Altered Mental Status, Intoxication with alcohol or substance confusion (Disorientation, impaired judgment, poor safety awaremess, or inability to follow instructions): No, Impaired Mobility: Ambulates or transfers with assistive devices or assistance; Unable to ambulate or transer.: No, Nursing Judgement: Yes (hypotensive upon arrival)  Lines:   Peripheral IV 07/08/25 Left Antecubital (Active)   Site Assessment Clean, dry & intact 07/09/25 0335   Line Status Infusing 07/09/25 0335   Line Care Connections checked and tightened;Ports disinfected 07/09/25 0335   Phlebitis Assessment No symptoms 07/09/25 0335   Infiltration Assessment 0 07/09/25 0335   Alcohol Cap Used Yes 07/09/25 0335   Dressing Status Clean, dry & intact 07/09/25 0335   Dressing Type Transparent 07/09/25 0335   Dressing Intervention New 07/08/25 1305        Opportunity for questions and clarification was provided.      Patient transported with:  Monitor, Registered Nurse, and Tech      Patient SBP on 80s-90s, complaints of severe Flank pain. Notified NP Abernathie given 1L bolus and midodrine, BP not responsive to fluids. Labs taken, Bladder scan done 56 ml.Ordered to transfer to [facility].

## 2025-07-09 NOTE — PLAN OF CARE
Problem: Discharge Planning  Goal: Discharge to home or other facility with appropriate resources  7/9/2025 1007 by Moni Mansfield RN  Outcome: Progressing  7/9/2025 1007 by Moni Mansfield RN  Outcome: Progressing  7/8/2025 2241 by Elly Bowen RN  Outcome: Progressing     Problem: Pain  Goal: Verbalizes/displays adequate comfort level or baseline comfort level  7/9/2025 1007 by Moni Mansfield RN  Outcome: Progressing  7/9/2025 1007 by Moni Mansfield RN  Outcome: Progressing  7/8/2025 2241 by Elly Bowen RN  Outcome: Progressing     Problem: Neurosensory - Adult  Goal: Achieves stable or improved neurological status  7/9/2025 1007 by Moni Mansfield RN  Outcome: Progressing  7/9/2025 1007 by Moni Mansfield RN  Outcome: Progressing  7/8/2025 2241 by Elly Bowen RN  Outcome: Progressing     Problem: Cardiovascular - Adult  Goal: Maintains optimal cardiac output and hemodynamic stability  7/9/2025 1007 by Moni Mansfield RN  Outcome: Progressing  7/9/2025 1007 by Moni Mansfield RN  Outcome: Progressing  7/8/2025 2241 by Elly Bowen RN  Outcome: Progressing  Goal: Absence of cardiac dysrhythmias or at baseline  7/9/2025 1007 by Moni Mansfield RN  Outcome: Progressing  7/9/2025 1007 by Moni Mansfield RN  Outcome: Progressing  7/8/2025 2241 by Elly Bowen RN  Outcome: Progressing     Problem: Gastrointestinal - Adult  Goal: Minimal or absence of nausea and vomiting  7/9/2025 1007 by Moni Mansfield RN  Outcome: Progressing  7/9/2025 1007 by Moni Mansfield RN  Outcome: Progressing  7/8/2025 2241 by Elly Bowen RN  Outcome: Progressing  Goal: Maintains or returns to baseline bowel function  7/9/2025 1007 by Moni Mansfield RN  Outcome: Progressing  7/9/2025 1007 by Moni Mansfield RN  Outcome: Progressing  7/8/2025 2241 by Elly Bowen RN  Outcome: Progressing     Problem: Genitourinary - Adult  Goal: Absence of urinary retention  7/9/2025

## 2025-07-09 NOTE — PROGRESS NOTES
Hospitalist Progress Note    NAME:   Monique Goodman   : 1985   MRN: [id number]     Date/Time: 2025 7:38 AM  Patient PCP: Javi Krishnan MD    Estimated discharge date:  Barriers:       Assessment / Plan:    JIGNESH on CKD  HTN     -Reports chronic/intermittent dehydration, getting IV fluid as infusion every week, reported SBP 60s at home  - UA with trace proteinuria , no signs of infection , CK- 91   - CT abd/pelvis:non obstructive nephrolithiasis, distended urinary bladder with resulting minimal bilateral hydroureteronephrosis  - Creatinine 2.61 on admission, baseline around 1.0, at present no improvement with IV hydration, creatinine remains at 2.67. K-5.1, P-6.1   - S/p bolus , continue with maintenance fluid   -CT noted for  517ml of urinary retention, repeat bladder scan -56ml   - Patient follows up with urology clinic .  -Monitor for intake and output, urine electrolytes ordered   -Hold olmesartan  - Appreciated nephrology recs            Chronic pain  History of occipital neuralgia  Reported history of lupus  Depression        -Continue Lyrica(takes 200 mg twice daily), reduced to 100 mg twice daily for now due to JIGNESH  - Continue PTA  Seroquel 100 mg p.o. twice daily  - Does not take Cymbalta anymore  - Continue PTA mirtazapine  - Continue Zanaflex     GERD, continue omeprazole     HTN  - Presented with hypotension .No signs of sepsis .  Last dose of Olmesartan was on . Not on diuretics at present .  Am cortisol ordered . XR chest for intermittent SOB.   -Continue with Midodrine                 Medical Decision Making:   I personally reviewed labs:Yes   I personally reviewed imaging:  I personally reviewed EKG:  Toxic drug monitoring:  Lovenox- H&H   Discussed case with: Patient, mother ,RN         Code Status: Full   DVT Prophylaxis:   GI Prophylaxis:    Subjective:     Chief Complaint / Reason for Physician Visit  \"States slow iv fluid does not improve her kidney function. States

## 2025-07-09 NOTE — PLAN OF CARE
Problem: Discharge Planning  Goal: Discharge to home or other facility with appropriate resources  Outcome: Progressing     Problem: Pain  Goal: Verbalizes/displays adequate comfort level or baseline comfort level  Outcome: Progressing     Problem: Neurosensory - Adult  Goal: Achieves stable or improved neurological status  Outcome: Progressing     Problem: Cardiovascular - Adult  Goal: Maintains optimal cardiac output and hemodynamic stability  Outcome: Progressing  Goal: Absence of cardiac dysrhythmias or at baseline  Outcome: Progressing     Problem: Gastrointestinal - Adult  Goal: Minimal or absence of nausea and vomiting  Outcome: Progressing  Goal: Maintains or returns to baseline bowel function  Outcome: Progressing     Problem: Genitourinary - Adult  Goal: Absence of urinary retention  Outcome: Progressing     Problem: Infection - Adult  Goal: Absence of infection at discharge  Outcome: Progressing  Goal: Absence of infection during hospitalization  Outcome: Progressing     Problem: Metabolic/Fluid and Electrolytes - Adult  Goal: Electrolytes maintained within normal limits  Outcome: Progressing  Goal: Hemodynamic stability and optimal renal function maintained  Outcome: Progressing

## 2025-07-09 NOTE — PROGRESS NOTES
Spiritual Health History and Assessment/Progress Note  [facility] [place]    Loneliness/Social Isolation,  ,  ,      Name: Monique Goodman MRN: [id number]    Age: 39 y.o.     Sex: female   Language: English   Islam: Protestant   Syncope, near     Date: 7/9/2025            Total Time Calculated: 14 min              Spiritual Assessment began in [facility] CARDIAC MEDICAL STEP DOWN        Referral/Consult From: Rounding   Encounter Overview/Reason: Loneliness/Social Isolation  Service Provided For: Patient and family together    Maria Elena, Belief, Meaning:   Patient identifies as spiritual, is connected with a maria elena tradition or spiritual practice, and has beliefs or practices that help with coping during difficult times  Family/Friends identify as spiritual, are connected with a maria elena tradition or spiritual practice, and have beliefs or practices that help with coping during difficult times      Importance and Influence:  Patient has no beliefs influential to healthcare decision-making identified during this visit  Family/Friends have no beliefs influential to healthcare decision-making identified during this visit    Community:  Patient is connected with a spiritual community and feels well-supported. Support system includes: Parent/s and Maria Elena Community  Family/Friends Other: did not assess    Assessment and Plan of Care:     Patient Interventions include: Facilitated expression of thoughts and feelings and Affirmed coping skills/support systems  Family/Friends Interventions include: Affirmed coping skills/support systems    Patient Plan of Care: No spiritual needs identified for follow-up and Spiritual Care available upon further referral  Family/Friends Plan of Care: No spiritual needs identified for follow-up    Electronically signed by Chaplain DAVE [facility] on 7/9/2025 at 10:46 AM

## 2025-07-09 NOTE — PROGRESS NOTES
Nephrology Progress Note  [facility] / [place] Office  [address], [address]  [place] [address]  Phone - [phone]  Fax - [phone]                   Patient: Monique Goodman                     YOB: 1985        Date- 7/9/2025                                     Admit Date: 7/8/2025   CC: Follow up for jignesh          IMPRESSION & PLAN:   JIGNESH -due to dehydration   Ckd  H/o edema  Htn  GERD      PLAN-  Continue ivf- NACL  Check urine lytes  Follow bmp  Avoid acei or arb     Subjective:  Interval History:   -  she is admitteed with flank pain and back pain.  She has h/o edema in past.she is not taking diuretics  She has h/o htn. She is not taking her olmesartan on daily basis.  She took 3 tab olmesartan in last 1 week  Her bp was low in er  Her cr increased to 2.67  Her cr 1.0 in June 2025  She has h/o multiple jignesh in past.  She has remote h/o lupus  She is not on any lupus meds. She is not followed by any rheumatologist    Objective:   Vitals:    07/09/25 1254 07/09/25 1510 07/09/25 1630 07/09/25 1700   BP:  128/67 134/84    Pulse:  70 76    Resp: 17 15 15 18   Temp:  97.8 °F (36.6 °C) 97.8 °F (36.6 °C)    TempSrc:  Oral Oral    SpO2:  97%     Weight:       Height:          I/O last 3 completed shifts:  In: 2504.7 [P.O.:100; I.V.:2404.7]  Out: 800 [Urine:800]  No intake/output data recorded.      Physical exam:    GEN: NAD  NECK- no mass  RESP: No wheezing, decreased BS b/l  CVS: S1,S2  RRR  NEURO: Normal speech, Non focal  EXT: trace Edema   PSYCH: Normal Mood    Chart reviewed.         Pertinent Notes reviewed.       Data Review :  Lab Results   Component Value Date/Time     07/09/2025 04:39 AM    K 5.1 07/09/2025 04:39 AM     07/09/2025 04:39 AM    CO2 20 07/09/2025 04:39 AM    BUN 32 07/09/2025 04:39 AM    CREATININE 2.67 07/09/2025 04:39 AM    GLUCOSE 121 07/09/2025 04:39 AM    CALCIUM 8.5 07/09/2025 04:39 AM  Line)  10 mEq IntraVENous PRN    magnesium sulfate 2000 mg in 50 mL IVPB premix  2,000 mg IntraVENous PRN    enoxaparin Sodium (LOVENOX) injection 30 mg  30 mg SubCUTAneous BID    ondansetron (ZOFRAN-ODT) disintegrating tablet 4 mg  4 mg Oral Q8H PRN    Or    ondansetron (ZOFRAN) injection 4 mg  4 mg IntraVENous Q6H PRN    polyethylene glycol (GLYCOLAX) packet 17 g  17 g Oral Daily PRN    acetaminophen (TYLENOL) tablet 650 mg  650 mg Oral Q6H PRN    Or    acetaminophen (TYLENOL) suppository 650 mg  650 mg Rectal Q6H PRN    acetaminophen (TYLENOL) tablet 500 mg  500 mg Oral 3 times per day    mirtazapine (REMERON) tablet 30 mg  30 mg Oral Nightly    pantoprazole (PROTONIX) tablet 40 mg  40 mg Oral QAM AC    albuterol (PROVENTIL) (2.5 MG/3ML) 0.083% nebulizer solution 1.25 mg  1.25 mg Nebulization Q6H PRN    0.9 % sodium chloride infusion   IntraVENous Continuous    propranolol (INDERAL) immediate release tablet 10 mg  10 mg Oral BID    QUEtiapine (SEROQUEL) tablet 100 mg  100 mg Oral BID    pregabalin (LYRICA) capsule 100 mg  100 mg Oral BID    tiZANidine (ZANAFLEX) tablet 4 mg  4 mg Oral Q8H PRN    HYDROmorphone HCl PF (DILAUDID) injection 0.5 mg  0.5 mg IntraVENous Q4H PRN    diazePAM (VALIUM) tablet 5 mg  5 mg Oral Q12H PRN        Dilan Cunha MD  7/9/2025

## 2025-07-09 NOTE — PROGRESS NOTES
End of Shift Note    Bedside shift change report given to Eric ANSARI (oncoming nurse) by Moni Mansfield RN (offgoing nurse).  Report included the following information SBAR, ED Summary, MAR, Recent Results, and Cardiac Rhythm NSR/zak    Shift worked:  [id number]     Shift summary and any significant changes:     No significant changes     Concerns for physician to address:  N/a     Zone phone for oncoming shift:   [phone]       Activity:  Level of Assistance: Independent  Number times ambulated in hallways past shift: 0  Number of times OOB to chair past shift: 0    Cardiac:   Cardiac Monitoring: Yes      Cardiac Rhythm: Sinus zak    Access:  Current line(s): PIV     Genitourinary:   Urinary Status: Voiding, Bathroom privileges    Respiratory:   O2 Device: None (Room air)  Chronic home O2 use?: NO  Incentive spirometer at bedside: NO    GI:  Last BM (including prior to admit): 07/07/25  Current diet:  ADULT DIET; Regular  Passing flatus: YES    Pain Management:   Patient states pain is manageable on current regimen: YES    Skin:  Vish Scale Score: 21  Interventions: Wound Offloading (Prevention Methods): Turning    Patient Safety:  Fall Risk: Nursing Judgement-Fall Risk High(Add Comments): No  Fall Risk Interventions  Nursing Judgement-Fall Risk High(Add Comments): No  Toilet Every 2 Hours-In Advance of Need: Yes  Hourly Visual Checks: Awake, In bed  Fall Visual Posted: Socks  Room Door Open: Deferred to promote rest  Alarm On: Bed  Patient Moved Closer to Nursing Station: No    Active Consults:   IP CONSULT TO HOSPITALIST  IP CONSULT TO NEPHROLOGY    Length of Stay:  Expected LOS: 3  Actual LOS: 1    Moni Mansfield RN

## 2025-07-10 LAB
ALBUMIN SERPL-MCNC: 3.6 G/DL (ref 3.5–5)
ANION GAP SERPL CALC-SCNC: 3 MMOL/L (ref 2–12)
BUN SERPL-MCNC: 15 MG/DL (ref 6–20)
BUN/CREAT SERPL: 12 (ref 12–20)
CALCIUM SERPL-MCNC: 9.1 MG/DL (ref 8.5–10.1)
CHLORIDE SERPL-SCNC: 113 MMOL/L (ref 97–108)
CO2 SERPL-SCNC: 24 MMOL/L (ref 21–32)
CORTIS AM PEAK SERPL-MCNC: 4 UG/DL (ref 4.3–22.45)
CREAT SERPL-MCNC: 1.22 MG/DL (ref 0.55–1.02)
ERYTHROCYTE [DISTWIDTH] IN BLOOD BY AUTOMATED COUNT: 13.8 % (ref 11.5–14.5)
GLUCOSE SERPL-MCNC: 149 MG/DL (ref 65–100)
HCT VFR BLD AUTO: 34.4 % (ref 35–47)
HGB BLD-MCNC: 10.7 G/DL (ref 11.5–16)
MAGNESIUM SERPL-MCNC: 2 MG/DL (ref 1.6–2.4)
MCH RBC QN AUTO: 29.4 PG (ref 26–34)
MCHC RBC AUTO-ENTMCNC: 31.1 G/DL (ref 30–36.5)
MCV RBC AUTO: 94.5 FL (ref 80–99)
NRBC # BLD: 0 K/UL (ref 0–0.01)
NRBC BLD-RTO: 0 PER 100 WBC
PHOSPHATE SERPL-MCNC: 2.7 MG/DL (ref 2.6–4.7)
PLATELET # BLD AUTO: 317 K/UL (ref 150–400)
PMV BLD AUTO: 10.3 FL (ref 8.9–12.9)
POTASSIUM SERPL-SCNC: 4.6 MMOL/L (ref 3.5–5.1)
RBC # BLD AUTO: 3.64 M/UL (ref 3.8–5.2)
SODIUM SERPL-SCNC: 140 MMOL/L (ref 136–145)
WBC # BLD AUTO: 6.4 K/UL (ref 3.6–11)

## 2025-07-10 PROCEDURE — 6370000000 HC RX 637 (ALT 250 FOR IP): Performed by: INTERNAL MEDICINE

## 2025-07-10 PROCEDURE — 2580000003 HC RX 258: Performed by: STUDENT IN AN ORGANIZED HEALTH CARE EDUCATION/TRAINING PROGRAM

## 2025-07-10 PROCEDURE — 2500000003 HC RX 250 WO HCPCS: Performed by: INTERNAL MEDICINE

## 2025-07-10 PROCEDURE — 36415 COLL VENOUS BLD VENIPUNCTURE: CPT

## 2025-07-10 PROCEDURE — 80069 RENAL FUNCTION PANEL: CPT

## 2025-07-10 PROCEDURE — 2060000000 HC ICU INTERMEDIATE R&B

## 2025-07-10 PROCEDURE — 6360000002 HC RX W HCPCS: Performed by: INTERNAL MEDICINE

## 2025-07-10 PROCEDURE — 86038 ANTINUCLEAR ANTIBODIES: CPT

## 2025-07-10 PROCEDURE — 86160 COMPLEMENT ANTIGEN: CPT

## 2025-07-10 PROCEDURE — 2580000003 HC RX 258: Performed by: INTERNAL MEDICINE

## 2025-07-10 PROCEDURE — 83735 ASSAY OF MAGNESIUM: CPT

## 2025-07-10 PROCEDURE — 82533 TOTAL CORTISOL: CPT

## 2025-07-10 PROCEDURE — 6370000000 HC RX 637 (ALT 250 FOR IP)

## 2025-07-10 PROCEDURE — 85027 COMPLETE CBC AUTOMATED: CPT

## 2025-07-10 RX ORDER — MIDODRINE HYDROCHLORIDE 5 MG/1
5 TABLET ORAL 3 TIMES DAILY PRN
Status: DISCONTINUED | OUTPATIENT
Start: 2025-07-10 | End: 2025-07-11 | Stop reason: HOSPADM

## 2025-07-10 RX ADMIN — QUETIAPINE FUMARATE 100 MG: 100 TABLET ORAL at 21:18

## 2025-07-10 RX ADMIN — SODIUM CHLORIDE: 0.9 INJECTION, SOLUTION INTRAVENOUS at 07:07

## 2025-07-10 RX ADMIN — PROPRANOLOL HYDROCHLORIDE 10 MG: 10 TABLET ORAL at 21:29

## 2025-07-10 RX ADMIN — MIDODRINE HYDROCHLORIDE 5 MG: 5 TABLET ORAL at 09:48

## 2025-07-10 RX ADMIN — SODIUM CHLORIDE, PRESERVATIVE FREE 10 ML: 5 INJECTION INTRAVENOUS at 09:50

## 2025-07-10 RX ADMIN — MIRTAZAPINE 30 MG: 15 TABLET, FILM COATED ORAL at 21:18

## 2025-07-10 RX ADMIN — HYDROMORPHONE HYDROCHLORIDE 0.5 MG: 1 INJECTION, SOLUTION INTRAMUSCULAR; INTRAVENOUS; SUBCUTANEOUS at 23:42

## 2025-07-10 RX ADMIN — ENOXAPARIN SODIUM 30 MG: 100 INJECTION SUBCUTANEOUS at 09:47

## 2025-07-10 RX ADMIN — DIAZEPAM 5 MG: 5 TABLET ORAL at 22:48

## 2025-07-10 RX ADMIN — TIZANIDINE 4 MG: 4 TABLET ORAL at 21:32

## 2025-07-10 RX ADMIN — HYDROMORPHONE HYDROCHLORIDE 0.5 MG: 1 INJECTION, SOLUTION INTRAMUSCULAR; INTRAVENOUS; SUBCUTANEOUS at 02:23

## 2025-07-10 RX ADMIN — HYDROMORPHONE HYDROCHLORIDE 0.5 MG: 1 INJECTION, SOLUTION INTRAMUSCULAR; INTRAVENOUS; SUBCUTANEOUS at 11:18

## 2025-07-10 RX ADMIN — QUETIAPINE FUMARATE 100 MG: 100 TABLET ORAL at 09:49

## 2025-07-10 RX ADMIN — PREGABALIN 100 MG: 100 CAPSULE ORAL at 09:49

## 2025-07-10 RX ADMIN — SODIUM CHLORIDE, PRESERVATIVE FREE 5 ML: 5 INJECTION INTRAVENOUS at 21:19

## 2025-07-10 RX ADMIN — HYDROMORPHONE HYDROCHLORIDE 0.5 MG: 1 INJECTION, SOLUTION INTRAMUSCULAR; INTRAVENOUS; SUBCUTANEOUS at 19:33

## 2025-07-10 RX ADMIN — PREGABALIN 100 MG: 100 CAPSULE ORAL at 21:18

## 2025-07-10 RX ADMIN — ENOXAPARIN SODIUM 30 MG: 100 INJECTION SUBCUTANEOUS at 21:17

## 2025-07-10 RX ADMIN — HYDROMORPHONE HYDROCHLORIDE 0.5 MG: 1 INJECTION, SOLUTION INTRAMUSCULAR; INTRAVENOUS; SUBCUTANEOUS at 07:14

## 2025-07-10 RX ADMIN — DIAZEPAM 5 MG: 5 TABLET ORAL at 10:07

## 2025-07-10 RX ADMIN — ACETAMINOPHEN 500 MG: 500 TABLET ORAL at 22:48

## 2025-07-10 RX ADMIN — PROPRANOLOL HYDROCHLORIDE 10 MG: 10 TABLET ORAL at 09:48

## 2025-07-10 RX ADMIN — SODIUM CHLORIDE, PRESERVATIVE FREE 5 ML: 5 INJECTION INTRAVENOUS at 23:44

## 2025-07-10 RX ADMIN — ACETAMINOPHEN 500 MG: 500 TABLET ORAL at 07:13

## 2025-07-10 RX ADMIN — PANTOPRAZOLE SODIUM 40 MG: 40 TABLET, DELAYED RELEASE ORAL at 07:13

## 2025-07-10 RX ADMIN — SODIUM CHLORIDE: 0.9 INJECTION, SOLUTION INTRAVENOUS at 17:39

## 2025-07-10 RX ADMIN — HYDROMORPHONE HYDROCHLORIDE 0.5 MG: 1 INJECTION, SOLUTION INTRAMUSCULAR; INTRAVENOUS; SUBCUTANEOUS at 15:22

## 2025-07-10 RX ADMIN — ACETAMINOPHEN 500 MG: 500 TABLET ORAL at 15:25

## 2025-07-10 ASSESSMENT — PAIN SCALES - GENERAL
PAINLEVEL_OUTOF10: 7
PAINLEVEL_OUTOF10: 8
PAINLEVEL_OUTOF10: 8
PAINLEVEL_OUTOF10: 7
PAINLEVEL_OUTOF10: 5
PAINLEVEL_OUTOF10: 0
PAINLEVEL_OUTOF10: 7
PAINLEVEL_OUTOF10: 3
PAINLEVEL_OUTOF10: 0
PAINLEVEL_OUTOF10: 6
PAINLEVEL_OUTOF10: 7

## 2025-07-10 ASSESSMENT — PAIN DESCRIPTION - DESCRIPTORS
DESCRIPTORS: TIGHTNESS;THROBBING
DESCRIPTORS: STABBING;THROBBING
DESCRIPTORS: ACHING
DESCRIPTORS: STABBING;SHARP
DESCRIPTORS: SHARP
DESCRIPTORS: STABBING;SHARP
DESCRIPTORS: STABBING;THROBBING
DESCRIPTORS: THROBBING;TIGHTNESS
DESCRIPTORS: ACHING
DESCRIPTORS: THROBBING

## 2025-07-10 ASSESSMENT — PAIN DESCRIPTION - ORIENTATION
ORIENTATION: RIGHT;LEFT
ORIENTATION: RIGHT;LOWER
ORIENTATION: RIGHT;LEFT
ORIENTATION: LEFT;RIGHT
ORIENTATION: RIGHT
ORIENTATION: RIGHT;LEFT
ORIENTATION: RIGHT
ORIENTATION: PROXIMAL
ORIENTATION: RIGHT;LEFT;POSTERIOR
ORIENTATION: RIGHT

## 2025-07-10 ASSESSMENT — PAIN DESCRIPTION - ONSET: ONSET: ON-GOING

## 2025-07-10 ASSESSMENT — PAIN DESCRIPTION - LOCATION
LOCATION: FLANK
LOCATION: FLANK
LOCATION: BACK
LOCATION: FLANK
LOCATION: BACK

## 2025-07-10 ASSESSMENT — PAIN - FUNCTIONAL ASSESSMENT
PAIN_FUNCTIONAL_ASSESSMENT: ACTIVITIES ARE NOT PREVENTED

## 2025-07-10 ASSESSMENT — PAIN DESCRIPTION - FREQUENCY: FREQUENCY: INTERMITTENT

## 2025-07-10 ASSESSMENT — PAIN DESCRIPTION - PAIN TYPE: TYPE: ACUTE PAIN

## 2025-07-10 NOTE — PROGRESS NOTES
2200: Messaged KAYLYN Bhatia due to pt complaining of 9/10 flank pain, worse on the right side. Pt received PRN hydrocodone as ordered at 2020 and is not due for another dose at this time. She is requesting something for pain at this time. Pt is also c/o nausea. Per NP jany, okay to give PRN dilaudid early. Pt also given PRN zofran at this time.     End of Shift Note    Bedside shift change report given to Randi (oncoming nurse) by Liza Grimaldo RN (offgoing nurse).  Report included the following information SBAR, MAR, and Cardiac Rhythm NSR    Shift worked:  6928-1156     Shift summary and any significant changes:     See above regarding pain .   New IV placed by charge RN, pt pulled out previous one accidentally.      Concerns for physician to address:    Zone phone for oncoming shift:       Activity:  Level of Assistance: Independent  Number times ambulated in hallways past shift: 0  Number of times OOB to chair past shift: 0    Cardiac:   Cardiac Monitoring: Yes      Cardiac Rhythm: Sinus rhythm    Access:  Current line(s): PIV     Genitourinary:   Urinary Status: Voiding    Respiratory:   O2 Device: None (Room air)  Chronic home O2 use?: NO  Incentive spirometer at bedside: NO    GI:  Last BM (including prior to admit): 07/07/25  Current diet:  ADULT DIET; Regular  Passing flatus: YES    Pain Management:   Patient states pain is manageable on current regimen: YES    Skin:  Vish Scale Score: 21  Interventions: Wound Offloading (Prevention Methods): Pillows, Repositioning, Turning, Other (comment) (frequent ambulationi)    Patient Safety:  Fall Risk: Nursing Judgement-Fall Risk High(Add Comments): No  Fall Risk Interventions  Nursing Judgement-Fall Risk High(Add Comments): No  Toilet Every 2 Hours-In Advance of Need: Yes  Hourly Visual Checks: Awake, In bed  Fall Visual Posted: Socks  Room Door Open: Deferred to promote rest  Alarm On: Bed  Patient Moved Closer to Nursing Station: No    Active

## 2025-07-10 NOTE — PLAN OF CARE
Problem: Discharge Planning  Goal: Discharge to home or other facility with appropriate resources  7/9/2025 2321 by Eric Grimaldo RN  Outcome: Progressing  7/9/2025 1007 by Moni Mansfield RN  Outcome: Progressing     Problem: Pain  Goal: Verbalizes/displays adequate comfort level or baseline comfort level  7/9/2025 2321 by Eric Grimaldo RN  Outcome: Progressing  7/9/2025 1007 by Moni Mansfield RN  Outcome: Progressing     Problem: Neurosensory - Adult  Goal: Achieves stable or improved neurological status  7/9/2025 2321 by Eric Grimaldo RN  Outcome: Progressing  7/9/2025 1007 by Moni Mansfield RN  Outcome: Progressing     Problem: Cardiovascular - Adult  Goal: Maintains optimal cardiac output and hemodynamic stability  7/9/2025 2321 by Eric Grimaldo RN  Outcome: Progressing  7/9/2025 1007 by Moni Mansfield RN  Outcome: Progressing  Goal: Absence of cardiac dysrhythmias or at baseline  7/9/2025 2321 by Eric Grimaldo RN  Outcome: Progressing  7/9/2025 1007 by Moni Mansfield RN  Outcome: Progressing     Problem: Gastrointestinal - Adult  Goal: Minimal or absence of nausea and vomiting  7/9/2025 2321 by Eric Grimaldo RN  Outcome: Progressing  7/9/2025 1007 by Moni Mansfield RN  Outcome: Progressing  Goal: Maintains or returns to baseline bowel function  7/9/2025 2321 by Eric Grimaldo RN  Outcome: Progressing  7/9/2025 1007 by Moni Mansfield RN  Outcome: Progressing     Problem: Genitourinary - Adult  Goal: Absence of urinary retention  7/9/2025 2321 by Eric Grimlado RN  Outcome: Progressing  7/9/2025 1007 by Moni Mansfield RN  Outcome: Progressing     Problem: Infection - Adult  Goal: Absence of infection at discharge  7/9/2025 2321 by Eric Grimaldo RN  Outcome: Progressing  7/9/2025 1007 by Moni Mansfield RN  Outcome: Progressing  Goal: Absence of infection during hospitalization  7/9/2025 2321 by Re, Kerilynn, RN  Outcome: Progressing  7/9/2025 1007

## 2025-07-10 NOTE — PROGRESS NOTES
Nephrology Progress Note  [facility] / [place] [facility]  [address], [address]  [place] [address]  Phone - [phone]  Fax - [phone]                   Patient: Monique Goodman                     YOB: 1985        Date- 7/10/2025                                     Admit Date: 7/8/2025   CC: Follow up for acute kidney injury          IMPRESSION & PLAN:   Acute kidney injury-due to dehydration   Ckd  H/o edema  Htn  GERD      PLAN-  Stop IV fluids   Follow SAHRA, complement level  Follow bmp  Avoid acei or arb  Okay to discharge renal standpoint  We will sign off call us if needed     Subjective:  Interval History:   - 7/10/2025-creatinine improved 1.2 blood pressure stable    7/9/2025 she is admitteed with flank pain and back pain.  She has h/o edema in past.she is not taking diuretics  She has h/o htn. She is not taking her olmesartan on daily basis.  She took 3 tab olmesartan in last 1 week  Her bp was low in er  Her cr increased to 2.67  Her cr 1.0 in June 2025  She has h/o multiple laura in past.  She has remote h/o lupus  She is not on any lupus meds. She is not followed by any rheumatologist    Objective:   Vitals:    07/10/25 0714 07/10/25 0815 07/10/25 0948 07/10/25 1059   BP:    130/77   Pulse:  74 81 63   Resp: 14   14   Temp:    99.3 °F (37.4 °C)   TempSrc:    Oral   SpO2:       Weight:       Height:          I/O last 3 completed shifts:  In: 4073.8 [P.O.:790; I.V.:2781.3; IV Piggyback:502.5]  Out: 2050 [Urine:2050]  I/O this shift:  In: 1380 [P.O.:1380]  Out: 1600 [Urine:1600]      Physical exam:    GEN: NAD  NECK- no mass  RESP: No wheezing, decreased BS b/l  CVS: S1,S2  RRR  NEURO: Normal speech, nonfocal  EXT: trace Edema   PSYCH: Normal Mood    Chart reviewed.         Pertinent Notes reviewed.       Data Review :  Lab Results   Component Value Date/Time     07/10/2025 03:37 AM    K 4.6 07/10/2025 03:37 AM

## 2025-07-10 NOTE — PROGRESS NOTES
Hospital follow-up PCP transitional care appointment has been scheduled with Dr. Javi Krishnan on 7/29/2025 at 1330. This is the first available appt due to limited provider availability. PCP office does not offer alternate provider option for hospital follow up. Pending patient discharge. Korina Strickland, Care Management Assistant

## 2025-07-10 NOTE — CARE COORDINATION
Patient is clear from a CM standpoint.    Care Management Initial Assessment       RUR: 15% (moderate RUR)  Readmission? No  1st IM letter given? No  1st  letter given: No     7252 - CM contacted patient to complete initial assessment.  Patient's preferred pharmacy is:   [facility] [address] [facility] - P [phone] - F [phone].     07/10/25 0971   Service Assessment   Patient Orientation Alert and Oriented   Cognition Alert   History Provided By Patient   Primary Caregiver Self   Accompanied By/Relationship N/A   Support Systems Parent   Patient's Healthcare Decision Maker is: Legal Next of Kin   PCP Verified by CM Yes   Last Visit to PCP Within last 3 months  (about 2 weeks ago)   Prior Functional Level Independent in ADLs/IADLs   Current Functional Level Independent in ADLs/IADLs   Can patient return to prior living arrangement Yes   Ability to make needs known: Good   Family able to assist with home care needs: Yes   Would you like for me to discuss the discharge plan with any other family members/significant others, and if so, who? No   Financial Resources Medicaid   Community Resources None   Social/Functional History   Lives With Parent   Type of Home House   Home Layout One level  (5 [address])   Home Equipment (S)  Walker - Rolling  (Patient states she has a \"leg pump machine for her kidneys for the fluid\")   Prior Level of Assist for ADLs Independent   Prior Level of Assist for Homemaking Independent   Ambulation Assistance Independent   Prior Level of Assist for Transfers Independent   Active  Yes   Mode of Transportation Car   Discharge Planning   Type of Residence House   Living Arrangements Alone   Current Services Prior To Admission None   Potential Assistance Needed N/A   DME Ordered? No   Potential Assistance Purchasing Medications No   Type of Home Care Services None   Patient expects to be discharged to: House   Services At/After Discharge

## 2025-07-10 NOTE — PROGRESS NOTES
Hospitalist Progress Note    NAME:   Monique Goodman   : 1985   MRN: [id number]     Date/Time: 7/10/2025 3:30 PM  Patient PCP: Javi Krishnan MD    Estimated discharge date:  Barriers:       Assessment / Plan:    JIGNESH on CKD  HTN     -Reports chronic/intermittent dehydration, getting IV fluid as infusion every week, reported SBP 60s at home  - UA with trace proteinuria , no signs of infection , CK- 91   - CT abd/pelvis:non obstructive nephrolithiasis, distended urinary bladder with resulting minimal bilateral hydroureteronephrosis  - S/p bolus , continue with maintenance fluid   -CT noted for  517ml of urinary retention, repeat bladder scan -56ml   - Patient follows up with urology clinic   - Creatinine 2.61 on admission, baseline around 1.0,on 7/10 : Creatinine improved with IVF , BUN/Cr -15/1.22  .-Hold olmesartan  - Discussed with Dr Kong From Nephrology: Discontinue IVF,  Follow up with SAHRA , complement level. Okay to be discharged from renal standpoint            Chronic pain  History of occipital neuralgia  Reported history of lupus  Depression        -Continue Lyrica(takes 200 mg twice daily), reduced to 100 mg twice daily for now due to JIGNESH  - Continue PTA  Seroquel 100 mg p.o. twice daily  - Does not take Cymbalta anymore  - Continue PTA mirtazapine  - Continue Zanaflex     GERD, continue omeprazole     HTN  - Presented with hypotension .No signs of sepsis .  Last dose of Olmesartan was on . Not on diuretics at present .  Am cortisol slightly decreased -4 .  Patient needs to follow up with PCP - to repeat testing , since can be altered during acute illness.  Her BP is improving with hydration. XR chest for intermittent SOB.   -  Midodrine as needed                Medical Decision Making:   I personally reviewed labs:Yes   I personally reviewed imaging:  I personally reviewed EKG:  Toxic drug monitoring:  Lovenox- H&H   Discussed case with: Patient, mother ,RN         Code Status:

## 2025-07-11 VITALS
SYSTOLIC BLOOD PRESSURE: 133 MMHG | WEIGHT: 267 LBS | TEMPERATURE: 98.3 F | HEIGHT: 63 IN | BODY MASS INDEX: 47.31 KG/M2 | HEART RATE: 78 BPM | OXYGEN SATURATION: 95 % | DIASTOLIC BLOOD PRESSURE: 71 MMHG | RESPIRATION RATE: 13 BRPM

## 2025-07-11 LAB
ALBUMIN SERPL-MCNC: 3.4 G/DL (ref 3.5–5)
ANA SER QL: NEGATIVE
ANION GAP SERPL CALC-SCNC: 2 MMOL/L (ref 2–12)
ANION GAP SERPL CALC-SCNC: 5 MMOL/L (ref 2–12)
BUN SERPL-MCNC: 12 MG/DL (ref 6–20)
BUN SERPL-MCNC: 12 MG/DL (ref 6–20)
BUN/CREAT SERPL: 10 (ref 12–20)
BUN/CREAT SERPL: 10 (ref 12–20)
C3 SERPL-MCNC: 179 MG/DL (ref 82–167)
C4 SERPL-MCNC: 30 MG/DL (ref 12–38)
CALCIUM SERPL-MCNC: 9.7 MG/DL (ref 8.5–10.1)
CALCIUM SERPL-MCNC: 9.8 MG/DL (ref 8.5–10.1)
CHLORIDE SERPL-SCNC: 108 MMOL/L (ref 97–108)
CHLORIDE SERPL-SCNC: 108 MMOL/L (ref 97–108)
CO2 SERPL-SCNC: 24 MMOL/L (ref 21–32)
CO2 SERPL-SCNC: 27 MMOL/L (ref 21–32)
CREAT SERPL-MCNC: 1.16 MG/DL (ref 0.55–1.02)
CREAT SERPL-MCNC: 1.2 MG/DL (ref 0.55–1.02)
ERYTHROCYTE [DISTWIDTH] IN BLOOD BY AUTOMATED COUNT: 13.9 % (ref 11.5–14.5)
GLUCOSE SERPL-MCNC: 107 MG/DL (ref 65–100)
GLUCOSE SERPL-MCNC: 109 MG/DL (ref 65–100)
HCT VFR BLD AUTO: 32.8 % (ref 35–47)
HGB BLD-MCNC: 10 G/DL (ref 11.5–16)
MAGNESIUM SERPL-MCNC: 1.6 MG/DL (ref 1.6–2.4)
MCH RBC QN AUTO: 29.3 PG (ref 26–34)
MCHC RBC AUTO-ENTMCNC: 30.5 G/DL (ref 30–36.5)
MCV RBC AUTO: 96.2 FL (ref 80–99)
NRBC # BLD: 0 K/UL (ref 0–0.01)
NRBC BLD-RTO: 0 PER 100 WBC
PHOSPHATE SERPL-MCNC: 3.8 MG/DL (ref 2.6–4.7)
PLATELET # BLD AUTO: 304 K/UL (ref 150–400)
PMV BLD AUTO: 10.1 FL (ref 8.9–12.9)
POTASSIUM SERPL-SCNC: 4.6 MMOL/L (ref 3.5–5.1)
POTASSIUM SERPL-SCNC: 4.6 MMOL/L (ref 3.5–5.1)
RBC # BLD AUTO: 3.41 M/UL (ref 3.8–5.2)
SODIUM SERPL-SCNC: 137 MMOL/L (ref 136–145)
SODIUM SERPL-SCNC: 137 MMOL/L (ref 136–145)
WBC # BLD AUTO: 7.2 K/UL (ref 3.6–11)

## 2025-07-11 PROCEDURE — 36415 COLL VENOUS BLD VENIPUNCTURE: CPT

## 2025-07-11 PROCEDURE — 80069 RENAL FUNCTION PANEL: CPT

## 2025-07-11 PROCEDURE — 85027 COMPLETE CBC AUTOMATED: CPT

## 2025-07-11 PROCEDURE — 6370000000 HC RX 637 (ALT 250 FOR IP): Performed by: STUDENT IN AN ORGANIZED HEALTH CARE EDUCATION/TRAINING PROGRAM

## 2025-07-11 PROCEDURE — 80048 BASIC METABOLIC PNL TOTAL CA: CPT

## 2025-07-11 PROCEDURE — 2500000003 HC RX 250 WO HCPCS: Performed by: INTERNAL MEDICINE

## 2025-07-11 PROCEDURE — 83735 ASSAY OF MAGNESIUM: CPT

## 2025-07-11 PROCEDURE — 6370000000 HC RX 637 (ALT 250 FOR IP)

## 2025-07-11 PROCEDURE — 6370000000 HC RX 637 (ALT 250 FOR IP): Performed by: INTERNAL MEDICINE

## 2025-07-11 PROCEDURE — 2580000003 HC RX 258: Performed by: INTERNAL MEDICINE

## 2025-07-11 PROCEDURE — 6360000002 HC RX W HCPCS: Performed by: INTERNAL MEDICINE

## 2025-07-11 RX ORDER — OXYCODONE HYDROCHLORIDE 5 MG/1
5 TABLET ORAL EVERY 8 HOURS PRN
Qty: 3 TABLET | Refills: 0 | Status: SHIPPED | OUTPATIENT
Start: 2025-07-11 | End: 2025-07-14

## 2025-07-11 RX ADMIN — PANTOPRAZOLE SODIUM 40 MG: 40 TABLET, DELAYED RELEASE ORAL at 06:53

## 2025-07-11 RX ADMIN — HYDROMORPHONE HYDROCHLORIDE 0.5 MG: 1 INJECTION, SOLUTION INTRAMUSCULAR; INTRAVENOUS; SUBCUTANEOUS at 12:22

## 2025-07-11 RX ADMIN — PROPRANOLOL HYDROCHLORIDE 10 MG: 10 TABLET ORAL at 08:12

## 2025-07-11 RX ADMIN — SODIUM CHLORIDE, PRESERVATIVE FREE 10 ML: 5 INJECTION INTRAVENOUS at 08:15

## 2025-07-11 RX ADMIN — SODIUM CHLORIDE: 0.9 INJECTION, SOLUTION INTRAVENOUS at 04:10

## 2025-07-11 RX ADMIN — QUETIAPINE FUMARATE 100 MG: 100 TABLET ORAL at 08:12

## 2025-07-11 RX ADMIN — MIDODRINE HYDROCHLORIDE 5 MG: 5 TABLET ORAL at 04:08

## 2025-07-11 RX ADMIN — DIAZEPAM 5 MG: 5 TABLET ORAL at 10:11

## 2025-07-11 RX ADMIN — HYDROMORPHONE HYDROCHLORIDE 0.5 MG: 1 INJECTION, SOLUTION INTRAMUSCULAR; INTRAVENOUS; SUBCUTANEOUS at 08:24

## 2025-07-11 RX ADMIN — ACETAMINOPHEN 500 MG: 500 TABLET ORAL at 06:53

## 2025-07-11 RX ADMIN — ENOXAPARIN SODIUM 30 MG: 100 INJECTION SUBCUTANEOUS at 08:13

## 2025-07-11 RX ADMIN — ACETAMINOPHEN 500 MG: 500 TABLET ORAL at 12:21

## 2025-07-11 RX ADMIN — PREGABALIN 100 MG: 100 CAPSULE ORAL at 08:12

## 2025-07-11 RX ADMIN — HYDROMORPHONE HYDROCHLORIDE 0.5 MG: 1 INJECTION, SOLUTION INTRAMUSCULAR; INTRAVENOUS; SUBCUTANEOUS at 04:08

## 2025-07-11 RX ADMIN — SODIUM CHLORIDE, PRESERVATIVE FREE 5 ML: 5 INJECTION INTRAVENOUS at 04:09

## 2025-07-11 ASSESSMENT — PAIN SCALES - GENERAL
PAINLEVEL_OUTOF10: 4
PAINLEVEL_OUTOF10: 7
PAINLEVEL_OUTOF10: 8
PAINLEVEL_OUTOF10: 4
PAINLEVEL_OUTOF10: 5
PAINLEVEL_OUTOF10: 3
PAINLEVEL_OUTOF10: 7

## 2025-07-11 ASSESSMENT — PAIN DESCRIPTION - ORIENTATION
ORIENTATION: LOWER
ORIENTATION: LOWER
ORIENTATION: RIGHT;LOWER

## 2025-07-11 ASSESSMENT — PAIN - FUNCTIONAL ASSESSMENT: PAIN_FUNCTIONAL_ASSESSMENT: ACTIVITIES ARE NOT PREVENTED

## 2025-07-11 ASSESSMENT — PAIN DESCRIPTION - LOCATION
LOCATION: BACK

## 2025-07-11 ASSESSMENT — PAIN DESCRIPTION - DESCRIPTORS
DESCRIPTORS: SHARP
DESCRIPTORS: ACHING
DESCRIPTORS: ACHING

## 2025-07-11 NOTE — PROGRESS NOTES
End of Shift Note    Bedside shift change report given to COTY aguilera (oncoming nurse) by Machelle Pandya RN (offgoing nurse).  Report included the following information SBAR, Kardex, Intake/Output, MAR, Recent Results, and Cardiac Rhythm      Shift worked:  6870-5715     Shift summary and any significant changes:     Lower back pain remains an issue. Pain scores ranged from 4 to 8/10. Systolic BP >100.      Concerns for physician to address:       Zone phone for oncoming shift:   [phone]       Activity:  Level of Assistance: Independent  Number times ambulated in hallways past shift: 4  Number of times OOB to chair past shift: 0    Cardiac:   Cardiac Monitoring: Yes      Cardiac Rhythm: Sinus rhythm    Access:  Current line(s): PIV     Genitourinary:   Urinary Status: Voiding, Bathroom privileges    Respiratory:   O2 Device: None (Room air)  Chronic home O2 use?: NO  Incentive spirometer at bedside: NO    GI:  Last BM (including prior to admit): 07/09/25  Current diet:  ADULT DIET; Regular  Passing flatus: YES    Pain Management:   Patient states pain is manageable on current regimen: NO    Skin:  Vish Scale Score: 22  Interventions: Wound Offloading (Prevention Methods): Bed, pressure reduction mattress, Pillows, Repositioning, Turning    Patient Safety:  Fall Risk: Nursing Judgement-Fall Risk High(Add Comments): No  Fall Risk Interventions  Nursing Judgement-Fall Risk High(Add Comments): No  Toilet Every 2 Hours-In Advance of Need: Yes  Hourly Visual Checks: Awake, In bed  Fall Visual Posted: Socks  Room Door Open: Deferred to promote rest  Alarm On: Bed  Patient Moved Closer to Nursing Station: No    Active Consults:   IP CONSULT TO HOSPITALIST  IP CONSULT TO NEPHROLOGY    Length of Stay:  Expected LOS: 3  Actual LOS: 3    Machelle Pandya RN

## 2025-07-11 NOTE — PROGRESS NOTES
0700. Bedside shift change report given to Anthony Rn (oncoming nurse) by Machelle RN (offgoing nurse). Report included the following information Nurse Handoff Report, Index, Intake/Output, MAR, Recent Results, and Cardiac Rhythm NSR.    1400. Patient discharged home with mother. AVS provided in duplicate and all lines removed. All questions answered prior to discharge. All belongings with patient at time of discharge.

## 2025-07-11 NOTE — DISCHARGE SUMMARY
lupus  Depression     Resume home dose of Lyrica  - Continue PTA  Seroquel 100 mg p.o. twice daily  - Does not take Cymbalta anymore  - Continue PTA mirtazapine  - Continue Zanaflex     GERD, continue omeprazole     HTN  - Presented with hypotension .No signs of sepsis .  Last dose of Olmesartan was on 7/6. Not on diuretics at present .  Am cortisol slightly decreased -4 .  Patient needs to follow up with PCP - to repeat testing , since can be altered during acute illness.  Blood currently stable      Patient medically stable for discharge follow-up with nephrology PCP as outpatient.  Patient has an outpatient appointment on 7/17.  BMP in 3 to 5 days.  All discharge instruction discussed in detail with the patient and all questions were answered    Discharge Exam:  Patient seen and examined by me on discharge day.  Pertinent Findings:  Patient Vitals for the past 24 hrs:   BP Temp Temp src Pulse Resp SpO2   07/11/25 1120 133/71 98.3 °F (36.8 °C) Oral 78 13 95 %   07/11/25 0810 122/69 98.4 °F (36.9 °C) Oral 73 14 98 %   07/11/25 0520 (!) 99/53 -- -- 61 14 95 %   07/11/25 0438 -- -- -- -- 15 --   07/11/25 0408 -- -- -- -- 14 --   07/11/25 0310 (!) 91/49 98.1 °F (36.7 °C) Oral 62 13 95 %   07/11/25 0012 -- -- -- -- 13 --   07/10/25 2342 -- -- -- -- 12 --   07/10/25 2340 (!) 93/48 98.4 °F (36.9 °C) -- 65 13 92 %   07/10/25 2020 137/79 97.8 °F (36.6 °C) Oral 64 18 96 %   07/10/25 1539 106/82 99.5 °F (37.5 °C) Oral 58 13 --       Gen:    Not in distress  Chest: Clear lungs  CVS:   Regular rhythm.  No edema  Abd:  Soft, not distended, not tender  Neuro: awake, moving all exts    Discharge/Recent Laboratory Results:  Recent Labs     07/11/25  0621     137   K 4.6  4.6     108   CO2 24  27   BUN 12  12   CREATININE 1.16*  1.20*   GLUCOSE 109*  107*   CALCIUM 9.7  9.8   PHOS 3.8   MG 1.6     Recent Labs     07/11/25  0621   HGB 10.0*   HCT 32.8*   WBC 7.2          Discharge Medications:      Medication List        START taking these medications      oxyCODONE 5 MG immediate release tablet  Commonly known as: Roxicodone  Take 1 tablet by mouth every 8 hours as needed for Pain for up to 3 days. Intended supply: 3 days. Take lowest dose possible to manage pain Max Daily Amount: 15 mg            CHANGE how you take these medications      dicyclomine 10 MG capsule  Commonly known as: BENTYL  Take 1 capsule by mouth 4 times daily (before meals and nightly)  What changed: Another medication with the same name was removed. Continue taking this medication, and follow the directions you see here.     omeprazole 20 MG delayed release capsule  Commonly known as: PRILOSEC  TAKE 1 CAPSULE BY MOUTH EVERY DAY  What changed:   how much to take  when to take this     tiZANidine 4 MG tablet  Commonly known as: ZANAFLEX  TAKE 2 TAB BY MOUTH AT NIGHT AS DIRECTED ORALLY  What changed:   how much to take  how to take this  when to take this  additional instructions            CONTINUE taking these medications      acetaminophen 500 MG tablet  Commonly known as: TYLENOL  Take 1 tablet by mouth 4 times daily as needed for Pain     albuterol sulfate  (90 Base) MCG/ACT inhaler  Commonly known as: PROVENTIL;VENTOLIN;PROAIR     diazePAM 5 MG tablet  Commonly known as: VALIUM     famotidine 20 MG tablet  Commonly known as: PEPCID     hydrocortisone 2.5 % cream     mirtazapine 30 MG tablet  Commonly known as: REMERON  TAKE 1 TABLET BY MOUTH EVERY DAY AT NIGHT     ondansetron 4 MG disintegrating tablet  Commonly known as: ZOFRAN-ODT  Take 1 tablet by mouth 3 times daily as needed for Nausea or Vomiting     pregabalin 200 MG capsule  Commonly known as: LYRICA  Take 1 capsule by mouth 2 times daily for 180 days. Max Daily Amount: 400 mg     propranolol 10 MG immediate release tablet  Commonly known as: INDERAL     QUEtiapine 100 MG tablet  Commonly known as: SEROQUEL     witch hazel-glycerin pad  Commonly known as: A.E.RJoaquín Giraldo

## 2025-07-11 NOTE — PLAN OF CARE
Problem: Pain  Goal: Verbalizes/displays adequate comfort level or baseline comfort level  Outcome: Progressing     Problem: Cardiovascular - Adult  Goal: Maintains optimal cardiac output and hemodynamic stability  Outcome: Progressing  Goal: Absence of cardiac dysrhythmias or at baseline  Outcome: Progressing     Problem: Gastrointestinal - Adult  Goal: Minimal or absence of nausea and vomiting  Outcome: Progressing     Problem: Genitourinary - Adult  Goal: Absence of urinary retention  Outcome: Progressing     Problem: Infection - Adult  Goal: Absence of infection at discharge  Outcome: Progressing  Goal: Absence of infection during hospitalization  Outcome: Progressing     Problem: Metabolic/Fluid and Electrolytes - Adult  Goal: Hemodynamic stability and optimal renal function maintained  Outcome: Progressing     Problem: Safety - Adult  Goal: Free from fall injury  Outcome: Progressing

## 2025-07-19 ENCOUNTER — HOSPITAL ENCOUNTER (EMERGENCY)
Facility: HOSPITAL | Age: 40
Discharge: HOME | End: 2025-07-19
Attending: STUDENT IN AN ORGANIZED HEALTH CARE EDUCATION/TRAINING PROGRAM
Payer: MEDICAID

## 2025-07-19 VITALS
HEART RATE: 68 BPM | HEIGHT: 63 IN | TEMPERATURE: 98.1 F | DIASTOLIC BLOOD PRESSURE: 77 MMHG | OXYGEN SATURATION: 96 % | RESPIRATION RATE: 17 BRPM | SYSTOLIC BLOOD PRESSURE: 137 MMHG | WEIGHT: 274.03 LBS | BODY MASS INDEX: 48.55 KG/M2

## 2025-07-19 DIAGNOSIS — R10.9 FLANK PAIN: Primary | ICD-10-CM

## 2025-07-19 LAB
ALBUMIN SERPL-MCNC: 4.2 G/DL (ref 3.5–5)
ALBUMIN/GLOB SERPL: 1.2 (ref 1.1–2.2)
ALP SERPL-CCNC: 136 U/L (ref 45–117)
ALT SERPL-CCNC: 22 U/L (ref 12–78)
ANION GAP SERPL CALC-SCNC: 3 MMOL/L (ref 2–12)
APPEARANCE UR: CLEAR
AST SERPL-CCNC: 11 U/L (ref 15–37)
BACTERIA URNS QL MICRO: NEGATIVE /HPF
BASOPHILS # BLD: 0.04 K/UL (ref 0–0.1)
BASOPHILS NFR BLD: 0.4 % (ref 0–1)
BILIRUB SERPL-MCNC: 0.2 MG/DL (ref 0.2–1)
BILIRUB UR QL: NEGATIVE
BUN SERPL-MCNC: 15 MG/DL (ref 6–20)
BUN/CREAT SERPL: 13 (ref 12–20)
CALCIUM SERPL-MCNC: 9.6 MG/DL (ref 8.5–10.1)
CHLORIDE SERPL-SCNC: 110 MMOL/L (ref 97–108)
CO2 SERPL-SCNC: 27 MMOL/L (ref 21–32)
COLOR UR: ABNORMAL
CREAT SERPL-MCNC: 1.16 MG/DL (ref 0.55–1.02)
DIFFERENTIAL METHOD BLD: ABNORMAL
EKG ATRIAL RATE: 66 BPM
EKG DIAGNOSIS: NORMAL
EKG P AXIS: 47 DEGREES
EKG P-R INTERVAL: 208 MS
EKG Q-T INTERVAL: 372 MS
EKG QRS DURATION: 88 MS
EKG QTC CALCULATION (BAZETT): 389 MS
EKG R AXIS: 73 DEGREES
EKG T AXIS: 38 DEGREES
EKG VENTRICULAR RATE: 66 BPM
EOSINOPHIL # BLD: 0.4 K/UL (ref 0–0.4)
EOSINOPHIL NFR BLD: 4.2 % (ref 0–7)
EPITH CASTS URNS QL MICRO: ABNORMAL /LPF
ERYTHROCYTE [DISTWIDTH] IN BLOOD BY AUTOMATED COUNT: 13.6 % (ref 11.5–14.5)
GLOBULIN SER CALC-MCNC: 3.4 G/DL (ref 2–4)
GLUCOSE SERPL-MCNC: 92 MG/DL (ref 65–100)
GLUCOSE UR STRIP.AUTO-MCNC: NEGATIVE MG/DL
HCG UR QL: NEGATIVE
HCT VFR BLD AUTO: 38.9 % (ref 35–47)
HGB BLD-MCNC: 12.2 G/DL (ref 11.5–16)
HGB UR QL STRIP: NEGATIVE
HYALINE CASTS URNS QL MICRO: ABNORMAL /LPF (ref 0–2)
IMM GRANULOCYTES # BLD AUTO: 0.06 K/UL (ref 0–0.04)
IMM GRANULOCYTES NFR BLD AUTO: 0.6 % (ref 0–0.5)
KETONES UR QL STRIP.AUTO: NEGATIVE MG/DL
LEUKOCYTE ESTERASE UR QL STRIP.AUTO: NEGATIVE
LIPASE SERPL-CCNC: 26 U/L (ref 13–75)
LYMPHOCYTES # BLD: 2.61 K/UL (ref 0.8–3.5)
LYMPHOCYTES NFR BLD: 27.5 % (ref 12–49)
MCH RBC QN AUTO: 28.9 PG (ref 26–34)
MCHC RBC AUTO-ENTMCNC: 31.4 G/DL (ref 30–36.5)
MCV RBC AUTO: 92.2 FL (ref 80–99)
MONOCYTES # BLD: 0.82 K/UL (ref 0–1)
MONOCYTES NFR BLD: 8.6 % (ref 5–13)
NEUTS SEG # BLD: 5.56 K/UL (ref 1.8–8)
NEUTS SEG NFR BLD: 58.7 % (ref 32–75)
NITRITE UR QL STRIP.AUTO: NEGATIVE
NRBC # BLD: 0 K/UL (ref 0–0.01)
NRBC BLD-RTO: 0 PER 100 WBC
PH UR STRIP: 6 (ref 5–8)
PLATELET # BLD AUTO: 322 K/UL (ref 150–400)
PMV BLD AUTO: 10.1 FL (ref 8.9–12.9)
POTASSIUM SERPL-SCNC: 4.3 MMOL/L (ref 3.5–5.1)
PROT SERPL-MCNC: 7.6 G/DL (ref 6.4–8.2)
PROT UR STRIP-MCNC: NEGATIVE MG/DL
RBC # BLD AUTO: 4.22 M/UL (ref 3.8–5.2)
RBC #/AREA URNS HPF: ABNORMAL /HPF (ref 0–5)
SODIUM SERPL-SCNC: 140 MMOL/L (ref 136–145)
SP GR UR REFRACTOMETRY: 1.02
UROBILINOGEN UR QL STRIP.AUTO: 0.2 EU/DL (ref 0.2–1)
WBC # BLD AUTO: 9.5 K/UL (ref 3.6–11)
WBC URNS QL MICRO: ABNORMAL /HPF (ref 0–4)

## 2025-07-19 PROCEDURE — 80053 COMPREHEN METABOLIC PANEL: CPT

## 2025-07-19 PROCEDURE — 96374 THER/PROPH/DIAG INJ IV PUSH: CPT

## 2025-07-19 PROCEDURE — 96375 TX/PRO/DX INJ NEW DRUG ADDON: CPT

## 2025-07-19 PROCEDURE — 93005 ELECTROCARDIOGRAM TRACING: CPT | Performed by: STUDENT IN AN ORGANIZED HEALTH CARE EDUCATION/TRAINING PROGRAM

## 2025-07-19 PROCEDURE — 81025 URINE PREGNANCY TEST: CPT

## 2025-07-19 PROCEDURE — 85025 COMPLETE CBC W/AUTO DIFF WBC: CPT

## 2025-07-19 PROCEDURE — 81001 URINALYSIS AUTO W/SCOPE: CPT

## 2025-07-19 PROCEDURE — 36415 COLL VENOUS BLD VENIPUNCTURE: CPT

## 2025-07-19 PROCEDURE — 99284 EMERGENCY DEPT VISIT MOD MDM: CPT

## 2025-07-19 PROCEDURE — 6360000002 HC RX W HCPCS: Performed by: STUDENT IN AN ORGANIZED HEALTH CARE EDUCATION/TRAINING PROGRAM

## 2025-07-19 PROCEDURE — 6370000000 HC RX 637 (ALT 250 FOR IP): Performed by: STUDENT IN AN ORGANIZED HEALTH CARE EDUCATION/TRAINING PROGRAM

## 2025-07-19 PROCEDURE — 83690 ASSAY OF LIPASE: CPT

## 2025-07-19 PROCEDURE — 2580000003 HC RX 258: Performed by: STUDENT IN AN ORGANIZED HEALTH CARE EDUCATION/TRAINING PROGRAM

## 2025-07-19 PROCEDURE — 96376 TX/PRO/DX INJ SAME DRUG ADON: CPT

## 2025-07-19 RX ORDER — OXYCODONE HYDROCHLORIDE 5 MG/1
5 TABLET ORAL EVERY 8 HOURS PRN
Qty: 6 TABLET | Refills: 0 | Status: SHIPPED | OUTPATIENT
Start: 2025-07-19 | End: 2025-07-22

## 2025-07-19 RX ORDER — ACETAMINOPHEN 500 MG
1000 TABLET ORAL
Status: COMPLETED | OUTPATIENT
Start: 2025-07-19 | End: 2025-07-19

## 2025-07-19 RX ORDER — 0.9 % SODIUM CHLORIDE 0.9 %
1000 INTRAVENOUS SOLUTION INTRAVENOUS ONCE
Status: COMPLETED | OUTPATIENT
Start: 2025-07-19 | End: 2025-07-19

## 2025-07-19 RX ORDER — MORPHINE SULFATE 4 MG/ML
4 INJECTION, SOLUTION INTRAMUSCULAR; INTRAVENOUS
Status: COMPLETED | OUTPATIENT
Start: 2025-07-19 | End: 2025-07-19

## 2025-07-19 RX ORDER — HYDROMORPHONE HYDROCHLORIDE 1 MG/ML
0.5 INJECTION, SOLUTION INTRAMUSCULAR; INTRAVENOUS; SUBCUTANEOUS
Status: COMPLETED | OUTPATIENT
Start: 2025-07-19 | End: 2025-07-19

## 2025-07-19 RX ORDER — ONDANSETRON 2 MG/ML
4 INJECTION INTRAMUSCULAR; INTRAVENOUS ONCE
Status: COMPLETED | OUTPATIENT
Start: 2025-07-19 | End: 2025-07-19

## 2025-07-19 RX ADMIN — MORPHINE SULFATE 4 MG: 4 INJECTION, SOLUTION INTRAMUSCULAR; INTRAVENOUS at 15:15

## 2025-07-19 RX ADMIN — ACETAMINOPHEN 1000 MG: 500 TABLET ORAL at 17:39

## 2025-07-19 RX ADMIN — ONDANSETRON 4 MG: 2 INJECTION, SOLUTION INTRAMUSCULAR; INTRAVENOUS at 15:13

## 2025-07-19 RX ADMIN — SODIUM CHLORIDE 1000 ML: 0.9 INJECTION, SOLUTION INTRAVENOUS at 15:18

## 2025-07-19 RX ADMIN — HYDROMORPHONE HYDROCHLORIDE 0.5 MG: 1 INJECTION, SOLUTION INTRAMUSCULAR; INTRAVENOUS; SUBCUTANEOUS at 16:47

## 2025-07-19 RX ADMIN — HYDROMORPHONE HYDROCHLORIDE 0.5 MG: 1 INJECTION, SOLUTION INTRAMUSCULAR; INTRAVENOUS; SUBCUTANEOUS at 17:39

## 2025-07-19 ASSESSMENT — PAIN SCALES - GENERAL
PAINLEVEL_OUTOF10: 7
PAINLEVEL_OUTOF10: 8
PAINLEVEL_OUTOF10: 7
PAINLEVEL_OUTOF10: 8
PAINLEVEL_OUTOF10: 7

## 2025-07-19 ASSESSMENT — PAIN DESCRIPTION - ORIENTATION
ORIENTATION: LEFT;RIGHT;LOWER
ORIENTATION: RIGHT;LEFT;LOWER
ORIENTATION: LOWER

## 2025-07-19 ASSESSMENT — PAIN DESCRIPTION - DESCRIPTORS
DESCRIPTORS: PRESSURE
DESCRIPTORS: PRESSURE
DESCRIPTORS: ACHING;PRESSURE

## 2025-07-19 ASSESSMENT — PAIN DESCRIPTION - LOCATION
LOCATION: FLANK
LOCATION: BACK

## 2025-07-19 NOTE — ED NOTES
RN assumed care of pt, per triage note:     Patient ambulatory into triage c/o bilateral flank pain, dizziness x8 hours. Pt reports pain is similar to last admission for kidney failure

## 2025-07-19 NOTE — DISCHARGE INSTRUCTIONS
Thank You!    It was a pleasure taking care of you in our Emergency Department today. We know that when you come to our Emergency Department, you are entrusting us with your health, comfort, and safety. Our physicians and nurses honor that trust, and truly appreciate the opportunity to care for you and your loved ones.      We also value your feedback. If you receive a survey about your Emergency Department experience today, please fill it out.  We care about our patients' feedback, and we listen to what you have to say.  Thank you.    Michael Ortiz MD  ________________________________________________________________________  I have included a copy of your lab results and/or radiologic studies from today's visit so you can have them easily available at your follow-up visit. We hope you feel better and please do not hesitate to contact the ED if you have any questions at all!    Recent Results (from the past 12 hours)   EKG 12 Lead    Collection Time: 07/19/25 12:39 PM   Result Value Ref Range    Ventricular Rate 66 BPM    Atrial Rate 66 BPM    P-R Interval 208 ms    QRS Duration 88 ms    Q-T Interval 372 ms    QTc Calculation (Bazett) 389 ms    P Axis 47 degrees    R Axis 73 degrees    T Axis 38 degrees    Diagnosis       Normal sinus rhythm  Normal ECG  When compared with ECG of 08-JUL-2025 12:58,  ST no longer elevated in Inferior leads  Nonspecific T wave abnormality now evident in Anterior leads     CBC with Auto Differential    Collection Time: 07/19/25  1:38 PM   Result Value Ref Range    WBC 9.5 3.6 - 11.0 K/uL    RBC 4.22 3.80 - 5.20 M/uL    Hemoglobin 12.2 11.5 - 16.0 g/dL    Hematocrit 38.9 35.0 - 47.0 %    MCV 92.2 80.0 - 99.0 FL    MCH 28.9 26.0 - 34.0 PG    MCHC 31.4 30.0 - 36.5 g/dL    RDW 13.6 11.5 - 14.5 %    Platelets 322 150 - 400 K/uL    MPV 10.1 8.9 - 12.9 FL    Nucleated RBCs 0.0 0  WBC    nRBC 0.00 0.00 - 0.01 K/uL    Neutrophils % 58.7 32.0 - 75.0 %    Lymphocytes % 27.5 12.0 - 49.0

## 2025-07-22 ENCOUNTER — HOSPITAL ENCOUNTER (OUTPATIENT)
Facility: HOSPITAL | Age: 40
Setting detail: INFUSION SERIES
End: 2025-07-22

## 2025-07-31 ENCOUNTER — HOSPITAL ENCOUNTER (OUTPATIENT)
Facility: HOSPITAL | Age: 40
Setting detail: INFUSION SERIES
Discharge: HOME | End: 2025-07-31
Payer: MEDICAID

## 2025-07-31 VITALS
DIASTOLIC BLOOD PRESSURE: 82 MMHG | TEMPERATURE: 98 F | SYSTOLIC BLOOD PRESSURE: 133 MMHG | OXYGEN SATURATION: 95 % | HEART RATE: 70 BPM | RESPIRATION RATE: 16 BRPM

## 2025-07-31 DIAGNOSIS — G90.A POSTURAL ORTHOSTATIC TACHYCARDIA SYNDROME: Primary | ICD-10-CM

## 2025-07-31 PROCEDURE — 2580000003 HC RX 258: Performed by: PHYSICIAN ASSISTANT

## 2025-07-31 PROCEDURE — 96360 HYDRATION IV INFUSION INIT: CPT

## 2025-07-31 RX ORDER — 0.9 % SODIUM CHLORIDE 0.9 %
1500 INTRAVENOUS SOLUTION INTRAVENOUS ONCE
OUTPATIENT
Start: 2025-08-06 | End: 2025-08-06

## 2025-07-31 RX ORDER — 0.9 % SODIUM CHLORIDE 0.9 %
1500 INTRAVENOUS SOLUTION INTRAVENOUS ONCE
Start: 2025-08-06 | End: 2025-08-06

## 2025-07-31 RX ORDER — 0.9 % SODIUM CHLORIDE 0.9 %
1500 INTRAVENOUS SOLUTION INTRAVENOUS ONCE
Status: DISCONTINUED | OUTPATIENT
Start: 2025-07-31 | End: 2025-07-31

## 2025-07-31 RX ORDER — 0.9 % SODIUM CHLORIDE 0.9 %
1500 INTRAVENOUS SOLUTION INTRAVENOUS ONCE
Status: COMPLETED | OUTPATIENT
Start: 2025-07-31 | End: 2025-07-31

## 2025-07-31 RX ADMIN — SODIUM CHLORIDE 1000 ML: 0.9 INJECTION, SOLUTION INTRAVENOUS at 11:47

## 2025-07-31 NOTE — PROGRESS NOTES
[facility] Progress Note    Pt arrived in stable condition for Hydration    Assessment completed.     24G PIV established in left A/C with positive blood return noted. Pt requested only to receive 1 liter today.    Patient Vitals for the past 12 hrs:   Temp Pulse Resp BP SpO2   07/31/25 1140 98 °F (36.7 °C) 70 16 133/82 95 %      The following medications administered:  Medications Administered         sodium chloride 0.9 % bolus 1,500 mL Admin Date  07/31/2025 Action  New Bag Dose  1,000 mL Rate  989 mL/hr Route  IntraVENous Documented By  Leisa Hall, RN          Pt tolerated treatment well. IV flushed per policy and removed, 2x2 and coban placed.     Pt discharged in no acute distress. Next appointment:    Future Appointments   Date Time Provider Department Center   8/8/2025 11:00 AM [facility] LAB CHAIR [facility]   8/14/2025  3:00 PM [facility] LAB CHAIR [facility]   8/28/2025  2:15 PM [facility] CHEMO CHAIR 22 [facility]   9/5/2025 11:00 AM [facility] CHEMO CHAIR 20 [facility]

## 2025-08-03 NOTE — ED PROVIDER NOTES
[facility] EMERGENCY DEPARTMENT  EMERGENCY DEPARTMENT ENCOUNTER       Pt Name: Monique Goodman  MRN: [id number]  Birthdate 1985  Date of evaluation: 7/19/2025  Provider: Michael Ortiz MD   PCP: Javi Krishnan MD  Note Started: 2:03 PM 8/3/25     CHIEF COMPLAINT       Chief Complaint   Patient presents with    Flank Pain     Patient ambulatory into triage c/o bilateral flank pain, dizziness x8 hours. Pt reports pain is similar to last admission for kidney failure.         HISTORY OF PRESENT ILLNESS: 1 or more elements      History From: Patient  None     Monique Goodman is a 39 y.o. female who presents to the emergency department with bilateral flank pain which has been ongoing for the past 8 hours.  Patient has history of frequent admissions to the hospital for JIGNESH's of uncertain origin.  She is there when she has these JIGNESH as she usually has flank pain and her symptoms today feel similar.  Denies associated dysuria, hematuria.  She does report some associated nausea.     Nursing Notes were all reviewed and agreed with or any disagreements were addressed in the HPI.     REVIEW OF SYSTEMS      Review of Systems     Positives and Pertinent negatives as per HPI.    PAST HISTORY     Past Medical History:  Past Medical History:   Diagnosis Date    Abnormal CT of the abdomen 11/8/2012    JIGNESH (acute kidney injury) 01/24/2024    PT STATES SHE HAS BEEN IN KIDNEY FAILURE DUE TO DIURETICS    Anxiety and depression     Arthritis     Asthma     Autoimmune disease     lupus    C. difficile diarrhea 2022    PER PT    Cervical prolapse     Dehydration     Garth-Danlos syndrome     Gastrointestinal disorder     gerd, twisted colon, IBS, PT DENIES IBS    GERD (gastroesophageal reflux disease)     Headache(784.0)     History of blood transfusion 2018    PT STATES SHE HAD A GI BLEED    Hx of blood clots 2023    PT STATES SHE HAD A VERY SMALL BLOOD CLOT IN HER RIGHT LEG    Hypertension     Kidney stone

## 2025-08-07 ENCOUNTER — HOSPITAL ENCOUNTER (EMERGENCY)
Facility: HOSPITAL | Age: 40
Discharge: HOME | End: 2025-08-07
Payer: MEDICAID

## 2025-08-07 VITALS
OXYGEN SATURATION: 99 % | DIASTOLIC BLOOD PRESSURE: 56 MMHG | SYSTOLIC BLOOD PRESSURE: 104 MMHG | HEART RATE: 76 BPM | TEMPERATURE: 97.9 F | BODY MASS INDEX: 49.65 KG/M2 | WEIGHT: 280.2 LBS | HEIGHT: 63 IN | RESPIRATION RATE: 18 BRPM

## 2025-08-07 DIAGNOSIS — R11.2 NAUSEA AND VOMITING, UNSPECIFIED VOMITING TYPE: Primary | ICD-10-CM

## 2025-08-07 DIAGNOSIS — R10.9 FLANK PAIN: ICD-10-CM

## 2025-08-07 LAB
ALBUMIN SERPL-MCNC: 3.7 G/DL (ref 3.5–5.2)
ALBUMIN/GLOB SERPL: 1.3 (ref 1.1–2.2)
ALP SERPL-CCNC: 139 U/L (ref 35–104)
ALT SERPL-CCNC: 12 U/L (ref 10–35)
ANION GAP SERPL CALC-SCNC: 13 MMOL/L (ref 2–14)
APPEARANCE UR: CLEAR
AST SERPL-CCNC: 18 U/L (ref 10–35)
BACTERIA URNS QL MICRO: NEGATIVE /HPF
BASOPHILS # BLD: 0.03 K/UL (ref 0–0.1)
BASOPHILS NFR BLD: 0.4 % (ref 0–1)
BILIRUB SERPL-MCNC: <0.2 MG/DL (ref 0–1.2)
BILIRUB UR QL: NEGATIVE
BUN SERPL-MCNC: 25 MG/DL (ref 6–20)
BUN/CREAT SERPL: 17 (ref 12–20)
CALCIUM SERPL-MCNC: 9.1 MG/DL (ref 8.6–10)
CHLORIDE SERPL-SCNC: 105 MMOL/L (ref 98–107)
CO2 SERPL-SCNC: 21 MMOL/L (ref 20–29)
COLOR UR: ABNORMAL
CREAT SERPL-MCNC: 1.47 MG/DL (ref 0.6–1)
DIFFERENTIAL METHOD BLD: ABNORMAL
EOSINOPHIL # BLD: 0.34 K/UL (ref 0–0.4)
EOSINOPHIL NFR BLD: 4.7 % (ref 0–7)
EPITH CASTS URNS QL MICRO: ABNORMAL /LPF
ERYTHROCYTE [DISTWIDTH] IN BLOOD BY AUTOMATED COUNT: 13.4 % (ref 11.5–14.5)
GLOBULIN SER CALC-MCNC: 2.9 G/DL (ref 2–4)
GLUCOSE SERPL-MCNC: 160 MG/DL (ref 65–100)
GLUCOSE UR STRIP.AUTO-MCNC: NEGATIVE MG/DL
HCG UR QL: NEGATIVE
HCT VFR BLD AUTO: 34.1 % (ref 35–47)
HGB BLD-MCNC: 11 G/DL (ref 11.5–16)
HGB UR QL STRIP: NEGATIVE
IMM GRANULOCYTES # BLD AUTO: 0.08 K/UL (ref 0–0.04)
IMM GRANULOCYTES NFR BLD AUTO: 1.1 % (ref 0–0.5)
KETONES UR QL STRIP.AUTO: NEGATIVE MG/DL
LEUKOCYTE ESTERASE UR QL STRIP.AUTO: NEGATIVE
LIPASE SERPL-CCNC: 21 U/L (ref 13–60)
LYMPHOCYTES # BLD: 1.42 K/UL (ref 0.8–3.5)
LYMPHOCYTES NFR BLD: 19.6 % (ref 12–49)
MCH RBC QN AUTO: 29.5 PG (ref 26–34)
MCHC RBC AUTO-ENTMCNC: 32.3 G/DL (ref 30–36.5)
MCV RBC AUTO: 91.4 FL (ref 80–99)
MONOCYTES # BLD: 0.82 K/UL (ref 0–1)
MONOCYTES NFR BLD: 11.3 % (ref 5–13)
NEUTS SEG # BLD: 4.55 K/UL (ref 1.8–8)
NEUTS SEG NFR BLD: 62.9 % (ref 32–75)
NITRITE UR QL STRIP.AUTO: NEGATIVE
NRBC # BLD: 0 K/UL (ref 0–0.01)
NRBC BLD-RTO: 0 PER 100 WBC
PH UR STRIP: 6 (ref 5–8)
PLATELET # BLD AUTO: 285 K/UL (ref 150–400)
PMV BLD AUTO: 9.8 FL (ref 8.9–12.9)
POTASSIUM SERPL-SCNC: 4.1 MMOL/L (ref 3.5–5.1)
PROT SERPL-MCNC: 6.6 G/DL (ref 6.4–8.3)
PROT UR STRIP-MCNC: 30 MG/DL
RBC # BLD AUTO: 3.73 M/UL (ref 3.8–5.2)
RBC #/AREA URNS HPF: ABNORMAL /HPF (ref 0–5)
SODIUM SERPL-SCNC: 138 MMOL/L (ref 136–145)
SP GR UR REFRACTOMETRY: 1.01
URINE CULTURE IF INDICATED: ABNORMAL
UROBILINOGEN UR QL STRIP.AUTO: 0.2 EU/DL (ref 0.2–1)
WBC # BLD AUTO: 7.2 K/UL (ref 3.6–11)
WBC URNS QL MICRO: ABNORMAL /HPF (ref 0–4)

## 2025-08-07 PROCEDURE — 99284 EMERGENCY DEPT VISIT MOD MDM: CPT

## 2025-08-07 PROCEDURE — 85025 COMPLETE CBC W/AUTO DIFF WBC: CPT

## 2025-08-07 PROCEDURE — 2580000003 HC RX 258: Performed by: PHYSICIAN ASSISTANT

## 2025-08-07 PROCEDURE — 6360000002 HC RX W HCPCS: Performed by: PHYSICIAN ASSISTANT

## 2025-08-07 PROCEDURE — 81001 URINALYSIS AUTO W/SCOPE: CPT

## 2025-08-07 PROCEDURE — 81025 URINE PREGNANCY TEST: CPT

## 2025-08-07 PROCEDURE — 96376 TX/PRO/DX INJ SAME DRUG ADON: CPT

## 2025-08-07 PROCEDURE — 96374 THER/PROPH/DIAG INJ IV PUSH: CPT

## 2025-08-07 PROCEDURE — 96375 TX/PRO/DX INJ NEW DRUG ADDON: CPT

## 2025-08-07 PROCEDURE — 36415 COLL VENOUS BLD VENIPUNCTURE: CPT

## 2025-08-07 PROCEDURE — 83690 ASSAY OF LIPASE: CPT

## 2025-08-07 PROCEDURE — 80053 COMPREHEN METABOLIC PANEL: CPT

## 2025-08-07 RX ORDER — HYDROMORPHONE HYDROCHLORIDE 1 MG/ML
1 INJECTION, SOLUTION INTRAMUSCULAR; INTRAVENOUS; SUBCUTANEOUS
Status: COMPLETED | OUTPATIENT
Start: 2025-08-07 | End: 2025-08-07

## 2025-08-07 RX ORDER — ONDANSETRON 2 MG/ML
4 INJECTION INTRAMUSCULAR; INTRAVENOUS ONCE
Status: COMPLETED | OUTPATIENT
Start: 2025-08-07 | End: 2025-08-07

## 2025-08-07 RX ORDER — HYDROMORPHONE HYDROCHLORIDE 1 MG/ML
0.5 INJECTION, SOLUTION INTRAMUSCULAR; INTRAVENOUS; SUBCUTANEOUS
Status: COMPLETED | OUTPATIENT
Start: 2025-08-07 | End: 2025-08-07

## 2025-08-07 RX ORDER — ONDANSETRON 4 MG/1
4 TABLET, ORALLY DISINTEGRATING ORAL 3 TIMES DAILY PRN
Qty: 21 TABLET | Refills: 0 | Status: SHIPPED | OUTPATIENT
Start: 2025-08-07

## 2025-08-07 RX ORDER — OXYCODONE HYDROCHLORIDE 5 MG/1
5 TABLET ORAL EVERY 6 HOURS PRN
Qty: 3 TABLET | Refills: 0 | Status: SHIPPED | OUTPATIENT
Start: 2025-08-07 | End: 2025-08-10

## 2025-08-07 RX ORDER — 0.9 % SODIUM CHLORIDE 0.9 %
1000 INTRAVENOUS SOLUTION INTRAVENOUS ONCE
Status: COMPLETED | OUTPATIENT
Start: 2025-08-07 | End: 2025-08-07

## 2025-08-07 RX ADMIN — HYDROMORPHONE HYDROCHLORIDE 0.5 MG: 1 INJECTION, SOLUTION INTRAMUSCULAR; INTRAVENOUS; SUBCUTANEOUS at 10:06

## 2025-08-07 RX ADMIN — HYDROMORPHONE HYDROCHLORIDE 1 MG: 1 INJECTION, SOLUTION INTRAMUSCULAR; INTRAVENOUS; SUBCUTANEOUS at 07:58

## 2025-08-07 RX ADMIN — SODIUM CHLORIDE 1000 ML: 9 INJECTION, SOLUTION INTRAVENOUS at 10:23

## 2025-08-07 RX ADMIN — SODIUM CHLORIDE 1000 ML: 0.9 INJECTION, SOLUTION INTRAVENOUS at 08:00

## 2025-08-07 RX ADMIN — ONDANSETRON 4 MG: 2 INJECTION, SOLUTION INTRAMUSCULAR; INTRAVENOUS at 07:58

## 2025-08-07 ASSESSMENT — PAIN DESCRIPTION - LOCATION
LOCATION: BACK;ABDOMEN
LOCATION: ABDOMEN;BACK
LOCATION: BACK;ABDOMEN
LOCATION: ABDOMEN;BACK
LOCATION: ABDOMEN;BACK

## 2025-08-07 ASSESSMENT — PAIN DESCRIPTION - ORIENTATION
ORIENTATION: LOWER
ORIENTATION: RIGHT;LEFT;LOWER
ORIENTATION: LOWER
ORIENTATION: RIGHT
ORIENTATION: LOWER;LEFT;RIGHT

## 2025-08-07 ASSESSMENT — PAIN - FUNCTIONAL ASSESSMENT
PAIN_FUNCTIONAL_ASSESSMENT: ACTIVITIES ARE NOT PREVENTED
PAIN_FUNCTIONAL_ASSESSMENT: 0-10
PAIN_FUNCTIONAL_ASSESSMENT: ACTIVITIES ARE NOT PREVENTED

## 2025-08-07 ASSESSMENT — PAIN SCALES - GENERAL
PAINLEVEL_OUTOF10: 8
PAINLEVEL_OUTOF10: 8
PAINLEVEL_OUTOF10: 6
PAINLEVEL_OUTOF10: 6
PAINLEVEL_OUTOF10: 8

## 2025-08-07 ASSESSMENT — PAIN DESCRIPTION - DESCRIPTORS
DESCRIPTORS: ACHING;CRAMPING
DESCRIPTORS: ACHING
DESCRIPTORS: ACHING
DESCRIPTORS: ACHING;CRAMPING
DESCRIPTORS: ACHING

## 2025-08-07 ASSESSMENT — PAIN DESCRIPTION - ONSET: ONSET: ON-GOING

## 2025-08-07 ASSESSMENT — PAIN DESCRIPTION - PAIN TYPE: TYPE: ACUTE PAIN

## 2025-08-07 ASSESSMENT — PAIN DESCRIPTION - FREQUENCY: FREQUENCY: CONTINUOUS

## 2025-08-08 ENCOUNTER — HOSPITAL ENCOUNTER (OUTPATIENT)
Facility: HOSPITAL | Age: 40
Setting detail: INFUSION SERIES
End: 2025-08-08

## 2025-08-14 ENCOUNTER — HOSPITAL ENCOUNTER (OUTPATIENT)
Facility: HOSPITAL | Age: 40
Setting detail: INFUSION SERIES
Discharge: HOME | End: 2025-08-14
Payer: MEDICAID

## 2025-08-14 VITALS
OXYGEN SATURATION: 94 % | SYSTOLIC BLOOD PRESSURE: 160 MMHG | RESPIRATION RATE: 17 BRPM | DIASTOLIC BLOOD PRESSURE: 83 MMHG | TEMPERATURE: 98.7 F | HEART RATE: 67 BPM

## 2025-08-14 DIAGNOSIS — G90.A POSTURAL ORTHOSTATIC TACHYCARDIA SYNDROME: Primary | ICD-10-CM

## 2025-08-14 PROCEDURE — 96361 HYDRATE IV INFUSION ADD-ON: CPT

## 2025-08-14 PROCEDURE — 2580000003 HC RX 258: Performed by: PHYSICIAN ASSISTANT

## 2025-08-14 PROCEDURE — 96360 HYDRATION IV INFUSION INIT: CPT

## 2025-08-14 RX ORDER — 0.9 % SODIUM CHLORIDE 0.9 %
1500 INTRAVENOUS SOLUTION INTRAVENOUS ONCE
Start: 2025-08-28 | End: 2025-08-28

## 2025-08-14 RX ORDER — 0.9 % SODIUM CHLORIDE 0.9 %
1500 INTRAVENOUS SOLUTION INTRAVENOUS ONCE
Status: COMPLETED | OUTPATIENT
Start: 2025-08-14 | End: 2025-08-14

## 2025-08-14 RX ORDER — 0.9 % SODIUM CHLORIDE 0.9 %
1500 INTRAVENOUS SOLUTION INTRAVENOUS ONCE
OUTPATIENT
Start: 2025-08-28 | End: 2025-08-28

## 2025-08-14 RX ADMIN — SODIUM CHLORIDE 1500 ML: 0.9 INJECTION, SOLUTION INTRAVENOUS at 14:52

## 2025-08-14 ASSESSMENT — PAIN SCALES - GENERAL: PAINLEVEL_OUTOF10: 5

## 2025-08-14 ASSESSMENT — PAIN DESCRIPTION - ORIENTATION: ORIENTATION: LOWER

## 2025-08-14 ASSESSMENT — PAIN DESCRIPTION - DESCRIPTORS: DESCRIPTORS: ACHING

## 2025-08-14 ASSESSMENT — PAIN DESCRIPTION - LOCATION: LOCATION: ABDOMEN;BACK

## 2025-08-20 ENCOUNTER — HOSPITAL ENCOUNTER (OUTPATIENT)
Facility: HOSPITAL | Age: 40
Setting detail: INFUSION SERIES
End: 2025-08-20

## 2025-08-28 ENCOUNTER — HOSPITAL ENCOUNTER (OUTPATIENT)
Facility: HOSPITAL | Age: 40
Setting detail: INFUSION SERIES
Discharge: HOME | End: 2025-08-28
Payer: MEDICAID

## 2025-08-28 VITALS
OXYGEN SATURATION: 96 % | TEMPERATURE: 98.4 F | SYSTOLIC BLOOD PRESSURE: 104 MMHG | DIASTOLIC BLOOD PRESSURE: 63 MMHG | HEART RATE: 62 BPM | RESPIRATION RATE: 18 BRPM

## 2025-08-28 DIAGNOSIS — G90.A POSTURAL ORTHOSTATIC TACHYCARDIA SYNDROME: Primary | ICD-10-CM

## 2025-08-28 PROCEDURE — 2580000003 HC RX 258: Performed by: PHYSICIAN ASSISTANT

## 2025-08-28 PROCEDURE — 96360 HYDRATION IV INFUSION INIT: CPT

## 2025-08-28 RX ORDER — 0.9 % SODIUM CHLORIDE 0.9 %
1500 INTRAVENOUS SOLUTION INTRAVENOUS ONCE
Status: COMPLETED | OUTPATIENT
Start: 2025-08-28 | End: 2025-08-28

## 2025-08-28 RX ORDER — 0.9 % SODIUM CHLORIDE 0.9 %
1500 INTRAVENOUS SOLUTION INTRAVENOUS ONCE
Status: DISCONTINUED | OUTPATIENT
Start: 2025-08-28 | End: 2025-08-29 | Stop reason: HOSPADM

## 2025-08-28 RX ORDER — 0.9 % SODIUM CHLORIDE 0.9 %
1500 INTRAVENOUS SOLUTION INTRAVENOUS ONCE
OUTPATIENT
Start: 2025-09-11 | End: 2025-09-11

## 2025-08-28 RX ORDER — 0.9 % SODIUM CHLORIDE 0.9 %
1500 INTRAVENOUS SOLUTION INTRAVENOUS ONCE
Start: 2025-09-11 | End: 2025-09-11

## 2025-08-28 RX ADMIN — SODIUM CHLORIDE 1500 ML: 0.9 INJECTION, SOLUTION INTRAVENOUS at 14:28

## 2025-08-28 ASSESSMENT — PAIN DESCRIPTION - PAIN TYPE: TYPE: CHRONIC PAIN

## 2025-08-28 ASSESSMENT — PAIN SCALES - GENERAL: PAINLEVEL_OUTOF10: 5

## 2025-08-28 ASSESSMENT — PAIN DESCRIPTION - DESCRIPTORS: DESCRIPTORS: ACHING

## 2025-08-28 ASSESSMENT — PAIN DESCRIPTION - LOCATION: LOCATION: BACK

## 2025-08-28 ASSESSMENT — PAIN DESCRIPTION - ORIENTATION: ORIENTATION: LOWER

## 2025-09-01 ENCOUNTER — HOSPITAL ENCOUNTER (EMERGENCY)
Facility: HOSPITAL | Age: 40
Discharge: HOME | End: 2025-09-01
Attending: EMERGENCY MEDICINE
Payer: MEDICAID

## 2025-09-01 VITALS
HEART RATE: 61 BPM | TEMPERATURE: 97.7 F | OXYGEN SATURATION: 100 % | RESPIRATION RATE: 18 BRPM | DIASTOLIC BLOOD PRESSURE: 67 MMHG | SYSTOLIC BLOOD PRESSURE: 131 MMHG

## 2025-09-01 DIAGNOSIS — M54.50 ACUTE BILATERAL LOW BACK PAIN WITHOUT SCIATICA: Primary | ICD-10-CM

## 2025-09-01 LAB
ANION GAP SERPL CALC-SCNC: 10 MMOL/L (ref 2–14)
APPEARANCE UR: CLEAR
BACTERIA URNS QL MICRO: NEGATIVE /HPF
BASOPHILS # BLD: 0.05 K/UL (ref 0–0.1)
BASOPHILS NFR BLD: 0.5 % (ref 0–1)
BILIRUB UR QL: NEGATIVE
BUN SERPL-MCNC: 19 MG/DL (ref 6–20)
BUN/CREAT SERPL: 18 (ref 12–20)
CALCIUM SERPL-MCNC: 9.8 MG/DL (ref 8.6–10)
CHLORIDE SERPL-SCNC: 107 MMOL/L (ref 98–107)
CO2 SERPL-SCNC: 21 MMOL/L (ref 20–29)
COLOR UR: ABNORMAL
CREAT SERPL-MCNC: 1.06 MG/DL (ref 0.6–1)
DIFFERENTIAL METHOD BLD: NORMAL
EOSINOPHIL # BLD: 0.3 K/UL (ref 0–0.4)
EOSINOPHIL NFR BLD: 3.1 % (ref 0–7)
EPITH CASTS URNS QL MICRO: ABNORMAL /LPF
ERYTHROCYTE [DISTWIDTH] IN BLOOD BY AUTOMATED COUNT: 13.9 % (ref 11.5–14.5)
GLUCOSE SERPL-MCNC: 110 MG/DL (ref 65–100)
GLUCOSE UR STRIP.AUTO-MCNC: NEGATIVE MG/DL
HCT VFR BLD AUTO: 35.8 % (ref 35–47)
HGB BLD-MCNC: 11.5 G/DL (ref 11.5–16)
HGB UR QL STRIP: ABNORMAL
HYALINE CASTS URNS QL MICRO: ABNORMAL /LPF (ref 0–2)
IMM GRANULOCYTES # BLD AUTO: 0.04 K/UL (ref 0–0.04)
IMM GRANULOCYTES NFR BLD AUTO: 0.4 % (ref 0–0.5)
KETONES UR QL STRIP.AUTO: NEGATIVE MG/DL
LEUKOCYTE ESTERASE UR QL STRIP.AUTO: NEGATIVE
LYMPHOCYTES # BLD: 2.72 K/UL (ref 0.8–3.5)
LYMPHOCYTES NFR BLD: 28.4 % (ref 12–49)
MCH RBC QN AUTO: 29.4 PG (ref 26–34)
MCHC RBC AUTO-ENTMCNC: 32.1 G/DL (ref 30–36.5)
MCV RBC AUTO: 91.6 FL (ref 80–99)
MONOCYTES # BLD: 0.73 K/UL (ref 0–1)
MONOCYTES NFR BLD: 7.6 % (ref 5–13)
NEUTS SEG # BLD: 5.74 K/UL (ref 1.8–8)
NEUTS SEG NFR BLD: 60 % (ref 32–75)
NITRITE UR QL STRIP.AUTO: NEGATIVE
NRBC # BLD: 0 K/UL (ref 0–0.01)
NRBC BLD-RTO: 0 PER 100 WBC
PH UR STRIP: 6 (ref 5–8)
PLATELET # BLD AUTO: 313 K/UL (ref 150–400)
PMV BLD AUTO: 9.6 FL (ref 8.9–12.9)
POTASSIUM SERPL-SCNC: 3.7 MMOL/L (ref 3.5–5.1)
PROT UR STRIP-MCNC: NEGATIVE MG/DL
RBC # BLD AUTO: 3.91 M/UL (ref 3.8–5.2)
RBC #/AREA URNS HPF: >100 /HPF (ref 0–5)
SODIUM SERPL-SCNC: 138 MMOL/L (ref 136–145)
SP GR UR REFRACTOMETRY: 1.01
URINE CULTURE IF INDICATED: ABNORMAL
UROBILINOGEN UR QL STRIP.AUTO: 0.2 EU/DL (ref 0.2–1)
WBC # BLD AUTO: 9.6 K/UL (ref 3.6–11)
WBC URNS QL MICRO: ABNORMAL /HPF (ref 0–4)

## 2025-09-01 PROCEDURE — 36415 COLL VENOUS BLD VENIPUNCTURE: CPT

## 2025-09-01 PROCEDURE — 6360000002 HC RX W HCPCS: Performed by: EMERGENCY MEDICINE

## 2025-09-01 PROCEDURE — 2580000003 HC RX 258: Performed by: EMERGENCY MEDICINE

## 2025-09-01 PROCEDURE — 6370000000 HC RX 637 (ALT 250 FOR IP): Performed by: EMERGENCY MEDICINE

## 2025-09-01 PROCEDURE — 99284 EMERGENCY DEPT VISIT MOD MDM: CPT

## 2025-09-01 PROCEDURE — 81001 URINALYSIS AUTO W/SCOPE: CPT

## 2025-09-01 PROCEDURE — 96374 THER/PROPH/DIAG INJ IV PUSH: CPT

## 2025-09-01 PROCEDURE — 85025 COMPLETE CBC W/AUTO DIFF WBC: CPT

## 2025-09-01 PROCEDURE — 80048 BASIC METABOLIC PNL TOTAL CA: CPT

## 2025-09-01 RX ORDER — ONDANSETRON 2 MG/ML
4 INJECTION INTRAMUSCULAR; INTRAVENOUS ONCE
Status: COMPLETED | OUTPATIENT
Start: 2025-09-01 | End: 2025-09-01

## 2025-09-01 RX ORDER — 0.9 % SODIUM CHLORIDE 0.9 %
1000 INTRAVENOUS SOLUTION INTRAVENOUS ONCE
Status: COMPLETED | OUTPATIENT
Start: 2025-09-01 | End: 2025-09-01

## 2025-09-01 RX ORDER — ONDANSETRON 4 MG/1
4 TABLET, ORALLY DISINTEGRATING ORAL 3 TIMES DAILY PRN
Qty: 21 TABLET | Refills: 0 | Status: SHIPPED | OUTPATIENT
Start: 2025-09-01

## 2025-09-01 RX ORDER — OXYCODONE AND ACETAMINOPHEN 5; 325 MG/1; MG/1
1 TABLET ORAL
Refills: 0 | Status: COMPLETED | OUTPATIENT
Start: 2025-09-01 | End: 2025-09-01

## 2025-09-01 RX ORDER — OXYCODONE AND ACETAMINOPHEN 5; 325 MG/1; MG/1
1 TABLET ORAL EVERY 6 HOURS PRN
Qty: 6 TABLET | Refills: 0 | Status: SHIPPED | OUTPATIENT
Start: 2025-09-01 | End: 2025-09-04

## 2025-09-01 RX ADMIN — ONDANSETRON 4 MG: 2 INJECTION, SOLUTION INTRAMUSCULAR; INTRAVENOUS at 04:37

## 2025-09-01 RX ADMIN — OXYCODONE AND ACETAMINOPHEN 1 TABLET: 5; 325 TABLET ORAL at 04:37

## 2025-09-01 RX ADMIN — SODIUM CHLORIDE 1000 ML: 9 INJECTION, SOLUTION INTRAVENOUS at 04:00

## 2025-09-01 ASSESSMENT — PAIN SCALES - GENERAL: PAINLEVEL_OUTOF10: 8

## 2025-09-01 ASSESSMENT — LIFESTYLE VARIABLES
HOW OFTEN DO YOU HAVE A DRINK CONTAINING ALCOHOL: NEVER
HOW MANY STANDARD DRINKS CONTAINING ALCOHOL DO YOU HAVE ON A TYPICAL DAY: PATIENT DOES NOT DRINK

## (undated) DEVICE — GARMENT,MEDLINE,DVT,INT,CALF,MED, GEN2: Brand: MEDLINE

## (undated) DEVICE — SYRINGE MED 10ML LUERLOCK TIP W/O SFTY DISP

## (undated) DEVICE — SOLUTION IRRIG 3000ML 0.9% SOD CHL USP UROMATIC PLAS CONT

## (undated) DEVICE — NEEDLE HYPO 22GA L1.5IN BLK S STL HUB POLYPR SHLD REG BVL

## (undated) DEVICE — DRAPE,REIN 53X77,STERILE: Brand: MEDLINE

## (undated) DEVICE — SOLUTION IRRIGATION STRL H2O 1000 ML UROMATIC CONTAINER

## (undated) DEVICE — DRAPE,U/ SHT,SPLIT,PLAS,STERIL: Brand: MEDLINE

## (undated) DEVICE — PACK,BASIC,SIRUS,V: Brand: MEDLINE

## (undated) DEVICE — INFECTION CONTROL KIT SYS

## (undated) DEVICE — STERILE POLYISOPRENE POWDER-FREE SURGICAL GLOVES: Brand: PROTEXIS

## (undated) DEVICE — SOLUTION IRRIG 1000ML STRL H2O USP PLAS POUR BTL

## (undated) DEVICE — (D)PREP SKN CHLRAPRP APPL 26ML -- CONVERT TO ITEM 371833

## (undated) DEVICE — INTENDED FOR TISSUE SEPARATION, AND OTHER PROCEDURES THAT REQUIRE A SHARP SURGICAL BLADE TO PUNCTURE OR CUT.: Brand: BARD-PARKER ® CARBON RIB-BACK BLADES

## (undated) DEVICE — SOLUTION IV 1000ML 0.9% SOD CHL

## (undated) DEVICE — TOWEL SURG W17XL27IN STD BLU COT NONFENESTRATED PREWASHED

## (undated) DEVICE — SPONGE GZ W4XL4IN COT 12 PLY TYP VII WVN C FLD DSGN

## (undated) DEVICE — KIT,1200CC CANISTER,3/16"X6' TUBING: Brand: MEDLINE INDUSTRIES, INC.

## (undated) DEVICE — REM POLYHESIVE ADULT PATIENT RETURN ELECTRODE: Brand: VALLEYLAB

## (undated) DEVICE — ROCKER SWITCH PENCIL BLADE ELECTRODE, HOLSTER: Brand: EDGE

## (undated) DEVICE — GLOVE ORANGE PI 8   MSG9080

## (undated) DEVICE — Y-TYPE TUR/BLADDER IRRIGATION SET, REGULATING CLAMP

## (undated) DEVICE — HANDLE LT SNAP ON ULT DURABLE LENS FOR TRUMPF ALC DISPOSABLE

## (undated) DEVICE — DRAPE,EXTREMITY,89X128,STERILE: Brand: MEDLINE

## (undated) DEVICE — OPEN-END URETERAL CATHETER: Brand: COOK

## (undated) DEVICE — CYSTO-SMH: Brand: MEDLINE INDUSTRIES, INC.

## (undated) DEVICE — GUIDEWIRE ENDOSCP L150CM DIA0.035IN TIP 3CM PTFE NIT

## (undated) DEVICE — STRAP,POSITIONING,KNEE/BODY,FOAM,4X60": Brand: MEDLINE

## (undated) DEVICE — SURGICAL PROCEDURE PACK BASIN MAJ SET CUST NO CAUT